# Patient Record
Sex: MALE | Race: WHITE | NOT HISPANIC OR LATINO | Employment: OTHER | URBAN - METROPOLITAN AREA
[De-identification: names, ages, dates, MRNs, and addresses within clinical notes are randomized per-mention and may not be internally consistent; named-entity substitution may affect disease eponyms.]

---

## 2017-01-04 ENCOUNTER — TELEPHONE (OUTPATIENT)
Dept: NEPHROLOGY | Facility: CLINIC | Age: 72
End: 2017-01-04

## 2017-01-04 NOTE — TELEPHONE ENCOUNTER
Call to patient to check in on BP. Patient reports that he has been out of town and just got back yesterday so he just made medication changes as recommended per Dr. Michel. Lab appointment set up 1/23 per patient. Confirmed by patient that he discontinued amlodipine and HCTZ and started nifedipine XL 60 mg BID and chlorthalidone 25 mg daily and continued metoprolol 100 mg BID and losartan 100 mg daily. Will update Dr. Michel and follow up in a week or two.  Elisa Cardona LPN  Nephrology  [facility]  [phone]

## 2017-01-17 ENCOUNTER — TELEPHONE (OUTPATIENT)
Dept: GASTROENTEROLOGY | Facility: CLINIC | Age: 72
End: 2017-01-17

## 2017-01-17 ENCOUNTER — MYC MEDICAL ADVICE (OUTPATIENT)
Dept: PULMONOLOGY | Facility: CLINIC | Age: 72
End: 2017-01-17

## 2017-01-17 NOTE — TELEPHONE ENCOUNTER
Patient contacted and reminded of upcoming appointment.  Patient confirmed they will be attending.  Patient instructed to bring updated medications list to appointment.  Instructed pt to arrive an hour and a half to two hours prior to appt time for labs.  Aj Weber, CMA

## 2017-01-18 ENCOUNTER — OFFICE VISIT (OUTPATIENT)
Dept: FAMILY MEDICINE | Facility: CLINIC | Age: 72
End: 2017-01-18

## 2017-01-18 ENCOUNTER — HOSPITAL ENCOUNTER (INPATIENT)
Facility: CLINIC | Age: 72
LOS: 7 days | Discharge: HOME | DRG: 273 | End: 2017-01-25
Attending: EMERGENCY MEDICINE | Admitting: INTERNAL MEDICINE
Payer: MEDICARE

## 2017-01-18 ENCOUNTER — APPOINTMENT (OUTPATIENT)
Dept: GENERAL RADIOLOGY | Facility: CLINIC | Age: 72
DRG: 273 | End: 2017-01-18
Attending: EMERGENCY MEDICINE
Payer: MEDICARE

## 2017-01-18 VITALS
RESPIRATION RATE: 20 BRPM | DIASTOLIC BLOOD PRESSURE: 88 MMHG | WEIGHT: 208.5 LBS | HEART RATE: 154 BPM | BODY MASS INDEX: 30.78 KG/M2 | SYSTOLIC BLOOD PRESSURE: 127 MMHG | OXYGEN SATURATION: 96 %

## 2017-01-18 DIAGNOSIS — I48.92 ATRIAL FLUTTER WITH RAPID VENTRICULAR RESPONSE (H): ICD-10-CM

## 2017-01-18 DIAGNOSIS — I48.0 PAROXYSMAL ATRIAL FIBRILLATION (H): Primary | ICD-10-CM

## 2017-01-18 DIAGNOSIS — Z23 NEED FOR PROPHYLACTIC VACCINATION AND INOCULATION AGAINST INFLUENZA: Primary | ICD-10-CM

## 2017-01-18 DIAGNOSIS — R07.9 CHEST PAIN, UNSPECIFIED TYPE: ICD-10-CM

## 2017-01-18 DIAGNOSIS — I10 HTN (HYPERTENSION): ICD-10-CM

## 2017-01-18 DIAGNOSIS — D86.9 SARCOIDOSIS: ICD-10-CM

## 2017-01-18 DIAGNOSIS — R06.02 SOB (SHORTNESS OF BREATH): ICD-10-CM

## 2017-01-18 DIAGNOSIS — F41.9 ANXIETY: ICD-10-CM

## 2017-01-18 DIAGNOSIS — D86.0 SARCOIDOSIS OF LUNG (H): ICD-10-CM

## 2017-01-18 DIAGNOSIS — I50.33 ACUTE ON CHRONIC DIASTOLIC HEART FAILURE (H): ICD-10-CM

## 2017-01-18 LAB
ALBUMIN SERPL-MCNC: 3.7 G/DL (ref 3.4–5)
ALP SERPL-CCNC: 132 U/L (ref 40–150)
ALT SERPL W P-5'-P-CCNC: 66 U/L (ref 0–70)
ANION GAP SERPL CALCULATED.3IONS-SCNC: 10 MMOL/L (ref 3–14)
AST SERPL W P-5'-P-CCNC: 32 U/L (ref 0–45)
BASOPHILS # BLD AUTO: 0.1 10E9/L (ref 0–0.2)
BASOPHILS NFR BLD AUTO: 0.6 %
BILIRUB DIRECT SERPL-MCNC: 0.3 MG/DL (ref 0–0.2)
BILIRUB SERPL-MCNC: 0.9 MG/DL (ref 0.2–1.3)
BUN SERPL-MCNC: 47 MG/DL (ref 7–30)
CA-I BLD-SCNC: 4.9 MG/DL (ref 4.4–5.2)
CALCIUM SERPL-MCNC: 8.6 MG/DL (ref 8.5–10.1)
CHLORIDE SERPL-SCNC: 102 MMOL/L (ref 94–109)
CO2 BLDCOV-SCNC: 22 MMOL/L (ref 21–28)
CO2 BLDCOV-SCNC: 24 MMOL/L (ref 21–28)
CO2 SERPL-SCNC: 23 MMOL/L (ref 20–32)
CREAT SERPL-MCNC: 1.99 MG/DL (ref 0.66–1.25)
DIFFERENTIAL METHOD BLD: ABNORMAL
EOSINOPHIL # BLD AUTO: 0.4 10E9/L (ref 0–0.7)
EOSINOPHIL NFR BLD AUTO: 3.2 %
ERYTHROCYTE [DISTWIDTH] IN BLOOD BY AUTOMATED COUNT: 13.8 % (ref 10–15)
GFR SERPL CREATININE-BSD FRML MDRD: 33 ML/MIN/1.7M2
GLUCOSE BLD-MCNC: 137 MG/DL (ref 70–99)
GLUCOSE SERPL-MCNC: 131 MG/DL (ref 70–99)
HCT VFR BLD AUTO: 45.1 % (ref 40–53)
HCT VFR BLD CALC: 47 %PCV (ref 40–53)
HGB BLD CALC-MCNC: 16 G/DL (ref 13.3–17.7)
HGB BLD-MCNC: 15.1 G/DL (ref 13.3–17.7)
IMM GRANULOCYTES # BLD: 0 10E9/L (ref 0–0.4)
IMM GRANULOCYTES NFR BLD: 0.3 %
INR PPP: 1.1 (ref 0.86–1.14)
LACTATE BLD-SCNC: 1.2 MMOL/L (ref 0.7–2.1)
LACTATE BLD-SCNC: 1.6 MMOL/L (ref 0.7–2.1)
LYMPHOCYTES # BLD AUTO: 1 10E9/L (ref 0.8–5.3)
LYMPHOCYTES NFR BLD AUTO: 8.1 %
MCH RBC QN AUTO: 32.9 PG (ref 26.5–33)
MCHC RBC AUTO-ENTMCNC: 33.5 G/DL (ref 31.5–36.5)
MCV RBC AUTO: 98 FL (ref 78–100)
MONOCYTES # BLD AUTO: 1.2 10E9/L (ref 0–1.3)
MONOCYTES NFR BLD AUTO: 9.8 %
NEUTROPHILS # BLD AUTO: 9.6 10E9/L (ref 1.6–8.3)
NEUTROPHILS NFR BLD AUTO: 78 %
NRBC # BLD AUTO: 0 10*3/UL
NRBC BLD AUTO-RTO: 0 /100
NT-PROBNP SERPL-MCNC: ABNORMAL PG/ML (ref 0–900)
PCO2 BLDV: 45 MM HG (ref 40–50)
PCO2 BLDV: 48 MM HG (ref 40–50)
PH BLDV: 7.29 PH (ref 7.32–7.43)
PH BLDV: 7.3 PH (ref 7.32–7.43)
PLATELET # BLD AUTO: 236 10E9/L (ref 150–450)
PO2 BLDV: 22 MM HG (ref 25–47)
PO2 BLDV: 30 MM HG (ref 25–47)
POTASSIUM BLD-SCNC: 4.8 MMOL/L (ref 3.4–5.3)
POTASSIUM SERPL-SCNC: 4.5 MMOL/L (ref 3.4–5.3)
PROCALCITONIN SERPL-MCNC: NORMAL NG/ML
PROT SERPL-MCNC: 8.3 G/DL (ref 6.8–8.8)
RBC # BLD AUTO: 4.59 10E12/L (ref 4.4–5.9)
SAO2 % BLDV FROM PO2: 32 %
SAO2 % BLDV FROM PO2: 50 %
SODIUM BLD-SCNC: 138 MMOL/L (ref 133–144)
SODIUM SERPL-SCNC: 135 MMOL/L (ref 133–144)
TROPONIN I BLD-MCNC: 0.23 UG/L (ref 0–0.1)
TROPONIN I SERPL-MCNC: 0.48 UG/L (ref 0–0.04)
WBC # BLD AUTO: 12.3 10E9/L (ref 4–11)

## 2017-01-18 PROCEDURE — 25000308 HC RX OP HPI UCR WEL MED 250 IP 250: Performed by: EMERGENCY MEDICINE

## 2017-01-18 PROCEDURE — 96375 TX/PRO/DX INJ NEW DRUG ADDON: CPT | Performed by: EMERGENCY MEDICINE

## 2017-01-18 PROCEDURE — 82803 BLOOD GASES ANY COMBINATION: CPT

## 2017-01-18 PROCEDURE — 84145 PROCALCITONIN (PCT): CPT | Performed by: EMERGENCY MEDICINE

## 2017-01-18 PROCEDURE — 71010 XR CHEST PORT 1 VW: CPT

## 2017-01-18 PROCEDURE — 99223 1ST HOSP IP/OBS HIGH 75: CPT | Mod: AI | Performed by: INTERNAL MEDICINE

## 2017-01-18 PROCEDURE — 87040 BLOOD CULTURE FOR BACTERIA: CPT | Performed by: EMERGENCY MEDICINE

## 2017-01-18 PROCEDURE — 83605 ASSAY OF LACTIC ACID: CPT | Performed by: EMERGENCY MEDICINE

## 2017-01-18 PROCEDURE — 93010 ELECTROCARDIOGRAM REPORT: CPT | Mod: Z6 | Performed by: EMERGENCY MEDICINE

## 2017-01-18 PROCEDURE — 82330 ASSAY OF CALCIUM: CPT

## 2017-01-18 PROCEDURE — 40000498 ZZHCL STATISTIC POTASSIUM ED POCT

## 2017-01-18 PROCEDURE — 96361 HYDRATE IV INFUSION ADD-ON: CPT | Performed by: EMERGENCY MEDICINE

## 2017-01-18 PROCEDURE — 96365 THER/PROPH/DIAG IV INF INIT: CPT | Performed by: EMERGENCY MEDICINE

## 2017-01-18 PROCEDURE — 96376 TX/PRO/DX INJ SAME DRUG ADON: CPT | Performed by: EMERGENCY MEDICINE

## 2017-01-18 PROCEDURE — 25000125 ZZHC RX 250: Performed by: EMERGENCY MEDICINE

## 2017-01-18 PROCEDURE — 25000128 H RX IP 250 OP 636: Performed by: EMERGENCY MEDICINE

## 2017-01-18 PROCEDURE — 93005 ELECTROCARDIOGRAM TRACING: CPT

## 2017-01-18 PROCEDURE — 40000501 ZZHCL STATISTIC HEMATOCRIT ED POCT

## 2017-01-18 PROCEDURE — 40000275 ZZH STATISTIC RCP TIME EA 10 MIN

## 2017-01-18 PROCEDURE — 80076 HEPATIC FUNCTION PANEL: CPT | Performed by: EMERGENCY MEDICINE

## 2017-01-18 PROCEDURE — 94640 AIRWAY INHALATION TREATMENT: CPT | Mod: 76 | Performed by: EMERGENCY MEDICINE

## 2017-01-18 PROCEDURE — 83605 ASSAY OF LACTIC ACID: CPT

## 2017-01-18 PROCEDURE — 21400006 ZZH R&B CCU INTERMEDIATE UMMC

## 2017-01-18 PROCEDURE — 85610 PROTHROMBIN TIME: CPT | Performed by: EMERGENCY MEDICINE

## 2017-01-18 PROCEDURE — 40000497 ZZHCL STATISTIC SODIUM ED POCT

## 2017-01-18 PROCEDURE — 99291 CRITICAL CARE FIRST HOUR: CPT | Mod: 25 | Performed by: EMERGENCY MEDICINE

## 2017-01-18 PROCEDURE — 40000556 ZZH STATISTIC PERIPHERAL IV START W US GUIDANCE

## 2017-01-18 PROCEDURE — 80048 BASIC METABOLIC PNL TOTAL CA: CPT | Performed by: EMERGENCY MEDICINE

## 2017-01-18 PROCEDURE — 93010 ELECTROCARDIOGRAM REPORT: CPT | Performed by: INTERNAL MEDICINE

## 2017-01-18 PROCEDURE — 96368 THER/DIAG CONCURRENT INF: CPT | Performed by: EMERGENCY MEDICINE

## 2017-01-18 PROCEDURE — 40000502 ZZHCL STATISTIC GLUCOSE ED POCT

## 2017-01-18 PROCEDURE — 93005 ELECTROCARDIOGRAM TRACING: CPT | Performed by: EMERGENCY MEDICINE

## 2017-01-18 PROCEDURE — 84484 ASSAY OF TROPONIN QUANT: CPT

## 2017-01-18 PROCEDURE — 96366 THER/PROPH/DIAG IV INF ADDON: CPT | Performed by: EMERGENCY MEDICINE

## 2017-01-18 PROCEDURE — 83880 ASSAY OF NATRIURETIC PEPTIDE: CPT | Performed by: EMERGENCY MEDICINE

## 2017-01-18 PROCEDURE — 99285 EMERGENCY DEPT VISIT HI MDM: CPT | Performed by: EMERGENCY MEDICINE

## 2017-01-18 PROCEDURE — 85025 COMPLETE CBC W/AUTO DIFF WBC: CPT | Performed by: EMERGENCY MEDICINE

## 2017-01-18 PROCEDURE — 94640 AIRWAY INHALATION TREATMENT: CPT | Performed by: EMERGENCY MEDICINE

## 2017-01-18 PROCEDURE — 84484 ASSAY OF TROPONIN QUANT: CPT | Performed by: EMERGENCY MEDICINE

## 2017-01-18 RX ORDER — MIRTAZAPINE 15 MG/1
15 TABLET, FILM COATED ORAL AT BEDTIME
Status: DISCONTINUED | OUTPATIENT
Start: 2017-01-18 | End: 2017-01-25 | Stop reason: HOSPADM

## 2017-01-18 RX ORDER — NITROGLYCERIN 0.4 MG/1
0.4 TABLET SUBLINGUAL EVERY 5 MIN PRN
Status: DISCONTINUED | OUTPATIENT
Start: 2017-01-18 | End: 2017-01-25 | Stop reason: HOSPADM

## 2017-01-18 RX ORDER — ATORVASTATIN CALCIUM 20 MG/1
20 TABLET, FILM COATED ORAL DAILY
Status: DISCONTINUED | OUTPATIENT
Start: 2017-01-19 | End: 2017-01-25 | Stop reason: HOSPADM

## 2017-01-18 RX ORDER — ALUMINA, MAGNESIA, AND SIMETHICONE 2400; 2400; 240 MG/30ML; MG/30ML; MG/30ML
15-30 SUSPENSION ORAL EVERY 4 HOURS PRN
Status: DISCONTINUED | OUTPATIENT
Start: 2017-01-18 | End: 2017-01-25 | Stop reason: HOSPADM

## 2017-01-18 RX ORDER — LIDOCAINE 40 MG/G
CREAM TOPICAL
Status: DISCONTINUED | OUTPATIENT
Start: 2017-01-18 | End: 2017-01-23

## 2017-01-18 RX ORDER — HEPARIN SODIUM 10000 [USP'U]/100ML
0-3500 INJECTION, SOLUTION INTRAVENOUS CONTINUOUS
Status: DISCONTINUED | OUTPATIENT
Start: 2017-01-18 | End: 2017-01-19

## 2017-01-18 RX ORDER — ACETAMINOPHEN 650 MG/1
650 SUPPOSITORY RECTAL EVERY 4 HOURS PRN
Status: DISCONTINUED | OUTPATIENT
Start: 2017-01-18 | End: 2017-01-25 | Stop reason: HOSPADM

## 2017-01-18 RX ORDER — IPRATROPIUM BROMIDE AND ALBUTEROL SULFATE 2.5; .5 MG/3ML; MG/3ML
3 SOLUTION RESPIRATORY (INHALATION) ONCE
Status: COMPLETED | OUTPATIENT
Start: 2017-01-18 | End: 2017-01-18

## 2017-01-18 RX ORDER — INFLIXIMAB 100 MG/10ML
100 INJECTION, POWDER, LYOPHILIZED, FOR SOLUTION INTRAVENOUS
COMMUNITY

## 2017-01-18 RX ORDER — ASPIRIN 81 MG/1
324 TABLET, CHEWABLE ORAL ONCE
Status: DISCONTINUED | OUTPATIENT
Start: 2017-01-18 | End: 2017-01-18

## 2017-01-18 RX ORDER — ACETAMINOPHEN 325 MG/1
650 TABLET ORAL EVERY 4 HOURS PRN
Status: DISCONTINUED | OUTPATIENT
Start: 2017-01-18 | End: 2017-01-25 | Stop reason: HOSPADM

## 2017-01-18 RX ORDER — LEVOTHYROXINE SODIUM 137 UG/1
137 TABLET ORAL DAILY
Status: DISCONTINUED | OUTPATIENT
Start: 2017-01-19 | End: 2017-01-25 | Stop reason: HOSPADM

## 2017-01-18 RX ORDER — METOPROLOL TARTRATE 100 MG
100 TABLET ORAL 2 TIMES DAILY
Status: DISCONTINUED | OUTPATIENT
Start: 2017-01-18 | End: 2017-01-25 | Stop reason: HOSPADM

## 2017-01-18 RX ORDER — ASPIRIN 81 MG/1
81 TABLET ORAL DAILY
Status: DISCONTINUED | OUTPATIENT
Start: 2017-01-19 | End: 2017-01-25 | Stop reason: HOSPADM

## 2017-01-18 RX ORDER — SODIUM CHLORIDE 9 MG/ML
1000 INJECTION, SOLUTION INTRAVENOUS CONTINUOUS
Status: DISCONTINUED | OUTPATIENT
Start: 2017-01-18 | End: 2017-01-18

## 2017-01-18 RX ORDER — POTASSIUM CHLORIDE 750 MG/1
20 TABLET, EXTENDED RELEASE ORAL
Status: COMPLETED | OUTPATIENT
Start: 2017-01-18 | End: 2017-01-22

## 2017-01-18 RX ORDER — LOSARTAN POTASSIUM 50 MG/1
100 TABLET ORAL DAILY
Status: DISCONTINUED | OUTPATIENT
Start: 2017-01-19 | End: 2017-01-19

## 2017-01-18 RX ORDER — ALBUTEROL SULFATE 90 UG/1
2 AEROSOL, METERED RESPIRATORY (INHALATION) EVERY 6 HOURS PRN
Status: DISCONTINUED | OUTPATIENT
Start: 2017-01-18 | End: 2017-01-25 | Stop reason: HOSPADM

## 2017-01-18 RX ORDER — POTASSIUM CHLORIDE 750 MG/1
40 TABLET, EXTENDED RELEASE ORAL
Status: DISCONTINUED | OUTPATIENT
Start: 2017-01-18 | End: 2017-01-23

## 2017-01-18 RX ORDER — ALBUTEROL SULFATE 0.83 MG/ML
2.5 SOLUTION RESPIRATORY (INHALATION)
Status: COMPLETED | OUTPATIENT
Start: 2017-01-18 | End: 2017-01-19

## 2017-01-18 RX ORDER — FUROSEMIDE 10 MG/ML
40 INJECTION INTRAMUSCULAR; INTRAVENOUS ONCE
Status: COMPLETED | OUTPATIENT
Start: 2017-01-18 | End: 2017-01-19

## 2017-01-18 RX ORDER — DILTIAZEM HYDROCHLORIDE 5 MG/ML
0.25 INJECTION INTRAVENOUS ONCE
Status: COMPLETED | OUTPATIENT
Start: 2017-01-18 | End: 2017-01-18

## 2017-01-18 RX ORDER — CHLORTHALIDONE 25 MG/1
25 TABLET ORAL DAILY
Status: DISCONTINUED | OUTPATIENT
Start: 2017-01-19 | End: 2017-01-19

## 2017-01-18 RX ORDER — POLYETHYLENE GLYCOL 3350 17 G/17G
17 POWDER, FOR SOLUTION ORAL ONCE
Status: COMPLETED | OUTPATIENT
Start: 2017-01-18 | End: 2017-01-19

## 2017-01-18 RX ADMIN — DILTIAZEM HYDROCHLORIDE 23 MG: 5 INJECTION INTRAVENOUS at 19:46

## 2017-01-18 RX ADMIN — IPRATROPIUM BROMIDE AND ALBUTEROL SULFATE 3 ML: .5; 3 SOLUTION RESPIRATORY (INHALATION) at 18:14

## 2017-01-18 RX ADMIN — ALBUTEROL SULFATE 2.5 MG: 2.5 SOLUTION RESPIRATORY (INHALATION) at 19:22

## 2017-01-18 RX ADMIN — HEPARIN SODIUM 1100 UNITS/HR: 10000 INJECTION, SOLUTION INTRAVENOUS at 20:12

## 2017-01-18 RX ADMIN — DILTIAZEM HYDROCHLORIDE 5 MG/HR: 5 INJECTION INTRAVENOUS at 22:09

## 2017-01-18 RX ADMIN — METOPROLOL TARTRATE 5 MG: 5 INJECTION INTRAVENOUS at 18:53

## 2017-01-18 RX ADMIN — METOPROLOL TARTRATE 5 MG: 5 INJECTION INTRAVENOUS at 18:15

## 2017-01-18 RX ADMIN — METOPROLOL TARTRATE 5 MG: 5 INJECTION INTRAVENOUS at 19:20

## 2017-01-18 RX ADMIN — SODIUM CHLORIDE 1000 ML: 9 INJECTION, SOLUTION INTRAVENOUS at 18:30

## 2017-01-18 RX ADMIN — SODIUM CHLORIDE 1000 ML: 900 INJECTION, SOLUTION INTRAVENOUS at 22:16

## 2017-01-18 ASSESSMENT — PAIN SCALES - GENERAL: PAINLEVEL: MILD PAIN (2)

## 2017-01-18 ASSESSMENT — PAIN DESCRIPTION - DESCRIPTORS: DESCRIPTORS: ACHING

## 2017-01-18 NOTE — MR AVS SNAPSHOT
After Visit Summary   1/18/2017    Rohan Monroe    MRN: [id number]           Patient Information     Date Of Birth          1945        Visit Information        Provider Department      1/18/2017 4:00 PM Madalyn Fajardo MD PHD [facility]        Today's Diagnoses     Need for prophylactic vaccination and inoculation against influenza    -  1       Care Instructions    Primary Care Center Medication Refill Request Information:  * Please contact your pharmacy regarding ANY request for medication refills.  ** [facility] Prescription Fax = [phone]  * Please allow 3 business days for routine medication refills.  * Please allow 5 business days for controlled substance medication refills.     Primary Care Center Test Result notification information:  *You will be notified with in 7-10 days of your appointment day regarding the results of your test.  If you are on MyChart you will be notified as soon as the provider has reviewed the results and signed off on them.          Follow-ups after your visit        Your next 10 appointments already scheduled     Jan 23, 2017 12:00 PM   Lab with [facility]   [facility] Lab ([facility])    [address] [place] [id number] [phone]            Jan 23, 2017  1:00 PM   (Arrive by 12:45 PM)   Return General Liver with Moses Orosco MD   [facility] Hepatology ([facility])    [address] [place] [id number] [phone]            Feb 13, 2017  1:30 PM   (Arrive by 1:00 PM)   RETURN DIABETES with Macey Roy MD   [facility] Endocrinology ([facility])    [address] [place] [id number] [phone]            Feb 21, 2017 12:00 PM   Infusion 180 with  SPEC INFUSION, UC 47 ATC   [facility] ([facility])     [address]  [address] [facility] [address]   [phone]            Feb 24, 2017 10:00 AM   PFT VISIT with WALTER MCDOWELL   [facility] Pulmonary Function Testing ([facility])    [address]  [address] [facility] [address]   [phone]            Feb 24, 2017 10:30 AM   (Arrive by 10:15 AM)   Return Interstitial Lung with David Morris Perlman, MD   [facility] ([facility])    [address]  [address] [facility] [address]   [phone]            Feb 27, 2017 10:20 AM   (Arrive by 10:05 AM)   RETURN FOOT/ANKLE with Chicho Mejia DPM   [facility] ([facility])    [address] [place] [address]  [address]th [facility] [address]   [phone]            Mar 06, 2017  8:30 AM   VISUAL FIELD with [facility] EYE VISUAL FIELD   [facility] Clinic ([facility])    [address]  [facility] [address]   [phone]            Mar 06, 2017  9:00 AM   RETURN GLAUCOMA with Kina Greco MD   [facility] ([facility])    [address]  [facility] [address]   [phone]              Who to contact     Please call your clinic at [phone] to:    Ask questions about your health    Make or cancel appointments    Discuss your medicines    Learn about your test results    Speak to your doctor   If you have compliments or concerns about an experience at your clinic, or if you wish to file a complaint, please contact [facility] Patient Relations at [phone] or email us at [email or web address]         Additional Information About Your Visit        MyChart Information     [employer] gives you secure access to your electronic health record. If you see a primary care provider, you can also send messages to your care team and make appointments. If  you have questions, please call your primary care clinic.  If you do not have a primary care provider, please call [phone] and they will assist you.      [employer] is an electronic gateway that provides easy, online access to your medical records. With [employer], you can request a clinic appointment, read your test results, renew a prescription or communicate with your care team.     To access your existing account, please contact your [facility] or call [phone] for assistance.        Care EveryWhere ID     This is your Care EveryWhere ID. This could be used by other organizations to access your [facility] medical records  [id number]        Your Vitals Were     Pulse Respirations Pulse Oximetry             154 20 96%          Blood Pressure from Last 3 Encounters:   01/18/17 127/88   12/27/16 163/108   12/26/16 158/91    Weight from Last 3 Encounters:   01/18/17 94.575 kg (208 lb 8 oz)   12/27/16 93.078 kg (205 lb 3.2 oz)   12/26/16 94.802 kg (209 lb)              Today, you had the following     No orders found for display         Today's Medication Changes          These changes are accurate as of: 1/18/17  4:53 PM.  If you have any questions, ask your nurse or doctor.               These medicines have changed or have updated prescriptions.        Dose/Directions    Urea 40 % Crea   Commonly known as:  CARMOL 40   This may have changed:  additional instructions   Used for:  Dermatophytosis of nail, Corns and callosities        To feet daily   Quantity:  199 g   Refills:  4                Primary Care Provider Office Phone # Fax #    Jamie Foster [phone]       [address] [address]        Thank you!     Thank you for choosing [facility]  for your care. Our goal is always to provide you with excellent care. Hearing back from our patients is one way we can continue to improve our services. Please  take a few minutes to complete the written survey that you may receive in the mail after your visit with us. Thank you!             Your Updated Medication List - Protect others around you: Learn how to safely use, store and throw away your medicines at www.disposemymeds.org.          This list is accurate as of: 1/18/17  4:53 PM.  Always use your most recent med list.                   Brand Name Dispense Instructions for use    albuterol 108 (90 BASE) MCG/ACT Inhaler    PROAIR HFA/PROVENTIL HFA/VENTOLIN HFA    3 Inhaler    Inhale 2 puffs into the lungs every 6 hours as needed for shortness of breath / dyspnea       ASPIRIN EC PO      Take 81 mg by mouth daily       atorvastatin 20 MG tablet    LIPITOR    90 tablet    Take 1 tablet (20 mg) by mouth daily       blood glucose monitoring lancets     2 Box    Use to test blood sugar 2 times daily or as directed.  102 lancets per box.  3 month supply.       blood glucose monitoring meter device kit    no brand specified    1 kit    Use to test blood sugar 2 times daily.       blood glucose monitoring test strip    ACCU-CHEK RONNIE PLUS    200 each    Use to test blood sugar 2 times daily or as directed.  3 month supply.       chlorthalidone 25 MG tablet    HYGROTON    30 tablet    Take 1 tablet (25 mg) by mouth daily       ciclopirox 0.77 % cream    LOPROX    90 g    Apply topically 2 times daily To feet and toenails.       fluticasone-vilanterol 100-25 MCG/INH oral inhaler    BREO ELLIPTA    3 Inhaler    Inhale 1 puff into the lungs daily       * INFLIXIMAB IV      Every 45 days       * inFLIXimab 100 MG injection    REMICADE         levothyroxine 137 MCG tablet    SYNTHROID/LEVOTHROID    90 tablet    Take 1 tablet (137 mcg) by mouth daily       losartan 100 MG tablet    COZAAR    90 tablet    Take 1 tablet (100 mg) by mouth daily       methotrexate 2.5 MG tablet CHEMO     48 tablet    Take 3 tablets (7.5 mg) by mouth once a week On Mondays       metoprolol 100 MG  tablet    LOPRESSOR    60 tablet    Take 1 tablet (100 mg) by mouth 2 times daily       mirtazapine 15 MG tablet    REMERON     Take 15 mg by mouth At Bedtime.       MULTIVITAMIN & MINERAL PO      Take 1 tablet by mouth daily.       NIFEdipine ER 60 MG 24 hr tablet    ADALAT CC    60 tablet    Take 1 tablet (60 mg) by mouth daily       sildenafil 50 MG cap/tab    VIAGRA    10 tablet    Take 2 tablets (100 mg) by mouth daily as needed for erectile dysfunction       tiotropium 18 MCG capsule    SPIRIVA HANDIHALER    90 capsule    Inhale contents of one capsule daily.       Urea 40 % Crea    CARMOL 40    199 g    To feet daily       * Notice:  This list has 2 medication(s) that are the same as other medications prescribed for you. Read the directions carefully, and ask your doctor or other care provider to review them with you.

## 2017-01-18 NOTE — IP AVS SNAPSHOT
MRN:[id number]                      After Visit Summary   1/18/2017    Rohan Monroe    MRN: [id number]           Thank you!     Thank you for choosing [place] for your care. Our goal is always to provide you with excellent care. Hearing back from our patients is one way we can continue to improve our services. Please take a few minutes to complete the written survey that you may receive in the mail after you visit with us. Thank you!        Patient Information     Date Of Birth          1945        About your hospital stay     You were admitted on:  January 18, 2017 You last received care in the:  [facility]    You were discharged on:  January 25, 2017        Reason for your hospital stay       Atrial flutter                  Who to Call     For medical emergencies, please call 911.  For non-urgent questions about your medical care, please call your primary care provider or clinic, [phone]  For questions related to your surgery, please call your surgery clinic        Attending Provider     Provider    Steffi Holley MD Cog[place]ell, MD George Crawford, MD Jona Torres, Jered Denson MD       Primary Care Provider Office Phone # Fax #    Jamie XIAO MD Cristian [phone]       [place] [address]        After Care Instructions     Activity       Your activity upon discharge: activity as tolerated            Diet       Follow this diet upon discharge: Orders Placed This Encounter  2 Gram Sodium Diet                  Follow-up Appointments     Adult [facility] Follow-up and recommended labs and tests       Follow up with Dr. 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     Establish care with [facility] and follow up with them within 1 week for Cr check and consideration of restarting  losartan.  Appointments on [place] and/or [facility] (with [facility] or [facility] provider or service). Call [phone] if you haven't heard regarding these appointments within 7 days of discharge.                  Your next 10 appointments already scheduled     Jan 27, 2017 11:00 AM   LAB with [facility] ([facility])    [address] [place] [id number] [phone]           Patient must bring picture ID.  Patient should be prepared to give a urine specimen  Please do not eat 10-12 hours before your appointment if you are coming in fasting for labs on lipids, cholesterol, or glucose (sugar).  Pregnant women should follow their Care Team instructions. Water with medications is okay. Do not drink coffee or other fluids.   If you have concerns about taking  your medications, please ask at office or if scheduling via [employer], send a message by clicking on Secure Messaging, Message Your Care Team.            Jan 31, 2017 10:45 AM   LAB with  LAB   [facility] ([facility])    [address] [id number] [phone]           Patient must bring picture ID.  Patient should be prepared to give a urine specimen  Please do not eat 10-12 hours before your appointment if you are coming in fasting for labs on lipids, cholesterol, or glucose (sugar).  Pregnant women should follow their Care Team instructions. Water with medications is okay. Do not drink coffee or other fluids.   If you have concerns about taking  your medications, please ask at office or if scheduling via [employer], send a message by clicking on Secure Messaging, Message Your Care Team.            Jan 31, 2017 11:45 AM   (Arrive by 11:30 AM)   Return General Liver with Moses Orosco MD   [facility] Hepatology ([facility])    [address] [place] [id number] [phone]            Feb 13,  2017  1:30 PM   (Arrive by 1:00 PM)   RETURN DIABETES with Macey Roy MD   [facility] Endocrinology ([facility])    [address] [facility]  3rd [facility] [id number] [phone]            Feb 21, 2017 12:00 PM   Infusion 180 with UC SPEC INFUSION, UC 47 ATC   [facility] Specialty [facility] Procedure ([facility])    [address] [place] [address]  2nd [facility] [id number] [phone]            Feb 24, 2017 10:00 AM   PFT VISIT with  PFL B   [facility] Pulmonary Function Testing ([facility])    [address] [place] [address]  3rd [facility] [id number] [phone]            Feb 24, 2017 10:30 AM   (Arrive by 10:15 AM)   Return Interstitial Lung with David Morris Perlman, MD   [facility] ([facility])    [address] [place] [address]  3rd [facility] [id number] [phone]            Feb 24, 2017 11:00 AM   (Arrive by 10:45 AM)   RETURN ARRHYTHMIA with Brian Vazquez MD   [facility] Heart Care ([facility])    [address] [place] [address]  3rd [facility] [id number] [phone]            Feb 27, 2017 10:20 AM   (Arrive by 10:05 AM)   RETURN FOOT/ANKLE with Chicho Mejia DPM   [facility] ([facility])    [address] [place] [address]  4th [facility] [id number] [phone]              Additional Services     INR Clinic Referral       ______________________________________________________    [facility]   Phone: [phone]   Fax: [phone]    For INR and Warfarin monitoring (Goal=2-3).  Indication for Anticoagulation: Atrial Flutter s/p cardioversion.   Expected duration of therapy: 3 months, then re-assess.   Dr. Jamie Foster to follow.     Appointment on 1/27/17 at 11 AM for INR lab draw at the [facility]  Lab.  ______________________________________________________            Medication Therapy Management Referral       Reason for referral:  on more than 10 medications and hospitalized or in the ED in the past 6 months     This service is designed to help you get the most from your medications.  A specially trained pharmacist will work closely with you and your doctors  to solve any problems related to your medications and to help you get the   best results from taking them.      The Medication Therapy Management staff will call you to schedule an appointment.                  Further instructions from your care team           Care of groin site:         Remove the Band-Aid after 24 hours. If there is minor oozing, apply another Band-aid and remove it after 12 hours.          Do NOT take a bath, use a hot tub, pool, or submerse in water for at least 3 days You may shower.          It is normal to have a small bruise or lump at the site.         Do not scrub the site.         Do not use lotion or powder near the puncture site for 3 days.         For the first 2 days: Do not stoop or squat. When you cough, sneeze or move your bowels, hold your hand over the puncture site and press gently.         Do not lift more than 10 pounds or exertional activity for 10 days.      If you start bleeding from the site in your groin:  Lie down flat and press firmly on the site.  Call your physician immediately, or, come to the emergency room.    Call 911 right away if you have bleeding that is heavy or does not stop.     Call your doctor/provider if:         You have a large or growing hard lump around the site.         The site is red, swollen, hot or tender.         Blood or fluid is draining from the site.         You have chills or a fever greater than 101 F (38 C).         Your leg or arm turns bluish, feels numb or cool.         You have hives, a rash or unusual itching.     Cardiovascular Clinic:   [address] [place] [address]. [place] [address]  Your Care Team:   Cardiology   Telephone Number     Elizabeth Ortiz Vazquez [phone]   Jaclyn Pina RN  Paige Lees RN  [phone]     For scheduling appts or procedures:    Joie Savage   [phone]     As always, Thank you for trusting us with your health care needs          Warfarin Instruction     You have started taking a medicine called warfarin. This is a blood-thinning medicine (anticoagulant). It helps prevent and treat blood clots.      Before leaving the hospital, make sure you know how much to take and how long to take it.      You will need regular blood tests to make sure your blood is clotting safely. It is very important to see your doctor for regular blood tests.    Talk to your doctor before taking any new medicine (this includes over-the-counter drugs and herbal products). Many medicines can interact with warfarin. This may cause more bleeding or too much clotting.     Eating a lot of vitamin K--found in green, leafy vegetables--can change the way warfarin works in your body. Do NOT avoid these foods. Instead, try to eat the same amount each day.     Bleeding is the most common side-effect of warfarin. You may notice bleeding gums, a bloody nose, bruises and bleeding longer when you cut yourself. See a doctor at once if:   o You cough up blood  o You find blood in your stool (poop)  o You have a deep cut, or a cut that bleeds longer than 10 minutes   o You have a bad cut, hard fall, accident or hit your head (go to urgent care or the emergency room).    For women who can get pregnant: This medicine can harm an unborn baby. Be very careful not to get pregnant while taking this medicine. If you think you might be pregnant, call your doctor right away.    For more information, read \"Guide to Warfarin Therapy,  the booklet you received in the hospital.        Pending Results     Date and Time Order Name Status Description    1/24/2017 0959 " "Cardioversion In process     1/18/2017 2244 Cardioversion In process             Statement of Approval     Ordered          01/25/17 0936  I have reviewed and agree with all the recommendations and orders detailed in this document.   EFFECTIVE NOW     Approved and electronically signed by:  Delio Mayo MD             Admission Information        Provider Department Dept Phone    1/18/2017 Jered Tenorio MD [facility] [phone]      Your Vitals Were     Blood Pressure Pulse Temperature    142/90 mmHg 119 98.7  F (37.1  C) (Oral)    Respirations Height Weight    18 1.753 m (5' 9\") 87.544 kg (193 lb)    BMI (Body Mass Index) Pulse Oximetry       28.49 kg/m2 90%       MyChart Information     SMR SITE gives you secure access to your electronic health record. If you see a primary care provider, you can also send messages to your care team and make appointments. If you have questions, please call your primary care clinic.  If you do not have a primary care provider, please call [phone] and they will assist you.        Care EveryWhere ID     This is your Care EveryWhere ID. This could be used by other organizations to access your [facility] medical records  [id number]           Review of your medicines      START taking        Dose / Directions    amiodarone HCl 400 MG Tabs   Used for:  Atrial flutter with rapid ventricular response (H)        Dose:  200 mg   Take 200 mg by mouth 2 times daily   Quantity:  60 tablet   Refills:  3       clonazePAM 0.5 MG tablet   Commonly known as:  klonoPIN   Used for:  Anxiety        Dose:  0.5 mg   Take 1 tablet (0.5 mg) by mouth nightly as needed for anxiety   Quantity:  15 tablet   Refills:  0       furosemide 20 MG tablet   Commonly known as:  LASIX   Used for:  Atrial flutter with rapid ventricular response (H), Sarcoidosis (H), Acute on chronic diastolic heart failure (H)        Dose:  40 mg   Take 2 tablets (40 mg) by mouth daily as needed For weight gain of 3 " lbs or greater   Quantity:  180 tablet   Refills:  3       * warfarin 5 MG tablet   Commonly known as:  COUMADIN   Used for:  Atrial flutter with rapid ventricular response (H)        Dose:  5 mg   Take 1 tablet (5 mg) by mouth daily   Quantity:  30 tablet   Refills:  3       * warfarin 2.5 MG tablet   Commonly known as:  COUMADIN   Used for:  Atrial flutter with rapid ventricular response (H)        Dose:  2.5 mg   Take 1 tablet (2.5 mg) by mouth once for 1 dose   Quantity:  1 tablet   Refills:  0       * Notice:  This list has 2 medication(s) that are the same as other medications prescribed for you. Read the directions carefully, and ask your doctor or other care provider to review them with you.      CONTINUE these medicines which may have CHANGED, or have new prescriptions. If we are uncertain of the size of tablets/capsules you have at home, strength may be listed as something that might have changed.        Dose / Directions    Urea 40 % Crea   Commonly known as:  CARMOL 40   This may have changed:  additional instructions   Used for:  Dermatophytosis of nail, Corns and callosities        To feet daily   Quantity:  199 g   Refills:  4         CONTINUE these medicines which have NOT CHANGED        Dose / Directions    albuterol 108 (90 BASE) MCG/ACT Inhaler   Commonly known as:  PROAIR HFA/PROVENTIL HFA/VENTOLIN HFA   Used for:  Sarcoidosis (H)        Dose:  2 puff   Inhale 2 puffs into the lungs every 6 hours as needed for shortness of breath / dyspnea   Quantity:  3 Inhaler   Refills:  6       ASPIRIN EC PO        Dose:  81 mg   Take 81 mg by mouth daily   Refills:  0       atorvastatin 20 MG tablet   Commonly known as:  LIPITOR   Used for:  Hyperlipidemia        Dose:  20 mg   Take 1 tablet (20 mg) by mouth daily   Quantity:  90 tablet   Refills:  3       blood glucose monitoring lancets   Used for:  Type 2 diabetes, HbA1C goal < 8% (H)        Use to test blood sugar 2 times daily or as directed.  102  lancets per box.  3 month supply.   Quantity:  2 Box   Refills:  3       blood glucose monitoring meter device kit   Commonly known as:  no brand specified   Used for:  Type 2 diabetes mellitus with diabetic nephropathy (H)        Use to test blood sugar 2 times daily.   Quantity:  1 kit   Refills:  0       blood glucose monitoring test strip   Commonly known as:  ACCU-CHEK RONNIE PLUS   Used for:  Type 2 diabetes, HbA1C goal < 8% (H)        Use to test blood sugar 2 times daily or as directed.  3 month supply.   Quantity:  200 each   Refills:  3       ciclopirox 0.77 % cream   Commonly known as:  LOPROX   Used for:  Dermatophytosis of nail, Tinea pedis of both feet        Apply topically 2 times daily To feet and toenails.   Quantity:  90 g   Refills:  6       fluticasone-vilanterol 100-25 MCG/INH oral inhaler   Commonly known as:  BREO ELLIPTA   Used for:  COPD (chronic obstructive pulmonary disease) (H)        Dose:  1 puff   Inhale 1 puff into the lungs daily   Quantity:  3 Inhaler   Refills:  3       * INFLIXIMAB IV        Every 45 days   Refills:  0       * inFLIXimab 100 MG injection   Commonly known as:  REMICADE        Refills:  0       levothyroxine 137 MCG tablet   Commonly known as:  SYNTHROID/LEVOTHROID   Used for:  Hypothyroidism        Dose:  137 mcg   Take 1 tablet (137 mcg) by mouth daily   Quantity:  90 tablet   Refills:  0       methotrexate 2.5 MG tablet CHEMO   Used for:  Sarcoidosis (H)        Dose:  7.5 mg   Take 3 tablets (7.5 mg) by mouth once a week On Mondays   Quantity:  48 tablet   Refills:  3       metoprolol 100 MG tablet   Commonly known as:  LOPRESSOR   Used for:  Hypertension secondary to other renal disorders        Dose:  100 mg   Take 1 tablet (100 mg) by mouth 2 times daily   Quantity:  60 tablet   Refills:  11       mirtazapine 15 MG tablet   Commonly known as:  REMERON        Dose:  15 mg   Take 15 mg by mouth At Bedtime.   Refills:  0       MULTIVITAMIN & MINERAL PO         Dose:  1 tablet   Take 1 tablet by mouth daily.   Refills:  0       sildenafil 50 MG cap/tab   Commonly known as:  VIAGRA   Used for:  CAD (coronary artery disease)        Dose:  100 mg   Take 2 tablets (100 mg) by mouth daily as needed for erectile dysfunction   Quantity:  10 tablet   Refills:  6       tiotropium 18 MCG capsule   Commonly known as:  SPIRIVA HANDIHALER   Used for:  COPD (chronic obstructive pulmonary disease) (H)        Inhale contents of one capsule daily.   Quantity:  90 capsule   Refills:  3       * Notice:  This list has 2 medication(s) that are the same as other medications prescribed for you. Read the directions carefully, and ask your doctor or other care provider to review them with you.      STOP taking     chlorthalidone 25 MG tablet   Commonly known as:  HYGROTON           losartan 100 MG tablet   Commonly known as:  COZAAR           NIFEdipine ER 60 MG 24 hr tablet   Commonly known as:  ADALAT CC                Where to get your medicines      These medications were sent to [facility] [place] - [place], [place] - [address]  [address] [address]     Phone:  [phone]    - amiodarone HCl 400 MG Tabs  - furosemide 20 MG tablet  - warfarin 2.5 MG tablet  - warfarin 5 MG tablet      Some of these will need a paper prescription and others can be bought over the counter. Ask your nurse if you have questions.     Bring a paper prescription for each of these medications    - clonazePAM 0.5 MG tablet             Protect others around you: Learn how to safely use, store and throw away your medicines at www.disposemymeds.org.             Medication List: This is a list of all your medications and when to take them. Check marks below indicate your daily home schedule. Keep this list as a reference.      Medications           Morning Afternoon Evening Bedtime As Needed    albuterol 108 (90 BASE) MCG/ACT Inhaler   Commonly known as:  PROAIR HFA/PROVENTIL  HFA/VENTOLIN HFA   Inhale 2 puffs into the lungs every 6 hours as needed for shortness of breath / dyspnea   Last time this was given:  2 puffs on 1/22/2017  6:08 AM                                amiodarone HCl 400 MG Tabs   Take 200 mg by mouth 2 times daily   Last time this was given:  400 mg on 1/25/2017  9:36 AM                                ASPIRIN EC PO   Take 81 mg by mouth daily   Last time this was given:  81 mg on 1/25/2017  9:36 AM                                atorvastatin 20 MG tablet   Commonly known as:  LIPITOR   Take 1 tablet (20 mg) by mouth daily   Last time this was given:  20 mg on 1/24/2017  9:39 PM                                blood glucose monitoring lancets   Use to test blood sugar 2 times daily or as directed.  102 lancets per box.  3 month supply.                                blood glucose monitoring meter device kit   Commonly known as:  no brand specified   Use to test blood sugar 2 times daily.                                blood glucose monitoring test strip   Commonly known as:  ACCU-CHEK RONNIE PLUS   Use to test blood sugar 2 times daily or as directed.  3 month supply.                                ciclopirox 0.77 % cream   Commonly known as:  LOPROX   Apply topically 2 times daily To feet and toenails.                                clonazePAM 0.5 MG tablet   Commonly known as:  klonoPIN   Take 1 tablet (0.5 mg) by mouth nightly as needed for anxiety   Last time this was given:  0.5 mg on 1/24/2017  9:39 PM                                fluticasone-vilanterol 100-25 MCG/INH oral inhaler   Commonly known as:  BREO ELLIPTA   Inhale 1 puff into the lungs daily   Last time this was given:  1 puff on 1/25/2017  9:34 AM                                furosemide 20 MG tablet   Commonly known as:  LASIX   Take 2 tablets (40 mg) by mouth daily as needed For weight gain of 3 lbs or greater                                * INFLIXIMAB IV   Every 45 days                                 * inFLIXimab 100 MG injection   Commonly known as:  REMICADE                                levothyroxine 137 MCG tablet   Commonly known as:  SYNTHROID/LEVOTHROID   Take 1 tablet (137 mcg) by mouth daily   Last time this was given:  137 mcg on 1/25/2017  9:36 AM                                methotrexate 2.5 MG tablet CHEMO   Take 3 tablets (7.5 mg) by mouth once a week On Mondays   Last time this was given:  7.5 mg on 1/24/2017 10:44 AM                                metoprolol 100 MG tablet   Commonly known as:  LOPRESSOR   Take 1 tablet (100 mg) by mouth 2 times daily   Last time this was given:  100 mg on 1/25/2017  9:36 AM                                mirtazapine 15 MG tablet   Commonly known as:  REMERON   Take 15 mg by mouth At Bedtime.   Last time this was given:  15 mg on 1/24/2017  9:39 PM                                MULTIVITAMIN & MINERAL PO   Take 1 tablet by mouth daily.                                sildenafil 50 MG cap/tab   Commonly known as:  VIAGRA   Take 2 tablets (100 mg) by mouth daily as needed for erectile dysfunction                                tiotropium 18 MCG capsule   Commonly known as:  SPIRIVA HANDIHALER   Inhale contents of one capsule daily.                                Urea 40 % Crea   Commonly known as:  CARMOL 40   To feet daily                                * warfarin 5 MG tablet   Commonly known as:  COUMADIN   Take 1 tablet (5 mg) by mouth daily   Last time this was given:  5 mg on 1/24/2017  5:37 PM                                * warfarin 2.5 MG tablet   Commonly known as:  COUMADIN   Take 1 tablet (2.5 mg) by mouth once for 1 dose   Last time this was given:  5 mg on 1/24/2017  5:37 PM                                * Notice:  This list has 4 medication(s) that are the same as other medications prescribed for you. Read the directions carefully, and ask your doctor or other care provider to review them with you.

## 2017-01-18 NOTE — PATIENT INSTRUCTIONS
Primary Care Center Medication Refill Request Information:  * Please contact your pharmacy regarding ANY request for medication refills.  ** [facility] Prescription Fax = [phone]  * Please allow 3 business days for routine medication refills.  * Please allow 5 business days for controlled substance medication refills.     Primary Care Center Test Result notification information:  *You will be notified with in 7-10 days of your appointment day regarding the results of your test.  If you are on MyChart you will be notified as soon as the provider has reviewed the results and signed off on them.

## 2017-01-18 NOTE — NURSING NOTE
Chief Complaint   Patient presents with     Breathing Problem     Patient is here to discuss breathing problem.      Nga Barrientos LPN at 4:09 PM on 1/18/2017.

## 2017-01-18 NOTE — TELEPHONE ENCOUNTER
"Spoke with patient, requesting being seen. Discussed symptoms, having increased SOB with chest tightness, constantly clearing throat to help breathing, not coughing much or any signs of chest or head cold. Patient stated \"sensations not usual\". Having indigestion and GI issues too. Recommended he either be seen by his PCP or ER to be further evaluated, since his symptoms do not all sound like Pulmonary issues. Agreed with plan.   "

## 2017-01-18 NOTE — ED NOTES
Pt BIBA and was seen at clinic for dyspnea and some SOB. SOB was getting worse at clinic. Hx  Sarcoidosis and 2 stents placed. HR Tachy 150's. SOB. 94% RA.

## 2017-01-18 NOTE — IP AVS SNAPSHOT
[address] [address]    Phone:  [phone]                                       After Visit Summary   1/18/2017    Rohan Monroe    MRN: [id number]           After Visit Summary Signature Page     I have received my discharge instructions, and my questions have been answered. I have discussed any challenges I see with this plan with the nurse or doctor.    ..........................................................................................................................................  Patient/Patient Representative Signature      ..........................................................................................................................................  Patient Representative Print Name and Relationship to Patient    ..................................................               ................................................  Date                                            Time    ..........................................................................................................................................  Reviewed by Signature/Title    ...................................................              ..............................................  Date                                                            Time

## 2017-01-19 ENCOUNTER — APPOINTMENT (OUTPATIENT)
Dept: ULTRASOUND IMAGING | Facility: CLINIC | Age: 72
DRG: 273 | End: 2017-01-19
Payer: MEDICARE

## 2017-01-19 ENCOUNTER — ANESTHESIA (OUTPATIENT)
Dept: SURGERY | Facility: CLINIC | Age: 72
DRG: 273 | End: 2017-01-19
Payer: MEDICARE

## 2017-01-19 ENCOUNTER — APPOINTMENT (OUTPATIENT)
Dept: CARDIOLOGY | Facility: CLINIC | Age: 72
DRG: 273 | End: 2017-01-19
Payer: MEDICARE

## 2017-01-19 ENCOUNTER — APPOINTMENT (OUTPATIENT)
Dept: CARDIOLOGY | Facility: CLINIC | Age: 72
DRG: 273 | End: 2017-01-19
Attending: STUDENT IN AN ORGANIZED HEALTH CARE EDUCATION/TRAINING PROGRAM
Payer: MEDICARE

## 2017-01-19 ENCOUNTER — ANESTHESIA EVENT (OUTPATIENT)
Dept: SURGERY | Facility: CLINIC | Age: 72
DRG: 273 | End: 2017-01-19
Payer: MEDICARE

## 2017-01-19 ENCOUNTER — APPOINTMENT (OUTPATIENT)
Dept: ULTRASOUND IMAGING | Facility: CLINIC | Age: 72
DRG: 273 | End: 2017-01-19
Attending: INTERNAL MEDICINE
Payer: MEDICARE

## 2017-01-19 LAB
ALBUMIN UR-MCNC: 300 MG/DL
ANION GAP SERPL CALCULATED.3IONS-SCNC: 8 MMOL/L (ref 3–14)
APPEARANCE UR: CLEAR
BACTERIA #/AREA URNS HPF: ABNORMAL /HPF
BILIRUB UR QL STRIP: NEGATIVE
BUN SERPL-MCNC: 45 MG/DL (ref 7–30)
CALCIUM SERPL-MCNC: 8.4 MG/DL (ref 8.5–10.1)
CHLORIDE SERPL-SCNC: 102 MMOL/L (ref 94–109)
CO2 SERPL-SCNC: 25 MMOL/L (ref 20–32)
COLOR UR AUTO: YELLOW
CREAT SERPL-MCNC: 2.16 MG/DL (ref 0.66–1.25)
CREAT SERPL-MCNC: 2.2 MG/DL (ref 0.66–1.25)
CREAT UR-MCNC: 194 MG/DL
FRACT EXCRET NA UR+SERPL-RTO: 0.2 %
GFR SERPL CREATININE-BSD FRML MDRD: 29 ML/MIN/1.7M2
GFR SERPL CREATININE-BSD FRML MDRD: 30 ML/MIN/1.7M2
GLUCOSE BLDC GLUCOMTR-MCNC: 162 MG/DL (ref 70–99)
GLUCOSE BLDC GLUCOMTR-MCNC: 169 MG/DL (ref 70–99)
GLUCOSE BLDC GLUCOMTR-MCNC: 193 MG/DL (ref 70–99)
GLUCOSE SERPL-MCNC: 175 MG/DL (ref 70–99)
GLUCOSE UR STRIP-MCNC: NEGATIVE MG/DL
HGB UR QL STRIP: ABNORMAL
HYALINE CASTS #/AREA URNS LPF: 11 /LPF (ref 0–2)
INR PPP: 1.28 (ref 0.86–1.14)
INTERPRETATION ECG - MUSE: NORMAL
INTERPRETATION ECG - MUSE: NORMAL
KETONES UR STRIP-MCNC: NEGATIVE MG/DL
LACTATE BLD-SCNC: 1.2 MMOL/L (ref 0.7–2.1)
LEUKOCYTE ESTERASE UR QL STRIP: NEGATIVE
LMWH PPP CHRO-ACNC: 0.6 IU/ML
LMWH PPP CHRO-ACNC: 0.66 IU/ML
LMWH PPP CHRO-ACNC: 0.71 IU/ML
LMWH PPP CHRO-ACNC: 1.08 IU/ML
MAGNESIUM SERPL-MCNC: 2 MG/DL (ref 1.6–2.3)
MAGNESIUM SERPL-MCNC: 2.1 MG/DL (ref 1.6–2.3)
MUCOUS THREADS #/AREA URNS LPF: PRESENT /LPF
NITRATE UR QL: NEGATIVE
PH UR STRIP: 5.5 PH (ref 5–7)
POTASSIUM SERPL-SCNC: 4.3 MMOL/L (ref 3.4–5.3)
POTASSIUM SERPL-SCNC: 4.6 MMOL/L (ref 3.4–5.3)
RBC #/AREA URNS AUTO: 20 /HPF (ref 0–2)
SODIUM SERPL-SCNC: 135 MMOL/L (ref 133–144)
SODIUM SERPL-SCNC: 139 MMOL/L (ref 133–144)
SODIUM UR-SCNC: 12 MMOL/L
SP GR UR STRIP: 1.02 (ref 1–1.03)
SQUAMOUS #/AREA URNS AUTO: 1 /HPF (ref 0–1)
TROPONIN I SERPL-MCNC: 0.39 UG/L (ref 0–0.04)
TROPONIN I SERPL-MCNC: 0.48 UG/L (ref 0–0.04)
TROPONIN I SERPL-MCNC: 0.49 UG/L (ref 0–0.04)
URN SPEC COLLECT METH UR: ABNORMAL
UROBILINOGEN UR STRIP-MCNC: 2 MG/DL (ref 0–2)
WBC #/AREA URNS AUTO: 2 /HPF (ref 0–2)

## 2017-01-19 PROCEDURE — 84295 ASSAY OF SERUM SODIUM: CPT | Performed by: INTERNAL MEDICINE

## 2017-01-19 PROCEDURE — 93325 DOPPLER ECHO COLOR FLOW MAPG: CPT | Mod: 26 | Performed by: INTERNAL MEDICINE

## 2017-01-19 PROCEDURE — 21400006 ZZH R&B CCU INTERMEDIATE UMMC

## 2017-01-19 PROCEDURE — 93005 ELECTROCARDIOGRAM TRACING: CPT

## 2017-01-19 PROCEDURE — 85610 PROTHROMBIN TIME: CPT | Performed by: STUDENT IN AN ORGANIZED HEALTH CARE EDUCATION/TRAINING PROGRAM

## 2017-01-19 PROCEDURE — 99152 MOD SED SAME PHYS/QHP 5/>YRS: CPT

## 2017-01-19 PROCEDURE — 92960 CARDIOVERSION ELECTRIC EXT: CPT | Performed by: NURSE PRACTITIONER

## 2017-01-19 PROCEDURE — 92960 CARDIOVERSION ELECTRIC EXT: CPT

## 2017-01-19 PROCEDURE — 84484 ASSAY OF TROPONIN QUANT: CPT | Performed by: STUDENT IN AN ORGANIZED HEALTH CARE EDUCATION/TRAINING PROGRAM

## 2017-01-19 PROCEDURE — A9270 NON-COVERED ITEM OR SERVICE: HCPCS | Mod: GY | Performed by: INTERNAL MEDICINE

## 2017-01-19 PROCEDURE — 80048 BASIC METABOLIC PNL TOTAL CA: CPT | Performed by: STUDENT IN AN ORGANIZED HEALTH CARE EDUCATION/TRAINING PROGRAM

## 2017-01-19 PROCEDURE — 00000146 ZZHCL STATISTIC GLUCOSE BY METER IP

## 2017-01-19 PROCEDURE — 82570 ASSAY OF URINE CREATININE: CPT | Performed by: INTERNAL MEDICINE

## 2017-01-19 PROCEDURE — 25000125 ZZHC RX 250: Performed by: INTERNAL MEDICINE

## 2017-01-19 PROCEDURE — 94640 AIRWAY INHALATION TREATMENT: CPT

## 2017-01-19 PROCEDURE — 25000132 ZZH RX MED GY IP 250 OP 250 PS 637: Mod: GY | Performed by: INTERNAL MEDICINE

## 2017-01-19 PROCEDURE — 25000308 HC RX OP HPI UCR WEL MED 250 IP 250: Performed by: EMERGENCY MEDICINE

## 2017-01-19 PROCEDURE — 93308 TTE F-UP OR LMTD: CPT | Mod: 26 | Performed by: INTERNAL MEDICINE

## 2017-01-19 PROCEDURE — 37000008 ZZH ANESTHESIA TECHNICAL FEE, 1ST 30 MIN

## 2017-01-19 PROCEDURE — 25000125 ZZHC RX 250: Performed by: STUDENT IN AN ORGANIZED HEALTH CARE EDUCATION/TRAINING PROGRAM

## 2017-01-19 PROCEDURE — 99211 OFF/OP EST MAY X REQ PHY/QHP: CPT

## 2017-01-19 PROCEDURE — 40000556 ZZH STATISTIC PERIPHERAL IV START W US GUIDANCE

## 2017-01-19 PROCEDURE — 25500064 ZZH RX 255 OP 636: Performed by: INTERNAL MEDICINE

## 2017-01-19 PROCEDURE — 93320 DOPPLER ECHO COMPLETE: CPT | Mod: 26 | Performed by: INTERNAL MEDICINE

## 2017-01-19 PROCEDURE — 94640 AIRWAY INHALATION TREATMENT: CPT | Mod: 76

## 2017-01-19 PROCEDURE — 93312 ECHO TRANSESOPHAGEAL: CPT | Mod: 26 | Performed by: INTERNAL MEDICINE

## 2017-01-19 PROCEDURE — 81001 URINALYSIS AUTO W/SCOPE: CPT | Performed by: INTERNAL MEDICINE

## 2017-01-19 PROCEDURE — 83735 ASSAY OF MAGNESIUM: CPT | Performed by: STUDENT IN AN ORGANIZED HEALTH CARE EDUCATION/TRAINING PROGRAM

## 2017-01-19 PROCEDURE — 76770 US EXAM ABDO BACK WALL COMP: CPT

## 2017-01-19 PROCEDURE — 36415 COLL VENOUS BLD VENIPUNCTURE: CPT | Performed by: INTERNAL MEDICINE

## 2017-01-19 PROCEDURE — 40000275 ZZH STATISTIC RCP TIME EA 10 MIN

## 2017-01-19 PROCEDURE — 99233 SBSQ HOSP IP/OBS HIGH 50: CPT | Mod: 25 | Performed by: INTERNAL MEDICINE

## 2017-01-19 PROCEDURE — 93321 DOPPLER ECHO F-UP/LMTD STD: CPT | Mod: 26 | Performed by: INTERNAL MEDICINE

## 2017-01-19 PROCEDURE — 25000132 ZZH RX MED GY IP 250 OP 250 PS 637: Mod: GY | Performed by: STUDENT IN AN ORGANIZED HEALTH CARE EDUCATION/TRAINING PROGRAM

## 2017-01-19 PROCEDURE — 93010 ELECTROCARDIOGRAM REPORT: CPT | Mod: 76 | Performed by: INTERNAL MEDICINE

## 2017-01-19 PROCEDURE — A9270 NON-COVERED ITEM OR SERVICE: HCPCS | Mod: GY | Performed by: STUDENT IN AN ORGANIZED HEALTH CARE EDUCATION/TRAINING PROGRAM

## 2017-01-19 PROCEDURE — 93320 DOPPLER ECHO COMPLETE: CPT

## 2017-01-19 PROCEDURE — 40000264 ECHO LIMITED WITH OPTISON

## 2017-01-19 PROCEDURE — 93308 TTE F-UP OR LMTD: CPT

## 2017-01-19 PROCEDURE — 85520 HEPARIN ASSAY: CPT | Performed by: INTERNAL MEDICINE

## 2017-01-19 PROCEDURE — 83605 ASSAY OF LACTIC ACID: CPT | Performed by: INTERNAL MEDICINE

## 2017-01-19 PROCEDURE — 82565 ASSAY OF CREATININE: CPT | Performed by: INTERNAL MEDICINE

## 2017-01-19 PROCEDURE — 84132 ASSAY OF SERUM POTASSIUM: CPT | Performed by: STUDENT IN AN ORGANIZED HEALTH CARE EDUCATION/TRAINING PROGRAM

## 2017-01-19 PROCEDURE — 36415 COLL VENOUS BLD VENIPUNCTURE: CPT | Performed by: STUDENT IN AN ORGANIZED HEALTH CARE EDUCATION/TRAINING PROGRAM

## 2017-01-19 PROCEDURE — 93970 EXTREMITY STUDY: CPT

## 2017-01-19 PROCEDURE — 84484 ASSAY OF TROPONIN QUANT: CPT | Performed by: INTERNAL MEDICINE

## 2017-01-19 PROCEDURE — 84300 ASSAY OF URINE SODIUM: CPT | Performed by: INTERNAL MEDICINE

## 2017-01-19 PROCEDURE — 5A2204Z RESTORATION OF CARDIAC RHYTHM, SINGLE: ICD-10-PCS | Performed by: NURSE PRACTITIONER

## 2017-01-19 RX ORDER — FLUMAZENIL 0.1 MG/ML
0.2 INJECTION, SOLUTION INTRAVENOUS
Status: ACTIVE | OUTPATIENT
Start: 2017-01-19 | End: 2017-01-21

## 2017-01-19 RX ORDER — FENTANYL CITRATE 50 UG/ML
25 INJECTION, SOLUTION INTRAMUSCULAR; INTRAVENOUS
Status: ACTIVE | OUTPATIENT
Start: 2017-01-19 | End: 2017-01-20

## 2017-01-19 RX ORDER — DEXTROSE MONOHYDRATE 25 G/50ML
25-50 INJECTION, SOLUTION INTRAVENOUS
Status: DISCONTINUED | OUTPATIENT
Start: 2017-01-19 | End: 2017-01-25 | Stop reason: HOSPADM

## 2017-01-19 RX ORDER — OXYCODONE HYDROCHLORIDE 5 MG/1
5 TABLET ORAL ONCE
Status: COMPLETED | OUTPATIENT
Start: 2017-01-19 | End: 2017-01-19

## 2017-01-19 RX ORDER — WARFARIN SODIUM 5 MG/1
5 TABLET ORAL
Status: DISCONTINUED | OUTPATIENT
Start: 2017-01-19 | End: 2017-01-19

## 2017-01-19 RX ORDER — NICOTINE POLACRILEX 4 MG
15-30 LOZENGE BUCCAL
Status: DISCONTINUED | OUTPATIENT
Start: 2017-01-19 | End: 2017-01-25 | Stop reason: HOSPADM

## 2017-01-19 RX ORDER — LOSARTAN POTASSIUM 50 MG/1
100 TABLET ORAL DAILY
Status: DISCONTINUED | OUTPATIENT
Start: 2017-01-19 | End: 2017-01-21

## 2017-01-19 RX ORDER — NICOTINE POLACRILEX 4 MG
15-30 LOZENGE BUCCAL
Status: DISCONTINUED | OUTPATIENT
Start: 2017-01-19 | End: 2017-01-20

## 2017-01-19 RX ORDER — FENTANYL CITRATE 50 UG/ML
25-50 INJECTION, SOLUTION INTRAMUSCULAR; INTRAVENOUS
Status: COMPLETED | OUTPATIENT
Start: 2017-01-19 | End: 2017-01-19

## 2017-01-19 RX ORDER — SODIUM CHLORIDE 9 MG/ML
INJECTION, SOLUTION INTRAVENOUS CONTINUOUS PRN
Status: DISCONTINUED | OUTPATIENT
Start: 2017-01-19 | End: 2017-01-23

## 2017-01-19 RX ORDER — HEPARIN SODIUM 10000 [USP'U]/100ML
0-3500 INJECTION, SOLUTION INTRAVENOUS CONTINUOUS
Status: DISCONTINUED | OUTPATIENT
Start: 2017-01-19 | End: 2017-01-19

## 2017-01-19 RX ORDER — HEPARIN SODIUM 10000 [USP'U]/100ML
0-3500 INJECTION, SOLUTION INTRAVENOUS CONTINUOUS
Status: DISCONTINUED | OUTPATIENT
Start: 2017-01-19 | End: 2017-01-22

## 2017-01-19 RX ORDER — DEXTROSE MONOHYDRATE 25 G/50ML
25-50 INJECTION, SOLUTION INTRAVENOUS
Status: DISCONTINUED | OUTPATIENT
Start: 2017-01-19 | End: 2017-01-20

## 2017-01-19 RX ORDER — HYDRALAZINE HYDROCHLORIDE 10 MG/1
10 TABLET, FILM COATED ORAL ONCE
Status: DISCONTINUED | OUTPATIENT
Start: 2017-01-19 | End: 2017-01-19

## 2017-01-19 RX ORDER — NALOXONE HYDROCHLORIDE 0.4 MG/ML
.1-.4 INJECTION, SOLUTION INTRAMUSCULAR; INTRAVENOUS; SUBCUTANEOUS
Status: ACTIVE | OUTPATIENT
Start: 2017-01-19 | End: 2017-01-21

## 2017-01-19 RX ORDER — WARFARIN SODIUM 2.5 MG/1
2.5 TABLET ORAL ONCE
Status: COMPLETED | OUTPATIENT
Start: 2017-01-19 | End: 2017-01-19

## 2017-01-19 RX ADMIN — NITROGLYCERIN 0.4 MG: 0.4 TABLET SUBLINGUAL at 02:32

## 2017-01-19 RX ADMIN — LEVOTHYROXINE SODIUM 137 MCG: 137 TABLET ORAL at 09:30

## 2017-01-19 RX ADMIN — METOPROLOL TARTRATE 100 MG: 100 TABLET, FILM COATED ORAL at 09:29

## 2017-01-19 RX ADMIN — POLYETHYLENE GLYCOL 3350 17 G: 17 POWDER, FOR SOLUTION ORAL at 00:26

## 2017-01-19 RX ADMIN — LIDOCAINE HYDROCHLORIDE 15 ML: 20 SOLUTION ORAL; TOPICAL at 11:30

## 2017-01-19 RX ADMIN — HEPARIN SODIUM 1650 UNITS/HR: 10000 INJECTION, SOLUTION INTRAVENOUS at 10:12

## 2017-01-19 RX ADMIN — DEXTROSE 1 MG/MIN: 5 SOLUTION INTRAVENOUS at 16:40

## 2017-01-19 RX ADMIN — ALBUTEROL SULFATE 2 PUFF: 90 AEROSOL, METERED RESPIRATORY (INHALATION) at 03:12

## 2017-01-19 RX ADMIN — FENTANYL CITRATE 50 MCG: 50 INJECTION INTRAMUSCULAR; INTRAVENOUS at 11:38

## 2017-01-19 RX ADMIN — UMECLIDINIUM 1 PUFF: 62.5 AEROSOL, POWDER ORAL at 09:30

## 2017-01-19 RX ADMIN — TOPICAL ANESTHETIC 1 SPRAY: 200 SPRAY DENTAL; PERIODONTAL at 11:31

## 2017-01-19 RX ADMIN — ALBUTEROL SULFATE 2 PUFF: 90 AEROSOL, METERED RESPIRATORY (INHALATION) at 19:29

## 2017-01-19 RX ADMIN — AMIODARONE HYDROCHLORIDE 150 MG: 1.5 INJECTION, SOLUTION INTRAVENOUS at 16:25

## 2017-01-19 RX ADMIN — NITROGLYCERIN 0.4 MG: 0.4 TABLET SUBLINGUAL at 01:24

## 2017-01-19 RX ADMIN — METOPROLOL TARTRATE 100 MG: 100 TABLET, FILM COATED ORAL at 19:05

## 2017-01-19 RX ADMIN — INSULIN ASPART 1 UNITS: 100 INJECTION, SOLUTION INTRAVENOUS; SUBCUTANEOUS at 18:11

## 2017-01-19 RX ADMIN — LOSARTAN POTASSIUM 100 MG: 50 TABLET, FILM COATED ORAL at 16:45

## 2017-01-19 RX ADMIN — NITROGLYCERIN 0.4 MG: 0.4 TABLET SUBLINGUAL at 05:52

## 2017-01-19 RX ADMIN — NIFEDIPINE 60 MG: 60 TABLET, FILM COATED, EXTENDED RELEASE ORAL at 14:06

## 2017-01-19 RX ADMIN — MIDAZOLAM 1 MG: 1 INJECTION INTRAMUSCULAR; INTRAVENOUS at 11:38

## 2017-01-19 RX ADMIN — MIRTAZAPINE 15 MG: 15 TABLET, FILM COATED ORAL at 00:27

## 2017-01-19 RX ADMIN — FLUTICASONE FUROATE AND VILANTEROL TRIFENATATE 1 PUFF: 100; 25 POWDER RESPIRATORY (INHALATION) at 09:30

## 2017-01-19 RX ADMIN — FUROSEMIDE 40 MG: 10 INJECTION, SOLUTION INTRAVENOUS at 00:27

## 2017-01-19 RX ADMIN — HUMAN ALBUMIN MICROSPHERES AND PERFLUTREN 4 ML: 10; .22 INJECTION, SOLUTION INTRAVENOUS at 16:30

## 2017-01-19 RX ADMIN — ASPIRIN 81 MG: 81 TABLET, COATED ORAL at 14:06

## 2017-01-19 RX ADMIN — ALBUTEROL SULFATE 2.5 MG: 2.5 SOLUTION RESPIRATORY (INHALATION) at 04:05

## 2017-01-19 RX ADMIN — METOPROLOL TARTRATE 100 MG: 100 TABLET, FILM COATED ORAL at 00:26

## 2017-01-19 RX ADMIN — WARFARIN SODIUM 2.5 MG: 2.5 TABLET ORAL at 19:05

## 2017-01-19 RX ADMIN — OXYCODONE HYDROCHLORIDE 5 MG: 5 TABLET ORAL at 05:59

## 2017-01-19 RX ADMIN — DEXTROSE 1 MG/MIN: 5 SOLUTION INTRAVENOUS at 19:27

## 2017-01-19 RX ADMIN — ATORVASTATIN CALCIUM 20 MG: 20 TABLET, FILM COATED ORAL at 21:13

## 2017-01-19 RX ADMIN — MIRTAZAPINE 15 MG: 15 TABLET, FILM COATED ORAL at 21:13

## 2017-01-19 RX ADMIN — ALBUTEROL SULFATE 2.5 MG: 2.5 SOLUTION RESPIRATORY (INHALATION) at 21:59

## 2017-01-19 ASSESSMENT — COPD QUESTIONNAIRES: COPD: 1

## 2017-01-19 ASSESSMENT — PAIN DESCRIPTION - DESCRIPTORS
DESCRIPTORS: ACHING
DESCRIPTORS: SHARP
DESCRIPTORS: SHARP

## 2017-01-19 ASSESSMENT — ENCOUNTER SYMPTOMS
DYSRHYTHMIAS: 1
SHORTNESS OF BREATH: 1
WEAKNESS: 1
PALPITATIONS: 0
BACK PAIN: 1
FEVER: 1

## 2017-01-19 NOTE — ANESTHESIA CARE TRANSFER NOTE
Patient: Rohan Monroe    ANESTHESIA CARDIOVERSION (N/A Update)  Additional InformationProcedure(s):  Cardioversion     Diagnosis: Atrial Flutter   Diagnosis Additional Information: No value filed.    Anesthesia Type:   No value filed.     Note:  Airway :Nasal Cannula  Patient transferred to:Medical/Surgical Unit  Comments: Anesthesia Care Transfer Note      Transfer to:  Echo RN    Patient Vital Signs:  Stable, at baseline with sedation given prior to anesthesia arrival.    Airway:  None    Patient cardioverted x1 to SR with PAC's.  No additional sedation given per anesthesia.  Airway patent, pt arousable to voice, but very sedate.  Care transferred with report to RN.    Jarett Borja CRNA      Vitals: (Last set prior to Anesthesia Care Transfer)              Electronically Signed By: VINNY Bowden CRNA  January 19, 2017  12:20 PM

## 2017-01-19 NOTE — PLAN OF CARE
Problem: Goal Outcome Summary  Goal: Goal Outcome Summary  Outcome: No Change  Pt admitted from ED after clinic visit for increased SOB. Pt A/Ox4, VSS on 3L O2 NC. Pt complains of chest pain during inhalation. Nitro given with initial success, but pain increased around 0430. Pt refused nitro, MD notified (MD assessed pt). Trop peak 0.49, last trop 0.479 at 0400. Pt switched to high intensity heparin gtt at 0500 running at 1650 units/hr, next 10A at 1100 (concerns for PE). Ultrasound of lower extremity ordered. Pt in Aflutter 150-160's. Pt was on diltiazem gtt at 10mg/hr, MD ([id number]) told to stop around 0200. Pt's SBP less than 120. Pt has been NPO since midnight for DEAN and cardioversion today. Pt complains of SOB and ACEVES. WOC consult placed for skin breakdown on coccyx, barrier applied, repositioned. Pt voiding adequately, 1x IV lasix given. Continue to monitor and notify MD of questions or concerns.

## 2017-01-19 NOTE — PROGRESS NOTES
Received consult for suspected pressure injury on coccyx. Assessed the area and noted 3 x 1.5 x 0.0cm blanchable erythema with peeling epithelium on intergluteal cleft, suspect from perspiration. No pressure injury detected. Able to shift weight from side to side. Educated pt on pressure injury, risk factors, and preventive measures. Verbalized understanding.  P. Continue to follow pressure ulcer prevention protocol. Apply [employer] barrier cream daily until redness resolves then DC. No further visit planned, will sign off.  Face to face time- 10mins

## 2017-01-19 NOTE — ANESTHESIA PREPROCEDURE EVALUATION
Anesthesia Evaluation     . Pt has had prior anesthetic.       ROS/MED HX    ENT/Pulmonary:     (+)COPD, , recent URI . .    Neurologic:       Cardiovascular:     (+) --CAD, --. : . CHF . . :. dysrhythmias a-fib, .      (-) hypertension   METS/Exercise Tolerance:     Hematologic:         Musculoskeletal:         GI/Hepatic:     (+) hepatitis liver disease (Cirrhosis, Hepatitis C), Other GI/Hepatic Hepatic coma      Renal/Genitourinary:     (+) chronic renal disease, type: CRI, Pt does not require dialysis, Pt has no history of transplant, BPH,       Endo:     (+) type II DM thyroid problem hypothyroidism, .   (-) Type I DM   Psychiatric:     (+) psychiatric history depression      Infectious Disease:         Malignancy:         Other:               Physical Exam      Airway   Mallampati: II  TM distance: >3 FB  Neck ROM: full    Dental   (+) missing    Cardiovascular   Rhythm and rate: irregular and abnormal      Pulmonary (+) decreased breath sounds                       Anesthesia Plan      History & Physical Review  History and physical reviewed and following examination; no interval change.    ASA Status:  4 .    NPO Status:  > 6 hours    Plan for MAC with Intravenous induction. Maintenance will be TIVA.  Reason for MAC:  Difficulty with conscious sedation (QS), Deep or markedly invasive procedure (G8) and Chronic cardiopulmonary disease (G9)         Postoperative Care      Consents  Anesthetic plan, risks, benefits and alternatives discussed with:  Patient.  Use of blood products discussed: No .   .                          .

## 2017-01-19 NOTE — ANESTHESIA POSTPROCEDURE EVALUATION
Patient: Rohan Monroe    ANESTHESIA CARDIOVERSION (N/A Update)  Additional InformationProcedure(s):  Cardioversion     Diagnosis:Atrial Flutter   Diagnosis Additional Information: No value filed.    Anesthesia Type:  MAC    Note:  Anesthesia Post Evaluation    Patient location during evaluation: Echo Lab  Patient participation: Able to fully participate in evaluation  Level of consciousness: awake  Pain management: adequate  Airway patency: patent  Cardiovascular status: acceptable  Respiratory status: acceptable  Hydration status: acceptable  PONV: none     Anesthetic complications: None          Last vitals:  Filed Vitals:    01/19/17 1210 01/19/17 1213 01/19/17 1215   BP: 107/80 104/76 104/69   Pulse: 79 85 90   Temp:      Resp: 20 20 20   SpO2: 98% 98% 98%       Electronically Signed By: Anuj Sanchez MD  January 19, 2017  12:23 PM

## 2017-01-19 NOTE — H&P
CARDS 1 Admission History and Physical  Rohan Monroe MRN: [id number]  1945  Date of Admission:1/18/2017  Primary care provider: Jamie Foster  ___________________________________          Assessment and Plan:   Rohan Monroe is a 71 year old male with a history of coronary disease (PCI in 2008 to mid LAD and D2) and MRI proven cardio-pulmonary sarcoid, DM2, HTN, HLD admitted with 2:1 atrial flutter.    # Atrial Flutter  Rates 150's. HD stable, dyspnea with talking and minimal activity. Going on for several weeks though worse in past few days. [id number]= 4 (HTN, CAD, DM2, age). Not anticoagulated.  No evidence of infection. CXR without pulmonary edema, though nt pro bnp elevated to 13K and has JVD to jawline.  -low intensity heparin gtt with bolus  -NPO at MN for DEAN and cardioversion in am (ordered)  -consider ablation  -s/p IV metoprolol 5mg x3 in ED  -diltiazem gtt ordered  -nt pro BNP 13K; likely 2/2 prolonged atrial flutter.   -lasix IV 40 mg x1 (also stopped the fluids that the ED was giving him!)  -continue pta metoprolol, nifedipine     #Elevated troponin  #Hx CAD s/p PCI with ROSALIE to mLAD and D2 in 2008  Denies chest pain with exertion, but endorses chest pain with a deep breath. Trop elevated to 0.482. No evidence of acute ischemia on EKG with 2:1 Flutter. Likely demand ischemia.  -on low intensity heparin gtt  -trend troponins  -continue asa, metoprolol, statin    #Cardiac and Pulmonary Sarcoidosis  #No hx of CHF  Diagnosed on MRI. Last cardiac MRI 2013 without evidence of heart failure.   -on infliximab q2mos and methotraxate every 1 week on mondays.    #Constipation  -miralax    Chronic Issues  #HTN- continue pta metoprolol, chlorthalidone, losartan, nifedipine   #CKD stage 3 2/2 DM and vascular disease- follows with renal. Baseline 1.7 and admitted at 1.9.  #COPD- continue pta inhalers, nebs  #DM2- not on insulin per med rec. SSI  "low; hypoglycemia protocol    FEN: No IVF, 2g Na diet, NPO after MN, replete electrolytes prn  Prophylaxis: heparin gtt  Consults: none  Code Status: full  Disposition: Admit to [facility]    Patient seen and discussed with cardiology fellow.    Gretel Cherry MD  Internal Medicine PGY-3  [phone]    CARDIOLOGY STAFF NOTE  I have seen and examined the patient with the Cards 1 team. I agree with the note above that reflects my assessment and plan of care with exceptions. Patient was seen on 1/19/17. Please refer to progress note dated 1/19/17 fr details.  Brian Vazquez MD [facility]  Cardiology - Electrophysiology                Chief Complaint:   Shortness of breath         History of Present Illness:   Rohan Monroe is a 71 year old male with a history of coronary disease (PCI in 2008 to mid LAD and D2) and MRI proven cardio-pulmonary sarcoid, DM2, HTN, HLD admitted with 2:1 atrial flutter.    Patient presents with a 3-4 week history of worsening shortness of breath that has been significantly worse for the past \"several days\". He came in to clinic today and subsequently the ED due to no improvement in his dyspnea. Chest pain with a deep breath, sob with talking. Denies palpitations, exertional cp, orthopnea. Reports lower extremity edema that is significant but unchanged and at his baseline. Shortness of breath so significant that he cannot complete ADL's. Taking all of his medications. Denies recent illness, URI, UTI, f/c, n/v, diaphoresis. Endorses constipation.    Patient with recent travel to [place]. Not hypoxic.     In ED, gave metoprolol 5mg IV x3, initiated heparin gtt, initiated diltiazem. Rates remain in 150's-160's despite interventions.    ROS:  Complete 10 point ROS was negative unless noted in the HPI.         Past Cardiac History:   Cardiac/pulmonary sarcoidosis  Recent Cardiac Admissions: no recent cardiac admissions    Last ECHO: 3/19/15  Interpretation Summary  Global and " regional left ventricular function is normal with an EF of 60-65%.   Global right ventricular function is normal. The IVC is small, c/w with   reduced intravascular volume. No pericardial effusion is present.  PatientHeight: 69 in  PatientWeight: 190 lbs  SystolicPressure: 135 mmHg  DiastolicPressure: 67 mmHg  HeartRate: 96 bpm  BSA 2.0 m^2        Procedure  Echocardiogram with two-dimensional, color and spectral Doppler performed.  Contrast Optison.     Left Ventricle  Left ventricular wall thickness is normal.  Left ventricular size is normal.  Global and regional left ventricular function is normal with an EF of 60-65%.  Left ventricular diastolic  function cannot be assessed.     Right Ventricle  The right ventricle is normal size.  Global right ventricular function is normal.     Atria  The left atrium appears normal.  Mild left atrial enlargement is present.     Mitral Valve  The mitral valve is normal.     Aortic Valve  Aortic valve is normal in structure and function.     Tricuspid Valve  The peak velocity of the tricuspid regurgitant jet is not obtainable.  Pulmonary artery systolic pressure cannot be assessed.     Pulmonic Valve  The pulmonic valve is normal.     Vessels  The aorta root is normal.  Small inferior vena cava size consistent with hypovolemia.     Pericardium  No pericardial effusion is present.     Compared to Previous Study  Compared to the prior study performed 2014, no significant changes are   present.     MMode 2D Measurements & Calculations  IVSd: 1.0 cm  LVIDd: 4.5 cm  LVIDs: 3.2 cm  LVPWd: 1.0 cm  FS: 28 %  LV mass(C)d: 155 grams  asc Aorta: 3.4 cm  LVOT diam: 2.2 cm  Doppler Measurements & Calculations  MV E point: 56 cm/sec  MV A point: 81 cm/sec  MV E/A: 0.70   Ao V2 max: 104 cm/sec  Ao max P.0 mmHg  ISMAEL(V,D): 3.1 cm^2  LV V1 max: 85 cm/sec     Interpreting Physician:  Andrew Quiros MD electronically signed on 2015   16:13:17    Last Angiogram: 2008      Cardiac  MRI 5/2013  Examination:   MRI CARDIAC W/CONTRAST      Date:  5/24/2013 12:00 PM      Indication:  History of sarcoidosis, followup cardiac MRI      TECHNIQUE: ECG gated MRI of the heart with and without IV gadolinium  contrast was performed on a Siemens Aera1.5 Gia MRI scanner.  Standard cardiac imaging planes were obtained using True FISP  sequences, SSFP cine sequences, early and delayed enhancement images  after injection of gadolinium contrast (MultiHance 30 mL). Image post  processing was performed on a [facility] workstation. The MRI sequences  and imaging planes in this study were tailored for cardiac imaging  and are suboptimal for evaluation of noncardiac structures.      COMPARISON: Cardiac MRI dated 10/14/2011      IMPRESSION  IMPRESSION:  1.  Normal left ventricular size and normal global LV systolic  function with a calculated ejection fraction of 55 %.  2.  Normal right ventricular size and normal global RV systolic  function with a calculated ejection fraction of   48 %.  3.  On delayed enhancement imaging, there is patchy mid myocardial  hyperenhancement in proximal inferior septum, proximal anterior  septum, and discrete areas of hyperenhancement in distal lateral wall  and distal anterior wall. This pattern of late gadolinium  hyperenhancement can be seen in patients with sarcoidosis. There is  no significant change in the delayed myocardial hyperenhancement  pattern compared to cardiac MRI dated 10/14/2011, no new foci of  delayed enhancement identified.  4. Lung fields have bilateral patchy areas of increased signal  suggestive of fibrosis.      Thank you for referring your patient for a cardiac MRI evaluation.      FINDINGS      CARDIAC MORPHOLOGY / FUNCTION:      1.  Normal left ventricular size and  normal global LV systolic  function with a calculated ejection fraction of 55 %. No regional  wall motion abnormalities are noted. Normal left ventricular mass.  Mild concentric LV remodeling.  2.   Normal right ventricular size and normal global RV systolic  function with a calculated ejection fraction of   48 %. Mild right  ventricular hypertrophy, right ventricular free wall measures 6 mm in  diastole.  3.  Normal mitral, tricuspid, and aortic valvular function. Normal  sized aortic root.  4.  Normal left atrial size, normal right atrial size.  5.  There is no evidence of myocardial edema on T2-weighted imaging.  6.  Normal rest perfusion. No evidence of LV/RV clot or mass.  7.  On delayed enhancement imaging, there is patchy mid myocardial  hyperenhancement in proximal inferior septum, proximal anterior  septum, and discrete areas of hyperenhancement in distal lateral wall  and distal anterior wall. This pattern of late gadolinium  hyperenhancement can be seen in patients with sarcoidosis. Normal  pericardial thickness. No pericardial effusion.  8. Lung fields have bilateral patchy areas of increased signal  suggestive of fibrosis.      MEASUREMENTS:      LEFT VENTRICULAR VOLUME RESULTS      Parameter              Value            [place] [id number] SSFP (Male, Age  20-65, Field 1.5)  Method:                Short-axis/3D*      Body Surface Area:   2.05  m2      ED volume:             80.36   ml         (101 - 236  ml)      ED volume index:       39.23  ml/m2      (52 - 112  ml/m2)      ED volume/HT:          45.85  ml/m       (60 - 130  ml/m)      ES volume:             35.79   ml         (28 - 93  ml)      ES volume index:       17.47  ml/m2      Stroke volume:         44.57   ml         (66 - 150  ml)      Stroke volume index: 21.76  ml/m2      Cardiac output:        3.35  l/min      Cardiac output index:1.64  l/(m2*min)      Ejection fraction:     55.46   %          (55 - 74  %)      LV mass ED:            107.55   g         (85 - 181  g)      LV mass ED index:    52.51  g/m2       (46 - 84  g/m2)      LV mass ED/HT:       61.37  g/m        (50 - 100  g/m)              LV Diameter Cynthia  results:      Parameter                              Value  LV Diameter Wizard results for slice:8  Fractional shortenin.31%  LVDd:                                  40.00  mm  LVDs:                                  29.07  mm  ASd:                                   10.88  mm  ASs:                                   15.81  mm  PWd:                                   11.80  mm  PWs:                                   14.67  mm          RIGHT VENTRICULAR VOLUME RESULTS      Parameter              Value            [facility]  SSFP (Male, Age  20-65, Field 1.5)  ED volume:             101.50  ml        (110 - 243  ml)      ED volume index:       49.55  ml/m2      (58 - 115  ml/m2)      ED volume/HT:          57.91  ml/m       (65 - 134  ml/m)      ES volume:             52.32   ml         (46 - 112  ml)      ES volume index:       25.54  ml/m2      Stroke volume:         49.18   ml         (60 - 136  ml)      Stroke volume index:24.01  ml/m2      Cardiac output:        3.70  l/min      Cardiac output index:1.81  l/(m2*min)      Ejection fraction:     48.45   %          (47 - 63  %)      MIGUEL DAMON MD  I have personally reviewed the image and initial interpretation and  agree with the findings.          Past Medical History:     Past Medical History   Diagnosis Date     Unspecified hypothyroidism      Hypothyroidism     Unspecified viral hepatitis C without hepatic coma      Sarcoidosis (H)      Generalized osteoarthrosis, unspecified site      Depressive disorder, not elsewhere classified      Depression (non-psychotic)     Other psoriasis      Viral warts, unspecified      Lichen planus      Type II or unspecified type diabetes mellitus without mention of complication, not stated as uncontrolled      Chronic infection      Hep C     Congestive heart failure, unspecified      Sarcoidosis (H)      Glaucoma suspect      ERM OS (epiretinal membrane, left eye)      PVD (posterior vitreous detachment), left eye       Cataract of both eyes      Hypertension              Past Surgical History:      Past Surgical History   Procedure Laterality Date     Hc repair incisional hernia,reducible       Hernia Repair, Incisional, Unilateral     C ligatn/strip long & short saphen       Hc removal of tonsils,<9 y/o       Tonsils <10y.o.     Cardiac stent       s/p     Arthroplasty hip  8/24/2011     Procedure:ARTHROPLASTY HIP; Right Total Hip Arthroplasty  Choice anesthesia; Surgeon:LESLI WILKINSON; Location:[facility] OR     Joint replacement       1 month ago--right hip     Cataract iol, rt/lt  9/15/2015     LE     Colonoscopy       Cardiac surgery       Biopsy               Social History:     Social History   Substance Use Topics     Smoking status: Former Smoker -- 1.00 packs/day for 30 years     Types: Cigarettes     Start date: 12/30/1960     Quit date: 07/22/1994     Smokeless tobacco: Never Used     Alcohol Use: No             Family History:     Family History   Problem Relation Age of Onset     HEART DISEASE Father      irreg heart beat     Circulatory Father      varicose veins     HEART DISEASE Mother      heart attack     Arthritis Mother      DIABETES Sister      type 2     Eye Disorder Maternal Grandmother      glaucoma     Glaucoma No family hx of      Macular Degeneration No family hx of      CANCER No family hx of      no skin cancer     Kidney Cancer Sister      DIABETES No family hx of      DIABETES Sister      OSTEOPOROSIS Mother      Thyroid Disease Mother      Prostate Cancer Father              Allergies:     Allergies   Allergen Reactions     Prednisone Other (See Comments)     He reports that he can't sleep for days and can't use small to massive doses of prednisone   Pt. Does not do well with high doses of Prednisone, His MD says that Prednisone is counter indicated. Prednisone failed to treat his sarcoidosis              Medications:     No current facility-administered medications on file prior to  encounter.  Current Outpatient Prescriptions on File Prior to Encounter:  inFLIXimab (REMICADE) 100 MG injection    NIFEdipine ER osmotic (ADALAT CC) 60 MG 24 hr tablet Take 1 tablet (60 mg) by mouth daily   chlorthalidone (HYGROTON) 25 MG tablet Take 1 tablet (25 mg) by mouth daily   metoprolol (LOPRESSOR) 100 MG tablet Take 1 tablet (100 mg) by mouth 2 times daily   levothyroxine (SYNTHROID/LEVOTHROID) 137 MCG tablet Take 1 tablet (137 mcg) by mouth daily   tiotropium (SPIRIVA HANDIHALER) 18 MCG capsule Inhale contents of one capsule daily.   fluticasone - vilanterol (BREO ELLIPTA) 100-25 MCG/INH oral inhaler Inhale 1 puff into the lungs daily   methotrexate 2.5 MG tablet Take 3 tablets (7.5 mg) by mouth once a week On Mondays   ciclopirox (LOPROX) 0.77 % cream Apply topically 2 times daily To feet and toenails.   albuterol (PROAIR HFA, PROVENTIL HFA, VENTOLIN HFA) 108 (90 BASE) MCG/ACT inhaler Inhale 2 puffs into the lungs every 6 hours as needed for shortness of breath / dyspnea   losartan (COZAAR) 100 MG tablet Take 1 tablet (100 mg) by mouth daily   atorvastatin (LIPITOR) 20 MG tablet Take 1 tablet (20 mg) by mouth daily   ASPIRIN EC PO Take 81 mg by mouth daily   blood glucose monitoring (ACCU-CHEK RONNIE PLUS) test strip Use to test blood sugar 2 times daily or as directed.  3 month supply.   blood glucose monitoring (ACCU-CHEK FASTCLIX) lancets Use to test blood sugar 2 times daily or as directed.  102 lancets per box.  3 month supply.   blood glucose monitoring (NO BRAND SPECIFIED) meter device kit Use to test blood sugar 2 times daily.   Urea (CARMOL 40) 40 % CREA To feet daily (Patient taking differently: To feet daily PRN)   INFLIXIMAB IV Every 45 days   Multiple Vitamins-Minerals (MULTIVITAMIN & MINERAL PO) Take 1 tablet by mouth daily.   mirtazapine (REMERON) 15 MG tablet Take 15 mg by mouth At Bedtime.   sildenafil (VIAGRA) 50 MG tablet Take 2 tablets (100 mg) by mouth daily as needed for erectile  "dysfunction            Physical Exam:   Blood pressure 133/95, pulse 158, temperature 98.9  F (37.2  C), temperature source Oral, resp. rate 18, height 1.753 m (5' 9\"), weight 92.987 kg (205 lb), SpO2 100 %.    General: appears uncomfortable, short of breath with conversation, pleasant  HEENT: NCAT, PERRL, EOMI, OP clear and non-erythematous  Neck: No LAD, JVD to jawline  CV: tachycardia, s1 s2 hears, no m/r/g  Resp: anteriorly with shallow breaths, +rales, no wheezes  Abd: s/nt/nd, bs normoactive, obese  Extremities: wwp, 2+ bilateral LE edema (chronic)  Skin: no lesions or rashes appreciated  Neuro/Psych: AAOx4, CN 2-12 grossly intact, moving all four extremities         Data:        CMP  Recent Labs  Lab 17  174    135   POTASSIUM 4.8 4.5   CHLORIDE  --  102   CO2  --  23   ANIONGAP  --  10   * 131*   BUN  --  47*   CR  --  1.99*   GFRESTIMATED  --  33*   RAFFY  --  8.6   PROTTOTAL  --  8.3   ALBUMIN  --  3.7   BILITOTAL  --  0.9   ALKPHOS  --  132   AST  --  32   ALT  --  66     CBC  Recent Labs  Lab 17   WBC  --  12.3*   RBC  --  4.59   HGB 16.0 15.1   HCT  --  45.1   MCV  --  98   MCH  --  32.9   MCHC  --  33.5   RDW  --  13.8   PLT  --  236     INR  Recent Labs  Lab 17   INR 1.10       Troponin Lab Results   Component Value Date    TROPI 0.482* 2017    TROPI 0.017 2016    TROPI 0.021 2015    TROPONIN 0.23* 2017    TROPONIN 0.03 2014    TROPONIN 0.01 2013     NT-pro BNP ordered    Arterial Blood GasNo lab results found in last 7 days.    EK:1 Atrial flutter with rates 157    IMAGING:   CXR:   Exam:  XR CHEST PORT 1 VW, 2017 5:58 PM     History: Extreme dyspnea history of systemic sarcoidosis and COPD.     Comparison:  Chest radiograph 2016 and CT chest 3/16/2015     Findings:  Single portable AP view of the chest was obtained. The  cardiomediastinal silhouette and pulmonary vasculature are " partially  obscured by overlying opacities.  No significant pleural effusion or  appreciable pneumothorax. Chronic diffuse bilateral interstitial  fibrotic changes with no acute airspace opacities.                                                                         Impression:  Stable chronic diffuse interstitial fibrotic changes  without acute airspace disease.       I have personally reviewed the examination and initial interpretation  and I agree with the findings.

## 2017-01-19 NOTE — PROGRESS NOTES
Pt arrived in ECHO department  for DEAN.   Procedure explained, questions answered and consent signed. Post procedure instructions discussed with patient.  Pt's throat sprayed at 1130, therefore pt will not be able to eat or drink until 2 hours after at 13:30.  Informed pt of this time and encouraged to start with warm fluids and soft foods.    Pt tolerated procedure well, and was given a total of 50 mcg IV fentanyl and 1 mg IV versed for sedation.  Pt denied throat or chest pain after DEAN complete.   DEAN probe 3 used for procedure.  No clots visualized on DEAN so proceeded with DCCV.  Pt was deeply sedated from medications used for DEAN, so no additional medications given by anesthesia.  Pt denied C.P or throat pain after procedure and was transported back to unit 6C via RN after awake and VSS.    Report given to Arden RODRIGUEZ.

## 2017-01-19 NOTE — OP NOTE
CARDIOVERSION    PROCEDURE:  direct current cardioversion  PROCEDURE DATE: 1/19/2017     Pre-procedure diagnosis:  Atrial flutter  Post-procedure diagnosis: s/p direct current cardioversion  Complications:  none    BRIEF CLINICAL HISTORY:  Mr. Monroe is a 71 year old male who has a past medical history significant for pulmonary and cardiac sarcoidosis, CAD s/p PCI mLAD and D2 2008, DM2, HTn, HLD, Hep C, and hypothyroidism. He presented on 1.18/17 with a 3-4 week history of worsening SOB which worsened over the last several days. He also endorsed chest pain with deep breathing. He was found to be in AFL with RVR Vent Rate 150 bpm. Toponin elevated to 0.482 thought to be demand ischemia. Last echo from 2015 shows normal LVEF. He was started on heparin infusion. He now presents to echo lab for DEAN/DCCV at request of Cards I Service. [facility] 4 (+age, +DM2, +HTN, +CAD).       PROCEDURE:  The patient arrived at the Echo Laboratory in a fasting, non-sedated state.  Informed consent was previously obtained from patient, who understood indications, risks, and benefits of the procedure.  Patient on heparin infusion. Transesophageal echo was performed by the echo lab team to rule out intracardiac thrombus which also showed very reduced systemic function.  With help from Anesthesia, patient was deeply sedated.  100J of synchronized biphasic shock was delivered through the cardiac monitor, and the patient was successfully converted to normal sinus rhythm. He had frequent PACs and runs of AT after DCCV.  After sedation wore off, patient awoke and remained neurologically intact.  The patient tolerated the procedure well from a hemodynamic and respiratory standpoint without any complications.  He was observed in the Echo Laboratory and then transferred back to inpatient unit.    IMPRESSION:  1.  Successful direct current cardioversion with 100J biphasic shock.    RECOMMENDATIONS/PLANS:  1.   Transfer back to inpatient unit.   2.    Continue anticoagulation  3.   Consider AAT  4.   Severely reduced systemic function noted on DEAN however was in AFL with RVR- communicated results to primary team for further evaluation.     VINNY Fox CNP  Electrophysiology Consult Service  Pager: [phone]

## 2017-01-19 NOTE — PLAN OF CARE
Problem: Goal Outcome Summary  Goal: Goal Outcome Summary  Outcome: Improving  D/I/A:  Admitted (1/18) 2:1 A-flutter. AOx4 in room. DEAN+DCCV ~midday. Converted to SR w/ frequent PACs (70's-90's). Post-DCCV, BP soft. Once up to floor, BP WNL. Weaning O2 from 5L as tolerated. Once converted, pt stated midsternal CP resolved. Heparin gtt maintained and monitored at 1650 U/hr. Redraw late d/t pt availability, 10a=1.08, heparin stopped by oncoming RN. [facility] unable to get PIV placed without US. US-PIV placed in RUE. Renal US at bedside. Still need urine sample. EF in DEAN was low (~5%), however pt was still in A-flutter. Bedisde TTE ordered to verify.     Plan:  Monitor HR/R, monitor respiratory status, manage anticoag status, get UA, and notify provider of changes in prep for DC in <3 days.

## 2017-01-19 NOTE — PROGRESS NOTES
Cardiology Daily Note   Date of Service: 1/19/2017  Patient: Rohan Monroe  MRN: [id number]  Admission Date: 1/18/2017  Hospital Day # 1      Assessment & Plan:   Rohan Monroe is a 71 year old male with a history of coronary disease (PCI in 2008 to mid LAD and D2) and MRI proven cardio-pulmonary sarcoid, DM2, HTN, HLD admitted with typical atrial flutter now s/p cardioversion    Today:  Successful cardioversion to sinus rhythm w/ frequent PAC  Started amiodarone gtt w/ bolus due to ectopy  Decreased heparin gtt to low intensity  Echocardiogram w/ significantly reduced EF of 5-10%    # Atrial Flutter  # Acute systolic heart failure  Rates 150's. HD stable, dyspnea with talking and minimal activity. Going on for several weeks though worse in past few days. [id number]= 4 (HTN, CAD, DM2, age). Not anticoagulated prior to admission.  No evidence of infection. BNP at 13k and JVD present although CXR clear and no crackles on exam. DEAN negative on 01/19 with subsequent successful cardioversion to sinus rhythm with frequent PACs and hypotension. Concern for severely reduced EF on DEAN, contradicting with TTE.  -Low intensity heparin gtt (decreased from high intensity)  -Amiodarone 150mg bolus over 30 minutes with IV load for ectopy  -Continue pta metoprolol  -Will switch from nifedipine to amlodipine tomorrow (received nifedipine today)  -Will discuss angiogram  -Afterload reduction with home losartan 100mg daily      #Hx CAD s/p PCI with ROSALIE to mLAD and D2 in 2008  Denies chest pain with exertion, but endorses chest pain with a deep breath. Trop elevation plateau ~0.48. No evidence of acute ischemia on EKG with 2:1 Flutter.   -on low intensity heparin gtt  -Will repeat 1x troponin  -continue asa, metoprolol, statin    #RADHA on CKD, non-oliguric-  Likely due to prolonged A-flutter and decompensated heart failure as above.  - Renal US, FENA, UA pending  - Repeat AM BMP  - Strict I/O, daily  weights      #Cardiac and Pulmonary Sarcoidosis  #No hx of CHF  Diagnosed on MRI. Last cardiac MRI 2013 without evidence of heart failure.    -on infliximab q2mos and methotraxate every 1 week on mondays.  -enrolled in [facility] cardiac sarcoid registry  -Follow up with Dr. Vazquez in clinic in 3 months    #Constipation  -miralax    Chronic Issues  #HTN- continue pta metoprolol, chlorthalidone, losartan, nifedipine    #CKD stage 3 2/2 DM and vascular disease- follows with renal. Baseline 1.7 and admitted at 1.9.  #COPD- continue pta inhalers, nebs  #DM2- not on insulin per med rec. SSI low; hypoglycemia protocol    FEN: No IVF, 2g Na diet, NPO after MN, replete electrolytes prn  Prophylaxis: heparin gtt  Consults: none  Code Status: full  Disposition: Pending further workup. Anticipate inpatient for next 3-4 days minimum.      This patient was discussed with Dr. Vazquez who agrees with the assessment and plan.      Frank Braga, DO  [phone]  Internal Medicine PGY 2    CARDIOLOGY STAFF NOTE  I have seen and examined the patient with the Cards 1 team. I agree with the note above that reflects my assessment and plan of care.  Patient admitted with SOB and palpitations found to be in AFL, typical looking on EKG. RAte control approach failed, set for DEAN and DCCV. We did discuss with patient the option for ablation, but he preferred DCCV and it is reasonable approach since it is the first time he is diagnosed with that. CHADS VASC 4, needs bridging with heparin given kidney function. There was some concern of very low EF on DEAN, but TTE in sinus rhythm showed a more normal EF. We will have to review the images to assess the reason for discrepancy besides the HR difference.  Brian Vazquez MD [facility]  Cardiology - Electrophysiology      __________________________________________________      Subjective & Interval Hx:    A-flutter continued overnight with palpitations and mild dyspnea. Cardioverted successfully  "with improvement in symptoms. No fever, chills, cough. Pleuritic pain resolved.    Last 24 hr care team notes reviewed.   ROS: 4 point ROS including Respiratory, CV, GI and , other than that noted in the HPI, is negative    Telemetry reviewed: A-flutter overnight    Pertinent medications:  As above    Physical Exam:  Blood pressure 106/57, pulse 83, temperature 97.4  F (36.3  C), temperature source Oral, resp. rate 18, height 1.753 m (5' 9\"), weight 92.987 kg (205 lb), SpO2 99 %.    General: Pleasant, awake, sitting at bedside, NAD  HEENT: NC, AT, anicteric  Neck: + JVD, no LAD  Chest/Resp: Mild crackles in bases b/l no wheezing  Heart/CV: RRR with frequent PAC/PVC no murmur  Abdomen/GI: Soft, NT, ND, no HSM  Extremities/MSK: No edema or petechiae  Skin: No rashes or lesions  Neuro: Speech intact, ambulating, no focal deficits  Psych: Mood appropriate, affect congruent      Lines/Tubes:   Peripheral IV 01/18/17 Left Upper forearm (Active)   Site Assessment WDL 1/19/2017 10:00 AM   Number of days:1       Peripheral IV 01/18/17 Right;Lateral Upper forearm (Active)   Number of days:1       Labs & Studies of Note:  Labs reviewed in EPIC.   Last Basic Metabolic Panel:  NA      135   1/19/2017   POTASSIUM      4.6   1/19/2017  CHLORIDE      102   1/19/2017  RAFFY      8.4   1/19/2017  CO2       25   1/19/2017  BUN       45   1/19/2017  CR     2.20   1/19/2017  GLC      175   1/19/2017      EKG reviewed.   Meds reviewed.  Imaging reviewed.    Medications list for Reference  Current Facility-Administered Medications   Medication     glucose 40 % gel 15-30 g    Or     dextrose 50 % injection 25-50 mL    Or     glucagon injection 1 mg     glucose 40 % gel 15-30 g    Or     dextrose 50 % injection 25-50 mL    Or     glucagon injection 1 mg     insulin aspart (NovoLOG) inj (RAPID ACTING)     insulin aspart (NovoLOG) inj (RAPID ACTING)     heparin  drip 25,000 units in 0.45% NaCl 250 mL (see additional administration details for " dose)     heparin bolus from infusion pump     0.9% sodium chloride infusion     midazolam (VERSED) injection 0.5 mg     fentaNYL Citrate (PF) (SUBLIMAZE) injection 25 mcg     naloxone (NARCAN) injection 0.1-0.4 mg     flumazenil (ROMAZICON) injection 0.2 mg     amiodarone (NEXTERONE) 250 mg in D5W 250 mL infusion     amiodarone (NEXTERONE) 250 mg in D5W 250 mL infusion     amiodarone (NEXTERONE) bolus 150 mg     albuterol neb solution 2.5 mg     diltiazem (CARDIZEM) 125 mg in NaCl 0.9 % 125 mL infusion     albuterol (PROAIR HFA/PROVENTIL HFA/VENTOLIN HFA) Inhaler 2 puff     aspirin EC EC tablet 81 mg     atorvastatin (LIPITOR) tablet 20 mg     chlorthalidone (HYGROTON) tablet 25 mg     fluticasone-vilanterol (BREO ELLIPTA) 100-25 MCG/INH oral inhaler 1 puff     levothyroxine (SYNTHROID/LEVOTHROID) tablet 137 mcg     losartan (COZAAR) tablet 100 mg     metoprolol (LOPRESSOR) tablet 100 mg     mirtazapine (REMERON) tablet 15 mg     umeclidinium (INCRUSE ELLIPTA) oral inhaler 1 puff     lidocaine 1 % 1 mL     lidocaine (LMX4) kit     sodium chloride (PF) 0.9% PF flush 3 mL     sodium chloride (PF) 0.9% PF flush 3 mL     medication instruction     nitroglycerin (NITROSTAT) sublingual tablet 0.4 mg     alum & mag hydroxide-simethicone (MYLANTA ES/MAALOX  ES) suspension 15-30 mL     acetaminophen (TYLENOL) tablet 650 mg     acetaminophen (TYLENOL) Suppository 650 mg     Patient is already receiving anticoagulation with heparin, enoxaparin (LOVENOX), warfarin (COUMADIN)  or other anticoagulant medication     sodium chloride (PF) 0.9% PF flush 3 mL     sodium chloride (PF) 0.9% PF flush 3 mL     May take oral meds with a sip of water, the morning of Cardioversion procedure.         HOLD Digoxin (Lanoxin) AM of procedure IF on digoxin     HOLD: Insulin - REGULAR AM of procedure IF diabetic     HOLD: Insulin - RAPID/SHORT acting AM of procedure IF diabetic     HOLD: Oral hypoglycemics AM of procedure IF diabetic     Give    of usual dose of LONG ACTING insulin AM of procedure IF diabetic     potassium chloride SA (K-DUR/KLOR-CON M) CR tablet 20 mEq     potassium chloride SA (K-DUR/KLOR-CON M) CR tablet 40 mEq     magnesium sulfate 2 g in NS intermittent infusion ([employer]ar[employer] or [facility] C[facility])     influenza Vac Split High-Dose (FLUZONE) injection 0.5 mL     NIFEdipine ER (ADALAT CC) 24 hr tablet 60 mg

## 2017-01-19 NOTE — ED PROVIDER NOTES
History     Chief Complaint   Patient presents with     Shortness of Breath     HPI  Rohan Monroe is a 71-year-old male with a history of pulmonary and cardiac sarcoidosis as well as COPD who presented to the ER due to worsening shortness of breath. The patient says he s been having worsening breathing for the past 2 weeks but it got abruptly worse about 3 days prior. The patient states that he s been unable to walk even across the room due to acute dyspnea. The patient went to his primary-care office today and was sent to the ER due to worsening breathing as well as persisting tachycardia. The patient says that he has a history of abnormal heart rate but is unable to give us any further details. Denies any prior cardioversions. He s not on any anticoagulation. He says that he cannot feel his heart beating fast and does not know when he went into the abnormal rhythm. Denies any fevers. Says he s been having some mild body aches. Notes some weakness. Says that he s not normally on oxygen. Has been taking his blood pressure medication and has not missed any doses. Is on immunosuppression for his sarcoidosis which he s been taking regularly. The patient follows with both cardiology and pulmonology here at the [facility].    I have reviewed the Medications, Allergies, Past Medical and Surgical History, and Social History in the Epic system.    This part of the document was transcribed by Sergio Mcintyre for Steffi Holley MD.    Current Facility-Administered Medications   Medication     glucose 40 % gel 15-30 g    Or     dextrose 50 % injection 25-50 mL    Or     glucagon injection 1 mg     glucose 40 % gel 15-30 g    Or     dextrose 50 % injection 25-50 mL    Or     glucagon injection 1 mg     insulin aspart (NovoLOG) inj (RAPID ACTING)     insulin aspart (NovoLOG) inj (RAPID ACTING)     albuterol neb solution 2.5 mg     heparin  drip 25,000 units in 0.45% NaCl 250 mL (see additional administration details for  dose)     heparin bolus from infusion pump     diltiazem (CARDIZEM) 125 mg in NaCl 0.9 % 125 mL infusion     albuterol (PROAIR HFA/PROVENTIL HFA/VENTOLIN HFA) Inhaler 2 puff     aspirin EC EC tablet 81 mg     atorvastatin (LIPITOR) tablet 20 mg     chlorthalidone (HYGROTON) tablet 25 mg     fluticasone-vilanterol (BREO ELLIPTA) 100-25 MCG/INH oral inhaler 1 puff     levothyroxine (SYNTHROID/LEVOTHROID) tablet 137 mcg     losartan (COZAAR) tablet 100 mg     metoprolol (LOPRESSOR) tablet 100 mg     mirtazapine (REMERON) tablet 15 mg     umeclidinium (INCRUSE ELLIPTA) oral inhaler 1 puff     lidocaine 1 % 1 mL     lidocaine (LMX4) kit     sodium chloride (PF) 0.9% PF flush 3 mL     sodium chloride (PF) 0.9% PF flush 3 mL     medication instruction     nitroglycerin (NITROSTAT) sublingual tablet 0.4 mg     alum & mag hydroxide-simethicone (MYLANTA ES/MAALOX  ES) suspension 15-30 mL     acetaminophen (TYLENOL) tablet 650 mg     acetaminophen (TYLENOL) Suppository 650 mg     Patient is already receiving anticoagulation with heparin, enoxaparin (LOVENOX), warfarin (COUMADIN)  or other anticoagulant medication     sodium chloride (PF) 0.9% PF flush 3 mL     sodium chloride (PF) 0.9% PF flush 3 mL     May take oral meds with a sip of water, the morning of Cardioversion procedure.         HOLD Digoxin (Lanoxin) AM of procedure IF on digoxin     HOLD: Insulin - REGULAR AM of procedure IF diabetic     HOLD: Insulin - RAPID/SHORT acting AM of procedure IF diabetic     HOLD: Oral hypoglycemics AM of procedure IF diabetic     Give   of usual dose of LONG ACTING insulin AM of procedure IF diabetic     potassium chloride SA (K-DUR/KLOR-CON M) CR tablet 20 mEq     potassium chloride SA (K-DUR/KLOR-CON M) CR tablet 40 mEq     magnesium sulfate 2 g in NS intermittent infusion (PharMEDium or [facility]V Cmpd)     influenza Vac Split High-Dose (FLUZONE) injection 0.5 mL     NIFEdipine ER (ADALAT CC) 24 hr tablet 60 mg     Past Medical History    Diagnosis Date     Unspecified hypothyroidism      Hypothyroidism     Unspecified viral hepatitis C without hepatic coma      Sarcoidosis (H)      Generalized osteoarthrosis, unspecified site      Depressive disorder, not elsewhere classified      Depression (non-psychotic)     Other psoriasis      Viral warts, unspecified      Lichen planus      Type II or unspecified type diabetes mellitus without mention of complication, not stated as uncontrolled      Chronic infection      Hep C     Congestive heart failure, unspecified      Sarcoidosis (H)      Glaucoma suspect      ERM OS (epiretinal membrane, left eye)      PVD (posterior vitreous detachment), left eye      Cataract of both eyes      Hypertension        Past Surgical History   Procedure Laterality Date     Hc repair incisional hernia,reducible       Hernia Repair, Incisional, Unilateral     C ligatn/strip long & short saphen       Hc removal of tonsils,<9 y/o       Tonsils <10y.o.     Cardiac stent       s/p     Arthroplasty hip  8/24/2011     Procedure:ARTHROPLASTY HIP; Right Total Hip Arthroplasty  Choice anesthesia; Surgeon:LESLI WILKINSON; Location:[facility] OR     Joint replacement       1 month ago--right hip     Cataract iol, rt/lt  9/15/2015     LE     Colonoscopy       Cardiac surgery       Biopsy         Family History   Problem Relation Age of Onset     HEART DISEASE Father      irreg heart beat     Circulatory Father      varicose veins     HEART DISEASE Mother      heart attack     Arthritis Mother      DIABETES Sister      type 2     Eye Disorder Maternal Grandmother      glaucoma     Glaucoma No family hx of      Macular Degeneration No family hx of      CANCER No family hx of      no skin cancer     Kidney Cancer Sister      DIABETES No family hx of      DIABETES Sister      OSTEOPOROSIS Mother      Thyroid Disease Mother      Prostate Cancer Father        Social History   Substance Use Topics     Smoking status: Former Smoker -- 1.00 packs/day  "for 30 years     Types: Cigarettes     Start date: 12/30/1960     Quit date: 07/22/1994     Smokeless tobacco: Never Used     Alcohol Use: No        Allergies   Allergen Reactions     Prednisone Other (See Comments)     He reports that he can't sleep for days and can't use small to massive doses of prednisone   Pt. Does not do well with high doses of Prednisone, His MD says that Prednisone is counter indicated. Prednisone failed to treat his sarcoidosis        Review of Systems   Constitutional: Positive for fever.   Respiratory: Positive for shortness of breath.    Cardiovascular: Negative for palpitations.   Musculoskeletal: Positive for back pain.   Allergic/Immunologic: Positive for immunocompromised state.   Neurological: Positive for weakness.   All other systems reviewed and are negative.      Physical Exam   BP: (!) 169/120 mmHg  Pulse: 148  Heart Rate: 156  Temp: 98.9  F (37.2  C)  Resp: 18  Height: 175.3 cm (5' 9\")  Weight: 92.987 kg (205 lb)  SpO2: 97 %  Physical Exam   Constitutional: He is oriented to person, place, and time. He appears well-developed and well-nourished.   HENT:   Head: Normocephalic and atraumatic.   Neck: Normal range of motion. Neck supple.   Cardiovascular: Normal rate, regular rhythm and normal heart sounds.    Rapid heart rate   Pulmonary/Chest: Effort normal. No respiratory distress. He has no wheezes. He has rales.   Abdominal: Soft. He exhibits no distension. There is no tenderness. There is no rebound.   Neurological: He is alert and oriented to person, place, and time.   Skin: Skin is warm.   Psychiatric: He has a normal mood and affect. His behavior is normal. Thought content normal.       ED Course     [unfilled]  Procedures             EKG Interpretation:      Interpreted by Steffi Holley  Time reviewed: 1920  Symptoms at time of EKG: None   Rhythm: atrial flutter  Rate: 150-160  Axis: Normal  Ectopy: none  Conduction: normal  ST Segments/ T Waves: No ST-T wave " changes  Q Waves: III  Comparison to prior: new a flutter    Clinical Impression: atrial flutter (new onset)                Critical Care time:  was 45 minutes for this patient excluding procedures.              Medications   albuterol neb solution 2.5 mg (2.5 mg Nebulization Given 1/18/17 1922)   heparin  drip 25,000 units in 0.45% NaCl 250 mL (see additional administration details for dose) (1,100 Units/hr Intravenous New Bag 1/18/17 2012)   heparin bolus from infusion pump (not administered)   diltiazem (CARDIZEM) 125 mg in NaCl 0.9 % 125 mL infusion (10 mg/hr Intravenous Rate/Dose Change 1/18/17 2348)   albuterol (PROAIR HFA/PROVENTIL HFA/VENTOLIN HFA) Inhaler 2 puff (not administered)   aspirin EC EC tablet 81 mg (not administered)   atorvastatin (LIPITOR) tablet 20 mg (not administered)   chlorthalidone (HYGROTON) tablet 25 mg (not administered)   fluticasone-vilanterol (BREO ELLIPTA) 100-25 MCG/INH oral inhaler 1 puff (not administered)   levothyroxine (SYNTHROID/LEVOTHROID) tablet 137 mcg (not administered)   losartan (COZAAR) tablet 100 mg (not administered)   metoprolol (LOPRESSOR) tablet 100 mg (100 mg Oral Given 1/19/17 0026)   mirtazapine (REMERON) tablet 15 mg (15 mg Oral Given 1/19/17 0027)   umeclidinium (INCRUSE ELLIPTA) oral inhaler 1 puff (not administered)   lidocaine 1 % 1 mL (not administered)   lidocaine (LMX4) kit (not administered)   sodium chloride (PF) 0.9% PF flush 3 mL (not administered)   sodium chloride (PF) 0.9% PF flush 3 mL (3 mLs Intracatheter Not Given 1/19/17 0022)   medication instruction (not administered)   nitroglycerin (NITROSTAT) sublingual tablet 0.4 mg (0.4 mg Sublingual Given 1/19/17 0124)   alum & mag hydroxide-simethicone (MYLANTA ES/MAALOX  ES) suspension 15-30 mL (not administered)   acetaminophen (TYLENOL) tablet 650 mg (not administered)   acetaminophen (TYLENOL) Suppository 650 mg (not administered)   Patient is already receiving anticoagulation with heparin,  enoxaparin (LOVENOX), warfarin (COUMADIN)  or other anticoagulant medication (not administered)   sodium chloride (PF) 0.9% PF flush 3 mL (not administered)   sodium chloride (PF) 0.9% PF flush 3 mL (3 mLs Intravenous Not Given 1/19/17 0022)   May take oral meds with a sip of water, the morning of Cardioversion procedure.     (not administered)   HOLD Digoxin (Lanoxin) AM of procedure IF on digoxin (not administered)   HOLD: Insulin - REGULAR AM of procedure IF diabetic (not administered)   HOLD: Insulin - RAPID/SHORT acting AM of procedure IF diabetic (not administered)   HOLD: Oral hypoglycemics AM of procedure IF diabetic (not administered)   Give   of usual dose of LONG ACTING insulin AM of procedure IF diabetic (not administered)   potassium chloride SA (K-DUR/KLOR-CON M) CR tablet 20 mEq (not administered)   potassium chloride SA (K-DUR/KLOR-CON M) CR tablet 40 mEq (not administered)   magnesium sulfate 2 g in NS intermittent infusion (PharMEDium or [facility] Cmpd) (not administered)   influenza Vac Split High-Dose (FLUZONE) injection 0.5 mL (not administered)   NIFEdipine ER (ADALAT CC) 24 hr tablet 60 mg (not administered)   glucose 40 % gel 15-30 g (not administered)     Or   dextrose 50 % injection 25-50 mL (not administered)     Or   glucagon injection 1 mg (not administered)   glucose 40 % gel 15-30 g (not administered)     Or   dextrose 50 % injection 25-50 mL (not administered)     Or   glucagon injection 1 mg (not administered)   insulin aspart (NovoLOG) inj (RAPID ACTING) (not administered)   insulin aspart (NovoLOG) inj (RAPID ACTING) (1 Units Subcutaneous Not Given 1/19/17 0032)   metoprolol (LOPRESSOR) injection 5 mg (5 mg Intravenous Given 1/18/17 1815)   ipratropium - albuterol 0.5 mg/2.5 mg/3 mL (DUONEB) neb solution 3 mL (3 mLs Nebulization Given 1/18/17 1814)   0.9% sodium chloride BOLUS (0 mLs Intravenous Stopped 1/18/17 2030)   metoprolol (LOPRESSOR) injection 5 mg (5 mg Intravenous Given 1/18/17  1853)   metoprolol (LOPRESSOR) injection 5 mg (5 mg Intravenous Given 1/18/17 1920)   diltiazem (CARDIZEM) injection 23.25 mg (23 mg Intravenous Given 1/18/17 1946)   heparin Loading Dose from infusion pump *Give when STARTING heparin infusion 5,600 Units (5,600 Units Intravenous Given 1/18/17 2012)   polyethylene glycol (MIRALAX/GLYCOLAX) Packet 17 g (17 g Oral Given 1/19/17 0026)   furosemide (LASIX) injection 40 mg (40 mg Intravenous Given 1/19/17 0027)         Labs Ordered and Resulted from Time of ED Arrival Up to the Time of Departure from the ED   TROPONIN I - Abnormal; Notable for the following:     Troponin I ES 0.482 (*)     All other components within normal limits   BASIC METABOLIC PANEL - Abnormal; Notable for the following:     Glucose 131 (*)     Urea Nitrogen 47 (*)     Creatinine 1.99 (*)     GFR Estimate 33 (*)     GFR Estimate If Black 40 (*)     All other components within normal limits   CBC WITH PLATELETS DIFFERENTIAL - Abnormal; Notable for the following:     WBC 12.3 (*)     Absolute Neutrophil 9.6 (*)     All other components within normal limits   HEPATIC PANEL - Abnormal; Notable for the following:     Bilirubin Direct 0.3 (*)     All other components within normal limits   ISTAT GASES ELEC ICA GLUC ELIU POCT - Abnormal; Notable for the following:     Ph Venous 7.30 (*)     PO2 Venous 22 (*)     Glucose 137 (*)     All other components within normal limits   TROPONIN POCT - Abnormal; Notable for the following:     Troponin I 0.23 (*)     All other components within normal limits   ISTAT  GASES LACTATE ELIU POCT - Abnormal; Notable for the following:     Ph Venous 7.29 (*)     All other components within normal limits   LACTIC ACID WHOLE BLOOD   INR   PROCALCITONIN   PERIPHERAL IV CATHETER   CARDIAC CONTINUOUS MONITORING   PULSE OXIMETRY NURSING   ISTAT CG4 GASES LACTATE ELIU NURSING POCT   PLATELETS MONITORED PER HEPARIN TREATMENT PROTOCOL (FOR MEANINGFUL USE   BLOOD CULTURE   BLOOD CULTURE        Assessments & Plan (with Medical Decision Making): 71-year-old male with a history of pulmonary and respiratory sarcoid as well as COPD who presents to the ER due to worsening shortness of breath.  Patient was seen in clinic today and referred here because he was complaining of worsening breathing as well as new tachycardia.  Patient has a history of paroxysmal A. fib, but does not remember when he last had it.  Patient s previous EKGs were all normal sinus rhythm.  Patient is on multiple blood pressure medications including Lopressor.  He says that he s been taking them and has not missed any doses.  Patient here was given an additional 3 doses of 5 mg of metoprolol.  With the 5 mg patient had no change in his heart rate and it remained in the 150s.  Patient was therefore is given a dose of diltiazem.  Patient got around 20 mg of diltiazem and he became rate controlled in the 80s.  Patient still remains in A-flutter, but his rate is now controlled.  Patient s chest x-ray was stable showing chronic interstitial fibrotic changes.  Patient did have some shortness of breath which improved with breathing treatments.  Patient has a normal pro-calcitonin.  The mild leukocytosis of 12.3.  INR stable.  Patient s creatinine has gradually increased to 1.99 today.  His baseline is normally around 14.  Patient does have an elevated troponin 0.48 which is likely demand ischemia from his atrial flutter.  Patient s lactic acid is normal.  Case is discussed with  who is the cardiologist on-call.  She agrees with the patient being admitted to [facility].  She recommended the patient be started on a heparin drip.  Plan will be to admit to cardiology with monitoring of a-flutter and possible cardioversion in the morning after tess. Patient was not a candidate for ER cardioversion due to unknown onset of the atrial flutter.  Patient s blood pressures have been stable and he s been hemodynamically stable.   This part of the  document was transcribed by Indiana Taylor, Medical Scribe.     I have reviewed the nursing notes.    I have reviewed the findings, diagnosis, plan and need for follow up with the patient.    New Prescriptions    No medications on file       Final diagnoses:   Atrial flutter with rapid ventricular response (H)   SOB (shortness of breath)   Sarcoidosis of lung (HCC)       1/18/2017   [facility], [place], EMERGENCY DEPARTMENT      Steffi Holley MD  01/19/17 0131

## 2017-01-19 NOTE — PROGRESS NOTES
Admission    Admitted from: [facility] ED  Accompanied by: self  Belongings: bedside  Admission Profile: complete  Teaching: orientation to unit, call don't fall, use of console, meal times, visiting hours, when to call for the RN (angina/SOB/dizzyness, etc), and enforced importance to safety  Access: PIV  Telemetry: Placed on pt  Ht./Wt.: complete  Skin: Skin breakdown on coccyx

## 2017-01-19 NOTE — PROGRESS NOTES
REASON FOR VISIT:  Increasing shortness breath.      HISTORY OF PRESENT ILLNESS:  This is a 71-year-old male with known pulmonary sarcoid who also has diabetes, coronary artery disease, hypertension, hyperlipidemia and COPD.  He comes in today because of increasing shortness of breath over the last 2 weeks.  He cannot even walk more than 10 feet at a time without stopping.  He is having difficulty  Talking in sentences.  He has been very fatigued.  He is not sleeping well.  He had visited his daughter over El[place] in [place] and since he has been home he has been doing poorly.  He is having a lot of difficulty with breathing and gets  a tightness in his chest when he breathes.  He is on inhalers and has been continuing to use them.  His symptoms have gotten a lot worse over the last 2 days.     Past Medical History   Diagnosis Date     Unspecified hypothyroidism      Hypothyroidism     Unspecified viral hepatitis C without hepatic coma      Sarcoidosis (H)      Generalized osteoarthrosis, unspecified site      Depressive disorder, not elsewhere classified      Depression (non-psychotic)     Other psoriasis      Viral warts, unspecified      Lichen planus      Type II or unspecified type diabetes mellitus without mention of complication, not stated as uncontrolled      Chronic infection      Hep C     Congestive heart failure, unspecified      Sarcoidosis (H)      Glaucoma suspect      ERM OS (epiretinal membrane, left eye)      PVD (posterior vitreous detachment), left eye      Cataract of both eyes      Hypertension      No current facility-administered medications for this visit.     Current Outpatient Prescriptions   Medication     inFLIXimab (REMICADE) 100 MG injection     NIFEdipine ER osmotic (ADALAT CC) 60 MG 24 hr tablet     chlorthalidone (HYGROTON) 25 MG tablet     metoprolol (LOPRESSOR) 100 MG tablet     levothyroxine (SYNTHROID/LEVOTHROID) 137 MCG tablet     tiotropium (SPIRIVA HANDIHALER) 18 MCG  capsule     fluticasone - vilanterol (BREO ELLIPTA) 100-25 MCG/INH oral inhaler     methotrexate 2.5 MG tablet     ciclopirox (LOPROX) 0.77 % cream     albuterol (PROAIR HFA, PROVENTIL HFA, VENTOLIN HFA) 108 (90 BASE) MCG/ACT inhaler     losartan (COZAAR) 100 MG tablet     atorvastatin (LIPITOR) 20 MG tablet     ASPIRIN EC PO     blood glucose monitoring (ACCU-CHEK RONNIE PLUS) test strip     blood glucose monitoring (ACCU-CHEK FASTCLIX) lancets     blood glucose monitoring (NO BRAND SPECIFIED) meter device kit     Urea (CARMOL 40) 40 % CREA     sildenafil (VIAGRA) 50 MG tablet     INFLIXIMAB IV     Multiple Vitamins-Minerals (MULTIVITAMIN & MINERAL PO)     mirtazapine (REMERON) 15 MG tablet     Facility-Administered Medications Ordered in Other Visits   Medication     albuterol neb solution 2.5 mg     0.9% sodium chloride BOLUS    Followed by     0.9% sodium chloride infusion        Allergies   Allergen Reactions     Prednisone Other (See Comments)     He reports that he can't sleep for days and can't use small to massive doses of prednisone   Pt. Does not do well with high doses of Prednisone, His MD says that Prednisone is counter indicated. Prednisone failed to treat his sarcoidosis      Social History     Social History     Marital Status:      Spouse Name: N/A     Number of Children: 2     Years of Education: N/A     Occupational History      [employer]     Social History Main Topics     Smoking status: Former Smoker -- 1.00 packs/day for 30 years     Types: Cigarettes     Start date: 12/30/1960     Quit date: 07/22/1994     Smokeless tobacco: Never Used     Alcohol Use: No     Drug Use: No     Sexual Activity:     Partners: Female     Other Topics Concern     Blood Transfusions No     Exercise Yes     Social History Narrative    Dairy/d 2 servings/d    Caffeine little servings/d    Exercise 3 x week    Sunscreen used - Yes    Seatbelts used - Yes    Working smoke/CO detectors in the home - Yes     Guns stored in the home - No    Self Breast Exams - NOT APPLICABLE    Self Testicular Exam - No    Eye Exam up to date - Yes    Dental Exam up to date - Yes    Pap Smear up to date - NOT APPLICABLE    Mammogram up to date - NOT APPLICABLE    PSA up to date - Yes    Dexa Scan up to date - NOT APPLICABLE    Flex Sig / Colonoscopy up to date - Yes    Immunizations up to date - Unsure    Abuse: Current or Past(Physical, Sexual or Emotional)- No    Do you feel safe in your environment - Yes     REVIEW OF SYSTEMS:   GENERAL:  No fever, chills or night sweats.  He is having great profound fatigue.   EYES:  No vision change.   ENT:  He does have throat pain.   CARDIAC:  As above, he is getting chest discomfort.   RESPIRATORY:  As above.   ABDOMEN:  Has constipation and heartburn.   GENITOURINARY:  He has a change in urination.  He has been getting up more, especially in the early morning.   MUSCULOSKELETAL:  No new joint pain, muscle pain or swelling.   SKIN:  No new lesions.   NEUROLOGIC:  No numbness, tingling, dizziness or headache.   ENDOCRINE:  Does have diabetes.   No environmental allergies.   MENTAL HEALTH:  Does have sleep problems.     /88 mmHg  Pulse 154  Resp 20  Wt 94.575 kg (208 lb 8 oz)  SpO2 96%       PHYSICAL EXAMINATION:  An elderly male who appears to be in moderate distress.  Color is still good, but he can only talk in 1-word sentences.  He is very dyspneic and looks uncomfortable.  His lungs are aerating.  No notable wheezes.  He does have diminished breath sounds at the bases.  His heart is tachycardic 157, appears to be regular.  Neck is supple with no nodes.  Abdomen is soft.  His legs have no edema.        His electrocardiogram shows normal sinus rhythm, tachycardia.      ASSESSMENT AND PLAN:  Elderly gentleman with history of pulmonary sarcoid, coronary artery disease and diabetes presents today with markedly increased shortness of breath.       1.  Dyspnea.  This could be cardiac, it  might be related to his sarcoid.  He is tachycardic, but his blood pressure is still good.  He looks moderately ill and we did send him to the emergency room by [facility] transport.   Madalyn Fajardo MD, PhD

## 2017-01-20 ENCOUNTER — ANTICOAGULATION THERAPY VISIT (OUTPATIENT)
Dept: ANTICOAGULATION | Facility: CLINIC | Age: 72
End: 2017-01-20

## 2017-01-20 DIAGNOSIS — Z79.01 LONG-TERM (CURRENT) USE OF ANTICOAGULANTS: Primary | ICD-10-CM

## 2017-01-20 DIAGNOSIS — I48.92 ATRIAL FLUTTER WITH RAPID VENTRICULAR RESPONSE (H): ICD-10-CM

## 2017-01-20 LAB
ANION GAP SERPL CALCULATED.3IONS-SCNC: 11 MMOL/L (ref 3–14)
BUN SERPL-MCNC: 57 MG/DL (ref 7–30)
CALCIUM SERPL-MCNC: 8.2 MG/DL (ref 8.5–10.1)
CHLORIDE SERPL-SCNC: 101 MMOL/L (ref 94–109)
CO2 SERPL-SCNC: 20 MMOL/L (ref 20–32)
CREAT SERPL-MCNC: 2.33 MG/DL (ref 0.66–1.25)
ERYTHROCYTE [DISTWIDTH] IN BLOOD BY AUTOMATED COUNT: 14 % (ref 10–15)
GFR SERPL CREATININE-BSD FRML MDRD: 28 ML/MIN/1.7M2
GLUCOSE BLDC GLUCOMTR-MCNC: 118 MG/DL (ref 70–99)
GLUCOSE BLDC GLUCOMTR-MCNC: 143 MG/DL (ref 70–99)
GLUCOSE BLDC GLUCOMTR-MCNC: 147 MG/DL (ref 70–99)
GLUCOSE BLDC GLUCOMTR-MCNC: 174 MG/DL (ref 70–99)
GLUCOSE SERPL-MCNC: 146 MG/DL (ref 70–99)
HBA1C MFR BLD: 7.5 % (ref 4.3–6)
HCT VFR BLD AUTO: 40.8 % (ref 40–53)
HGB BLD-MCNC: 13.4 G/DL (ref 13.3–17.7)
INR PPP: 1.3 (ref 0.86–1.14)
INTERPRETATION ECG - MUSE: NORMAL
INTERPRETATION ECG - MUSE: NORMAL
LMWH PPP CHRO-ACNC: 0.26 IU/ML
LMWH PPP CHRO-ACNC: 0.48 IU/ML
MAGNESIUM SERPL-MCNC: 2.5 MG/DL (ref 1.6–2.3)
MCH RBC QN AUTO: 32.7 PG (ref 26.5–33)
MCHC RBC AUTO-ENTMCNC: 32.8 G/DL (ref 31.5–36.5)
MCV RBC AUTO: 100 FL (ref 78–100)
PLATELET # BLD AUTO: 194 10E9/L (ref 150–450)
POTASSIUM SERPL-SCNC: 4.3 MMOL/L (ref 3.4–5.3)
RBC # BLD AUTO: 4.1 10E12/L (ref 4.4–5.9)
SODIUM SERPL-SCNC: 132 MMOL/L (ref 133–144)
WBC # BLD AUTO: 9.1 10E9/L (ref 4–11)

## 2017-01-20 PROCEDURE — 85520 HEPARIN ASSAY: CPT | Performed by: INTERNAL MEDICINE

## 2017-01-20 PROCEDURE — 36415 COLL VENOUS BLD VENIPUNCTURE: CPT | Performed by: STUDENT IN AN ORGANIZED HEALTH CARE EDUCATION/TRAINING PROGRAM

## 2017-01-20 PROCEDURE — 25000128 H RX IP 250 OP 636: Performed by: INTERNAL MEDICINE

## 2017-01-20 PROCEDURE — 85520 HEPARIN ASSAY: CPT | Performed by: STUDENT IN AN ORGANIZED HEALTH CARE EDUCATION/TRAINING PROGRAM

## 2017-01-20 PROCEDURE — 25000132 ZZH RX MED GY IP 250 OP 250 PS 637: Mod: GY | Performed by: INTERNAL MEDICINE

## 2017-01-20 PROCEDURE — 25000132 ZZH RX MED GY IP 250 OP 250 PS 637: Mod: GY | Performed by: STUDENT IN AN ORGANIZED HEALTH CARE EDUCATION/TRAINING PROGRAM

## 2017-01-20 PROCEDURE — A9270 NON-COVERED ITEM OR SERVICE: HCPCS | Mod: GY | Performed by: INTERNAL MEDICINE

## 2017-01-20 PROCEDURE — A9270 NON-COVERED ITEM OR SERVICE: HCPCS | Mod: GY | Performed by: STUDENT IN AN ORGANIZED HEALTH CARE EDUCATION/TRAINING PROGRAM

## 2017-01-20 PROCEDURE — 83735 ASSAY OF MAGNESIUM: CPT | Performed by: STUDENT IN AN ORGANIZED HEALTH CARE EDUCATION/TRAINING PROGRAM

## 2017-01-20 PROCEDURE — 90662 IIV NO PRSV INCREASED AG IM: CPT | Performed by: INTERNAL MEDICINE

## 2017-01-20 PROCEDURE — 36415 COLL VENOUS BLD VENIPUNCTURE: CPT | Performed by: INTERNAL MEDICINE

## 2017-01-20 PROCEDURE — 00000146 ZZHCL STATISTIC GLUCOSE BY METER IP

## 2017-01-20 PROCEDURE — 25000125 ZZHC RX 250: Performed by: INTERNAL MEDICINE

## 2017-01-20 PROCEDURE — 80048 BASIC METABOLIC PNL TOTAL CA: CPT | Performed by: STUDENT IN AN ORGANIZED HEALTH CARE EDUCATION/TRAINING PROGRAM

## 2017-01-20 PROCEDURE — 99232 SBSQ HOSP IP/OBS MODERATE 35: CPT | Mod: GC | Performed by: INTERNAL MEDICINE

## 2017-01-20 PROCEDURE — 21400006 ZZH R&B CCU INTERMEDIATE UMMC

## 2017-01-20 PROCEDURE — 83036 HEMOGLOBIN GLYCOSYLATED A1C: CPT | Performed by: STUDENT IN AN ORGANIZED HEALTH CARE EDUCATION/TRAINING PROGRAM

## 2017-01-20 PROCEDURE — 85610 PROTHROMBIN TIME: CPT | Performed by: STUDENT IN AN ORGANIZED HEALTH CARE EDUCATION/TRAINING PROGRAM

## 2017-01-20 PROCEDURE — 85027 COMPLETE CBC AUTOMATED: CPT | Performed by: STUDENT IN AN ORGANIZED HEALTH CARE EDUCATION/TRAINING PROGRAM

## 2017-01-20 RX ORDER — FUROSEMIDE 10 MG/ML
20 INJECTION INTRAMUSCULAR; INTRAVENOUS ONCE
Status: DISCONTINUED | OUTPATIENT
Start: 2017-01-20 | End: 2017-01-20

## 2017-01-20 RX ORDER — BISACODYL 10 MG
10 SUPPOSITORY, RECTAL RECTAL DAILY PRN
Status: DISCONTINUED | OUTPATIENT
Start: 2017-01-20 | End: 2017-01-25 | Stop reason: HOSPADM

## 2017-01-20 RX ORDER — POLYETHYLENE GLYCOL 3350 17 G/17G
17 POWDER, FOR SOLUTION ORAL DAILY PRN
Status: DISCONTINUED | OUTPATIENT
Start: 2017-01-20 | End: 2017-01-25 | Stop reason: HOSPADM

## 2017-01-20 RX ORDER — WARFARIN SODIUM 2.5 MG/1
2.5 TABLET ORAL
Status: COMPLETED | OUTPATIENT
Start: 2017-01-20 | End: 2017-01-20

## 2017-01-20 RX ORDER — POLYETHYLENE GLYCOL 3350 17 G/17G
17 POWDER, FOR SOLUTION ORAL DAILY
Status: DISCONTINUED | OUTPATIENT
Start: 2017-01-21 | End: 2017-01-20

## 2017-01-20 RX ORDER — AMOXICILLIN 250 MG
1 CAPSULE ORAL 2 TIMES DAILY
Status: DISCONTINUED | OUTPATIENT
Start: 2017-01-20 | End: 2017-01-20

## 2017-01-20 RX ORDER — AMOXICILLIN 250 MG
1 CAPSULE ORAL AT BEDTIME
Status: DISCONTINUED | OUTPATIENT
Start: 2017-01-20 | End: 2017-01-20

## 2017-01-20 RX ORDER — FUROSEMIDE 10 MG/ML
40 INJECTION INTRAMUSCULAR; INTRAVENOUS ONCE
Status: COMPLETED | OUTPATIENT
Start: 2017-01-20 | End: 2017-01-20

## 2017-01-20 RX ORDER — AMIODARONE HYDROCHLORIDE 200 MG/1
200 TABLET ORAL 2 TIMES DAILY
Status: DISCONTINUED | OUTPATIENT
Start: 2017-01-20 | End: 2017-01-21

## 2017-01-20 RX ORDER — FUROSEMIDE 10 MG/ML
20 INJECTION INTRAMUSCULAR; INTRAVENOUS
Status: DISCONTINUED | OUTPATIENT
Start: 2017-01-20 | End: 2017-01-22

## 2017-01-20 RX ADMIN — AMIODARONE HYDROCHLORIDE 200 MG: 200 TABLET ORAL at 07:59

## 2017-01-20 RX ADMIN — SENNOSIDES AND DOCUSATE SODIUM 1 TABLET: 8.6; 5 TABLET ORAL at 14:19

## 2017-01-20 RX ADMIN — METOPROLOL TARTRATE 100 MG: 100 TABLET, FILM COATED ORAL at 20:26

## 2017-01-20 RX ADMIN — INSULIN ASPART 1 UNITS: 100 INJECTION, SOLUTION INTRAVENOUS; SUBCUTANEOUS at 08:14

## 2017-01-20 RX ADMIN — WARFARIN SODIUM 2.5 MG: 2.5 TABLET ORAL at 18:02

## 2017-01-20 RX ADMIN — MIRTAZAPINE 15 MG: 15 TABLET, FILM COATED ORAL at 22:32

## 2017-01-20 RX ADMIN — POLYETHYLENE GLYCOL 3350 17 G: 17 POWDER, FOR SOLUTION ORAL at 14:19

## 2017-01-20 RX ADMIN — LEVOTHYROXINE SODIUM 137 MCG: 137 TABLET ORAL at 07:59

## 2017-01-20 RX ADMIN — ASPIRIN 81 MG: 81 TABLET, COATED ORAL at 07:59

## 2017-01-20 RX ADMIN — AMIODARONE HYDROCHLORIDE 200 MG: 200 TABLET ORAL at 20:25

## 2017-01-20 RX ADMIN — HEPARIN SODIUM 1150 UNITS/HR: 10000 INJECTION, SOLUTION INTRAVENOUS at 02:16

## 2017-01-20 RX ADMIN — INFLUENZA A VIRUS A/CALIFORNIA/7/2009 X-179A (H1N1) ANTIGEN (FORMALDEHYDE INACTIVATED), INFLUENZA A VIRUS A/HONG KONG/4801/2014 X-263B (H3N2) ANTIGEN (FORMALDEHYDE INACTIVATED), AND INFLUENZA B VIRUS B/BRISBANE/60/2008 ANTIGEN (FORMALDEHYDE INACTIVATED) 0.5 ML: 60; 60; 60 INJECTION, SUSPENSION INTRAMUSCULAR at 07:59

## 2017-01-20 RX ADMIN — FUROSEMIDE 20 MG: 10 INJECTION, SOLUTION INTRAVENOUS at 16:18

## 2017-01-20 RX ADMIN — ATORVASTATIN CALCIUM 20 MG: 20 TABLET, FILM COATED ORAL at 22:32

## 2017-01-20 RX ADMIN — BISACODYL 10 MG: 10 SUPPOSITORY RECTAL at 21:04

## 2017-01-20 RX ADMIN — FUROSEMIDE 40 MG: 10 INJECTION, SOLUTION INTRAVENOUS at 00:42

## 2017-01-20 RX ADMIN — FUROSEMIDE 20 MG: 10 INJECTION, SOLUTION INTRAVENOUS at 14:19

## 2017-01-20 RX ADMIN — ALUMINUM HYDROXIDE, MAGNESIUM HYDROXIDE, AND DIMETHICONE 30 ML: 400; 400; 40 SUSPENSION ORAL at 00:24

## 2017-01-20 RX ADMIN — ALBUTEROL SULFATE 2 PUFF: 90 AEROSOL, METERED RESPIRATORY (INHALATION) at 02:45

## 2017-01-20 RX ADMIN — SODIUM PHOSPHATE 1 ENEMA: 7; 19 ENEMA RECTAL at 22:40

## 2017-01-20 RX ADMIN — LOSARTAN POTASSIUM 100 MG: 50 TABLET, FILM COATED ORAL at 07:59

## 2017-01-20 RX ADMIN — FLUTICASONE FUROATE AND VILANTEROL TRIFENATATE 1 PUFF: 100; 25 POWDER RESPIRATORY (INHALATION) at 08:08

## 2017-01-20 RX ADMIN — SENNOSIDES AND DOCUSATE SODIUM 1 TABLET: 8.6; 5 TABLET ORAL at 20:25

## 2017-01-20 RX ADMIN — DEXTROSE 0.5 MG/MIN: 5 SOLUTION INTRAVENOUS at 00:42

## 2017-01-20 RX ADMIN — METOPROLOL TARTRATE 100 MG: 100 TABLET, FILM COATED ORAL at 07:59

## 2017-01-20 RX ADMIN — UMECLIDINIUM 1 PUFF: 62.5 AEROSOL, POWDER ORAL at 08:08

## 2017-01-20 NOTE — PLAN OF CARE
Problem: Arrhythmia/Dysrhythmia (Symptomatic) (Adult)  Goal: Signs and Symptoms of Listed Potential Problems Will be Absent or Manageable (Arrhythmia/Dysrhythmia)  Signs and symptoms of listed potential problems will be absent or manageable by discharge/transition of care (reference Arrhythmia/Dysrhythmia (Symptomatic) (Adult) CPG).   Outcome: No Change  D/I/A: Pt up with SBA of one to bathroom.  BM x1 this evening.  A+O x4 and able to make needs known.  VSSA.  SR on monitor.  Amiodarone and Heparin infusions monitored and maintained.  Pleasant and cooperative with cares and treatments.  Denies pain.  ACEVES noted.   P: Continue to monitor status.

## 2017-01-20 NOTE — PROGRESS NOTES
Care Coordinator Progress Note     Admission Date/Time:  1/18/2017  Attending MD:  Brian Vazquez MD   Data  Chart reviewed, discussed with interdisciplinary team.   Patient was admitted for:    Atrial flutter with rapid ventricular response (H)  SOB (shortness of breath)  Sarcoidosis of lung (H).    Concerns with insurance coverage for discharge needs: None.  Current Living Situation: Patient said that he lives with his teenage son. Pt said that he is independent with his own care.   Support System: Supportive and Involved  Services Involved: none currently  Transportation: Car (Pt's car was valeted to the [facility] lot. Pt's  ticket lost. )  Barriers to Discharge: subtherapeutic INR    Coordination of Care and Referrals: Provided patient/family with options for outpt INR and Warfarin monitoring.    Assessment  Per MD, pt will need outpt INR and Warfarin monitoring arranged. Pt said that his PCP is Dr. Jamie Foster.   Intervention:      Arrangements made with the [facility] (Ph: [phone] Fax: [phone]) for INR and Warfarin monitoring (Goal=2-3). Indication for Anticoagulation: Atrial Flutter s/p cardioversion. Expected duration of therapy: 3 months, then re-assess. Dr. Jamie Foster to follow. Appointment made on  at 11 AM for INR lab draw.      Also per request of pt, appts already scheduled with Dr. Orosco for 1/23/17 were rescheduled.    Plan  Anticipated Discharge Date:  1/23/17  Anticipated Discharge Plan:  Discharge to home.     ELSA RITTER RN BSN  Care Coordinator    Addendum 1/24/17  I: Per MD, above appointment changed to 1/27/17 at 11:00 am.

## 2017-01-20 NOTE — PLAN OF CARE
Problem: Arrhythmia/Dysrhythmia (Symptomatic) (Adult)  Goal: Signs and Symptoms of Listed Potential Problems Will be Absent or Manageable (Arrhythmia/Dysrhythmia)  Signs and symptoms of listed potential problems will be absent or manageable by discharge/transition of care (reference Arrhythmia/Dysrhythmia (Symptomatic) (Adult) CPG).   Outcome: Improving  VSS. Supplemental O2 weaned to 2 liters, desats to 88% on room air. Encouraged IS. Complains of abdominal discomfort/bloating and requested stool softeners. Order obtained and patient given senna and miralax. Up in room with SBA. Reports that breathing has improved since IV lasix was given overnight. Additional 20mg IV lasix given this afternoon. Warfarin education completed by pharmacy. Plan to continue to monitor patient. Heparin drip infusing at 1000units/hour. Next Xa pending. Notify physician with changes or concerns.

## 2017-01-20 NOTE — PROGRESS NOTES
Admitted to [facility] 1/18/17 for atrial flutter with rapid ventricular response, SOB, sarcoidosis.  Started on warfarin 1/19/17.  Dr. Foster will follow  Range:  2-3    Coumadin therapy x 3 months, then reassess.  Anticipated discharge from [facility]:  1/23/17,  [facility] on 1/25/17.  Christal Alfaro RN    Was not discharged until 1/25. Discharge instructions were to have INR on 1/27, but dosage was unclear. Called and left a message for Rohan asking him to call back. Note he was started on amiodarone during hospitalization. -Raysa Alejandro RN

## 2017-01-20 NOTE — PROGRESS NOTES
CARDIOLOGY SERVICE NOTE  01/20/2017    INTERVAL HISTORY:  Underwent DEAN DCCV yesterday. DEAN noted EF of 5% in atrial flutter. TTE after cardioversion demonstrated normal LV function  Had frequent PACs post- DCCV so was started on amiodarone.   Feels well today. Breathing is improving but still has orthopnea and LE edema  No palpitations or CP    PERTINENT MEDICATIONS:  Amiodarone  ASA  Atorvastatin  Losartan 100  Metoprolol 100mg BID  Heparin gtt  Warfarin     PERTINENT LABS:  CMP  Recent Labs  Lab 01/20/17  0803 01/19/17  1424 01/19/17  0633 01/19/17  0003 01/18/17  1748 01/18/17  1746   * 139 135  --  138 135   POTASSIUM 4.3  --  4.6 4.3 4.8 4.5   CHLORIDE 101  --  102  --   --  102   CO2 20  --  25  --   --  23   ANIONGAP 11  --  8  --   --  10   *  --  175*  --  137* 131*   BUN 57*  --  45*  --   --  47*   CR 2.33* 2.16* 2.20*  --   --  1.99*   GFRESTIMATED 28* 30* 29*  --   --  33*   GFRESTBLACK 33* 36* 36*  --   --  40*   RAFFY 8.2*  --  8.4*  --   --  8.6   MAG 2.5*  --  2.1 2.0  --   --    PROTTOTAL  --   --   --   --   --  8.3   ALBUMIN  --   --   --   --   --  3.7   BILITOTAL  --   --   --   --   --  0.9   ALKPHOS  --   --   --   --   --  132   AST  --   --   --   --   --  32   ALT  --   --   --   --   --  66     CBC  Recent Labs  Lab 01/20/17  0803 01/18/17  1748 01/18/17  1746   WBC 9.1  --  12.3*   RBC 4.10*  --  4.59   HGB 13.4 16.0 15.1   HCT 40.8  --  45.1     --  98   MCH 32.7  --  32.9   MCHC 32.8  --  33.5   RDW 14.0  --  13.8     --  236     INR  Recent Labs  Lab 01/20/17  0803 01/19/17  0003 01/18/17  1746   INR 1.30* 1.28* 1.10     Arterial Blood GasNo lab results found in last 7 days.    PHYSICAL EXAM:  Temp:  [97.3  F (36.3  C)-98.1  F (36.7  C)] 98.1  F (36.7  C)  Pulse:  [] 79  Heart Rate:  [68-86] 79  Resp:  [16-20] 18  BP: (109-131)/(69-97) 116/71 mmHg  SpO2:  [95 %-100 %] 96 %  Sitting comfortably in chair  nml s1/s2, JVP 8-10cm  Good air movement with mild  exp wheezing  Ext are warm with 2+ pedal edema  Alert and oriented    ASSESSMENT AND PLAN:  Mr. Monroe is a 70 yo male with history of cardio-pulmonary sarcoid, CAD s/p PCI to mLAD and D2 in 2008, T2DM, HTN, HLD who was admitted with symptomatic atrial flutter.    Successful DCCV on 1/19 and maintained with amiodarone. Hypervolemic on exam with BLE edema. Conflicting LV function assessments with low EF on DEAN and normal EF on TTE after cardioversion.     Will gently diurese today and continue bridging with heparin gtt.    Active Problem List:  1. Atrial flutter: s/p DEAN DCCV and on amiodarone  2. CAD s/p PCI with ROSALIE to mLAD and D2  3. Cardiopulmonary sarcoid  4. Possible cardiomyopathy while in flutter which has resolved  5. Hypervolemia due to #4  6. Constipation  7. CKD  8. HTN    Plan  - cont heparin gtt until INR is therapeutic; CKD prevents use of lovenox for bridging  - cont losartan, metoprolol for BP and possible cardiomyopathy  - cont amiodarone 200mg BID for one month and transition to 200mg daily  - will continue diuresis with IV furosemide 20mg BID; goal net negative 1-1.5L daily  - cont ASA, statin  - PT due to deconditioning, ACEVES  - wean O2  - senna for constipation  - follow up with Dr. Vazquez in clinic after discharge    DVT prophylaxis: heparin gtt  Disposition: inpatient for heparin bridge and diuresis    Patient and plan of care discussed with Dr. Dye.    Delio Senior MD  Cardiovascular Medicine Fellow, PGY-5  [phone]      CARDIOLOGY STAFF  Patient seen and examined by me.  History and physical examination discussed with Dr. Debra Senior whose note reflects our joint assessment and recommendation/plans.  I am covering the Cardiology 1 service for Dr. Vazquez for the day.  He had a successful cardioversion yesterday.  He is on heparin; we are awaiting a therapeutic INR.  His creatinine does not allow safe use of Lovenox or a NOAC.  We can use the time for additional diuresis.  Dominic  Marnie

## 2017-01-20 NOTE — PHARMACY-ANTICOAGULATION SERVICE
Clinical Pharmacy - Warfarin Dosing Consult     Pharmacy has been consulted to manage this patient s warfarin therapy.  Indication: Atrial Fibrillation  Therapy Goal: INR 2-3  Warfarin Prior to Admission: No  Significant drug interactions: amiodarone, ASA, heparin  Dose Comments: bridging with heparin gtt    INR   Date Value Ref Range Status   01/19/2017 1.28* 0.86 - 1.14 Final   01/18/2017 1.10 0.86 - 1.14 Final       Recommend warfarin 2.5 mg today.  Empiric dose reduction given drug-drug interaction with amiodarone (patient just started IV load).    Pharmacy will monitor Rohan Monroe daily and order warfarin doses to achieve specified goal.      Please contact pharmacy as soon as possible if the warfarin needs to be held for a procedure or if the warfarin goals change.      Ann Muñoz, PharmD

## 2017-01-20 NOTE — PLAN OF CARE
Problem: Goal Outcome Summary  Goal: Goal Outcome Summary  LUIS: Pt with CAD, MRI (cardio-pulmonary sarcoid, no CHF,  DM 2,HTN, HLD, admitted with Atrial flutter with cardioversion on 1/19/17. DEAN neg. H/y of COPD, CKD stage 3   Pr reported SOB approximately at midnight, O sat 97% on 3 L MD gigi notified,Lasix IV 40 mg given, Inhaler prn was given with effect.Pt denies any pain,nausea,palpitation.   at night. Continues on Heparin gtt at 1150 U/hr , Hep 10 A will be checked at 7 a.m. Amiodarone gtt at 30 ml/hr  Plan : Continue to monitor, notify MD as needed.

## 2017-01-21 LAB
ANION GAP SERPL CALCULATED.3IONS-SCNC: 12 MMOL/L (ref 3–14)
BUN SERPL-MCNC: 56 MG/DL (ref 7–30)
CALCIUM SERPL-MCNC: 8.4 MG/DL (ref 8.5–10.1)
CHLORIDE SERPL-SCNC: 99 MMOL/L (ref 94–109)
CO2 SERPL-SCNC: 22 MMOL/L (ref 20–32)
CREAT SERPL-MCNC: 2.09 MG/DL (ref 0.66–1.25)
ERYTHROCYTE [DISTWIDTH] IN BLOOD BY AUTOMATED COUNT: 13.7 % (ref 10–15)
GFR SERPL CREATININE-BSD FRML MDRD: 31 ML/MIN/1.7M2
GLUCOSE BLDC GLUCOMTR-MCNC: 141 MG/DL (ref 70–99)
GLUCOSE BLDC GLUCOMTR-MCNC: 151 MG/DL (ref 70–99)
GLUCOSE SERPL-MCNC: 133 MG/DL (ref 70–99)
HCT VFR BLD AUTO: 42.2 % (ref 40–53)
HGB BLD-MCNC: 14 G/DL (ref 13.3–17.7)
INR PPP: 1.23 (ref 0.86–1.14)
LMWH PPP CHRO-ACNC: 0.18 IU/ML
LMWH PPP CHRO-ACNC: 0.32 IU/ML
MAGNESIUM SERPL-MCNC: 2.4 MG/DL (ref 1.6–2.3)
MCH RBC QN AUTO: 32.3 PG (ref 26.5–33)
MCHC RBC AUTO-ENTMCNC: 33.2 G/DL (ref 31.5–36.5)
MCV RBC AUTO: 98 FL (ref 78–100)
PLATELET # BLD AUTO: 211 10E9/L (ref 150–450)
POTASSIUM SERPL-SCNC: 4 MMOL/L (ref 3.4–5.3)
RBC # BLD AUTO: 4.33 10E12/L (ref 4.4–5.9)
SODIUM SERPL-SCNC: 133 MMOL/L (ref 133–144)
WBC # BLD AUTO: 6.7 10E9/L (ref 4–11)

## 2017-01-21 PROCEDURE — A9270 NON-COVERED ITEM OR SERVICE: HCPCS | Mod: GY

## 2017-01-21 PROCEDURE — 36415 COLL VENOUS BLD VENIPUNCTURE: CPT | Performed by: INTERNAL MEDICINE

## 2017-01-21 PROCEDURE — 85520 HEPARIN ASSAY: CPT | Performed by: INTERNAL MEDICINE

## 2017-01-21 PROCEDURE — A9270 NON-COVERED ITEM OR SERVICE: HCPCS | Mod: GY | Performed by: INTERNAL MEDICINE

## 2017-01-21 PROCEDURE — 93005 ELECTROCARDIOGRAM TRACING: CPT

## 2017-01-21 PROCEDURE — 25000132 ZZH RX MED GY IP 250 OP 250 PS 637: Mod: GY | Performed by: INTERNAL MEDICINE

## 2017-01-21 PROCEDURE — 25000132 ZZH RX MED GY IP 250 OP 250 PS 637: Mod: GY | Performed by: STUDENT IN AN ORGANIZED HEALTH CARE EDUCATION/TRAINING PROGRAM

## 2017-01-21 PROCEDURE — 40000275 ZZH STATISTIC RCP TIME EA 10 MIN

## 2017-01-21 PROCEDURE — 36415 COLL VENOUS BLD VENIPUNCTURE: CPT | Performed by: STUDENT IN AN ORGANIZED HEALTH CARE EDUCATION/TRAINING PROGRAM

## 2017-01-21 PROCEDURE — 25000132 ZZH RX MED GY IP 250 OP 250 PS 637: Mod: GY

## 2017-01-21 PROCEDURE — 25000125 ZZHC RX 250: Performed by: INTERNAL MEDICINE

## 2017-01-21 PROCEDURE — 80048 BASIC METABOLIC PNL TOTAL CA: CPT | Performed by: STUDENT IN AN ORGANIZED HEALTH CARE EDUCATION/TRAINING PROGRAM

## 2017-01-21 PROCEDURE — 21400006 ZZH R&B CCU INTERMEDIATE UMMC

## 2017-01-21 PROCEDURE — 36569 INSJ PICC 5 YR+ W/O IMAGING: CPT

## 2017-01-21 PROCEDURE — 83735 ASSAY OF MAGNESIUM: CPT | Performed by: STUDENT IN AN ORGANIZED HEALTH CARE EDUCATION/TRAINING PROGRAM

## 2017-01-21 PROCEDURE — A9270 NON-COVERED ITEM OR SERVICE: HCPCS | Mod: GY | Performed by: STUDENT IN AN ORGANIZED HEALTH CARE EDUCATION/TRAINING PROGRAM

## 2017-01-21 PROCEDURE — 93010 ELECTROCARDIOGRAM REPORT: CPT | Mod: 76 | Performed by: INTERNAL MEDICINE

## 2017-01-21 PROCEDURE — 85027 COMPLETE CBC AUTOMATED: CPT | Performed by: STUDENT IN AN ORGANIZED HEALTH CARE EDUCATION/TRAINING PROGRAM

## 2017-01-21 PROCEDURE — 85610 PROTHROMBIN TIME: CPT | Performed by: STUDENT IN AN ORGANIZED HEALTH CARE EDUCATION/TRAINING PROGRAM

## 2017-01-21 PROCEDURE — 00000146 ZZHCL STATISTIC GLUCOSE BY METER IP

## 2017-01-21 PROCEDURE — 85520 HEPARIN ASSAY: CPT | Performed by: STUDENT IN AN ORGANIZED HEALTH CARE EDUCATION/TRAINING PROGRAM

## 2017-01-21 PROCEDURE — 25000128 H RX IP 250 OP 636: Performed by: INTERNAL MEDICINE

## 2017-01-21 PROCEDURE — 99232 SBSQ HOSP IP/OBS MODERATE 35: CPT | Mod: GC | Performed by: INTERNAL MEDICINE

## 2017-01-21 RX ORDER — LIDOCAINE 40 MG/G
CREAM TOPICAL
Status: DISCONTINUED | OUTPATIENT
Start: 2017-01-21 | End: 2017-01-23

## 2017-01-21 RX ORDER — AMIODARONE HYDROCHLORIDE 200 MG/1
400 TABLET ORAL 2 TIMES DAILY
Status: DISCONTINUED | OUTPATIENT
Start: 2017-01-22 | End: 2017-01-25 | Stop reason: HOSPADM

## 2017-01-21 RX ORDER — DILTIAZEM HYDROCHLORIDE 30 MG/1
30 TABLET, FILM COATED ORAL ONCE
Status: DISCONTINUED | OUTPATIENT
Start: 2017-01-21 | End: 2017-01-21

## 2017-01-21 RX ORDER — WARFARIN SODIUM 5 MG/1
5 TABLET ORAL
Status: COMPLETED | OUTPATIENT
Start: 2017-01-21 | End: 2017-01-21

## 2017-01-21 RX ADMIN — METOPROLOL TARTRATE 100 MG: 100 TABLET, FILM COATED ORAL at 09:16

## 2017-01-21 RX ADMIN — METOPROLOL TARTRATE 5 MG: 5 INJECTION INTRAVENOUS at 02:52

## 2017-01-21 RX ADMIN — FLUTICASONE FUROATE AND VILANTEROL TRIFENATATE 1 PUFF: 100; 25 POWDER RESPIRATORY (INHALATION) at 09:16

## 2017-01-21 RX ADMIN — DEXTROSE 1 MG/MIN: 5 SOLUTION INTRAVENOUS at 17:32

## 2017-01-21 RX ADMIN — DEXTROSE 1 MG/MIN: 5 SOLUTION INTRAVENOUS at 21:38

## 2017-01-21 RX ADMIN — HEPARIN SODIUM 1000 UNITS/HR: 10000 INJECTION, SOLUTION INTRAVENOUS at 01:54

## 2017-01-21 RX ADMIN — AMIODARONE HYDROCHLORIDE 150 MG: 1.5 INJECTION, SOLUTION INTRAVENOUS at 17:08

## 2017-01-21 RX ADMIN — HEPARIN SODIUM 1000 UNITS/HR: 10000 INJECTION, SOLUTION INTRAVENOUS at 21:27

## 2017-01-21 RX ADMIN — AMIODARONE HYDROCHLORIDE 200 MG: 200 TABLET ORAL at 09:16

## 2017-01-21 RX ADMIN — ASPIRIN 81 MG: 81 TABLET, COATED ORAL at 09:16

## 2017-01-21 RX ADMIN — METOPROLOL TARTRATE 5 MG: 5 INJECTION INTRAVENOUS at 02:05

## 2017-01-21 RX ADMIN — METOPROLOL TARTRATE 5 MG: 5 INJECTION INTRAVENOUS at 01:30

## 2017-01-21 RX ADMIN — INSULIN ASPART 1 UNITS: 100 INJECTION, SOLUTION INTRAVENOUS; SUBCUTANEOUS at 14:05

## 2017-01-21 RX ADMIN — WARFARIN SODIUM 5 MG: 5 TABLET ORAL at 18:02

## 2017-01-21 RX ADMIN — ATORVASTATIN CALCIUM 20 MG: 20 TABLET, FILM COATED ORAL at 21:26

## 2017-01-21 RX ADMIN — FUROSEMIDE 20 MG: 10 INJECTION, SOLUTION INTRAVENOUS at 17:15

## 2017-01-21 RX ADMIN — LIDOCAINE HYDROCHLORIDE 3 ML: 10 INJECTION, SOLUTION INFILTRATION; PERINEURAL at 16:27

## 2017-01-21 RX ADMIN — UMECLIDINIUM 1 PUFF: 62.5 AEROSOL, POWDER ORAL at 09:16

## 2017-01-21 RX ADMIN — METOPROLOL TARTRATE 100 MG: 100 TABLET, FILM COATED ORAL at 21:27

## 2017-01-21 RX ADMIN — FUROSEMIDE 20 MG: 10 INJECTION, SOLUTION INTRAVENOUS at 09:16

## 2017-01-21 RX ADMIN — LEVOTHYROXINE SODIUM 137 MCG: 137 TABLET ORAL at 09:16

## 2017-01-21 RX ADMIN — MIRTAZAPINE 15 MG: 15 TABLET, FILM COATED ORAL at 21:27

## 2017-01-21 NOTE — PROGRESS NOTES
CARDIOLOGY SERVICE NOTE  01/21/2017    INTERVAL HISTORY:  Atrial flutter recurred overnight w/ rate ~150 which did not respond to metoprolol x 3; HD stable  Mildly symptomatic w/ SOB, anxiety but no palpitations or chest pain  -1L net output w/ IV lasix yesterday    PERTINENT MEDICATIONS:  Amiodarone 200mg bid  ASA  Atorvastatin  Losartan 100  Metoprolol 100mg BID  Heparin gtt  Warfarin   Lasix 20mg IV bid    PERTINENT LABS:  CMP    Recent Labs  Lab 01/21/17  0749 01/20/17  0803 01/19/17  1424 01/19/17  0633 01/19/17  0003 01/18/17  1748 01/18/17  1746    132* 139 135  --  138 135   POTASSIUM 4.0 4.3  --  4.6 4.3 4.8 4.5   CHLORIDE 99 101  --  102  --   --  102   CO2 22 20  --  25  --   --  23   ANIONGAP 12 11  --  8  --   --  10   * 146*  --  175*  --  137* 131*   BUN 56* 57*  --  45*  --   --  47*   CR 2.09* 2.33* 2.16* 2.20*  --   --  1.99*   GFRESTIMATED 31* 28* 30* 29*  --   --  33*   GFRESTBLACK 38* 33* 36* 36*  --   --  40*   RAFFY 8.4* 8.2*  --  8.4*  --   --  8.6   MAG 2.4* 2.5*  --  2.1 2.0  --   --    PROTTOTAL  --   --   --   --   --   --  8.3   ALBUMIN  --   --   --   --   --   --  3.7   BILITOTAL  --   --   --   --   --   --  0.9   ALKPHOS  --   --   --   --   --   --  132   AST  --   --   --   --   --   --  32   ALT  --   --   --   --   --   --  66     CBC    Recent Labs  Lab 01/21/17  0749 01/20/17  0803 01/18/17  1748 01/18/17  1746   WBC 6.7 9.1  --  12.3*   RBC 4.33* 4.10*  --  4.59   HGB 14.0 13.4 16.0 15.1   HCT 42.2 40.8  --  45.1   MCV 98 100  --  98   MCH 32.3 32.7  --  32.9   MCHC 33.2 32.8  --  33.5   RDW 13.7 14.0  --  13.8    194  --  236     INR    Recent Labs  Lab 01/21/17  0749 01/20/17  0803 01/19/17  0003 01/18/17  1746   INR 1.23* 1.30* 1.28* 1.10     Arterial Blood GasNo lab results found in last 7 days.    PHYSICAL EXAM:  Temp:  [98.1  F (36.7  C)-99.6  F (37.6  C)] 98.3  F (36.8  C)  Pulse:  [] 151  Heart Rate:  [] 140  Resp:  [18] 18  BP:  (100-140)/() 128/98 mmHg  SpO2:  [89 %-96 %] 95 %  Sitting comfortably in chair  Tachycardic, regular, no appreciable murmur; +JVD  Good air movement with mild exp wheezing  Ext are warm with 2+ pedal edema to knees b/l  Alert and oriented    ASSESSMENT AND PLAN:  Mr. Monroe is a 70 yo male with history of cardio-pulmonary sarcoid, CAD s/p PCI to mLAD and D2 in 2008, T2DM, HTN, HLD who was admitted with symptomatic atrial flutter.    Successful DCCV on 1/19 w/ subsequent initiation of amiodarone. 2:1 A-flutter recurred on 01/21. Hypervolemic on exam with BLE edema improving w/ diuresis. TTE w/ EF 55% after cardioversion.    Active Problem List:  1. Atrial flutter: s/p DEAN DCCV and on amiodarone  2. CAD s/p PCI with ROSALIE to mLAD and D2  3. Cardiopulmonary sarcoid  4. Hypervolemia  5. Constipation  6. RADHA on CKD- improving  7. HTN    Plan  - cont heparin gtt until INR is therapeutic; CKD prevents use of lovenox for bridging  - cont losartan, metoprolol for BP and possible cardiomyopathy  - additional amiodarone IV load today; tomorrow will resume amiodarone 200mg BID for one month and transition to 200mg daily  - plan for ablation Monday with no urgent indication  - will continue diuresis with IV furosemide 20mg BID; goal net negative 1-1.5L daily  - renal function improving  - cont ASA, statin  - PT due to deconditioning, ACEVES  - follow up with Dr. Vazquez in clinic after discharge    DVT prophylaxis: heparin gtt  Disposition: inpatient for heparin bridge and ablation Monday w/ possible DC 01/24    Patient and plan of care discussed with Dr. George Braga,   IM- PGY-2  [phone]    CARDIOLOGY STAFF NOTE  I have seen and examined the patient with the Cards 1 team. I agree with the note above that reflects my assessment and plan of care.  Patient had recurrence of atrial flutter, and it sometimes looks like it is the same typical one, but not at times. I suggested to the patient that we continue  amiodarone loading, and pursue an EPS to see if typical flutter, and if so pursue ablation, if not, treat like afib with DCCV, AAT first and then ablation soon after if no response. Patient continues to be bridged with coumadin.  Brian Vazquez MD [facility]  Cardiology - Electrophysiology

## 2017-01-21 NOTE — PLAN OF CARE
Problem: Goal Outcome Summary  Goal: Goal Outcome Summary  Outcome: No Change  Reverted to A fib at ~1700, rate 110's; has an occasional sinus beat. Resident notified. Continues on 2 lpm O2, sats 92-96%. Heparin drip at 1000u/hr, last hep xa level therapeutic at 0.26; next level to be drawn in AM. Ate fair at supper. C/o constipation; dulcolax suppository ordered and given, awaiting results.

## 2017-01-21 NOTE — PLAN OF CARE
Problem: Goal Outcome Summary  Goal: Goal Outcome Summary  Outcome: No Change    D: admitted with symptomatic atrial flutter. Pt had Successful DCCV on 1/19 and maintained with amiodarone. Hypervolemic on exam with BLE edema. Conflicting LV function assessments with low EF on DEAN and normal EF on TTE after cardioversion. Pt reverted back A fib 1/20/16 at around 1700.  A/I: Overnight HR sustained in 130's-140's. EKG done overnight that reads A flutter. MD paged and ordered PRN Metoprolol 5 mg IV x 3. Pt received all 3 doses overnight. Pt HR remains in Afib/Aflutter 130s-140s overnight and MD aware. No further orders were placed. Pt continues on 2 lpm O2 and oxygen sats above 92%. Heparin drip at 1000 u/hr, last hep 10a level therapeutic at 0.26; next level to be drawn in AM. Pt had bowel movement at around 2300.   P: Monitor and report any changes to team. Plan is for pt to be NPO overnight anticipating possible cardioversion or ablation to address HR today. Keila Velasquez

## 2017-01-22 ENCOUNTER — APPOINTMENT (OUTPATIENT)
Dept: PHYSICAL THERAPY | Facility: CLINIC | Age: 72
DRG: 273 | End: 2017-01-22
Attending: INTERNAL MEDICINE
Payer: MEDICARE

## 2017-01-22 ENCOUNTER — APPOINTMENT (OUTPATIENT)
Dept: GENERAL RADIOLOGY | Facility: CLINIC | Age: 72
DRG: 273 | End: 2017-01-22
Payer: MEDICARE

## 2017-01-22 LAB
ANION GAP SERPL CALCULATED.3IONS-SCNC: 11 MMOL/L (ref 3–14)
BUN SERPL-MCNC: 62 MG/DL (ref 7–30)
CALCIUM SERPL-MCNC: 8.5 MG/DL (ref 8.5–10.1)
CHLORIDE SERPL-SCNC: 94 MMOL/L (ref 94–109)
CO2 SERPL-SCNC: 25 MMOL/L (ref 20–32)
CREAT SERPL-MCNC: 2.52 MG/DL (ref 0.66–1.25)
ERYTHROCYTE [DISTWIDTH] IN BLOOD BY AUTOMATED COUNT: 13.8 % (ref 10–15)
GFR SERPL CREATININE-BSD FRML MDRD: 25 ML/MIN/1.7M2
GLUCOSE BLDC GLUCOMTR-MCNC: 143 MG/DL (ref 70–99)
GLUCOSE BLDC GLUCOMTR-MCNC: 157 MG/DL (ref 70–99)
GLUCOSE BLDC GLUCOMTR-MCNC: 159 MG/DL (ref 70–99)
GLUCOSE BLDC GLUCOMTR-MCNC: 173 MG/DL (ref 70–99)
GLUCOSE BLDC GLUCOMTR-MCNC: 204 MG/DL (ref 70–99)
GLUCOSE SERPL-MCNC: 184 MG/DL (ref 70–99)
HCT VFR BLD AUTO: 42.4 % (ref 40–53)
HGB BLD-MCNC: 14.3 G/DL (ref 13.3–17.7)
INR PPP: 1.24 (ref 0.86–1.14)
LMWH PPP CHRO-ACNC: 0.15 IU/ML
LMWH PPP CHRO-ACNC: 0.37 IU/ML
MAGNESIUM SERPL-MCNC: 2 MG/DL (ref 1.6–2.3)
MAGNESIUM SERPL-MCNC: 2.2 MG/DL (ref 1.6–2.3)
MCH RBC QN AUTO: 32.6 PG (ref 26.5–33)
MCHC RBC AUTO-ENTMCNC: 33.7 G/DL (ref 31.5–36.5)
MCV RBC AUTO: 97 FL (ref 78–100)
PLATELET # BLD AUTO: 213 10E9/L (ref 150–450)
POTASSIUM SERPL-SCNC: 3.8 MMOL/L (ref 3.4–5.3)
PROCALCITONIN SERPL-MCNC: 0.22 NG/ML
RBC # BLD AUTO: 4.39 10E12/L (ref 4.4–5.9)
SODIUM SERPL-SCNC: 130 MMOL/L (ref 133–144)
WBC # BLD AUTO: 11.1 10E9/L (ref 4–11)

## 2017-01-22 PROCEDURE — 25000132 ZZH RX MED GY IP 250 OP 250 PS 637: Mod: GY | Performed by: INTERNAL MEDICINE

## 2017-01-22 PROCEDURE — 94640 AIRWAY INHALATION TREATMENT: CPT

## 2017-01-22 PROCEDURE — 40000275 ZZH STATISTIC RCP TIME EA 10 MIN

## 2017-01-22 PROCEDURE — 85520 HEPARIN ASSAY: CPT | Performed by: INTERNAL MEDICINE

## 2017-01-22 PROCEDURE — 97162 PT EVAL MOD COMPLEX 30 MIN: CPT | Mod: GP | Performed by: PHYSICAL THERAPIST

## 2017-01-22 PROCEDURE — 80048 BASIC METABOLIC PNL TOTAL CA: CPT | Performed by: INTERNAL MEDICINE

## 2017-01-22 PROCEDURE — 97116 GAIT TRAINING THERAPY: CPT | Mod: GP | Performed by: PHYSICAL THERAPIST

## 2017-01-22 PROCEDURE — 40000802 ZZH SITE CHECK

## 2017-01-22 PROCEDURE — 93010 ELECTROCARDIOGRAM REPORT: CPT | Performed by: INTERNAL MEDICINE

## 2017-01-22 PROCEDURE — 25000125 ZZHC RX 250: Performed by: STUDENT IN AN ORGANIZED HEALTH CARE EDUCATION/TRAINING PROGRAM

## 2017-01-22 PROCEDURE — 25000132 ZZH RX MED GY IP 250 OP 250 PS 637: Mod: GY | Performed by: STUDENT IN AN ORGANIZED HEALTH CARE EDUCATION/TRAINING PROGRAM

## 2017-01-22 PROCEDURE — 21400006 ZZH R&B CCU INTERMEDIATE UMMC

## 2017-01-22 PROCEDURE — A9270 NON-COVERED ITEM OR SERVICE: HCPCS | Mod: GY | Performed by: STUDENT IN AN ORGANIZED HEALTH CARE EDUCATION/TRAINING PROGRAM

## 2017-01-22 PROCEDURE — 85610 PROTHROMBIN TIME: CPT | Performed by: STUDENT IN AN ORGANIZED HEALTH CARE EDUCATION/TRAINING PROGRAM

## 2017-01-22 PROCEDURE — A9270 NON-COVERED ITEM OR SERVICE: HCPCS | Mod: GY | Performed by: INTERNAL MEDICINE

## 2017-01-22 PROCEDURE — 40000193 ZZH STATISTIC PT WARD VISIT: Performed by: PHYSICAL THERAPIST

## 2017-01-22 PROCEDURE — 00000146 ZZHCL STATISTIC GLUCOSE BY METER IP

## 2017-01-22 PROCEDURE — 97530 THERAPEUTIC ACTIVITIES: CPT | Mod: GP | Performed by: PHYSICAL THERAPIST

## 2017-01-22 PROCEDURE — A9270 NON-COVERED ITEM OR SERVICE: HCPCS | Mod: GY

## 2017-01-22 PROCEDURE — 25000132 ZZH RX MED GY IP 250 OP 250 PS 637: Mod: GY

## 2017-01-22 PROCEDURE — 83735 ASSAY OF MAGNESIUM: CPT | Performed by: STUDENT IN AN ORGANIZED HEALTH CARE EDUCATION/TRAINING PROGRAM

## 2017-01-22 PROCEDURE — 25000128 H RX IP 250 OP 636: Performed by: INTERNAL MEDICINE

## 2017-01-22 PROCEDURE — 71010 XR CHEST PORT 1 VW: CPT

## 2017-01-22 PROCEDURE — 94640 AIRWAY INHALATION TREATMENT: CPT | Mod: 76

## 2017-01-22 PROCEDURE — 84145 PROCALCITONIN (PCT): CPT | Performed by: STUDENT IN AN ORGANIZED HEALTH CARE EDUCATION/TRAINING PROGRAM

## 2017-01-22 PROCEDURE — 36415 COLL VENOUS BLD VENIPUNCTURE: CPT | Performed by: STUDENT IN AN ORGANIZED HEALTH CARE EDUCATION/TRAINING PROGRAM

## 2017-01-22 PROCEDURE — 85027 COMPLETE CBC AUTOMATED: CPT | Performed by: INTERNAL MEDICINE

## 2017-01-22 PROCEDURE — 99232 SBSQ HOSP IP/OBS MODERATE 35: CPT | Mod: GC | Performed by: INTERNAL MEDICINE

## 2017-01-22 PROCEDURE — 85520 HEPARIN ASSAY: CPT | Performed by: STUDENT IN AN ORGANIZED HEALTH CARE EDUCATION/TRAINING PROGRAM

## 2017-01-22 PROCEDURE — 40000274 ZZH STATISTIC RCP CONSULT EA 30 MIN

## 2017-01-22 PROCEDURE — 83735 ASSAY OF MAGNESIUM: CPT | Performed by: INTERNAL MEDICINE

## 2017-01-22 PROCEDURE — 36415 COLL VENOUS BLD VENIPUNCTURE: CPT | Performed by: INTERNAL MEDICINE

## 2017-01-22 PROCEDURE — 25000125 ZZHC RX 250: Performed by: INTERNAL MEDICINE

## 2017-01-22 PROCEDURE — 94799 UNLISTED PULMONARY SVC/PX: CPT

## 2017-01-22 RX ORDER — LIDOCAINE 40 MG/G
CREAM TOPICAL
Status: DISCONTINUED | OUTPATIENT
Start: 2017-01-22 | End: 2017-01-22

## 2017-01-22 RX ORDER — HEPARIN SODIUM 10000 [USP'U]/100ML
0-3500 INJECTION, SOLUTION INTRAVENOUS CONTINUOUS
Status: DISPENSED | OUTPATIENT
Start: 2017-01-22 | End: 2017-01-23

## 2017-01-22 RX ORDER — IPRATROPIUM BROMIDE AND ALBUTEROL SULFATE 2.5; .5 MG/3ML; MG/3ML
3 SOLUTION RESPIRATORY (INHALATION) EVERY 4 HOURS PRN
Status: DISCONTINUED | OUTPATIENT
Start: 2017-01-22 | End: 2017-01-25 | Stop reason: HOSPADM

## 2017-01-22 RX ORDER — FUROSEMIDE 10 MG/ML
40 INJECTION INTRAMUSCULAR; INTRAVENOUS 3 TIMES DAILY
Status: DISCONTINUED | OUTPATIENT
Start: 2017-01-22 | End: 2017-01-23

## 2017-01-22 RX ORDER — IPRATROPIUM BROMIDE AND ALBUTEROL SULFATE 2.5; .5 MG/3ML; MG/3ML
3 SOLUTION RESPIRATORY (INHALATION)
Status: DISCONTINUED | OUTPATIENT
Start: 2017-01-22 | End: 2017-01-22

## 2017-01-22 RX ORDER — SODIUM CHLORIDE 9 MG/ML
INJECTION, SOLUTION INTRAVENOUS CONTINUOUS
Status: DISCONTINUED | OUTPATIENT
Start: 2017-01-22 | End: 2017-01-23

## 2017-01-22 RX ORDER — DILTIAZEM HYDROCHLORIDE 5 MG/ML
10 INJECTION INTRAVENOUS ONCE
Status: COMPLETED | OUTPATIENT
Start: 2017-01-22 | End: 2017-01-22

## 2017-01-22 RX ORDER — CLONAZEPAM 0.5 MG/1
0.5 TABLET ORAL 2 TIMES DAILY PRN
Status: DISCONTINUED | OUTPATIENT
Start: 2017-01-22 | End: 2017-01-25 | Stop reason: HOSPADM

## 2017-01-22 RX ORDER — SENNOSIDES 8.6 MG
8.6 TABLET ORAL 2 TIMES DAILY PRN
Status: DISCONTINUED | OUTPATIENT
Start: 2017-01-22 | End: 2017-01-25 | Stop reason: HOSPADM

## 2017-01-22 RX ORDER — WARFARIN SODIUM 7.5 MG/1
7.5 TABLET ORAL
Status: COMPLETED | OUTPATIENT
Start: 2017-01-22 | End: 2017-01-22

## 2017-01-22 RX ORDER — FUROSEMIDE 10 MG/ML
40 INJECTION INTRAMUSCULAR; INTRAVENOUS
Status: DISCONTINUED | OUTPATIENT
Start: 2017-01-22 | End: 2017-01-22

## 2017-01-22 RX ORDER — FUROSEMIDE 10 MG/ML
40 INJECTION INTRAMUSCULAR; INTRAVENOUS ONCE
Status: COMPLETED | OUTPATIENT
Start: 2017-01-22 | End: 2017-01-22

## 2017-01-22 RX ADMIN — HEPARIN SODIUM 1000 UNITS/HR: 10000 INJECTION, SOLUTION INTRAVENOUS at 02:48

## 2017-01-22 RX ADMIN — AMIODARONE HYDROCHLORIDE 400 MG: 200 TABLET ORAL at 09:28

## 2017-01-22 RX ADMIN — INSULIN ASPART 1 UNITS: 100 INJECTION, SOLUTION INTRAVENOUS; SUBCUTANEOUS at 09:29

## 2017-01-22 RX ADMIN — MIRTAZAPINE 15 MG: 15 TABLET, FILM COATED ORAL at 21:36

## 2017-01-22 RX ADMIN — DEXTROSE 0.5 MG/MIN: 5 SOLUTION INTRAVENOUS at 03:10

## 2017-01-22 RX ADMIN — FUROSEMIDE 40 MG: 10 INJECTION, SOLUTION INTRAVENOUS at 09:38

## 2017-01-22 RX ADMIN — FUROSEMIDE 40 MG: 10 INJECTION, SOLUTION INTRAVENOUS at 20:18

## 2017-01-22 RX ADMIN — WARFARIN SODIUM 7.5 MG: 7.5 TABLET ORAL at 17:54

## 2017-01-22 RX ADMIN — FUROSEMIDE 40 MG: 10 INJECTION, SOLUTION INTRAVENOUS at 13:59

## 2017-01-22 RX ADMIN — CLONAZEPAM 0.5 MG: 0.5 TABLET ORAL at 21:46

## 2017-01-22 RX ADMIN — SENNOSIDES 8.6 MG: 8.6 TABLET, FILM COATED ORAL at 20:31

## 2017-01-22 RX ADMIN — IPRATROPIUM BROMIDE AND ALBUTEROL SULFATE 3 ML: .5; 3 SOLUTION RESPIRATORY (INHALATION) at 08:41

## 2017-01-22 RX ADMIN — DILTIAZEM HYDROCHLORIDE 5 MG/HR: 5 INJECTION INTRAVENOUS at 11:08

## 2017-01-22 RX ADMIN — METOPROLOL TARTRATE 100 MG: 100 TABLET, FILM COATED ORAL at 09:28

## 2017-01-22 RX ADMIN — ASPIRIN 81 MG: 81 TABLET, COATED ORAL at 09:28

## 2017-01-22 RX ADMIN — AMIODARONE HYDROCHLORIDE 400 MG: 200 TABLET ORAL at 20:18

## 2017-01-22 RX ADMIN — DILTIAZEM HYDROCHLORIDE 10 MG/HR: 5 INJECTION INTRAVENOUS at 23:38

## 2017-01-22 RX ADMIN — IPRATROPIUM BROMIDE AND ALBUTEROL SULFATE 3 ML: .5; 3 SOLUTION RESPIRATORY (INHALATION) at 12:50

## 2017-01-22 RX ADMIN — ATORVASTATIN CALCIUM 20 MG: 20 TABLET, FILM COATED ORAL at 21:36

## 2017-01-22 RX ADMIN — POTASSIUM CHLORIDE 20 MEQ: 750 TABLET, EXTENDED RELEASE ORAL at 23:37

## 2017-01-22 RX ADMIN — UMECLIDINIUM 1 PUFF: 62.5 AEROSOL, POWDER ORAL at 09:29

## 2017-01-22 RX ADMIN — METOPROLOL TARTRATE 100 MG: 100 TABLET, FILM COATED ORAL at 20:18

## 2017-01-22 RX ADMIN — FUROSEMIDE 40 MG: 10 INJECTION, SOLUTION INTRAVENOUS at 21:37

## 2017-01-22 RX ADMIN — DILTIAZEM HYDROCHLORIDE 10 MG: 5 INJECTION INTRAVENOUS at 11:07

## 2017-01-22 RX ADMIN — LEVOTHYROXINE SODIUM 137 MCG: 137 TABLET ORAL at 09:28

## 2017-01-22 RX ADMIN — FLUTICASONE FUROATE AND VILANTEROL TRIFENATATE 1 PUFF: 100; 25 POWDER RESPIRATORY (INHALATION) at 09:28

## 2017-01-22 RX ADMIN — INSULIN ASPART 1 UNITS: 100 INJECTION, SOLUTION INTRAVENOUS; SUBCUTANEOUS at 13:59

## 2017-01-22 RX ADMIN — ALBUTEROL SULFATE 2 PUFF: 90 AEROSOL, METERED RESPIRATORY (INHALATION) at 06:08

## 2017-01-22 RX ADMIN — INSULIN ASPART 1 UNITS: 100 INJECTION, SOLUTION INTRAVENOUS; SUBCUTANEOUS at 18:19

## 2017-01-22 NOTE — PLAN OF CARE
Problem: Goal Outcome Summary  Goal: Goal Outcome Summary  PT 6C: Initial PT evaluation completed, treatment initiated. RN ok'd light activity with close monitoring of HR/rhythm. Pt transfers with CGA, poor eccentric control sitting into chair. Able to march in place x 30 seconds, limited by ACEVES, SpO2 98% on 2L O2 via NC. Ambulated 60 ft with and without UE support, CGA. Recommend ambulation with assist from RN/NST/gait belt 2/2 fall risk. Noted -140 with both sinus tachycardia and a-flutter.     Recommend: anticipate home with assist from son, home PT. May benefit from use of an AD (4WW) upon discharge for ambulation 2/2 ACEVES/mild balance impairments.

## 2017-01-22 NOTE — PLAN OF CARE
"Problem: Goal Outcome Summary  Goal: Goal Outcome Summary  Outcome: No Change  D: Admitted with A. Flutter on 1/18.    I: 2LNC. Heparin gtt at 1000units/hr, therapeutic. Amiodarone gtt decreased from 1mg/min to 0.5mg/min at 2357. Pt requested senna, notified MDs.      A: A/Ox4. A flutter 100s-120s, occasional dip into 70s-80s. Hypertensive, MDs notified. Lungs diminished with wheezes. Frequent coughing. Pt complains of increased cough, which he states \"feels like I'm getting pneumonia.\" Cards 1 team notified. Good urine output. Active bowel sounds. Rt. Midline. PIV. SBA.   Temp:  [98  F (36.7  C)-98.3  F (36.8  C)] 98.2  F (36.8  C)  Pulse:  [120-151] 120  Heart Rate:  [] 150  Resp:  [18-22] 20  BP: ()/() 141/105 mmHg  SpO2:  [95 %-97 %] 95 %    P: Possible ablation on Monday. Continue to monitor and assess patient with reports of concerns to Cards 1 team.    Clarisse Campos RN 01/22/2017 8:43 AM    Hours of Care [id number]            "

## 2017-01-22 NOTE — PLAN OF CARE
Problem: Goal Outcome Summary  Goal: Goal Outcome Summary  Outcome: No Change  D: Pt admitted 1/18 with atrial flutter. Ablation 1/19. A-flutter again 1/20. Now in A-flutter with heart rates 90s-150s. O2 2L with sats upper 90s. C/o of worsening cough. Cough is not productive and increasing in frequency. MDs notified.   I: Heparin gtt 1000U/hr through PIV. 10a therapeutic. Midline placed for amiodarone drip. Amiodarone gtt 60mL/hr with orders to decrease to 30mL/hr at 2330. Encouraged IS and deep breathing.   A: Pt c/o of worsening cough. Cough is not productive and increasing in frequency. MDs notified.   P: Continue to monitor. Notify MDs as needed. Possible ablation Monday.

## 2017-01-23 ENCOUNTER — APPOINTMENT (OUTPATIENT)
Dept: CARDIOLOGY | Facility: CLINIC | Age: 72
DRG: 273 | End: 2017-01-23
Payer: MEDICARE

## 2017-01-23 ENCOUNTER — ANESTHESIA EVENT (OUTPATIENT)
Dept: SURGERY | Facility: CLINIC | Age: 72
DRG: 273 | End: 2017-01-23
Payer: MEDICARE

## 2017-01-23 ENCOUNTER — ANESTHESIA (OUTPATIENT)
Dept: SURGERY | Facility: CLINIC | Age: 72
DRG: 273 | End: 2017-01-23
Payer: MEDICARE

## 2017-01-23 LAB
ANION GAP SERPL CALCULATED.3IONS-SCNC: 11 MMOL/L (ref 3–14)
BUN SERPL-MCNC: 66 MG/DL (ref 7–30)
CALCIUM SERPL-MCNC: 8.7 MG/DL (ref 8.5–10.1)
CHLORIDE SERPL-SCNC: 95 MMOL/L (ref 94–109)
CO2 SERPL-SCNC: 24 MMOL/L (ref 20–32)
CREAT SERPL-MCNC: 2.79 MG/DL (ref 0.66–1.25)
GFR SERPL CREATININE-BSD FRML MDRD: 22 ML/MIN/1.7M2
GLUCOSE BLDC GLUCOMTR-MCNC: 130 MG/DL (ref 70–99)
GLUCOSE BLDC GLUCOMTR-MCNC: 149 MG/DL (ref 70–99)
GLUCOSE BLDC GLUCOMTR-MCNC: 150 MG/DL (ref 70–99)
GLUCOSE SERPL-MCNC: 147 MG/DL (ref 70–99)
INR PPP: 1.33 (ref 0.86–1.14)
INTERPRETATION ECG - MUSE: NORMAL
MAGNESIUM SERPL-MCNC: 2.3 MG/DL (ref 1.6–2.3)
POTASSIUM SERPL-SCNC: 4 MMOL/L (ref 3.4–5.3)
SODIUM SERPL-SCNC: 131 MMOL/L (ref 133–144)

## 2017-01-23 PROCEDURE — A9270 NON-COVERED ITEM OR SERVICE: HCPCS | Mod: GY | Performed by: INTERNAL MEDICINE

## 2017-01-23 PROCEDURE — 4A0234Z MEASUREMENT OF CARDIAC ELECTRICAL ACTIVITY, PERCUTANEOUS APPROACH: ICD-10-PCS | Performed by: INTERNAL MEDICINE

## 2017-01-23 PROCEDURE — 93005 ELECTROCARDIOGRAM TRACING: CPT

## 2017-01-23 PROCEDURE — 93010 ELECTROCARDIOGRAM REPORT: CPT | Mod: 76 | Performed by: INTERNAL MEDICINE

## 2017-01-23 PROCEDURE — 92960 CARDIOVERSION ELECTRIC EXT: CPT

## 2017-01-23 PROCEDURE — 25000128 H RX IP 250 OP 636: Performed by: INTERNAL MEDICINE

## 2017-01-23 PROCEDURE — 27210807 ZZH SHEATH CR6

## 2017-01-23 PROCEDURE — 40000275 ZZH STATISTIC RCP TIME EA 10 MIN: Performed by: OPTOMETRIST

## 2017-01-23 PROCEDURE — 25000125 ZZHC RX 250: Performed by: INTERNAL MEDICINE

## 2017-01-23 PROCEDURE — 93613 INTRACARDIAC EPHYS 3D MAPG: CPT | Mod: GC | Performed by: INTERNAL MEDICINE

## 2017-01-23 PROCEDURE — 85610 PROTHROMBIN TIME: CPT | Performed by: STUDENT IN AN ORGANIZED HEALTH CARE EDUCATION/TRAINING PROGRAM

## 2017-01-23 PROCEDURE — 27210946 ZZH KIT HC TOTES DISP CR8

## 2017-01-23 PROCEDURE — A9270 NON-COVERED ITEM OR SERVICE: HCPCS | Mod: GY | Performed by: NURSE PRACTITIONER

## 2017-01-23 PROCEDURE — 93621 COMP EP EVL L PAC&REC C SINS: CPT

## 2017-01-23 PROCEDURE — 00000146 ZZHCL STATISTIC GLUCOSE BY METER IP

## 2017-01-23 PROCEDURE — 37000008 ZZH ANESTHESIA TECHNICAL FEE, 1ST 30 MIN

## 2017-01-23 PROCEDURE — 25000125 ZZHC RX 250: Performed by: STUDENT IN AN ORGANIZED HEALTH CARE EDUCATION/TRAINING PROGRAM

## 2017-01-23 PROCEDURE — 92960 CARDIOVERSION ELECTRIC EXT: CPT | Performed by: INTERNAL MEDICINE

## 2017-01-23 PROCEDURE — 25000125 ZZHC RX 250: Performed by: NURSE PRACTITIONER

## 2017-01-23 PROCEDURE — 83735 ASSAY OF MAGNESIUM: CPT | Performed by: STUDENT IN AN ORGANIZED HEALTH CARE EDUCATION/TRAINING PROGRAM

## 2017-01-23 PROCEDURE — 93653 COMPRE EP EVAL TX SVT: CPT | Mod: GC | Performed by: INTERNAL MEDICINE

## 2017-01-23 PROCEDURE — 93005 ELECTROCARDIOGRAM TRACING: CPT | Performed by: OPTOMETRIST

## 2017-01-23 PROCEDURE — 25800025 ZZH RX 258: Performed by: NURSE ANESTHETIST, CERTIFIED REGISTERED

## 2017-01-23 PROCEDURE — 02K83ZZ MAP CONDUCTION MECHANISM, PERCUTANEOUS APPROACH: ICD-10-PCS | Performed by: INTERNAL MEDICINE

## 2017-01-23 PROCEDURE — 27210796 ZZH PAD EXTRNAL REFRENCE CARDIAC MAPPING CR14

## 2017-01-23 PROCEDURE — 93613 INTRACARDIAC EPHYS 3D MAPG: CPT

## 2017-01-23 PROCEDURE — C1732 CATH, EP, DIAG/ABL, 3D/VECT: HCPCS

## 2017-01-23 PROCEDURE — 21400006 ZZH R&B CCU INTERMEDIATE UMMC

## 2017-01-23 PROCEDURE — 99232 SBSQ HOSP IP/OBS MODERATE 35: CPT | Mod: 25 | Performed by: INTERNAL MEDICINE

## 2017-01-23 PROCEDURE — 40000802 ZZH SITE CHECK

## 2017-01-23 PROCEDURE — C1894 INTRO/SHEATH, NON-LASER: HCPCS

## 2017-01-23 PROCEDURE — 27210795 ZZH PAD DEFIB QUICK CR4

## 2017-01-23 PROCEDURE — 25000132 ZZH RX MED GY IP 250 OP 250 PS 637: Mod: GY | Performed by: STUDENT IN AN ORGANIZED HEALTH CARE EDUCATION/TRAINING PROGRAM

## 2017-01-23 PROCEDURE — 25000132 ZZH RX MED GY IP 250 OP 250 PS 637: Mod: GY | Performed by: INTERNAL MEDICINE

## 2017-01-23 PROCEDURE — 27210995 ZZH RX 272

## 2017-01-23 PROCEDURE — 02583ZZ DESTRUCTION OF CONDUCTION MECHANISM, PERCUTANEOUS APPROACH: ICD-10-PCS | Performed by: INTERNAL MEDICINE

## 2017-01-23 PROCEDURE — 25000132 ZZH RX MED GY IP 250 OP 250 PS 637: Mod: GY | Performed by: NURSE PRACTITIONER

## 2017-01-23 PROCEDURE — 93653 COMPRE EP EVAL TX SVT: CPT

## 2017-01-23 PROCEDURE — 25000125 ZZHC RX 250: Performed by: NURSE ANESTHETIST, CERTIFIED REGISTERED

## 2017-01-23 PROCEDURE — 99152 MOD SED SAME PHYS/QHP 5/>YRS: CPT

## 2017-01-23 PROCEDURE — C1730 CATH, EP, 19 OR FEW ELECT: HCPCS

## 2017-01-23 PROCEDURE — 40000556 ZZH STATISTIC PERIPHERAL IV START W US GUIDANCE

## 2017-01-23 PROCEDURE — C1893 INTRO/SHEATH, FIXED,NON-PEEL: HCPCS

## 2017-01-23 PROCEDURE — A9270 NON-COVERED ITEM OR SERVICE: HCPCS | Mod: GY | Performed by: STUDENT IN AN ORGANIZED HEALTH CARE EDUCATION/TRAINING PROGRAM

## 2017-01-23 PROCEDURE — 99153 MOD SED SAME PHYS/QHP EA: CPT

## 2017-01-23 PROCEDURE — 36415 COLL VENOUS BLD VENIPUNCTURE: CPT | Performed by: STUDENT IN AN ORGANIZED HEALTH CARE EDUCATION/TRAINING PROGRAM

## 2017-01-23 PROCEDURE — 80048 BASIC METABOLIC PNL TOTAL CA: CPT | Performed by: STUDENT IN AN ORGANIZED HEALTH CARE EDUCATION/TRAINING PROGRAM

## 2017-01-23 RX ORDER — ADENOSINE 3 MG/ML
6-12 INJECTION, SOLUTION INTRAVENOUS EVERY 5 MIN PRN
Status: DISCONTINUED | OUTPATIENT
Start: 2017-01-23 | End: 2017-01-23

## 2017-01-23 RX ORDER — PROTAMINE SULFATE 10 MG/ML
5-40 INJECTION, SOLUTION INTRAVENOUS EVERY 10 MIN PRN
Status: DISCONTINUED | OUTPATIENT
Start: 2017-01-23 | End: 2017-01-23

## 2017-01-23 RX ORDER — HEPARIN SODIUM 10000 [USP'U]/100ML
0-3500 INJECTION, SOLUTION INTRAVENOUS CONTINUOUS
Status: DISPENSED | OUTPATIENT
Start: 2017-01-23 | End: 2017-01-24

## 2017-01-23 RX ORDER — FUROSEMIDE 10 MG/ML
20-100 INJECTION INTRAMUSCULAR; INTRAVENOUS
Status: DISCONTINUED | OUTPATIENT
Start: 2017-01-23 | End: 2017-01-23

## 2017-01-23 RX ORDER — LORAZEPAM 2 MG/ML
.5-2 INJECTION INTRAMUSCULAR EVERY 10 MIN PRN
Status: DISCONTINUED | OUTPATIENT
Start: 2017-01-23 | End: 2017-01-23

## 2017-01-23 RX ORDER — FENTANYL CITRATE 50 UG/ML
25-50 INJECTION, SOLUTION INTRAMUSCULAR; INTRAVENOUS
Status: DISCONTINUED | OUTPATIENT
Start: 2017-01-23 | End: 2017-01-23

## 2017-01-23 RX ORDER — HEPARIN SODIUM 1000 [USP'U]/ML
1000-10000 INJECTION, SOLUTION INTRAVENOUS; SUBCUTANEOUS EVERY 5 MIN PRN
Status: DISCONTINUED | OUTPATIENT
Start: 2017-01-23 | End: 2017-01-23

## 2017-01-23 RX ORDER — LIDOCAINE 40 MG/G
CREAM TOPICAL
Status: DISCONTINUED | OUTPATIENT
Start: 2017-01-23 | End: 2017-01-25 | Stop reason: HOSPADM

## 2017-01-23 RX ORDER — DOBUTAMINE HYDROCHLORIDE 200 MG/100ML
5-40 INJECTION INTRAVENOUS CONTINUOUS PRN
Status: DISCONTINUED | OUTPATIENT
Start: 2017-01-23 | End: 2017-01-23

## 2017-01-23 RX ORDER — KETOROLAC TROMETHAMINE 15 MG/ML
15 INJECTION, SOLUTION INTRAMUSCULAR; INTRAVENOUS
Status: DISCONTINUED | OUTPATIENT
Start: 2017-01-23 | End: 2017-01-23

## 2017-01-23 RX ORDER — PROMETHAZINE HYDROCHLORIDE 25 MG/ML
6.25-25 INJECTION, SOLUTION INTRAMUSCULAR; INTRAVENOUS EVERY 4 HOURS PRN
Status: DISCONTINUED | OUTPATIENT
Start: 2017-01-23 | End: 2017-01-23

## 2017-01-23 RX ORDER — NALOXONE HYDROCHLORIDE 0.4 MG/ML
0.4 INJECTION, SOLUTION INTRAMUSCULAR; INTRAVENOUS; SUBCUTANEOUS EVERY 5 MIN PRN
Status: DISCONTINUED | OUTPATIENT
Start: 2017-01-23 | End: 2017-01-23

## 2017-01-23 RX ORDER — SODIUM CHLORIDE, SODIUM LACTATE, POTASSIUM CHLORIDE, CALCIUM CHLORIDE 600; 310; 30; 20 MG/100ML; MG/100ML; MG/100ML; MG/100ML
INJECTION, SOLUTION INTRAVENOUS CONTINUOUS PRN
Status: DISCONTINUED | OUTPATIENT
Start: 2017-01-23 | End: 2017-01-23

## 2017-01-23 RX ORDER — DIPHENHYDRAMINE HYDROCHLORIDE 50 MG/ML
25-50 INJECTION INTRAMUSCULAR; INTRAVENOUS
Status: DISCONTINUED | OUTPATIENT
Start: 2017-01-23 | End: 2017-01-23

## 2017-01-23 RX ORDER — NALOXONE HYDROCHLORIDE 0.4 MG/ML
.1-.4 INJECTION, SOLUTION INTRAMUSCULAR; INTRAVENOUS; SUBCUTANEOUS
Status: DISCONTINUED | OUTPATIENT
Start: 2017-01-23 | End: 2017-01-25 | Stop reason: HOSPADM

## 2017-01-23 RX ORDER — PROTAMINE SULFATE 10 MG/ML
1-5 INJECTION, SOLUTION INTRAVENOUS
Status: DISCONTINUED | OUTPATIENT
Start: 2017-01-23 | End: 2017-01-23

## 2017-01-23 RX ORDER — ONDANSETRON 2 MG/ML
4 INJECTION INTRAMUSCULAR; INTRAVENOUS EVERY 4 HOURS PRN
Status: DISCONTINUED | OUTPATIENT
Start: 2017-01-23 | End: 2017-01-23

## 2017-01-23 RX ORDER — FLUMAZENIL 0.1 MG/ML
0.2 INJECTION, SOLUTION INTRAVENOUS
Status: DISCONTINUED | OUTPATIENT
Start: 2017-01-23 | End: 2017-01-23

## 2017-01-23 RX ORDER — WARFARIN SODIUM 10 MG/1
10 TABLET ORAL
Status: COMPLETED | OUTPATIENT
Start: 2017-01-23 | End: 2017-01-23

## 2017-01-23 RX ORDER — FUROSEMIDE 40 MG
40 TABLET ORAL 3 TIMES DAILY
Status: DISCONTINUED | OUTPATIENT
Start: 2017-01-23 | End: 2017-01-23

## 2017-01-23 RX ADMIN — METOPROLOL TARTRATE 100 MG: 100 TABLET, FILM COATED ORAL at 08:44

## 2017-01-23 RX ADMIN — SENNOSIDES 8.6 MG: 8.6 TABLET, FILM COATED ORAL at 18:56

## 2017-01-23 RX ADMIN — WARFARIN SODIUM 10 MG: 10 TABLET ORAL at 18:56

## 2017-01-23 RX ADMIN — AMIODARONE HYDROCHLORIDE 400 MG: 200 TABLET ORAL at 08:44

## 2017-01-23 RX ADMIN — CLONAZEPAM 0.5 MG: 0.5 TABLET ORAL at 21:24

## 2017-01-23 RX ADMIN — MIRTAZAPINE 15 MG: 15 TABLET, FILM COATED ORAL at 21:24

## 2017-01-23 RX ADMIN — HEPARIN SODIUM 1100 UNITS/HR: 10000 INJECTION, SOLUTION INTRAVENOUS at 20:42

## 2017-01-23 RX ADMIN — INSULIN ASPART 1 UNITS: 100 INJECTION, SOLUTION INTRAVENOUS; SUBCUTANEOUS at 18:53

## 2017-01-23 RX ADMIN — ATORVASTATIN CALCIUM 20 MG: 20 TABLET, FILM COATED ORAL at 21:24

## 2017-01-23 RX ADMIN — FUROSEMIDE 40 MG: 10 INJECTION, SOLUTION INTRAVENOUS at 08:41

## 2017-01-23 RX ADMIN — ACETAMINOPHEN AND CODEINE PHOSPHATE 1 TABLET: 300; 30 TABLET ORAL at 21:24

## 2017-01-23 RX ADMIN — Medication 1 SPRAY: at 08:50

## 2017-01-23 RX ADMIN — AMIODARONE HYDROCHLORIDE 400 MG: 200 TABLET ORAL at 20:49

## 2017-01-23 RX ADMIN — UMECLIDINIUM 1 PUFF: 62.5 AEROSOL, POWDER ORAL at 08:40

## 2017-01-23 RX ADMIN — FENTANYL CITRATE 50 MCG: 50 INJECTION, SOLUTION INTRAMUSCULAR; INTRAVENOUS at 13:37

## 2017-01-23 RX ADMIN — LEVOTHYROXINE SODIUM 137 MCG: 137 TABLET ORAL at 08:44

## 2017-01-23 RX ADMIN — METHOHEXITAL SODIUM 20 MG: 500 INJECTION, POWDER, LYOPHILIZED, FOR SOLUTION INTRAMUSCULAR; INTRAVENOUS; RECTAL at 16:03

## 2017-01-23 RX ADMIN — HEPARIN SODIUM 850 UNITS/HR: 10000 INJECTION, SOLUTION INTRAVENOUS at 06:28

## 2017-01-23 RX ADMIN — METOPROLOL TARTRATE 100 MG: 100 TABLET, FILM COATED ORAL at 20:49

## 2017-01-23 RX ADMIN — FENTANYL CITRATE 25 MCG: 50 INJECTION, SOLUTION INTRAMUSCULAR; INTRAVENOUS at 14:52

## 2017-01-23 RX ADMIN — FENTANYL CITRATE 50 MCG/HR: 50 INJECTION INTRAVENOUS at 13:37

## 2017-01-23 RX ADMIN — DEXTROSE MONOHYDRATE 300 MG: 50 INJECTION, SOLUTION INTRAVENOUS at 16:00

## 2017-01-23 RX ADMIN — SODIUM CHLORIDE 30 ML/HR: 9 INJECTION, SOLUTION INTRAVENOUS at 09:31

## 2017-01-23 RX ADMIN — SODIUM CHLORIDE, POTASSIUM CHLORIDE, SODIUM LACTATE AND CALCIUM CHLORIDE: 600; 310; 30; 20 INJECTION, SOLUTION INTRAVENOUS at 16:00

## 2017-01-23 RX ADMIN — MIDAZOLAM 2 MG/HR: 5 INJECTION INTRAMUSCULAR; INTRAVENOUS at 13:37

## 2017-01-23 RX ADMIN — FLUTICASONE FUROATE AND VILANTEROL TRIFENATATE 1 PUFF: 100; 25 POWDER RESPIRATORY (INHALATION) at 08:40

## 2017-01-23 RX ADMIN — MIDAZOLAM HYDROCHLORIDE 1 MG: 1 INJECTION, SOLUTION INTRAMUSCULAR; INTRAVENOUS at 13:38

## 2017-01-23 RX ADMIN — METHOHEXITAL SODIUM 10 MG: 500 INJECTION, POWDER, LYOPHILIZED, FOR SOLUTION INTRAMUSCULAR; INTRAVENOUS; RECTAL at 16:07

## 2017-01-23 RX ADMIN — POLYETHYLENE GLYCOL 3350 17 G: 17 POWDER, FOR SOLUTION ORAL at 20:52

## 2017-01-23 RX ADMIN — ASPIRIN 81 MG: 81 TABLET, COATED ORAL at 08:44

## 2017-01-23 NOTE — PLAN OF CARE
Problem: Goal Outcome Summary  Goal: Goal Outcome Summary  Outcome: Improving  AVSS. Telemetry maintained. . A-flutter with rare ectopy. Episode of numerous PACs with junctional escape beats with HR in 40-50's. MD notified. Diltiazem drip stopped per VO by MD at 0300. Pt self converted to sinus curtis at 0242 followed by NSR with rare PACs. Continued to monitor closely. Heparin gtt maintained per order. Potassium replaced. To be rechecked with am labs. Mid shift BG 149mg/dL. NPO for possible procedure, addt'l access obtained. In chair overnight. Up with sba. Void spontaneously. Using urinal bedside. UOP wdl post lasix admin on previous shift. Weight down. Rested btwn cares. Continue POC.

## 2017-01-23 NOTE — PROGRESS NOTES
Cardiology Daily Note   Date of Service: 1/23/2017  Patient: Rohan Monroe  MRN: [id number]  Admission Date: 1/18/2017  Hospital Day # 5      Assessment & Plan:   Rohan Monroe is a 71 year old male with a history of coronary disease (PCI in 2008 to mid LAD and D2) and MRI proven cardio-pulmonary sarcoid, DM2, HTN, HLD admitted with typical atrial flutter s/p cardioversion on 1/19/2017.     Changes today:   - ablation scheduled   - increased warfarin to 10mg due to continuing subtherapeutic INR    # Atrial Flutter - s/p cardioversion 1/19/16   # Acute systolic heart failure  HD stable, remains dyspenic minimal activity. [id number]= 4 (HTN, CAD, DM2, age). Not anticoagulated prior to admission.  No evidence of infection. BNP at 13k and JVD present although CXR clear and no crackles on exam. DEAN negative on 01/19 with subsequent cardioversion to sinus rhythm with frequent PACs and hypotension. Reconverted to flutter on 1/21. TTE in sinus showed normal EF. Scheduled ablation on 1/23.  - Low intensity heparin gtt (decreased from high intensity)  - Amiodarone 400mg BID  - Continue pta metoprolol, nifedipine  - Losartan 100mg daily  - Ablation scheduled for today      #Hx CAD s/p PCI with ROSALIE to mLAD and D2 in 2008  Denies chest pain with exertion, but endorses chest pain with a deep breath. Trop elevation plateau ~0.48 --> 0.38 on 1/20 PM. No evidence of acute ischemia on EKG with 2:1 Flutter.   -on low intensity heparin gtt  -continue asa, metoprolol, statin    #RADHA on CKD, non-oliguric-  Likely due to prolonged A-flutter and decompensated heart failure as above. Renal US normal, FENA 0.2% showing pre-renal etiology. UA with elevated albumin (300), hyaline casts, and moderate blood. Creatinine 2.33 (1/20) with elevated urea nitrogen and decreased GFR (28)  - Strict I/O, daily weights    #Cardiac and Pulmonary Sarcoidosis  #No hx of CHF  Diagnosed on MRI. Last cardiac MRI 2013 without evidence  "of heart failure.    -on infliximab q2mos and methotraxate every 1 week on mondays.  -enrolled in [facility] cardiac sarcoid registry  -Follow up with Dr. Vazquez in clinic in 3 months    #Constipation  -miralax    Chronic Issues  #HTN- continue pta metoprolol, chlorthalidone, losartan,nifedipine    #CKD stage 3 2/2 DM and vascular disease- follows with renal. Baseline 1.7 and admitted at 1.9 --> 2.33 (1/20) --> 2.79 (1/23)  #COPD- continue pta inhalers, nebs  #DM2- not on insulin per med rec. SSI low; hypoglycemia protocol    FEN: No IVF, 2g Na diet, replete electrolytes prn  Prophylaxis: heparin gtt  Consults: none  Code Status: full  Disposition: Pending further workup. Anticipate inpatient for next 2-3 days minimum.      This patient was discussed with Dr. Tenorio who agrees with the assessment and plan.      Brandy Peters, MS4    Staff: see resident progress note from today as well. woa  ___________________________________________________________________      Subjective & Interval Hx:    Feeling well today. Still unable to lay flat but is trying to sleep while upright. He is having less SOB with small movements. He states he is ready for his ablation. He is having less urine output than when he first started the lasix.       Last 24 hr care team notes reviewed.   ROS: 4 point ROS including Respiratory, CV, GI and , other than that noted in the HPI, is negative    Physical Exam:  Blood pressure 128/76, pulse 120, temperature 98.1  F (36.7  C), temperature source Oral, resp. rate 18, height 1.753 m (5' 9\"), weight 88.86 kg (195 lb 14.4 oz), SpO2 96 %.    General: sitting in bed comfortably, pleasant, awake  HEENT: neck supple, EOMI  Chest/Resp: wheezes present diffusely, no crackles appreciated, better effort than yesterday  Heart/CV: heart sounds muffled, irregularly irregular, no murmurs appreciated, no S3/S4  Abdomen/GI: soft, non-tender  Extremities/MSK: spontaneous movement of all four, 2+ pitting edema to upper " shins bilaterally  Skin: warm, no pallor, no jaundice  Neuro/Psych: CN grossly intact, mood appropriate    Lines/Tubes:   Peripheral IV 17 Anterior;Right Upper forearm (Active)   Site Assessment [facility] 2017 11:00 AM   Number of days:0       Midline Catheter Single Lumen (Active)   Site Assessment [facility] 2017 11:00 AM   Line Status Infusing 2017  2:00 AM   External Cath Length (cm) 0 cm 2017  2:00 AM   Initial Extremity Circumference (cm) 0 cm 2017  2:00 AM   Extravasation? No 2017  4:00 PM   Dressing Intervention Chlorhexidine patch;Transparent;New dressing 2017  4:00 PM   Dressing Change Due 17  8:00 PM   Number of days:2       Labs & Studies of Note:  Labs reviewed in EPIC.   Last Basic Metabolic Panel:  NA      131   2017   POTASSIUM      4.0   2017  CHLORIDE       95   2017  RAFFY      8.7   2017  CO2       24   2017  BUN       66   2017  CR     2.79   2017  GLC      147   2017    INR: 1.33  M.3    EKG reviewed.   Meds reviewed.  Imaging reviewed.    Medications list for Reference  Current Facility-Administered Medications   Medication     warfarin (COUMADIN) tablet 10 mg     adenosine (ADENOCARD) injection 6-12 mg     atropine injection 0.5-1 mg     diphenhydrAMINE (BENADRYL) injection 25-50 mg     DOBUTamine 500 mg in dextrose 5% 250 mL (adult std)     fentaNYL Citrate (PF) (SUBLIMAZE) injection 25-50 mcg     fentaNYL Citrate (PF) (SUBLIMAZE) 250 mcg in D5W 100 mL infusion     flumazenil (ROMAZICON) injection 0.2 mg     furosemide (LASIX) injection  mg     heparin (porcine) injection 1,000-10,000 Units     heparin infusion 25,000 units in 0.45% NaCl 250 mL     isoproterenol (ISUPREL) 200 mcg/50 mL pre-mix ADULT     ketorolac (TORADOL) injection 15 mg     LORazepam (ATIVAN) injection 0.5-2 mg     midazolam (VERSED) injection 0.5-2 mg     midazolam (VERSED) 20 mg in D5W 100 mL infusion     naloxone (NARCAN)  injection 0.4 mg     ondansetron (ZOFRAN) injection 4 mg     promethazine (PHENERGAN) IV injection 6.25-25 mg     protamine injection 1-5 mg     protamine injection 5-40 mg     sennosides (SENOKOT) tablet 8.6 mg     furosemide (LASIX) injection 40 mg     diltiazem (CARDIZEM) 125 mg in NaCl 0.9 % 125 mL infusion     clonazePAM (klonoPIN) tablet 0.5 mg     ipratropium - albuterol 0.5 mg/2.5 mg/3 mL (DUONEB) neb solution 3 mL     0.9% sodium chloride infusion     artificial saliva (BIOTENE MT) solution 1 spray     metoprolol (LOPRESSOR) injection 5 mg     amiodarone (PACERONE/CODARONE) tablet 400 mg     lidocaine 1 % 0.5-5 mL     lidocaine (LMX4) kit     sodium chloride (PF) 0.9% PF flush 10-20 mL     sodium chloride (PF) 0.9% PF flush 10 mL     polyethylene glycol (MIRALAX/GLYCOLAX) Packet 17 g     bisacodyl (DULCOLAX) Suppository 10 mg     glucose 40 % gel 15-30 g    Or     dextrose 50 % injection 25-50 mL    Or     glucagon injection 1 mg     insulin aspart (NovoLOG) inj (RAPID ACTING)     insulin aspart (NovoLOG) inj (RAPID ACTING)     0.9% sodium chloride infusion     heparin bolus from infusion pump     Warfarin Therapy Reminder (Check START DATE - warfarin may be starting in the FUTURE)     albuterol (PROAIR HFA/PROVENTIL HFA/VENTOLIN HFA) Inhaler 2 puff     aspirin EC EC tablet 81 mg     atorvastatin (LIPITOR) tablet 20 mg     fluticasone-vilanterol (BREO ELLIPTA) 100-25 MCG/INH oral inhaler 1 puff     levothyroxine (SYNTHROID/LEVOTHROID) tablet 137 mcg     metoprolol (LOPRESSOR) tablet 100 mg     mirtazapine (REMERON) tablet 15 mg     umeclidinium (INCRUSE ELLIPTA) oral inhaler 1 puff     lidocaine 1 % 1 mL     lidocaine (LMX4) kit     sodium chloride (PF) 0.9% PF flush 3 mL     sodium chloride (PF) 0.9% PF flush 3 mL     medication instruction     nitroglycerin (NITROSTAT) sublingual tablet 0.4 mg     alum & mag hydroxide-simethicone (MYLANTA ES/MAALOX  ES) suspension 15-30 mL     acetaminophen (TYLENOL)  tablet 650 mg     acetaminophen (TYLENOL) Suppository 650 mg     Patient is already receiving anticoagulation with heparin, enoxaparin (LOVENOX), warfarin (COUMADIN)  or other anticoagulant medication     sodium chloride (PF) 0.9% PF flush 3 mL     sodium chloride (PF) 0.9% PF flush 3 mL     May take oral meds with a sip of water, the morning of Cardioversion procedure.         HOLD Digoxin (Lanoxin) AM of procedure IF on digoxin     HOLD: Insulin - REGULAR AM of procedure IF diabetic     HOLD: Insulin - RAPID/SHORT acting AM of procedure IF diabetic     HOLD: Oral hypoglycemics AM of procedure IF diabetic     Give   of usual dose of LONG ACTING insulin AM of procedure IF diabetic     potassium chloride SA (K-DUR/KLOR-CON M) CR tablet 40 mEq     magnesium sulfate 2 g in NS intermittent infusion ([facility] or [facility])

## 2017-01-23 NOTE — PROGRESS NOTES
CARDIOLOGY SERVICE NOTE  01/23/2017    INTERVAL HISTORY:  Atrial flutter ablated today but now in AFib. Received 300 mg amiodarone load.  Complains of congestion and orthopnea without fever, chills, or productive sputum  Holding afternoon diuretics with prabhjot.    PERTINENT MEDICATIONS:  Amiodarone 400mg bid  ASA  Atorvastatin 40 mg qhs  Metoprolol 100mg BID  Heparin gtt  Warfarin   Lasix 20mg IV bid    PERTINENT LABS:  CMP    Recent Labs  Lab 01/23/17  0739 01/22/17  2211 01/22/17  0810 01/21/17  0749 01/20/17  0803  01/18/17  1746   * 130*  --  133 132*  < > 135   POTASSIUM 4.0 3.8  --  4.0 4.3  < > 4.5   CHLORIDE 95 94  --  99 101  < > 102   CO2 24 25  --  22 20  < > 23   ANIONGAP 11 11  --  12 11  < > 10   * 184*  --  133* 146*  < > 131*   BUN 66* 62*  --  56* 57*  < > 47*   CR 2.79* 2.52*  --  2.09* 2.33*  < > 1.99*   GFRESTIMATED 22* 25*  --  31* 28*  < > 33*   GFRESTBLACK 27* 31*  --  38* 33*  < > 40*   RAFFY 8.7 8.5  --  8.4* 8.2*  < > 8.6   MAG 2.3 2.2 2.0 2.4* 2.5*  < >  --    PROTTOTAL  --   --   --   --   --   --  8.3   ALBUMIN  --   --   --   --   --   --  3.7   BILITOTAL  --   --   --   --   --   --  0.9   ALKPHOS  --   --   --   --   --   --  132   AST  --   --   --   --   --   --  32   ALT  --   --   --   --   --   --  66   < > = values in this interval not displayed.  CBC    Recent Labs  Lab 01/22/17  1625 01/21/17  0749 01/20/17  0803 01/18/17  1748 01/18/17  1746   WBC 11.1* 6.7 9.1  --  12.3*   RBC 4.39* 4.33* 4.10*  --  4.59   HGB 14.3 14.0 13.4 16.0 15.1   HCT 42.4 42.2 40.8  --  45.1   MCV 97 98 100  --  98   MCH 32.6 32.3 32.7  --  32.9   MCHC 33.7 33.2 32.8  --  33.5   RDW 13.8 13.7 14.0  --  13.8    211 194  --  236     INR    Recent Labs  Lab 01/23/17  0739 01/22/17  0810 01/21/17  0749 01/20/17  0803   INR 1.33* 1.24* 1.23* 1.30*     PHYSICAL EXAM:  Temp:  [98  F (36.7  C)-98.7  F (37.1  C)] 98.7  F (37.1  C)  Pulse:  [120] 120  Heart Rate:  [] 127  Resp:  [20-22]  20  BP: ()/() 131/94 mmHg  SpO2:  [94 %-97 %] 96 %    Sitting comfortably in chair  Tachycardic, regular, no appreciable murmur; +JVD  Diffuse expiratory wheezing without crackles  Ext are warm with 1+ pedal edema to mid leg b/l  Alert and oriented    ASSESSMENT AND PLAN:  Mr. Monroe is a 72 yo male with history of cardio-pulmonary sarcoid, CAD s/p PCI to mLAD and D2 in 2008, T2DM, HTN, HLD who was admitted with symptomatic atrial flutter.    Successful DCCV on 1/19 w/ subsequent initiation of amiodarone. 2:1 A-flutter recurred on 01/21. Hypervolemic on exam with BLE edema improving w/ diuresis. TTE w/ EF 55% after cardioversion.     Successful clockwise atrial flutter ablation 1/23/17. But now in atrial fibrillation.    Active Problem List:  1. Atrial flutter: s/p DEAN DCCV and on amiodarone, s/p ablation 1/23/17  2. CAD s/p PCI with ROSALIE to mLAD and D2  3. Cardiopulmonary sarcoid  4. Hypervolemia  5. Constipation  6. RADHA on CKD  7. HTN  8. COPD    Plan  - s/p atrial flutter ablation today  - cont heparin gtt until INR is therapeutic; CKD prevents use of lovenox for bridging  - discontinued losartan due to radha, continued metoprolol for BP and possible cardiomyopathy  - holding diuresis after one dose lasix 40 mg x1 today due to worsening radha likely 2/2   - cont ASA, statin  - schedule duonebs w/ wheezing on exam and COPD history; avoiding prednisone w/ fluid overload  - continue amiodarone (currently 400 mg BID) for   - follow up with Dr. Vazquez in clinic after discharge within 1 month for PET scan to assess sarcoid activity and for tapering of amiodarone    DVT prophylaxis: heparin gtt  Disposition: inpatient for heparin bridge after ablation until INR therapeutic and to optimize volume status    Patient and plan of care discussed with Dr. Tenorio.     Gretel Cherry MD  IM- PGY-3  [phone]    Attending: Patient seen and examined with Dr. Cherry (Cards I PGY3). The H&P is as noted. Any  additional findings, if any, have been added to the body of the note. All labs and imaging studies reviewed personally. The assessment and recommendations outlined above reflect our joint plan and was reached after careful review and discussion of the case.     Jered Tenorio MD

## 2017-01-23 NOTE — ANESTHESIA CARE TRANSFER NOTE
Patient: Rohan Monroe    ANESTHESIA CARDIOVERSION (N/A Update)  Additional InformationProcedure(s):  ANESTHESIA CARDIOVERSION     Diagnosis: Atrial Fibilatrial  Diagnosis Additional Information: No value filed.    Anesthesia Type:   No value filed.     Note:  Airway :Face Mask  Destination: Cath Lab.  Comments: Pt. Pink and breathing spontaneously.  Vitals stable.  Report given to oncoming nurse.  Transfer of care occurred.      Vitals: (Last set prior to Anesthesia Care Transfer)              Electronically Signed By: VINNY Prater CRNA  January 23, 2017  4:26 PM

## 2017-01-23 NOTE — PLAN OF CARE
Problem: Goal Outcome Summary  Goal: Goal Outcome Summary  PT 6C: Pt declining PT this morning. States he has been unable to sleep since Wed due to frequent caregiver disturbances and needs to rest. Will check back this afternoon as schedule allows.

## 2017-01-23 NOTE — PLAN OF CARE
Problem: Goal Outcome Summary  Goal: Goal Outcome Summary  Outcome: No Change  D: Aflutter with HR 80s-150s. BPs 110s/70s-80s. Denies pain. 2L O2 on NC.   I: Diltiazem started today, gtt 10mL/hr through midline. Heparin gtt 850U/hr through PIV, 10a recheck therapeutic at 0.15. Amiodarone drip stopped. Walked in hallway with PT and nurse. PRN clonazepam ordered for occasional anxiety. Requiring insulin coverage at meals.   A: Stable, c/o chest congestion again today. Encouraged IS and activity. RT started on Acapella. Lung sounds with expiratory wheezes.   P: Continue to monitor. Notify MDs as needed. NPO at 0000 for ablation tomorrow.

## 2017-01-23 NOTE — DISCHARGE INSTRUCTIONS
Care of groin site:         Remove the Band-Aid after 24 hours. If there is minor oozing, apply another Band-aid and remove it after 12 hours.          Do NOT take a bath, use a hot tub, pool, or submerse in water for at least 3 days You may shower.          It is normal to have a small bruise or lump at the site.         Do not scrub the site.         Do not use lotion or powder near the puncture site for 3 days.         For the first 2 days: Do not stoop or squat. When you cough, sneeze or move your bowels, hold your hand over the puncture site and press gently.         Do not lift more than 10 pounds or exertional activity for 10 days.      If you start bleeding from the site in your groin:  Lie down flat and press firmly on the site.  Call your physician immediately, or, come to the emergency room.    Call 911 right away if you have bleeding that is heavy or does not stop.     Call your doctor/provider if:         You have a large or growing hard lump around the site.         The site is red, swollen, hot or tender.         Blood or fluid is draining from the site.         You have chills or a fever greater than 101 F (38 C).         Your leg or arm turns bluish, feels numb or cool.         You have hives, a rash or unusual itching.     Cardiovascular Clinic:   [address] [place] [address]. [place] [address]  Your Care Team:  EP Cardiology   Telephone Number     Elizabeth Vazquez [phone]   Jaclyn Lees RN  [phone]     For scheduling appts or procedures:    Joie Savage   [phone]     As always, Thank you for trusting us with your health care needs

## 2017-01-23 NOTE — ANESTHESIA POSTPROCEDURE EVALUATION
Patient: Rohan Monroe    ANESTHESIA CARDIOVERSION (N/A Update)  Additional InformationProcedure(s):  ANESTHESIA CARDIOVERSION     Diagnosis:Atrial Fibilatrial  Diagnosis Additional Information: No value filed.    Anesthesia Type:  General    Note:  Anesthesia Post Evaluation    Patient location during evaluation: Cardiac Cath Lab  Patient participation: Able to fully participate in evaluation  Level of consciousness: awake and alert  Pain management: adequate  Airway patency: patent  Cardiovascular status: acceptable and hemodynamically stable  Respiratory status: acceptable and spontaneous ventilation  Hydration status: acceptable  PONV: none     Anesthetic complications: None          Last vitals:  Filed Vitals:    01/23/17 0445 01/23/17 0741 01/23/17 1102   BP: 136/90 128/92 128/76   Pulse:      Temp:  36.8  C (98.2  F) 36.7  C (98.1  F)   Resp: 18 18 18   SpO2: 95% 94% 96%       Electronically Signed By: Linnette Palumbo MD  January 23, 2017  4:35 PM

## 2017-01-23 NOTE — OP NOTE
EP PROCEDURE NOTE    Procedures:  1. Cavotricuspid isthmus ablation.  2. Comprehensive EP study with conventional mapping and pacing from RA, RV and CS with His recording.  3. 3D Mapping using Carto3.  4. Cardioversion    Attending: Dr. Churchill  EP Fellow: Dr. Weber  Procedure Date: 1/23/2017    Pre-operative Diagnosis:  Isthmus dependant Clockwise Atrial Flutter, Atrial Fibrillation  Post-operative diagnosis:   Isthmus dependant Clockwise Atrial Flutte  Complications:  None.  Fluoroscopy time/dose: 7.4 minutes, 65 mGy.    75 Fentanyl and 1 Versed given in a total of 155 minutes sedation time.  Under the supervision of Staff Physician    Clinical Profile:  Rohan Monroe is a 71 year old male with a history of coronary disease (PCI in 2008 to mid LAD and D2) and MRI proven cardio-pulmonary sarcoid, DM2, HTN, HLD, atrial fibrillation admitted with typical atrial flutter s/p cardioversion on 1/19/2017 which resulted in recurrent atrial flutter.     PROCEDURE  The risks and benefits of the procedure were explained to the patient in full.  The risks include, but are not limited to: pain, bleeding, blood transfusion reactions, arrhythmia, dissection of vessels, cardiac perforation, pericardial effusion, AV block with need of a pacemaker, stroke, and death. Informed Consent was obtained. The patient was brought to the EP lab in a fasting and hemodynamically stable condition. The patient was prepped and draped in a sterile fashion. This procedure was done under moderate sedation.  Sheaths and Catheters:  After local anesthesia with 2% lidocaine, vascular access was obtained using the modified Seldinger technique for the following access points:    Right Femoral Vein:  - 7Fr Locking Sheath: A decapolar Catheter placed into the Coronary Sinus.  - 8Fr sheath: [Later exchanged for an 8Fr RAMP sheath]   through which an non irrigated [employer] 8 mm DF curve mapping and ablation catheter was positioned into the RA  and RV.      EP study results (in milliseconds):  Pre-ablation: In Aflutter with Atrial SVW=518,  VV= 581, DE= n/a, QRS= 82, QT= 385.  Post-ablation: Atrial fibrillation, OA=SI=784, pr 193, QRS= 87, QT= 418.     Arrhythmias Observed or Induced:  A. Atrial Flutter, Isthmus dependant,  clockwise: TCL= 237.    Mapping and Ablation:  The patient was noted to be in atrial flutter at the beginning of the procedure. With the catheters in position, pacing from the lateral isthmus and proximal coronary sinus was performed to measure pre-ablation transisthmus conduction times (TICT):    The flutter was entrained from the following location at a PCL of 20-30 msec below TCL:  - CTI PPI-TCL = 3  msec.  - CSd PPI-TCL = 55 msec.     To enhance catheter stability, the 8Fr sheath was exchanged over a guidewire for an 8Fr RAMP sheath under fluoroscopy.    The ablation catheter was advanced into the cavotriscupid isthmus to a position where there was a large ventricular signal, and a small atrial signal. The position of the catheter was checked in the both the JARAMILLO and English projections.  Prior to ablating, the catheter was maneuvered to map out the HIS location. Then, we repositioned the catheter in the 6 O clock to 6:30 position in the English projection on the 3D map.  A series of coalescing ablation lesions were applied that connected the tricuspid annulus to the IVC. The atrial signal changed morphologies to a double potential. During this flutter line, the atrial flutter broke into sinus rhythm.  The post-ablation TICT was:  TICT Lateral CTI to CSp = 150 msec.  TICT CSp to Lateral CTI = 150 msec.      After evidence of bi-direction block was confirmed using differential pacing/3D mapping with CSp pacing, the procedure was terminated.  After measuring the TICT, we attempted to induce the flutter. Flutter was not induced.  However, after termination of pacing, atrial fibrillation started spontaneously. With anesthesia, we attempted  cardioversion x 3 after amiodarone 300mg iv. Each DCCV  resulted in temporary sinus rhythm but  atrial fibrillation resumed soon thereafter.  ASSESSMENT / PLAN:  Successful ablation of typical atrial flutter (Clockwise)  1.  Continue Coumadin   2.  Continue with Rhythm control  3.  Patient is in atrial fibrillatin (coarse a-fib) nto typical flutter.  The A Fib may require later DCCV attempt after RFA inflammation settles     Marcial Weber M.D.  Cardiac Electrophysiology Fellow      Dr. Churchill was present for the entire procedure.     I appreciated the opportunity to see and assess Kylie and direct the procedure described above with EP Fellow Dr Weber. The above note summarizes my findings and current recommendations.    Following the procedure I discussed the findings with Mr Monroe before he returned to the hospital station.     Please do not hesitate to contact me if you have any questions or concerns.    Jamie Churchill MD [facility]   Pager [phone]  Office [phone]

## 2017-01-23 NOTE — ANESTHESIA PREPROCEDURE EVALUATION
Anesthesia Evaluation         Anesthesia Plan      History & Physical Review  History and physical reviewed and following examination; no interval change.    ASA Status:  3 emergent.        Plan for General with Intravenous induction.          Postoperative Care      Consents  Anesthetic plan, risks, benefits and alternatives discussed with:  Patient..          ANESTHESIA PREOP EVALUATION    Procedure: ANESTHESIA CARDIOVERSION;ANESTHESIA CARDIOVERSION    HPI: Rohan Monroe is a 71 year old male with a history of coronary disease (PCI in 2008 to mid LAD and D2) and MRI proven cardio-pulmonary sarcoid, DM2, HTN, HLD admitted with typical atrial flutter s/p cardioversion on 1/19/2017.     PMHx/PSHx/ROS:  Past Medical History   Diagnosis Date     Unspecified hypothyroidism      Hypothyroidism     Unspecified viral hepatitis C without hepatic coma      Sarcoidosis (H)      Generalized osteoarthrosis, unspecified site      Depressive disorder, not elsewhere classified      Depression (non-psychotic)     Other psoriasis      Viral warts, unspecified      Lichen planus      Type II or unspecified type diabetes mellitus without mention of complication, not stated as uncontrolled      Chronic infection      Hep C     Congestive heart failure, unspecified      Sarcoidosis (H)      Glaucoma suspect      ERM OS (epiretinal membrane, left eye)      PVD (posterior vitreous detachment), left eye      Cataract of both eyes      Hypertension        Past Surgical History   Procedure Laterality Date     Hc repair incisional hernia,reducible       Hernia Repair, Incisional, Unilateral     C ligatn/strip long & short saphen       Hc removal of tonsils,<9 y/o       Tonsils <10y.o.     Cardiac stent       s/p     Arthroplasty hip  8/24/2011     Procedure:ARTHROPLASTY HIP; Right Total Hip Arthroplasty  Choice anesthesia; Surgeon:LESLI WILKINSON; Location:[facility] OR     Joint replacement       1 month ago--right hip     Cataract  iol, rt/lt  9/15/2015     LE     Colonoscopy       Cardiac surgery       Biopsy       Anesthesia cardioversion N/A 1/19/2017     Procedure: ANESTHESIA CARDIOVERSION;  Surgeon: GENERIC ANESTHESIA PROVIDER;  Location: [facility] OR         Past Anes Hx: No personal or family h/o anesthesia problems    Soc Hx:   Tobacco:   EtOH:     Allergies:   Allergies   Allergen Reactions     Prednisone Other (See Comments)     He reports that he can't sleep for days and can't use small to massive doses of prednisone   Pt. Does not do well with high doses of Prednisone, His MD says that Prednisone is counter indicated. Prednisone failed to treat his sarcoidosis        Meds:   Prescriptions prior to admission   Medication Sig Dispense Refill Last Dose     inFLIXimab (REMICADE) 100 MG injection    1/18/2017 at Unknown time     NIFEdipine ER osmotic (ADALAT CC) 60 MG 24 hr tablet Take 1 tablet (60 mg) by mouth daily 60 tablet 11 1/18/2017 at Unknown time     chlorthalidone (HYGROTON) 25 MG tablet Take 1 tablet (25 mg) by mouth daily 30 tablet 11 1/18/2017 at Unknown time     metoprolol (LOPRESSOR) 100 MG tablet Take 1 tablet (100 mg) by mouth 2 times daily 60 tablet 11 1/18/2017 at Unknown time     levothyroxine (SYNTHROID/LEVOTHROID) 137 MCG tablet Take 1 tablet (137 mcg) by mouth daily 90 tablet 0 1/18/2017 at Unknown time     tiotropium (SPIRIVA HANDIHALER) 18 MCG capsule Inhale contents of one capsule daily. 90 capsule 3 1/18/2017 at Unknown time     fluticasone - vilanterol (BREO ELLIPTA) 100-25 MCG/INH oral inhaler Inhale 1 puff into the lungs daily 3 Inhaler 3 1/18/2017 at Unknown time     methotrexate 2.5 MG tablet Take 3 tablets (7.5 mg) by mouth once a week On Mondays 48 tablet 3 1/18/2017 at Unknown time     ciclopirox (LOPROX) 0.77 % cream Apply topically 2 times daily To feet and toenails. 90 g 6 1/18/2017 at Unknown time     albuterol (PROAIR HFA, PROVENTIL HFA, VENTOLIN HFA) 108 (90 BASE) MCG/ACT inhaler Inhale 2 puffs into the  "lungs every 6 hours as needed for shortness of breath / dyspnea 3 Inhaler 6 1/18/2017 at Unknown time     losartan (COZAAR) 100 MG tablet Take 1 tablet (100 mg) by mouth daily 90 tablet 3 1/18/2017 at Unknown time     atorvastatin (LIPITOR) 20 MG tablet Take 1 tablet (20 mg) by mouth daily 90 tablet 3 1/18/2017 at Unknown time     ASPIRIN EC PO Take 81 mg by mouth daily   1/18/2017 at Unknown time     blood glucose monitoring (ACCU-CHEK RONNIE PLUS) test strip Use to test blood sugar 2 times daily or as directed.  3 month supply. 200 each 3 1/18/2017 at Unknown time     blood glucose monitoring (ACCU-CHEK FASTCLIX) lancets Use to test blood sugar 2 times daily or as directed.  102 lancets per box.  3 month supply. 2 Box 3 1/18/2017 at Unknown time     blood glucose monitoring (NO BRAND SPECIFIED) meter device kit Use to test blood sugar 2 times daily. 1 kit 0 1/18/2017 at Unknown time     Urea (CARMOL 40) 40 % CREA To feet daily (Patient taking differently: To feet daily PRN) 199 g 4 1/18/2017 at Unknown time     INFLIXIMAB IV Every 45 days   1/18/2017 at Unknown time     Multiple Vitamins-Minerals (MULTIVITAMIN & MINERAL PO) Take 1 tablet by mouth daily.   1/18/2017 at Unknown time     mirtazapine (REMERON) 15 MG tablet Take 15 mg by mouth At Bedtime.   1/18/2017 at Unknown time     sildenafil (VIAGRA) 50 MG tablet Take 2 tablets (100 mg) by mouth daily as needed for erectile dysfunction 10 tablet 6 Unknown at Unknown time       No current outpatient prescriptions on file.       Physical Exam:  VS: T 98.1, P 120, /76, R 18, SpO2 96%     Weight:   Wt Readings from Last 2 Encounters:   01/23/17 88.86 kg (195 lb 14.4 oz)   01/18/17 94.575 kg (208 lb 8 oz)     Height:   Ht Readings from Last 2 Encounters:   01/19/17 1.753 m (5' 9\")   12/27/16 1.753 m (5' 9\")       NPO Status:      Labs:      BMP:  NA      131   1/23/2017   POTASSIUM      4.0   1/23/2017  CHLORIDE       95   1/23/2017  RAFFY      8.7   1/23/2017  CO2 "       24   2017  BUN       66   2017  CR     2.79   2017  GLC      147   2017     CBC:  WBC     11.1   2017  HGB     14.3   2017  HCT     42.4   2017  PLT      213   2017     Imaging:        EK/23/2017  3:44 PM

## 2017-01-24 ENCOUNTER — APPOINTMENT (OUTPATIENT)
Dept: PHYSICAL THERAPY | Facility: CLINIC | Age: 72
DRG: 273 | End: 2017-01-24
Payer: MEDICARE

## 2017-01-24 ENCOUNTER — ANESTHESIA EVENT (OUTPATIENT)
Dept: SURGERY | Facility: CLINIC | Age: 72
DRG: 273 | End: 2017-01-24
Payer: MEDICARE

## 2017-01-24 ENCOUNTER — ANESTHESIA (OUTPATIENT)
Dept: SURGERY | Facility: CLINIC | Age: 72
DRG: 273 | End: 2017-01-24
Payer: MEDICARE

## 2017-01-24 ENCOUNTER — APPOINTMENT (OUTPATIENT)
Dept: CARDIOLOGY | Facility: CLINIC | Age: 72
DRG: 273 | End: 2017-01-24
Attending: STUDENT IN AN ORGANIZED HEALTH CARE EDUCATION/TRAINING PROGRAM
Payer: MEDICARE

## 2017-01-24 LAB
ANION GAP SERPL CALCULATED.3IONS-SCNC: 13 MMOL/L (ref 3–14)
BACTERIA SPEC CULT: NO GROWTH
BACTERIA SPEC CULT: NO GROWTH
BUN SERPL-MCNC: 67 MG/DL (ref 7–30)
CALCIUM SERPL-MCNC: 8.2 MG/DL (ref 8.5–10.1)
CHLORIDE SERPL-SCNC: 96 MMOL/L (ref 94–109)
CO2 SERPL-SCNC: 24 MMOL/L (ref 20–32)
CREAT SERPL-MCNC: 2.51 MG/DL (ref 0.66–1.25)
GFR SERPL CREATININE-BSD FRML MDRD: 25 ML/MIN/1.7M2
GLUCOSE BLDC GLUCOMTR-MCNC: 156 MG/DL (ref 70–99)
GLUCOSE SERPL-MCNC: 133 MG/DL (ref 70–99)
INR PPP: 1.83 (ref 0.86–1.14)
INR PPP: 2.24 (ref 0.86–1.14)
LMWH PPP CHRO-ACNC: 0.27 IU/ML
LMWH PPP CHRO-ACNC: 0.42 IU/ML
MAGNESIUM SERPL-MCNC: 2.3 MG/DL (ref 1.6–2.3)
MAGNESIUM SERPL-MCNC: 2.3 MG/DL (ref 1.6–2.3)
MICRO REPORT STATUS: NORMAL
MICRO REPORT STATUS: NORMAL
POTASSIUM SERPL-SCNC: 3.9 MMOL/L (ref 3.4–5.3)
POTASSIUM SERPL-SCNC: 3.9 MMOL/L (ref 3.4–5.3)
SODIUM SERPL-SCNC: 133 MMOL/L (ref 133–144)
SPECIMEN SOURCE: NORMAL
SPECIMEN SOURCE: NORMAL

## 2017-01-24 PROCEDURE — 83735 ASSAY OF MAGNESIUM: CPT | Performed by: STUDENT IN AN ORGANIZED HEALTH CARE EDUCATION/TRAINING PROGRAM

## 2017-01-24 PROCEDURE — 85520 HEPARIN ASSAY: CPT | Performed by: INTERNAL MEDICINE

## 2017-01-24 PROCEDURE — 80048 BASIC METABOLIC PNL TOTAL CA: CPT | Performed by: INTERNAL MEDICINE

## 2017-01-24 PROCEDURE — A9270 NON-COVERED ITEM OR SERVICE: HCPCS | Mod: GY | Performed by: INTERNAL MEDICINE

## 2017-01-24 PROCEDURE — 21400006 ZZH R&B CCU INTERMEDIATE UMMC

## 2017-01-24 PROCEDURE — 92960 CARDIOVERSION ELECTRIC EXT: CPT | Performed by: NURSE PRACTITIONER

## 2017-01-24 PROCEDURE — 00000146 ZZHCL STATISTIC GLUCOSE BY METER IP

## 2017-01-24 PROCEDURE — 40000193 ZZH STATISTIC PT WARD VISIT

## 2017-01-24 PROCEDURE — 97530 THERAPEUTIC ACTIVITIES: CPT | Mod: GP

## 2017-01-24 PROCEDURE — 25000125 ZZHC RX 250: Performed by: NURSE ANESTHETIST, CERTIFIED REGISTERED

## 2017-01-24 PROCEDURE — 85610 PROTHROMBIN TIME: CPT | Performed by: STUDENT IN AN ORGANIZED HEALTH CARE EDUCATION/TRAINING PROGRAM

## 2017-01-24 PROCEDURE — 36415 COLL VENOUS BLD VENIPUNCTURE: CPT | Performed by: INTERNAL MEDICINE

## 2017-01-24 PROCEDURE — 99232 SBSQ HOSP IP/OBS MODERATE 35: CPT | Mod: 25 | Performed by: INTERNAL MEDICINE

## 2017-01-24 PROCEDURE — 85610 PROTHROMBIN TIME: CPT | Performed by: INTERNAL MEDICINE

## 2017-01-24 PROCEDURE — 93010 ELECTROCARDIOGRAM REPORT: CPT | Mod: 76 | Performed by: INTERNAL MEDICINE

## 2017-01-24 PROCEDURE — 92960 CARDIOVERSION ELECTRIC EXT: CPT

## 2017-01-24 PROCEDURE — 25000131 ZZH RX MED GY IP 250 OP 636 PS 637: Mod: GY | Performed by: STUDENT IN AN ORGANIZED HEALTH CARE EDUCATION/TRAINING PROGRAM

## 2017-01-24 PROCEDURE — 84132 ASSAY OF SERUM POTASSIUM: CPT | Performed by: STUDENT IN AN ORGANIZED HEALTH CARE EDUCATION/TRAINING PROGRAM

## 2017-01-24 PROCEDURE — 36415 COLL VENOUS BLD VENIPUNCTURE: CPT | Performed by: STUDENT IN AN ORGANIZED HEALTH CARE EDUCATION/TRAINING PROGRAM

## 2017-01-24 PROCEDURE — 37000008 ZZH ANESTHESIA TECHNICAL FEE, 1ST 30 MIN

## 2017-01-24 PROCEDURE — 25000132 ZZH RX MED GY IP 250 OP 250 PS 637: Mod: GY | Performed by: INTERNAL MEDICINE

## 2017-01-24 PROCEDURE — 5A2204Z RESTORATION OF CARDIAC RHYTHM, SINGLE: ICD-10-PCS | Performed by: NURSE PRACTITIONER

## 2017-01-24 PROCEDURE — 93005 ELECTROCARDIOGRAM TRACING: CPT

## 2017-01-24 PROCEDURE — 25000132 ZZH RX MED GY IP 250 OP 250 PS 637: Mod: GY | Performed by: STUDENT IN AN ORGANIZED HEALTH CARE EDUCATION/TRAINING PROGRAM

## 2017-01-24 PROCEDURE — 83735 ASSAY OF MAGNESIUM: CPT | Performed by: INTERNAL MEDICINE

## 2017-01-24 PROCEDURE — A9270 NON-COVERED ITEM OR SERVICE: HCPCS | Mod: GY | Performed by: STUDENT IN AN ORGANIZED HEALTH CARE EDUCATION/TRAINING PROGRAM

## 2017-01-24 PROCEDURE — 25000125 ZZHC RX 250: Performed by: INTERNAL MEDICINE

## 2017-01-24 RX ORDER — CLONAZEPAM 0.5 MG/1
0.5 TABLET ORAL
Qty: 15 TABLET | Refills: 0 | Status: SHIPPED | OUTPATIENT
Start: 2017-01-24 | End: 2017-01-25

## 2017-01-24 RX ORDER — WARFARIN SODIUM 5 MG/1
5 TABLET ORAL DAILY
Qty: 30 TABLET | Refills: 3 | Status: SHIPPED
Start: 2017-01-24 | End: 2017-02-24

## 2017-01-24 RX ORDER — WARFARIN SODIUM 5 MG/1
5 TABLET ORAL
Status: COMPLETED | OUTPATIENT
Start: 2017-01-24 | End: 2017-01-24

## 2017-01-24 RX ORDER — FUROSEMIDE 10 MG/ML
40 INJECTION INTRAMUSCULAR; INTRAVENOUS ONCE
Status: COMPLETED | OUTPATIENT
Start: 2017-01-24 | End: 2017-01-24

## 2017-01-24 RX ORDER — POTASSIUM CHLORIDE 750 MG/1
40 TABLET, EXTENDED RELEASE ORAL
Status: DISCONTINUED | OUTPATIENT
Start: 2017-01-24 | End: 2017-01-24 | Stop reason: HOSPADM

## 2017-01-24 RX ORDER — FUROSEMIDE 20 MG
40 TABLET ORAL DAILY PRN
Qty: 180 TABLET | Refills: 3 | Status: SHIPPED
Start: 2017-01-24 | End: 2017-03-09

## 2017-01-24 RX ORDER — POLYETHYLENE GLYCOL 3350 17 G/17G
17 POWDER, FOR SOLUTION ORAL DAILY
Status: DISCONTINUED | OUTPATIENT
Start: 2017-01-24 | End: 2017-01-24

## 2017-01-24 RX ORDER — AMIODARONE HYDROCHLORIDE 400 MG/1
200 TABLET ORAL 2 TIMES DAILY
Qty: 60 TABLET | Refills: 3 | Status: SHIPPED
Start: 2017-01-24 | End: 2017-02-13

## 2017-01-24 RX ORDER — POTASSIUM CHLORIDE 750 MG/1
20 TABLET, EXTENDED RELEASE ORAL
Status: DISCONTINUED | OUTPATIENT
Start: 2017-01-24 | End: 2017-01-24 | Stop reason: HOSPADM

## 2017-01-24 RX ORDER — HEPARIN SODIUM 10000 [USP'U]/100ML
0-3500 INJECTION, SOLUTION INTRAVENOUS CONTINUOUS
Status: DISCONTINUED | OUTPATIENT
Start: 2017-01-24 | End: 2017-01-24

## 2017-01-24 RX ADMIN — FLUTICASONE FUROATE AND VILANTEROL TRIFENATATE 1 PUFF: 100; 25 POWDER RESPIRATORY (INHALATION) at 08:58

## 2017-01-24 RX ADMIN — WARFARIN SODIUM 5 MG: 5 TABLET ORAL at 17:37

## 2017-01-24 RX ADMIN — AMIODARONE HYDROCHLORIDE 400 MG: 200 TABLET ORAL at 08:59

## 2017-01-24 RX ADMIN — AMIODARONE HYDROCHLORIDE 400 MG: 200 TABLET ORAL at 20:40

## 2017-01-24 RX ADMIN — MIRTAZAPINE 15 MG: 15 TABLET, FILM COATED ORAL at 21:39

## 2017-01-24 RX ADMIN — ATORVASTATIN CALCIUM 20 MG: 20 TABLET, FILM COATED ORAL at 21:39

## 2017-01-24 RX ADMIN — METOPROLOL TARTRATE 100 MG: 100 TABLET, FILM COATED ORAL at 20:40

## 2017-01-24 RX ADMIN — UMECLIDINIUM 1 PUFF: 62.5 AEROSOL, POWDER ORAL at 08:58

## 2017-01-24 RX ADMIN — METHOTREXATE 7.5 MG: 2.5 TABLET ORAL at 10:44

## 2017-01-24 RX ADMIN — METOPROLOL TARTRATE 100 MG: 100 TABLET, FILM COATED ORAL at 08:59

## 2017-01-24 RX ADMIN — Medication 1 SPRAY: at 10:44

## 2017-01-24 RX ADMIN — METHOHEXITAL SODIUM 40 MG: 500 INJECTION, POWDER, LYOPHILIZED, FOR SOLUTION INTRAMUSCULAR; INTRAVENOUS; RECTAL at 14:47

## 2017-01-24 RX ADMIN — CLONAZEPAM 0.5 MG: 0.5 TABLET ORAL at 21:39

## 2017-01-24 RX ADMIN — ASPIRIN 81 MG: 81 TABLET, COATED ORAL at 08:59

## 2017-01-24 RX ADMIN — FUROSEMIDE 40 MG: 10 INJECTION, SOLUTION INTRAVENOUS at 22:13

## 2017-01-24 RX ADMIN — LEVOTHYROXINE SODIUM 137 MCG: 137 TABLET ORAL at 08:59

## 2017-01-24 NOTE — ANESTHESIA CARE TRANSFER NOTE
Patient: Rohan Monroe    ANESTHESIA CARDIOVERSION (N/A Update)  Additional InformationProcedure(s):  Cardioversion     Diagnosis: Atrial Fibrillation   Diagnosis Additional Information: No value filed.    Anesthesia Type:   General     Note:  Airway :Nasal Cannula  Patient transferred to:Phase II  Comments: Transferred to: Care of Echo RN.     Patient vital signs: stable    Airway: none    Monitors applied. Arouses to voice. Report to RN.                Vitals: (Last set prior to Anesthesia Care Transfer)              Electronically Signed By: VINNY Crooks CRNA  January 24, 2017  3:02 PM

## 2017-01-24 NOTE — PROGRESS NOTES
"  Electrophysiology Post Procedure Follow Up Note  Date of Procedure: 1/23/17  DOS: 1/24/2017     Pre-operative Diagnosis:  Isthmus dependant Clockwise Atrial Flutter, Atrial Fibrillation  Post-operative diagnosis:   Isthmus dependant Clockwise Atrial Flutter  Hospital Course:  Mr. Monroe is a 71 year old male who has a past medical history significant for pulmonary and cardiac sarcoidosis, CAD s/p PCI mLAD and D2 2008, DM2, HTn, HLD, Hep C, and hypothyroidism. He presented on 1.18/17 with a 3-4 week history of worsening SOB which worsened over the last several days. He also endorsed chest pain with deep breathing. He was found to be in AFL with RVR Vent Rate 150 bpm. Toponin elevated to 0.482 thought to be demand ischemia. Last echo from 2015 shows normal LVEF. He was started on heparin infusion. He then underwent a DEAN/DCCV on 1/19/17. He was noted to have frequent PACs and runs of AT post DCCV and was started on amiodarone. His DEAN pre DCCV showed LVEf 5% (done in AFL) but repeat surface TTE showed LVEF 55%. He then recurred back into AFL, appearing mostly typical on ECGs and was referred for ablation. He underwent a successful CTI ablation on 1/23/17. He went into AF post ablation while testing. He was given IV amiodarone bolus and DCCV attempted several times unsuccessfully. He continues on heparin infusion. CHADSVASC 4 (+age, +DM2, +HTN, +CAD). Groin sites are soft, CDI, no bruit, no bleeding, and no hematoma. Patient is tolerating oral intake, ambulating at baseline, and voiding without difficulties. Patient remains hemodynamically stable.     ROS:   10 point ROS neg other than the symptoms noted above.   Exam:  /69 mmHg  Pulse 120  Temp(Src) 97.6  F (36.4  C) (Axillary)  Resp 18  Ht 1.753 m (5' 9\")  Wt 88.678 kg (195 lb 8 oz)  BMI 28.86 kg/m2  SpO2 96%    Constitutional: alert and no distress  Head: Normocephalic. No masses, lesions, tenderness or abnormalities  Neck: Neck supple. No adenopathy. " Thyroid symmetric, normal size,, Carotids without bruits.  ENT: ENT exam normal, no neck nodes or sinus tenderness  Cardiovascular: Irregular.  No murmurs, clicks gallops or rub  Respiratory: negative, Percussion normal. Good diaphragmatic excursion. Lungs clear  Gastrointestinal: Abdomen soft, non-tender. BS normal. No masses, organomegaly  : Deferred  Musculoskeletal: extremities normal- no gross deformities noted, gait normal and normal muscle tone  Skin: no suspicious lesions or rashes  Neurologic: Gait normal. Reflexes normal and symmetric. Sensation grossly WNL.  Psychiatric: mentation appears normal and affect normal/bright  Hematologic/Lymphatic/Immunologic: Normal cervical lymph nodes   Medications:   Rohan Monroe   Home Medication Instructions LISETH:[id number]    Printed on:01/24/17 0642   Medication Information                      albuterol (PROAIR HFA, PROVENTIL HFA, VENTOLIN HFA) 108 (90 BASE) MCG/ACT inhaler  Inhale 2 puffs into the lungs every 6 hours as needed for shortness of breath / dyspnea             ASPIRIN EC PO  Take 81 mg by mouth daily             atorvastatin (LIPITOR) 20 MG tablet  Take 1 tablet (20 mg) by mouth daily             blood glucose monitoring (ACCU-CHEK RONNIE PLUS) test strip  Use to test blood sugar 2 times daily or as directed.  3 month supply.             blood glucose monitoring (ACCU-CHEK FASTCLIX) lancets  Use to test blood sugar 2 times daily or as directed.  102 lancets per box.  3 month supply.             blood glucose monitoring (NO BRAND SPECIFIED) meter device kit  Use to test blood sugar 2 times daily.             chlorthalidone (HYGROTON) 25 MG tablet  Take 1 tablet (25 mg) by mouth daily             ciclopirox (LOPROX) 0.77 % cream  Apply topically 2 times daily To feet and toenails.             fluticasone - vilanterol (BREO ELLIPTA) 100-25 MCG/INH oral inhaler  Inhale 1 puff into the lungs daily             inFLIXimab (REMICADE) 100 MG  injection               INFLIXIMAB IV  Every 45 days             levothyroxine (SYNTHROID/LEVOTHROID) 137 MCG tablet  Take 1 tablet (137 mcg) by mouth daily             losartan (COZAAR) 100 MG tablet  Take 1 tablet (100 mg) by mouth daily             methotrexate 2.5 MG tablet  Take 3 tablets (7.5 mg) by mouth once a week On Mondays             metoprolol (LOPRESSOR) 100 MG tablet  Take 1 tablet (100 mg) by mouth 2 times daily             mirtazapine (REMERON) 15 MG tablet  Take 15 mg by mouth At Bedtime.             Multiple Vitamins-Minerals (MULTIVITAMIN & MINERAL PO)  Take 1 tablet by mouth daily.             NIFEdipine ER osmotic (ADALAT CC) 60 MG 24 hr tablet  Take 1 tablet (60 mg) by mouth daily             sildenafil (VIAGRA) 50 MG tablet  Take 2 tablets (100 mg) by mouth daily as needed for erectile dysfunction             tiotropium (SPIRIVA HANDIHALER) 18 MCG capsule  Inhale contents of one capsule daily.             Urea (CARMOL 40) 40 % CREA  To feet daily               Labs:  BMP  Recent Labs  Lab 01/24/17 0313 01/23/17  0739 01/22/17  2211 01/21/17  0749    131* 130* 133   POTASSIUM 3.9 4.0 3.8 4.0   CHLORIDE 96 95 94 99   RAFFY 8.2* 8.7 8.5 8.4*   CO2 24 24 25 22   BUN 67* 66* 62* 56*   CR 2.51* 2.79* 2.52* 2.09*   * 147* 184* 133*     CBC  Recent Labs  Lab 01/22/17  1625 01/21/17  0749 01/20/17  0803 01/18/17  1748 01/18/17  1746   WBC 11.1* 6.7 9.1  --  12.3*   RBC 4.39* 4.33* 4.10*  --  4.59   HGB 14.3 14.0 13.4 16.0 15.1   HCT 42.4 42.2 40.8  --  45.1   MCV 97 98 100  --  98   MCH 32.6 32.3 32.7  --  32.9   MCHC 33.7 33.2 32.8  --  33.5   RDW 13.8 13.7 14.0  --  13.8    211 194  --  236     INR  Recent Labs  Lab 01/24/17  0313 01/23/17  0739 01/22/17  0810 01/21/17  0749   INR 1.83* 1.33* 1.24* 1.23*     Patient Instructions:    Care of groin site:         Remove the Band-Aid after 24 hours. If there is minor oozing, apply another Band-aid and remove it after 12 hours.           Do NOT take a bath, use a hot tub, pool, or submerse in water for at least 3 days You may shower.          It is normal to have a small bruise or lump at the site.         Do not scrub the site.         Do not use lotion or powder near the puncture site for 3 days.         For the first 2 days: Do not stoop or squat. When you cough, sneeze or move your bowels, hold your hand over the puncture site and press gently.         Do not lift more than 10 pounds or exertional activity for 10 days.      If you start bleeding from the site in your groin:  Lie down flat and press firmly on the site.  Call your physician immediately, or, come to the emergency room.    Call 911 right away if you have bleeding that is heavy or does not stop.     Call your doctor/provider if:         You have a large or growing hard lump around the site.         The site is red, swollen, hot or tender.         Blood or fluid is draining from the site.         You have chills or a fever greater than 101 F (38 C).         Your leg or arm turns bluish, feels numb or cool.         You have hives, a rash or unusual itching.     Plan & Follow up:    DCCV for AF later today    Continue anticoagulation    Cares per primary team     At discharge, follow up with Dr. Vazquez for ongoing cardiac sarcoid management    VINNY Fox CNP  Electrophysiology Consult Service  Pager: [phone]

## 2017-01-24 NOTE — ANESTHESIA POSTPROCEDURE EVALUATION
Patient: Rohan Monroe    ANESTHESIA CARDIOVERSION (N/A Update)  Additional InformationProcedure(s):  Cardioversion     Diagnosis:Atrial Fibrillation   Diagnosis Additional Information: No value filed.    Anesthesia Type:  General    Note:  Anesthesia Post Evaluation    Patient location during evaluation: PACU  Patient participation: Able to fully participate in evaluation  Level of consciousness: awake and alert  Pain management: adequate  Airway patency: patent  Cardiovascular status: acceptable  Respiratory status: acceptable  Hydration status: acceptable  PONV: none     Anesthetic complications: None          Last vitals:  Filed Vitals:    01/24/17 1450 01/24/17 1451 01/24/17 1454   BP: 114/84 127/79    Pulse: 76 77 72   Temp:      Resp: 14 16 16   SpO2:  98% 99%       Electronically Signed By: Deep Paz MD  January 24, 2017  3:06 PM

## 2017-01-24 NOTE — OP NOTE
CARDIOVERSION    PROCEDURE:  direct current cardioversion  PROCEDURE DATE: 1/24/2017     Pre-procedure diagnosis:  Atrial fibrillation  Post-procedure diagnosis: s/p direct current cardioversion  Complications:  none    BRIEF CLINICAL HISTORY:  Please see note from todaty     PROCEDURE:  The patient arrived at the Echo Laboratory in a fasting, non-sedated state.  Informed consent was previously obtained from patient, who understood indications, risks, and benefits of the procedure.  Patient on heparin infusion.  With help from Anesthesia, patient was deeply sedated.  200J of synchronized biphasic shock was delivered through the cardiac monitor, and the patient was successfully converted to normal sinus rhythm.  After sedation wore off, patient awoke and remained neurologically intact.  The patient tolerated the procedure well from a hemodynamic and respiratory standpoint without any complications.  He was observed in the Echo Laboratory and then transferred back to inpatient unit after sedation wore off and he was ambulatory.    IMPRESSION:  1.  Successful direct current cardioversion with 200J biphasic shock.    RECOMMENDATIONS/PLANS:  1.   Transfer back to inpatient unit   2.   Continue anticoagulation    VINNY Fox CNP  Electrophysiology Consult Service  Pager: [phone]

## 2017-01-24 NOTE — PROGRESS NOTES
Shift Summary:  Kept NPO for EPS/Ablation, Heparin Gtt at 850 units/hour, Gtt turned off later morning per pre Cath orders, sent to [facility] early afternoon, returned late afternoon awake & alert, Right groin site C/D/I, feet cool/legs discolored as prior to procedure, +1 DP, VSS, wearing O2 2L NC, monitor Atrial Fib 60's to 100's. Had received an Amiodarone bolus in CCL prior to transfer back to , on scheduled po Metoprolol & po Amiodarone, Heparin Gtt to be restarted at 2030 tonight. On bedrest with right leg straight until 2030, eating & drinking fluids, has voided once, Refer to flow sheets for full assessments, labs, FSG, frequent VS & post Ablation checks. AM INR 1.33, received Coumadin 10 mg po tonight, new drug for patient, ordered PLC Coumadin Class.

## 2017-01-24 NOTE — DISCHARGE SUMMARY
Cardiology Discharge Summary  Rohan Monroe MRN: [id number]  1945  Primary care provider: Jamie Foster  ___________________________________          Date of Admission:  1/18/2017  Date of Discharge:  1/25/2017   Admitting Physician:  Brian Vazquez MD  Discharge Physician:  Jered Tenorio MD   Discharging Service:  [facility]      Primary Provider: Jamie Foster         Reason for Admission:   Atrial flutter          Discharge Diagnosis:   Atrial flutter  Atrial fibrillation  Acute on chronic diastolic heart failure         Procedures & Significant Findings:   Atrial flutter ablation, clockwise  Atrial fibrillation cardioversion         Consultations:   None         Hospital Course by Problem:      Rohan Monroe is a 71 year old male with a history of coronary disease (PCI in 2008 to mid LAD and D2) and MRI proven cardio-pulmonary sarcoid, DM2, HTN, HLD admitted with symptomatic atrial flutter.    #Atrial fibrillation   #Atrial flutter  Successful DCCV on 1/19 w/ subsequent initiation of amiodarone. 2:1 A-flutter recurred on 01/21. Initial hypervolemia on exam with BLE edema improved w/ diuresis. TTE w/ EF 55% after cardioversion. However, returned to atrial flutter.  Successful clockwise atrial flutter ablation 1/23/17 that resulted in coarse atrial fibrillation. The following day, on 1/24/17, patient was cardioverted from coarse atrial fibrillation to NSR.   Therapeutic on warfarin on discharge. Patient feels well on discharge  -Discharged on amiodarone and warfarin.    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 Last cardiac MRI 2013 without evidence of heart failure. Hypervolemic on admission but may have been 2/2 tachyarrhythmia rather than sarcoid. Diuresed during admission, but not requiring diuresis after rates were controlled. Will need followup  -Follow up in clinic  "with Dr. Vazquez within 1 month regarding sarcoid management (PET scan possibly) and possible tapering of amiodarone.  -Continue infliximab  -Discharged with prn lasix to take if gains 3lbs in 1 day    #RADHA on CKD stage 3  Baseline creatinine 1.7. Cr elevated to 2.44 on discharge, and is resolving. Likely pre-renal after aggressive diuresis.  -holding losartan on discharge.  -recheck Cr in 1 week in [facility]  -consider re-starting losartan as able.    #HTN  Discontinued losartan on discharge due to RADHA and discontinued nifedipine. Continued metoprolol.    #CAD s/p PCI with ROSALIE to mLAD and D2 (2008)  Discharged on home medications of asa, statin, BB    #COPD  Discharged on home nebs    Physical Exam on day of Discharge:  Blood pressure 118/95, pulse 119, temperature 98.1  F (36.7  C), temperature source Oral, resp. rate 18, height 1.753 m (5' 9\"), weight 88.678 kg (195 lb 8 oz), SpO2 94 %.  General: NAD, pleasant  Neck: no JVD  Respiratory: Good air movement, no rhonchi  Heart/CV: RRR, no m/r/g  Abdomen/GI: s/nt/nd, bs+  Extremities/MSK: trace LE edema         Pending Results:   None         Discharge Medications:     Current Discharge Medication List      START taking these medications    Details   !! warfarin (COUMADIN) 2.5 MG tablet Take 1 tablet (2.5 mg) by mouth once for 1 dose  Qty: 1 tablet, Refills: 0    Associated Diagnoses: Atrial flutter with rapid ventricular response (H)      clonazePAM (KLONOPIN) 0.5 MG tablet Take 1 tablet (0.5 mg) by mouth nightly as needed for anxiety  Qty: 15 tablet, Refills: 0    Associated Diagnoses: Anxiety      amiodarone HCl 400 MG TABS Take 200 mg by mouth 2 times daily  Qty: 60 tablet, Refills: 3    Comments: Take 200mg BID for 4 weeks followed by 200mg daily.  Associated Diagnoses: Atrial flutter with rapid ventricular response (H)      !! warfarin (COUMADIN) 5 MG tablet Take 1 tablet (5 mg) by mouth daily  Qty: 30 tablet, Refills: 3    Associated Diagnoses: Atrial flutter " with rapid ventricular response (H)      furosemide (LASIX) 20 MG tablet Take 2 tablets (40 mg) by mouth daily as needed For weight gain of 3 lbs or greater  Qty: 180 tablet, Refills: 3    Associated Diagnoses: Atrial flutter with rapid ventricular response (H); Sarcoidosis (H); Acute on chronic diastolic heart failure (H)       !! - Potential duplicate medications found. Please discuss with provider.      CONTINUE these medications which have NOT CHANGED    Details   !! inFLIXimab (REMICADE) 100 MG injection       metoprolol (LOPRESSOR) 100 MG tablet Take 1 tablet (100 mg) by mouth 2 times daily  Qty: 60 tablet, Refills: 11    Associated Diagnoses: Hypertension secondary to other renal disorders      levothyroxine (SYNTHROID/LEVOTHROID) 137 MCG tablet Take 1 tablet (137 mcg) by mouth daily  Qty: 90 tablet, Refills: 0    Comments: Lab needed prior to future refills  Associated Diagnoses: Hypothyroidism      tiotropium (SPIRIVA HANDIHALER) 18 MCG capsule Inhale contents of one capsule daily.  Qty: 90 capsule, Refills: 3    Associated Diagnoses: COPD (chronic obstructive pulmonary disease) (H)      fluticasone - vilanterol (BREO ELLIPTA) 100-25 MCG/INH oral inhaler Inhale 1 puff into the lungs daily  Qty: 3 Inhaler, Refills: 3    Associated Diagnoses: COPD (chronic obstructive pulmonary disease) (H)      methotrexate 2.5 MG tablet Take 3 tablets (7.5 mg) by mouth once a week On Mondays  Qty: 48 tablet, Refills: 3    Associated Diagnoses: Sarcoidosis (H)      ciclopirox (LOPROX) 0.77 % cream Apply topically 2 times daily To feet and toenails.  Qty: 90 g, Refills: 6    Associated Diagnoses: Dermatophytosis of nail; Tinea pedis of both feet      albuterol (PROAIR HFA, PROVENTIL HFA, VENTOLIN HFA) 108 (90 BASE) MCG/ACT inhaler Inhale 2 puffs into the lungs every 6 hours as needed for shortness of breath / dyspnea  Qty: 3 Inhaler, Refills: 6    Associated Diagnoses: Sarcoidosis (H)      atorvastatin (LIPITOR) 20 MG tablet  Take 1 tablet (20 mg) by mouth daily  Qty: 90 tablet, Refills: 3    Associated Diagnoses: Hyperlipidemia      ASPIRIN EC PO Take 81 mg by mouth daily      blood glucose monitoring (ACCU-CHEK RONNIE PLUS) test strip Use to test blood sugar 2 times daily or as directed.  3 month supply.  Qty: 200 each, Refills: 3    Associated Diagnoses: Type 2 diabetes, HbA1C goal < 8% (H)      blood glucose monitoring (ACCU-CHEK FASTCLIX) lancets Use to test blood sugar 2 times daily or as directed.  102 lancets per box.  3 month supply.  Qty: 2 Box, Refills: 3    Associated Diagnoses: Type 2 diabetes, HbA1C goal < 8% (H)      blood glucose monitoring (NO BRAND SPECIFIED) meter device kit Use to test blood sugar 2 times daily.  Qty: 1 kit, Refills: 0    Associated Diagnoses: Type 2 diabetes mellitus with diabetic nephropathy (H)      Urea (CARMOL 40) 40 % CREA To feet daily  Qty: 199 g, Refills: 4    Associated Diagnoses: Dermatophytosis of nail; Corns and callosities      !! INFLIXIMAB IV Every 45 days      Multiple Vitamins-Minerals (MULTIVITAMIN & MINERAL PO) Take 1 tablet by mouth daily.      mirtazapine (REMERON) 15 MG tablet Take 15 mg by mouth At Bedtime.      sildenafil (VIAGRA) 50 MG tablet Take 2 tablets (100 mg) by mouth daily as needed for erectile dysfunction  Qty: 10 tablet, Refills: 6    Associated Diagnoses: CAD (coronary artery disease)       !! - Potential duplicate medications found. Please discuss with provider.      STOP taking these medications       NIFEdipine ER osmotic (ADALAT CC) 60 MG 24 hr tablet Comments:   Reason for Stopping:         chlorthalidone (HYGROTON) 25 MG tablet Comments:   Reason for Stopping:         losartan (COZAAR) 100 MG tablet Comments:   Reason for Stopping:                    Discharge Instructions and Follow-Up:     Discharge Procedure Orders  Medication Therapy Management Referral   Referral Type: Med Therapy Management     INR Clinic Referral     Reason for your hospital stay    Order Comments: Atrial flutter     Adult [facility] Follow-up and recommended labs and tests   Order Comments: Follow up with Dr. Vazquez, at [facility], within 1 month for follow up of cardiopulmonary sarcoid after a cardiac admission  for hospital follow- up.     Establish care with [facility] and follow up with them within 1 week for Cr check and consideration of restarting losartan.  Appointments on [facility] and/or [facility] (with [facility] or [facility] provider or service). Call [phone] if you haven't heard regarding these appointments within 7 days of discharge.     Activity   Order Comments: Your activity upon discharge: activity as tolerated   Order Specific Question Answer Comments   Is discharge order? Yes      Full Code     Diet   Order Comments: Follow this diet upon discharge: Orders Placed This Encounter  2 Gram Sodium Diet   Order Specific Question Answer Comments   Is discharge order? Yes               Discharge Disposition:   Home         Condition on Discharge:   Discharge condition: Stable   Code status on discharge: Full Code        Date of service: 1/25/2017    The patient was discussed with Dr. Tenorio.    Gretel Cherry MD   PGY3  [phone]      Staff: patient seen and examined prior to discharge. Discharge instructions (medications, and f/u appts) reviewed with the patient. The patient reports understanding these instructions and denies any questions at this time. Personal time spent on discharge: 15 minutes/woa

## 2017-01-24 NOTE — PLAN OF CARE
Problem: Goal Outcome Summary  Goal: Goal Outcome Summary  Outcome: No change  Heparin Gtt restarted at 2030 and running at 1100 units/hour. Pt alert and oriented x 4. Right groin site C/D/I, feet cool/legs discolored as baseline,pulses soft and present, VSS, wearing O2 2L NC, monitor Atrial Fib 60's to 100's. Pt received scheduled Amiodarone po and Metoprolol. Pt off bedrest at 2030. Pt slept well and VSS. RN will notify team with changes.

## 2017-01-24 NOTE — PLAN OF CARE
Problem: Goal Outcome Summary  Goal: Goal Outcome Summary  PT 6C: Attempted to see pt around noon, BP was elevated at 170/139mmHg (), therefore, activity deferred at this time. During setup for session and vitals, pt educated on trialing 4WW for improved stability during ambulation and seat present due to decreased activity tolerance, pt in agreement. Unable to return as pt has procedure this afternoon. Will reschedule.

## 2017-01-24 NOTE — PROGRESS NOTES
CARDIOLOGY SERVICE NOTE  01/24/2017    INTERVAL HISTORY:  Clockwise atrial flutter ablated 1/23 but afterwards and this morning was in AFib.   Cardioversion successful today out of AFib and now in NSR  Improved breathing after ablation and cardioversion. Denies chest pain/pressure, fever, chills.  Holding diuretics with prabhjot.  Will discharge in am.  INR therapeutic; discontinued heparin gtt    PERTINENT MEDICATIONS:  Amiodarone 400mg bid  ASA  Atorvastatin 40 mg qhs  Metoprolol 100mg BID  Warfarin     PERTINENT LABS:  CMP    Recent Labs  Lab 01/24/17  1037 01/24/17  0313 01/23/17  0739 01/22/17  2211  01/21/17  0749  01/18/17  1746   NA  --  133 131* 130*  --  133  < > 135   POTASSIUM 3.9 3.9 4.0 3.8  --  4.0  < > 4.5   CHLORIDE  --  96 95 94  --  99  < > 102   CO2  --  24 24 25  --  22  < > 23   ANIONGAP  --  13 11 11  --  12  < > 10   GLC  --  133* 147* 184*  --  133*  < > 131*   BUN  --  67* 66* 62*  --  56*  < > 47*   CR  --  2.51* 2.79* 2.52*  --  2.09*  < > 1.99*   GFRESTIMATED  --  25* 22* 25*  --  31*  < > 33*   GFRESTBLACK  --  31* 27* 31*  --  38*  < > 40*   RAFFY  --  8.2* 8.7 8.5  --  8.4*  < > 8.6   MAG 2.3 2.3 2.3 2.2  < > 2.4*  < >  --    PROTTOTAL  --   --   --   --   --   --   --  8.3   ALBUMIN  --   --   --   --   --   --   --  3.7   BILITOTAL  --   --   --   --   --   --   --  0.9   ALKPHOS  --   --   --   --   --   --   --  132   AST  --   --   --   --   --   --   --  32   ALT  --   --   --   --   --   --   --  66   < > = values in this interval not displayed.  CBC    Recent Labs  Lab 01/22/17  1625 01/21/17  0749 01/20/17  0803 01/18/17  1748 01/18/17  1746   WBC 11.1* 6.7 9.1  --  12.3*   RBC 4.39* 4.33* 4.10*  --  4.59   HGB 14.3 14.0 13.4 16.0 15.1   HCT 42.4 42.2 40.8  --  45.1   MCV 97 98 100  --  98   MCH 32.6 32.3 32.7  --  32.9   MCHC 33.7 33.2 32.8  --  33.5   RDW 13.8 13.7 14.0  --  13.8    211 194  --  236     INR    Recent Labs  Lab 01/24/17  1037 01/24/17  0313 01/23/17  0739  01/22/17  0810   INR 2.24* 1.83* 1.33* 1.24*     PHYSICAL EXAM:  Temp:  [96.6  F (35.9  C)-98.1  F (36.7  C)] 98.1  F (36.7  C)  Pulse:  [] 77  Heart Rate:  [] 102  Resp:  [14-18] 18  BP: (105-170)/() 124/80 mmHg  SpO2:  [92 %-99 %] 97 %    Sitting comfortably in chair  Tachycardic, irregular, no appreciable murmur; no JVD  Diffuse expiratory wheezing without crackles  Ext are warm with 1+ pedal edema to mid leg b/l  Alert and oriented    ASSESSMENT AND PLAN:  Mr. Monroe is a 70 yo male with history of cardio-pulmonary sarcoid, CAD s/p PCI to mLAD and D2 in 2008, T2DM, HTN, HLD who was admitted with symptomatic atrial flutter.    Successful DCCV on 1/19 w/ subsequent initiation of amiodarone. 2:1 A-flutter recurred on 01/21. Euvolemic on exam with BLE edema improving w/ diuresis. TTE w/ EF 55% after cardioversion.     Successful clockwise atrial flutter ablation 1/23/17, though converted to atrial fibrillation. Cardioverted to NSR on 1/24 successfully.    Active Problem List:  1. Atrial flutter: s/p DEAN DCCV and on amiodarone, s/p ablation 1/23/17  2. Atrial fibrillation s/p cardioversion  3. CAD s/p PCI with ROSALIE to mLAD and D2  4. Cardiopulmonary sarcoid  5. Hypervolemia  6. Constipation  7. RADHA on CKD  8. HTN  9. COPD    Plan  - s/p atrial flutter ablation 1/23 and s/p atrial fibrillation cardioversion 1/24  - INR is therapeutic; CKD prevents use of lovenox for bridging  - discontinued losartan due to radha, continued metoprolol for BP and possible cardiomyopathy  - holding diuresis today, as he is euvolemic  - cont ASA, statin  - schedule duonebs w/ wheezing on exam and COPD history; avoiding prednisone w/ fluid overload  - continue amiodarone (currently 400 mg BID) for atrial fibrillation  - follow up with Dr. Vazquez in clinic after discharge within 1 month for PET scan to assess sarcoid activity and for tapering of amiodarone    DVT prophylaxis: warfarin  Disposition: discharge in am if  euvolemic with controlled rates    Patient and plan of care discussed with Dr. Churchill.     Gretel Cherry MD  IM- PGY-3  [phone]    I very much appreciated the opportunity to see and assess Mr Rohan Monroe in the hospital with Dr Subramanian and the [facility] resident team. I discussed with him the need for DCCV for A Fib and the difference between A Fib and A Flutter. He voiced understanding and agreement to proceed with recommended plan.     I agree with the note above summarizes my findings and current recommendations.  Please do not hesitate to contact my office if you have any questions or concerns.      Jamie Churchill MD  Cardiac Arrhythmia Service  [facility]  [phone]

## 2017-01-24 NOTE — PROGRESS NOTES
Pt here for cardioversion.  Consent signed prior to procedure.  Anesthesia present for sedation, 40 mg brevitol given per them.  Pt shocked x 1 at 200 J into SR with occasional PACs.  Post EKG to be done on floor.  Pt can eat/ drink when awake.  Pt returned to 6C when awake and VSS.  Report given to 6C RN.

## 2017-01-25 ENCOUNTER — CARE COORDINATION (OUTPATIENT)
Dept: CARDIOLOGY | Facility: CLINIC | Age: 72
End: 2017-01-25

## 2017-01-25 ENCOUNTER — APPOINTMENT (OUTPATIENT)
Dept: GENERAL RADIOLOGY | Facility: CLINIC | Age: 72
DRG: 273 | End: 2017-01-25
Payer: MEDICARE

## 2017-01-25 ENCOUNTER — APPOINTMENT (OUTPATIENT)
Dept: PHYSICAL THERAPY | Facility: CLINIC | Age: 72
DRG: 273 | End: 2017-01-25
Payer: MEDICARE

## 2017-01-25 VITALS
HEIGHT: 69 IN | SYSTOLIC BLOOD PRESSURE: 142 MMHG | TEMPERATURE: 98.7 F | DIASTOLIC BLOOD PRESSURE: 90 MMHG | BODY MASS INDEX: 28.58 KG/M2 | OXYGEN SATURATION: 90 % | HEART RATE: 119 BPM | WEIGHT: 193 LBS | RESPIRATION RATE: 18 BRPM

## 2017-01-25 LAB
ANION GAP SERPL CALCULATED.3IONS-SCNC: 9 MMOL/L (ref 3–14)
BUN SERPL-MCNC: 62 MG/DL (ref 7–30)
CALCIUM SERPL-MCNC: 8.4 MG/DL (ref 8.5–10.1)
CHLORIDE SERPL-SCNC: 96 MMOL/L (ref 94–109)
CO2 SERPL-SCNC: 28 MMOL/L (ref 20–32)
CREAT SERPL-MCNC: 2.44 MG/DL (ref 0.66–1.25)
GFR SERPL CREATININE-BSD FRML MDRD: 26 ML/MIN/1.7M2
GLUCOSE BLDC GLUCOMTR-MCNC: 120 MG/DL (ref 70–99)
GLUCOSE BLDC GLUCOMTR-MCNC: 151 MG/DL (ref 70–99)
GLUCOSE BLDC GLUCOMTR-MCNC: 206 MG/DL (ref 70–99)
GLUCOSE SERPL-MCNC: 148 MG/DL (ref 70–99)
INR PPP: 3.06 (ref 0.86–1.14)
INTERPRETATION ECG - MUSE: NORMAL
MAGNESIUM SERPL-MCNC: 2.3 MG/DL (ref 1.6–2.3)
POTASSIUM SERPL-SCNC: 4.2 MMOL/L (ref 3.4–5.3)
SODIUM SERPL-SCNC: 133 MMOL/L (ref 133–144)

## 2017-01-25 PROCEDURE — 40000802 ZZH SITE CHECK

## 2017-01-25 PROCEDURE — A9270 NON-COVERED ITEM OR SERVICE: HCPCS | Mod: GY | Performed by: INTERNAL MEDICINE

## 2017-01-25 PROCEDURE — 99238 HOSP IP/OBS DSCHRG MGMT 30/<: CPT | Mod: GC | Performed by: INTERNAL MEDICINE

## 2017-01-25 PROCEDURE — 25000132 ZZH RX MED GY IP 250 OP 250 PS 637: Mod: GY | Performed by: STUDENT IN AN ORGANIZED HEALTH CARE EDUCATION/TRAINING PROGRAM

## 2017-01-25 PROCEDURE — 00000146 ZZHCL STATISTIC GLUCOSE BY METER IP

## 2017-01-25 PROCEDURE — 80048 BASIC METABOLIC PNL TOTAL CA: CPT | Performed by: STUDENT IN AN ORGANIZED HEALTH CARE EDUCATION/TRAINING PROGRAM

## 2017-01-25 PROCEDURE — A9270 NON-COVERED ITEM OR SERVICE: HCPCS | Mod: GY | Performed by: STUDENT IN AN ORGANIZED HEALTH CARE EDUCATION/TRAINING PROGRAM

## 2017-01-25 PROCEDURE — 93005 ELECTROCARDIOGRAM TRACING: CPT | Performed by: OPTOMETRIST

## 2017-01-25 PROCEDURE — 40000275 ZZH STATISTIC RCP TIME EA 10 MIN: Performed by: OPTOMETRIST

## 2017-01-25 PROCEDURE — 93005 ELECTROCARDIOGRAM TRACING: CPT

## 2017-01-25 PROCEDURE — 25000132 ZZH RX MED GY IP 250 OP 250 PS 637: Mod: GY | Performed by: INTERNAL MEDICINE

## 2017-01-25 PROCEDURE — 40000193 ZZH STATISTIC PT WARD VISIT

## 2017-01-25 PROCEDURE — 93010 ELECTROCARDIOGRAM REPORT: CPT | Mod: 76 | Performed by: INTERNAL MEDICINE

## 2017-01-25 PROCEDURE — A9270 NON-COVERED ITEM OR SERVICE: HCPCS | Mod: GY | Performed by: NURSE PRACTITIONER

## 2017-01-25 PROCEDURE — 97530 THERAPEUTIC ACTIVITIES: CPT | Mod: GP

## 2017-01-25 PROCEDURE — 71010 XR CHEST PORT 1 VW: CPT

## 2017-01-25 PROCEDURE — 25000132 ZZH RX MED GY IP 250 OP 250 PS 637: Mod: GY | Performed by: NURSE PRACTITIONER

## 2017-01-25 PROCEDURE — 85610 PROTHROMBIN TIME: CPT | Performed by: STUDENT IN AN ORGANIZED HEALTH CARE EDUCATION/TRAINING PROGRAM

## 2017-01-25 PROCEDURE — 36415 COLL VENOUS BLD VENIPUNCTURE: CPT | Performed by: STUDENT IN AN ORGANIZED HEALTH CARE EDUCATION/TRAINING PROGRAM

## 2017-01-25 PROCEDURE — 83735 ASSAY OF MAGNESIUM: CPT | Performed by: STUDENT IN AN ORGANIZED HEALTH CARE EDUCATION/TRAINING PROGRAM

## 2017-01-25 RX ORDER — WARFARIN SODIUM 2.5 MG/1
2.5 TABLET ORAL
Status: DISCONTINUED | OUTPATIENT
Start: 2017-01-25 | End: 2017-01-25 | Stop reason: HOSPADM

## 2017-01-25 RX ORDER — WARFARIN SODIUM 2.5 MG/1
2.5 TABLET ORAL ONCE
Qty: 1 TABLET | Refills: 0 | Status: SHIPPED
Start: 2017-01-25 | End: 2017-01-25

## 2017-01-25 RX ORDER — CLONAZEPAM 0.5 MG/1
0.5 TABLET ORAL
Qty: 15 TABLET | Refills: 0 | Status: SHIPPED | OUTPATIENT
Start: 2017-01-25 | End: 2017-01-31

## 2017-01-25 RX ADMIN — AMIODARONE HYDROCHLORIDE 400 MG: 200 TABLET ORAL at 09:36

## 2017-01-25 RX ADMIN — ASPIRIN 81 MG: 81 TABLET, COATED ORAL at 09:36

## 2017-01-25 RX ADMIN — LEVOTHYROXINE SODIUM 137 MCG: 137 TABLET ORAL at 09:36

## 2017-01-25 RX ADMIN — ACETAMINOPHEN AND CODEINE PHOSPHATE 2 TABLET: 300; 30 TABLET ORAL at 02:11

## 2017-01-25 RX ADMIN — UMECLIDINIUM 1 PUFF: 62.5 AEROSOL, POWDER ORAL at 09:34

## 2017-01-25 RX ADMIN — INSULIN ASPART 1 UNITS: 100 INJECTION, SOLUTION INTRAVENOUS; SUBCUTANEOUS at 09:35

## 2017-01-25 RX ADMIN — SENNOSIDES 8.6 MG: 8.6 TABLET, FILM COATED ORAL at 09:41

## 2017-01-25 RX ADMIN — POLYETHYLENE GLYCOL 3350 17 G: 17 POWDER, FOR SOLUTION ORAL at 09:41

## 2017-01-25 RX ADMIN — METOPROLOL TARTRATE 100 MG: 100 TABLET, FILM COATED ORAL at 09:36

## 2017-01-25 RX ADMIN — FLUTICASONE FUROATE AND VILANTEROL TRIFENATATE 1 PUFF: 100; 25 POWDER RESPIRATORY (INHALATION) at 09:34

## 2017-01-25 NOTE — PROGRESS NOTES
This is a patient of [facility] and will be follow by one of their Care Coordinators for Post Discharge Follow up                                                   CARDS 1 Admission History and Physical  Rohan Monroe MRN: [id number]  1945  Date of Admission:1/18/2017  Primary care provider: Jamie Foster

## 2017-01-25 NOTE — PROGRESS NOTES
Shift Summary:  Heparin Gtt infusing at 1100 units/hour, patient in Atrial Fib 90's-10's, placed NPO mid morning, sent to Echo Lab mid afternoon for Cardioversion- see EPIC note- returned to 6C in SR 1st AVB with freq PAC's, about 1630 converted to Afib/flutter in 110's, asymptomatic, BP WDL, Cards 1 Fellow notified, 12 lead EKG done, about 1830 patient converted back to SR with PAC's Cards 1 Fellow notified. INR recheck 2.24, received Coumadin 5 mg po tonight, Heparin Gtt DC'ed per order. K+ 3.9, Mag 2.3, Cr 2.51. Continues on po Metoprolol & Amiodarone. PLC Coumadin Class set for 10:30 am tomorrow in room. Refer to flow sheets for full assessments, VS, labs, FSG etc.

## 2017-01-25 NOTE — PLAN OF CARE
Problem: Goal Outcome Summary  Goal: Goal Outcome Summary  Outcome: No Change  Pt A/Ox4, VSS on 2L O2. Pt voiding adequately, 40mg IV lasix given. Pt had chest pain around 0200 (while inhaling), MD notified. CXR, EKG done. Pt given T3, pain went away. Pt in SR 70-80's with frequent PACs. Continue to monitor and notify MD of questions or concerns.

## 2017-01-25 NOTE — PLAN OF CARE
Problem: Goal Outcome Summary  Goal: Goal Outcome Summary  PT 6C: patient preparing for discharge. Education provided regarding outpatient cardiac rehab versus home health PT, use of AD upon discharge home (patient does not currently expect he will need to use one). Patient reports he doesn't have concerns regarding discharge; would benefit from follow-up with PT upon discharge in order to progress endurance and strength. Recommend home PT upon discharge to address endurance, strength for improved functional mobility.       Physical Therapy Discharge Summary    Reason for therapy discharge:    Discharged to home.    Progress towards therapy goal(s). See goals on Care Plan in [facility] electronic health record for goal details.  Goals partially met.  Barriers to achieving goals:   discharge from facility.    Therapy recommendation(s):    Continued therapy is recommended.  Rationale/Recommendations:   PT to progress endurance and strength.

## 2017-01-25 NOTE — PLAN OF CARE
"Problem: Discharge Planning  Goal: Discharge Planning (Adult, OB, Behavioral, Peds)  1/25/17 I saw the patient (pt) , a family member , and a friend at bedside for PLC Warfarin education . All three were very attentive , asking questions , able to answer\"teach back \" and verbalized understanding of content presented. Continue to reinforce information . Also see education flow sheet.   Guide to Warfarin given and reviewed today .          "

## 2017-01-26 ENCOUNTER — TELEPHONE (OUTPATIENT)
Dept: GASTROENTEROLOGY | Facility: CLINIC | Age: 72
End: 2017-01-26

## 2017-01-26 ENCOUNTER — TELEPHONE (OUTPATIENT)
Dept: PHARMACY | Facility: OTHER | Age: 72
End: 2017-01-26

## 2017-01-26 DIAGNOSIS — K74.60 CIRRHOSIS OF LIVER WITHOUT ASCITES, UNSPECIFIED HEPATIC CIRRHOSIS TYPE (H): Primary | ICD-10-CM

## 2017-01-26 NOTE — TELEPHONE ENCOUNTER
Left message for pt reminding them of upcoming appointment.  Instructed pt to bring updated medications list.  Instructed pt to arrive an hour and a half to two hours prior to appt time for labs.  Aj Weber, CMA

## 2017-01-26 NOTE — TELEPHONE ENCOUNTER
MTM referral from: Transitions of Care (recent hospital discharge or ED visit)    MTM referral outreach attempt #1 on January 26, 2017 at 11:08 AM      Outcome: Left Message    Suzie Savage MTM Coordinator Intern

## 2017-01-26 NOTE — PROGRESS NOTES
Discharge Note:  O2 sats on RA 90-91%, down to 86 % at times, patient denies SOB, Cards 1 MD notified,  some ACEVES, infrequent loose cough. Monitor SR with 1st AVB, PAC's. Patient reported good Bm after Senokot and Miralax. PLC Coumadin Education done at bedside. Refer to flow sheets for full assessments, VS, labs, FSG etc. Refer to DC orders  DISCHARGE   Discharged to: Home  Via: [employer] retrieved from [employer]  Accompanied by: son & friend  Discharge Instructions: diet, activity, medications, follow up appointments, when to call the MD, and what to watchout for (i.e. s/s of infection, increasing SOB, palpitations, chest pain,) S/S worsening HF, Coumadin management etc.  Prescriptions: To be filled by [facility] pharmacy per pt's request; medication list reviewed & sent with pt  Follow Up Appointments: arranged; information given  Belongings: All sent with pt  IV: out  Telemetry: off  Pt exhibits understanding of above discharge instructions; all questions answered.  Discharge Paperwork: faxed by SARTHAK

## 2017-01-27 ENCOUNTER — ANTICOAGULATION THERAPY VISIT (OUTPATIENT)
Dept: ANTICOAGULATION | Facility: CLINIC | Age: 72
End: 2017-01-27

## 2017-01-27 ENCOUNTER — CARE COORDINATION (OUTPATIENT)
Dept: CARDIOLOGY | Facility: CLINIC | Age: 72
End: 2017-01-27

## 2017-01-27 DIAGNOSIS — K74.60 CIRRHOSIS OF LIVER WITHOUT ASCITES, UNSPECIFIED HEPATIC CIRRHOSIS TYPE (H): ICD-10-CM

## 2017-01-27 DIAGNOSIS — I48.92 ATRIAL FLUTTER WITH RAPID VENTRICULAR RESPONSE (H): ICD-10-CM

## 2017-01-27 DIAGNOSIS — Z79.01 LONG-TERM (CURRENT) USE OF ANTICOAGULANTS: Primary | ICD-10-CM

## 2017-01-27 DIAGNOSIS — R80.9 PROTEINURIA: ICD-10-CM

## 2017-01-27 DIAGNOSIS — R80.9 PROTEINURIA: Primary | ICD-10-CM

## 2017-01-27 DIAGNOSIS — D86.9 SARCOIDOSIS: ICD-10-CM

## 2017-01-27 LAB
AFP SERPL-MCNC: 2.2 UG/L (ref 0–8)
ALBUMIN SERPL-MCNC: 2.8 G/DL (ref 3.4–5)
ALBUMIN UR-MCNC: >500 MG/DL
ALP SERPL-CCNC: 96 U/L (ref 40–150)
ALT SERPL W P-5'-P-CCNC: 50 U/L (ref 0–70)
ANION GAP SERPL CALCULATED.3IONS-SCNC: 11 MMOL/L (ref 3–14)
APPEARANCE UR: CLEAR
AST SERPL W P-5'-P-CCNC: 40 U/L (ref 0–45)
BILIRUB DIRECT SERPL-MCNC: 0.3 MG/DL (ref 0–0.2)
BILIRUB SERPL-MCNC: 0.7 MG/DL (ref 0.2–1.3)
BILIRUB UR QL STRIP: NEGATIVE
BUN SERPL-MCNC: 71 MG/DL (ref 7–30)
CALCIUM SERPL-MCNC: 8.7 MG/DL (ref 8.5–10.1)
CHLORIDE SERPL-SCNC: 97 MMOL/L (ref 94–109)
CO2 SERPL-SCNC: 24 MMOL/L (ref 20–32)
COLOR UR AUTO: YELLOW
CREAT SERPL-MCNC: 2.48 MG/DL (ref 0.66–1.25)
ERYTHROCYTE [DISTWIDTH] IN BLOOD BY AUTOMATED COUNT: 13.1 % (ref 10–15)
GFR SERPL CREATININE-BSD FRML MDRD: 26 ML/MIN/1.7M2
GLUCOSE SERPL-MCNC: 144 MG/DL (ref 70–99)
GLUCOSE UR STRIP-MCNC: NEGATIVE MG/DL
HCT VFR BLD AUTO: 41.2 % (ref 40–53)
HGB BLD-MCNC: 14.2 G/DL (ref 13.3–17.7)
HGB UR QL STRIP: ABNORMAL
INR PPP: 3.59 (ref 0.86–1.14)
KETONES UR STRIP-MCNC: NEGATIVE MG/DL
LEUKOCYTE ESTERASE UR QL STRIP: NEGATIVE
MCH RBC QN AUTO: 32.6 PG (ref 26.5–33)
MCHC RBC AUTO-ENTMCNC: 34.5 G/DL (ref 31.5–36.5)
MCV RBC AUTO: 95 FL (ref 78–100)
MUCOUS THREADS #/AREA URNS LPF: PRESENT /LPF
NITRATE UR QL: NEGATIVE
PH UR STRIP: 6 PH (ref 5–7)
PLATELET # BLD AUTO: 364 10E9/L (ref 150–450)
POTASSIUM SERPL-SCNC: 4.4 MMOL/L (ref 3.4–5.3)
PROT SERPL-MCNC: 7.7 G/DL (ref 6.8–8.8)
RBC # BLD AUTO: 4.36 10E12/L (ref 4.4–5.9)
RBC #/AREA URNS AUTO: 90 /HPF (ref 0–2)
SODIUM SERPL-SCNC: 132 MMOL/L (ref 133–144)
SP GR UR STRIP: 1.01 (ref 1–1.03)
SQUAMOUS #/AREA URNS AUTO: <1 /HPF (ref 0–1)
URN SPEC COLLECT METH UR: ABNORMAL
UROBILINOGEN UR STRIP-MCNC: 2 MG/DL (ref 0–2)
WBC # BLD AUTO: 15.4 10E9/L (ref 4–11)
WBC #/AREA URNS AUTO: 3 /HPF (ref 0–2)

## 2017-01-27 PROCEDURE — 81001 URINALYSIS AUTO W/SCOPE: CPT | Performed by: INTERNAL MEDICINE

## 2017-01-27 PROCEDURE — 82105 ALPHA-FETOPROTEIN SERUM: CPT | Performed by: INTERNAL MEDICINE

## 2017-01-27 NOTE — PROGRESS NOTES
ANTICOAGULATION FOLLOW-UP CLINIC VISIT    Patient Name:  Rohan Monroe  Date:  1/27/2017  Contact Type:  Telephone    SUBJECTIVE:     Patient Findings     Positives Medication Changes    Comments Pt continues on Amiodarone            OBJECTIVE    INR   Date Value Ref Range Status   01/27/2017 3.59* 0.86 - 1.14 Final       ASSESSMENT / PLAN  INR assessment SUPRA    Recheck INR In: 4 DAYS    INR Location Clinic      Anticoagulation Summary as of 1/27/2017     INR goal 2.0-3.0   Selected INR 3.59! (1/27/2017)   Maintenance plan No maintenance plan   Full instructions 1/27: 2.5 mg; 1/28: 2.5 mg; 1/29: 2.5 mg; 1/30: 2.5 mg; Otherwise No maintenance plan   Next INR check 1/31/2017   Priority INR   Target end date 4/20/2017    Indications   Long-term (current) use of anticoagulants [Z79.01] [Z79.01]  Atrial flutter with rapid ventricular response (H) [I48.92]         Anticoagulation Episode Summary     INR check location     Preferred lab     Send INR reminders to [facility]    Comments 3 months therapy, then reassess      Anticoagulation Care Providers     Provider Role Specialty Phone number    Jamie Foster MD [facility] Internal Medicine [phone]            See the Encounter Report to view Anticoagulation Flowsheet and Dosing Calendar (Go to Encounters tab in chart review, and find the Anticoagulation Therapy Visit)    Left message for patient with results and dosing recommendations. Asked patient to call back to report any missed doses, falls, signs and symptoms of bleeding or clotting, any changes in health, medication, or diet. Asked patient to call back with any questions or concerns.     Marcia Elias RN     Rohan called back with and was given these recommendations. He denies bleeding or bruising. -Raysa Alejandro RN

## 2017-01-31 ENCOUNTER — CARE COORDINATION (OUTPATIENT)
Dept: CARDIOLOGY | Facility: CLINIC | Age: 72
End: 2017-01-31

## 2017-01-31 ENCOUNTER — OFFICE VISIT (OUTPATIENT)
Dept: CARDIOLOGY | Facility: CLINIC | Age: 72
End: 2017-01-31
Attending: NURSE PRACTITIONER
Payer: MEDICARE

## 2017-01-31 VITALS
BODY MASS INDEX: 28.66 KG/M2 | HEIGHT: 68 IN | WEIGHT: 189.1 LBS | SYSTOLIC BLOOD PRESSURE: 137 MMHG | DIASTOLIC BLOOD PRESSURE: 90 MMHG | OXYGEN SATURATION: 96 % | HEART RATE: 71 BPM

## 2017-01-31 DIAGNOSIS — I48.3 TYPICAL ATRIAL FLUTTER (H): ICD-10-CM

## 2017-01-31 DIAGNOSIS — I48.92 ATRIAL FLUTTER (H): Primary | ICD-10-CM

## 2017-01-31 DIAGNOSIS — K59.00 CONSTIPATION, UNSPECIFIED CONSTIPATION TYPE: Primary | ICD-10-CM

## 2017-01-31 DIAGNOSIS — J44.9 CHRONIC OBSTRUCTIVE PULMONARY DISEASE, UNSPECIFIED COPD TYPE (H): ICD-10-CM

## 2017-01-31 DIAGNOSIS — D86.9 SARCOIDOSIS: ICD-10-CM

## 2017-01-31 PROCEDURE — 93010 ELECTROCARDIOGRAM REPORT: CPT | Mod: ZP | Performed by: INTERNAL MEDICINE

## 2017-01-31 PROCEDURE — 99212 OFFICE O/P EST SF 10 MIN: CPT

## 2017-01-31 PROCEDURE — 93005 ELECTROCARDIOGRAM TRACING: CPT | Mod: ZF

## 2017-01-31 PROCEDURE — 99215 OFFICE O/P EST HI 40 MIN: CPT | Mod: ZP | Performed by: NURSE PRACTITIONER

## 2017-01-31 RX ORDER — ALBUTEROL SULFATE 90 UG/1
2 AEROSOL, METERED RESPIRATORY (INHALATION) EVERY 6 HOURS PRN
Qty: 3 INHALER | Refills: 6 | Status: SHIPPED | OUTPATIENT
Start: 2017-01-31 | End: 2017-12-14

## 2017-01-31 RX ORDER — TIOTROPIUM BROMIDE 18 UG/1
CAPSULE ORAL; RESPIRATORY (INHALATION)
Qty: 90 CAPSULE | Refills: 3 | Status: SHIPPED | OUTPATIENT
Start: 2017-01-31 | End: 2017-12-14

## 2017-01-31 RX ORDER — POLYETHYLENE GLYCOL 3350 17 G/17G
1 POWDER, FOR SOLUTION ORAL DAILY
Qty: 510 G | Refills: 1 | Status: SHIPPED | OUTPATIENT
Start: 2017-01-31 | End: 2018-11-18

## 2017-01-31 ASSESSMENT — PAIN SCALES - GENERAL: PAINLEVEL: NO PAIN (0)

## 2017-01-31 NOTE — PROGRESS NOTES
"  Pt was to f/u in [facility] per  Dr. Tenorio dc summary for labs and med management. \"-recheck Cr in 1 week in [facility] -consider re-starting losartan as able\". Call to pt by scheduling, pt is not wanting to make appointment at this time.    "

## 2017-01-31 NOTE — Clinical Note
1/31/2017      RE: Rohan Monroe  [address]  [place] [address]       HPI:   Mr. Monroe is a 71 year old male who has a past medical history significant for pulmonary and cardiac sarcoidosis, CAD s/p PCI mLAD and D2 2008, DM2, HTN, HLD, Hep C, hypothyroidism, and AFL s/p DCCV 1/19/17 with early recurrence ant CTI ablation on 1/23/17 (CHADSVASC 4).   He presented on 1/18/17 to [facility] with a 3-4 week history of worsening SOB which worsened over the last several days. He also endorsed chest pain with deep breathing. He was found to be in AFL with RVR Vent Rate 150 bpm. Toponin elevated to 0.482 thought to be demand ischemia. He was subsequently admitted. He was started on heparin infusion. He then underwent a DEAN/DCCV on 1/19/17. He was noted to have frequent PACs and runs of AT post DCCV for which he was started on amiodarone. His DEAN pre DCCV showed LVEF 5% (done in AFL) but repeat surface TTE showed LVEF 55%. He then recurred back into AFL, appearing mostly typical on ECGs and was referred for ablation. He underwent a successful CTI ablation on 1/23/17. He went into AF post ablation while testing. He was given IV amiodarone bolus and DCCV attempted several times unsuccessfully. After further amiodarone loading, he underwent a successful DCCV on 1/24/17.  He was bridged to therapeutic warfarin. He developed RADHA with Scr up to 2.7 and was 2.4 at discharge. His losartan was stopped and diuretics were changed to PRN. He was discharged on 1/25/17 with plan to follow up with ROSA M and Dr. Vazquez.     He presented to clinic today for lab draws and requested to be seen by his cardiology team for which I was called over to see him. He reports he continues to have ACEVES and feels his breathing is not improving after hospital discharge. He had labs drawn 2 days after discharge on 1/27/17 which showed SCr 2.48, GFR 26. He states he has taken PRN lasix once which helped his symptoms somewhat. He  feels very functionally limited and cannot walk too far without getting short of breath.He denies any chest pain, dizziness, lightheadedness, palpitations, or syncopal symptoms. He has no leg/ankle swelling, abdominal distention, or elevated JVP. He also repots having constipation and a macerated skin in his perineal area. He also states that klonapin is causing him to have nighmears and is wondering if there is alternative sleeping aids. Presenting 12 lead ECG shows NSR Vent Rate 72 bpm,  ms, QRS 78 ms, QTc 446 ms. Current cardiac medications include: amiodarone, PRN lasix, metoprolol, lipitor, and warfarin.     PAST MEDICAL HISTORY:  Past Medical History   Diagnosis Date     Unspecified hypothyroidism      Hypothyroidism     Unspecified viral hepatitis C without hepatic coma      Sarcoidosis (H)      Generalized osteoarthrosis, unspecified site      Depressive disorder, not elsewhere classified      Depression (non-psychotic)     Other psoriasis      Viral warts, unspecified      Lichen planus      Type II or unspecified type diabetes mellitus without mention of complication, not stated as uncontrolled      Chronic infection      Hep C     Congestive heart failure, unspecified      Sarcoidosis (H)      Glaucoma suspect      ERM OS (epiretinal membrane, left eye)      PVD (posterior vitreous detachment), left eye      Cataract of both eyes      Hypertension        CURRENT MEDICATIONS:  Current Outpatient Prescriptions   Medication Sig Dispense Refill     polyethylene glycol (MIRALAX) powder Take 17 g (1 capful) by mouth daily 510 g 1     tiotropium (SPIRIVA HANDIHALER) 18 MCG capsule Inhale contents of one capsule daily. 90 capsule 3     albuterol (PROAIR HFA/PROVENTIL HFA/VENTOLIN HFA) 108 (90 BASE) MCG/ACT Inhaler Inhale 2 puffs into the lungs every 6 hours as needed for shortness of breath / dyspnea 3 Inhaler 6     fluticasone-vilanterol (BREO ELLIPTA) 100-25 MCG/INH oral inhaler Inhale 1 puff into the  lungs daily 3 Inhaler 3     amiodarone HCl 400 MG TABS Take 200 mg by mouth 2 times daily 60 tablet 3     warfarin (COUMADIN) 5 MG tablet Take 1 tablet (5 mg) by mouth daily 30 tablet 3     furosemide (LASIX) 20 MG tablet Take 2 tablets (40 mg) by mouth daily as needed For weight gain of 3 lbs or greater 180 tablet 3     inFLIXimab (REMICADE) 100 MG injection        metoprolol (LOPRESSOR) 100 MG tablet Take 1 tablet (100 mg) by mouth 2 times daily 60 tablet 11     levothyroxine (SYNTHROID/LEVOTHROID) 137 MCG tablet Take 1 tablet (137 mcg) by mouth daily 90 tablet 0     methotrexate 2.5 MG tablet Take 3 tablets (7.5 mg) by mouth once a week On Mondays 48 tablet 3     ciclopirox (LOPROX) 0.77 % cream Apply topically 2 times daily To feet and toenails. 90 g 6     atorvastatin (LIPITOR) 20 MG tablet Take 1 tablet (20 mg) by mouth daily 90 tablet 3     ASPIRIN EC PO Take 81 mg by mouth daily       blood glucose monitoring (ACCU-CHEK RONNIE PLUS) test strip Use to test blood sugar 2 times daily or as directed.  3 month supply. 200 each 3     blood glucose monitoring (ACCU-CHEK FASTCLIX) lancets Use to test blood sugar 2 times daily or as directed.  102 lancets per box.  3 month supply. 2 Box 3     blood glucose monitoring (NO BRAND SPECIFIED) meter device kit Use to test blood sugar 2 times daily. 1 kit 0     Urea (CARMOL 40) 40 % CREA To feet daily (Patient taking differently: To feet daily PRN) 199 g 4     sildenafil (VIAGRA) 50 MG tablet Take 2 tablets (100 mg) by mouth daily as needed for erectile dysfunction 10 tablet 6     INFLIXIMAB IV Every 45 days       Multiple Vitamins-Minerals (MULTIVITAMIN & MINERAL PO) Take 1 tablet by mouth daily.       mirtazapine (REMERON) 15 MG tablet Take 15 mg by mouth At Bedtime.       [DISCONTINUED] tiotropium (SPIRIVA HANDIHALER) 18 MCG capsule Inhale contents of one capsule daily. 90 capsule 3     [DISCONTINUED] albuterol (PROAIR HFA, PROVENTIL HFA, VENTOLIN HFA) 108 (90 BASE)  MCG/ACT inhaler Inhale 2 puffs into the lungs every 6 hours as needed for shortness of breath / dyspnea 3 Inhaler 6       PAST SURGICAL HISTORY:  Past Surgical History   Procedure Laterality Date     Hc repair incisional hernia,reducible       Hernia Repair, Incisional, Unilateral     C ligatn/strip long & short saphen       Hc removal of tonsils,<9 y/o       Tonsils <10y.o.     Cardiac stent       s/p     Arthroplasty hip  8/24/2011     Procedure:ARTHROPLASTY HIP; Right Total Hip Arthroplasty  Choice anesthesia; Surgeon:LESLI WILKINSON; Location:[facility] OR     Joint replacement       1 month ago--right hip     Cataract iol, rt/lt  9/15/2015     LE     Colonoscopy       Cardiac surgery       Biopsy       Anesthesia cardioversion N/A 1/19/2017     Procedure: ANESTHESIA CARDIOVERSION;  Surgeon: GENERIC ANESTHESIA PROVIDER;  Location: [facility] OR     Anesthesia cardioversion N/A 1/23/2017     Procedure: ANESTHESIA CARDIOVERSION;  Surgeon: GENERIC ANESTHESIA PROVIDER;  Location: [facility] OR     Anesthesia cardioversion N/A 1/24/2017     Procedure: ANESTHESIA CARDIOVERSION;  Surgeon: GENERIC ANESTHESIA PROVIDER;  Location: [facility] OR       ALLERGIES:     Allergies   Allergen Reactions     Prednisone Other (See Comments)     He reports that he can't sleep for days and can't use small to massive doses of prednisone   Pt. Does not do well with high doses of Prednisone, His MD says that Prednisone is counter indicated. Prednisone failed to treat his sarcoidosis        FAMILY HISTORY:  Family History   Problem Relation Age of Onset     HEART DISEASE Father      irreg heart beat     Circulatory Father      varicose veins     HEART DISEASE Mother      heart attack     Arthritis Mother      DIABETES Sister      type 2     Eye Disorder Maternal Grandmother      glaucoma     Glaucoma No family hx of      Macular Degeneration No family hx of      CANCER No family hx of      no skin cancer     Kidney Cancer Sister      DIABETES No family hx of       "DIABETES Sister      OSTEOPOROSIS Mother      Thyroid Disease Mother      Prostate Cancer Father        SOCIAL HISTORY:  Social History   Substance Use Topics     Smoking status: Former Smoker -- 1.00 packs/day for 30 years     Types: Cigarettes     Start date: 12/30/1960     Quit date: 07/22/1994     Smokeless tobacco: Never Used     Alcohol Use: No       ROS:   A comprehensive 10 point review of systems negative other than as mentioned in HPI.  Exam:  /90 mmHg  Pulse 71  Ht 1.727 m (5' 8\")  Wt 85.775 kg (189 lb 1.6 oz)  BMI 28.76 kg/m2  SpO2 96%  GENERAL APPEARANCE: alert and no distress  HEENT: no icterus, no xanthelasmas, normal pupil size and reaction, normal palate, mucosa moist, no central cyanosis  NECK: no adenopathy, no asymmetry, masses, or scars, thyroid normal to palpation and no bruits, JVP not elevated  RESPIRATORY: lungs clear to auscultation - no rales, rhonchi or wheezes, no use of accessory muscles, no retractions, respirations are unlabored, normal respiratory rate  CARDIOVASCULAR: regular rhythm, normal S1 with physiologic split S2, no S3 or S4 and no murmur, click or rub, precordium quiet with normal PMI.  ABDOMEN: soft, non tender, without hepatosplenomegaly, no masses palpable, bowel sounds normal, aorta not enlarged by palpation, no abdominal bruits  EXTREMITIES: peripheral pulses normal, no edema, no bruits  NEURO: alert and oriented to person/place/time, normal speech, gait and affect  VASC: Radial, femoral, dorsalis pedis and posterior tibialis pulses are normal in volumes and symmetric bilaterally. No bruits are heard.  SKIN: no ecchymoses, no rashes    Labs:  Reviewed.     Testing/Procedures:  PULMONARY FUNCTION TESTS:   PFT 8/30/2016   FVC 2.60   FEV1 0.96   FVC% 65   FEV1% 32     1/18/17 DEAN:  Interpretation Summary  No left atrial mass or thrombus visualized.  There is spontaneous echo contrast in the left atrium.  The LV systolic function is severely reduced with global " hypokinesis. The  LVEF is estimated at 5-10% at a heart rate of 150 bpm.  This study was compared with the study from 3/2015, the LVEF is reduced and  patient is in atrial flutter.  1/18/17 ECHOCARDIOGRAM:   Interpretation Summary  The Ejection Fraction is estimated at 50-55%.      Assessment and Plan:   Mr. Monroe is a 71 year old male who has a past medical history significant for pulmonary and cardiac sarcoidosis, CAD s/p PCI mLAD and D2 2008, DM2, HTN, HLD, Hep C, hypothyroidism, and AFL s/p DCCV 1/19/17 with early recurrence ant CTI ablation on 1/23/17 (CHADSVASC 4).   He presented on 1/18/17 to [facility] with a 3-4 week history of worsening SOB which worsened over the last several days. He also endorsed chest pain with deep breathing. He was found to be in AFL with RVR Vent Rate 150 bpm. Toponin elevated to 0.482 thought to be demand ischemia. He was subsequently admitted. He was started on heparin infusion. He then underwent a DEAN/DCCV on 1/19/17. He was noted to have frequent PACs and runs of AT post DCCV for which he was started on amiodarone. His DEAN pre DCCV showed LVEF 5% (done in AFL) but repeat surface TTE showed LVEF 55%. He then recurred back into AFL, appearing mostly typical on ECGs and was referred for ablation. He underwent a successful CTI ablation on 1/23/17. He went into AF post ablation while testing. He was given IV amiodarone bolus and DCCV attempted several times unsuccessfully. After further amiodarone loading, he underwent a successful DCCV on 1/24/17.  He was bridged to therapeutic warfarin. He developed RADHA with Scr up to 2.7 and was 2.4 at discharge. His losartan was stopped and diuretics were changed to PRN. He was discharged on 1/25/17 with plan to follow up with ROSA M and Dr. Vazquez.     He presented to clinic today for lab draws and requested to be seen by his cardiology team for which I was called over to see him. He reports he continues to have ACEVES and feels his breathing is not  "improving after hospital discharge. He had labs drawn 2 days after discharge on 1/27/17 which showed SCr 2.48, GFR 26. He states he has taken PRN lasix once which helped his symptoms somewhat. He feels very functionally limited and cannot walk too far without getting short of breath.He denies any chest pain, dizziness, lightheadedness, palpitations, or syncopal symptoms. He has no leg/ankle swelling, abdominal distention, or elevated JVP. He also repots having constipation and a macerated skin in his perineal area. He also states that klonapin is causing him to have nighmears and is wondering if there is alternative sleeping aids. Presenting 12 lead ECG shows NSR Vent Rate 72 bpm,  ms, QRS 78 ms, QTc 446 ms. Current cardiac medications include: amiodarone, PRN lasix, metoprolol, lipitor, and warfarin.     Cardiopulmonary  Sarcoidosis:   - Continue MTX and remicade   - possibly having \"sarcoid flare\" given arrhythmias, ACEVES, and slightly elevated troponins on recent hospital admission. He will be seeing his pulmonologist Dr. Perlman soon and also has appointment with Dr. Vazquez for cardiac sarcoid and EP care. We will consider PET scan to assess for active inflation. Will have him establish with Dr. Paige as well for her cardiac sarcoid expertise.   - His shortness of Breath/Dyspnea on Exertion is likely multifactorial d/t cardiopulmonary sarcoid/COPD. Possibly related to hypervolemia, but not overtly hypervolemic on exam. Last echo performed was only a very limited echo to measure LVEF. His LVEF was noted on DEAN to be very reduced (however done in AFL with RVR) then with repeat limited echo showing normalized LVEF done immediately after DCCV. Will get updated complete echo to evaluate RV and LV function, RSP, and IVC. Will hold on diuretics at this stage given elevated SCr and no strong clinical signs of hypervolemia. Will repeat labs in 1 week to closely monitor SCr. Encouraged him to avoid dehydration.   - " Will get updated labs CMP, ESR, and CRP.   - he reports he ran out of a couple of his inhalers which I will refill for him today one time only as this will be managed by pulmonary.   - will get updated PFTs     Constipation:   - Miralax 17 g PO daily   - maintain hydration    Macerated skin in perineal area:  - A&D ointment   - Keep clean and dry  - Avoid pressure on coccyx or sitting/lying in one position for long periods of time.     Insomnia:   -Reports klonopin is causing nightmares. I have instructed him to stop klonopin. He is requesting alternative sleep agent. We discussed use of melatonin which he now says he remembers it has helped him in the past. I recommended use of melatonin as it is non-habit forming. If he continues to have insomnia I have encouraged him to follow up with his PCP to determine next steps.     Atrial Flutter/Atrial Fibrillation:   - Stroke Prophylaxis:  CHADSVASC=+DM2, +HTN, +CAD, +age  2, corresponding to a 4.0% annual stroke / systemic emolism event rate. indicating need for long term oral anticoagulation.  He is appropriately on warfarin. No bleeding issues.   - Rate Control: Continue metoprolol.   - Rhythm Control: Cardioversion, Antiarrhythmics and/or ablation are options for rhythm control. S/p CTI ablation on 1/23/17 with AF post ablation s/p DCCV during ablation which was not successful and then loaded with amiodarone and had a successful DCCV on 1/23/17. He remains in sinus. He continues on amiodarone. Will need TFTs and LFTs every 6 months and PFTs annually while on amiodarone.   - Risk Factor Management: Continue Statin, maintain tight BP control, and RAHEEM evaluation.       The patient states understanding and is agreeable with plan.   VINNY Fox CNP  Pager: [phone]              VINNY Malave CNP

## 2017-01-31 NOTE — PATIENT INSTRUCTIONS
Start Miralax daily for constipation    Lab draw in 1-2 weeks    Will discuss with your lung team    Cardiovascular Clinic:   [address] [place] [address]. [place] [address]  Your Care Team:  EP Cardiology   Telephone Number     Elizabeth Vazquez [phone]  To have me paged: [phone]   Jaclyn Lees RN  [phone]     For scheduling appts or procedures:    Joie Savage   [phone]     As always, Thank you for trusting us with your health care needs!

## 2017-01-31 NOTE — NURSING NOTE
Chief Complaint   Patient presents with     Follow Up For     ADD ON- RETURN: RAR   return visit- add on for RAR

## 2017-01-31 NOTE — PROGRESS NOTES
HPI:   Mr. Monroe is a 71 year old male who has a past medical history significant for pulmonary and cardiac sarcoidosis, CAD s/p PCI mLAD and D2 2008, DM2, HTN, HLD, Hep C, hypothyroidism, and AFL s/p DCCV 1/19/17 with early recurrence ant CTI ablation on 1/23/17 (CHADSVASC 4).   He presented on 1/18/17 to [facility] with a 3-4 week history of worsening SOB which worsened over the last several days. He also endorsed chest pain with deep breathing. He was found to be in AFL with RVR Vent Rate 150 bpm. Toponin elevated to 0.482 thought to be demand ischemia. He was subsequently admitted. He was started on heparin infusion. He then underwent a DEAN/DCCV on 1/19/17. He was noted to have frequent PACs and runs of AT post DCCV for which he was started on amiodarone. His DEAN pre DCCV showed LVEF 5% (done in AFL) but repeat surface TTE showed LVEF 55%. He then recurred back into AFL, appearing mostly typical on ECGs and was referred for ablation. He underwent a successful CTI ablation on 1/23/17. He went into AF post ablation while testing. He was given IV amiodarone bolus and DCCV attempted several times unsuccessfully. After further amiodarone loading, he underwent a successful DCCV on 1/24/17.  He was bridged to therapeutic warfarin. He developed RADHA with Scr up to 2.7 and was 2.4 at discharge. His losartan was stopped and diuretics were changed to PRN. He was discharged on 1/25/17 with plan to follow up with ROSA M and Dr. Vazquez.     He presented to clinic today for lab draws and requested to be seen by his cardiology team for which I was called over to see him. He reports he continues to have ACEVES and feels his breathing is not improving after hospital discharge. He had labs drawn 2 days after discharge on 1/27/17 which showed SCr 2.48, GFR 26. He states he has taken PRN lasix once which helped his symptoms somewhat. He feels very functionally limited and cannot walk too far without getting short of breath.He denies any chest  pain, dizziness, lightheadedness, palpitations, or syncopal symptoms. He has no leg/ankle swelling, abdominal distention, or elevated JVP. He also repots having constipation and a macerated skin in his perineal area. He also states that klonapin is causing him to have nighmears and is wondering if there is alternative sleeping aids. Presenting 12 lead ECG shows NSR Vent Rate 72 bpm,  ms, QRS 78 ms, QTc 446 ms. Current cardiac medications include: amiodarone, PRN lasix, metoprolol, lipitor, and warfarin.     PAST MEDICAL HISTORY:  Past Medical History   Diagnosis Date     Unspecified hypothyroidism      Hypothyroidism     Unspecified viral hepatitis C without hepatic coma      Sarcoidosis (H)      Generalized osteoarthrosis, unspecified site      Depressive disorder, not elsewhere classified      Depression (non-psychotic)     Other psoriasis      Viral warts, unspecified      Lichen planus      Type II or unspecified type diabetes mellitus without mention of complication, not stated as uncontrolled      Chronic infection      Hep C     Congestive heart failure, unspecified      Sarcoidosis (H)      Glaucoma suspect      ERM OS (epiretinal membrane, left eye)      PVD (posterior vitreous detachment), left eye      Cataract of both eyes      Hypertension        CURRENT MEDICATIONS:  Current Outpatient Prescriptions   Medication Sig Dispense Refill     polyethylene glycol (MIRALAX) powder Take 17 g (1 capful) by mouth daily 510 g 1     tiotropium (SPIRIVA HANDIHALER) 18 MCG capsule Inhale contents of one capsule daily. 90 capsule 3     albuterol (PROAIR HFA/PROVENTIL HFA/VENTOLIN HFA) 108 (90 BASE) MCG/ACT Inhaler Inhale 2 puffs into the lungs every 6 hours as needed for shortness of breath / dyspnea 3 Inhaler 6     fluticasone-vilanterol (BREO ELLIPTA) 100-25 MCG/INH oral inhaler Inhale 1 puff into the lungs daily 3 Inhaler 3     amiodarone HCl 400 MG TABS Take 200 mg by mouth 2 times daily 60 tablet 3      warfarin (COUMADIN) 5 MG tablet Take 1 tablet (5 mg) by mouth daily 30 tablet 3     furosemide (LASIX) 20 MG tablet Take 2 tablets (40 mg) by mouth daily as needed For weight gain of 3 lbs or greater 180 tablet 3     inFLIXimab (REMICADE) 100 MG injection        metoprolol (LOPRESSOR) 100 MG tablet Take 1 tablet (100 mg) by mouth 2 times daily 60 tablet 11     levothyroxine (SYNTHROID/LEVOTHROID) 137 MCG tablet Take 1 tablet (137 mcg) by mouth daily 90 tablet 0     methotrexate 2.5 MG tablet Take 3 tablets (7.5 mg) by mouth once a week On Mondays 48 tablet 3     ciclopirox (LOPROX) 0.77 % cream Apply topically 2 times daily To feet and toenails. 90 g 6     atorvastatin (LIPITOR) 20 MG tablet Take 1 tablet (20 mg) by mouth daily 90 tablet 3     ASPIRIN EC PO Take 81 mg by mouth daily       blood glucose monitoring (ACCU-CHEK RONNIE PLUS) test strip Use to test blood sugar 2 times daily or as directed.  3 month supply. 200 each 3     blood glucose monitoring (ACCU-CHEK FASTCLIX) lancets Use to test blood sugar 2 times daily or as directed.  102 lancets per box.  3 month supply. 2 Box 3     blood glucose monitoring (NO BRAND SPECIFIED) meter device kit Use to test blood sugar 2 times daily. 1 kit 0     Urea (CARMOL 40) 40 % CREA To feet daily (Patient taking differently: To feet daily PRN) 199 g 4     sildenafil (VIAGRA) 50 MG tablet Take 2 tablets (100 mg) by mouth daily as needed for erectile dysfunction 10 tablet 6     INFLIXIMAB IV Every 45 days       Multiple Vitamins-Minerals (MULTIVITAMIN & MINERAL PO) Take 1 tablet by mouth daily.       mirtazapine (REMERON) 15 MG tablet Take 15 mg by mouth At Bedtime.       [DISCONTINUED] tiotropium (SPIRIVA HANDIHALER) 18 MCG capsule Inhale contents of one capsule daily. 90 capsule 3     [DISCONTINUED] albuterol (PROAIR HFA, PROVENTIL HFA, VENTOLIN HFA) 108 (90 BASE) MCG/ACT inhaler Inhale 2 puffs into the lungs every 6 hours as needed for shortness of breath / dyspnea 3  Inhaler 6       PAST SURGICAL HISTORY:  Past Surgical History   Procedure Laterality Date     Hc repair incisional hernia,reducible       Hernia Repair, Incisional, Unilateral     C ligatn/strip long & short saphen       Hc removal of tonsils,<11 y/o       Tonsils <10y.o.     Cardiac stent       s/p     Arthroplasty hip  8/24/2011     Procedure:ARTHROPLASTY HIP; Right Total Hip Arthroplasty  Choice anesthesia; Surgeon:LESLI WILKINSON; Location:[facility]     Joint replacement       1 month ago--right hip     Cataract iol, rt/lt  9/15/2015     LE     Colonoscopy       Cardiac surgery       Biopsy       Anesthesia cardioversion N/A 1/19/2017     Procedure: ANESTHESIA CARDIOVERSION;  Surgeon: GENERIC ANESTHESIA PROVIDER;  Location: [facility]     Anesthesia cardioversion N/A 1/23/2017     Procedure: ANESTHESIA CARDIOVERSION;  Surgeon: GENERIC ANESTHESIA PROVIDER;  Location: [facility]     Anesthesia cardioversion N/A 1/24/2017     Procedure: ANESTHESIA CARDIOVERSION;  Surgeon: GENERIC ANESTHESIA PROVIDER;  Location: [facility]       ALLERGIES:     Allergies   Allergen Reactions     Prednisone Other (See Comments)     He reports that he can't sleep for days and can't use small to massive doses of prednisone   Pt. Does not do well with high doses of Prednisone, His MD says that Prednisone is counter indicated. Prednisone failed to treat his sarcoidosis        FAMILY HISTORY:  Family History   Problem Relation Age of Onset     HEART DISEASE Father      irreg heart beat     Circulatory Father      varicose veins     HEART DISEASE Mother      heart attack     Arthritis Mother      DIABETES Sister      type 2     Eye Disorder Maternal Grandmother      glaucoma     Glaucoma No family hx of      Macular Degeneration No family hx of      CANCER No family hx of      no skin cancer     Kidney Cancer Sister      DIABETES No family hx of      DIABETES Sister      OSTEOPOROSIS Mother      Thyroid Disease Mother      Prostate Cancer Father   "      SOCIAL HISTORY:  Social History   Substance Use Topics     Smoking status: Former Smoker -- 1.00 packs/day for 30 years     Types: Cigarettes     Start date: 12/30/1960     Quit date: 07/22/1994     Smokeless tobacco: Never Used     Alcohol Use: No       ROS:   A comprehensive 10 point review of systems negative other than as mentioned in HPI.  Exam:  /90 mmHg  Pulse 71  Ht 1.727 m (5' 8\")  Wt 85.775 kg (189 lb 1.6 oz)  BMI 28.76 kg/m2  SpO2 96%  GENERAL APPEARANCE: alert and no distress  HEENT: no icterus, no xanthelasmas, normal pupil size and reaction, normal palate, mucosa moist, no central cyanosis  NECK: no adenopathy, no asymmetry, masses, or scars, thyroid normal to palpation and no bruits, JVP not elevated  RESPIRATORY: lungs clear to auscultation - no rales, rhonchi or wheezes, no use of accessory muscles, no retractions, respirations are unlabored, normal respiratory rate  CARDIOVASCULAR: regular rhythm, normal S1 with physiologic split S2, no S3 or S4 and no murmur, click or rub, precordium quiet with normal PMI.  ABDOMEN: soft, non tender, without hepatosplenomegaly, no masses palpable, bowel sounds normal, aorta not enlarged by palpation, no abdominal bruits  EXTREMITIES: peripheral pulses normal, no edema, no bruits  NEURO: alert and oriented to person/place/time, normal speech, gait and affect  VASC: Radial, femoral, dorsalis pedis and posterior tibialis pulses are normal in volumes and symmetric bilaterally. No bruits are heard.  SKIN: no ecchymoses, no rashes    Labs:  Reviewed.     Testing/Procedures:  PULMONARY FUNCTION TESTS:   PFT 8/30/2016   FVC 2.60   FEV1 0.96   FVC% 65   FEV1% 32     1/18/17 DEAN:  Interpretation Summary  No left atrial mass or thrombus visualized.  There is spontaneous echo contrast in the left atrium.  The LV systolic function is severely reduced with global hypokinesis. The  LVEF is estimated at 5-10% at a heart rate of 150 bpm.  This study was compared " with the study from 3/2015, the LVEF is reduced and  patient is in atrial flutter.  1/18/17 ECHOCARDIOGRAM:   Interpretation Summary  The Ejection Fraction is estimated at 50-55%.      Assessment and Plan:   Mr. Monroe is a 71 year old male who has a past medical history significant for pulmonary and cardiac sarcoidosis, CAD s/p PCI mLAD and D2 2008, DM2, HTN, HLD, Hep C, hypothyroidism, and AFL s/p DCCV 1/19/17 with early recurrence ant CTI ablation on 1/23/17 (CHADSVASC 4).   He presented on 1/18/17 to [facility] with a 3-4 week history of worsening SOB which worsened over the last several days. He also endorsed chest pain with deep breathing. He was found to be in AFL with RVR Vent Rate 150 bpm. Toponin elevated to 0.482 thought to be demand ischemia. He was subsequently admitted. He was started on heparin infusion. He then underwent a DEAN/DCCV on 1/19/17. He was noted to have frequent PACs and runs of AT post DCCV for which he was started on amiodarone. His DEAN pre DCCV showed LVEF 5% (done in AFL) but repeat surface TTE showed LVEF 55%. He then recurred back into AFL, appearing mostly typical on ECGs and was referred for ablation. He underwent a successful CTI ablation on 1/23/17. He went into AF post ablation while testing. He was given IV amiodarone bolus and DCCV attempted several times unsuccessfully. After further amiodarone loading, he underwent a successful DCCV on 1/24/17.  He was bridged to therapeutic warfarin. He developed RADHA with Scr up to 2.7 and was 2.4 at discharge. His losartan was stopped and diuretics were changed to PRN. He was discharged on 1/25/17 with plan to follow up with ROSA M and Dr. Vazquez.     He presented to clinic today for lab draws and requested to be seen by his cardiology team for which I was called over to see him. He reports he continues to have ACEVES and feels his breathing is not improving after hospital discharge. He had labs drawn 2 days after discharge on 1/27/17 which showed  "SCr 2.48, GFR 26. He states he has taken PRN lasix once which helped his symptoms somewhat. He feels very functionally limited and cannot walk too far without getting short of breath.He denies any chest pain, dizziness, lightheadedness, palpitations, or syncopal symptoms. He has no leg/ankle swelling, abdominal distention, or elevated JVP. He also repots having constipation and a macerated skin in his perineal area. He also states that klonapin is causing him to have nighmears and is wondering if there is alternative sleeping aids. Presenting 12 lead ECG shows NSR Vent Rate 72 bpm,  ms, QRS 78 ms, QTc 446 ms. Current cardiac medications include: amiodarone, PRN lasix, metoprolol, lipitor, and warfarin.     Cardiopulmonary  Sarcoidosis:   - Continue MTX and remicade   - possibly having \"sarcoid flare\" given arrhythmias, ACEVES, and slightly elevated troponins on recent hospital admission. He will be seeing his pulmonologist Dr. Perlman soon and also has appointment with Dr. Vazquez for cardiac sarcoid and EP care. We will consider PET scan to assess for active inflation. Will have him establish with Dr. Paige as well for her cardiac sarcoid expertise.   - His shortness of Breath/Dyspnea on Exertion is likely multifactorial d/t cardiopulmonary sarcoid/COPD. Possibly related to hypervolemia, but not overtly hypervolemic on exam. Last echo performed was only a very limited echo to measure LVEF. His LVEF was noted on DEAN to be very reduced (however done in AFL with RVR) then with repeat limited echo showing normalized LVEF done immediately after DCCV. Will get updated complete echo to evaluate RV and LV function, RSP, and IVC. Will hold on diuretics at this stage given elevated SCr and no strong clinical signs of hypervolemia. Will repeat labs in 1 week to closely monitor SCr. Encouraged him to avoid dehydration.   - Will get updated labs CMP, ESR, and CRP.   - he reports he ran out of a couple of his inhalers which " I will refill for him today one time only as this will be managed by pulmonary.   - will get updated PFTs     Constipation:   - Miralax 17 g PO daily   - maintain hydration    Macerated skin in perineal area:  - A&D ointment   - Keep clean and dry  - Avoid pressure on coccyx or sitting/lying in one position for long periods of time.     Insomnia:   -Reports klonopin is causing nightmares. I have instructed him to stop klonopin. He is requesting alternative sleep agent. We discussed use of melatonin which he now says he remembers it has helped him in the past. I recommended use of melatonin as it is non-habit forming. If he continues to have insomnia I have encouraged him to follow up with his PCP to determine next steps.     Atrial Flutter/Atrial Fibrillation:   - Stroke Prophylaxis:  CHADSVASC=+DM2, +HTN, +CAD, +age  2, corresponding to a 4.0% annual stroke / systemic emolism event rate. indicating need for long term oral anticoagulation.  He is appropriately on warfarin. No bleeding issues.   - Rate Control: Continue metoprolol.   - Rhythm Control: Cardioversion, Antiarrhythmics and/or ablation are options for rhythm control. S/p CTI ablation on 1/23/17 with AF post ablation s/p DCCV during ablation which was not successful and then loaded with amiodarone and had a successful DCCV on 1/23/17. He remains in sinus. He continues on amiodarone. Will need TFTs and LFTs every 6 months and PFTs annually while on amiodarone.   - Risk Factor Management: Continue Statin, maintain tight BP control, and RAHEEM evaluation.       The patient states understanding and is agreeable with plan.   VINNY Fox CNP  Pager: [phone]

## 2017-01-31 NOTE — Clinical Note
1/31/2017      RE: Rohan Howard Gitalejandro  [address]  [place] [address]       Dear Colleague,    Thank you for the opportunity to participate in the care of your patient, Rohan Monroe, at the [facility] Please see a copy of my visit note below.    HPI:   ***    PAST MEDICAL HISTORY:  Past Medical History   Diagnosis Date     Unspecified hypothyroidism      Hypothyroidism     Unspecified viral hepatitis C without hepatic coma      Sarcoidosis (H)      Generalized osteoarthrosis, unspecified site      Depressive disorder, not elsewhere classified      Depression (non-psychotic)     Other psoriasis      Viral warts, unspecified      Lichen planus      Type II or unspecified type diabetes mellitus without mention of complication, not stated as uncontrolled      Chronic infection      Hep C     Congestive heart failure, unspecified      Sarcoidosis (H)      Glaucoma suspect      ERM OS (epiretinal membrane, left eye)      PVD (posterior vitreous detachment), left eye      Cataract of both eyes      Hypertension        CURRENT MEDICATIONS:  Current Outpatient Prescriptions   Medication Sig Dispense Refill     polyethylene glycol (MIRALAX) powder Take 17 g (1 capful) by mouth daily 510 g 1     tiotropium (SPIRIVA HANDIHALER) 18 MCG capsule Inhale contents of one capsule daily. 90 capsule 3     albuterol (PROAIR HFA/PROVENTIL HFA/VENTOLIN HFA) 108 (90 BASE) MCG/ACT Inhaler Inhale 2 puffs into the lungs every 6 hours as needed for shortness of breath / dyspnea 3 Inhaler 6     fluticasone-vilanterol (BREO ELLIPTA) 100-25 MCG/INH oral inhaler Inhale 1 puff into the lungs daily 3 Inhaler 3     amiodarone HCl 400 MG TABS Take 200 mg by mouth 2 times daily 60 tablet 3     warfarin (COUMADIN) 5 MG tablet Take 1 tablet (5 mg) by mouth daily 30 tablet 3     furosemide (LASIX) 20 MG tablet Take 2 tablets (40 mg) by mouth daily as  needed For weight gain of 3 lbs or greater 180 tablet 3     inFLIXimab (REMICADE) 100 MG injection        metoprolol (LOPRESSOR) 100 MG tablet Take 1 tablet (100 mg) by mouth 2 times daily 60 tablet 11     levothyroxine (SYNTHROID/LEVOTHROID) 137 MCG tablet Take 1 tablet (137 mcg) by mouth daily 90 tablet 0     methotrexate 2.5 MG tablet Take 3 tablets (7.5 mg) by mouth once a week On Mondays 48 tablet 3     ciclopirox (LOPROX) 0.77 % cream Apply topically 2 times daily To feet and toenails. 90 g 6     atorvastatin (LIPITOR) 20 MG tablet Take 1 tablet (20 mg) by mouth daily 90 tablet 3     ASPIRIN EC PO Take 81 mg by mouth daily       blood glucose monitoring (ACCU-CHEK RONNIE PLUS) test strip Use to test blood sugar 2 times daily or as directed.  3 month supply. 200 each 3     blood glucose monitoring (ACCU-CHEK FASTCLIX) lancets Use to test blood sugar 2 times daily or as directed.  102 lancets per box.  3 month supply. 2 Box 3     blood glucose monitoring (NO BRAND SPECIFIED) meter device kit Use to test blood sugar 2 times daily. 1 kit 0     Urea (CARMOL 40) 40 % CREA To feet daily (Patient taking differently: To feet daily PRN) 199 g 4     sildenafil (VIAGRA) 50 MG tablet Take 2 tablets (100 mg) by mouth daily as needed for erectile dysfunction 10 tablet 6     INFLIXIMAB IV Every 45 days       Multiple Vitamins-Minerals (MULTIVITAMIN & MINERAL PO) Take 1 tablet by mouth daily.       mirtazapine (REMERON) 15 MG tablet Take 15 mg by mouth At Bedtime.       [DISCONTINUED] tiotropium (SPIRIVA HANDIHALER) 18 MCG capsule Inhale contents of one capsule daily. 90 capsule 3     [DISCONTINUED] albuterol (PROAIR HFA, PROVENTIL HFA, VENTOLIN HFA) 108 (90 BASE) MCG/ACT inhaler Inhale 2 puffs into the lungs every 6 hours as needed for shortness of breath / dyspnea 3 Inhaler 6       PAST SURGICAL HISTORY:  Past Surgical History   Procedure Laterality Date     Hc repair incisional hernia,reducible       Hernia Repair,  Incisional, Unilateral     C ligatn/strip long & short saphen       Hc removal of tonsils,<11 y/o       Tonsils <10y.o.     Cardiac stent       s/p     Arthroplasty hip  8/24/2011     Procedure:ARTHROPLASTY HIP; Right Total Hip Arthroplasty  Choice anesthesia; Surgeon:LESLI WILKINSON; Location[facility]     Joint replacement       1 month ago--right hip     Cataract iol, rt/lt  9/15/2015     LE     Colonoscopy       Cardiac surgery       Biopsy       Anesthesia cardioversion N/A 1/19/2017     Procedure: ANESTHESIA CARDIOVERSION;  Surgeon: GENERIC ANESTHESIA PROVIDER;  [facility]     Anesthesia cardioversion N/A 1/23/2017     Procedure: ANESTHESIA CARDIOVERSION;  Surgeon: GENERIC ANESTHESIA PROVIDER;  [facility]     Anesthesia cardioversion N/A 1/24/2017     Procedure: ANESTHESIA CARDIOVERSION;  Surgeon: GENERIC ANESTHESIA PROVIDER;  Location[facility]       ALLERGIES:     Allergies   Allergen Reactions     Prednisone Other (See Comments)     He reports that he can't sleep for days and can't use small to massive doses of prednisone   Pt. Does not do well with high doses of Prednisone, His MD says that Prednisone is counter indicated. Prednisone failed to treat his sarcoidosis        FAMILY HISTORY:  Family History   Problem Relation Age of Onset     HEART DISEASE Father      irreg heart beat     Circulatory Father      varicose veins     HEART DISEASE Mother      heart attack     Arthritis Mother      DIABETES Sister      type 2     Eye Disorder Maternal Grandmother      glaucoma     Glaucoma No family hx of      Macular Degeneration No family hx of      CANCER No family hx of      no skin cancer     Kidney Cancer Sister      DIABETES No family hx of      DIABETES Sister      OSTEOPOROSIS Mother      Thyroid Disease Mother      Prostate Cancer Father        SOCIAL HISTORY:  Social History   Substance Use Topics     Smoking status: Former Smoker -- 1.00 packs/day for 30 years     Types: Cigarettes     Start date:  "12/30/1960     Quit date: 07/22/1994     Smokeless tobacco: Never Used     Alcohol Use: No       ROS:   A comprehensive 10 point review of systems negative other than as mentioned in HPI.  Exam:  /90 mmHg  Pulse 71  Ht 1.727 m (5' 8\")  Wt 85.775 kg (189 lb 1.6 oz)  BMI 28.76 kg/m2  SpO2 96%  GENERAL APPEARANCE: healthy, alert and no distress  HEENT: no icterus, no xanthelasmas, normal pupil size and reaction, normal palate, mucosa moist, no central cyanosis  NECK: no adenopathy, no asymmetry, masses, or scars, thyroid normal to palpation and no bruits, JVP not elevated  RESPIRATORY: lungs clear to auscultation - no rales, rhonchi or wheezes, no use of accessory muscles, no retractions, respirations are unlabored, normal respiratory rate  CARDIOVASCULAR: regular rhythm, normal S1 with physiologic split S2, no S3 or S4 and no murmur, click or rub, precordium quiet with normal PMI.  ABDOMEN: soft, non tender, without hepatosplenomegaly, no masses palpable, bowel sounds normal, aorta not enlarged by palpation, no abdominal bruits  EXTREMITIES: peripheral pulses normal, no edema, no bruits  NEURO: alert and oriented to person/place/time, normal speech, gait and affect  VASC: Radial, femoral, dorsalis pedis and posterior tibialis pulses are normal in volumes and symmetric bilaterally. No bruits are heard.  SKIN: no ecchymoses, no rashes    Labs:  Reviewed.     Testing/Procedures:  PULMONARY FUNCTION TESTS:   PFT 8/30/2016   FVC 2.60   FEV1 0.96   FVC% 65   FEV1% 32         ECHOCARDIOGRAM:   ***      Assessment and Plan:   ***      The patient states understanding and is agreeable with plan.   VINNY Fox CNP  Pager: [phone]              Please do not hesitate to contact me if you have any questions/concerns.     Sincerely,     VINNY Malave CNP  "

## 2017-01-31 NOTE — MR AVS SNAPSHOT
After Visit Summary   1/31/2017    Rohan Monroe    MRN: [id number]           Patient Information     Date Of Birth          1945        Visit Information        Provider Department      1/31/2017 11:30 AM Elizabeth Harris APRN [facility]        Today's Diagnoses     Constipation, unspecified constipation type    -  1     Atrial flutter (H)         Chronic obstructive pulmonary disease, unspecified COPD type (H)         Sarcoidosis (H)           Care Instructions        Start Miralax daily for constipation    Lab draw in 1-2 weeks    Will discuss with your lung team    Cardiovascular Clinic:   [address] [place] [address]. [place] [address]  Your Care Team:  EP Cardiology   Telephone Number     Elizabeth Harris NP  Dr. Brian Vazquez [phone]  To have me paged: [phone]   Jaclyn Lees RN  [phone]     For scheduling appts or procedures:    Joie Savage   [phone]     As always, Thank you for trusting us with your health care needs!          Follow-ups after your visit        Your next 10 appointments already scheduled     Jan 31, 2017  3:00 PM   TELEMEDICINE with Delphine Calzada [facility] MT ([facility])    [address] [place] [id number]              Note: this is not an onsite visit; there is no need to come to the facility.            Feb 02, 2017  9:00 AM   (Arrive by 8:45 AM)   Return General Liver with Moses Orosco MD   [facility] Hepatology ([facility])    [address] [place] [address]  3rd [facility] [address] [phone]            Feb 13, 2017 12:30 PM   Lab with  LAB    [facility] Lab ([facility])    [address] [place] [address] [phone]            Feb 13, 2017  1:30 PM   (Arrive by 1:00 PM)   RETURN DIABETES with Macey Roy MD    [facility] Endocrinology ([facility])    [address]  3rd [facility] [address]   [phone]            Feb 21, 2017 12:00 PM   Infusion 180 with UC SPEC INFUSION, UC 47 ATC   [facility] Specialty [facility] Procedure ([facility])    [address]  2nd [facility] [address]   [phone]            Feb 24, 2017 10:00 AM   PFT VISIT with WALTER PFL B   [facility] Pulmonary Function Testing ([facility])    [address] [address] [facility]  3rd [facility] [address]   [phone]            Feb 24, 2017 10:30 AM   (Arrive by 10:15 AM)   Return Interstitial Lung with David Morris Perlman, MD   [facility] ([facility])    [address] [place] [address]  3rd [facility] [address]   [phone]            Feb 24, 2017 11:00 AM   (Arrive by 10:45 AM)   RETURN ARRHYTHMIA with Brian Vazquez MD   [facility] Heart Care ([facility])    [address] [place] [address]  3rd [facility] [address] [phone]            Feb 27, 2017 10:20 AM   (Arrive by 10:05 AM)   RETURN FOOT/ANKLE with Chicho Mejia DPM   [facility] Orthopaedic Clinic ([facility])    [address] [place] [address]  4th [facility] [address] [phone]              Future tests that were ordered for you today     Open Future Orders        Priority Expected Expires Ordered    Comprehensive metabolic panel (BMP + Alb, Alk Phos, ALT, AST, Total. Bili, TP) Routine  1/31/2018 1/31/2017            Who to contact     If you have questions or need follow up information about today's clinic visit or your schedule please contact [facility] directly at [phone].  Normal or non-critical lab and imaging results will be communicated to you by MyChart, letter or phone within 4 business days after the clinic has received the  "results. If you do not hear from us within 7 days, please contact the clinic through [employer] or phone. If you have a critical or abnormal lab result, we will notify you by phone as soon as possible.  Submit refill requests through [employer] or call your pharmacy and they will forward the refill request to us. Please allow 3 business days for your refill to be completed.          Additional Information About Your Visit        [employer] Information     [employer] gives you secure access to your electronic health record. If you see a primary care provider, you can also send messages to your care team and make appointments. If you have questions, please call your primary care clinic.  If you do not have a primary care provider, please call [phone] and they will assist you.        Care EveryWhere ID     This is your Care EveryWhere ID. This could be used by other organizations to access your [place] medical records  [id number]        Your Vitals Were     Pulse Height BMI (Body Mass Index) Pulse Oximetry          71 1.727 m (5' 8\") 28.76 kg/m2 96%         Blood Pressure from Last 3 Encounters:   01/31/17 137/90   01/25/17 142/90   01/18/17 127/88    Weight from Last 3 Encounters:   01/31/17 85.775 kg (189 lb 1.6 oz)   01/25/17 87.544 kg (193 lb)   01/18/17 94.575 kg (208 lb 8 oz)              We Performed the Following     EKG 12-lead, tracing only (Future)          Today's Medication Changes          These changes are accurate as of: 1/31/17 12:33 PM.  If you have any questions, ask your nurse or doctor.               Start taking these medicines.        Dose/Directions    polyethylene glycol powder   Commonly known as:  MIRALAX   Used for:  Constipation, unspecified constipation type   Started by:  Elizabeth Cabral APRN CNP        Dose:  1 capful   Take 17 g (1 capful) by mouth daily   Quantity:  510 g   Refills:  1         These medicines have changed or have updated prescriptions.        Dose/Directions    Urea " 40 % Crea   Commonly known as:  CARMOL 40   This may have changed:  additional instructions   Used for:  Dermatophytosis of nail, Corns and callosities        To feet daily   Quantity:  199 g   Refills:  4         Stop taking these medicines if you haven't already. Please contact your care team if you have questions.     clonazePAM 0.5 MG tablet   Commonly known as:  klonoPIN   Stopped by:  Elizabeth Cabral, VINNY CNP                Where to get your medicines      These medications were sent to [id number] - [place], [place] - [facility] [address]     Phone:  [phone]    - albuterol 108 (90 BASE) MCG/ACT Inhaler  - fluticasone-vilanterol 100-25 MCG/INH oral inhaler  - polyethylene glycol powder  - tiotropium 18 MCG capsule             Primary Care Provider Office Phone # Fax #    Jamie Foster [phone]       [address] [address]        Thank you!     Thank you for choosing [facility]  for your care. Our goal is always to provide you with excellent care. Hearing back from our patients is one way we can continue to improve our services. Please take a few minutes to complete the written survey that you may receive in the mail after your visit with us. Thank you!             Your Updated Medication List - Protect others around you: Learn how to safely use, store and throw away your medicines at www.disposemymeds.org.          This list is accurate as of: 1/31/17 12:33 PM.  Always use your most recent med list.                   Brand Name Dispense Instructions for use    albuterol 108 (90 BASE) MCG/ACT Inhaler    PROAIR HFA/PROVENTIL HFA/VENTOLIN HFA    3 Inhaler    Inhale 2 puffs into the lungs every 6 hours as needed for shortness of breath / dyspnea       amiodarone HCl 400 MG Tabs     60 tablet    Take 200 mg by mouth 2 times daily       ASPIRIN EC PO      Take 81 mg  by mouth daily       atorvastatin 20 MG tablet    LIPITOR    90 tablet    Take 1 tablet (20 mg) by mouth daily       blood glucose monitoring lancets     2 Box    Use to test blood sugar 2 times daily or as directed.  102 lancets per box.  3 month supply.       blood glucose monitoring meter device kit    no brand specified    1 kit    Use to test blood sugar 2 times daily.       blood glucose monitoring test strip    ACCU-CHEK RONNIE PLUS    200 each    Use to test blood sugar 2 times daily or as directed.  3 month supply.       ciclopirox 0.77 % cream    LOPROX    90 g    Apply topically 2 times daily To feet and toenails.       fluticasone-vilanterol 100-25 MCG/INH oral inhaler    BREO ELLIPTA    3 Inhaler    Inhale 1 puff into the lungs daily       furosemide 20 MG tablet    LASIX    180 tablet    Take 2 tablets (40 mg) by mouth daily as needed For weight gain of 3 lbs or greater       * INFLIXIMAB IV      Every 45 days       * inFLIXimab 100 MG injection    REMICADE         levothyroxine 137 MCG tablet    SYNTHROID/LEVOTHROID    90 tablet    Take 1 tablet (137 mcg) by mouth daily       methotrexate 2.5 MG tablet CHEMO     48 tablet    Take 3 tablets (7.5 mg) by mouth once a week On Mondays       metoprolol 100 MG tablet    LOPRESSOR    60 tablet    Take 1 tablet (100 mg) by mouth 2 times daily       mirtazapine 15 MG tablet    REMERON     Take 15 mg by mouth At Bedtime.       MULTIVITAMIN & MINERAL PO      Take 1 tablet by mouth daily.       polyethylene glycol powder    MIRALAX    510 g    Take 17 g (1 capful) by mouth daily       sildenafil 50 MG cap/tab    VIAGRA    10 tablet    Take 2 tablets (100 mg) by mouth daily as needed for erectile dysfunction       tiotropium 18 MCG capsule    SPIRIVA HANDIHALER    90 capsule    Inhale contents of one capsule daily.       Urea 40 % Crea    CARMOL 40    199 g    To feet daily       * warfarin 5 MG tablet    COUMADIN    30 tablet    Take 1 tablet (5 mg) by mouth daily        * warfarin 2.5 MG tablet    COUMADIN    1 tablet    Take 1 tablet (2.5 mg) by mouth once for 1 dose       * Notice:  This list has 4 medication(s) that are the same as other medications prescribed for you. Read the directions carefully, and ask your doctor or other care provider to review them with you.

## 2017-02-01 ENCOUNTER — TELEPHONE (OUTPATIENT)
Dept: NEPHROLOGY | Facility: CLINIC | Age: 72
End: 2017-02-01

## 2017-02-01 ENCOUNTER — ALLIED HEALTH/NURSE VISIT (OUTPATIENT)
Dept: PHARMACY | Facility: OTHER | Age: 72
End: 2017-02-01
Payer: COMMERCIAL

## 2017-02-01 DIAGNOSIS — N18.30 CKD (CHRONIC KIDNEY DISEASE) STAGE 3, GFR 30-59 ML/MIN (H): ICD-10-CM

## 2017-02-01 DIAGNOSIS — I48.92 ATRIAL FLUTTER, UNSPECIFIED TYPE (H): Primary | ICD-10-CM

## 2017-02-01 DIAGNOSIS — K59.00 CONSTIPATION: ICD-10-CM

## 2017-02-01 DIAGNOSIS — J44.9 CHRONIC OBSTRUCTIVE PULMONARY DISEASE, UNSPECIFIED COPD TYPE (H): ICD-10-CM

## 2017-02-01 DIAGNOSIS — D86.0 SARCOIDOSIS OF LUNG (H): ICD-10-CM

## 2017-02-01 DIAGNOSIS — E11.9 TYPE 2 DIABETES MELLITUS WITHOUT COMPLICATION, WITHOUT LONG-TERM CURRENT USE OF INSULIN (H): ICD-10-CM

## 2017-02-01 DIAGNOSIS — K59.04 CHRONIC IDIOPATHIC CONSTIPATION: ICD-10-CM

## 2017-02-01 LAB — INTERPRETATION ECG - MUSE: NORMAL

## 2017-02-01 PROCEDURE — 99607 MTMS BY PHARM ADDL 15 MIN: CPT | Performed by: PHARMACIST

## 2017-02-01 PROCEDURE — 99605 MTMS BY PHARM NP 15 MIN: CPT | Performed by: PHARMACIST

## 2017-02-01 NOTE — TELEPHONE ENCOUNTER
Call to patient regarding follow up appointments with Hepatology and Nephrology. Message sent to scheduling team for 2/28 at 1115 with Dr. Orosco and 100 with Dr. Michel. Informed patient of appointments scheduled. No further questions or concerns.  Elisa Cardona LPN  Nephrology  [facility]  [phone]

## 2017-02-01 NOTE — Clinical Note
[facility]  [address] [facility] [address]  [phone]      February 8, 2017      Rohan Monroe  [address]  [facility] [address]        Dear Rohan,    It was so nice to speak with you on Wednesday, February 1st.  I hope I was able to give you some useful information during our Medication Therapy Management (MTM) visit. The purpose of this visit with a clinical pharmacist was to review the medicines you received when discharged from the hospital. We want to make sure that you know which medicines to take and what they are for. We also want to make sure all your medicines are working, safe, and as easy to take as possible.    Enclosed is a summary of the suggestions we talked about and any other thoughts I had. There is also a list of your medicines included. This information has also been shared with your primary care provider.    Feel free to call if you have any questions or concerns. By working together with you and your doctor, I hope to help you feel confident managing your medicines and improving your quality of life.  I plan to call you next week to see if there are follow-up questions regarding your medications and to ensure you were able to get your inhaler from [facility].       Chapito cortez,        Caryn Guzmán, Pharm.D.  Medication Therapy Management Pharmacist  Page/VM:  [phone]

## 2017-02-01 NOTE — MR AVS SNAPSHOT
After Visit Summary   2/1/2017    Rohan Monroe    MRN: [id number]           Patient Information     Date Of Birth          1945        Visit Information        Provider Department      2/1/2017 3:00 PM Caryn Guzmán [facility] MTM        Today's Diagnoses     Atrial flutter, unspecified type (H)    -  1     Sarcoidosis of lung (HCC)         Chronic obstructive pulmonary disease, unspecified COPD type (H)         CKD (chronic kidney disease) stage 3, GFR 30-59 ml/min         Type 2 diabetes mellitus without complication, without long-term current use of insulin (H)         Constipation         Chronic idiopathic constipation           Care Instructions    Recommendations from today's MTM visit:                                                    Today we reviewed what your medicines are for, how to know if they are working, that your medicines are safe and how to make your medicine regimen as easy as possible.     1) Use Breo Ellipta and Spiriva every day to prevent bad breathing days.    2) Consider use of Dulcolax (bisacodyl) 1-2 tablets daily for 3-5 days to help with bowel movements after being discharged from the hospital.  Continue use of Miralax.    3) Amiodarone directions are as follows:  Take two 200 mg tablets twice daily for 4 weeks.  Then, take one 200 mg tablet daily.  Follow instructions provided by [facility] clinic follow-up regarding amiodarone, fluid and salt intake.    Next MTM visit: I will call in two weeks to see how you are doing with your inhalers and [facility] clinic.      To schedule another MTM appointment, please call the clinic directly or you may call the MTM scheduling line at [phone] or toll-free at [phone].     My Clinical Pharmacist's contact information:                                                      It was a pleasure visiting with you!  Please feel free to contact me with any questions or concerns you have.    Caryn  Ramirez, Pharm.D.  Medication Therapy Management Pharmacist  Page/VM:  [phone]    You may receive a survey about the MTM services you received.  I would appreciate your feedback to help me serve you better in the future. Please fill it out and return it when you can. Your comments will be anonymous.                Follow-ups after your visit        Your next 10 appointments already scheduled     Feb 13, 2017  2:00 PM   Lab with  LAB   [facility] Lab ([facility])    [address] [place] [address]  1st [facility] [id number] [phone]            Feb 13, 2017  2:30 PM   Ech Complete with ECHCR1   [facility] Echo ([facility])    [address] [place] [id number] [phone]           1.  Please bring or wear a comfortable two-piece outfit. 2.  You may eat, drink and take your normal medicines. 3.  For any questions that cannot be answered, please contact the ordering physician            Feb 21, 2017 12:00 PM   Infusion 180 with  SPEC INFUSION, UC 47 ATC   [facility] ([facility])    [address] [place] [address] [phone]            Feb 24, 2017 10:00 AM   PFT VISIT with  PFL B   [facility] Pulmonary Function Testing ([facility])    [address] [place] [id number] [phone]            Feb 24, 2017 10:30 AM   (Arrive by 10:15 AM)   Return Interstitial Lung with David Morris Perlman, MD   [facility] ([facility])    [address] [place] [id number] [phone]            Feb 24, 2017 11:00 AM   (Arrive by 10:45 AM)   RETURN ARRHYTHMIA with Brian Vazquez MD   [facility] Heart Care ([facility])    [address] [place] [address]    [phone]            Feb 27, 2017 10:20 AM   (Arrive by 10:05 AM)   RETURN FOOT/ANKLE with Chicho Mejia DPM   [facility] Orthopaedic Clinic ([facility])    [address] [address]  4th Floor  [facility] [address] [phone]            Feb 28, 2017 11:15 AM   (Arrive by 11:00 AM)   Return General Liver with Moses Orosco MD   [facility] Hepatology ([facility])    [address]  3rd Floor  [facility] [address] [phone]            Feb 28, 2017 12:00 PM   Lab with  LAB   [facility] Lab ([facility])    [address]  1st [facility] [address] [phone]              Who to contact     If you have questions or need follow up information about today's clinic visit or your schedule please contact [facility] directly at [phone].  Normal or non-critical lab and imaging results will be communicated to you by [employer], letter or phone within 4 business days after the clinic has received the results. If you do not hear from us within 7 days, please contact the clinic through [employer] or phone. If you have a critical or abnormal lab result, we will notify you by phone as soon as possible.  Submit refill requests through [employer] or call your pharmacy and they will forward the refill request to us. Please allow 3 business days for your refill to be completed.          Additional Information About Your Visit        [employer] Information     [employer] gives you secure access to your electronic health record. If you see a primary care provider, you can also send messages to your care team and make appointments. If you have questions, please call your primary care clinic.  If you do not have a primary care provider, please call [phone] and they will assist you.        Care EveryWhere ID     This is your Care EveryWhere ID. This could be used by other organizations to access your [place]  medical records  [id number]         Blood Pressure from Last 3 Encounters:   01/31/17 137/90   01/25/17 142/90   01/18/17 127/88    Weight from Last 3 Encounters:   01/31/17 189 lb 1.6 oz (85.775 kg)   01/25/17 193 lb (87.544 kg)   01/18/17 208 lb 8 oz (94.575 kg)              Today, you had the following     No orders found for display         Today's Medication Changes          These changes are accurate as of: 2/1/17 11:59 PM.  If you have any questions, ask your nurse or doctor.               These medicines have changed or have updated prescriptions.        Dose/Directions    Urea 40 % Crea   Commonly known as:  CARMOL 40   This may have changed:  additional instructions   Used for:  Dermatophytosis of nail, Corns and callosities        To feet daily   Quantity:  199 g   Refills:  4                Primary Care Provider Office Phone # Fax #    Jamie Foster [phone]       [address] [address]        Thank you!     Thank you for choosing [facility]  for your care. Our goal is always to provide you with excellent care. Hearing back from our patients is one way we can continue to improve our services. Please take a few minutes to complete the written survey that you may receive in the mail after your visit with us. Thank you!             Your Updated Medication List - Protect others around you: Learn how to safely use, store and throw away your medicines at www.disposemymeds.org.          This list is accurate as of: 2/1/17 11:59 PM.  Always use your most recent med list.                   Brand Name Dispense Instructions for use    albuterol 108 (90 BASE) MCG/ACT Inhaler    PROAIR HFA/PROVENTIL HFA/VENTOLIN HFA    3 Inhaler    Inhale 2 puffs into the lungs every 6 hours as needed for shortness of breath / dyspnea       amiodarone HCl 400 MG Tabs     60 tablet    Take 200 mg by mouth 2 times daily       ASPIRIN EC PO      Take 81 mg  by mouth daily       atorvastatin 20 MG tablet    LIPITOR    90 tablet    Take 1 tablet (20 mg) by mouth daily       blood glucose monitoring lancets     2 Box    Use to test blood sugar 2 times daily or as directed.  102 lancets per box.  3 month supply.       blood glucose monitoring meter device kit    no brand specified    1 kit    Use to test blood sugar 2 times daily.       blood glucose monitoring test strip    ACCU-CHEK RONNIE PLUS    200 each    Use to test blood sugar 2 times daily or as directed.  3 month supply.       ciclopirox 0.77 % cream    LOPROX    90 g    Apply topically 2 times daily To feet and toenails.       fluticasone-vilanterol 100-25 MCG/INH oral inhaler    BREO ELLIPTA    3 Inhaler    Inhale 1 puff into the lungs daily       furosemide 20 MG tablet    LASIX    180 tablet    Take 2 tablets (40 mg) by mouth daily as needed For weight gain of 3 lbs or greater       * INFLIXIMAB IV      Every 45 days       * inFLIXimab 100 MG injection    REMICADE         levothyroxine 137 MCG tablet    SYNTHROID/LEVOTHROID    90 tablet    Take 1 tablet (137 mcg) by mouth daily       methotrexate 2.5 MG tablet CHEMO     48 tablet    Take 3 tablets (7.5 mg) by mouth once a week On Mondays       metoprolol 100 MG tablet    LOPRESSOR    60 tablet    Take 1 tablet (100 mg) by mouth 2 times daily       mirtazapine 15 MG tablet    REMERON     Take 15 mg by mouth At Bedtime.       MULTIVITAMIN & MINERAL PO      Take 1 tablet by mouth daily.       polyethylene glycol powder    MIRALAX    510 g    Take 17 g (1 capful) by mouth daily       sildenafil 50 MG cap/tab    VIAGRA    10 tablet    Take 2 tablets (100 mg) by mouth daily as needed for erectile dysfunction       tiotropium 18 MCG capsule    SPIRIVA HANDIHALER    90 capsule    Inhale contents of one capsule daily.       Urea 40 % Crea    CARMOL 40    199 g    To feet daily       warfarin 5 MG tablet    COUMADIN    30 tablet    Take 1 tablet (5 mg) by mouth daily        * Notice:  This list has 2 medication(s) that are the same as other medications prescribed for you. Read the directions carefully, and ask your doctor or other care provider to review them with you.

## 2017-02-01 NOTE — PROGRESS NOTES
SUBJECTIVE/OBJECTIVE:                                                    Rohan Monroe is a 71 year old male called for a transitions of care visit.  He was discharged from [facility] on 2017 for Atrial fibrillation.     Chief Complaint: Saroj.    Allergies/ADRs: Reviewed in Epic  Tobacco: History of tobacco dependence - quit    Alcohol: not currently using  Caffeine: no caffeine  Activity: limited to ADLs  PMH: Reviewed in Epic    Medication Adherence: issues found and discussed below    Atrial Fibrillation: Using amiodarone 200 mg twice daily for 4 weeks, then 200 mg once daily. Very light headed using amiodarone.  Hard to know whether he is having nausea or lack of appetite because he is feeling so weak.  Has been experiencing constipation before and after admission.  Chronic anticoagulation for now.  Also using metoprolol tartrate 100 mg twice daily.  Patient is very tried but expects most of this is related to his recovery.       Constipation: Persistent constipation prior to admission - worse upon discharge.  Taking Miralax and helping a little.      Diabetes:  Pt currently taking no medications. Pt is not experiencing side effects.  SMB-3 times a week.   Ranges (patient reported): 130-150's mg/dL  Symptoms of low blood sugar? none. Frequency of hypoglycemia? never.  Recent symptoms of high blood sugar? none  Eye exam: due  Foot exam: due  Microalbumin is not < 30 mg/g. Pt is not taking an ACEi/ARB.  Aspirin: Taking 81mg daily and denies side effects   Immunizations up to date     Hyperlipidemia: Current therapy includes atorvastatin 20 mg once daily.  Pt reports no significant myalgias or other side effects.      CAD: Has had PCI with HF.  Using aspirin, statin and metoprolol tartrate 100 mg twice daily.     Sarcoidosis: Patient states it was untreatable in .  Started Remicade infusions shortly after visiting the [facility] at that time.  He is now having infusions every other month which is  less frequent than every month at the beginning of treatment.  Using furosemide as needed for weight gain.  Methotrexate is longstanding.  Taking furosemide to manage fluids.  Patient wondering how much water he should be drinking a day.      CKD/RADHA: Patient is using furosemide to manage fluids.  No orthostasis reported.      COPD: Current medications: Albuterol MDI, Tiotropium (Spiriva) once daily and Fluticasone/Vilanterol (Breo Ellipta) once daily. Pt rinses their mouth after using steroid inhaler.    Pt is not experiencing side effects.   Pt reports the following symptoms: increased need of albuterol and increased SOB at rest.  Pt does not have an COPD Action Plan on file.   Has spirometry been completed: Doesn't know  Breo Ellipta: only has 1 dose remaining.  Patient is unsure of which inhalers he should be using and how to prevent something like this from happening in the future.  He is using albuterol a lot and is confused about what he should be getting from the pharmacy.  Is not using Breo as directed currently due to confusion.      Current labs include:  BP Readings from Last 3 Encounters:   01/31/17 137/90   01/25/17 142/90   01/18/17 127/88     Today's Vitals: There were no vitals taken for this visit.  A1C      7.5   1/20/2017.  CHOL      128   1/7/2014  TRIG      148   1/7/2014  HDL       35   1/7/2014  LDL       62   1/7/2014  ALT       50   1/27/2017  Lab Results   Component Value Date    UCRR 194 01/19/2017    MICROL 3260 12/26/2016    UMALCR 3046.73* 12/26/2016       Last Basic Metabolic Panel:  NA      132   1/27/2017   POTASSIUM      4.4   1/27/2017  CHLORIDE       97   1/27/2017  BUN       71   1/27/2017  CR     2.48   1/27/2017    GFR ESTIMATE   Date Value Ref Range Status   01/27/2017 26* >60 mL/min/1.7m2 Final     Comment:     Non  GFR Calc   01/25/2017 26* >60 mL/min/1.7m2 Final     Comment:     Non  GFR Calc   01/24/2017 25* >60 mL/min/1.7m2 Final      Comment:     Non  GFR Calc     TSH   Date Value Ref Range Status   12/26/2016 1.84 0.40 - 4.00 mU/L Final     Most Recent Immunizations   Administered Date(s) Administered     Influenza (H1N1) 12/18/2009     Influenza (High Dose) 3 valent vaccine 01/20/2017     Influenza (IIV3) 09/25/2012     Mantoux 05/27/2009     Pneumococcal (PCV 7) 08/11/2011     Pneumococcal 23 valent 07/18/2016     TDAP (BOOSTRIX AGES 10-64) 08/11/2011     Tdap (Adacel,Boostrix) 08/11/2011     ASSESSMENT:                                                       Current medications were reviewed today.      Medication Adherence: needs improvement - see below    COPD: Improved. Patient would benefit from improved adherence to his Breo Ellipta to reduce risk of COPD exacerbation in the future.    Constipation: Needs improvement.  Patient could benefit from use of bisacodyl tablets 1-2 daily short 3-5 days to improve bowel function after being in the hospital.     Atrial Fibrillation/CAD: Stable.  Patient could benefit from education on amiodarone titration to ensure that therapy is successfully completed.  Pt is on moderate intensity statin which is indicated based on 2013 ACC/AHA guidelines for lipid management.      Sarcoidosis: Stable.  Patient would benefit from additional education on furosemide dosing, water restrictions, salt restrictions and daily weights to prevent future exacerbations.     Diabetes: Stable. Patient is meeting A1c goal of < 7%. Self monitoring of blood glucose is at goal of fasting  mg/dL and post prandial < 180 mg/dL.    CKD/RADHA: Stable.      PLAN:                                                      Post Discharge Medication Reconciliation Status: discharge medications reconciled, continue medications without change.    1) Use Breo Ellipta and Spiriva every day to prevent bad breathing days.    2) Consider use of Dulcolax (bisacodyl) 1-2 tablets daily for 3-5 days to help with bowel movements after  being discharged from the hospital.  Continue use of Miralax.    3) Amiodarone directions are as follows:  Take two 200 mg tablets twice daily for 4 weeks.  Then, take one 200 mg tablet daily.  Follow instructions provided by [facility] clinic follow-up regarding amiodarone, fluid and salt intake.    I spent 75 minutes with this patient today.  All changes were made via collaborative practice agreement with Jamie Foster. A copy of the visit note was provided to the patient's primary care provider.    Will follow up in 2 weeks to see how patient is doing with inhaler adherence, amiodarone and [facility] clinic.    The patient was mailed a summary of these recommendations as an after visit summary.    Caryn Guzmán, Pharm.D.  Medication Therapy Management Pharmacist  Page/VM:  [phone]

## 2017-02-08 NOTE — PATIENT INSTRUCTIONS
Recommendations from today's MTM visit:                                                    Today we reviewed what your medicines are for, how to know if they are working, that your medicines are safe and how to make your medicine regimen as easy as possible.     1) Use Breo Ellipta and Spiriva every day to prevent bad breathing days.    2) Consider use of Dulcolax (bisacodyl) 1-2 tablets daily for 3-5 days to help with bowel movements after being discharged from the hospital.  Continue use of Miralax.    3) Amiodarone directions are as follows:  Take two 200 mg tablets twice daily for 4 weeks.  Then, take one 200 mg tablet daily.  Follow instructions provided by [facility] clinic follow-up regarding amiodarone, fluid and salt intake.    Next MTM visit: I will call in two weeks to see how you are doing with your inhalers and [facility] clinic.      To schedule another MTM appointment, please call the clinic directly or you may call the MTM scheduling line at [phone] or toll-free at [phone].     My Clinical Pharmacist's contact information:                                                      It was a pleasure visiting with you!  Please feel free to contact me with any questions or concerns you have.    Caryn Guzmán, Pharm.D.  Medication Therapy Management Pharmacist  Page/VM:  [phone]    You may receive a survey about the MTM services you received.  I would appreciate your feedback to help me serve you better in the future. Please fill it out and return it when you can. Your comments will be anonymous.

## 2017-02-10 ENCOUNTER — TELEPHONE (OUTPATIENT)
Dept: OTHER | Facility: CLINIC | Age: 72
End: 2017-02-10

## 2017-02-13 ENCOUNTER — RADIANT APPOINTMENT (OUTPATIENT)
Dept: CARDIOLOGY | Facility: CLINIC | Age: 72
End: 2017-02-13

## 2017-02-13 DIAGNOSIS — D86.9 SARCOIDOSIS: ICD-10-CM

## 2017-02-13 LAB
ALBUMIN SERPL-MCNC: 2.6 G/DL (ref 3.4–5)
ALP SERPL-CCNC: 101 U/L (ref 40–150)
ALT SERPL W P-5'-P-CCNC: 30 U/L (ref 0–70)
ANION GAP SERPL CALCULATED.3IONS-SCNC: 8 MMOL/L (ref 3–14)
AST SERPL W P-5'-P-CCNC: 22 U/L (ref 0–45)
BILIRUB SERPL-MCNC: 0.7 MG/DL (ref 0.2–1.3)
BUN SERPL-MCNC: 36 MG/DL (ref 7–30)
CALCIUM SERPL-MCNC: 8.2 MG/DL (ref 8.5–10.1)
CHLORIDE SERPL-SCNC: 104 MMOL/L (ref 94–109)
CO2 SERPL-SCNC: 27 MMOL/L (ref 20–32)
CREAT SERPL-MCNC: 1.89 MG/DL (ref 0.66–1.25)
CRP SERPL-MCNC: 17.4 MG/L (ref 0–8)
ERYTHROCYTE [SEDIMENTATION RATE] IN BLOOD BY WESTERGREN METHOD: 73 MM/H (ref 0–20)
GFR SERPL CREATININE-BSD FRML MDRD: 35 ML/MIN/1.7M2
GLUCOSE SERPL-MCNC: 138 MG/DL (ref 70–99)
POTASSIUM SERPL-SCNC: 4.4 MMOL/L (ref 3.4–5.3)
PROT SERPL-MCNC: 7 G/DL (ref 6.8–8.8)
SODIUM SERPL-SCNC: 138 MMOL/L (ref 133–144)

## 2017-02-13 RX ADMIN — Medication 4 ML: at 15:15

## 2017-02-13 NOTE — TELEPHONE ENCOUNTER
Patient called reporting that he feels fatigued and nauseated since starting amiodarone. He attempted to decrease dose to 200 mg daily; however, his symptoms persisted. He states that he will no longer take this medication as he feels he cannot tolerate it. He has maintained sinus thus far. He will continue on Metoprolol for now. I updated his medication list. He has an upcoming echo and lab draw on 2/13/17. He has EP follow up scheduled on 2/24/17.   VINNY Fox CNP  Electrophysiology Consult Service  Pager: [phone]

## 2017-02-14 ENCOUNTER — MYC MEDICAL ADVICE (OUTPATIENT)
Dept: PULMONOLOGY | Facility: CLINIC | Age: 72
End: 2017-02-14

## 2017-02-15 DIAGNOSIS — D86.9 SARCOIDOSIS: Primary | ICD-10-CM

## 2017-02-15 NOTE — TELEPHONE ENCOUNTER
Spoke with patient, he is feeling like doing his Remicade infusions every 8 weeks is too far apart, wondering about going back to every 6 week. Discussed with Dr Perlman and he is fine with going back to every 6 weeks for a while.

## 2017-02-16 ENCOUNTER — INFUSION THERAPY VISIT (OUTPATIENT)
Dept: INFUSION THERAPY | Facility: CLINIC | Age: 72
End: 2017-02-16
Attending: INTERNAL MEDICINE
Payer: MEDICARE

## 2017-02-16 VITALS
WEIGHT: 202 LBS | TEMPERATURE: 97.8 F | RESPIRATION RATE: 18 BRPM | HEART RATE: 73 BPM | OXYGEN SATURATION: 93 % | DIASTOLIC BLOOD PRESSURE: 95 MMHG | BODY MASS INDEX: 30.71 KG/M2 | SYSTOLIC BLOOD PRESSURE: 163 MMHG

## 2017-02-16 DIAGNOSIS — D86.0 SARCOIDOSIS OF LUNG (H): Primary | ICD-10-CM

## 2017-02-16 DIAGNOSIS — D86.9 SARCOIDOSIS: ICD-10-CM

## 2017-02-16 PROCEDURE — 25000128 H RX IP 250 OP 636: Mod: ZF | Performed by: INTERNAL MEDICINE

## 2017-02-16 PROCEDURE — 96409 CHEMO IV PUSH SNGL DRUG: CPT

## 2017-02-16 RX ORDER — ACETAMINOPHEN 325 MG/1
650 TABLET ORAL ONCE
Status: CANCELLED
Start: 2017-02-16 | End: 2017-02-16

## 2017-02-16 RX ADMIN — INFLIXIMAB 500 MG: 100 INJECTION, POWDER, LYOPHILIZED, FOR SOLUTION INTRAVENOUS at 13:46

## 2017-02-16 ASSESSMENT — PAIN SCALES - GENERAL: PAINLEVEL: NO PAIN (0)

## 2017-02-16 NOTE — MR AVS SNAPSHOT
After Visit Summary   2/16/2017    Rohan Monroe    MRN: [id number]           Patient Information     Date Of Birth          1945        Visit Information        Provider Department      2/16/2017 1:00 PM WALTER [facility] ATC;  SPEC INFUSION [facility] Specialty and Procedure        Today's Diagnoses     Sarcoidosis of lung (HCC)    -  1    SARCOIDOSIS-systemic           Follow-ups after your visit        Your next 10 appointments already scheduled     Feb 24, 2017 10:00 AM CST   PFT VISIT with  PFL B   [facility] Pulmonary Function Testing ([facility])    [address] [place] [address]   [phone]            Feb 24, 2017 10:30 AM CST   (Arrive by 10:15 AM)   Return Interstitial Lung with David Morris Perlman, MD   [facility] ([facility])    [address] [place] [address]   [phone]            Feb 24, 2017 11:00 AM CST   (Arrive by 10:45 AM)   RETURN ARRHYTHMIA with Brian Vazquez MD   [facility] Heart Care ([facility])    [address] [place] [address]   [phone]            Feb 27, 2017 10:20 AM CST   (Arrive by 10:05 AM)   RETURN FOOT/ANKLE with Chicho Mejia DPM   [facility] Orthopaedic Clinic ([facility])    [address] [place] [address]  4th [facility] [address]   [phone]            Feb 28, 2017 11:15 AM CST   (Arrive by 11:00 AM)   Return General Liver with Moses Orosco MD   [facility] Hepatology ([facility])    [address] [place] [address]   [phone]            Feb 28, 2017 12:00 PM CST   Lab with  LAB   [facility] Lab ([facility])    [address] [place] [address]   [phone]            Feb 28, 2017  1:00 PM CST   (Arrive by  12:30 PM)   Return Visit with Ollie Michel MD   [facility] Nephrology ([facility])    [address]  3[address]  [facility] [address]   [phone]            Mar 06, 2017  8:30 AM CST   VISUAL FIELD with [facility] EYE VISUAL FIELD   Eye Clinic ([facility])    [address]  [address]  [facility] [address]   [phone]            Mar 06, 2017  9:00 AM CST   RETURN GLAUCOMA with Kina Greco MD   Eye Clinic ([facility])    [address]  [address]  [address]  [facility] [address]   [phone]            Mar 06, 2017 10:15 AM CST   RETURN RETINA with Sandee Middleton MD   Eye Clinic ([facility])    [address]  [address]  [facility] [address]   [phone]              Who to contact     If you have questions or need follow up information about today's clinic visit or your schedule please contact [facility] SPECIALTY [facility] PROCEDURE directly at [phone].  Normal or non-critical lab and imaging results will be communicated to you by Blue Apronhart, letter or phone within 4 business days after the clinic has received the results. If you do not hear from us within 7 days, please contact the clinic through Blue Apronhart or phone. If you have a critical or abnormal lab result, we will notify you by phone as soon as possible.  Submit refill requests through Enablon or call your pharmacy and they will forward the refill request to us. Please allow 3 business days for your refill to be completed.          Additional Information About Your Visit        Enablon Information     En[employer]on gives you secure access to your electronic health record. If you see a primary care provider, you can also send messages to your care team and make appointments. If you have questions, please call your primary care clinic.  If you do not have a primary  care provider, please call [phone] and they will assist you.        Care EveryWhere ID     This is your Care EveryWhere ID. This could be used by other organizations to access your [facility] medical records  [id number]        Your Vitals Were     Pulse Temperature Respirations Pulse Oximetry BMI (Body Mass Index)       73 97.8  F (36.6  C) 18 93% 30.71 kg/m2        Blood Pressure from Last 3 Encounters:   02/16/17 (!) 163/95   01/31/17 137/90   01/25/17 142/90    Weight from Last 3 Encounters:   02/16/17 91.6 kg (202 lb)   01/31/17 85.8 kg (189 lb 1.6 oz)   01/25/17 87.5 kg (193 lb)              Today, you had the following     No orders found for display         Today's Medication Changes          These changes are accurate as of: 2/16/17 11:59 PM.  If you have any questions, ask your nurse or doctor.               These medicines have changed or have updated prescriptions.        Dose/Directions    Urea 40 % Crea   Commonly known as:  CARMOL 40   This may have changed:  additional instructions   Used for:  Dermatophytosis of nail, Corns and callosities        To feet daily   Quantity:  199 g   Refills:  4                Primary Care Provider Office Phone # Fax #    Jamie Foster [phone]       [address] [address]        Thank you!     Thank you for choosing [facility] SPECIALTY AND PROCEDURE  for your care. Our goal is always to provide you with excellent care. Hearing back from our patients is one way we can continue to improve our services. Please take a few minutes to complete the written survey that you may receive in the mail after your visit with us. Thank you!             Your Updated Medication List - Protect others around you: Learn how to safely use, store and throw away your medicines at www.disposemymeds.org.          This list is accurate as of: 2/16/17 11:59 PM.  Always use your most recent med list.                    Brand Name Dispense Instructions for use    albuterol 108 (90 BASE) MCG/ACT Inhaler    PROAIR HFA/PROVENTIL HFA/VENTOLIN HFA    3 Inhaler    Inhale 2 puffs into the lungs every 6 hours as needed for shortness of breath / dyspnea       ASPIRIN EC PO      Take 81 mg by mouth daily       atorvastatin 20 MG tablet    LIPITOR    90 tablet    Take 1 tablet (20 mg) by mouth daily       blood glucose monitoring lancets     2 Box    Use to test blood sugar 2 times daily or as directed.  102 lancets per box.  3 month supply.       blood glucose monitoring meter device kit    no brand specified    1 kit    Use to test blood sugar 2 times daily.       blood glucose monitoring test strip    ACCU-CHEK RONNIE PLUS    200 each    Use to test blood sugar 2 times daily or as directed.  3 month supply.       ciclopirox 0.77 % cream    LOPROX    90 g    Apply topically 2 times daily To feet and toenails.       fluticasone-vilanterol 100-25 MCG/INH oral inhaler    BREO ELLIPTA    3 Inhaler    Inhale 1 puff into the lungs daily       furosemide 20 MG tablet    LASIX    180 tablet    Take 2 tablets (40 mg) by mouth daily as needed For weight gain of 3 lbs or greater       * INFLIXIMAB IV      Every 45 days       * inFLIXimab 100 MG injection    REMICADE         levothyroxine 137 MCG tablet    SYNTHROID/LEVOTHROID    90 tablet    Take 1 tablet (137 mcg) by mouth daily       methotrexate 2.5 MG tablet CHEMO     48 tablet    Take 3 tablets (7.5 mg) by mouth once a week On Mondays       metoprolol 100 MG tablet    LOPRESSOR    60 tablet    Take 1 tablet (100 mg) by mouth 2 times daily       mirtazapine 15 MG tablet    REMERON     Take 15 mg by mouth At Bedtime.       MULTIVITAMIN & MINERAL PO      Take 1 tablet by mouth daily.       polyethylene glycol powder    MIRALAX    510 g    Take 17 g (1 capful) by mouth daily       sildenafil 50 MG cap/tab    VIAGRA    10 tablet    Take 2 tablets (100 mg) by mouth daily as needed for  erectile dysfunction       tiotropium 18 MCG capsule    SPIRIVA HANDIHALER    90 capsule    Inhale contents of one capsule daily.       Urea 40 % Crea    CARMOL 40    199 g    To feet daily       warfarin 5 MG tablet    COUMADIN    30 tablet    Take 1 tablet (5 mg) by mouth daily       * Notice:  This list has 2 medication(s) that are the same as other medications prescribed for you. Read the directions carefully, and ask your doctor or other care provider to review them with you.

## 2017-02-17 NOTE — PROGRESS NOTES
"Infusion nursing note:    Rohan Monroe presents today to [facility] for a remicade infusion.  During today's [facility] appointment orders from Perlman were completed.  Dose # every 6 week dosing    Progress note:  ID verified by name and .  Assessment completed.  Vitals were stable throughout time in [facility].  verbal education given to patient/representative regarding infusion and possible side effects.  Patient/representative verbalized understanding.    PRIOR TO INFUSION OF BIOLOGICAL MEDICATIONS OR ANY OF THESE AS LISTED: Remicaide (infliximab) \".rheumbiologicalchecklist\"    Prior to Infusion of biological medications or any of these as listed:    1. Elevated temperature, fever, chills, productive cough or abnormal vital signs, night sweats, coughing up blood or sputum, no appetite or abnormal vital signs : NO    2. Open wounds or new incisions: NO    3. Recent hospitalization: YES cardiac ablation a couple of weeks ago  4. Any current or recent bouts of illness or infection? On any antibiotics? : NO    5. Any upcoming surgeries or dental procedures?:NO    6. Any new, sudden or worsening abdominal pain :NO    7. Vaccination within 4 weeks? Patient or someone in the household is scheduled to receive vaccination? No live virus vaccines prior to or during treatment :NO    8. Any nervous system diseases [i.e. multiple sclerosis, Guillain-[place], seizures, neurological  changes]: NO    9. New-onset medical symptoms: YES    10.  Evaluate for any sign of active TB [Unexplained weight loss, Loss of appetite, Night sweats, Fever, Fatigue, Chills, Coughing for 3 weeks or longer, Hemoptysis (coughing up blood), Chest pain]: NO      Note: Pt states he had a cardiac ablation a couple of weeks ago and since then he has been increasingly SOB.  His O2 sats 93%, frequent but dry cough, appears very deconditioned.  Dr. Perlman updated on the above and the patients recent WBC, CRP and ESR.  TORB: okay to proceed with remicade " infusion today.    Pt has appt with Dr. Perlman next week.  Administrations This Visit     inFLIXimab (REMICADE) 500 mg in NaCl 0.9 % 250 mL non-oncology use     Admin Date Action Dose Rate Route Administered By          02/16/2017 New Bag 500 mg 162.5 mL/hr Intravenous Rosa Rouse, RN                         BP (!) 163/95  Pulse 73  Temp 97.8  F (36.6  C)  Resp 18  Wt 91.6 kg (202 lb)  SpO2 93%  BMI 30.71 kg/m2    Infusion given over approximately  2 hours     Discharge Plan:  Reviewed with patient.  Given biologic medication or medication hand-out. Inform patient if any fever, chills or signs of infection, new symptoms, abdominal pain, heart palpitations, shortness of breath, reaction, weakness, neurological changes, seek medical attention immediately and should not receive infusions. No live virus vaccines prior to or during treatment or up to 6 months post infusion. If the patient has an upcoming procedure or surgery, this should be discussed with the rheumatologist and surgeon or provider.    Discharge instructions were reviewed with patient Yes  Patient/representative verbalized understanding of discharge instructions and all questions answered Yes.    Discharged from [facility] in stable condition.    Rosa Rouse

## 2017-02-20 DIAGNOSIS — D63.1 ANEMIA IN CHRONIC KIDNEY DISEASE (CODE): ICD-10-CM

## 2017-02-20 DIAGNOSIS — N18.30 CKD (CHRONIC KIDNEY DISEASE) STAGE 3, GFR 30-59 ML/MIN (H): Primary | ICD-10-CM

## 2017-02-20 NOTE — NURSING NOTE
Labs per clinic 2A protocol.  Follow up/CKD 3  Last OV: 12/27/16  Elisa Cardona LPN  Nephrology  [facility]  [phone]

## 2017-02-21 ENCOUNTER — TELEPHONE (OUTPATIENT)
Dept: GASTROENTEROLOGY | Facility: CLINIC | Age: 72
End: 2017-02-21

## 2017-02-21 NOTE — TELEPHONE ENCOUNTER
Patient contacted and reminded of upcoming appointment.  Patient confirmed they will be attending.  Patient instructed to bring updated medications list to appointment.  Patient instructed to arrive early for check-in  Aj Weber CMA

## 2017-02-23 ENCOUNTER — PRE VISIT (OUTPATIENT)
Dept: CARDIOLOGY | Facility: CLINIC | Age: 72
End: 2017-02-23

## 2017-02-23 DIAGNOSIS — I48.92 ATRIAL FLUTTER, UNSPECIFIED TYPE (H): Primary | ICD-10-CM

## 2017-02-24 ENCOUNTER — OFFICE VISIT (OUTPATIENT)
Dept: CARDIOLOGY | Facility: CLINIC | Age: 72
End: 2017-02-24
Attending: INTERNAL MEDICINE
Payer: MEDICARE

## 2017-02-24 ENCOUNTER — OFFICE VISIT (OUTPATIENT)
Dept: PULMONOLOGY | Facility: CLINIC | Age: 72
End: 2017-02-24
Attending: INTERNAL MEDICINE
Payer: MEDICARE

## 2017-02-24 VITALS
HEART RATE: 80 BPM | DIASTOLIC BLOOD PRESSURE: 106 MMHG | BODY MASS INDEX: 29.62 KG/M2 | SYSTOLIC BLOOD PRESSURE: 178 MMHG | TEMPERATURE: 97.8 F | HEIGHT: 69 IN | WEIGHT: 200 LBS | OXYGEN SATURATION: 92 %

## 2017-02-24 VITALS
RESPIRATION RATE: 20 BRPM | BODY MASS INDEX: 29.62 KG/M2 | HEIGHT: 69 IN | HEART RATE: 85 BPM | OXYGEN SATURATION: 92 % | WEIGHT: 200 LBS | TEMPERATURE: 97.8 F

## 2017-02-24 DIAGNOSIS — I48.92 ATRIAL FLUTTER WITH RAPID VENTRICULAR RESPONSE (H): ICD-10-CM

## 2017-02-24 DIAGNOSIS — I48.92 ATRIAL FLUTTER, UNSPECIFIED TYPE (H): ICD-10-CM

## 2017-02-24 DIAGNOSIS — I10 ESSENTIAL HYPERTENSION: Primary | ICD-10-CM

## 2017-02-24 DIAGNOSIS — J84.9 ILD (INTERSTITIAL LUNG DISEASE) (H): Primary | ICD-10-CM

## 2017-02-24 DIAGNOSIS — D86.9 SARCOIDOSIS: ICD-10-CM

## 2017-02-24 LAB
DLCOUNC-%PRED-PRE: 39 %
DLCOUNC-PRE: 9.71 ML/MIN/MMHG
DLCOUNC-PRED: 24.79 ML/MIN/MMHG
ERV-%PRED-PRE: 128 %
ERV-PRE: 0.99 L
ERV-PRED: 0.77 L
EXPTIME-PRE: 11.07 SEC
FEF2575-%PRED-POST: 16 %
FEF2575-%PRED-PRE: 17 %
FEF2575-POST: 0.38 L/SEC
FEF2575-PRE: 0.4 L/SEC
FEF2575-PRED: 2.22 L/SEC
FEFMAX-%PRED-PRE: 51 %
FEFMAX-PRE: 3.97 L/SEC
FEFMAX-PRED: 7.7 L/SEC
FEV1-%PRED-PRE: 39 %
FEV1-PRE: 1.16 L
FEV1FEV6-PRE: 49 %
FEV1FEV6-PRED: 77 %
FEV1FVC-PRE: 42 %
FEV1FVC-PRED: 76 %
FEV1SVC-PRE: 43 %
FEV1SVC-PRED: 67 %
FIFMAX-PRE: 4.26 L/SEC
FVC-%PRED-PRE: 69 %
FVC-PRE: 2.74 L
FVC-PRED: 3.92 L
IC-%PRED-PRE: 47 %
IC-PRE: 1.73 L
IC-PRED: 3.66 L
VA-%PRED-PRE: 64 %
VA-PRE: 4.23 L
VC-%PRED-PRE: 61 %
VC-PRE: 2.72 L
VC-PRED: 4.43 L

## 2017-02-24 PROCEDURE — 93010 ELECTROCARDIOGRAM REPORT: CPT | Mod: ZP | Performed by: INTERNAL MEDICINE

## 2017-02-24 PROCEDURE — 99212 OFFICE O/P EST SF 10 MIN: CPT | Mod: 27

## 2017-02-24 PROCEDURE — 99214 OFFICE O/P EST MOD 30 MIN: CPT | Mod: ZP | Performed by: INTERNAL MEDICINE

## 2017-02-24 PROCEDURE — 99212 OFFICE O/P EST SF 10 MIN: CPT | Mod: ZF

## 2017-02-24 PROCEDURE — 93005 ELECTROCARDIOGRAM TRACING: CPT | Mod: ZF

## 2017-02-24 RX ORDER — AMLODIPINE BESYLATE 10 MG/1
10 TABLET ORAL DAILY
Qty: 90 TABLET | Refills: 3 | Status: ON HOLD | OUTPATIENT
Start: 2017-02-24 | End: 2017-04-28

## 2017-02-24 RX ORDER — WARFARIN SODIUM 5 MG/1
5 TABLET ORAL DAILY
Qty: 30 TABLET | Refills: 6 | Status: SHIPPED | OUTPATIENT
Start: 2017-02-24 | End: 2017-10-12

## 2017-02-24 ASSESSMENT — ENCOUNTER SYMPTOMS
POSTURAL DYSPNEA: 0
COUGH: 1
LEG SWELLING: 1
DYSPNEA ON EXERTION: 1
EXERCISE INTOLERANCE: 1
HYPERTENSION: 1
CLAUDICATION: 0
FATIGUE: 1
CONSTIPATION: 1
INSOMNIA: 0
ALTERED TEMPERATURE REGULATION: 1
ORTHOPNEA: 0
DYSURIA: 0
PANIC: 0
DECREASED APPETITE: 0
NAUSEA: 0
NIGHT SWEATS: 0
DEPRESSION: 0
RESPIRATORY PAIN: 0
NERVOUS/ANXIOUS: 1
JAUNDICE: 0
CHILLS: 0
POLYPHAGIA: 0
INCREASED ENERGY: 1
ABDOMINAL PAIN: 0
DIFFICULTY URINATING: 0
HALLUCINATIONS: 0
HEARTBURN: 0
SNORES LOUDLY: 0
HEMATURIA: 0
WEIGHT LOSS: 1
SLEEP DISTURBANCES DUE TO BREATHING: 1
LEG PAIN: 0
DIARRHEA: 0
POLYDIPSIA: 0
BLOOD IN STOOL: 0
VOMITING: 0
BOWEL INCONTINENCE: 0
RECTAL PAIN: 0
SYNCOPE: 0
FLANK PAIN: 0
LIGHT-HEADEDNESS: 0
RECTAL BLEEDING: 0
COUGH DISTURBING SLEEP: 1
DECREASED CONCENTRATION: 0
TACHYCARDIA: 1
WEIGHT GAIN: 0
WHEEZING: 1
SPUTUM PRODUCTION: 1
FEVER: 0
SHORTNESS OF BREATH: 1
BLOATING: 1
HYPOTENSION: 0
HEMOPTYSIS: 0
PALPITATIONS: 1

## 2017-02-24 ASSESSMENT — PAIN SCALES - GENERAL
PAINLEVEL: NO PAIN (0)
PAINLEVEL: NO PAIN (0)

## 2017-02-24 NOTE — PROGRESS NOTES
HPI:   Mr. Monroe is a 71 year old male who has a past medical history significant for pulmonary and cardiac sarcoidosis, CAD s/p PCI mLAD and D2 2008, DM2, HTN, HLD, Hep C, hypothyroidism, and AFL s/p DCCV 1/19/17 with early recurrence and s/p CTI ablation on 1/23/17 (CHADSVASC 4).   He presented on 1/18/17 to [facility] with a 3-4 week history of worsening SOB which worsened over the last several days. He also endorsed chest pain with deep breathing. He was found to be in AFL with RVR Vent Rate 150 bpm. Toponin elevated to 0.482 thought to be demand ischemia. He was subsequently admitted. He was started on heparin infusion. He then underwent a DEAN/DCCV on 1/19/17. He was noted to have frequent PACs and runs of AT post DCCV for which he was started on amiodarone. His DEAN pre DCCV showed LVEF 5% (done in AFL) but repeat surface TTE showed LVEF 55%. He then recurred back into AFL, appearing mostly typical on ECGs and was referred for ablation. He underwent a successful CTI ablation on 1/23/17. He went into AF post ablation while testing. He was given IV amiodarone bolus and DCCV attempted several times unsuccessfully. After further amiodarone loading, he underwent a successful DCCV on 1/24/17.  He was bridged to therapeutic warfarin. He developed RADHA with Scr up to 2.7 and was 2.4 at discharge. His losartan was stopped and diuretics were changed to PRN. He was discharged on 1/25/17 with plan to follow up with [facility] and EP.     He then presented to clinic on 1/31/17  Reporting continued ACEVES and feels his breathing is not improving after hospital discharge. He had labs drawn 2 days after discharge on 1/27/17 which showed SCr 2.48, GFR 26. He states he has taken PRN lasix once which helped his symptoms somewhat. He felt very functionally limited and cannot walk too far without getting short of breath. An echo was ordered showed LVEF 50-55%, IVC normal, RV mildly reduced function, and no significant valvular issues. Labs show  improved Scr 1.89 GFR 33 and Sed rate 73.     Today, he reports his SOB and ACEVES is improving but not back to his prior baseline. He had PFTs today which showed mixed restriction and obstruction, moderate to severe reduction in DLCO. Spirometry overall stable, DLCO down from previous but has been that low before. He follows with Dr. Shine for pulmonary sarcoid who plans to repeat PET scan. Patient had been on amiodarone which he did not tolerate due to nausea and fatigue and he self stopped this mediation on 2/10/17. He is noted to be hypertensive today 170-180's/100's. Presenting 12 lead ECG shows NSR with 1 AVB Vent Rate 87 bpm,  ms, QRS 76 ms, QTc 481ms. Current cardiac medications include: PRN lasix, metoprolol, lipitor, and warfarin.     PAST MEDICAL HISTORY:  Past Medical History   Diagnosis Date     Cataract of both eyes      Chronic infection      Hep C     Congestive heart failure, unspecified      Depressive disorder, not elsewhere classified      Depression (non-psychotic)     ERM OS (epiretinal membrane, left eye)      Generalized osteoarthrosis, unspecified site      Glaucoma suspect      Hypertension      Lichen planus      Other psoriasis      PVD (posterior vitreous detachment), left eye      Sarcoidosis (H)      Sarcoidosis (H)      Type II or unspecified type diabetes mellitus without mention of complication, not stated as uncontrolled      Unspecified hypothyroidism      Hypothyroidism     Unspecified viral hepatitis C without hepatic coma      Viral warts, unspecified        CURRENT MEDICATIONS:  Current Outpatient Prescriptions   Medication Sig Dispense Refill     polyethylene glycol (MIRALAX) powder Take 17 g (1 capful) by mouth daily 510 g 1     tiotropium (SPIRIVA HANDIHALER) 18 MCG capsule Inhale contents of one capsule daily. 90 capsule 3     albuterol (PROAIR HFA/PROVENTIL HFA/VENTOLIN HFA) 108 (90 BASE) MCG/ACT Inhaler Inhale 2 puffs into the lungs every 6 hours as needed for  shortness of breath / dyspnea 3 Inhaler 6     fluticasone-vilanterol (BREO ELLIPTA) 100-25 MCG/INH oral inhaler Inhale 1 puff into the lungs daily 3 Inhaler 3     warfarin (COUMADIN) 5 MG tablet Take 1 tablet (5 mg) by mouth daily 30 tablet 3     furosemide (LASIX) 20 MG tablet Take 2 tablets (40 mg) by mouth daily as needed For weight gain of 3 lbs or greater 180 tablet 3     inFLIXimab (REMICADE) 100 MG injection        metoprolol (LOPRESSOR) 100 MG tablet Take 1 tablet (100 mg) by mouth 2 times daily 60 tablet 11     levothyroxine (SYNTHROID/LEVOTHROID) 137 MCG tablet Take 1 tablet (137 mcg) by mouth daily 90 tablet 0     methotrexate 2.5 MG tablet Take 3 tablets (7.5 mg) by mouth once a week On Mondays 48 tablet 3     ciclopirox (LOPROX) 0.77 % cream Apply topically 2 times daily To feet and toenails. 90 g 6     atorvastatin (LIPITOR) 20 MG tablet Take 1 tablet (20 mg) by mouth daily 90 tablet 3     ASPIRIN EC PO Take 81 mg by mouth daily       blood glucose monitoring (ACCU-CHEK RONNIE PLUS) test strip Use to test blood sugar 2 times daily or as directed.  3 month supply. 200 each 3     blood glucose monitoring (ACCU-CHEK FASTCLIX) lancets Use to test blood sugar 2 times daily or as directed.  102 lancets per box.  3 month supply. 2 Box 3     blood glucose monitoring (NO BRAND SPECIFIED) meter device kit Use to test blood sugar 2 times daily. 1 kit 0     Urea (CARMOL 40) 40 % CREA To feet daily (Patient taking differently: To feet daily PRN) 199 g 4     sildenafil (VIAGRA) 50 MG tablet Take 2 tablets (100 mg) by mouth daily as needed for erectile dysfunction 10 tablet 6     INFLIXIMAB IV Every 45 days       Multiple Vitamins-Minerals (MULTIVITAMIN & MINERAL PO) Take 1 tablet by mouth daily.       mirtazapine (REMERON) 15 MG tablet Take 15 mg by mouth At Bedtime.         PAST SURGICAL HISTORY:  Past Surgical History   Procedure Laterality Date     Hc repair incisional hernia,reducible       Hernia Repair,  Incisional, Unilateral     C ligatn/strip long & short saphen       Hc removal of tonsils,<11 y/o       Tonsils <10y.o.     Cardiac stent       s/p     Arthroplasty hip  8/24/2011     Procedure:ARTHROPLASTY HIP; Right Total Hip Arthroplasty  Choice anesthesia; Surgeon:LESLI WILKINSON; Location:[facility] OR     Joint replacement       1 month ago--right hip     Cataract iol, rt/lt  9/15/2015     LE     Colonoscopy       Cardiac surgery       Biopsy       Anesthesia cardioversion N/A 1/19/2017     Procedure: ANESTHESIA CARDIOVERSION;  Surgeon: GENERIC ANESTHESIA PROVIDER;  Location: [facility] OR     Anesthesia cardioversion N/A 1/23/2017     Procedure: ANESTHESIA CARDIOVERSION;  Surgeon: GENERIC ANESTHESIA PROVIDER;  Location: [facility] OR     Anesthesia cardioversion N/A 1/24/2017     Procedure: ANESTHESIA CARDIOVERSION;  Surgeon: GENERIC ANESTHESIA PROVIDER;  Location: [facility] OR       ALLERGIES:     Allergies   Allergen Reactions     Prednisone Other (See Comments)     He reports that he can't sleep for days and can't use small to massive doses of prednisone   Pt. Does not do well with high doses of Prednisone, His MD says that Prednisone is counter indicated. Prednisone failed to treat his sarcoidosis        FAMILY HISTORY:  Family History   Problem Relation Age of Onset     HEART DISEASE Father      irreg heart beat     Circulatory Father      varicose veins     HEART DISEASE Mother      heart attack     Arthritis Mother      DIABETES Sister      type 2     Eye Disorder Maternal Grandmother      glaucoma     Glaucoma No family hx of      Macular Degeneration No family hx of      CANCER No family hx of      no skin cancer     Kidney Cancer Sister      DIABETES No family hx of      DIABETES Sister      OSTEOPOROSIS Mother      Thyroid Disease Mother      Prostate Cancer Father        SOCIAL HISTORY:  Social History   Substance Use Topics     Smoking status: Former Smoker     Packs/day: 1.00     Years: 30.00     Types: Cigarettes     Start  "date: 12/30/1960     Quit date: 7/22/1994     Smokeless tobacco: Never Used     Alcohol use No       ROS:   A comprehensive 10 point review of systems negative other than as mentioned in HPI.  Exam:  BP (!) 178/106 (BP Location: Left arm, Patient Position: Supine, Cuff Size: Adult Regular)  Pulse 80  Temp 97.8  F (36.6  C) (Oral)  Ht 1.74 m (5' 8.5\")  Wt 90.7 kg (200 lb)  SpO2 92%  BMI 29.97 kg/m2  GENERAL APPEARANCE: alert and no distress  HEENT: no icterus, no xanthelasmas, normal pupil size and reaction, normal palate, mucosa moist, no central cyanosis  NECK: no adenopathy, no asymmetry, masses, or scars, thyroid normal to palpation and no bruits, JVP not elevated  RESPIRATORY: lungs clear to auscultation - no rales, rhonchi or wheezes, no use of accessory muscles, no retractions, respirations are unlabored, normal respiratory rate  CARDIOVASCULAR: regular rhythm, normal S1 with physiologic split S2, no S3 or S4 and no murmur, click or rub, precordium quiet with normal PMI.  ABDOMEN: soft, non tender, without hepatosplenomegaly, no masses palpable, bowel sounds normal, aorta not enlarged by palpation, no abdominal bruits  EXTREMITIES: peripheral pulses normal, no edema, no bruits  NEURO: alert and oriented to person/place/time, normal speech, gait and affect  VASC: Radial, femoral, dorsalis pedis and posterior tibialis pulses are normal in volumes and symmetric bilaterally. No bruits are heard.  SKIN: no ecchymoses, no rashes    Labs:  Reviewed.     Testing/Procedures:  PULMONARY FUNCTION TESTS:   PFT Latest Ref Rng & Units 2/24/2017   FVC L 2.74   FEV1 L 1.16   FVC% % 69   FEV1% % 39     1/18/17 DEAN:  Interpretation Summary  No left atrial mass or thrombus visualized.  There is spontaneous echo contrast in the left atrium.  The LV systolic function is severely reduced with global hypokinesis. The  LVEF is estimated at 5-10% at a heart rate of 150 bpm.  This study was compared with the study from 3/2015, the LVEF " "is reduced and  patient is in atrial flutter.  1/18/17 ECHOCARDIOGRAM:   Interpretation Summary  The Ejection Fraction is estimated at 50-55%.      Assessment and Plan:   Mr. Monroe is a 71 year old male who has a past medical history significant for pulmonary and cardiac sarcoidosis, CAD s/p PCI mLAD and D2 2008, DM2, HTN, HLD, Hep C, hypothyroidism, and AFL s/p DCCV 1/19/17 with early recurrence and s/p CTI ablation on 1/23/17 (CHADSVASC 4). He was admitted on 1/18/17 to 1/25/17 with a 3-4 week history of worsening SOB which worsened over the last several days. He was found to be in AFL with RVR Vent Rate 150 bpm and he underwent a DEAN/DCCV with early reccurace back into AFL, appearing mostly typical on ECGs and was referred for ablation. He underwent a successful CTI ablation on 1/23/17. He developed RADHA with Scr up to 2.7 and was 2.4 at discharge. His losartan was stopped and diuretics were changed to PRN. Recent echo shows LVEF 50-55%, IVC normal, RV mildly reduced, and no significant valvular issues. Today, he reports his SOB and ACEVES is improving but not back to his prior baseline. He had PFTs today which showed mixed restriction and obstruction, moderate to severe reduction in DLCO. Spirometry overall stable, DLCO down from previous but has been that low before. He follows with Dr. Shine for pulmonary sarcoid who plans to repeat PET scan. Patient had been on amiodarone which he did not tolerate due to nausea and fatigue and he self stopped this mediation on 2/10/17. He is noted to be hypertensive today 170-180's/100's. Presenting 12 lead ECG shows NSR with 1 AVB Vent Rate 87 bpm,  ms, QRS 76 ms, QTc 481ms. Current cardiac medications include: PRN lasix, metoprolol, lipitor, and warfarin.     Cardiopulmonary  Sarcoidosis:   - Continue MTX and remicade   - possibly having \"sarcoid flare\" given arrhythmias, ACEVES, and slightly elevated troponins on recent hospital admission. He was seen by his " pulmonologist Dr. Perlman today who plans to get updated PET sca. Patient will also be establishing with Dr. Paige in the near future for her cardiac sarcoid expertise.   - His shortness of Breath/Dyspnea on Exertion is likely multifactorial d/t cardiopulmonary sarcoid/COPD. It is improving.     Atrial Flutter/Atrial Fibrillation:   - Stroke Prophylaxis:  CHADSVASC=+DM2, +HTN, +CAD, +age  2, corresponding to a 4.0% annual stroke / systemic emolism event rate. indicating need for long term oral anticoagulation.  He is appropriately on warfarin. No bleeding issues.   - Rate Control: Continue metoprolol.   - Rhythm Control: Cardioversion, Antiarrhythmics and/or ablation are options for rhythm control. S/p CTI ablation on 1/23/17 with AF post ablation s/p DCCV during ablation which was not successful and then loaded with amiodarone and had a successful DCCV on 1/23/17. He remains in sinus. He did not tolerate amiodarone and stopped medication on 2/10/17. Renal function and CAD limit AAT options. We do not feel aggressive rhythm control strategy is needed at this stage.   - Risk Factor Management: Continue Statin, RAHEEM evaluation as indicated, and maintain tight bP control. He is hypertensive today and we will start him on Norvasc 10 mg daily. We have asked him to record his BPs at home and send up an updated in 3-4 weeks. He will come in for a nurses visit in a week to monitor BP and calibrate his home BP machine.      Follow up in 3 months   The patient states understanding and is agreeable with plan.   This patient was seen and examined with Dr. Vazquez. The above note reflects our joint assessment and plan.   VINNY Fox CNP  Pager: [phone]    EP STAFF NOTE  Patient seen and examined and management plan personally reviewed with the patient. I agree with the note above by Elizabeth Harris, EP Nurse. Changes in the physical examination, assessment and plan have been incorporated into the note by myself, as to make  it a single cohesive document.  Brian Vazquez MD [facility]  Cardiology - Electrophysiology

## 2017-02-24 NOTE — NURSING NOTE
Med Reconcile: Reviewed and verified all current medications with the patient. The updated medication list was printed and given to the patient.    New Medication: Norvasc 10 mg daily.  Patient was educated regarding newly prescribed medication, including discussion of  the indication, administration, side effects, and when to report to MD or RN. Patient demonstrated understanding of this information and agreed to call with further questions or concerns.    Return Appointment: Follow up with Dr Vazquez in 4 months.  Patient given instructions regarding scheduling next clinic visit. Patient demonstrated understanding of this information and agreed to call with further questions or concerns.    Patient stated he understood all health information given and agreed to call with further questions or concerns.    Chaitanya Farrell RN  RN Care Coordinator  [facility]  [phone]

## 2017-02-24 NOTE — PATIENT INSTRUCTIONS
1.  Start taking Norvasc 5 mg daily  2.  Please monitor your blood pressures at home.  When you are here for one of your next appointments in the building, please let us know so we can set up a nurse visit to compare the readings.  3.  Follow up with Dr Vazquez in 4 months    Cardiovascular Clinic:   [address] [place] [address]. [place] [address]  Your Care Team:  EP Cardiology   Telephone Number     Elizabeth Vazquez [phone]  To have me paged: [phone]   Jaclyn Lees RN  [phone]     For scheduling appts or procedures:    Joie Savage    [phone]      As always, Thank you for trusting us with your health care needs!

## 2017-02-24 NOTE — MR AVS SNAPSHOT
After Visit Summary   2/24/2017    Rohna Monroe    MRN: [id number]           Patient Information     Date Of Birth          1945        Visit Information        Provider Department      2/24/2017 11:00 AM Brian Vazquez MD [facility]        Today's Diagnoses     HTN (hypertension)    -  1    Atrial flutter, unspecified type (H)          Care Instructions    1.  Start taking Norvasc 5 mg daily  2.  Please monitor your blood pressures at home.  When you are here for one of your next appointments in the building, please let us know so we can set up a nurse visit to compare the readings.  3.  Follow up with Dr Vazquez in 4 months    Cardiovascular Clinic:   [address] [place] [address]. [place] [address]  Your Care Team:   Cardiology   Telephone Number     Elizabeth Harris NP  Dr. Brian Vazquez [phone]  To have me paged: [phone]   Jaclyn Lees RN  [phone]     For scheduling appts or procedures:    Joie Savage    [phone]      As always, Thank you for trusting us with your health care needs!          Follow-ups after your visit        Follow-up notes from your care team     Return in about 4 months (around 6/24/2017) for Dr Vazquez dx Sarcoidosis, aflutter.      Your next 10 appointments already scheduled     Feb 28, 2017 11:15 AM CST   (Arrive by 11:00 AM)   Return General Liver with Moses Orosco MD   [facility] ([facility])    [address] [place] [address] [phone]            Feb 28, 2017 12:00 PM CST   Lab with  LAB    Health Lab ([facility])    [address] [place] [address] [phone]            Feb 28, 2017  1:00 PM CST   (Arrive by 12:30 PM)   Return Visit with Ollie Michel MD   [facility] ([facility])    [address] [place] [address]    [phone]            Mar 06, 2017  8:30 AM CST   VISUAL FIELD with [facility] EYE VISUAL FIELD   Eye Clinic ([facility])    [address]  [address] Clin 9a  [facility] [address]   [phone]            Mar 06, 2017  9:00 AM CST   RETURN GLAUCOMA with Kina Greco MD   Eye Clinic ([facility])    [address]  [address]  [address] Clin 9a  [facility] [address]   [phone]            Mar 06, 2017 10:15 AM CST   RETURN RETINA with Sandee Middleton MD   Eye Clinic ([facility])    [address]  [address]  [address] Clin 9a  [facility] [address] [phone]            Mar 09, 2017  1:00 PM CST   (Arrive by 12:45 PM)   NEW HEART FAILURE with Diane Paige MD   [facility] ([facility])    [address]  3rd Floor  [facility] [id number] [phone]            Mar 30, 2017 12:00 PM CDT   Infusion 180 with UC SPEC INFUSION, UC 49 ATC   [facility] ([facility])    [address]  2nd Floor  [facility] [id number] [phone]            Apr 11, 2017  1:00 PM CDT   Lab with UC LAB   [facility] ([facility])    [address]  1st Floor  [facility] [address] [phone]              Future tests that were ordered for you today     Open Future Orders        Priority Expected Expires Ordered    General PFT Lab (Please always keep checked) Routine  2/24/2018 2/24/2017    Pulmonary Function Test Routine  2/24/2018 2/24/2017    6 minute walk test Routine  2/24/2018 2/24/2017            Who to contact     If you have questions or need follow up information about today's clinic visit or your schedule please contact [facility] directly at [phone].  Normal or non-critical lab and imaging results will be communicated to you by Jalen, letter  "or phone within 4 business days after the clinic has received the results. If you do not hear from us within 7 days, please contact the clinic through [employer] or phone. If you have a critical or abnormal lab result, we will notify you by phone as soon as possible.  Submit refill requests through [employer] or call your pharmacy and they will forward the refill request to us. Please allow 3 business days for your refill to be completed.          Additional Information About Your Visit        MilePoint Information     [employer] gives you secure access to your electronic health record. If you see a primary care provider, you can also send messages to your care team and make appointments. If you have questions, please call your primary care clinic.  If you do not have a primary care provider, please call [phone] and they will assist you.        Care EveryWhere ID     This is your Care EveryWhere ID. This could be used by other organizations to access your [place] medical records  [id number]        Your Vitals Were     Pulse Temperature Height Pulse Oximetry BMI (Body Mass Index)       80 97.8  F (36.6  C) (Oral) 1.74 m (5' 8.5\") 92% 29.97 kg/m2        Blood Pressure from Last 3 Encounters:   02/24/17 (!) 178/106   02/16/17 (!) 163/95   01/31/17 137/90    Weight from Last 3 Encounters:   02/24/17 90.7 kg (200 lb)   02/24/17 90.7 kg (200 lb)   02/16/17 91.6 kg (202 lb)              We Performed the Following     EKG 12-lead, tracing only (Future)          Today's Medication Changes          These changes are accurate as of: 2/24/17 12:08 PM.  If you have any questions, ask your nurse or doctor.               Start taking these medicines.        Dose/Directions    amLODIPine 10 MG tablet   Commonly known as:  NORVASC   Used for:  HTN (hypertension)   Started by:  Brian Vazquez MD        Dose:  10 mg   Take 1 tablet (10 mg) by mouth daily   Quantity:  90 tablet   Refills:  3         These medicines have changed or have " updated prescriptions.        Dose/Directions    Urea 40 % Crea   Commonly known as:  CARMOL 40   This may have changed:  additional instructions   Used for:  Dermatophytosis of nail, Corns and callosities        To feet daily   Quantity:  199 g   Refills:  4            Where to get your medicines      These medications were sent to [id number] - [place], [place] - [address]  [address] [address]     Phone:  [phone]     amLODIPine 10 MG tablet                Primary Care Provider Office Phone # Fax #    Jamie Foster [phone]       [facility] [address] [address]        Thank you!     Thank you for choosing [facility]  for your care. Our goal is always to provide you with excellent care. Hearing back from our patients is one way we can continue to improve our services. Please take a few minutes to complete the written survey that you may receive in the mail after your visit with us. Thank you!             Your Updated Medication List - Protect others around you: Learn how to safely use, store and throw away your medicines at www.disposemymeds.org.          This list is accurate as of: 2/24/17 12:08 PM.  Always use your most recent med list.                   Brand Name Dispense Instructions for use    albuterol 108 (90 BASE) MCG/ACT Inhaler    PROAIR HFA/PROVENTIL HFA/VENTOLIN HFA    3 Inhaler    Inhale 2 puffs into the lungs every 6 hours as needed for shortness of breath / dyspnea       amLODIPine 10 MG tablet    NORVASC    90 tablet    Take 1 tablet (10 mg) by mouth daily       ASPIRIN EC PO      Take 81 mg by mouth daily       atorvastatin 20 MG tablet    LIPITOR    90 tablet    Take 1 tablet (20 mg) by mouth daily       blood glucose monitoring lancets     2 Box    Use to test blood sugar 2 times daily or as directed.  102 lancets per box.  3 month supply.       blood glucose monitoring  meter device kit    no brand specified    1 kit    Use to test blood sugar 2 times daily.       blood glucose monitoring test strip    ACCU-CHEK RONNIE PLUS    200 each    Use to test blood sugar 2 times daily or as directed.  3 month supply.       ciclopirox 0.77 % cream    LOPROX    90 g    Apply topically 2 times daily To feet and toenails.       fluticasone-vilanterol 100-25 MCG/INH oral inhaler    BREO ELLIPTA    3 Inhaler    Inhale 1 puff into the lungs daily       furosemide 20 MG tablet    LASIX    180 tablet    Take 2 tablets (40 mg) by mouth daily as needed For weight gain of 3 lbs or greater       * INFLIXIMAB IV      Every 45 days       * inFLIXimab 100 MG injection    REMICADE         levothyroxine 137 MCG tablet    SYNTHROID/LEVOTHROID    90 tablet    Take 1 tablet (137 mcg) by mouth daily       methotrexate 2.5 MG tablet CHEMO     48 tablet    Take 3 tablets (7.5 mg) by mouth once a week On Mondays       metoprolol 100 MG tablet    LOPRESSOR    60 tablet    Take 1 tablet (100 mg) by mouth 2 times daily       mirtazapine 15 MG tablet    REMERON     Take 15 mg by mouth At Bedtime.       MULTIVITAMIN & MINERAL PO      Take 1 tablet by mouth daily.       polyethylene glycol powder    MIRALAX    510 g    Take 17 g (1 capful) by mouth daily       sildenafil 50 MG cap/tab    VIAGRA    10 tablet    Take 2 tablets (100 mg) by mouth daily as needed for erectile dysfunction       tiotropium 18 MCG capsule    SPIRIVA HANDIHALER    90 capsule    Inhale contents of one capsule daily.       Urea 40 % Crea    CARMOL 40    199 g    To feet daily       warfarin 5 MG tablet    COUMADIN    30 tablet    Take 1 tablet (5 mg) by mouth daily       * Notice:  This list has 2 medication(s) that are the same as other medications prescribed for you. Read the directions carefully, and ask your doctor or other care provider to review them with you.

## 2017-02-24 NOTE — MR AVS SNAPSHOT
After Visit Summary   2/24/2017    Rohan Monroe    MRN: [id number]           Patient Information     Date Of Birth          1945        Visit Information        Provider Department      2/24/2017 10:30 AM Perlman, David Morris, MD [facility]        Today's Diagnoses     ILD (interstitial lung disease) (H)    -  1       Follow-ups after your visit        Follow-up notes from your care team     Return in about 3 months (around 5/24/2017).      Your next 10 appointments already scheduled     Feb 28, 2017 11:15 AM CST   (Arrive by 11:00 AM)   Return General Liver with Moses Orosco MD   [facility] ([facility])    [address]  3rd Floor  [facility] [address]   [phone]            Feb 28, 2017 12:00 PM CST   Lab with  LAB   [facility] Lab ([facility])    [address]  1st Floor  [facility] [address]   [phone]            Feb 28, 2017  1:00 PM CST   (Arrive by 12:30 PM)   Return Visit with Ollie Michel MD   [facility] Nephrology ([facility])    [address]  3rd [facility] [address]   [phone]            Mar 06, 2017  8:30 AM CST   VISUAL FIELD with [facility] EYE VISUAL FIELD   Eye Clinic ([facility])    [address]  [address]  9th [address] Clin 9a  [facility] [address]   [phone]            Mar 06, 2017  9:00 AM CST   RETURN GLAUCOMA with Kina Greco MD   Eye Clinic ([facility])    [address]  [address]  [address] [place] [address]   [phone]            Mar 06, 2017 10:15 AM CST   RETURN RETINA with Sandee Middleton MD   Eye Clinic ([facility])    [address]  [address]  [address] Clin 9a  [facility] [address]   [phone]            Mar 09, 2017  1:00 PM CST   (Arrive by 12:45 PM)   NEW HEART FAILURE  with Diane Paieg MD   [facility] ([facility])    [address]  3rd Floor  [facility] [address] [phone]            Mar 30, 2017 12:00 PM CDT   Infusion 180 with UC SPEC INFUSION, UC 49 ATC   [facility] ([facility])    [address]  2nd Floor  [facility] [address] [phone]            Apr 11, 2017  1:00 PM CDT   Lab with UC LAB   [facility] Lab ([facility])    [address]  1st Floor  [facility] [address] [phone]              Future tests that were ordered for you today     Open Future Orders        Priority Expected Expires Ordered    General PFT Lab (Please always keep checked) Routine  2/24/2018 2/24/2017    Pulmonary Function Test Routine  2/24/2018 2/24/2017    6 minute walk test Routine  2/24/2018 2/24/2017            Who to contact     If you have questions or need follow up information about today's clinic visit or your schedule please contact [facility] directly at [phone].  Normal or non-critical lab and imaging results will be communicated to you by MyChart, letter or phone within 4 business days after the clinic has received the results. If you do not hear from us within 7 days, please contact the clinic through [employer] or phone. If you have a critical or abnormal lab result, we will notify you by phone as soon as possible.  Submit refill requests through [employer] or call your pharmacy and they will forward the refill request to us. Please allow 3 business days for your refill to be completed.          Additional Information About Your Visit        [employer] Information     [employer] gives you secure access to your electronic health record. If you see a primary care provider, you can also send messages to your care team and make appointments. If you have questions, please call  "your primary care clinic.  If you do not have a primary care provider, please call [phone] and they will assist you.        Care EveryWhere ID     This is your Care EveryWhere ID. This could be used by other organizations to access your [place] medical records  [id number]        Your Vitals Were     Pulse Temperature Respirations Height Pulse Oximetry BMI (Body Mass Index)    85 97.8  F (36.6  C) 20 1.74 m (5' 8.5\") 92% 29.97 kg/m2       Blood Pressure from Last 3 Encounters:   02/24/17 (!) 178/106   02/16/17 (!) 163/95   01/31/17 137/90    Weight from Last 3 Encounters:   02/24/17 90.7 kg (200 lb)   02/24/17 90.7 kg (200 lb)   02/16/17 91.6 kg (202 lb)                 Today's Medication Changes          These changes are accurate as of: 2/24/17  2:47 PM.  If you have any questions, ask your nurse or doctor.               Start taking these medicines.        Dose/Directions    amLODIPine 10 MG tablet   Commonly known as:  NORVASC   Used for:  HTN (hypertension)   Started by:  Brian Vazquez MD        Dose:  10 mg   Take 1 tablet (10 mg) by mouth daily   Quantity:  90 tablet   Refills:  3         These medicines have changed or have updated prescriptions.        Dose/Directions    Urea 40 % Crea   Commonly known as:  CARMOL 40   This may have changed:  additional instructions   Used for:  Dermatophytosis of nail, Corns and callosities        To feet daily   Quantity:  199 g   Refills:  4            Where to get your medicines      These medications were sent to [id number] - [place], [place] - [address] [address]     Phone:  [phone]     amLODIPine 10 MG tablet    warfarin 5 MG tablet                Primary Care Provider Office Phone # Fax #    Jamie Foster [phone]       [address] [address]        Thank you!     Thank you for choosing [facility]  for your care. Our goal is always to provide you with excellent care. Hearing back from our patients is one way we can continue to improve our services. Please take a few minutes to complete the written survey that you may receive in the mail after your visit with us. Thank you!             Your Updated Medication List - Protect others around you: Learn how to safely use, store and throw away your medicines at www.disposemymeds.org.          This list is accurate as of: 2/24/17  2:47 PM.  Always use your most recent med list.                   Brand Name Dispense Instructions for use    albuterol 108 (90 BASE) MCG/ACT Inhaler    PROAIR HFA/PROVENTIL HFA/VENTOLIN HFA    3 Inhaler    Inhale 2 puffs into the lungs every 6 hours as needed for shortness of breath / dyspnea       amLODIPine 10 MG tablet    NORVASC    90 tablet    Take 1 tablet (10 mg) by mouth daily       ASPIRIN EC PO      Take 81 mg by mouth daily       atorvastatin 20 MG tablet    LIPITOR    90 tablet    Take 1 tablet (20 mg) by mouth daily       blood glucose monitoring lancets     2 Box    Use to test blood sugar 2 times daily or as directed.  102 lancets per box.  3 month supply.       blood glucose monitoring meter device kit    no brand specified    1 kit    Use to test blood sugar 2 times daily.       blood glucose monitoring test strip    ACCU-CHEK RONNIE PLUS    200 each    Use to test blood sugar 2 times daily or as directed.  3 month supply.       ciclopirox 0.77 % cream    LOPROX    90 g    Apply topically 2 times daily To feet and toenails.       fluticasone-vilanterol 100-25 MCG/INH oral inhaler    BREO ELLIPTA    3 Inhaler    Inhale 1 puff into the lungs daily       furosemide 20 MG tablet    LASIX    180 tablet    Take 2 tablets (40 mg) by mouth daily as needed For weight gain of 3 lbs or greater       * INFLIXIMAB IV      Every 45 days       * inFLIXimab 100 MG injection    REMICADE         levothyroxine 137 MCG tablet     SYNTHROID/LEVOTHROID    90 tablet    Take 1 tablet (137 mcg) by mouth daily       methotrexate 2.5 MG tablet CHEMO     48 tablet    Take 3 tablets (7.5 mg) by mouth once a week On Mondays       metoprolol 100 MG tablet    LOPRESSOR    60 tablet    Take 1 tablet (100 mg) by mouth 2 times daily       mirtazapine 15 MG tablet    REMERON     Take 15 mg by mouth At Bedtime.       MULTIVITAMIN & MINERAL PO      Take 1 tablet by mouth daily.       polyethylene glycol powder    MIRALAX    510 g    Take 17 g (1 capful) by mouth daily       sildenafil 50 MG cap/tab    VIAGRA    10 tablet    Take 2 tablets (100 mg) by mouth daily as needed for erectile dysfunction       tiotropium 18 MCG capsule    SPIRIVA HANDIHALER    90 capsule    Inhale contents of one capsule daily.       Urea 40 % Crea    CARMOL 40    199 g    To feet daily       warfarin 5 MG tablet    COUMADIN    30 tablet    Take 1 tablet (5 mg) by mouth daily       * Notice:  This list has 2 medication(s) that are the same as other medications prescribed for you. Read the directions carefully, and ask your doctor or other care provider to review them with you.

## 2017-02-24 NOTE — LETTER
2/24/2017      RE: Rohan Monroe  [address]  [place] [id number]       Dear Colleague,    Thank you for the opportunity to participate in the care of your patient, Rohan Monroe, at the [facility]. Please see a copy of my visit note below.    HPI:   Mr. Monroe is a 71 year old male who has a past medical history significant for pulmonary and cardiac sarcoidosis, CAD s/p PCI mLAD and D2 2008, DM2, HTN, HLD, Hep C, hypothyroidism, and AFL s/p DCCV 1/19/17 with early recurrence and s/p CTI ablation on 1/23/17 (CHADSVASC 4).   He presented on 1/18/17 to [facility] with a 3-4 week history of worsening SOB which worsened over the last several days. He also endorsed chest pain with deep breathing. He was found to be in AFL with RVR Vent Rate 150 bpm. Toponin elevated to 0.482 thought to be demand ischemia. He was subsequently admitted. He was started on heparin infusion. He then underwent a DEAN/DCCV on 1/19/17. He was noted to have frequent PACs and runs of AT post DCCV for which he was started on amiodarone. His DEAN pre DCCV showed LVEF 5% (done in AFL) but repeat surface TTE showed LVEF 55%. He then recurred back into AFL, appearing mostly typical on ECGs and was referred for ablation. He underwent a successful CTI ablation on 1/23/17. He went into AF post ablation while testing. He was given IV amiodarone bolus and DCCV attempted several times unsuccessfully. After further amiodarone loading, he underwent a successful DCCV on 1/24/17.  He was bridged to therapeutic warfarin. He developed RADHA with Scr up to 2.7 and was 2.4 at discharge. His losartan was stopped and diuretics were changed to PRN. He was discharged on 1/25/17 with plan to follow up with CORE and EP.     He then presented to clinic on 1/31/17  Reporting continued ACEVES and feels his breathing is not improving after hospital discharge. He had labs drawn 2 days  after discharge on 1/27/17 which showed SCr 2.48, GFR 26. He states he has taken PRN lasix once which helped his symptoms somewhat. He felt very functionally limited and cannot walk too far without getting short of breath. An echo was ordered showed LVEF 50-55%, IVC normal, RV mildly reduced function, and no significant valvular issues. Labs show improved Scr 1.89 GFR 33 and Sed rate 73.     Today, he reports his SOB and ACEVES is improving but not back to his prior baseline. He had PFTs today which showed mixed restriction and obstruction, moderate to severe reduction in DLCO. Spirometry overall stable, DLCO down from previous but has been that low before. He follows with Dr. Shine for pulmonary sarcoid who plans to repeat PET scan. Patient had been on amiodarone which he did not tolerate due to nausea and fatigue and he self stopped this mediation on 2/10/17. He is noted to be hypertensive today 170-180's/100's. Presenting 12 lead ECG shows NSR with 1 AVB Vent Rate 87 bpm,  ms, QRS 76 ms, QTc 481ms. Current cardiac medications include: PRN lasix, metoprolol, lipitor, and warfarin.     PAST MEDICAL HISTORY:  Past Medical History   Diagnosis Date     Cataract of both eyes      Chronic infection      Hep C     Congestive heart failure, unspecified      Depressive disorder, not elsewhere classified      Depression (non-psychotic)     ERM OS (epiretinal membrane, left eye)      Generalized osteoarthrosis, unspecified site      Glaucoma suspect      Hypertension      Lichen planus      Other psoriasis      PVD (posterior vitreous detachment), left eye      Sarcoidosis (H)      Sarcoidosis (H)      Type II or unspecified type diabetes mellitus without mention of complication, not stated as uncontrolled      Unspecified hypothyroidism      Hypothyroidism     Unspecified viral hepatitis C without hepatic coma      Viral warts, unspecified        CURRENT MEDICATIONS:  Current Outpatient Prescriptions   Medication Sig  Dispense Refill     polyethylene glycol (MIRALAX) powder Take 17 g (1 capful) by mouth daily 510 g 1     tiotropium (SPIRIVA HANDIHALER) 18 MCG capsule Inhale contents of one capsule daily. 90 capsule 3     albuterol (PROAIR HFA/PROVENTIL HFA/VENTOLIN HFA) 108 (90 BASE) MCG/ACT Inhaler Inhale 2 puffs into the lungs every 6 hours as needed for shortness of breath / dyspnea 3 Inhaler 6     fluticasone-vilanterol (BREO ELLIPTA) 100-25 MCG/INH oral inhaler Inhale 1 puff into the lungs daily 3 Inhaler 3     warfarin (COUMADIN) 5 MG tablet Take 1 tablet (5 mg) by mouth daily 30 tablet 3     furosemide (LASIX) 20 MG tablet Take 2 tablets (40 mg) by mouth daily as needed For weight gain of 3 lbs or greater 180 tablet 3     inFLIXimab (REMICADE) 100 MG injection        metoprolol (LOPRESSOR) 100 MG tablet Take 1 tablet (100 mg) by mouth 2 times daily 60 tablet 11     levothyroxine (SYNTHROID/LEVOTHROID) 137 MCG tablet Take 1 tablet (137 mcg) by mouth daily 90 tablet 0     methotrexate 2.5 MG tablet Take 3 tablets (7.5 mg) by mouth once a week On Mondays 48 tablet 3     ciclopirox (LOPROX) 0.77 % cream Apply topically 2 times daily To feet and toenails. 90 g 6     atorvastatin (LIPITOR) 20 MG tablet Take 1 tablet (20 mg) by mouth daily 90 tablet 3     ASPIRIN EC PO Take 81 mg by mouth daily       blood glucose monitoring (ACCU-CHEK RONNIE PLUS) test strip Use to test blood sugar 2 times daily or as directed.  3 month supply. 200 each 3     blood glucose monitoring (ACCU-CHEK FASTCLIX) lancets Use to test blood sugar 2 times daily or as directed.  102 lancets per box.  3 month supply. 2 Box 3     blood glucose monitoring (NO BRAND SPECIFIED) meter device kit Use to test blood sugar 2 times daily. 1 kit 0     Urea (CARMOL 40) 40 % CREA To feet daily (Patient taking differently: To feet daily PRN) 199 g 4     sildenafil (VIAGRA) 50 MG tablet Take 2 tablets (100 mg) by mouth daily as needed for erectile dysfunction 10 tablet 6      INFLIXIMAB IV Every 45 days       Multiple Vitamins-Minerals (MULTIVITAMIN & MINERAL PO) Take 1 tablet by mouth daily.       mirtazapine (REMERON) 15 MG tablet Take 15 mg by mouth At Bedtime.         PAST SURGICAL HISTORY:  Past Surgical History   Procedure Laterality Date     Hc repair incisional hernia,reducible       Hernia Repair, Incisional, Unilateral     C ligatn/strip long & short saphen       Hc removal of tonsils,<11 y/o       Tonsils <10y.o.     Cardiac stent       s/p     Arthroplasty hip  8/24/2011     Procedure:ARTHROPLASTY HIP; Right Total Hip Arthroplasty  Choice anesthesia; Surgeon:LESLI WILKINSON; Location:[facility] OR     Joint replacement       1 month ago--right hip     Cataract iol, rt/lt  9/15/2015     LE     Colonoscopy       Cardiac surgery       Biopsy       Anesthesia cardioversion N/A 1/19/2017     Procedure: ANESTHESIA CARDIOVERSION;  Surgeon: GENERIC ANESTHESIA PROVIDER;  Location: [facility] OR     Anesthesia cardioversion N/A 1/23/2017     Procedure: ANESTHESIA CARDIOVERSION;  Surgeon: GENERIC ANESTHESIA PROVIDER;  Location: [facility] OR     Anesthesia cardioversion N/A 1/24/2017     Procedure: ANESTHESIA CARDIOVERSION;  Surgeon: GENERIC ANESTHESIA PROVIDER;  Location: [facility] OR       ALLERGIES:     Allergies   Allergen Reactions     Prednisone Other (See Comments)     He reports that he can't sleep for days and can't use small to massive doses of prednisone   Pt. Does not do well with high doses of Prednisone, His MD says that Prednisone is counter indicated. Prednisone failed to treat his sarcoidosis        FAMILY HISTORY:  Family History   Problem Relation Age of Onset     HEART DISEASE Father      irreg heart beat     Circulatory Father      varicose veins     HEART DISEASE Mother      heart attack     Arthritis Mother      DIABETES Sister      type 2     Eye Disorder Maternal Grandmother      glaucoma     Glaucoma No family hx of      Macular Degeneration No family hx of      CANCER No family hx of       "no skin cancer     Kidney Cancer Sister      DIABETES No family hx of      DIABETES Sister      OSTEOPOROSIS Mother      Thyroid Disease Mother      Prostate Cancer Father        SOCIAL HISTORY:  Social History   Substance Use Topics     Smoking status: Former Smoker     Packs/day: 1.00     Years: 30.00     Types: Cigarettes     Start date: 12/30/1960     Quit date: 7/22/1994     Smokeless tobacco: Never Used     Alcohol use No       ROS:   A comprehensive 10 point review of systems negative other than as mentioned in HPI.  Exam:  BP (!) 178/106 (BP Location: Left arm, Patient Position: Supine, Cuff Size: Adult Regular)  Pulse 80  Temp 97.8  F (36.6  C) (Oral)  Ht 1.74 m (5' 8.5\")  Wt 90.7 kg (200 lb)  SpO2 92%  BMI 29.97 kg/m2  GENERAL APPEARANCE: alert and no distress  HEENT: no icterus, no xanthelasmas, normal pupil size and reaction, normal palate, mucosa moist, no central cyanosis  NECK: no adenopathy, no asymmetry, masses, or scars, thyroid normal to palpation and no bruits, JVP not elevated  RESPIRATORY: lungs clear to auscultation - no rales, rhonchi or wheezes, no use of accessory muscles, no retractions, respirations are unlabored, normal respiratory rate  CARDIOVASCULAR: regular rhythm, normal S1 with physiologic split S2, no S3 or S4 and no murmur, click or rub, precordium quiet with normal PMI.  ABDOMEN: soft, non tender, without hepatosplenomegaly, no masses palpable, bowel sounds normal, aorta not enlarged by palpation, no abdominal bruits  EXTREMITIES: peripheral pulses normal, no edema, no bruits  NEURO: alert and oriented to person/place/time, normal speech, gait and affect  VASC: Radial, femoral, dorsalis pedis and posterior tibialis pulses are normal in volumes and symmetric bilaterally. No bruits are heard.  SKIN: no ecchymoses, no rashes    Labs:  Reviewed.     Testing/Procedures:  PULMONARY FUNCTION TESTS:   PFT Latest Ref Rng & Units 2/24/2017   FVC L 2.74   FEV1 L 1.16   FVC% % 69   FEV1% % " 39     1/18/17 DEAN:  Interpretation Summary  No left atrial mass or thrombus visualized.  There is spontaneous echo contrast in the left atrium.  The LV systolic function is severely reduced with global hypokinesis. The  LVEF is estimated at 5-10% at a heart rate of 150 bpm.  This study was compared with the study from 3/2015, the LVEF is reduced and  patient is in atrial flutter.  1/18/17 ECHOCARDIOGRAM:   Interpretation Summary  The Ejection Fraction is estimated at 50-55%.      Assessment and Plan:   Mr. Monroe is a 71 year old male who has a past medical history significant for pulmonary and cardiac sarcoidosis, CAD s/p PCI mLAD and D2 2008, DM2, HTN, HLD, Hep C, hypothyroidism, and AFL s/p DCCV 1/19/17 with early recurrence and s/p CTI ablation on 1/23/17 (CHADSVASC 4). He was admitted on 1/18/17 to 1/25/17 with a 3-4 week history of worsening SOB which worsened over the last several days. He was found to be in AFL with RVR Vent Rate 150 bpm and he underwent a DEAN/DCCV with early reccurace back into AFL, appearing mostly typical on ECGs and was referred for ablation. He underwent a successful CTI ablation on 1/23/17. He developed RADHA with Scr up to 2.7 and was 2.4 at discharge. His losartan was stopped and diuretics were changed to PRN. Recent echo shows LVEF 50-55%, IVC normal, RV mildly reduced, and no significant valvular issues. Today, he reports his SOB and ACEVES is improving but not back to his prior baseline. He had PFTs today which showed mixed restriction and obstruction, moderate to severe reduction in DLCO. Spirometry overall stable, DLCO down from previous but has been that low before. He follows with Dr. Shine for pulmonary sarcoid who plans to repeat PET scan. Patient had been on amiodarone which he did not tolerate due to nausea and fatigue and he self stopped this mediation on 2/10/17. He is noted to be hypertensive today 170-180's/100's. Presenting 12 lead ECG shows NSR with 1 AVB Vent Rate 87  "bpm,  ms, QRS 76 ms, QTc 481ms. Current cardiac medications include: PRN lasix, metoprolol, lipitor, and warfarin.     Cardiopulmonary  Sarcoidosis:   - Continue MTX and remicade   - possibly having \"sarcoid flare\" given arrhythmias, ACEVES, and slightly elevated troponins on recent hospital admission. He was seen by his pulmonologist Dr. Perlman today who plans to get updated PET sca. Patient will also be establishing with Dr. Paige in the near future for her cardiac sarcoid expertise.   - His shortness of Breath/Dyspnea on Exertion is likely multifactorial d/t cardiopulmonary sarcoid/COPD. It is improving.     Atrial Flutter/Atrial Fibrillation:   - Stroke Prophylaxis:  CHADSVASC=+DM2, +HTN, +CAD, +age  2, corresponding to a 4.0% annual stroke / systemic emolism event rate. indicating need for long term oral anticoagulation.  He is appropriately on warfarin. No bleeding issues.   - Rate Control: Continue metoprolol.   - Rhythm Control: Cardioversion, Antiarrhythmics and/or ablation are options for rhythm control. S/p CTI ablation on 1/23/17 with AF post ablation s/p DCCV during ablation which was not successful and then loaded with amiodarone and had a successful DCCV on 1/23/17. He remains in sinus. He did not tolerate amiodarone and stopped medication on 2/10/17. Renal function and CAD limit AAT options. We do not feel aggressive rhythm control strategy is needed at this stage.   - Risk Factor Management: Continue Statin, RAHEEM evaluation as indicated, and maintain tight bP control. He is hypertensive today and we will start him on Norvasc 10 mg daily. We have asked him to record his BPs at home and send up an updated in 3-4 weeks. He will come in for a nurses visit in a week to monitor BP and calibrate his home BP machine.      Follow up in 3 months   The patient states understanding and is agreeable with plan.   This patient was seen and examined with Dr. Vazquez. The above note reflects our joint " assessment and plan.   VINNY Fox CNP  Pager: [phone]    EP STAFF NOTE  Patient seen and examined and management plan personally reviewed with the patient. I agree with the note above by Elizabeth Harris, EP Nurse. Changes in the physical examination, assessment and plan have been incorporated into the note by myself, as to make it a single cohesive document.        Brian Vazquez MD [facility]  Cardiology - Electrophysiology

## 2017-02-24 NOTE — LETTER
2/24/2017       RE: Rohan Monroe  [address]  [place] [address]     Dear Colleague,    Thank you for referring your patient, Rohan Monroe, to the [facility] at [facility]. Please see a copy of my visit note below.    Reason for Visit  Rohan Monroe is a 71 year old year old male who is being seen for RECHECK (Follow up Sacroids)    ILD HPI    Denis Monroe is a 71-year-old male with a history of pulmonary and cardiac sarcoidosis, along with some obstructive lung disease and liver disease due to hep C, who is seen today for followup.  The patient has been stable, maintained on 7.5 mg weekly of methotrexate and Remicade infusions for several years.  We had spaced his Remicade infusions to every 8 weeks as he was doing well and he seemed to tolerate this okay.  However, he recently was hospitalized with an episode of atrial fibrillation, ultimately underwent cardioversion and ablation procedure.  Since then he had a lot worsening shortness of breath that did not resolve with the fixing of the atrial fibrillation.  We decided to bring up the frequency of his Remicade infusions to every 6 weeks and he does feel that since his last infusion, his breathing has improved and he is continuing to improve and not quite back to his normal baseline, but feels that he is pretty close.  He otherwise is tolerating the medications well.  In terms of his cardiac sarcoid he has followup today with Dr. Brian Vazquez, and then in a couple weeks with Dr. Paige regarding his cardiac sarcoid.  Otherwise, he does note occasional phlegm production but is generally clear without significant yellow or green color.  Denies fevers, chills, night sweats.           Current Outpatient Prescriptions   Medication     polyethylene glycol (MIRALAX) powder     tiotropium (SPIRIVA HANDIHALER) 18 MCG capsule     albuterol (PROAIR  HFA/PROVENTIL HFA/VENTOLIN HFA) 108 (90 BASE) MCG/ACT Inhaler     fluticasone-vilanterol (BREO ELLIPTA) 100-25 MCG/INH oral inhaler     furosemide (LASIX) 20 MG tablet     [DISCONTINUED] warfarin (COUMADIN) 5 MG tablet     inFLIXimab (REMICADE) 100 MG injection     metoprolol (LOPRESSOR) 100 MG tablet     levothyroxine (SYNTHROID/LEVOTHROID) 137 MCG tablet     methotrexate 2.5 MG tablet     ciclopirox (LOPROX) 0.77 % cream     atorvastatin (LIPITOR) 20 MG tablet     ASPIRIN EC PO     blood glucose monitoring (ACCU-CHEK RONNIE PLUS) test strip     blood glucose monitoring (ACCU-CHEK FASTCLIX) lancets     blood glucose monitoring (NO BRAND SPECIFIED) meter device kit     Urea (CARMOL 40) 40 % CREA     sildenafil (VIAGRA) 50 MG tablet     INFLIXIMAB IV     Multiple Vitamins-Minerals (MULTIVITAMIN & MINERAL PO)     mirtazapine (REMERON) 15 MG tablet     amLODIPine (NORVASC) 10 MG tablet     warfarin (COUMADIN) 5 MG tablet     No current facility-administered medications for this visit.      Allergies   Allergen Reactions     Prednisone Other (See Comments)     He reports that he can't sleep for days and can't use small to massive doses of prednisone   Pt. Does not do well with high doses of Prednisone, His MD says that Prednisone is counter indicated. Prednisone failed to treat his sarcoidosis      Past Medical History   Diagnosis Date     Cataract of both eyes      Chronic infection      Hep C     Congestive heart failure, unspecified      Depressive disorder, not elsewhere classified      Depression (non-psychotic)     ERM OS (epiretinal membrane, left eye)      Generalized osteoarthrosis, unspecified site      Glaucoma suspect      Hypertension      Lichen planus      Other psoriasis      PVD (posterior vitreous detachment), left eye      Sarcoidosis (H)      Sarcoidosis (H)      Type II or unspecified type diabetes mellitus without mention of complication, not stated as uncontrolled      Unspecified hypothyroidism       Hypothyroidism     Unspecified viral hepatitis C without hepatic coma      Viral warts, unspecified        Past Surgical History   Procedure Laterality Date     Hc repair incisional hernia,reducible       Hernia Repair, Incisional, Unilateral     C ligatn/strip long & short saphen       Hc removal of tonsils,<9 y/o       Tonsils <10y.o.     Cardiac stent       s/p     Arthroplasty hip  8/24/2011     Procedure:ARTHROPLASTY HIP; Right Total Hip Arthroplasty  Choice anesthesia; Surgeon:LESLI WILKINSON; Location:[facility] OR     Joint replacement       1 month ago--right hip     Cataract iol, rt/lt  9/15/2015     LE     Colonoscopy       Cardiac surgery       Biopsy       Anesthesia cardioversion N/A 1/19/2017     Procedure: ANESTHESIA CARDIOVERSION;  Surgeon: GENERIC ANESTHESIA PROVIDER;  Location: [facility] OR     Anesthesia cardioversion N/A 1/23/2017     Procedure: ANESTHESIA CARDIOVERSION;  Surgeon: GENERIC ANESTHESIA PROVIDER;  Location: [facility] OR     Anesthesia cardioversion N/A 1/24/2017     Procedure: ANESTHESIA CARDIOVERSION;  Surgeon: GENERIC ANESTHESIA PROVIDER;  Location: [facility] OR       Social History     Social History     Marital status:      Spouse name: N/A     Number of children: 2     Years of education: N/A     Occupational History      [employer]     Social History Main Topics     Smoking status: Former Smoker     Packs/day: 1.00     Years: 30.00     Types: Cigarettes     Start date: 12/30/1960     Quit date: 7/22/1994     Smokeless tobacco: Never Used     Alcohol use No     Drug use: No     Sexual activity: Yes     Partners: Female     Other Topics Concern     Blood Transfusions No     Exercise Yes     Social History Narrative    Dairy/d 2 servings/d    Caffeine little servings/d    Exercise 3 x week    Sunscreen used - Yes    Seatbelts used - Yes    Working smoke/CO detectors in the home - Yes    Guns stored in the home - No    Self Breast Exams - NOT APPLICABLE    Self Testicular Exam - No     Eye Exam up to date - Yes    Dental Exam up to date - Yes    Pap Smear up to date - NOT APPLICABLE    Mammogram up to date - NOT APPLICABLE    PSA up to date - Yes    Dexa Scan up to date - NOT APPLICABLE    Flex Sig / Colonoscopy up to date - Yes    Immunizations up to date - Unsure    Abuse: Current or Past(Physical, Sexual or Emotional)- No    Do you feel safe in your environment - Yes       Family History   Problem Relation Age of Onset     HEART DISEASE Father      irreg heart beat     Circulatory Father      varicose veins     HEART DISEASE Mother      heart attack     Arthritis Mother      DIABETES Sister      type 2     Eye Disorder Maternal Grandmother      glaucoma     Glaucoma No family hx of      Macular Degeneration No family hx of      CANCER No family hx of      no skin cancer     Kidney Cancer Sister      DIABETES No family hx of      DIABETES Sister      OSTEOPOROSIS Mother      Thyroid Disease Mother      Prostate Cancer Father        ROS Pulmonary      Answers for HPI/ROS submitted by the patient on 2/24/2017   General Symptoms: Yes  Skin Symptoms: No  HENT Symptoms: No  EYE SYMPTOMS: No  HEART SYMPTOMS: Yes  LUNG SYMPTOMS: Yes  INTESTINAL SYMPTOMS: Yes  URINARY SYMPTOMS: Yes  REPRODUCTIVE SYMPTOMS: No  SKELETAL SYMPTOMS: No  BLOOD SYMPTOMS: No  NERVOUS SYSTEM SYMPTOMS: No  MENTAL HEALTH SYMPTOMS: Yes  Fever: No  Loss of appetite: No  Weight loss: Yes  Weight gain: No  Fatigue: Yes  Night sweats: No  Chills: No  Increased stress: Yes  Excessive hunger: No  Excessive thirst: No  Feeling hot or cold when others believe the temperature is normal: Yes  Loss of height: No  Post-operative complications: No  Surgical site pain: No  Hallucinations: No  Change in or Loss of Energy: Yes  Hyperactivity: No  Confusion: No  Cough: Yes  Sputum or phlegm: Yes  Coughing up blood: No  Difficulty breating or shortness of breath: Yes  Snoring: No  Wheezing: Yes  Difficulty breathing on exertion: Yes  Respiratory  "pain: No  Nighttime Cough: Yes  Difficulty breathing when lying flat: No  Chest pain or pressure: No  Fast or irregular heartbeat: Yes  Pain in legs with walking: No  Swelling in feet or ankles: Yes  Trouble breathing while lying down: No  Fingers or Toes appear blue: No  High blood pressure: Yes  Low blood pressure: No  Fainting: No  Murmurs: No  Chest pain on exertion: No  Chest pain at rest: No  Cramping pain in leg during exercise: No  Pacemaker: No  Varicose veins: No  Edema or swelling: Yes  Fast heart beat: Yes  Wake up at night with shortness of breath: Yes  Heart flutters: Yes  Light-headedness: No  Exercise intolerance: Yes  Heart burn or indigestion: No  Nausea: No  Vomiting: No  Abdominal pain: No  Bloating: Yes  Constipation: Yes  Diarrhea: No  Blood in stool: No  Black stools: No  Rectal or Anal pain: No  Fecal incontinence: No  Rectal bleeding: No  Yellowing of skin or eyes: No  Vomit with blood: No  Change in stools: No  Hemorrhoids: No  Trouble holding urine or incontinence: No  Pain or burning: No  Trouble starting or stopping: No  Increased frequency of urination: Yes  Blood in urine: No  Decreased frequency of urination: No  Frequent nighttime urination: Yes  Flank pain: No  Difficulty emptying bladder: No  Nervous or Anxious: Yes  Depression: No  Trouble sleeping: No  Trouble thinking or concentrating: No  Mood changes: No  Panic attacks: No      A complete ROS was otherwise negative except as noted in the HPI.  Vitals:    02/24/17 1008   Pulse: 85   Resp: 20   Temp: 97.8  F (36.6  C)   SpO2: 92%   Weight: 90.7 kg (200 lb)   Height: 1.74 m (5' 8.5\")     Exam:   GENERAL APPEARANCE: Well developed, well nourished, alert, and in no apparent distress.  NECK: supple, no masses, no thyromegaly.  LYMPHATICS: No significant axillary, cervical, or supraclavicular nodes.  RESP: normal percussion, good air flow throughout, - some coarse upper airway wheezes, crackles mainly at R base.  CV: Normal S1, S2, " regular rhythm, normal rate, no rub, no murmur,  no gallop, no LE edema.   ABDOMEN:  Bowel sounds normal, soft, nontender, no HSM or masses.   MS: extremities normal- no clubbing, no cyanosis.  NEURO: Mentation intact, speech normal, normal strength and tone, normal gait and stance  PSYCH: mentation appears normal. and affect normal/bright  Results: I have reviewed all imaging, PFTs and other relavent tests, please see below for details, PFT and imaging results were reviewed with the patient.  PFTs: Mixed restriction and obstruction, moderate to severe reduction in DLCO.  Spirometry overall stable, DLCO down from previous but has been that low before.  Assessment and plan:     This is a 71-year-old male with a complicated pulmonary and cardiac sarcoidosis on Remicade and methotrexate who has been recovering from what seems to be a flare of his sarcoidosis, although what role the atrial fibrillation plays in I think is hard to know.  He will see Dr. Brian Vazquez today, although I do not think any further immediate management of his rhythm is needed and he will follow up with Dr. Paige in a couple weeks.  I do think one question would be, given a recent episode of atrial fibrillation whether we feel his immunosuppression regimen is stable for his cardiac sarcoidosis and whether we need to get repeat imaging, specifically a cardiac PET scan to further investigate this.  I do not think it is urgent and therefore, we will wait until he sees Dr. Paige and I will follow up and discuss his case with her.  I do not think he needs any other imaging of his chest right now, although may consider a CT scan of the chest in the future if his breathing does not return to baseline; however, if we are going to get a cardiac PET scan we can get a PET/CT anyway, so I will hold off on any imaging until that decision is made.  I will see him back in 3 months with pulmonary function tests.         CBC   Recent Labs   Lab Test   01/27/17   1132  01/22/17   1625   RBC  4.36*  4.39*   HGB  14.2  14.3   HCT  41.2  42.4   PLT  364  213       Basic Metabolic Panel  Recent Labs   Lab Test  02/13/17   1359  01/27/17   1132   08/24/11   1306   NA  138  132*   < >   --    POTASSIUM  4.4  4.4   < >  5.1   CHLORIDE  104  97   < >   --    CO2  27  24   < >   --    BUN  36*  71*   < >   --    CT   --    --    --   Red Blood Cells Leukocyte Reduced  Red Blood Cells Leukocyte Reduced   GLC  138*  144*   < >   --    RAFFY  8.2*  8.7   < >   --     < > = values in this interval not displayed.       INR  Recent Labs   Lab Test  01/27/17   1132  01/25/17   0724   INR  3.59*  3.06*       PFT  PFT Latest Ref Rng & Units 2/24/2017   FVC L 2.74   FEV1 L 1.16   FVC% % 69   FEV1% % 39         Again, thank you for allowing me to participate in the care of your patient.      Sincerely,    David Morris Perlman, MD

## 2017-02-24 NOTE — NURSING NOTE
Chief Complaint   Patient presents with     RECHECK     Follow up Sacrford Gordon LPN  Neuroscience- Movement Disorders and Epilepsy

## 2017-02-24 NOTE — PROGRESS NOTES
Reason for Visit  Rohan Monroe is a 71 year old year old male who is being seen for RECHECK (Follow up Sacroids)    ILD HPI    Denis Monroe is a 71-year-old male with a history of pulmonary and cardiac sarcoidosis, along with some obstructive lung disease and liver disease due to hep C, who is seen today for followup.  The patient has been stable, maintained on 7.5 mg weekly of methotrexate and Remicade infusions for several years.  We had spaced his Remicade infusions to every 8 weeks as he was doing well and he seemed to tolerate this okay.  However, he recently was hospitalized with an episode of atrial fibrillation, ultimately underwent cardioversion and ablation procedure.  Since then he had a lot worsening shortness of breath that did not resolve with the fixing of the atrial fibrillation.  We decided to bring up the frequency of his Remicade infusions to every 6 weeks and he does feel that since his last infusion, his breathing has improved and he is continuing to improve and not quite back to his normal baseline, but feels that he is pretty close.  He otherwise is tolerating the medications well.  In terms of his cardiac sarcoid he has followup today with Dr. Brian Vazquez, and then in a couple weeks with Dr. Paige regarding his cardiac sarcoid.  Otherwise, he does note occasional phlegm production but is generally clear without significant yellow or green color.  Denies fevers, chills, night sweats.           Current Outpatient Prescriptions   Medication     polyethylene glycol (MIRALAX) powder     tiotropium (SPIRIVA HANDIHALER) 18 MCG capsule     albuterol (PROAIR HFA/PROVENTIL HFA/VENTOLIN HFA) 108 (90 BASE) MCG/ACT Inhaler     fluticasone-vilanterol (BREO ELLIPTA) 100-25 MCG/INH oral inhaler     furosemide (LASIX) 20 MG tablet     [DISCONTINUED] warfarin (COUMADIN) 5 MG tablet     inFLIXimab (REMICADE) 100 MG injection     metoprolol (LOPRESSOR) 100 MG tablet     levothyroxine  (SYNTHROID/LEVOTHROID) 137 MCG tablet     methotrexate 2.5 MG tablet     ciclopirox (LOPROX) 0.77 % cream     atorvastatin (LIPITOR) 20 MG tablet     ASPIRIN EC PO     blood glucose monitoring (ACCU-CHEK RONNIE PLUS) test strip     blood glucose monitoring (ACCU-CHEK FASTCLIX) lancets     blood glucose monitoring (NO BRAND SPECIFIED) meter device kit     Urea (CARMOL 40) 40 % CREA     sildenafil (VIAGRA) 50 MG tablet     INFLIXIMAB IV     Multiple Vitamins-Minerals (MULTIVITAMIN & MINERAL PO)     mirtazapine (REMERON) 15 MG tablet     amLODIPine (NORVASC) 10 MG tablet     warfarin (COUMADIN) 5 MG tablet     No current facility-administered medications for this visit.      Allergies   Allergen Reactions     Prednisone Other (See Comments)     He reports that he can't sleep for days and can't use small to massive doses of prednisone   Pt. Does not do well with high doses of Prednisone, His MD says that Prednisone is counter indicated. Prednisone failed to treat his sarcoidosis      Past Medical History   Diagnosis Date     Cataract of both eyes      Chronic infection      Hep C     Congestive heart failure, unspecified      Depressive disorder, not elsewhere classified      Depression (non-psychotic)     ERM OS (epiretinal membrane, left eye)      Generalized osteoarthrosis, unspecified site      Glaucoma suspect      Hypertension      Lichen planus      Other psoriasis      PVD (posterior vitreous detachment), left eye      Sarcoidosis (H)      Sarcoidosis (H)      Type II or unspecified type diabetes mellitus without mention of complication, not stated as uncontrolled      Unspecified hypothyroidism      Hypothyroidism     Unspecified viral hepatitis C without hepatic coma      Viral warts, unspecified        Past Surgical History   Procedure Laterality Date     Hc repair incisional hernia,reducible       Hernia Repair, Incisional, Unilateral     C ligatn/strip long & short saphen       Hc removal of tonsils,<9 y/o        Tonsils <10y.o.     Cardiac stent       s/p     Arthroplasty hip  8/24/2011     Procedure:ARTHROPLASTY HIP; Right Total Hip Arthroplasty  Choice anesthesia; Surgeon:LESLI WILKINSON; Location:[facility]     Joint replacement       1 month ago--right hip     Cataract iol, rt/lt  9/15/2015     LE     Colonoscopy       Cardiac surgery       Biopsy       Anesthesia cardioversion N/A 1/19/2017     Procedure: ANESTHESIA CARDIOVERSION;  Surgeon: GENERIC ANESTHESIA PROVIDER;  Location: [facility]     Anesthesia cardioversion N/A 1/23/2017     Procedure: ANESTHESIA CARDIOVERSION;  Surgeon: GENERIC ANESTHESIA PROVIDER;  Location: [facility]     Anesthesia cardioversion N/A 1/24/2017     Procedure: ANESTHESIA CARDIOVERSION;  Surgeon: GENERIC ANESTHESIA PROVIDER;  Location: [facility] OR       Social History     Social History     Marital status:      Spouse name: N/A     Number of children: 2     Years of education: N/A     Occupational History      [employer]     Social History Main Topics     Smoking status: Former Smoker     Packs/day: 1.00     Years: 30.00     Types: Cigarettes     Start date: 12/30/1960     Quit date: 7/22/1994     Smokeless tobacco: Never Used     Alcohol use No     Drug use: No     Sexual activity: Yes     Partners: Female     Other Topics Concern     Blood Transfusions No     Exercise Yes     Social History Narrative    Dairy/d 2 servings/d    Caffeine little servings/d    Exercise 3 x week    Sunscreen used - Yes    Seatbelts used - Yes    Working smoke/CO detectors in the home - Yes    Guns stored in the home - No    Self Breast Exams - NOT APPLICABLE    Self Testicular Exam - No    Eye Exam up to date - Yes    Dental Exam up to date - Yes    Pap Smear up to date - NOT APPLICABLE    Mammogram up to date - NOT APPLICABLE    PSA up to date - Yes    Dexa Scan up to date - NOT APPLICABLE    Flex Sig / Colonoscopy up to date - Yes    Immunizations up to date - Unsure    Abuse: Current or Past(Physical,  Sexual or Emotional)- No    Do you feel safe in your environment - Yes       Family History   Problem Relation Age of Onset     HEART DISEASE Father      irreg heart beat     Circulatory Father      varicose veins     HEART DISEASE Mother      heart attack     Arthritis Mother      DIABETES Sister      type 2     Eye Disorder Maternal Grandmother      glaucoma     Glaucoma No family hx of      Macular Degeneration No family hx of      CANCER No family hx of      no skin cancer     Kidney Cancer Sister      DIABETES No family hx of      DIABETES Sister      OSTEOPOROSIS Mother      Thyroid Disease Mother      Prostate Cancer Father        ROS Pulmonary      Answers for HPI/ROS submitted by the patient on 2/24/2017   General Symptoms: Yes  Skin Symptoms: No  HENT Symptoms: No  EYE SYMPTOMS: No  HEART SYMPTOMS: Yes  LUNG SYMPTOMS: Yes  INTESTINAL SYMPTOMS: Yes  URINARY SYMPTOMS: Yes  REPRODUCTIVE SYMPTOMS: No  SKELETAL SYMPTOMS: No  BLOOD SYMPTOMS: No  NERVOUS SYSTEM SYMPTOMS: No  MENTAL HEALTH SYMPTOMS: Yes  Fever: No  Loss of appetite: No  Weight loss: Yes  Weight gain: No  Fatigue: Yes  Night sweats: No  Chills: No  Increased stress: Yes  Excessive hunger: No  Excessive thirst: No  Feeling hot or cold when others believe the temperature is normal: Yes  Loss of height: No  Post-operative complications: No  Surgical site pain: No  Hallucinations: No  Change in or Loss of Energy: Yes  Hyperactivity: No  Confusion: No  Cough: Yes  Sputum or phlegm: Yes  Coughing up blood: No  Difficulty breating or shortness of breath: Yes  Snoring: No  Wheezing: Yes  Difficulty breathing on exertion: Yes  Respiratory pain: No  Nighttime Cough: Yes  Difficulty breathing when lying flat: No  Chest pain or pressure: No  Fast or irregular heartbeat: Yes  Pain in legs with walking: No  Swelling in feet or ankles: Yes  Trouble breathing while lying down: No  Fingers or Toes appear blue: No  High blood pressure: Yes  Low blood pressure:  "No  Fainting: No  Murmurs: No  Chest pain on exertion: No  Chest pain at rest: No  Cramping pain in leg during exercise: No  Pacemaker: No  Varicose veins: No  Edema or swelling: Yes  Fast heart beat: Yes  Wake up at night with shortness of breath: Yes  Heart flutters: Yes  Light-headedness: No  Exercise intolerance: Yes  Heart burn or indigestion: No  Nausea: No  Vomiting: No  Abdominal pain: No  Bloating: Yes  Constipation: Yes  Diarrhea: No  Blood in stool: No  Black stools: No  Rectal or Anal pain: No  Fecal incontinence: No  Rectal bleeding: No  Yellowing of skin or eyes: No  Vomit with blood: No  Change in stools: No  Hemorrhoids: No  Trouble holding urine or incontinence: No  Pain or burning: No  Trouble starting or stopping: No  Increased frequency of urination: Yes  Blood in urine: No  Decreased frequency of urination: No  Frequent nighttime urination: Yes  Flank pain: No  Difficulty emptying bladder: No  Nervous or Anxious: Yes  Depression: No  Trouble sleeping: No  Trouble thinking or concentrating: No  Mood changes: No  Panic attacks: No      A complete ROS was otherwise negative except as noted in the HPI.  Vitals:    02/24/17 1008   Pulse: 85   Resp: 20   Temp: 97.8  F (36.6  C)   SpO2: 92%   Weight: 90.7 kg (200 lb)   Height: 1.74 m (5' 8.5\")     Exam:   GENERAL APPEARANCE: Well developed, well nourished, alert, and in no apparent distress.  NECK: supple, no masses, no thyromegaly.  LYMPHATICS: No significant axillary, cervical, or supraclavicular nodes.  RESP: normal percussion, good air flow throughout, - some coarse upper airway wheezes, crackles mainly at R base.  CV: Normal S1, S2, regular rhythm, normal rate, no rub, no murmur,  no gallop, no LE edema.   ABDOMEN:  Bowel sounds normal, soft, nontender, no HSM or masses.   MS: extremities normal- no clubbing, no cyanosis.  NEURO: Mentation intact, speech normal, normal strength and tone, normal gait and stance  PSYCH: mentation appears normal. and " affect normal/bright  Results: I have reviewed all imaging, PFTs and other relavent tests, please see below for details, PFT and imaging results were reviewed with the patient.  PFTs: Mixed restriction and obstruction, moderate to severe reduction in DLCO.  Spirometry overall stable, DLCO down from previous but has been that low before.  Assessment and plan:     This is a 71-year-old male with a complicated pulmonary and cardiac sarcoidosis on Remicade and methotrexate who has been recovering from what seems to be a flare of his sarcoidosis, although what role the atrial fibrillation plays in I think is hard to know.  He will see Dr. Brian Vazquez today, although I do not think any further immediate management of his rhythm is needed and he will follow up with Dr. Paige in a couple weeks.  I do think one question would be, given a recent episode of atrial fibrillation whether we feel his immunosuppression regimen is stable for his cardiac sarcoidosis and whether we need to get repeat imaging, specifically a cardiac PET scan to further investigate this.  I do not think it is urgent and therefore, we will wait until he sees Dr. Paige and I will follow up and discuss his case with her.  I do not think he needs any other imaging of his chest right now, although may consider a CT scan of the chest in the future if his breathing does not return to baseline; however, if we are going to get a cardiac PET scan we can get a PET/CT anyway, so I will hold off on any imaging until that decision is made.  I will see him back in 3 months with pulmonary function tests.         CBC   Recent Labs   Lab Test  01/27/17   1132  01/22/17   1625   RBC  4.36*  4.39*   HGB  14.2  14.3   HCT  41.2  42.4   PLT  364  213       Basic Metabolic Panel  Recent Labs   Lab Test  02/13/17   1359  01/27/17   1132   08/24/11   1306   NA  138  132*   < >   --    POTASSIUM  4.4  4.4   < >  5.1   CHLORIDE  104  97   < >   --    CO2  27  24   < >   --     BUN  36*  71*   < >   --    CT   --    --    --   Red Blood Cells Leukocyte Reduced  Red Blood Cells Leukocyte Reduced   GLC  138*  144*   < >   --    RAFFY  8.2*  8.7   < >   --     < > = values in this interval not displayed.       INR  Recent Labs   Lab Test  01/27/17   1132  01/25/17   0724   INR  3.59*  3.06*       PFT  PFT Latest Ref Rng & Units 2/24/2017   FVC L 2.74   FEV1 L 1.16   FVC% % 69   FEV1% % 39           CC:

## 2017-02-27 LAB — INTERPRETATION ECG - MUSE: NORMAL

## 2017-02-28 ENCOUNTER — PRE VISIT (OUTPATIENT)
Dept: CARDIOLOGY | Facility: CLINIC | Age: 72
End: 2017-02-28

## 2017-02-28 ENCOUNTER — OFFICE VISIT (OUTPATIENT)
Dept: GASTROENTEROLOGY | Facility: CLINIC | Age: 72
End: 2017-02-28
Attending: INTERNAL MEDICINE
Payer: MEDICARE

## 2017-02-28 ENCOUNTER — OFFICE VISIT (OUTPATIENT)
Dept: NEPHROLOGY | Facility: CLINIC | Age: 72
End: 2017-02-28
Attending: INTERNAL MEDICINE
Payer: MEDICARE

## 2017-02-28 ENCOUNTER — ALLIED HEALTH/NURSE VISIT (OUTPATIENT)
Dept: CARDIOLOGY | Facility: CLINIC | Age: 72
End: 2017-02-28
Attending: INTERNAL MEDICINE
Payer: MEDICARE

## 2017-02-28 VITALS
RESPIRATION RATE: 24 BRPM | OXYGEN SATURATION: 94 % | DIASTOLIC BLOOD PRESSURE: 86 MMHG | HEIGHT: 69 IN | WEIGHT: 206 LBS | TEMPERATURE: 97.6 F | HEART RATE: 87 BPM | SYSTOLIC BLOOD PRESSURE: 148 MMHG | BODY MASS INDEX: 30.51 KG/M2

## 2017-02-28 VITALS — OXYGEN SATURATION: 92 % | DIASTOLIC BLOOD PRESSURE: 93 MMHG | HEART RATE: 84 BPM | SYSTOLIC BLOOD PRESSURE: 169 MMHG

## 2017-02-28 DIAGNOSIS — K74.60 CIRRHOSIS OF LIVER WITHOUT ASCITES, UNSPECIFIED HEPATIC CIRRHOSIS TYPE (H): Primary | ICD-10-CM

## 2017-02-28 DIAGNOSIS — D63.1 ANEMIA IN CHRONIC KIDNEY DISEASE (CODE): ICD-10-CM

## 2017-02-28 DIAGNOSIS — N18.30 CKD (CHRONIC KIDNEY DISEASE) STAGE 3, GFR 30-59 ML/MIN (H): Primary | ICD-10-CM

## 2017-02-28 DIAGNOSIS — I10 HTN (HYPERTENSION): Primary | ICD-10-CM

## 2017-02-28 DIAGNOSIS — N18.30 CKD (CHRONIC KIDNEY DISEASE) STAGE 3, GFR 30-59 ML/MIN (H): ICD-10-CM

## 2017-02-28 LAB
ALBUMIN SERPL-MCNC: 3 G/DL (ref 3.4–5)
ANION GAP SERPL CALCULATED.3IONS-SCNC: 8 MMOL/L (ref 3–14)
BUN SERPL-MCNC: 33 MG/DL (ref 7–30)
CALCIUM SERPL-MCNC: 8.6 MG/DL (ref 8.5–10.1)
CHLORIDE SERPL-SCNC: 104 MMOL/L (ref 94–109)
CO2 SERPL-SCNC: 26 MMOL/L (ref 20–32)
CREAT SERPL-MCNC: 1.92 MG/DL (ref 0.66–1.25)
CREAT UR-MCNC: 83 MG/DL
DEPRECATED CALCIDIOL+CALCIFEROL SERPL-MC: 11 UG/L (ref 20–75)
ERYTHROCYTE [DISTWIDTH] IN BLOOD BY AUTOMATED COUNT: 15 % (ref 10–15)
FERRITIN SERPL-MCNC: 181 NG/ML (ref 26–388)
GFR SERPL CREATININE-BSD FRML MDRD: 34 ML/MIN/1.7M2
GLUCOSE SERPL-MCNC: 122 MG/DL (ref 70–99)
HCT VFR BLD AUTO: 42 % (ref 40–53)
HGB BLD-MCNC: 13.4 G/DL (ref 13.3–17.7)
IRON SATN MFR SERPL: 19 % (ref 15–46)
IRON SERPL-MCNC: 61 UG/DL (ref 35–180)
MCH RBC QN AUTO: 31.1 PG (ref 26.5–33)
MCHC RBC AUTO-ENTMCNC: 31.9 G/DL (ref 31.5–36.5)
MCV RBC AUTO: 97 FL (ref 78–100)
MICROALBUMIN UR-MCNC: 3760 MG/L
MICROALBUMIN/CREAT UR: 4546.55 MG/G CR (ref 0–17)
PHOSPHATE SERPL-MCNC: 2.6 MG/DL (ref 2.5–4.5)
PLATELET # BLD AUTO: 276 10E9/L (ref 150–450)
POTASSIUM SERPL-SCNC: 4.9 MMOL/L (ref 3.4–5.3)
PROT UR-MCNC: 5.02 G/L
PROT/CREAT 24H UR: 6.07 G/G CR (ref 0–0.2)
PTH-INTACT SERPL-MCNC: 183 PG/ML (ref 12–72)
RBC # BLD AUTO: 4.31 10E12/L (ref 4.4–5.9)
SODIUM SERPL-SCNC: 138 MMOL/L (ref 133–144)
TIBC SERPL-MCNC: 316 UG/DL (ref 240–430)
WBC # BLD AUTO: 10.1 10E9/L (ref 4–11)

## 2017-02-28 PROCEDURE — 82306 VITAMIN D 25 HYDROXY: CPT | Performed by: INTERNAL MEDICINE

## 2017-02-28 PROCEDURE — 83970 ASSAY OF PARATHORMONE: CPT | Performed by: INTERNAL MEDICINE

## 2017-02-28 PROCEDURE — 80069 RENAL FUNCTION PANEL: CPT | Performed by: INTERNAL MEDICINE

## 2017-02-28 PROCEDURE — 83550 IRON BINDING TEST: CPT | Performed by: INTERNAL MEDICINE

## 2017-02-28 PROCEDURE — 40000269 ZZH STATISTIC NO CHARGE FACILITY FEE: Mod: ZF

## 2017-02-28 PROCEDURE — 99211 OFF/OP EST MAY X REQ PHY/QHP: CPT | Mod: ZF

## 2017-02-28 PROCEDURE — 99213 OFFICE O/P EST LOW 20 MIN: CPT | Mod: ZF

## 2017-02-28 PROCEDURE — 82728 ASSAY OF FERRITIN: CPT | Performed by: INTERNAL MEDICINE

## 2017-02-28 PROCEDURE — 36415 COLL VENOUS BLD VENIPUNCTURE: CPT | Performed by: INTERNAL MEDICINE

## 2017-02-28 PROCEDURE — 85027 COMPLETE CBC AUTOMATED: CPT | Performed by: INTERNAL MEDICINE

## 2017-02-28 PROCEDURE — 83540 ASSAY OF IRON: CPT | Performed by: INTERNAL MEDICINE

## 2017-02-28 PROCEDURE — 84156 ASSAY OF PROTEIN URINE: CPT | Performed by: INTERNAL MEDICINE

## 2017-02-28 PROCEDURE — 82043 UR ALBUMIN QUANTITATIVE: CPT | Performed by: INTERNAL MEDICINE

## 2017-02-28 PROCEDURE — 99212 OFFICE O/P EST SF 10 MIN: CPT | Mod: ZF

## 2017-02-28 ASSESSMENT — PAIN SCALES - GENERAL: PAINLEVEL: NO PAIN (0)

## 2017-02-28 NOTE — PROGRESS NOTES
I had the pleasure of seeing Rohan Monroe for followup in the Liver Clinic at the [facility] on 02/28/2017.  Mr. Monroe returns for followup of what was thought to be cirrhosis caused by chronic hepatitis C.  He may have had resolution of his cirrhosis with an effective treatment of hepatitis C. The reason why I say so is that his platelet count has completely normalized and he did have a fibrosis scan at [facility] that suggested that he was somewhat less than cirrhotic in terms of his stage fibrosis.      He recently was hospitalized here with severe flare of his pulmonary sarcoidosis.  He also developed atrial dysrhythmias and had an ablation procedure.  It has taken him quite a while to improve.  He just saw Dr. Perlman last week, and the plan is to increase his Remicade to every 6 weeks.  He says really this week is the first time he has started to feel better in terms of his pulmonary symptoms.      He denies any abdominal pain, itching or skin rash.  He does have fatigue.  He denies any increased abdominal girth.  He has mild lower extremity edema.  He denies any fevers or chills.  He does have a chronic cough along with his shortness of breath.  He denies any nausea, vomiting, diarrhea or constipation.  His appetite is improving, but he has had a fair amount of weight loss with this illness.       Current Outpatient Prescriptions   Medication     amLODIPine (NORVASC) 10 MG tablet     warfarin (COUMADIN) 5 MG tablet     polyethylene glycol (MIRALAX) powder     tiotropium (SPIRIVA HANDIHALER) 18 MCG capsule     albuterol (PROAIR HFA/PROVENTIL HFA/VENTOLIN HFA) 108 (90 BASE) MCG/ACT Inhaler     fluticasone-vilanterol (BREO ELLIPTA) 100-25 MCG/INH oral inhaler     furosemide (LASIX) 20 MG tablet     inFLIXimab (REMICADE) 100 MG injection     metoprolol (LOPRESSOR) 100 MG tablet     levothyroxine (SYNTHROID/LEVOTHROID) 137 MCG tablet     methotrexate 2.5 MG tablet      ciclopirox (LOPROX) 0.77 % cream     atorvastatin (LIPITOR) 20 MG tablet     ASPIRIN EC PO     blood glucose monitoring (ACCU-CHEK RONNIE PLUS) test strip     blood glucose monitoring (ACCU-CHEK FASTCLIX) lancets     blood glucose monitoring (NO BRAND SPECIFIED) meter device kit     Urea (CARMOL 40) 40 % CREA     sildenafil (VIAGRA) 50 MG tablet     Multiple Vitamins-Minerals (MULTIVITAMIN & MINERAL PO)     mirtazapine (REMERON) 15 MG tablet     No current facility-administered medications for this visit.      B/P: 148/86, T: 97.6, P: 87, R: 24    HEENT exam shows no scleral icterus and no temporal muscle wasting.  His chest exam shows decreased breath sounds bilaterally and a few bibasilar rales.  His abdominal exam shows no increase in girth.  No masses or tenderness to palpation are present.  Liver is 10 cm in span without left lobe enlargement.  No spleen tip is palpable, and extremity exam shows just 1+ edema.  Skin exam shows no stigmata of chronic liver disease.  Neurologic exam shows no asterixis.       Recent Results (from the past 168 hour(s))   General PFT Lab (Please always keep checked)    Collection Time: 02/24/17  9:24 AM   Result Value Ref Range    FVC-Pred 3.92 L    FVC-Pre 2.74 L    FVC-%Pred-Pre 69 %    FEV1-Pre 1.16 L    FEV1-%Pred-Pre 39 %    FEV1FVC-Pred 76 %    FEV1FVC-Pre 42 %    FEFMax-Pred 7.70 L/sec    FEFMax-Pre 3.97 L/sec    FEFMax-%Pred-Pre 51 %    FEF2575-Pred 2.22 L/sec    FEF2575-Pre 0.40 L/sec    PJT1104-%Pred-Pre 17 %    FEF2575-Post 0.38 L/sec    [id number]-%Pred-Post 16 %    ExpTime-Pre 11.07 sec    FIFMax-Pre 4.26 L/sec    VC-Pred 4.43 L    VC-Pre 2.72 L    VC-%Pred-Pre 61 %    IC-Pred 3.66 L    IC-Pre 1.73 L    IC-%Pred-Pre 47 %    ERV-Pred 0.77 L    ERV-Pre 0.99 L    ERV-%Pred-Pre 128 %    FEV1FEV6-Pred 77 %    FEV1FEV6-Pre 49 %    DLCOunc-Pred 24.79 ml/min/mmHg    DLCOunc-Pre 9.71 ml/min/mmHg    DLCOunc-%Pred-Pre 39 %    VA-Pre 4.23 L    VA-%Pred-Pre 64 %    FEV1SVC-Pred 67 %     FEV1SVC-Pre 43 %   EKG 12-lead, tracing only (Future)    Collection Time: 02/24/17 11:00 AM   Result Value Ref Range    Interpretation ECG Click View Image link to view waveform and result       His most recent laboratory tests show his sodium is 138, potassium 4.4, BUN is 36, creatinine 1.9.  His INR is 3.6.  His white count is 15.4, hemoglobin 14.2, platelets 364,000.      My impression is that Mr. Monroe has advanced fibrosis caused by chronic hepatitis C.  As I mentioned, his fibrosis scan suggests that he at a stage below cirrhosis and his platelet count is normal at this point in time.  I will see him back in the clinic in 6 months to repeat the ultrasound at that point in time.  He is a sustained virologic responder to hepatitis C, and his liver function is excellent.      He is seeing Dr. Perlman for his sarcoid lung disease, and he is treating that aggressively.  He will see Dr. Michel for his chronic kidney disease today.  His proteinuria is up, and I am sure he will be addressing that.      Thank you very much for allowing me to participate in the care of this patient.  If you have any questions regarding my recommendations, please do not hesitate to contact me.       Moses Orosco MD      Professor of Medicine  [employer]      Executive Medical Director, Solid Organ Transplant Program  [facility]

## 2017-02-28 NOTE — LETTER
2/28/2017      RE: Rohan Monroe  [address]  [place] [id number]       I had the pleasure of seeing Rohan Monroe for followup in the Liver Clinic at the [facility] on 02/28/2017.  Mr. Monroe returns for followup of what was thought to be cirrhosis caused by chronic hepatitis C.  He may have had resolution of his cirrhosis with an effective treatment of hepatitis C. The reason why I say so is that his platelet count has completely normalized and he did have a fibrosis scan at [facility] that suggested that he was somewhat less than cirrhotic in terms of his stage fibrosis.      He recently was hospitalized here with severe flare of his pulmonary sarcoidosis.  He also developed atrial dysrhythmias and had an ablation procedure.  It has taken him quite a while to improve.  He just saw Dr. Perlman last week, and the plan is to increase his Remicade to every 6 weeks.  He says really this week is the first time he has started to feel better in terms of his pulmonary symptoms.      He denies any abdominal pain, itching or skin rash.  He does have fatigue.  He denies any increased abdominal girth.  He has mild lower extremity edema.  He denies any fevers or chills.  He does have a chronic cough along with his shortness of breath.  He denies any nausea, vomiting, diarrhea or constipation.  His appetite is improving, but he has had a fair amount of weight loss with this illness.       Current Outpatient Prescriptions   Medication     amLODIPine (NORVASC) 10 MG tablet     warfarin (COUMADIN) 5 MG tablet     polyethylene glycol (MIRALAX) powder     tiotropium (SPIRIVA HANDIHALER) 18 MCG capsule     albuterol (PROAIR HFA/PROVENTIL HFA/VENTOLIN HFA) 108 (90 BASE) MCG/ACT Inhaler     fluticasone-vilanterol (BREO ELLIPTA) 100-25 MCG/INH oral inhaler     furosemide (LASIX) 20 MG tablet     inFLIXimab (REMICADE) 100 MG injection     metoprolol  (LOPRESSOR) 100 MG tablet     levothyroxine (SYNTHROID/LEVOTHROID) 137 MCG tablet     methotrexate 2.5 MG tablet     ciclopirox (LOPROX) 0.77 % cream     atorvastatin (LIPITOR) 20 MG tablet     ASPIRIN EC PO     blood glucose monitoring (ACCU-CHEK RONNIE PLUS) test strip     blood glucose monitoring (ACCU-CHEK FASTCLIX) lancets     blood glucose monitoring (NO BRAND SPECIFIED) meter device kit     Urea (CARMOL 40) 40 % CREA     sildenafil (VIAGRA) 50 MG tablet     Multiple Vitamins-Minerals (MULTIVITAMIN & MINERAL PO)     mirtazapine (REMERON) 15 MG tablet     No current facility-administered medications for this visit.      B/P: 148/86, T: 97.6, P: 87, R: 24    HEENT exam shows no scleral icterus and no temporal muscle wasting.  His chest exam shows decreased breath sounds bilaterally and a few bibasilar rales.  His abdominal exam shows no increase in girth.  No masses or tenderness to palpation are present.  Liver is 10 cm in span without left lobe enlargement.  No spleen tip is palpable, and extremity exam shows just 1+ edema.  Skin exam shows no stigmata of chronic liver disease.  Neurologic exam shows no asterixis.       Recent Results (from the past 168 hour(s))   General PFT Lab (Please always keep checked)    Collection Time: 02/24/17  9:24 AM   Result Value Ref Range    FVC-Pred 3.92 L    FVC-Pre 2.74 L    FVC-%Pred-Pre 69 %    FEV1-Pre 1.16 L    FEV1-%Pred-Pre 39 %    FEV1FVC-Pred 76 %    FEV1FVC-Pre 42 %    FEFMax-Pred 7.70 L/sec    FEFMax-Pre 3.97 L/sec    FEFMax-%Pred-Pre 51 %    FEF2575-Pred 2.22 L/sec    FEF2575-Pre 0.40 L/sec    [id number]-%Pred-Pre 17 %    FEF2575-Post 0.38 L/sec    [id number]-%Pred-Post 16 %    ExpTime-Pre 11.07 sec    FIFMax-Pre 4.26 L/sec    VC-Pred 4.43 L    VC-Pre 2.72 L    VC-%Pred-Pre 61 %    IC-Pred 3.66 L    IC-Pre 1.73 L    IC-%Pred-Pre 47 %    ERV-Pred 0.77 L    ERV-Pre 0.99 L    ERV-%Pred-Pre 128 %    FEV1FEV6-Pred 77 %    FEV1FEV6-Pre 49 %    DLCOunc-Pred 24.79 ml/min/mmHg     DLCOunc-Pre 9.71 ml/min/mmHg    DLCOunc-%Pred-Pre 39 %    VA-Pre 4.23 L    VA-%Pred-Pre 64 %    FEV1SVC-Pred 67 %    FEV1SVC-Pre 43 %   EKG 12-lead, tracing only (Future)    Collection Time: 02/24/17 11:00 AM   Result Value Ref Range    Interpretation ECG Click View Image link to view waveform and result       His most recent laboratory tests show his sodium is 138, potassium 4.4, BUN is 36, creatinine 1.9.  His INR is 3.6.  His white count is 15.4, hemoglobin 14.2, platelets 364,000.      My impression is that Mr. Monroe has advanced fibrosis caused by chronic hepatitis C.  As I mentioned, his fibrosis scan suggests that he at a stage below cirrhosis and his platelet count is normal at this point in time.  I will see him back in the clinic in 6 months to repeat the ultrasound at that point in time.  He is a sustained virologic responder to hepatitis C, and his liver function is excellent.      He is seeing Dr. Perlman for his sarcoid lung disease, and he is treating that aggressively.  He will see Dr. Michel for his chronic kidney disease today.  His proteinuria is up, and I am sure he will be addressing that.      Thank you very much for allowing me to participate in the care of this patient.  If you have any questions regarding my recommendations, please do not hesitate to contact me.       Moses Orosco MD      Professor of Medicine  [employer]      Executive Medical Director, Solid Organ Transplant Program  [facility]

## 2017-02-28 NOTE — NURSING NOTE
Patient comes to clinic today for BP check and BP monitor calibration.     Home BP Monitor:  149/94    Clinic BP monitor:  169/93      Spoke with Dr. Vazquez about elevated BP today. It is slightly lower than last check during clinic appointment on 2/24/17 which was 178/106 where norvasc 10mg daily was added. Per Dr. Vazquez, continue norvasc 10mg daily along with already prescribed metorpolol 100 BID for another 2 weeks. If BPs still elevated at that time, will change metoprolol to coreg.

## 2017-02-28 NOTE — NURSING NOTE
"Chief Complaint   Patient presents with     RECHECK     Chronic Hep C with SVR       Initial /86 (BP Location: Right arm, Patient Position: Chair, Cuff Size: Adult Large)  Pulse 87  Temp 97.6  F (36.4  C) (Oral)  Resp 24  Ht 1.74 m (5' 8.5\")  Wt 93.4 kg (206 lb)  SpO2 94%  BMI 30.86 kg/m2 Estimated body mass index is 30.86 kg/(m^2) as calculated from the following:    Height as of this encounter: 1.74 m (5' 8.5\").    Weight as of this encounter: 93.4 kg (206 lb).  Medication Reconciliation: complete    "

## 2017-02-28 NOTE — LETTER
2/28/2017      RE: Rohan Monroe  [address]  [facility] [address]       Nephrology Follow-up Clinic Note  February 28, 2017       Reason for visit: CKD follow-up    HPI:  Mr Monroe is a pleasant 71 year old man here for follow-up of CKD and proteinuria. His past medical hx is significant for sarcoid (affecting lungs and heart), hep C s/p treatment with cirrhosis (and SVR), CAD s/p stents, COPD, He denies a personal history of UTIs or kidney stones. Given unclear etiology of CKD and presence of RBCs, WBCs, and proteinuria we proceeded with kidney biopsy on 11/13/2015. Results were consistent with diabetic changes and vascular disease as etiology for CKD.  Patient now working with endocrine and diabetes educator regarding his DM.  At out last visit, we made a significant number of changes regarding his blood pressure medications.  Unfortunately, he recently was hospitalized this past Aj for atrial flutter and hypervolemia.  He did undergo cardioversion and subsequent ablation.  He is now on coumadin.  His Cr gretchen with a aggressive diuresis.  His main complaint has been dyspnea that did not significantly improve after diuresis and ablation.  He has not been hypoxic.  He does feel better after his remicade infusion which will now occur more frequently (every 6 weeks) though clearly not back to baseline.  He has occasional phlegm production. He denies fever, palpitations and chest pain.  Cardiology is following his blood pressure closely.      Review of Systems:   A 10 point review of systems was negative except as noted above.    Active Medical Issues:  Patient Active Problem List   Diagnosis     Hypothyroidism     SARCOIDOSIS-systemic     Generalized osteoarthrosis, unspecified site     Viral warts     Other psoriasis     Hypertrophy of prostate without urinary obstruction     Diabetes mellitus, type 2 (H)     CAD (coronary artery disease)     Type 2 diabetes, HbA1C goal < 8% (H)      CARDIOVASCULAR SCREENING; LDL GOAL LESS THAN 100     Sepsis (H)     Atrial flutter with rapid ventricular response (H)     CKD (chronic kidney disease) stage 3, GFR 30-59 ml/min     Hip joint pain     Hyperlipidemia LDL goal <70     Acute exacerbation of chronic obstructive pulmonary disease (COPD) (H)     Cellulitis     Immunosuppression (H)     Hyponatremia     RADHA (acute kidney injury) (HCC)     COPD (chronic obstructive pulmonary disease) (H)     Cirrhosis of liver (H)     Pneumonia     Proteinuria     Microscopic hematuria     Sarcoidosis of lung (HCC)     Hyperlipidemia     Atrial flutter (H)     Long-term (current) use of anticoagulants [Z79.01]       PMHx, PSHx, FamHx, SocHx: reviewed in EPIC.    Current Medications:  Medications reviewed by me.  Current Outpatient Prescriptions   Medication Sig Dispense Refill     amLODIPine (NORVASC) 10 MG tablet Take 1 tablet (10 mg) by mouth daily 90 tablet 3     warfarin (COUMADIN) 5 MG tablet Take 1 tablet (5 mg) by mouth daily 30 tablet 6     polyethylene glycol (MIRALAX) powder Take 17 g (1 capful) by mouth daily (Patient taking differently: Take 1 capful by mouth daily as needed for constipation ) 510 g 1     tiotropium (SPIRIVA HANDIHALER) 18 MCG capsule Inhale contents of one capsule daily. 90 capsule 3     albuterol (PROAIR HFA/PROVENTIL HFA/VENTOLIN HFA) 108 (90 BASE) MCG/ACT Inhaler Inhale 2 puffs into the lungs every 6 hours as needed for shortness of breath / dyspnea 3 Inhaler 6     fluticasone-vilanterol (BREO ELLIPTA) 100-25 MCG/INH oral inhaler Inhale 1 puff into the lungs daily 3 Inhaler 3     furosemide (LASIX) 20 MG tablet Take 2 tablets (40 mg) by mouth daily as needed For weight gain of 3 lbs or greater 180 tablet 3     inFLIXimab (REMICADE) 100 MG injection every 6 weeks       metoprolol (LOPRESSOR) 100 MG tablet Take 1 tablet (100 mg) by mouth 2 times daily 60 tablet 11     levothyroxine (SYNTHROID/LEVOTHROID) 137 MCG tablet Take 1 tablet (137  "mcg) by mouth daily 90 tablet 0     methotrexate 2.5 MG tablet Take 3 tablets (7.5 mg) by mouth once a week On Mondays 48 tablet 3     ciclopirox (LOPROX) 0.77 % cream Apply topically 2 times daily To feet and toenails. 90 g 6     atorvastatin (LIPITOR) 20 MG tablet Take 1 tablet (20 mg) by mouth daily 90 tablet 3     ASPIRIN EC PO Take 81 mg by mouth daily       blood glucose monitoring (ACCU-CHEK RONNIE PLUS) test strip Use to test blood sugar 2 times daily or as directed.  3 month supply. 200 each 3     blood glucose monitoring (ACCU-CHEK FASTCLIX) lancets Use to test blood sugar 2 times daily or as directed.  102 lancets per box.  3 month supply. 2 Box 3     blood glucose monitoring (NO BRAND SPECIFIED) meter device kit Use to test blood sugar 2 times daily. 1 kit 0     Urea (CARMOL 40) 40 % CREA To feet daily (Patient taking differently: To feet daily PRN) 199 g 4     sildenafil (VIAGRA) 50 MG tablet Take 2 tablets (100 mg) by mouth daily as needed for erectile dysfunction 10 tablet 6     Multiple Vitamins-Minerals (MULTIVITAMIN & MINERAL PO) Take 1 tablet by mouth daily.       mirtazapine (REMERON) 15 MG tablet Take 15 mg by mouth At Bedtime.         Physical Exam:   Vital Signs 2/28/2017   Systolic 148   Diastolic 86   Pulse 87   Temperature 97.6   Respirations 24   Weight (LB) 206 lb   Height 5' 8.504\"   BMI 30.93   Pain 0   O2 94     Gen: notably SOB  Heart: regular rhythm, normal rate, no rub  Lungs: shallow breaths but clear  Abd: soft, non-tender, non-distended  : No CVA tenderness  Ext: 1+ LE edema  MSK: No joint effusions  Skin: No concerning rash  Neuro: No gross focal deficit  Psych: Mood and affect appropriate     Labs:   Today's labs reviewed by me.  Electrolytes/Renal -   Recent Labs   Lab Test  02/28/17   1150  02/13/17   1359  01/27/17   1132  01/25/17   0724  01/24/17   1037  01/24/17   0313   12/26/16   1015  08/30/16   1322   NA  138  138  132*  133   --   133   < >  135  136   POTASSIUM  " 4.9  4.4  4.4  4.2  3.9  3.9   < >  4.2  4.9   CHLORIDE  104  104  97  96   --   96   < >  105  104   CO2  26  27  24  28   --   24   < >  22  26   BUN  33*  36*  71*  62*   --   67*   < >  41*  46*   CR  1.92*  1.89*  2.48*  2.44*   --   2.51*   < >  1.70*  1.77*   GLC  122*  138*  144*  148*   --   133*   < >  214*  159*   RAFFY  8.6  8.2*  8.7  8.4*   --   8.2*   < >  8.4*  9.2   MAG   --    --    --   2.3  2.3  2.3   < >   --    --    PHOS  2.6   --    --    --    --    --    --   2.8  3.2    < > = values in this interval not displayed.     CBC -   Recent Labs   Lab Test  02/28/17   1150  01/27/17   1132  01/22/17   1625   WBC  10.1  15.4*  11.1*   HGB  13.4  14.2  14.3   PLT  276  364  213     LFTs -   Recent Labs   Lab Test  02/28/17   1150  02/13/17   1359  01/27/17   1132  01/18/17   1746   ALKPHOS   --   101  96  132   BILITOTAL   --   0.7  0.7  0.9   ALT   --   30  50  66   AST   --   22  40  32   PROTTOTAL   --   7.0  7.7  8.3   ALBUMIN  3.0*  2.6*  2.8*  3.7     UA -   Recent Labs   Lab Test  10/27/15   1339   COLOR  Yellow   APPEARANCE  Clear   URINEGLC  Negative   URINEBILI  Negative   URINEKETONE  Negative   SG  1.017   UBLD  Large*   URINEPH  6.0   PROTEIN  300*   NITRITE  Negative   LEUKEST  Negative   RBCU  40*   WBCU  2     Recent Labs   Lab Test  02/28/17   1206  12/26/16   1015  08/30/16   1320  07/18/16   1324  03/15/16   1107  02/29/16   1424  10/27/15   1339  10/07/15   1032  01/27/11   1507  11/16/10   0950   UTPG  6.07*  3.60*  1.40*  2.44*  1.50*  2.29*  2.00*  1.81*  0.02  0.10     PTH -   Recent Labs   Lab Test  03/15/16   1116  01/27/11   1435   PTHI  98*  24     IRON STUDIES -   Recent Labs   Lab Test  02/28/17   1150  03/15/16   1116   IRON  61  91   FEB  316  346   IRONSAT  19  26   TIFFANIE  181  176         Assessment & Plan:   CKD stage 3 with proteinuria and hematuria:  Etiology 2/2 DM and renovascular disease.  Patient underwent kidney biopsy on 11/13/2015 to better evaluate his CKD.  Tissue sample was limited but with tissue/cortex present patient had changes consistent with diabetic nephropathy and also mild to moderate renal arteriosclerosis.  Serologic work-up for vasculitis and monoclonal gammopathy was negative. Hep C related kidney disease unlikely given he has had treatment with sustained virologic response.  Kidney function consistently declined since 3/16/2015 and has ranged from 1.2-1.8 mg/dL in that time. Previous to 3/2015 he has had some fluctuation in Cr, likely representing RADHA events and appears to have had another RADHA event during his recent hospitalization for atrial flutter.  His recent baseline Cr was ~1.7 mg/dL though now his Cr running closer to 1.9 mg/dL which may be his new baseline.  He does have significant albuminuria that was not well controlled with max dose losartan.  However, he is now off losartan which may lead to worsening proteinuria.    --discussed that moving forward it will be important to control his DM and HTN with regards to his CKD   --would like to restart losartan (and eventually get back to max dose) given DM, HTN, proteinuria and CKD.  Albuminuria not at goal in past despite max dose losartan. Regardless, he would benefit from RAAS blockade for renoprotection.  However, would not start double RAAS blockade for management of proteinuria given that risks outweigh the benefits.    --cardiology currently managing antihypertensives.  Will encourage them that next agent should be an ACEi or ARB followed by close monitoring of his kidney function.  --no electrolyte, acid/base or anemia issues at this time  --PTH mildly elevated at 98, given normal corrected Ca and Phos.  No need for active vitamin D (e.g. calcitriol) at this time.  Will continue to monitor recheck in 6-12 month.  --follow-up in 4 months    Hypertension: Cardiology has made a number of changes to his antihypertensive regimen and is following him closely.  Currently, he is on lasix,  metoprolol and amlodipine.    --will defer further management to cardiology  --as mentioned, would like to start ACEi or ARB in the near future for renoprotection/proteinuria (please see problem 1)      Total time spent was >40 minutes, and more than 50% of face to face time was spent in counseling and/or coordination of care regarding principles of multidisciplinary care, medication management, and chronic kidney disease education.  MD Ollie Bass MD

## 2017-02-28 NOTE — MR AVS SNAPSHOT
After Visit Summary   2/28/2017    Rohan Monroe    MRN: [id number]           Patient Information     Date Of Birth          1945        Visit Information        Provider Department      2/28/2017 2:00 PM Nurse, [facility]        Care Instructions    You were seen today for home BP cuff calibration and BP check.     Your home BP cuff is 20 points lower on the systolic than the clinic's BP cuff. Please add 20 points to your top number when you take it at home to reflect your true blood pressure.    Your BP today:  Your cuff: 149/94  Clinic cuff: 169/93      Dr. Vazquez has advised to continue current medication for the next 2 weeks. If BP is still elevated, we will change medications at that time.       Jaclyn Pina, RN  Electrophysiology Nurse Coordinator  [phone]        Follow-ups after your visit        Follow-up notes from your care team     Return if symptoms worsen or fail to improve.      Your next 10 appointments already scheduled     Mar 06, 2017  8:30 AM CST   VISUAL FIELD with [facility] EYE VISUAL FIELD   Eye Clinic ([facility])    [address]  [address] [place] [address]   [phone]            Mar 06, 2017  9:00 AM CST   RETURN GLAUCOMA with Kina Greco MD   Eye Clinic ([facility])    [address]  [address] [place] [address]   [phone]            Mar 06, 2017 10:15 AM CST   RETURN RETINA with Sandee Middleton MD   Eye Clinic ([facility])    [address]  [address] [place] [address]   [phone]            Mar 09, 2017  1:00 PM CST   (Arrive by 12:45 PM)   NEW HEART FAILURE with Diane Paige MD   [facility] ([facility])    [address]  [address] [facility] [id number] [phone]            Mar 30, 2017 12:00 PM CDT   Infusion 180  with  SPEC INFUSION, UC 49 ATC   [facility] Specialty [facility] Procedure ([facility])    [address] [place] [address]  [address] [facility] [address]   [phone]            Apr 11, 2017  1:00 PM CDT   Lab with  LAB   [facility] ([facility])    [address] [place] [address]   [phone]            Apr 11, 2017  2:00 PM CDT   (Arrive by 1:30 PM)   Return Visit with Ollie Michel MD   [facility] Nephrology ([facility])    [address] [place] [address]   [phone]            Jun 21, 2017  1:30 PM CDT   (Arrive by 1:15 PM)   RETURN ARRHYTHMIA with Brian Vazquez MD   [facility] ([facility])    [address] [place] [address]   [phone]            Aug 29, 2017 10:30 AM CDT   US ABDOMEN COMPLETE with UCUS2   [facility] ([facility])    [address] [place] [address]   [phone]           Please bring a list of your medicines (including vitamins, minerals and over-the-counter drugs). Also, tell your doctor about any allergies you may have. Wear comfortable clothes and leave your valuables at home.  Adults: No eating or drinking for 8 hours before the exam. You may take medicine with a small sip of water.  Children: - Children 6+ years: No food or drink for 6 hours before exam. - Children 1-5 years: No food or drink for 4 hours before exam. - Infants, breast-fed: may have breast milk up to 2 hours before exam. - Infants, formula: may have bottle until 4 hours before exam.  Please call the Imaging Department at your exam site with any questions.              Future tests that were ordered for you today     Open Standing Orders        Priority Remaining Interval Expires Ordered    US abdomen complete [ZQS374] Routine 2/2  Every 6 Months 3/30/2018 2/28/2017          Open Future Orders        Priority Expected Expires Ordered    INR [PXO0104] Routine 8/27/2017 3/30/2018 2/28/2017    AFP tumor marker [HZY214] Routine 8/27/2017 3/30/2018 2/28/2017     Hepatic Panel [LAB20] Routine 8/27/2017 3/30/2018 2/28/2017    Basic metabolic panel [LAB15] Routine 8/27/2017 3/30/2018 2/28/2017    CBC with platelets [IEB213] Routine 8/27/2017 3/30/2018 2/28/2017            Who to contact     If you have questions or need follow up information about today's clinic visit or your schedule please contact [facility] directly at [phone].  Normal or non-critical lab and imaging results will be communicated to you by [employer], letter or phone within 4 business days after the clinic has received the results. If you do not hear from us within 7 days, please contact the clinic through [employer] or phone. If you have a critical or abnormal lab result, we will notify you by phone as soon as possible.  Submit refill requests through [employer] or call your pharmacy and they will forward the refill request to us. Please allow 3 business days for your refill to be completed.          Additional Information About Your Visit        [employer] Information     [employer] gives you secure access to your electronic health record. If you see a primary care provider, you can also send messages to your care team and make appointments. If you have questions, please call your primary care clinic.  If you do not have a primary care provider, please call [phone] and they will assist you.        Care EveryWhere ID     This is your Care EveryWhere ID. This could be used by other organizations to access your [facility] medical records  [id number]        Your Vitals Were     Pulse Pulse Oximetry                84 92%           Blood Pressure from Last 3 Encounters:   02/28/17 (!) (P) 149/94   02/28/17 148/86   02/24/17 (!) 178/106    Weight from Last 3 Encounters:   02/28/17  93.4 kg (206 lb)   02/24/17 90.7 kg (200 lb)   02/24/17 90.7 kg (200 lb)              Today, you had the following     No orders found for display         Today's Medication Changes          These changes are accurate as of: 2/28/17  2:17 PM.  If you have any questions, ask your nurse or doctor.               These medicines have changed or have updated prescriptions.        Dose/Directions    polyethylene glycol powder   Commonly known as:  MIRALAX   This may have changed:    - when to take this  - reasons to take this   Used for:  Constipation, unspecified constipation type        Dose:  1 capful   Take 17 g (1 capful) by mouth daily   Quantity:  510 g   Refills:  1       Urea 40 % Crea   Commonly known as:  CARMOL 40   This may have changed:  additional instructions   Used for:  Dermatophytosis of nail, Corns and callosities        To feet daily   Quantity:  199 g   Refills:  4                Primary Care Provider Office Phone # Fax #    Jamie Foster [phone]       [address] [address]        Thank you!     Thank you for choosing [facility]  for your care. Our goal is always to provide you with excellent care. Hearing back from our patients is one way we can continue to improve our services. Please take a few minutes to complete the written survey that you may receive in the mail after your visit with us. Thank you!             Your Updated Medication List - Protect others around you: Learn how to safely use, store and throw away your medicines at www.disposemymeds.org.          This list is accurate as of: 2/28/17  2:17 PM.  Always use your most recent med list.                   Brand Name Dispense Instructions for use    albuterol 108 (90 BASE) MCG/ACT Inhaler    PROAIR HFA/PROVENTIL HFA/VENTOLIN HFA    3 Inhaler    Inhale 2 puffs into the lungs every 6 hours as needed for shortness of breath / dyspnea       amLODIPine 10 MG tablet     NORVASC    90 tablet    Take 1 tablet (10 mg) by mouth daily       ASPIRIN EC PO      Take 81 mg by mouth daily       atorvastatin 20 MG tablet    LIPITOR    90 tablet    Take 1 tablet (20 mg) by mouth daily       blood glucose monitoring lancets     2 Box    Use to test blood sugar 2 times daily or as directed.  102 lancets per box.  3 month supply.       blood glucose monitoring meter device kit    no brand specified    1 kit    Use to test blood sugar 2 times daily.       blood glucose monitoring test strip    ACCU-CHEK RONNIE PLUS    200 each    Use to test blood sugar 2 times daily or as directed.  3 month supply.       ciclopirox 0.77 % cream    LOPROX    90 g    Apply topically 2 times daily To feet and toenails.       fluticasone-vilanterol 100-25 MCG/INH oral inhaler    BREO ELLIPTA    3 Inhaler    Inhale 1 puff into the lungs daily       furosemide 20 MG tablet    LASIX    180 tablet    Take 2 tablets (40 mg) by mouth daily as needed For weight gain of 3 lbs or greater       inFLIXimab 100 MG injection    REMICADE     every 6 weeks       levothyroxine 137 MCG tablet    SYNTHROID/LEVOTHROID    90 tablet    Take 1 tablet (137 mcg) by mouth daily       methotrexate 2.5 MG tablet CHEMO     48 tablet    Take 3 tablets (7.5 mg) by mouth once a week On Mondays       metoprolol 100 MG tablet    LOPRESSOR    60 tablet    Take 1 tablet (100 mg) by mouth 2 times daily       mirtazapine 15 MG tablet    REMERON     Take 15 mg by mouth At Bedtime.       MULTIVITAMIN & MINERAL PO      Take 1 tablet by mouth daily.       polyethylene glycol powder    MIRALAX    510 g    Take 17 g (1 capful) by mouth daily       sildenafil 50 MG cap/tab    VIAGRA    10 tablet    Take 2 tablets (100 mg) by mouth daily as needed for erectile dysfunction       tiotropium 18 MCG capsule    SPIRIVA HANDIHALER    90 capsule    Inhale contents of one capsule daily.       Urea 40 % Crea    CARMOL 40    199 g    To feet daily       warfarin 5 MG  tablet    COUMADIN    30 tablet    Take 1 tablet (5 mg) by mouth daily

## 2017-02-28 NOTE — MR AVS SNAPSHOT
After Visit Summary   2/28/2017    Rohan Monroe    MRN: [id number]           Patient Information     Date Of Birth          1945        Visit Information        Provider Department      2/28/2017 1:00 PM Ollie Michel MD [facility] Nephrology        Today's Diagnoses     CKD (chronic kidney disease) stage 3, GFR 30-59 ml/min    -  1       Follow-ups after your visit        Follow-up notes from your care team     Return in about 6 months (around 8/28/2017).      Your next 10 appointments already scheduled     Mar 06, 2017  8:30 AM CST   VISUAL FIELD with [facility] EYE VISUAL FIELD   Eye Clinic ([facility])    [address]  [address]  9[address] Clin 9a  [facility] [address]   [phone]            Mar 06, 2017  9:00 AM CST   RETURN GLAUCOMA with Kina Greco MD   Eye Clinic ([facility])    [address]  [address]  [address] Clin 9a  [facility] [address]   [phone]            Mar 06, 2017 10:15 AM CST   RETURN RETINA with Sandee Middleton MD   Eye Clinic ([facility])    [address]  [address]  [address] Clin 9a  [facility] [address]   [phone]            Mar 09, 2017  1:00 PM CST   (Arrive by 12:45 PM)   NEW HEART FAILURE with Diane Paige MD   [facility] ([facility])    [address]  3rd Floor  [facility] [address] [phone]            Mar 30, 2017 12:00 PM CDT   Infusion 180 with UC SPEC INFUSION, UC 49 ATC   [facility] ([facility])    [address]  2nd Floor  [facility] [address] [phone]            Apr 11, 2017  1:00 PM CDT   Lab with UC LAB   [facility] Lab ([facility])    [address]  1st [facility] [id number] [phone]            Apr 11, 2017  2:00 PM CDT   (Arrive by 1:30 PM)    Return Visit with Ollie Michel MD   [facility] Nephrology ([facility])    [address]  3rd [facility] [address] [phone]            Jun 21, 2017  1:30 PM CDT   (Arrive by 1:15 PM)   RETURN ARRHYTHMIA with Brian Vazquez MD   [facility] ([facility])    [address]  3rd [facility] [address] [phone]            Aug 29, 2017 10:30 AM CDT   US ABDOMEN COMPLETE with UCUS2   [facility] US ([facility])    [address]  1st [facility] [address] [phone]           Please bring a list of your medicines (including vitamins, minerals and over-the-counter drugs). Also, tell your doctor about any allergies you may have. Wear comfortable clothes and leave your valuables at home.  Adults: No eating or drinking for 8 hours before the exam. You may take medicine with a small sip of water.  Children: - Children 6+ years: No food or drink for 6 hours before exam. - Children 1-5 years: No food or drink for 4 hours before exam. - Infants, breast-fed: may have breast milk up to 2 hours before exam. - Infants, formula: may have bottle until 4 hours before exam.  Please call the Imaging Department at your exam site with any questions.              Future tests that were ordered for you today     Open Standing Orders        Priority Remaining Interval Expires Ordered    US abdomen complete [BLI368] Routine 2/2 Every 6 Months 3/30/2018 2/28/2017          Open Future Orders        Priority Expected Expires Ordered    INR [YXG9708] Routine 8/27/2017 3/30/2018 2/28/2017    AFP tumor marker [[id number]] Routine 8/27/2017 3/30/2018 2/28/2017     Hepatic Panel [LAB20] Routine 8/27/2017 3/30/2018 2/28/2017    Basic metabolic panel [LAB15] Routine 8/27/2017 3/30/2018 2/28/2017    CBC with platelets [LXG314] Routine 8/27/2017 3/30/2018 2/28/2017            Who to contact      If you have questions or need follow up information about today's clinic visit or your schedule please contact [facility] NEPHROLOGY directly at [phone].  Normal or non-critical lab and imaging results will be communicated to you by Measurefulhart, letter or phone within 4 business days after the clinic has received the results. If you do not hear from us within 7 days, please contact the clinic through miacosat or phone. If you have a critical or abnormal lab result, we will notify you by phone as soon as possible.  Submit refill requests through SugarCRM or call your pharmacy and they will forward the refill request to us. Please allow 3 business days for your refill to be completed.          Additional Information About Your Visit        SugarCRM Information     SugarCRM gives you secure access to your electronic health record. If you see a primary care provider, you can also send messages to your care team and make appointments. If you have questions, please call your primary care clinic.  If you do not have a primary care provider, please call [phone] and they will assist you.        Care EveryWhere ID     This is your Care EveryWhere ID. This could be used by other organizations to access your [facility] medical records  [id number]         Blood Pressure from Last 3 Encounters:   02/28/17 (!) (P) 149/94   02/28/17 148/86   02/24/17 (!) 178/106    Weight from Last 3 Encounters:   02/28/17 93.4 kg (206 lb)   02/24/17 90.7 kg (200 lb)   02/24/17 90.7 kg (200 lb)              Today, you had the following     No orders found for display         Today's Medication Changes          These changes are accurate as of: 2/28/17 11:59 PM.  If you have any questions, ask your nurse or doctor.               These medicines have changed or have updated prescriptions.        Dose/Directions    polyethylene glycol powder   Commonly known as:  MIRALAX   This may have changed:    - when to take this  - reasons to take this    Used for:  Constipation, unspecified constipation type        Dose:  1 capful   Take 17 g (1 capful) by mouth daily   Quantity:  510 g   Refills:  1       Urea 40 % Crea   Commonly known as:  CARMOL 40   This may have changed:  additional instructions   Used for:  Dermatophytosis of nail, Corns and callosities        To feet daily   Quantity:  199 g   Refills:  4                Primary Care Provider Office Phone # Fax #    Jamie Foster [phone]       [address] [address]        Thank you!     Thank you for choosing [facility] NEPHROLOGY  for your care. Our goal is always to provide you with excellent care. Hearing back from our patients is one way we can continue to improve our services. Please take a few minutes to complete the written survey that you may receive in the mail after your visit with us. Thank you!             Your Updated Medication List - Protect others around you: Learn how to safely use, store and throw away your medicines at www.disposemymeds.org.          This list is accurate as of: 2/28/17 11:59 PM.  Always use your most recent med list.                   Brand Name Dispense Instructions for use    albuterol 108 (90 BASE) MCG/ACT Inhaler    PROAIR HFA/PROVENTIL HFA/VENTOLIN HFA    3 Inhaler    Inhale 2 puffs into the lungs every 6 hours as needed for shortness of breath / dyspnea       amLODIPine 10 MG tablet    NORVASC    90 tablet    Take 1 tablet (10 mg) by mouth daily       ASPIRIN EC PO      Take 81 mg by mouth daily       atorvastatin 20 MG tablet    LIPITOR    90 tablet    Take 1 tablet (20 mg) by mouth daily       blood glucose monitoring lancets     2 Box    Use to test blood sugar 2 times daily or as directed.  102 lancets per box.  3 month supply.       blood glucose monitoring meter device kit    no brand specified    1 kit    Use to test blood sugar 2 times daily.       blood glucose monitoring test strip     ACCU-CHEK RONNIE PLUS    200 each    Use to test blood sugar 2 times daily or as directed.  3 month supply.       ciclopirox 0.77 % cream    LOPROX    90 g    Apply topically 2 times daily To feet and toenails.       fluticasone-vilanterol 100-25 MCG/INH oral inhaler    BREO ELLIPTA    3 Inhaler    Inhale 1 puff into the lungs daily       furosemide 20 MG tablet    LASIX    180 tablet    Take 2 tablets (40 mg) by mouth daily as needed For weight gain of 3 lbs or greater       inFLIXimab 100 MG injection    REMICADE     every 6 weeks       levothyroxine 137 MCG tablet    SYNTHROID/LEVOTHROID    90 tablet    Take 1 tablet (137 mcg) by mouth daily       methotrexate 2.5 MG tablet CHEMO     48 tablet    Take 3 tablets (7.5 mg) by mouth once a week On Mondays       metoprolol 100 MG tablet    LOPRESSOR    60 tablet    Take 1 tablet (100 mg) by mouth 2 times daily       mirtazapine 15 MG tablet    REMERON     Take 15 mg by mouth At Bedtime.       MULTIVITAMIN & MINERAL PO      Take 1 tablet by mouth daily.       polyethylene glycol powder    MIRALAX    510 g    Take 17 g (1 capful) by mouth daily       sildenafil 50 MG cap/tab    VIAGRA    10 tablet    Take 2 tablets (100 mg) by mouth daily as needed for erectile dysfunction       tiotropium 18 MCG capsule    SPIRIVA HANDIHALER    90 capsule    Inhale contents of one capsule daily.       Urea 40 % Crea    CARMOL 40    199 g    To feet daily       warfarin 5 MG tablet    COUMADIN    30 tablet    Take 1 tablet (5 mg) by mouth daily

## 2017-02-28 NOTE — MR AVS SNAPSHOT
After Visit Summary   2/28/2017    Rohan Monroe    MRN: [id number]           Patient Information     Date Of Birth          1945        Visit Information        Provider Department      2/28/2017 11:15 AM Moses Orosco MD [facility] Hepatology        Today's Diagnoses     Cirrhosis of liver without ascites, unspecified hepatic cirrhosis type (H)    -  1       Follow-ups after your visit        Follow-up notes from your care team     Return in about 6 months (around 8/28/2017).      Your next 10 appointments already scheduled     Feb 28, 2017 12:00 PM CST   Lab with  LAB    Health Lab ([facility])    [address]  1st [facility] [address]   [phone]            Feb 28, 2017  1:00 PM CST   (Arrive by 12:30 PM)   Return Visit with Ollie Michel MD   [facility] Nephrology ([facility])    [address]  3rd [facility] [address]   [phone]            Feb 28, 2017  2:00 PM CST   Nurse Visit with  Cvc Nurse   [facility] Heart Care ([facility])    [address]  3rd [facility] [address]   [phone]            Mar 06, 2017  8:30 AM CST   VISUAL FIELD with [facility] EYE VISUAL FIELD   Eye Clinic ([facility])    [address]  [address]   [facility] [address] [place] [address]   [phone]            Mar 06, 2017  9:00 AM CST   RETURN GLAUCOMA with Kina Greco MD   Eye Clinic ([facility])    [address]  [address]   [facility] [address] [place] [address]   [phone]            Mar 06, 2017 10:15 AM CST   RETURN RETINA with Sandee Middleton MD   Eye Clinic ([facility])    [address]  [address]  [facility] Clin [address] [place] [address]   [phone]            Mar 09, 2017  1:00 PM CST   (Arrive by 12:45 PM)   NEW HEART FAILURE with Diane Paige,  MD   [facility] Heart Care ([facility])    [address]  3rd Floor  [facility] [address] [phone]            Mar 30, 2017 12:00 PM CDT   Infusion 180 with UC SPEC INFUSION, UC 49 ATC   [facility] Specialty [facility] ([facility])    [address]  2nd Floor  [facility] [address] [phone]            Apr 11, 2017  1:00 PM CDT   Lab with UC LAB   [facility] Lab ([facility])    [address] [place] [address]  1st Floor  [facility] [address] [phone]              Future tests that were ordered for you today     Open Standing Orders        Priority Remaining Interval Expires Ordered    US abdomen complete [MNN893] Routine 2/2 Every 6 Months 3/30/2018 2/28/2017          Open Future Orders        Priority Expected Expires Ordered    INR [[id number]] Routine 8/27/2017 3/30/2018 2/28/2017    AFP tumor marker [MEF716] Routine 8/27/2017 3/30/2018 2/28/2017     Hepatic Panel [LAB20] Routine 8/27/2017 3/30/2018 2/28/2017    Basic metabolic panel [LAB15] Routine 8/27/2017 3/30/2018 2/28/2017    CBC with platelets [IGG017] Routine 8/27/2017 3/30/2018 2/28/2017            Who to contact     If you have questions or need follow up information about today's clinic visit or your schedule please contact [facility] HEPATOLOGY directly at [phone].  Normal or non-critical lab and imaging results will be communicated to you by MyChart, letter or phone within 4 business days after the clinic has received the results. If you do not hear from us within 7 days, please contact the clinic through MyChart or phone. If you have a critical or abnormal lab result, we will notify you by phone as soon as possible.  Submit refill requests through ePig Games or call your pharmacy and they will forward the refill request to us. Please allow 3 business days for your refill to be completed.          Additional Information  "About Your Visit        [employer] Information     ScalingData gives you secure access to your electronic health record. If you see a primary care provider, you can also send messages to your care team and make appointments. If you have questions, please call your primary care clinic.  If you do not have a primary care provider, please call [phone] and they will assist you.        Care EveryWhere ID     This is your Care EveryWhere ID. This could be used by other organizations to access your [facility] medical records  [id number]        Your Vitals Were     Pulse Temperature Respirations Height Pulse Oximetry BMI (Body Mass Index)    87 97.6  F (36.4  C) (Oral) 24 1.74 m (5' 8.5\") 94% 30.86 kg/m2       Blood Pressure from Last 3 Encounters:   02/28/17 148/86   02/24/17 (!) 178/106   02/16/17 (!) 163/95    Weight from Last 3 Encounters:   02/28/17 93.4 kg (206 lb)   02/24/17 90.7 kg (200 lb)   02/24/17 90.7 kg (200 lb)              We Performed the Following     Schedule follow up appointments          Today's Medication Changes          These changes are accurate as of: 2/28/17 11:27 AM.  If you have any questions, ask your nurse or doctor.               These medicines have changed or have updated prescriptions.        Dose/Directions    polyethylene glycol powder   Commonly known as:  MIRALAX   This may have changed:    - when to take this  - reasons to take this   Used for:  Constipation, unspecified constipation type        Dose:  1 capful   Take 17 g (1 capful) by mouth daily   Quantity:  510 g   Refills:  1       Urea 40 % Crea   Commonly known as:  CARMOL 40   This may have changed:  additional instructions   Used for:  Dermatophytosis of nail, Corns and callosities        To feet daily   Quantity:  199 g   Refills:  4                Primary Care Provider Office Phone # Fax #    Jamie Foster [phone]       [address] [address]        Thank you!  "    Thank you for choosing [facility] HEPATOLOGY  for your care. Our goal is always to provide you with excellent care. Hearing back from our patients is one way we can continue to improve our services. Please take a few minutes to complete the written survey that you may receive in the mail after your visit with us. Thank you!             Your Updated Medication List - Protect others around you: Learn how to safely use, store and throw away your medicines at www.disposemymeds.org.          This list is accurate as of: 2/28/17 11:27 AM.  Always use your most recent med list.                   Brand Name Dispense Instructions for use    albuterol 108 (90 BASE) MCG/ACT Inhaler    PROAIR HFA/PROVENTIL HFA/VENTOLIN HFA    3 Inhaler    Inhale 2 puffs into the lungs every 6 hours as needed for shortness of breath / dyspnea       amLODIPine 10 MG tablet    NORVASC    90 tablet    Take 1 tablet (10 mg) by mouth daily       ASPIRIN EC PO      Take 81 mg by mouth daily       atorvastatin 20 MG tablet    LIPITOR    90 tablet    Take 1 tablet (20 mg) by mouth daily       blood glucose monitoring lancets     2 Box    Use to test blood sugar 2 times daily or as directed.  102 lancets per box.  3 month supply.       blood glucose monitoring meter device kit    no brand specified    1 kit    Use to test blood sugar 2 times daily.       blood glucose monitoring test strip    ACCU-CHEK RONNIE PLUS    200 each    Use to test blood sugar 2 times daily or as directed.  3 month supply.       ciclopirox 0.77 % cream    LOPROX    90 g    Apply topically 2 times daily To feet and toenails.       fluticasone-vilanterol 100-25 MCG/INH oral inhaler    BREO ELLIPTA    3 Inhaler    Inhale 1 puff into the lungs daily       furosemide 20 MG tablet    LASIX    180 tablet    Take 2 tablets (40 mg) by mouth daily as needed For weight gain of 3 lbs or greater       inFLIXimab 100 MG injection    REMICADE     every 6 weeks       levothyroxine 137 MCG  tablet    SYNTHROID/LEVOTHROID    90 tablet    Take 1 tablet (137 mcg) by mouth daily       methotrexate 2.5 MG tablet CHEMO     48 tablet    Take 3 tablets (7.5 mg) by mouth once a week On Mondays       metoprolol 100 MG tablet    LOPRESSOR    60 tablet    Take 1 tablet (100 mg) by mouth 2 times daily       mirtazapine 15 MG tablet    REMERON     Take 15 mg by mouth At Bedtime.       MULTIVITAMIN & MINERAL PO      Take 1 tablet by mouth daily.       polyethylene glycol powder    MIRALAX    510 g    Take 17 g (1 capful) by mouth daily       sildenafil 50 MG cap/tab    VIAGRA    10 tablet    Take 2 tablets (100 mg) by mouth daily as needed for erectile dysfunction       tiotropium 18 MCG capsule    SPIRIVA HANDIHALER    90 capsule    Inhale contents of one capsule daily.       Urea 40 % Crea    CARMOL 40    199 g    To feet daily       warfarin 5 MG tablet    COUMADIN    30 tablet    Take 1 tablet (5 mg) by mouth daily

## 2017-02-28 NOTE — PROGRESS NOTES
Nephrology Follow-up Clinic Note  February 28, 2017       Reason for visit: CKD follow-up    HPI:  Mr Monroe is a pleasant 71 year old man here for follow-up of CKD and proteinuria. His past medical hx is significant for sarcoid (affecting lungs and heart), hep C s/p treatment with cirrhosis (and SVR), CAD s/p stents, COPD, He denies a personal history of UTIs or kidney stones. Given unclear etiology of CKD and presence of RBCs, WBCs, and proteinuria we proceeded with kidney biopsy on 11/13/2015. Results were consistent with diabetic changes and vascular disease as etiology for CKD.  Patient now working with endocrine and diabetes educator regarding his DM.  At out last visit, we made a significant number of changes regarding his blood pressure medications.  Unfortunately, he recently was hospitalized this past Ja for atrial flutter and hypervolemia.  He did undergo cardioversion and subsequent ablation.  He is now on coumadin.  His Cr gretchen with a aggressive diuresis.  His main complaint has been dyspnea that did not significantly improve after diuresis and ablation.  He has not been hypoxic.  He does feel better after his remicade infusion which will now occur more frequently (every 6 weeks) though clearly not back to baseline.  He has occasional phlegm production. He denies fever, palpitations and chest pain.  Cardiology is following his blood pressure closely.      Review of Systems:   A 10 point review of systems was negative except as noted above.    Active Medical Issues:  Patient Active Problem List   Diagnosis     Hypothyroidism     SARCOIDOSIS-systemic     Generalized osteoarthrosis, unspecified site     Viral warts     Other psoriasis     Hypertrophy of prostate without urinary obstruction     Diabetes mellitus, type 2 (H)     CAD (coronary artery disease)     Type 2 diabetes, HbA1C goal < 8% (H)     CARDIOVASCULAR SCREENING; LDL GOAL LESS THAN 100     Sepsis (H)     Atrial flutter with rapid ventricular  response (H)     CKD (chronic kidney disease) stage 3, GFR 30-59 ml/min     Hip joint pain     Hyperlipidemia LDL goal <70     Acute exacerbation of chronic obstructive pulmonary disease (COPD) (H)     Cellulitis     Immunosuppression (H)     Hyponatremia     RADHA (acute kidney injury) (HCC)     COPD (chronic obstructive pulmonary disease) (H)     Cirrhosis of liver (H)     Pneumonia     Proteinuria     Microscopic hematuria     Sarcoidosis of lung (HCC)     Hyperlipidemia     Atrial flutter (H)     Long-term (current) use of anticoagulants [Z79.01]       PMHx, PSHx, FamHx, SocHx: reviewed in EPIC.    Current Medications:  Medications reviewed by me.  Current Outpatient Prescriptions   Medication Sig Dispense Refill     amLODIPine (NORVASC) 10 MG tablet Take 1 tablet (10 mg) by mouth daily 90 tablet 3     warfarin (COUMADIN) 5 MG tablet Take 1 tablet (5 mg) by mouth daily 30 tablet 6     polyethylene glycol (MIRALAX) powder Take 17 g (1 capful) by mouth daily (Patient taking differently: Take 1 capful by mouth daily as needed for constipation ) 510 g 1     tiotropium (SPIRIVA HANDIHALER) 18 MCG capsule Inhale contents of one capsule daily. 90 capsule 3     albuterol (PROAIR HFA/PROVENTIL HFA/VENTOLIN HFA) 108 (90 BASE) MCG/ACT Inhaler Inhale 2 puffs into the lungs every 6 hours as needed for shortness of breath / dyspnea 3 Inhaler 6     fluticasone-vilanterol (BREO ELLIPTA) 100-25 MCG/INH oral inhaler Inhale 1 puff into the lungs daily 3 Inhaler 3     furosemide (LASIX) 20 MG tablet Take 2 tablets (40 mg) by mouth daily as needed For weight gain of 3 lbs or greater 180 tablet 3     inFLIXimab (REMICADE) 100 MG injection every 6 weeks       metoprolol (LOPRESSOR) 100 MG tablet Take 1 tablet (100 mg) by mouth 2 times daily 60 tablet 11     levothyroxine (SYNTHROID/LEVOTHROID) 137 MCG tablet Take 1 tablet (137 mcg) by mouth daily 90 tablet 0     methotrexate 2.5 MG tablet Take 3 tablets (7.5 mg) by mouth once a week On  "Mondays 48 tablet 3     ciclopirox (LOPROX) 0.77 % cream Apply topically 2 times daily To feet and toenails. 90 g 6     atorvastatin (LIPITOR) 20 MG tablet Take 1 tablet (20 mg) by mouth daily 90 tablet 3     ASPIRIN EC PO Take 81 mg by mouth daily       blood glucose monitoring (ACCU-CHEK RONNIE PLUS) test strip Use to test blood sugar 2 times daily or as directed.  3 month supply. 200 each 3     blood glucose monitoring (ACCU-CHEK FASTCLIX) lancets Use to test blood sugar 2 times daily or as directed.  102 lancets per box.  3 month supply. 2 Box 3     blood glucose monitoring (NO BRAND SPECIFIED) meter device kit Use to test blood sugar 2 times daily. 1 kit 0     Urea (CARMOL 40) 40 % CREA To feet daily (Patient taking differently: To feet daily PRN) 199 g 4     sildenafil (VIAGRA) 50 MG tablet Take 2 tablets (100 mg) by mouth daily as needed for erectile dysfunction 10 tablet 6     Multiple Vitamins-Minerals (MULTIVITAMIN & MINERAL PO) Take 1 tablet by mouth daily.       mirtazapine (REMERON) 15 MG tablet Take 15 mg by mouth At Bedtime.         Physical Exam:   Vital Signs 2/28/2017   Systolic 148   Diastolic 86   Pulse 87   Temperature 97.6   Respirations 24   Weight (LB) 206 lb   Height 5' 8.504\"   BMI 30.93   Pain 0   O2 94     Gen: notably SOB  Heart: regular rhythm, normal rate, no rub  Lungs: shallow breaths but clear  Abd: soft, non-tender, non-distended  : No CVA tenderness  Ext: 1+ LE edema  MSK: No joint effusions  Skin: No concerning rash  Neuro: No gross focal deficit  Psych: Mood and affect appropriate     Labs:   Today's labs reviewed by me.  Electrolytes/Renal -   Recent Labs   Lab Test  02/28/17   1150  02/13/17   1359  01/27/17   1132  01/25/17   0724  01/24/17   1037  01/24/17   0313   12/26/16   1015  08/30/16   1322   NA  138  138  132*  133   --   133   < >  135  136   POTASSIUM  4.9  4.4  4.4  4.2  3.9  3.9   < >  4.2  4.9   CHLORIDE  104  104  97  96   --   96   < >  105  104   CO2  26  27 "  24  28   --   24   < >  22  26   BUN  33*  36*  71*  62*   --   67*   < >  41*  46*   CR  1.92*  1.89*  2.48*  2.44*   --   2.51*   < >  1.70*  1.77*   GLC  122*  138*  144*  148*   --   133*   < >  214*  159*   RAFFY  8.6  8.2*  8.7  8.4*   --   8.2*   < >  8.4*  9.2   MAG   --    --    --   2.3  2.3  2.3   < >   --    --    PHOS  2.6   --    --    --    --    --    --   2.8  3.2    < > = values in this interval not displayed.     CBC -   Recent Labs   Lab Test  02/28/17   1150  01/27/17   1132  01/22/17   1625   WBC  10.1  15.4*  11.1*   HGB  13.4  14.2  14.3   PLT  276  364  213     LFTs -   Recent Labs   Lab Test  02/28/17   1150  02/13/17   1359  01/27/17   1132  01/18/17   1746   ALKPHOS   --   101  96  132   BILITOTAL   --   0.7  0.7  0.9   ALT   --   30  50  66   AST   --   22  40  32   PROTTOTAL   --   7.0  7.7  8.3   ALBUMIN  3.0*  2.6*  2.8*  3.7     UA -   Recent Labs   Lab Test  10/27/15   1339   COLOR  Yellow   APPEARANCE  Clear   URINEGLC  Negative   URINEBILI  Negative   URINEKETONE  Negative   SG  1.017   UBLD  Large*   URINEPH  6.0   PROTEIN  300*   NITRITE  Negative   LEUKEST  Negative   RBCU  40*   WBCU  2     Recent Labs   Lab Test  02/28/17   1206  12/26/16   1015  08/30/16   1320  07/18/16   1324  03/15/16   1107  02/29/16   1424  10/27/15   1339  10/07/15   1032  01/27/11   1507  11/16/10   0950   UTPG  6.07*  3.60*  1.40*  2.44*  1.50*  2.29*  2.00*  1.81*  0.02  0.10     PTH -   Recent Labs   Lab Test  03/15/16   1116  01/27/11   1435   PTHI  98*  24     IRON STUDIES -   Recent Labs   Lab Test  02/28/17   1150  03/15/16   1116   IRON  61  91   FEB  316  346   IRONSAT  19  26   TIFFANIE  181  176         Assessment & Plan:   CKD stage 3 with proteinuria and hematuria:  Etiology 2/2 DM and renovascular disease.  Patient underwent kidney biopsy on 11/13/2015 to better evaluate his CKD. Tissue sample was limited but with tissue/cortex present patient had changes consistent with diabetic nephropathy  and also mild to moderate renal arteriosclerosis.  Serologic work-up for vasculitis and monoclonal gammopathy was negative. Hep C related kidney disease unlikely given he has had treatment with sustained virologic response.  Kidney function consistently declined since 3/16/2015 and has ranged from 1.2-1.8 mg/dL in that time. Previous to 3/2015 he has had some fluctuation in Cr, likely representing RADHA events and appears to have had another RADHA event during his recent hospitalization for atrial flutter.  His recent baseline Cr was ~1.7 mg/dL though now his Cr running closer to 1.9 mg/dL which may be his new baseline.  He does have significant albuminuria that was not well controlled with max dose losartan.  However, he is now off losartan which may lead to worsening proteinuria.    --discussed that moving forward it will be important to control his DM and HTN with regards to his CKD   --would like to restart losartan (and eventually get back to max dose) given DM, HTN, proteinuria and CKD.  Albuminuria not at goal in past despite max dose losartan. Regardless, he would benefit from RAAS blockade for renoprotection.  However, would not start double RAAS blockade for management of proteinuria given that risks outweigh the benefits.    --cardiology currently managing antihypertensives.  Will encourage them that next agent should be an ACEi or ARB followed by close monitoring of his kidney function.  --no electrolyte, acid/base or anemia issues at this time  --PTH mildly elevated at 98, given normal corrected Ca and Phos.  No need for active vitamin D (e.g. calcitriol) at this time.  Will continue to monitor recheck in 6-12 month.  --follow-up in 4 months    Hypertension: Cardiology has made a number of changes to his antihypertensive regimen and is following him closely.  Currently, he is on lasix, metoprolol and amlodipine.    --will defer further management to cardiology  --as mentioned, would like to start ACEi or  ARB in the near future for renoprotection/proteinuria (please see problem 1)      Total time spent was >40 minutes, and more than 50% of face to face time was spent in counseling and/or coordination of care regarding principles of multidisciplinary care, medication management, and chronic kidney disease education.  Ollie Michel MD

## 2017-02-28 NOTE — PATIENT INSTRUCTIONS
You were seen today for home BP cuff calibration and BP check.     Your home BP cuff is 20 points lower on the systolic than the clinic's BP cuff. Please add 20 points to your top number when you take it at home to reflect your true blood pressure.    Your BP today:  Your cuff: 149/94  Clinic cuff: 169/93      Dr. Vazquez has advised to continue current medication for the next 2 weeks. If BP is still elevated, we will change medications at that time.       Jaclyn Pina RN  Electrophysiology Nurse Coordinator  [phone]

## 2017-03-03 ENCOUNTER — ANTICOAGULATION THERAPY VISIT (OUTPATIENT)
Dept: ANTICOAGULATION | Facility: CLINIC | Age: 72
End: 2017-03-03

## 2017-03-03 DIAGNOSIS — I48.92 ATRIAL FLUTTER WITH RAPID VENTRICULAR RESPONSE (H): ICD-10-CM

## 2017-03-03 DIAGNOSIS — Z79.01 LONG-TERM (CURRENT) USE OF ANTICOAGULANTS: ICD-10-CM

## 2017-03-06 ENCOUNTER — OFFICE VISIT (OUTPATIENT)
Dept: OPHTHALMOLOGY | Facility: CLINIC | Age: 72
End: 2017-03-06
Attending: OPHTHALMOLOGY
Payer: MEDICARE

## 2017-03-06 ENCOUNTER — PRE VISIT (OUTPATIENT)
Dept: CARDIOLOGY | Facility: CLINIC | Age: 72
End: 2017-03-06

## 2017-03-06 DIAGNOSIS — H35.373 EPIRETINAL MEMBRANE (ERM) OF BOTH EYES: Primary | ICD-10-CM

## 2017-03-06 DIAGNOSIS — H43.812 VITREOUS DEGENERATION AND DETACHMENT OF LEFT EYE: ICD-10-CM

## 2017-03-06 DIAGNOSIS — D86.0 SARCOIDOSIS OF LUNG (H): ICD-10-CM

## 2017-03-06 DIAGNOSIS — H40.003 GLAUCOMA SUSPECT OF BOTH EYES: ICD-10-CM

## 2017-03-06 DIAGNOSIS — D86.9 SARCOIDOSIS: ICD-10-CM

## 2017-03-06 DIAGNOSIS — H43.811 VITREORETINAL DEGENERATION OF RIGHT EYE: ICD-10-CM

## 2017-03-06 DIAGNOSIS — E11.3299 MILD NONPROLIFERATIVE DIABETIC RETINOPATHY WITHOUT MACULAR EDEMA ASSOCIATED WITH TYPE 2 DIABETES MELLITUS (H): ICD-10-CM

## 2017-03-06 DIAGNOSIS — I25.10 CORONARY ARTERY DISEASE WITHOUT ANGINA PECTORIS, UNSPECIFIED VESSEL OR LESION TYPE, UNSPECIFIED WHETHER NATIVE OR TRANSPLANTED HEART: Primary | ICD-10-CM

## 2017-03-06 PROCEDURE — 99213 OFFICE O/P EST LOW 20 MIN: CPT

## 2017-03-06 PROCEDURE — 92133 CPTRZD OPH DX IMG PST SGM ON: CPT | Mod: ZF | Performed by: OPHTHALMOLOGY

## 2017-03-06 PROCEDURE — 92015 DETERMINE REFRACTIVE STATE: CPT | Mod: GY,ZF

## 2017-03-06 PROCEDURE — 92083 EXTENDED VISUAL FIELD XM: CPT | Mod: ZF | Performed by: OPHTHALMOLOGY

## 2017-03-06 ASSESSMENT — REFRACTION_WEARINGRX
OS_AXIS: 023
OD_CYLINDER: +1.25
OS_ADD: +2.75
OD_ADD: +2.75
OD_CYLINDER: +1.25
OS_CYLINDER: +0.75
OS_SPHERE: -5.00
OD_ADD: +2.75
OD_SPHERE: -4.00
OS_CYLINDER: +0.75
OD_SPHERE: -4.00
OS_AXIS: 023
OD_AXIS: 010
OS_ADD: +2.75
OS_SPHERE: -5.00
OD_AXIS: 010

## 2017-03-06 ASSESSMENT — VISUAL ACUITY
OD_SC+: -1
OS_SC: 20/50
OS_SC: 20/50
OD_SC: 20/15
METHOD: SNELLEN - LINEAR
OD_SC: 20/15
OD_SC+: -1
METHOD: SNELLEN - LINEAR

## 2017-03-06 ASSESSMENT — REFRACTION_MANIFEST
OS_ADD: +2.75
OD_CYLINDER: SPHERE
OD_SPHERE: -0.25
OS_SPHERE: -0.50
OD_ADD: +2.75
OS_CYLINDER: +0.50
OS_ADD: +2.75
OD_SPHERE: -0.25
OD_CYLINDER: SPHERE
OS_SPHERE: -0.50
OD_ADD: +2.75
OS_CYLINDER: +0.50
OS_AXIS: 150
OS_AXIS: 150

## 2017-03-06 ASSESSMENT — EXTERNAL EXAM - LEFT EYE
OS_EXAM: NORMAL
OS_EXAM: NORMAL

## 2017-03-06 ASSESSMENT — SLIT LAMP EXAM - LIDS
COMMENTS: NORMAL

## 2017-03-06 ASSESSMENT — CONF VISUAL FIELD
OS_NORMAL: 1
OD_NORMAL: 1
OD_NORMAL: 1
OS_NORMAL: 1
METHOD: COUNTING FINGERS

## 2017-03-06 ASSESSMENT — TONOMETRY
OS_IOP_MMHG: 13
IOP_METHOD: APPLANATION
OD_IOP_MMHG: 12
IOP_METHOD: APPLANATION
OD_IOP_MMHG: 12
OS_IOP_MMHG: 13

## 2017-03-06 ASSESSMENT — CUP TO DISC RATIO
OS_RATIO: 0.85
OS_RATIO: 0.85
OD_RATIO: 0.75
OD_RATIO: 0.75

## 2017-03-06 ASSESSMENT — EXTERNAL EXAM - RIGHT EYE
OD_EXAM: NORMAL
OD_EXAM: NORMAL

## 2017-03-06 NOTE — PROGRESS NOTES
CC -   ERM, DM    HPI - No va complaints, about the same as last visit.  No distortion, VA OS worse than OD, but not bothered much. DM II, good BG control, doesn't know A1c  Rohan Monroe is a  71 year old year-old patient referred by Dr Greco for ERM evaluation       PAST OCULAR SURGERY  S/p CE/IOL LE 9/15/15   S/p CE/IOL RE 9/29/15  Glaucoma suspect    RETINAL IMAGING:  OCT  3-6-17  right eye- mild ERM no CME  left eye -  2+ ERM, 2+ distortion, no CME      ASSESSMENT & PLAN    1. ERM OS > OD   - VA good, not bothered   - observe    2. PVD OU   - S/Sx RD    3.  Mild NPDR OU with DM II no DME   - BP/BG control      4.  Sarcoidosis   - no ocular involvement at present      5.  OAG OS > OD   - sees Angus      return to clinic: 1 years, OCT OU    ATTESTATION     Attending Attestation:    Complete documentation of historical and exam elements from today's encounter can be found in the full encounter summary report (not redupilcated in this progress note).  I personally obtained the chief complaint(s) and hisotry of present illness., I have confirmed and edited as necessary the Past medical history/Past Surgical History, Social history, Family medical history,  Review of systems, and exam/neuro findings as obtained by the technician or others. I have examined this patient myself. , I personally viewed the relevant tests, image(s), reports, and studies listed above and the documentation reflects my findings and interpretation. and I formulated and edited as necessary the assessment and plan and discussed the findings and management plan with the patient and family.    Sandee Middleton MD, PhD  , Vitreoretinal Surgery  Department of Ophthalmology  [facility]

## 2017-03-06 NOTE — NURSING NOTE
Chief Complaints and History of Present Illnesses   Patient presents with     Glaucoma Suspect Follow Up     1.5 year follow up both eyes.     HPI    Affected eye(s):  Both   Symptoms:     No floaters   No flashes   No redness   No Dryness         Do you have eye pain now?:  No      Comments:  Pt states vision is about the same as last visit.  DMII BS: 158 yesterday morning.  Lab Results       Component                Value               Date                       A1C                      7.5                 01/20/2017                 A1C                      6.8                 01/07/2014                 A1C                      6.6                 09/01/2013                 A1C                      6.4                 08/11/2011                 A1C                      7.3                 06/05/2008              Choco Murillo [facility] March 6, 2017 8:22 AM

## 2017-03-06 NOTE — MR AVS SNAPSHOT
After Visit Summary   3/6/2017    Rohan Monroe    MRN: [id number]           Patient Information     Date Of Birth          1945        Visit Information        Provider Department      3/6/2017 10:15 AM Sandee Middleton MD [facility]        Today's Diagnoses     Epiretinal membrane (ERM) of both eyes    -  1    Vitreoretinal degeneration of right eye        Vitreous degeneration and detachment of left eye        Mild nonproliferative diabetic retinopathy without macular edema associated with type 2 diabetes mellitus (H)           Follow-ups after your visit        Follow-up notes from your care team     Return in about 1 year (around 3/6/2018).      Your next 10 appointments already scheduled     Mar 09, 2017 12:15 PM CST   Lab with UC LAB   [facility] ([facility])    [address] [place] [address]  [address] [facility] [address] [phone]            Mar 09, 2017  1:00 PM CST   (Arrive by 12:45 PM)   NEW HEART FAILURE with Diane Paige MD   [facility] ([facility])    [address] [place] [address]  [address] [facility] [address]   [phone]            Mar 30, 2017 12:00 PM CDT   Infusion 180 with UC SPEC INFUSION, UC 49 ATC   [facility] ([facility])    [address] [place] [address]  [address] [facility] [address] [phone]            Apr 11, 2017  1:00 PM CDT   Lab with UC LAB   [facility] ([facility])    [address] [place] [address]  [address] [facility] [address]   [phone]            Apr 11, 2017  2:00 PM CDT   (Arrive by 1:30 PM)   Return Visit with Ollie Michel MD   [facility] Nephrology ([facility])    [address] [place] [address]  [address] [facility] [address]   [phone]            Jun 21, 2017  1:30 PM CDT   (Arrive by 1:15 PM)   RETURN ARRHYTHMIA with Brian  MD George   [facility] ([facility])    [address] [place] [id number] [phone]            Aug 29, 2017 10:00 AM CDT   Lab with  LAB    [facility] ([facility])    [address] [place] [id number] [phone]            Aug 29, 2017 10:30 AM CDT   US ABDOMEN COMPLETE with UCUS2   [facility] US ([facility])    [address] [place] [id number] [phone]           Please bring a list of your medicines (including vitamins, minerals and over-the-counter drugs). Also, tell your doctor about any allergies you may have. Wear comfortable clothes and leave your valuables at home.  Adults: No eating or drinking for 8 hours before the exam. You may take medicine with a small sip of water.  Children: - Children 6+ years: No food or drink for 6 hours before exam. - Children 1-5 years: No food or drink for 4 hours before exam. - Infants, breast-fed: may have breast milk up to 2 hours before exam. - Infants, formula: may have bottle until 4 hours before exam.  Please call the Imaging Department at your exam site with any questions.            Aug 29, 2017 11:30 AM CDT   (Arrive by 11:15 AM)   Return General Liver with Moses Orosco MD   [facility] Hepatology ([facility])    [address] [place] [id number] [phone]              Future tests that were ordered for you today     Open Future Orders        Priority Expected Expires Ordered    OCT Retina Spectralis OU (both eyes) Routine  9/7/2018 3/6/2017            Who to contact     Please call your clinic at [phone] to:    Ask questions about your health    Make or cancel appointments    Discuss your medicines    Learn about your test results    Speak to your doctor   If you have compliments or concerns about an experience at your  clinic, or if you wish to file a complaint, please contact [facility] Patient Relations at [phone] or email us at [email or web address]         Additional Information About Your Visit        Drinks4-youhart Information     [employer]fepoint[employer] gives you secure access to your electronic health record. If you see a primary care provider, you can also send messages to your care team and make appointments. If you have questions, please call your primary care clinic.  If you do not have a primary care provider, please call [phone] and they will assist you.      Me-Mover is an electronic gateway that provides easy, online access to your medical records. With Me-Mover, you can request a clinic appointment, read your test results, renew a prescription or communicate with your care team.     To access your existing account, please contact your [facility] Clinic or call [phone] for assistance.        Care EveryWhere ID     This is your Care EveryWhere ID. This could be used by other organizations to access your [place] medical records  [id number]         Blood Pressure from Last 3 Encounters:   02/28/17 (!) (P) 149/94   02/28/17 148/86   02/24/17 (!) 178/106    Weight from Last 3 Encounters:   02/28/17 93.4 kg (206 lb)   02/24/17 90.7 kg (200 lb)   02/24/17 90.7 kg (200 lb)              We Performed the Following     OCT Retina Spectralis OU (both eyes)          Today's Medication Changes          These changes are accurate as of: 3/6/17 11:59 AM.  If you have any questions, ask your nurse or doctor.               These medicines have changed or have updated prescriptions.        Dose/Directions    polyethylene glycol powder   Commonly known as:  MIRALAX   This may have changed:    - when to take this  - reasons to take this   Used for:  Constipation, unspecified constipation type        Dose:  1 capful   Take 17 g (1 capful) by mouth daily   Quantity:  510 g   Refills:   1       Urea 40 % Crea   Commonly known as:  CARMOL 40   This may have changed:  additional instructions   Used for:  Dermatophytosis of nail, Corns and callosities        To feet daily   Quantity:  199 g   Refills:  4                Primary Care Provider Office Phone # Fax #    Jamie Foster [phone]       [address] [address]        Thank you!     Thank you for choosing [facility]  for your care. Our goal is always to provide you with excellent care. Hearing back from our patients is one way we can continue to improve our services. Please take a few minutes to complete the written survey that you may receive in the mail after your visit with us. Thank you!             Your Updated Medication List - Protect others around you: Learn how to safely use, store and throw away your medicines at www.disposemymeds.org.          This list is accurate as of: 3/6/17 11:59 AM.  Always use your most recent med list.                   Brand Name Dispense Instructions for use    albuterol 108 (90 BASE) MCG/ACT Inhaler    PROAIR HFA/PROVENTIL HFA/VENTOLIN HFA    3 Inhaler    Inhale 2 puffs into the lungs every 6 hours as needed for shortness of breath / dyspnea       amLODIPine 10 MG tablet    NORVASC    90 tablet    Take 1 tablet (10 mg) by mouth daily       ASPIRIN EC PO      Take 81 mg by mouth daily       atorvastatin 20 MG tablet    LIPITOR    90 tablet    Take 1 tablet (20 mg) by mouth daily       blood glucose monitoring lancets     2 Box    Use to test blood sugar 2 times daily or as directed.  102 lancets per box.  3 month supply.       blood glucose monitoring meter device kit    no brand specified    1 kit    Use to test blood sugar 2 times daily.       blood glucose monitoring test strip    ACCU-CHEK RONNIE PLUS    200 each    Use to test blood sugar 2 times daily or as directed.  3 month supply.       ciclopirox 0.77 % cream    LOPROX    90 g    Apply  topically 2 times daily To feet and toenails.       fluticasone-vilanterol 100-25 MCG/INH oral inhaler    BREO ELLIPTA    3 Inhaler    Inhale 1 puff into the lungs daily       furosemide 20 MG tablet    LASIX    180 tablet    Take 2 tablets (40 mg) by mouth daily as needed For weight gain of 3 lbs or greater       inFLIXimab 100 MG injection    REMICADE     every 6 weeks       levothyroxine 137 MCG tablet    SYNTHROID/LEVOTHROID    90 tablet    Take 1 tablet (137 mcg) by mouth daily       methotrexate 2.5 MG tablet CHEMO     48 tablet    Take 3 tablets (7.5 mg) by mouth once a week On Mondays       metoprolol 100 MG tablet    LOPRESSOR    60 tablet    Take 1 tablet (100 mg) by mouth 2 times daily       mirtazapine 15 MG tablet    REMERON     Take 15 mg by mouth At Bedtime.       MULTIVITAMIN & MINERAL PO      Take 1 tablet by mouth daily.       polyethylene glycol powder    MIRALAX    510 g    Take 17 g (1 capful) by mouth daily       sildenafil 50 MG cap/tab    VIAGRA    10 tablet    Take 2 tablets (100 mg) by mouth daily as needed for erectile dysfunction       tiotropium 18 MCG capsule    SPIRIVA HANDIHALER    90 capsule    Inhale contents of one capsule daily.       Urea 40 % Crea    CARMOL 40    199 g    To feet daily       warfarin 5 MG tablet    COUMADIN    30 tablet    Take 1 tablet (5 mg) by mouth daily

## 2017-03-06 NOTE — PROGRESS NOTES
HPI: [id number]  History of sarcoid   Glaucoma suspect   Suspicious nerves   Steroid responder but now on Remicaide and methotrexate for sarcoid   Good intraocular pressure and visual field stable with decreased central sensitivity both eyes consistent with abnormal maculas    S/p CE/IOL LE 9/15/15   S/p CE/IOL RE 9/29/15    Octopus visual field and OCT retinal nerve fiber layer stable    A/P:   Cataract extraction with intraocular lens right eye 9-29-15 and left eye 9/15/15.   LE corrects to 20/30 (likely 2/2 epiretinal membrane and underlying retinal irregularities)    RTC in 6 months for GTOP visual field    To follow up with retina for Epiretinal membrane today    Attending Physician Attestation:  Complete documentation of historical and exam elements from today's encounter can be found in the full encounter summary report (not reduplicated in this progress note). I personally obtained the chief complaint(s) and history of present illness. I confirmed and edited asnecessary the review of systems, past medical/surgical history, family history, social history, and examination findings as documented by others; and I examined the patient myself. I personally reviewed the relevant tests, images, and reports as documented above. I formulated and edited as necessary the assessment and plan and discussed the findings and management plan with the patient and family.  - Kina Greco MD 10:08 AM 3/6/2017

## 2017-03-06 NOTE — MR AVS SNAPSHOT
After Visit Summary   3/6/2017    Rohan Monroe    MRN: [id number]           Patient Information     Date Of Birth          1945        Visit Information        Provider Department      3/6/2017 9:00 AM [facility]        Today's Diagnoses     Glaucoma suspect of both eyes           Follow-ups after your visit        Follow-up notes from your care team     Return in about 6 months (around 9/6/2017) for visual field GTOP.      Your next 10 appointments already scheduled     Mar 09, 2017 12:15 PM CST   Lab with UC LAB   [facility] ([facility] [place])    [address] [place] [id number]   [phone]            Mar 09, 2017  1:00 PM CST   (Arrive by 12:45 PM)   NEW HEART FAILURE with Diane Paige MD   [facility] ([facility])    [address] [place] [phone]   [phone]            Mar 30, 2017 12:00 PM CDT   Infusion 180 with UC SPEC INFUSION, UC 49 ATC   [facility] ([facility])    [address] [place] [phone]   [phone]            Apr 11, 2017  1:00 PM CDT   Lab with UC LAB   [facility] ([facility] [place])    [address] [place] [id number]   [phone]            Apr 11, 2017  2:00 PM CDT   (Arrive by 1:30 PM)   Return Visit with Ollie Michel MD   [facility] ([facility] [place])    [address] [place] [address]   [phone]            Jun 21, 2017  1:30 PM CDT   (Arrive by 1:15 PM)   RETURN ARRHYTHMIA with Brian Vazquez MD   [facility] ([facility] [place])    [address] [place] [phone]   [phone]            Aug 29, 2017 10:00 AM CDT   Lab with UC LAB   [facility]  ([facility])    [address] [place] [phone] [phone]            Aug 29, 2017 10:30 AM CDT   US ABDOMEN COMPLETE with UCUS2   [facility] ([facility])    [address] [place] [id number] [phone]           Please bring a list of your medicines (including vitamins, minerals and over-the-counter drugs). Also, tell your doctor about any allergies you may have. Wear comfortable clothes and leave your valuables at home.  Adults: No eating or drinking for 8 hours before the exam. You may take medicine with a small sip of water.  Children: - Children 6+ years: No food or drink for 6 hours before exam. - Children 1-5 years: No food or drink for 4 hours before exam. - Infants, breast-fed: may have breast milk up to 2 hours before exam. - Infants, formula: may have bottle until 4 hours before exam.  Please call the Imaging Department at your exam site with any questions.            Aug 29, 2017 11:30 AM CDT   (Arrive by 11:15 AM)   Return General Liver with Moses Orosco MD   [facility] ([facility])    [address] [place] [id number] [phone]              Who to contact     Please call your clinic at [phone] to:    Ask questions about your health    Make or cancel appointments    Discuss your medicines    Learn about your test results    Speak to your doctor   If you have compliments or concerns about an experience at your clinic, or if you wish to file a complaint, please contact [facility] Patient Relations at [phone] or email us at [email or web address]         Additional Information About Your Visit        MyChart Information     [employer] gives you secure access to your electronic health record. If you see a primary care provider, you can also send messages to your care team and  make appointments. If you have questions, please call your primary care clinic.  If you do not have a primary care provider, please call [phone] and they will assist you.      [employer] is an electronic gateway that provides easy, online access to your medical records. With [employer], you can request a clinic appointment, read your test results, renew a prescription or communicate with your care team.     To access your existing account, please contact your [facility] or call [phone] for assistance.        Care EveryWhere ID     This is your Care EveryWhere ID. This could be used by other organizations to access your [facility] medical records  [id number]         Blood Pressure from Last 3 Encounters:   02/28/17 (!) (P) 149/94   02/28/17 148/86   02/24/17 (!) 178/106    Weight from Last 3 Encounters:   02/28/17 93.4 kg (206 lb)   02/24/17 90.7 kg (200 lb)   02/24/17 90.7 kg (200 lb)              We Performed the Following     Glaucoma Top OU     OCT Optic Nerve RNFL Spectralis OU (both eyes)          Today's Medication Changes          These changes are accurate as of: 3/6/17 10:17 AM.  If you have any questions, ask your nurse or doctor.               These medicines have changed or have updated prescriptions.        Dose/Directions    polyethylene glycol powder   Commonly known as:  MIRALAX   This may have changed:    - when to take this  - reasons to take this   Used for:  Constipation, unspecified constipation type        Dose:  1 capful   Take 17 g (1 capful) by mouth daily   Quantity:  510 g   Refills:  1       Urea 40 % Crea   Commonly known as:  CARMOL 40   This may have changed:  additional instructions   Used for:  Dermatophytosis of nail, Corns and callosities        To feet daily   Quantity:  199 g   Refills:  4                Primary Care Provider Office Phone # Fax #    Jamie Foster [phone]       [address]  [facility] [address]        Thank you!     Thank you for choosing [facility]  for your care. Our goal is always to provide you with excellent care. Hearing back from our patients is one way we can continue to improve our services. Please take a few minutes to complete the written survey that you may receive in the mail after your visit with us. Thank you!             Your Updated Medication List - Protect others around you: Learn how to safely use, store and throw away your medicines at www.disposemymeds.org.          This list is accurate as of: 3/6/17 10:17 AM.  Always use your most recent med list.                   Brand Name Dispense Instructions for use    albuterol 108 (90 BASE) MCG/ACT Inhaler    PROAIR HFA/PROVENTIL HFA/VENTOLIN HFA    3 Inhaler    Inhale 2 puffs into the lungs every 6 hours as needed for shortness of breath / dyspnea       amLODIPine 10 MG tablet    NORVASC    90 tablet    Take 1 tablet (10 mg) by mouth daily       ASPIRIN EC PO      Take 81 mg by mouth daily       atorvastatin 20 MG tablet    LIPITOR    90 tablet    Take 1 tablet (20 mg) by mouth daily       blood glucose monitoring lancets     2 Box    Use to test blood sugar 2 times daily or as directed.  102 lancets per box.  3 month supply.       blood glucose monitoring meter device kit    no brand specified    1 kit    Use to test blood sugar 2 times daily.       blood glucose monitoring test strip    ACCU-CHEK RONNIE PLUS    200 each    Use to test blood sugar 2 times daily or as directed.  3 month supply.       ciclopirox 0.77 % cream    LOPROX    90 g    Apply topically 2 times daily To feet and toenails.       fluticasone-vilanterol 100-25 MCG/INH oral inhaler    BREO ELLIPTA    3 Inhaler    Inhale 1 puff into the lungs daily       furosemide 20 MG tablet    LASIX    180 tablet    Take 2 tablets (40 mg) by mouth daily as needed For weight gain of 3 lbs or greater       inFLIXimab 100 MG injection    REMICADE     every 6 weeks        levothyroxine 137 MCG tablet    SYNTHROID/LEVOTHROID    90 tablet    Take 1 tablet (137 mcg) by mouth daily       methotrexate 2.5 MG tablet CHEMO     48 tablet    Take 3 tablets (7.5 mg) by mouth once a week On Mondays       metoprolol 100 MG tablet    LOPRESSOR    60 tablet    Take 1 tablet (100 mg) by mouth 2 times daily       mirtazapine 15 MG tablet    REMERON     Take 15 mg by mouth At Bedtime.       MULTIVITAMIN & MINERAL PO      Take 1 tablet by mouth daily.       polyethylene glycol powder    MIRALAX    510 g    Take 17 g (1 capful) by mouth daily       sildenafil 50 MG cap/tab    VIAGRA    10 tablet    Take 2 tablets (100 mg) by mouth daily as needed for erectile dysfunction       tiotropium 18 MCG capsule    SPIRIVA HANDIHALER    90 capsule    Inhale contents of one capsule daily.       Urea 40 % Crea    CARMOL 40    199 g    To feet daily       warfarin 5 MG tablet    COUMADIN    30 tablet    Take 1 tablet (5 mg) by mouth daily

## 2017-03-06 NOTE — NURSING NOTE
Chief Complaints and History of Present Illnesses   Patient presents with     Epiretinal Membrane Follow Up     2 year follow up both eyes.     HPI    Affected eye(s):  Both   Symptoms:     No floaters   No flashes   No redness   No Dryness         Do you have eye pain now?:  No      Comments:  Pt states vision is about the same as last visit.  DMII BS: 158 yesterday morning.  Lab Results       Component                Value               Date                       A1C                      7.5                 01/20/2017                 A1C                      6.8                 01/07/2014                 A1C                      6.6                 09/01/2013                 A1C                      6.4                 08/11/2011                 A1C                      7.3                 06/05/2008              Choco Murillo [facility] March 6, 2017 8:22 AM

## 2017-03-09 ENCOUNTER — OFFICE VISIT (OUTPATIENT)
Dept: CARDIOLOGY | Facility: CLINIC | Age: 72
End: 2017-03-09
Attending: INTERNAL MEDICINE
Payer: MEDICARE

## 2017-03-09 VITALS
BODY MASS INDEX: 30.38 KG/M2 | DIASTOLIC BLOOD PRESSURE: 81 MMHG | HEART RATE: 55 BPM | OXYGEN SATURATION: 92 % | SYSTOLIC BLOOD PRESSURE: 148 MMHG | WEIGHT: 205.1 LBS | HEIGHT: 69 IN

## 2017-03-09 DIAGNOSIS — D86.0 SARCOIDOSIS OF LUNG (H): ICD-10-CM

## 2017-03-09 DIAGNOSIS — I48.92 ATRIAL FLUTTER, UNSPECIFIED TYPE (H): ICD-10-CM

## 2017-03-09 DIAGNOSIS — I50.33 ACUTE ON CHRONIC DIASTOLIC HEART FAILURE (H): ICD-10-CM

## 2017-03-09 DIAGNOSIS — D86.9 SARCOIDOSIS: ICD-10-CM

## 2017-03-09 DIAGNOSIS — Z79.01 LONG-TERM (CURRENT) USE OF ANTICOAGULANTS: ICD-10-CM

## 2017-03-09 DIAGNOSIS — I25.10 CORONARY ARTERY DISEASE WITHOUT ANGINA PECTORIS, UNSPECIFIED VESSEL OR LESION TYPE, UNSPECIFIED WHETHER NATIVE OR TRANSPLANTED HEART: Primary | ICD-10-CM

## 2017-03-09 DIAGNOSIS — I48.92 ATRIAL FLUTTER WITH RAPID VENTRICULAR RESPONSE (H): ICD-10-CM

## 2017-03-09 DIAGNOSIS — I25.10 CORONARY ARTERY DISEASE WITHOUT ANGINA PECTORIS, UNSPECIFIED VESSEL OR LESION TYPE, UNSPECIFIED WHETHER NATIVE OR TRANSPLANTED HEART: ICD-10-CM

## 2017-03-09 DIAGNOSIS — Z13.6 ENCOUNTER FOR ABDOMINAL AORTIC ANEURYSM (AAA) SCREENING: Primary | ICD-10-CM

## 2017-03-09 LAB
ANION GAP SERPL CALCULATED.3IONS-SCNC: 10 MMOL/L (ref 3–14)
BUN SERPL-MCNC: 34 MG/DL (ref 7–30)
CALCIUM SERPL-MCNC: 8.2 MG/DL (ref 8.5–10.1)
CHLORIDE SERPL-SCNC: 104 MMOL/L (ref 94–109)
CO2 SERPL-SCNC: 23 MMOL/L (ref 20–32)
CREAT SERPL-MCNC: 1.6 MG/DL (ref 0.66–1.25)
CRP SERPL-MCNC: 9.2 MG/L (ref 0–8)
GFR SERPL CREATININE-BSD FRML MDRD: 43 ML/MIN/1.7M2
GLUCOSE SERPL-MCNC: 142 MG/DL (ref 70–99)
POTASSIUM SERPL-SCNC: 4.8 MMOL/L (ref 3.4–5.3)
SODIUM SERPL-SCNC: 136 MMOL/L (ref 133–144)
TROPONIN I SERPL-MCNC: 0.02 UG/L (ref 0–0.04)

## 2017-03-09 PROCEDURE — 99214 OFFICE O/P EST MOD 30 MIN: CPT | Mod: ZP | Performed by: INTERNAL MEDICINE

## 2017-03-09 PROCEDURE — 84484 ASSAY OF TROPONIN QUANT: CPT | Performed by: INTERNAL MEDICINE

## 2017-03-09 PROCEDURE — 36415 COLL VENOUS BLD VENIPUNCTURE: CPT | Performed by: INTERNAL MEDICINE

## 2017-03-09 PROCEDURE — 80048 BASIC METABOLIC PNL TOTAL CA: CPT | Performed by: INTERNAL MEDICINE

## 2017-03-09 PROCEDURE — 86140 C-REACTIVE PROTEIN: CPT | Performed by: INTERNAL MEDICINE

## 2017-03-09 RX ORDER — FUROSEMIDE 20 MG
80 TABLET ORAL 2 TIMES DAILY
Qty: 180 TABLET | Refills: 3 | Status: SHIPPED | OUTPATIENT
Start: 2017-03-09 | End: 2017-03-14

## 2017-03-09 ASSESSMENT — PAIN SCALES - GENERAL: PAINLEVEL: NO PAIN (0)

## 2017-03-09 NOTE — PROGRESS NOTES
Interpretation Summary  Borderline reduced left ventricular systolic function, (EF 50-55%). Left  ventricular diastolic function is indeterminate.  The right ventricle is normal size; global RV function is mildly reduced.  No significant valvular pathology.  The inferior vena cava is normal; estimated mean RA pressure is <3 mmHg.  In comparison to prior limited study dated 1/19/17, the LV function is  unchanged, however much improved from chronologically earlier study of the  same date.  _____________________________________________________________________________  __        Left Ventricle  Left ventricular size is normal. Mild concentric wall thickening consistent  with left ventricular hypertrophy is present. Left ventricular diastolic  function is indeterminate. Borderline (EF 50-55%) reduced left ventricular  function is present.     Right Ventricle  The right ventricle is normal size. Global right ventricular function is  mildly reduced.     Atria  The left atrium appears normal. Mild to moderate right atrial enlargement is  present.     Mitral Valve  The mitral valve is normal.        Aortic Valve  Mild aortic valve sclerosis is present.     Tricuspid Valve  Trace tricuspid insufficiency is present. Pulmonary artery systolic pressure  cannot be assessed.     Pulmonic Valve  The pulmonic valve cannot be assessed.     Vessels  The inferior vena cava is normal. The aorta root is normal. Estimated mean  right atrial pressure is <3 mmHg.     Pericardium  No pericardial effusion is present.     _____________________________________________________________________________  __  MMode/2D Measurements & Calculations  IVSd: 1.2 cm  LVIDd: 4.4 cm  LVIDs: 3.0 cm  LVPWd: 1.00 cm  FS: 31.8 %  EDV(Teich): 88.9 ml  ESV(Teich): 35.5 ml     LV mass(C)d: 166.9 grams  Ao root diam: 3.3 cm  asc Aorta Diam: 3.0 cm  LA/Ao: 1.2  LVOT diam: 2.1 cm  LVOT area: 3.4 cm2  LA Volume (BP): 59.5 ml  LA Volume Index (BP): 29.3  ml/m2        Doppler Measurements & Calculations  MV E max jhonatan: 65.1 cm/sec  MV A max jhonatan: 81.9 cm/sec  MV E/A: 0.79  MV mean P.4 mmHg  MV V2 VTI: 19.5 cm  MVA(VTI): 2.7 cm2     MV dec time: 0.21 sec  Ao V2 max: 107.9 cm/sec  Ao max P.7 mmHg  LV V1 VTI: 15.8 cm  SV(LVOT): 52.8 ml  PA acc time: 0.10 sec  TR max jhonatan: 285.0 cm/sec  TR max P.5 mmHg  Lateral E/e': 7.6  Medial E/e': 9.7

## 2017-03-09 NOTE — MR AVS SNAPSHOT
After Visit Summary   3/9/2017    Rohan Monroe    MRN: [id number]           Patient Information     Date Of Birth          1945        Visit Information        Provider Department      3/9/2017 1:00 PM Diane Paige MD [facility]        Today's Diagnoses     Coronary artery disease without angina pectoris, unspecified vessel or lesion type, unspecified whether native or transplanted heart    -  1    SARCOIDOSIS-systemic        Atrial flutter with rapid ventricular response (H)        Acute on chronic diastolic heart failure (H)          Care Instructions    Increase Lasix 80 mg twice daily  check your weight daily in AM  Schedule lab appointment for Mon  Someone will call you on Mon. To see how you feel.    Reyna Hernandez, RN or Louisa Sparrow RN  Cardiology Care Coordinator  Please be aware that I work part-time but I will be checking messages several times per week.   For urgent needs, please call the number below.    [phone], press 1 for [facility], press 3 to speak to a nurse    ..        Follow-ups after your visit        Follow-up notes from your care team     Return for to be determined.      Your next 10 appointments already scheduled     Mar 13, 2017 10:15 AM CDT   LAB with  LAB   [facility] ([facility])    [address] [place] [id number] [phone]           Patient must bring picture ID.  Patient should be prepared to give a urine specimen  Please do not eat 10-12 hours before your appointment if you are coming in fasting for labs on lipids, cholesterol, or glucose (sugar).  Pregnant women should follow their Care Team instructions. Water with medications is okay. Do not drink coffee or other fluids.   If you have concerns about taking  your medications, please ask at office or if scheduling via [employer], send a message by clicking on Secure Messaging, Message Your Care Team.            Mar 30,  2017 12:00 PM CDT   Infusion 180 with  SPEC INFUSION, UC 49 ATC   [facility] ([facility])    [address]  [address] [facility] [address]   [phone]            Apr 11, 2017  1:00 PM CDT   Lab with UC LAB   [facility] Lab ([facility])    [address] [place] [address]   [phone]            Apr 11, 2017  2:00 PM CDT   (Arrive by 1:30 PM)   Return Visit with Ollie Michel MD   [facility] Nephrology ([facility])    [address] [place] [address]   [phone]            Jun 21, 2017  1:30 PM CDT   (Arrive by 1:15 PM)   RETURN ARRHYTHMIA with Brian Vazquez MD   [facility] Heart Care ([facility])    [address] [place] [address]   [phone]            Aug 29, 2017 10:00 AM CDT   Lab with UC LAB   [facility] Lab ([facility])    [address] [place] [address]   [phone]            Aug 29, 2017 10:30 AM CDT   US ABDOMEN COMPLETE with UCUS2   [facility] US ([facility])    [address] [place] [address]   [phone]           Please bring a list of your medicines (including vitamins, minerals and over-the-counter drugs). Also, tell your doctor about any allergies you may have. Wear comfortable clothes and leave your valuables at home.  Adults: No eating or drinking for 8 hours before the exam. You may take medicine with a small sip of water.  Children: - Children 6+ years: No food or drink for 6 hours before exam. - Children 1-5 years: No food or drink for 4 hours before exam. - Infants, breast-fed: may have breast milk up to 2 hours before exam. - Infants, formula: may have bottle until 4 hours before exam.  Please call the Imaging Department  at your exam site with any questions.            Aug 29, 2017 11:30 AM CDT   (Arrive by 11:15 AM)   Return General Liver with Moses Orosco MD   [facility] Hepatology ([facility])    [address]  3rd Floor  [facility] [address] [phone]            Sep 08, 2017  9:30 AM CDT   RETURN GLAUCOMA with Kina Greco MD   [facility] ([facility])    [address]  [address]  9th [address] Clin [address]  [facility] [id number] [phone]              Future tests that were ordered for you today     Open Future Orders        Priority Expected Expires Ordered    Basic metabolic panel Routine 3/13/2017 3/9/2018 3/9/2017            Who to contact     If you have questions or need follow up information about today's clinic visit or your schedule please contact [facility] directly at [phone].  Normal or non-critical lab and imaging results will be communicated to you by MyChart, letter or phone within 4 business days after the clinic has received the results. If you do not hear from us within 7 days, please contact the clinic through [employer] or phone. If you have a critical or abnormal lab result, we will notify you by phone as soon as possible.  Submit refill requests through [employer] or call your pharmacy and they will forward the refill request to us. Please allow 3 business days for your refill to be completed.          Additional Information About Your Visit        [employer] Information     [employer] gives you secure access to your electronic health record. If you see a primary care provider, you can also send messages to your care team and make appointments. If you have questions, please call your primary care clinic.  If you do not have a primary care provider, please call [phone] and they will assist you.        Care EveryWhere ID     This is your Care EveryWhere ID. This could be used by other organizations to access your [facility]  "records  [id number]        Your Vitals Were     Pulse Height Pulse Oximetry BMI (Body Mass Index)          55 1.753 m (5' 9\") 92% 30.29 kg/m2         Blood Pressure from Last 3 Encounters:   03/09/17 148/81   02/28/17 (!) (P) 149/94   02/28/17 148/86    Weight from Last 3 Encounters:   03/09/17 93 kg (205 lb 1.6 oz)   02/28/17 93.4 kg (206 lb)   02/24/17 90.7 kg (200 lb)                 Today's Medication Changes          These changes are accurate as of: 3/9/17  1:43 PM.  If you have any questions, ask your nurse or doctor.               These medicines have changed or have updated prescriptions.        Dose/Directions    furosemide 20 MG tablet   Commonly known as:  LASIX   This may have changed:    - how much to take  - when to take this  - reasons to take this   Used for:  Atrial flutter with rapid ventricular response (H), Sarcoidosis (H), Acute on chronic diastolic heart failure (H)   Changed by:  Diane Paige MD        Dose:  80 mg   Take 4 tablets (80 mg) by mouth 2 times daily For weight gain of 3 lbs or greater   Quantity:  180 tablet   Refills:  3       polyethylene glycol powder   Commonly known as:  MIRALAX   This may have changed:    - when to take this  - reasons to take this   Used for:  Constipation, unspecified constipation type        Dose:  1 capful   Take 17 g (1 capful) by mouth daily   Quantity:  510 g   Refills:  1       Urea 40 % Crea   Commonly known as:  CARMOL 40   This may have changed:  additional instructions   Used for:  Dermatophytosis of nail, Corns and callosities        To feet daily   Quantity:  199 g   Refills:  4            Where to get your medicines      These medications were sent to [id number] - [place], [place]  [facility] [address]     Phone:  [phone]     furosemide 20 MG tablet                Primary Care Provider Office Phone # Fax #    Jamie Foster [phone] "       [place] [address] [address]        Thank you!     Thank you for choosing [facility]  for your care. Our goal is always to provide you with excellent care. Hearing back from our patients is one way we can continue to improve our services. Please take a few minutes to complete the written survey that you may receive in the mail after your visit with us. Thank you!             Your Updated Medication List - Protect others around you: Learn how to safely use, store and throw away your medicines at www.disposemymeds.org.          This list is accurate as of: 3/9/17  1:43 PM.  Always use your most recent med list.                   Brand Name Dispense Instructions for use    albuterol 108 (90 BASE) MCG/ACT Inhaler    PROAIR HFA/PROVENTIL HFA/VENTOLIN HFA    3 Inhaler    Inhale 2 puffs into the lungs every 6 hours as needed for shortness of breath / dyspnea       amLODIPine 10 MG tablet    NORVASC    90 tablet    Take 1 tablet (10 mg) by mouth daily       ASPIRIN EC PO      Take 81 mg by mouth daily       atorvastatin 20 MG tablet    LIPITOR    90 tablet    Take 1 tablet (20 mg) by mouth daily       blood glucose monitoring lancets     2 Box    Use to test blood sugar 2 times daily or as directed.  102 lancets per box.  3 month supply.       blood glucose monitoring meter device kit    no brand specified    1 kit    Use to test blood sugar 2 times daily.       blood glucose monitoring test strip    ACCU-CHEK RONNIE PLUS    200 each    Use to test blood sugar 2 times daily or as directed.  3 month supply.       ciclopirox 0.77 % cream    LOPROX    90 g    Apply topically 2 times daily To feet and toenails.       fluticasone-vilanterol 100-25 MCG/INH oral inhaler    BREO ELLIPTA    3 Inhaler    Inhale 1 puff into the lungs daily       furosemide 20 MG tablet    LASIX    180 tablet    Take 4 tablets (80 mg) by mouth 2 times daily For weight gain of 3 lbs or greater        inFLIXimab 100 MG injection    REMICADE     every 6 weeks       levothyroxine 137 MCG tablet    SYNTHROID/LEVOTHROID    90 tablet    Take 1 tablet (137 mcg) by mouth daily       methotrexate 2.5 MG tablet CHEMO     48 tablet    Take 3 tablets (7.5 mg) by mouth once a week On Mondays       metoprolol 100 MG tablet    LOPRESSOR    60 tablet    Take 1 tablet (100 mg) by mouth 2 times daily       mirtazapine 15 MG tablet    REMERON     Take 15 mg by mouth At Bedtime.       MULTIVITAMIN & MINERAL PO      Take 1 tablet by mouth daily.       polyethylene glycol powder    MIRALAX    510 g    Take 17 g (1 capful) by mouth daily       sildenafil 50 MG cap/tab    VIAGRA    10 tablet    Take 2 tablets (100 mg) by mouth daily as needed for erectile dysfunction       tiotropium 18 MCG capsule    SPIRIVA HANDIHALER    90 capsule    Inhale contents of one capsule daily.       Urea 40 % Crea    CARMOL 40    199 g    To feet daily       warfarin 5 MG tablet    COUMADIN    30 tablet    Take 1 tablet (5 mg) by mouth daily

## 2017-03-09 NOTE — NURSING NOTE
Chief Complaint   Patient presents with     New Patient     ref/ / George for pt. with pulm and cardiac sarcoid

## 2017-03-09 NOTE — PROGRESS NOTES
March 9, 2017    Dear Colleagues:      I had the pleasure of seeing Rohan Monroe in the [facility] on 03/09/2017.  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 6 weeks since 2008 as well as longstanding methotrexate, who is here today for further evaluation of newly worsening heart failure.      Most recently he had atrial flutter and underwent a pulmonary vein isolation.  He was in the hospital for control of the atrial arrhythmias and had intermittent amiodarone loading.  Due to toxicity from the amiodarone, this was stopped.  These were documented to be pulmonary, but he had difficulty thinking clearly and just overall felt poorly on amiodarone and therefore stopped it.  He has not had any further palpitations, although he did not really have any prior to that when he was in atrial fibrillation.  At present he says that he can barely walk 10 steps.  He is short of breath even with showering.  Several months ago he was actually feeling quite well and could walk several blocks.  He feels considerably worse in the last month and a half.  When he was first out of the hospital, he has coughing and that is somewhat better.  He denies PND and orthopnea any further.  He denies palpitations chest pain or syncope, but his energy level is extremely low.  He cannot walk 1 flight of stairs at present, and he has wheezing at rest.  He denies any fevers or chills, and the cough is not productive.       PAST MEDICAL HISTORY:  Past Medical History   Diagnosis Date     Cataract of both eyes      Chronic infection      Hep C     Congestive heart failure, unspecified      Depressive disorder, not elsewhere classified      Depression (non-psychotic)     ERM OS (epiretinal membrane, left eye)      Generalized osteoarthrosis, unspecified site      Glaucoma  suspect      Hypertension      Lichen planus      Other psoriasis      PVD (posterior vitreous detachment), left eye      Sarcoidosis (H)      Sarcoidosis (H)      Type II or unspecified type diabetes mellitus without mention of complication, not stated as uncontrolled      Unspecified hypothyroidism      Hypothyroidism     Unspecified viral hepatitis C without hepatic coma      Viral warts, unspecified        SOCIAL HISTORY:  The patient was an  in the [employer] and stopped working 3 years ago.  He lives with his son, who is in college and is studying to be a .  He is .  He did drink heavily in his past.  He stopped in 1992.  Smoking he stopped in 1994.  He denies any other illicit drug use.      FAMILY HISTORY:  There is no family history of autoimmune diseases or sudden cardiac death.         CURRENT MEDICATIONS:  Current Outpatient Prescriptions   Medication Sig Dispense Refill     amLODIPine (NORVASC) 10 MG tablet Take 1 tablet (10 mg) by mouth daily 90 tablet 3     warfarin (COUMADIN) 5 MG tablet Take 1 tablet (5 mg) by mouth daily 30 tablet 6     polyethylene glycol (MIRALAX) powder Take 17 g (1 capful) by mouth daily (Patient taking differently: Take 1 capful by mouth daily as needed for constipation ) 510 g 1     tiotropium (SPIRIVA HANDIHALER) 18 MCG capsule Inhale contents of one capsule daily. 90 capsule 3     albuterol (PROAIR HFA/PROVENTIL HFA/VENTOLIN HFA) 108 (90 BASE) MCG/ACT Inhaler Inhale 2 puffs into the lungs every 6 hours as needed for shortness of breath / dyspnea 3 Inhaler 6     fluticasone-vilanterol (BREO ELLIPTA) 100-25 MCG/INH oral inhaler Inhale 1 puff into the lungs daily 3 Inhaler 3     furosemide (LASIX) 20 MG tablet Take 2 tablets (40 mg) by mouth daily as needed For weight gain of 3 lbs or greater 180 tablet 3     inFLIXimab (REMICADE) 100 MG injection every 6 weeks       metoprolol (LOPRESSOR) 100 MG tablet Take 1  tablet (100 mg) by mouth 2 times daily 60 tablet 11     levothyroxine (SYNTHROID/LEVOTHROID) 137 MCG tablet Take 1 tablet (137 mcg) by mouth daily 90 tablet 0     methotrexate 2.5 MG tablet Take 3 tablets (7.5 mg) by mouth once a week On Mondays 48 tablet 3     ciclopirox (LOPROX) 0.77 % cream Apply topically 2 times daily To feet and toenails. 90 g 6     atorvastatin (LIPITOR) 20 MG tablet Take 1 tablet (20 mg) by mouth daily 90 tablet 3     ASPIRIN EC PO Take 81 mg by mouth daily       blood glucose monitoring (ACCU-CHEK RONNIE PLUS) test strip Use to test blood sugar 2 times daily or as directed.  3 month supply. 200 each 3     blood glucose monitoring (ACCU-CHEK FASTCLIX) lancets Use to test blood sugar 2 times daily or as directed.  102 lancets per box.  3 month supply. 2 Box 3     blood glucose monitoring (NO BRAND SPECIFIED) meter device kit Use to test blood sugar 2 times daily. 1 kit 0     Urea (CARMOL 40) 40 % CREA To feet daily (Patient taking differently: To feet daily PRN) 199 g 4     sildenafil (VIAGRA) 50 MG tablet Take 2 tablets (100 mg) by mouth daily as needed for erectile dysfunction 10 tablet 6     Multiple Vitamins-Minerals (MULTIVITAMIN & MINERAL PO) Take 1 tablet by mouth daily.       mirtazapine (REMERON) 15 MG tablet Take 15 mg by mouth At Bedtime.         ROS:   Constitutional: No fever, chills, or sweats. Weight has been relatively stable   ENT: No visual disturbance, ear ache, epistaxis, sore throat.   Allergies/Immunologic: Negative.   Respiratory: + cough, no hemoptysis.   Cardiovascular: As per HPI.   GI: No nausea, vomiting, hematemesis, melena, or hematochezia.   : No urinary frequency, dysuria, or hematuria.   Integument: Negative.   Psychiatric: frustrated with his health   Neuro: Negative.   Endocrinology: Negative.   Musculoskeletal: sore on his backside which is new in the last few months    EXAM:  /89 (BP Location: Right arm, Patient Position: Chair, Cuff Size: Adult  "Regular)  Pulse 55  Ht 1.753 m (5' 9\")  Wt 93 kg (205 lb 1.6 oz)  SpO2 92%  BMI 30.29 kg/m2  General: appears comfortable, alert and articulate  Head: normocephalic, atraumatic  Eyes: anicteric sclera, EOMI  Neck: no adenopathy  Orophyarynx: moist mucosa, no lesions, dentition intact  Heart: PMI unable to be appreciated, regular, S1/S2, trace systolic murmur at the apex, no gallop, rub, estimated JVP 15 cm    Lungs: clear, no rales or wheezing  Abdomen:  + distension  non-tender, bowel sounds present, no hepatosplenomegaly  Extremities: no clubbing, cyanosis, 1 + LE edema   Neurological: normal speech and affect, no gross motor deficits    Labs:  CBC RESULTS:  Lab Results   Component Value Date    WBC 10.1 02/28/2017    RBC 4.31 (L) 02/28/2017    HGB 13.4 02/28/2017    HCT 42.0 02/28/2017    MCV 97 02/28/2017    MCH 31.1 02/28/2017    MCHC 31.9 02/28/2017    RDW 15.0 02/28/2017     02/28/2017       CMP RESULTS:  Lab Results   Component Value Date     02/28/2017    POTASSIUM 4.9 02/28/2017    CHLORIDE 104 02/28/2017    CO2 26 02/28/2017    ANIONGAP 8 02/28/2017     (H) 02/28/2017    BUN 33 (H) 02/28/2017    CR 1.92 (H) 02/28/2017    GFRESTIMATED 34 (L) 02/28/2017    GFRESTBLACK 42 (L) 02/28/2017    RAFFY 8.6 02/28/2017    BILITOTAL 0.7 02/13/2017    ALBUMIN 3.0 (L) 02/28/2017    ALKPHOS 101 02/13/2017    ALT 30 02/13/2017    AST 22 02/13/2017        INR RESULTS:  Lab Results   Component Value Date    INR 3.59 (H) 01/27/2017       Lab Results   Component Value Date    MAG 2.3 01/25/2017     Lab Results   Component Value Date    NTBNPI 16016 (H) 01/18/2017     Lab Results   Component Value Date    NTBNP 674 (H) 03/16/2015       Interpretation Summary  Borderline reduced left ventricular systolic function, (EF 50-55%). Left  ventricular diastolic function is indeterminate.  The right ventricle is normal size; global RV function is mildly reduced.  No significant valvular pathology.  The inferior vena " cava is normal; estimated mean RA pressure is <3 mmHg.  In comparison to prior limited study dated 17, the LV function is  unchanged, however much improved from chronologically earlier study of the  same date.    Left Ventricle  Left ventricular size is normal. Mild concentric wall thickening consistent  with left ventricular hypertrophy is present. Left ventricular diastolic  function is indeterminate. Borderline (EF 50-55%) reduced left ventricular  function is present.     Right Ventricle  The right ventricle is normal size. Global right ventricular function is  mildly reduced.     Atria  The left atrium appears normal. Mild to moderate right atrial enlargement is  present.     Mitral Valve  The mitral valve is normal.        Aortic Valve  Mild aortic valve sclerosis is present.     Tricuspid Valve  Trace tricuspid insufficiency is present. Pulmonary artery systolic pressure  cannot be assessed.     Pulmonic Valve  The pulmonic valve cannot be assessed.     Vessels  The inferior vena cava is normal. The aorta root is normal. Estimated mean  right atrial pressure is <3 mmHg.     Pericardium  No pericardial effusion is present.     _____________________________________________________________________________  __  MMode/2D Measurements & Calculations  IVSd: 1.2 cm  LVIDd: 4.4 cm  LVIDs: 3.0 cm  LVPWd: 1.00 cm  FS: 31.8 %  EDV(Teich): 88.9 ml  ESV(Teich): 35.5 ml     LV mass(C)d: 166.9 grams  Ao root diam: 3.3 cm  asc Aorta Diam: 3.0 cm  LA/Ao: 1.2  LVOT diam: 2.1 cm  LVOT area: 3.4 cm2  LA Volume (BP): 59.5 ml  LA Volume Index (BP): 29.3 ml/m2        Doppler Measurements & Calculations  MV E max jhonatan: 65.1 cm/sec  MV A max jhonatan: 81.9 cm/sec  MV E/A: 0.79  MV mean P.4 mmHg  MV V2 VTI: 19.5 cm  MVA(VTI): 2.7 cm2     MV dec time: 0.21 sec  Ao V2 max: 107.9 cm/sec  Ao max P.7 mmHg  LV V1 VTI: 15.8 cm  SV(LVOT): 52.8 ml  PA acc time: 0.10 sec  TR max jhonatan: 285.0 cm/sec  TR max P.5 mmHg  Lateral E/e':  7.6  Medial E/e': 9.7        EKG demonstrated first-degree AV block, normal axis, slightly prolonged QT with premature atrial contractions.      Last pulmonary function tests were in 02/24/2017, shows FEV1 of 2.74, which is 69% of predicted, an FEV1 of 39% of predicted at 1.16.  DLCO was not performed.     It appears as though the last coronary angiogram was in 04/08/2008.  Left main was normal.  LAD had mild disease proximally.  There was a large septal branch which continues to give us several other septal branches, mid-LAD there was an 80% lesion, and D2 is a medium branch with 80% ostial disease, so the impression was severely diseased mid-LAD and D2 at the bifurcation, status post successful PCI with Cypher ROSALIE to the mid-LAD and D2, moderate RCA disease.  Filling pressures at that time were RA of 12, RV 60/12, PA 60/30 with a mean of 44, wedge pressure of 23.     Assessment and Plan:   In summary, this is a very pleasant 71-year-old man with a history of pulmonary sarcoidosis and a presumptive diagnosis of cardiac sarcoid based on the fact that he has high filling pressures and atrial arrhythmias but without a confirmed tissue diagnosis of cardiac sarcoidosis, who is maintained on infliximab as well as methotrexate, with decent control of his pulmonary symptoms over the last several years, who presents for significantly worsening shortness of breath, diuretic requirement and recent natriuretic peptides in the 10-15,000 range.      He has significant functional limitation today which is likely multifactorial, but substantial volume overload is detected on exam today.  I am increasing his Lasix to 80 mg b.i.d., and I was actually on the fringe of even admitting him today for further diuresis and evaluation of what may be happening in is lungs.      With regard to his heart failure, technically he has heart failure with preserved ejection fraction based on the fact that his last EF was normal.  However, he has  abnormal diastolic filling pattern and elevated filling pressures even years ago which is likely due to a combination of hypertension as well as sarcoidosis.  He also has underlying coronary artery disease from 2008, and this has not been reevaluated and we will need to consider whether or not to repeat a coronary angiogram based on the fact that he has worsening heart failure and this could certainly be contributing to worsening diastolic function.      With regard to his worsening pulmonary symptoms, on my differential includes volume overload, which he certainly has on exam, but also worsening pulmonary sarcoid,  amiodarone lung toxicity, atypical infections given the immunosuppression, methotrexate lung toxicity,  and therefore if he is not better from a pulmonary perspective after further diuresis, I would like to perform a right heart catheterization as well as a chest CT to determine whether or not he needs further pulmonary evaluation.  I will also communicate with his outpatient lung physician, Dr. Perlman, about his worsening status.      With regard to his atrial fibrillation, he is presently in sinus on metoprolol 100 b.i.d. as well as anticoagulated, currently in sinus rhythm.      2.  Coronary artery disease.  He is on aspirin, statin and beta blocker, presently asymptomatic but again we may reevaluate at the coronaries soon .     3.  Chronic kidney disease with proteinuria.  He has developed worsening renal function on valsartan in the hospital, although last renal note said to try again.  At present he is decompensated and I am increasing his diuretics, so I do not think now is the time to try, but we will certainly have an eye towards rechallenging on an ARB soon given the proteinuria and CKD.        Please feel free to contact me if you have any further questions.      Sincerely,            Diane Paige MD   Cardiology Staff     CC  Patient Care Team:  Jamie Foster MD as PCP - General  (Internal Medicine)  Jamie Foster MD as Referring Physician (Internal Medicine)  Kina Greco MD as MD (Ophthalmology)  Perlman, David Morris, MD as MD (Internal Medicine)  Haley Renner DO as Referring Physician (Student in organized health care education/training program)  Chicho Mejia, EDDYM (Podiatry)  Macey Roy MD as MD (Internal Medicine)  Thompson Gonzalez MD as MD (Cardiology)  Karlene Farrell, RN as Nurse Coordinator (Cardiology)  Madalyn Fajardo MD PhD as MD (Family Practice)  Jaclyn Pina, MICHAEL as Nurse Coordinator (Clinical Cardiac Electrophysiology)  Brian Vazquez MD as MD (Clinical Cardiac Electrophysiology)  Elizabeth Cabral, VINNY CNP as Nurse Practitioner (Nurse Practitioner)  ELIZABETH CABRAL

## 2017-03-09 NOTE — PATIENT INSTRUCTIONS
Increase Lasix 80 mg twice daily  check your weight daily in AM  Schedule lab appointment for Mon  Someone will call you on Mon. To see how you feel.    Reyna Hernandez RN or Louisa Sparrow RN  Cardiology Care Coordinator  Please be aware that I work part-time but I will be checking messages several times per week.   For urgent needs, please call the number below.    [phone], press 1 for apprupt, press 3 to speak to a nurse    ..

## 2017-03-09 NOTE — LETTER
3/9/2017      RE: Rohan Monroe  [address]  [facility] [address]       Dear Colleague,    Thank you for the opportunity to participate in the care of your patient, Rohan Monroe, at the [facility]. Please see a copy of my visit note below.      March 9, 2017    Dear Colleagues:      I had the pleasure of seeing Rohan Monroe in the [facility] on 03/09/2017.  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 6 weeks since 2008 as well as longstanding methotrexate, who is here today for further evaluation of newly worsening heart failure.      Most recently he had atrial flutter and underwent a pulmonary vein isolation.  He was in the hospital for control of the atrial arrhythmias and had intermittent amiodarone loading.  Due to toxicity from the amiodarone, this was stopped.  These were documented to be pulmonary, but he had difficulty thinking clearly and just overall felt poorly on amiodarone and therefore stopped it.  He has not had any further palpitations, although he did not really have any prior to that when he was in atrial fibrillation.  At present he says that he can barely walk 10 steps.  He is short of breath even with showering.  Several months ago he was actually feeling quite well and could walk several blocks.  He feels considerably worse in the last month and a half.  When he was first out of the hospital, he has coughing and that is somewhat better.  He denies PND and orthopnea any further.  He denies palpitations chest pain or syncope, but his energy level is extremely low.  He cannot walk 1 flight of stairs at present, and he has wheezing at rest.  He denies any fevers or chills, and the cough is not productive.       PAST  MEDICAL HISTORY:  Past Medical History   Diagnosis Date     Cataract of both eyes      Chronic infection      Hep C     Congestive heart failure, unspecified      Depressive disorder, not elsewhere classified      Depression (non-psychotic)     ERM OS (epiretinal membrane, left eye)      Generalized osteoarthrosis, unspecified site      Glaucoma suspect      Hypertension      Lichen planus      Other psoriasis      PVD (posterior vitreous detachment), left eye      Sarcoidosis (H)      Sarcoidosis (H)      Type II or unspecified type diabetes mellitus without mention of complication, not stated as uncontrolled      Unspecified hypothyroidism      Hypothyroidism     Unspecified viral hepatitis C without hepatic coma      Viral warts, unspecified        SOCIAL HISTORY:  The patient was an  in the [employer] and stopped working 3 years ago.  He lives with his son, who is in college and is studying to be a .  He is .  He did drink heavily in his past.  He stopped in 1992.  Smoking he stopped in 1994.  He denies any other illicit drug use.      FAMILY HISTORY:  There is no family history of autoimmune diseases or sudden cardiac death.         CURRENT MEDICATIONS:  Current Outpatient Prescriptions   Medication Sig Dispense Refill     amLODIPine (NORVASC) 10 MG tablet Take 1 tablet (10 mg) by mouth daily 90 tablet 3     warfarin (COUMADIN) 5 MG tablet Take 1 tablet (5 mg) by mouth daily 30 tablet 6     polyethylene glycol (MIRALAX) powder Take 17 g (1 capful) by mouth daily (Patient taking differently: Take 1 capful by mouth daily as needed for constipation ) 510 g 1     tiotropium (SPIRIVA HANDIHALER) 18 MCG capsule Inhale contents of one capsule daily. 90 capsule 3     albuterol (PROAIR HFA/PROVENTIL HFA/VENTOLIN HFA) 108 (90 BASE) MCG/ACT Inhaler Inhale 2 puffs into the lungs every 6 hours as needed for shortness of breath / dyspnea 3 Inhaler 6      fluticasone-vilanterol (BREO ELLIPTA) 100-25 MCG/INH oral inhaler Inhale 1 puff into the lungs daily 3 Inhaler 3     furosemide (LASIX) 20 MG tablet Take 2 tablets (40 mg) by mouth daily as needed For weight gain of 3 lbs or greater 180 tablet 3     inFLIXimab (REMICADE) 100 MG injection every 6 weeks       metoprolol (LOPRESSOR) 100 MG tablet Take 1 tablet (100 mg) by mouth 2 times daily 60 tablet 11     levothyroxine (SYNTHROID/LEVOTHROID) 137 MCG tablet Take 1 tablet (137 mcg) by mouth daily 90 tablet 0     methotrexate 2.5 MG tablet Take 3 tablets (7.5 mg) by mouth once a week On Mondays 48 tablet 3     ciclopirox (LOPROX) 0.77 % cream Apply topically 2 times daily To feet and toenails. 90 g 6     atorvastatin (LIPITOR) 20 MG tablet Take 1 tablet (20 mg) by mouth daily 90 tablet 3     ASPIRIN EC PO Take 81 mg by mouth daily       blood glucose monitoring (ACCU-CHEK RONNIE PLUS) test strip Use to test blood sugar 2 times daily or as directed.  3 month supply. 200 each 3     blood glucose monitoring (ACCU-CHEK FASTCLIX) lancets Use to test blood sugar 2 times daily or as directed.  102 lancets per box.  3 month supply. 2 Box 3     blood glucose monitoring (NO BRAND SPECIFIED) meter device kit Use to test blood sugar 2 times daily. 1 kit 0     Urea (CARMOL 40) 40 % CREA To feet daily (Patient taking differently: To feet daily PRN) 199 g 4     sildenafil (VIAGRA) 50 MG tablet Take 2 tablets (100 mg) by mouth daily as needed for erectile dysfunction 10 tablet 6     Multiple Vitamins-Minerals (MULTIVITAMIN & MINERAL PO) Take 1 tablet by mouth daily.       mirtazapine (REMERON) 15 MG tablet Take 15 mg by mouth At Bedtime.         ROS:   Constitutional: No fever, chills, or sweats. Weight has been relatively stable   ENT: No visual disturbance, ear ache, epistaxis, sore throat.   Allergies/Immunologic: Negative.   Respiratory: + cough, no hemoptysis.   Cardiovascular: As per HPI.   GI: No nausea, vomiting, hematemesis,  "melena, or hematochezia.   : No urinary frequency, dysuria, or hematuria.   Integument: Negative.   Psychiatric: frustrated with his health   Neuro: Negative.   Endocrinology: Negative.   Musculoskeletal: sore on his backside which is new in the last few months    EXAM:  /89 (BP Location: Right arm, Patient Position: Chair, Cuff Size: Adult Regular)  Pulse 55  Ht 1.753 m (5' 9\")  Wt 93 kg (205 lb 1.6 oz)  SpO2 92%  BMI 30.29 kg/m2  General: appears comfortable, alert and articulate  Head: normocephalic, atraumatic  Eyes: anicteric sclera, EOMI  Neck: no adenopathy  Orophyarynx: moist mucosa, no lesions, dentition intact  Heart: PMI unable to be appreciated, regular, S1/S2, trace systolic murmur at the apex, no gallop, rub, estimated JVP 15 cm    Lungs: clear, no rales or wheezing  Abdomen:  + distension  non-tender, bowel sounds present, no hepatosplenomegaly  Extremities: no clubbing, cyanosis, 1 + LE edema   Neurological: normal speech and affect, no gross motor deficits    Labs:  CBC RESULTS:  Lab Results   Component Value Date    WBC 10.1 02/28/2017    RBC 4.31 (L) 02/28/2017    HGB 13.4 02/28/2017    HCT 42.0 02/28/2017    MCV 97 02/28/2017    MCH 31.1 02/28/2017    MCHC 31.9 02/28/2017    RDW 15.0 02/28/2017     02/28/2017       CMP RESULTS:  Lab Results   Component Value Date     02/28/2017    POTASSIUM 4.9 02/28/2017    CHLORIDE 104 02/28/2017    CO2 26 02/28/2017    ANIONGAP 8 02/28/2017     (H) 02/28/2017    BUN 33 (H) 02/28/2017    CR 1.92 (H) 02/28/2017    GFRESTIMATED 34 (L) 02/28/2017    GFRESTBLACK 42 (L) 02/28/2017    RAFFY 8.6 02/28/2017    BILITOTAL 0.7 02/13/2017    ALBUMIN 3.0 (L) 02/28/2017    ALKPHOS 101 02/13/2017    ALT 30 02/13/2017    AST 22 02/13/2017        INR RESULTS:  Lab Results   Component Value Date    INR 3.59 (H) 01/27/2017       Lab Results   Component Value Date    MAG 2.3 01/25/2017     Lab Results   Component Value Date    NTBNPI 34571 (H) 01/18/2017 "     Lab Results   Component Value Date    NTBNP 674 (H) 03/16/2015       Interpretation Summary  Borderline reduced left ventricular systolic function, (EF 50-55%). Left  ventricular diastolic function is indeterminate.  The right ventricle is normal size; global RV function is mildly reduced.  No significant valvular pathology.  The inferior vena cava is normal; estimated mean RA pressure is <3 mmHg.  In comparison to prior limited study dated 1/19/17, the LV function is  unchanged, however much improved from chronologically earlier study of the  same date.    Left Ventricle  Left ventricular size is normal. Mild concentric wall thickening consistent  with left ventricular hypertrophy is present. Left ventricular diastolic  function is indeterminate. Borderline (EF 50-55%) reduced left ventricular  function is present.     Right Ventricle  The right ventricle is normal size. Global right ventricular function is  mildly reduced.     Atria  The left atrium appears normal. Mild to moderate right atrial enlargement is  present.     Mitral Valve  The mitral valve is normal.        Aortic Valve  Mild aortic valve sclerosis is present.     Tricuspid Valve  Trace tricuspid insufficiency is present. Pulmonary artery systolic pressure  cannot be assessed.     Pulmonic Valve  The pulmonic valve cannot be assessed.     Vessels  The inferior vena cava is normal. The aorta root is normal. Estimated mean  right atrial pressure is <3 mmHg.     Pericardium  No pericardial effusion is present.     _____________________________________________________________________________  __  MMode/2D Measurements & Calculations  IVSd: 1.2 cm  LVIDd: 4.4 cm  LVIDs: 3.0 cm  LVPWd: 1.00 cm  FS: 31.8 %  EDV(Teich): 88.9 ml  ESV(Teich): 35.5 ml     LV mass(C)d: 166.9 grams  Ao root diam: 3.3 cm  asc Aorta Diam: 3.0 cm  LA/Ao: 1.2  LVOT diam: 2.1 cm  LVOT area: 3.4 cm2  LA Volume (BP): 59.5 ml  LA Volume Index (BP): 29.3 ml/m2        Doppler  Measurements & Calculations  MV E max jhonatan: 65.1 cm/sec  MV A max jhonatan: 81.9 cm/sec  MV E/A: 0.79  MV mean P.4 mmHg  MV V2 VTI: 19.5 cm  MVA(VTI): 2.7 cm2     MV dec time: 0.21 sec  Ao V2 max: 107.9 cm/sec  Ao max P.7 mmHg  LV V1 VTI: 15.8 cm  SV(LVOT): 52.8 ml  PA acc time: 0.10 sec  TR max jhonatan: 285.0 cm/sec  TR max P.5 mmHg  Lateral E/e': 7.6  Medial E/e': 9.7        EKG demonstrated first-degree AV block, normal axis, slightly prolonged QT with premature atrial contractions.      Last pulmonary function tests were in 2017, shows FEV1 of 2.74, which is 69% of predicted, an FEV1 of 39% of predicted at 1.16.  DLCO was not performed.     It appears as though the last coronary angiogram was in 2008.  Left main was normal.  LAD had mild disease proximally.  There was a large septal branch which continues to give us several other septal branches, mid-LAD there was an 80% lesion, and D2 is a medium branch with 80% ostial disease, so the impression was severely diseased mid-LAD and D2 at the bifurcation, status post successful PCI with Cypher ROSALIE to the mid-LAD and D2, moderate RCA disease.  Filling pressures at that time were RA of 12, RV 60/12, PA 60/30 with a mean of 44, wedge pressure of 23.     Assessment and Plan:   In summary, this is a very pleasant 71-year-old man with a history of pulmonary sarcoidosis and a presumptive diagnosis of cardiac sarcoid based on the fact that he has high filling pressures and atrial arrhythmias but without a confirmed tissue diagnosis of cardiac sarcoidosis, who is maintained on infliximab as well as methotrexate, with decent control of his pulmonary symptoms over the last several years, who presents for significantly worsening shortness of breath, diuretic requirement and recent natriuretic peptides in the 10-15,000 range.      He has significant functional limitation today which is likely multifactorial, but substantial volume overload is detected on exam  today.  I am increasing his Lasix to 80 mg b.i.d., and I was actually on the fringe of even admitting him today for further diuresis and evaluation of what may be happening in is lungs.      With regard to his heart failure, technically he has heart failure with preserved ejection fraction based on the fact that his last EF was normal.  However, he has abnormal diastolic filling pattern and elevated filling pressures even years ago which is likely due to a combination of hypertension as well as sarcoidosis.  He also has underlying coronary artery disease from 2008, and this has not been reevaluated and we will need to consider whether or not to repeat a coronary angiogram based on the fact that he has worsening heart failure and this could certainly be contributing to worsening diastolic function.      With regard to his worsening pulmonary symptoms, on my differential includes volume overload, which he certainly has on exam, but also worsening pulmonary sarcoid,  amiodarone lung toxicity, atypical infections given the immunosuppression, methotrexate lung toxicity,  and therefore if he is not better from a pulmonary perspective after further diuresis, I would like to perform a right heart catheterization as well as a chest CT to determine whether or not he needs further pulmonary evaluation.  I will also communicate with his outpatient lung physician, Dr. Perlman, about his worsening status.      With regard to his atrial fibrillation, he is presently in sinus on metoprolol 100 b.i.d. as well as anticoagulated, currently in sinus rhythm.      2.  Coronary artery disease.  He is on aspirin, statin and beta blocker, presently asymptomatic but again we may reevaluate at the coronaries soon .     3.  Chronic kidney disease with proteinuria.  He has developed worsening renal function on valsartan in the hospital, although last renal note said to try again.  At present he is decompensated and I am increasing his  diuretics, so I do not think now is the time to try, but we will certainly have an eye towards rechallenging on an ARB soon given the proteinuria and CKD.        Please feel free to contact me if you have any further questions.      Sincerely,            Diane Paige MD   Cardiology Staff     CC  Patient Care Team:  Jamie Foster MD as PCP - General (Internal Medicine)  Kina Greco MD as MD (Ophthalmology)  Perlman, David Morris, MD as MD (Internal Medicine)  Haley Renner DO as Referring Physician (Student in organized health care education/training program)  Chicho Mejia, AGATHA (Podiatry)  Macey Roy MD as MD (Internal Medicine)  Thompson Gonzalez MD as MD (Cardiology)  Karlene Farrell, RN as Nurse Coordinator (Cardiology)  Madalyn Fajardo MD PhD as MD (Family Practice)  Jaclyn Pina, MICHAEL as Nurse Coordinator (Clinical Cardiac Electrophysiology)  Brian Vazquez MD as MD (Clinical Cardiac Electrophysiology)  Elizabeth Cabral APRN CNP as Nurse Practitioner (Nurse Practitioner)

## 2017-03-10 ENCOUNTER — CARE COORDINATION (OUTPATIENT)
Dept: CARDIOLOGY | Facility: CLINIC | Age: 72
End: 2017-03-10

## 2017-03-10 ENCOUNTER — MYC MEDICAL ADVICE (OUTPATIENT)
Dept: CARDIOLOGY | Facility: CLINIC | Age: 72
End: 2017-03-10

## 2017-03-10 NOTE — PROGRESS NOTES
Called patient for BP recordings.     We calibrated his home BP monitor on Feb 28, showing that his blood pressure monitor was showing 20 points less on systolic than our clinic BP. However, he was seen in our clinic yesterday and his BP recordings between home and clinic monitors were about the same. Lasix was increased yesterday at appointment with Dr. Paige.      3/9/17:  Home BP monitor: 144/79  Clinic BP monitor: 148/81    3/1/17:  Home BP monitor: 149/94, 140/83      Routed to Elizabeth Harris NP for recommendations.   Patient also Myckenny hammer'd in a list of blood pressures.    3/14/17  Dr. Paige just increased his diuretics. She has requested we hold on restarting losartan. I told her we will leave it up to her to restart when she sees fit.   Thanks,   LVW     Sent above to patient via [employer].

## 2017-03-13 ENCOUNTER — CARE COORDINATION (OUTPATIENT)
Dept: CARDIOLOGY | Facility: CLINIC | Age: 72
End: 2017-03-13

## 2017-03-13 DIAGNOSIS — D86.9 SARCOIDOSIS: ICD-10-CM

## 2017-03-13 DIAGNOSIS — I25.10 CORONARY ARTERY DISEASE WITHOUT ANGINA PECTORIS, UNSPECIFIED VESSEL OR LESION TYPE, UNSPECIFIED WHETHER NATIVE OR TRANSPLANTED HEART: ICD-10-CM

## 2017-03-13 DIAGNOSIS — Z79.01 LONG-TERM (CURRENT) USE OF ANTICOAGULANTS: ICD-10-CM

## 2017-03-13 DIAGNOSIS — I48.92 ATRIAL FLUTTER WITH RAPID VENTRICULAR RESPONSE (H): ICD-10-CM

## 2017-03-13 DIAGNOSIS — D86.9 SARCOIDOSIS: Primary | ICD-10-CM

## 2017-03-13 DIAGNOSIS — I50.33 ACUTE ON CHRONIC DIASTOLIC HEART FAILURE (H): ICD-10-CM

## 2017-03-13 DIAGNOSIS — I48.92 ATRIAL FLUTTER, UNSPECIFIED TYPE (H): ICD-10-CM

## 2017-03-13 LAB — INR PPP: 2.16 (ref 0.86–1.14)

## 2017-03-13 PROCEDURE — 36415 COLL VENOUS BLD VENIPUNCTURE: CPT | Performed by: INTERNAL MEDICINE

## 2017-03-13 PROCEDURE — 85610 PROTHROMBIN TIME: CPT | Performed by: INTERNAL MEDICINE

## 2017-03-13 PROCEDURE — 80048 BASIC METABOLIC PNL TOTAL CA: CPT | Performed by: INTERNAL MEDICINE

## 2017-03-13 NOTE — PROGRESS NOTES
Spoke with patient today to see if his symptoms had improved with increase Lasix last week.  Pt. States he is MUCH better, He is currently taking Lasix 80 mg BID.  Last week he needed assistance to go anywhere and was using a wheelchair.  Today he was able to go to the lab by himself.  Awaiting BMP results from today and will then speak with Dr. Paige about further plans.    3/14/17    Cr. 2.21 yesterday onn Lasix 80 mg BID, was 1.6 last week.   Spoke with pt. He is still feeling well.  Will decrease lasix to 40 mg BID per Dr. Paige   Repeat labs next week. I will call him on Fri. To see if he is all right on this dose. He may increase to 60 mg in AM if weight up later this week,

## 2017-03-13 NOTE — PROGRESS NOTES
"Pt calling into triage line just wanting to inform Dr. Paige and her heart failure team that \"I feel great\" after recent increase of lasix to 80mg po BID    No need to call patient back as this \"was the best major change\" that has been done lately for the patient  "

## 2017-03-14 ENCOUNTER — ANTICOAGULATION THERAPY VISIT (OUTPATIENT)
Dept: ANTICOAGULATION | Facility: CLINIC | Age: 72
End: 2017-03-14

## 2017-03-14 DIAGNOSIS — I48.92 ATRIAL FLUTTER WITH RAPID VENTRICULAR RESPONSE (H): ICD-10-CM

## 2017-03-14 DIAGNOSIS — Z79.01 LONG-TERM (CURRENT) USE OF ANTICOAGULANTS: ICD-10-CM

## 2017-03-14 LAB
ANION GAP SERPL CALCULATED.3IONS-SCNC: 5 MMOL/L (ref 3–14)
BUN SERPL-MCNC: 45 MG/DL (ref 7–30)
CALCIUM SERPL-MCNC: 8.5 MG/DL (ref 8.5–10.1)
CHLORIDE SERPL-SCNC: 98 MMOL/L (ref 94–109)
CO2 SERPL-SCNC: 32 MMOL/L (ref 20–32)
CREAT SERPL-MCNC: 2.27 MG/DL (ref 0.66–1.25)
GFR SERPL CREATININE-BSD FRML MDRD: 28 ML/MIN/1.7M2
GLUCOSE SERPL-MCNC: 163 MG/DL (ref 70–99)
POTASSIUM SERPL-SCNC: 4.1 MMOL/L (ref 3.4–5.3)
SODIUM SERPL-SCNC: 135 MMOL/L (ref 133–144)

## 2017-03-14 RX ORDER — FUROSEMIDE 20 MG
40 TABLET ORAL 2 TIMES DAILY
Qty: 180 TABLET | Refills: 3 | Status: ON HOLD | COMMUNITY
Start: 2017-03-14 | End: 2017-04-28

## 2017-03-14 NOTE — MR AVS SNAPSHOT
Rohan Howard Gitalejandro   3/14/2017   Anticoagulation Therapy Visit    Description:  71 year old male   Provider:  Delphine Kaur, RN   Department:  [facility]           INR as of 3/14/2017     Today's INR 2.16 (3/13/2017)      Anticoagulation Summary as of 3/14/2017     INR goal 2.0-3.0   Today's INR 2.16 (3/13/2017)   Full instructions 5 mg every day   Next INR check 3/30/2017    Indications   Long-term (current) use of anticoagulants [Z79.01] [Z79.01]  Atrial flutter with rapid ventricular response (H) [I48.92]         March 2017 Details    Sun Mon Tue Wed Thu Fri Sat        1               2               3               4                 5               6               7               8               9               10               11                 12               13               14      5 mg   See details      15      5 mg         16      5 mg         17      5 mg         18      5 mg           19      5 mg         20      5 mg         21      5 mg         22      5 mg         23      5 mg         24      5 mg         25      5 mg           26      5 mg         27      5 mg         28      5 mg         29      5 mg         30            31                 Date Details   03/14 This INR check       Date of next INR:  3/30/2017         How to take your warfarin dose     To take:  5 mg Take 1 of the 5 mg tablets.

## 2017-03-14 NOTE — PROGRESS NOTES
ANTICOAGULATION FOLLOW-UP CLINIC VISIT    Patient Name:  Rohan Monroe  Date:  3/14/2017  Contact Type:  Telephone    SUBJECTIVE:     Patient Findings     Positives No Problem Findings    Comments Denis reports that he has been taking warfarin 5mg daily for the past couple of weeks.  Denis reports that he has an appointment on 3/30 and this is when he will have his INR checked.           OBJECTIVE    INR   Date Value Ref Range Status   03/13/2017 2.16 (H) 0.86 - 1.14 Final       ASSESSMENT / PLAN  INR assessment THER    Recheck INR In: 2 WEEKS    INR Location Clinic      Anticoagulation Summary as of 3/14/2017     INR goal 2.0-3.0   Today's INR 2.16 (3/13/2017)   Maintenance plan 5 mg (5 mg x 1) every day   Full instructions 5 mg every day   Weekly total 35 mg   Plan last modified Delphine Kaur, RN (3/14/2017)   Next INR check 3/30/2017   Priority INR   Target end date 4/20/2017    Indications   Long-term (current) use of anticoagulants [Z79.01] [Z79.01]  Atrial flutter with rapid ventricular response (H) [I48.92]         Anticoagulation Episode Summary     INR check location     Preferred lab     Send INR reminders to [facility]    Comments 3 months therapy, then reassess. Call [phone]. Do not leave message.       Anticoagulation Care Providers     Provider Role Specialty Phone number    Jamie Foster MD [facility] Internal Medicine [phone]            See the Encounter Report to view Anticoagulation Flowsheet and Dosing Calendar (Go to Encounters tab in chart review, and find the Anticoagulation Therapy Visit)    Spoke with Denis.    Delphine Kaur, MICHAEL

## 2017-03-30 ENCOUNTER — ANTICOAGULATION THERAPY VISIT (OUTPATIENT)
Dept: ANTICOAGULATION | Facility: CLINIC | Age: 72
End: 2017-03-30

## 2017-03-30 ENCOUNTER — INFUSION THERAPY VISIT (OUTPATIENT)
Dept: INFUSION THERAPY | Facility: CLINIC | Age: 72
End: 2017-03-30
Attending: INTERNAL MEDICINE
Payer: MEDICARE

## 2017-03-30 VITALS — DIASTOLIC BLOOD PRESSURE: 83 MMHG | WEIGHT: 200.3 LBS | BODY MASS INDEX: 29.58 KG/M2 | SYSTOLIC BLOOD PRESSURE: 151 MMHG

## 2017-03-30 DIAGNOSIS — I48.92 ATRIAL FLUTTER WITH RAPID VENTRICULAR RESPONSE (H): ICD-10-CM

## 2017-03-30 DIAGNOSIS — K74.60 CIRRHOSIS OF LIVER WITHOUT ASCITES, UNSPECIFIED HEPATIC CIRRHOSIS TYPE (H): ICD-10-CM

## 2017-03-30 DIAGNOSIS — D86.9 SARCOIDOSIS: ICD-10-CM

## 2017-03-30 DIAGNOSIS — D86.0 SARCOIDOSIS OF LUNG (H): Primary | ICD-10-CM

## 2017-03-30 DIAGNOSIS — Z79.01 LONG-TERM (CURRENT) USE OF ANTICOAGULANTS: ICD-10-CM

## 2017-03-30 LAB — INR PPP: 3.68 (ref 0.86–1.14)

## 2017-03-30 PROCEDURE — 96413 CHEMO IV INFUSION 1 HR: CPT

## 2017-03-30 PROCEDURE — 40000269 ZZH STATISTIC NO CHARGE FACILITY FEE: Mod: ZF

## 2017-03-30 PROCEDURE — 85610 PROTHROMBIN TIME: CPT | Performed by: INTERNAL MEDICINE

## 2017-03-30 PROCEDURE — 25000128 H RX IP 250 OP 636: Mod: ZF | Performed by: INTERNAL MEDICINE

## 2017-03-30 PROCEDURE — 96415 CHEMO IV INFUSION ADDL HR: CPT

## 2017-03-30 RX ORDER — ACETAMINOPHEN 325 MG/1
650 TABLET ORAL ONCE
Status: CANCELLED
Start: 2017-03-30 | End: 2017-03-30

## 2017-03-30 RX ADMIN — INFLIXIMAB 500 MG: 100 INJECTION, POWDER, LYOPHILIZED, FOR SOLUTION INTRAVENOUS at 12:18

## 2017-03-30 NOTE — MR AVS SNAPSHOT
After Visit Summary   3/30/2017    Rohan Monroe    MRN: [id number]           Patient Information     Date Of Birth          1945        Visit Information        Provider Department      3/30/2017 12:00 PM UC 49 ATC; UC SPEC INFUSION [facility]        Today's Diagnoses     Sarcoidosis of lung (HCC)    -  1    SARCOIDOSIS-systemic        Cirrhosis of liver without ascites, unspecified hepatic cirrhosis type (H)          Care Instructions      Infliximab Solution for injection  What is this medicine?  INFLIXIMAB (in FLIX i mab) is used to treat Crohn's disease and ulcerative colitis. It is also used to treat ankylosing spondylitis, psoriasis, and some forms of arthritis.  This medicine may be used for other purposes; ask your health care provider or pharmacist if you have questions.  What should I tell my health care provider before I take this medicine?  They need to know if you have any of these conditions:    diabetes    exposure to tuberculosis    heart failure    hepatitis or liver disease    immune system problems    infection    lung or breathing disease, like COPD    multiple sclerosis    current or past resident of [place]    seizure disorder    an unusual or allergic reaction to infliximab, mouse proteins, other medicines, foods, dyes, or preservatives    pregnant or trying to get pregnant    breast-feeding  How should I use this medicine?  This medicine is for injection into a vein. It is usually given by a health care professional in a hospital or clinic setting.  A special MedGuide will be given to you by the pharmacist with each prescription and refill. Be sure to read this information carefully each time.  Talk to your pediatrician regarding the use of this medicine in children. Special care may be needed.  Overdosage: If you think you have taken too much of this medicine contact a poison control  center or emergency room at once.  NOTE: This medicine is only for you. Do not share this medicine with others.  What if I miss a dose?  It is important not to miss your dose. Call your doctor or health care professional if you are unable to keep an appointment.  What may interact with this medicine?  Do not take this medicine with any of the following medications:    anakinra    rilonacept  This medicine may also interact with the following medications:    vaccines  This list may not describe all possible interactions. Give your health care provider a list of all the medicines, herbs, non-prescription drugs, or dietary supplements you use. Also tell them if you smoke, drink alcohol, or use illegal drugs. Some items may interact with your medicine.  What should I watch for while using this medicine?  Visit your doctor or health care professional for regular checks on your progress.  If you get a cold or other infection while receiving this medicine, call your doctor or health care professional. Do not treat yourself. This medicine may decrease your body's ability to fight infections. Before beginning therapy, your doctor may do a test to see if you have been exposed to tuberculosis.  This medicine may make the symptoms of heart failure worse in some patients. If you notice symptoms such as increased shortness of breath or swelling of the ankles or legs, contact your health care provider right away.  If you are going to have surgery or dental work, tell your health care professional or dentist that you have received this medicine.  If you take this medicine for plaque psoriasis, stay out of the sun. If you cannot avoid being in the sun, wear protective clothing and use sunscreen. Do not use sun lamps or tanning beds/booths.  What side effects may I notice from receiving this medicine?  Side effects that you should report to your doctor or health care professional as soon as possible:    allergic reactions like skin  rash, itching or hives, swelling of the face, lips, or tongue    chest pain    fever or chills, usually related to the infusion    muscle or joint pain    red, scaly patches or raised bumps on the skin    signs of infection - fever or chills, cough, sore throat, pain or difficulty passing urine    swollen lymph nodes in the neck, underarm, or groin areas    unexplained weight loss    unusual bleeding or bruising    unusually weak or tired    yellowing of the eyes or skin  Side effects that usually do not require medical attention (report to your doctor or health care professional if they continue or are bothersome):    headache    heartburn or stomach pain    nausea, vomiting  This list may not describe all possible side effects. Call your doctor for medical advice about side effects. You may report side effects to FDA at 3-291-FDA-5628.  Where should I keep my medicine?  This drug is given in a hospital or clinic and will not be stored at home.  NOTE:This sheet is a summary. It may not cover all possible information. If you have questions about this medicine, talk to your doctor, pharmacist, or health care provider. Copyright  2016 Gold Standard              Follow-ups after your visit        Your next 10 appointments already scheduled     Apr 11, 2017  1:00 PM CDT   Lab with [facility]   [facility] ([facility])    [address] [place] [address]  [address] [facility] [address] [phone]            Apr 11, 2017  2:00 PM CDT   (Arrive by 1:30 PM)   Return Visit with Ollie Michel MD   [facility] ([facility])    [address]  [address] [facility] [address] [phone]            May 11, 2017  2:00 PM CDT   Infusion 180 with WALTER SPEC INFUSION, UC 50 ATC   [facility] ([facility])    [address]  [address] [facility] [address] [phone]             Jun 21, 2017  1:30 PM CDT   (Arrive by 1:15 PM)   RETURN ARRHYTHMIA with Brian Vazquez MD   [facility] ([facility])    [address] [place] [id number] [phone]            Aug 29, 2017 10:00 AM CDT   Lab with  LAB   [facility] ([facility])    [address] [place] [id number] [phone]            Aug 29, 2017 10:30 AM CDT   US ABDOMEN COMPLETE with UCUS2   [facility] ([facility])    [address] [id number] [phone]           Please bring a list of your medicines (including vitamins, minerals and over-the-counter drugs). Also, tell your doctor about any allergies you may have. Wear comfortable clothes and leave your valuables at home.  Adults: No eating or drinking for 8 hours before the exam. You may take medicine with a small sip of water.  Children: - Children 6+ years: No food or drink for 6 hours before exam. - Children 1-5 years: No food or drink for 4 hours before exam. - Infants, breast-fed: may have breast milk up to 2 hours before exam. - Infants, formula: may have bottle until 4 hours before exam.  Please call the Imaging Department at your exam site with any questions.            Aug 29, 2017 11:30 AM CDT   (Arrive by 11:15 AM)   Return General Liver with Moses Orosco MD   [facility] ([facility])    [address] [place] [address]   [phone]            Sep 08, 2017  9:30 AM CDT   RETURN GLAUCOMA with Kina Greco MD   [facility] ([facility])    [address]  [address]   [address]  [facility] [address]   [phone]            Mar 06, 2018 12:15 PM CST   RETURN RETINA with Sandee Middleton MD   [facility] ([facility])    [address]  [address]  [address]  [address]  [facility] [address] [phone]              Who to contact     If you have questions or need follow up information about today's clinic visit or your schedule please contact [facility] directly at [phone].  Normal or non-critical lab and imaging results will be communicated to you by MyChart, letter or phone within 4 business days after the clinic has received the results. If you do not hear from us within 7 days, please contact the clinic through [employer] or phone. If you have a critical or abnormal lab result, we will notify you by phone as soon as possible.  Submit refill requests through [employer] or call your pharmacy and they will forward the refill request to us. Please allow 3 business days for your refill to be completed.          Additional Information About Your Visit        [employer] Information     [employer] gives you secure access to your electronic health record. If you see a primary care provider, you can also send messages to your care team and make appointments. If you have questions, please call your primary care clinic.  If you do not have a primary care provider, please call [phone] and they will assist you.        Care EveryWhere ID     This is your Care EveryWhere ID. This could be used by other organizations to access your [facility] medical records  [id number]        Your Vitals Were     BMI (Body Mass Index)                   29.58 kg/m2            Blood Pressure from Last 3 Encounters:   03/30/17 151/83   03/09/17 148/81   02/28/17 (!) (P) 149/94    Weight from Last 3 Encounters:   03/30/17 90.9 kg (200 lb 4.8 oz)   03/09/17 93 kg (205 lb 1.6 oz)   02/28/17 93.4 kg (206 lb)              We Performed the Following     INR [[id number]]     Treatment Conditions          Today's Medication Changes          These changes are accurate as of: 3/30/17  3:32 PM.  If you have any questions, ask your nurse or doctor.               These  medicines have changed or have updated prescriptions.        Dose/Directions    polyethylene glycol powder   Commonly known as:  MIRALAX   This may have changed:    - when to take this  - reasons to take this   Used for:  Constipation, unspecified constipation type        Dose:  1 capful   Take 17 g (1 capful) by mouth daily   Quantity:  510 g   Refills:  1       Urea 40 % Crea   Commonly known as:  CARMOL 40   This may have changed:  additional instructions   Used for:  Dermatophytosis of nail, Corns and callosities        To feet daily   Quantity:  199 g   Refills:  4                Primary Care Provider Office Phone # Fax #    Jamie Foster [phone]       [facility] [address] [place] [id number]        Thank you!     Thank you for choosing [facility]  for your care. Our goal is always to provide you with excellent care. Hearing back from our patients is one way we can continue to improve our services. Please take a few minutes to complete the written survey that you may receive in the mail after your visit with us. Thank you!             Your Updated Medication List - Protect others around you: Learn how to safely use, store and throw away your medicines at www.disposemymeds.org.          This list is accurate as of: 3/30/17  3:32 PM.  Always use your most recent med list.                   Brand Name Dispense Instructions for use    albuterol 108 (90 BASE) MCG/ACT Inhaler    PROAIR HFA/PROVENTIL HFA/VENTOLIN HFA    3 Inhaler    Inhale 2 puffs into the lungs every 6 hours as needed for shortness of breath / dyspnea       amLODIPine 10 MG tablet    NORVASC    90 tablet    Take 1 tablet (10 mg) by mouth daily       ASPIRIN EC PO      Take 81 mg by mouth daily       atorvastatin 20 MG tablet    LIPITOR    90 tablet    Take 1 tablet (20 mg) by mouth daily       blood glucose monitoring lancets     2 Box    Use to test blood  sugar 2 times daily or as directed.  102 lancets per box.  3 month supply.       blood glucose monitoring meter device kit    no brand specified    1 kit    Use to test blood sugar 2 times daily.       blood glucose monitoring test strip    ACCU-CHEK RONNIE PLUS    200 each    Use to test blood sugar 2 times daily or as directed.  3 month supply.       ciclopirox 0.77 % cream    LOPROX    90 g    Apply topically 2 times daily To feet and toenails.       fluticasone-vilanterol 100-25 MCG/INH oral inhaler    BREO ELLIPTA    3 Inhaler    Inhale 1 puff into the lungs daily       inFLIXimab 100 MG injection    REMICADE     every 6 weeks       LASIX 20 MG tablet   Generic drug:  furosemide     180 tablet    Take 2 tablets (40 mg) by mouth 2 times daily For weight gain of 3 lbs or greater       levothyroxine 137 MCG tablet    SYNTHROID/LEVOTHROID    90 tablet    Take 1 tablet (137 mcg) by mouth daily       methotrexate 2.5 MG tablet CHEMO     48 tablet    Take 3 tablets (7.5 mg) by mouth once a week On Mondays       metoprolol 100 MG tablet    LOPRESSOR    60 tablet    Take 1 tablet (100 mg) by mouth 2 times daily       mirtazapine 15 MG tablet    REMERON     Take 15 mg by mouth At Bedtime.       MULTIVITAMIN & MINERAL PO      Take 1 tablet by mouth daily.       polyethylene glycol powder    MIRALAX    510 g    Take 17 g (1 capful) by mouth daily       sildenafil 50 MG cap/tab    VIAGRA    10 tablet    Take 2 tablets (100 mg) by mouth daily as needed for erectile dysfunction       tiotropium 18 MCG capsule    SPIRIVA HANDIHALER    90 capsule    Inhale contents of one capsule daily.       Urea 40 % Crea    CARMOL 40    199 g    To feet daily       warfarin 5 MG tablet    COUMADIN    30 tablet    Take 1 tablet (5 mg) by mouth daily

## 2017-03-30 NOTE — PROGRESS NOTES
ANTICOAGULATION FOLLOW-UP CLINIC VISIT    Patient Name:  Rohan Monroe  Date:  3/30/2017  Contact Type:  Telephone    SUBJECTIVE:     Patient Findings     Positives No Problem Findings           OBJECTIVE    INR   Date Value Ref Range Status   03/30/2017 3.68 (H) 0.86 - 1.14 Final       ASSESSMENT / PLAN  INR assessment SUPRA    Recheck INR In: 1 WEEK    INR Location Clinic      Anticoagulation Summary as of 3/30/2017     INR goal 2.0-3.0   Today's INR 3.68!   Maintenance plan 5 mg (5 mg x 1) every day   Full instructions 3/30: 2.5 mg; Otherwise 5 mg every day   Weekly total 35 mg   Plan last modified Delphine Kaur RN (3/14/2017)   Next INR check 4/6/2017   Priority INR   Target end date 4/20/2017    Indications   Long-term (current) use of anticoagulants [Z79.01] [Z79.01]  Atrial flutter with rapid ventricular response (H) [I48.92]         Anticoagulation Episode Summary     INR check location     Preferred lab     Send INR reminders to [facility]    Comments 3 months therapy, then reassess. Call [phone]. Do not leave message.       Anticoagulation Care Providers     Provider Role Specialty Phone number    Jamie Foster MD [facility] [phone]            See the Encounter Report to view Anticoagulation Flowsheet and Dosing Calendar (Go to Encounters tab in chart review, and find the Anticoagulation Therapy Visit)    Spoke with Rohan Elias, RN

## 2017-03-30 NOTE — PATIENT INSTRUCTIONS
Infliximab Solution for injection  What is this medicine?  INFLIXIMAB (in FLIX i mab) is used to treat Crohn's disease and ulcerative colitis. It is also used to treat ankylosing spondylitis, psoriasis, and some forms of arthritis.  This medicine may be used for other purposes; ask your health care provider or pharmacist if you have questions.  What should I tell my health care provider before I take this medicine?  They need to know if you have any of these conditions:    diabetes    exposure to tuberculosis    heart failure    hepatitis or liver disease    immune system problems    infection    lung or breathing disease, like COPD    multiple sclerosis    current or past resident of [place]    seizure disorder    an unusual or allergic reaction to infliximab, mouse proteins, other medicines, foods, dyes, or preservatives    pregnant or trying to get pregnant    breast-feeding  How should I use this medicine?  This medicine is for injection into a vein. It is usually given by a health care professional in a hospital or clinic setting.  A special MedGuide will be given to you by the pharmacist with each prescription and refill. Be sure to read this information carefully each time.  Talk to your pediatrician regarding the use of this medicine in children. Special care may be needed.  Overdosage: If you think you have taken too much of this medicine contact a poison control center or emergency room at once.  NOTE: This medicine is only for you. Do not share this medicine with others.  What if I miss a dose?  It is important not to miss your dose. Call your doctor or health care professional if you are unable to keep an appointment.  What may interact with this medicine?  Do not take this medicine with any of the following medications:    anakinra    rilonacept  This medicine may also interact with the following medications:    vaccines  This list may not describe all possible interactions.  Give your health care provider a list of all the medicines, herbs, non-prescription drugs, or dietary supplements you use. Also tell them if you smoke, drink alcohol, or use illegal drugs. Some items may interact with your medicine.  What should I watch for while using this medicine?  Visit your doctor or health care professional for regular checks on your progress.  If you get a cold or other infection while receiving this medicine, call your doctor or health care professional. Do not treat yourself. This medicine may decrease your body's ability to fight infections. Before beginning therapy, your doctor may do a test to see if you have been exposed to tuberculosis.  This medicine may make the symptoms of heart failure worse in some patients. If you notice symptoms such as increased shortness of breath or swelling of the ankles or legs, contact your health care provider right away.  If you are going to have surgery or dental work, tell your health care professional or dentist that you have received this medicine.  If you take this medicine for plaque psoriasis, stay out of the sun. If you cannot avoid being in the sun, wear protective clothing and use sunscreen. Do not use sun lamps or tanning beds/booths.  What side effects may I notice from receiving this medicine?  Side effects that you should report to your doctor or health care professional as soon as possible:    allergic reactions like skin rash, itching or hives, swelling of the face, lips, or tongue    chest pain    fever or chills, usually related to the infusion    muscle or joint pain    red, scaly patches or raised bumps on the skin    signs of infection - fever or chills, cough, sore throat, pain or difficulty passing urine    swollen lymph nodes in the neck, underarm, or groin areas    unexplained weight loss    unusual bleeding or bruising    unusually weak or tired    yellowing of the eyes or skin  Side effects that usually do not require medical  attention (report to your doctor or health care professional if they continue or are bothersome):    headache    heartburn or stomach pain    nausea, vomiting  This list may not describe all possible side effects. Call your doctor for medical advice about side effects. You may report side effects to FDA at 5-506-FDA-9590.  Where should I keep my medicine?  This drug is given in a hospital or clinic and will not be stored at home.  NOTE:This sheet is a summary. It may not cover all possible information. If you have questions about this medicine, talk to your doctor, pharmacist, or health care provider. Copyright  2016 Gold Standard

## 2017-03-30 NOTE — MR AVS SNAPSHOT
Rohan Howard Gitalejandro   3/30/2017   Anticoagulation Therapy Visit    Description:  71 year old male   Provider:  Marcia Elias, RN   Department:  [facility]           INR as of 3/30/2017     Today's INR 3.68!      Anticoagulation Summary as of 3/30/2017     INR goal 2.0-3.0   Today's INR 3.68!   Full instructions 3/30: 2.5 mg; Otherwise 5 mg every day   Next INR check 4/6/2017    Indications   Long-term (current) use of anticoagulants [Z79.01] [Z79.01]  Atrial flutter with rapid ventricular response (H) [I48.92]         March 2017 Details    Sun Mon Tue Wed Thu Fri Sat        1               2               3               4                 5               6               7               8               9               10               11                 12               13               14               15               16               17               18                 19               20               21               22               23               24               25                 26               27               28               29               30      2.5 mg   See details      31      5 mg           Date Details   03/30 This INR check               How to take your warfarin dose     To take:  2.5 mg Take 0.5 of a 5 mg tablet.    To take:  5 mg Take 1 of the 5 mg tablets.           April 2017 Details    Sun Mon Tue Wed Thu Fri Sat           1      5 mg           2      5 mg         3      5 mg         4      5 mg         5      5 mg         6            7               8                 9               10               11               12               13               14               15                 16               17               18               19               20               21               22                 23               24               25               26               27               28               29                 30                      Date Details   No  additional details    Date of next INR:  4/6/2017         How to take your warfarin dose     To take:  5 mg Take 1 of the 5 mg tablets.

## 2017-03-31 ENCOUNTER — CARE COORDINATION (OUTPATIENT)
Dept: CARDIOLOGY | Facility: CLINIC | Age: 72
End: 2017-03-31

## 2017-03-31 ENCOUNTER — CARE COORDINATION (OUTPATIENT)
Dept: PULMONOLOGY | Facility: CLINIC | Age: 72
End: 2017-03-31

## 2017-03-31 NOTE — PROGRESS NOTES
Spoke with patient regarding previous phone call to triage nurse.  He reports his breathing seems a bit better today.  In reviewing the infusion notes for Remicaide    from yesterday, he received approx. 350 cc of fluid.  This may have been enough to cause him SOB,  He is currently taking Lasix 40 mg BID, Dr. Paige mentioned in her clinic notes that he could take 60 mg in AM if he had wt. Increase.  He reports his wt. has been stable, but I did suggest he could take an extra 20 mg of Lasix on his infusion days.  He is scheduled for infusions every 6 weeks.  He will come in next week for repeat labs.

## 2017-03-31 NOTE — PROGRESS NOTES
Pt called and stated since his infusion with Remicaide he has had increase in SOB and not feeling well.   Is not sure if it will get better in a few days or if he should be concerned.     Call back number: [phone]

## 2017-03-31 NOTE — PROGRESS NOTES
Patient called concerned that his symptoms overall are not improving.  He would like to discuss changing Remicade infusions to every four weeks.  Will notify Dr. Perlman when he gets back from vacation next week.

## 2017-04-03 ENCOUNTER — CARE COORDINATION (OUTPATIENT)
Dept: CARDIOLOGY | Facility: CLINIC | Age: 72
End: 2017-04-03

## 2017-04-03 ENCOUNTER — TELEPHONE (OUTPATIENT)
Dept: CARDIOLOGY | Facility: CLINIC | Age: 72
End: 2017-04-03

## 2017-04-03 DIAGNOSIS — N18.30 CKD (CHRONIC KIDNEY DISEASE) STAGE 3, GFR 30-59 ML/MIN (H): Primary | ICD-10-CM

## 2017-04-03 NOTE — TELEPHONE ENCOUNTER
Patient called stating he is experiencing increased SOB.  He said thathe called Friday and his breathing has been worse since then.  He stated that he feels congestion in his lungs and is not feeling well.  He would really like to talk to Dr. Paige or one of her nurses regarding his concerns.      # [phone]

## 2017-04-03 NOTE — NURSING NOTE
Labs per clinic 2A protocol.  Follow up/CKD 3  Last OV: 2/28/17  Per Dr. Michel, iPTH and vitamin D in 6 months  Elisa Cardona LPN  Nephrology  [facility]  [phone]

## 2017-04-03 NOTE — PROGRESS NOTES
"Type of call: Symptoms Call  Symptoms Call    Symptom: shortness of breath    Active now: intermittent, mostly with activity    Duration: until pt sits and rests    Notes: Pt called back stating that he had called earlier and was waiting for Dr. Paige's RN to call him regarding is SOB. Pt complained of shortness of breath with activity, and this is worsening.     Pt stated that he had received Remicaide last Friday and this helped a great deal. Pt states that he feels like he has mucous in his lung that he is unable to expectorate with cough.     Pt encouraged to use his nebulizer to help open his airways, as pt stated that his has helped in the past. Rohan said that he would try this again and call back, if before 5:00pm if this isn't resolved.     Pt then encouraged to go to the emergency room if symptoms worsen.     Pt verbalized understanding of above plan of care.     Continue to monitor closely.     Patient call back number: [phone] (home)    Update: Rohan called back and stated that since using his nebulizer, he feels a lot better. \"This has helped a great deal.\"     Instructed pt to monitor his sputum for any signs of bright red blood or colors of infection.     Pt verbalized understanding, questions were answered.        "

## 2017-04-04 ENCOUNTER — CARE COORDINATION (OUTPATIENT)
Dept: CARDIOLOGY | Facility: CLINIC | Age: 72
End: 2017-04-04

## 2017-04-04 NOTE — PROGRESS NOTES
Patient calls in with same concerns as yesterday (see note). The neb helped to expectorate mucus one time. He does not feel as though he has a URI and thinks it's a sarcoid flare. Wondering if either Dr. Paige or Dr. Perlman have recommendations.      Will route to both MDs and RNCCs for follow up.

## 2017-04-04 NOTE — PROGRESS NOTES
"Spoke with patient about recent call to triage line.  He continues to have periodic episodes of increase SOB.  His wt. hhs been stable and he thinks it is actually down a bit.  He is currently taking Lasix 40 mg in AM, 60 mg in afternoon. He has not had labs as it is difficult for him to get to clinic.  He has appt. next week and will repeat them at that time.  Due to elevated Cr. I have asked him to take the extra lasix on a \"as needed basis\" rather than everyday.  I also encouraged him to contact Dr. Perlman again,as this sounds perhaps more like his lung problem than cardiac.  He is agreeable to plan.  "

## 2017-04-07 ENCOUNTER — CARE COORDINATION (OUTPATIENT)
Dept: PULMONOLOGY | Facility: CLINIC | Age: 72
End: 2017-04-07

## 2017-04-07 NOTE — PROGRESS NOTES
"Patient states that he has been more short of breath this week than his baseline.  He contacted Dr. Paige's nurse who instructed him to try using his nebulizer.  Patient states that nebulizer helped for awhile and it also helped him cough up a \"big grey glob\" that gave him some relief. Patient denies fevers or chest pain.  Plan will be to notify Dr. Perlman ( who is currently out of town) and schedule patient in Dr. Perlman's next available appointment slot.  Patient instructed that if symptoms get worse in the interim that he should be evaluated in the ED.    "

## 2017-04-10 ENCOUNTER — CARE COORDINATION (OUTPATIENT)
Dept: PULMONOLOGY | Facility: CLINIC | Age: 72
End: 2017-04-10

## 2017-04-10 DIAGNOSIS — J22 LOWER RESPIRATORY INFECTION: Primary | ICD-10-CM

## 2017-04-10 RX ORDER — AZITHROMYCIN 250 MG/1
TABLET, FILM COATED ORAL
Qty: 6 TABLET | Refills: 0 | Status: SHIPPED | OUTPATIENT
Start: 2017-04-10 | End: 2017-04-18

## 2017-04-10 NOTE — PROGRESS NOTES
Telephone Encounter: Incoming    Reason for Call: Cough increased with grey production. SOB with minimal activity. Wondering about increasing inflixamab infusions to every 4 weeks.      Nursing Action/Patient Instruction: Discussed with Dr Perlman, he would like him to take an antibiotic, Zpak and is okay with increasing Inflixamab to every 4 weeks. Informed patient.     Patient Response/Questions/Concerns: Agrees with plan.

## 2017-04-11 DIAGNOSIS — E03.9 HYPOTHYROIDISM: ICD-10-CM

## 2017-04-13 RX ORDER — LEVOTHYROXINE SODIUM 137 UG/1
137 TABLET ORAL DAILY
Qty: 90 TABLET | Refills: 1 | Status: SHIPPED | OUTPATIENT
Start: 2017-04-13 | End: 2017-10-12

## 2017-04-13 NOTE — TELEPHONE ENCOUNTER
levothyroxine  138 MCG      Last Written Prescription Date:  12/20/16  Last Fill Quantity: 90,   # refills: 0  Last Office Visit : 1/18/17  Future Office visit:  NONE  TSH   Date Value Ref Range Status   12/26/2016 1.84 0.40 - 4.00 mU/L Final

## 2017-04-17 DIAGNOSIS — N18.30 CKD (CHRONIC KIDNEY DISEASE) STAGE 3, GFR 30-59 ML/MIN (H): Primary | ICD-10-CM

## 2017-04-17 NOTE — NURSING NOTE
Labs per clinic 2A protocol.  Follow up/CKD 3  Last OV: 2/28/17  Elisa Cardona LPN  Nephrology  [facility]  [phone]

## 2017-04-18 ENCOUNTER — OFFICE VISIT (OUTPATIENT)
Dept: NEPHROLOGY | Facility: CLINIC | Age: 72
End: 2017-04-18
Attending: INTERNAL MEDICINE
Payer: MEDICARE

## 2017-04-18 VITALS
WEIGHT: 195 LBS | HEIGHT: 70 IN | HEART RATE: 78 BPM | BODY MASS INDEX: 27.92 KG/M2 | SYSTOLIC BLOOD PRESSURE: 113 MMHG | DIASTOLIC BLOOD PRESSURE: 75 MMHG | OXYGEN SATURATION: 94 %

## 2017-04-18 DIAGNOSIS — J84.9 ILD (INTERSTITIAL LUNG DISEASE) (H): Primary | ICD-10-CM

## 2017-04-18 DIAGNOSIS — N18.4 CKD (CHRONIC KIDNEY DISEASE) STAGE 4, GFR 15-29 ML/MIN (H): Primary | ICD-10-CM

## 2017-04-18 DIAGNOSIS — K74.60 CIRRHOSIS OF LIVER WITHOUT ASCITES, UNSPECIFIED HEPATIC CIRRHOSIS TYPE (H): ICD-10-CM

## 2017-04-18 DIAGNOSIS — D86.0 SARCOIDOSIS OF LUNG (H): Primary | ICD-10-CM

## 2017-04-18 DIAGNOSIS — N18.30 CKD (CHRONIC KIDNEY DISEASE) STAGE 3, GFR 30-59 ML/MIN (H): ICD-10-CM

## 2017-04-18 DIAGNOSIS — D86.9 SARCOIDOSIS: ICD-10-CM

## 2017-04-18 LAB
ALBUMIN SERPL-MCNC: 3.5 G/DL (ref 3.4–5)
ANION GAP SERPL CALCULATED.3IONS-SCNC: 10 MMOL/L (ref 3–14)
BUN SERPL-MCNC: 49 MG/DL (ref 7–30)
CALCIUM SERPL-MCNC: 9.2 MG/DL (ref 8.5–10.1)
CHLORIDE SERPL-SCNC: 104 MMOL/L (ref 94–109)
CO2 SERPL-SCNC: 24 MMOL/L (ref 20–32)
CREAT SERPL-MCNC: 2.32 MG/DL (ref 0.66–1.25)
CREAT UR-MCNC: 36 MG/DL
ERYTHROCYTE [DISTWIDTH] IN BLOOD BY AUTOMATED COUNT: 16 % (ref 10–15)
GFR SERPL CREATININE-BSD FRML MDRD: 28 ML/MIN/1.7M2
GLUCOSE SERPL-MCNC: 123 MG/DL (ref 70–99)
HCT VFR BLD AUTO: 40.6 % (ref 40–53)
HGB BLD-MCNC: 13.2 G/DL (ref 13.3–17.7)
MCH RBC QN AUTO: 30.7 PG (ref 26.5–33)
MCHC RBC AUTO-ENTMCNC: 32.5 G/DL (ref 31.5–36.5)
MCV RBC AUTO: 94 FL (ref 78–100)
MICROALBUMIN UR-MCNC: 882 MG/L
MICROALBUMIN/CREAT UR: 2456.82 MG/G CR (ref 0–17)
PHOSPHATE SERPL-MCNC: 3.2 MG/DL (ref 2.5–4.5)
PLATELET # BLD AUTO: 360 10E9/L (ref 150–450)
POTASSIUM SERPL-SCNC: 4.2 MMOL/L (ref 3.4–5.3)
PROT UR-MCNC: 1.11 G/L
PROT/CREAT 24H UR: 3.08 G/G CR (ref 0–0.2)
RBC # BLD AUTO: 4.3 10E12/L (ref 4.4–5.9)
SODIUM SERPL-SCNC: 138 MMOL/L (ref 133–144)
WBC # BLD AUTO: 9.2 10E9/L (ref 4–11)

## 2017-04-18 PROCEDURE — 99212 OFFICE O/P EST SF 10 MIN: CPT | Mod: ZF

## 2017-04-18 PROCEDURE — 85027 COMPLETE CBC AUTOMATED: CPT | Performed by: INTERNAL MEDICINE

## 2017-04-18 PROCEDURE — 84156 ASSAY OF PROTEIN URINE: CPT | Performed by: INTERNAL MEDICINE

## 2017-04-18 PROCEDURE — 80069 RENAL FUNCTION PANEL: CPT | Performed by: INTERNAL MEDICINE

## 2017-04-18 PROCEDURE — 36415 COLL VENOUS BLD VENIPUNCTURE: CPT | Performed by: INTERNAL MEDICINE

## 2017-04-18 PROCEDURE — 82043 UR ALBUMIN QUANTITATIVE: CPT | Performed by: INTERNAL MEDICINE

## 2017-04-18 RX ORDER — ACETAMINOPHEN 325 MG/1
650 TABLET ORAL ONCE
Status: CANCELLED
Start: 2017-04-18 | End: 2017-04-18

## 2017-04-18 RX ORDER — ACETAMINOPHEN 325 MG/1
650 TABLET ORAL ONCE
Status: DISCONTINUED | OUTPATIENT
Start: 2017-04-18 | End: 2017-04-18 | Stop reason: CLARIF

## 2017-04-18 ASSESSMENT — PAIN SCALES - GENERAL: PAINLEVEL: NO PAIN (0)

## 2017-04-18 NOTE — LETTER
4/18/2017      RE: Rohan Monroe  [address]  [place] [address]       Follow up visit for CKD    Primary Care Physician: Jamie Foster    History of Present Illness:  Mr Monroe is a pleasant 71 year old man here for follow-up of CKD and proteinuria. His past medical hx is significant for sarcoid (affecting lungs and heart), hep C s/p treatment with cirrhosis (and SVR), CAD s/p stents, COPD, He denies a personal history of UTIs or kidney stones. Given unclear etiology of CKD and presence of RBCs, WBCs, and proteinuria we proceeded with kidney biopsy on 11/13/2015. Results were consistent with diabetic changes and vascular disease as etiology for CKD. Patient now working with endocrine and diabetes educator regarding his DM. At out last visit, we made a significant number of changes regarding his blood pressure medications. Unfortunately, he recently was hospitalized this past Aj for atrial flutter and hypervolemia. He did undergo cardioversion and subsequent ablation. He is now on coumadin. His Cr gretchen with a aggressive diuresis. His main complaint has been dyspnea that did not significantly improve after diuresis and ablation. He feels better after remicade infusions which are now scheduled every 4 weeks.  The dyspnea is thought to be related to mucous plugging. He was also treated for pneumonia last week.  Last month he received a higher amount of diuretics and he had a bump in the creatinine from 1.6 to 2.27.  He is back on lower diuretic dose, furosemide 40 mg bid.  He denies fever, palpitations and chest pain.     Active Medical Problems:  Patient Active Problem List   Diagnosis     Hypothyroidism     SARCOIDOSIS-systemic     Generalized osteoarthrosis, unspecified site     Viral warts     Other psoriasis     Hypertrophy of prostate without urinary obstruction     Diabetes mellitus, type 2 (H)     CAD (coronary artery disease)     Type 2 diabetes, HbA1C goal < 8% (H)      CARDIOVASCULAR SCREENING; LDL GOAL LESS THAN 100     Sepsis (H)     Atrial flutter with rapid ventricular response (H)     CKD (chronic kidney disease) stage 3, GFR 30-59 ml/min     Hip joint pain     Hyperlipidemia LDL goal <70     Acute exacerbation of chronic obstructive pulmonary disease (COPD) (H)     Cellulitis     Immunosuppression (H)     Hyponatremia     RADHA (acute kidney injury) (HCC)     COPD (chronic obstructive pulmonary disease) (H)     Cirrhosis of liver (H)     Pneumonia     Proteinuria     Microscopic hematuria     Sarcoidosis of lung (HCC)     Hyperlipidemia     Atrial flutter (H)     Long-term (current) use of anticoagulants [Z79.01]       Past Surgical History:  Past Surgical History:   Procedure Laterality Date     ANESTHESIA CARDIOVERSION N/A 1/19/2017    Procedure: ANESTHESIA CARDIOVERSION;  Surgeon: GENERIC ANESTHESIA PROVIDER;  Location: [facility] OR     ANESTHESIA CARDIOVERSION N/A 1/23/2017    Procedure: ANESTHESIA CARDIOVERSION;  Surgeon: GENERIC ANESTHESIA PROVIDER;  Location: [facility] OR     ANESTHESIA CARDIOVERSION N/A 1/24/2017    Procedure: ANESTHESIA CARDIOVERSION;  Surgeon: GENERIC ANESTHESIA PROVIDER;  Location: [facility] OR     ARTHROPLASTY HIP  8/24/2011    Procedure:ARTHROPLASTY HIP; Right Total Hip Arthroplasty  Choice anesthesia; Surgeon:LESLI WILKINSON; Location:[facility] OR     BIOPSY       cardiac stent      s/p     CARDIAC SURGERY       CATARACT IOL, RT/LT  9/15/2015    LE     COLONOSCOPY       HC REMOVAL OF TONSILS,<11 Y/O      Tonsils <10y.o.     HC REPAIR INCISIONAL HERNIA,REDUCIBLE      Hernia Repair, Incisional, Unilateral     JOINT REPLACEMENT      1 month ago--right hip     LIGATN/STRIP LONG & SHORT SAPHEN         Family History:  Family History   Problem Relation Age of Onset     HEART DISEASE Father      irreg heart beat     Circulatory Father      varicose veins     Prostate Cancer Father      HEART DISEASE Mother      heart attack     Arthritis Mother      OSTEOPOROSIS Mother       Thyroid Disease Mother      Eye Disorder Maternal Grandmother      glaucoma     DIABETES Sister      type 2     Kidney Cancer Sister      DIABETES Sister      Glaucoma No family hx of      Macular Degeneration No family hx of      CANCER No family hx of      no skin cancer       Social History:  Social History     Social History     Marital status:      Spouse name: N/A     Number of children: 2     Years of education: N/A     Occupational History      [employer]     Social History Main Topics     Smoking status: Former Smoker     Packs/day: 1.00     Years: 30.00     Types: Cigarettes     Start date: 12/30/1960     Quit date: 7/22/1994     Smokeless tobacco: Never Used     Alcohol use No     Drug use: No     Sexual activity: Yes     Partners: Female     Other Topics Concern     Blood Transfusions No     Exercise Yes     Social History Narrative    Dairy/d 2 servings/d    Caffeine little servings/d    Exercise 3 x week    Sunscreen used - Yes    Seatbelts used - Yes    Working smoke/CO detectors in the home - Yes    Guns stored in the home - No    Self Breast Exams - NOT APPLICABLE    Self Testicular Exam - No    Eye Exam up to date - Yes    Dental Exam up to date - Yes    Pap Smear up to date - NOT APPLICABLE    Mammogram up to date - NOT APPLICABLE    PSA up to date - Yes    Dexa Scan up to date - NOT APPLICABLE    Flex Sig / Colonoscopy up to date - Yes    Immunizations up to date - Unsure    Abuse: Current or Past(Physical, Sexual or Emotional)- No    Do you feel safe in your environment - Yes       Allergies:  Allergies   Allergen Reactions     Prednisone Other (See Comments)     He reports that he can't sleep for days and can't use small to massive doses of prednisone   Pt. Does not do well with high doses of Prednisone, His MD says that Prednisone is counter indicated. Prednisone failed to treat his sarcoidosis        Medications:  Outpatient Encounter Prescriptions as of 4/18/2017   Medication  Sig Dispense Refill     levothyroxine (SYNTHROID/LEVOTHROID) 137 MCG tablet Take 1 tablet (137 mcg) by mouth daily 90 tablet 1     furosemide (LASIX) 20 MG tablet Take 2 tablets (40 mg) by mouth 2 times daily For weight gain of 3 lbs or greater 180 tablet 3     amLODIPine (NORVASC) 10 MG tablet Take 1 tablet (10 mg) by mouth daily 90 tablet 3     warfarin (COUMADIN) 5 MG tablet Take 1 tablet (5 mg) by mouth daily 30 tablet 6     polyethylene glycol (MIRALAX) powder Take 17 g (1 capful) by mouth daily (Patient taking differently: Take 1 capful by mouth daily as needed for constipation ) 510 g 1     tiotropium (SPIRIVA HANDIHALER) 18 MCG capsule Inhale contents of one capsule daily. 90 capsule 3     albuterol (PROAIR HFA/PROVENTIL HFA/VENTOLIN HFA) 108 (90 BASE) MCG/ACT Inhaler Inhale 2 puffs into the lungs every 6 hours as needed for shortness of breath / dyspnea 3 Inhaler 6     fluticasone-vilanterol (BREO ELLIPTA) 100-25 MCG/INH oral inhaler Inhale 1 puff into the lungs daily 3 Inhaler 3     inFLIXimab (REMICADE) 100 MG injection every 6 weeks       metoprolol (LOPRESSOR) 100 MG tablet Take 1 tablet (100 mg) by mouth 2 times daily 60 tablet 11     methotrexate 2.5 MG tablet Take 3 tablets (7.5 mg) by mouth once a week On Mondays 48 tablet 3     ciclopirox (LOPROX) 0.77 % cream Apply topically 2 times daily To feet and toenails. 90 g 6     atorvastatin (LIPITOR) 20 MG tablet Take 1 tablet (20 mg) by mouth daily 90 tablet 3     ASPIRIN EC PO Take 81 mg by mouth daily       blood glucose monitoring (ACCU-CHEK RONNIE PLUS) test strip Use to test blood sugar 2 times daily or as directed.  3 month supply. 200 each 3     blood glucose monitoring (ACCU-CHEK FASTCLIX) lancets Use to test blood sugar 2 times daily or as directed.  102 lancets per box.  3 month supply. 2 Box 3     blood glucose monitoring (NO BRAND SPECIFIED) meter device kit Use to test blood sugar 2 times daily. 1 kit 0     Urea (CARMOL 40) 40 % CREA To feet  "daily (Patient taking differently: To feet daily PRN) 199 g 4     sildenafil (VIAGRA) 50 MG tablet Take 2 tablets (100 mg) by mouth daily as needed for erectile dysfunction 10 tablet 6     Multiple Vitamins-Minerals (MULTIVITAMIN & MINERAL PO) Take 1 tablet by mouth daily.       mirtazapine (REMERON) 15 MG tablet Take 15 mg by mouth At Bedtime.       [DISCONTINUED] azithromycin (ZITHROMAX) 250 MG tablet Take 2 tablets (500 mg) the first day, then take 1 tablet (250 mg) by mouth daily for a total of 5 days. 6 tablet 0     Facility-Administered Encounter Medications as of 4/18/2017   Medication Dose Route Frequency Provider Last Rate Last Dose     inFLIXimab (REMICADE) 455 mg in NaCl 0.9 % 250 mL non-oncology use  5 mg/kg Intravenous Once Perlman, David Morris, MD         acetaminophen (TYLENOL) tablet 650 mg  650 mg Oral Once Perlman, David Morris, MD         0.9% sodium chloride BOLUS  250 mL Intravenous Once Perlman, David Morris, MD            Review of Systems:   A comprehensive review of systems was obtained and negative, except as noted in the HPI or PMH.    Physical Exam:  /75  Pulse 78  Ht 1.765 m (5' 9.5\")  Wt 88.5 kg (195 lb)  SpO2 94%  BMI 28.38 kg/m2    GENERAL APPEARANCE: alert and no distress  HENT: mouth without ulcers or lesions  LYMPHATICS: no cervical or supraclavicular nodes  RESP: lungs clear to auscultation - no rales, rhonchi or wheezes  CV: regular rhythm, normal rate, no rub, no murmur  EDEMA: trace LE edema bilaterally  ABDOMEN: soft, nondistended, nontender, bowel sounds normal  MS: extremities normal - no gross deformities noted, no evidence of inflammation in joints, no muscle tenderness  SKIN: no rash    Laboratory:  Recent Labs   Lab Test  04/18/17   1231  03/30/17   1200   02/28/17   1150  02/13/17   1359   01/25/17   0724   01/20/17   0803   03/15/16   1116   01/27/11   1435   NA  138   --    < >  138  138   < >  133   < >  132*   < >  135   < >  135   POTASSIUM  4.2   --    " < >  4.9  4.4   < >  4.2   < >  4.3   < >  4.5   < >  4.8   CHLORIDE  104   --    < >  104  104   < >  96   < >  101   < >  103   < >  102   CO2  24   --    < >  26  27   < >  28   < >  20   < >  25   < >  27   BUN  49*   --    < >  33*  36*   < >  62*   < >  57*   < >  40*   < >  31*   CR  2.32*   --    < >  1.92*  1.89*   < >  2.44*   < >  2.33*   < >  1.60*   < >  1.30*   RAFFY  9.2   --    < >  8.6  8.2*   < >  8.4*   < >  8.2*   < >  8.9   < >  10.0   MAG   --    --    --    --    --    --   2.3   < >  2.5*   < >   --    < >   --    PHOS  3.2   --    --   2.6   --    --    --    --    --    < >  2.8   < >  3.3   PROTTOTAL   --    --    --    --   7.0   < >   --    --    --    < >   --    < >   --    ALBUMIN  3.5   --    --   3.0*  2.6*   < >   --    --    --    < >  3.8   < >  4.3   AST   --    --    --    --   22   < >   --    --    --    < >  19   < >   --    ALT   --    --    --    --   30   < >   --    --    --    < >  45   < >   --    GLC  123*   --    < >  122*  138*   < >  148*   < >  146*   < >  145*   < >  117*   A1C   --    --    --    --    --    --    --    --   7.5*   --    --    < >   --    WBC  9.2   --    --   10.1   --    < >   --    < >  9.1   < >  9.1   < >  8.6   HGB  13.2*   --    --   13.4   --    < >   --    < >  13.4   < >  13.8  13.8   < >  15.6   MCV  94   --    --   97   --    < >   --    < >  100   < >  96   < >  92   PLT  360   --    --   276   --    < >   --    < >  194   < >  234   < >  201   INR   --   3.68*   < >   --    --    < >  3.06*   < >  1.30*   < >   --    < >   --    PTHI   --    --    --   183*   --    --    --    --    --    --   98*   --   24   IRON   --    --    --   61   --    --    --    --    --    --   91   --    --    FEB   --    --    --   316   --    --    --    --    --    --   346   --    --    IRONSAT   --    --    --   19   --    --    --    --    --    --   26   --    --    TIFFANIE   --    --    --   181   --    --    --    --    --    --   176   --    --      < > = values in this interval not displayed.       Recent Labs   Lab Test  04/18/17   1212  02/28/17   1206  01/27/17   1130   12/26/16   1015  08/30/16   1320  07/18/16   1324   03/15/16   1107  02/29/16   1424  10/27/15   1339  10/07/15   1032   01/27/11   1507  11/16/10   0950   COLOR   --    --   Yellow   < >   --    --    --    < >   --    --   Yellow   --    < >  Yellow  Yellow   APPEARANCE   --    --   Clear   < >   --    --    --    < >   --    --   Clear   --    < >  Clear  Clear   URINEGLC   --    --   Negative   < >   --    --    --    < >   --    --   Negative   --    < >  Negative  Negative   URINEBILI   --    --   Negative   < >   --    --    --    < >   --    --   Negative   --    < >  Negative  Negative   URINEKETONE   --    --   Negative   < >   --    --    --    < >   --    --   Negative   --    < >  Negative  Negative   SG   --    --   1.014   < >   --    --    --    < >   --    --   1.017   --    < >  1.024  1.017   UBLD   --    --   Large*   < >   --    --    --    < >   --    --   Large*   --    < >  Negative  Negative   URINEPH   --    --   6.0   < >   --    --    --    < >   --    --   6.0   --    < >  5.5  5.5   PROTEIN   --    --   >500*   < >   --    --    --    < >   --    --   300*   --    < >  30*  Negative   NITRITE   --    --   Negative   < >   --    --    --    < >   --    --   Negative   --    < >  Negative  Negative   LEUKEST   --    --   Negative   < >   --    --    --    < >   --    --   Negative   --    < >  Small*  Negative   RBCU   --    --   90*   < >   --    --    --    < >   --    --   40*   --    < >  1  1   WBCU   --    --   3*   < >   --    --    --    < >   --    --   2   --    < >  3*  <1   UTPG  3.08*  6.07*   --    --   3.60*  1.40*  2.44*   --   1.50*  2.29*  2.00*  1.81*   --   0.02  0.10    < > = values in this interval not displayed.       Imaging:  All imaging studies reviewed by me.     Assessment and Plan:  Mr. Monroe is a 71 year old male with sarcoidosis,  atrial fibrillation, hypertension, recent pulmonary embolism, h/o hepatitis c, ckd stage 3 secondary to diabetes and renovascular disease that presents for follow up of CKD.    1. Renal Function  CKD stage 3, Creatinine variable with baseline as low as 1.6, eGFR 43.  He has a current RADHA likely related to diuretics.  He had significant increase in the bicarbonate which likely represents a pre-renal response to the higher diuretic dose.  He has significant risk factors for progression with nephrotic range proteinuria.  His proteinuria is slightly better than before though which is encouraging since he has been off of ARB.  -- Agree with gentle diuretics for volume management per cardiology recommendations  -- Ideally if the kidney function remains stable, would like to restart ARB at a later point.    2. Volume / Blood Pressure  Hypertension well controlled with diuretics, amlodipine, metoprolol.  Volume status is mildly increased  -- No changes to bp medications today.  Recommend ongoing diuresis with goal of avoiding intravascular depletion.  Recommend eventual ARB for renoprotective effects.    3. Electrolytes  No electrolyte issues at this time.    4. Acid Base  No evidence of metabolic abnormality at his time    5. Anemia  Patient has mild anemia.  He has adequate iron stores.    -- Continue to monitor hemoglobin if renal function continues to decline    6. CKD-BMD  Secondary hyperparathyroidism likely due to severe vitamin d deficiency    Follow up in 3 months    This patient has been evaluated by me, Ollie Michel MD, on 4/18/17. I discussed with the fellow, Dr. Ervin, and agree with the findings and plan in this note.    I have reviewed 4/18/17 medications, vital signs and labs.    Total time spent was >40 minutes, and more than 50% of face to face time was spent in counseling and/or coordination of care regarding principles of multidisciplinary care, medication management, and CKD education.   Ollie  Wilfredo Michel MD

## 2017-04-18 NOTE — NURSING NOTE
"Chief Complaint   Patient presents with     RECHECK     CKD stage 3       Initial /75  Pulse 78  Ht 1.765 m (5' 9.5\")  Wt 88.5 kg (195 lb)  SpO2 94%  BMI 28.38 kg/m2 Estimated body mass index is 28.38 kg/(m^2) as calculated from the following:    Height as of this encounter: 1.765 m (5' 9.5\").    Weight as of this encounter: 88.5 kg (195 lb).  Medication Reconciliation: complete    "

## 2017-04-18 NOTE — PROGRESS NOTES
Follow up visit for CKD    Primary Care Physician: Jamie Foster    History of Present Illness:  Mr Monroe is a pleasant 71 year old man here for follow-up of CKD and proteinuria. His past medical hx is significant for sarcoid (affecting lungs and heart), hep C s/p treatment with cirrhosis (and SVR), CAD s/p stents, COPD, He denies a personal history of UTIs or kidney stones. Given unclear etiology of CKD and presence of RBCs, WBCs, and proteinuria we proceeded with kidney biopsy on 11/13/2015. Results were consistent with diabetic changes and vascular disease as etiology for CKD. Patient now working with endocrine and diabetes educator regarding his DM. At out last visit, we made a significant number of changes regarding his blood pressure medications. Unfortunately, he recently was hospitalized this past Aj for atrial flutter and hypervolemia. He did undergo cardioversion and subsequent ablation. He is now on coumadin. His Cr gretchen with a aggressive diuresis. His main complaint has been dyspnea that did not significantly improve after diuresis and ablation. He feels better after remicade infusions which are now scheduled every 4 weeks.  The dyspnea is thought to be related to mucous plugging. He was also treated for pneumonia last week.  Last month he received a higher amount of diuretics and he had a bump in the creatinine from 1.6 to 2.27.  He is back on lower diuretic dose, furosemide 40 mg bid.  He denies fever, palpitations and chest pain.     Active Medical Problems:  Patient Active Problem List   Diagnosis     Hypothyroidism     SARCOIDOSIS-systemic     Generalized osteoarthrosis, unspecified site     Viral warts     Other psoriasis     Hypertrophy of prostate without urinary obstruction     Diabetes mellitus, type 2 (H)     CAD (coronary artery disease)     Type 2 diabetes, HbA1C goal < 8% (H)     CARDIOVASCULAR SCREENING; LDL GOAL LESS THAN 100     Sepsis (H)     Atrial flutter with rapid ventricular  response (H)     CKD (chronic kidney disease) stage 3, GFR 30-59 ml/min     Hip joint pain     Hyperlipidemia LDL goal <70     Acute exacerbation of chronic obstructive pulmonary disease (COPD) (H)     Cellulitis     Immunosuppression (H)     Hyponatremia     RADHA (acute kidney injury) (HCC)     COPD (chronic obstructive pulmonary disease) (H)     Cirrhosis of liver (H)     Pneumonia     Proteinuria     Microscopic hematuria     Sarcoidosis of lung (HCC)     Hyperlipidemia     Atrial flutter (H)     Long-term (current) use of anticoagulants [Z79.01]       Past Surgical History:  Past Surgical History:   Procedure Laterality Date     ANESTHESIA CARDIOVERSION N/A 1/19/2017    Procedure: ANESTHESIA CARDIOVERSION;  Surgeon: GENERIC ANESTHESIA PROVIDER;  Location: [facility] OR     ANESTHESIA CARDIOVERSION N/A 1/23/2017    Procedure: ANESTHESIA CARDIOVERSION;  Surgeon: GENERIC ANESTHESIA PROVIDER;  Location: [facility] OR     ANESTHESIA CARDIOVERSION N/A 1/24/2017    Procedure: ANESTHESIA CARDIOVERSION;  Surgeon: GENERIC ANESTHESIA PROVIDER;  Location: [facility] OR     ARTHROPLASTY HIP  8/24/2011    Procedure:ARTHROPLASTY HIP; Right Total Hip Arthroplasty  Choice anesthesia; Surgeon:LESLI WILKINSON; Location:[facility] OR     BIOPSY       cardiac stent      s/p     CARDIAC SURGERY       CATARACT IOL, RT/LT  9/15/2015    LE     COLONOSCOPY       HC REMOVAL OF TONSILS,<11 Y/O      Tonsils <10y.o.     HC REPAIR INCISIONAL HERNIA,REDUCIBLE      Hernia Repair, Incisional, Unilateral     JOINT REPLACEMENT      1 month ago--right hip     LIGATN/STRIP LONG & SHORT SAPHEN         Family History:  Family History   Problem Relation Age of Onset     HEART DISEASE Father      irreg heart beat     Circulatory Father      varicose veins     Prostate Cancer Father      HEART DISEASE Mother      heart attack     Arthritis Mother      OSTEOPOROSIS Mother      Thyroid Disease Mother      Eye Disorder Maternal Grandmother      glaucoma     DIABETES Sister      type 2      Kidney Cancer Sister      DIABETES Sister      Glaucoma No family hx of      Macular Degeneration No family hx of      CANCER No family hx of      no skin cancer       Social History:  Social History     Social History     Marital status:      Spouse name: N/A     Number of children: 2     Years of education: N/A     Occupational History      [employer]     Social History Main Topics     Smoking status: Former Smoker     Packs/day: 1.00     Years: 30.00     Types: Cigarettes     Start date: 12/30/1960     Quit date: 7/22/1994     Smokeless tobacco: Never Used     Alcohol use No     Drug use: No     Sexual activity: Yes     Partners: Female     Other Topics Concern     Blood Transfusions No     Exercise Yes     Social History Narrative    Dairy/d 2 servings/d    Caffeine little servings/d    Exercise 3 x week    Sunscreen used - Yes    Seatbelts used - Yes    Working smoke/CO detectors in the home - Yes    Guns stored in the home - No    Self Breast Exams - NOT APPLICABLE    Self Testicular Exam - No    Eye Exam up to date - Yes    Dental Exam up to date - Yes    Pap Smear up to date - NOT APPLICABLE    Mammogram up to date - NOT APPLICABLE    PSA up to date - Yes    Dexa Scan up to date - NOT APPLICABLE    Flex Sig / Colonoscopy up to date - Yes    Immunizations up to date - Unsure    Abuse: Current or Past(Physical, Sexual or Emotional)- No    Do you feel safe in your environment - Yes       Allergies:  Allergies   Allergen Reactions     Prednisone Other (See Comments)     He reports that he can't sleep for days and can't use small to massive doses of prednisone   Pt. Does not do well with high doses of Prednisone, His MD says that Prednisone is counter indicated. Prednisone failed to treat his sarcoidosis        Medications:  Outpatient Encounter Prescriptions as of 4/18/2017   Medication Sig Dispense Refill     levothyroxine (SYNTHROID/LEVOTHROID) 137 MCG tablet Take 1 tablet (137 mcg) by  mouth daily 90 tablet 1     furosemide (LASIX) 20 MG tablet Take 2 tablets (40 mg) by mouth 2 times daily For weight gain of 3 lbs or greater 180 tablet 3     amLODIPine (NORVASC) 10 MG tablet Take 1 tablet (10 mg) by mouth daily 90 tablet 3     warfarin (COUMADIN) 5 MG tablet Take 1 tablet (5 mg) by mouth daily 30 tablet 6     polyethylene glycol (MIRALAX) powder Take 17 g (1 capful) by mouth daily (Patient taking differently: Take 1 capful by mouth daily as needed for constipation ) 510 g 1     tiotropium (SPIRIVA HANDIHALER) 18 MCG capsule Inhale contents of one capsule daily. 90 capsule 3     albuterol (PROAIR HFA/PROVENTIL HFA/VENTOLIN HFA) 108 (90 BASE) MCG/ACT Inhaler Inhale 2 puffs into the lungs every 6 hours as needed for shortness of breath / dyspnea 3 Inhaler 6     fluticasone-vilanterol (BREO ELLIPTA) 100-25 MCG/INH oral inhaler Inhale 1 puff into the lungs daily 3 Inhaler 3     inFLIXimab (REMICADE) 100 MG injection every 6 weeks       metoprolol (LOPRESSOR) 100 MG tablet Take 1 tablet (100 mg) by mouth 2 times daily 60 tablet 11     methotrexate 2.5 MG tablet Take 3 tablets (7.5 mg) by mouth once a week On Mondays 48 tablet 3     ciclopirox (LOPROX) 0.77 % cream Apply topically 2 times daily To feet and toenails. 90 g 6     atorvastatin (LIPITOR) 20 MG tablet Take 1 tablet (20 mg) by mouth daily 90 tablet 3     ASPIRIN EC PO Take 81 mg by mouth daily       blood glucose monitoring (ACCU-CHEK RONNIE PLUS) test strip Use to test blood sugar 2 times daily or as directed.  3 month supply. 200 each 3     blood glucose monitoring (ACCU-CHEK FASTCLIX) lancets Use to test blood sugar 2 times daily or as directed.  102 lancets per box.  3 month supply. 2 Box 3     blood glucose monitoring (NO BRAND SPECIFIED) meter device kit Use to test blood sugar 2 times daily. 1 kit 0     Urea (CARMOL 40) 40 % CREA To feet daily (Patient taking differently: To feet daily PRN) 199 g 4     sildenafil (VIAGRA) 50 MG tablet  "Take 2 tablets (100 mg) by mouth daily as needed for erectile dysfunction 10 tablet 6     Multiple Vitamins-Minerals (MULTIVITAMIN & MINERAL PO) Take 1 tablet by mouth daily.       mirtazapine (REMERON) 15 MG tablet Take 15 mg by mouth At Bedtime.       [DISCONTINUED] azithromycin (ZITHROMAX) 250 MG tablet Take 2 tablets (500 mg) the first day, then take 1 tablet (250 mg) by mouth daily for a total of 5 days. 6 tablet 0     Facility-Administered Encounter Medications as of 4/18/2017   Medication Dose Route Frequency Provider Last Rate Last Dose     inFLIXimab (REMICADE) 455 mg in NaCl 0.9 % 250 mL non-oncology use  5 mg/kg Intravenous Once Perlman, David Morris, MD         acetaminophen (TYLENOL) tablet 650 mg  650 mg Oral Once Perlman, David Morris, MD         0.9% sodium chloride BOLUS  250 mL Intravenous Once Perlman, David Morris, MD            Review of Systems:   A comprehensive review of systems was obtained and negative, except as noted in the HPI or PMH.    Physical Exam:  /75  Pulse 78  Ht 1.765 m (5' 9.5\")  Wt 88.5 kg (195 lb)  SpO2 94%  BMI 28.38 kg/m2    GENERAL APPEARANCE: alert and no distress  HENT: mouth without ulcers or lesions  LYMPHATICS: no cervical or supraclavicular nodes  RESP: lungs clear to auscultation - no rales, rhonchi or wheezes  CV: regular rhythm, normal rate, no rub, no murmur  EDEMA: trace LE edema bilaterally  ABDOMEN: soft, nondistended, nontender, bowel sounds normal  MS: extremities normal - no gross deformities noted, no evidence of inflammation in joints, no muscle tenderness  SKIN: no rash    Laboratory:  Recent Labs   Lab Test  04/18/17   1231  03/30/17   1200   02/28/17   1150  02/13/17   1359   01/25/17   0724   01/20/17   0803   03/15/16   1116   01/27/11   1435   NA  138   --    < >  138  138   < >  133   < >  132*   < >  135   < >  135   POTASSIUM  4.2   --    < >  4.9  4.4   < >  4.2   < >  4.3   < >  4.5   < >  4.8   CHLORIDE  104   --    < >  104  104   " < >  96   < >  101   < >  103   < >  102   CO2  24   --    < >  26  27   < >  28   < >  20   < >  25   < >  27   BUN  49*   --    < >  33*  36*   < >  62*   < >  57*   < >  40*   < >  31*   CR  2.32*   --    < >  1.92*  1.89*   < >  2.44*   < >  2.33*   < >  1.60*   < >  1.30*   RAFFY  9.2   --    < >  8.6  8.2*   < >  8.4*   < >  8.2*   < >  8.9   < >  10.0   MAG   --    --    --    --    --    --   2.3   < >  2.5*   < >   --    < >   --    PHOS  3.2   --    --   2.6   --    --    --    --    --    < >  2.8   < >  3.3   PROTTOTAL   --    --    --    --   7.0   < >   --    --    --    < >   --    < >   --    ALBUMIN  3.5   --    --   3.0*  2.6*   < >   --    --    --    < >  3.8   < >  4.3   AST   --    --    --    --   22   < >   --    --    --    < >  19   < >   --    ALT   --    --    --    --   30   < >   --    --    --    < >  45   < >   --    GLC  123*   --    < >  122*  138*   < >  148*   < >  146*   < >  145*   < >  117*   A1C   --    --    --    --    --    --    --    --   7.5*   --    --    < >   --    WBC  9.2   --    --   10.1   --    < >   --    < >  9.1   < >  9.1   < >  8.6   HGB  13.2*   --    --   13.4   --    < >   --    < >  13.4   < >  13.8  13.8   < >  15.6   MCV  94   --    --   97   --    < >   --    < >  100   < >  96   < >  92   PLT  360   --    --   276   --    < >   --    < >  194   < >  234   < >  201   INR   --   3.68*   < >   --    --    < >  3.06*   < >  1.30*   < >   --    < >   --    PTHI   --    --    --   183*   --    --    --    --    --    --   98*   --   24   IRON   --    --    --   61   --    --    --    --    --    --   91   --    --    FEB   --    --    --   316   --    --    --    --    --    --   346   --    --    IRONSAT   --    --    --   19   --    --    --    --    --    --   26   --    --    TIFFANIE   --    --    --   181   --    --    --    --    --    --   176   --    --     < > = values in this interval not displayed.       Recent Labs   Lab Test  04/18/17   1218   02/28/17   1206  01/27/17   1130   12/26/16   1015  08/30/16   1320  07/18/16   1324   03/15/16   1107  02/29/16   1424  10/27/15   1339  10/07/15   1032   01/27/11   1507  11/16/10   0950   COLOR   --    --   Yellow   < >   --    --    --    < >   --    --   Yellow   --    < >  Yellow  Yellow   APPEARANCE   --    --   Clear   < >   --    --    --    < >   --    --   Clear   --    < >  Clear  Clear   URINEGLC   --    --   Negative   < >   --    --    --    < >   --    --   Negative   --    < >  Negative  Negative   URINEBILI   --    --   Negative   < >   --    --    --    < >   --    --   Negative   --    < >  Negative  Negative   URINEKETONE   --    --   Negative   < >   --    --    --    < >   --    --   Negative   --    < >  Negative  Negative   SG   --    --   1.014   < >   --    --    --    < >   --    --   1.017   --    < >  1.024  1.017   UBLD   --    --   Large*   < >   --    --    --    < >   --    --   Large*   --    < >  Negative  Negative   URINEPH   --    --   6.0   < >   --    --    --    < >   --    --   6.0   --    < >  5.5  5.5   PROTEIN   --    --   >500*   < >   --    --    --    < >   --    --   300*   --    < >  30*  Negative   NITRITE   --    --   Negative   < >   --    --    --    < >   --    --   Negative   --    < >  Negative  Negative   LEUKEST   --    --   Negative   < >   --    --    --    < >   --    --   Negative   --    < >  Small*  Negative   RBCU   --    --   90*   < >   --    --    --    < >   --    --   40*   --    < >  1  1   WBCU   --    --   3*   < >   --    --    --    < >   --    --   2   --    < >  3*  <1   UTPG  3.08*  6.07*   --    --   3.60*  1.40*  2.44*   --   1.50*  2.29*  2.00*  1.81*   --   0.02  0.10    < > = values in this interval not displayed.       Imaging:  All imaging studies reviewed by me.     Assessment and Plan:  Mr. Monroe is a 71 year old male with sarcoidosis, atrial fibrillation, hypertension, recent pulmonary embolism, h/o hepatitis c, ckd stage 3  secondary to diabetes and renovascular disease that presents for follow up of CKD.    1. Renal Function  CKD stage 3, Creatinine variable with baseline as low as 1.6, eGFR 43.  He has a current RADHA likely related to diuretics.  He had significant increase in the bicarbonate which likely represents a pre-renal response to the higher diuretic dose.  He has significant risk factors for progression with nephrotic range proteinuria.  His proteinuria is slightly better than before though which is encouraging since he has been off of ARB.  -- Agree with gentle diuretics for volume management per cardiology recommendations  -- Ideally if the kidney function remains stable, would like to restart ARB at a later point.    2. Volume / Blood Pressure  Hypertension well controlled with diuretics, amlodipine, metoprolol.  Volume status is mildly increased  -- No changes to bp medications today.  Recommend ongoing diuresis with goal of avoiding intravascular depletion.  Recommend eventual ARB for renoprotective effects.    3. Electrolytes  No electrolyte issues at this time.    4. Acid Base  No evidence of metabolic abnormality at his time    5. Anemia  Patient has mild anemia.  He has adequate iron stores.    -- Continue to monitor hemoglobin if renal function continues to decline    6. CKD-BMD  Secondary hyperparathyroidism likely due to severe vitamin d deficiency    Follow up in 3 months    This patient has been evaluated by me, Ollie Michel MD, on 4/18/17. I discussed with the fellow, Dr. Ervin, and agree with the findings and plan in this note.    I have reviewed 4/18/17 medications, vital signs and labs.    Total time spent was >40 minutes, and more than 50% of face to face time was spent in counseling and/or coordination of care regarding principles of multidisciplinary care, medication management, and CKD education.   Ollie Michel MD

## 2017-04-18 NOTE — MR AVS SNAPSHOT
After Visit Summary   4/18/2017    Rohan [place]    MRN: [id number]           Patient Information     Date Of Birth          1945        Visit Information        Provider Department      4/18/2017 1:00 PM Kenny Ervin MD [facility] Nephrology        Today's Diagnoses     CKD (chronic kidney disease) stage 4, GFR 15-29 ml/min (H)    -  1       Follow-ups after your visit        Follow-up notes from your care team     Return in about 3 months (around 7/18/2017) for Routine Visit.      Your next 10 appointments already scheduled     Apr 19, 2017  2:00 PM CDT   Infusion 180 with UC SPEC INFUSION, UC 45 ATC   [facility] ([facility])    [address] [place] [address]  [address] [facility] [address] [phone]            Apr 22, 2017 10:00 AM CDT   (Arrive by 9:45 AM)   Return Visit with Geovany Morel MD   [facility] ([facility])    [address] [place] [address] [phone]            Jun 21, 2017  1:30 PM CDT   (Arrive by 1:15 PM)   RETURN ARRHYTHMIA with Brian Vazquez MD   [facility] ([facility])    [address] [place] [address] [phone]            Aug 29, 2017 10:00 AM CDT   Lab with UC LAB   [facility] Lab ([facility])    [address] [place] [address] [phone]            Aug 29, 2017 10:30 AM CDT   US ABDOMEN COMPLETE with UCUS2   [facility] US ([facility])    [address] [place] [address] [phone]           Please bring a list of your medicines (including vitamins, minerals and over-the-counter drugs). Also, tell your doctor about any allergies you may have. Wear comfortable clothes and leave your valuables at home.  Adults: No eating or drinking  for 8 hours before the exam. You may take medicine with a small sip of water.  Children: - Children 6+ years: No food or drink for 6 hours before exam. - Children 1-5 years: No food or drink for 4 hours before exam. - Infants, breast-fed: may have breast milk up to 2 hours before exam. - Infants, formula: may have bottle until 4 hours before exam.  Please call the Imaging Department at your exam site with any questions.            Aug 29, 2017 11:30 AM CDT   (Arrive by 11:15 AM)   Return General Liver with Moses Orosco MD   [facility] Hepatology ([facility])    [address]  [address] [facility] [id number] [phone]            Sep 08, 2017  9:30 AM CDT   RETURN GLAUCOMA with Kina Greco MD   [facility] ([facility])    [address]  [address]  [address]  [facility] [address]   [phone]            Sep 12, 2017  2:30 PM CDT   Lab with  LAB   [facility] Lab ([facility])    [address]  [address] [facility] [id number] [phone]            Sep 12, 2017  3:35 PM CDT   (Arrive by 3:05 PM)   Return Visit with Ollie Michel MD   [facility] Nephrology ([facility])    [address]  [address] [facility] [id number] [phone]              Future tests that were ordered for you today     Open Standing Orders        Priority Remaining Interval Expires Ordered    Renal panel Routine 6/6 Every 4 Weeks 4/18/2018 4/18/2017    Protein  random urine Routine 6/6 Every 4 Weeks 4/18/2018 4/18/2017            Who to contact     If you have questions or need follow up information about today's clinic visit or your schedule please contact [facility] NEPHROLOGY directly at [phone].  Normal or non-critical lab and imaging results will be communicated to you by MyChart, letter or phone within 4 business days after the clinic has received the results. If you do not  "hear from us within 7 days, please contact the clinic through [email or web address] or phone. If you have a critical or abnormal lab result, we will notify you by phone as soon as possible.  Submit refill requests through [email or web address] or call your pharmacy and they will forward the refill request to us. Please allow 3 business days for your refill to be completed.          Additional Information About Your Visit        [email or web address] Information     [email or web address] gives you secure access to your electronic health record. If you see a primary care provider, you can also send messages to your care team and make appointments. If you have questions, please call your primary care clinic.  If you do not have a primary care provider, please call [phone] and they will assist you.        Care EveryWhere ID     This is your Care EveryWhere ID. This could be used by other organizations to access your [facility] medical records  [id number]        Your Vitals Were     Pulse Height Pulse Oximetry BMI (Body Mass Index)          78 1.765 m (5' 9.5\") 94% 28.38 kg/m2         Blood Pressure from Last 3 Encounters:   04/18/17 113/75   03/30/17 151/83   03/09/17 148/81    Weight from Last 3 Encounters:   04/18/17 88.5 kg (195 lb)   03/30/17 90.9 kg (200 lb 4.8 oz)   03/09/17 93 kg (205 lb 1.6 oz)                 Today's Medication Changes          These changes are accurate as of: 4/18/17  1:55 PM.  If you have any questions, ask your nurse or doctor.               These medicines have changed or have updated prescriptions.        Dose/Directions    polyethylene glycol powder   Commonly known as:  MIRALAX   This may have changed:    - when to take this  - reasons to take this   Used for:  Constipation, unspecified constipation type        Dose:  1 capful   Take 17 g (1 capful) by mouth daily   Quantity:  510 g   Refills:  1       Urea 40 % Crea   Commonly known as:  CARMOL 40   This may have changed:  additional instructions   Used for:  Dermatophytosis of nail, " Corns and callosities        To feet daily   Quantity:  199 g   Refills:  4                Primary Care Provider Office Phone # Fax #    Jamie Foster [phone]       [address] [address]        Thank you!     Thank you for choosing [facility] NEPHROLOGY  for your care. Our goal is always to provide you with excellent care. Hearing back from our patients is one way we can continue to improve our services. Please take a few minutes to complete the written survey that you may receive in the mail after your visit with us. Thank you!             Your Updated Medication List - Protect others around you: Learn how to safely use, store and throw away your medicines at www.disposemymeds.org.          This list is accurate as of: 4/18/17  1:55 PM.  Always use your most recent med list.                   Brand Name Dispense Instructions for use    albuterol 108 (90 BASE) MCG/ACT Inhaler    PROAIR HFA/PROVENTIL HFA/VENTOLIN HFA    3 Inhaler    Inhale 2 puffs into the lungs every 6 hours as needed for shortness of breath / dyspnea       amLODIPine 10 MG tablet    NORVASC    90 tablet    Take 1 tablet (10 mg) by mouth daily       ASPIRIN EC PO      Take 81 mg by mouth daily       atorvastatin 20 MG tablet    LIPITOR    90 tablet    Take 1 tablet (20 mg) by mouth daily       blood glucose monitoring lancets     2 Box    Use to test blood sugar 2 times daily or as directed.  102 lancets per box.  3 month supply.       blood glucose monitoring meter device kit    no brand specified    1 kit    Use to test blood sugar 2 times daily.       blood glucose monitoring test strip    ACCU-CHEK RONNIE PLUS    200 each    Use to test blood sugar 2 times daily or as directed.  3 month supply.       ciclopirox 0.77 % cream    LOPROX    90 g    Apply topically 2 times daily To feet and toenails.       fluticasone-vilanterol 100-25 MCG/INH oral inhaler    BREO ELLIPTA    3 Inhaler     Inhale 1 puff into the lungs daily       inFLIXimab 100 MG injection    REMICADE     every 6 weeks       LASIX 20 MG tablet   Generic drug:  furosemide     180 tablet    Take 2 tablets (40 mg) by mouth 2 times daily For weight gain of 3 lbs or greater       levothyroxine 137 MCG tablet    SYNTHROID/LEVOTHROID    90 tablet    Take 1 tablet (137 mcg) by mouth daily       methotrexate 2.5 MG tablet CHEMO     48 tablet    Take 3 tablets (7.5 mg) by mouth once a week On Mondays       metoprolol 100 MG tablet    LOPRESSOR    60 tablet    Take 1 tablet (100 mg) by mouth 2 times daily       mirtazapine 15 MG tablet    REMERON     Take 15 mg by mouth At Bedtime.       MULTIVITAMIN & MINERAL PO      Take 1 tablet by mouth daily.       polyethylene glycol powder    MIRALAX    510 g    Take 17 g (1 capful) by mouth daily       sildenafil 50 MG cap/tab    VIAGRA    10 tablet    Take 2 tablets (100 mg) by mouth daily as needed for erectile dysfunction       tiotropium 18 MCG capsule    SPIRIVA HANDIHALER    90 capsule    Inhale contents of one capsule daily.       Urea 40 % Crea    CARMOL 40    199 g    To feet daily       warfarin 5 MG tablet    COUMADIN    30 tablet    Take 1 tablet (5 mg) by mouth daily

## 2017-04-19 ENCOUNTER — CARE COORDINATION (OUTPATIENT)
Dept: PULMONOLOGY | Facility: CLINIC | Age: 72
End: 2017-04-19

## 2017-04-19 DIAGNOSIS — D86.9 SARCOIDOSIS: Primary | ICD-10-CM

## 2017-04-19 NOTE — PROGRESS NOTES
"Telephone Encounter: Incoming    Reason for Call: Patient states that he had \"a bad night\" with episodes of shortness of breath and other symptoms of discomfort during the night. He is vague about his symptoms but stated that at one point he almost call ed 911.  He is wondering if he needs oxygen at night.    Nursing Action/Patient Instruction: Patient informed that if symptoms get worse that he needs to be seen in ED.  Patient also instructed that he needs to inform his cardiologist about symptoms. Will notify Dr. Perlman and check overnight oximetry.     Patient Response/Questions/Concerns: Patient agrees with plan.  States that he has an appointment with cardiologist this coming Saturday.  "

## 2017-04-22 ENCOUNTER — HOSPITAL ENCOUNTER (INPATIENT)
Facility: CLINIC | Age: 72
LOS: 6 days | Discharge: HOME | DRG: 286 | End: 2017-04-28
Attending: EMERGENCY MEDICINE | Admitting: HOSPITALIST
Payer: MEDICARE

## 2017-04-22 ENCOUNTER — APPOINTMENT (OUTPATIENT)
Dept: ULTRASOUND IMAGING | Facility: CLINIC | Age: 72
DRG: 286 | End: 2017-04-22
Attending: EMERGENCY MEDICINE
Payer: MEDICARE

## 2017-04-22 ENCOUNTER — APPOINTMENT (OUTPATIENT)
Dept: CT IMAGING | Facility: CLINIC | Age: 72
DRG: 286 | End: 2017-04-22
Attending: EMERGENCY MEDICINE
Payer: MEDICARE

## 2017-04-22 ENCOUNTER — APPOINTMENT (OUTPATIENT)
Dept: GENERAL RADIOLOGY | Facility: CLINIC | Age: 72
DRG: 286 | End: 2017-04-22
Attending: EMERGENCY MEDICINE
Payer: MEDICARE

## 2017-04-22 ENCOUNTER — OFFICE VISIT (OUTPATIENT)
Dept: CARDIOLOGY | Facility: CLINIC | Age: 72
End: 2017-04-22
Attending: INTERNAL MEDICINE
Payer: MEDICARE

## 2017-04-22 VITALS
OXYGEN SATURATION: 89 % | HEART RATE: 82 BPM | WEIGHT: 195.11 LBS | SYSTOLIC BLOOD PRESSURE: 118 MMHG | DIASTOLIC BLOOD PRESSURE: 74 MMHG | HEIGHT: 70 IN | BODY MASS INDEX: 27.93 KG/M2

## 2017-04-22 DIAGNOSIS — I27.20 PULMONARY HYPERTENSION (H): Primary | ICD-10-CM

## 2017-04-22 DIAGNOSIS — E11.9 TYPE 2 DIABETES, HBA1C GOAL < 8% (H): ICD-10-CM

## 2017-04-22 DIAGNOSIS — J18.9 PNEUMONIA: ICD-10-CM

## 2017-04-22 DIAGNOSIS — R06.00 DYSPNEA, UNSPECIFIED TYPE: ICD-10-CM

## 2017-04-22 DIAGNOSIS — D86.0 SARCOIDOSIS OF LUNG (H): ICD-10-CM

## 2017-04-22 DIAGNOSIS — R09.02 HYPOXIA: ICD-10-CM

## 2017-04-22 DIAGNOSIS — R07.89 CHEST PRESSURE: Primary | ICD-10-CM

## 2017-04-22 LAB
ALBUMIN SERPL-MCNC: 3.4 G/DL (ref 3.4–5)
ALP SERPL-CCNC: 109 U/L (ref 40–150)
ALT SERPL W P-5'-P-CCNC: 24 U/L (ref 0–70)
ANION GAP SERPL CALCULATED.3IONS-SCNC: 9 MMOL/L (ref 3–14)
AST SERPL W P-5'-P-CCNC: 28 U/L (ref 0–45)
BASOPHILS # BLD AUTO: 0.1 10E9/L (ref 0–0.2)
BASOPHILS NFR BLD AUTO: 0.6 %
BILIRUB SERPL-MCNC: 1 MG/DL (ref 0.2–1.3)
BUN SERPL-MCNC: 42 MG/DL (ref 7–30)
CALCIUM SERPL-MCNC: 9.2 MG/DL (ref 8.5–10.1)
CHLORIDE SERPL-SCNC: 102 MMOL/L (ref 94–109)
CO2 BLDCOV-SCNC: 26 MMOL/L (ref 21–28)
CO2 SERPL-SCNC: 28 MMOL/L (ref 20–32)
CREAT SERPL-MCNC: 1.98 MG/DL (ref 0.66–1.25)
CRP SERPL-MCNC: 27 MG/L (ref 0–8)
D DIMER PPP FEU-MCNC: 1.4 UG/ML FEU (ref 0–0.5)
DIFFERENTIAL METHOD BLD: ABNORMAL
EOSINOPHIL # BLD AUTO: 0.4 10E9/L (ref 0–0.7)
EOSINOPHIL NFR BLD AUTO: 3.8 %
ERYTHROCYTE [DISTWIDTH] IN BLOOD BY AUTOMATED COUNT: 16 % (ref 10–15)
FLUAV+FLUBV AG SPEC QL: NEGATIVE
FLUAV+FLUBV AG SPEC QL: NORMAL
GFR SERPL CREATININE-BSD FRML MDRD: 33 ML/MIN/1.7M2
GLUCOSE BLDC GLUCOMTR-MCNC: 105 MG/DL (ref 70–99)
GLUCOSE SERPL-MCNC: 111 MG/DL (ref 70–99)
GRAM STN SPEC: NORMAL
HCT VFR BLD AUTO: 39 % (ref 40–53)
HGB BLD-MCNC: 12.5 G/DL (ref 13.3–17.7)
IMM GRANULOCYTES # BLD: 0 10E9/L (ref 0–0.4)
IMM GRANULOCYTES NFR BLD: 0.2 %
INR PPP: 1.61 (ref 0.86–1.14)
INTERPRETATION ECG - MUSE: NORMAL
INTERPRETATION ECG - MUSE: NORMAL
L PNEUMO1 AG UR QL IA: NORMAL
LACTATE BLD-SCNC: 0.6 MMOL/L (ref 0.7–2.1)
LYMPHOCYTES # BLD AUTO: 1.1 10E9/L (ref 0.8–5.3)
LYMPHOCYTES NFR BLD AUTO: 10.5 %
Lab: NORMAL
MCH RBC QN AUTO: 30.2 PG (ref 26.5–33)
MCHC RBC AUTO-ENTMCNC: 32.1 G/DL (ref 31.5–36.5)
MCV RBC AUTO: 94 FL (ref 78–100)
MICRO REPORT STATUS: NORMAL
MONOCYTES # BLD AUTO: 0.3 10E9/L (ref 0–1.3)
MONOCYTES NFR BLD AUTO: 3 %
NEUTROPHILS # BLD AUTO: 8.3 10E9/L (ref 1.6–8.3)
NEUTROPHILS NFR BLD AUTO: 81.9 %
NRBC # BLD AUTO: 0 10*3/UL
NRBC BLD AUTO-RTO: 0 /100
NT-PROBNP SERPL-MCNC: 2832 PG/ML (ref 0–900)
PCO2 BLDV: 46 MM HG (ref 40–50)
PH BLDV: 7.36 PH (ref 7.32–7.43)
PLATELET # BLD AUTO: 348 10E9/L (ref 150–450)
PO2 BLDV: 22 MM HG (ref 25–47)
POTASSIUM SERPL-SCNC: 3.6 MMOL/L (ref 3.4–5.3)
PROCALCITONIN SERPL-MCNC: 0.06 NG/ML
PROT SERPL-MCNC: 7.9 G/DL (ref 6.8–8.8)
RBC # BLD AUTO: 4.14 10E12/L (ref 4.4–5.9)
S PNEUM AG SPEC QL: NORMAL
SAO2 % BLDV FROM PO2: 35 %
SODIUM SERPL-SCNC: 139 MMOL/L (ref 133–144)
SPECIMEN SOURCE: NORMAL
TROPONIN I SERPL-MCNC: NORMAL UG/L (ref 0–0.04)
WBC # BLD AUTO: 10.2 10E9/L (ref 4–11)

## 2017-04-22 PROCEDURE — 85610 PROTHROMBIN TIME: CPT | Performed by: EMERGENCY MEDICINE

## 2017-04-22 PROCEDURE — 87804 INFLUENZA ASSAY W/OPTIC: CPT | Performed by: EMERGENCY MEDICINE

## 2017-04-22 PROCEDURE — 87899 AGENT NOS ASSAY W/OPTIC: CPT | Performed by: PHYSICIAN ASSISTANT

## 2017-04-22 PROCEDURE — 96365 THER/PROPH/DIAG IV INF INIT: CPT | Performed by: EMERGENCY MEDICINE

## 2017-04-22 PROCEDURE — 71020 XR CHEST 2 VW: CPT

## 2017-04-22 PROCEDURE — A9270 NON-COVERED ITEM OR SERVICE: HCPCS | Mod: GY

## 2017-04-22 PROCEDURE — 80053 COMPREHEN METABOLIC PANEL: CPT | Performed by: EMERGENCY MEDICINE

## 2017-04-22 PROCEDURE — 84145 PROCALCITONIN (PCT): CPT | Performed by: EMERGENCY MEDICINE

## 2017-04-22 PROCEDURE — 99223 1ST HOSP IP/OBS HIGH 75: CPT | Mod: AI | Performed by: HOSPITALIST

## 2017-04-22 PROCEDURE — 25000132 ZZH RX MED GY IP 250 OP 250 PS 637: Mod: GY | Performed by: PHYSICIAN ASSISTANT

## 2017-04-22 PROCEDURE — 25000128 H RX IP 250 OP 636: Performed by: EMERGENCY MEDICINE

## 2017-04-22 PROCEDURE — 93010 ELECTROCARDIOGRAM REPORT: CPT | Mod: ZP | Performed by: INTERNAL MEDICINE

## 2017-04-22 PROCEDURE — 86140 C-REACTIVE PROTEIN: CPT | Performed by: EMERGENCY MEDICINE

## 2017-04-22 PROCEDURE — 71260 CT THORAX DX C+: CPT

## 2017-04-22 PROCEDURE — 87205 SMEAR GRAM STAIN: CPT | Performed by: PHYSICIAN ASSISTANT

## 2017-04-22 PROCEDURE — 85025 COMPLETE CBC W/AUTO DIFF WBC: CPT | Performed by: EMERGENCY MEDICINE

## 2017-04-22 PROCEDURE — 82803 BLOOD GASES ANY COMBINATION: CPT

## 2017-04-22 PROCEDURE — 12000001 ZZH R&B MED SURG/OB UMMC

## 2017-04-22 PROCEDURE — 25500064 ZZH RX 255 OP 636: Performed by: STUDENT IN AN ORGANIZED HEALTH CARE EDUCATION/TRAINING PROGRAM

## 2017-04-22 PROCEDURE — 87077 CULTURE AEROBIC IDENTIFY: CPT | Performed by: PHYSICIAN ASSISTANT

## 2017-04-22 PROCEDURE — 99285 EMERGENCY DEPT VISIT HI MDM: CPT | Mod: 25 | Performed by: EMERGENCY MEDICINE

## 2017-04-22 PROCEDURE — 93971 EXTREMITY STUDY: CPT | Mod: LT

## 2017-04-22 PROCEDURE — 99214 OFFICE O/P EST MOD 30 MIN: CPT | Mod: ZP | Performed by: INTERNAL MEDICINE

## 2017-04-22 PROCEDURE — 25000132 ZZH RX MED GY IP 250 OP 250 PS 637: Mod: GY

## 2017-04-22 PROCEDURE — 83605 ASSAY OF LACTIC ACID: CPT

## 2017-04-22 PROCEDURE — 85379 FIBRIN DEGRADATION QUANT: CPT | Performed by: EMERGENCY MEDICINE

## 2017-04-22 PROCEDURE — 00000146 ZZHCL STATISTIC GLUCOSE BY METER IP

## 2017-04-22 PROCEDURE — 93010 ELECTROCARDIOGRAM REPORT: CPT | Mod: Z6 | Performed by: EMERGENCY MEDICINE

## 2017-04-22 PROCEDURE — 87070 CULTURE OTHR SPECIMN AEROBIC: CPT | Performed by: PHYSICIAN ASSISTANT

## 2017-04-22 PROCEDURE — A9270 NON-COVERED ITEM OR SERVICE: HCPCS | Mod: GY | Performed by: EMERGENCY MEDICINE

## 2017-04-22 PROCEDURE — 25000132 ZZH RX MED GY IP 250 OP 250 PS 637: Mod: GY | Performed by: EMERGENCY MEDICINE

## 2017-04-22 PROCEDURE — 99207 ZZC CDG-CHARGE REQUIRED MANUAL ENTRY: CPT | Performed by: HOSPITALIST

## 2017-04-22 PROCEDURE — 83880 ASSAY OF NATRIURETIC PEPTIDE: CPT | Performed by: EMERGENCY MEDICINE

## 2017-04-22 PROCEDURE — 87040 BLOOD CULTURE FOR BACTERIA: CPT | Performed by: EMERGENCY MEDICINE

## 2017-04-22 PROCEDURE — A9270 NON-COVERED ITEM OR SERVICE: HCPCS | Mod: GY | Performed by: PHYSICIAN ASSISTANT

## 2017-04-22 PROCEDURE — 84484 ASSAY OF TROPONIN QUANT: CPT | Performed by: EMERGENCY MEDICINE

## 2017-04-22 PROCEDURE — 87633 RESP VIRUS 12-25 TARGETS: CPT | Performed by: EMERGENCY MEDICINE

## 2017-04-22 PROCEDURE — 87186 SC STD MICRODIL/AGAR DIL: CPT | Performed by: PHYSICIAN ASSISTANT

## 2017-04-22 PROCEDURE — 93005 ELECTROCARDIOGRAM TRACING: CPT | Performed by: EMERGENCY MEDICINE

## 2017-04-22 RX ORDER — POLYETHYLENE GLYCOL 3350 17 G/17G
17 POWDER, FOR SOLUTION ORAL DAILY PRN
Status: DISCONTINUED | OUTPATIENT
Start: 2017-04-22 | End: 2017-04-28 | Stop reason: HOSPADM

## 2017-04-22 RX ORDER — WARFARIN SODIUM 5 MG/1
5 TABLET ORAL ONCE
Status: COMPLETED | OUTPATIENT
Start: 2017-04-22 | End: 2017-04-22

## 2017-04-22 RX ORDER — ACETAMINOPHEN 325 MG/1
650 TABLET ORAL EVERY 4 HOURS PRN
Status: DISCONTINUED | OUTPATIENT
Start: 2017-04-22 | End: 2017-04-28 | Stop reason: HOSPADM

## 2017-04-22 RX ORDER — FUROSEMIDE 40 MG
40 TABLET ORAL 2 TIMES DAILY
Status: DISCONTINUED | OUTPATIENT
Start: 2017-04-22 | End: 2017-04-23

## 2017-04-22 RX ORDER — CEFTRIAXONE 1 G/1
1 INJECTION, POWDER, FOR SOLUTION INTRAMUSCULAR; INTRAVENOUS EVERY 24 HOURS
Status: DISCONTINUED | OUTPATIENT
Start: 2017-04-23 | End: 2017-04-26

## 2017-04-22 RX ORDER — LIDOCAINE HYDROCHLORIDE 10 MG/ML
INJECTION, SOLUTION EPIDURAL; INFILTRATION; INTRACAUDAL; PERINEURAL
Status: DISCONTINUED
Start: 2017-04-22 | End: 2017-04-22 | Stop reason: HOSPADM

## 2017-04-22 RX ORDER — ASPIRIN 81 MG/1
324 TABLET, CHEWABLE ORAL ONCE
Status: COMPLETED | OUTPATIENT
Start: 2017-04-22 | End: 2017-04-22

## 2017-04-22 RX ORDER — MIRTAZAPINE 15 MG/1
15 TABLET, FILM COATED ORAL AT BEDTIME
Status: DISCONTINUED | OUTPATIENT
Start: 2017-04-22 | End: 2017-04-28 | Stop reason: HOSPADM

## 2017-04-22 RX ORDER — ONDANSETRON 2 MG/ML
4 INJECTION INTRAMUSCULAR; INTRAVENOUS EVERY 6 HOURS PRN
Status: DISCONTINUED | OUTPATIENT
Start: 2017-04-22 | End: 2017-04-28 | Stop reason: HOSPADM

## 2017-04-22 RX ORDER — ONDANSETRON 4 MG/1
4 TABLET, ORALLY DISINTEGRATING ORAL EVERY 6 HOURS PRN
Status: DISCONTINUED | OUTPATIENT
Start: 2017-04-22 | End: 2017-04-28 | Stop reason: HOSPADM

## 2017-04-22 RX ORDER — ASPIRIN 81 MG/1
81 TABLET ORAL DAILY
Status: DISCONTINUED | OUTPATIENT
Start: 2017-04-23 | End: 2017-04-28 | Stop reason: HOSPADM

## 2017-04-22 RX ORDER — PIPERACILLIN SODIUM, TAZOBACTAM SODIUM 3; .375 G/15ML; G/15ML
3.38 INJECTION, POWDER, LYOPHILIZED, FOR SOLUTION INTRAVENOUS ONCE
Status: DISCONTINUED | OUTPATIENT
Start: 2017-04-22 | End: 2017-04-22

## 2017-04-22 RX ORDER — METOPROLOL TARTRATE 50 MG
100 TABLET ORAL 2 TIMES DAILY
Status: DISCONTINUED | OUTPATIENT
Start: 2017-04-22 | End: 2017-04-28 | Stop reason: HOSPADM

## 2017-04-22 RX ORDER — ATORVASTATIN CALCIUM 10 MG/1
20 TABLET, FILM COATED ORAL DAILY
Status: DISCONTINUED | OUTPATIENT
Start: 2017-04-23 | End: 2017-04-28 | Stop reason: HOSPADM

## 2017-04-22 RX ORDER — NALOXONE HYDROCHLORIDE 0.4 MG/ML
.1-.4 INJECTION, SOLUTION INTRAMUSCULAR; INTRAVENOUS; SUBCUTANEOUS
Status: DISCONTINUED | OUTPATIENT
Start: 2017-04-22 | End: 2017-04-28 | Stop reason: HOSPADM

## 2017-04-22 RX ORDER — BISACODYL 5 MG
5-15 TABLET, DELAYED RELEASE (ENTERIC COATED) ORAL DAILY PRN
Status: DISCONTINUED | OUTPATIENT
Start: 2017-04-22 | End: 2017-04-28 | Stop reason: HOSPADM

## 2017-04-22 RX ORDER — IOPAMIDOL 755 MG/ML
66 INJECTION, SOLUTION INTRAVASCULAR ONCE
Status: COMPLETED | OUTPATIENT
Start: 2017-04-22 | End: 2017-04-22

## 2017-04-22 RX ORDER — DOXYCYCLINE 100 MG/1
100 CAPSULE ORAL EVERY 12 HOURS SCHEDULED
Status: DISCONTINUED | OUTPATIENT
Start: 2017-04-22 | End: 2017-04-26

## 2017-04-22 RX ORDER — AMOXICILLIN 250 MG
1-2 CAPSULE ORAL 2 TIMES DAILY
Status: DISCONTINUED | OUTPATIENT
Start: 2017-04-22 | End: 2017-04-28 | Stop reason: HOSPADM

## 2017-04-22 RX ORDER — ALBUTEROL SULFATE 90 UG/1
2 AEROSOL, METERED RESPIRATORY (INHALATION) EVERY 6 HOURS PRN
Status: DISCONTINUED | OUTPATIENT
Start: 2017-04-22 | End: 2017-04-28 | Stop reason: HOSPADM

## 2017-04-22 RX ORDER — LEVOTHYROXINE SODIUM 137 UG/1
137 TABLET ORAL DAILY
Status: DISCONTINUED | OUTPATIENT
Start: 2017-04-23 | End: 2017-04-28 | Stop reason: HOSPADM

## 2017-04-22 RX ADMIN — MIRTAZAPINE 15 MG: 15 TABLET, FILM COATED ORAL at 21:58

## 2017-04-22 RX ADMIN — METOPROLOL TARTRATE 100 MG: 50 TABLET, FILM COATED ORAL at 21:58

## 2017-04-22 RX ADMIN — WARFARIN SODIUM 5 MG: 5 TABLET ORAL at 21:58

## 2017-04-22 RX ADMIN — DOXYCYCLINE HYCLATE 100 MG: 100 CAPSULE, GELATIN COATED ORAL at 21:57

## 2017-04-22 RX ADMIN — ASPIRIN 324 MG: 81 TABLET, CHEWABLE ORAL at 16:35

## 2017-04-22 RX ADMIN — AZITHROMYCIN MONOHYDRATE 500 MG: 500 INJECTION, POWDER, LYOPHILIZED, FOR SOLUTION INTRAVENOUS at 18:52

## 2017-04-22 RX ADMIN — FUROSEMIDE 40 MG: 40 TABLET ORAL at 21:58

## 2017-04-22 RX ADMIN — IOPAMIDOL 66 ML: 755 INJECTION, SOLUTION INTRAVENOUS at 16:01

## 2017-04-22 RX ADMIN — PIPERACILLIN AND TAZOBACTAM 3.38 G: 3; .375 INJECTION, POWDER, LYOPHILIZED, FOR SOLUTION INTRAVENOUS; PARENTERAL at 20:24

## 2017-04-22 RX ADMIN — SENNOSIDES AND DOCUSATE SODIUM 2 TABLET: 8.6; 5 TABLET ORAL at 21:59

## 2017-04-22 ASSESSMENT — ACTIVITIES OF DAILY LIVING (ADL)
SWALLOWING: 0-->SWALLOWS FOODS/LIQUIDS WITHOUT DIFFICULTY
AMBULATION: 1-->ASSISTIVE EQUIPMENT
RETIRED_COMMUNICATION: 0-->UNDERSTANDS/COMMUNICATES WITHOUT DIFFICULTY
TOILETING: 0-->INDEPENDENT
WHICH_OF_THE_ABOVE_FUNCTIONAL_RISKS_HAD_A_RECENT_ONSET_OR_CHANGE?: AMBULATION
RETIRED_EATING: 0-->INDEPENDENT
TRANSFERRING: 0-->INDEPENDENT
DRESS: 0-->INDEPENDENT
FALL_HISTORY_WITHIN_LAST_SIX_MONTHS: NO
BATHING: 0-->INDEPENDENT
COGNITION: 0 - NO COGNITION ISSUES REPORTED

## 2017-04-22 ASSESSMENT — PAIN SCALES - GENERAL: PAINLEVEL: NO PAIN (1)

## 2017-04-22 NOTE — ED PROVIDER NOTES
History     Chief Complaint   Patient presents with     Chest Pain     HPI  Rohan [place] is a 71 year old male with a h/o A.flutter on coumadin, HLD, COPD, DM2, HLD, Sarcoidosis (affecting lungs and heart), hypothyroidism, hep C s/p treatment with cirrhosis (and SVR), CAD s/p stents who was sent from his Cardiology Clinic due to hypoxia and worsening SOB. He has been maintained on 7.5 mg weekly of methotrexate and Remicade infusions for several years. Last Echo 2/13/17, EF 50-55%. Right Heart Cath ordered by Dr. Paige, 3/9/2017, not yet completed.       He endorses that he has lower extremity edema, but his left leg is larger than the right (which is seemingly newer to him). He denies any recent illness, fever, chills, nausea, vomiting, abdominal pain, diarrhea, or changes in skin. He states that he is not currently on home oxygen, but was years ago before starting remicade for his sarcoidosis. He reports that the chest discomfort that he currently has is similar to when he needed stents placed in the past, however the asymmetric lower extremity uneven swelling is new. He says the chest discomfort has been constant, lasting about a 1.5 weeks, does not change with position and does not radiate. Mild nonproductive cough, no hemoptysis. Some generalized weakness, fatigue, but no syncope or near syncope. No LH or dizziness, no palpitations.    I have reviewed the Medications, Allergies, Past Medical and Surgical History, and Social History in the Epic system.    ______________________________________________________________________    Review of Systems   Constitutional: Positive for activity change (decreased) and fatigue. Negative for chills and fever.   HENT: Negative for congestion and sore throat.   Eyes: Negative for photophobia and visual disturbance.   Respiratory: Positive for shortness of breath. Negative for cough, chest tightness and wheezing.   Cardiovascular: Positive for chest pain  (discomfort right-sided, progressive, not acute) and leg swelling. Negative for palpitations.   Gastrointestinal: Positive for vomiting. Negative for abdominal pain, blood in stool, constipation, diarrhea and nausea.   Genitourinary: Negative for difficulty urinating, dysuria and hematuria.   Musculoskeletal: Negative for arthralgias and myalgias.   Skin: Negative for rash.   Neurological: Negative for dizziness, weakness, light-headedness and headaches.     Physical Exam      Physical Exam   Constitutional: He is oriented to person, place, and time. He appears well-developed and well-nourished. No distress (mild respiratory distress).   HENT:   Head: Normocephalic and atraumatic.   Nose: Nose normal.   Mouth/Throat: Oropharynx is clear and moist. No oropharyngeal exudate.   Eyes: Conjunctivae and EOM are normal. Pupils are equal, round, and reactive to light. Right eye exhibits no discharge. Left eye exhibits no discharge. No scleral icterus.   Neck: Normal range of motion. Neck supple. No JVD present. No thyromegaly present.   Cardiovascular: Normal rate and regular rhythm.   No murmur heard.  Pulmonary/Chest: Effort normal and breath sounds normal. No stridor. No respiratory distress (resolved on oxygen). He has no wheezes. He exhibits tenderness (right sided mid chest discomfort on exam).   Abdominal: Soft. Bowel sounds are normal. He exhibits no distension. There is no tenderness.   Musculoskeletal: He exhibits edema (LLE > RLE).   Lymphadenopathy:   He has no cervical adenopathy.   Neurological: He is alert and oriented to person, place, and time. He has normal reflexes. No cranial nerve deficit. He exhibits normal muscle tone.   Skin: Skin is warm. He is not diaphoretic.     ED Course     ED Course   Comment By Time   Repeat EKG, unchanged Jackie Bah MD 04/22 5910   Spoke with Radiology prior to ordering the CT PE discussing the patient's renal function.  They believe it will be safe to perform CT  PE with contrast. Radha Holcomb MD 04/22 1456     Procedures             EKG Interpretation:      Interpreted by Radha Holcomb MD  Time reviewed: 12:37  Symptoms at time of EKG: chest pain   Rhythm: sinus with premature atrial complexes  Rate: 71 bpm  Axis: Normal  Ectopy: none  Conduction: 1st degree AV block  ST Segments/ T Waves: Non-specific ST-T wave changes  Q Waves: none  Comparison to prior: compared to 2/24/17: decreased amplitude in II, III, aVF compared to previous  Clinical Impression: no acute changes, otherwise normal morphology           Labs Ordered and Resulted from Time of ED Arrival Up to the Time of Departure from the ED - No data to display    Assessments & Plan (with Medical Decision Making)   Resident note:  Rohan [place] is a 71 year old male with a h/o A.flutter on coumadin, HLD, COPD, DM2, HLD, Sarcoidosis (affecting lungs and heart), hypothyroidism, hep C s/p treatment with cirrhosis (and SVR), CAD s/p stents who was sent from his Cardiology Clinic due to hypoxia and worsening SOB, possibly due to his sarcoidosis vs cardiac etiology vs thrombosis. He is currently on anticoagulation, INR is subtherapeutic with worsening left LE swelling, d-dimer is elevated. He was given oxygen with improvement in respiratory status and aspirin. CXR concerning for pulmonary edema.     ______________________________________________________________________    ATTENDING NOTE:   I have seen and evaluated the patient along with our rotating resident, Dr. Bah. I agree with her documentation except as may differ below. I have personally seen and performed a physical examination of the patient, have added/changed any pertinent ROS/PE findings to the above chart, and have come to the following assessment & plan:    IMPRESSION: 71-year-old male, PMH notable for pulmonary sarcoid on Remicade, diastolic heart failure, CAD with previous stents, as was shortness of breath as described further above in  HPI/ROS.  Quickly, patient appears nontoxic, NAD, but slightly tachypneic. No significant wheezes    PLAN: Labs, CXR, symptom management. Think will need admission given new oxygen requirement. When admitted should get echo, Cards and Pulm consults.    RESULTS:  See ED Course section above for particular pertinent findings and comments  - Labs: Ddimer +, BNP elevated but down from previous, troponin negative CRP elevated  - Imaging: Images and written preliminary reports reviewed by myself and revealed:  --- No DVT on LE US  --- CT and CXR, No PE but does have left sided infection vs. Worsening sarcoid + chronic sarcoid, pleural effusion    INTERVENTIONS:   - Supplemental O2  - Aspirin  - Azithromycin (IV)    RE-EVALUATION:  See ED Course section above for particular pertinent findings and comments  - The patient's symptoms were stable/grossly unchanged during ED stay, though he did fee somewhat improved with the supplemental O2  - Patient observed for multiple hours with multiple repeat exams and remains stable.    DISCUSSIONS:  - I discussed the care of the patient with IM, Pulm  - w/ Patient: I have reviewed the findings, diagnosis, plan and need for follow up with the patient.    DISPOSITION/PLANNING:  - FINAL IMPRESSION: Dyspnea, hypoxia/hypoxic respiratory failure, infectious/Pneumonia on top of baseline pulmonary and cardiac sarcoidosis  - DISPOSITION: Admit to IM for further evaluation/management  --- Pending: Resp. Viral panel, cultures  --- Recommendations: Cards, Pulm consults, echo      ______________________________________________________________________________    - I have reviewed the available nursing notes.                      New Prescriptions    No medications on file       Final diagnoses:   None       4/22/2017   [facility] [place], EMERGENCY DEPARTMENT     Radha Holcomb MD  04/24/17 1426

## 2017-04-22 NOTE — LETTER
4/22/2017    RE: Rohan [place]  [address]  [place] [address]       Dear Colleague,    Thank you for the opportunity to participate in the care of your patient, Rohan [place], at the [facility]. Please see a copy of my visit note below.    HISTORY OF PRESENT ILLNESS:  Mr. [place] is a 71-year-old gentleman, a patient of Dr. Diane Paige, presenting to the clinic for evaluation of shortness of breath.  Patient states that he had been having worsening shortness of breath in the last few weeks and states that his overall functional capacity is extremely limited to a level that he has difficulty moving within the house for his day-to-day activities.  He denies having any chest pain, palpitations, syncope or presyncope.    ROS:   Constitutional: No fever, chills, or sweats. No weight gain/loss  ENT: No visual disturbance, ear ache, epistaxis, sore throat  Allergies/Immunologic: Negative  Respiratory: No cough, hemoptysis  Cardiovascular: As per HPI.    GI: No nausea, vomiting, hematemesis, melena, or hematochezia  : No urinary frequency, dysuria, or hematuria    Integument: No rash  Psychiatric: Negative  Neuro: No visual disturbance, weakness or paraesthesias  Endocrinology: Negative  Musculoskeletal: No myalgia         PAST MEDICAL HISTORY:   1.  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   2.  Heart failure with preserved ejection fraction.   3.  Hypertension.   4.  Type 2 diabetes.   5.  Coronary artery disease with status post PCI of the LAD and diagonal branch.      MEDICATIONS:   1.  Levothyroxine 137 mcg a day.   2.  Lasix 20 mg a day.   3.  Amlodipine 10 mg a day.   4.  Warfarin 5 mg daily.   5.  Spiriva 18 mcg inhaler.   6.  Remicade 100 mg daily.   7.  Metoprolol 100 mg twice daily.   8.  Methotrexate 7.5 mg by mouth once a week.   9.  Lipitor 20 mg daily.   10.  Sildenafil 100 mg  daily.   11.  Aspirin 81 mg daily.      PHYSICAL EXAMINATION:   VITAL SIGNS:  Blood pressure 118/74, heart rate 82 beats per minute, BMI 28.74.   GENERAL:  The patient is in mild distress.   HEAD:  Atraumatic and normocephalic.   ENT:  Pupils are round and reactive to light.  Extraocular movements are full.  No pallor, no icterus.  Oropharynx appears normal.   NECK:  No JVD, no thyromegaly, no carotid bruits.   CHEST:  Bilaterally symmetrical chest.  Bilateral basilar crackles present.     CARDIAC:  Regular rate and rhythm, no S3, no S4, no murmurs.   ABDOMEN:  Soft, bowel sounds present, nontender.   EXTREMITIES:  Pedal edema present.      ASSESSMENT AND PLAN:   1.  Shortness of breath is multifactorial.  The patient has sarcoidosis and has heart failure with a preserved ejection fraction.  The patient's shortness of breath has worsened in the last few weeks, and the patient states that he is having difficulty doing his day-to-day activities at home.  The patient came to the clinic with the intention of getting admitted to the hospital for further workup.  I have discussed this patient's current condition with our emergency department physicians for evaluation and for possible admission today in the hospital.   2.  Coronary artery disease.  No symptoms of ischemic etiology at this time.  Would continue with the current medical regimen for his ischemic heart disease at this time.         DOE SINGER MD     D: 2017 12:09   T: 2017 11:13   MT: [email or web address]    Name:     LOU RAO   MRN:      [id number]        Account:      [id number]   :      1945           Service Date: 2017    Document: [id number]

## 2017-04-22 NOTE — MR AVS SNAPSHOT
After Visit Summary   4/22/2017    Rohan Monroe    MRN: [id number]           Patient Information     Date Of Birth          1945        Visit Information        Provider Department      4/22/2017 10:00 AM Villa Randhawa MD [facility]        Today's Diagnoses     Chest pressure    -  1       Follow-ups after your visit        Your next 10 appointments already scheduled     May 08, 2017  3:00 PM CDT   Infusion 180 with UC SPEC INFUSION, UC 51 ATC   [facility] Specialty and Procedure ([facility])    [address] [place] [id number] [phone]            May 11, 2017  3:00 PM CDT   PFT VISIT with  PFL B   [facility] Pul[facility] Function Testing ([facility])    [address] [place] [phone]   [phone]            May 11, 2017  3:30 PM CDT   (Arrive by 3:15 PM)   Return Interstitial Lung with David Morris Perlman, MD   [facility] ([facility])    [address] [place] [id number] [phone]            Jun 21, 2017  1:30 PM CDT   (Arrive by 1:15 PM)   RETURN ARRHYTHMIA with Brian Vazquez MD   [facility] ([facility])    [address] [place] [phone]   [phone]            Aug 29, 2017 10:00 AM CDT   Lab with UC LAB   [facility] ([facility])    [address] [place] [id number] [phone]            Aug 29, 2017 10:30 AM CDT   US ABDOMEN COMPLETE with UCUS2   [facility] US ([facility])    [address] [place] [id number] [phone]           Please bring a list of your medicines (including vitamins, minerals and over-the-counter drugs). Also, tell your doctor about any allergies  you may have. Wear comfortable clothes and leave your valuables at home.  Adults: No eating or drinking for 8 hours before the exam. You may take medicine with a small sip of water.  Children: - Children 6+ years: No food or drink for 6 hours before exam. - Children 1-5 years: No food or drink for 4 hours before exam. - Infants, breast-fed: may have breast milk up to 2 hours before exam. - Infants, formula: may have bottle until 4 hours before exam.  Please call the Imaging Department at your exam site with any questions.            Aug 29, 2017 11:30 AM CDT   (Arrive by 11:15 AM)   Return General Liver with Moses Orosco MD   [facility] Hepatology ([facility])    [address]  3rd Floor  [facility] [id number] [phone]            Sep 08, 2017  9:30 AM CDT   RETURN GLAUCOMA with Kina Greco MD   [facility] ([facility])    [address]  [address]  [address] [address]  [facility] [id number] [phone]            Sep 12, 2017  2:30 PM CDT   Lab with  LAB   [facility] Lab ([facility])    [address]  [address] [facility] [phone] [phone]              Who to contact     If you have questions or need follow up information about today's clinic visit or your schedule please contact [facility] directly at [phone].  Normal or non-critical lab and imaging results will be communicated to you by MyChart, letter or phone within 4 business days after the clinic has received the results. If you do not hear from us within 7 days, please contact the clinic through MyChart or phone. If you have a critical or abnormal lab result, we will notify you by phone as soon as possible.  Submit refill requests through myJambi or call your pharmacy and they will forward the refill request to us. Please allow 3 business days for your refill to be completed.          Additional Information About Your Visit       "  MyChart Information     [employer] gives you secure access to your electronic health record. If you see a primary care provider, you can also send messages to your care team and make appointments. If you have questions, please call your primary care clinic.  If you do not have a primary care provider, please call [phone] and they will assist you.        Care EveryWhere ID     This is your Care EveryWhere ID. This could be used by other organizations to access your [facility] medical records  [id number]        Your Vitals Were     Pulse Height Pulse Oximetry BMI (Body Mass Index)          82 1.765 m (5' 9.5\") 89% 28.4 kg/m2         Blood Pressure from Last 3 Encounters:   04/28/17 (!) 160/94   04/22/17 118/74   04/18/17 113/75    Weight from Last 3 Encounters:   04/28/17 83.9 kg (185 lb)   04/22/17 88.5 kg (195 lb 1.7 oz)   04/18/17 88.5 kg (195 lb)              We Performed the Following     EKG 12-lead, tracing only (Same Day)          Today's Medication Changes          These changes are accurate as of: 4/22/17 12:33 PM.  If you have any questions, ask your nurse or doctor.               These medicines have changed or have updated prescriptions.        Dose/Directions    polyethylene glycol powder   Commonly known as:  MIRALAX   This may have changed:    - when to take this  - reasons to take this   Used for:  Constipation, unspecified constipation type        Dose:  1 capful   Take 17 g (1 capful) by mouth daily   Quantity:  510 g   Refills:  1       Urea 40 % Crea   Commonly known as:  CARMOL 40   This may have changed:  additional instructions   Used for:  Dermatophytosis of nail, Corns and callosities        To feet daily   Quantity:  199 g   Refills:  4                Primary Care Provider Office Phone # Fax #    Jamie Foster [phone]       [address] [address]        Thank you!     Thank you for choosing [facility]  for your " care. Our goal is always to provide you with excellent care. Hearing back from our patients is one way we can continue to improve our services. Please take a few minutes to complete the written survey that you may receive in the mail after your visit with us. Thank you!             Your Updated Medication List - Protect others around you: Learn how to safely use, store and throw away your medicines at www.disposemymeds.org.          This list is accurate as of: 4/22/17 12:33 PM.  Always use your most recent med list.                   Brand Name Dispense Instructions for use    albuterol 108 (90 BASE) MCG/ACT Inhaler    PROAIR HFA/PROVENTIL HFA/VENTOLIN HFA    3 Inhaler    Inhale 2 puffs into the lungs every 6 hours as needed for shortness of breath / dyspnea       amLODIPine 10 MG tablet    NORVASC    90 tablet    Take 1 tablet (10 mg) by mouth daily       ASPIRIN EC PO      Take 81 mg by mouth daily       atorvastatin 20 MG tablet    LIPITOR    90 tablet    Take 1 tablet (20 mg) by mouth daily       blood glucose monitoring lancets     2 Box    Use to test blood sugar 2 times daily or as directed.  102 lancets per box.  3 month supply.       blood glucose monitoring meter device kit    no brand specified    1 kit    Use to test blood sugar 2 times daily.       blood glucose monitoring test strip    ACCU-CHEK RONNIE PLUS    200 each    Use to test blood sugar 2 times daily or as directed.  3 month supply.       ciclopirox 0.77 % cream    LOPROX    90 g    Apply topically 2 times daily To feet and toenails.       fluticasone-vilanterol 100-25 MCG/INH oral inhaler    BREO ELLIPTA    3 Inhaler    Inhale 1 puff into the lungs daily       inFLIXimab 100 MG injection    REMICADE     Inject 100 mg into the vein every 28 days       * LASIX 20 MG tablet   Generic drug:  furosemide     180 tablet    Take 40 mg by mouth 2 times daily       * furosemide 20 MG tablet    LASIX    180 tablet    Take 3 tablets (60 mg) by mouth 2  times daily       levothyroxine 137 MCG tablet    SYNTHROID/LEVOTHROID    90 tablet    Take 1 tablet (137 mcg) by mouth daily       methotrexate 2.5 MG tablet CHEMO     48 tablet    Take 3 tablets (7.5 mg) by mouth once a week On Mondays       metoprolol 100 MG tablet    LOPRESSOR    60 tablet    Take 1 tablet (100 mg) by mouth 2 times daily       mirtazapine 15 MG tablet    REMERON     Take 15 mg by mouth At Bedtime.       MULTIVITAMIN & MINERAL PO      Take 1 tablet by mouth daily.       polyethylene glycol powder    MIRALAX    510 g    Take 17 g (1 capful) by mouth daily       sildenafil 20 MG tablet    REVATIO/VIAGRA    90 tablet    Take 1 tablet (20 mg) by mouth 3 times daily       sildenafil 50 MG cap/tab    VIAGRA    10 tablet    Take 2 tablets (100 mg) by mouth daily as needed for erectile dysfunction       tiotropium 18 MCG capsule    SPIRIVA HANDIHALER    90 capsule    Inhale contents of one capsule daily.       Urea 40 % Crea    CARMOL 40    199 g    To feet daily       warfarin 5 MG tablet    COUMADIN    30 tablet    Take 1 tablet (5 mg) by mouth daily       * Notice:  This list has 2 medication(s) that are the same as other medications prescribed for you. Read the directions carefully, and ask your doctor or other care provider to review them with you.

## 2017-04-22 NOTE — PHARMACY-ADMISSION MEDICATION HISTORY
"Admission medication history interview status for the 4/22/2017 admission is complete. See Epic admission navigator for allergy information, pharmacy, prior to admission medications and immunization status.     Medication history interview sources:  Patient    Changes made to PTA medication list (reason)  Added: N/A (per pt)  Deleted: N/A (per pt)  Changed:   -furosemide: removed \"for weight gain of 3 lbs or greater\" (per pt, taking this medication daily regardless of weight change)  -infliximab injection: every 6 weeks --> Inject 100 mg into the vein every 28 days.  (per pt)  -Miralax powder: Take 17g by mouth daily --> Take 17g by mouth daily as needed. (per pt)  -sildenafil tablet: Take 100mg by mouth daily as needed for erectile dysfunction --> Take 50mg by mouth daily as needed for erectile dysfunction. (per pt)  -urea cream: to feet daily --> Apply to feet daily as needed. (per pt)     Additional medication history information (including reliability of information, actions taken by pharmacist):  -Pt was able to recall last doses taken and report any changes to dosing regimens.  -Infliximab: Pt reported his next dose is this upcoming Wednesday.  -Per [place], pt has a documented influenza immunization this season.      Prior to Admission medications    Medication Sig Last Dose Taking? Auth Provider   levothyroxine (SYNTHROID/LEVOTHROID) 137 MCG tablet Take 1 tablet (137 mcg) by mouth daily 4/22/2017 at AM Yes Jamie Foster MD   furosemide (LASIX) 20 MG tablet Take 40 mg by mouth 2 times daily  4/22/2017 at AM Yes Diane Paige MD   amLODIPine (NORVASC) 10 MG tablet Take 1 tablet (10 mg) by mouth daily 4/21/2017 at PM Yes Brian Vazquez MD   warfarin (COUMADIN) 5 MG tablet Take 1 tablet (5 mg) by mouth daily 4/21/2017 at Unknown time Yes Brian Vazquez MD   polyethylene glycol (MIRALAX) powder Take 17 g (1 capful) by mouth daily  Patient taking differently: Take 1 capful by mouth daily as needed for " constipation  Past Week at Unknown time Yes Elizabeth Cabral APRN CNP   tiotropium (SPIRIVA HANDIHALER) 18 MCG capsule Inhale contents of one capsule daily. 4/22/2017 at AM Yes Elizabeth Cabral APRN CNP   albuterol (PROAIR HFA/PROVENTIL HFA/VENTOLIN HFA) 108 (90 BASE) MCG/ACT Inhaler Inhale 2 puffs into the lungs every 6 hours as needed for shortness of breath / dyspnea 4/22/2017 at AM Yes Elizabeth Cabral APRN CNP   fluticasone-vilanterol (BREO ELLIPTA) 100-25 MCG/INH oral inhaler Inhale 1 puff into the lungs daily 4/22/2017 at AM Yes Elizabeth Cabral APRN CNP   inFLIXimab (REMICADE) 100 MG injection Inject 100 mg into the vein every 28 days  4 weeks ago at Unknown time Yes Reported, Patient   metoprolol (LOPRESSOR) 100 MG tablet Take 1 tablet (100 mg) by mouth 2 times daily 4/22/2017 at AM Yes Ollie Michel MD   methotrexate 2.5 MG tablet Take 3 tablets (7.5 mg) by mouth once a week On Mondays  Patient taking differently: Take 2.5mg by mouth weekly on Mondays. Past Month at Unknown time Yes Perlman, David Morris, MD   atorvastatin (LIPITOR) 20 MG tablet Take 1 tablet (20 mg) by mouth daily 4/21/2017 at PM Yes Thompson Gonzalez MD   ASPIRIN EC PO Take 81 mg by mouth daily 4/22/2017 at AM Yes Unknown, Entered By History   Multiple Vitamins-Minerals (MULTIVITAMIN & MINERAL PO) Take 1 tablet by mouth daily. 4/21/2017 at PM Yes Unknown, Entered By History   mirtazapine (REMERON) 15 MG tablet Take 15 mg by mouth At Bedtime. 4/21/2017 at PM Yes Unknown, Entered By History   ciclopirox (LOPROX) 0.77 % cream Apply topically 2 times daily To feet and toenails. Unknown at Unknown time  Chicho Mejia DPM   blood glucose monitoring (ACCU-CHEK RONNIE PLUS) test strip Use to test blood sugar 2 times daily or as directed.  3 month supply.   Macey Roy MD   blood glucose monitoring (ACCU-CHEK FASTCLIX) lancets Use to test blood sugar 2 times daily or as directed.  102  lancets per box.  3 month supply.   Macey Roy MD   blood glucose monitoring (NO BRAND SPECIFIED) meter device kit Use to test blood sugar 2 times daily.   Macey Roy MD   Urea (CARMOL 40) 40 % CREA To feet daily  Patient taking differently: Apply to feet daily as needed. Unknown at Unknown time  Chicho Mejia DPM   sildenafil (VIAGRA) 50 MG tablet Take 2 tablets (100 mg) by mouth daily as needed for erectile dysfunction  Patient taking differently: Take 50 mg by mouth daily as needed for erectile dysfunction  Unknown at Unknown time  Weight, Real Lopes MD         Medication history completed by: Veronica Fountain

## 2017-04-22 NOTE — IP AVS SNAPSHOT
[address] [address]    Phone:  [phone]                                       After Visit Summary   4/22/2017    Rohan Monroe    MRN: [id number]           After Visit Summary Signature Page     I have received my discharge instructions, and my questions have been answered. I have discussed any challenges I see with this plan with the nurse or doctor.    ..........................................................................................................................................  Patient/Patient Representative Signature      ..........................................................................................................................................  Patient Representative Print Name and Relationship to Patient    ..................................................               ................................................  Date                                            Time    ..........................................................................................................................................  Reviewed by Signature/Title    ...................................................              ..............................................  Date                                                            Time

## 2017-04-22 NOTE — PHARMACY-VANCOMYCIN DOSING SERVICE
Pharmacy Vancomycin Initial Note  Date of Service 2017  Patient's  1945  71 year old, male    Indication: [facility]    Current estimated CrCl = Estimated Creatinine Clearance: 36.9 mL/min (based on Cr of 1.98).  Patient with CKD stage 4.  Baseline SCr ~1.9-2.7 per chart review.    Creatinine for last 3 days  2017: 12:48 PM Creatinine 1.98 mg/dL    Recent Vancomycin Level(s) for last 3 days  No results found for requested labs within last 72 hours.      Vancomycin IV Administrations (past 72 hours)      No vancomycin orders with administrations in past 72 hours.                Nephrotoxins and other renal medications (Future)    Start     Dose/Rate Route Frequency Ordered Stop    17 1800  vancomycin (VANCOCIN) 1,750 mg in NaCl 0.9 % 500 mL intermittent infusion      1,750 mg Intravenous EVERY 24 HOURS 17 1758      17 1800  vancomycin (VANCOCIN) 1,750 mg in NaCl 0.9 % 500 mL intermittent infusion      1,750 mg Intravenous ONCE 17 1759      17 1748  piperacillin-tazobactam (ZOSYN) 3.375 g vial to attach to  mL bag     Comments:  DO NOT USE THIS FIELD FOR ADMIN INSTRUCTIONS; INFORMATION DOES NOT SHOW ON MAR. USE THE FIELD ABOVE MARKED ADMIN INSTRUCTIONS    3.375 g  100 mL/hr over 1 Hours Intravenous ONCE 17 1747            Contrast Orders - past 72 hours (72h ago through future)    Start     Dose/Rate Route Frequency Ordered Stop    17 1602  iopamidol (ISOVUE-370) solution 66 mL      66 mL Intravenous ONCE 17 1601 17 1601                Plan:  1.  Start vancomycin 1750mg (~20mg/kg rounded to nearest 250mg) every 24 hours.  2.  Goal Trough Level: 15-20 mg/L   3.  Pharmacy will check trough levels as appropriate in 1-3 Days.    4. Serum creatinine levels will be ordered daily for the first week of therapy and at least twice weekly for subsequent weeks.    5. [place] method utilized to dose vancomycin therapy: Method  2    Jaimie Valverde, PharmD, BCPS

## 2017-04-22 NOTE — ED NOTES
Bed: IN01  Expected date:   Expected time:   Means of arrival:   Comments:  JALEN (MRN [id number]) - 70 yo M. history of lung sarcoid, diastolic heart failure, CAD with stents to the LAD and diagonal in the past, currently on Remicade for the lungs are quite, presented to cardiology clinic with shortness of breath.  Being sent here for further evaluation to ensure no immediate cardiac/lung condition.  No testing initiated in the Cardiology clinic

## 2017-04-22 NOTE — ED NOTES
Alert orientated ambulatory patient presents to ER via EMS with c/o:    1.) SoB    Hx: SOB with exertion.  Observed during changing patient from street clothes in hospital gown, patient became SOB.  SPo2% on room air 88%.  With 2L o2 supplementation spo2% 95.      Airway WDLs  Breathing SOB  Circulation WDLs

## 2017-04-22 NOTE — IP AVS SNAPSHOT
MRN:[id number]                      After Visit Summary   4/22/2017    Rohan Monroe    MRN: [id number]           Thank you!     Thank you for choosing [facility] for your care. Our goal is always to provide you with excellent care. Hearing back from our patients is one way we can continue to improve our services. Please take a few minutes to complete the written survey that you may receive in the mail after you visit with us. Thank you!        Patient Information     Date Of Birth          1945        Designated Caregiver       Most Recent Value    Caregiver    Will someone help with your care after discharge? yes    Name of designated caregiver Elvis Monroe    Phone number of caregiver [phone]    Caregiver address same as patient      About your hospital stay     You were admitted on:  April 22, 2017 You last received care in the:  [address] [facility]    You were discharged on:  April 28, 2017        Reason for your hospital stay       You were admitted to the hospital for shortness of breath and hypoxemia in the setting of sarcoidosis.  An extensive evaluation was performed including cardiac catheterization that revealed pulmonary hypertension.  You were started on sildenafil for this as well as home oxygen.    You also had a thoracentesis performed and 300 ml of fluid removed.                  Who to Call     For medical emergencies, please call 911.  For non-urgent questions about your medical care, please call your primary care provider or clinic, [phone]          Attending Provider     Provider Specialty    Radha Holcomb MD Emergency Medicine    Shaq Sanabria MD Internal Medicine    Sheila Sands MD Internal Medicine    Gus, Sacha Lopes MD Pediatrics       Primary Care Provider Office Phone # Fax #    Jamie Foster [phone]       [address] [address]        After  Care Instructions     Activity       Your activity upon discharge: activity as tolerated            Diet       Follow this diet upon discharge: Regular low salt            Oxygen Adult       [facility] Order 2-4 liter(s) by nasal cannula continuously with use of portable tank. Expected treatment length is indefinite (99 months).. Test on conserving device as applicable.    Patients who qualify for home O2 coverage under the CMS guidelines require ABG tests or O2 sat readings obtained closest to, but no earlier than 2 days prior to the discharge, as evidence of the need for home oxygen therapy. Testing must be performed while patient is in the chronic stable state. See notes for O2 sats.    I certify that this patient, Rohan Monroe has been under my care and that I, or a nurse practitioner or physician's assistant working with me, had a face-to-face encounter that meets the face-to-face encounter requirements with this patient on 4/27/2017. The patient, Rohan Monroe was evaluated or treated in whole, or in part, for the following medical condition, which necessitates the use of the ordered oxygen. Treatment Diagnosis: sarcoidosis, pulmonary hypertension    Attending Provider: Sacha Weber*  Physician signature: See electronic signature associated with these discharge orders  Date of Order: April 27, 2017                  Follow-up Appointments     Adult [facility] Follow-up and recommended labs and tests       Follow up with Dr. Perlman next week.    Get remicade infusion next week.    Follow up with Cardiology cardiac catheterization for stenting in 2 weeks.    Follow up with Dr. Paige in the next few weeks.      Appointments on [facility] and/or [facility] (with [facility] or [facility] provider or service). Call [phone] if you haven't heard regarding these appointments within 7 days of discharge.                  Your next 10 appointments already scheduled     May 08, 2017  3:00  PM CDT   Infusion 180 with  SPEC INFUSION, UC 51 ATC   [facility] Specialty [facility] Procedure ([facility])    [address]  2nd [facility] [address]   [phone]            May 11, 2017  3:00 PM CDT   PFT VISIT with  PFL B   [facility] Pulmonary Function Testing ([facility])    [address] [address]   [phone]            May 11, 2017  3:30 PM CDT   (Arrive by 3:15 PM)   Return Interstitial Lung with David Morris Perlman, MD   [facility] ([facility])    [address] [address]   [phone]            Jun 21, 2017  1:30 PM CDT   (Arrive by 1:15 PM)   RETURN ARRHYTHMIA with Brian Vazquez MD   [facility] ([facility])    [address] [place] [address]   [phone]            Aug 29, 2017 10:00 AM CDT   Lab with  LAB   [facility] ([facility])    [address] [address]   [phone]            Aug 29, 2017 10:30 AM CDT   US ABDOMEN COMPLETE with UCUS2   [facility] US ([facility])    [address] [place] [address]   [phone]           Please bring a list of your medicines (including vitamins, minerals and over-the-counter drugs). Also, tell your doctor about any allergies you may have. Wear comfortable clothes and leave your valuables at home.  Adults: No eating or drinking for 8 hours before the exam. You may take medicine with a small sip of water.  Children: - Children 6+ years: No food or drink for 6 hours before exam. - Children 1-5 years: No food or drink for 4 hours before exam. - Infants, breast-fed: may have breast milk up to 2 hours before exam. - Infants, formula: may have bottle until 4  hours before exam.  Please call the Imaging Department at your exam site with any questions.            Aug 29, 2017 11:30 AM CDT   (Arrive by 11:15 AM)   Return General Liver with Moses Orosco MD   [facility]atology ([facility])    [address] [facility] [address] [facility]  3rd Floor  [facility] [address] [phone]            Sep 08, 2017  9:30 AM CDT   RETURN GLAUCOMA with Kina Greco MD   [facility] ([facility])    [address]  [address]  [address] [address]  [facility] [address]   [phone]            Sep 12, 2017  2:30 PM CDT   Lab with  LAB   [facility] Lab ([facility])    [address] [facility] [facility]  [address]st [facility] [address] [phone]              Additional Services     Medication Therapy Management Referral       Reason for referral:  on more than 5 medications and managing chronic disease and on more than 10 medications and hospitalized or in the ED in the past 6 months     This service is designed to help you get the most from your medications.  A specially trained pharmacist will work closely with you and your doctors  to solve any problems related to your medications and to help you get the   best results from taking them.      The Medication Therapy Management staff will call you to schedule an appointment.            PULMONARY REHAB REFERRAL                 Further instructions from your care team       Oxygen Provider:  Arranged through [employer], contact number [phone]. Home visit will occur same day as discharge where set up of equipment will be completed. If you have any questions or concerns please call the oxygen company directly.      Pending Results     Date and Time Order Name Status Description    4/25/2017 1132 Fluid Culture Aerobic Bacterial Preliminary     4/22/2017 1252 POC US ECHO LIMITED In process             Statement of Approval     Ordered          04/28/17 4180  I have  "reviewed and agree with all the recommendations and orders detailed in this document.  EFFECTIVE NOW     Approved and electronically signed by:  Sacha Weber MD             Admission Information     Date & Time Provider Department Dept. Phone    4/22/2017 Scaha Weber MD [address] [facility] [phone]      Your Vitals Were     Blood Pressure Pulse Temperature Respirations Height Weight    160/94 (BP Location: Left arm) 78 97.8  F (36.6  C) (Oral) 20 1.753 m (5' 9\") 83.9 kg (185 lb)    Pulse Oximetry BMI (Body Mass Index)                99% 27.32 kg/m2          MyChart Information     [employer] [employer] gives you secure access to your electronic health record. If you see a primary care provider, you can also send messages to your care team and make appointments. If you have questions, please call your primary care clinic.  If you do not have a primary care provider, please call [phone] and they will assist you.        Care EveryWhere ID     This is your Care EveryWhere ID. This could be used by other organizations to access your [facility] [place] medical records  [id number]           Review of your medicines      START taking        Dose / Directions    sildenafil 20 MG tablet   Commonly known as:  REVATIO/VIAGRA   Used for:  Pulmonary hypertension (H)        Dose:  20 mg   Take 1 tablet (20 mg) by mouth 3 times daily   Quantity:  90 tablet   Refills:  3         CONTINUE these medicines which may have CHANGED, or have new prescriptions. If we are uncertain of the size of tablets/capsules you have at home, strength may be listed as something that might have changed.        Dose / Directions    furosemide 20 MG tablet   Commonly known as:  LASIX   This may have changed:  how much to take   Used for:  Pulmonary hypertension (H)        Dose:  60 mg   Take 3 tablets (60 mg) by mouth 2 times daily   Quantity:  180 tablet   Refills:  0       methotrexate 2.5 MG tablet CHEMO   This may have changed:  "   - how much to take  - how to take this  - when to take this  - additional instructions   Used for:  Sarcoidosis (H)        Dose:  7.5 mg   Take 3 tablets (7.5 mg) by mouth once a week On Mondays   Quantity:  48 tablet   Refills:  3       polyethylene glycol powder   Commonly known as:  MIRALAX   This may have changed:    - when to take this  - reasons to take this   Used for:  Constipation, unspecified constipation type        Dose:  1 capful   Take 17 g (1 capful) by mouth daily   Quantity:  510 g   Refills:  1       Urea 40 % Crea   Commonly known as:  CARMOL 40   This may have changed:  additional instructions   Used for:  Dermatophytosis of nail, Corns and callosities        To feet daily   Quantity:  199 g   Refills:  4         CONTINUE these medicines which have NOT CHANGED        Dose / Directions    albuterol 108 (90 BASE) MCG/ACT Inhaler   Commonly known as:  PROAIR HFA/PROVENTIL HFA/VENTOLIN HFA   Used for:  Sarcoidosis (H)        Dose:  2 puff   Inhale 2 puffs into the lungs every 6 hours as needed for shortness of breath / dyspnea   Quantity:  3 Inhaler   Refills:  6       ASPIRIN EC PO        Dose:  81 mg   Take 81 mg by mouth daily   Refills:  0       atorvastatin 20 MG tablet   Commonly known as:  LIPITOR   Used for:  Hyperlipidemia        Dose:  20 mg   Take 1 tablet (20 mg) by mouth daily   Quantity:  90 tablet   Refills:  3       blood glucose monitoring lancets   Used for:  Type 2 diabetes, HbA1C goal < 8% (H)        Use to test blood sugar 2 times daily or as directed.  102 lancets per box.  3 month supply.   Quantity:  2 Box   Refills:  3       blood glucose monitoring meter device kit   Commonly known as:  no brand specified   Used for:  Type 2 diabetes mellitus with diabetic nephropathy (H)        Use to test blood sugar 2 times daily.   Quantity:  1 kit   Refills:  0       blood glucose monitoring test strip   Commonly known as:  ACCU-CHEK RONNIE PLUS   Used for:  Type 2 diabetes, HbA1C goal  < 8% (H)        Use to test blood sugar 2 times daily or as directed.  3 month supply.   Quantity:  200 each   Refills:  3       ciclopirox 0.77 % cream   Commonly known as:  LOPROX   Used for:  Dermatophytosis of nail, Tinea pedis of both feet        Apply topically 2 times daily To feet and toenails.   Quantity:  90 g   Refills:  6       fluticasone-vilanterol 100-25 MCG/INH oral inhaler   Commonly known as:  BREO ELLIPTA   Used for:  Chronic obstructive pulmonary disease, unspecified COPD type (H)        Dose:  1 puff   Inhale 1 puff into the lungs daily   Quantity:  3 Inhaler   Refills:  3       inFLIXimab 100 MG injection   Commonly known as:  REMICADE        Dose:  100 mg   Inject 100 mg into the vein every 28 days   Refills:  0       levothyroxine 137 MCG tablet   Commonly known as:  SYNTHROID/LEVOTHROID   Used for:  Hypothyroidism        Dose:  137 mcg   Take 1 tablet (137 mcg) by mouth daily   Quantity:  90 tablet   Refills:  1       metoprolol 100 MG tablet   Commonly known as:  LOPRESSOR   Used for:  Hypertension secondary to other renal disorders        Dose:  100 mg   Take 1 tablet (100 mg) by mouth 2 times daily   Quantity:  60 tablet   Refills:  11       mirtazapine 15 MG tablet   Commonly known as:  REMERON        Dose:  15 mg   Take 15 mg by mouth At Bedtime.   Refills:  0       MULTIVITAMIN & MINERAL PO        Dose:  1 tablet   Take 1 tablet by mouth daily.   Refills:  0       tiotropium 18 MCG capsule   Commonly known as:  SPIRIVA HANDIHALER   Used for:  Chronic obstructive pulmonary disease, unspecified COPD type (H)        Inhale contents of one capsule daily.   Quantity:  90 capsule   Refills:  3       warfarin 5 MG tablet   Commonly known as:  COUMADIN   Used for:  Atrial flutter with rapid ventricular response (H)        Dose:  5 mg   Take 1 tablet (5 mg) by mouth daily   Quantity:  30 tablet   Refills:  6         STOP taking     amLODIPine 10 MG tablet   Commonly known as:  NORVASC            sildenafil 50 MG cap/tab   Commonly known as:  VIAGRA                Where to get your medicines      These medications were sent to [facility] - [place], [place] - [address]  [address], [facility] [address]     Phone:  [phone]     furosemide 20 MG tablet    sildenafil 20 MG tablet                Protect others around you: Learn how to safely use, store and throw away your medicines at www.disposemymeds.org.             Medication List: This is a list of all your medications and when to take them. Check marks below indicate your daily home schedule. Keep this list as a reference.      Medications           Morning Afternoon Evening Bedtime As Needed    albuterol 108 (90 BASE) MCG/ACT Inhaler   Commonly known as:  PROAIR HFA/PROVENTIL HFA/VENTOLIN HFA   Inhale 2 puffs into the lungs every 6 hours as needed for shortness of breath / dyspnea   Last time this was given:  2 puffs on 4/24/2017  4:24 PM                                   ASPIRIN EC PO   Take 81 mg by mouth daily   Last time this was given:  81 mg on 4/28/2017  9:06 AM                                   atorvastatin 20 MG tablet   Commonly known as:  LIPITOR   Take 1 tablet (20 mg) by mouth daily   Last time this was given:  20 mg on 4/28/2017  9:05 AM                                   blood glucose monitoring lancets   Use to test blood sugar 2 times daily or as directed.  102 lancets per box.  3 month supply.                                      blood glucose monitoring meter device kit   Commonly known as:  no brand specified   Use to test blood sugar 2 times daily.                                blood glucose monitoring test strip   Commonly known as:  ACCU-CHEK RONNIE PLUS   Use to test blood sugar 2 times daily or as directed.  3 month supply.                                ciclopirox 0.77 % cream   Commonly known as:  LOPROX   Apply topically 2 times daily To feet and toenails.                                       fluticasone-vilanterol 100-25 MCG/INH oral inhaler   Commonly known as:  BREO ELLIPTA   Inhale 1 puff into the lungs daily   Last time this was given:  1 puff on 4/28/2017  9:06 AM                                   furosemide 20 MG tablet   Commonly known as:  LASIX   Take 3 tablets (60 mg) by mouth 2 times daily   Last time this was given:  60 mg on 4/28/2017  9:05 AM                                      inFLIXimab 100 MG injection   Commonly known as:  REMICADE   Inject 100 mg into the vein every 28 days                                levothyroxine 137 MCG tablet   Commonly known as:  SYNTHROID/LEVOTHROID   Take 1 tablet (137 mcg) by mouth daily   Last time this was given:  137 mcg on 4/28/2017  9:06 AM                                   methotrexate 2.5 MG tablet CHEMO   Take 3 tablets (7.5 mg) by mouth once a week On Mondays                                metoprolol 100 MG tablet   Commonly known as:  LOPRESSOR   Take 1 tablet (100 mg) by mouth 2 times daily   Last time this was given:  100 mg on 4/28/2017  9:06 AM                                      mirtazapine 15 MG tablet   Commonly known as:  REMERON   Take 15 mg by mouth At Bedtime.   Last time this was given:  15 mg on 4/27/2017  8:57 PM                                   MULTIVITAMIN & MINERAL PO   Take 1 tablet by mouth daily.                                   polyethylene glycol powder   Commonly known as:  MIRALAX   Take 17 g (1 capful) by mouth daily                                   sildenafil 20 MG tablet   Commonly known as:  REVATIO/VIAGRA   Take 1 tablet (20 mg) by mouth 3 times daily   Last time this was given:  20 mg on 4/28/2017 10:52 AM                                         tiotropium 18 MCG capsule   Commonly known as:  SPIRIVA HANDIHALER   Inhale contents of one capsule daily.                                   Urea 40 % Crea   Commonly known as:  CARMOL 40   To feet daily                                warfarin 5 MG tablet   Commonly  known as:  COUMADIN   Take 1 tablet (5 mg) by mouth daily   Last time this was given:  7.5 mg on 4/27/2017  6:13 PM

## 2017-04-23 LAB
ALBUMIN SERPL-MCNC: 3.3 G/DL (ref 3.4–5)
ALP SERPL-CCNC: 108 U/L (ref 40–150)
ALT SERPL W P-5'-P-CCNC: 31 U/L (ref 0–70)
ANION GAP SERPL CALCULATED.3IONS-SCNC: 8 MMOL/L (ref 3–14)
AST SERPL W P-5'-P-CCNC: 27 U/L (ref 0–45)
BILIRUB SERPL-MCNC: 1.2 MG/DL (ref 0.2–1.3)
BUN SERPL-MCNC: 37 MG/DL (ref 7–30)
CALCIUM SERPL-MCNC: 8.6 MG/DL (ref 8.5–10.1)
CHLORIDE SERPL-SCNC: 105 MMOL/L (ref 94–109)
CO2 SERPL-SCNC: 25 MMOL/L (ref 20–32)
CREAT SERPL-MCNC: 1.84 MG/DL (ref 0.66–1.25)
CRP SERPL-MCNC: 24 MG/L (ref 0–8)
ERYTHROCYTE [DISTWIDTH] IN BLOOD BY AUTOMATED COUNT: 16.3 % (ref 10–15)
ERYTHROCYTE [SEDIMENTATION RATE] IN BLOOD BY WESTERGREN METHOD: 44 MM/H (ref 0–20)
FLUAV H1 2009 PAND RNA SPEC QL NAA+PROBE: NEGATIVE
FLUAV H1 RNA SPEC QL NAA+PROBE: NEGATIVE
FLUAV H3 RNA SPEC QL NAA+PROBE: NEGATIVE
FLUAV RNA SPEC QL NAA+PROBE: NEGATIVE
FLUBV RNA SPEC QL NAA+PROBE: NEGATIVE
GFR SERPL CREATININE-BSD FRML MDRD: 36 ML/MIN/1.7M2
GLUCOSE BLDC GLUCOMTR-MCNC: 118 MG/DL (ref 70–99)
GLUCOSE SERPL-MCNC: 136 MG/DL (ref 70–99)
HADV DNA SPEC QL NAA+PROBE: NEGATIVE
HADV DNA SPEC QL NAA+PROBE: NEGATIVE
HCT VFR BLD AUTO: 39.8 % (ref 40–53)
HGB BLD-MCNC: 12.8 G/DL (ref 13.3–17.7)
HMPV RNA SPEC QL NAA+PROBE: NEGATIVE
HPIV1 RNA SPEC QL NAA+PROBE: NEGATIVE
HPIV2 RNA SPEC QL NAA+PROBE: NEGATIVE
HPIV3 RNA SPEC QL NAA+PROBE: NEGATIVE
INR PPP: 2.06 (ref 0.86–1.14)
MCH RBC QN AUTO: 30.7 PG (ref 26.5–33)
MCHC RBC AUTO-ENTMCNC: 32.2 G/DL (ref 31.5–36.5)
MCV RBC AUTO: 95 FL (ref 78–100)
MICROBIOLOGIST REVIEW: NORMAL
MRSA DNA SPEC QL NAA+PROBE: NORMAL
PLATELET # BLD AUTO: 339 10E9/L (ref 150–450)
POTASSIUM SERPL-SCNC: 4 MMOL/L (ref 3.4–5.3)
PROT SERPL-MCNC: 7.6 G/DL (ref 6.8–8.8)
RBC # BLD AUTO: 4.17 10E12/L (ref 4.4–5.9)
RHINOVIRUS RNA SPEC QL NAA+PROBE: NEGATIVE
RSV RNA SPEC QL NAA+PROBE: NEGATIVE
RSV RNA SPEC QL NAA+PROBE: NEGATIVE
SODIUM SERPL-SCNC: 138 MMOL/L (ref 133–144)
SPECIMEN SOURCE: NORMAL
SPECIMEN SOURCE: NORMAL
WBC # BLD AUTO: 11.9 10E9/L (ref 4–11)

## 2017-04-23 PROCEDURE — 25000132 ZZH RX MED GY IP 250 OP 250 PS 637: Mod: GY | Performed by: INTERNAL MEDICINE

## 2017-04-23 PROCEDURE — 40000141 ZZH STATISTIC PERIPHERAL IV START W/O US GUIDANCE

## 2017-04-23 PROCEDURE — 99233 SBSQ HOSP IP/OBS HIGH 50: CPT | Performed by: INTERNAL MEDICINE

## 2017-04-23 PROCEDURE — 86140 C-REACTIVE PROTEIN: CPT | Performed by: PHYSICIAN ASSISTANT

## 2017-04-23 PROCEDURE — A9270 NON-COVERED ITEM OR SERVICE: HCPCS | Mod: GY | Performed by: PHYSICIAN ASSISTANT

## 2017-04-23 PROCEDURE — 25000128 H RX IP 250 OP 636: Performed by: INTERNAL MEDICINE

## 2017-04-23 PROCEDURE — 99221 1ST HOSP IP/OBS SF/LOW 40: CPT | Mod: GC | Performed by: INTERNAL MEDICINE

## 2017-04-23 PROCEDURE — 80053 COMPREHEN METABOLIC PANEL: CPT | Performed by: PHYSICIAN ASSISTANT

## 2017-04-23 PROCEDURE — 25000128 H RX IP 250 OP 636: Performed by: PHYSICIAN ASSISTANT

## 2017-04-23 PROCEDURE — 85027 COMPLETE CBC AUTOMATED: CPT | Performed by: PHYSICIAN ASSISTANT

## 2017-04-23 PROCEDURE — 25000132 ZZH RX MED GY IP 250 OP 250 PS 637: Mod: GY | Performed by: PHYSICIAN ASSISTANT

## 2017-04-23 PROCEDURE — 12000001 ZZH R&B MED SURG/OB UMMC

## 2017-04-23 PROCEDURE — A9270 NON-COVERED ITEM OR SERVICE: HCPCS | Mod: GY | Performed by: INTERNAL MEDICINE

## 2017-04-23 PROCEDURE — 85652 RBC SED RATE AUTOMATED: CPT | Performed by: PHYSICIAN ASSISTANT

## 2017-04-23 PROCEDURE — 36415 COLL VENOUS BLD VENIPUNCTURE: CPT | Performed by: PHYSICIAN ASSISTANT

## 2017-04-23 PROCEDURE — 85610 PROTHROMBIN TIME: CPT | Performed by: PHYSICIAN ASSISTANT

## 2017-04-23 PROCEDURE — 00000146 ZZHCL STATISTIC GLUCOSE BY METER IP

## 2017-04-23 PROCEDURE — 87640 STAPH A DNA AMP PROBE: CPT | Performed by: PHYSICIAN ASSISTANT

## 2017-04-23 PROCEDURE — 87641 MR-STAPH DNA AMP PROBE: CPT | Performed by: PHYSICIAN ASSISTANT

## 2017-04-23 RX ORDER — WARFARIN SODIUM 5 MG/1
5 TABLET ORAL
Status: COMPLETED | OUTPATIENT
Start: 2017-04-23 | End: 2017-04-23

## 2017-04-23 RX ORDER — FUROSEMIDE 10 MG/ML
40 INJECTION INTRAMUSCULAR; INTRAVENOUS
Status: DISCONTINUED | OUTPATIENT
Start: 2017-04-23 | End: 2017-04-26

## 2017-04-23 RX ORDER — LORAZEPAM 0.5 MG/1
0.5 TABLET ORAL EVERY 4 HOURS PRN
Status: DISCONTINUED | OUTPATIENT
Start: 2017-04-23 | End: 2017-04-28 | Stop reason: HOSPADM

## 2017-04-23 RX ADMIN — SENNOSIDES AND DOCUSATE SODIUM 2 TABLET: 8.6; 5 TABLET ORAL at 08:26

## 2017-04-23 RX ADMIN — ALBUTEROL SULFATE 2 PUFF: 90 AEROSOL, METERED RESPIRATORY (INHALATION) at 02:30

## 2017-04-23 RX ADMIN — METOPROLOL TARTRATE 100 MG: 50 TABLET, FILM COATED ORAL at 08:26

## 2017-04-23 RX ADMIN — FUROSEMIDE 40 MG: 10 INJECTION, SOLUTION INTRAVENOUS at 16:51

## 2017-04-23 RX ADMIN — LORAZEPAM 0.5 MG: 0.5 TABLET ORAL at 06:58

## 2017-04-23 RX ADMIN — ALBUTEROL SULFATE 2 PUFF: 90 AEROSOL, METERED RESPIRATORY (INHALATION) at 18:08

## 2017-04-23 RX ADMIN — SENNOSIDES AND DOCUSATE SODIUM 2 TABLET: 8.6; 5 TABLET ORAL at 19:01

## 2017-04-23 RX ADMIN — UMECLIDINIUM 1 PUFF: 62.5 AEROSOL, POWDER ORAL at 08:25

## 2017-04-23 RX ADMIN — ASPIRIN 81 MG: 81 TABLET, COATED ORAL at 08:25

## 2017-04-23 RX ADMIN — CEFTRIAXONE 1 G: 1 INJECTION, POWDER, FOR SOLUTION INTRAMUSCULAR; INTRAVENOUS at 01:32

## 2017-04-23 RX ADMIN — CEFTRIAXONE 1 G: 1 INJECTION, POWDER, FOR SOLUTION INTRAMUSCULAR; INTRAVENOUS at 23:39

## 2017-04-23 RX ADMIN — FLUTICASONE FUROATE AND VILANTEROL TRIFENATATE 1 PUFF: 100; 25 POWDER RESPIRATORY (INHALATION) at 08:25

## 2017-04-23 RX ADMIN — LEVOTHYROXINE SODIUM 137 MCG: 137 TABLET ORAL at 08:26

## 2017-04-23 RX ADMIN — LORAZEPAM 0.5 MG: 0.5 TABLET ORAL at 19:00

## 2017-04-23 RX ADMIN — WARFARIN SODIUM 5 MG: 5 TABLET ORAL at 18:07

## 2017-04-23 RX ADMIN — ATORVASTATIN CALCIUM 20 MG: 10 TABLET, FILM COATED ORAL at 08:26

## 2017-04-23 RX ADMIN — FUROSEMIDE 40 MG: 40 TABLET ORAL at 08:26

## 2017-04-23 RX ADMIN — DOXYCYCLINE HYCLATE 100 MG: 100 CAPSULE, GELATIN COATED ORAL at 08:26

## 2017-04-23 RX ADMIN — DOXYCYCLINE HYCLATE 100 MG: 100 CAPSULE, GELATIN COATED ORAL at 19:00

## 2017-04-23 RX ADMIN — METOPROLOL TARTRATE 100 MG: 50 TABLET, FILM COATED ORAL at 19:01

## 2017-04-23 NOTE — PLAN OF CARE
Problem: Goal Outcome Summary  Goal: Goal Outcome Summary  Outcome: No Change  Pt alert and oriented X 4. VSS. C/O shortness of breath , lung sounds diminished, prn albuterol inhaler given,  Nasal cannula changed to oxymask. Oxymask on 5L, O2 sats 96 %. Pt reports relief.   Ceftriaxone infused per order. MRSA swab sent down to lab. New PIV on left arm as right one accidentally pulled out by pt in sleep. Voiding small amount frequently. Denies pain. P: Monitor respiratory status closely. Notify MD with change in status. Continue with POC.     Around 0610, pt asked for bedpan, placed bed pan underneath, within one minute pt called complaining of SOB, sat pt upright, O2 sats still in high 90's on 5L via oxymask. Pt reports feeling slightly better after few minutes. Called MD per pt's request. MD came to assess pt. Ativan 0.5 mg PO given per order.

## 2017-04-23 NOTE — PLAN OF CARE
Problem: Goal Outcome Summary  Goal: Goal Outcome Summary  Outcome: No Change  Patient denies pain and shortness of breath. Satting 93-95% on 2L via NC. Patient using bedpan and urinal. Encouraged to get up to bathroom but patient afraid he'll get too short of breath and not be able to recover in a timely manner. Good appetite with meals. Anxious and wants to see Cardiologists. VSS. Continue with plan of care and notify MD of any changes.

## 2017-04-23 NOTE — PROGRESS NOTES
Admission:  Patient arrived around 1900 from the emergency department. Placed in droplet isolation per order. His sats are mid to high 90's on 3 liters of oxygen per nasal cannula. Received several IV antibiotics. Lungs are diminished throughout and fine crackles noted in left lower lobe. Rust colored sputum noted once and sent to the lab. He say he has sarcoidosis. He denies pain. Medications given as ordered and admit profile completed. Patient would like to some information about advanced directives. Ate a soup, applesauce and drank milk. Will monitor respiratory status closely.

## 2017-04-23 NOTE — CONSULTS
[facility]    Pulmonary, Allergy, Critical Care and Sleep Medicine    Initial Consultation  04/23/2017    Rohan Monroe MRN# [id number]   Age: 71 year old YOB: 1945     Date of Admission: 4/22/2017  Reason for Consultation: Hypoxemic respiratory failure/sarcoidosis flare  Requesting Team: Gold 1    Primary care provider: Jamie Foster     Assessment and Recommendations:    Rohan Monroe is a 71 year old male with a  history of sarcoidosis (pulmonary and presumed cardiac involvement treated with MTX and Remicade infusions), aflutter s/p ablation in Jan 2017 (on warfarin), COPD, Hep C (treated with SVR), and HFpEF who is admitted from the cardiology clinic for dyspnea on exertion.     Acute on chronic hypoxemic respiratory failure:  Complicated history of sarcoidosis (both pulmonary and cardiac involvement) currently being managed with MTX (7.5 mg qweek) and Remicaide injections (now [id number]). His symptoms are primarily transient episodes of dyspnea with exertion. He feels very strongly that these are similar to pain he had when he had his coronary stents placed. His CT does show new left pleural effusion and new LLL infiltrates compared to previous CT in 2015. Differential is broad at this point but would include infection, sarcoidosis flare, amiodarone or MTX toxicity. I'm more suspicious that this represents episodic coronary events. Cardiology has been consulted to assist in evaluation of dyspnea.   -Continue with antibiotics but would not treat for prolonged period of time (maybe total of 5 days)   -Follow up infectious w/u  -Would continue to hold MTX  -I think RHC/Echo would be appropriate, cards involved    Seen and discussed with Dr. Perlman Andrew Caraganis, MD  Pulmonary and Critical Care Fellow   Pager [phone]    Chief Complaint and History of Present Illness:    CC:  Dyspnea on exertion     HPI:   71 year old male with history of  sarcoidosis (pulmonary and presumed cardiac involvement treated with MTX and Remicade infusions), aflutter s/p ablation in Jan 2017 (on warfarin), COPD, Hep C (treated with SVR), and HFpEF who is admitted from the cardiology clinic for dyspnea on exertion.     Patient with a very complicated history but in short has been maintained on MTX and Remicade infusions with good success. He is followed by Dr. Perlman and last saw him Feb 24th. At that time, his Remicade infusions had been spaced out to q8 weeks but had worsening of symptoms that coincided with an admission in January for a flutter that required ablation. His Remicade was shortened to q6 weeks then that he thought helped initially but soon after had more progressive dyspnea. Unfortunately, his symptoms continued and his Remicade was shortened to q4 weeks and recently finished a course of Azithromycin that helped very little.     He describes transient episodes of breathlessness when doing even the littlest exertion (combing his hair). During these episodes, his oxygen desaturation does not go down but he nearly passes out and can not take a full breath. He does associate some chest pressure with it as well. He normally has to rest for a few minutes before he is able to catch his breath. He has very little cough but does note when he coughs up something (typically grey) he feels better. He denies fever, sick contacts, or recent travel.     During his admission in January, he was started on Amiodarone but was unable to tolerate after one week because of increased confusion. He saw Dr. Paige for his cardiac sarcoidosis in March at which time his Lasix dose was increased to 80 mg BID, which he thought helped for a short period of time. Unfortunately, the dose has been reduced to 40 BID because of renal failure. Plans were discussed at that time for potential RHC to evaluate his dyspnea in the setting of CAD with stent placement in 2009.     In the ER, he was  hypoxic to 87% on RA. Initially given Vanc/Zosyn/Azithro. CT chest showed new left sided pleural effusion and LLL consolidation/GGO that is new compared to CT from March 2015. PCT was negative, BNP elevated at 2832 (was 13K in Aj). Admitted to the floors and continued on CTX and doxy. Team spoke with pulmonary, and steroids were not started.     Review of Systems:  Complete 12 point ROS negative unless mentioned in HPI  Histories, Prior to Admission Medications, Allergies:    Past Medical History:  Past Medical History:   Diagnosis Date     Cataract of both eyes      Chronic infection     Hep C     Congestive heart failure, unspecified      Depressive disorder, not elsewhere classified     Depression (non-psychotic)     ERM OS (epiretinal membrane, left eye)      Generalized osteoarthrosis, unspecified site      Glaucoma suspect      Hypertension      Lichen planus      Other psoriasis      PVD (posterior vitreous detachment), left eye      Sarcoidosis (H)      Sarcoidosis (H)      Type II or unspecified type diabetes mellitus without mention of complication, not stated as uncontrolled      Unspecified hypothyroidism     Hypothyroidism     Unspecified viral hepatitis C without hepatic coma      Viral warts, unspecified        Past Surgical History:  Past Surgical History:   Procedure Laterality Date     ANESTHESIA CARDIOVERSION N/A 1/19/2017    Procedure: ANESTHESIA CARDIOVERSION;  Surgeon: GENERIC ANESTHESIA PROVIDER;  Location: [facility] OR     ANESTHESIA CARDIOVERSION N/A 1/23/2017    Procedure: ANESTHESIA CARDIOVERSION;  Surgeon: GENERIC ANESTHESIA PROVIDER;  Location: [facility] OR     ANESTHESIA CARDIOVERSION N/A 1/24/2017    Procedure: ANESTHESIA CARDIOVERSION;  Surgeon: GENERIC ANESTHESIA PROVIDER;  Location: [facility] OR     ARTHROPLASTY HIP  8/24/2011    Procedure:ARTHROPLASTY HIP; Right Total Hip Arthroplasty  Choice anesthesia; Surgeon:LESLI WILKINSON; Location:[facility] OR     BIOPSY       cardiac stent      s/p     CARDIAC  SURGERY       CATARACT IOL, RT/LT  9/15/2015    LE     COLONOSCOPY       HC REMOVAL OF TONSILS,<11 Y/O      Tonsils <10y.o.     HC REPAIR INCISIONAL HERNIA,REDUCIBLE      Hernia Repair, Incisional, Unilateral     JOINT REPLACEMENT      1 month ago--right hip     LIGATN/STRIP LONG & SHORT SAPHEN         Past Social History:  Social History     Social History     Marital status:      Spouse name: N/A     Number of children: 2     Years of education: N/A     Occupational History      [employer]     Social History Main Topics     Smoking status: Former Smoker     Packs/day: 1.00     Years: 30.00     Types: Cigarettes     Start date: 12/30/1960     Quit date: 7/22/1994     Smokeless tobacco: Never Used     Alcohol use No     Drug use: No     Sexual activity: Yes     Partners: Female     Other Topics Concern     Blood Transfusions No     Exercise Yes     Social History Narrative    Dairy/d 2 servings/d    Caffeine little servings/d    Exercise 3 x week    Sunscreen used - Yes    Seatbelts used - Yes    Working smoke/CO detectors in the home - Yes    Guns stored in the home - No    Self Breast Exams - NOT APPLICABLE    Self Testicular Exam - No    Eye Exam up to date - Yes    Dental Exam up to date - Yes    Pap Smear up to date - NOT APPLICABLE    Mammogram up to date - NOT APPLICABLE    PSA up to date - Yes    Dexa Scan up to date - NOT APPLICABLE    Flex Sig / Colonoscopy up to date - Yes    Immunizations up to date - Unsure    Abuse: Current or Past(Physical, Sexual or Emotional)- No    Do you feel safe in your environment - Yes       Family History:  Family History   Problem Relation Age of Onset     HEART DISEASE Father      irreg heart beat     Circulatory Father      varicose veins     Prostate Cancer Father      HEART DISEASE Mother      heart attack     Arthritis Mother      OSTEOPOROSIS Mother      Thyroid Disease Mother      Eye Disorder Maternal Grandmother      glaucoma     DIABETES Sister       type 2     Kidney Cancer Sister      DIABETES Sister      Glaucoma No family hx of      Macular Degeneration No family hx of      CANCER No family hx of      no skin cancer       Medications:    warfarin  5 mg Oral ONCE at 18:00     senna-docusate  1-2 tablet Oral BID     aspirin EC EC tablet 81 mg  81 mg Oral Daily     atorvastatin  20 mg Oral Daily     fluticasone-vilanterol  1 puff Inhalation Daily     furosemide  40 mg Oral BID     levothyroxine  137 mcg Oral Daily     metoprolol  100 mg Oral BID     mirtazapine  15 mg Oral At Bedtime     umeclidinium  1 puff Inhalation Daily     cefTRIAXone  1 g Intravenous Q24H     doxycycline  100 mg Oral Q12H JOSE MANUEL     LORazepam, naloxone, - MEDICATION INSTRUCTIONS -, acetaminophen, bisacodyl, ondansetron **OR** ondansetron, albuterol, polyethylene glycol, Warfarin Therapy Reminder    Allergies:     Allergies   Allergen Reactions     Prednisone Other (See Comments)     He reports that he can't sleep for days and can't use small to massive doses of prednisone   Pt. Does not do well with high doses of Prednisone, His MD says that Prednisone is counter indicated. Prednisone failed to treat his sarcoidosis        Physical Exam:    Temp:  [95.8  F (35.4  C)-97.6  F (36.4  C)] 95.8  F (35.4  C)  Heart Rate:  [71-84] 78  Resp:  [11-24] 24  BP: (115-175)/() 143/83  SpO2:  [87 %-100 %] 95 %    Intake/Output Summary (Last 24 hours) at 04/23/17 1134  Last data filed at 04/23/17 1055   Gross per 24 hour   Intake              480 ml   Output              950 ml   Net             -470 ml       General: laying in bed in NAD, 93% on RA  HEENT: anicteric, moist mucosa  Neck: no palpable lymphadenopathy, no JVD noted  Chest: diminished breath sounds on the left, no wheezing  Cardiac: RRR no murmurs  Abdomen: Soft, flat, non tender, active BS  Extremities: No LE Edema  Neuro: A&Ox3, no focal deficits   Skin: no rash noted    Laboratory, imaging, and microbiologic data:    All laboratory  and imaging data reviewed.    PCT: 0.06  Lactate: 0.9  BNP: 2823 (prev 13K in Aj)  Strep pneumo/legionella: neg  CRP: 27->24  Sputum cx: pending  RVP: pending  Influenza: neg      Attending Note:    The patient was seen examined by me with the pulmonary fellow, I have personally reviewed the imaging studies, lab and culture results.  The note reflects our joint findings, assessment and plan.  Pt known to me, overall reviewing PFTs and Chest CT pulm status appears stable other than new pleural effusion.  Pt's symptoms new and given history needs eval for cardiac process.      David Perlman, M.D.  Pulmonary, Allergy and Critical Care.

## 2017-04-23 NOTE — PROGRESS NOTES
[facility], [place]    Internal Medicine Progress Note - Gold Service      Assessment & Plan   Rohan Monroe is a 71 year old male with hx of Atrial flutter s/p ablation on ac, CAD s/p atents, CHF, Sarcoidosis involving heart and lungs on MTX and remicade q4 wks, HLD, COPD, hypothyroidism, DM2, hep C cirrhosis s/p treatment w SVR who was sent from cardiology clinic with worsening ACEVES found to have hypoxemia.    Acute on chronic hypoxic respiratory failure: He has hx of CHF, COPD, lung/heart sarcoidosis who has been experiencing dyspnea for some time. States sx are particularly worse for the past 2 weeks with ACEVES. Per pt, completed z pack w some relief though symptoms have recurred.  - CT-PE 4/22: negative for PE, new left lower lobe consolidative and ground glass opacities, layering moderate left pleural effusion, increased mediastinal lymphadenopathy, chronic emphysema.  - viral panel negative  - in ED, given vancomycin, azithromycin and zosyn, currently on ceftriaxone and doxycycline  - CRP 24  - sputum culture sent  - O2 via NC 2L and sat 94% this am, but gets dyspneic w conversation without desaturation  - prn nebs  - pulmonary consult requested  - Per sarcoid cardiology clinic note from 3/9, plan was to proceed w RHC to further eval etiology of dyspnea: will consult cardiology  - He is on immunosuppression, and thus differential dx is broad at this time    Sarcoidosis: heart and lung involvement. Most recent PFT's (2/24/17) FVC 2.74 (69% pred), FEV1 1.16 (39% pred) Currently maintained on Remicade every 4 weeks (due for dose on 4/26/17), and methotrexate  - continuing PTA regimen. Will resume MTX tomorrow    Diastolic HF: last Echo 2/2017 with EF 50-55%, LV function indeterminate, global RV function mildly reduced. PTA lasix. Appears euvolemic on exam. NT-BNP 2832, previously 13K (1/2017)  - on PTA lasix 40mg bid. Prior nephrology note indicated lasix 80bid and pt states he  felt better with higher dose for a shortwhile.    CAD: hx of PCI w ROSALIE to mid LAD and D2  - continuing PTA asa statin and BB  - as above, cardiology consult for possible RHC    Afib/a flutter: hx of recent ablation and amioradone loading, but felt worse on amiodarone and it was discontinued.  - on admission, EKG sinus  - continue PTA coumadin and metoprolol  - INR subtherapeutic on admission. Pharmacy to help dose coumadin    HTN: BP stable on admission    COPD: not on home O2  - contineu PTA regimen    CKD3: Cre 1.8 on admission which is close to his baseline. Follows w Dr Ervin here, just recently seen.    Dm2: a1c 7.5% in Jan 2017. Diet controlled  - continue carb controlled diet.    Diet: Moderate Consistent CHO Diet  Fluids: n/a  DVT Prophylaxis: Warfarin  Code Status: Full Code    Disposition Plan   Expected discharge: 2 - 3 days; recommended to prior living arrangement once eval completed.     Entered: Sheila Sands 04/23/2017, 10:41 AM   Information in the above section will display in the discharge planner report.      The patient's care was discussed with the Patient.    Sheila Sands  Internal Medicine Staff Hospitalist Service  [facility]  Pager: [phone]  Please see sticky note for cross cover information    Interval History   Feels sob with minimal exertion. Otherwise, no CP, no abdominal pain. Other complete ROS reviewed and negative.      Data reviewed today: I reviewed all medications, new labs and imaging results over the last 24 hours. I personally reviewed the chest CT image(s) showing consolidation.    Physical Exam   Vital Signs: Temp: 95.8  F (35.4  C) Temp src: Axillary BP: 143/83   Heart Rate: 78 Resp: 24 SpO2: 95 % O2 Device: Oxymask Oxygen Delivery: 5 LPM  Weight: 191 lbs 4.8 oz  General Appearance: Not in acute distress, but tachypneic and unable to speak long sentences.  Respiratory: distant breath sound on L lower lung field.  Cardiovascular: RRR no murmur  GI:  soft  Skin: no peripheral edema         Data   Data     Recent Labs  Lab 04/23/17  0635 04/22/17  1248 04/18/17  1231   WBC 11.9* 10.2 9.2   HGB 12.8* 12.5* 13.2*   MCV 95 94 94    348 360   INR 2.06* 1.61*  --     139 138   POTASSIUM 4.0 3.6 4.2   CHLORIDE 105 102 104   CO2 25 28 24   BUN 37* 42* 49*   CR 1.84* 1.98* 2.32*   ANIONGAP 8 9 10   RAFFY 8.6 9.2 9.2   * 111* 123*   ALBUMIN 3.3* 3.4 3.5   PROTTOTAL 7.6 7.9  --    BILITOTAL 1.2 1.0  --    ALKPHOS 108 109  --    ALT 31 24  --    AST 27 28  --    TROPI  --  <0.015The 99th percentile for upper reference range is 0.045 ug/L.  Troponin values in the range of 0.045 - 0.120 ug/L may be associated with risks of adverse clinical events.  --

## 2017-04-23 NOTE — H&P
[facility]   History & Physical    Rohan Monroe MRN# [id number]   Age: 71 year old YOB: 1945     Date of Admission: 4/22/2017  Service: Gold Night    Primary Care Provider: Jamie Foster         Assessment and Plan:   Rohan Monroe is a 71 year old male with PMH of Atrial Flutter (on warfarin), CAD s/p stents, CHF, HLD, COPD (no home O2 use), hypothyroidism, DMII (diet controlled),  Hep C cirrhosis (s/p treatment- SVR), depression, and Sarcoidosis of the heart and lungs who presents from Cardiology Clinic d/t ~ 2 week hypoxia, ACEVES and worsening shortness of breath.        A/c hypoxic respiratory failure: In patient with hx of CHF, COPD, and Lung/heart sarcoidosis with ~ 2 week hx of productive cough, shortness of breath and progressive aceves. Patient completed Z-pack 4 days ago and reports sx have worsened. No fevers, chills, night sweats, weight gain or loss, or PND noted. Presented to ED from Cardiology clinic. ED evaluation revealed D-Dimer 1.4, LA 0.6, NT-BNP 2832 (13K-1/2017). Negative CT-PE. Negative Influenza. Afebrile, no leukocytosis. CT with new left lower lobe consolidative and ground glass opacities, layering moderate left pleural effusion, increased mediastinal lymphadenopathy, chronic emphysema. Sats initially 87% on RA with increase to 94% on 2L O2. Initiated on Vancomycin, Azithromycin, and Zosyn. Etiology of sx suspicious for Sarcoid flare vs amiodarone toxicity vs MTX toxicity vs atypical infection (given immunosuppression). Discussed with Pulmonology who agree with holding off on steroids (paitnet unable to tolerate) for now and consult in am.    - Respiratory Viral panel- Pending    - Ceftriaxone and Doxycycline for now for possible CAP  - Add procal, CRP, ESR, Legionella and strep pneumoniae antigen  - sputum culture  - O2 PRN to maintain sats > 90%  - Pulmonology consult- appreciate recs  - Continue Home Inhalers  - PRN  albuterol Nebs  - Continuous pulse Ox  - Will trend troponin tonight  - Continue to closely monitor      Sarcoidosis: Heart (presumed- as he has geneva filling pressures and Lung involvement. Most recent PFT's (2/24/17) FVC 2.74 (69% pred), FEV1 1.16 (39% pred) Currently maintained on Remicade every 4 weeks (due for dose on 4/26/17), and methotrexate. No Folic acid use on MTX- would recommend.  - Continue PTA regimen. Did note order MTX tonight- if patient admitted on 4/24/17 pleased order. Also recommend Folic acid supplementation     Diastolic HF: ECHO (2/2017) with EF 50-55%, LV function indeterminate, global RV function mildly reduced. PTA lasix. Appears euvolemic on exam. 1+ piitting edema on left ankle, though could be related to recent Norvasc use. NT-BNP 2832, previously 13K (1/2017)  - Continue PTA lasix    CAD: 4/2008. S/p PCI with ROSALIE to the mid-LAD and D2. PTA ASA, statin and beta blocker. Per recent Cardiology clinic note, plan to reevaluate coronaries soon.   - Continue PTA meds  - F/u with Cardiology as scheduled    A. Fib/flutter: [id number]. Recent ablation and amiodarone loading, though developed toxicity and amiodarone d/c. EKG with sinus rhythm, PVCs', prolonged Qtc. PTA warfarin, metoprolol. INR subtherapeutic at 1.61.    - Continue PTA warfarin. Pharmacy consult to dose  - Continue metoprolol         COPD: Reports controlled. No home O2 use. PTA Breo ellipta, Singulair, albuterol  - Continue     HTN: BP stable on admission. PTA lasix, Norvasc, and metoprolol.      CKD III: with significant proteinuria. Biopsy (11/13/15) consistent with diabetic and vascular disease. Follows with [facility], Dr. Ervin (4/2017) with plan for 3 month f/u  - Avoid nephrotoxic medication, hypotension, dehydration    Hypothyroidism: TSH wnl 1.84 (12/2016). PTA synthroid.  - Continue    DMII: Hgb A1c 7.5 (1/20/17). Diet controlled per patient, though A1c not indicative of this.    - BG checks BID  - MOD CHO  diet      Discussed with Dr. Sanabria     FEN: No IVF, MOD CHO diet  Prophylaxis: Warfarin  Code Status: Full Code    Courtney Whitley PA-C  Internal Medicine Hospitalist Service  [facility]  Pager: [phone]         Chief Complaint:   Shortness of breath, sharma         History of Present Illness:   Rohan Monroe is a 71 year old male with PMH as outlined above who presented to Cariology clinic with complaints of ~ 2 week hx of sob, sharma, and productive cough. Patient reports that he was admitted in 1/2017 for A.flutter, elevated troponin and A/c diastolic HF- s/p ablation, amiodarone loading and diuresis. Since that time patient reports that he has not been at his recent  pulmonary status. ~ 2 weeks ago patient reports significant sputum production for which he received a Z-pack and slight improvement in sputum production, though no change in sob or sharma. He is only able to ambulate ~ 10 feet and reports sx with even hair brushing. Rest make sx worse and exertion makes worse. No PND, orthopnea, CP, palpitations, syncope. Sputum is now clear and scant, cough is reported as dry. No fevers, chills, or cold sx noted.     Currently, patient resting in hospital bed w/o acute complaint. He looks for jackson to Pulmonology consult.                Review of Systems:   A 10 point review of systems was performed and is negative unless otherwise noted in HPI.          Past Medical History:     Past Medical History:   Diagnosis Date     Cataract of both eyes      Chronic infection     Hep C     Congestive heart failure, unspecified      Depressive disorder, not elsewhere classified     Depression (non-psychotic)     ERM OS (epiretinal membrane, left eye)      Generalized osteoarthrosis, unspecified site      Glaucoma suspect      Hypertension      Lichen planus      Other psoriasis      PVD (posterior vitreous detachment), left eye      Sarcoidosis (H)      Sarcoidosis (H)      Type II or unspecified  type diabetes mellitus without mention of complication, not stated as uncontrolled      Unspecified hypothyroidism     Hypothyroidism     Unspecified viral hepatitis C without hepatic coma      Viral warts, unspecified              Past Surgical History:      Past Surgical History:   Procedure Laterality Date     ANESTHESIA CARDIOVERSION N/A 1/19/2017    Procedure: ANESTHESIA CARDIOVERSION;  Surgeon: GENERIC ANESTHESIA PROVIDER;  Location: [facility] OR     ANESTHESIA CARDIOVERSION N/A 1/23/2017    Procedure: ANESTHESIA CARDIOVERSION;  Surgeon: GENERIC ANESTHESIA PROVIDER;  Location: [facility] OR     ANESTHESIA CARDIOVERSION N/A 1/24/2017    Procedure: ANESTHESIA CARDIOVERSION;  Surgeon: GENERIC ANESTHESIA PROVIDER;  Location: [facility] OR     ARTHROPLASTY HIP  8/24/2011    Procedure:ARTHROPLASTY HIP; Right Total Hip Arthroplasty  Choice anesthesia; Surgeon:LESLI WILKINSON; Location:[facility] OR     BIOPSY       cardiac stent      s/p     CARDIAC SURGERY       CATARACT IOL, RT/LT  9/15/2015    LE     COLONOSCOPY       HC REMOVAL OF TONSILS,<11 Y/O      Tonsils <10y.o.     HC REPAIR INCISIONAL HERNIA,REDUCIBLE      Hernia Repair, Incisional, Unilateral     JOINT REPLACEMENT      1 month ago--right hip     LIGATN/STRIP LONG & SHORT SAPHEN               Family History:     Family History   Problem Relation Age of Onset     HEART DISEASE Father      irreg heart beat     Circulatory Father      varicose veins     Prostate Cancer Father      HEART DISEASE Mother      heart attack     Arthritis Mother      OSTEOPOROSIS Mother      Thyroid Disease Mother      Eye Disorder Maternal Grandmother      glaucoma     DIABETES Sister      type 2     Kidney Cancer Sister      DIABETES Sister      Glaucoma No family hx of      Macular Degeneration No family hx of      CANCER No family hx of      no skin cancer             Social History:     Social History   Substance Use Topics     Smoking status: Former Smoker     Packs/day: 1.00     Years: 30.00     Types:  Cigarettes     Start date: 12/30/1960     Quit date: 7/22/1994     Smokeless tobacco: Never Used     Alcohol use No             Medications:     No current facility-administered medications on file prior to encounter.   Current Outpatient Prescriptions on File Prior to Encounter:  levothyroxine (SYNTHROID/LEVOTHROID) 137 MCG tablet Take 1 tablet (137 mcg) by mouth daily   furosemide (LASIX) 20 MG tablet Take 40 mg by mouth 2 times daily    amLODIPine (NORVASC) 10 MG tablet Take 1 tablet (10 mg) by mouth daily   warfarin (COUMADIN) 5 MG tablet Take 1 tablet (5 mg) by mouth daily   polyethylene glycol (MIRALAX) powder Take 17 g (1 capful) by mouth daily (Patient taking differently: Take 1 capful by mouth daily as needed for constipation )   tiotropium (SPIRIVA HANDIHALER) 18 MCG capsule Inhale contents of one capsule daily.   albuterol (PROAIR HFA/PROVENTIL HFA/VENTOLIN HFA) 108 (90 BASE) MCG/ACT Inhaler Inhale 2 puffs into the lungs every 6 hours as needed for shortness of breath / dyspnea   fluticasone-vilanterol (BREO ELLIPTA) 100-25 MCG/INH oral inhaler Inhale 1 puff into the lungs daily   inFLIXimab (REMICADE) 100 MG injection Inject 100 mg into the vein every 28 days    metoprolol (LOPRESSOR) 100 MG tablet Take 1 tablet (100 mg) by mouth 2 times daily   methotrexate 2.5 MG tablet Take 3 tablets (7.5 mg) by mouth once a week On Mondays (Patient taking differently: Take 2.5mg by mouth weekly on Mondays.)   atorvastatin (LIPITOR) 20 MG tablet Take 1 tablet (20 mg) by mouth daily   ASPIRIN EC PO Take 81 mg by mouth daily   Multiple Vitamins-Minerals (MULTIVITAMIN & MINERAL PO) Take 1 tablet by mouth daily.   mirtazapine (REMERON) 15 MG tablet Take 15 mg by mouth At Bedtime.   ciclopirox (LOPROX) 0.77 % cream Apply topically 2 times daily To feet and toenails.   blood glucose monitoring (ACCU-CHEK RONNIE PLUS) test strip Use to test blood sugar 2 times daily or as directed.  3 month supply.   blood glucose monitoring  "(ACCU-CHEK FASTCLIX) lancets Use to test blood sugar 2 times daily or as directed.  102 lancets per box.  3 month supply.   blood glucose monitoring (NO BRAND SPECIFIED) meter device kit Use to test blood sugar 2 times daily.   Urea (CARMOL 40) 40 % CREA To feet daily (Patient taking differently: Apply to feet daily as needed.)   sildenafil (VIAGRA) 50 MG tablet Take 2 tablets (100 mg) by mouth daily as needed for erectile dysfunction (Patient taking differently: Take 50 mg by mouth daily as needed for erectile dysfunction )            Allergies:     Allergies   Allergen Reactions     Prednisone Other (See Comments)     He reports that he can't sleep for days and can't use small to massive doses of prednisone   Pt. Does not do well with high doses of Prednisone, His MD says that Prednisone is counter indicated. Prednisone failed to treat his sarcoidosis              Physical Exam:   /71 (BP Location: Left arm)  Temp 97.3  F (36.3  C) (Oral)  Resp 16  Ht 1.753 m (5' 9\")  Wt 86.8 kg (191 lb 4.8 oz)  SpO2 93%  BMI 28.25 kg/m2   GENERAL: Pleasant male lying in bed. Able to talk in full sentences. No accessory muscle use.  Alert and oriented x 3. NAD.   HEENT: Anicteric sclera. PERRL. Mucous membranes moist and without lesions.   CV: RRR. S1, S2. No murmurs appreciated.   RESPIRATORY: Effort normal on 2L O2. Lungs: Right with scant wutgh scant exp wheeze. Left lower with faint crackle.  No rhonchi.   GI: Abdomen soft and non distended with normoactive bowel sounds present in all quadrants. No tenderness, rebound, guarding.   MUSCULOSKELETAL: No joint swelling or tenderness. Moves all extremities.   NEUROLOGICAL: No focal deficits. Strength 5/5 bilaterally in upper and lower extremities.   EXTREMITIES: 1+ pitting edema to ankle on RLE. LLE with appearance of chronic venous insufficiency.  Intact bilateral pedal pulses.   SKIN: No jaundice. No rashes to exposed skin areas. .          Labs:   CBC:  Recent Labs "   Lab Test  04/22/17   1248   WBC  10.2   RBC  4.14*   HGB  12.5*   HCT  39.0*   MCV  94   MCH  30.2   MCHC  32.1   RDW  16.0*   PLT  348       CMP:  Recent Labs   Lab Test  04/22/17   1248   NA  139   POTASSIUM  3.6   CHLORIDE  102   RAFFY  9.2   CO2  28   BUN  42*   CR  1.98*   GLC  111*   AST  28   ALT  24   BILITOTAL  1.0   ALBUMIN  3.4   PROTTOTAL  7.9   ALKPHOS  109       INR:   Recent Labs   Lab Test  04/22/17   1248   INR  1.61*            Imaging:   EXAMINATION: DOPPLER VENOUS ULTRASOUND OF THE LEFT LOWER EXTREMITY,  4/22/2017 4:50 PM     IMPRESSION:  No evidence left lower extremity deep venous thrombosis.         EXAMINATION: CT CHEST PULMONARY EMBOLISM W CONTRAST, 4/22/2017 4:20 PM       IMPRESSION:   1. No pulmonary embolism.  2. New left lower lobe consolidative and groundglass opacities. New  layering moderate left pleural effusion. Differential infection versus  progression of sarcoid lung disease. Increased mediastinal  lymphadenopathy.   3. Chronic changes of sarcoidosis with peribronchovascular fibrotic  changes and perilymphatic nodularity.  4. Chronic emphysema.  5. Cirrhosis.    XR CHEST 2 VW 4/22/2017 1:27 PM      HISTORY: shortness of breath  IMPRESSION:  1. Increased diffuse bilateral interstitial opacities, findings are  concerning for pulmonary edema. Additional consideration includes  infection.  2. There is a new left small-to-moderate possibly loculated pleural  effusion

## 2017-04-23 NOTE — PHARMACY-ANTICOAGULATION SERVICE
Clinical Pharmacy - Warfarin Dosing Consult     Pharmacy has been consulted to manage this patient s warfarin therapy.  Indication: Atrial Flutter  Warfarin Prior to Admission: Yes  Warfarin PTA Regimen: 5 mg daily  Significant drug interactions: azithromycin, doxycycline, ceftriaxone, aspirin    INR   Date Value Ref Range Status   04/22/2017 1.61 (H) 0.86 - 1.14 Final   03/30/2017 3.68 (H) 0.86 - 1.14 Final       Recommend warfarin 5 mg today.  Pharmacy will monitor Rohan Monroe daily and order warfarin doses to achieve specified goal.      Please contact pharmacy as soon as possible if the warfarin needs to be held for a procedure or if the warfarin goals change.      Ann English, Pharm.D.

## 2017-04-23 NOTE — CONSULTS
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     He reports that over the past several weeks has become increasingly dyspneic with exertion, to the extent to where just walking for a few steps. He has also had a productive cough over the past week that is now primarily clear and small in quantity. No fevers or night sweats but some chills. No chest pain or pressure. No orthopnea/PND.      He is followed by Dr. Paige out-pt and was last seen in her clinic on 3/9/2017. At that time he was found to be decompensated with worsened functional capacity due to dyspnea, and was also found to be hypervolemic. It was discussed that he would benefit from a RHC as well as possibly an ischemic evaluation. He was started on an increased diuretic dose of furosemide 80mg BID.            Past Medical History:     Past Medical History:   Diagnosis Date     Cataract of both eyes      Chronic infection     Hep C     Congestive heart failure, unspecified      Depressive disorder, not elsewhere classified     Depression (non-psychotic)     ERM OS (epiretinal membrane, left eye)      Generalized osteoarthrosis, unspecified site      Glaucoma suspect      Hypertension      Lichen planus      Other psoriasis      PVD (posterior vitreous detachment), left eye      Sarcoidosis (H)      Sarcoidosis (H)      Type II or unspecified type diabetes mellitus without mention of complication, not stated as uncontrolled      Unspecified hypothyroidism     Hypothyroidism      Unspecified viral hepatitis C without hepatic coma      Viral warts, unspecified              Past Surgical History:      Past Surgical History:   Procedure Laterality Date     ANESTHESIA CARDIOVERSION N/A 1/19/2017    Procedure: ANESTHESIA CARDIOVERSION;  Surgeon: GENERIC ANESTHESIA PROVIDER;  Location: [facility] OR     ANESTHESIA CARDIOVERSION N/A 1/23/2017    Procedure: ANESTHESIA CARDIOVERSION;  Surgeon: GENERIC ANESTHESIA PROVIDER;  Location: [facility] OR     ANESTHESIA CARDIOVERSION N/A 1/24/2017    Procedure: ANESTHESIA CARDIOVERSION;  Surgeon: GENERIC ANESTHESIA PROVIDER;  Location: [facility] OR     ARTHROPLASTY HIP  8/24/2011    Procedure:ARTHROPLASTY HIP; Right Total Hip Arthroplasty  Choice anesthesia; Surgeon:LESLI WILKINSON; Location:[facility] OR     BIOPSY       cardiac stent      s/p     CARDIAC SURGERY       CATARACT IOL, RT/LT  9/15/2015    LE     COLONOSCOPY       HC REMOVAL OF TONSILS,<11 Y/O      Tonsils <10y.o.     HC REPAIR INCISIONAL HERNIA,REDUCIBLE      Hernia Repair, Incisional, Unilateral     JOINT REPLACEMENT      1 month ago--right hip     LIGATN/STRIP LONG & SHORT SAPHEN               Social History:     Social History   Substance Use Topics     Smoking status: Former Smoker     Packs/day: 1.00     Years: 30.00     Types: Cigarettes     Start date: 12/30/1960     Quit date: 7/22/1994     Smokeless tobacco: Never Used     Alcohol use No             Family History:     Family History   Problem Relation Age of Onset     HEART DISEASE Father      irreg heart beat     Circulatory Father      varicose veins     Prostate Cancer Father      HEART DISEASE Mother      heart attack     Arthritis Mother      OSTEOPOROSIS Mother      Thyroid Disease Mother      Eye Disorder Maternal Grandmother      glaucoma     DIABETES Sister      type 2     Kidney Cancer Sister      DIABETES Sister      Glaucoma No family hx of      Macular Degeneration No family hx of      CANCER No family hx of      no skin cancer             Allergies:      Allergies   Allergen Reactions     Prednisone Other (See Comments)     He reports that he can't sleep for days and can't use small to massive doses of prednisone   Pt. Does not do well with high doses of Prednisone, His MD says that Prednisone is counter indicated. Prednisone failed to treat his sarcoidosis              Medications:   Prior to admission medications:  Prescriptions Prior to Admission   Medication Sig Dispense Refill Last Dose     levothyroxine (SYNTHROID/LEVOTHROID) 137 MCG tablet Take 1 tablet (137 mcg) by mouth daily 90 tablet 1 4/22/2017 at AM     furosemide (LASIX) 20 MG tablet Take 40 mg by mouth 2 times daily  180 tablet 3 4/22/2017 at AM     amLODIPine (NORVASC) 10 MG tablet Take 1 tablet (10 mg) by mouth daily 90 tablet 3 4/21/2017 at PM     warfarin (COUMADIN) 5 MG tablet Take 1 tablet (5 mg) by mouth daily 30 tablet 6 4/21/2017 at Unknown time     polyethylene glycol (MIRALAX) powder Take 17 g (1 capful) by mouth daily (Patient taking differently: Take 1 capful by mouth daily as needed for constipation ) 510 g 1 Past Week at Unknown time     tiotropium (SPIRIVA HANDIHALER) 18 MCG capsule Inhale contents of one capsule daily. 90 capsule 3 4/22/2017 at AM     albuterol (PROAIR HFA/PROVENTIL HFA/VENTOLIN HFA) 108 (90 BASE) MCG/ACT Inhaler Inhale 2 puffs into the lungs every 6 hours as needed for shortness of breath / dyspnea 3 Inhaler 6 4/22/2017 at AM     fluticasone-vilanterol (BREO ELLIPTA) 100-25 MCG/INH oral inhaler Inhale 1 puff into the lungs daily 3 Inhaler 3 4/22/2017 at AM     inFLIXimab (REMICADE) 100 MG injection Inject 100 mg into the vein every 28 days    4 weeks ago at Unknown time     metoprolol (LOPRESSOR) 100 MG tablet Take 1 tablet (100 mg) by mouth 2 times daily 60 tablet 11 4/22/2017 at AM     methotrexate 2.5 MG tablet Take 3 tablets (7.5 mg) by mouth once a week On Mondays (Patient taking differently: Take 2.5mg by mouth weekly on Mondays.) 48 tablet 3 Past Month at  Unknown time     atorvastatin (LIPITOR) 20 MG tablet Take 1 tablet (20 mg) by mouth daily 90 tablet 3 4/21/2017 at PM     ASPIRIN EC PO Take 81 mg by mouth daily   4/22/2017 at AM     Multiple Vitamins-Minerals (MULTIVITAMIN & MINERAL PO) Take 1 tablet by mouth daily.   4/21/2017 at PM     mirtazapine (REMERON) 15 MG tablet Take 15 mg by mouth At Bedtime.   4/21/2017 at PM     ciclopirox (LOPROX) 0.77 % cream Apply topically 2 times daily To feet and toenails. 90 g 6 Unknown at Unknown time     blood glucose monitoring (ACCU-CHEK RONNIE PLUS) test strip Use to test blood sugar 2 times daily or as directed.  3 month supply. 200 each 3 Taking     blood glucose monitoring (ACCU-CHEK FASTCLIX) lancets Use to test blood sugar 2 times daily or as directed.  102 lancets per box.  3 month supply. 2 Box 3 Taking     blood glucose monitoring (NO BRAND SPECIFIED) meter device kit Use to test blood sugar 2 times daily. 1 kit 0 Taking     Urea (CARMOL 40) 40 % CREA To feet daily (Patient taking differently: Apply to feet daily as needed.) 199 g 4 Unknown at Unknown time     sildenafil (VIAGRA) 50 MG tablet Take 2 tablets (100 mg) by mouth daily as needed for erectile dysfunction (Patient taking differently: Take 50 mg by mouth daily as needed for erectile dysfunction ) 10 tablet 6 Unknown at Unknown time        Current medications:  Current Facility-Administered Medications   Medication     LORazepam (ATIVAN) tablet 0.5 mg     warfarin (COUMADIN) tablet 5 mg     naloxone (NARCAN) injection 0.1-0.4 mg     Patient is already receiving anticoagulation with heparin, enoxaparin (LOVENOX), warfarin (COUMADIN)  or other anticoagulant medication     acetaminophen (TYLENOL) tablet 650 mg     senna-docusate (SENOKOT-S;PERICOLACE) 8.6-50 MG per tablet 1-2 tablet     bisacodyl (DULCOLAX) EC tablet 5-15 mg     ondansetron (ZOFRAN-ODT) ODT tab 4 mg    Or     ondansetron (ZOFRAN) injection 4 mg     albuterol (PROAIR HFA/PROVENTIL HFA/VENTOLIN  "HFA) Inhaler 2 puff     aspirin EC EC tablet 81 mg     atorvastatin (LIPITOR) tablet 20 mg     fluticasone-vilanterol (BREO ELLIPTA) 100-25 MCG/INH oral inhaler 1 puff     furosemide (LASIX) tablet 40 mg     levothyroxine (SYNTHROID/LEVOTHROID) tablet 137 mcg     metoprolol (LOPRESSOR) tablet 100 mg     mirtazapine (REMERON) tablet 15 mg     polyethylene glycol (MIRALAX/GLYCOLAX) powder 17 g     umeclidinium (INCRUSE ELLIPTA) 62.5 MCG/INH oral inhaler 1 puff     Warfarin Therapy Reminder (Check START DATE - warfarin may be starting in the FUTURE)     cefTRIAXone (ROCEPHIN) 1 g vial to attach to  mL bag for ADULTS or NS 50 mL bag for PEDS     doxycycline (VIBRAMYCIN) capsule 100 mg            Review of Systems:   12-point review of systems was negative except as mentioned in the HPI.          Exam:   /80 (BP Location: Left arm)  Temp 96.8  F (36  C) (Oral)  Resp 18  Ht 1.753 m (5' 9\")  Wt 86.8 kg (191 lb 4.8 oz)  SpO2 93%  BMI 28.25 kg/m2    GEN: A & O, resting comfortably in bed, NAD, on O2/NC  HEENT: PERRL  NECK: Supple, JVP elevated to angle of jaw with (+)HJR  RESP: rales bilat bases  CV: RRR nl s1, s2, soft 1/6 SANDRA at the LUSB  ABD: Soft, nontender to palpation, +BS, no guarding  EXT: No peripheral edema, warm/well perfused   NEURO: CN II-XII intact, normal 5/5 strength in all extremitites  SKIN: Normal skin turgor, no rash on limited exam         Data:   Labs:  CMP  Recent Labs  Lab 04/23/17  0635 04/22/17  1248 04/18/17  1231    139 138   POTASSIUM 4.0 3.6 4.2   CHLORIDE 105 102 104   CO2 25 28 24   ANIONGAP 8 9 10   * 111* 123*   BUN 37* 42* 49*   CR 1.84* 1.98* 2.32*   GFRESTIMATED 36* 33* 28*   GFRESTBLACK 44* 40* 34*   RAFFY 8.6 9.2 9.2   PHOS  --   --  3.2   PROTTOTAL 7.6 7.9  --    ALBUMIN 3.3* 3.4 3.5   BILITOTAL 1.2 1.0  --    ALKPHOS 108 109  --    AST 27 28  --    ALT 31 24  --      CBC  Recent Labs  Lab 04/23/17  0635 04/22/17  1248 04/18/17  1231   WBC 11.9* 10.2 9.2 "   RBC 4.17* 4.14* 4.30*   HGB 12.8* 12.5* 13.2*   HCT 39.8* 39.0* 40.6   MCV 95 94 94   MCH 30.7 30.2 30.7   MCHC 32.2 32.1 32.5   RDW 16.3* 16.0* 16.0*    348 360     INR  Recent Labs  Lab 17  0635 17  1248   INR 2.06* 1.61*     Lab Results   Component Value Date    TROPI  2017     <0.015  The 99th percentile for upper reference range is 0.045 ug/L.  Troponin values in   the range of 0.045 - 0.120 ug/L may be associated with risks of adverse   clinical events.      TROPI 0.017 2017    TROPI 0.387 () 2017    TROPONIN 0.23 () 2017    TROPONIN 0.03 2014    TROPONIN 0.01 2013     Recent Labs   Lab Test  14   1100  12   0936   CHOL  128  105   HDL  35*  28*   LDL  62  50   TRIG  148  131   CHOLHDLRATIO  3.6  3.7            Imaging:   EKst degree AVB with PACs, non specific TW change in V1    CT PE 2017  IMPRESSION:   1. No pulmonary embolism.  2. New left lower lobe consolidative and groundglass opacities. New  layering moderate left pleural effusion. Differential infection versus  progression of sarcoid lung disease. Increased mediastinal  lymphadenopathy.   3. Chronic changes of sarcoidosis with peribronchovascular fibrotic  changes and perilymphatic nodularity.  4. Chronic emphysema.  5. Cirrhosis.    Cardiac MRI 2013  IMPRESSION:  1. Normal left ventricular size and normal global LV systolic  function with a calculated ejection fraction of 55 %.  2. Normal right ventricular size and normal global RV systolic  function with a calculated ejection fraction of 48 %.  3. On delayed enhancement imaging, there is patchy mid myocardial  hyperenhancement in proximal inferior septum, proximal anterior  septum, and discrete areas of hyperenhancement in distal lateral wall  and distal anterior wall. This pattern of late gadolinium  hyperenhancement can be seen in patients with sarcoidosis. There is  no significant change in the delayed  myocardial hyperenhancement  pattern compared to cardiac MRI dated 10/14/2011, no new foci of  delayed enhancement identified.  4. Lung fields have bilateral patchy areas of increased signal  suggestive of fibrosis.          Assessment and Recomendations:     Mr. 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 He appears hypervolemic on exam with elevated JVP and (+)HJR with rales on pulmonary auscultation. His weight is low at 86.8lb, prev 93kg 3/9/2017. NTproBNP elevated 2800, prev 35046 however prior values 950, 1300.     Recommend:  - diurese with furosemide 40mg IV BID (on 40mg PO BID at home), titrate as needed for goal net neg 1-2L/24h  - TTE tomorrow  - RHC and coronary angiogram once more euvolemic, likely Tuesday   - agree with current cardiac regimen of ASA 81 mg daily, atorvastatin 20mg daily, metoprolol tartrate 100mg BID, warfarin   - management of pulmonary sarcoid per Pulmonology, currently on methotrexate and infliximab    Thank you for allowing us to participate in the care of this patient. Please do not hesitate to call with any questions or concerns.     Jose David Pinzon MD  PGY4 Cardiology  Pager: [phone]  April 23, 2017    I very much appreciated the opportunity to see and assess Mr Rohan Monroe in the hospital with CV Fellow Dr Pinzon. Mr Monroe presents a combination of conditions making it difficult to attribute his cardiac decompensation to a single source. However, diuretic management to stabalize followed later this week with RHC and poss angiography would seem appropriate      I agree  with the note above which summarizes my findings and current recommendations.  Please do not hesitate to contact my office if you have any questions or concerns.      Jamie Churchill MD  Cardiac Arrhythmia Service  [facility]  [phone]

## 2017-04-24 ENCOUNTER — APPOINTMENT (OUTPATIENT)
Dept: CARDIOLOGY | Facility: CLINIC | Age: 72
DRG: 286 | End: 2017-04-24
Payer: MEDICARE

## 2017-04-24 LAB
ANION GAP SERPL CALCULATED.3IONS-SCNC: 6 MMOL/L (ref 3–14)
BUN SERPL-MCNC: 29 MG/DL (ref 7–30)
CALCIUM SERPL-MCNC: 8.8 MG/DL (ref 8.5–10.1)
CHLORIDE SERPL-SCNC: 102 MMOL/L (ref 94–109)
CO2 SERPL-SCNC: 30 MMOL/L (ref 20–32)
CREAT SERPL-MCNC: 1.82 MG/DL (ref 0.66–1.25)
GFR SERPL CREATININE-BSD FRML MDRD: 37 ML/MIN/1.7M2
GLUCOSE BLDC GLUCOMTR-MCNC: 114 MG/DL (ref 70–99)
GLUCOSE BLDC GLUCOMTR-MCNC: 131 MG/DL (ref 70–99)
GLUCOSE BLDC GLUCOMTR-MCNC: 148 MG/DL (ref 70–99)
GLUCOSE SERPL-MCNC: 166 MG/DL (ref 70–99)
INR PPP: 2.38 (ref 0.86–1.14)
MAGNESIUM SERPL-MCNC: 2 MG/DL (ref 1.6–2.3)
POTASSIUM SERPL-SCNC: 4 MMOL/L (ref 3.4–5.3)
SODIUM SERPL-SCNC: 138 MMOL/L (ref 133–144)

## 2017-04-24 PROCEDURE — 93321 DOPPLER ECHO F-UP/LMTD STD: CPT | Mod: 26 | Performed by: INTERNAL MEDICINE

## 2017-04-24 PROCEDURE — 36415 COLL VENOUS BLD VENIPUNCTURE: CPT | Performed by: INTERNAL MEDICINE

## 2017-04-24 PROCEDURE — 80048 BASIC METABOLIC PNL TOTAL CA: CPT | Performed by: INTERNAL MEDICINE

## 2017-04-24 PROCEDURE — 93308 TTE F-UP OR LMTD: CPT | Mod: 26 | Performed by: INTERNAL MEDICINE

## 2017-04-24 PROCEDURE — 85610 PROTHROMBIN TIME: CPT | Performed by: PHYSICIAN ASSISTANT

## 2017-04-24 PROCEDURE — 25000132 ZZH RX MED GY IP 250 OP 250 PS 637: Mod: GY | Performed by: PHYSICIAN ASSISTANT

## 2017-04-24 PROCEDURE — 83735 ASSAY OF MAGNESIUM: CPT | Performed by: INTERNAL MEDICINE

## 2017-04-24 PROCEDURE — 25000128 H RX IP 250 OP 636: Performed by: INTERNAL MEDICINE

## 2017-04-24 PROCEDURE — 25500064 ZZH RX 255 OP 636: Performed by: INTERNAL MEDICINE

## 2017-04-24 PROCEDURE — 99233 SBSQ HOSP IP/OBS HIGH 50: CPT | Performed by: INTERNAL MEDICINE

## 2017-04-24 PROCEDURE — 36415 COLL VENOUS BLD VENIPUNCTURE: CPT | Performed by: PHYSICIAN ASSISTANT

## 2017-04-24 PROCEDURE — 25000125 ZZHC RX 250: Performed by: INTERNAL MEDICINE

## 2017-04-24 PROCEDURE — 40000264 ECHO LIMITED WITH OPTISON

## 2017-04-24 PROCEDURE — A9270 NON-COVERED ITEM OR SERVICE: HCPCS | Mod: GY | Performed by: PHYSICIAN ASSISTANT

## 2017-04-24 PROCEDURE — 25000132 ZZH RX MED GY IP 250 OP 250 PS 637: Mod: GY | Performed by: INTERNAL MEDICINE

## 2017-04-24 PROCEDURE — 12000001 ZZH R&B MED SURG/OB UMMC

## 2017-04-24 PROCEDURE — A9270 NON-COVERED ITEM OR SERVICE: HCPCS | Mod: GY | Performed by: INTERNAL MEDICINE

## 2017-04-24 PROCEDURE — 99232 SBSQ HOSP IP/OBS MODERATE 35: CPT | Mod: 25 | Performed by: PHYSICIAN ASSISTANT

## 2017-04-24 PROCEDURE — 93325 DOPPLER ECHO COLOR FLOW MAPG: CPT | Mod: 26 | Performed by: INTERNAL MEDICINE

## 2017-04-24 PROCEDURE — 00000146 ZZHCL STATISTIC GLUCOSE BY METER IP

## 2017-04-24 RX ADMIN — DOXYCYCLINE HYCLATE 100 MG: 100 CAPSULE, GELATIN COATED ORAL at 21:17

## 2017-04-24 RX ADMIN — SENNOSIDES AND DOCUSATE SODIUM 2 TABLET: 8.6; 5 TABLET ORAL at 08:24

## 2017-04-24 RX ADMIN — FUROSEMIDE 40 MG: 10 INJECTION, SOLUTION INTRAVENOUS at 17:40

## 2017-04-24 RX ADMIN — FUROSEMIDE 40 MG: 10 INJECTION, SOLUTION INTRAVENOUS at 08:24

## 2017-04-24 RX ADMIN — METOPROLOL TARTRATE 100 MG: 50 TABLET, FILM COATED ORAL at 21:17

## 2017-04-24 RX ADMIN — HUMAN ALBUMIN MICROSPHERES AND PERFLUTREN 6 ML: 10; .22 INJECTION, SOLUTION INTRAVENOUS at 10:15

## 2017-04-24 RX ADMIN — DOXYCYCLINE HYCLATE 100 MG: 100 CAPSULE, GELATIN COATED ORAL at 08:24

## 2017-04-24 RX ADMIN — MIRTAZAPINE 15 MG: 15 TABLET, FILM COATED ORAL at 21:17

## 2017-04-24 RX ADMIN — SENNOSIDES AND DOCUSATE SODIUM 2 TABLET: 8.6; 5 TABLET ORAL at 21:17

## 2017-04-24 RX ADMIN — ALBUTEROL SULFATE 2 PUFF: 90 AEROSOL, METERED RESPIRATORY (INHALATION) at 16:24

## 2017-04-24 RX ADMIN — LORAZEPAM 0.5 MG: 0.5 TABLET ORAL at 17:40

## 2017-04-24 RX ADMIN — METOPROLOL TARTRATE 100 MG: 50 TABLET, FILM COATED ORAL at 08:24

## 2017-04-24 RX ADMIN — LEVOTHYROXINE SODIUM 137 MCG: 137 TABLET ORAL at 08:24

## 2017-04-24 RX ADMIN — ASPIRIN 81 MG: 81 TABLET, COATED ORAL at 08:24

## 2017-04-24 RX ADMIN — UMECLIDINIUM 1 PUFF: 62.5 AEROSOL, POWDER ORAL at 08:23

## 2017-04-24 RX ADMIN — PHYTONADIONE 2 MG: 10 INJECTION, EMULSION INTRAMUSCULAR; INTRAVENOUS; SUBCUTANEOUS at 15:08

## 2017-04-24 RX ADMIN — ATORVASTATIN CALCIUM 20 MG: 10 TABLET, FILM COATED ORAL at 08:24

## 2017-04-24 RX ADMIN — FLUTICASONE FUROATE AND VILANTEROL TRIFENATATE 1 PUFF: 100; 25 POWDER RESPIRATORY (INHALATION) at 08:23

## 2017-04-24 NOTE — PLAN OF CARE
Problem: Goal Outcome Summary  Goal: Goal Outcome Summary  Outcome: No Change  Patient is alert and oriented times 4. At well for supper. Unable to get out of bed to to shortness of breath. Gets winded talking at times. Says he doesn't feel any worse though. Has occasional cough. Lungs diminished in upper airways, fine crackles noted in right base. Provided with incentive spirometer, and he demonstrated correct use up to 750 ml. Noted to desat to 89-90% on 1.5 liters of oxygen so increased oxygen 2 times to 3 liters after supper. Oxygen sats 99% when he is sleeping. On IV an oral antibiotics. Was seen by cardiology and pulmonology today. Lasix changed to IV. Had good output after evening lasix. Requested oral ativan once at 1900. Continue to monitor respiratory status closely.

## 2017-04-24 NOTE — PROGRESS NOTES
[facility]    Pulmonary, Allergy, Critical Care and Sleep Medicine    Follow-up Note  April 24, 2017    Assessment and Plan:  Rohan Monroe is a 71 year old male with a  history of sarcoidosis (pulmonary and presumed cardiac involvement treated with MTX and Remicade infusions), aflutter s/p ablation in Jan 2017 (on warfarin), COPD, Hep C (treated with SVR), and HFpEF who is admitted from the cardiology clinic for dyspnea on exertion.      Acute on chronic hypoxemic respiratory failure:  Complicated history of sarcoidosis (both pulmonary and cardiac involvement) currently being managed with MTX (7.5 mg qweek) and Remicaide injections (now [id number]). His current symptoms are primarily transient episodes of dyspnea with exertion. He feels very strongly that these are similar to pain he had when he had his coronary stents placed.  His CT does show new left pleural effusion and new LLL infiltrates compared to previous CT in 2015. Differential is broad at this point but would include infection, sarcoidosis flare, amiodarone or MTX toxicity.  We are more concerned though that this is actually cardiac related, possibly transient coronary ischemia given only with exertion and agree with plan for angiogram and RHC.  Has not had his effusion tapped yet and would be helpful to see if transudate/exudate.  -Continue with antibiotics total of 5 days  -Follow up infectious w/u  -Would continue to hold MTX  -Agree with plan for RHC/LHC per cards  -Would be worth having IR perform diagnostic/therapeutic thoracentesis    Seen and discussed with Dr Kang Cardona  Pulmonary/Critical Care Fellow    Interval History:  No big events overnight.  Has been getting diuretics with adequate urine output.  Plan for LHC/RHC tomorrow per cards.  Still no fevers, needing 3LNC.    Medications:       warfarin-No DOSE today  1 each Does not apply no dose today (warfarin)     phytonadione (AQUA-MEPHYTON) in 50 mL  0.9% NaCl intermittent infusion  2 mg Intravenous Once     furosemide  40 mg Intravenous BID     senna-docusate  1-2 tablet Oral BID     aspirin EC EC tablet 81 mg  81 mg Oral Daily     atorvastatin  20 mg Oral Daily     fluticasone-vilanterol  1 puff Inhalation Daily     levothyroxine  137 mcg Oral Daily     metoprolol  100 mg Oral BID     mirtazapine  15 mg Oral At Bedtime     umeclidinium  1 puff Inhalation Daily     cefTRIAXone  1 g Intravenous Q24H     doxycycline  100 mg Oral Q12H JOSE MANUEL       Physical Exam:    Temp:  [96.1  F (35.6  C)-97.3  F (36.3  C)] 96.1  F (35.6  C)  Pulse:  [77-79] 79  Heart Rate:  [78-80] 80  Resp:  [18-24] 20  BP: (131-151)/(77-85) 151/84  SpO2:  [88 %-99 %] 92 %    Intake/Output Summary (Last 24 hours) at 04/24/17 1258  Last data filed at 04/24/17 0800   Gross per 24 hour   Intake              720 ml   Output             3000 ml   Net            -2280 ml       General: laying in bed in NAD  HEENT: anicteric, moist mucosa  Neck: no palpable lymphadenopathy, no JVD noted  Chest: Diminished B/L, has crackles, no wheezing.  Cardiac: RRR no murmurs  Abdomen: Soft, flat, non tender, active BS  Extremities: No LE Edema  Neuro: A&Ox3, no focal defecits  Skin: no rash noted           Data:   All laboratory and imaging data reviewed.    CMP  Recent Labs  Lab 04/23/17  0635 04/22/17  1248 04/18/17  1231    139 138   POTASSIUM 4.0 3.6 4.2   CHLORIDE 105 102 104   CO2 25 28 24   ANIONGAP 8 9 10   * 111* 123*   BUN 37* 42* 49*   CR 1.84* 1.98* 2.32*   GFRESTIMATED 36* 33* 28*   GFRESTBLACK 44* 40* 34*   RAFFY 8.6 9.2 9.2   PHOS  --   --  3.2   PROTTOTAL 7.6 7.9  --    ALBUMIN 3.3* 3.4 3.5   BILITOTAL 1.2 1.0  --    ALKPHOS 108 109  --    AST 27 28  --    ALT 31 24  --      CBC  Recent Labs  Lab 04/23/17  0635 04/22/17  1248 04/18/17  1231   WBC 11.9* 10.2 9.2   RBC 4.17* 4.14* 4.30*   HGB 12.8* 12.5* 13.2*   HCT 39.8* 39.0* 40.6   MCV 95 94 94   MCH 30.7 30.2 30.7   MCHC 32.2 32.1 32.5    RDW 16.3* 16.0* 16.0*    348 360     INR  Recent Labs  Lab 04/24/17  0715 04/23/17  0635 04/22/17  1248   INR 2.38* 2.06* 1.61*       Urine Studies  Recent Labs   Lab Test  01/27/17   1130  01/19/17   1930  03/26/16   1947  10/27/15   1339  07/24/14   1630   URINEPH  6.0  5.5  5.0  6.0  5.5   NITRITE  Negative  Negative  Negative  Negative  Negative   LEUKEST  Negative  Negative  Negative  Negative  Trace*   WBCU  3*  2  1  2  2   Sputum Culture last 3 months:  Specimen Description   Date Value Ref Range Status   04/23/2017 Nares  Final   04/22/2017 Urine  Final   04/22/2017 Urine  Final   04/22/2017 Sputum  Final   04/22/2017 Sputum  Final    Culture Micro   Date Value Ref Range Status   04/22/2017   Final    Moderate growth Normal trent to date  Culture in progress     04/22/2017 No growth after 2 days  Final   04/22/2017 No growth after 2 days  Final

## 2017-04-24 NOTE — PROGRESS NOTES
HISTORY OF PRESENT ILLNESS:  Mr. Monroe is a 71-year-old gentleman, a patient of Dr. Diane Paige, presenting to the clinic for evaluation of shortness of breath.  Patient states that he had been having worsening shortness of breath in the last few weeks and states that his overall functional capacity is extremely limited to a level that he has difficulty moving within the house for his day-to-day activities.  He denies having any chest pain, palpitations, syncope or presyncope.    ROS:   Constitutional: No fever, chills, or sweats. No weight gain/loss  ENT: No visual disturbance, ear ache, epistaxis, sore throat  Allergies/Immunologic: Negative  Respiratory: No cough, hemoptysis  Cardiovascular: As per HPI.    GI: No nausea, vomiting, hematemesis, melena, or hematochezia  : No urinary frequency, dysuria, or hematuria    Integument: No rash  Psychiatric: Negative  Neuro: No visual disturbance, weakness or paraesthesias  Endocrinology: Negative  Musculoskeletal: No myalgia         PAST MEDICAL HISTORY:   1.  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   2.  Heart failure with preserved ejection fraction.   3.  Hypertension.   4.  Type 2 diabetes.   5.  Coronary artery disease with status post PCI of the LAD and diagonal branch.      MEDICATIONS:   1.  Levothyroxine 137 mcg a day.   2.  Lasix 20 mg a day.   3.  Amlodipine 10 mg a day.   4.  Warfarin 5 mg daily.   5.  Spiriva 18 mcg inhaler.   6.  Remicade 100 mg daily.   7.  Metoprolol 100 mg twice daily.   8.  Methotrexate 7.5 mg by mouth once a week.   9.  Lipitor 20 mg daily.   10.  Sildenafil 100 mg daily.   11.  Aspirin 81 mg daily.      PHYSICAL EXAMINATION:   VITAL SIGNS:  Blood pressure 118/74, heart rate 82 beats per minute, BMI 28.74.   GENERAL:  The patient is in mild distress.   HEAD:  Atraumatic and normocephalic.   ENT:  Pupils are round and reactive to light.  Extraocular movements are full.  No pallor, no icterus.  Oropharynx  appears normal.   NECK:  No JVD, no thyromegaly, no carotid bruits.   CHEST:  Bilaterally symmetrical chest.  Bilateral basilar crackles present.     CARDIAC:  Regular rate and rhythm, no S3, no S4, no murmurs.   ABDOMEN:  Soft, bowel sounds present, nontender.   EXTREMITIES:  Pedal edema present.      ASSESSMENT AND PLAN:   1.  Shortness of breath is multifactorial.  The patient has sarcoidosis and has heart failure with a preserved ejection fraction.  The patient's shortness of breath has worsened in the last few weeks, and the patient states that he is having difficulty doing his day-to-day activities at home.  The patient came to the clinic with the intention of getting admitted to the hospital for further workup.  I have discussed this patient's current condition with our emergency department physicians for evaluation and for possible admission today in the hospital.   2.  Coronary artery disease.  No symptoms of ischemic etiology at this time.  Would continue with the current medical regimen for his ischemic heart disease at this time.         DOE SINGER MD             D: 2017 12:09   T: 2017 11:13   MT: [email or web address]      Name:     LOU RAO   MRN:      [id number]        Account:      [id number]   :      1945           Service Date: 2017      Document: [id number]

## 2017-04-24 NOTE — PROGRESS NOTES
Care Coordinator Progress Note     Admission Date/Time:  4/22/2017  Attending MD:  Sheila Sands,*     Data  Chart reviewed, discussed with interdisciplinary team.   Patient was admitted for: Hypoxia, sarcoidosis, O2.    Concerns with insurance coverage for discharge needs: None.  Current Living Situation: Patient said that he lives with his teenage son. Pt said that he was independent with his own care prior to admission, but significant decline currently in breathing.  Support System: Supportive and Involved  Services Involved: none currently  Transportation: family or friend will supply  Barriers to Discharge: pending medical clearance, significant dyspnea with any exertion, heart cath tomorrow, O2 need currently.        Assessment   RNCC met with pt who stated that things are not currently working at home due to his SOB. States that his 21 yo son lives with him and is the reason why this pt is able to stay in his home currently.  Pt feels that his sob is so great that he will need more help at home.  Discussed possible out comes and pt would like to have HHC at home depending on outcome of angiogram tomorrow.  Pt also may require O2 at home.  Pending a walk test after medical work up.      Current resources:   [facility] (Ph: [phone] Fax: [phone]) for INR and Warfarin monitoring (Goal=2-3). Indication for Anticoagulation: Atrial Flutter s/p cardioversion. Expected duration of therapy: 3 months, then re-assess. Dr. Jamie Foster to follow.   PCP: Jamie Foster        Plan  Anticipated Discharge Date:  4/26/17-4/27/17   Anticipated Discharge Plan: angiogram tomorrow.  Dc disposition pending medical work up.  RNCC to continue to follow this pt for possible hHC and DME needs.  May also need a new neb machine as his is old and no one supplies the needed tubing and maintainance on the unit.     Vi Spicer, RNCC, BSN    [facility]  [address] [place] [address]    [email or web address]  [email or web address]    Office: [phone] Pager: [phone]

## 2017-04-24 NOTE — PROGRESS NOTES
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    Acute on chronic hypoxic respiratory failure: He has hx of CHF, COPD, lung/heart sarcoidosis who has been experiencing dyspnea for some time. States sx are particularly worse for the past 2 weeks with ACEVES. Per pt, completed z pack w some relief though symptoms have recurred.  - CT-PE 4/22: negative for PE, new left lower lobe consolidative and ground glass opacities, layering moderate left pleural effusion, increased mediastinal lymphadenopathy, chronic emphysema.  - viral panel negative  - in ED, given vancomycin, azithromycin and zosyn, currently on ceftriaxone and doxycycline. Plan to treat for 5 days.  - CRP 24  - sputum culture sent: negative to date  - O2 via NC 2L and sat 94% this am, but gets dyspneic w conversation without desaturation  - prn nebs  - pulmonary consult appreciated: plan for thoracentesis by radiology. Reversing INR w vit K  - Per sarcoid cardiology clinic note from 3/9, plan was to proceed w RHC to further eval etiology of dyspnea. Cardiology consulted: plan for iv diuresis,echo today and RHC likely tomorrow. NPO after MN  - neg ~2L neg since yesterday and feeling little better  - He is on immunosuppression, and thus differential dx is broad at this time    Sarcoidosis: heart and lung involvement. Most recent PFT's (2/24/17) FVC 2.74 (69% pred), FEV1 1.16 (39% pred) Currently maintained on Remicade every 4 weeks (due for dose on 4/26/17), and methotrexate  - Holding MTX per discussion with pulmonary on 4/23    Diastolic HF: last Echo 2/2017 with  EF 50-55%, LV function indeterminate, global RV function mildly reduced. PTA lasix. Appears euvolemic on exam. NT-BNP 2832, previously 13K (1/2017)  - on PTA lasix 40mg bid. Prior nephrology note indicated lasix 80bid and pt states he felt better with higher dose for a shortwhile. As above, currently being diuresed with lasix 40mg iv bid goal 1-2L neg daily.    CAD: hx of PCI w ROSALIE to mid LAD and D2  - continuing PTA asa statin and BB  - as above, cardiology consulted    Afib/a flutter: hx of recent ablation and amioradone loading, but felt worse on amiodarone and it was discontinued.  - on admission, EKG sinus  - continue PTA coumadin and metoprolol  - INR subtherapeutic on admission. Pharmacy to help dose coumadin, now therapeutic (on antibiotic): reversing with vit k today for thoracentesis. RHC    HTN: BP stable on admission    COPD: not on home O2  - contineu PTA regimen    CKD3: Cre 1.8 on admission which is close to his baseline. Follows w Dr Ervin here, just recently seen.    Dm2: a1c 7.5% in Jan 2017. Diet controlled  - continue carb controlled diet.    Diet: Moderate Consistent CHO Diet  NPO Time Specified  Fluids: n/a  DVT Prophylaxis: Warfarin  Code Status: Full Code    Disposition Plan   Expected discharge: 2 - 3 days; recommended to prior living arrangement once eval completed.     Entered: Sheila Sands 04/24/2017, 11:54 AM   Information in the above section will display in the discharge planner report.      The patient's care was discussed with the Patient.    Sheila Sands  Internal Medicine Staff Hospitalist Service  [facility]  Pager: [phone]  Please see sticky note for cross cover information    Interval History   Feels sob with minimal exertion. Otherwise, no CP, no abdominal pain. Other complete ROS reviewed and negative.      Data reviewed today: I reviewed all medications, new labs and imaging results over the last 24 hours. I personally reviewed the chest CT image(s)  showing consolidation.    Physical Exam   Vital Signs: Temp: 96.1  F (35.6  C) Temp src: Oral BP: 151/84 (just finished rolling/bedpan) Pulse: 79 Heart Rate: 80 Resp: 20 SpO2: 92 % O2 Device: Nasal cannula Oxygen Delivery: 3 LPM  Weight: 191 lbs 4.8 oz  General Appearance: Not in acute distress, but get dyspenic with minimal exertion.  Respiratory: distant breath sound on L lower lung field.  Cardiovascular: RRR no murmur  GI: soft  Skin: no peripheral edema         Data   Data     Recent Labs  Lab 04/24/17  0715 04/23/17  0635 04/22/17  1248 04/18/17  1231   WBC  --  11.9* 10.2 9.2   HGB  --  12.8* 12.5* 13.2*   MCV  --  95 94 94   PLT  --  339 348 360   INR 2.38* 2.06* 1.61*  --    NA  --  138 139 138   POTASSIUM  --  4.0 3.6 4.2   CHLORIDE  --  105 102 104   CO2  --  25 28 24   BUN  --  37* 42* 49*   CR  --  1.84* 1.98* 2.32*   ANIONGAP  --  8 9 10   RAFFY  --  8.6 9.2 9.2   GLC  --  136* 111* 123*   ALBUMIN  --  3.3* 3.4 3.5   PROTTOTAL  --  7.6 7.9  --    BILITOTAL  --  1.2 1.0  --    ALKPHOS  --  108 109  --    ALT  --  31 24  --    AST  --  27 28  --    TROPI  --   --  <0.015The 99th percentile for upper reference range is 0.045 ug/L.  Troponin values in the range of 0.045 - 0.120 ug/L may be associated with risks of adverse clinical events.  --

## 2017-04-24 NOTE — PLAN OF CARE
Problem: Respiratory Insufficiency (Adult)  Goal: Effective Ventilation  Patient will demonstrate the desired outcomes by discharge/transition of care.   Outcome: No Change  Pt alert and oriented X 4. Denies pain. O2 sats mostly around high 90's, occasionally drops down to 88 -89 % on 3L via nasal cannula. Pt reports one time it dropped down to 82 %. Denies SOB at rest, increased shortness of breath with using urinal , gets winded when talking, cough at times. Pt had lasix yesterday with good urine output. BM yesterday morning.   P: Monitor respiratory status closely, notify MD with change in status.

## 2017-04-24 NOTE — PROGRESS NOTES
Cardiology Progress Note  April 24, 2017    This patient was discussed with Dr. Rivera and the note below reflects our joint plan.   Assessment and Recommendation:   Mr. 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      He reports that over the past several weeks has become increasingly dyspneic with even minimal exertion such as walking 10 feet. He has also had a productive cough over the past week that is now primarily clear and small in quantity. No fevers or night sweats but some chills. No chest pain or pressure. No orthopnea/PND. PE w/u negative, no DVT on US.      Interval History: No interval changes. Rohan explains that he feels well while lying stationary in his hospital bed but notes with minimal exertion he develops profound dyspnea. He states minimal improvement with Lasix IV in hospital. ECHO is unchanged from previous study, LVEF 50-55% with mild diffuse hypokinesis. Weight is 86.8 kg (weight in 2/2017 was 90.7 kg, 2/2017 93kg). He has been having good UOP with diuresis. BNP 2,800 Plan is for continued diuresis, RHC and coronary angiogram tomorrow if INR, PLT stable and if patient can lie flat on table.       Recommend:  - Diurese with furosemide 40mg IV BID (on 40mg PO BID at home), titrate as needed for goal net neg 1-2L/24h  - TTE completed, unchanged from previous studies  - RHC and coronary angio tomorrow if euvolemic (weight is down, having good UOP), ordered  -NPO at midnight  - agree with current cardiac regimen of ASA 81 mg daily, atorvastatin 20mg daily, metoprolol tartrate 100mg BID, warfarin. Will get  on day of angiography so can hold 81 mg dose tomorrow AM  - management of pulmonary sarcoid per Pulmonology, currently on methotrexate and infliximab    Thank you for consulting the  "cardiovascular services at the [facility]. Please do not hesitate to call us with any questions.     Uziel Moreau PA-C  [facility] Cardiology Consult Team  Pager [phone] or [phone]    Interval History: No palpitations, CP or other cardiac symptoms.     Review of systems: 4 point ROS including Respiratory, CV, GI and , other than that noted in the HPI,  is negative     Objective:   Vitals: /84 (BP Location: Left arm)  Pulse 79  Temp 96.1  F (35.6  C) (Oral)  Resp 20  Ht 1.753 m (5' 9\")  Wt 86.8 kg (191 lb 4.8 oz)  SpO2 92%  BMI 28.25 kg/m2     Intake/Output Summary (Last 24 hours) at 04/24/17 1108  Last data filed at 04/24/17 0800   Gross per 24 hour   Intake              720 ml   Output             3000 ml   Net            -2280 ml     Gen: AxO, NAD, O2 via nasal cannula satting 92-94%   Lungs: coarse, rales in bases  CV: RRR, soft systolic murmur 1/6, JVP appears elevated to just below jaw  Abd: Soft   Ext: 1+ pitting edema to left lower extremity otherwise normal, distal pulses intact  Labs:  Lab Results   Component Value Date    TROPI  04/22/2017     <0.015  The 99th percentile for upper reference range is 0.045 ug/L.  Troponin values in   the range of 0.045 - 0.120 ug/L may be associated with risks of adverse   clinical events.      TROPI 0.017 03/09/2017    TROPI 0.387 () 01/19/2017    TROPI 0.479 () 01/19/2017    TROPI 0.490 () 01/19/2017    TROPONIN 0.23 () 01/18/2017    TROPONIN 0.03 06/25/2014    TROPONIN 0.01 08/31/2013    TROPONIN 0.01 09/24/2011     Diagnostic studies:     Echo:  Limited study.  Borderline (EF 50-55%) reduced left ventricular function is present. Traced at  51%.  No change from prior.    Left Ventricle  Left ventricular wall thickness is normal. Left ventricular size is normal.  Borderline (EF 50-55%) reduced left ventricular function is present. Mild  diffuse hypokinesis is present.     Right Ventricle  The right ventricle is " normal size.     Pericardium  No pericardial effusion is present.    Echo 2/13/17  Borderline reduced left ventricular systolic function, (EF 50-55%). Left  ventricular diastolic function is indeterminate.  The right ventricle is normal size; global RV function is mildly reduced.  No significant valvular pathology.  The inferior vena cava is normal; estimated mean RA pressure is <3 mmHg.  In comparison to prior limited study dated 1/19/17, the LV function is  unchanged, however much improved from chronologically earlier study of the  same date.    EKG:    Meds per EPIC EMR:    furosemide  40 mg Intravenous BID     senna-docusate  1-2 tablet Oral BID     aspirin EC EC tablet 81 mg  81 mg Oral Daily     atorvastatin  20 mg Oral Daily     fluticasone-vilanterol  1 puff Inhalation Daily     levothyroxine  137 mcg Oral Daily     metoprolol  100 mg Oral BID     mirtazapine  15 mg Oral At Bedtime     umeclidinium  1 puff Inhalation Daily     cefTRIAXone  1 g Intravenous Q24H     doxycycline  100 mg Oral Q12H JOSE MANUEL     I interviewed and examined the patient with the house staff.  I agree with the assessment and plan as documented.      Julio Rivera MD, PhD  Professor of Medicine  Division of Cardiology

## 2017-04-24 NOTE — PLAN OF CARE
Problem: Respiratory Insufficiency (Adult)  Goal: Identify Related Risk Factors and Signs and Symptoms  Related risk factors and signs and symptoms are identified upon initiation of Human Response Clinical Practice Guideline (CPG)   DI: vss, afebrile. Lungs diminished throughout with crackles noted in (R) base. 92% on 3L/nc. ACEVES. Declined to get out of bed due to dyspnea . Appetite good, consumed 100%  Of trays. Denies pain. NPO after midnight for thoracentesis and possible heart cath. Pt is aware. INR=2.38, no coumadin tonight and will receive Vit K when available from pharmacy. Alert and oriented x 4. P: cont to monitor resp status, keep NPO after midnight.

## 2017-04-25 ENCOUNTER — APPOINTMENT (OUTPATIENT)
Dept: CARDIOLOGY | Facility: CLINIC | Age: 72
DRG: 286 | End: 2017-04-25
Attending: PHYSICIAN ASSISTANT
Payer: MEDICARE

## 2017-04-25 ENCOUNTER — APPOINTMENT (OUTPATIENT)
Dept: GENERAL RADIOLOGY | Facility: CLINIC | Age: 72
DRG: 286 | End: 2017-04-25
Attending: STUDENT IN AN ORGANIZED HEALTH CARE EDUCATION/TRAINING PROGRAM
Payer: MEDICARE

## 2017-04-25 LAB
ANION GAP SERPL CALCULATED.3IONS-SCNC: 8 MMOL/L (ref 3–14)
BUN SERPL-MCNC: 29 MG/DL (ref 7–30)
CALCIUM SERPL-MCNC: 9 MG/DL (ref 8.5–10.1)
CHLORIDE SERPL-SCNC: 103 MMOL/L (ref 94–109)
CO2 SERPL-SCNC: 27 MMOL/L (ref 20–32)
CREAT SERPL-MCNC: 1.6 MG/DL (ref 0.66–1.25)
ERYTHROCYTE [DISTWIDTH] IN BLOOD BY AUTOMATED COUNT: 16 % (ref 10–15)
GFR SERPL CREATININE-BSD FRML MDRD: 43 ML/MIN/1.7M2
GLUCOSE BLDC GLUCOMTR-MCNC: 114 MG/DL (ref 70–99)
GLUCOSE BLDC GLUCOMTR-MCNC: 121 MG/DL (ref 70–99)
GLUCOSE BLDC GLUCOMTR-MCNC: 177 MG/DL (ref 70–99)
GLUCOSE SERPL-MCNC: 129 MG/DL (ref 70–99)
HCT VFR BLD AUTO: 40.4 % (ref 40–53)
HGB BLD-MCNC: 12.9 G/DL (ref 13.3–17.7)
INR PPP: 1.46 (ref 0.86–1.14)
INTERPRETATION ECG - MUSE: NORMAL
MCH RBC QN AUTO: 30.4 PG (ref 26.5–33)
MCHC RBC AUTO-ENTMCNC: 31.9 G/DL (ref 31.5–36.5)
MCV RBC AUTO: 95 FL (ref 78–100)
PLATELET # BLD AUTO: 312 10E9/L (ref 150–450)
POTASSIUM SERPL-SCNC: 3.8 MMOL/L (ref 3.4–5.3)
RBC # BLD AUTO: 4.24 10E12/L (ref 4.4–5.9)
SODIUM SERPL-SCNC: 138 MMOL/L (ref 133–144)
WBC # BLD AUTO: 11.2 10E9/L (ref 4–11)

## 2017-04-25 PROCEDURE — 25000125 ZZHC RX 250: Performed by: INTERNAL MEDICINE

## 2017-04-25 PROCEDURE — 36415 COLL VENOUS BLD VENIPUNCTURE: CPT | Performed by: PHYSICIAN ASSISTANT

## 2017-04-25 PROCEDURE — 88305 TISSUE EXAM BY PATHOLOGIST: CPT | Performed by: INTERNAL MEDICINE

## 2017-04-25 PROCEDURE — 99233 SBSQ HOSP IP/OBS HIGH 50: CPT | Performed by: STUDENT IN AN ORGANIZED HEALTH CARE EDUCATION/TRAINING PROGRAM

## 2017-04-25 PROCEDURE — 00000155 ZZHCL STATISTIC H-CELL BLOCK W/STAIN: Performed by: INTERNAL MEDICINE

## 2017-04-25 PROCEDURE — 27210742 ZZH CATH CR1

## 2017-04-25 PROCEDURE — 4A023N6 MEASUREMENT OF CARDIAC SAMPLING AND PRESSURE, RIGHT HEART, PERCUTANEOUS APPROACH: ICD-10-PCS | Performed by: INTERNAL MEDICINE

## 2017-04-25 PROCEDURE — 93456 R HRT CORONARY ARTERY ANGIO: CPT

## 2017-04-25 PROCEDURE — 71020 XR CHEST 2 VW: CPT

## 2017-04-25 PROCEDURE — 99152 MOD SED SAME PHYS/QHP 5/>YRS: CPT

## 2017-04-25 PROCEDURE — 21400006 ZZH R&B CCU INTERMEDIATE UMMC

## 2017-04-25 PROCEDURE — 00000146 ZZHCL STATISTIC GLUCOSE BY METER IP

## 2017-04-25 PROCEDURE — A9270 NON-COVERED ITEM OR SERVICE: HCPCS | Mod: GY | Performed by: PHYSICIAN ASSISTANT

## 2017-04-25 PROCEDURE — C1894 INTRO/SHEATH, NON-LASER: HCPCS

## 2017-04-25 PROCEDURE — 00000102 ZZHCL STATISTIC CYTO WRIGHT STAIN TC: Performed by: INTERNAL MEDICINE

## 2017-04-25 PROCEDURE — 27210946 ZZH KIT HC TOTES DISP CR8

## 2017-04-25 PROCEDURE — 88112 CYTOPATH CELL ENHANCE TECH: CPT | Performed by: INTERNAL MEDICINE

## 2017-04-25 PROCEDURE — 80048 BASIC METABOLIC PNL TOTAL CA: CPT | Performed by: PHYSICIAN ASSISTANT

## 2017-04-25 PROCEDURE — 99153 MOD SED SAME PHYS/QHP EA: CPT

## 2017-04-25 PROCEDURE — 85027 COMPLETE CBC AUTOMATED: CPT | Performed by: PHYSICIAN ASSISTANT

## 2017-04-25 PROCEDURE — 27210787 ZZH MANIFOLD CR2

## 2017-04-25 PROCEDURE — 25000128 H RX IP 250 OP 636: Performed by: INTERNAL MEDICINE

## 2017-04-25 PROCEDURE — 27211089 ZZH KIT ACIST INJECTOR CR3

## 2017-04-25 PROCEDURE — 25000132 ZZH RX MED GY IP 250 OP 250 PS 637: Mod: GY | Performed by: PHYSICIAN ASSISTANT

## 2017-04-25 PROCEDURE — 25000128 H RX IP 250 OP 636: Performed by: PHYSICIAN ASSISTANT

## 2017-04-25 PROCEDURE — 93456 R HRT CORONARY ARTERY ANGIO: CPT | Mod: 26 | Performed by: INTERNAL MEDICINE

## 2017-04-25 PROCEDURE — 85610 PROTHROMBIN TIME: CPT | Performed by: PHYSICIAN ASSISTANT

## 2017-04-25 RX ORDER — ARGATROBAN 1 MG/ML
150 INJECTION, SOLUTION INTRAVENOUS
Status: DISCONTINUED | OUTPATIENT
Start: 2017-04-25 | End: 2017-04-25 | Stop reason: HOSPADM

## 2017-04-25 RX ORDER — NITROGLYCERIN 20 MG/100ML
.07-2 INJECTION INTRAVENOUS CONTINUOUS PRN
Status: DISCONTINUED | OUTPATIENT
Start: 2017-04-25 | End: 2017-04-25 | Stop reason: HOSPADM

## 2017-04-25 RX ORDER — ONDANSETRON 2 MG/ML
4 INJECTION INTRAMUSCULAR; INTRAVENOUS EVERY 4 HOURS PRN
Status: DISCONTINUED | OUTPATIENT
Start: 2017-04-25 | End: 2017-04-25 | Stop reason: HOSPADM

## 2017-04-25 RX ORDER — NICARDIPINE HYDROCHLORIDE 2.5 MG/ML
100 INJECTION INTRAVENOUS
Status: DISCONTINUED | OUTPATIENT
Start: 2017-04-25 | End: 2017-04-25 | Stop reason: HOSPADM

## 2017-04-25 RX ORDER — LIDOCAINE 40 MG/G
CREAM TOPICAL
Status: DISCONTINUED | OUTPATIENT
Start: 2017-04-25 | End: 2017-04-26

## 2017-04-25 RX ORDER — PROMETHAZINE HYDROCHLORIDE 25 MG/ML
6.25-25 INJECTION, SOLUTION INTRAMUSCULAR; INTRAVENOUS EVERY 4 HOURS PRN
Status: DISCONTINUED | OUTPATIENT
Start: 2017-04-25 | End: 2017-04-25 | Stop reason: HOSPADM

## 2017-04-25 RX ORDER — CLOPIDOGREL BISULFATE 75 MG/1
75 TABLET ORAL
Status: DISCONTINUED | OUTPATIENT
Start: 2017-04-25 | End: 2017-04-25 | Stop reason: HOSPADM

## 2017-04-25 RX ORDER — NITROGLYCERIN 0.4 MG/1
0.4 TABLET SUBLINGUAL EVERY 5 MIN PRN
Status: DISCONTINUED | OUTPATIENT
Start: 2017-04-25 | End: 2017-04-25 | Stop reason: HOSPADM

## 2017-04-25 RX ORDER — METOPROLOL TARTRATE 1 MG/ML
5 INJECTION, SOLUTION INTRAVENOUS EVERY 5 MIN PRN
Status: DISCONTINUED | OUTPATIENT
Start: 2017-04-25 | End: 2017-04-25 | Stop reason: HOSPADM

## 2017-04-25 RX ORDER — FLUMAZENIL 0.1 MG/ML
0.2 INJECTION, SOLUTION INTRAVENOUS
Status: DISCONTINUED | OUTPATIENT
Start: 2017-04-25 | End: 2017-04-25 | Stop reason: HOSPADM

## 2017-04-25 RX ORDER — DIPHENHYDRAMINE HYDROCHLORIDE 50 MG/ML
25-50 INJECTION INTRAMUSCULAR; INTRAVENOUS
Status: DISCONTINUED | OUTPATIENT
Start: 2017-04-25 | End: 2017-04-25 | Stop reason: HOSPADM

## 2017-04-25 RX ORDER — NALOXONE HYDROCHLORIDE 0.4 MG/ML
.1-.4 INJECTION, SOLUTION INTRAMUSCULAR; INTRAVENOUS; SUBCUTANEOUS
Status: DISCONTINUED | OUTPATIENT
Start: 2017-04-25 | End: 2017-04-26

## 2017-04-25 RX ORDER — HEPARIN SODIUM 1000 [USP'U]/ML
1000-10000 INJECTION, SOLUTION INTRAVENOUS; SUBCUTANEOUS EVERY 5 MIN PRN
Status: DISCONTINUED | OUTPATIENT
Start: 2017-04-25 | End: 2017-04-25 | Stop reason: HOSPADM

## 2017-04-25 RX ORDER — PRASUGREL 10 MG/1
10-60 TABLET, FILM COATED ORAL
Status: DISCONTINUED | OUTPATIENT
Start: 2017-04-25 | End: 2017-04-25 | Stop reason: HOSPADM

## 2017-04-25 RX ORDER — NALOXONE HYDROCHLORIDE 0.4 MG/ML
0.4 INJECTION, SOLUTION INTRAMUSCULAR; INTRAVENOUS; SUBCUTANEOUS EVERY 5 MIN PRN
Status: DISCONTINUED | OUTPATIENT
Start: 2017-04-25 | End: 2017-04-25 | Stop reason: HOSPADM

## 2017-04-25 RX ORDER — WARFARIN SODIUM 7.5 MG/1
7.5 TABLET ORAL
Status: DISCONTINUED | OUTPATIENT
Start: 2017-04-25 | End: 2017-04-25

## 2017-04-25 RX ORDER — EPTIFIBATIDE 2 MG/ML
1 INJECTION, SOLUTION INTRAVENOUS CONTINUOUS PRN
Status: DISCONTINUED | OUTPATIENT
Start: 2017-04-25 | End: 2017-04-25 | Stop reason: HOSPADM

## 2017-04-25 RX ORDER — ATROPINE SULFATE 0.1 MG/ML
.5-1 INJECTION INTRAVENOUS
Status: DISCONTINUED | OUTPATIENT
Start: 2017-04-25 | End: 2017-04-25 | Stop reason: HOSPADM

## 2017-04-25 RX ORDER — NALOXONE HYDROCHLORIDE 0.4 MG/ML
.2-.4 INJECTION, SOLUTION INTRAMUSCULAR; INTRAVENOUS; SUBCUTANEOUS
Status: ACTIVE | OUTPATIENT
Start: 2017-04-25 | End: 2017-04-26

## 2017-04-25 RX ORDER — HYDRALAZINE HYDROCHLORIDE 20 MG/ML
10-20 INJECTION INTRAMUSCULAR; INTRAVENOUS
Status: DISCONTINUED | OUTPATIENT
Start: 2017-04-25 | End: 2017-04-25 | Stop reason: HOSPADM

## 2017-04-25 RX ORDER — LORAZEPAM 2 MG/ML
.5-2 INJECTION INTRAMUSCULAR EVERY 4 HOURS PRN
Status: DISCONTINUED | OUTPATIENT
Start: 2017-04-25 | End: 2017-04-25 | Stop reason: HOSPADM

## 2017-04-25 RX ORDER — ATROPINE SULFATE 0.1 MG/ML
0.5 INJECTION INTRAVENOUS EVERY 5 MIN PRN
Status: ACTIVE | OUTPATIENT
Start: 2017-04-25 | End: 2017-04-26

## 2017-04-25 RX ORDER — ADENOSINE 3 MG/ML
12-12000 INJECTION, SOLUTION INTRAVENOUS
Status: DISCONTINUED | OUTPATIENT
Start: 2017-04-25 | End: 2017-04-25 | Stop reason: HOSPADM

## 2017-04-25 RX ORDER — ASPIRIN 81 MG/1
81-324 TABLET, CHEWABLE ORAL
Status: DISCONTINUED | OUTPATIENT
Start: 2017-04-25 | End: 2017-04-25 | Stop reason: HOSPADM

## 2017-04-25 RX ORDER — NITROGLYCERIN 5 MG/ML
100-200 VIAL (ML) INTRAVENOUS
Status: DISCONTINUED | OUTPATIENT
Start: 2017-04-25 | End: 2017-04-25 | Stop reason: HOSPADM

## 2017-04-25 RX ORDER — METHYLPREDNISOLONE SODIUM SUCCINATE 125 MG/2ML
125 INJECTION, POWDER, LYOPHILIZED, FOR SOLUTION INTRAMUSCULAR; INTRAVENOUS
Status: DISCONTINUED | OUTPATIENT
Start: 2017-04-25 | End: 2017-04-25 | Stop reason: HOSPADM

## 2017-04-25 RX ORDER — ENALAPRILAT 1.25 MG/ML
1.25-2.5 INJECTION INTRAVENOUS
Status: DISCONTINUED | OUTPATIENT
Start: 2017-04-25 | End: 2017-04-25 | Stop reason: HOSPADM

## 2017-04-25 RX ORDER — EPTIFIBATIDE 2 MG/ML
180 INJECTION, SOLUTION INTRAVENOUS EVERY 10 MIN PRN
Status: DISCONTINUED | OUTPATIENT
Start: 2017-04-25 | End: 2017-04-25 | Stop reason: HOSPADM

## 2017-04-25 RX ORDER — SODIUM NITROPRUSSIDE 25 MG/ML
100-200 INJECTION INTRAVENOUS
Status: DISCONTINUED | OUTPATIENT
Start: 2017-04-25 | End: 2017-04-25 | Stop reason: HOSPADM

## 2017-04-25 RX ORDER — POTASSIUM CHLORIDE 29.8 MG/ML
20 INJECTION INTRAVENOUS
Status: DISCONTINUED | OUTPATIENT
Start: 2017-04-25 | End: 2017-04-25 | Stop reason: HOSPADM

## 2017-04-25 RX ORDER — VERAPAMIL HYDROCHLORIDE 2.5 MG/ML
1-5 INJECTION, SOLUTION INTRAVENOUS
Status: DISCONTINUED | OUTPATIENT
Start: 2017-04-25 | End: 2017-04-25 | Stop reason: HOSPADM

## 2017-04-25 RX ORDER — IOPAMIDOL 755 MG/ML
35 INJECTION, SOLUTION INTRAVASCULAR ONCE
Status: COMPLETED | OUTPATIENT
Start: 2017-04-25 | End: 2017-04-25

## 2017-04-25 RX ORDER — FLUMAZENIL 0.1 MG/ML
0.2 INJECTION, SOLUTION INTRAVENOUS
Status: DISCONTINUED | OUTPATIENT
Start: 2017-04-25 | End: 2017-04-26

## 2017-04-25 RX ORDER — ARGATROBAN 1 MG/ML
350 INJECTION, SOLUTION INTRAVENOUS
Status: DISCONTINUED | OUTPATIENT
Start: 2017-04-25 | End: 2017-04-25 | Stop reason: HOSPADM

## 2017-04-25 RX ORDER — FENTANYL CITRATE 50 UG/ML
25-50 INJECTION, SOLUTION INTRAMUSCULAR; INTRAVENOUS
Status: DISCONTINUED | OUTPATIENT
Start: 2017-04-25 | End: 2017-04-26

## 2017-04-25 RX ORDER — NITROGLYCERIN 5 MG/ML
100-500 VIAL (ML) INTRAVENOUS
Status: DISCONTINUED | OUTPATIENT
Start: 2017-04-25 | End: 2017-04-25 | Stop reason: HOSPADM

## 2017-04-25 RX ORDER — PROTAMINE SULFATE 10 MG/ML
1-5 INJECTION, SOLUTION INTRAVENOUS
Status: DISCONTINUED | OUTPATIENT
Start: 2017-04-25 | End: 2017-04-25 | Stop reason: HOSPADM

## 2017-04-25 RX ORDER — LIDOCAINE HYDROCHLORIDE 10 MG/ML
30 INJECTION, SOLUTION EPIDURAL; INFILTRATION; INTRACAUDAL; PERINEURAL
Status: DISCONTINUED | OUTPATIENT
Start: 2017-04-25 | End: 2017-04-25 | Stop reason: HOSPADM

## 2017-04-25 RX ORDER — PHENYLEPHRINE HCL IN 0.9% NACL 1 MG/10 ML
20-100 SYRINGE (ML) INTRAVENOUS
Status: DISCONTINUED | OUTPATIENT
Start: 2017-04-25 | End: 2017-04-25 | Stop reason: HOSPADM

## 2017-04-25 RX ORDER — DOBUTAMINE HYDROCHLORIDE 200 MG/100ML
2-20 INJECTION INTRAVENOUS CONTINUOUS PRN
Status: DISCONTINUED | OUTPATIENT
Start: 2017-04-25 | End: 2017-04-25 | Stop reason: HOSPADM

## 2017-04-25 RX ORDER — CLOPIDOGREL BISULFATE 75 MG/1
300-600 TABLET ORAL
Status: DISCONTINUED | OUTPATIENT
Start: 2017-04-25 | End: 2017-04-25 | Stop reason: HOSPADM

## 2017-04-25 RX ORDER — POTASSIUM CHLORIDE 7.45 MG/ML
10 INJECTION INTRAVENOUS
Status: DISCONTINUED | OUTPATIENT
Start: 2017-04-25 | End: 2017-04-25 | Stop reason: HOSPADM

## 2017-04-25 RX ORDER — DEXTROSE MONOHYDRATE 25 G/50ML
12.5-5 INJECTION, SOLUTION INTRAVENOUS EVERY 30 MIN PRN
Status: DISCONTINUED | OUTPATIENT
Start: 2017-04-25 | End: 2017-04-25 | Stop reason: HOSPADM

## 2017-04-25 RX ORDER — PROTAMINE SULFATE 10 MG/ML
25-100 INJECTION, SOLUTION INTRAVENOUS EVERY 5 MIN PRN
Status: DISCONTINUED | OUTPATIENT
Start: 2017-04-25 | End: 2017-04-25 | Stop reason: HOSPADM

## 2017-04-25 RX ORDER — FUROSEMIDE 10 MG/ML
20-100 INJECTION INTRAMUSCULAR; INTRAVENOUS
Status: DISCONTINUED | OUTPATIENT
Start: 2017-04-25 | End: 2017-04-25 | Stop reason: HOSPADM

## 2017-04-25 RX ORDER — DOPAMINE HYDROCHLORIDE 160 MG/100ML
2-20 INJECTION, SOLUTION INTRAVENOUS CONTINUOUS PRN
Status: DISCONTINUED | OUTPATIENT
Start: 2017-04-25 | End: 2017-04-25 | Stop reason: HOSPADM

## 2017-04-25 RX ORDER — EPTIFIBATIDE 2 MG/ML
2 INJECTION, SOLUTION INTRAVENOUS CONTINUOUS PRN
Status: DISCONTINUED | OUTPATIENT
Start: 2017-04-25 | End: 2017-04-25 | Stop reason: HOSPADM

## 2017-04-25 RX ORDER — FENTANYL CITRATE 50 UG/ML
25-50 INJECTION, SOLUTION INTRAMUSCULAR; INTRAVENOUS
Status: DISCONTINUED | OUTPATIENT
Start: 2017-04-25 | End: 2017-04-25 | Stop reason: HOSPADM

## 2017-04-25 RX ORDER — NIFEDIPINE 10 MG/1
10 CAPSULE ORAL
Status: DISCONTINUED | OUTPATIENT
Start: 2017-04-25 | End: 2017-04-25 | Stop reason: HOSPADM

## 2017-04-25 RX ORDER — ASPIRIN 325 MG
325 TABLET ORAL
Status: DISCONTINUED | OUTPATIENT
Start: 2017-04-25 | End: 2017-04-25 | Stop reason: HOSPADM

## 2017-04-25 RX ADMIN — BACLOFEN 325 MG: 10 TABLET ORAL at 12:22

## 2017-04-25 RX ADMIN — MIDAZOLAM HYDROCHLORIDE 1 MG: 1 INJECTION, SOLUTION INTRAMUSCULAR; INTRAVENOUS at 13:16

## 2017-04-25 RX ADMIN — SENNOSIDES AND DOCUSATE SODIUM 1 TABLET: 8.6; 5 TABLET ORAL at 19:55

## 2017-04-25 RX ADMIN — ASPIRIN 81 MG: 81 TABLET, COATED ORAL at 17:13

## 2017-04-25 RX ADMIN — IOPAMIDOL 35 ML: 755 INJECTION, SOLUTION INTRAVASCULAR at 14:00

## 2017-04-25 RX ADMIN — FUROSEMIDE 40 MG: 10 INJECTION, SOLUTION INTRAVENOUS at 08:38

## 2017-04-25 RX ADMIN — DOXYCYCLINE HYCLATE 100 MG: 100 CAPSULE, GELATIN COATED ORAL at 19:55

## 2017-04-25 RX ADMIN — FENTANYL CITRATE 50 MCG: 50 INJECTION, SOLUTION INTRAMUSCULAR; INTRAVENOUS at 13:16

## 2017-04-25 RX ADMIN — LEVOTHYROXINE SODIUM 137 MCG: 137 TABLET ORAL at 18:49

## 2017-04-25 RX ADMIN — FLUTICASONE FUROATE AND VILANTEROL TRIFENATATE 1 PUFF: 100; 25 POWDER RESPIRATORY (INHALATION) at 08:38

## 2017-04-25 RX ADMIN — MIRTAZAPINE 15 MG: 15 TABLET, FILM COATED ORAL at 22:13

## 2017-04-25 RX ADMIN — CEFTRIAXONE 1 G: 1 INJECTION, POWDER, FOR SOLUTION INTRAMUSCULAR; INTRAVENOUS at 00:56

## 2017-04-25 RX ADMIN — FUROSEMIDE 40 MG: 10 INJECTION, SOLUTION INTRAVENOUS at 17:13

## 2017-04-25 RX ADMIN — METOPROLOL TARTRATE 100 MG: 50 TABLET, FILM COATED ORAL at 08:37

## 2017-04-25 RX ADMIN — METOPROLOL TARTRATE 100 MG: 50 TABLET, FILM COATED ORAL at 19:55

## 2017-04-25 RX ADMIN — ATORVASTATIN CALCIUM 20 MG: 10 TABLET, FILM COATED ORAL at 17:13

## 2017-04-25 RX ADMIN — UMECLIDINIUM 1 PUFF: 62.5 AEROSOL, POWDER ORAL at 08:38

## 2017-04-25 NOTE — PLAN OF CARE
Problem: Goal Outcome Summary  Goal: Goal Outcome Summary  Outcome: No Change  Patient is alert and oriented times 4, very pleasant. Had a few visitors and many phone calls. Unable to tolerate activity du to shortness of breath. Gets short of breath talking at times. Took albuterol inhaler once with some relief. Said it helped him cough up some sputum. On oxygen at 3 liters at all times. Sats 92 and greater. Has continuous pulse ox on. Lungs are diminished throughout. Great urine output after IV lasix. To have a right heart cath tomorrow. First of 2 surgical scrubs done. No coumadin given tonight. Has saline locked PIV. Offers no complaints of pain.

## 2017-04-25 NOTE — PLAN OF CARE
Problem: Respiratory Insufficiency (Adult)  Goal: Identify Related Risk Factors and Signs and Symptoms  Related risk factors and signs and symptoms are identified upon initiation of Human Response Clinical Practice Guideline (CPG)   Outcome: No Change  DI: vss, afebrile. Denies pain. Short of breath with activity. CXR done this am, down to procedure at 1210. Pop wipes done. Pt to transfer to  post procedure. Belongings packed. Cell phone placed into pocket of jeans in belongings bag. P; left via litter at 1210

## 2017-04-25 NOTE — PROGRESS NOTES
Patient A&O X 4. VSS, SR, on 4L O2 NC with good O2 saturation. Denies any pain, shortness of breath. Right groin site soft, slightly tender to touch, no change since sheath pull, good pulses and sensation. Off bedrest at 1600. Coumadin tonight held for thoracentesis tomorrow, add-on case per IR. Will continue to monitor and notify team of any questions or concerns.

## 2017-04-25 NOTE — PLAN OF CARE
Problem: Goal Outcome Summary  Goal: Goal Outcome Summary  Alert and oriented X 4. AVSS. Denies pain. Denies SOB at rest. Reports SOB with minor activity such as turning and sitting up in bed. Able to speak without difficulty. Fine crackles at bases.He reports occasional productive cough with tan sputum expectorated. Encouraged to sleep with head elevated. Using Incentive spirometer during the day. Continues to have good urine output. He's been NPO for heart cath procedure.

## 2017-04-25 NOTE — PHARMACY-ANTICOAGULATION SERVICE
Clinical Pharmacy - Warfarin Dosing Consult     Pharmacy has been consulted to manage this patient s warfarin therapy.  Indication: Atrial Fibrillation  Therapy Goal: INR 2-3  Warfarin Prior to Admission: Yes  Warfarin PTA Regimen: 5 mg daily  Significant drug interactions: aspirin, doxycycline, ceftriaxone, azithromycin)  Dose Comments: Hold dose and Vit K today for procedure on 4/25    INR   Date Value Ref Range Status   04/25/2017 1.46 (H) 0.86 - 1.14 Final   04/24/2017 2.38 (H) 0.86 - 1.14 Final       Recommend warfarin 7.5 mg today.  Pharmacy will monitor Rohan Monroe daily and order warfarin doses to achieve specified goal.      Please contact pharmacy as soon as possible if the warfarin needs to be held for a procedure or if the warfarin goals change.      Addendum:  4/25 PM:   Warfarin dose dc'd for thoracentesis on 4/26.  4/26: Warfarin restarted and will recommend 7.5 mg today.    Joie Lam, PharmD, pager [phone]

## 2017-04-25 NOTE — PROGRESS NOTES
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     Acute on chronic hypoxic respiratory failure:  Patient has history of both CHF and severe pulmonary sarcoidosis now with worsening exertional dyspnea.  Evaluation for PE negative but CT showed left pleural effusion, mediastinal lymphadenopathy, and left lower lobe consolidation.      Differential diagnosis includes pulmonary hypertension vs worsening sarcoid.  Etiology of left pleural effusion is not clear but need to determine transudate or exudate.   Chest radiograph shows slightly larger effusion today.    - in ED, given vancomycin, azithromycin and zosyn, currently on ceftriaxone and doxycycline. Plan to treat for 5 days.  Sputum culture NGTD  - continue supplementary oxygen as needed  -thoracentesis today  - left and right cardiac catheterization today  - prn nebs  -continue diuresis.     Sarcoidosis: heart and lung involvement. Most recent PFT's (2/24/17) FVC 2.74 (69% pred), FEV1 1.16 (39% pred) Currently maintained on Remicade every 4 weeks (due for dose on 4/26/17), and methotrexate  - Holding MTX per discussion with pulmonary on 4/23    Diastolic HF: last Echo 2/2017 with EF 50-55%, LV function indeterminate, global RV function mildly reduced.     -Continue diuresis, aiming for ~1L negative daily, monitor BMP      CAD: hx of PCI w ROSALIE to mid LAD and D2  - continuing PTA asa statin and BB  - as above, cardiology consulted    Afib/a flutter: hx of recent ablation and amioradone loading, but felt worse on amiodarone and it was discontinued.  - on admission,  EKG sinus  - continue PTA coumadin and metoprolol  - INR subtherapeutic on admission.  Reversed for procedures, plan to restart today.    HTN: Stable, monitor.     COPD: not on home O2  - contineu PTA regimen    CKD3: Cre 1.8 on admission which is close to his baseline. Follows w Dr Ervin here, just recently seen.    Dm2: a1c 7.5% in Jan 2017. Diet controlled  - continue carb controlled diet.    Diet: NPO Time Specified  Room Service  Fluids: n/a  DVT Prophylaxis: Warfarin restart tonight  Code Status: Full Code    Disposition Plan   Expected discharge: 2 - 3 days; recommended to prior living arrangement once eval completed.     Entered: Sacha Weber 04/25/2017, 12:24 PM   Information in the above section will display in the discharge planner report.      The patient's care was discussed with the Patient.    Sacha Weber MD   Internal Medicine Staff Hospitalist Service  [facility]  Pager: [phone]  Please see sticky note for cross cover information    Interval History     Continues to feel about the same with marked dyspnea on exertion.  No pain or new other symptoms.      4 point review of systems otherwise negative.     Data reviewed today: I reviewed all medications, new labs and imaging results over the last 24 hours. I personally reviewed all imaging and labs in the last 24h.     Physical Exam   Vital Signs: Temp: 97  F (36.1  C) Temp src: Oral BP: (!) 158/98 Pulse: 80 Heart Rate: 85 Resp: 20 SpO2: 96 % O2 Device: Nasal cannula Oxygen Delivery: 3 LPM  Weight: 188 lbs 1.6 oz  General Appearance: Comfortably lying in bed.   Respiratory: decreased breath sounds at left lung base, crackles throughout.   Cardiovascular: normal rate, regular rhythm. No murmurs, rubs, or gallops.  JVP 14 cm  GI: soft, non-tender, non-distended.  No hepatosplenomegaly or mass.   Skin: no peripheral edema         Data   Data     Recent Labs  Lab 04/25/17  0707 04/24/17  1305 04/24/17  0715 04/23/17  0635  04/22/17  1248   WBC 11.2*  --   --  11.9* 10.2   HGB 12.9*  --   --  12.8* 12.5*   MCV 95  --   --  95 94     --   --  339 348   INR 1.46*  --  2.38* 2.06* 1.61*    138  --  138 139   POTASSIUM 3.8 4.0  --  4.0 3.6   CHLORIDE 103 102  --  105 102   CO2 27 30  --  25 28   BUN 29 29  --  37* 42*   CR 1.60* 1.82*  --  1.84* 1.98*   ANIONGAP 8 6  --  8 9   RAFFY 9.0 8.8  --  8.6 9.2   * 166*  --  136* 111*   ALBUMIN  --   --   --  3.3* 3.4   PROTTOTAL  --   --   --  7.6 7.9   BILITOTAL  --   --   --  1.2 1.0   ALKPHOS  --   --   --  108 109   ALT  --   --   --  31 24   AST  --   --   --  27 28   TROPI  --   --   --   --  <0.015The 99th percentile for upper reference range is 0.045 ug/L.  Troponin values in the range of 0.045 - 0.120 ug/L may be associated with risks of adverse clinical events.

## 2017-04-26 ENCOUNTER — APPOINTMENT (OUTPATIENT)
Dept: GENERAL RADIOLOGY | Facility: CLINIC | Age: 72
DRG: 286 | End: 2017-04-26
Attending: PHYSICIAN ASSISTANT
Payer: MEDICARE

## 2017-04-26 ENCOUNTER — APPOINTMENT (OUTPATIENT)
Dept: INTERVENTIONAL RADIOLOGY/VASCULAR | Facility: CLINIC | Age: 72
DRG: 286 | End: 2017-04-26
Attending: RADIOLOGY PRACTITIONER ASSISTANT
Payer: MEDICARE

## 2017-04-26 LAB
ANION GAP SERPL CALCULATED.3IONS-SCNC: 9 MMOL/L (ref 3–14)
APPEARANCE FLD: NORMAL
BACTERIA SPEC CULT: ABNORMAL
BASOPHILS NFR FLD MANUAL: 1 %
BUN SERPL-MCNC: 35 MG/DL (ref 7–30)
CALCIUM SERPL-MCNC: 9 MG/DL (ref 8.5–10.1)
CHLORIDE SERPL-SCNC: 102 MMOL/L (ref 94–109)
CO2 SERPL-SCNC: 26 MMOL/L (ref 20–32)
COLOR FLD: NORMAL
CREAT SERPL-MCNC: 1.68 MG/DL (ref 0.66–1.25)
EOSINOPHIL NFR FLD MANUAL: 1 %
ERYTHROCYTE [DISTWIDTH] IN BLOOD BY AUTOMATED COUNT: 15.7 % (ref 10–15)
GFR SERPL CREATININE-BSD FRML MDRD: 40 ML/MIN/1.7M2
GLUCOSE SERPL-MCNC: 134 MG/DL (ref 70–99)
GRAM STN SPEC: NORMAL
HCT VFR BLD AUTO: 41.3 % (ref 40–53)
HGB BLD-MCNC: 13.2 G/DL (ref 13.3–17.7)
INR PPP: 1.24 (ref 0.86–1.14)
LACTATE BLD-SCNC: 0.7 MMOL/L (ref 0.7–2.1)
LDH FLD L TO P-CCNC: 300 U/L
LDH SERPL L TO P-CCNC: 219 U/L (ref 85–227)
LYMPHOCYTES NFR FLD MANUAL: 78 %
MCH RBC QN AUTO: 30.5 PG (ref 26.5–33)
MCHC RBC AUTO-ENTMCNC: 32 G/DL (ref 31.5–36.5)
MCV RBC AUTO: 95 FL (ref 78–100)
MICRO REPORT STATUS: ABNORMAL
MICRO REPORT STATUS: NORMAL
MICROORGANISM SPEC CULT: ABNORMAL
MONOS+MACROS NFR FLD MANUAL: 16 %
NEUTS BAND NFR FLD MANUAL: 4 %
NT-PROBNP SERPL-MCNC: 2129 PG/ML (ref 0–900)
PH FLD: 8.5 PH
PLATELET # BLD AUTO: 328 10E9/L (ref 150–450)
POTASSIUM SERPL-SCNC: 3.9 MMOL/L (ref 3.4–5.3)
PROT FLD-MCNC: 4.1 G/DL
RBC # BLD AUTO: 4.33 10E12/L (ref 4.4–5.9)
SODIUM SERPL-SCNC: 137 MMOL/L (ref 133–144)
SPECIMEN SOURCE FLD: NORMAL
SPECIMEN SOURCE: ABNORMAL
SPECIMEN SOURCE: NORMAL
WBC # BLD AUTO: 13.3 10E9/L (ref 4–11)
WBC # FLD AUTO: 1457 /UL

## 2017-04-26 PROCEDURE — 85027 COMPLETE CBC AUTOMATED: CPT | Performed by: STUDENT IN AN ORGANIZED HEALTH CARE EDUCATION/TRAINING PROGRAM

## 2017-04-26 PROCEDURE — 85610 PROTHROMBIN TIME: CPT | Performed by: STUDENT IN AN ORGANIZED HEALTH CARE EDUCATION/TRAINING PROGRAM

## 2017-04-26 PROCEDURE — 25000132 ZZH RX MED GY IP 250 OP 250 PS 637: Mod: GY

## 2017-04-26 PROCEDURE — 84157 ASSAY OF PROTEIN OTHER: CPT | Performed by: INTERNAL MEDICINE

## 2017-04-26 PROCEDURE — 83615 LACTATE (LD) (LDH) ENZYME: CPT | Performed by: INTERNAL MEDICINE

## 2017-04-26 PROCEDURE — 83615 LACTATE (LD) (LDH) ENZYME: CPT | Performed by: STUDENT IN AN ORGANIZED HEALTH CARE EDUCATION/TRAINING PROGRAM

## 2017-04-26 PROCEDURE — 71010 XR CHEST 1 VW: CPT

## 2017-04-26 PROCEDURE — 32555 ASPIRATE PLEURA W/ IMAGING: CPT

## 2017-04-26 PROCEDURE — 25000132 ZZH RX MED GY IP 250 OP 250 PS 637: Mod: GY | Performed by: PHYSICIAN ASSISTANT

## 2017-04-26 PROCEDURE — 25000128 H RX IP 250 OP 636: Performed by: PHYSICIAN ASSISTANT

## 2017-04-26 PROCEDURE — 82962 GLUCOSE BLOOD TEST: CPT

## 2017-04-26 PROCEDURE — 36415 COLL VENOUS BLD VENIPUNCTURE: CPT | Performed by: STUDENT IN AN ORGANIZED HEALTH CARE EDUCATION/TRAINING PROGRAM

## 2017-04-26 PROCEDURE — 25000132 ZZH RX MED GY IP 250 OP 250 PS 637: Mod: GY | Performed by: STUDENT IN AN ORGANIZED HEALTH CARE EDUCATION/TRAINING PROGRAM

## 2017-04-26 PROCEDURE — 80048 BASIC METABOLIC PNL TOTAL CA: CPT | Performed by: STUDENT IN AN ORGANIZED HEALTH CARE EDUCATION/TRAINING PROGRAM

## 2017-04-26 PROCEDURE — 27210995 ZZH RX 272: Performed by: RADIOLOGY

## 2017-04-26 PROCEDURE — 83605 ASSAY OF LACTIC ACID: CPT | Performed by: STUDENT IN AN ORGANIZED HEALTH CARE EDUCATION/TRAINING PROGRAM

## 2017-04-26 PROCEDURE — 99233 SBSQ HOSP IP/OBS HIGH 50: CPT | Performed by: STUDENT IN AN ORGANIZED HEALTH CARE EDUCATION/TRAINING PROGRAM

## 2017-04-26 PROCEDURE — 84311 SPECTROPHOTOMETRY: CPT | Performed by: INTERNAL MEDICINE

## 2017-04-26 PROCEDURE — A9270 NON-COVERED ITEM OR SERVICE: HCPCS | Mod: GY | Performed by: STUDENT IN AN ORGANIZED HEALTH CARE EDUCATION/TRAINING PROGRAM

## 2017-04-26 PROCEDURE — 40000940 XR CHEST 1 VW

## 2017-04-26 PROCEDURE — 87070 CULTURE OTHR SPECIMN AEROBIC: CPT | Performed by: INTERNAL MEDICINE

## 2017-04-26 PROCEDURE — A9270 NON-COVERED ITEM OR SERVICE: HCPCS | Mod: GY | Performed by: PHYSICIAN ASSISTANT

## 2017-04-26 PROCEDURE — 89051 BODY FLUID CELL COUNT: CPT | Performed by: INTERNAL MEDICINE

## 2017-04-26 PROCEDURE — 25000128 H RX IP 250 OP 636: Performed by: INTERNAL MEDICINE

## 2017-04-26 PROCEDURE — A9270 NON-COVERED ITEM OR SERVICE: HCPCS | Mod: GY

## 2017-04-26 PROCEDURE — 87205 SMEAR GRAM STAIN: CPT | Performed by: INTERNAL MEDICINE

## 2017-04-26 PROCEDURE — 83880 ASSAY OF NATRIURETIC PEPTIDE: CPT | Performed by: STUDENT IN AN ORGANIZED HEALTH CARE EDUCATION/TRAINING PROGRAM

## 2017-04-26 PROCEDURE — 0W9B3ZZ DRAINAGE OF LEFT PLEURAL CAVITY, PERCUTANEOUS APPROACH: ICD-10-PCS | Performed by: PHYSICIAN ASSISTANT

## 2017-04-26 PROCEDURE — 21400006 ZZH R&B CCU INTERMEDIATE UMMC

## 2017-04-26 PROCEDURE — 27211039 IR THORACENTESIS

## 2017-04-26 PROCEDURE — 83986 ASSAY PH BODY FLUID NOS: CPT | Performed by: INTERNAL MEDICINE

## 2017-04-26 RX ORDER — WARFARIN SODIUM 7.5 MG/1
7.5 TABLET ORAL
Status: COMPLETED | OUTPATIENT
Start: 2017-04-26 | End: 2017-04-26

## 2017-04-26 RX ADMIN — METOPROLOL TARTRATE 100 MG: 50 TABLET, FILM COATED ORAL at 20:23

## 2017-04-26 RX ADMIN — WARFARIN SODIUM 7.5 MG: 7.5 TABLET ORAL at 17:10

## 2017-04-26 RX ADMIN — SENNOSIDES AND DOCUSATE SODIUM 1 TABLET: 8.6; 5 TABLET ORAL at 07:46

## 2017-04-26 RX ADMIN — DOXYCYCLINE HYCLATE 100 MG: 100 CAPSULE, GELATIN COATED ORAL at 07:46

## 2017-04-26 RX ADMIN — CEFTRIAXONE 1 G: 1 INJECTION, POWDER, FOR SOLUTION INTRAMUSCULAR; INTRAVENOUS at 01:05

## 2017-04-26 RX ADMIN — ATORVASTATIN CALCIUM 20 MG: 10 TABLET, FILM COATED ORAL at 07:46

## 2017-04-26 RX ADMIN — UMECLIDINIUM 1 PUFF: 62.5 AEROSOL, POWDER ORAL at 07:47

## 2017-04-26 RX ADMIN — LEVOTHYROXINE SODIUM 137 MCG: 137 TABLET ORAL at 07:46

## 2017-04-26 RX ADMIN — FUROSEMIDE 60 MG: 40 TABLET ORAL at 17:10

## 2017-04-26 RX ADMIN — METOPROLOL TARTRATE 100 MG: 50 TABLET, FILM COATED ORAL at 07:46

## 2017-04-26 RX ADMIN — LIDOCAINE HYDROCHLORIDE 8 ML: 10 INJECTION, SOLUTION EPIDURAL; INFILTRATION; INTRACAUDAL; PERINEURAL at 08:35

## 2017-04-26 RX ADMIN — MIRTAZAPINE 15 MG: 15 TABLET, FILM COATED ORAL at 21:45

## 2017-04-26 RX ADMIN — FLUTICASONE FUROATE AND VILANTEROL TRIFENATATE 1 PUFF: 100; 25 POWDER RESPIRATORY (INHALATION) at 07:46

## 2017-04-26 RX ADMIN — SENNOSIDES AND DOCUSATE SODIUM 1 TABLET: 8.6; 5 TABLET ORAL at 20:23

## 2017-04-26 RX ADMIN — ASPIRIN 81 MG: 81 TABLET, COATED ORAL at 09:46

## 2017-04-26 RX ADMIN — FUROSEMIDE 40 MG: 10 INJECTION, SOLUTION INTRAVENOUS at 07:46

## 2017-04-26 NOTE — PROGRESS NOTES
Interventional Radiology Intra-procedural Nursing Note    Patient Name: Rohan Monroe  Medical Record Number: [id number]  Today's Date: April 26, 2017    Start Time: 0/830  End of procedure time:0840  Procedure: LEFT DIAGNOSTIC THORACENTESIS  Report given to Joanne Nava RN:   Time pt departs:  0910  : NO    Other Notes: Arrives from PCU  W/ consent  Present et completed from yesterday  W/ daughter signing consent. TO called after procedure & laterality of procedure verified et clarified orders. 300 cc   Removed of which 120cc sent for  Labs as ordered. To have CXR follow-up in 1 hr (0945) which provider will order.Awaiting transport @ this time.    Elizabeth M. Agerbeck

## 2017-04-26 NOTE — PLAN OF CARE
Problem: Goal Outcome Summary  Goal: Goal Outcome Summary     DIAP:  Pt admitted 4/22/17 from clinic w/ ACEVES/hypoxia.  Pt has history of a flutter s/p ablation, CAD s/p stents, CHF, sarcoidosis involving heart and lungs, HLD, COPD, hypothyroidism, DM2, hep C.  Pt alert and oriented.  VSS on room air/2-3L NC at times.  Up w/ SBA.  Thoracentesis done this AM w/ 300 cc removed- awaiting labs.  R mary WNL.  Pt triggered sepsis protocol, lactic acid 0.7.  Pt c/o blurred vision, SOB and lightheadedness this afternoon, MD aware- pt better since w/ O2.  Continue w/ plan of care and notify team w/ changes/concerns.

## 2017-04-26 NOTE — PROCEDURES
Completed ultrasound guided LEFT-sided thoracentesis. A total of 300 mL clear reddish colored fluid drained from the chest. A sample of fluid was collected for requested labs. No immediate complication.  Dx: Pleural effusion. Ortiz

## 2017-04-26 NOTE — PROGRESS NOTES
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      Acute on chronic hypoxic respiratory failure:  Patient has history of both CHF and severe pulmonary sarcoidosis now with worsening exertional dyspnea.  Evaluation for PE negative but CT showed left pleural effusion, mediastinal lymphadenopathy, and left lower lobe consolidation.  Cardiac catheterization yesterday revealed pulmonary hypertension with normal PAOP.       Suspect that pulmonary hypertension is the most likely diagnosis at present.       -discuss treatment with cardiology for pulmonary hypertension   - stop antibiotics today  - continue supplementary oxygen as needed  -switch furosemide to 60 mg PO BID      Left pleural effusion:  Exudate by criteria - awaiting cytology and cultures, but consider possibly related to sarcoidosis?  -appreciate pulmonary assistance  -await cytology and cultures      Sarcoidosis: heart and lung involvement. Most recent PFT's (2/24/17) FVC 2.74 (69% pred), FEV1 1.16 (39% pred) Currently maintained on Remicade every 4 weeks (due for dose on 4/26/17), and methotrexate  - Holding MTX per discussion with pulmonary on 4/23  -will need Remicade in the coming days    Diastolic HF: last Echo 2/2017 with EF 50-55%, LV function indeterminate, global RV function mildly reduced.     -Continue diuresis as above    CAD: hx of PCI w ROSALIE to mid LAD and D2; ISR seen on cardiac catheterization yesterday.  -plan for outpatient PCI  - continuing PTA aspirin, statin and BB  -appreciate cardiology  assistance    Afib/a flutter: hx of recent ablation and amioradone loading, but felt worse on amiodarone and it was discontinued.  - on admission, EKG sinus  - continue PTA coumadin and metoprolol  - INR subtherapeutic on admission.  Restart warfarin today.    HTN: Stable, monitor.     COPD: not on home O2  - contineu PTA regimen    CKD3: Cre 1.8 on admission which is close to his baseline, now 1.6. Follows w Dr Ervin here, just recently seen.    Dm2: a1c 7.5% in Jan 2017. Diet controlled  - continue carb controlled diet.    Diet: Room Service  Regular Diet Adult  Fluids: n/a  DVT Prophylaxis: Warfarin  Code Status: Full Code    Disposition Plan   Expected discharge: 2 - 3 days; recommended to prior living arrangement once eval completed.     Entered: Sacha Weber 04/26/2017, 8:40 AM   Information in the above section will display in the discharge planner report.      The patient's care was discussed with the Patient.    Sacha Weber MD   Internal Medicine Staff Hospitalist Service  [facility]  Pager: [phone]  Please see sticky note for cross cover information    Interval History     Cardiac catheterization performed yesterday that showed ISR of stent in diagonal but no findings to explain current symptoms.  RHC showed markedly elevated pulmonary pressures and normal PAOP.    4 point review of systems otherwise negative.     Data reviewed today: I reviewed all medications, new labs and imaging results over the last 24 hours. I personally reviewed all imaging and labs in the last 24h.     Physical Exam   Vital Signs: Temp: 97.5  F (36.4  C) Temp src: Oral BP: 130/80   Heart Rate: 82 Resp: 20 SpO2: 98 % O2 Device: Nasal cannula Oxygen Delivery: 4 LPM  Weight: 183 lbs 8 oz  General Appearance: Comfortably sitting up in a chair  Respiratory: mildly elevated work of breathing.  Crackles throughout.   Cardiovascular: normal rate, regular rhythm. No murmurs, rubs, or gallops.  JVP 10  GI:  soft, non-tender, non-distended.  No hepatosplenomegaly or mass.   Skin: no peripheral edema         Data   Data     Recent Labs  Lab 04/26/17  0711 04/25/17  0707 04/24/17  1305 04/24/17  0715 04/23/17  0635 04/22/17  1248   WBC 13.3* 11.2*  --   --  11.9* 10.2   HGB 13.2* 12.9*  --   --  12.8* 12.5*   MCV 95 95  --   --  95 94    312  --   --  339 348   INR 1.24* 1.46*  --  2.38* 2.06* 1.61*    138 138  --  138 139   POTASSIUM 3.9 3.8 4.0  --  4.0 3.6   CHLORIDE 102 103 102  --  105 102   CO2 26 27 30  --  25 28   BUN 35* 29 29  --  37* 42*   CR 1.68* 1.60* 1.82*  --  1.84* 1.98*   ANIONGAP 9 8 6  --  8 9   RAFFY 9.0 9.0 8.8  --  8.6 9.2   * 129* 166*  --  136* 111*   ALBUMIN  --   --   --   --  3.3* 3.4   PROTTOTAL  --   --   --   --  7.6 7.9   BILITOTAL  --   --   --   --  1.2 1.0   ALKPHOS  --   --   --   --  108 109   ALT  --   --   --   --  31 24   AST  --   --   --   --  27 28   TROPI  --   --   --   --   --  <0.015The 99th percentile for upper reference range is 0.045 ug/L.  Troponin values in the range of 0.045 - 0.120 ug/L may be associated with risks of adverse clinical events.

## 2017-04-26 NOTE — PLAN OF CARE
Problem: Goal Outcome Summary  Goal: Goal Outcome Summary  Outcome: No Change     D: Pt admitted 4/22 from clinic with hypoxia and worsening ACEVES. Hx sarcoidosis and CHF.   I/A: VSSA, maintaining sats >92% on 4L via NC (drops to 86-88% on RA). No c/o pain overnight. R groin site tender to touch, soft, with good pulses. BG stable overnight. Using urinal with adequate UO. Sleeping between cares.   P: Plan for a thoracentesis this afternoon for L pleural effusion. Continue to monitor and notify Gold 1 with pertinent changes.

## 2017-04-27 LAB
ADENOSINE DEAMINASE PLR-CCNC: 2.7 U/L
ANION GAP SERPL CALCULATED.3IONS-SCNC: 9 MMOL/L (ref 3–14)
BUN SERPL-MCNC: 42 MG/DL (ref 7–30)
CALCIUM SERPL-MCNC: 9.1 MG/DL (ref 8.5–10.1)
CHLORIDE SERPL-SCNC: 101 MMOL/L (ref 94–109)
CO2 SERPL-SCNC: 27 MMOL/L (ref 20–32)
CREAT SERPL-MCNC: 1.85 MG/DL (ref 0.66–1.25)
ERYTHROCYTE [DISTWIDTH] IN BLOOD BY AUTOMATED COUNT: 15.6 % (ref 10–15)
GFR SERPL CREATININE-BSD FRML MDRD: 36 ML/MIN/1.7M2
GLUCOSE SERPL-MCNC: 152 MG/DL (ref 70–99)
HCT VFR BLD AUTO: 41.5 % (ref 40–53)
HGB BLD-MCNC: 13.3 G/DL (ref 13.3–17.7)
INR PPP: 1.18 (ref 0.86–1.14)
LACTATE BLD-SCNC: 1.4 MMOL/L (ref 0.7–2.1)
MCH RBC QN AUTO: 30.3 PG (ref 26.5–33)
MCHC RBC AUTO-ENTMCNC: 32 G/DL (ref 31.5–36.5)
MCV RBC AUTO: 95 FL (ref 78–100)
PLATELET # BLD AUTO: 350 10E9/L (ref 150–450)
POTASSIUM SERPL-SCNC: 4 MMOL/L (ref 3.4–5.3)
RBC # BLD AUTO: 4.39 10E12/L (ref 4.4–5.9)
SODIUM SERPL-SCNC: 137 MMOL/L (ref 133–144)
TSH SERPL DL<=0.05 MIU/L-ACNC: 0.7 MU/L (ref 0.4–4)
WBC # BLD AUTO: 13.5 10E9/L (ref 4–11)

## 2017-04-27 PROCEDURE — 84443 ASSAY THYROID STIM HORMONE: CPT | Performed by: INTERNAL MEDICINE

## 2017-04-27 PROCEDURE — 85027 COMPLETE CBC AUTOMATED: CPT | Performed by: INTERNAL MEDICINE

## 2017-04-27 PROCEDURE — 83605 ASSAY OF LACTIC ACID: CPT | Performed by: INTERNAL MEDICINE

## 2017-04-27 PROCEDURE — 25000132 ZZH RX MED GY IP 250 OP 250 PS 637: Mod: GY | Performed by: PHYSICIAN ASSISTANT

## 2017-04-27 PROCEDURE — 99232 SBSQ HOSP IP/OBS MODERATE 35: CPT | Mod: GC | Performed by: INTERNAL MEDICINE

## 2017-04-27 PROCEDURE — 21400006 ZZH R&B CCU INTERMEDIATE UMMC

## 2017-04-27 PROCEDURE — 80048 BASIC METABOLIC PNL TOTAL CA: CPT | Performed by: INTERNAL MEDICINE

## 2017-04-27 PROCEDURE — 85610 PROTHROMBIN TIME: CPT | Performed by: INTERNAL MEDICINE

## 2017-04-27 PROCEDURE — 25000132 ZZH RX MED GY IP 250 OP 250 PS 637: Mod: GY | Performed by: STUDENT IN AN ORGANIZED HEALTH CARE EDUCATION/TRAINING PROGRAM

## 2017-04-27 PROCEDURE — 36415 COLL VENOUS BLD VENIPUNCTURE: CPT | Performed by: INTERNAL MEDICINE

## 2017-04-27 PROCEDURE — A9270 NON-COVERED ITEM OR SERVICE: HCPCS | Mod: GY | Performed by: STUDENT IN AN ORGANIZED HEALTH CARE EDUCATION/TRAINING PROGRAM

## 2017-04-27 PROCEDURE — 99233 SBSQ HOSP IP/OBS HIGH 50: CPT | Performed by: STUDENT IN AN ORGANIZED HEALTH CARE EDUCATION/TRAINING PROGRAM

## 2017-04-27 PROCEDURE — A9270 NON-COVERED ITEM OR SERVICE: HCPCS | Mod: GY | Performed by: PHYSICIAN ASSISTANT

## 2017-04-27 RX ORDER — WARFARIN SODIUM 7.5 MG/1
7.5 TABLET ORAL
Status: COMPLETED | OUTPATIENT
Start: 2017-04-27 | End: 2017-04-27

## 2017-04-27 RX ADMIN — FUROSEMIDE 60 MG: 40 TABLET ORAL at 07:36

## 2017-04-27 RX ADMIN — WARFARIN SODIUM 7.5 MG: 7.5 TABLET ORAL at 18:13

## 2017-04-27 RX ADMIN — MIRTAZAPINE 15 MG: 15 TABLET, FILM COATED ORAL at 20:57

## 2017-04-27 RX ADMIN — SENNOSIDES AND DOCUSATE SODIUM 1 TABLET: 8.6; 5 TABLET ORAL at 07:36

## 2017-04-27 RX ADMIN — UMECLIDINIUM 1 PUFF: 62.5 AEROSOL, POWDER ORAL at 07:36

## 2017-04-27 RX ADMIN — LEVOTHYROXINE SODIUM 137 MCG: 137 TABLET ORAL at 07:36

## 2017-04-27 RX ADMIN — FLUTICASONE FUROATE AND VILANTEROL TRIFENATATE 1 PUFF: 100; 25 POWDER RESPIRATORY (INHALATION) at 07:36

## 2017-04-27 RX ADMIN — FUROSEMIDE 60 MG: 40 TABLET ORAL at 18:13

## 2017-04-27 RX ADMIN — ATORVASTATIN CALCIUM 20 MG: 10 TABLET, FILM COATED ORAL at 07:36

## 2017-04-27 RX ADMIN — METOPROLOL TARTRATE 100 MG: 50 TABLET, FILM COATED ORAL at 07:36

## 2017-04-27 RX ADMIN — SENNOSIDES AND DOCUSATE SODIUM 1 TABLET: 8.6; 5 TABLET ORAL at 20:00

## 2017-04-27 RX ADMIN — ASPIRIN 81 MG: 81 TABLET, COATED ORAL at 07:36

## 2017-04-27 RX ADMIN — METOPROLOL TARTRATE 100 MG: 50 TABLET, FILM COATED ORAL at 20:00

## 2017-04-27 NOTE — PLAN OF CARE
Problem: Goal Outcome Summary  Goal: Goal Outcome Summary  Outcome: No Change  D: Pt admitted w/ hypoxia, ACEVES.  S/p L thoracentesis 4/26.  RHC 4/25 showing phtn.  I: 1.5 LPM NC.  A: Pt denies pain.  VSS, see flowsheet.  SR.  SBA.  Sleeping b/w cares.  Voiding in urinal.  Wheezy, diminished lung sounds.  INR 1.24.  WBC 13.3.  BNP 2129.  P: Continue to monitor and notify MDs as necessary of pertinent pt condition changes.  Pulm consult.  Advance directive consult.

## 2017-04-27 NOTE — PROGRESS NOTES
[facility]    Pulmonary, Allergy, Critical Care and Sleep Medicine    Follow-up Note  April 27, 2017    Assessment and Plan:  Rohan Monroe is a 71 year old male with a  history of sarcoidosis (pulmonary and presumed cardiac involvement treated with MTX and Remicade infusions), aflutter s/p ablation in Jan 2017 (on warfarin), COPD, Hep C (treated with SVR), and HFpEF who is admitted from the cardiology clinic for dyspnea on exertion.      Acute on chronic hypoxemic respiratory failure:  Complicated history of sarcoidosis (both pulmonary and cardiac involvement) currently being managed with MTX (7.5 mg qweek) and Remicaide injections (now [id number]). His current symptoms are primarily transient episodes of dyspnea with exertion. He feels very strongly that these are similar to pain he had when he had his coronary stents placed.  His CT did show new left pleural effusion and new LLL infiltrates compared to previous CT in 2015.   -RHC showed does have pHTN, primary team talking with cards and deciding if will start on sildenafil  -LHC shows instent restenosis and will need intervention as outpt but he has RADHA and they were worried about contrast nephropathy if intervened at that time so will do outpt procedure when renal function improved  -His effusion is exudative and lymphocytic predominant, no hx TB or fungal PNA.  Sarcoid can do this but is a diagnosis of exclusion.  Cytology not yet returned, but adenopathy was not significantly increased though patients with sarcoid do have higher risk of lymphoma so we have him F/U as an outpt in 4 weeks with Dr Perlman with repeat CT looking at effusion and his adenopathy assuming cytology is negative.  -OK to restart his MTX  -OK to move his remicade infusion back to next week     Seen and discussed with Dr Kang Cardona  Pulmonary/Critical Care Fellow    Interval History:  Feels better after thoracentesis.  Had questions about starting  meds for pHTN and regarding when he gets his repeat [facility].          Medications:       furosemide  60 mg Oral BID     senna-docusate  1-2 tablet Oral BID     aspirin EC EC tablet 81 mg  81 mg Oral Daily     atorvastatin  20 mg Oral Daily     fluticasone-vilanterol  1 puff Inhalation Daily     levothyroxine  137 mcg Oral Daily     metoprolol  100 mg Oral BID     mirtazapine  15 mg Oral At Bedtime     umeclidinium  1 puff Inhalation Daily       Physical Exam:    Vitals:    04/27/17 0500 04/27/17 0734 04/27/17 1134 04/27/17 1141   BP:  144/89 (!) 141/102 137/87   BP Location:  Left arm Left arm Left arm   Pulse:       Resp:  20 18    Temp:  97.6  F (36.4  C) 98  F (36.7  C)    TempSrc:  Oral Oral    SpO2:  93% 93%    Weight: 83.1 kg (183 lb 1.6 oz)      Height:             Intake/Output Summary (Last 24 hours) at 04/27/17 1526  Last data filed at 04/27/17 1329   Gross per 24 hour   Intake              760 ml   Output             1500 ml   Net             -740 ml         General: laying in bed in NAD  HEENT: anicteric, moist mucosa  Neck: no palpable lymphadenopathy, no JVD noted  Chest: Diminished B/L, has crackles at posterior bases, no wheezing.  Cardiac: RRR no murmurs  Abdomen: Soft, flat, non tender, active BS  Extremities: No LE Edema  Neuro: A&Ox3, no focal defecits  Skin: no rash noted           Data:   All laboratory and imaging data reviewed.    CMP    Recent Labs  Lab 04/27/17  0725 04/26/17  0711 04/25/17  0707 04/24/17  1305 04/23/17  0635 04/22/17  1248    137 138 138 138 139   POTASSIUM 4.0 3.9 3.8 4.0 4.0 3.6   CHLORIDE 101 102 103 102 105 102   CO2 27 26 27 30 25 28   ANIONGAP 9 9 8 6 8 9   * 134* 129* 166* 136* 111*   BUN 42* 35* 29 29 37* 42*   CR 1.85* 1.68* 1.60* 1.82* 1.84* 1.98*   GFRESTIMATED 36* 40* 43* 37* 36* 33*   GFRESTBLACK 44* 49* 51* 44* 44* 40*   RAFFY 9.1 9.0 9.0 8.8 8.6 9.2   MAG  --   --   --  2.0  --   --    PROTTOTAL  --   --   --   --  7.6 7.9   ALBUMIN  --   --   --   --   3.3* 3.4   BILITOTAL  --   --   --   --  1.2 1.0   ALKPHOS  --   --   --   --  108 109   AST  --   --   --   --  27 28   ALT  --   --   --   --  31 24     CBC    Recent Labs  Lab 04/27/17  0725 04/26/17  0711 04/25/17  0707 04/23/17  0635   WBC 13.5* 13.3* 11.2* 11.9*   RBC 4.39* 4.33* 4.24* 4.17*   HGB 13.3 13.2* 12.9* 12.8*   HCT 41.5 41.3 40.4 39.8*   MCV 95 95 95 95   MCH 30.3 30.5 30.4 30.7   MCHC 32.0 32.0 31.9 32.2   RDW 15.6* 15.7* 16.0* 16.3*    328 312 339     INR    Recent Labs  Lab 04/27/17  0725 04/26/17  0711 04/25/17  0707 04/24/17  0715   INR 1.18* 1.24* 1.46* 2.38*       Urine Studies    Recent Labs   Lab Test  01/27/17   1130  01/19/17   1930  03/26/16   1947  10/27/15   1339  07/24/14   1630   URINEPH  6.0  5.5  5.0  6.0  5.5   NITRITE  Negative  Negative  Negative  Negative  Negative   LEUKEST  Negative  Negative  Negative  Negative  Trace*   WBCU  3*  2  1  2  2   Sputum Culture last 3 months:  Specimen Description   Date Value Ref Range Status   04/26/2017 Pleural fluid Left  Final   04/26/2017 Pleural fluid Left  Final   04/23/2017 Nares  Final    Culture Micro   Date Value Ref Range Status   04/26/2017 Culture negative monitoring continues  Final   04/22/2017 (A)  Final    Heavy growth Normal trent  Light growth Citrobacter koseri     04/22/2017 No growth after 5 days  Final

## 2017-04-27 NOTE — CONSULTS
CARDIOLOGY INPATIENT CONSULTATION  April 27, 2017    Reason for Consult:    A cardiology consult was requested by Dr. Weber from the hospitalist service to provide clinical guidance regarding new diagnosis of pulmonary hypertension.    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   Patient states that he has been having ACEVES for many months. His SOB has become much more severe the past month, to the extend that he walks a few meters and he has to stop and catch his breath. Minimal activity like combing his hair or walking, easily make him SOB.  He was seen by Dr Paige in cardiology clinic 3/9/2017 and at that time, the differential for ACEVES was pulmonary sarcoidosis, amiodarone toxicity (he received minimal anounts after the cardioversion but had to stop due to intolerance) or infectious process (given that he is on infliximab) or MTX lung toxicity. Lasix was increased to 80 mg PO BID. In telephone follow-up, he mentioned that his symptoms had improved. He was also prescribed Z pack another time (given increased sputum production and SOB) but it didn't help. He was seen on 4/22 by Dr. Randhawa and it was decided that he should be admitted. At the hospital, he had CTPE protocol without PE on 4/22. On 4/25, he underwent thoracentesis with 300cc removal. He mentions that he noticed great relive in his symptoms. He jhad RHC (numbers suggestive of mod to severe pulm HTN (mean PA of 42), normal left-sided filling pressures (wedge of 11mmHg). PVR of 11 which is consistent with  pulmonary process. TPG is 11, close to the value 12, implying primary pulmonary process. His RV has normal function without dilation  Of note, denies syncope or presyncope    At the time of interview, the patient denies chest pain, dyspnea at rest or with exertion, orthopnea, PND, palpitations, lightheadedness, or syncope.     REVIEW OF SYSTEMS:      Constitutional: No fever, chills, or sweats. No weight gain/loss   ENT: No visual disturbance, ear ache, epistaxis, sore throat  Allergies/Immunologic: Negative  Respiratory: No cough, hemoptysis  Cardiovascular: As per HPI  GI: No nausea, vomiting, hematemesis, melena, or hematochezia  : No urinary frequency, dysuria, or hematuria  Integument: Negative  Psychiatric: Negative  Neuro: Negative  Endocrinology: Negative   Musculoskeletal: Negative  Vascular: No walking impairment, claudication, ischemic rest pain or nonhealing wounds    PMH:    Past Medical History:   Diagnosis Date     Cataract of both eyes      Chronic infection     Hep C     Congestive heart failure, unspecified      Depressive disorder, not elsewhere classified     Depression (non-psychotic)     ERM OS (epiretinal membrane, left eye)      Generalized osteoarthrosis, unspecified site      Glaucoma suspect      Hypertension      Lichen planus      Other psoriasis      PVD (posterior vitreous detachment), left eye      Sarcoidosis (H)      Sarcoidosis (H)      Type II or unspecified type diabetes mellitus without mention of complication, not stated as uncontrolled      Unspecified hypothyroidism     Hypothyroidism     Unspecified viral hepatitis C without hepatic coma      Viral warts, unspecified          ACTIVE PROBLEMS:    Patient Active Problem List    Diagnosis Date Noted     Hypoxia 04/22/2017     Priority: Medium     Long-term (current) use of anticoagulants [Z79.01] 01/20/2017     Priority: Medium     Atrial flutter (H) 01/18/2017     Priority: Medium     Hyperlipidemia 05/13/2016     Priority:  Medium     Sarcoidosis of lung (HCC) 03/21/2016     Priority: Medium     Proteinuria 10/30/2015     Priority: Medium     Microscopic hematuria 10/30/2015     Priority: Medium     COPD (chronic obstructive pulmonary disease) (H) 01/22/2014     Priority: Medium     Hyponatremia 09/01/2013     Priority: Medium     Diagnosis updated by automated process. Provider to review and confirm.       RADHA (acute kidney injury) (HCC) 09/01/2013     Priority: Medium     Immunosuppression (H) 08/31/2013     Priority: Medium     Pneumonia 06/25/2014     Cirrhosis of liver (H) 04/16/2014     Cellulitis 08/31/2013     Hyperlipidemia LDL goal <70 09/26/2011     Acute exacerbation of chronic obstructive pulmonary disease (COPD) (H) 09/26/2011     Hip joint pain 09/20/2011     Atrial flutter with rapid ventricular response (H) 03/28/2011     CKD (chronic kidney disease) stage 3, GFR 30-59 ml/min 03/28/2011     Type 2 diabetes, HbA1C goal < 8% (H) 10/31/2010     A1C 6.4, 2011        CARDIOVASCULAR SCREENING; LDL GOAL LESS THAN 100 10/31/2010     CAD (coronary artery disease) 05/30/2008     Diabetes mellitus, type 2 (H)      Problem list name updated by automated process. Provider to review       Hypertrophy of prostate without urinary obstruction 10/13/2006     Problem list name updated by automated process. Provider to review       Hypothyroidism 07/22/2005     Hypothyroidism  Problem list name updated by automated process. Provider to review       SARCOIDOSIS-systemic 07/22/2005     Generalized osteoarthrosis, unspecified site 07/22/2005     Viral warts 07/22/2005     Problem list name updated by automated process. Provider to review       Other psoriasis 07/22/2005         SOCIAL HISTORY:    Social History   Substance Use Topics     Smoking status: Former Smoker     Packs/day: 1.00     Years: 30.00     Types: Cigarettes     Start date: 12/30/1960     Quit date: 7/22/1994     Smokeless tobacco: Never Used     Alcohol use No       FAMILY  HISTORY:    Family History   Problem Relation Age of Onset     HEART DISEASE Father      irreg heart beat     Circulatory Father      varicose veins     Prostate Cancer Father      HEART DISEASE Mother      heart attack     Arthritis Mother      OSTEOPOROSIS Mother      Thyroid Disease Mother      Eye Disorder Maternal Grandmother      glaucoma     DIABETES Sister      type 2     Kidney Cancer Sister      DIABETES Sister      Glaucoma No family hx of      Macular Degeneration No family hx of      CANCER No family hx of      no skin cancer       MEDICATIONS:      warfarin  7.5 mg Oral ONCE at 18:00     furosemide  60 mg Oral BID     senna-docusate  1-2 tablet Oral BID     aspirin EC EC tablet 81 mg  81 mg Oral Daily     atorvastatin  20 mg Oral Daily     fluticasone-vilanterol  1 puff Inhalation Daily     levothyroxine  137 mcg Oral Daily     metoprolol  100 mg Oral BID     mirtazapine  15 mg Oral At Bedtime     umeclidinium  1 puff Inhalation Daily         Warfarin Therapy Reminder       - MEDICATION INSTRUCTIONS -         PHYSICAL EXAM:    Temp:  [97.6  F (36.4  C)-98.4  F (36.9  C)] 98  F (36.7  C)  Pulse:  [79] 79  Heart Rate:  [74-94] 74  Resp:  [18-20] 18  BP: (137-155)/() 137/87  SpO2:  [93 %-96 %] 93 %    Intake/Output Summary (Last 24 hours) at 04/27/17 1354  Last data filed at 04/27/17 1329   Gross per 24 hour   Intake              760 ml   Output             1500 ml   Net             -740 ml     GENERAL:pleasant, no apparent distress.    HEENT: NC/AT.  PERRLA.  EOMI.  Sclerae white, not injected.     NECK: .  No jugular venous distension.   HEART: RRR. Normal S1, S2. No murmur, rub, click, or gallop. The PMI is in the 5th ICS in the midclavicular line. There is no heave.    LUNGS: CTA.  No ronchi, wheezes, rales.  No dullness to percussion.   NEURO: AA&Ox3, fluent/appropriate, motor grossly nonfocal  ABDOMEN: Soft, nontender, nondistended.    EXTREMITIES: No clubbing, cyanosis, or edema.  No wounds.  No varicose veins signs of chronic venous insufficiency.       DIAGNOSTIC TESTING:     Labs:   All laboratory data reviewed    Lab Results   Component Value Date    TROPI  04/22/2017     <0.015  The 99th percentile for upper reference range is 0.045 ug/L.  Troponin values in   the range of 0.045 - 0.120 ug/L may be associated with risks of adverse   clinical events.      TROPI 0.017 03/09/2017    TROPI 0.387 () 01/19/2017    TROPI 0.479 () 01/19/2017    TROPI 0.490 () 01/19/2017    TROPONIN 0.23 () 01/18/2017    TROPONIN 0.03 06/25/2014    TROPONIN 0.01 08/31/2013    TROPONIN 0.01 09/24/2011       Imaging:   CT PE 4/22/2017  IMPRESSION:   1. No pulmonary embolism.  2. New left lower lobe consolidative and groundglass opacities. New  layering moderate left pleural effusion. Differential infection versus  progression of sarcoid lung disease. Increased mediastinal  lymphadenopathy.   3. Chronic changes of sarcoidosis with peribronchovascular fibrotic  changes and perilymphatic nodularity.  4. Chronic emphysema.  5. Cirrhosis.     Cardiac MRI 5/24/2013  IMPRESSION:  1. Normal left ventricular size and normal global LV systolic  function with a calculated ejection fraction of 55 %.  2. Normal right ventricular size and normal global RV systolic  function with a calculated ejection fraction of 48 %.  3. On delayed enhancement imaging, there is patchy mid myocardial  hyperenhancement in proximal inferior septum, proximal anterior  septum, and discrete areas of hyperenhancement in distal lateral wall  and distal anterior wall. This pattern of late gadolinium  hyperenhancement can be seen in patients with sarcoidosis. There is  no significant change in the delayed myocardial hyperenhancement  pattern compared to cardiac MRI dated 10/14/2011, no new foci of  delayed enhancement identified.  4. Lung fields have bilateral patchy areas of increased signal  suggestive of fibrosis.    PFT with severe decrease in DLCO in  2017    EKst degree AVB with PACs, non specific TW change in V1    Transthoracic Echocardiogram:     Recent Results (from the past 4320 hour(s))   ECHO LIMITED WITH OPTISON    Narrative    [id number]  [facility]  [id number]  [id number]^LANDY^GABRIELA^CARYL           [facility]  Echocardiography Laboratory  [address] [place] [address]     Name: LOU RAO  MRN: [id number]  : 1945  Study Date: 2017 10:07 AM  Age: 71 yrs  Gender: Male  Patient Location: [facility]  Reason For Study: Cardiomyopathy  History: Cardiomyopathy  Ordering Physician: GABRIELA BILL  Performed By: Adri Long RDCS     BSA: 2.0 m2  Height: 69 in  Weight: 191 lb  BP: 143/82 mmHg  _____________________________________________________________________________  __        Procedure  Limited Portable Echo Adult. Contrast Optison. Optison (NDC #0659-6667-54)  given intravenously. Patient was given 6 ml mixture of 3 ml Optison and 6 ml  saline. 3 ml wasted.  _____________________________________________________________________________  __        Interpretation Summary  Limited study.  Borderline (EF 50-55%) reduced left ventricular function is present. Traced at  51%.  No change from prior.  _____________________________________________________________________________  __        Left Ventricle  Left ventricular wall thickness is normal. Left ventricular size is normal.  Borderline (EF 50-55%) reduced left ventricular function is present. Mild  diffuse hypokinesis is present.     Right Ventricle  The right ventricle is normal size.     Pericardium  No pericardial effusion is present.     _____________________________________________________________________________  __                             Report approved by: Sherrill Okeefe 2017 01:00 PM                       _____________________________________________________________________________  __      ECHO COMPLETE WITH OPTISON     [facility]    [id number]  [facility]  [id number]  [id number]^DENI CORONADO^OSCAR^CHRISTINE           [facility]  Diagnostic and Treamtent-3rd Floor  [address] [place] [address]     Name: LOU RAO  MRN: [id number]  : 1945  Study Date: 2017 02:28 PM  Age: 71 yrs  Gender: Male  Patient Location: [facility]  Reason For Study: Sarcoidosis, unspecified  Ordering Physician: OSCAR GONSALVES  Referring Physician: OSCAR GONSALVES  Performed By: Shelley Yee RDCS     BSA: 2.0 m2  Height: 69 in  Weight: 191 lb  BP: 179/115 mmHg  _____________________________________________________________________________  __        Procedure  Echocardiogram with two-dimensional, color and spectral Doppler performed.  Contrast Optison. Optison (NDC #9290-4546-38) given intravenously. Patient was  given 4 ml mixture of 3 ml Optison and 6 ml saline. 5 ml wasted. IV start  location L Upper arm .  _____________________________________________________________________________  __        Interpretation Summary  Borderline reduced left ventricular systolic function, (EF 50-55%). Left  ventricular diastolic function is indeterminate.  The right ventricle is normal size; global RV function is mildly reduced.  No significant valvular pathology.  The inferior vena cava is normal; estimated mean RA pressure is <3 mmHg.  In comparison to prior limited study dated 17, the LV function is  unchanged, however much improved from chronologically earlier study of the  same date.  _____________________________________________________________________________  __        Left Ventricle  Left ventricular size is normal. Mild concentric wall thickening consistent  with left ventricular hypertrophy is present. Left ventricular diastolic  function is indeterminate. Borderline (EF 50-55%) reduced left ventricular  function is present.     Right Ventricle  The right ventricle is normal size. Global  right ventricular function is  mildly reduced.     Atria  The left atrium appears normal. Mild to moderate right atrial enlargement is  present.     Mitral Valve  The mitral valve is normal.        Aortic Valve  Mild aortic valve sclerosis is present.     Tricuspid Valve  Trace tricuspid insufficiency is present. Pulmonary artery systolic pressure  cannot be assessed.     Pulmonic Valve  The pulmonic valve cannot be assessed.     Vessels  The inferior vena cava is normal. The aorta root is normal. Estimated mean  right atrial pressure is <3 mmHg.     Pericardium  No pericardial effusion is present.     _____________________________________________________________________________  __  MMode/2D Measurements & Calculations  IVSd: 1.2 cm  LVIDd: 4.4 cm  LVIDs: 3.0 cm  LVPWd: 1.00 cm  FS: 31.8 %  EDV(Teich): 88.9 ml  ESV(Teich): 35.5 ml     LV mass(C)d: 166.9 grams  Ao root diam: 3.3 cm  asc Aorta Diam: 3.0 cm  LA/Ao: 1.2  LVOT diam: 2.1 cm  LVOT area: 3.4 cm2  LA Volume (BP): 59.5 ml  LA Volume Index (BP): 29.3 ml/m2        Doppler Measurements & Calculations  MV E max jhonatan: 65.1 cm/sec  MV A max jhonatan: 81.9 cm/sec  MV E/A: 0.79  MV mean P.4 mmHg  MV V2 VTI: 19.5 cm  MVA(VTI): 2.7 cm2     MV dec time: 0.21 sec  Ao V2 max: 107.9 cm/sec  Ao max P.7 mmHg  LV V1 VTI: 15.8 cm  SV(LVOT): 52.8 ml  PA acc time: 0.10 sec  TR max jhonatan: 285.0 cm/sec  TR max P.5 mmHg  Lateral E/e': 7.6  Medial E/e': 9.7           _____________________________________________________________________________  __           Report approved by: Sherrill Christianson 2017 05:17 PM      ECHO LIMITED WITH OPTISON    Narrative    Interpretation Summary                       [facility]  Echocardiography Laboratory  [address] [place] [address]     Name: JALEN LOUCHRISTIANO PERDOMO  MRN: [id number]  : 1945  Study Date: 2017 04:05 PM  Age: 71 yrs  Gender: Male  Patient Location: [facility]  Reason  For Study: Heart Failure  Ordering Physician: KASEY SMITH  Performed By: Nestor Ambrocio RDCS     BSA: 2.1 m2  Height: 69 in  Weight: 205 lb  ______________________________________________________________________________        Procedure  Limited Portable Echo Adult. Contrast Optison. Optison (NDC #9392-7942-19)  given intravenously. Patient was given 4 ml mixture of 3 ml Optison and 6 ml  saline. 5 ml wasted.  ______________________________________________________________________________     Interpretation Summary  The Ejection Fraction is estimated at 50-55%.  ______________________________________________________________________________           Left Ventricle  The Ejection Fraction is estimated at 50-55%.  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 RHC c/w pulmonary HTN, normal left-sided filling pressures.  He most likely has pulm HTN Group V due to underlying sarcoidosis. His RV function is preserved and he denies syncope, pre-syncope or hypotension.       RECOMMENDATION:  --Will initiate Revatio 20mg PO TID. Will monitor and would expect response in about 3 months. We discussed with the patient about our  recommendations and he is in agreement  --Continue current management for CAD, AFib/Flutter, HTN, HFpEF    I have discussed the above with Dr. Hardwick.      Maryellen Felder MD  Cardiology Fellow  [phone]      I personally saw and examined this patient with PAH complicating sarcoidosis treated with TNF antagonist.  Heart catherization disclosed normal RA and PCP with elevated mean PA pressure and adequate though low cardiac output. In addition the patient is felt to have cirrhosis of the liver, another potential cause of PAH.  While the patient has extensive lung disease, we would suggest a trial of PAH directed therapy.  We discussed the response rate (20%) as well as risks and benefits and patient wishes to proceed.  He will need to be monitored carefully for potential magnification of VQ mismatch and resultant hypoxia.  At the present time there is little objective evidence for diastolic dysfunction, however, this would/could be expected and/or contributing as MRI did show mid-wall enhancement. Thank you for allowing us to see him.  Eric Hardwick M.D.

## 2017-04-27 NOTE — PLAN OF CARE
Problem: Goal Outcome Summary  Goal: Goal Outcome Summary     DIAP: Pt admitted 4/22/17 from clinic w/ ACEVES/hypoxia.  Pt has history of a flutter s/p ablation, CAD s/p stents, CHF, sarcoidosis involving heart and lungs, HLD, COPD, hypothyroidism, DM2, hep C.  Pt alert and oriented. VSS on room air/2-3L NC at times w/ exertion.  Up w/ SBA.  Pt triggered sepsis protocol, lactic acid 1.4.  No complaints of pain.  Walk test done today for home O2.  New pulm HTN dx per MD.  PO lasix w/ good UOP.  Likely discharge tomorrow w/ new medication.  Continue w/ plan of care and notify team w/ changes/concerns.

## 2017-04-27 NOTE — PROGRESS NOTES
[facility], [place]    Internal Medicine Progress Note - Gold Service      Assessment & Plan   Rohan Monroe is a 71 year old male with hx of Atrial flutter s/p ablation on ac, CAD s/p stents, CHF, Sarcoidosis involving heart and lungs on MTX and remicade q4 wks, HLD, COPD, hypothyroidism, DM2, hep C cirrhosis s/p treatment w SVR who was sent from cardiology clinic with worsening hypoxemia and dyspnea on exertion found to have pulmonary hypertension.      Acute on chronic hypoxic respiratory failure:  Patient has history of both CHF and severe pulmonary sarcoidosis now with worsening exertional dyspnea.  Evaluation for PE negative but CT showed left pleural effusion, mediastinal lymphadenopathy, and left lower lobe consolidation.  Cardiac catheterization revealed pulmonary hypertension with normal PAOP.       Suspect that pulmonary hypertension is the most likely diagnosis at present.       -await cardiology recs  - no further need for antibiotics   - continue supplementary oxygen as needed  -furosemide 60 mg PO BID  -likely will need oxygen at home  -likely start sildenafil soon      Left pleural effusion:  Exudate by criteria - awaiting cytology and cultures, but consider possibly related to sarcoidosis?  -appreciate pulmonary assistance  -await cytology and cultures  -plan for CT as outpatient with close follow up       Sarcoidosis: heart and lung involvement. Most recent PFT's (2/24/17) FVC 2.74 (69% pred), FEV1 1.16 (39% pred) Currently maintained on Remicade every 4 weeks (due for dose on 4/26/17), and methotrexate    -restart methotrexate Monday  -will need Remicade next week after discharge     Diastolic HF: last Echo 2/2017 with EF 50-55%, LV function indeterminate, global RV function mildly reduced.     -Continue diuresis as above    CAD: hx of PCI w ROSALIE to mid LAD and D2; ISR seen on cardiac catheterization yesterday.  -plan for outpatient PCI  - continuing PTA  aspirin, statin and BB  -appreciate cardiology assistance    Afib/a flutter: hx of recent ablation and amioradone loading, but felt worse on amiodarone and it was discontinued.  - on admission, EKG sinus  - continue PTA coumadin and metoprolol  - INR subtherapeutic on admission.  Continue warfarin    HTN: Stable, monitor.     COPD: not on home O2  - contineu PTA regimen    CKD3:  At baseline, monitor.     Dm2: a1c 7.5% in Jan 2017. Diet controlled  - continue carb controlled diet.    Diet: Room Service  Regular Diet Adult  Fluids: n/a  DVT Prophylaxis: Warfarin  Code Status: Full Code    Disposition Plan   Expected discharge: 2 - 3 days; recommended to prior living arrangement once eval completed.     Entered: Sacha Weber 04/27/2017, 8:38 AM   Information in the above section will display in the discharge planner report.      The patient's care was discussed with the Patient.    Sacha Weber MD   Internal Medicine Staff Hospitalist Service  [facility]  Pager: [phone]  Please see sticky note for cross cover information    Interval History     Feels a little better again today, is able to walk a few minutes now with less shortness of breath.  Still requiring oxygen.     4 point review of systems otherwise negative.     Data reviewed today: I reviewed all medications, new labs and imaging results over the last 24 hours. I personally reviewed all imaging and labs in the last 24h.     Physical Exam   Vital Signs: Temp: 97.6  F (36.4  C) Temp src: Oral BP: 144/89 Pulse: 79 Heart Rate: 86 Resp: 20 SpO2: 93 % O2 Device: Nasal cannula Oxygen Delivery: 1.5 LPM  Weight: 183 lbs 1.6 oz  General Appearance: Comfortably sitting up in a chair  Respiratory: mildly elevated work of breathing.  Crackles throughout.   Cardiovascular: normal rate, regular rhythm. No murmurs, rubs, or gallops.   GI: soft, non-tender, non-distended.  No hepatosplenomegaly or mass.   Skin: no peripheral edema         Data    Data     Recent Labs  Lab 04/27/17  0725 04/26/17  0711 04/25/17  0707  04/23/17  0635 04/22/17  1248   WBC 13.5* 13.3* 11.2*  --  11.9* 10.2   HGB 13.3 13.2* 12.9*  --  12.8* 12.5*   MCV 95 95 95  --  95 94    328 312  --  339 348   INR 1.18* 1.24* 1.46*  < > 2.06* 1.61*    137 138  < > 138 139   POTASSIUM 4.0 3.9 3.8  < > 4.0 3.6   CHLORIDE 101 102 103  < > 105 102   CO2 27 26 27  < > 25 28   BUN 42* 35* 29  < > 37* 42*   CR 1.85* 1.68* 1.60*  < > 1.84* 1.98*   ANIONGAP 9 9 8  < > 8 9   RAFFY 9.1 9.0 9.0  < > 8.6 9.2   * 134* 129*  < > 136* 111*   ALBUMIN  --   --   --   --  3.3* 3.4   PROTTOTAL  --   --   --   --  7.6 7.9   BILITOTAL  --   --   --   --  1.2 1.0   ALKPHOS  --   --   --   --  108 109   ALT  --   --   --   --  31 24   AST  --   --   --   --  27 28   TROPI  --   --   --   --   --  <0.015The 99th percentile for upper reference range is 0.045 ug/L.  Troponin values in the range of 0.045 - 0.120 ug/L may be associated with risks of adverse clinical events.   < > = values in this interval not displayed.

## 2017-04-27 NOTE — PROGRESS NOTES
Attempted to see today but was not in room.  Off for IR procedure.  Reviewed RHC, seems to have pHTN.  Will stop by tomorrow once analysis of fluid done.

## 2017-04-27 NOTE — PROGRESS NOTES
Patient has been assessed for Home Oxygen needs. Oxygen readings:    *Pulse oximetry (SpO2) = *93-95% on room air at rest while awake.    *SpO2 improved to 97% on 2 liters/minute at rest.    *SpO2 = 84% on room air during activity/with exercise.    *SpO2 improved to 86% on 2 liters/minute during activity/with exercise, at 4 LPM sats 90-93%

## 2017-04-27 NOTE — PLAN OF CARE
"Problem: Respiratory Insufficiency (Adult)  Goal: Identify Related Risk Factors and Signs and Symptoms  Related risk factors and signs and symptoms are identified upon initiation of Human Response Clinical Practice Guideline (CPG)   Outcome: Improving  D/I/A: Pt up in chair t/o evening.  States \"breathing feels better.\"  A+O x4 and able to make needs known.  VSSA.  Denies pain; mild ACEVES noted.  Pleasant and cooperative with cares and treatments.  Voiding in fair amounts without difficulty.  Thoracentesis site cover with tegaderm, no pain or redness noted.  P: Continue to monitor status.        "

## 2017-04-27 NOTE — PROGRESS NOTES
Care Coordinator Progress Note     Admission Date/Time:  4/22/2017  Attending MD:  Sacha Weber*     Data  Chart reviewed, discussed with interdisciplinary team.   Patient was admitted for:    Hypoxia  Dyspnea, unspecified type  Pneumonia  Sarcoidosis of lung (H)  Type 2 diabetes, HbA1C goal < 8% (H).    Concerns with insurance coverage for discharge needs: None.  Current Living Situation: Patient said that he lives with his teenage son. Pt said that he was independent with his own care prior to admission, but significant decline currently in breathing.  Support System: Supportive and Involved  Services Involved: none currently  Transportation: family or friend will supply  Barriers to Discharge: pending medical clearance, significant dyspnea with any exertion, O2 need currently.     Assessment  RNCC met with pt who stated that things are not currently working at home due to his SOB. States that his 23 yo son lives with him and is the reason why this pt is able to stay in his home currently.  Pt was able to begin moving around more and feels that he will not need any additional support other than his son at home.  He agrees to Home O2 recommendation, see walk test.  Orders placed and referral sent to  home O2 per pt request.  HO2 aware that pt may dc tomorrow am.  Pt would like to drive his car home, as it is currently in the [employer] lot.  Instructed pt to ask the physician team, otherwise he stated his son or friend will pick him up.      Pt requested RN to reschedule next remicade infusion, as he missed his last infusion, appointment schedule for next available appointment, unable to get in next week.  See AVS for appointment date and time.      [facility]  [facility]  [address] [place] [id number]  [phone]   Mon-Fri: 7 a.m. - 7 p.m.  Sat.-Sun. 7 a.m. - 2 p.m.  [facility]  [address] [place]   [address]  Phone # [phone]      Plan  Anticipated Discharge Date:  4/28/17   Anticipated Discharge Plan:  Dc to home with new home O2 and follow up appointments scheduled.     Vi Spicer RNCC, BSN    [facility]  [address] [place] [id number]    [email or web address]    Office: [phone] Pager: [phone]

## 2017-04-28 ENCOUNTER — DOCUMENTATION ONLY (OUTPATIENT)
Dept: PHARMACY | Facility: CLINIC | Age: 72
End: 2017-04-28

## 2017-04-28 ENCOUNTER — DOCUMENTATION ONLY (OUTPATIENT)
Dept: CARDIOLOGY | Facility: CLINIC | Age: 72
End: 2017-04-28

## 2017-04-28 ENCOUNTER — APPOINTMENT (OUTPATIENT)
Dept: PHYSICAL THERAPY | Facility: CLINIC | Age: 72
DRG: 286 | End: 2017-04-28
Attending: STUDENT IN AN ORGANIZED HEALTH CARE EDUCATION/TRAINING PROGRAM
Payer: MEDICARE

## 2017-04-28 VITALS
WEIGHT: 185 LBS | HEART RATE: 78 BPM | TEMPERATURE: 98 F | BODY MASS INDEX: 27.4 KG/M2 | SYSTOLIC BLOOD PRESSURE: 160 MMHG | OXYGEN SATURATION: 94 % | RESPIRATION RATE: 20 BRPM | HEIGHT: 69 IN | DIASTOLIC BLOOD PRESSURE: 94 MMHG

## 2017-04-28 LAB
ANION GAP SERPL CALCULATED.3IONS-SCNC: 8 MMOL/L (ref 3–14)
BACTERIA SPEC CULT: NO GROWTH
BACTERIA SPEC CULT: NO GROWTH
BUN SERPL-MCNC: 46 MG/DL (ref 7–30)
CALCIUM SERPL-MCNC: 9.2 MG/DL (ref 8.5–10.1)
CHLORIDE SERPL-SCNC: 99 MMOL/L (ref 94–109)
CO2 SERPL-SCNC: 28 MMOL/L (ref 20–32)
COPATH REPORT: NORMAL
CREAT SERPL-MCNC: 1.99 MG/DL (ref 0.66–1.25)
ERYTHROCYTE [DISTWIDTH] IN BLOOD BY AUTOMATED COUNT: 15.7 % (ref 10–15)
GFR SERPL CREATININE-BSD FRML MDRD: 33 ML/MIN/1.7M2
GLUCOSE SERPL-MCNC: 144 MG/DL (ref 70–99)
HCT VFR BLD AUTO: 40.8 % (ref 40–53)
HGB BLD-MCNC: 13.2 G/DL (ref 13.3–17.7)
INR PPP: 1.27 (ref 0.86–1.14)
MCH RBC QN AUTO: 30.5 PG (ref 26.5–33)
MCHC RBC AUTO-ENTMCNC: 32.4 G/DL (ref 31.5–36.5)
MCV RBC AUTO: 94 FL (ref 78–100)
MICRO REPORT STATUS: NORMAL
MICRO REPORT STATUS: NORMAL
PLATELET # BLD AUTO: 356 10E9/L (ref 150–450)
POTASSIUM SERPL-SCNC: 4.1 MMOL/L (ref 3.4–5.3)
RBC # BLD AUTO: 4.33 10E12/L (ref 4.4–5.9)
SODIUM SERPL-SCNC: 136 MMOL/L (ref 133–144)
SPECIMEN SOURCE: NORMAL
SPECIMEN SOURCE: NORMAL
WBC # BLD AUTO: 11.3 10E9/L (ref 4–11)

## 2017-04-28 PROCEDURE — 40000193 ZZH STATISTIC PT WARD VISIT

## 2017-04-28 PROCEDURE — 97530 THERAPEUTIC ACTIVITIES: CPT | Mod: GP

## 2017-04-28 PROCEDURE — 97161 PT EVAL LOW COMPLEX 20 MIN: CPT | Mod: GP

## 2017-04-28 PROCEDURE — 85610 PROTHROMBIN TIME: CPT | Performed by: INTERNAL MEDICINE

## 2017-04-28 PROCEDURE — 25000132 ZZH RX MED GY IP 250 OP 250 PS 637: Mod: GY | Performed by: INTERNAL MEDICINE

## 2017-04-28 PROCEDURE — 25000132 ZZH RX MED GY IP 250 OP 250 PS 637: Mod: GY | Performed by: STUDENT IN AN ORGANIZED HEALTH CARE EDUCATION/TRAINING PROGRAM

## 2017-04-28 PROCEDURE — 25000132 ZZH RX MED GY IP 250 OP 250 PS 637: Mod: GY | Performed by: PHYSICIAN ASSISTANT

## 2017-04-28 PROCEDURE — 85027 COMPLETE CBC AUTOMATED: CPT | Performed by: INTERNAL MEDICINE

## 2017-04-28 PROCEDURE — 36415 COLL VENOUS BLD VENIPUNCTURE: CPT | Performed by: INTERNAL MEDICINE

## 2017-04-28 PROCEDURE — 00000146 ZZHCL STATISTIC GLUCOSE BY METER IP

## 2017-04-28 PROCEDURE — 99239 HOSP IP/OBS DSCHRG MGMT >30: CPT | Performed by: STUDENT IN AN ORGANIZED HEALTH CARE EDUCATION/TRAINING PROGRAM

## 2017-04-28 PROCEDURE — A9270 NON-COVERED ITEM OR SERVICE: HCPCS | Mod: GY | Performed by: INTERNAL MEDICINE

## 2017-04-28 PROCEDURE — 80048 BASIC METABOLIC PNL TOTAL CA: CPT | Performed by: INTERNAL MEDICINE

## 2017-04-28 PROCEDURE — A9270 NON-COVERED ITEM OR SERVICE: HCPCS | Mod: GY | Performed by: PHYSICIAN ASSISTANT

## 2017-04-28 PROCEDURE — A9270 NON-COVERED ITEM OR SERVICE: HCPCS | Mod: GY | Performed by: STUDENT IN AN ORGANIZED HEALTH CARE EDUCATION/TRAINING PROGRAM

## 2017-04-28 RX ORDER — WARFARIN SODIUM 7.5 MG/1
7.5 TABLET ORAL
Status: DISCONTINUED | OUTPATIENT
Start: 2017-04-28 | End: 2017-04-28 | Stop reason: HOSPADM

## 2017-04-28 RX ORDER — FUROSEMIDE 20 MG
60 TABLET ORAL 2 TIMES DAILY
Qty: 180 TABLET | Refills: 0 | Status: SHIPPED | OUTPATIENT
Start: 2017-04-28 | End: 2017-10-13

## 2017-04-28 RX ORDER — SILDENAFIL CITRATE 20 MG/1
20 TABLET ORAL 3 TIMES DAILY
Status: DISCONTINUED | OUTPATIENT
Start: 2017-04-28 | End: 2017-04-28 | Stop reason: HOSPADM

## 2017-04-28 RX ORDER — SILDENAFIL CITRATE 20 MG/1
20 TABLET ORAL 3 TIMES DAILY
Qty: 90 TABLET | Refills: 3 | Status: SHIPPED | OUTPATIENT
Start: 2017-04-28 | End: 2017-09-26

## 2017-04-28 RX ADMIN — FLUTICASONE FUROATE AND VILANTEROL TRIFENATATE 1 PUFF: 100; 25 POWDER RESPIRATORY (INHALATION) at 09:06

## 2017-04-28 RX ADMIN — METOPROLOL TARTRATE 100 MG: 50 TABLET, FILM COATED ORAL at 09:06

## 2017-04-28 RX ADMIN — ASPIRIN 81 MG: 81 TABLET, COATED ORAL at 09:06

## 2017-04-28 RX ADMIN — LEVOTHYROXINE SODIUM 137 MCG: 137 TABLET ORAL at 09:06

## 2017-04-28 RX ADMIN — FUROSEMIDE 60 MG: 40 TABLET ORAL at 09:05

## 2017-04-28 RX ADMIN — UMECLIDINIUM 1 PUFF: 62.5 AEROSOL, POWDER ORAL at 09:06

## 2017-04-28 RX ADMIN — ATORVASTATIN CALCIUM 20 MG: 10 TABLET, FILM COATED ORAL at 09:05

## 2017-04-28 RX ADMIN — LORAZEPAM 0.5 MG: 0.5 TABLET ORAL at 03:04

## 2017-04-28 RX ADMIN — SILDENAFIL 20 MG: 20 TABLET, FILM COATED ORAL at 15:50

## 2017-04-28 RX ADMIN — SENNOSIDES AND DOCUSATE SODIUM 1 TABLET: 8.6; 5 TABLET ORAL at 09:06

## 2017-04-28 RX ADMIN — SILDENAFIL 20 MG: 20 TABLET, FILM COATED ORAL at 10:52

## 2017-04-28 NOTE — CONSULTS
IMPRESSION:   1.  Sarcoidosis.   2.  MRI compatible with mild involvement of the myocardium.   3.  Pulmonary artery hypertension.   4.  Sarcoid lung disease.   5.  History of atrial flutter.   6.  Hyperlipidemia.   7.  Remote history of acute kidney injury.   8.  Type 2 diabetes mellitus.   9.  Benign prostatic hypertrophy.   10.  Hypothyroidism.   11.  Cirrhosis of the liver (nonalcoholic).   12.  Remote history of hyponatremia.        Thank you very much for allowing me to see your patient, Rohan Monroe.  I first saw Mr. Monroe in 2008 for evaluation of alternative therapies for sarcoid as well as for evidence of sarcoid myocardial involvement.  At that time I referred him to the [facility], as a magnetic resonance imaging was in its infant days here and PET scanning was restricted.  At the [facility], the diagnosis of sarcoid was confirmed and the patient was then offered an early chance to participate in Remicade usage for sarcoid.      He subsequently transferred his care back to the [facility], where he has been followed by Chelsie.  As an attempt was made to increase the intervals between dosing of Remicade, the patient developed increasing shortness of breath.  He would be considered a New York Heart Association functional class III.      Cardiac catheterization has been recently performed.  This disclosed evidence of pulmonary arterial hypertension, as reflected by evidence of a normal pulmonary capillary wedge pressure, elevated pulmonary artery pressure and an elevated pulmonary vascular resistance.      He has no history of presyncope nor syncope.      A remote transesophageal echocardiogram has shown spontaneous echo contrast in the left atrium, decreased systolic function with diffuse hypokinesis.  At that time, the patient was in atrial flutter.  A more recent echocardiogram showed a marked resumption of LV function in the range of 50% to 55%, indeterminate left  ventricular diastolic function and normal right-sided structures.  Presumably in the interim, he had undergone ablation for his atrial flutter, which resulted in the improvement in his LV function.      We discussed the potential treatments with pulmonary hypertension specific medications.  He is willing to try that.  He understands that there is only approximately a 20% response rate.  We discussed the adverse effects associated with phosphodiesterase-5 inhibitors.      His Remicade interval has been reduced in an attempt to regain control of his exertional symptoms.  He has not tolerated prednisone in the past.  We have in the past used low-dose rapamycin to treat sarcoid, and in animal models there have been rather striking improvements in the granulomas, as evidenced by microscopy.      Thank you very much for allowing us to see him.         CHERYL NGUYEN MD             D: 2017 16:55   T: 2017 19:37   MT: MELE      Name:     LOU RAO   MRN:      [id number]        Account:       [id number]   :      1945           Consult Date:  2017      Document: [id number]       cc: Andrew PJ Olson MD David Perlman MD Rebecca Cogswell MD

## 2017-04-28 NOTE — PROGRESS NOTES
04/28/17 1100   Quick Adds   Type of Visit Initial PT Evaluation   Living Environment   Lives With child(jose), adult   Living Arrangements apartment   Home Accessibility no concerns   Number of Stairs to Enter Home 0   Number of Stairs Within Home 0   Transportation Available car;family or friend will provide   Living Environment Comment Pt lives with 22 year old son, son is very supportive and involved in pt cares.  Pt is retired, son is a student and available to assist as needed.   Self-Care   Dominant Hand right   Usual Activity Tolerance good   Current Activity Tolerance fair   Regular Exercise yes   Equipment Currently Used at Home cane, straight   Activity/Exercise/Self-Care Comment Pt previously participated in both OP Pulmonary and Cardiac Rehab, states this was many years ago. Pt says he walks in [employer] near his home with son for exercise.    Functional Level Prior   Ambulation 1-->assistive equipment   Transferring 0-->independent   Toileting 0-->independent   Bathing 0-->independent   Dressing 0-->independent   Eating 0-->independent   Communication 0-->understands/communicates without difficulty   Swallowing 0-->swallows foods/liquids without difficulty   Cognition 0 - no cognition issues reported   Fall history within last six months no   Which of the above functional risks had a recent onset or change? ambulation   Prior Functional Level Comment Pt reports independence with functional mobility and ADLs at baseline, states he was using SEC for last couple weeks due to increased ACEVES and weakness.   General Information   Onset of Illness/Injury or Date of Surgery - Date 04/22/17   Referring Physician Sacha Weber MD   Patient/Family Goals Statement to return to prior level of function, increase activity tolerance   Pertinent History of Current Problem (include personal factors and/or comorbidities that impact the POC) Pt admitted on 4/22 from clinic for ACEVES and hypoxia. The [facility]  includes a flutter s/p ablation, CAD s/p stents, CHF, sarcoidosis, Type 2 DM, and a new dx of pulmonary HTN.   Precautions/Limitations oxygen therapy device and L/min  (2 L/min at rest)   Heart Disease Risk Factors Diabetes;Lack of physical activity;High blood pressure;Medical history   General Observations Pt received sitting up in chair, agreeable to PT, VSS on 2L via NC, RN ok'd treatment session.   General Info Comments Activity: Up ad higinio   Cognitive Status Examination   Orientation orientation to person, place and time   Level of Consciousness alert   Follows Commands and Answers Questions 100% of the time   Personal Safety and Judgment intact   Memory intact   Pain Assessment   Patient Currently in Pain No   Integumentary/Edema   Integumentary/Edema Comments No LE edema   Range of Motion (ROM)   ROM Comment BLE WFL   Strength   Strength Comments Not formally assessed, demonstrates at least 3+/5 strength in BLE with mobility   Transfer Skills   Transfer Comments sit<>stand IND   Gait   Gait Comments Ambulates with mod IND, steady throughout   Balance   Balance Comments Sitting: good, standing: good   Sensory Examination   Sensory Perception Comments Denies sensory changes in extremities   General Therapy Interventions   Planned Therapy Interventions risk factor education;home program guidelines;progressive activity/exercise   Clinical Impression   Criteria for Skilled Therapeutic Intervention yes, treatment indicated   PT Diagnosis deconditioning in setting of new Pulmonary Hypertension diagnosis   Influenced by the following impairments decreased activity tolerance, ACEVES, need for supplemental O2   Functional limitations due to impairments functional endurance for daily mobility and ADLs   Clinical Presentation Stable/Uncomplicated   Clinical Presentation Rationale Pt with new diagnosis of pulmonary hypertension in setting of significant PMH and multiple comorbidities impacting abiltiy to return to PLOF safely  "and independently   Clinical Decision Making (Complexity) Low complexity   Therapy Frequency` (1x eval and treat)   Predicted Duration of Therapy Intervention (days/wks) (1x eval and treat)   Anticipated Discharge Disposition Home with Outpatient Therapy   Risk & Benefits of therapy have been explained Yes   Patient, Family & other staff in agreement with plan of care Yes   [facility] TM \"6 Clicks\"   2016, Trustees of [facility], under license to [employer].  All rights reserved.   6 Clicks Short Forms Basic Mobility Inpatient Short Form   [facility]  \"6 Clicks\" V.2 Basic Mobility Inpatient Short Form   1. Turning from your back to your side while in a flat bed without using bedrails? 4 - None   2. Moving from lying on your back to sitting on the side of a flat bed without using bedrails? 4 - None   3. Moving to and from a bed to a chair (including a wheelchair)? 4 - None   4. Standing up from a chair using your arms (e.g., wheelchair, or bedside chair)? 4 - None   5. To walk in hospital room? 4 - None   6. Climbing 3-5 steps with a railing? 4 - None   Basic Mobility Raw Score (Score out of 24.Lower scores equate to lower levels of function) 24   Total Evaluation Time   Total Evaluation Time (Minutes) 10     "

## 2017-04-28 NOTE — PROGRESS NOTES
CLINICAL NUTRITION SERVICES    Reviewed nutrition risk factors due to LOS.  Pt is tolerating diet and eating adequately per nursing documentation (good appetite and consuming % of meals documented).  Per discussion with RN, reports good/adequate oral intake. Reviewed wt hx.  Wt Hx: 91.6 kg (4/26/16), 88.5 kg (8/30/16), 85.8 kg (1/31/17), 90.9 kg (3/30/17), 86.8 kg (4/22/17), 83.9 kg (4/28/17) - Wt changes due to changes in fluid status and diuresis this admission.      Follow Up / Monitoring:   Per protocol. Will continue to follow pt via rounds.    Cheryl Hill, MS, RD, LD, [facility]   6C Pgr:  [phone]

## 2017-04-28 NOTE — DISCHARGE SUMMARY
[facility] - Internal Medicine Discharge Summary   Date of Service: 4/28/2017    Rohan Monroe MRN# [id number]   YOB: 1945 Age: 71 year old     Date of Admission:  4/22/2017  Date of Discharge:  4/28/2017  Admitting Physician:  Shaq Sanabria MD  Discharge Physician:  Sacha Weber MD  Discharging Service:  Internal Medicine, [facility]     Primary Provider: Jamie Foster         Reason for Admission:   Rohan Monroe is a 71 year old male with PMH of Atrial Flutter (on warfarin), CAD s/p stents, CHF, HLD, COPD (no home O2 use), hypothyroidism, DMII (diet controlled),  Hep C cirrhosis (s/p treatment- SVR), depression, and Sarcoidosis of the heart and lungs who presents from Cardiology Clinic d/t ~ 2 week hypoxia, ACEVES and worsening shortness of breath.           Discharge Diagnosis:   Acute on chronic hypoxemic respiratory failure  WHO Group 1 vs Group 5 Pulmonary hypertension  Sarcoidosis  Heart failure with preserved ejection fraction  Exudative left pleural effusion  Coronary artery disease  COPD  Hypertension  Type 2 Diabetes Mellitus            Procedures & Significant Findings:     Right heart catheterization:  RA 12/10 (8)  RV 65/2 (12)  PCWP 14/17 (11)  PA 65/28 (42)  /81 (97)  PVR 8.11 dhaliwal units    Stefan CI 1.9    CT 4/22:    1. No pulmonary embolism.  2. New left lower lobe consolidative and groundglass opacities. New  layering moderate left pleural effusion. Differential infection versus  progression of sarcoid lung disease. Increased mediastinal  lymphadenopathy.   3. Chronic changes of sarcoidosis with peribronchovascular fibrotic  changes and perilymphatic nodularity.  4. Chronic emphysema.  5. Cirrhosis.       Left thoracentesis on 4/26 with 300 ml removed:  Red, Cloudy  WBC 1457 (78% N)  Cytology negative   (greater than serum)  Protein 4.1  Culture NGTD    Creatinine at discharge 1.99    Echocardiogram limited:    Borderline (EF 50-55%)  reduced left ventricular function is present. Traced at  51%.           Consultations:   Pulmonology  Cardiology         Hospital Course by Problem:    Acute on chronic hypoxemic respiratory failure, due to WHO Group 1 vs Group 5 Pulmonary hypertension: The patient was admitted to the hospital and placed on supplemental oxygen.  A CT was performed that did not reveal a pulmonary embolism but did show possible consolidation. There was evidence of hypervolemia and the patient was administered IV diuresis with some improvement in his symptoms.  A broad differential diagnosis was entertained and consultations were obtained from both Cardiology and Pulmonology.  A right cardiac catheterization was performed that showed a normal wedge pressure but markedly elevated pulmonary arterial pressures.  A left heart cardiac catheterization was also performed that revealed in-stent restenosis but no obstruction; it was decided that elective PCI should be performed in a few weeks.  Given the consolidation seen imaging the patient was treated with 5 days of ceftriaxone and doxycycline.   The patient was started on increased diuretic doses and sildenafil and will need to be followed closely as an outpatient.   He was discharged on home oxygen.       Sarcoidosis:  The patient has severe sarcoidosis managed with remicade and methotrexate.  Both were held as an inpatient and then restarted at discharge.  He should continue to follow up with Dr. Perlman.    Heart failure with preserved ejection fraction: Interestingly, Mr. Monroe had a normal PAOP after diuresis.  It is likely that some degree of diuresis will continue to be necessary, as will follow up with Dr. Paige.    Exudative left pleural effusion: Mr. Monroe was noted to have a left sided pleural effusion and this was drained (300 ml) with significant improvement in his symptoms.  This was found to be an exudate that was apparently sterile.  There was no lymphadenopathy to  "suggest malignancy and cytology was negative.   He should have a CT in 4 weeks to evaluate for recurrence.    Coronary artery disease: Mr. Monroe has a history of CAD with PCI to the LAD and diagonal branch.  Cardiac catheterization revealed evidence of instent restenosis that was not obstructive.  He should have a repeat cardiac catheterization in 2-4 weeks.    COPD: Home medications were continued.    Hypertension: Metoprolol was continued but amlodipine was stopped given CHF.    Type 2 Diabetes Mellitus: Last A1c was 7.5% with diet control.      Physical Exam on day of Discharge:  Blood pressure (!) 160/94, pulse 78, temperature 97.8  F (36.6  C), temperature source Oral, resp. rate 20, height 1.753 m (5' 9\"), weight 83.9 kg (185 lb), SpO2 99 %.  General: Well appearing  HEENT: Anicteric, extraocular muscles intact.   Neck: supple, no lymphadenopathy   Respiratory: bilateral fine crackles.   Heart/CV: normal rate, regular rhythm. No murmurs, rubs, or gallops.   Abdomen/GI: soft, non-tender, non-distended.  No hepatosplenomegaly or mass.  Normoactive bowel sounds.   Extremities/MSK: warm and well perfused.  No edema.               Pending Results:   Pleural fluid culture          Discharge Medications:     Current Discharge Medication List      START taking these medications    Details   sildenafil (REVATIO/VIAGRA) 20 MG tablet Take 1 tablet (20 mg) by mouth 3 times daily  Qty: 90 tablet, Refills: 3    Associated Diagnoses: Pulmonary hypertension (H)         CONTINUE these medications which have CHANGED    Details   furosemide (LASIX) 20 MG tablet Take 3 tablets (60 mg) by mouth 2 times daily  Qty: 180 tablet, Refills: 0    Associated Diagnoses: Pulmonary hypertension (H)         CONTINUE these medications which have NOT CHANGED    Details   levothyroxine (SYNTHROID/LEVOTHROID) 137 MCG tablet Take 1 tablet (137 mcg) by mouth daily  Qty: 90 tablet, Refills: 1    Associated Diagnoses: Hypothyroidism      warfarin " (COUMADIN) 5 MG tablet Take 1 tablet (5 mg) by mouth daily  Qty: 30 tablet, Refills: 6    Associated Diagnoses: Atrial flutter with rapid ventricular response (H)      polyethylene glycol (MIRALAX) powder Take 17 g (1 capful) by mouth daily  Qty: 510 g, Refills: 1    Associated Diagnoses: Constipation, unspecified constipation type      tiotropium (SPIRIVA HANDIHALER) 18 MCG capsule Inhale contents of one capsule daily.  Qty: 90 capsule, Refills: 3    Associated Diagnoses: Chronic obstructive pulmonary disease, unspecified COPD type (H)      albuterol (PROAIR HFA/PROVENTIL HFA/VENTOLIN HFA) 108 (90 BASE) MCG/ACT Inhaler Inhale 2 puffs into the lungs every 6 hours as needed for shortness of breath / dyspnea  Qty: 3 Inhaler, Refills: 6    Associated Diagnoses: Sarcoidosis (H)      fluticasone-vilanterol (BREO ELLIPTA) 100-25 MCG/INH oral inhaler Inhale 1 puff into the lungs daily  Qty: 3 Inhaler, Refills: 3    Associated Diagnoses: Chronic obstructive pulmonary disease, unspecified COPD type (H)      inFLIXimab (REMICADE) 100 MG injection Inject 100 mg into the vein every 28 days       metoprolol (LOPRESSOR) 100 MG tablet Take 1 tablet (100 mg) by mouth 2 times daily  Qty: 60 tablet, Refills: 11    Associated Diagnoses: Hypertension secondary to other renal disorders      methotrexate 2.5 MG tablet Take 3 tablets (7.5 mg) by mouth once a week On Mondays  Qty: 48 tablet, Refills: 3    Associated Diagnoses: Sarcoidosis (H)      atorvastatin (LIPITOR) 20 MG tablet Take 1 tablet (20 mg) by mouth daily  Qty: 90 tablet, Refills: 3    Associated Diagnoses: Hyperlipidemia      ASPIRIN EC PO Take 81 mg by mouth daily      Multiple Vitamins-Minerals (MULTIVITAMIN & MINERAL PO) Take 1 tablet by mouth daily.      mirtazapine (REMERON) 15 MG tablet Take 15 mg by mouth At Bedtime.      ciclopirox (LOPROX) 0.77 % cream Apply topically 2 times daily To feet and toenails.  Qty: 90 g, Refills: 6    Associated Diagnoses: Dermatophytosis  of nail; Tinea pedis of both feet      blood glucose monitoring (ACCU-CHEK RONNIE PLUS) test strip Use to test blood sugar 2 times daily or as directed.  3 month supply.  Qty: 200 each, Refills: 3    Associated Diagnoses: Type 2 diabetes, HbA1C goal < 8% (H)      blood glucose monitoring (ACCU-CHEK FASTCLIX) lancets Use to test blood sugar 2 times daily or as directed.  102 lancets per box.  3 month supply.  Qty: 2 Box, Refills: 3    Associated Diagnoses: Type 2 diabetes, HbA1C goal < 8% (H)      blood glucose monitoring (NO BRAND SPECIFIED) meter device kit Use to test blood sugar 2 times daily.  Qty: 1 kit, Refills: 0    Associated Diagnoses: Type 2 diabetes mellitus with diabetic nephropathy (H)      Urea (CARMOL 40) 40 % CREA To feet daily  Qty: 199 g, Refills: 4    Associated Diagnoses: Dermatophytosis of nail; Corns and callosities         STOP taking these medications       amLODIPine (NORVASC) 10 MG tablet Comments:   Reason for Stopping:         sildenafil (VIAGRA) 50 MG tablet Comments:   Reason for Stopping:                    Discharge Instructions and Follow-Up:     Discharge Procedure Orders  Medication Therapy Management Referral   Referral Type: Med Therapy Management     Reason for your hospital stay   Order Comments: You were admitted to the hospital for shortness of breath and hypoxemia in the setting of sarcoidosis.  An extensive evaluation was performed including cardiac catheterization that revealed pulmonary hypertension.  You were started on sildenafil for this as well as home oxygen.    You also had a thoracentesis performed and 300 ml of fluid removed.     Adult [facility] Follow-up and recommended labs and tests   Order Comments: Follow up with Dr. Perlman next week.    Get remicade infusion next week.    Follow up with Dr. Paige in the next few weeks.      Appointments on [place] and/or [facility] (with [facility] or [facility] provider or service). Call [phone] if you haven't heard  regarding these appointments within 7 days of discharge.     Activity   Order Comments: Your activity upon discharge: activity as tolerated   Order Specific Question Answer Comments   Is discharge order? Yes      Full Code     Oxygen Adult   Order Comments: [facility] Order 2-4 liter(s) by nasal cannula continuously with use of portable tank. Expected treatment length is indefinite (99 months).. Test on conserving device as applicable.    Patients who qualify for home O2 coverage under the CMS guidelines require ABG tests or O2 sat readings obtained closest to, but no earlier than 2 days prior to the discharge, as evidence of the need for home oxygen therapy. Testing must be performed while patient is in the chronic stable state. See notes for O2 sats.    I certify that this patient, Rohan Monroe has been under my care and that I, or a nurse practitioner or physician's assistant working with me, had a face-to-face encounter that meets the face-to-face encounter requirements with this patient on 4/27/2017. The patient, Rohan Monroe was evaluated or treated in whole, or in part, for the following medical condition, which necessitates the use of the ordered oxygen. Treatment Diagnosis: sarcoidosis, pulmonary hypertension    Attending Provider: Sacha Weber*  Physician signature: See electronic signature associated with these discharge orders  Date of Order: April 27, 2017     Diet   Order Comments: Follow this diet upon discharge: Regular low salt   Order Specific Question Answer Comments   Is discharge order? Yes                  Discharge Disposition:   Home         Condition on Discharge:   Discharge condition: Stable   Code status on discharge: Full Code        Date of service: 4/28/2017        It was our pleasure to care for Mr. Monroe. Please do not hesitate to contact me should there be questions regarding the hospital course or discharge plan.      Sacha Weber,  MD  Hospitalist  Medicine & Pediatrics  [facility]   of Medicine & Pediatrics  [facility]  Pager: [phone]  [email or web address]  TT 60

## 2017-04-28 NOTE — PLAN OF CARE
DISCHARGE     Discharged to:  Home  Via:  Automobile  Accompanied by:  Self, pt refused to have someone get him  Discharge Instructions:  Diet, activity, medications, follow up appointments, when to call the MD, and what to watchout for (i.e. s/s of infection, increasing SOB, palpitations, chest pain,)  Prescriptions:  To be filled by discharge pharmacy per pt's request; medication list reviewed & sent with pt  Follow Up Appointments:  Arranged; information given  Belongings:  All sent with pt  IV:  Out  Telemetry:  Off  Pt exhibits understanding of above discharge instructions; all questions answered.  Discharge Paperwork:  Signed,  aware of dc

## 2017-04-28 NOTE — PLAN OF CARE
Problem: Goal Outcome Summary  Goal: Goal Outcome Summary  Pt admitted on 4/22 from clinic for ACEVES and hypoxia. PMH includes a flutter s/p ablation, CAD s/p stents, CHF, sarcoidosis, Type 2 DM, and a new dx of pulmonary HTN.  Pt VSS throughout shift, AO x 4 and denies pain. O2 sats above 92% with 2 L NC. R PIV SL'ed. Pt SBA while transferring. PRN Ativan x 1 given. Plan is for discharge later today with medications that will require follow up at his clinic for new diagnosis. Continue to monitor and notify physicians of any pertinent changes.

## 2017-04-28 NOTE — PROGRESS NOTES
Prior Authorization Approval    Sildenafil   Qty: 90  Day Supply: 30  Diagnosis: Pulmonary hypertension (H) (I27.2)    Expected copay: 10.00  Effective Dates: 01/29/2017 - 04/28/2020    Insurance: [employer]  Phone: [phone]  ID: [id number]  Submitted via: theresa Jesus  Pharmacy Liaison  Cell: [phone] Page: [phone]

## 2017-04-28 NOTE — DISCHARGE INSTRUCTIONS
Oxygen Provider:  Arranged through [facility], contact number [phone]. Home visit will occur same day as discharge where set up of equipment will be completed. If you have any questions or concerns please call the oxygen company directly.

## 2017-04-28 NOTE — PROGRESS NOTES
Care Coordinator- Discharge Planning     Admission Date/Time:  4/22/2017  Attending MD:  Sacha Weber*     Data  Chart reviewed, discussed with interdisciplinary team.   Patient was admitted for:   1. Hypoxia    2. Dyspnea, unspecified type    3. Pneumonia    4. Sarcoidosis of lung (H)    5. Type 2 diabetes, HbA1C goal < 8% (H)         Assessment  Full assessment completed in previous note    Coordination of Care and Referrals: Provided patient/family with options for DME.    RNCC contacted [facility] regarding dc being held for tomorrow.  They will deliver portable O2 today, however if pt does not dc 4/29/17 he WILL need a new walk test documented and orders updated if their is a change in need.  Please call [facility] tomorrow to let them know if pt is dcing 4/29/17 or holding another day.  Pt on weekend list for RNCC follow up.   Bedside RN aware also.   [facility]  [address]   [address]  Phone # [phone]       Plan  Anticipated Discharge Date: 4/29/17   Anticipated Discharge Plan:  Dc held today, restarting Pulm HTN med.  Dc planned for 4/29/17     CTS Handoff completed:  ZONIA Spicer, NEFTALI, BSN    [facility]    Medicine Group  [address] [place] [address]    [email or web address]    Office: [phone] Pager: [phone]     4/28/2017 3:28 PM care team dc'ing pt.  He tolerated new med this am.  O2 delivered and will complete set up in his home.  RNCC contacted home o2 to notify of dc.  Pt to have family provide transportation or discuss driving with dc MD.

## 2017-04-28 NOTE — PROGRESS NOTES
Received oxygen intake at 4:12pm on 4/27/17. Reviewed chart, patient qualifies under Medicare guidelines and all documentation is in the chart, except a signed order. Spoke to Vi, the care coordinator, and confirmed we received the order and just need it signed before discharge tomorrow. She also said she has already offered choice to the patient and he is okay with [facility] setting him up. Confirmed that we will put together order and deliver tomorrow.   9:30am- Called patient and confirmed that he is okay with [facility], and said we would bring a tank to the hospital to get him home and complete home set up upon discharge.   9:40pm- Vi returned our call and said patient will not be going home today, but most likely tomorrow. Explained that we will still deliver the E-tank today, so the patient is ready to go whenever discharge occurs. Also mentioned that if patient does not go home tomorrow he will need to be retested to qualify for oxygen under Medicare guidelines.

## 2017-04-28 NOTE — PLAN OF CARE
Problem: Respiratory Insufficiency (Adult)  Goal: Identify Related Risk Factors and Signs and Symptoms  Related risk factors and signs and symptoms are identified upon initiation of Human [employer] Clinical Practice Guideline (CPG)   Outcome: Improving  D/I: Monitor shows SR 80s. Continues on O2 at 2-4L/NC. Received 1st dose sildenafil with no change in BP. Sats at rest on 2L 94-97%, decreases to 84% on 3L per therapy, so increased to 4L. Recovers quickly with rest. Home O2 tank delivered and patient is aware of who to call for delivery of concentrator. Denies pain. Up for shower independently on 3L. Denied difficulties. See flowsheets for assessments and additional data.  A: Stable PH patient on O2. Tolerated 1st dose sildenafil well.   P: DC to home tonight with home O2 and new start sildenafil. Patient states has car here from admit (through clinic) and states he is comfortable driving himself. Recommended to patient that he get a ride. Patient attempting to make arrangements. Continue current cares and notify providers with questions or concerns.     04/28/17 1530   Respiratory Insufficiency   Related Risk Factors (Respiratory Insufficiency) physiological factors   Signs and Symptoms (Respiratory Insufficiency) decreased oxygen saturation

## 2017-04-28 NOTE — PLAN OF CARE
Problem: Goal Outcome Summary  Goal: Goal Outcome Summary  PT 6C: PT Eval and 1x treatment completed. Pt presets with significant deconditioning in the setting of new Pulmonary HTN diagnosis. Ambulated 150' x2 mod IND, increased ACEVES requiring seated rest. SpO2 >97% on 2L at rest, decreasing to mid 80s during ambulation. Increased to 4L for second bout of ambulation with pt maintaining sats >90%. Educated pt on progressing activity tolerance, including increasing ambulation frequency and duration and participating in OP Pulmonary Rehab upon discharge. Pt is very agreeable to plan.     REC: Home with OP Pulmonary Rehab. (Orders have been entered)      Physical Therapy Discharge Summary    Reason for therapy discharge:    Discharged to home.    Progress towards therapy goal(s). See goals on Care Plan in [facility] electronic health record for goal details.  Goals met    Therapy recommendation(s):    Continued therapy is recommended.  Rationale/Recommendations:  Recommend OP Pulmonary Rehab for increased functional endurance for improved safety and independence with functional mobility and ADLs..

## 2017-05-01 ENCOUNTER — CARE COORDINATION (OUTPATIENT)
Dept: CARE COORDINATION | Facility: CLINIC | Age: 72
End: 2017-05-01

## 2017-05-01 ENCOUNTER — ANTICOAGULATION THERAPY VISIT (OUTPATIENT)
Dept: ANTICOAGULATION | Facility: CLINIC | Age: 72
End: 2017-05-01

## 2017-05-01 ENCOUNTER — CARE COORDINATION (OUTPATIENT)
Dept: CARDIOLOGY | Facility: CLINIC | Age: 72
End: 2017-05-01

## 2017-05-01 DIAGNOSIS — Z79.01 LONG-TERM (CURRENT) USE OF ANTICOAGULANTS: ICD-10-CM

## 2017-05-01 DIAGNOSIS — I48.92 ATRIAL FLUTTER WITH RAPID VENTRICULAR RESPONSE (H): ICD-10-CM

## 2017-05-01 DIAGNOSIS — I48.92 ATRIAL FLUTTER (H): Primary | ICD-10-CM

## 2017-05-01 LAB
BACTERIA SPEC CULT: NO GROWTH
MICRO REPORT STATUS: NORMAL
SPECIMEN SOURCE: NORMAL

## 2017-05-01 NOTE — PROGRESS NOTES
"[facility]  \"Hello, my name is Kat Moreno , and I am calling from the [facility].  I want to check in and see how you are doing, after leaving the hospital.  You may also receive a call from your Care Coordinator (care team), but I want to make sure you don t have any urgent needs.  I have a couple questions to review with you:     Post-Discharge Outreach                                                    Rohan Monroe is a 71 year old male     Follow-up Appointments           Adult [facility] Follow-up and recommended labs and tests       Follow up with Dr. Perlman next week.     Get remicade infusion next week.     Follow up with Cardiology cardiac catheterization for stenting in 2 weeks.     Follow up with Dr. Paige in the next few weeks.      Appointments on [place] and/or [facility] (with [facility] or [facility] provider or service). Call [phone] if you haven't heard regarding these appointments within 7 days of discharge.                       Your next 10 appointments already scheduled            May 08, 2017 3:00 PM CDT   Infusion 180 with  SPEC INFUSION,  51 ATC   [facility] Specialty and Procedure ([facility])     [address] [place] [address] [id number] [phone]                  May 11, 2017 3:00 PM CDT   PFT VISIT with  PFL B   [facility] Pulmonary Function Testing ([facility])     [address] [place] [address] [id number] [phone]                  May 11, 2017 3:30 PM CDT   (Arrive by 3:15 PM)   Return Interstitial Lung with David Morris Perlman, MD   [facility] ([facility])     [address] [place] [address] [id number] [phone]                  Jun 21, 2017 1:30 PM CDT   (Arrive by 1:15 PM)   RETURN ARRHYTHMIA with Brian Vazquez MD   [facility] ([facility])     [address]  3rd [address]  [facility] [address] [phone]              Care Team:    Patient Care Team       Relationship Specialty Notifications Start End    Jamie Foster MD PCP - General Internal Medicine  10/18/11     Phone: [phone] Fax: [phone]         [facility] [address] [facility] [address]    Jamie Foster MD Referring Physician Internal Medicine  1/16/14     Comment:  urology    Phone: [phone] Fax: [phone]         [facility] [address] [facility] [address]    Kina Greco MD MD Ophthalmology  5/1/14     Phone: [phone] Fax: [phone]          [facility] [address] [facility] [address] [facility] [address]    Perlman, David Morris, MD MD Internal Medicine  5/11/15     Phone: [phone] Pager: [phone] Fax: [phone]         [facility] [address] [facility] [address] [facility] [address]    Haley Renner DO Referring Physician Student in organized health care education/training program  9/24/15     Comment:  Referred to Endocrinology    Phone: [phone] Fax: [phone]         [facility] [address]    Chicho Mejia DPM   Podiatry  9/28/15     Phone: [phone] Fax: [phone]          [facility] [address] [facility] [address]    Macey Roy MD MD Internal Medicine  11/24/15     Phone: [phone] Fax: [phone]         [facility] [address] [facility] [address]    Thompson Gonzalez MD MD Cardiology Admissions 5/13/16     Phone: [phone] Fax: [phone]          [facility] [address] [facility] [address] [facility] [address]    Karlene Farrell, RN Nurse Coordinator Cardiology Admissions 5/13/16     Madalyn Fajardo MD PhD MD Family Practice  1/18/17     Phone: [phone] Fax: [phone]          [facility] [address] [facility] [address] [facility] [address]     Jaclyn Pina, RN Nurse Coordinator Clinical Cardiac Electrophysiology  2/23/17     Phone: [phone] Fax: [phone]         [address] [address]    Brian Vazquez MD MD Clinical Cardiac Electrophysiology  2/23/17     Phone: [phone] Fax: [phone]         [place] [address] [facility] [address]    Elizabeth Cabral APRN CNP Nurse Practitioner Nurse Practitioner  2/23/17     Phone: [phone] Fax: [phone]         [place] [id number] [facility] [address]            Transition of Care Review                                                      Did you have a surgery or procedure during your hospital visit? No   If yes, do you have any of the following:     Signs of infection:  No:      Pain:  No         Wound/incision concerns? NO    Do you have all of your medications/refills?  Yes    Are you having any side effects or questions about your medication(s)? No    Do you have any new or worsening symptoms?  NO    Do you have any future appointments scheduled?   YES             Plan                                                      Thanks for your time.  Your Care Coordinator may follow-up within the next couple days.  In the meantime if you have questions, concerns or problems call your care team.        Kat Moreno

## 2017-05-01 NOTE — PROGRESS NOTES
Called patient to verify that he would be in for an INR today, and he got upset, and said that there is no way that he can get out today for an INR.  He said the earliest that he will do it is next Monday-5/8.

## 2017-05-01 NOTE — PROGRESS NOTES
I spoke with Dr. Foster, and he is going to review this patient's chart, and determine if coumadin therapy is to continue.  He was going to contact cardiology to discuss.

## 2017-05-01 NOTE — MR AVS SNAPSHOT
Rohan Howard Mabel   5/1/2017   Anticoagulation Therapy Visit    Description:  71 year old male   Provider:  Diane Mazariegos RN   Department:  [facility]           INR as of 5/1/2017     Today's INR       Anticoagulation Summary as of 5/1/2017     INR goal 2.0-3.0   Today's INR    Full instructions 5 mg every day   Next INR check 5/1/2017    Indications   Long-term (current) use of anticoagulants [Z79.01] [Z79.01]  Atrial flutter with rapid ventricular response (H) [I48.92]         May 2017 Details    Sun Mon Tue Wed Thu Fri Sat      1      See details      2               3               4               5               6                 7               8               9               10               11               12               13                 14               15               16               17               18               19               20                 21               22               23               24               25               26               27                 28               29               30               31                   Date Details   05/01 This INR check       Date of next INR:  5/1/2017         How to take your warfarin dose     To take:  5 mg Take 1 of the 5 mg tablets.

## 2017-05-01 NOTE — MR AVS SNAPSHOT
Rohan Howard Mabel   5/1/2017   Anticoagulation Therapy Visit    Description:  71 year old male   Provider:  Diane Mazariegos, RN   Department:  [facility]           INR as of 5/1/2017     Today's INR       Anticoagulation Summary as of 5/1/2017     INR goal 2.0-3.0   Today's INR    Full instructions 5 mg every day   Next INR check 5/8/2017    Indications   Long-term (current) use of anticoagulants [Z79.01] [Z79.01]  Atrial flutter with rapid ventricular response (H) [I48.92]         May 2017 Details    Sun Mon Tue Wed Thu Fri Sat      1      5 mg   See details      2      5 mg         3      5 mg         4      5 mg         5      5 mg         6      5 mg           7      5 mg         8            9               10               11               12               13                 14               15               16               17               18               19               20                 21               22               23               24               25               26               27                 28               29               30               31                   Date Details   05/01 This INR check       Date of next INR:  5/8/2017         How to take your warfarin dose     To take:  5 mg Take 1 of the 5 mg tablets.

## 2017-05-01 NOTE — PROGRESS NOTES
Dates of hospitalization: 4/22 to 4/28  Reason for hospitalization: SOB and hypoxemia in the setting of sarcoidosis.  Pulmonary HTN.  Procedures performed: cardiac catheterization.  Thoracentesis with 300mL of fluid removed.  Vitamin K or FFP administered? no  Inpatient warfarin doses added to calendar? yes  Medication changes at discharge: doses of sildenafil , methotrexate, and lasix changed.  Warfarin dosing after DC: 4/27 take 7.5mg and then 5mg daily .  Patient discharged on Lovenox? no  Next INR date: 5/1  Where is the patient discharging to? (home, TCU, staying locally, etc.): home  Will patient have home care? no

## 2017-05-01 NOTE — PROGRESS NOTES
Spoke with patient who was recently admitted for SOB. He was started on Sildenafil and seems to be feeling better.  Per patient , he was told he needed Cor. Angio for stenting with in the next 2 weeks.  He is concerned and some what upset that this has not been set up yet. He says when he needed stents in the past, he had similar symptoms of SOB.  Dr. Paige would like to see him on May 26th but pt. feels this is too late.  I will speak with Dr. Paige regarding this matter and get back to him.

## 2017-05-03 ENCOUNTER — ALLIED HEALTH/NURSE VISIT (OUTPATIENT)
Dept: PHARMACY | Facility: CLINIC | Age: 72
End: 2017-05-03
Payer: COMMERCIAL

## 2017-05-03 ENCOUNTER — CARE COORDINATION (OUTPATIENT)
Dept: CARDIOLOGY | Facility: CLINIC | Age: 72
End: 2017-05-03

## 2017-05-03 DIAGNOSIS — E78.5 HYPERLIPIDEMIA LDL GOAL <70: ICD-10-CM

## 2017-05-03 DIAGNOSIS — G47.00 INSOMNIA, UNSPECIFIED TYPE: ICD-10-CM

## 2017-05-03 DIAGNOSIS — I48.92 ATRIAL FLUTTER WITH RAPID VENTRICULAR RESPONSE (H): ICD-10-CM

## 2017-05-03 DIAGNOSIS — D86.9 SARCOIDOSIS: Primary | ICD-10-CM

## 2017-05-03 DIAGNOSIS — I25.10 CORONARY ARTERY DISEASE WITHOUT ANGINA PECTORIS, UNSPECIFIED VESSEL OR LESION TYPE, UNSPECIFIED WHETHER NATIVE OR TRANSPLANTED HEART: ICD-10-CM

## 2017-05-03 DIAGNOSIS — E11.9 TYPE 2 DIABETES MELLITUS WITHOUT COMPLICATION, WITHOUT LONG-TERM CURRENT USE OF INSULIN (H): ICD-10-CM

## 2017-05-03 PROCEDURE — 99607 MTMS BY PHARM ADDL 15 MIN: CPT | Performed by: PHARMACIST

## 2017-05-03 PROCEDURE — 99605 MTMS BY PHARM NP 15 MIN: CPT | Performed by: PHARMACIST

## 2017-05-03 NOTE — PROGRESS NOTES
SUBJECTIVE/OBJECTIVE:                                                    Rohan Monroe is a 71 year old male called for a transitions of care visit.  He was discharged from [facility] on 04/24 for acute respiratory failure.     Chief Complaint: No major questions today aside from some concern about getting in for cardiac stenting as quickly as possible.    Allergies/ADRs: Reviewed in Epic  Tobacco: No tobacco use   Alcohol: not currently using  Caffeine: 1-1.5 cups/day of coffee  Activity: Limited by sarcoidosis. Working on cooking and light house work.  PMH: Reviewed in Epic    Medication Adherence: no issues reported    Sarcoidosis/Secondary PHTN: Pt is taking sildenafil 20mg TID as directed and feels it is giving him some relief without notable side effects. Pt is feeling okay today. He is pleased with the fact that he is now able to cook meals and do light housework around his apartment. Pt is currently using up to 4 liters of O2 around the clock and his tubing is sufficient for free movement around his apartment.  He is taking his Spiriva and Breo inhalers as directed and has no concerns or issues regarding their use. He washes his mouth out after using Breo. He does occasionally need albuterol inhaler.  Pt is taking furosemide as directed with his second dose early in the afternoon to avoid night waking. Pt is urinating very frequently. He weights once a day and reports stable. He has not been checking home BPs.  Pt will be restarting MTX 2.5mg weekly and monthly Remicade treatments now that he is out of the hospital.    AFib/CVD: Pt is taking metoprolol warfarin without complaint of bruising or bleeding. Pt is followed by the INR clinic. He will be going in on the 8th.  Pt reports that he was to have cardiac stenting while in the hospital but cardiology recommended he hold off due to the stress that contrast dye was already posing on his kidneys. The recommendation was for pt to return for  stenting within 2 weeks but he is not scheduled with cardiology until the end of this month. He has already called cardiology about this.    Diabetes:  Pt currently taking no medications.  SMBG: one time daily.   Ranges (patient reported): 120 range when he checks after eating in the morning.  Frequency of hypoglycemia? never.  Recent symptoms of high blood sugar? none  Eye exam: up to date  Foot exam: up to date  Microalbumin is not < 30 mg/g. Pt is not taking an ACEi/ARB due to recent RADHA.  Aspirin: Taking 81mg daily and denies side effects    Hyperlipidemia: Current therapy includes atorvastatin 20mg once daily.  Pt reports no significant myalgias or other side effects.     Insomnia: Pt is taking mirtazapine without complaint of issues. He feels it works well for sleep.    Current labs include:  BP Readings from Last 3 Encounters:   04/28/17 (!) 160/94   04/22/17 118/74   04/18/17 113/75     Today's Vitals: There were no vitals taken for this visit.  Lab Results   Component Value Date    A1C 7.5 01/20/2017   .  Lab Results   Component Value Date    CHOL 128 01/07/2014     Lab Results   Component Value Date    TRIG 148 01/07/2014     Lab Results   Component Value Date    HDL 35 01/07/2014     Lab Results   Component Value Date    LDL 62 01/07/2014     Lab Results   Component Value Date    ALT 31 04/23/2017     Lab Results   Component Value Date    UCRR 36 04/18/2017    MICROL 882 04/18/2017    UMALCR 2456.82 (H) 04/18/2017       Last Basic Metabolic Panel:  Lab Results   Component Value Date     04/28/2017      Lab Results   Component Value Date    POTASSIUM 4.1 04/28/2017     Lab Results   Component Value Date    CHLORIDE 99 04/28/2017     Lab Results   Component Value Date    BUN 46 04/28/2017     Lab Results   Component Value Date    CR 1.99 04/28/2017     GFR Estimate   Date Value Ref Range Status   04/28/2017 33 (L) >60 mL/min/1.7m2 Final     Comment:     Non  GFR Calc   04/27/2017 36  (L) >60 mL/min/1.7m2 Final     Comment:     Non  GFR Calc   04/26/2017 40 (L) >60 mL/min/1.7m2 Final     Comment:     Non  GFR Calc     GFR Estimate If Black   Date Value Ref Range Status   04/28/2017 40 (L) >60 mL/min/1.7m2 Final     Comment:      GFR Calc   04/27/2017 44 (L) >60 mL/min/1.7m2 Final     Comment:      GFR Calc   04/26/2017 49 (L) >60 mL/min/1.7m2 Final     Comment:      GFR Calc     TSH   Date Value Ref Range Status   04/27/2017 0.70 0.40 - 4.00 mU/L Final   ]    Most Recent Immunizations   Administered Date(s) Administered     Influenza (H1N1) 12/18/2009     Influenza (High Dose) 3 valent vaccine 01/20/2017     Influenza (IIV3) 09/25/2012     Mantoux 05/27/2009     Pneumococcal (PCV 7) 08/11/2011     Pneumococcal 23 valent 07/18/2016     TDAP Vaccine (Boostrix) 08/11/2011     Tdap (Adacel,Boostrix) 08/11/2011     ASSESSMENT:                                                       Current medications were reviewed today.      Medication Adherence: no issues identified    Sarcoidosis/Secondary PHTN: Improving. Due to comorbid diabetes, pt would benefit from restart of ARB once SCr has stabilized.    AFib/CVD: Unimproved. I did note to the patient that cardiology is working on scheduling him sooner per chart notes. I encouraged him to reach out to them again if he does not hear by the end of the week.    Diabetes: Needs improvement. Patient is meeting A1c goal of < 8%. Self monitoring of blood glucose is at goal of fasting  mg/dL. Pt would benefit from checking fasting and 2 hours postprandial BG readings.    Hyperlipidemia: Needs improvement. Pt due for FLP.    Insomnia: Stable.    PLAN:                                                      Post Discharge Medication Reconciliation Status: discharge medications reconciled, continue medications without change.    1. Pt to check BP and BG regularly and discuss them with me  next week (see pt instructions).    2. Pt to contact cardiology if he has not heard back from the nurse or scheduling by the end of the week.    3. Pt to have FLP drawn sometime in the next few months (no rush). Order placed.    Next visit:  Discuss ARB restart    I spent 60 minutes with this patient today.  All changes were made via collaborative practice agreement with Jamie Foster A copy of the visit note was provided to the patient's primary care provider.    Will follow up in 1 week via phone.    The patient was mailed a summary of these recommendations as an after visit summary.    Michelle Haywood PharmD  Medication Therapy Management Provider  Phone:[phone]  Pager: [phone]

## 2017-05-03 NOTE — PATIENT INSTRUCTIONS
Recommendations from today's MTM visit:                                                    MTM (medication therapy management) is a service provided by a clinical pharmacist designed to help you get the most of out of your medicines.   Today we reviewed what your medicines are for, how to know if they are working, that your medicines are safe and how to make your medicine regimen as easy as possible.     1. Check blood pressure every other day so we can discuss when I call next week.    2. Check blood sugar once daily. Try to get some readings in the morning before you eat and some 2 hours after a meal. Record which ones are fasting (before meals) and which are postprandial (after meals).    3. Hopefully cardiology will call back in the next day or so, but let me know if you need help getting a hold of the nurse to discuss stenting.    4. Have your cholesterol checked with Dr. Foster. I will place an order for you to have it drawn whenever, but this is not at all urgent. This is a fasting lab.    Next MTM visit: I will call again Wednesday 5/10 at 10:30am    To schedule another MTM appointment, please call the clinic directly or you may call the MTM scheduling line at [phone] or toll-free at [phone].     My Clinical Pharmacist's contact information:                                                      It was a pleasure seeing you today!  Please feel free to contact me with any questions or concerns you have.      Michelle Haywood, Ganesh  Medication Therapy Management Provider  Phone:[phone]  Pager: [phone]    You may receive a survey about the MTM services you received.  I would appreciate your feedback to help me serve you better in the future. Please fill it out and return it when you can. Your comments will be anonymous.

## 2017-05-03 NOTE — PROGRESS NOTES
Spoke with patient this evening about scheduling Cor. Angio. next week. Dr. Paige and Sydnee reviewed recent cath labs studies today.  Patient reports his SOB is improving since starting Sildenafil for high PA pressures.  I will contact him tomorrow to schedule procedure.

## 2017-05-03 NOTE — MR AVS SNAPSHOT
After Visit Summary   5/3/2017    Rohan Monroe    MRN: [id number]           Patient Information     Date Of Birth          1945        Visit Information        Provider Department      5[facility]3/2017 11:00 [facility]        Today's Diagnoses     SARCOIDOSIS-systemic    -  1    Type 2 diabetes mellitus without complication, without long-term current use of insulin (H)        Coronary artery disease without angina pectoris, unspecified vessel or lesion type, unspecified whether native or transplanted heart        Atrial flutter with rapid ventricular response (H)        Hyperlipidemia LDL goal <70        Insomnia, unspecified type          Care Instructions    Recommendations from today's MTM visit:                                                    MTM (medication therapy management) is a service provided by a clinical pharmacist designed to help you get the most of out of your medicines.   Today we reviewed what your medicines are for, how to know if they are working, that your medicines are safe and how to make your medicine regimen as easy as possible.     1. Check blood pressure every other day so we can discuss when I call next week.    2. Check blood sugar once daily. Try to get some readings in the morning before you eat and some 2 hours after a meal. Record which ones are fasting (before meals) and which are postprandial (after meals).    3. Hopefully cardiology will call back in the next day or so, but let me know if you need help getting a hold of the nurse to discuss stenting.    4. Have your cholesterol checked with Dr. Foster. I will place an order for you to have it drawn whenever, but this is not at all urgent. This is a fasting lab.    Next MTM visit: I will call again Wednesday 5/10 at 10:30am    To schedule another MTM appointment, please call the clinic directly or you may call the MTM scheduling line at  [phone] or toll-free at [phone].     My Clinical Pharmacist's contact information:                                                      It was a pleasure seeing you today!  Please feel free to contact me with any questions or concerns you have.      Michelle Haywood PharmD  Medication Therapy Management Provider  Phone:[phone]  Pager: [phone]    You may receive a survey about the MTM services you received.  I would appreciate your feedback to help me serve you better in the future. Please fill it out and return it when you can. Your comments will be anonymous.          Follow-ups after your visit        Your next 10 appointments already scheduled     May 08, 2017  3:00 PM CDT   Infusion 180 with  SPEC INFUSION, UC 51 ATC   [facility] ([facility])    [address] [place] [address]  2nd [facility] [address] [phone]            May 10, 2017 10:30 AM CDT   TELEMEDICINE with Michelle Haywood [facility] Medication Therapy Management ([facility])    [address] [place] [address] [phone]           Note: this is not an onsite visit; there is no need to come to the facility.            May 11, 2017  3:00 PM CDT   PFT VISIT with  PFL B   [facility] ([facility])    [address] [place] [id number] [phone]            May 11, 2017  3:30 PM CDT   (Arrive by 3:15 PM)   Return Interstitial Lung with David Morris Perlman, MD   [facility] ([facility])    [address] [place] [id number] [phone]            May 26, 2017  9:30 AM CDT   Lab with  LAB   [facility] ([facility])    [address] [place] [id number] [phone]            May  26, 2017 10:00 AM CDT   (Arrive by 9:45 AM)   RETURN HEART FAILURE with Diane Paige MD   [facility] ([facility])    [address] [address] [phone]            Jun 21, 2017  1:30 PM CDT   (Arrive by 1:15 PM)   RETURN ARRHYTHMIA with Brian Vazquez MD   [facility] ([facility])    [address] [place] [id number] [phone]            Aug 29, 2017 10:00 AM CDT   Lab with  LAB   [facility] ([facility])    [address] [place] [id number] [phone]            Aug 29, 2017 10:30 AM CDT   US ABDOMEN COMPLETE with UCUS2   [facility] ([facility])    [address] [place] [id number] [phone]           Please bring a list of your medicines (including vitamins, minerals and over-the-counter drugs). Also, tell your doctor about any allergies you may have. Wear comfortable clothes and leave your valuables at home.  Adults: No eating or drinking for 8 hours before the exam. You may take medicine with a small sip of water.  Children: - Children 6+ years: No food or drink for 6 hours before exam. - Children 1-5 years: No food or drink for 4 hours before exam. - Infants, breast-fed: may have breast milk up to 2 hours before exam. - Infants, formula: may have bottle until 4 hours before exam.  Please call the Imaging Department at your exam site with any questions.              Future tests that were ordered for you today     Open Future Orders        Priority Expected Expires Ordered    Lipid panel reflex to direct LDL Routine  5/3/2018 5/3/2017            Who to contact     If you have questions or need follow up information about today's clinic visit or your schedule please contact [facility] MEDICATION THERAPY MANAGEMENT directly at [phone].  Normal  or non-critical lab and imaging results will be communicated to you by Fotomotohart, letter or phone within 4 business days after the clinic has received the results. If you do not hear from us within 7 days, please contact the clinic through VocalIQ or phone. If you have a critical or abnormal lab result, we will notify you by phone as soon as possible.  Submit refill requests through VocalIQ or call your pharmacy and they will forward the refill request to us. Please allow 3 business days for your refill to be completed.          Additional Information About Your Visit        VocalIQ Information     V[employer]IQ gives you secure access to your electronic health record. If you see a primary care provider, you can also send messages to your care team and make appointments. If you have questions, please call your primary care clinic.  If you do not have a primary care provider, please call [phone] and they will assist you.        Care EveryWhere ID     This is your Care EveryWhere ID. This could be used by other organizations to access your [facility] medical records  [id number]         Blood Pressure from Last 3 Encounters:   04/28/17 (!) 160/94   04/22/17 118/74   04/18/17 113/75    Weight from Last 3 Encounters:   04/28/17 185 lb (83.9 kg)   04/22/17 195 lb 1.7 oz (88.5 kg)   04/18/17 195 lb (88.5 kg)                 Today's Medication Changes          These changes are accurate as of: 5/3/17  3:15 PM.  If you have any questions, ask your nurse or doctor.               These medicines have changed or have updated prescriptions.        Dose/Directions    methotrexate 2.5 MG tablet CHEMO   This may have changed:    - how much to take  - how to take this  - when to take this  - additional instructions   Used for:  Sarcoidosis (H)        Dose:  7.5 mg   Take 3 tablets (7.5 mg) by mouth once a week On Mondays   Quantity:  48 tablet   Refills:  3       Urea 40 % Crea   Commonly known as:  CARMOL 40   This may have changed:   additional instructions   Used for:  Dermatophytosis of nail, Corns and callosities        To feet daily   Quantity:  199 g   Refills:  4                Primary Care Provider Office Phone # Fax #    Jamie Foster [phone]       [facility] [address] [address]        Thank you!     Thank you for choosing [facility] MEDICATION THERAPY MANAGEMENT  for your care. Our goal is always to provide you with excellent care. Hearing back from our patients is one way we can continue to improve our services. Please take a few minutes to complete the written survey that you may receive in the mail after your visit with us. Thank you!             Your Updated Medication List - Protect others around you: Learn how to safely use, store and throw away your medicines at www.disposemymeds.org.          This list is accurate as of: 5/3/17  3:15 PM.  Always use your most recent med list.                   Brand Name Dispense Instructions for use    albuterol 108 (90 BASE) MCG/ACT Inhaler    PROAIR HFA/PROVENTIL HFA/VENTOLIN HFA    3 Inhaler    Inhale 2 puffs into the lungs every 6 hours as needed for shortness of breath / dyspnea       ASPIRIN EC PO      Take 81 mg by mouth daily       atorvastatin 20 MG tablet    LIPITOR    90 tablet    Take 1 tablet (20 mg) by mouth daily       blood glucose monitoring lancets     2 Box    Use to test blood sugar 2 times daily or as directed.  102 lancets per box.  3 month supply.       blood glucose monitoring meter device kit    no brand specified    1 kit    Use to test blood sugar 2 times daily.       blood glucose monitoring test strip    ACCU-CHEK RONNIE PLUS    200 each    Use to test blood sugar 2 times daily or as directed.  3 month supply.       fluticasone-vilanterol 100-25 MCG/INH oral inhaler    BREO ELLIPTA    3 Inhaler    Inhale 1 puff into the lungs daily       furosemide 20 MG tablet    LASIX    180 tablet    Take 3 tablets (60 mg) by  mouth 2 times daily       inFLIXimab 100 MG injection    REMICADE     Inject 100 mg into the vein every 28 days       levothyroxine 137 MCG tablet    SYNTHROID/LEVOTHROID    90 tablet    Take 1 tablet (137 mcg) by mouth daily       methotrexate 2.5 MG tablet CHEMO     48 tablet    Take 3 tablets (7.5 mg) by mouth once a week On Mondays       metoprolol 100 MG tablet    LOPRESSOR    60 tablet    Take 1 tablet (100 mg) by mouth 2 times daily       mirtazapine 15 MG tablet    REMERON     Take 15 mg by mouth At Bedtime.       MULTIVITAMIN & MINERAL PO      Take 1 tablet by mouth daily.       polyethylene glycol powder    MIRALAX    510 g    Take 17 g (1 capful) by mouth daily       sildenafil 20 MG tablet    REVATIO/VIAGRA    90 tablet    Take 1 tablet (20 mg) by mouth 3 times daily       tiotropium 18 MCG capsule    SPIRIVA HANDIHALER    90 capsule    Inhale contents of one capsule daily.       Urea 40 % Crea    CARMOL 40    199 g    To feet daily       warfarin 5 MG tablet    COUMADIN    30 tablet    Take 1 tablet (5 mg) by mouth daily

## 2017-05-04 ENCOUNTER — CARE COORDINATION (OUTPATIENT)
Dept: CARDIOLOGY | Facility: CLINIC | Age: 72
End: 2017-05-04

## 2017-05-04 DIAGNOSIS — D86.0 SARCOIDOSIS OF LUNG (H): ICD-10-CM

## 2017-05-04 DIAGNOSIS — I25.10 CORONARY ARTERY DISEASE INVOLVING NATIVE CORONARY ARTERY OF NATIVE HEART WITHOUT ANGINA PECTORIS: Primary | ICD-10-CM

## 2017-05-04 DIAGNOSIS — E78.5 HYPERLIPIDEMIA LDL GOAL <70: ICD-10-CM

## 2017-05-04 RX ORDER — SODIUM CHLORIDE 9 MG/ML
INJECTION, SOLUTION INTRAVENOUS CONTINUOUS
Status: CANCELLED | OUTPATIENT
Start: 2017-05-04

## 2017-05-04 RX ORDER — ASPIRIN 81 MG/1
81 TABLET ORAL DAILY
Status: CANCELLED | OUTPATIENT
Start: 2017-05-04

## 2017-05-04 NOTE — PROGRESS NOTES
Coronary angiogram scheduled for Fri. May 12 with Dr. Randhawa with 12noon arrival  Will bridge patient with Lovenox due to high Chads-vas score,  Pt. Is agreeable to plan.

## 2017-05-05 DIAGNOSIS — D86.0 SARCOIDOSIS OF LUNG (H): ICD-10-CM

## 2017-05-05 DIAGNOSIS — D86.9 SARCOIDOSIS: ICD-10-CM

## 2017-05-08 ENCOUNTER — CARE COORDINATION (OUTPATIENT)
Dept: CARDIOLOGY | Facility: CLINIC | Age: 72
End: 2017-05-08

## 2017-05-08 ENCOUNTER — ANTICOAGULATION THERAPY VISIT (OUTPATIENT)
Dept: ANTICOAGULATION | Facility: CLINIC | Age: 72
End: 2017-05-08

## 2017-05-08 ENCOUNTER — INFUSION THERAPY VISIT (OUTPATIENT)
Dept: INFUSION THERAPY | Facility: CLINIC | Age: 72
End: 2017-05-08
Attending: INTERNAL MEDICINE
Payer: MEDICARE

## 2017-05-08 VITALS
SYSTOLIC BLOOD PRESSURE: 123 MMHG | BODY MASS INDEX: 28.5 KG/M2 | WEIGHT: 193 LBS | RESPIRATION RATE: 16 BRPM | DIASTOLIC BLOOD PRESSURE: 64 MMHG | HEART RATE: 84 BPM | TEMPERATURE: 96.4 F

## 2017-05-08 DIAGNOSIS — I25.10 CORONARY ARTERY DISEASE INVOLVING NATIVE CORONARY ARTERY OF NATIVE HEART WITHOUT ANGINA PECTORIS: ICD-10-CM

## 2017-05-08 DIAGNOSIS — N18.4 CKD (CHRONIC KIDNEY DISEASE) STAGE 4, GFR 15-29 ML/MIN (H): ICD-10-CM

## 2017-05-08 DIAGNOSIS — D86.0 SARCOIDOSIS OF LUNG (H): Primary | ICD-10-CM

## 2017-05-08 DIAGNOSIS — Z79.01 LONG-TERM (CURRENT) USE OF ANTICOAGULANTS: ICD-10-CM

## 2017-05-08 DIAGNOSIS — E78.5 HYPERLIPIDEMIA LDL GOAL <70: ICD-10-CM

## 2017-05-08 DIAGNOSIS — D86.9 SARCOIDOSIS: ICD-10-CM

## 2017-05-08 DIAGNOSIS — I48.91 ATRIAL FIBRILLATION (H): Primary | ICD-10-CM

## 2017-05-08 DIAGNOSIS — I48.92 ATRIAL FLUTTER WITH RAPID VENTRICULAR RESPONSE (H): ICD-10-CM

## 2017-05-08 LAB
ALBUMIN SERPL-MCNC: 3.1 G/DL (ref 3.4–5)
ALP SERPL-CCNC: 108 U/L (ref 40–150)
ALT SERPL W P-5'-P-CCNC: 69 U/L (ref 0–70)
ANION GAP SERPL CALCULATED.3IONS-SCNC: 9 MMOL/L (ref 3–14)
AST SERPL W P-5'-P-CCNC: 52 U/L (ref 0–45)
BILIRUB DIRECT SERPL-MCNC: 0.2 MG/DL (ref 0–0.2)
BILIRUB SERPL-MCNC: 0.6 MG/DL (ref 0.2–1.3)
BUN SERPL-MCNC: 44 MG/DL (ref 7–30)
CALCIUM SERPL-MCNC: 8.7 MG/DL (ref 8.5–10.1)
CHLORIDE SERPL-SCNC: 99 MMOL/L (ref 94–109)
CO2 SERPL-SCNC: 27 MMOL/L (ref 20–32)
CREAT SERPL-MCNC: 1.94 MG/DL (ref 0.66–1.25)
CREAT UR-MCNC: 124 MG/DL
ERYTHROCYTE [DISTWIDTH] IN BLOOD BY AUTOMATED COUNT: 14.8 % (ref 10–15)
GFR SERPL CREATININE-BSD FRML MDRD: 34 ML/MIN/1.7M2
GLUCOSE SERPL-MCNC: 138 MG/DL (ref 70–99)
HCT VFR BLD AUTO: 32.8 % (ref 40–53)
HGB BLD-MCNC: 10.8 G/DL (ref 13.3–17.7)
INR PPP: 2.44 (ref 0.86–1.14)
MCH RBC QN AUTO: 30.2 PG (ref 26.5–33)
MCHC RBC AUTO-ENTMCNC: 32.9 G/DL (ref 31.5–36.5)
MCV RBC AUTO: 92 FL (ref 78–100)
PHOSPHATE SERPL-MCNC: 3.6 MG/DL (ref 2.5–4.5)
PLATELET # BLD AUTO: 369 10E9/L (ref 150–450)
POTASSIUM SERPL-SCNC: 4.2 MMOL/L (ref 3.4–5.3)
PROT SERPL-MCNC: 7.6 G/DL (ref 6.8–8.8)
PROT UR-MCNC: 2.32 G/L
PROT/CREAT 24H UR: 1.87 G/G CR (ref 0–0.2)
RBC # BLD AUTO: 3.58 10E12/L (ref 4.4–5.9)
SODIUM SERPL-SCNC: 136 MMOL/L (ref 133–144)
WBC # BLD AUTO: 11.1 10E9/L (ref 4–11)

## 2017-05-08 PROCEDURE — 84155 ASSAY OF PROTEIN SERUM: CPT | Performed by: INTERNAL MEDICINE

## 2017-05-08 PROCEDURE — 84075 ASSAY ALKALINE PHOSPHATASE: CPT | Performed by: INTERNAL MEDICINE

## 2017-05-08 PROCEDURE — 82248 BILIRUBIN DIRECT: CPT | Performed by: INTERNAL MEDICINE

## 2017-05-08 PROCEDURE — 84156 ASSAY OF PROTEIN URINE: CPT | Performed by: INTERNAL MEDICINE

## 2017-05-08 PROCEDURE — 85027 COMPLETE CBC AUTOMATED: CPT | Performed by: INTERNAL MEDICINE

## 2017-05-08 PROCEDURE — 96415 CHEMO IV INFUSION ADDL HR: CPT

## 2017-05-08 PROCEDURE — 25000128 H RX IP 250 OP 636: Mod: ZF | Performed by: INTERNAL MEDICINE

## 2017-05-08 PROCEDURE — 96413 CHEMO IV INFUSION 1 HR: CPT

## 2017-05-08 PROCEDURE — 84450 TRANSFERASE (AST) (SGOT): CPT | Performed by: INTERNAL MEDICINE

## 2017-05-08 PROCEDURE — 85610 PROTHROMBIN TIME: CPT | Performed by: INTERNAL MEDICINE

## 2017-05-08 PROCEDURE — 84460 ALANINE AMINO (ALT) (SGPT): CPT | Performed by: INTERNAL MEDICINE

## 2017-05-08 PROCEDURE — 82247 BILIRUBIN TOTAL: CPT | Performed by: INTERNAL MEDICINE

## 2017-05-08 PROCEDURE — 80069 RENAL FUNCTION PANEL: CPT | Performed by: INTERNAL MEDICINE

## 2017-05-08 RX ORDER — ACETAMINOPHEN 325 MG/1
650 TABLET ORAL ONCE
Status: CANCELLED
Start: 2017-05-08 | End: 2017-05-08

## 2017-05-08 RX ADMIN — INFLIXIMAB 500 MG: 100 INJECTION, POWDER, LYOPHILIZED, FOR SOLUTION INTRAVENOUS at 15:32

## 2017-05-08 NOTE — PROGRESS NOTES
"Nursing Note  Rohan Monroe [facility]:   Chief Complaint   Patient presents with     Infusion     Remicade     During [facility]'s [facility] appointment, orders from Dr. Perlman were completed.  Frequency: monthly    Progress note:  Patient identification verified by name and date of birth.  Assessment completed.  Vitals recorded in Doc Flowsheets.  Patient was provided with education regarding infusion and possible side effects.  Patient verbalized understanding.      needed: No  Premedications: historically pt does not take pre-medications.  Infusion Rates: infusion given over approximately 2 hours.  Approximate Infusion length:2 hours.   Labs: were drawn per orders.   Vascular access: peripheral IV placed today.  Treatment Conditions:PRIOR TO INFUSION OF BIOLOGICAL MEDICATIONS OR ANY OF THESE AS LISTED: Remicaide (infliximab) \".rheumbiologicalchecklist\"    Prior to Infusion of biological medications or any of these as listed:    1. Elevated temperature, fever, chills, productive cough or abnormal vital signs, night sweats, coughing up blood or sputum, no appetite or abnormal vital signs : NO    2. Open wounds or new incisions: NO    3. Recent hospitalization: NO    4.  Recent surgeries:  NO    5. Any upcoming surgeries or dental procedures?:NO    6. Any current or recent bouts of illness or infection? On any antibiotics? : NO    7. Any new, sudden or worsening abdominal pain :NO    8. Vaccination within 4 weeks? Patient or someone in the household is scheduled to receive vaccination? No live virus vaccines prior to or during treatment :NO    9. Any nervous system diseases [i.e. multiple sclerosis, Guillain-[place], seizures, neurological  changes]: NO    10. Pregnant or breast feeding; or plans on pregnancy in the future: NO    11. Signs of worsening depression or suicidal ideations while taking benlysta:NO    12. " New-onset medical symptoms: NO    13.  New cancer diagnosis or on chemotherapy or radiation NO    14.  Evaluate for any sign of active TB [Unexplained weight loss, Loss of appetite, Night sweats, Fever, Fatigue, Chills, Coughing for 3 weeks or longer, Hemoptysis (coughing up blood), Chest pain]: NO    **Note: If answered yes to any of the above, hold the infusion and contact ordering rheumatologist or on-call rheumatologist.    RN reviewed the following with patient: Medication hand-out provided to patient, Inform patient if any fever, chills or signs of infection, new symptoms, abdominal pain, heart palpitations, shortness of breath, reaction, weakness, neurological changes, seek medical attention immediately and should not receive infusions. No live virus vaccines prior to or during treatment or up to 6 months post infusion. If the patient has an upcoming procedure or surgery, this should be discussed with the rheumatologist and surgeon or provider.  Patient tolerated infusion: well.          Discharge Plan:   Follow up plan of care with: sarcoidosis f/u and PFT  Discharge instructions were reviewed with patient.  Patient/representative verbalized understanding of discharge instructions and all questions answered.  [facility] in stable condition.    Yuliet Farrell RN    Administrations This Visit     inFLIXimab (REMICADE) 500 mg in NaCl 0.9 % 325 mL non-oncology use     Admin Date Action Dose Rate Route Administered By          05/08/2017 New Bag 500 mg 162.5 mL/hr Intravenous Yuliet Farrell RN                         /62  Temp 96.4  F (35.8  C) (Oral)  Resp 16  Wt 87.5 kg (193 lb)  BMI 28.5 kg/m2

## 2017-05-08 NOTE — MR AVS SNAPSHOT
Rohan Howard Mabel   5/8/2017   Anticoagulation Therapy Visit    Description:  71 year old male   Provider:  Diane Mazariegos RN   Department:  [facility]           INR as of 5/8/2017     Today's INR 2.44      Anticoagulation Summary as of 5/8/2017     INR goal 2.0-3.0   Today's INR 2.44   Full instructions 5 mg every day   Next INR check 5/12/2017    Indications   Long-term (current) use of anticoagulants [Z79.01] [Z79.01]  Atrial flutter with rapid ventricular response (H) [I48.92]         May 2017 Details    Sun Mon Tue Wed Thu Fri Sat      1               2               3               4               5               6                 7               8      5 mg   See details      9      5 mg         10      5 mg         11      5 mg         12            13                 14               15               16               17               18               19               20                 21               22               23               24               25               26               27                 28               29               30               31                   Date Details   05/08 This INR check       Date of next INR:  5/12/2017         How to take your warfarin dose     To take:  5 mg Take 1 of the 5 mg tablets.

## 2017-05-08 NOTE — PATIENT INSTRUCTIONS
Dear Rohan Monroe    Thank you for choosing [facility] ([facility]) for your infusion.  The following information is a summary of our appointment as well as important reminders.      POST-INFUSION OF BIOLOGICAL MEDICATION:    If you experience any fever, chills or signs of infection, new symptoms, abdominal pain, heart palpitations, shortness of breath, reaction, weakness, neurological changes, seek medical attention immediately and should not receive infusions. No live virus vaccines prior to or during treatment or up to 6 months post infusion. If the patient has an upcoming procedure or surgery, this should be discussed with the rheumatologist and surgeon or provider.    Additional information: you had your infusion of Remicade 500 mg via IV todayl      We look forward in seeing you on your next appointment here at [facility].  Please don t hesitate to call us at [phone] to reschedule any of your appointments or to speak with one of the [facility] registered nurses.  It was a pleasure taking care of you today.    Sincerely,  Yuliet Farrell RN  [facility]  [facility]  [address] [place] [address]  [id number]  Phone:  [phone]

## 2017-05-08 NOTE — MR AVS SNAPSHOT
After Visit Summary   5/8/2017    Rohan Monroe    MRN: [id number]           Patient Information     Date Of Birth          1945        Visit Information        Provider Department      5/8/2017 3:00 PM  51 ATC;  [facility] Specialty and Procedure        Today's Diagnoses     Sarcoidosis of lung (HCC)    -  1    SARCOIDOSIS-systemic        Coronary artery disease involving native coronary artery of native heart without angina pectoris        Hyperlipidemia LDL goal <70        CKD (chronic kidney disease) stage 4, GFR 15-29 ml/min (H)        Sarcoidosis (H)          Care Instructions    Dear Rohan Monroe    Thank you for choosing [facility] ([facility]) for your infusion.  The following information is a summary of our appointment as well as important reminders.      POST-INFUSION OF BIOLOGICAL MEDICATION:    If you experience any fever, chills or signs of infection, new symptoms, abdominal pain, heart palpitations, shortness of breath, reaction, weakness, neurological changes, seek medical attention immediately and should not receive infusions. No live virus vaccines prior to or during treatment or up to 6 months post infusion. If the patient has an upcoming procedure or surgery, this should be discussed with the rheumatologist and surgeon or provider.    Additional information: you had your infusion of Remicade 500 mg via IV todayl      We look forward in seeing you on your next appointment here at [facility].  Please don t hesitate to call us at [phone] to reschedule any of your appointments or to speak with one of the [facility] registered nurses.  It was a pleasure taking care of you today.    Sincerely,  Yuliet Farrell RN  [facility]  Specialty Infusion & Procedure Center  [address] [place] [address]  [address]  Phone:  [phone]           Follow-ups after your visit        Your next 10 appointments already scheduled     May 10, 2017 10:30 AM CDT   TELEMEDICINE with Michelle Haywood RPH   [facility] Medication Therapy Management ([facility])    [address] [place] [address]  4th [facility] [id number] [phone]           Note: this is not an onsite visit; there is no need to come to the facility.            May 11, 2017  3:00 PM CDT   PFT VISIT with WALTER PFL B   [facility] Pulmonary Function Testing ([facility])    [address] [place] [address]  3rd [facility] [id number] [phone]            May 11, 2017  3:30 PM CDT   (Arrive by 3:15 PM)   Return Interstitial Lung with David Morris Perlman, MD   [facility] ([facility])    [address] [place] [address]  3rd [facility] [id number] [phone]            May 12, 2017 12:00 PM CDT   Procedure 1.5 hr with U2A ROOM 6   [address] [facility] ([facility])    [address] [facility] [address]               May 12, 2017  1:30 PM CDT   Cath 90 Minute with [id number]   [facility]  Heart Cath Lab ([facility])    [address] [facility] [address] [phone]            May 26, 2017  9:30 AM CDT   Lab with UC LAB   [facility] Lab ([facility])    [address] [place] [address]  1st [facility] [id number] [phone]            May 26, 2017 10:00 AM CDT   (Arrive by 9:45 AM)   RETURN HEART FAILURE with Diane Paige MD   [facility] Heart Care ([facility])    [address] [place] [address]  3rd [facility] [address] [phone]            Jun 05, 2017 12:00 PM CDT   Infusion 180 with UC SPEC INFUSION, UC 49 ATC   [facility] Specialty and Procedure ([facility])    [address] [place] [address]  Se  2nd Floor  [facility] [id number] [phone]            Jun 21, 2017  1:30 PM CDT   (Arrive by 1:15 PM)   RETURN ARRHYTHMIA with Brian Vazquez MD   [facility] ([facility])    [address] [facility] [address]  3rd Floor  [facility] [id number] [phone]              Who to contact     If you have questions or need follow up information about today's clinic visit or your schedule please contact [facility]REATMENT [place] SPECIALTY AND PROCEDURE directly at [phone].  Normal or non-critical lab and imaging results will be communicated to you by [employer], letter or phone within 4 business days after the clinic has received the results. If you do not hear from us within 7 days, please contact the clinic through [employer] or phone. If you have a critical or abnormal lab result, we will notify you by phone as soon as possible.  Submit refill requests through [employer] or call your pharmacy and they will forward the refill request to us. Please allow 3 business days for your refill to be completed.          Additional Information About Your Visit        [employer] Information     [employer] gives you secure access to your electronic health record. If you see a primary care provider, you can also send messages to your care team and make appointments. If you have questions, please call your primary care clinic.  If you do not have a primary care provider, please call [phone] and they will assist you.        Care EveryWhere ID     This is your Care EveryWhere ID. This could be used by other organizations to access your [facility] medical records  [id number]        Your Vitals Were     Pulse Temperature Respirations BMI (Body Mass Index)          84 96.4  F (35.8  C) (Oral) 16 28.5 kg/m2         Blood Pressure from Last 3 Encounters:   05/08/17 123/64   04/28/17 (!) 160/94   04/22/17 118/74    Weight from Last 3 Encounters:   05/08/17 87.5 kg (193 lb)   04/28/17 83.9 kg (185 lb)    04/22/17 88.5 kg (195 lb 1.7 oz)              We Performed the Following     Alkaline phosphatase     ALT     AST     Bilirubin  total     Bilirubin direct     CBC with platelets     INR     Protein  random urine     Protein total     Renal panel     Treatment Conditions          Today's Medication Changes          These changes are accurate as of: 5/8/17  5:26 PM.  If you have any questions, ask your nurse or doctor.               Start taking these medicines.        Dose/Directions    enoxaparin 80 MG/0.8ML injection   Commonly known as:  LOVENOX   Used for:  Atrial fibrillation (H)   Started by:  Diane Paige MD        Dose:  1 mg/kg   Inject 0.88 mLs (88 mg) Subcutaneous every 12 hours   Quantity:  8 Syringe   Refills:  0         These medicines have changed or have updated prescriptions.        Dose/Directions    methotrexate 2.5 MG tablet CHEMO   This may have changed:    - how much to take  - how to take this  - when to take this  - additional instructions   Used for:  Sarcoidosis (H)        Dose:  7.5 mg   Take 3 tablets (7.5 mg) by mouth once a week On Mondays   Quantity:  48 tablet   Refills:  3       Urea 40 % Crea   Commonly known as:  CARMOL 40   This may have changed:  additional instructions   Used for:  Dermatophytosis of nail, Corns and callosities        To feet daily   Quantity:  199 g   Refills:  4            Where to get your medicines      These medications were sent to [address] [place] [address]    Hours:  TRANSPLANT PHONE NUMBER [phone] Phone:  [phone]     enoxaparin 80 MG/0.8ML injection                Primary Care Provider Office Phone # Fax #    Jamie Foster [phone]       [address] [address]        Thank you!     Thank you for choosing [facility] SPECIALTY AND PROCEDURE   for your care. Our goal is always to provide you with excellent care. Hearing back from our patients is one way we can continue to improve our services. Please take a few minutes to complete the written survey that you may receive in the mail after your visit with us. Thank you!             Your Updated Medication List - Protect others around you: Learn how to safely use, store and throw away your medicines at www.disposemymeds.org.          This list is accurate as of: 5/8/17  5:26 PM.  Always use your most recent med list.                   Brand Name Dispense Instructions for use    albuterol 108 (90 BASE) MCG/ACT Inhaler    PROAIR HFA/PROVENTIL HFA/VENTOLIN HFA    3 Inhaler    Inhale 2 puffs into the lungs every 6 hours as needed for shortness of breath / dyspnea       ASPIRIN EC PO      Take 81 mg by mouth daily       atorvastatin 20 MG tablet    LIPITOR    90 tablet    Take 1 tablet (20 mg) by mouth daily       blood glucose monitoring lancets     2 Box    Use to test blood sugar 2 times daily or as directed.  102 lancets per box.  3 month supply.       blood glucose monitoring meter device kit    no brand specified    1 kit    Use to test blood sugar 2 times daily.       blood glucose monitoring test strip    ACCU-CHEK RONNIE PLUS    200 each    Use to test blood sugar 2 times daily or as directed.  3 month supply.       enoxaparin 80 MG/0.8ML injection    LOVENOX    8 Syringe    Inject 0.88 mLs (88 mg) Subcutaneous every 12 hours       fluticasone-vilanterol 100-25 MCG/INH oral inhaler    BREO ELLIPTA    3 Inhaler    Inhale 1 puff into the lungs daily       furosemide 20 MG tablet    LASIX    180 tablet    Take 3 tablets (60 mg) by mouth 2 times daily       inFLIXimab 100 MG injection    REMICADE     Inject 100 mg into the vein every 28 days       levothyroxine 137 MCG tablet    SYNTHROID/LEVOTHROID    90 tablet    Take 1 tablet (137 mcg) by mouth daily       methotrexate 2.5 MG tablet CHEMO     48 tablet     Take 3 tablets (7.5 mg) by mouth once a week On Mondays       metoprolol 100 MG tablet    LOPRESSOR    60 tablet    Take 1 tablet (100 mg) by mouth 2 times daily       mirtazapine 15 MG tablet    REMERON     Take 15 mg by mouth At Bedtime.       MULTIVITAMIN & MINERAL PO      Take 1 tablet by mouth daily.       polyethylene glycol powder    MIRALAX    510 g    Take 17 g (1 capful) by mouth daily       sildenafil 20 MG tablet    REVATIO/VIAGRA    90 tablet    Take 1 tablet (20 mg) by mouth 3 times daily       tiotropium 18 MCG capsule    SPIRIVA HANDIHALER    90 capsule    Inhale contents of one capsule daily.       Urea 40 % Crea    CARMOL 40    199 g    To feet daily       warfarin 5 MG tablet    COUMADIN    30 tablet    Take 1 tablet (5 mg) by mouth daily

## 2017-05-08 NOTE — PROGRESS NOTES
ANTICOAGULATION FOLLOW-UP CLINIC VISIT    Patient Name:  Rohan Monroe  Date:  5/8/2017  Contact Type:  Telephone    SUBJECTIVE:     Patient Findings     Comments 5/12-surgery.  Patient will call our clinic when he knows when he is to start holding his coumadin.  1645- received a call back from Lolita Hernandez who called the patient with the plan for bridging.  Please see the Smart set note for details.           OBJECTIVE    INR   Date Value Ref Range Status   05/08/2017 2.44 (H) 0.86 - 1.14 Final       ASSESSMENT / PLAN  INR assessment THER    Recheck INR In: 1 WEEK    INR Location Clinic      Anticoagulation Summary as of 5/8/2017     INR goal 2.0-3.0   Today's INR 2.44   Maintenance plan 5 mg (5 mg x 1) every day   Full instructions 5/8: Hold; 5/9: Hold; 5/10: Hold; 5/11: Hold; Otherwise 5 mg every day   Weekly total 35 mg   Plan last modified Delphine Kaur RN (3/14/2017)   Next INR check 5/15/2017   Priority INR   Target end date 4/20/2017    Indications   Long-term (current) use of anticoagulants [Z79.01] [Z79.01]  Atrial flutter with rapid ventricular response (H) [I48.92]         Anticoagulation Episode Summary     INR check location     Preferred lab     Send INR reminders to [facility]    Comments 3 months therapy, then reassess. Call [phone]. Do not leave message.       Anticoagulation Care Providers     Provider Role Specialty Phone number    Jamie Foster MD [facility] Internal Medicine [phone]            See the Encounter Report to view Anticoagulation Flowsheet and Dosing Calendar (Go to Encounters tab in chart review, and find the Anticoagulation Therapy Visit)    Spoke with patient and Lolita Hernandez RN.  The following is the bridging plan for 5/12 coronary angiogram with Dr. Randhawa:  Patient will start holding coumadin today-5/8.  He will start Lovenox every 12 hours on Wed AM and continue it on Thursday, with the last dose on Thursday evening. Patient will ask  when to restart coumadin and lovenox after the procedure.   Lovenox is to be post procedure too.   Lolita has gone over the entire plan with the patient, and ordered the lovenox.       Diane Mazariegos RN

## 2017-05-08 NOTE — PROGRESS NOTES
Patient's INR was 2.4 today. He is scheduled for RHC on Fri. 5/12 at [facility].  He has history of At. Fib. So will need to be bridged with Lovenox per Dr. Paige.  Lovenox 90 mg ordered x 8 doses with pharmacy at [facility].  Pt. will stop Coumadin today. Start Lovenox on Wed, and Thurs. BID.  Procedure is Fri. IF ok with cardiology he may restart Coumadin on Fri. evening.  Continue  Lovenox on Sat. and Sun.   INR check on Mon. I have reviewed this plan with Marcia at the [facility] clinic.

## 2017-05-08 NOTE — PROGRESS NOTES
ANTICOAGULATION FOLLOW-UP CLINIC VISIT    Patient Name:  Rohna Monroe  Date:  5/8/2017  Contact Type:  Telephone    SUBJECTIVE:     Patient Findings     Comments 5/12-surgery.  Patient will call our clinic when he knows when he is to start holding his coumadin.           OBJECTIVE    INR   Date Value Ref Range Status   05/08/2017 2.44 (H) 0.86 - 1.14 Final       ASSESSMENT / PLAN  INR assessment THER    Recheck INR In: 4 DAYS    INR Location Clinic      Anticoagulation Summary as of 5/8/2017     INR goal 2.0-3.0   Today's INR 2.44   Maintenance plan 5 mg (5 mg x 1) every day   Full instructions 5 mg every day   Weekly total 35 mg   Plan last modified Delphine Kaur RN (3/14/2017)   Next INR check 5/12/2017   Priority INR   Target end date 4/20/2017    Indications   Long-term (current) use of anticoagulants [Z79.01] [Z79.01]  Atrial flutter with rapid ventricular response (H) [I48.92]         Anticoagulation Episode Summary     INR check location     Preferred lab     Send INR reminders to [facility]    Comments 3 months therapy, then reassess. Call [phone]. Do not leave message.       Anticoagulation Care Providers     Provider Role Specialty Phone number    Jamie Foster MD [facility] Internal Medicine [phone]            See the Encounter Report to view Anticoagulation Flowsheet and Dosing Calendar (Go to Encounters tab in chart review, and find the Anticoagulation Therapy Visit)  Spoke with patient.    Diane Mazariegos RN

## 2017-05-10 ENCOUNTER — ALLIED HEALTH/NURSE VISIT (OUTPATIENT)
Dept: PHARMACY | Facility: CLINIC | Age: 72
End: 2017-05-10
Payer: COMMERCIAL

## 2017-05-10 DIAGNOSIS — I48.92 ATRIAL FLUTTER WITH RAPID VENTRICULAR RESPONSE (H): ICD-10-CM

## 2017-05-10 DIAGNOSIS — E11.9 TYPE 2 DIABETES MELLITUS WITHOUT COMPLICATION, WITHOUT LONG-TERM CURRENT USE OF INSULIN (H): ICD-10-CM

## 2017-05-10 DIAGNOSIS — I25.10 CORONARY ARTERY DISEASE WITHOUT ANGINA PECTORIS, UNSPECIFIED VESSEL OR LESION TYPE, UNSPECIFIED WHETHER NATIVE OR TRANSPLANTED HEART: Primary | ICD-10-CM

## 2017-05-10 PROCEDURE — 99606 MTMS BY PHARM EST 15 MIN: CPT | Performed by: PHARMACIST

## 2017-05-10 NOTE — MR AVS SNAPSHOT
After Visit Summary   5/10/2017    Rohan Monroe    MRN: [id number]           Patient Information     Date Of Birth          1945        Visit Information        Provider Department      5[facility]10/2017 10:30 [facility]        Today's Diagnoses     Coronary artery disease without angina pectoris, unspecified vessel or lesion type, unspecified whether native or transplanted heart    -  1    Atrial flutter with rapid ventricular response (H)        Type 2 diabetes mellitus without complication, without long-term current use of insulin (H)          Care Instructions    Recommendations from today's MTM visit:                                                        1. Start Lovenox 90mg (0.9ml) twice daily today and tomorrow, stop for Friday, and restart for Saturday and Sunday. Speak with cardiology after your procedure to see when to restart your warfarin.    2. Work with cardiology to determine when you should restart losartan for heart and kidney health.    3. At your leisure, have your fasting cholesterol checked. The order is placed.    Next MTM visit: Call me anytime with questions.    To schedule another MTM appointment, please call the clinic directly or you may call the MTM scheduling line at [phone] or toll-free at [phone].     My Clinical Pharmacist's contact information:                                                      It was a pleasure seeing you today!  Please feel free to contact me with any questions or concerns you have.      Michelle Haywood PharmD  Medication Therapy Management Provider  Phone:[phone]  Pager: [phone]    You may receive a survey about the MTM services you received.  I would appreciate your feedback to help me serve you better in the future. Please fill it out and return it when you can. Your comments will be anonymous.                  Follow-ups after your visit        Your next 10  appointments already scheduled     May 11, 2017  3:00 PM CDT   PFT VISIT with UC PFL B   [facility] Pulmonary Function Testing ([facility])    [address] [place] [address]  [address] [facility] [address] [phone]            May 11, 2017  3:30 PM CDT   (Arrive by 3:15 PM)   Return Interstitial Lung with David Morris Perlman, MD   [facility] ([facility])    [address] [place] [address]  [address] [facility] [address] [phone]            May 12, 2017 12:00 PM CDT   Procedure 1.5 hr with U2A ROOM 6   [address] [facility] ([facility])    [address] [facility] [address]               May 12, 2017  1:30 PM CDT   Cath 90 Minute with [id number]   [facility] [place]  Heart Cath Lab ([facility])    [address] [facility] [address] [phone]            May 26, 2017  9:30 AM CDT   Lab with UC LAB   [facility] Lab ([facility])    [address] [place] [address]  [address] [facility] [address] [phone]            May 26, 2017 10:00 AM CDT   (Arrive by 9:45 AM)   RETURN HEART FAILURE with Diane Paige MD   [facility] Heart Care ([facility])    [address] [place] [address]  [address] [facility] [address] [phone]            Jun 05, 2017 12:00 PM CDT   Infusion 180 with UC SPEC INFUSION, UC 49 ATC   [facility] Specialty and Procedure ([facility])    [address] [place] [address]  [address] [facility] [address] [phone]            Jun 21, 2017  1:30 PM CDT   (Arrive by 1:15 PM)   RETURN ARRHYTHMIA with Brian Vazquez MD   [facility] Heart Care ([facility])    [address] [place] [address]  [address] [facility] [address] [phone]            Aug 29, 2017 10:30 AM CDT   US ABDOMEN COMPLETE with UCUS2   [facility]  Imaging Center US ([facility])    [address]  [address] [facility] [address] [phone]           Please bring a list of your medicines (including vitamins, minerals and over-the-counter drugs). Also, tell your doctor about any allergies you may have. Wear comfortable clothes and leave your valuables at home.  Adults: No eating or drinking for 8 hours before the exam. You may take medicine with a small sip of water.  Children: - Children 6+ years: No food or drink for 6 hours before exam. - Children 1-5 years: No food or drink for 4 hours before exam. - Infants, breast-fed: may have breast milk up to 2 hours before exam. - Infants, formula: may have bottle until 4 hours before exam.  Please call the Imaging Department at your exam site with any questions.              Who to contact     If you have questions or need follow up information about today's clinic visit or your schedule please contact [facility] MEDICATION THERAPY MANAGEMENT directly at [phone].  Normal or non-critical lab and imaging results will be communicated to you by SnapAppointmentshart, letter or phone within 4 business days after the clinic has received the results. If you do not hear from us within 7 days, please contact the clinic through [employer] or phone. If you have a critical or abnormal lab result, we will notify you by phone as soon as possible.  Submit refill requests through [employer] or call your pharmacy and they will forward the refill request to us. Please allow 3 business days for your refill to be completed.          Additional Information About Your Visit        [employer] Information     [employer] gives you secure access to your electronic health record. If you see a primary care provider, you can also send messages to your care team and make appointments. If you have questions, please call your primary care clinic.  If you do not have a primary care provider, please call [phone] and they will  assist you.        Care EveryWhere ID     This is your Care EveryWhere ID. This could be used by other organizations to access your [place] [facility] medical records  [id number]         Blood Pressure from Last 3 Encounters:   05/08/17 123/64   04/28/17 (!) 160/94   04/22/17 118/74    Weight from Last 3 Encounters:   05/08/17 193 lb (87.5 kg)   04/28/17 185 lb (83.9 kg)   04/22/17 195 lb 1.7 oz (88.5 kg)              Today, you had the following     No orders found for display         Today's Medication Changes          These changes are accurate as of: 5/10/17 12:39 PM.  If you have any questions, ask your nurse or doctor.               These medicines have changed or have updated prescriptions.        Dose/Directions    methotrexate 2.5 MG tablet CHEMO   This may have changed:    - how much to take  - how to take this  - when to take this  - additional instructions   Used for:  Sarcoidosis (H)        Dose:  7.5 mg   Take 3 tablets (7.5 mg) by mouth once a week On Mondays   Quantity:  48 tablet   Refills:  3       Urea 40 % Crea   Commonly known as:  CARMOL 40   This may have changed:  additional instructions   Used for:  Dermatophytosis of nail, Corns and callosities        To feet daily   Quantity:  199 g   Refills:  4                Primary Care Provider Office Phone # Fax #    Jamie Foster [phone]       [address] [address]        Thank you!     Thank you for choosing [facility] MEDICATION THERAPY MANAGEMENT  for your care. Our goal is always to provide you with excellent care. Hearing back from our patients is one way we can continue to improve our services. Please take a few minutes to complete the written survey that you may receive in the mail after your visit with us. Thank you!             Your Updated Medication List - Protect others around you: Learn how to safely use, store and throw away your medicines at www.disposemymeds.org.          This  list is accurate as of: 5/10/17 12:39 PM.  Always use your most recent med list.                   Brand Name Dispense Instructions for use    albuterol 108 (90 BASE) MCG/ACT Inhaler    PROAIR HFA/PROVENTIL HFA/VENTOLIN HFA    3 Inhaler    Inhale 2 puffs into the lungs every 6 hours as needed for shortness of breath / dyspnea       ASPIRIN EC PO      Take 81 mg by mouth daily       atorvastatin 20 MG tablet    LIPITOR    90 tablet    Take 1 tablet (20 mg) by mouth daily       blood glucose monitoring lancets     2 Box    Use to test blood sugar 2 times daily or as directed.  102 lancets per box.  3 month supply.       blood glucose monitoring meter device kit    no brand specified    1 kit    Use to test blood sugar 2 times daily.       blood glucose monitoring test strip    ACCU-CHEK RONNIE PLUS    200 each    Use to test blood sugar 2 times daily or as directed.  3 month supply.       enoxaparin 80 MG/0.8ML injection    LOVENOX    8 Syringe    Inject 0.88 mLs (88 mg) Subcutaneous every 12 hours       fluticasone-vilanterol 100-25 MCG/INH oral inhaler    BREO ELLIPTA    3 Inhaler    Inhale 1 puff into the lungs daily       furosemide 20 MG tablet    LASIX    180 tablet    Take 3 tablets (60 mg) by mouth 2 times daily       inFLIXimab 100 MG injection    REMICADE     Inject 100 mg into the vein every 28 days       levothyroxine 137 MCG tablet    SYNTHROID/LEVOTHROID    90 tablet    Take 1 tablet (137 mcg) by mouth daily       methotrexate 2.5 MG tablet CHEMO     48 tablet    Take 3 tablets (7.5 mg) by mouth once a week On Mondays       metoprolol 100 MG tablet    LOPRESSOR    60 tablet    Take 1 tablet (100 mg) by mouth 2 times daily       mirtazapine 15 MG tablet    REMERON     Take 15 mg by mouth At Bedtime.       MULTIVITAMIN & MINERAL PO      Take 1 tablet by mouth daily.       polyethylene glycol powder    MIRALAX    510 g    Take 17 g (1 capful) by mouth daily       sildenafil 20 MG tablet    REVATIO/VIAGRA     90 tablet    Take 1 tablet (20 mg) by mouth 3 times daily       tiotropium 18 MCG capsule    SPIRIVA HANDIHALER    90 capsule    Inhale contents of one capsule daily.       Urea 40 % Crea    CARMOL 40    199 g    To feet daily       warfarin 5 MG tablet    COUMADIN    30 tablet    Take 1 tablet (5 mg) by mouth daily

## 2017-05-10 NOTE — PROGRESS NOTES
SUBJECTIVE/OBJECTIVE:                                                    Rohan Monroe is a 71 year old male called for a follow-up visit for Medication Therapy Management.  He was referred to me from our transitions of care program.     Chief Complaint: Follow up from our visit on 5/3.  Pt called to see if he is doing better.    Tobacco: No tobacco use   Alcohol: not currently using    Medication Adherence: no issues reported    AFib/CVD/HTN: Pt is currently taking metoprolol as directed. He is scheduled for his angiogram for Friday. Pt stopped warfarin a couple days ago and is starting enoxaparin 90mg BID this morning. He is not having bruising/bleeding issues at this time. Pt's home BP readings have generally been in the 130s over 70s.  Diabetes: Pt is not taking any medications. He reports that his home BG readings are 115-120 fasting, 140-150 postprandial.    Current labs include:  BP Readings from Last 3 Encounters:   05/08/17 123/64   04/28/17 (!) 160/94   04/22/17 118/74     Today's Vitals: There were no vitals taken for this visit.  Lab Results   Component Value Date    A1C 7.5 01/20/2017   .  Lab Results   Component Value Date    CHOL 128 01/07/2014     Lab Results   Component Value Date    TRIG 148 01/07/2014     Lab Results   Component Value Date    HDL 35 01/07/2014     Lab Results   Component Value Date    LDL 62 01/07/2014       Liver Function Studies -   Recent Labs   Lab Test  05/08/17   1518   PROTTOTAL  7.6   ALBUMIN  3.1*   BILITOTAL  0.6   ALKPHOS  108   AST  52*   ALT  69       Lab Results   Component Value Date    UCRR 124 05/08/2017    MICROL 882 04/18/2017    UMALCR 2456.82 (H) 04/18/2017       Last Basic Metabolic Panel:  Lab Results   Component Value Date     05/08/2017      Lab Results   Component Value Date    POTASSIUM 4.2 05/08/2017     Lab Results   Component Value Date    CHLORIDE 99 05/08/2017     Lab Results   Component Value Date    BUN 44 05/08/2017     Lab Results    Component Value Date    CR 1.94 05/08/2017     GFR Estimate   Date Value Ref Range Status   05/08/2017 34 (L) >60 mL/min/1.7m2 Final     Comment:     Non  GFR Calc   04/28/2017 33 (L) >60 mL/min/1.7m2 Final     Comment:     Non  GFR Calc   04/27/2017 36 (L) >60 mL/min/1.7m2 Final     Comment:     Non  GFR Calc     GFR Estimate If Black   Date Value Ref Range Status   05/08/2017 41 (L) >60 mL/min/1.7m2 Final     Comment:      GFR Calc   04/28/2017 40 (L) >60 mL/min/1.7m2 Final     Comment:      GFR Calc   04/27/2017 44 (L) >60 mL/min/1.7m2 Final     Comment:      GFR Calc     TSH   Date Value Ref Range Status   04/27/2017 0.70 0.40 - 4.00 mU/L Final   ]    Most Recent Immunizations   Administered Date(s) Administered     Influenza (H1N1) 12/18/2009     Influenza (High Dose) 3 valent vaccine 01/20/2017     Influenza (IIV3) 09/25/2012     Mantoux 05/27/2009     Pneumococcal (PCV 7) 08/11/2011     Pneumococcal 23 valent 07/18/2016     TDAP Vaccine (Boostrix) 08/11/2011     Tdap (Adacel,Boostrix) 08/11/2011     ASSESSMENT:                                                    Current medications were reviewed today as discussed above.      Medication Adherence: no issues identified    AFib/CVD/HTN: Improving. Instructions for Lovenox from phone visit 5/8 reviewed with the patient and he is following appropriately. We discussed that ideally pt should restart ARB therapy long-term, but no recommendation to start today.  Diabetes: Stable. Self monitoring of blood glucose is at goal of fasting  mg/dL and post prandial < 180 mg/dL.     PLAN:                                                      1. Pt to start Lovenox today as directed by cardiology in preparation for angiogram Friday.    2. In the long-term, pt to consider restarting losartan with cardiology and to get FLP checked as ordered last visit.    I spent 15 minutes  with this patient today.  All changes were made via collaborative practice agreement with Jamie Foster. A copy of the visit note was provided to the patient's primary care provider.     Will follow up upon pt request, as he is not covered for MTM visits going forward. I encouraged him to call anytime with questions.    The patient was mailed a summary of these recommendations as an after visit summary.    Michelle Haywood PharmD  Medication Therapy Management Provider  Phone:[phone]  Pager: [phone]

## 2017-05-10 NOTE — PATIENT INSTRUCTIONS
Recommendations from today's MTM visit:                                                        1. Start Lovenox 90mg (0.9ml) twice daily today and tomorrow, stop for Friday, and restart for Saturday and Sunday. Speak with cardiology after your procedure to see when to restart your warfarin.    2. Work with cardiology to determine when you should restart losartan for heart and kidney health.    3. At your leisure, have your fasting cholesterol checked. The order is placed.    Next MTM visit: Call me anytime with questions.    To schedule another MTM appointment, please call the clinic directly or you may call the MTM scheduling line at [phone] or toll-free at [phone].     My Clinical Pharmacist's contact information:                                                      It was a pleasure seeing you today!  Please feel free to contact me with any questions or concerns you have.      Michelle Haywood PharmD  Medication Therapy Management Provider  Phone:[phone]  Pager: [phone]    You may receive a survey about the MTM services you received.  I would appreciate your feedback to help me serve you better in the future. Please fill it out and return it when you can. Your comments will be anonymous.

## 2017-05-11 ENCOUNTER — OFFICE VISIT (OUTPATIENT)
Dept: PULMONOLOGY | Facility: CLINIC | Age: 72
DRG: 246 | End: 2017-05-11
Attending: INTERNAL MEDICINE
Payer: MEDICARE

## 2017-05-11 VITALS
DIASTOLIC BLOOD PRESSURE: 72 MMHG | HEART RATE: 78 BPM | SYSTOLIC BLOOD PRESSURE: 116 MMHG | HEIGHT: 69 IN | WEIGHT: 193 LBS | BODY MASS INDEX: 28.58 KG/M2 | OXYGEN SATURATION: 91 %

## 2017-05-11 DIAGNOSIS — D86.9 SARCOIDOSIS: Primary | ICD-10-CM

## 2017-05-11 DIAGNOSIS — J84.9 ILD (INTERSTITIAL LUNG DISEASE) (H): ICD-10-CM

## 2017-05-11 ASSESSMENT — PAIN SCALES - GENERAL: PAINLEVEL: NO PAIN (0)

## 2017-05-11 NOTE — LETTER
5/11/2017       RE: Rohan Monroe  [address]  [place] [facility] [facility] [address]     Dear Colleague,    Thank you for referring your patient, Rohan Monroe, to the [facility] at [facility]. Please see a copy of my visit note below.    Reason for Visit  Rohan Monroe is a 71 year old year old male who is being seen for Sarcoidosis  ILD HPI    Denis Monroe is a 71-year-old male with a history of pulmonary, cardiac sarcoidosis as well as known coronary artery disease who is seen today for followup.  He was recently hospitalized with worsening dyspnea on exertion.  He underwent a right and left heart catheterization, which demonstrated some restenosis of his previously stented lesions as well as fairly significant pulmonary hypertension with an RV systolic pressure of 65.  He was diuresed and treated with antibiotics as he did have a productive cough.  His CT scan had a slightly increased left upper lobe infiltrate, but otherwise looked stable.  He returns to clinic today stating he is doing okay.  He still has fairly significant dyspnea on exertion.  They are planning a repeat angiogram tomorrow to address the restenosis that they found on the previous angiogram.  He continues on his Remicade and methotrexate.  He otherwise does note he has significant lower extremity edema, although he has been put on 60 a day of Lasix and he does feel that this is getting better.  Otherwise, he certainly is not back to his previous baseline, but feels like he is doing okay and from a lung standpoint he feels that his breathing is stable.  Of note, he also had a pleural effusion that was tapped during the admission.           Current Outpatient Prescriptions   Medication     enoxaparin (LOVENOX) 80 MG/0.8ML injection     sildenafil (REVATIO/VIAGRA) 20 MG tablet     furosemide (LASIX) 20 MG tablet     levothyroxine  (SYNTHROID/LEVOTHROID) 137 MCG tablet     warfarin (COUMADIN) 5 MG tablet     polyethylene glycol (MIRALAX) powder     tiotropium (SPIRIVA HANDIHALER) 18 MCG capsule     albuterol (PROAIR HFA/PROVENTIL HFA/VENTOLIN HFA) 108 (90 BASE) MCG/ACT Inhaler     fluticasone-vilanterol (BREO ELLIPTA) 100-25 MCG/INH oral inhaler     inFLIXimab (REMICADE) 100 MG injection     metoprolol (LOPRESSOR) 100 MG tablet     methotrexate 2.5 MG tablet     atorvastatin (LIPITOR) 20 MG tablet     ASPIRIN EC PO     blood glucose monitoring (ACCU-CHEK RONNIE PLUS) test strip     blood glucose monitoring (ACCU-CHEK FASTCLIX) lancets     blood glucose monitoring (NO BRAND SPECIFIED) meter device kit     Urea (CARMOL 40) 40 % CREA     Multiple Vitamins-Minerals (MULTIVITAMIN & MINERAL PO)     mirtazapine (REMERON) 15 MG tablet     No current facility-administered medications for this visit.      Allergies   Allergen Reactions     Prednisone Other (See Comments)     He reports that he can't sleep for days and can't use small to massive doses of prednisone   Pt. Does not do well with high doses of Prednisone, His MD says that Prednisone is counter indicated. Prednisone failed to treat his sarcoidosis      Past Medical History:   Diagnosis Date     Cataract of both eyes      Chronic infection     Hep C     Congestive heart failure, unspecified      Depressive disorder, not elsewhere classified     Depression (non-psychotic)     ERM OS (epiretinal membrane, left eye)      Generalized osteoarthrosis, unspecified site      Glaucoma suspect      Hypertension      Lichen planus      Other psoriasis      PVD (posterior vitreous detachment), left eye      Sarcoidosis (H)      Sarcoidosis (H)      Type II or unspecified type diabetes mellitus without mention of complication, not stated as uncontrolled      Unspecified hypothyroidism     Hypothyroidism     Unspecified viral hepatitis C without hepatic coma      Viral warts, unspecified        Past Surgical  History:   Procedure Laterality Date     ANESTHESIA CARDIOVERSION N/A 1/19/2017    Procedure: ANESTHESIA CARDIOVERSION;  Surgeon: GENERIC ANESTHESIA PROVIDER;  Location: [facility] OR     ANESTHESIA CARDIOVERSION N/A 1/23/2017    Procedure: ANESTHESIA CARDIOVERSION;  Surgeon: GENERIC ANESTHESIA PROVIDER;  Location: [facility] OR     ANESTHESIA CARDIOVERSION N/A 1/24/2017    Procedure: ANESTHESIA CARDIOVERSION;  Surgeon: GENERIC ANESTHESIA PROVIDER;  Location: [facility] OR     ARTHROPLASTY HIP  8/24/2011    Procedure:ARTHROPLASTY HIP; Right Total Hip Arthroplasty  Choice anesthesia; Surgeon:ELSLI WILKINSON; Location:[facility] OR     BIOPSY       cardiac stent      s/p     CARDIAC SURGERY       CATARACT IOL, RT/LT  9/15/2015    LE     COLONOSCOPY       HC REMOVAL OF TONSILS,<9 Y/O      Tonsils <10y.o.     HC REPAIR INCISIONAL HERNIA,REDUCIBLE      Hernia Repair, Incisional, Unilateral     JOINT REPLACEMENT      1 month ago--right hip     LIGATN/STRIP LONG & SHORT SAPHEN         Social History     Social History     Marital status:      Spouse name: N/A     Number of children: 2     Years of education: N/A     Occupational History      [employer]     Social History Main Topics     Smoking status: Former Smoker     Packs/day: 1.00     Years: 30.00     Types: Cigarettes     Start date: 12/30/1960     Quit date: 7/22/1994     Smokeless tobacco: Never Used     Alcohol use No     Drug use: No     Sexual activity: Yes     Partners: Female     Other Topics Concern     Blood Transfusions No     Exercise Yes     Social History Narrative    Dairy/d 2 servings/d    Caffeine little servings/d    Exercise 3 x week    Sunscreen used - Yes    Seatbelts used - Yes    Working smoke/CO detectors in the home - Yes    Guns stored in the home - No    Self Breast Exams - NOT APPLICABLE    Self Testicular Exam - No    Eye Exam up to date - Yes    Dental Exam up to date - Yes    Pap Smear up to date - NOT APPLICABLE    Mammogram up to date - NOT  "APPLICABLE    PSA up to date - Yes    Dexa Scan up to date - NOT APPLICABLE    Flex Sig / Colonoscopy up to date - Yes    Immunizations up to date - Unsure    Abuse: Current or Past(Physical, Sexual or Emotional)- No    Do you feel safe in your environment - Yes       Family History   Problem Relation Age of Onset     HEART DISEASE Father      irreg heart beat     Circulatory Father      varicose veins     Prostate Cancer Father      HEART DISEASE Mother      heart attack     Arthritis Mother      OSTEOPOROSIS Mother      Thyroid Disease Mother      Eye Disorder Maternal Grandmother      glaucoma     DIABETES Sister      type 2     Kidney Cancer Sister      DIABETES Sister      Glaucoma No family hx of      Macular Degeneration No family hx of      CANCER No family hx of      no skin cancer       ROS Pulmonary    A complete ROS was otherwise negative except as noted in the HPI.  Vitals:    05/11/17 1522 05/11/17 1527   BP: 116/72    BP Location: Right arm    Patient Position: Chair    Cuff Size: Adult Regular    Pulse: 78    SpO2: 97% 91%   Weight: 87.5 kg (193 lb)    Height: 1.753 m (5' 9\")      Exam:   GENERAL APPEARANCE: Well developed, well nourished, alert, and in no apparent distress.  NECK: supple, no masses, no thyromegaly.  LYMPHATICS: No significant axillary, cervical, or supraclavicular nodes.  RESP: good air flow throughout, - bibasialr crackles  CV: Normal S1, S2, regular rhythm, normal rate, no rub, no murmur,  no gallop, 1-2+ LE edema.   ABDOMEN:  Bowel sounds normal, soft, nontender, no HSM or masses.   MS: extremities normal- no clubbing, no cyanosis.  SKIN: no rash on limited exam  NEURO: Mentation intact, speech normal, normal strength and tone, normal gait and stance  PSYCH: mentation appears normal. and affect normal/bright  Results: I have reviewed all imaging, PFTs and other relavent tests, please see below for details, PFT and imaging results were reviewed with the patient.  PFTs: Severe " obstruction, slightly worse than previous  Assessment and plan:     A 71-year-old male with a complicated history of cardiac and pulmonary sarcoidosis.   1.  Pulmonary sarcoidosis.  It does appear to be relatively stable at this time as far as I can tell, although his disease seemed multifactorial, so it is always difficult to know what may be contributing to his symptoms.  At this point I would continue with his current immunosuppression regimen and continue with the Remicade infusions.   2.  Coronary artery disease.  We will undergo repeat left heart catheterization with plan for intervention tomorrow.  We will see how he does after this.  He also will follow up with Dr. Paige in the Cardiology Clinic.   3.  Pulmonary hypertension.  Significant pulmonary hypertension on his right heart catheterization.  I think it may be worth addressing this at some point, particularly if his dyspnea does not improve after the stenting of his coronary lesions.   4.  Pleural effusion.  Likely sarcoid related, the etiology is unclear.  It was apparently inflammatory at 1400 white cells and 78% lymphocytes.  Gram stain was negative.  There were no malignant cells founds.  We will likely plan for a followup chest x-ray at his next visit but otherwise this does not need any further evaluation.         CBC   Recent Labs   Lab Test  05/08/17   1518 04/28/17   0748   RBC  3.58*  4.33*   HGB  10.8*  13.2*   HCT  32.8*  40.8   PLT  369  356       Basic Metabolic Panel  Recent Labs   Lab Test  05/08/17   1518  04/28/17   0748   08/24/11   1306   NA  136  136   < >   --    POTASSIUM  4.2  4.1   < >  5.1   CHLORIDE  99  99   < >   --    CO2  27  28   < >   --    BUN  44*  46*   < >   --    CT   --    --    --   Red Blood Cells Leukocyte Reduced  Red Blood Cells Leukocyte Reduced   GLC  138*  144*   < >   --    RAFFY  8.7  9.2   < >   --     < > = values in this interval not displayed.       INR  Recent Labs   Lab Test  05/08/17 1518   04/28/17   0748   INR  2.44*  1.27*       PFT  PFT Latest Ref Rng & Units 5/11/2017   FVC L 2.45   FEV1 L 1.01   FVC% % 62   FEV1% % 34       Again, thank you for allowing me to participate in the care of your patient.      Sincerely,    David Morris Perlman, MD

## 2017-05-11 NOTE — MR AVS SNAPSHOT
After Visit Summary   5/11/2017    Rohan Monroe    MRN: [id number]           Patient Information     Date Of Birth          1945        Visit Information        Provider Department      5/11/2017 3:30 PM Perlman, David Morris, MD [facility]        Today's Diagnoses     Sarcoidosis (H)    -  1       Follow-ups after your visit        Your next 10 appointments already scheduled     May 12, 2017 12:00 PM CDT   Procedure 1.5 hr with U2A ROOM 6   [address] [facility] [place] ([facility])    [address] [address]               May 12, 2017  1:30 PM CDT   Cath 90 Minute with [id number]   [facility] [place],  Heart Cath Lab ([facility])    [address] [address] [phone]            May 26, 2017  9:30 AM CDT   Lab with UC LAB   [facility] Lab ([facility])    [address] [place] [address]  [address] [facility] [address] [phone]            May 26, 2017 10:00 AM CDT   (Arrive by 9:45 AM)   RETURN HEART FAILURE with Diane Paige MD   [facility] Care ([facility])    [address]  3rd [address]  [facility] [address] [phone]            Jun 05, 2017 12:00 PM CDT   Infusion 180 with UC SPEC INFUSION, UC 49 ATC   [facility] Specialty and Procedure ([facility])    [address]  2nd [address]  [facility] [address] [phone]            Jun 21, 2017  1:30 PM CDT   (Arrive by 1:15 PM)   RETURN ARRHYTHMIA with Brian Vazquez MD   [facility] Heart Care ([facility])    [address]  3rd Floor  [facility] [address] [phone]            Aug 29, 2017 10:00 AM CDT   Lab with UC LAB   [facility] Lab ([facility])    [address] [place] [address]  [address] [facility]  [id number] [phone]            Aug 29, 2017 10:30 AM CDT   US ABDOMEN COMPLETE with UCUS2   [facility] Imaging [facility] US ([facility])    [address]  [address] [facility] [id number] [phone]           Please bring a list of your medicines (including vitamins, minerals and over-the-counter drugs). Also, tell your doctor about any allergies you may have. Wear comfortable clothes and leave your valuables at home.  Adults: No eating or drinking for 8 hours before the exam. You may take medicine with a small sip of water.  Children: - Children 6+ years: No food or drink for 6 hours before exam. - Children 1-5 years: No food or drink for 4 hours before exam. - Infants, breast-fed: may have breast milk up to 2 hours before exam. - Infants, formula: may have bottle until 4 hours before exam.  Please call the Imaging Department at your exam site with any questions.            Aug 29, 2017 11:30 AM CDT   (Arrive by 11:15 AM)   Return General Liver with Moses Orosco MD   [facility] Hepatology ([facility])    [address] [place] [address]  [address] [facility] [id number] [phone]              Future tests that were ordered for you today     Open Future Orders        Priority Expected Expires Ordered    General PFT Lab (Please always keep checked) Routine  5/11/2018 5/11/2017    Pulmonary Function Test Routine  5/11/2018 5/11/2017            Who to contact     If you have questions or need follow up information about today's clinic visit or your schedule please contact [facility] directly at [phone].  Normal or non-critical lab and imaging results will be communicated to you by MyChart, letter or phone within 4 business days after the clinic has received the results. If you do not hear from us within 7 days, please contact the clinic through MyChart or phone. If you have a critical or abnormal lab result, we will  "notify you by phone as soon as possible.  Submit refill requests through [employer] or call your pharmacy and they will forward the refill request to us. Please allow 3 business days for your refill to be completed.          Additional Information About Your Visit        [employer] Information     [employer] gives you secure access to your electronic health record. If you see a primary care provider, you can also send messages to your care team and make appointments. If you have questions, please call your primary care clinic.  If you do not have a primary care provider, please call [phone] and they will assist you.        Care EveryWhere ID     This is your Care EveryWhere ID. This could be used by other organizations to access your [place] medical records  [id number]        Your Vitals Were     Pulse Height Pulse Oximetry BMI (Body Mass Index)          78 1.753 m (5' 9\") 91% 28.5 kg/m2         Blood Pressure from Last 3 Encounters:   05/11/17 116/72   05/08/17 123/64   04/28/17 (!) 160/94    Weight from Last 3 Encounters:   05/11/17 87.5 kg (193 lb)   05/08/17 87.5 kg (193 lb)   04/28/17 83.9 kg (185 lb)                 Today's Medication Changes          These changes are accurate as of: 5/11/17  3:51 PM.  If you have any questions, ask your nurse or doctor.               These medicines have changed or have updated prescriptions.        Dose/Directions    methotrexate 2.5 MG tablet CHEMO   This may have changed:    - how much to take  - how to take this  - when to take this  - additional instructions   Used for:  Sarcoidosis (H)        Dose:  7.5 mg   Take 3 tablets (7.5 mg) by mouth once a week On Mondays   Quantity:  48 tablet   Refills:  3       Urea 40 % Crea   Commonly known as:  CARMOL 40   This may have changed:  additional instructions   Used for:  Dermatophytosis of nail, Corns and callosities        To feet daily   Quantity:  199 g   Refills:  4                Primary Care Provider Office Phone # Fax # "    Jamie Foster [phone]       [place] [id number] [address] [address]        Thank you!     Thank you for choosing [facility]  for your care. Our goal is always to provide you with excellent care. Hearing back from our patients is one way we can continue to improve our services. Please take a few minutes to complete the written survey that you may receive in the mail after your visit with us. Thank you!             Your Updated Medication List - Protect others around you: Learn how to safely use, store and throw away your medicines at www.disposemymeds.org.          This list is accurate as of: 5/11/17  3:51 PM.  Always use your most recent med list.                   Brand Name Dispense Instructions for use    albuterol 108 (90 BASE) MCG/ACT Inhaler    PROAIR HFA/PROVENTIL HFA/VENTOLIN HFA    3 Inhaler    Inhale 2 puffs into the lungs every 6 hours as needed for shortness of breath / dyspnea       ASPIRIN EC PO      Take 81 mg by mouth daily       atorvastatin 20 MG tablet    LIPITOR    90 tablet    Take 1 tablet (20 mg) by mouth daily       blood glucose monitoring lancets     2 Box    Use to test blood sugar 2 times daily or as directed.  102 lancets per box.  3 month supply.       blood glucose monitoring meter device kit    no brand specified    1 kit    Use to test blood sugar 2 times daily.       blood glucose monitoring test strip    ACCU-CHEK RONNIE PLUS    200 each    Use to test blood sugar 2 times daily or as directed.  3 month supply.       enoxaparin 80 MG/0.8ML injection    LOVENOX    8 Syringe    Inject 0.88 mLs (88 mg) Subcutaneous every 12 hours       fluticasone-vilanterol 100-25 MCG/INH oral inhaler    BREO ELLIPTA    3 Inhaler    Inhale 1 puff into the lungs daily       furosemide 20 MG tablet    LASIX    180 tablet    Take 3 tablets (60 mg) by mouth 2 times daily       inFLIXimab 100 MG injection    REMICADE      Inject 100 mg into the vein every 28 days       levothyroxine 137 MCG tablet    SYNTHROID/LEVOTHROID    90 tablet    Take 1 tablet (137 mcg) by mouth daily       methotrexate 2.5 MG tablet CHEMO     48 tablet    Take 3 tablets (7.5 mg) by mouth once a week On Mondays       metoprolol 100 MG tablet    LOPRESSOR    60 tablet    Take 1 tablet (100 mg) by mouth 2 times daily       mirtazapine 15 MG tablet    REMERON     Take 15 mg by mouth At Bedtime.       MULTIVITAMIN & MINERAL PO      Take 1 tablet by mouth daily.       polyethylene glycol powder    MIRALAX    510 g    Take 17 g (1 capful) by mouth daily       sildenafil 20 MG tablet    REVATIO/VIAGRA    90 tablet    Take 1 tablet (20 mg) by mouth 3 times daily       tiotropium 18 MCG capsule    SPIRIVA HANDIHALER    90 capsule    Inhale contents of one capsule daily.       Urea 40 % Crea    CARMOL 40    199 g    To feet daily       warfarin 5 MG tablet    COUMADIN    30 tablet    Take 1 tablet (5 mg) by mouth daily

## 2017-05-11 NOTE — PROGRESS NOTES
Reason for Visit  Rohan Monroe is a 71 year old year old male who is being seen for Sarcoidosis  ILD HPI    Denis Monroe is a 71-year-old male with a history of pulmonary, cardiac sarcoidosis as well as known coronary artery disease who is seen today for followup.  He was recently hospitalized with worsening dyspnea on exertion.  He underwent a right and left heart catheterization, which demonstrated some restenosis of his previously stented lesions as well as fairly significant pulmonary hypertension with an RV systolic pressure of 65.  He was diuresed and treated with antibiotics as he did have a productive cough.  His CT scan had a slightly increased left upper lobe infiltrate, but otherwise looked stable.  He returns to clinic today stating he is doing okay.  He still has fairly significant dyspnea on exertion.  They are planning a repeat angiogram tomorrow to address the restenosis that they found on the previous angiogram.  He continues on his Remicade and methotrexate.  He otherwise does note he has significant lower extremity edema, although he has been put on 60 a day of Lasix and he does feel that this is getting better.  Otherwise, he certainly is not back to his previous baseline, but feels like he is doing okay and from a lung standpoint he feels that his breathing is stable.  Of note, he also had a pleural effusion that was tapped during the admission.           Current Outpatient Prescriptions   Medication     enoxaparin (LOVENOX) 80 MG/0.8ML injection     sildenafil (REVATIO/VIAGRA) 20 MG tablet     furosemide (LASIX) 20 MG tablet     levothyroxine (SYNTHROID/LEVOTHROID) 137 MCG tablet     warfarin (COUMADIN) 5 MG tablet     polyethylene glycol (MIRALAX) powder     tiotropium (SPIRIVA HANDIHALER) 18 MCG capsule     albuterol (PROAIR HFA/PROVENTIL HFA/VENTOLIN HFA) 108 (90 BASE) MCG/ACT Inhaler     fluticasone-vilanterol (BREO ELLIPTA) 100-25 MCG/INH oral inhaler     inFLIXimab (REMICADE) 100  MG injection     metoprolol (LOPRESSOR) 100 MG tablet     methotrexate 2.5 MG tablet     atorvastatin (LIPITOR) 20 MG tablet     ASPIRIN EC PO     blood glucose monitoring (ACCU-CHEK RONNIE PLUS) test strip     blood glucose monitoring (ACCU-CHEK FASTCLIX) lancets     blood glucose monitoring (NO BRAND SPECIFIED) meter device kit     Urea (CARMOL 40) 40 % CREA     Multiple Vitamins-Minerals (MULTIVITAMIN & MINERAL PO)     mirtazapine (REMERON) 15 MG tablet     No current facility-administered medications for this visit.      Allergies   Allergen Reactions     Prednisone Other (See Comments)     He reports that he can't sleep for days and can't use small to massive doses of prednisone   Pt. Does not do well with high doses of Prednisone, His MD says that Prednisone is counter indicated. Prednisone failed to treat his sarcoidosis      Past Medical History:   Diagnosis Date     Cataract of both eyes      Chronic infection     Hep C     Congestive heart failure, unspecified      Depressive disorder, not elsewhere classified     Depression (non-psychotic)     ERM OS (epiretinal membrane, left eye)      Generalized osteoarthrosis, unspecified site      Glaucoma suspect      Hypertension      Lichen planus      Other psoriasis      PVD (posterior vitreous detachment), left eye      Sarcoidosis (H)      Sarcoidosis (H)      Type II or unspecified type diabetes mellitus without mention of complication, not stated as uncontrolled      Unspecified hypothyroidism     Hypothyroidism     Unspecified viral hepatitis C without hepatic coma      Viral warts, unspecified        Past Surgical History:   Procedure Laterality Date     ANESTHESIA CARDIOVERSION N/A 1/19/2017    Procedure: ANESTHESIA CARDIOVERSION;  Surgeon: GENERIC ANESTHESIA PROVIDER;  Location: [facility] OR     ANESTHESIA CARDIOVERSION N/A 1/23/2017    Procedure: ANESTHESIA CARDIOVERSION;  Surgeon: GENERIC ANESTHESIA PROVIDER;  Location: [facility] OR     ANESTHESIA CARDIOVERSION N/A  1/24/2017    Procedure: ANESTHESIA CARDIOVERSION;  Surgeon: GENERIC ANESTHESIA PROVIDER;  Location: [facility] OR     ARTHROPLASTY HIP  8/24/2011    Procedure:ARTHROPLASTY HIP; Right Total Hip Arthroplasty  Choice anesthesia; Surgeon:LESLI WILKINSON; Location:[facility] OR     BIOPSY       cardiac stent      s/p     CARDIAC SURGERY       CATARACT IOL, RT/LT  9/15/2015    LE     COLONOSCOPY       HC REMOVAL OF TONSILS,<9 Y/O      Tonsils <10y.o.     HC REPAIR INCISIONAL HERNIA,REDUCIBLE      Hernia Repair, Incisional, Unilateral     JOINT REPLACEMENT      1 month ago--right hip     LIGATN/STRIP LONG & SHORT SAPHEN         Social History     Social History     Marital status:      Spouse name: N/A     Number of children: 2     Years of education: N/A     Occupational History      [employer]     Social History Main Topics     Smoking status: Former Smoker     Packs/day: 1.00     Years: 30.00     Types: Cigarettes     Start date: 12/30/1960     Quit date: 7/22/1994     Smokeless tobacco: Never Used     Alcohol use No     Drug use: No     Sexual activity: Yes     Partners: Female     Other Topics Concern     Blood Transfusions No     Exercise Yes     Social History Narrative    Dairy/d 2 servings/d    Caffeine little servings/d    Exercise 3 x week    Sunscreen used - Yes    Seatbelts used - Yes    Working smoke/CO detectors in the home - Yes    Guns stored in the home - No    Self Breast Exams - NOT APPLICABLE    Self Testicular Exam - No    Eye Exam up to date - Yes    Dental Exam up to date - Yes    Pap Smear up to date - NOT APPLICABLE    Mammogram up to date - NOT APPLICABLE    PSA up to date - Yes    Dexa Scan up to date - NOT APPLICABLE    Flex Sig / Colonoscopy up to date - Yes    Immunizations up to date - Unsure    Abuse: Current or Past(Physical, Sexual or Emotional)- No    Do you feel safe in your environment - Yes       Family History   Problem Relation Age of Onset     HEART DISEASE Father      irreg  "heart beat     Circulatory Father      varicose veins     Prostate Cancer Father      HEART DISEASE Mother      heart attack     Arthritis Mother      OSTEOPOROSIS Mother      Thyroid Disease Mother      Eye Disorder Maternal Grandmother      glaucoma     DIABETES Sister      type 2     Kidney Cancer Sister      DIABETES Sister      Glaucoma No family hx of      Macular Degeneration No family hx of      CANCER No family hx of      no skin cancer       ROS Pulmonary    A complete ROS was otherwise negative except as noted in the HPI.  Vitals:    05/11/17 1522 05/11/17 1527   BP: 116/72    BP Location: Right arm    Patient Position: Chair    Cuff Size: Adult Regular    Pulse: 78    SpO2: 97% 91%   Weight: 87.5 kg (193 lb)    Height: 1.753 m (5' 9\")      Exam:   GENERAL APPEARANCE: Well developed, well nourished, alert, and in no apparent distress.  NECK: supple, no masses, no thyromegaly.  LYMPHATICS: No significant axillary, cervical, or supraclavicular nodes.  RESP: good air flow throughout, - bibasialr crackles  CV: Normal S1, S2, regular rhythm, normal rate, no rub, no murmur,  no gallop, 1-2+ LE edema.   ABDOMEN:  Bowel sounds normal, soft, nontender, no HSM or masses.   MS: extremities normal- no clubbing, no cyanosis.  SKIN: no rash on limited exam  NEURO: Mentation intact, speech normal, normal strength and tone, normal gait and stance  PSYCH: mentation appears normal. and affect normal/bright  Results: I have reviewed all imaging, PFTs and other relavent tests, please see below for details, PFT and imaging results were reviewed with the patient.  PFTs: Severe obstruction, slightly worse than previous  Assessment and plan:     A 71-year-old male with a complicated history of cardiac and pulmonary sarcoidosis.   1.  Pulmonary sarcoidosis.  It does appear to be relatively stable at this time as far as I can tell, although his disease seemed multifactorial, so it is always difficult to know what may be " contributing to his symptoms.  At this point I would continue with his current immunosuppression regimen and continue with the Remicade infusions.   2.  Coronary artery disease.  We will undergo repeat left heart catheterization with plan for intervention tomorrow.  We will see how he does after this.  He also will follow up with Dr. Paige in the Cardiology Clinic.   3.  Pulmonary hypertension.  Significant pulmonary hypertension on his right heart catheterization.  I think it may be worth addressing this at some point, particularly if his dyspnea does not improve after the stenting of his coronary lesions.   4.  Pleural effusion.  Likely sarcoid related, the etiology is unclear.  It was apparently inflammatory at 1400 white cells and 78% lymphocytes.  Gram stain was negative.  There were no malignant cells founds.  We will likely plan for a followup chest x-ray at his next visit but otherwise this does not need any further evaluation.         CBC   Recent Labs   Lab Test  05/08/17 1518 04/28/17   0748   RBC  3.58*  4.33*   HGB  10.8*  13.2*   HCT  32.8*  40.8   PLT  369  356       Basic Metabolic Panel  Recent Labs   Lab Test  05/08/17 1518 04/28/17   0748   08/24/11   1306   NA  136  136   < >   --    POTASSIUM  4.2  4.1   < >  5.1   CHLORIDE  99  99   < >   --    CO2  27  28   < >   --    BUN  44*  46*   < >   --    CT   --    --    --   Red Blood Cells Leukocyte Reduced  Red Blood Cells Leukocyte Reduced   GLC  138*  144*   < >   --    RAFFY  8.7  9.2   < >   --     < > = values in this interval not displayed.       INR  Recent Labs   Lab Test  05/08/17 1518 04/28/17   0748   INR  2.44*  1.27*       PFT  PFT Latest Ref Rng & Units 5/11/2017   FVC L 2.45   FEV1 L 1.01   FVC% % 62   FEV1% % 34           CC:

## 2017-05-11 NOTE — NURSING NOTE
Chief Complaint   Patient presents with     Interstitial Lung Disease (ILD)     Return Visit- Sarcoids f/u    Cheryl Dalton at 2:55 PM on 5/11/2017

## 2017-05-12 ENCOUNTER — APPOINTMENT (OUTPATIENT)
Dept: ULTRASOUND IMAGING | Facility: CLINIC | Age: 72
DRG: 246 | End: 2017-05-12
Payer: MEDICARE

## 2017-05-12 ENCOUNTER — APPOINTMENT (OUTPATIENT)
Dept: CARDIOLOGY | Facility: CLINIC | Age: 72
DRG: 246 | End: 2017-05-12
Attending: INTERNAL MEDICINE
Payer: MEDICARE

## 2017-05-12 ENCOUNTER — APPOINTMENT (OUTPATIENT)
Dept: MEDSURG UNIT | Facility: CLINIC | Age: 72
DRG: 246 | End: 2017-05-12
Attending: INTERNAL MEDICINE
Payer: MEDICARE

## 2017-05-12 ENCOUNTER — HOSPITAL ENCOUNTER (INPATIENT)
Facility: CLINIC | Age: 72
LOS: 6 days | Discharge: HOME HEALTH | DRG: 246 | End: 2017-05-18
Attending: INTERNAL MEDICINE | Admitting: INTERNAL MEDICINE
Payer: MEDICARE

## 2017-05-12 DIAGNOSIS — E78.5 HYPERLIPIDEMIA: ICD-10-CM

## 2017-05-12 DIAGNOSIS — I25.10 CAD S/P PERCUTANEOUS CORONARY ANGIOPLASTY: Primary | ICD-10-CM

## 2017-05-12 DIAGNOSIS — I25.10 CORONARY ARTERY DISEASE INVOLVING NATIVE CORONARY ARTERY OF NATIVE HEART WITHOUT ANGINA PECTORIS: ICD-10-CM

## 2017-05-12 DIAGNOSIS — E78.5 HYPERLIPIDEMIA LDL GOAL <70: ICD-10-CM

## 2017-05-12 DIAGNOSIS — D86.0 SARCOIDOSIS OF LUNG (H): ICD-10-CM

## 2017-05-12 DIAGNOSIS — Z98.61 CAD S/P PERCUTANEOUS CORONARY ANGIOPLASTY: Primary | ICD-10-CM

## 2017-05-12 PROBLEM — R07.9 CHEST PAIN: Status: ACTIVE | Noted: 2017-05-12

## 2017-05-12 LAB
ANION GAP SERPL CALCULATED.3IONS-SCNC: 7 MMOL/L (ref 3–14)
ANION GAP SERPL CALCULATED.3IONS-SCNC: 9 MMOL/L (ref 3–14)
BUN SERPL-MCNC: 40 MG/DL (ref 7–30)
BUN SERPL-MCNC: 43 MG/DL (ref 7–30)
CALCIUM SERPL-MCNC: 7.9 MG/DL (ref 8.5–10.1)
CALCIUM SERPL-MCNC: 8.8 MG/DL (ref 8.5–10.1)
CHLORIDE SERPL-SCNC: 100 MMOL/L (ref 94–109)
CHLORIDE SERPL-SCNC: 102 MMOL/L (ref 94–109)
CO2 SERPL-SCNC: 26 MMOL/L (ref 20–32)
CO2 SERPL-SCNC: 29 MMOL/L (ref 20–32)
CREAT SERPL-MCNC: 1.84 MG/DL (ref 0.66–1.25)
CREAT SERPL-MCNC: 1.86 MG/DL (ref 0.66–1.25)
ERYTHROCYTE [DISTWIDTH] IN BLOOD BY AUTOMATED COUNT: 15.3 % (ref 10–15)
ERYTHROCYTE [DISTWIDTH] IN BLOOD BY AUTOMATED COUNT: 15.4 % (ref 10–15)
ERYTHROCYTE [DISTWIDTH] IN BLOOD BY AUTOMATED COUNT: 15.4 % (ref 10–15)
GFR SERPL CREATININE-BSD FRML MDRD: 36 ML/MIN/1.7M2
GFR SERPL CREATININE-BSD FRML MDRD: 36 ML/MIN/1.7M2
GLUCOSE BLDC GLUCOMTR-MCNC: 109 MG/DL (ref 70–99)
GLUCOSE BLDC GLUCOMTR-MCNC: 161 MG/DL (ref 70–99)
GLUCOSE SERPL-MCNC: 107 MG/DL (ref 70–99)
GLUCOSE SERPL-MCNC: 166 MG/DL (ref 70–99)
HCT VFR BLD AUTO: 26.3 % (ref 40–53)
HCT VFR BLD AUTO: 27.1 % (ref 40–53)
HCT VFR BLD AUTO: 34.8 % (ref 40–53)
HGB BLD-MCNC: 10.9 G/DL (ref 13.3–17.7)
HGB BLD-MCNC: 8.4 G/DL (ref 13.3–17.7)
HGB BLD-MCNC: 8.7 G/DL (ref 13.3–17.7)
INR PPP: 1.28 (ref 0.86–1.14)
INR PPP: 1.3 (ref 0.86–1.14)
KCT BLD-ACNC: 181 SEC (ref 105–167)
KCT BLD-ACNC: 211 SEC (ref 105–167)
KCT BLD-ACNC: 272 SEC (ref 105–167)
KCT BLD-ACNC: 291 SEC (ref 105–167)
LACTATE BLD-SCNC: 1.4 MMOL/L (ref 0.7–2.1)
MAGNESIUM SERPL-MCNC: 2.1 MG/DL (ref 1.6–2.3)
MCH RBC QN AUTO: 29.6 PG (ref 26.5–33)
MCH RBC QN AUTO: 29.9 PG (ref 26.5–33)
MCH RBC QN AUTO: 30.1 PG (ref 26.5–33)
MCHC RBC AUTO-ENTMCNC: 31.3 G/DL (ref 31.5–36.5)
MCHC RBC AUTO-ENTMCNC: 31.9 G/DL (ref 31.5–36.5)
MCHC RBC AUTO-ENTMCNC: 32.1 G/DL (ref 31.5–36.5)
MCV RBC AUTO: 94 FL (ref 78–100)
MCV RBC AUTO: 94 FL (ref 78–100)
MCV RBC AUTO: 95 FL (ref 78–100)
PLATELET # BLD AUTO: 246 10E9/L (ref 150–450)
PLATELET # BLD AUTO: 287 10E9/L (ref 150–450)
PLATELET # BLD AUTO: 301 10E9/L (ref 150–450)
POTASSIUM SERPL-SCNC: 3.6 MMOL/L (ref 3.4–5.3)
POTASSIUM SERPL-SCNC: 3.8 MMOL/L (ref 3.4–5.3)
RBC # BLD AUTO: 2.81 10E12/L (ref 4.4–5.9)
RBC # BLD AUTO: 2.89 10E12/L (ref 4.4–5.9)
RBC # BLD AUTO: 3.68 10E12/L (ref 4.4–5.9)
SODIUM SERPL-SCNC: 136 MMOL/L (ref 133–144)
SODIUM SERPL-SCNC: 138 MMOL/L (ref 133–144)
WBC # BLD AUTO: 11.4 10E9/L (ref 4–11)
WBC # BLD AUTO: 7.5 10E9/L (ref 4–11)
WBC # BLD AUTO: 9.7 10E9/L (ref 4–11)

## 2017-05-12 PROCEDURE — 93005 ELECTROCARDIOGRAM TRACING: CPT

## 2017-05-12 PROCEDURE — 25000132 ZZH RX MED GY IP 250 OP 250 PS 637: Mod: GY

## 2017-05-12 PROCEDURE — C1769 GUIDE WIRE: HCPCS

## 2017-05-12 PROCEDURE — 25000128 H RX IP 250 OP 636: Performed by: INTERNAL MEDICINE

## 2017-05-12 PROCEDURE — 27210998 ZZH ACCESS HEART CATH CR6

## 2017-05-12 PROCEDURE — 027135Z DILATION OF CORONARY ARTERY, TWO ARTERIES WITH TWO DRUG-ELUTING INTRALUMINAL DEVICES, PERCUTANEOUS APPROACH: ICD-10-PCS | Performed by: INTERNAL MEDICINE

## 2017-05-12 PROCEDURE — 99153 MOD SED SAME PHYS/QHP EA: CPT

## 2017-05-12 PROCEDURE — C9601 PERC DRUG-EL COR STENT BRAN: HCPCS | Mod: LD

## 2017-05-12 PROCEDURE — C9600 PERC DRUG-EL COR STENT SING: HCPCS

## 2017-05-12 PROCEDURE — 27210787 ZZH MANIFOLD CR2

## 2017-05-12 PROCEDURE — 85610 PROTHROMBIN TIME: CPT

## 2017-05-12 PROCEDURE — 25000125 ZZHC RX 250

## 2017-05-12 PROCEDURE — 25000128 H RX IP 250 OP 636

## 2017-05-12 PROCEDURE — 83605 ASSAY OF LACTIC ACID: CPT | Performed by: INTERNAL MEDICINE

## 2017-05-12 PROCEDURE — C1887 CATHETER, GUIDING: HCPCS

## 2017-05-12 PROCEDURE — 80048 BASIC METABOLIC PNL TOTAL CA: CPT

## 2017-05-12 PROCEDURE — C1725 CATH, TRANSLUMIN NON-LASER: HCPCS

## 2017-05-12 PROCEDURE — 27210946 ZZH KIT HC TOTES DISP CR8

## 2017-05-12 PROCEDURE — 92928 PRQ TCAT PLMT NTRAC ST 1 LES: CPT | Mod: LD | Performed by: INTERNAL MEDICINE

## 2017-05-12 PROCEDURE — 27210760 ZZH DEVICE INFLATION CR7

## 2017-05-12 PROCEDURE — 85347 COAGULATION TIME ACTIVATED: CPT

## 2017-05-12 PROCEDURE — 12000001 ZZH R&B MED SURG/OB UMMC

## 2017-05-12 PROCEDURE — 40000172 ZZH STATISTIC PROCEDURE PREP ONLY

## 2017-05-12 PROCEDURE — 27211089 ZZH KIT ACIST INJECTOR CR3

## 2017-05-12 PROCEDURE — C1894 INTRO/SHEATH, NON-LASER: HCPCS

## 2017-05-12 PROCEDURE — 83735 ASSAY OF MAGNESIUM: CPT

## 2017-05-12 PROCEDURE — C1874 STENT, COATED/COV W/DEL SYS: HCPCS

## 2017-05-12 PROCEDURE — 00000146 ZZHCL STATISTIC GLUCOSE BY METER IP

## 2017-05-12 PROCEDURE — 99152 MOD SED SAME PHYS/QHP 5/>YRS: CPT

## 2017-05-12 PROCEDURE — 85027 COMPLETE CBC AUTOMATED: CPT

## 2017-05-12 PROCEDURE — 80048 BASIC METABOLIC PNL TOTAL CA: CPT | Performed by: INTERNAL MEDICINE

## 2017-05-12 PROCEDURE — 85610 PROTHROMBIN TIME: CPT | Performed by: INTERNAL MEDICINE

## 2017-05-12 PROCEDURE — 85027 COMPLETE CBC AUTOMATED: CPT | Performed by: INTERNAL MEDICINE

## 2017-05-12 PROCEDURE — A9270 NON-COVERED ITEM OR SERVICE: HCPCS | Mod: GY

## 2017-05-12 PROCEDURE — 93010 ELECTROCARDIOGRAM REPORT: CPT | Mod: 59 | Performed by: INTERNAL MEDICINE

## 2017-05-12 PROCEDURE — 93926 LOWER EXTREMITY STUDY: CPT | Mod: RT

## 2017-05-12 RX ORDER — HEPARIN SODIUM 1000 [USP'U]/ML
1000-10000 INJECTION, SOLUTION INTRAVENOUS; SUBCUTANEOUS EVERY 5 MIN PRN
Status: DISCONTINUED | OUTPATIENT
Start: 2017-05-12 | End: 2017-05-12 | Stop reason: HOSPADM

## 2017-05-12 RX ORDER — VERAPAMIL HYDROCHLORIDE 2.5 MG/ML
1-5 INJECTION, SOLUTION INTRAVENOUS
Status: DISCONTINUED | OUTPATIENT
Start: 2017-05-12 | End: 2017-05-12 | Stop reason: HOSPADM

## 2017-05-12 RX ORDER — ASPIRIN 325 MG
325 TABLET ORAL
Status: DISCONTINUED | OUTPATIENT
Start: 2017-05-12 | End: 2017-05-12 | Stop reason: HOSPADM

## 2017-05-12 RX ORDER — LIDOCAINE 40 MG/G
CREAM TOPICAL
Status: DISCONTINUED | OUTPATIENT
Start: 2017-05-12 | End: 2017-05-18 | Stop reason: HOSPADM

## 2017-05-12 RX ORDER — NITROGLYCERIN 5 MG/ML
100-500 VIAL (ML) INTRAVENOUS
Status: DISCONTINUED | OUTPATIENT
Start: 2017-05-12 | End: 2017-05-12 | Stop reason: HOSPADM

## 2017-05-12 RX ORDER — NALOXONE HYDROCHLORIDE 0.4 MG/ML
.2-.4 INJECTION, SOLUTION INTRAMUSCULAR; INTRAVENOUS; SUBCUTANEOUS
Status: DISCONTINUED | OUTPATIENT
Start: 2017-05-12 | End: 2017-05-13 | Stop reason: CLARIF

## 2017-05-12 RX ORDER — ARGATROBAN 1 MG/ML
350 INJECTION, SOLUTION INTRAVENOUS
Status: DISCONTINUED | OUTPATIENT
Start: 2017-05-12 | End: 2017-05-12 | Stop reason: HOSPADM

## 2017-05-12 RX ORDER — NIFEDIPINE 10 MG/1
10 CAPSULE ORAL
Status: DISCONTINUED | OUTPATIENT
Start: 2017-05-12 | End: 2017-05-12 | Stop reason: HOSPADM

## 2017-05-12 RX ORDER — HYDROCODONE BITARTRATE AND ACETAMINOPHEN 5; 325 MG/1; MG/1
1-2 TABLET ORAL EVERY 4 HOURS PRN
Status: DISCONTINUED | OUTPATIENT
Start: 2017-05-12 | End: 2017-05-18 | Stop reason: HOSPADM

## 2017-05-12 RX ORDER — FLUMAZENIL 0.1 MG/ML
0.2 INJECTION, SOLUTION INTRAVENOUS
Status: DISCONTINUED | OUTPATIENT
Start: 2017-05-12 | End: 2017-05-12 | Stop reason: HOSPADM

## 2017-05-12 RX ORDER — NITROGLYCERIN 0.4 MG/1
0.4 TABLET SUBLINGUAL EVERY 5 MIN PRN
Status: DISCONTINUED | OUTPATIENT
Start: 2017-05-12 | End: 2017-05-18 | Stop reason: HOSPADM

## 2017-05-12 RX ORDER — NITROGLYCERIN 20 MG/100ML
.07-2 INJECTION INTRAVENOUS CONTINUOUS PRN
Status: DISCONTINUED | OUTPATIENT
Start: 2017-05-12 | End: 2017-05-12 | Stop reason: HOSPADM

## 2017-05-12 RX ORDER — DOPAMINE HYDROCHLORIDE 160 MG/100ML
2-20 INJECTION, SOLUTION INTRAVENOUS CONTINUOUS PRN
Status: DISCONTINUED | OUTPATIENT
Start: 2017-05-12 | End: 2017-05-12 | Stop reason: HOSPADM

## 2017-05-12 RX ORDER — NITROGLYCERIN 5 MG/ML
100-200 VIAL (ML) INTRAVENOUS
Status: DISCONTINUED | OUTPATIENT
Start: 2017-05-12 | End: 2017-05-12 | Stop reason: HOSPADM

## 2017-05-12 RX ORDER — METOPROLOL TARTRATE 50 MG
100 TABLET ORAL 2 TIMES DAILY
Status: DISCONTINUED | OUTPATIENT
Start: 2017-05-12 | End: 2017-05-13

## 2017-05-12 RX ORDER — LORAZEPAM 2 MG/ML
.5-2 INJECTION INTRAMUSCULAR EVERY 4 HOURS PRN
Status: DISCONTINUED | OUTPATIENT
Start: 2017-05-12 | End: 2017-05-12 | Stop reason: HOSPADM

## 2017-05-12 RX ORDER — ENALAPRILAT 1.25 MG/ML
1.25-2.5 INJECTION INTRAVENOUS
Status: DISCONTINUED | OUTPATIENT
Start: 2017-05-12 | End: 2017-05-12 | Stop reason: HOSPADM

## 2017-05-12 RX ORDER — HYDRALAZINE HYDROCHLORIDE 20 MG/ML
10 INJECTION INTRAMUSCULAR; INTRAVENOUS
Status: DISCONTINUED | OUTPATIENT
Start: 2017-05-12 | End: 2017-05-18 | Stop reason: HOSPADM

## 2017-05-12 RX ORDER — FENTANYL CITRATE 50 UG/ML
25-50 INJECTION, SOLUTION INTRAMUSCULAR; INTRAVENOUS
Status: DISCONTINUED | OUTPATIENT
Start: 2017-05-12 | End: 2017-05-12 | Stop reason: HOSPADM

## 2017-05-12 RX ORDER — PHENYLEPHRINE HCL IN 0.9% NACL 1 MG/10 ML
20-100 SYRINGE (ML) INTRAVENOUS
Status: DISCONTINUED | OUTPATIENT
Start: 2017-05-12 | End: 2017-05-12 | Stop reason: HOSPADM

## 2017-05-12 RX ORDER — ATROPINE SULFATE 0.1 MG/ML
.5-1 INJECTION INTRAVENOUS
Status: DISCONTINUED | OUTPATIENT
Start: 2017-05-12 | End: 2017-05-12 | Stop reason: HOSPADM

## 2017-05-12 RX ORDER — CLOPIDOGREL BISULFATE 75 MG/1
75 TABLET ORAL DAILY
Status: DISCONTINUED | OUTPATIENT
Start: 2017-05-13 | End: 2017-05-13

## 2017-05-12 RX ORDER — ONDANSETRON 2 MG/ML
4 INJECTION INTRAMUSCULAR; INTRAVENOUS EVERY 6 HOURS PRN
Status: DISCONTINUED | OUTPATIENT
Start: 2017-05-12 | End: 2017-05-18 | Stop reason: HOSPADM

## 2017-05-12 RX ORDER — ARGATROBAN 1 MG/ML
150 INJECTION, SOLUTION INTRAVENOUS
Status: DISCONTINUED | OUTPATIENT
Start: 2017-05-12 | End: 2017-05-12 | Stop reason: HOSPADM

## 2017-05-12 RX ORDER — FLUMAZENIL 0.1 MG/ML
0.2 INJECTION, SOLUTION INTRAVENOUS
Status: DISCONTINUED | OUTPATIENT
Start: 2017-05-12 | End: 2017-05-13 | Stop reason: CLARIF

## 2017-05-12 RX ORDER — ALPRAZOLAM 0.5 MG
0.5 TABLET ORAL ONCE
Status: DISCONTINUED | OUTPATIENT
Start: 2017-05-13 | End: 2017-05-13

## 2017-05-12 RX ORDER — NALOXONE HYDROCHLORIDE 0.4 MG/ML
.1-.4 INJECTION, SOLUTION INTRAMUSCULAR; INTRAVENOUS; SUBCUTANEOUS
Status: DISCONTINUED | OUTPATIENT
Start: 2017-05-12 | End: 2017-05-18 | Stop reason: HOSPADM

## 2017-05-12 RX ORDER — POTASSIUM CHLORIDE 29.8 MG/ML
20 INJECTION INTRAVENOUS
Status: DISCONTINUED | OUTPATIENT
Start: 2017-05-12 | End: 2017-05-12 | Stop reason: HOSPADM

## 2017-05-12 RX ORDER — EPTIFIBATIDE 2 MG/ML
2 INJECTION, SOLUTION INTRAVENOUS CONTINUOUS PRN
Status: DISCONTINUED | OUTPATIENT
Start: 2017-05-12 | End: 2017-05-12 | Stop reason: HOSPADM

## 2017-05-12 RX ORDER — DIPHENHYDRAMINE HYDROCHLORIDE 50 MG/ML
25-50 INJECTION INTRAMUSCULAR; INTRAVENOUS
Status: DISCONTINUED | OUTPATIENT
Start: 2017-05-12 | End: 2017-05-12 | Stop reason: HOSPADM

## 2017-05-12 RX ORDER — DEXTROSE MONOHYDRATE 25 G/50ML
12.5-5 INJECTION, SOLUTION INTRAVENOUS EVERY 30 MIN PRN
Status: DISCONTINUED | OUTPATIENT
Start: 2017-05-12 | End: 2017-05-12 | Stop reason: HOSPADM

## 2017-05-12 RX ORDER — NITROGLYCERIN 0.4 MG/1
0.4 TABLET SUBLINGUAL EVERY 5 MIN PRN
Status: DISCONTINUED | OUTPATIENT
Start: 2017-05-12 | End: 2017-05-12 | Stop reason: HOSPADM

## 2017-05-12 RX ORDER — ATROPINE SULFATE 0.1 MG/ML
0.5 INJECTION INTRAVENOUS EVERY 5 MIN PRN
Status: DISCONTINUED | OUTPATIENT
Start: 2017-05-12 | End: 2017-05-13 | Stop reason: CLARIF

## 2017-05-12 RX ORDER — METOPROLOL TARTRATE 1 MG/ML
5 INJECTION, SOLUTION INTRAVENOUS EVERY 5 MIN PRN
Status: DISCONTINUED | OUTPATIENT
Start: 2017-05-12 | End: 2017-05-12 | Stop reason: HOSPADM

## 2017-05-12 RX ORDER — DOBUTAMINE HYDROCHLORIDE 200 MG/100ML
2-20 INJECTION INTRAVENOUS CONTINUOUS PRN
Status: DISCONTINUED | OUTPATIENT
Start: 2017-05-12 | End: 2017-05-12 | Stop reason: HOSPADM

## 2017-05-12 RX ORDER — FUROSEMIDE 10 MG/ML
20-100 INJECTION INTRAMUSCULAR; INTRAVENOUS
Status: DISCONTINUED | OUTPATIENT
Start: 2017-05-12 | End: 2017-05-12 | Stop reason: HOSPADM

## 2017-05-12 RX ORDER — HYDRALAZINE HYDROCHLORIDE 20 MG/ML
10-20 INJECTION INTRAMUSCULAR; INTRAVENOUS
Status: DISCONTINUED | OUTPATIENT
Start: 2017-05-12 | End: 2017-05-12 | Stop reason: HOSPADM

## 2017-05-12 RX ORDER — PROTAMINE SULFATE 10 MG/ML
25-100 INJECTION, SOLUTION INTRAVENOUS EVERY 5 MIN PRN
Status: DISCONTINUED | OUTPATIENT
Start: 2017-05-12 | End: 2017-05-12 | Stop reason: HOSPADM

## 2017-05-12 RX ORDER — ASPIRIN 81 MG/1
81 TABLET ORAL DAILY
Status: DISCONTINUED | OUTPATIENT
Start: 2017-05-12 | End: 2017-05-12 | Stop reason: HOSPADM

## 2017-05-12 RX ORDER — IOPAMIDOL 755 MG/ML
65 INJECTION, SOLUTION INTRAVASCULAR ONCE
Status: COMPLETED | OUTPATIENT
Start: 2017-05-12 | End: 2017-05-12

## 2017-05-12 RX ORDER — NICARDIPINE HYDROCHLORIDE 2.5 MG/ML
100 INJECTION INTRAVENOUS
Status: DISCONTINUED | OUTPATIENT
Start: 2017-05-12 | End: 2017-05-12 | Stop reason: HOSPADM

## 2017-05-12 RX ORDER — MIRTAZAPINE 15 MG/1
15 TABLET, FILM COATED ORAL AT BEDTIME
Status: DISCONTINUED | OUTPATIENT
Start: 2017-05-12 | End: 2017-05-18 | Stop reason: HOSPADM

## 2017-05-12 RX ORDER — ACETAMINOPHEN 325 MG/1
325-650 TABLET ORAL EVERY 4 HOURS PRN
Status: DISCONTINUED | OUTPATIENT
Start: 2017-05-12 | End: 2017-05-18 | Stop reason: HOSPADM

## 2017-05-12 RX ORDER — POTASSIUM CHLORIDE 7.45 MG/ML
10 INJECTION INTRAVENOUS
Status: DISCONTINUED | OUTPATIENT
Start: 2017-05-12 | End: 2017-05-12 | Stop reason: HOSPADM

## 2017-05-12 RX ORDER — ONDANSETRON 2 MG/ML
4 INJECTION INTRAMUSCULAR; INTRAVENOUS EVERY 4 HOURS PRN
Status: DISCONTINUED | OUTPATIENT
Start: 2017-05-12 | End: 2017-05-12 | Stop reason: HOSPADM

## 2017-05-12 RX ORDER — WARFARIN SODIUM 7.5 MG/1
7.5 TABLET ORAL
Status: DISCONTINUED | OUTPATIENT
Start: 2017-05-12 | End: 2017-05-13

## 2017-05-12 RX ORDER — CLOPIDOGREL BISULFATE 75 MG/1
300-600 TABLET ORAL
Status: COMPLETED | OUTPATIENT
Start: 2017-05-12 | End: 2017-05-12

## 2017-05-12 RX ORDER — SODIUM CHLORIDE 9 MG/ML
INJECTION, SOLUTION INTRAVENOUS CONTINUOUS
Status: DISCONTINUED | OUTPATIENT
Start: 2017-05-12 | End: 2017-05-12 | Stop reason: HOSPADM

## 2017-05-12 RX ORDER — ONDANSETRON 4 MG/1
4 TABLET, ORALLY DISINTEGRATING ORAL EVERY 6 HOURS PRN
Status: DISCONTINUED | OUTPATIENT
Start: 2017-05-12 | End: 2017-05-18 | Stop reason: HOSPADM

## 2017-05-12 RX ORDER — ASPIRIN 81 MG/1
81 TABLET ORAL DAILY
Status: DISCONTINUED | OUTPATIENT
Start: 2017-05-13 | End: 2017-05-13

## 2017-05-12 RX ORDER — METHYLPREDNISOLONE SODIUM SUCCINATE 125 MG/2ML
125 INJECTION, POWDER, LYOPHILIZED, FOR SOLUTION INTRAMUSCULAR; INTRAVENOUS
Status: DISCONTINUED | OUTPATIENT
Start: 2017-05-12 | End: 2017-05-12 | Stop reason: HOSPADM

## 2017-05-12 RX ORDER — SILDENAFIL CITRATE 20 MG/1
20 TABLET ORAL 3 TIMES DAILY
Status: DISCONTINUED | OUTPATIENT
Start: 2017-05-12 | End: 2017-05-18 | Stop reason: HOSPADM

## 2017-05-12 RX ORDER — PROMETHAZINE HYDROCHLORIDE 25 MG/ML
6.25-25 INJECTION, SOLUTION INTRAMUSCULAR; INTRAVENOUS EVERY 4 HOURS PRN
Status: DISCONTINUED | OUTPATIENT
Start: 2017-05-12 | End: 2017-05-12 | Stop reason: HOSPADM

## 2017-05-12 RX ORDER — PRASUGREL 10 MG/1
10-60 TABLET, FILM COATED ORAL
Status: DISCONTINUED | OUTPATIENT
Start: 2017-05-12 | End: 2017-05-12 | Stop reason: HOSPADM

## 2017-05-12 RX ORDER — ASPIRIN 81 MG/1
81-324 TABLET, CHEWABLE ORAL
Status: DISCONTINUED | OUTPATIENT
Start: 2017-05-12 | End: 2017-05-12 | Stop reason: HOSPADM

## 2017-05-12 RX ORDER — FENTANYL CITRATE 50 UG/ML
25-50 INJECTION, SOLUTION INTRAMUSCULAR; INTRAVENOUS
Status: DISCONTINUED | OUTPATIENT
Start: 2017-05-12 | End: 2017-05-13 | Stop reason: CLARIF

## 2017-05-12 RX ORDER — LIDOCAINE HYDROCHLORIDE 10 MG/ML
30 INJECTION, SOLUTION EPIDURAL; INFILTRATION; INTRACAUDAL; PERINEURAL
Status: DISCONTINUED | OUTPATIENT
Start: 2017-05-12 | End: 2017-05-12 | Stop reason: HOSPADM

## 2017-05-12 RX ORDER — SODIUM NITROPRUSSIDE 25 MG/ML
100-200 INJECTION INTRAVENOUS
Status: DISCONTINUED | OUTPATIENT
Start: 2017-05-12 | End: 2017-05-12 | Stop reason: HOSPADM

## 2017-05-12 RX ORDER — ATORVASTATIN CALCIUM 40 MG/1
40 TABLET, FILM COATED ORAL DAILY
Qty: 30 TABLET | Refills: 3 | Status: SHIPPED | OUTPATIENT
Start: 2017-05-12 | End: 2017-09-18

## 2017-05-12 RX ORDER — EPTIFIBATIDE 2 MG/ML
180 INJECTION, SOLUTION INTRAVENOUS EVERY 10 MIN PRN
Status: DISCONTINUED | OUTPATIENT
Start: 2017-05-12 | End: 2017-05-12 | Stop reason: HOSPADM

## 2017-05-12 RX ORDER — SODIUM CHLORIDE 9 MG/ML
INJECTION, SOLUTION INTRAVENOUS CONTINUOUS
Status: ACTIVE | OUTPATIENT
Start: 2017-05-12 | End: 2017-05-12

## 2017-05-12 RX ORDER — PROTAMINE SULFATE 10 MG/ML
1-5 INJECTION, SOLUTION INTRAVENOUS
Status: DISCONTINUED | OUTPATIENT
Start: 2017-05-12 | End: 2017-05-12 | Stop reason: HOSPADM

## 2017-05-12 RX ORDER — LEVOTHYROXINE SODIUM 137 UG/1
137 TABLET ORAL
Status: DISCONTINUED | OUTPATIENT
Start: 2017-05-13 | End: 2017-05-18 | Stop reason: HOSPADM

## 2017-05-12 RX ORDER — CLOPIDOGREL BISULFATE 75 MG/1
75 TABLET ORAL DAILY
Qty: 90 TABLET | Refills: 3 | Status: SHIPPED | OUTPATIENT
Start: 2017-05-13 | End: 2018-05-04

## 2017-05-12 RX ORDER — EPTIFIBATIDE 2 MG/ML
1 INJECTION, SOLUTION INTRAVENOUS CONTINUOUS PRN
Status: DISCONTINUED | OUTPATIENT
Start: 2017-05-12 | End: 2017-05-12 | Stop reason: HOSPADM

## 2017-05-12 RX ORDER — ATORVASTATIN CALCIUM 40 MG/1
40 TABLET, FILM COATED ORAL AT BEDTIME
Status: DISCONTINUED | OUTPATIENT
Start: 2017-05-12 | End: 2017-05-18 | Stop reason: HOSPADM

## 2017-05-12 RX ORDER — NALOXONE HYDROCHLORIDE 0.4 MG/ML
0.4 INJECTION, SOLUTION INTRAMUSCULAR; INTRAVENOUS; SUBCUTANEOUS EVERY 5 MIN PRN
Status: DISCONTINUED | OUTPATIENT
Start: 2017-05-12 | End: 2017-05-12 | Stop reason: HOSPADM

## 2017-05-12 RX ORDER — CLOPIDOGREL BISULFATE 75 MG/1
75 TABLET ORAL
Status: DISCONTINUED | OUTPATIENT
Start: 2017-05-12 | End: 2017-05-12 | Stop reason: HOSPADM

## 2017-05-12 RX ORDER — ADENOSINE 3 MG/ML
12-12000 INJECTION, SOLUTION INTRAVENOUS
Status: DISCONTINUED | OUTPATIENT
Start: 2017-05-12 | End: 2017-05-12 | Stop reason: HOSPADM

## 2017-05-12 RX ADMIN — FENTANYL CITRATE 25 MCG: 50 INJECTION, SOLUTION INTRAMUSCULAR; INTRAVENOUS at 14:54

## 2017-05-12 RX ADMIN — IOPAMIDOL 65 ML: 755 INJECTION, SOLUTION INTRAVASCULAR at 16:15

## 2017-05-12 RX ADMIN — MIDAZOLAM HYDROCHLORIDE 0.5 MG: 1 INJECTION, SOLUTION INTRAMUSCULAR; INTRAVENOUS at 14:57

## 2017-05-12 RX ADMIN — NITROGLYCERIN 200 MCG: 5 INJECTION, SOLUTION INTRAVENOUS at 15:16

## 2017-05-12 RX ADMIN — MIDAZOLAM HYDROCHLORIDE 0.5 MG: 1 INJECTION, SOLUTION INTRAMUSCULAR; INTRAVENOUS at 14:41

## 2017-05-12 RX ADMIN — SODIUM CHLORIDE: 9 INJECTION, SOLUTION INTRAVENOUS at 16:32

## 2017-05-12 RX ADMIN — MIDAZOLAM HYDROCHLORIDE 0.5 MG: 1 INJECTION, SOLUTION INTRAMUSCULAR; INTRAVENOUS at 14:49

## 2017-05-12 RX ADMIN — MIDAZOLAM HYDROCHLORIDE 0.5 MG: 1 INJECTION, SOLUTION INTRAMUSCULAR; INTRAVENOUS at 14:46

## 2017-05-12 RX ADMIN — FENTANYL CITRATE 25 MCG: 50 INJECTION, SOLUTION INTRAMUSCULAR; INTRAVENOUS at 15:21

## 2017-05-12 RX ADMIN — CLOPIDOGREL BISULFATE 600 MG: 75 TABLET ORAL at 15:42

## 2017-05-12 RX ADMIN — FENTANYL CITRATE 25 MCG: 50 INJECTION, SOLUTION INTRAMUSCULAR; INTRAVENOUS at 14:44

## 2017-05-12 RX ADMIN — SILDENAFIL 20 MG: 20 TABLET, FILM COATED ORAL at 21:57

## 2017-05-12 RX ADMIN — FENTANYL CITRATE 25 MCG: 50 INJECTION, SOLUTION INTRAMUSCULAR; INTRAVENOUS at 14:41

## 2017-05-12 RX ADMIN — MIDAZOLAM HYDROCHLORIDE 0.5 MG: 1 INJECTION, SOLUTION INTRAMUSCULAR; INTRAVENOUS at 15:25

## 2017-05-12 RX ADMIN — FENTANYL CITRATE 50 MCG: 50 INJECTION, SOLUTION INTRAMUSCULAR; INTRAVENOUS at 18:53

## 2017-05-12 RX ADMIN — Medication 9000 UNITS: at 15:03

## 2017-05-12 RX ADMIN — SODIUM CHLORIDE: 9 INJECTION, SOLUTION INTRAVENOUS at 13:40

## 2017-05-12 NOTE — DISCHARGE INSTRUCTIONS
Going Home after Coronary Angioplasty or Stent Placement       Name: Rohan Monroe  Medical Record Number:  [id number]  Today's Date: May 18, 2017        For 24 hours:         Have an adult stay with you for 24 hours.         Relax and take it easy.         Drink plenty of fluids.         You may eat your normal diet, unless your doctor tells you otherwise.         Do NOT make any important or legal decisions.         Do NOT drive or operate machines at home or at work.         Do NOT drink alcohol.      Do NOT smoke.     Medicines:         If you have begun Plavix (clopidogrel), do not stop taking it until you talk to your heart doctor (cardiologist).         If you are on metformin (Glucophage), do not restart it until you have blood tests (within 2 to 3 days after discharge). When your doctor tells you it is safe, you may restart the metformin.         If you have stopped any other medicines, check with your nurse or provider about when to restart them.    Care of groin site:         Remove the Band-Aid after 24 hours. If there is minor oozing, apply another Band-aid and remove it after 12 hours.          Do NOT take a bath, or use a hot tub or pool for at least 3 days. You may shower.          It is normal to have a small bruise or lump at the site.         Do not scrub the site.         Do not use lotion or powder near the puncture site for 3 days.         For the first 2 days: Do not stoop or squat. When you cough, sneeze or move your bowels, hold your hand over the puncture site and press gently.         Do not lift more than 10 pounds for at least 3 to 5 days.         For 2 days, do NOT have sex or do any heavy exercise.     If you start bleeding from the site in your groin:  Lie down flat and press firmly on the site.  Call your physician immediately, or, come to the emergency room.    Call 911 right away if you have bleeding that is heavy or does not stop.     Call your doctor if:         You have  a large or growing hard lump around the site.         The site is red, swollen, hot or tender.         Blood or fluid is draining from the site.         You have chills or a fever greater than 101 F (38 C).         Your leg or arm turns bluish, feels numb or cool.         You have hives, a rash or unusual itching.     Cardiac Rehabilitation: You should receive a phone call from the Cardiac Rehabilitation Department within the next week.  If you do not receive the call, please contact [facility] at [phone].    ADDITIONAL INSTRUCTIONS:   1) Take aspirin 81 mg, Plavix, and coumadin until your INR level reaches 2 (have your INR checked on Friday May 19th). When your INR level reaches 2, STOP taking aspirin, but CONTINUE taking Plavix and coumadin.   2) Follow-up with your primary care provider in one week for incision site check and medication review.   3) Follow-up with Cardiology (Dr. Paige) on Friday, May 26th at 10:00 am (with lab draw at 9:30 am) at the [facility] ([address] [place] [address]). If you have any questions, please contact the Cardiology Clinic at [phone].    [facility] [place]:   [phone] (7 days a week)      Cardiology Fellow on call (24 hours per day) at [facility], [place]:   [phone] (ask for Cardiology Fellow on call)      Number where we can reach you:  ____________    [From Dr. Tobin note: Rohan Monroe is a 71 year old male who was admitted on 5/12/2017 after coronary angiogram with PCI that was complicated by large retroperitoneal bleed now with severe acute kidney injury and ileus.     1. Acute blood loss anemia due to retroperitoneal bleed with iliac artery injury requiring placement of covered stent     2. Coronary artery disease with percutaneous coronary intervention to mid-LAD vessel (left anterior descending artery) and D2 coronary artery]

## 2017-05-12 NOTE — PHARMACY-ANTICOAGULATION SERVICE
Clinical Pharmacy - Warfarin Dosing Consult     Pharmacy has been consulted to manage this patient s warfarin therapy.  Indication: Atrial Fibrillation  Therapy Goal: INR 2-3  Warfarin Prior to Admission: Yes  Warfarin PTA Regimen: 5 mg daily  Significant drug interactions: levothyroxin, clopidogrel, mirtazapine, Norco  Recent documented change in oral intake/nutrition: Unknown  Dose Comments: Pt holding warfarin since 5/8 in preparation for cardiac procedure    INR   Date Value Ref Range Status   05/12/2017 1.28 (H) 0.86 - 1.14 Final   05/08/2017 2.44 (H) 0.86 - 1.14 Final       Recommend warfarin 7.5 mg today.  Pharmacy will monitor Rohan Monroe daily and order warfarin doses to achieve specified goal.      Please contact pharmacy as soon as possible if the warfarin needs to be held for a procedure or if the warfarin goals change.      Lexa Morataya, PharmD

## 2017-05-12 NOTE — IP AVS SNAPSHOT
MRN:[id number]                      After Visit Summary   5/12/2017    Rohan Monroe    MRN: [id number]           Thank you!     Thank you for choosing [facility] for your care. Our goal is always to provide you with excellent care. Hearing back from our patients is one way we can continue to improve our services. Please take a few minutes to complete the written survey that you may receive in the mail after you visit with us. Thank you!        Patient Information     Date Of Birth          1945        Designated Caregiver       Most Recent Value    Caregiver    Will someone help with your care after discharge? yes    Name of designated caregiver arely [none]    Phone number of caregiver [phone]    Caregiver address same as patient      About your hospital stay     You were admitted on:  May 12, 2017 You last received care in the:  [address] [facility]    You were discharged on:  May 18, 2017        Reason for your hospital stay       Retroperitoneal bleed                  Who to Call     For medical emergencies, please call 911.  For non-urgent questions about your medical care, please call your primary care provider or clinic, [phone]  For questions related to your surgery, please call your surgery clinic        Attending Provider     Provider Specialty    Diane Paige MD Cardiology    Dominic Dye MD Clinical Cardiac Electrophysiology    Geovany Morel MD Cardiology       Primary Care Provider Office Phone # Fax #    Jamie XIAO MD Cristian [phone]       [address] [address]        After Care Instructions     Activity       Your activity upon discharge: activity as tolerated            Diet       Follow this diet upon discharge: Orders Placed This Encounter      Regular Diet Adult                  Follow-up Appointments     Adult [facility] Follow-up and recommended labs and tests       Follow up  with primary care provider, Jamie Foster, within 7 days for hospital follow- up.  The following labs/tests are recommended: BMP to assess renal function.      Cardiology in 1 month    Appointments on [place] and/or [facility] (with [facility] or [facility] provider or service). Call [phone] if you haven't heard regarding these appointments within 7 days of discharge.                  Your next 10 appointments already scheduled     May 26, 2017  9:30 AM CDT   Lab with UC LAB   [facility] ([facility])    [address] [place] [address]  1st [facility] [address] [phone]            May 26, 2017 10:00 AM CDT   (Arrive by 9:45 AM)   RETURN HEART FAILURE with Diane Paige MD   [facility] ([facility])    [address] [place] [place] [id number] [phone]            Jun 05, 2017 12:00 PM CDT   Infusion 180 with UC SPEC INFUSION, UC 49 ATC   [facility] Specialty [facility] Procedure ([facility])    [address] [place] [address]  2nd [facility] [address] [phone]            Jun 21, 2017  1:30 PM CDT   (Arrive by 1:15 PM)   RETURN ARRHYTHMIA with Brian Vazquez MD   [facility] ([facility])    [address] [place] [place] [id number] [phone]            Aug 17, 2017  1:00 PM CDT   PFT VISIT with UC PFL B   [facility] Pulmonary Function Testing ([facility])    [address] [place] [place] [address] [phone]            Aug 17, 2017  1:30 PM CDT   (Arrive by 1:15 PM)   Return Interstitial Lung with David Morris Perlman, MD   [facility] ([facility])    [address] [place] [id number] [phone]            Aug 29, 2017 10:00 AM CDT   Lab with UC LAB   [facility] ([facility])    [address] [place] [id number] [phone]            Aug 29, 2017 10:30 AM CDT   US ABDOMEN COMPLETE with UCUS2   [facility] ([facility])    [address] [place] [id number] [phone]           Please bring a list of your medicines (including vitamins, minerals and over-the-counter drugs). Also, tell your doctor about any allergies you may have. Wear comfortable clothes and leave your valuables at home.  Adults: No eating or drinking for 8 hours before the exam. You may take medicine with a small sip of water.  Children: - Children 6+ years: No food or drink for 6 hours before exam. - Children 1-5 years: No food or drink for 4 hours before exam. - Infants, breast-fed: may have breast milk up to 2 hours before exam. - Infants, formula: may have bottle until 4 hours before exam.  Please call the Imaging Department at your exam site with any questions.            Aug 29, 2017 11:30 AM CDT   (Arrive by 11:15 AM)   Return General Liver with Moses Orosco MD   [facility] ([facility])    [address] [place] [id number] [phone]              Additional Services     Home care nursing referral       [facility]  Phone: [phone]   Fax: [phone]    For RN eval post hospitalization.   Assess: vital signs, respiratory and cardiac status, activity tolerance, hydration, nutritional status.  Med set up and management.   INR lab draws with results to the [facility], next INR due 5/19/2017.   BMP lab draw once on 5/20/2017 with results to Dr. Joel Tobin, pager: [phone]  hysical Therapy eval and treat for deconditioning, strengthening, and endurance.     Your provider has ordered home care nursing services. If you have not been contacted within 2 days of your discharge please call the inpatient department phone  number at [phone] .            Medication Therapy Management Referral       Reason for referral:  on more than 10 medications and hospitalized or in the ED in the past 6 months     This service is designed to help you get the most from your medications.  A specially trained pharmacist will work closely with you and your doctors  to solve any problems related to your medications and to help you get the   best results from taking them.      The Medication Therapy Management staff will call you to schedule an appointment.                  Further instructions from your care team       Going Home after Coronary Angioplasty or Stent Placement       Name: Rohan Monroe  Medical Record Number:  [id number]  Today's Date: May 18, 2017        For 24 hours:         Have an adult stay with you for 24 hours.         Relax and take it easy.         Drink plenty of fluids.         You may eat your normal diet, unless your doctor tells you otherwise.         Do NOT make any important or legal decisions.         Do NOT drive or operate machines at home or at work.         Do NOT drink alcohol.      Do NOT smoke.     Medicines:         If you have begun Plavix (clopidogrel), do not stop taking it until you talk to your heart doctor (cardiologist).         If you are on metformin (Glucophage), do not restart it until you have blood tests (within 2 to 3 days after discharge). When your doctor tells you it is safe, you may restart the metformin.         If you have stopped any other medicines, check with your nurse or provider about when to restart them.    Care of groin site:         Remove the Band-Aid after 24 hours. If there is minor oozing, apply another Band-aid and remove it after 12 hours.          Do NOT take a bath, or use a hot tub or pool for at least 3 days. You may shower.          It is normal to have a small bruise or lump at the site.         Do not scrub the site.         Do not use lotion or powder near  the puncture site for 3 days.         For the first 2 days: Do not stoop or squat. When you cough, sneeze or move your bowels, hold your hand over the puncture site and press gently.         Do not lift more than 10 pounds for at least 3 to 5 days.         For 2 days, do NOT have sex or do any heavy exercise.     If you start bleeding from the site in your groin:  Lie down flat and press firmly on the site.  Call your physician immediately, or, come to the emergency room.    Call 911 right away if you have bleeding that is heavy or does not stop.     Call your doctor if:         You have a large or growing hard lump around the site.         The site is red, swollen, hot or tender.         Blood or fluid is draining from the site.         You have chills or a fever greater than 101 F (38 C).         Your leg or arm turns bluish, feels numb or cool.         You have hives, a rash or unusual itching.     Cardiac Rehabilitation: You should receive a phone call from the Cardiac Rehabilitation Department within the next week.  If you do not receive the call, please contact [facility] at [phone].    ADDITIONAL INSTRUCTIONS:   1) Take aspirin 81 mg, Plavix, and coumadin until your INR level reaches 2 (have your INR checked on Friday May 19th). When your INR level reaches 2, STOP taking aspirin, but CONTINUE taking Plavix and coumadin.   2) Follow-up with your primary care provider in one week for incision site check and medication review.   3) Follow-up with Cardiology (Dr. Paige) on Friday, May 26th at 10:00 am (with lab draw at 9:30 am) at the [facility] ([address] [place] [address]). If you have any questions, please contact the Cardiology Clinic at [phone].    [facility] [place]:   [phone] (7 days a week)      Cardiology Fellow on call (24 hours per day) at [facility] [place]:   [phone] (ask for Cardiology Fellow on  "call)      Number where we can reach you:  ____________    [From Dr. Tobin note: Rohan Monroe is a 71 year old male who was admitted on 5/12/2017 after coronary angiogram with PCI that was complicated by large retroperitoneal bleed now with severe acute kidney injury and ileus.     1. Acute blood loss anemia due to retroperitoneal bleed with iliac artery injury requiring placement of covered stent     2. Coronary artery disease with percutaneous coronary intervention to mid-LAD vessel (left anterior descending artery) and D2 coronary artery]    Pending Results     Date and Time Order Name Status Description    5/13/2017 0311 IR Lower Extremity Angiogram Right Preliminary     5/12/2017 2300 EKG 12-lead, tracing only  Preliminary             Statement of Approval     Ordered          05/18/17 4456  I have reviewed and agree with all the recommendations and orders detailed in this document.  EFFECTIVE NOW     Approved and electronically signed by:  Bri Alas APRN CNP             Admission Information     Date & Time Provider Department Dept. Phone    5/12/2017 Geovany Morel MD [address] [facility] [phone]      Your Vitals Were     Blood Pressure Pulse Temperature Respirations Height Weight    161/75 (BP Location: Left arm) 102 98.3  F (36.8  C) (Oral) 18 1.753 m (5' 9\") 90.2 kg (198 lb 12.8 oz)    Pulse Oximetry BMI (Body Mass Index)                96% 29.36 kg/m2          MyChart Information     [employer] gives you secure access to your electronic health record. If you see a primary care provider, you can also send messages to your care team and make appointments. If you have questions, please call your primary care clinic.  If you do not have a primary care provider, please call [phone] and they will assist you.        Care EveryWhere ID     This is your Care EveryWhere ID. This could be used by other organizations to access your [place] medical records  [id number]           Review " of your medicines      START taking        Dose / Directions    clopidogrel 75 MG tablet   Commonly known as:  PLAVIX   Used for:  Coronary artery disease involving native coronary artery of native heart without angina pectoris        Dose:  75 mg   Take 1 tablet (75 mg) by mouth daily   Quantity:  90 tablet   Refills:  3         CONTINUE these medicines which may have CHANGED, or have new prescriptions. If we are uncertain of the size of tablets/capsules you have at home, strength may be listed as something that might have changed.        Dose / Directions    atorvastatin 40 MG tablet   Commonly known as:  LIPITOR   This may have changed:    - medication strength  - how much to take   Used for:  Coronary artery disease involving native coronary artery of native heart without angina pectoris        Dose:  40 mg   Take 1 tablet (40 mg) by mouth daily   Quantity:  30 tablet   Refills:  3       methotrexate 2.5 MG tablet CHEMO   This may have changed:    - how much to take  - how to take this  - when to take this  - additional instructions   Used for:  Sarcoidosis (H)        Dose:  7.5 mg   Take 3 tablets (7.5 mg) by mouth once a week On Mondays   Quantity:  48 tablet   Refills:  3       Urea 40 % Crea   Commonly known as:  CARMOL 40   This may have changed:  additional instructions   Used for:  Dermatophytosis of nail, Corns and callosities        To feet daily   Quantity:  199 g   Refills:  4         CONTINUE these medicines which have NOT CHANGED        Dose / Directions    albuterol 108 (90 BASE) MCG/ACT Inhaler   Commonly known as:  PROAIR HFA/PROVENTIL HFA/VENTOLIN HFA   Used for:  Sarcoidosis (H)        Dose:  2 puff   Inhale 2 puffs into the lungs every 6 hours as needed for shortness of breath / dyspnea   Quantity:  3 Inhaler   Refills:  6       ASPIRIN EC PO        Dose:  81 mg   Take 81 mg by mouth daily   Refills:  0       blood glucose monitoring lancets   Used for:  Type 2 diabetes, HbA1C goal < 8% (H)         Use to test blood sugar 2 times daily or as directed.  102 lancets per box.  3 month supply.   Quantity:  2 Box   Refills:  3       blood glucose monitoring meter device kit   Commonly known as:  no brand specified   Used for:  Type 2 diabetes mellitus with diabetic nephropathy (H)        Use to test blood sugar 2 times daily.   Quantity:  1 kit   Refills:  0       blood glucose monitoring test strip   Commonly known as:  ACCU-CHEK RONNIE PLUS   Used for:  Type 2 diabetes, HbA1C goal < 8% (H)        Use to test blood sugar 2 times daily or as directed.  3 month supply.   Quantity:  200 each   Refills:  3       fluticasone-vilanterol 100-25 MCG/INH oral inhaler   Commonly known as:  BREO ELLIPTA   Used for:  Chronic obstructive pulmonary disease, unspecified COPD type (H)        Dose:  1 puff   Inhale 1 puff into the lungs daily   Quantity:  3 Inhaler   Refills:  3       furosemide 20 MG tablet   Commonly known as:  LASIX   Used for:  Pulmonary hypertension (H)        Dose:  60 mg   Take 3 tablets (60 mg) by mouth 2 times daily   Quantity:  180 tablet   Refills:  0       inFLIXimab 100 MG injection   Commonly known as:  REMICADE        Dose:  100 mg   Inject 100 mg into the vein every 28 days   Refills:  0       levothyroxine 137 MCG tablet   Commonly known as:  SYNTHROID/LEVOTHROID   Used for:  Hypothyroidism        Dose:  137 mcg   Take 1 tablet (137 mcg) by mouth daily   Quantity:  90 tablet   Refills:  1       metoprolol 100 MG tablet   Commonly known as:  LOPRESSOR   Used for:  Hypertension secondary to other renal disorders        Dose:  100 mg   Take 1 tablet (100 mg) by mouth 2 times daily   Quantity:  60 tablet   Refills:  11       mirtazapine 15 MG tablet   Commonly known as:  REMERON        Dose:  15 mg   Take 15 mg by mouth At Bedtime.   Refills:  0       MULTIVITAMIN & MINERAL PO        Dose:  1 tablet   Take 1 tablet by mouth daily.   Refills:  0       polyethylene glycol powder   Commonly known as:   MIRALAX   Used for:  Constipation, unspecified constipation type        Dose:  1 capful   Take 17 g (1 capful) by mouth daily   Quantity:  510 g   Refills:  1       sildenafil 20 MG tablet   Commonly known as:  REVATIO/VIAGRA   Used for:  Pulmonary hypertension (H)        Dose:  20 mg   Take 1 tablet (20 mg) by mouth 3 times daily   Quantity:  90 tablet   Refills:  3       tiotropium 18 MCG capsule   Commonly known as:  SPIRIVA HANDIHALER   Used for:  Chronic obstructive pulmonary disease, unspecified COPD type (H)        Inhale contents of one capsule daily.   Quantity:  90 capsule   Refills:  3       warfarin 5 MG tablet   Commonly known as:  COUMADIN   Used for:  Atrial flutter with rapid ventricular response (H)        Dose:  5 mg   Take 1 tablet (5 mg) by mouth daily   Quantity:  30 tablet   Refills:  6         STOP taking     enoxaparin 80 MG/0.8ML injection   Commonly known as:  LOVENOX                Where to get your medicines      These medications were sent to [id number] - [place], [place] - [address] [address]     Phone:  [phone]     atorvastatin 40 MG tablet    clopidogrel 75 MG tablet                Protect others around you: Learn how to safely use, store and throw away your medicines at www.disposemymeds.org.             Medication List: This is a list of all your medications and when to take them. Check marks below indicate your daily home schedule. Keep this list as a reference.      Medications           Morning Afternoon Evening Bedtime As Needed    albuterol 108 (90 BASE) MCG/ACT Inhaler   Commonly known as:  PROAIR HFA/PROVENTIL HFA/VENTOLIN HFA   Inhale 2 puffs into the lungs every 6 hours as needed for shortness of breath / dyspnea                                   ASPIRIN EC PO   Take 81 mg by mouth daily                                   atorvastatin 40 MG tablet   Commonly known as:  LIPITOR   Take 1 tablet (40 mg)  by mouth daily   Last time this was given:  40 mg on 5/17/2017  7:43 PM                                   blood glucose monitoring lancets   Use to test blood sugar 2 times daily or as directed.  102 lancets per box.  3 month supply.                                blood glucose monitoring meter device kit   Commonly known as:  no brand specified   Use to test blood sugar 2 times daily.                                blood glucose monitoring test strip   Commonly known as:  ACCU-CHEK RONNIE PLUS   Use to test blood sugar 2 times daily or as directed.  3 month supply.                                clopidogrel 75 MG tablet   Commonly known as:  PLAVIX   Take 1 tablet (75 mg) by mouth daily   Last time this was given:  75 mg on 5/18/2017  9:13 AM                                   fluticasone-vilanterol 100-25 MCG/INH oral inhaler   Commonly known as:  BREO ELLIPTA   Inhale 1 puff into the lungs daily   Last time this was given:  1 puff on 5/18/2017  9:10 AM                                   furosemide 20 MG tablet   Commonly known as:  LASIX   Take 3 tablets (60 mg) by mouth 2 times daily                                inFLIXimab 100 MG injection   Commonly known as:  REMICADE   Inject 100 mg into the vein every 28 days                                levothyroxine 137 MCG tablet   Commonly known as:  SYNTHROID/LEVOTHROID   Take 1 tablet (137 mcg) by mouth daily   Last time this was given:  137 mcg on 5/18/2017  9:12 AM                                   methotrexate 2.5 MG tablet CHEMO   Take 3 tablets (7.5 mg) by mouth once a week On Mondays   Last time this was given:  2.5 mg on 5/16/2017  5:25 PM                                metoprolol 100 MG tablet   Commonly known as:  LOPRESSOR   Take 1 tablet (100 mg) by mouth 2 times daily   Last time this was given:  100 mg on 5/18/2017  9:12 AM                                      mirtazapine 15 MG tablet   Commonly known as:  REMERON   Take 15 mg by mouth At Bedtime.   Last  time this was given:  15 mg on 5/17/2017  7:48 PM                                   MULTIVITAMIN & MINERAL PO   Take 1 tablet by mouth daily.                                polyethylene glycol powder   Commonly known as:  MIRALAX   Take 17 g (1 capful) by mouth daily                                   sildenafil 20 MG tablet   Commonly known as:  REVATIO/VIAGRA   Take 1 tablet (20 mg) by mouth 3 times daily   Last time this was given:  20 mg on 5/18/2017  9:19 AM                                         tiotropium 18 MCG capsule   Commonly known as:  SPIRIVA HANDIHALER   Inhale contents of one capsule daily.   Last time this was given:  18 mcg on 5/18/2017  9:11 AM                                   Urea 40 % Crea   Commonly known as:  CARMOL 40   To feet daily                                warfarin 5 MG tablet   Commonly known as:  COUMADIN   Take 1 tablet (5 mg) by mouth daily   Last time this was given:  2 mg on 5/17/2017  5:49 PM

## 2017-05-12 NOTE — LETTER
Transition Communication Hand-off for Care Transitions to Next Level of Care Provider    Name: Rohan Monroe  MRN #: [id number]  Primary Care Provider: Jamie Foster     Primary Clinic: [address] [address]     Reason for Hospitalization:  CAD S/P percutaneous coronary angioplasty [I25.10, Z98.61]  Retroperitoneal bleed [R58]  Admit Date/Time: 5/12/2017 11:46 AM  Discharge Date: 5/18/2017  Payor Source: Payor: MEDICARE / Plan: MEDICARE / Product Type: Medicare /     Reason for Communication Hand-off Referral: Multiple providers/specialties    Discharge Plan: Discharge to home with home care     Concern for non-adherence with plan of care:   No  Discharge Needs Assessment:  Needs       Most Recent Value    Home Care [facility] [phone], Fax: [phone]        Follow-up specialty is recommended: Yes    Follow-up plan:  Future Appointments  Date Time Provider Department Center   5/26/2017 9:30 AM  [facility]   5/26/2017 10:00 AM Diane Paige MD [facility]   6/5/2017 12:00 PM UC SPEC INFUSION UCINPR [facility]   6/21/2017 1:30 PM Brian Vazquez MD [facility]   8/17/2017 1:00 PM UC PFL B UCPFT [facility]   8/17/2017 1:30 PM Perlman, David Morris, MD CLS [facility]   8/29/2017 10:00 AM UC LAB UCLAB [facility]   8/29/2017 10:30 AM UCUS2 UCCUS [facility]   8/29/2017 11:30 AM Moses Orosco MD [facility]   9/8/2017 9:30 AM Kina Greco MD UUEYE [facility]   9/12/2017 2:30 PM UC LAB UCLAB [facility]   9/12/2017 3:35 PM Ollie Michel MD [facility]   3/6/2018 12:15 PM Sandee Middleton MD [facility]     Referrals     Future Labs/Procedures    Home care nursing referral     Comments:    [facility]  Phone: [phone]   Fax: [phone]    For RN nancy post hospitalization.   Assess: vital signs, respiratory and cardiac status, activity tolerance, hydration, nutritional status.  Med set up and management.    INR lab draws with results to the [facility], next INR due 5/19/2017.   BMP lab draw once on 5/20/2017 with results to Dr. Joel Tobin, pager: [phone]  Physical Therapy eval and treat for deconditioning, strengthening, and endurance.     Your provider has ordered home care nursing services. If you have not been contacted within 2 days of your discharge please call the inpatient department phone number at [phone] .    Medication Therapy Management Referral     Comments:    Reason for referral:  on more than 10 medications and hospitalized or in the ED in the past 6 months     This service is designed to help you get the most from your medications.  A specially trained pharmacist will work closely with you and your doctors  to solve any problems related to your medications and to help you get the   best results from taking them.      The Medication Therapy Management staff will call you to schedule an appointment.            Key Recommendations:  Post hospitalization follow up.  Alyson Cristobal RN BSN  6C Unit Care Coordinator  Phone number: [phone]  Pager: [phone]

## 2017-05-12 NOTE — IP AVS SNAPSHOT
[address] [address]    Phone:  [phone]                                       After Visit Summary   5/12/2017    Rohan Monroe    MRN: [id number]           After Visit Summary Signature Page     I have received my discharge instructions, and my questions have been answered. I have discussed any challenges I see with this plan with the nurse or doctor.    ..........................................................................................................................................  Patient/Patient Representative Signature      ..........................................................................................................................................  Patient Representative Print Name and Relationship to Patient    ..................................................               ................................................  Date                                            Time    ..........................................................................................................................................  Reviewed by Signature/Title    ...................................................              ..............................................  Date                                                            Time

## 2017-05-12 NOTE — PROCEDURES
PRELIMINARY CARDIAC CATH REPORT:     PROCEDURES PERFORMED:   1. Selective left coronary angiography.  2. Percutaneous coronary intervention of the mLAD and D2 with drug eluting stents.  3. Sedation directed by the interventional cardiologist for total time of 55 minutes.    PHYSICIANS:  1. Attending Interventional Cardiology Staff: Villa Randhawa MD  2. Interventional Cardiology Fellow: John Mayfield MD   3. Cardiology Fellow: Artur Castillo MD     INDICATION:  Rohan Monroe is a 71 year old male with stable angina (CCS class 3) thought secondary to mLAD and D2 stenoses who presents on an elective outpatient basis for planned PCI of the lesions.    DESCRIPTION:  1. Consent obtained with discussion of risks.  All questions were answered.  2. Sterile prep and procedure.  3. Location: right common femoral artery.  4. Access: Local anesthetic with lidocaine.  A standard (18 g) needle with ultrasound guidance was used to establish vascular access using a modified Seldinger technique.  5. Sheath: 6Fr long sheath.  6. Catheters: 6Fr XB-3.5 guide.  7. Fluoroscopy time of 7.6 min.  8. Estimated blood loss of <5 mL.  9. See below for procedure details.    MEDICATIONS:  1. Contrast 65 mL IV.  2. Conscious sedation with fentanyl 100 mcg and midazolam 2.5 mg for total of 55 minutes as directed by the attending cardiologist.  3.Heparin administered to achieve a goal ACT > 250 sec.   4. Nitroglycerin intracoronary.  5. Plavix 600 mg po.    HEMODYNAMICS:  1. HR 81 bpm  2. Ao /68/88 mmHg    CORONARY ANGIOGRAM:   1. LM is without angiographic evidence of disease.   2. LAD supplies the entire apex (type 3) and gives rise to septal perforators, D1 and D2.  The mLAD has a 75% hazy stenosis just distal to the existing stent, D1 has a 60% ostial stenosis and D2 has serial 80% in-stent stenoses and the remainder of the vessel has mild luminal irregularities.    3. LCX gives rise to OM1 and OM2 vessels.  The pLCx has a  30% stenosis, OM1 has a 70% stenosis in a small vessel and the remainder of the vessel has mild luminal irregularities.    4.  RCA was not studied as was recently evaluated.      PERCUTANEOUS CORONARY INTERVENTION:  1. LAD Lesions:  A 6Fr XB-3.5 guide catheter was positioned at the ostium of the LM.  Heparin was administered to achieve a goal ACT > 250 sec.  A Christiano Blue wire was advanced across the D2 in-stent lesion and positioned in the distal D2.  A 1.5x15mm balloon was used to pre-dilate the lesion.  A [id number] Synergy drug eluting stent was successfully deployed across the D2 lesion with inflation to 11 ashley.  The wire was then repositioned in the dLAD.  The mLAD lesion was treated with a 2.36c77mc Synergy drug eluting stent with inflation to 14 ashley.  Final angiography showed no evidence of perforation or dissection with residual stenosis of 0% and JOHN 3 flow.  No complications.    COMPLICATIONS:  1. None    SUMMARY:   1. Successful deployment of a 2.13y53ln Synergy drug eluting stent to the D2.  2. Successful deployment of a [id number] Synergy drug eluting stent to the mLAD.    PLAN:   1. Aspirin 81 mg po daily lifelong.  2. Plavix 75 mg po daily for at least 1 year uninterrupted.  3. Bedrest per protocol.  4. Discharge per protocol.  5. Continued medical management and lifestyle modification for cardiovascular risk factor optimization.     The attending interventional cardiologist was present for the entire procedure.    Findings discussed with the primary outpatient cardiology attending Dr. Paige.    See CVIS report for final draft.    John Mayfield M.D.  Structural Heart Disease &   Advanced Interventional Cardiology Fellow  Pager [phone]

## 2017-05-13 ENCOUNTER — APPOINTMENT (OUTPATIENT)
Dept: GENERAL RADIOLOGY | Facility: CLINIC | Age: 72
DRG: 246 | End: 2017-05-13
Attending: INTERNAL MEDICINE
Payer: MEDICARE

## 2017-05-13 ENCOUNTER — APPOINTMENT (OUTPATIENT)
Dept: CT IMAGING | Facility: CLINIC | Age: 72
DRG: 246 | End: 2017-05-13
Payer: MEDICARE

## 2017-05-13 ENCOUNTER — APPOINTMENT (OUTPATIENT)
Dept: INTERVENTIONAL RADIOLOGY/VASCULAR | Facility: CLINIC | Age: 72
DRG: 246 | End: 2017-05-13
Attending: INTERNAL MEDICINE
Payer: MEDICARE

## 2017-05-13 LAB
ABO + RH BLD: NORMAL
ABO + RH BLD: NORMAL
ALBUMIN SERPL-MCNC: 2.7 G/DL (ref 3.4–5)
ALP SERPL-CCNC: 61 U/L (ref 40–150)
ALT SERPL W P-5'-P-CCNC: 34 U/L (ref 0–70)
ANION GAP SERPL CALCULATED.3IONS-SCNC: 10 MMOL/L (ref 3–14)
ANION GAP SERPL CALCULATED.3IONS-SCNC: 13 MMOL/L (ref 3–14)
APTT PPP: 34 SEC (ref 22–37)
AST SERPL W P-5'-P-CCNC: 23 U/L (ref 0–45)
BASE DEFICIT BLDV-SCNC: 0.7 MMOL/L
BILIRUB SERPL-MCNC: 1.2 MG/DL (ref 0.2–1.3)
BLD GP AB SCN SERPL QL: NORMAL
BLD PROD TYP BPU: NORMAL
BLD UNIT ID BPU: 0
BLOOD BANK CMNT PATIENT-IMP: NORMAL
BLOOD PRODUCT CODE: NORMAL
BPU ID: NORMAL
BUN SERPL-MCNC: 38 MG/DL (ref 7–30)
BUN SERPL-MCNC: 46 MG/DL (ref 7–30)
CA-I BLD-MCNC: 3.5 MG/DL (ref 4.4–5.2)
CALCIUM SERPL-MCNC: 7.5 MG/DL (ref 8.5–10.1)
CALCIUM SERPL-MCNC: 7.6 MG/DL (ref 8.5–10.1)
CHLORIDE SERPL-SCNC: 103 MMOL/L (ref 94–109)
CHLORIDE SERPL-SCNC: 104 MMOL/L (ref 94–109)
CHOLEST SERPL-MCNC: 98 MG/DL
CO2 SERPL-SCNC: 23 MMOL/L (ref 20–32)
CO2 SERPL-SCNC: 24 MMOL/L (ref 20–32)
CREAT SERPL-MCNC: 2.03 MG/DL (ref 0.66–1.25)
CREAT SERPL-MCNC: 2.85 MG/DL (ref 0.66–1.25)
ERYTHROCYTE [DISTWIDTH] IN BLOOD BY AUTOMATED COUNT: 16 % (ref 10–15)
ERYTHROCYTE [DISTWIDTH] IN BLOOD BY AUTOMATED COUNT: 16.3 % (ref 10–15)
ERYTHROCYTE [DISTWIDTH] IN BLOOD BY AUTOMATED COUNT: 16.5 % (ref 10–15)
ERYTHROCYTE [DISTWIDTH] IN BLOOD BY AUTOMATED COUNT: 16.6 % (ref 10–15)
ERYTHROCYTE [DISTWIDTH] IN BLOOD BY AUTOMATED COUNT: 16.8 % (ref 10–15)
FIBRINOGEN PPP-MCNC: 359 MG/DL (ref 200–420)
GFR SERPL CREATININE-BSD FRML MDRD: 22 ML/MIN/1.7M2
GFR SERPL CREATININE-BSD FRML MDRD: 32 ML/MIN/1.7M2
GLUCOSE BLDC GLUCOMTR-MCNC: 230 MG/DL (ref 70–99)
GLUCOSE SERPL-MCNC: 148 MG/DL (ref 70–99)
GLUCOSE SERPL-MCNC: 166 MG/DL (ref 70–99)
HCO3 BLDV-SCNC: 25 MMOL/L (ref 21–28)
HCT VFR BLD AUTO: 22.5 % (ref 40–53)
HCT VFR BLD AUTO: 23.2 % (ref 40–53)
HCT VFR BLD AUTO: 24.4 % (ref 40–53)
HCT VFR BLD AUTO: 27.2 % (ref 40–53)
HCT VFR BLD AUTO: 30.2 % (ref 40–53)
HDLC SERPL-MCNC: 28 MG/DL
HGB BLD-MCNC: 10.4 G/DL (ref 13.3–17.7)
HGB BLD-MCNC: 6.6 G/DL (ref 13.3–17.7)
HGB BLD-MCNC: 7.6 G/DL (ref 13.3–17.7)
HGB BLD-MCNC: 7.6 G/DL (ref 13.3–17.7)
HGB BLD-MCNC: 8.2 G/DL (ref 13.3–17.7)
HGB BLD-MCNC: 9.4 G/DL (ref 13.3–17.7)
INR PPP: 1.21 (ref 0.86–1.14)
INR PPP: 1.26 (ref 0.86–1.14)
INR PPP: 1.45 (ref 0.86–1.14)
INTERPRETATION ECG - MUSE: NORMAL
LACTATE BLD-SCNC: 2.1 MMOL/L (ref 0.7–2.1)
LACTATE BLD-SCNC: 2.4 MMOL/L (ref 0.7–2.1)
LACTATE BLD-SCNC: 2.7 MMOL/L (ref 0.7–2.1)
LACTATE BLD-SCNC: 4 MMOL/L (ref 0.7–2.1)
LACTATE BLD-SCNC: 4 MMOL/L (ref 0.7–2.1)
LDLC SERPL CALC-MCNC: 50 MG/DL
MAGNESIUM SERPL-MCNC: 2 MG/DL (ref 1.6–2.3)
MCH RBC QN AUTO: 29.3 PG (ref 26.5–33)
MCH RBC QN AUTO: 29.8 PG (ref 26.5–33)
MCH RBC QN AUTO: 29.9 PG (ref 26.5–33)
MCH RBC QN AUTO: 30 PG (ref 26.5–33)
MCH RBC QN AUTO: 30.2 PG (ref 26.5–33)
MCHC RBC AUTO-ENTMCNC: 32.8 G/DL (ref 31.5–36.5)
MCHC RBC AUTO-ENTMCNC: 33.6 G/DL (ref 31.5–36.5)
MCHC RBC AUTO-ENTMCNC: 33.8 G/DL (ref 31.5–36.5)
MCHC RBC AUTO-ENTMCNC: 34.4 G/DL (ref 31.5–36.5)
MCHC RBC AUTO-ENTMCNC: 34.6 G/DL (ref 31.5–36.5)
MCV RBC AUTO: 87 FL (ref 78–100)
MCV RBC AUTO: 89 FL (ref 78–100)
MCV RBC AUTO: 92 FL (ref 78–100)
NONHDLC SERPL-MCNC: 70 MG/DL
NUM BPU REQUESTED: 2
NUM BPU REQUESTED: 4
NUM BPU REQUESTED: 6
O2/TOTAL GAS SETTING VFR VENT: ABNORMAL %
OXYHGB MFR BLDV: 26 %
PCO2 BLDV: 48 MM HG (ref 40–50)
PH BLDV: 7.33 PH (ref 7.32–7.43)
PHOSPHATE SERPL-MCNC: 3.4 MG/DL (ref 2.5–4.5)
PLATELET # BLD AUTO: 204 10E9/L (ref 150–450)
PLATELET # BLD AUTO: 205 10E9/L (ref 150–450)
PLATELET # BLD AUTO: 217 10E9/L (ref 150–450)
PLATELET # BLD AUTO: 225 10E9/L (ref 150–450)
PLATELET # BLD AUTO: 250 10E9/L (ref 150–450)
PO2 BLDV: 19 MM HG (ref 25–47)
POTASSIUM SERPL-SCNC: 4.2 MMOL/L (ref 3.4–5.3)
POTASSIUM SERPL-SCNC: 4.4 MMOL/L (ref 3.4–5.3)
POTASSIUM SERPL-SCNC: 4.6 MMOL/L (ref 3.4–5.3)
PROT SERPL-MCNC: 6.1 G/DL (ref 6.8–8.8)
RADIOLOGIST FLAGS: ABNORMAL
RADIOLOGIST FLAGS: ABNORMAL
RBC # BLD AUTO: 2.52 10E12/L (ref 4.4–5.9)
RBC # BLD AUTO: 2.53 10E12/L (ref 4.4–5.9)
RBC # BLD AUTO: 2.8 10E12/L (ref 4.4–5.9)
RBC # BLD AUTO: 3.14 10E12/L (ref 4.4–5.9)
RBC # BLD AUTO: 3.49 10E12/L (ref 4.4–5.9)
SODIUM SERPL-SCNC: 139 MMOL/L (ref 133–144)
SODIUM SERPL-SCNC: 139 MMOL/L (ref 133–144)
SPECIMEN EXP DATE BLD: NORMAL
TRANSFUSION STATUS PATIENT QL: NORMAL
TRIGL SERPL-MCNC: 98 MG/DL
WBC # BLD AUTO: 11.2 10E9/L (ref 4–11)
WBC # BLD AUTO: 16.8 10E9/L (ref 4–11)
WBC # BLD AUTO: 18.5 10E9/L (ref 4–11)
WBC # BLD AUTO: 19.1 10E9/L (ref 4–11)
WBC # BLD AUTO: 20.3 10E9/L (ref 4–11)

## 2017-05-13 PROCEDURE — 27210908 ZZH NEEDLE CR4

## 2017-05-13 PROCEDURE — 83605 ASSAY OF LACTIC ACID: CPT | Performed by: INTERNAL MEDICINE

## 2017-05-13 PROCEDURE — 36415 COLL VENOUS BLD VENIPUNCTURE: CPT | Performed by: INTERNAL MEDICINE

## 2017-05-13 PROCEDURE — 85610 PROTHROMBIN TIME: CPT | Performed by: INTERNAL MEDICINE

## 2017-05-13 PROCEDURE — 37221 ZZHC REVASC ILIAC ART, ANGIO/STENT, INIT VESSEL: CPT

## 2017-05-13 PROCEDURE — A9270 NON-COVERED ITEM OR SERVICE: HCPCS | Mod: GY

## 2017-05-13 PROCEDURE — 85384 FIBRINOGEN ACTIVITY: CPT | Performed by: INTERNAL MEDICINE

## 2017-05-13 PROCEDURE — B41D1ZZ FLUOROSCOPY OF AORTA AND BILATERAL LOWER EXTREMITY ARTERIES USING LOW OSMOLAR CONTRAST: ICD-10-PCS | Performed by: RADIOLOGY

## 2017-05-13 PROCEDURE — C1887 CATHETER, GUIDING: HCPCS

## 2017-05-13 PROCEDURE — 27210914 ZZH SHEATH CR8

## 2017-05-13 PROCEDURE — C1769 GUIDE WIRE: HCPCS

## 2017-05-13 PROCEDURE — 25500064 ZZH RX 255 OP 636: Performed by: INTERNAL MEDICINE

## 2017-05-13 PROCEDURE — P9016 RBC LEUKOCYTES REDUCED: HCPCS

## 2017-05-13 PROCEDURE — 80048 BASIC METABOLIC PNL TOTAL CA: CPT

## 2017-05-13 PROCEDURE — 99152 MOD SED SAME PHYS/QHP 5/>YRS: CPT

## 2017-05-13 PROCEDURE — 80053 COMPREHEN METABOLIC PANEL: CPT | Performed by: INTERNAL MEDICINE

## 2017-05-13 PROCEDURE — 85027 COMPLETE CBC AUTOMATED: CPT | Performed by: INTERNAL MEDICINE

## 2017-05-13 PROCEDURE — C1874 STENT, COATED/COV W/DEL SYS: HCPCS

## 2017-05-13 PROCEDURE — S0028 INJECTION, FAMOTIDINE, 20 MG: HCPCS | Performed by: INTERNAL MEDICINE

## 2017-05-13 PROCEDURE — 86901 BLOOD TYPING SEROLOGIC RH(D): CPT | Performed by: INTERNAL MEDICINE

## 2017-05-13 PROCEDURE — 86901 BLOOD TYPING SEROLOGIC RH(D): CPT

## 2017-05-13 PROCEDURE — 36415 COLL VENOUS BLD VENIPUNCTURE: CPT

## 2017-05-13 PROCEDURE — P9037 PLATE PHERES LEUKOREDU IRRAD: HCPCS

## 2017-05-13 PROCEDURE — 86850 RBC ANTIBODY SCREEN: CPT | Performed by: INTERNAL MEDICINE

## 2017-05-13 PROCEDURE — 85018 HEMOGLOBIN: CPT

## 2017-05-13 PROCEDURE — 25000128 H RX IP 250 OP 636: Performed by: INTERNAL MEDICINE

## 2017-05-13 PROCEDURE — 74176 CT ABD & PELVIS W/O CONTRAST: CPT

## 2017-05-13 PROCEDURE — 83735 ASSAY OF MAGNESIUM: CPT | Performed by: INTERNAL MEDICINE

## 2017-05-13 PROCEDURE — 99222 1ST HOSP IP/OBS MODERATE 55: CPT | Mod: GC | Performed by: INTERNAL MEDICINE

## 2017-05-13 PROCEDURE — P9059 PLASMA, FRZ BETWEEN 8-24HOUR: HCPCS

## 2017-05-13 PROCEDURE — 99153 MOD SED SAME PHYS/QHP EA: CPT

## 2017-05-13 PROCEDURE — 27210804 ZZH SHEATH CR3

## 2017-05-13 PROCEDURE — 83605 ASSAY OF LACTIC ACID: CPT

## 2017-05-13 PROCEDURE — 40000940 XR ABDOMEN PORT F1 VW

## 2017-05-13 PROCEDURE — 00000146 ZZHCL STATISTIC GLUCOSE BY METER IP

## 2017-05-13 PROCEDURE — 85610 PROTHROMBIN TIME: CPT

## 2017-05-13 PROCEDURE — 75710 ARTERY X-RAYS ARM/LEG: CPT | Mod: RT

## 2017-05-13 PROCEDURE — 25000125 ZZHC RX 250: Performed by: RADIOLOGY

## 2017-05-13 PROCEDURE — 40000556 ZZH STATISTIC PERIPHERAL IV START W US GUIDANCE

## 2017-05-13 PROCEDURE — 27210732 ZZH ACCESSORY CR1

## 2017-05-13 PROCEDURE — 93005 ELECTROCARDIOGRAM TRACING: CPT

## 2017-05-13 PROCEDURE — 25000132 ZZH RX MED GY IP 250 OP 250 PS 637: Mod: GY

## 2017-05-13 PROCEDURE — 80048 BASIC METABOLIC PNL TOTAL CA: CPT | Performed by: INTERNAL MEDICINE

## 2017-05-13 PROCEDURE — 25500064 ZZH RX 255 OP 636: Performed by: RADIOLOGY

## 2017-05-13 PROCEDURE — 74174 CTA ABD&PLVS W/CONTRAST: CPT

## 2017-05-13 PROCEDURE — 20000004 ZZH R&B ICU UMMC

## 2017-05-13 PROCEDURE — 25800025 ZZH RX 258: Performed by: INTERNAL MEDICINE

## 2017-05-13 PROCEDURE — 25000128 H RX IP 250 OP 636

## 2017-05-13 PROCEDURE — 27210780 ZZH KIT CR3

## 2017-05-13 PROCEDURE — 86900 BLOOD TYPING SEROLOGIC ABO: CPT

## 2017-05-13 PROCEDURE — 25000125 ZZHC RX 250

## 2017-05-13 PROCEDURE — 27210845 ZZH DEVICE INFLATION CR5

## 2017-05-13 PROCEDURE — 86900 BLOOD TYPING SEROLOGIC ABO: CPT | Performed by: INTERNAL MEDICINE

## 2017-05-13 PROCEDURE — 25000125 ZZHC RX 250: Performed by: INTERNAL MEDICINE

## 2017-05-13 PROCEDURE — 047H3DZ DILATION OF RIGHT EXTERNAL ILIAC ARTERY WITH INTRALUMINAL DEVICE, PERCUTANEOUS APPROACH: ICD-10-PCS | Performed by: RADIOLOGY

## 2017-05-13 PROCEDURE — 86923 COMPATIBILITY TEST ELECTRIC: CPT

## 2017-05-13 PROCEDURE — 80061 LIPID PANEL: CPT

## 2017-05-13 PROCEDURE — 74000 XR ABDOMEN PORT F1 VW: CPT

## 2017-05-13 PROCEDURE — 82805 BLOOD GASES W/O2 SATURATION: CPT

## 2017-05-13 PROCEDURE — 93010 ELECTROCARDIOGRAM REPORT: CPT | Mod: 76 | Performed by: INTERNAL MEDICINE

## 2017-05-13 PROCEDURE — 83735 ASSAY OF MAGNESIUM: CPT

## 2017-05-13 PROCEDURE — 84100 ASSAY OF PHOSPHORUS: CPT

## 2017-05-13 PROCEDURE — 25000128 H RX IP 250 OP 636: Performed by: RADIOLOGY

## 2017-05-13 PROCEDURE — 86850 RBC ANTIBODY SCREEN: CPT

## 2017-05-13 PROCEDURE — 27210905 ZZH KIT CR7

## 2017-05-13 PROCEDURE — 85027 COMPLETE CBC AUTOMATED: CPT

## 2017-05-13 PROCEDURE — 84132 ASSAY OF SERUM POTASSIUM: CPT | Performed by: INTERNAL MEDICINE

## 2017-05-13 PROCEDURE — 40000344 ZZHCL STATISTIC THAWING COMPONENT

## 2017-05-13 PROCEDURE — 85730 THROMBOPLASTIN TIME PARTIAL: CPT | Performed by: INTERNAL MEDICINE

## 2017-05-13 PROCEDURE — 82330 ASSAY OF CALCIUM: CPT | Performed by: INTERNAL MEDICINE

## 2017-05-13 RX ORDER — FENTANYL CITRATE 50 UG/ML
25-50 INJECTION, SOLUTION INTRAMUSCULAR; INTRAVENOUS EVERY 5 MIN PRN
Status: DISCONTINUED | OUTPATIENT
Start: 2017-05-13 | End: 2017-05-18 | Stop reason: HOSPADM

## 2017-05-13 RX ORDER — ONDANSETRON 2 MG/ML
4 INJECTION INTRAMUSCULAR; INTRAVENOUS EVERY 6 HOURS PRN
Status: DISCONTINUED | OUTPATIENT
Start: 2017-05-13 | End: 2017-05-17

## 2017-05-13 RX ORDER — IOPAMIDOL 755 MG/ML
100 INJECTION, SOLUTION INTRAVASCULAR ONCE
Status: COMPLETED | OUTPATIENT
Start: 2017-05-13 | End: 2017-05-13

## 2017-05-13 RX ORDER — ASPIRIN 81 MG/1
81 TABLET, CHEWABLE ORAL DAILY
Status: DISCONTINUED | OUTPATIENT
Start: 2017-05-13 | End: 2017-05-18 | Stop reason: HOSPADM

## 2017-05-13 RX ORDER — LORAZEPAM 2 MG/ML
0.5 INJECTION INTRAMUSCULAR EVERY 6 HOURS PRN
Status: DISCONTINUED | OUTPATIENT
Start: 2017-05-13 | End: 2017-05-15

## 2017-05-13 RX ORDER — CLOPIDOGREL BISULFATE 75 MG/1
75 TABLET ORAL DAILY
Status: DISCONTINUED | OUTPATIENT
Start: 2017-05-13 | End: 2017-05-18 | Stop reason: HOSPADM

## 2017-05-13 RX ORDER — SODIUM CHLORIDE 9 MG/ML
INJECTION, SOLUTION INTRAVENOUS ONCE
Status: DISCONTINUED | OUTPATIENT
Start: 2017-05-13 | End: 2017-05-13

## 2017-05-13 RX ORDER — IODIXANOL 320 MG/ML
150 INJECTION, SOLUTION INTRAVASCULAR ONCE
Status: COMPLETED | OUTPATIENT
Start: 2017-05-13 | End: 2017-05-13

## 2017-05-13 RX ORDER — NALOXONE HYDROCHLORIDE 0.4 MG/ML
.1-.4 INJECTION, SOLUTION INTRAMUSCULAR; INTRAVENOUS; SUBCUTANEOUS
Status: DISCONTINUED | OUTPATIENT
Start: 2017-05-13 | End: 2017-05-13 | Stop reason: CLARIF

## 2017-05-13 RX ORDER — FLUMAZENIL 0.1 MG/ML
0.2 INJECTION, SOLUTION INTRAVENOUS
Status: DISCONTINUED | OUTPATIENT
Start: 2017-05-13 | End: 2017-05-18 | Stop reason: HOSPADM

## 2017-05-13 RX ORDER — LORAZEPAM 2 MG/ML
0.5 INJECTION INTRAMUSCULAR ONCE
Status: COMPLETED | OUTPATIENT
Start: 2017-05-13 | End: 2017-05-13

## 2017-05-13 RX ADMIN — SODIUM CHLORIDE: 9 INJECTION, SOLUTION INTRAVENOUS at 01:29

## 2017-05-13 RX ADMIN — SODIUM CHLORIDE 1000 ML: 9 INJECTION, SOLUTION INTRAVENOUS at 18:12

## 2017-05-13 RX ADMIN — LORAZEPAM 0.5 MG: 2 INJECTION INTRAMUSCULAR; INTRAVENOUS at 10:44

## 2017-05-13 RX ADMIN — FENTANYL CITRATE 100 MCG: 50 INJECTION INTRAMUSCULAR; INTRAVENOUS at 04:54

## 2017-05-13 RX ADMIN — IODIXANOL 50 ML: 320 INJECTION, SOLUTION INTRAVASCULAR at 05:18

## 2017-05-13 RX ADMIN — ONDANSETRON 4 MG: 2 INJECTION INTRAMUSCULAR; INTRAVENOUS at 07:26

## 2017-05-13 RX ADMIN — ONDANSETRON 4 MG: 2 INJECTION INTRAMUSCULAR; INTRAVENOUS at 10:45

## 2017-05-13 RX ADMIN — LORAZEPAM 0.5 MG: 2 INJECTION INTRAMUSCULAR; INTRAVENOUS at 21:49

## 2017-05-13 RX ADMIN — IOPAMIDOL 135 ML: 755 INJECTION, SOLUTION INTRAVENOUS at 02:23

## 2017-05-13 RX ADMIN — SODIUM CHLORIDE, POTASSIUM CHLORIDE, SODIUM LACTATE AND CALCIUM CHLORIDE 500 ML: 600; 310; 30; 20 INJECTION, SOLUTION INTRAVENOUS at 15:06

## 2017-05-13 RX ADMIN — HYDRALAZINE HYDROCHLORIDE 10 MG: 20 INJECTION INTRAMUSCULAR; INTRAVENOUS at 09:34

## 2017-05-13 RX ADMIN — MIDAZOLAM 2 MG: 1 INJECTION INTRAMUSCULAR; INTRAVENOUS at 04:54

## 2017-05-13 RX ADMIN — ATORVASTATIN CALCIUM 40 MG: 40 TABLET, FILM COATED ORAL at 21:48

## 2017-05-13 RX ADMIN — SILDENAFIL 20 MG: 20 TABLET, FILM COATED ORAL at 21:49

## 2017-05-13 RX ADMIN — FAMOTIDINE 20 MG: 20 INJECTION, SOLUTION INTRAVENOUS at 09:45

## 2017-05-13 RX ADMIN — MIRTAZAPINE 15 MG: 15 TABLET, FILM COATED ORAL at 21:48

## 2017-05-13 ASSESSMENT — PAIN DESCRIPTION - DESCRIPTORS
DESCRIPTORS: DISCOMFORT;PRESSURE
DESCRIPTORS: DISCOMFORT

## 2017-05-13 NOTE — BRIEF OP NOTE
Interventional Radiology Brief Post Procedure Note    Procedure: Right iliac arteriogram, stent placement    Proceduralist: Maria Victoria Palmer MD    Assistant: Dr. Saleh    Time Out: Prior to the start of the procedure and with procedural staff participation, I verbally confirmed the patient s identity using two indicators, relevant allergies, that the procedure was appropriate and matched the consent or emergent situation, and that the correct equipment/implants were available. Immediately prior to starting the procedure I conducted the Time Out with the procedural staff and re-confirmed the patient s name, procedure, and site/side. (The Joint Commission universal protocol was followed.)  Yes    Sedation: IR Nurse Monitored Care   Post Procedure Summary:  Prior to the start of the procedure and with procedural staff participation, I verbally confirmed the patient s identity using two indicators, relevant allergies, that the procedure was appropriate and matched the consent or emergent situation, and that the correct equipment/implants were available. Immediately prior to starting the procedure I conducted the Time Out with the procedural staff and re-confirmed the patient s name, procedure, and site/side. (The Joint Commission universal protocol was followed.)  Yes       Sedatives: Fentanyl and Midazolam (Versed)    Vital signs, airway and pulse oximetry were monitored and remained stable throughout the procedure and sedation was maintained until the procedure was complete.  The patient was monitored by staff until sedation discharge criteria were met.    Patient tolerance: Patient tolerated the procedure well with no immediate complications.    Time of sedation in minutes: 60 Minutes minutes from beginning to end of physician one to one monitoring.        Findings:   Right iliac arteriogram with distal EIA pseudoaneurysm with active extravasation  Atrium ICAST stent 6 mm x 38 mm deployed    Estimated Blood Loss:  Less than 10 ml    Fluoroscopy Time: 6.3 minutes    SPECIMENS: None    Complications: 1. None     Condition: Stable    Plan:   1. Flat bedrest with left leg straight x 3 hours  2. Primary management per SICU  3. Continue Plavix as a;lready prescribed    Comments: See dictated procedure note for full details.    Maria Victoria Palmer MD

## 2017-05-13 NOTE — H&P
Cardiology Consult Note    Reason for Admission: Acute Blood Loss Anemia     HPI: Rohan Monroe is a 71 year old with Afib (on warfarin), DM, COPD, HCV and sarcoidosis (pulmonary ) who presented for elective PCI of mLaD and D2 for stable CCS class 3 angina. Patient underwent PCI without any intraprocedure complications or problems. Patient was sent to the floor post procedure. 30 min after arterial sheath removal patient started to develop hypotension and tachycardia. Patient felt flush at the time, groin at the time did not have any e/o hematoma but did have a small ecchymosis. ECG with no ischemic changes. Bedside echo revealed small IVC, labs later showed a hgb 8.9 from 10.9 and a normal lactate. He was given IVF with some improvement in BP and tachycardia. An US of the groin revealed pseudoaneurysm with 8.5mm neck. Patient continue to become hypotensive with new onset abdmoinal pain. CT A&P was obtained which revealed a large RP hematoma with active bleeding from external illiac artery. Patient was given 2u of pRBCs, plts and ffp. He was sent to IR were a cover stent was place. Patient has since remaind HD stable. Patient was transfer to the unit after procedure.     This am he complaints of abdominal distention and pain. Also with increase nausea and emesis since arrival. Denies any ongoing chest pain or dyspnea.     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; heart failure (EF 50-55%, mild LVH, diastolic dysfunction), atrial arrythmias  NYHA class 2-3 symptoms  ** Amiodorone toxicity   - CAD   -pHTN    Gen: -fever, -chills  HEENT: -decreased vision, -throat pain  Pulm: +dyspnea, -cough, -hemoptysis  CV: -chest pain, -exertional dyspnea, -orthopnea, -PND, -edema, -palpitations, -claudication  GI: -nausea, -vomiting, -diarrhea, -hematochezia  Heme: -NS, -weight loss  Neuro: -syncope, -dizziness  Endocrine: -polyuria, -polydypsia, -polyphagia  MSK: -myalgias  Skin: -rash or  ecchymosis    PAST MEDICAL HISTORY:  Past Medical History:   Diagnosis Date     Cataract of both eyes      Chronic infection     Hep C     Congestive heart failure, unspecified      Depressive disorder, not elsewhere classified     Depression (non-psychotic)     ERM OS (epiretinal membrane, left eye)      Generalized osteoarthrosis, unspecified site      Glaucoma suspect      Hypertension      Lichen planus      Other psoriasis      PVD (posterior vitreous detachment), left eye      Sarcoidosis (H)      Sarcoidosis (H)      Type II or unspecified type diabetes mellitus without mention of complication, not stated as uncontrolled      Unspecified hypothyroidism     Hypothyroidism     Unspecified viral hepatitis C without hepatic coma      Viral warts, unspecified        PAST SURGICAL HISTORY:  Past Surgical History:   Procedure Laterality Date     ANESTHESIA CARDIOVERSION N/A 1/19/2017    Procedure: ANESTHESIA CARDIOVERSION;  Surgeon: GENERIC ANESTHESIA PROVIDER;  Location: [facility] OR     ANESTHESIA CARDIOVERSION N/A 1/23/2017    Procedure: ANESTHESIA CARDIOVERSION;  Surgeon: GENERIC ANESTHESIA PROVIDER;  Location: [facility] OR     ANESTHESIA CARDIOVERSION N/A 1/24/2017    Procedure: ANESTHESIA CARDIOVERSION;  Surgeon: GENERIC ANESTHESIA PROVIDER;  Location: [facility] OR     ARTHROPLASTY HIP  8/24/2011    Procedure:ARTHROPLASTY HIP; Right Total Hip Arthroplasty  Choice anesthesia; Surgeon:LESLI WILKINSON; Location:[facility] OR     BIOPSY       cardiac stent      s/p     CARDIAC SURGERY       CATARACT IOL, RT/LT  9/15/2015    LE     COLONOSCOPY       HC REMOVAL OF TONSILS,<11 Y/O      Tonsils <10y.o.     HC REPAIR INCISIONAL HERNIA,REDUCIBLE      Hernia Repair, Incisional, Unilateral     JOINT REPLACEMENT      1 month ago--right hip     LIGATN/STRIP LONG & SHORT SAPHEN         SH and FH reviewed in EPIC  The patient was an  in the [place] and stopped working 3 years ago. He lives with his son, who is in  "college and is studying to be a . He is . He did drink heavily in his past. He stopped in 1992. Smoking he stopped in 1994. He denies any other illicit drug use.     Cardiac meds: Amlodipine 10mg daily, warfarin 5mg daily, metoprolol 100mg BID, lasix 80mg BID, atorvastatin 20mg daily, asa 81mg daily,     ALL  Allergies   Allergen Reactions     Prednisone Other (See Comments)     He reports that he can't sleep for days and can't use small to massive doses of prednisone   Pt. Does not do well with high doses of Prednisone, His MD says that Prednisone is counter indicated. Prednisone failed to treat his sarcoidosis        VITAL SIGNS:  /83  Pulse 102  Temp 98.8  F (37.1  C) (Oral)  Resp 18  Ht 1.753 m (5' 9\")  Wt 93 kg (205 lb 0.4 oz)  SpO2 97%  BMI 30.28 kg/m2       Intake/Output Summary (Last 24 hours) at 05/13/17 1743  Last data filed at 05/13/17 1600   Gross per 24 hour   Intake             4870 ml   Output             1595 ml   Net             3275 ml       PHYSICAL EXAM  General: moderate distress, laying flat in bed   Eyes:  PERRL, EOMI  Respiratory: CTAB, no wheezes, rales  Cardiac: tachycardia, regular, normal S1/S2. - S3 or S4. - murmurs. Wwp, mild lower ex edema  GI: distended, very tender, not hard   MSK: no deformities  Skin: no rash  Neurologic: alert, oriented    LABS: reviewed in EPIC  Wbc 20   hgb 10>8.9>7>6.9>7.5  plts 225    Cr 2.03  Lactic acid 4>2.7    Ecg: Sinus tach, 1 degree AVB, no ischemic changes     [facility] 04/2017    ------------------  Assessment:  71 year old with DM, COPD, HCV, Afib s/p PIV (on warfarin), CAD pulm sarcoid and presumed cardiac sarcoid with HFpEF who initially presented for elective PCI, s/p ROSALIE to mlAD and D2 course post procedure course complicated by large RP bleed requiering multiple transfusions and covered stent placement to external illiac by IR. Now stable from a bleeding perpective but with ongoing tachycardia and " abdominal distention concerning for ilious or SBO.    Neuro  # Anxiety  - prn ativan     # Pain  -limiting opioid use given abdominal distention    CV   # CAD, s/p ROSALIE to mLAD, D2  - ASA 81mg daily  - Ticagrelor 90mg BID  - Atorvastatin  - Holding BB in the setting of bleed     # Tachycardia, sinus. Suspect dehydration given ongoing volume loss. BB withdrawal also a possibility  -- IVF  -- reassess after every bolus     #HFpEF, holding lasix given recent volume loss   -- monitor closely  -- accurate io  -- tele     Pulm,   #Sarcoid   - holding mtx for now   - restart meds once more stable     Heme   # Acute blood loss, s/p cover stent placmeent to external illiac   -- trend hgb  -- hgb hgb >8     GI  # Ilious vs SBO  NG to suction  NPO     # ID   # Leukocytosis, likely 2/2 hematoma  -- continue to tred    Renal   #RADHA, 2/2 volume and blood loss  Trend cr  ivf  I/o      PPx: famotidine, Pneumoboots for now, given recent bleed    Patient seen and discuss with Dr. Randhawa.     Colin Mendoza MD   Cardiology Fellow       I have seen and examined the patient and agree with the finding and plan.       Villa Randhawa MD  Cardiology-CSI  [phone]

## 2017-05-13 NOTE — PROGRESS NOTES
Provider called regarding scheduled blood pressure meds for patient. Provider okay to hold lasix and metoprolol.

## 2017-05-13 NOTE — PROGRESS NOTES
Contacted Dr Mendoza via telephone and made aware UOP 5mL last 2 hours, remains tachycardic. Orders to be placed, will monitor

## 2017-05-13 NOTE — PROGRESS NOTES
Manual pressure held for 20 minutes to right groin site.  Sheath, size 6 Fr,  pulled by MARTY Hay.  Site CDI, no hematoma.  Stasis achieved at 1911. Off bedrest @ 2958.

## 2017-05-13 NOTE — PROGRESS NOTES
Assumed care at 2100. Pt was alert and oriented x 4. Denies SOB  and CP. Was on bed rest. Right groin site wnl, area soft, no hematoma noted. Dr. Zimmerman was here to see Pt; He complained of pain at the right groin site. Writer called lab to check CBC earlier. Pt requested to void. Unable to void in urinal. Pt appears anxious. Per provider, to straight cath Pt. 100 cc rosie urine obtained. Pt post void scan 29cc.  Pt started to complained difficulty breathing, became tachy and BP low. Please see flow sheet. MD here. Maintained saturations % on 3 L.. MD order CT of the Abd and pelvis, without contrast. Pt taken to CT, and will be transferred to .

## 2017-05-13 NOTE — PROGRESS NOTES
Interventional Radiology Intra-procedural Nursing Note    Patient Name: Rohan Monroe  Medical Record Number: [id number]  Today's Date: May 13, 2017    Start Time: 0408  End of procedure time: 0510  Procedure: angiogram for RP bleed  Report given to: Jono RODRIGUEZ  Time pt departs:  0520  : none    Other Notes: pt to IR.  Consent obtained and in chart.  Prepped and positioned per protocol.  Time out performed.  Dr. KELSEY Palmer is supervising staff assisted by DR. ZULEIKA Delgado.  Puncture made to L groin at 0412.   Bleed identified and stent placed.  Groin closed with a MYNX closure device.  He will need 3 hrs of bedrest.. Versed 12 Mg and Fentanyl 100 Mcg given for sedation.  Tolerated procedure well    GAL IVERSON

## 2017-05-13 NOTE — PROGRESS NOTES
"Sheath removal completed per protocol by MARTY Hay and MICHAEL Oliver. Pt tolerated well. Manual pressure was released at 1910. SBPs went down to 90s and HR up to 120s. Pt reports \"feeling weird and hot.\" Pt set up with frequent vitals and SBP dropped to 60s. Dr Zimmerman was notified and came to the bedside. Rapid response called. Right groin site remains CDI, no bleeding noted. Site non tender and soft. Pt has bruising on abd from lovenox injections which was present prior to hospital arrival per pt. Pt also has a healing bruise on upper RLE. Pt reports having the urge to urinate, but unable to empty his bladder. Bladder can shows 70cc. EKG completed. Labs drawn. U/s currently at bedside.   "

## 2017-05-13 NOTE — SIGNIFICANT EVENT
"SIGNIFICANT OVERNIGHT EVENTS      - 6Fr RFA sheath removed at ~650 PM, pressure held for 20 minutes.  - At about 730 PM pt first started becoming hypotensive and tachycardiac. Assessed at bedside. He denied any back pain, abdominal pain or right groin pain, only endorsed feeling \"weird and hot\".   - Access site was soft, non tender and didn't reveal any hematoma, he did have a small superficial bruise at the access site (~1 cm).   - Bedside echo showed no pericardial effusion, normal LV function and IVC measuring 1.1 cms and collapsing completely with inspiration. EKG showed NSR, normal axis, no conduction abnormalities, and no significant ST-T changes when compared with the EKG from 4-26 PM.  - His Hb was 8.7 (from 10.9) and lactate was 1.4  - Received 500 cc normal saline and his BP normalized (MAPs in the mid to high 70s)  - U/S revealed a new 14 mm access site pseudoaneurysm with an 8.5 mm neck. Repeat Hb at 1030 PM resulted at 8.4 (from 8.7 two hours ago). CT non contrast ordered in the setting of hypotension and Hb drop. Vascular surgery consulted.     - At around midnight, he started becoming hypotensive and tachycardiac again. Complained of RLQ pain.   - CT abd/pel non contrast reviewed with radiology: moderate to large Rt RP hematoma with mixed density.   - Given his hypotension and RP bleed, he was moved to the cardiac ICU and Interventional Radiology were consulted. CT angio abd/pel W contrast ordered stat to confirm the location of the bleed.   - Labs revealed Hb down to 6.6 and lactate elevated at 4.0  - Massive transfusion protocol was activated and he received 2 units of pRBCs, 2 units of FFP and 1 unit of platelets.  - CT angio abd/pel revealed enlarging RP hematoma with active extravasation from the Rt external iliac artery.     - Pt emergently taken to IR suite where Rt iliac arteriogram showed distal EIA pseudoaneurysm with active extravasation. Atrium ICAST stent 6 x 38 mm deployed.  - Pt " "currently hemodynamically stable: BP (!) 137/91 (BP Location: Left arm)  Pulse 102  Temp 98  F (36.7  C) (Oral)  Resp 14  Ht 1.753 m (5' 9\")  Wt 93 kg (205 lb 0.4 oz)  SpO2 100%  BMI 30.28 kg/m2  - Called pt's daughter Radha ([phone]) however it went to .    - Continue close hemodynamic monitoring  - Monitor Hb q4H for now.   - Continue ASA 81 mg, Plavix 75 mg, Lipitor 40 mg. Will hold Lopressor and lasix for now (Lopressor can be cautiously re-initiated once his BP has been stable for a few hours. Lasix to be determined by his volume status).      Go Zimmerman MD  Cardiovascular Disease Fellow  Pager: [phone]             "

## 2017-05-13 NOTE — CONSULTS
SURGICAL HISTORY AND PHYSICAL  5/13/2017    CHIEF COMPLAINT: Pseudoaneurysm with possible rupture into the retroperitoneum  ADMITTING PHYSICIAN: Dr. Stauffer    HISTORY PRESENTING ILLNESS: This is a 71 year old male with PMH significant for pulmonary/cardiac sarcoidosis, CHF (preserved Ef),  Hepatitis C, COPD (not O2 dependent), A flutter (on coumadin) who presented recently to the hospital with dyspnea on exertion found to have re-stenosis of coronary stents planned for scheduled left coronary angiography on 5/12 (was bridged pre procedure with lovenox)    The left angiography was performed through the right common femoral artery under ultrasound guidance with a 6 Spanish sheath. No comment in the note about closure device or ultrasound verification before removing,  held pressure for 20 mins. After this time he became hypotensive with a 2 point hemoglobin drop. US showed a pseudo-aneurysm 1.4 cm with a 0.8 cm neck.  No pericardial effusion with normal function, compressible IVC on bedside echo per Cardiology team.     Given the persistent hypotension without rapidly expanding groin hematoma we recommended a CT scan to look for intra-abdominal bleed. CT scan returned showing retroperitoneal hematoma possibly active bleeding (reviewed with radiology resident), documented preliminary read reported enlarging right retroperitoneal hematoma with active extravasation from right external iliac artery.     After the CT he was persistently hypotensive (MAP 60-70s) tachycardic 100-110s, increasing O2 requirements to 6 L and RLQ pain, transferred to cardiac ICU. Started on massive transfusion protocol after hemoglobin returned at 6.6 from 8. Got 2 u pRBC and 1 u platelets. Blood pressures improved and HR lowered with transfusions.    Went to IR suite for possible embolization    Review of systems: 10 point ROS neg other than the symptoms noted above in the HPI.     PAST MEDICAL HISTORY:  Past Medical History:   Diagnosis Date      Cataract of both eyes      Chronic infection     Hep C     Congestive heart failure, unspecified      Depressive disorder, not elsewhere classified     Depression (non-psychotic)     ERM OS (epiretinal membrane, left eye)      Generalized osteoarthrosis, unspecified site      Glaucoma suspect      Hypertension      Lichen planus      Other psoriasis      PVD (posterior vitreous detachment), left eye      Sarcoidosis (H)      Sarcoidosis (H)      Type II or unspecified type diabetes mellitus without mention of complication, not stated as uncontrolled      Unspecified hypothyroidism     Hypothyroidism     Unspecified viral hepatitis C without hepatic coma      Viral warts, unspecified      PAST SURGICAL HISTORY:  No previous abdominal surgeries  Past Surgical History:   Procedure Laterality Date     ANESTHESIA CARDIOVERSION N/A 1/19/2017    Procedure: ANESTHESIA CARDIOVERSION;  Surgeon: GENERIC ANESTHESIA PROVIDER;  Location: [facility] OR     ANESTHESIA CARDIOVERSION N/A 1/23/2017    Procedure: ANESTHESIA CARDIOVERSION;  Surgeon: GENERIC ANESTHESIA PROVIDER;  Location: [facility] OR     ANESTHESIA CARDIOVERSION N/A 1/24/2017    Procedure: ANESTHESIA CARDIOVERSION;  Surgeon: GENERIC ANESTHESIA PROVIDER;  Location: [facility] OR     ARTHROPLASTY HIP  8/24/2011    Procedure:ARTHROPLASTY HIP; Right Total Hip Arthroplasty  Choice anesthesia; Surgeon:LESLI WILKINSON; Location:[facility] OR     BIOPSY       cardiac stent      s/p     CARDIAC SURGERY       CATARACT IOL, RT/LT  9/15/2015    LE     COLONOSCOPY       HC REMOVAL OF TONSILS,<9 Y/O      Tonsils <10y.o.     HC REPAIR INCISIONAL HERNIA,REDUCIBLE      Hernia Repair, Incisional, Unilateral     JOINT REPLACEMENT      1 month ago--right hip     LIGATN/STRIP LONG & SHORT SAPHEN       FAMILY HISTORY:  Non-contributory    SOCIAL HISTORY:  30 pack year history   No ETOH    ALLERGIES:  Allergies   Allergen Reactions     Prednisone Other (See Comments)     He reports that he can't sleep for days and  can't use small to massive doses of prednisone   Pt. Does not do well with high doses of Prednisone, His MD says that Prednisone is counter indicated. Prednisone failed to treat his sarcoidosis      MEDICATIONS:  Prescriptions Prior to Admission   Medication Sig Dispense Refill Last Dose     enoxaparin (LOVENOX) 80 MG/0.8ML injection Inject 0.88 mLs (88 mg) Subcutaneous every 12 hours 8 Syringe 0 5/11/2017 at 1900     sildenafil (REVATIO/VIAGRA) 20 MG tablet Take 1 tablet (20 mg) by mouth 3 times daily 90 tablet 3 5/12/2017 at 0800     furosemide (LASIX) 20 MG tablet Take 3 tablets (60 mg) by mouth 2 times daily 180 tablet 0 5/12/2017 at 0800     levothyroxine (SYNTHROID/LEVOTHROID) 137 MCG tablet Take 1 tablet (137 mcg) by mouth daily 90 tablet 1 5/12/2017 at 0800     warfarin (COUMADIN) 5 MG tablet Take 1 tablet (5 mg) by mouth daily 30 tablet 6 Past Week at Unknown time     polyethylene glycol (MIRALAX) powder Take 17 g (1 capful) by mouth daily 510 g 1 Past Month at Unknown time     tiotropium (SPIRIVA HANDIHALER) 18 MCG capsule Inhale contents of one capsule daily. 90 capsule 3 5/11/2017 at 0800     albuterol (PROAIR HFA/PROVENTIL HFA/VENTOLIN HFA) 108 (90 BASE) MCG/ACT Inhaler Inhale 2 puffs into the lungs every 6 hours as needed for shortness of breath / dyspnea 3 Inhaler 6 Past Week at Unknown time     fluticasone-vilanterol (BREO ELLIPTA) 100-25 MCG/INH oral inhaler Inhale 1 puff into the lungs daily 3 Inhaler 3 5/11/2017 at 0800     inFLIXimab (REMICADE) 100 MG injection Inject 100 mg into the vein every 28 days    Past Week at Unknown time     metoprolol (LOPRESSOR) 100 MG tablet Take 1 tablet (100 mg) by mouth 2 times daily 60 tablet 11 5/12/2017 at 0800     methotrexate 2.5 MG tablet Take 3 tablets (7.5 mg) by mouth once a week On Mondays (Patient taking differently: Take 2.5mg by mouth weekly on Mondays.) 48 tablet 3 Past Week at Unknown time     ASPIRIN EC PO Take 81 mg by mouth daily   5/12/2017 at  0800     blood glucose monitoring (ACCU-CHEK RONNIE PLUS) test strip Use to test blood sugar 2 times daily or as directed.  3 month supply. 200 each 3 Past Week at Unknown time     blood glucose monitoring (ACCU-CHEK FASTCLIX) lancets Use to test blood sugar 2 times daily or as directed.  102 lancets per box.  3 month supply. 2 Box 3 Past Week at Unknown time     blood glucose monitoring (NO BRAND SPECIFIED) meter device kit Use to test blood sugar 2 times daily. 1 kit 0 Past Week at Unknown time     Urea (CARMOL 40) 40 % CREA To feet daily (Patient taking differently: Apply to feet daily as needed.) 199 g 4 Past Month at Unknown time     Multiple Vitamins-Minerals (MULTIVITAMIN & MINERAL PO) Take 1 tablet by mouth daily.   5/11/2017 at 1400     mirtazapine (REMERON) 15 MG tablet Take 15 mg by mouth At Bedtime.   5/11/2017 at 2100       PHYSICAL EXAMINATION:  Temp:  [97.4  F (36.3  C)-98.2  F (36.8  C)] 97.6  F (36.4  C)  Heart Rate:  [] 104  Resp:  [16-20] 20  BP: ()/() 72/41  SpO2:  [96 %-100 %] 100 %    General: Anxious  Neuro: AAOx3    CV: tachycardic, regular rhythmn  Resp: Non labored breathing on 6 L   Abdomen: soft, distended, tender in RLQ, not peritonitic. Skin with ecchymosis from lovenox injections  R groin without pulsatile mass, no ecchymosis, soft  MSK: wwp    LABS:  LABS: Reviewed.   Arterial Blood Gases   No lab results found in last 7 days.  Complete Blood Count     Recent Labs  Lab 05/12/17 2228 05/12/17 2020 05/12/17  1240 05/08/17  1518   WBC 11.4* 9.7 7.5 11.1*   HGB 8.4* 8.7* 10.9* 10.8*    287 301 369     Basic Metabolic Panel    Recent Labs  Lab 05/12/17 2028 05/12/17  1240 05/08/17  1518    136 136   POTASSIUM 3.6 3.8 4.2   CHLORIDE 102 100 99   CO2 26 29 27   BUN 40* 43* 44*   CR 1.84* 1.86* 1.94*   * 107* 138*     Liver Function Tests    Recent Labs  Lab 05/12/17 2028 05/12/17  1240 05/08/17  1518   AST  --   --  52*   ALT  --   --  69   ALKPHOS   --   --  108   BILITOTAL  --   --  0.6   ALBUMIN  --   --  3.1*   INR 1.30* 1.28* 2.44*     Pancreatic Enzymes  No lab results found in last 7 days.  Coagulation Profile    Recent Labs  Lab 05/12/17 2028 05/12/17  1240 05/08/17  1518   INR 1.30* 1.28* 2.44*     Lactate  Invalid input(s): LACTATE    IMAGING:  Recent Results (from the past 24 hour(s))   US Lower Extremity Arterial rt   Result Value    Radiologist flags Pseudoaneurysm (Urgent)    Narrative    Exam: Duplex ultrasound of right lower extremity arteries dated  5/12/2017 9:31 PM     Clinical information: Hypotension after cor angio (access RFA),  concern for bleeidng     Comparison: None    Technique: Includes Gray Scale images, color Doppler, spectral Doppler  waveforms and velocities with appropriate angles of 60 degrees or  less.    Ordering provider: Dr. Zimmerman    Findings:     Right lower extremity:     Common femoral artery: 195 cm/s above, 129 at the access site, and 84  cm/sec. Below Waveforms: Biphasic and triphasic  Deep femoral artery: 44 cm/sec. Waveforms: Biphasic  Proximal SFA: 123 cm/sec. Waveforms: Triphasic  Mid SFA: 86 cm/sec. Waveforms: Triphasic  Distal SFA: 78 cm/sec. Waveforms: Triphasic  Popliteal artery: 52 cm/sec. Waveforms: Biphasic  PTA ankle: 47 cm/sec. Waveforms: Biphasic  BENNETT ankle: 30 cm/sec. Waveforms: Biphasic    At the access site, there is a 14 mm pseudoaneurysm with an 8.5 mm  neck.      Impression    Impression:     1. At the access site, there is a 14 mm pseudoaneurysm with an 8.5 mm  neck.   2. Right leg: Normal velocities throughout the right leg.  Triphasic  and biphasic waveforms throughout the right leg.    [Urgent Result: Pseudoaneurysm]    Finding was identified on 5/12/2017 9:36 PM.     Dr. Zimmerman was contacted by Dr. Jeb Clement at 5/12/2017 9:42 PM and  verbalized understanding of the urgent finding.      Guidelines:  [facility] duplex criteria for lower limb arterial  occlusive  disease  -Percent stenosis- Normal (1-19%): Peak systolic velocity (cm/s):  <150, End-diastolic velocity (cm/s): <40, Velocity ration (Vr): <1.5,  Distal arterial waveform: Triphasic  -Percent stenosis- 20-49%: Peak systolic velocity (cm/s): 150-200,  End-diastolic velocity (cm/s): <40, Velocity ration (Vr): 1.5-2.0,  Distal arterial waveform: Triphasic  -Percent stenosis- 50-75%: Peak systolic velocity (cm/s): 200-300,  End-diastolic velocity (cm/s): <90, Velocity ration (Vr): 2.0-3.9,  Distal arterial waveform: Poststenotic turbulence distal to stenosis,  monophasic distal waveform  -Percent stenosis- >75%: Peak systolic velocity (cm/s): >300,  End-diastolic velocity (cm/s): <90, Velocity ration (Vr): >4.0, Distal  arterial waveform: Dampened distal waveform and low PSV/EDV* in the  stenosis  -Percent stenosis- Occlusion: Absent flow by color Doppler/pulsed  Doppler spectral analysis; length of occlusion estimated from distance  between exit and reentry collateral arteries  *PSV = peak systolic velocity, EDV = end-diastolic velocity  http://link.bassett.com/chapter/10.1007/[email or web address]_23/fulltext  html       ASSESSMENT: Rohan Monroe is a 71 year old male with PMH significant for pulmonary/cardiac sarcoidosis, CHF (preserved Ef),  Hepatitis C, COPD (not O2 dependent), A flutter (on coumadin) who presented recently to the hospital with dyspnea on exertion found to have re-stenosis of coronary stents planned for scheduled left coronary angiography on 5/12 (was bridged pre procedure with lovenox), post procedure developed R groin pseudoaneurysm then right retroperitoneal hematoma thought to be from active bleeding from right external iliac. Stabilized with blood products, in cardiac ICU    PLAN:  -IR to embolize  -vascular surgery aware if further interventions are required    Patient findings and plan discussed with surgery staff Dr. Stauffer and vascular fellow Dr. Fleming.    Barb Valle MD  PGY-2  General Surgery  [phone]

## 2017-05-13 NOTE — PROGRESS NOTES
Contacted Dr Mendoza via telephone. Made aware 900 cc out from NG, HR remains 120-130's, and UOP 10ml/hr for last 3 hours. MD stated he does not want any meds down tube for now, keep strict NPO- unable to give afternoon meds d/t this.

## 2017-05-13 NOTE — PROGRESS NOTES
70yo male w/ R retroperitoneal bleeding s/p R EIA access for PCI. R EIA stented by IR overnight. He is nauseated this am, suspect this is related to retroperitoneal hematoma. No additional vascular interventions at this time. Will defer management to cardiology and IR service at this time, and follow-up with IR service s/p R EIA stenting. Vascular surgery will sign-off at this time.

## 2017-05-13 NOTE — PROGRESS NOTES
Patient arrived from  via bed accompanied by 6C nurse Ann and resource float Brenna.  Settled into room 1, oriented to room. Massive transfusion protocol initiated due to HGB 6.6

## 2017-05-13 NOTE — PROGRESS NOTES
Nutrition progress note:     RD consulted for TLC diet education. Stopped by pt room, not appropriate at this time, will continue to follow.      RD to continue to follow up on oral intake and advancement of diet, follow per protocol.    Jack Saavedra MS, RD, LD, CNSC

## 2017-05-13 NOTE — PROVIDER NOTIFICATION
05/12/17 2000   Call Information   Date of Call 05/12/17   Time of Call 1959   Name of person requesting the team Emily Behrendt   Title of person requesting team RN   RRT Arrival time 2000   Reason for call   Type of RRT Adult   Primary reason for call Cardiovascular   Cardiovascular SBP less than 90   Was patient transferred from the ED, ICU, or PACU within last 24 hours prior to RRT call? Yes  (s/p angioplasty/2A)   SBAR   Situation Sudden hypotension SBP 60's/40's with -112.  Other vss.   Background s/p L coronary angiogram with PCI mLAD and D2 stents placed today.  Hx stable angina.   Notable History/Conditions Cardiac;Recent surgery   Assessment Pt. A+O x 4.  Afebrile with current vs BP low 80's/upper 50's.  Remains in ST at 103.  Respirations easy with BBS =/clear.  Rt. groin bruised with no evidence of hematoma.   Pt. remains in flat position. Pedal pulses 2+ on right.  Skin warm, dry to touch with CRT 2 seconds.  Pt. denies pain, SOB, or any discomfort at this time.     Interventions Blood glucose;ECG;Fluid bolus;IV fluids;Labs;O2 per N/C or mask;Portable monitor;Other (describe)  (Echo per MD at [facility].  500cc fluid bolus begun.)   Adjustments to Recommend (12 lead EKG, glucose, labs, guidry catheter, portable echo.)   Patient Outcome   Patient Outcome Stabilized on unit   RRT Team   Attending/Primary/Covering Physician Go Leiva   Date Attending Physician notified 05/12/17   Time Attending Physician notified 1959   Lead RN Allison Swan RN Emily Behrendt   RT Zak Metz   Other staff Peri Villatoro RN

## 2017-05-13 NOTE — PROGRESS NOTES
Interventional Radiology Pre-Procedure Sedation Assessment   Time of Assessment: 3:53 AM    Expected Level: Moderate Sedation    Indication: Sedation is required for the following type of Procedure: Arterial    Sedation and procedural consent: Risks, benefits and alternatives were discussed with Patient    PO Intake: Appropriately NPO for procedure    ASA Class: Class 3 - SEVERE SYSTEMIC DISEASE, DEFINITE FUNCTIONAL LIMITATIONS.    Mallampati: Grade 2:  Soft palate, base of uvula, tonsillar pillars, and portion of posterior pharyngeal wall visible    Lungs: Lungs Clear with good breath sounds bilaterally    Heart: Normal heart sounds and rate    History and physical reviewed and no updates needed. I have reviewed the lab findings, diagnostic data, medications, and the plan for sedation. I have determined this patient to be an appropriate candidate for the planned sedation and procedure and have reassessed the patient IMMEDIATELY PRIOR to sedation and procedure.    Ashley Saleh MD, MD

## 2017-05-13 NOTE — PLAN OF CARE
Problem: Goal Outcome Summary  Goal: Goal Outcome Summary  RN:  1.Pt will remain hemodynamically stable.   Pt transferred from 6D after CT abs/pelvis.  Hypotension, RUQ abdominal pain, SOB, anxiety s/p PCI yesterday evening.  Pt A&O on arrival.  C/o SOB, generalized discomfort.  Diaphoretic, clammy.  C/o feeling chilled.  Denies CP.  R groin puncture site flat, CDI.  Denies pain along R flank.  PP+2.  1L NS infusing.  BP 60-70's/40's.  MAP 50's.  -110's.   Pt placed on OxiPlus mask at 6L with O2 sat %.  MD called and at bedside to assess.  Stat EKG, labs including hgb and lactic acid ordered.  [facility] surgery also at bedside to assess.  Pt transferred to  per orders.  Report given to 4E RN.  Belongings sent w/ pt.  Continue care on .

## 2017-05-13 NOTE — PROGRESS NOTES
Notified Dr Mendoza pt still having nausea/vomiting and unable to give AM meds. Pt also requesting anti-anxiety medication. Stated he will place orders, aware meds unable to be given

## 2017-05-14 ENCOUNTER — APPOINTMENT (OUTPATIENT)
Dept: GENERAL RADIOLOGY | Facility: CLINIC | Age: 72
DRG: 246 | End: 2017-05-14
Attending: INTERNAL MEDICINE
Payer: MEDICARE

## 2017-05-14 LAB
ALBUMIN SERPL-MCNC: 2.5 G/DL (ref 3.4–5)
ALP SERPL-CCNC: 54 U/L (ref 40–150)
ALT SERPL W P-5'-P-CCNC: 28 U/L (ref 0–70)
ANION GAP SERPL CALCULATED.3IONS-SCNC: 11 MMOL/L (ref 3–14)
AST SERPL W P-5'-P-CCNC: 24 U/L (ref 0–45)
BILIRUB SERPL-MCNC: 0.8 MG/DL (ref 0.2–1.3)
BUN SERPL-MCNC: 54 MG/DL (ref 7–30)
CALCIUM SERPL-MCNC: 8 MG/DL (ref 8.5–10.1)
CHLORIDE SERPL-SCNC: 106 MMOL/L (ref 94–109)
CO2 SERPL-SCNC: 24 MMOL/L (ref 20–32)
CREAT SERPL-MCNC: 3.88 MG/DL (ref 0.66–1.25)
ERYTHROCYTE [DISTWIDTH] IN BLOOD BY AUTOMATED COUNT: 16.8 % (ref 10–15)
ERYTHROCYTE [DISTWIDTH] IN BLOOD BY AUTOMATED COUNT: 16.9 % (ref 10–15)
GFR SERPL CREATININE-BSD FRML MDRD: 15 ML/MIN/1.7M2
GLUCOSE SERPL-MCNC: 140 MG/DL (ref 70–99)
HCT VFR BLD AUTO: 22.8 % (ref 40–53)
HCT VFR BLD AUTO: 22.8 % (ref 40–53)
HGB BLD-MCNC: 7.7 G/DL (ref 13.3–17.7)
HGB BLD-MCNC: 7.8 G/DL (ref 13.3–17.7)
INR PPP: 1.25 (ref 0.86–1.14)
LACTATE BLD-SCNC: 2.2 MMOL/L (ref 0.7–2.1)
MCH RBC QN AUTO: 29.8 PG (ref 26.5–33)
MCH RBC QN AUTO: 30 PG (ref 26.5–33)
MCHC RBC AUTO-ENTMCNC: 33.8 G/DL (ref 31.5–36.5)
MCHC RBC AUTO-ENTMCNC: 34.2 G/DL (ref 31.5–36.5)
MCV RBC AUTO: 88 FL (ref 78–100)
MCV RBC AUTO: 88 FL (ref 78–100)
PLATELET # BLD AUTO: 160 10E9/L (ref 150–450)
PLATELET # BLD AUTO: 182 10E9/L (ref 150–450)
POTASSIUM SERPL-SCNC: 4.4 MMOL/L (ref 3.4–5.3)
PROT SERPL-MCNC: 5.9 G/DL (ref 6.8–8.8)
RBC # BLD AUTO: 2.58 10E12/L (ref 4.4–5.9)
RBC # BLD AUTO: 2.6 10E12/L (ref 4.4–5.9)
SODIUM SERPL-SCNC: 140 MMOL/L (ref 133–144)
WBC # BLD AUTO: 14.6 10E9/L (ref 4–11)
WBC # BLD AUTO: 14.8 10E9/L (ref 4–11)

## 2017-05-14 PROCEDURE — 74000 XR ABDOMEN PORT F1 VW: CPT

## 2017-05-14 PROCEDURE — 85610 PROTHROMBIN TIME: CPT | Performed by: RADIOLOGY

## 2017-05-14 PROCEDURE — 85027 COMPLETE CBC AUTOMATED: CPT | Performed by: RADIOLOGY

## 2017-05-14 PROCEDURE — 25000125 ZZHC RX 250: Performed by: INTERNAL MEDICINE

## 2017-05-14 PROCEDURE — 99232 SBSQ HOSP IP/OBS MODERATE 35: CPT | Mod: GC | Performed by: INTERNAL MEDICINE

## 2017-05-14 PROCEDURE — 21400006 ZZH R&B CCU INTERMEDIATE UMMC

## 2017-05-14 PROCEDURE — 25000128 H RX IP 250 OP 636: Performed by: INTERNAL MEDICINE

## 2017-05-14 PROCEDURE — 25000132 ZZH RX MED GY IP 250 OP 250 PS 637: Mod: GY | Performed by: STUDENT IN AN ORGANIZED HEALTH CARE EDUCATION/TRAINING PROGRAM

## 2017-05-14 PROCEDURE — 80053 COMPREHEN METABOLIC PANEL: CPT | Performed by: RADIOLOGY

## 2017-05-14 PROCEDURE — A9270 NON-COVERED ITEM OR SERVICE: HCPCS | Mod: GY | Performed by: STUDENT IN AN ORGANIZED HEALTH CARE EDUCATION/TRAINING PROGRAM

## 2017-05-14 PROCEDURE — 85027 COMPLETE CBC AUTOMATED: CPT | Performed by: INTERNAL MEDICINE

## 2017-05-14 PROCEDURE — 71010 XR CHEST PORT 1 VW: CPT

## 2017-05-14 PROCEDURE — 36415 COLL VENOUS BLD VENIPUNCTURE: CPT | Performed by: INTERNAL MEDICINE

## 2017-05-14 PROCEDURE — S0028 INJECTION, FAMOTIDINE, 20 MG: HCPCS | Performed by: INTERNAL MEDICINE

## 2017-05-14 PROCEDURE — 25000132 ZZH RX MED GY IP 250 OP 250 PS 637: Mod: GY

## 2017-05-14 PROCEDURE — 25000132 ZZH RX MED GY IP 250 OP 250 PS 637: Mod: GY | Performed by: INTERNAL MEDICINE

## 2017-05-14 PROCEDURE — 83605 ASSAY OF LACTIC ACID: CPT | Performed by: INTERNAL MEDICINE

## 2017-05-14 PROCEDURE — A9270 NON-COVERED ITEM OR SERVICE: HCPCS | Mod: GY

## 2017-05-14 PROCEDURE — 36415 COLL VENOUS BLD VENIPUNCTURE: CPT | Performed by: RADIOLOGY

## 2017-05-14 RX ORDER — SODIUM CHLORIDE 9 MG/ML
INJECTION, SOLUTION INTRAVENOUS CONTINUOUS
Status: DISCONTINUED | OUTPATIENT
Start: 2017-05-14 | End: 2017-05-15

## 2017-05-14 RX ORDER — LABETALOL HYDROCHLORIDE 5 MG/ML
10 INJECTION, SOLUTION INTRAVENOUS ONCE
Status: COMPLETED | OUTPATIENT
Start: 2017-05-14 | End: 2017-05-14

## 2017-05-14 RX ORDER — METOPROLOL TARTRATE 50 MG
100 TABLET ORAL 2 TIMES DAILY
Status: DISCONTINUED | OUTPATIENT
Start: 2017-05-14 | End: 2017-05-18 | Stop reason: HOSPADM

## 2017-05-14 RX ORDER — FAMOTIDINE 20 MG/1
20 TABLET, FILM COATED ORAL DAILY
Status: DISCONTINUED | OUTPATIENT
Start: 2017-05-14 | End: 2017-05-18 | Stop reason: HOSPADM

## 2017-05-14 RX ADMIN — CLOPIDOGREL 75 MG: 75 TABLET, FILM COATED ORAL at 07:41

## 2017-05-14 RX ADMIN — MIRTAZAPINE 15 MG: 15 TABLET, FILM COATED ORAL at 20:56

## 2017-05-14 RX ADMIN — LORAZEPAM 0.5 MG: 2 INJECTION INTRAMUSCULAR; INTRAVENOUS at 20:55

## 2017-05-14 RX ADMIN — LABETALOL HYDROCHLORIDE 10 MG: 5 INJECTION, SOLUTION INTRAVENOUS at 00:44

## 2017-05-14 RX ADMIN — FAMOTIDINE 20 MG: 10 INJECTION INTRAVENOUS at 10:24

## 2017-05-14 RX ADMIN — ASPIRIN 81 MG CHEWABLE TABLET 81 MG: 81 TABLET CHEWABLE at 07:41

## 2017-05-14 RX ADMIN — METOPROLOL TARTRATE 100 MG: 50 TABLET, FILM COATED ORAL at 10:24

## 2017-05-14 RX ADMIN — ACETAMINOPHEN 650 MG: 325 TABLET, FILM COATED ORAL at 20:55

## 2017-05-14 RX ADMIN — METOPROLOL TARTRATE 100 MG: 50 TABLET, FILM COATED ORAL at 20:55

## 2017-05-14 RX ADMIN — ATORVASTATIN CALCIUM 40 MG: 40 TABLET, FILM COATED ORAL at 20:56

## 2017-05-14 RX ADMIN — LEVOTHYROXINE SODIUM 137 MCG: 137 TABLET ORAL at 07:36

## 2017-05-14 RX ADMIN — SILDENAFIL 20 MG: 20 TABLET, FILM COATED ORAL at 20:55

## 2017-05-14 RX ADMIN — SILDENAFIL 20 MG: 20 TABLET, FILM COATED ORAL at 13:31

## 2017-05-14 RX ADMIN — SODIUM CHLORIDE: 9 INJECTION, SOLUTION INTRAVENOUS at 13:31

## 2017-05-14 RX ADMIN — SILDENAFIL 20 MG: 20 TABLET, FILM COATED ORAL at 07:41

## 2017-05-14 NOTE — PROGRESS NOTES
Cardiology Progress Note    Overnight/subj:   - NG place yesterday due increase abdominal distention, 900cc intially out  - improvement in n/v post ng placement  - NG replace overnight   - received several bolus of IVF in addition to 2prbcs for ongiong tachycardia and dehydration    VS reviewed: Notable for temp: AF, HR 130s>100s, MAP , oxygenating on 98% on 2L  Tele: no events       Wt: on admit 87.5, yday 93, today 96.3  I/O: 5.0 in / 1665 out yday. Since MN 0 in / 300 out  NG 1245 out yesterday    Drips:   none  PO Cardiac Meds: ASA 81mg daily, plavix 75mg daily, atorvastatin 75mg daily, sildenafil 20mg TID  Other meds: levothyroxine, lorazapam prn    Radiology Imaging  CXR- Increase interstial markings, small r effusion, increase hilar prominence, low lung volumes. Minimal change compare to prior on 4/26   AXR- NG tube in stomach    Cardiology Studies:  No new studies     Labs: Reviewed in epic    Phys exam:  GEN: NAD  Pulm: CTAB   Cardiac: JVP <5, tachycardia no murmurs  Vascular: +2 EDUARDO and +2 pulses pulses  GI: soft, non distended    ASSESSMENT/PLAN:  71 year old with DM, COPD, HCV, Afib s/p PIV (on warfarin), CAD pulm sarcoid and presumed cardiac sarcoid with HFpEF who initially presented for elective PCI, s/p ROSALIE to mlAD and D2 course post procedure course complicated by large RP bleed requiering multiple transfusions and covered stent placement to [facility] by IR. Now stable from a bleeding perpective but with ongoing tachycardia and abdominal distention concerning for ilious or SBO.     Neuro  # Anxiety  - prn ativan      # Pain  -limiting opioid use given abdominal distention     CV   # CAD, s/p ROSALIE to mLAD, D2  - ASA 81mg daily  - Ticagrelor 90mg BID  - Atorvastatin  - Holding BB in the setting of bleed      # Tachycardia, sinus. Suspect dehydration given ongoing volume loss. BB withdrawal also a possibility  -- IVF 75cc/hr  -- restart BB     #HFpEF, holding lasix given recent volume loss    -- monitor closely  -- accurate io  -- tele      Pulm,   #Sarcoid   - holding mtx for now   - restart meds once more stable      Heme   # Acute blood loss, s/p cover stent placmeent to external illiac   -- trend hgb  -- hgb hgb >8      GI  # Ilious vs SBO  NG to suction  Advance diet      # ID   # Leukocytosis, likely 2/2 hematoma  -- continue to tred     Renal   #RADHA, 2/2 volume and blood loss  Trend cr  ivf  I/o     Transfer to the floor.       PPx: famotidine, Pneumoboots for now, given recent bleed     Patient seen and discuss with Dr. Adriel Mendoza MD   Cardiology Fellow   [id number]      ATTENDING NOTE:  Patient has been seen and evaluated by me on 05/14/2017. I have reviewed the documentation above.  I have reviewed today's vital signs, medications, labs, and imaging results.  I have reviewed and edited, as necessary, the history, review of systems, physical examination, and assessment and plan.  I have discussed my assessment and plan with the cardiology fellow.  Rohan Monroe is a 71 year old male with risk factor profile (-) HTN, Type II DM, (+) hypercholesterolemia, sarcoidosis, HFpEF, CAD, s/p PCI mid LAD (5/13/17), complicated by retroperitoneal bleed at the site of the arteriotomy puncture requiring multiple units of PRBCs and urgent stenting of the RT external iliac artery with a covered stent, recuperating in the CCU.  The patient developed an ileus that was treated with NGT to suction.      Hemoglobin   Date Value Ref Range Status   05/14/2017 7.7 (L) 13.3 - 17.7 g/dL Final     Plan to reinitiate beta blocker today and gently hydrate patient.  Patient may transfer to telemetry today.      Geovany Morel MD     Cardiovascular Division

## 2017-05-14 NOTE — PROGRESS NOTES
Pt alert and oriented, denies pain throughout shift. NGT clamped this morning per MD order and now tolerating clear liquids- pt states appetite is poor but denies nausea/abdominal discomfort. Sp02 >92% on 2L NC, dyspnea on exertion reported. Fung remains in place d/t low UOP and strict I&O. Pt currently up in chair for last 3 hours, tolerating well, refused walk x2 today. Transfer orders received by MD- awaiting bed. Pt's daughter and son at bedside, VSS and call light in reach

## 2017-05-14 NOTE — PROGRESS NOTES
MD Notified: Dr Saucedo  Reason for Notification: Discussed patients Heart rate 120-130's, blood pressures 150's/90's, On beta blocker at home, held due to concerns for continued bleeding.  Orders Received: Yes, see MAR  Continue to monitor patient and update team of changes.

## 2017-05-14 NOTE — PROGRESS NOTES
NGT not functioning properly, was clamped due to PO meds given however after placed back on suction no functioning right, lots of air heard leaking again. NGT replaced without issues. abd xray ordered.

## 2017-05-14 NOTE — PLAN OF CARE
Problem: Goal Outcome Summary  Goal: Goal Outcome Summary  RN:  1.Pt will remain hemodynamically stable.    Outcome: Improving  Patient is alert and oriented to person, place, time, and situation Pupils equal, round, reactive to light, accommodating. moves all extremities. Follows commands. NGT in right nare replaced, mostly clear output from ice chips. Lungs Clear to ausculation, oxygen saturations >92%  On 2 liters of oxygen per nasal canula. Abdomen less distended than yesterday, denies nausea or vomiting. Bruising on abdomen. Guidry remains in place, is positional but patent. Pressure monitoring dc'd last evening, writer did not replace guidry however due to possibility of having this dc'd today altogether and the patient did not want the guidry replaced if it is removed. circulation, motion, sensation intact in all extremities. Patient upset about not being able to eat, insists he will be ordering breakfast. Writer educated patient about the bowel obstruction and how the NGT works. Writer will attempt again this morning to walk the patient, he refused last evening. Patient resting comfortably with call light in reach.  Continue to monitor patient and update team of changes.

## 2017-05-15 ENCOUNTER — APPOINTMENT (OUTPATIENT)
Dept: GENERAL RADIOLOGY | Facility: CLINIC | Age: 72
DRG: 246 | End: 2017-05-15
Attending: NURSE PRACTITIONER
Payer: MEDICARE

## 2017-05-15 ENCOUNTER — APPOINTMENT (OUTPATIENT)
Dept: CARDIOLOGY | Facility: CLINIC | Age: 72
DRG: 246 | End: 2017-05-15
Attending: NURSE PRACTITIONER
Payer: MEDICARE

## 2017-05-15 LAB
ALBUMIN SERPL-MCNC: 2.6 G/DL (ref 3.4–5)
ALP SERPL-CCNC: 62 U/L (ref 40–150)
ALT SERPL W P-5'-P-CCNC: 27 U/L (ref 0–70)
ANION GAP SERPL CALCULATED.3IONS-SCNC: 10 MMOL/L (ref 3–14)
AST SERPL W P-5'-P-CCNC: 27 U/L (ref 0–45)
BILIRUB SERPL-MCNC: 1 MG/DL (ref 0.2–1.3)
BUN SERPL-MCNC: 57 MG/DL (ref 7–30)
CALCIUM SERPL-MCNC: 7.8 MG/DL (ref 8.5–10.1)
CHLORIDE SERPL-SCNC: 104 MMOL/L (ref 94–109)
CO2 SERPL-SCNC: 23 MMOL/L (ref 20–32)
CREAT SERPL-MCNC: 4.84 MG/DL (ref 0.66–1.25)
ERYTHROCYTE [DISTWIDTH] IN BLOOD BY AUTOMATED COUNT: 17 % (ref 10–15)
GFR SERPL CREATININE-BSD FRML MDRD: 12 ML/MIN/1.7M2
GLUCOSE BLDC GLUCOMTR-MCNC: 115 MG/DL (ref 70–99)
GLUCOSE BLDC GLUCOMTR-MCNC: 90 MG/DL (ref 70–99)
GLUCOSE BLDC GLUCOMTR-MCNC: 96 MG/DL (ref 70–99)
GLUCOSE SERPL-MCNC: 103 MG/DL (ref 70–99)
HCT VFR BLD AUTO: 21 % (ref 40–53)
HGB BLD-MCNC: 6.9 G/DL (ref 13.3–17.7)
HGB BLD-MCNC: 7.7 G/DL (ref 13.3–17.7)
INR PPP: 1.23 (ref 0.86–1.14)
INTERPRETATION ECG - MUSE: NORMAL
LACTATE BLD-SCNC: 0.7 MMOL/L (ref 0.7–2.1)
LACTATE BLD-SCNC: 0.7 MMOL/L (ref 0.7–2.1)
MCH RBC QN AUTO: 30.1 PG (ref 26.5–33)
MCHC RBC AUTO-ENTMCNC: 32.9 G/DL (ref 31.5–36.5)
MCV RBC AUTO: 92 FL (ref 78–100)
NT-PROBNP SERPL-MCNC: 5090 PG/ML (ref 0–900)
PLATELET # BLD AUTO: 143 10E9/L (ref 150–450)
POTASSIUM SERPL-SCNC: 4.4 MMOL/L (ref 3.4–5.3)
PROT SERPL-MCNC: 6.2 G/DL (ref 6.8–8.8)
RADIOLOGIST FLAGS: ABNORMAL
RBC # BLD AUTO: 2.29 10E12/L (ref 4.4–5.9)
SODIUM SERPL-SCNC: 138 MMOL/L (ref 133–144)
WBC # BLD AUTO: 11.7 10E9/L (ref 4–11)

## 2017-05-15 PROCEDURE — 71010 XR CHEST PORT 1 VW: CPT

## 2017-05-15 PROCEDURE — 25000132 ZZH RX MED GY IP 250 OP 250 PS 637: Mod: GY | Performed by: INTERNAL MEDICINE

## 2017-05-15 PROCEDURE — 21400006 ZZH R&B CCU INTERMEDIATE UMMC

## 2017-05-15 PROCEDURE — 36415 COLL VENOUS BLD VENIPUNCTURE: CPT | Performed by: NURSE PRACTITIONER

## 2017-05-15 PROCEDURE — 25000128 H RX IP 250 OP 636: Performed by: NURSE PRACTITIONER

## 2017-05-15 PROCEDURE — A9270 NON-COVERED ITEM OR SERVICE: HCPCS | Mod: GY

## 2017-05-15 PROCEDURE — 85018 HEMOGLOBIN: CPT | Performed by: NURSE PRACTITIONER

## 2017-05-15 PROCEDURE — 25000128 H RX IP 250 OP 636: Performed by: INTERNAL MEDICINE

## 2017-05-15 PROCEDURE — 00000146 ZZHCL STATISTIC GLUCOSE BY METER IP

## 2017-05-15 PROCEDURE — 80053 COMPREHEN METABOLIC PANEL: CPT | Performed by: INTERNAL MEDICINE

## 2017-05-15 PROCEDURE — A9270 NON-COVERED ITEM OR SERVICE: HCPCS | Mod: GY | Performed by: NURSE PRACTITIONER

## 2017-05-15 PROCEDURE — 93321 DOPPLER ECHO F-UP/LMTD STD: CPT | Mod: 26 | Performed by: INTERNAL MEDICINE

## 2017-05-15 PROCEDURE — 25000132 ZZH RX MED GY IP 250 OP 250 PS 637: Mod: GY | Performed by: STUDENT IN AN ORGANIZED HEALTH CARE EDUCATION/TRAINING PROGRAM

## 2017-05-15 PROCEDURE — 93325 DOPPLER ECHO COLOR FLOW MAPG: CPT | Mod: 26 | Performed by: INTERNAL MEDICINE

## 2017-05-15 PROCEDURE — 74000 XR ABDOMEN PORT F1 VW: CPT

## 2017-05-15 PROCEDURE — 99232 SBSQ HOSP IP/OBS MODERATE 35: CPT | Performed by: NURSE PRACTITIONER

## 2017-05-15 PROCEDURE — 36415 COLL VENOUS BLD VENIPUNCTURE: CPT | Performed by: INTERNAL MEDICINE

## 2017-05-15 PROCEDURE — A9270 NON-COVERED ITEM OR SERVICE: HCPCS | Mod: GY | Performed by: STUDENT IN AN ORGANIZED HEALTH CARE EDUCATION/TRAINING PROGRAM

## 2017-05-15 PROCEDURE — 85027 COMPLETE CBC AUTOMATED: CPT | Performed by: NURSE PRACTITIONER

## 2017-05-15 PROCEDURE — 25000132 ZZH RX MED GY IP 250 OP 250 PS 637: Mod: GY | Performed by: NURSE PRACTITIONER

## 2017-05-15 PROCEDURE — 93308 TTE F-UP OR LMTD: CPT

## 2017-05-15 PROCEDURE — 83880 ASSAY OF NATRIURETIC PEPTIDE: CPT | Performed by: INTERNAL MEDICINE

## 2017-05-15 PROCEDURE — 25000132 ZZH RX MED GY IP 250 OP 250 PS 637: Mod: GY

## 2017-05-15 PROCEDURE — 83605 ASSAY OF LACTIC ACID: CPT | Performed by: INTERNAL MEDICINE

## 2017-05-15 PROCEDURE — 85610 PROTHROMBIN TIME: CPT | Performed by: INTERNAL MEDICINE

## 2017-05-15 PROCEDURE — A9270 NON-COVERED ITEM OR SERVICE: HCPCS | Mod: GY | Performed by: INTERNAL MEDICINE

## 2017-05-15 PROCEDURE — 93308 TTE F-UP OR LMTD: CPT | Mod: 26 | Performed by: INTERNAL MEDICINE

## 2017-05-15 RX ORDER — FUROSEMIDE 10 MG/ML
80 INJECTION INTRAMUSCULAR; INTRAVENOUS ONCE
Status: COMPLETED | OUTPATIENT
Start: 2017-05-15 | End: 2017-05-15

## 2017-05-15 RX ORDER — FUROSEMIDE 10 MG/ML
60 INJECTION INTRAMUSCULAR; INTRAVENOUS ONCE
Status: COMPLETED | OUTPATIENT
Start: 2017-05-15 | End: 2017-05-15

## 2017-05-15 RX ORDER — LORAZEPAM 0.5 MG/1
0.5 TABLET ORAL EVERY 6 HOURS
Status: DISCONTINUED | OUTPATIENT
Start: 2017-05-15 | End: 2017-05-18 | Stop reason: HOSPADM

## 2017-05-15 RX ORDER — MAGNESIUM CARB/ALUMINUM HYDROX 105-160MG
30 TABLET,CHEWABLE ORAL DAILY PRN
Status: DISCONTINUED | OUTPATIENT
Start: 2017-05-15 | End: 2017-05-18 | Stop reason: HOSPADM

## 2017-05-15 RX ORDER — TIOTROPIUM BROMIDE 18 UG/1
18 CAPSULE ORAL; RESPIRATORY (INHALATION) DAILY
Status: DISCONTINUED | OUTPATIENT
Start: 2017-05-15 | End: 2017-05-18 | Stop reason: HOSPADM

## 2017-05-15 RX ADMIN — MIRTAZAPINE 15 MG: 15 TABLET, FILM COATED ORAL at 21:08

## 2017-05-15 RX ADMIN — SILDENAFIL 20 MG: 20 TABLET, FILM COATED ORAL at 08:57

## 2017-05-15 RX ADMIN — CLOPIDOGREL 75 MG: 75 TABLET, FILM COATED ORAL at 08:58

## 2017-05-15 RX ADMIN — METOPROLOL TARTRATE 100 MG: 50 TABLET, FILM COATED ORAL at 08:57

## 2017-05-15 RX ADMIN — LORAZEPAM 0.5 MG: 0.5 TABLET ORAL at 14:44

## 2017-05-15 RX ADMIN — FUROSEMIDE 60 MG: 10 INJECTION, SOLUTION INTRAVENOUS at 10:12

## 2017-05-15 RX ADMIN — ASPIRIN 81 MG CHEWABLE TABLET 81 MG: 81 TABLET CHEWABLE at 08:57

## 2017-05-15 RX ADMIN — FUROSEMIDE 80 MG: 10 INJECTION, SOLUTION INTRAVENOUS at 18:52

## 2017-05-15 RX ADMIN — ATORVASTATIN CALCIUM 40 MG: 40 TABLET, FILM COATED ORAL at 21:08

## 2017-05-15 RX ADMIN — FAMOTIDINE 20 MG: 20 TABLET ORAL at 08:58

## 2017-05-15 RX ADMIN — LORAZEPAM 0.5 MG: 0.5 TABLET ORAL at 21:08

## 2017-05-15 RX ADMIN — LEVOTHYROXINE SODIUM 137 MCG: 137 TABLET ORAL at 08:57

## 2017-05-15 RX ADMIN — METOPROLOL TARTRATE 100 MG: 50 TABLET, FILM COATED ORAL at 21:08

## 2017-05-15 RX ADMIN — SILDENAFIL 20 MG: 20 TABLET, FILM COATED ORAL at 21:08

## 2017-05-15 RX ADMIN — SILDENAFIL 20 MG: 20 TABLET, FILM COATED ORAL at 14:44

## 2017-05-15 RX ADMIN — LORAZEPAM 0.5 MG: 2 INJECTION INTRAMUSCULAR; INTRAVENOUS at 09:03

## 2017-05-15 NOTE — CONSULTS
Nephrology Initial Consult  May 15, 2017      Rohan Monroe MRN:[id number] YOB: 1945  Date of Admission:5/12/2017  Primary care provider: Jamie Foster  Requesting physician: Geovany Morel MD    ASSESSMENT AND RECOMMENDATIONS:   Rohan Monroe is a 71 year old male with history of CKD stage 3 2/2 to DM/vascular dx, atrial fibrillation, HFpEF, CAD s/p stents, HCV, hypothyroidism, and sarcoidosis (pulm/cardiac involvement) that was admitted on 5/12 for elective PCI.  Now with RADHA on CKD.    1.) Non-oliguric RADHA on CKD: Likely a combination of contrast nephropathy and ischemic ATN from episodic hypotension and blood loss. Baseline Cr is variable, but appears to be in the 1.6-1.8 range. Cr continues to increase since admission and is up to 4.84 today. BUN 57. Patient continues to make adequate urine. No electrolyte, acid/base, volume issues, or indications for dialysis at the present time.  - Agree with giving diuretics; would recommend re-dosing Lasix this evening   - Continue Fung catheter  - Avoid hypotension and renal hypoperfusion  - Avoid nephrotoxins  - Daily BMP    2.) Volume/BP: Had episodes of hypotension from previous acute blood loss anemia. Antihypertensives being held for now given intermittent hypotension.     3.) Chronic anemia: Likely multifactorial from chronic health problems and underling CKD. Exacerbated this hospitalization from RP bleed following PCI. Hgb down to 6.9 today. Transfusions per primary team.    Recommendations were communicated to primary team via this note.     Thank you for this interesting consult.  Nephrology will continue to follow.     Seen and discussed with Dr. Fung.    Fabian Cruz DO   PGY-2 Internal Medicine  [phone]    REASON FOR CONSULT: RADHA on CKD    HISTORY OF PRESENT ILLNESS:  Rohan Mnoroe is a 71 year old male with history of CKD stage 3 2/2 to DM/vascular dx, atrial fibrillation, HFpEF, CAD s/p stents, HCV,  hypothyroidism, and sarcoidosis (pulm/cardiac involvement) that was admitted on 5/12 for elective PCI. Following the procedure, patient developed hypotension with BP 59/41 with Hgb drop from 10.9 to 6.6. 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 Cr increased from 1.84 on day of procedure to 4.84 today. Nephrology was consulted for assistance in management of RADHA on CKD.    At time of encounter, patient reports ongoing bloating and abdominal distension. He has some shortness of breath when he changes position. Denies body aches, fevers/chills, nausea/vomiting, dysuria/hematuria. No urinary urgency/hesitancy. Currently has Fung catheter in place. No other complaints or concerns.     Patient last seen by Nephrology as an outpatient on 4/18/17 by Dr. Ervin. At that time, was thought to have mild pre-renal RADHA from diuretics.     PAST MEDICAL HISTORY:  Reviewed with patient on 05/15/2017   Past Medical History:   Diagnosis Date     Cataract of both eyes      Chronic infection     Hep C     Congestive heart failure, unspecified      Depressive disorder, not elsewhere classified     Depression (non-psychotic)     ERM OS (epiretinal membrane, left eye)      Generalized osteoarthrosis, unspecified site      Glaucoma suspect      Hypertension      Lichen planus      Other psoriasis      PVD (posterior vitreous detachment), left eye      Sarcoidosis (H)      Sarcoidosis (H)      Type II or unspecified type diabetes mellitus without mention of complication, not stated as uncontrolled      Unspecified hypothyroidism     Hypothyroidism     Unspecified viral hepatitis C without hepatic coma      Viral warts, unspecified        Past Surgical History:   Procedure Laterality Date     ANESTHESIA CARDIOVERSION N/A 1/19/2017    Procedure: ANESTHESIA CARDIOVERSION;  Surgeon: GENERIC ANESTHESIA PROVIDER;  Location: [facility] OR     ANESTHESIA CARDIOVERSION N/A 1/23/2017    Procedure: ANESTHESIA  CARDIOVERSION;  Surgeon: GENERIC ANESTHESIA PROVIDER;  Location: [facility] OR     ANESTHESIA CARDIOVERSION N/A 1/24/2017    Procedure: ANESTHESIA CARDIOVERSION;  Surgeon: GENERIC ANESTHESIA PROVIDER;  Location: [facility] OR     ARTHROPLASTY HIP  8/24/2011    Procedure:ARTHROPLASTY HIP; Right Total Hip Arthroplasty  Choice anesthesia; Surgeon:LESLI WILKINSON; Location:[facility] OR     BIOPSY       cardiac stent      s/p     CARDIAC SURGERY       CATARACT IOL, RT/LT  9/15/2015    LE     COLONOSCOPY       HC REMOVAL OF TONSILS,<11 Y/O      Tonsils <10y.o.     HC REPAIR INCISIONAL HERNIA,REDUCIBLE      Hernia Repair, Incisional, Unilateral     JOINT REPLACEMENT      1 month ago--right hip     LIGATN/STRIP LONG & SHORT SAPHEN        MEDICATIONS:  PTA Meds  Prior to Admission medications    Medication Sig Last Dose Taking? Auth Provider   atorvastatin (LIPITOR) 40 MG tablet Take 1 tablet (40 mg) by mouth daily  Yes Villa Randhawa MD   clopidogrel (PLAVIX) 75 MG tablet Take 1 tablet (75 mg) by mouth daily  Yes Villa Randhawa MD   enoxaparin (LOVENOX) 80 MG/0.8ML injection Inject 0.88 mLs (88 mg) Subcutaneous every 12 hours 5/11/2017 at 1900 Yes Diane Paige MD   sildenafil (REVATIO/VIAGRA) 20 MG tablet Take 1 tablet (20 mg) by mouth 3 times daily 5/12/2017 at 0800 Yes Sacha Weber MD   furosemide (LASIX) 20 MG tablet Take 3 tablets (60 mg) by mouth 2 times daily 5/12/2017 at 0800 Yes Sacha Weber MD   levothyroxine (SYNTHROID/LEVOTHROID) 137 MCG tablet Take 1 tablet (137 mcg) by mouth daily 5/12/2017 at 0800 Yes Jamie Foster MD   warfarin (COUMADIN) 5 MG tablet Take 1 tablet (5 mg) by mouth daily Past Week at Unknown time Yes Brian Vazquez MD   polyethylene glycol (MIRALAX) powder Take 17 g (1 capful) by mouth daily Past Month at Unknown time Yes Elizabeth Cabral APRN CNP   tiotropium (SPIRIVA HANDIHALER) 18 MCG capsule Inhale contents of one capsule daily. 5/11/2017 at 0800 Yes  Elizabeth Cabral APRN CNP   albuterol (PROAIR HFA/PROVENTIL HFA/VENTOLIN HFA) 108 (90 BASE) MCG/ACT Inhaler Inhale 2 puffs into the lungs every 6 hours as needed for shortness of breath / dyspnea Past Week at Unknown time Yes Elizabeth Cabral APRN CNP   fluticasone-vilanterol (BREO ELLIPTA) 100-25 MCG/INH oral inhaler Inhale 1 puff into the lungs daily 5/11/2017 at 0800 Yes Eliazbeth Cabral APRN CNP   inFLIXimab (REMICADE) 100 MG injection Inject 100 mg into the vein every 28 days  Past Week at Unknown time Yes Reported, Patient   metoprolol (LOPRESSOR) 100 MG tablet Take 1 tablet (100 mg) by mouth 2 times daily 5/12/2017 at 0800 Yes Ollie Michel MD   methotrexate 2.5 MG tablet Take 3 tablets (7.5 mg) by mouth once a week On Mondays  Patient taking differently: Take 2.5mg by mouth weekly on Mondays. Past Week at Unknown time Yes Perlman, David Morris, MD   ASPIRIN EC PO Take 81 mg by mouth daily 5/12/2017 at 0800 Yes Unknown, Entered By History   blood glucose monitoring (ACCU-CHEK RONNIE PLUS) test strip Use to test blood sugar 2 times daily or as directed.  3 month supply. Past Week at Unknown time Yes Macey Roy MD   blood glucose monitoring (ACCU-CHEK FASTCLIX) lancets Use to test blood sugar 2 times daily or as directed.  102 lancets per box.  3 month supply. Past Week at Unknown time Yes Macey Roy MD   blood glucose monitoring (NO BRAND SPECIFIED) meter device kit Use to test blood sugar 2 times daily. Past Week at Unknown time Yes Macey Roy MD   Urea (CARMOL 40) 40 % CREA To feet daily  Patient taking differently: Apply to feet daily as needed. Past Month at Unknown time Yes Chicho Mejia DPM   Multiple Vitamins-Minerals (MULTIVITAMIN & MINERAL PO) Take 1 tablet by mouth daily. 5/11/2017 at 1400 Yes Unknown, Entered By History   mirtazapine (REMERON) 15 MG tablet Take 15 mg by mouth At Bedtime. 5/11/2017 at 2100 Yes Unknown,  Entered By History      Current Meds    metoprolol  100 mg Oral BID     famotidine  20 mg Oral Daily     aspirin  81 mg Oral Daily     clopidogrel  75 mg Oral Daily     lidocaine 2 %  10 mL Urethral Once     atorvastatin  40 mg Oral At Bedtime     levothyroxine  137 mcg Oral QAM AC     mirtazapine  15 mg Oral At Bedtime     sildenafil  20 mg Oral TID     sodium chloride (PF)  3 mL Intracatheter Q8H     Infusion Meds    ASPIRIN NOT PRESCRIBED       - MEDICATION INSTRUCTIONS -       Reason ACE/ARB order not selected       - MEDICATION INSTRUCTIONS -       Warfarin Therapy Reminder       ALLERGIES:    Allergies   Allergen Reactions     Prednisone Other (See Comments)     He reports that he can't sleep for days and can't use small to massive doses of prednisone   Pt. Does not do well with high doses of Prednisone, His MD says that Prednisone is counter indicated. Prednisone failed to treat his sarcoidosis      REVIEW OF SYSTEMS:  A 10 point review of systems was negative except as noted above.    SOCIAL HISTORY:   Social History     Social History     Marital status:      Spouse name: N/A     Number of children: 2     Years of education: N/A     Occupational History      [employer]     Social History Main Topics     Smoking status: Former Smoker     Packs/day: 1.00     Years: 30.00     Types: Cigarettes     Start date: 12/30/1960     Quit date: 7/22/1994     Smokeless tobacco: Never Used     Alcohol use No     Drug use: No     Sexual activity: Yes     Partners: Female     Other Topics Concern     Blood Transfusions No     Exercise Yes     Social History Narrative    Dairy/d 2 servings/d    Caffeine little servings/d    Exercise 3 x week    Sunscreen used - Yes    Seatbelts used - Yes    Working smoke/CO detectors in the home - Yes    Guns stored in the home - No    Self Breast Exams - NOT APPLICABLE    Self Testicular Exam - No    Eye Exam up to date - Yes    Dental Exam up to date - Yes    Pap Smear up  "to date - NOT APPLICABLE    Mammogram up to date - NOT APPLICABLE    PSA up to date - Yes    Dexa Scan up to date - NOT APPLICABLE    Flex Sig / Colonoscopy up to date - Yes    Immunizations up to date - Unsure    Abuse: Current or Past(Physical, Sexual or Emotional)- No    Do you feel safe in your environment - Yes     FAMILY MEDICAL HISTORY:   Family History   Problem Relation Age of Onset     HEART DISEASE Father      irreg heart beat     Circulatory Father      varicose veins     Prostate Cancer Father      HEART DISEASE Mother      heart attack     Arthritis Mother      OSTEOPOROSIS Mother      Thyroid Disease Mother      Eye Disorder Maternal Grandmother      glaucoma     DIABETES Sister      type 2     Kidney Cancer Sister      DIABETES Sister      Glaucoma No family hx of      Macular Degeneration No family hx of      CANCER No family hx of      no skin cancer     PHYSICAL EXAM:   Temp  Av.7  F (36.5  C)  Min: 95.8  F (35.4  C)  Max: 99  F (37.2  C)      Pulse  Av.3  Min: 77  Max: 108 Resp  Av.8  Min: 11  Max: 24  SpO2  Av.6 %  Min: 87 %  Max: 100 %  FiO2 (%)  Av %  Min: 97 %  Max: 97 %       /82 (BP Location: Left arm)  Pulse 102  Temp 98.6  F (37  C) (Axillary)  Resp 20  Ht 1.753 m (5' 9\")  Wt 96.3 kg (212 lb 4.9 oz)  SpO2 99%  BMI 31.35 kg/m2   Date 05/15/17 0700 - 17 0659   Shift 6933-1910 9783-3511 0193-1491 24 Hour Total   I  N  T  A  K  E   Shift Total  (mL/kg)       O  U  T  P  U  T   Urine 450   450    Shift Total  (mL/kg) 450  (4.67)   450  (4.67)   Weight (kg) 96.3 96.3 96.3 96.3       Admit Weight: 87.5 kg (193 lb)   GENERAL APPEARANCE: lying in bed in NAD, pleasant and cooperative   EYES: no scleral icterus, pupils equal  HENT: NC/AT, moist mucous membranes   Pulmonary: Non-labored, decreased breath sounds at the bases bilaterally   CV: regular rhythm, normal rate, no rub   - 1+ LE edema present  Bilaterally   GI: mildly distended, non-tender, bowel " sounds quiet   : Fung present draining light yellow urine   SKIN: no rash, warm, dry, no cyanosis  NEURO: mentation intact and speech normal    LABS:   CMP  Recent Labs  Lab 05/15/17  0719 05/14/17  0601 05/13/17 1911 05/13/17  0551  05/12/17 2028 05/08/17  1518    140 139 139  --  138  < > 136   POTASSIUM 4.4 4.4 4.4 4.6  < > 3.6  < > 4.2   CHLORIDE 104 106 103 104  --  102  < > 99   CO2 23 24 23 24  --  26  < > 27   ANIONGAP 10 11 13 10  --  9  < > 9   * 140* 148* 166*  --  166*  < > 138*   BUN 57* 54* 46* 38*  --  40*  < > 44*   CR 4.84* 3.88* 2.85* 2.03*  --  1.84*  < > 1.94*   GFRESTIMATED 12* 15* 22* 32*  --  36*  < > 34*   GFRESTBLACK 14* 19* 26* 39*  --  44*  < > 41*   RAFFY 7.8* 8.0* 7.6* 7.5*  --  7.9*  < > 8.7   MAG  --   --   --  2.0  --  2.1  --   --    PHOS  --   --   --  3.4  --   --   --  3.6   PROTTOTAL 6.2* 5.9* 6.1*  --   --   --   --  7.6   ALBUMIN 2.6* 2.5* 2.7*  --   --   --   --  3.1*   BILITOTAL 1.0 0.8 1.2  --   --   --   --  0.6   ALKPHOS 62 54 61  --   --   --   --  108   AST 27 24 23  --   --   --   --  52*   ALT 27 28 34  --   --   --   --  69   < > = values in this interval not displayed.  CBC  Recent Labs  Lab 05/14/17  1008 05/14/17  0601 05/13/17 2239 05/13/17 1911   HGB 7.7* 7.8* 8.2* 9.4*   WBC 14.6* 14.8* 16.8* 19.1*   RBC 2.58* 2.60* 2.80* 3.14*   HCT 22.8* 22.8* 24.4* 27.2*   MCV 88 88 87 87   MCH 29.8 30.0 29.3 29.9   MCHC 33.8 34.2 33.6 34.6   RDW 16.8* 16.9* 16.5* 16.6*    182 205 204     INR  Recent Labs  Lab 05/15/17  0719 05/14/17  0601 05/13/17  1911 05/13/17  0551 05/13/17  0158   INR 1.23* 1.25* 1.21* 1.26* 1.45*   PTT  --   --   --   --  34     ABG  Recent Labs  Lab 05/13/17  0625   O2PER 6L      URINE STUDIES  Recent Labs   Lab Test  01/27/17   1130  01/19/17   1930  03/26/16   1947  10/27/15   1339   COLOR  Yellow  Yellow  Yellow  Yellow   APPEARANCE  Clear  Clear  Clear  Clear   URINEGLC  Negative  Negative  Negative  Negative   URINEBILI   Negative  Negative  Negative  Negative   URINEKETONE  Negative  Negative  Negative  Negative   SG  1.014  1.016  1.013  1.017   UBLD  Large*  Moderate*  Trace*  Large*   URINEPH  6.0  5.5  5.0  6.0   PROTEIN  >500*  300*  30*  300*   NITRITE  Negative  Negative  Negative  Negative   LEUKEST  Negative  Negative  Negative  Negative   RBCU  90*  20*  1  40*   WBCU  3*  2  1  2     Recent Labs   Lab Test  05/08/17   1650  04/18/17   1212  02/28/17   1206  12/26/16   1015  08/30/16   1320  07/18/16   1324  03/15/16   1107  02/29/16   1424  10/27/15   1339  10/07/15   1032  01/27/11   1507  11/16/10   0950   UTPG  1.87*  3.08*  6.07*  3.60*  1.40*  2.44*  1.50*  2.29*  2.00*  1.81*  0.02  0.10     PTH  Recent Labs   Lab Test  02/28/17   1150  03/15/16   1116  01/27/11   1435   PTHI  183*  98*  24     IRON STUDIES  Recent Labs   Lab Test  02/28/17   1150  03/15/16   1116   IRON  61  91   FEB  316  346   IRONSAT  19  26   TIFFANIE  181  176     IMAGING:  All imaging studies reviewed by me.     DO STACIA Beach, Thompson Fung, saw this patient on 05/15/2017 with Dr. Cruz and agree with the findings and plan of care as documented in the note.

## 2017-05-15 NOTE — PLAN OF CARE
Problem: Goal Outcome Summary  Goal: Goal Outcome Summary  RN:  1.Pt will remain hemodynamically stable.    Outcome: Improving  Patient is alert and oriented to person, place, time, and situation. VSS, no CO pain. Follows commands. NGT in right nare clamped. On clear liquids now. Lungs Clear on 2 liters of oxygen per nasal canula.Bruising on abdomen. Fung remains in place and patent. Patient slept well overnight. No other issue noted. Will continue to monitor and  Address as needed.

## 2017-05-15 NOTE — PROGRESS NOTES
Interventional Cardiology Progress Note  Interval: Patient accidentally pulled out NG during sleep. Replaced this am.   Surgery consulted.   Nephrology consulted.   Repeat abdomen Xray  Repeat CXR  Stopped MIV.   IV lasix 60 mg x 1  Ativan switched from IV to PO.    Drips:   none  PO Cardiac Meds: ASA 81mg daily, Plavix 75mg daily, atorvastatin 75 mg daily, sildenafil 20 mg TID  Other meds: levothyroxine.      Radiology Imaging  CXR- 1. Unchanged bilateral mixed airspace opacities, indistinct pulmonary  vasculature and perihilar opacities suggestive of pulmonary edema  superimposed on chronic changes of sarcoidosis.   2. Small bilateral pleural effusions, not significantly changed from  the prior radiograph.  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 Currently hemodynamically stable with stable hemoglobin. However, abdominal distention concerning for ileus or SBO.     Neuro  # Anxiety  - prn ativan, switched to PO.     # Pain  -limiting opioid use given abdominal distention     CV   # CAD, s/p ROSALIE to mLAD, D2  - ASA 81mg daily  - Ticagrelor 90mg BID  - Atorvastatin  - Holding BB in the setting of bleed       # Tachycardia, resolved.   -- IVF stopped  -- restart BB     #HFpEF  - Resume PTA lasix with IV 60 mg lasix bolus x 1 today.   -- monitor closely  --  Accurate io  --  Continue tele      Pulm,   #Sarcoid   - holding mtx for now    - restart meds once more stable      Heme   # Acute blood loss, s/p cover stent placmeent to external illiac   -- trend hgb  -- Prefer to keep hgb >8, however 6.9 assumed to be dilutional. Recheck pending.      GI  # Ileus s vs SBO- patient had a bowel movement yesterday.  -- Surgery Consult  -- NG to suction  Advance diet as tolerated, pending surgery approval.      # ID   # Leukocytosis, likely 2/2 hematoma  -- continue to trend      Renal   #RADHA, likely secondary  Hypotension in the setting of acute blood loss.   -- Nephrology consult.   --Trend cr   I/o     PPx: famotidine, Pneumoboots for now, given recent bleed     VINNY Thomas, CNP  [facility]  Interventional Cardiology-CSI Service  Pager [phone]

## 2017-05-15 NOTE — CONSULTS
General Surgery Consult Note     Rohan Monroe MRN# [id number]   YOB: 1945 Age: 71 year old      Date of Admission:  5/12/2017    CC: Abdominal distention    Assessment/Plan: 71 year old male with complex medical history and post procedure day 2 from PCI complicated by retroperitoneal hematoma requiring external iliac stenting.  Patient subsequently developed abdominal distention that improved with NG decompression. Likely ileus in setting of retroperitoneal hematoma and nonobstructive imaging.    -Continue NG decompression to LIS  -Rec NPO. Would avoid diet while NG in place. Ok to remove NG and start diet with return of bowel function (+flatus).  -Keep K>4.0, Phos >3.0, Mg >2.0 to promote bowel function  -Mineral oil through NG or enemas if continues to have no bowel movement.    General surgery will sign off. Please call with questions.      HPI: 71 year old male h/o a fib on warfarin, DM, COPD, HCV, sarcoidosis who underwent elective PCI of mLaD and D2 for stable angina.  Postoperatively, patient developed large right sided retroperitoneal hematoma wtih pseudoaneurysm at catheter site that required 2 units PRBCs an, PLTs,  FFP, and covered stent placement of the external iliac artery by IR. Patient subsequently developed abdominal distention and AXR showed large gastric bubble. NG tube was placed with return of 900cc. Patient had immediate improvement in nausea and abdominal distention.  NG became dislodged overnight and was replaced this morning. Patient was started on clear liquid diet which he consumed while NG tube was in place.  Denies having flatus or bowel movement today.    Past Medical History:  Past Medical History:   Diagnosis Date     Cataract of both eyes      Chronic infection     Hep C     Congestive heart failure, unspecified      Depressive disorder, not elsewhere classified     Depression (non-psychotic)     ERM OS (epiretinal membrane, left eye)      Generalized  osteoarthrosis, unspecified site      Glaucoma suspect      Hypertension      Lichen planus      Other psoriasis      PVD (posterior vitreous detachment), left eye      Sarcoidosis (H)      Sarcoidosis (H)      Type II or unspecified type diabetes mellitus without mention of complication, not stated as uncontrolled      Unspecified hypothyroidism     Hypothyroidism     Unspecified viral hepatitis C without hepatic coma      Viral warts, unspecified        Past Surgical History:  Past Surgical History:   Procedure Laterality Date     ANESTHESIA CARDIOVERSION N/A 1/19/2017    Procedure: ANESTHESIA CARDIOVERSION;  Surgeon: GENERIC ANESTHESIA PROVIDER;  Location: [facility] OR     ANESTHESIA CARDIOVERSION N/A 1/23/2017    Procedure: ANESTHESIA CARDIOVERSION;  Surgeon: GENERIC ANESTHESIA PROVIDER;  Location: [facility] OR     ANESTHESIA CARDIOVERSION N/A 1/24/2017    Procedure: ANESTHESIA CARDIOVERSION;  Surgeon: GENERIC ANESTHESIA PROVIDER;  Location: [facility] OR     ARTHROPLASTY HIP  8/24/2011    Procedure:ARTHROPLASTY HIP; Right Total Hip Arthroplasty  Choice anesthesia; Surgeon:LESLI WILKINSON; Location:[facility] OR     BIOPSY       cardiac stent      s/p     CARDIAC SURGERY       CATARACT IOL, RT/LT  9/15/2015    LE     COLONOSCOPY       HC REMOVAL OF TONSILS,<9 Y/O      Tonsils <10y.o.     HC REPAIR INCISIONAL HERNIA,REDUCIBLE      Hernia Repair, Incisional, Unilateral     JOINT REPLACEMENT      1 month ago--right hip     LIGATN/STRIP LONG & SHORT SAPHEN         Allergies:     Allergies   Allergen Reactions     Prednisone Other (See Comments)     He reports that he can't sleep for days and can't use small to massive doses of prednisone   Pt. Does not do well with high doses of Prednisone, His MD says that Prednisone is counter indicated. Prednisone failed to treat his sarcoidosis        Medications:    No current facility-administered medications on file prior to encounter.   Current Outpatient Prescriptions on File Prior to  Encounter:  sildenafil (REVATIO/VIAGRA) 20 MG tablet Take 1 tablet (20 mg) by mouth 3 times daily   furosemide (LASIX) 20 MG tablet Take 3 tablets (60 mg) by mouth 2 times daily   levothyroxine (SYNTHROID/LEVOTHROID) 137 MCG tablet Take 1 tablet (137 mcg) by mouth daily   warfarin (COUMADIN) 5 MG tablet Take 1 tablet (5 mg) by mouth daily   polyethylene glycol (MIRALAX) powder Take 17 g (1 capful) by mouth daily   tiotropium (SPIRIVA HANDIHALER) 18 MCG capsule Inhale contents of one capsule daily.   albuterol (PROAIR HFA/PROVENTIL HFA/VENTOLIN HFA) 108 (90 BASE) MCG/ACT Inhaler Inhale 2 puffs into the lungs every 6 hours as needed for shortness of breath / dyspnea   fluticasone-vilanterol (BREO ELLIPTA) 100-25 MCG/INH oral inhaler Inhale 1 puff into the lungs daily   inFLIXimab (REMICADE) 100 MG injection Inject 100 mg into the vein every 28 days    metoprolol (LOPRESSOR) 100 MG tablet Take 1 tablet (100 mg) by mouth 2 times daily   methotrexate 2.5 MG tablet Take 3 tablets (7.5 mg) by mouth once a week On Mondays (Patient taking differently: Take 2.5mg by mouth weekly on Mondays.)   ASPIRIN EC PO Take 81 mg by mouth daily   blood glucose monitoring (ACCU-CHEK RONNIE PLUS) test strip Use to test blood sugar 2 times daily or as directed.  3 month supply.   blood glucose monitoring (ACCU-CHEK FASTCLIX) lancets Use to test blood sugar 2 times daily or as directed.  102 lancets per box.  3 month supply.   blood glucose monitoring (NO BRAND SPECIFIED) meter device kit Use to test blood sugar 2 times daily.   Urea (CARMOL 40) 40 % CREA To feet daily (Patient taking differently: Apply to feet daily as needed.)   Multiple Vitamins-Minerals (MULTIVITAMIN & MINERAL PO) Take 1 tablet by mouth daily.   mirtazapine (REMERON) 15 MG tablet Take 15 mg by mouth At Bedtime.       Social History:  Social History     Social History     Marital status:      Spouse name: N/A     Number of children: 2     Years of education: N/A  "    Occupational History      [employer]     Social History Main Topics     Smoking status: Former Smoker     Packs/day: 1.00     Years: 30.00     Types: Cigarettes     Start date: 12/30/1960     Quit date: 7/22/1994     Smokeless tobacco: Never Used     Alcohol use No     Drug use: No     Sexual activity: Yes     Partners: Female     Other Topics Concern     Blood Transfusions No     Exercise Yes     Social History Narrative    Dairy/d 2 servings/d    Caffeine little servings/d    Exercise 3 x week    Sunscreen used - Yes    Seatbelts used - Yes    Working smoke/CO detectors in the home - Yes    Guns stored in the home - No    Self Breast Exams - NOT APPLICABLE    Self Testicular Exam - No    Eye Exam up to date - Yes    Dental Exam up to date - Yes    Pap Smear up to date - NOT APPLICABLE    Mammogram up to date - NOT APPLICABLE    PSA up to date - Yes    Dexa Scan up to date - NOT APPLICABLE    Flex Sig / Colonoscopy up to date - Yes    Immunizations up to date - Unsure    Abuse: Current or Past(Physical, Sexual or Emotional)- No    Do you feel safe in your environment - Yes       Family History:  Family History   Problem Relation Age of Onset     HEART DISEASE Father      irreg heart beat     Circulatory Father      varicose veins     Prostate Cancer Father      HEART DISEASE Mother      heart attack     Arthritis Mother      OSTEOPOROSIS Mother      Thyroid Disease Mother      Eye Disorder Maternal Grandmother      glaucoma     DIABETES Sister      type 2     Kidney Cancer Sister      DIABETES Sister      Glaucoma No family hx of      Macular Degeneration No family hx of      CANCER No family hx of      no skin cancer       ROS:  The remainder of the complete ROS was negative unless noted in the HPI.    Exam:  /80 (BP Location: Left arm)  Pulse 102  Temp 98.1  F (36.7  C) (Oral)  Resp 20  Ht 1.753 m (5' 9\")  Wt 96.3 kg (212 lb 4.9 oz)  SpO2 98%  BMI 31.35 kg/m2    General: Alert, " interactive, NAD, NG to suction  Resp: CTAB, no crackles or wheezes  Cardiac: RRR, NS1,S2, No m/r/g  Abdomen: Multiple ecchymoses present. Soft, minimal tenderness to RLQ, mildly distended with tympanny to percussion. No HSM or masses, no rebound or guarding.   Extremities: No LE edema or obvious joint abnormalities  Skin: Warm and dry, no jaundice or rash    Labs:  Labs reviewed  Hgb 6.9   WBC 11.7  Creatinine 4.84    Imaging:  AXR 5/15/17:  1. No abnormally dilated loops of bowel on this single supine x-ray.  2. Delayed nephrograms consistent with poor renal function.      Sabino Whitaker MD PGY-1..................5/15/2017   1:43 PM  General Surgery Resident  P: [id number]    Discussed with Dr. العراقي on 5/15/17

## 2017-05-15 NOTE — PROGRESS NOTES
Care Coordinator Progress Note     Admission Date/Time:  5/12/2017  Attending MD:  Geovany Morel MD     Data  Chart reviewed, discussed with interdisciplinary team.   Patient was admitted for:    Coronary artery disease involving native coronary artery of native heart without angina pectoris  Sarcoidosis of lung (H)  Hyperlipidemia LDL goal <70  Hyperlipidemia  CAD S/P percutaneous coronary angioplasty.    Concerns with insurance coverage for discharge needs: None.  Current Living Situation: Patient said that he lives with his adult son, Elvis.    Support System: Supportive and Involved son.   Services Involved: [facility],  Home [facility] for home O2.   Transportation: Pt's daughter, Jaimie will provide pt with a ride home.   Barriers to Discharge: chronically ill  Coordination of Care and Referrals: Provided patient/family with options for home care.    Assessment  Pt currently has NG in place but it is clamped. Pt and pt's family are requesting that home care be set up with [facility]. Pt said that he is homebound.   Intervention:    Referral made to [facility] (Ph: [phone] Fax: [phone]) for RN eval post hospitalization, assess vital signs, respiratory and cardiac status, activity tolerance, hydration, nutrition, med set up and management. INR lab draws with results to the [facility]. Physical Therapy eval and treat for deconditioning, strengthening, and endurance.   Plan  Anticipated Discharge Date:  TBD  Anticipated Discharge Plan:  Discharge to home with home care.     ELSA RITTER RN BSN  Care Coordinator  [phone]    Addendum 5/18  Per MD, plan is for discharge to home today. Per MD, pt will need a BMP on Saturday 5/20/2017 with results to [facility] ([phone]), added this to home care order and this writer called over and spoke to Maggy in intake at [facility] who stated they can accommodate this.  This  writer spoke with pt and his daughter to answer their questions regarding home care.   Alyson Cristobal RN BSN  6C Unit Care Coordinator  Phone number: [phone]  Pager: [phone]

## 2017-05-15 NOTE — PROGRESS NOTES
Transfer:  Transferred to: 6C   Via: wheel chair  Reason for transfer: No longer needs ICU level of care  Belongings: dentures, eye glasses and phone with  in clothing bag sent with patient  Report called to: Jordan RODRIGUEZ on 6C.  Writer did not complete an assessment on the patient however patient is.   alert and oriented to person, place, time, and situation  afebrile, vital signs stable  In no distress at time of transfer, just wanting to get a good nights sleep.  NGT remains in place other tolerating liquid diet today.  Left 4E at 20:00 hrs.

## 2017-05-16 LAB
ALBUMIN SERPL-MCNC: 2.7 G/DL (ref 3.4–5)
ALP SERPL-CCNC: 66 U/L (ref 40–150)
ALT SERPL W P-5'-P-CCNC: 25 U/L (ref 0–70)
ANION GAP SERPL CALCULATED.3IONS-SCNC: 11 MMOL/L (ref 3–14)
ANION GAP SERPL CALCULATED.3IONS-SCNC: 9 MMOL/L (ref 3–14)
AST SERPL W P-5'-P-CCNC: 27 U/L (ref 0–45)
BILIRUB SERPL-MCNC: 1.3 MG/DL (ref 0.2–1.3)
BUN SERPL-MCNC: 59 MG/DL (ref 7–30)
BUN SERPL-MCNC: 60 MG/DL (ref 7–30)
CALCIUM SERPL-MCNC: 7.9 MG/DL (ref 8.5–10.1)
CALCIUM SERPL-MCNC: 8.1 MG/DL (ref 8.5–10.1)
CHLORIDE SERPL-SCNC: 101 MMOL/L (ref 94–109)
CHLORIDE SERPL-SCNC: 104 MMOL/L (ref 94–109)
CO2 SERPL-SCNC: 24 MMOL/L (ref 20–32)
CO2 SERPL-SCNC: 26 MMOL/L (ref 20–32)
CREAT SERPL-MCNC: 4.86 MG/DL (ref 0.66–1.25)
CREAT SERPL-MCNC: 5.07 MG/DL (ref 0.66–1.25)
ERYTHROCYTE [DISTWIDTH] IN BLOOD BY AUTOMATED COUNT: 17 % (ref 10–15)
GFR SERPL CREATININE-BSD FRML MDRD: 11 ML/MIN/1.7M2
GFR SERPL CREATININE-BSD FRML MDRD: 12 ML/MIN/1.7M2
GLUCOSE BLDC GLUCOMTR-MCNC: 109 MG/DL (ref 70–99)
GLUCOSE BLDC GLUCOMTR-MCNC: 90 MG/DL (ref 70–99)
GLUCOSE BLDC GLUCOMTR-MCNC: 97 MG/DL (ref 70–99)
GLUCOSE SERPL-MCNC: 164 MG/DL (ref 70–99)
GLUCOSE SERPL-MCNC: 90 MG/DL (ref 70–99)
HCT VFR BLD AUTO: 23.3 % (ref 40–53)
HGB BLD-MCNC: 7.5 G/DL (ref 13.3–17.7)
INR PPP: 1.22 (ref 0.86–1.14)
LACTATE BLD-SCNC: 1.2 MMOL/L (ref 0.7–2.1)
MCH RBC QN AUTO: 30.1 PG (ref 26.5–33)
MCHC RBC AUTO-ENTMCNC: 32.2 G/DL (ref 31.5–36.5)
MCV RBC AUTO: 94 FL (ref 78–100)
PLATELET # BLD AUTO: 205 10E9/L (ref 150–450)
POTASSIUM SERPL-SCNC: 3.8 MMOL/L (ref 3.4–5.3)
POTASSIUM SERPL-SCNC: 4.1 MMOL/L (ref 3.4–5.3)
PROT SERPL-MCNC: 6.2 G/DL (ref 6.8–8.8)
RBC # BLD AUTO: 2.49 10E12/L (ref 4.4–5.9)
SODIUM SERPL-SCNC: 137 MMOL/L (ref 133–144)
SODIUM SERPL-SCNC: 138 MMOL/L (ref 133–144)
WBC # BLD AUTO: 10.1 10E9/L (ref 4–11)

## 2017-05-16 PROCEDURE — A9270 NON-COVERED ITEM OR SERVICE: HCPCS | Mod: GY | Performed by: INTERNAL MEDICINE

## 2017-05-16 PROCEDURE — A9270 NON-COVERED ITEM OR SERVICE: HCPCS | Mod: GY

## 2017-05-16 PROCEDURE — 25000132 ZZH RX MED GY IP 250 OP 250 PS 637: Mod: GY

## 2017-05-16 PROCEDURE — 25000132 ZZH RX MED GY IP 250 OP 250 PS 637: Mod: GY | Performed by: INTERNAL MEDICINE

## 2017-05-16 PROCEDURE — 25000128 H RX IP 250 OP 636: Performed by: INTERNAL MEDICINE

## 2017-05-16 PROCEDURE — A9270 NON-COVERED ITEM OR SERVICE: HCPCS | Mod: GY | Performed by: NURSE PRACTITIONER

## 2017-05-16 PROCEDURE — 21400006 ZZH R&B CCU INTERMEDIATE UMMC

## 2017-05-16 PROCEDURE — 99233 SBSQ HOSP IP/OBS HIGH 50: CPT | Mod: GC | Performed by: INTERNAL MEDICINE

## 2017-05-16 PROCEDURE — 80048 BASIC METABOLIC PNL TOTAL CA: CPT | Performed by: INTERNAL MEDICINE

## 2017-05-16 PROCEDURE — 83605 ASSAY OF LACTIC ACID: CPT | Performed by: INTERNAL MEDICINE

## 2017-05-16 PROCEDURE — 80053 COMPREHEN METABOLIC PANEL: CPT | Performed by: INTERNAL MEDICINE

## 2017-05-16 PROCEDURE — 00000146 ZZHCL STATISTIC GLUCOSE BY METER IP

## 2017-05-16 PROCEDURE — A9270 NON-COVERED ITEM OR SERVICE: HCPCS | Mod: GY | Performed by: STUDENT IN AN ORGANIZED HEALTH CARE EDUCATION/TRAINING PROGRAM

## 2017-05-16 PROCEDURE — 36415 COLL VENOUS BLD VENIPUNCTURE: CPT | Performed by: INTERNAL MEDICINE

## 2017-05-16 PROCEDURE — 25000132 ZZH RX MED GY IP 250 OP 250 PS 637: Mod: GY | Performed by: STUDENT IN AN ORGANIZED HEALTH CARE EDUCATION/TRAINING PROGRAM

## 2017-05-16 PROCEDURE — 25000132 ZZH RX MED GY IP 250 OP 250 PS 637: Mod: GY | Performed by: NURSE PRACTITIONER

## 2017-05-16 PROCEDURE — 85027 COMPLETE CBC AUTOMATED: CPT | Performed by: INTERNAL MEDICINE

## 2017-05-16 PROCEDURE — 85610 PROTHROMBIN TIME: CPT | Performed by: INTERNAL MEDICINE

## 2017-05-16 PROCEDURE — 25000131 ZZH RX MED GY IP 250 OP 636 PS 637: Mod: GY | Performed by: INTERNAL MEDICINE

## 2017-05-16 RX ORDER — WARFARIN SODIUM 1 MG/1
2 TABLET ORAL
Status: COMPLETED | OUTPATIENT
Start: 2017-05-16 | End: 2017-05-16

## 2017-05-16 RX ORDER — FUROSEMIDE 10 MG/ML
80 INJECTION INTRAMUSCULAR; INTRAVENOUS ONCE
Status: COMPLETED | OUTPATIENT
Start: 2017-05-16 | End: 2017-05-16

## 2017-05-16 RX ADMIN — TIOTROPIUM BROMIDE 18 MCG: 18 CAPSULE ORAL; RESPIRATORY (INHALATION) at 08:20

## 2017-05-16 RX ADMIN — METOPROLOL TARTRATE 100 MG: 50 TABLET, FILM COATED ORAL at 20:05

## 2017-05-16 RX ADMIN — METOPROLOL TARTRATE 100 MG: 50 TABLET, FILM COATED ORAL at 08:21

## 2017-05-16 RX ADMIN — FUROSEMIDE 80 MG: 10 INJECTION, SOLUTION INTRAVENOUS at 09:39

## 2017-05-16 RX ADMIN — SILDENAFIL 20 MG: 20 TABLET, FILM COATED ORAL at 08:20

## 2017-05-16 RX ADMIN — MINERAL OIL 30 ML: 1000 SOLUTION ORAL at 01:40

## 2017-05-16 RX ADMIN — SILDENAFIL 20 MG: 20 TABLET, FILM COATED ORAL at 14:01

## 2017-05-16 RX ADMIN — ATORVASTATIN CALCIUM 40 MG: 40 TABLET, FILM COATED ORAL at 22:22

## 2017-05-16 RX ADMIN — LORAZEPAM 0.5 MG: 0.5 TABLET ORAL at 02:56

## 2017-05-16 RX ADMIN — MIRTAZAPINE 15 MG: 15 TABLET, FILM COATED ORAL at 22:22

## 2017-05-16 RX ADMIN — LEVOTHYROXINE SODIUM 137 MCG: 137 TABLET ORAL at 08:21

## 2017-05-16 RX ADMIN — CLOPIDOGREL 75 MG: 75 TABLET, FILM COATED ORAL at 08:21

## 2017-05-16 RX ADMIN — FAMOTIDINE 20 MG: 20 TABLET ORAL at 08:21

## 2017-05-16 RX ADMIN — LORAZEPAM 0.5 MG: 0.5 TABLET ORAL at 20:05

## 2017-05-16 RX ADMIN — LORAZEPAM 0.5 MG: 0.5 TABLET ORAL at 08:21

## 2017-05-16 RX ADMIN — SILDENAFIL 20 MG: 20 TABLET, FILM COATED ORAL at 20:05

## 2017-05-16 RX ADMIN — WARFARIN SODIUM 2 MG: 1 TABLET ORAL at 17:25

## 2017-05-16 RX ADMIN — LORAZEPAM 0.5 MG: 0.5 TABLET ORAL at 14:01

## 2017-05-16 RX ADMIN — ASPIRIN 81 MG CHEWABLE TABLET 81 MG: 81 TABLET CHEWABLE at 08:20

## 2017-05-16 RX ADMIN — METHOTREXATE 2.5 MG: 2.5 TABLET ORAL at 17:25

## 2017-05-16 RX ADMIN — FLUTICASONE FUROATE AND VILANTEROL TRIFENATATE 1 PUFF: 100; 25 POWDER RESPIRATORY (INHALATION) at 08:19

## 2017-05-16 NOTE — PROGRESS NOTES
"Cardiology Progress Note    Assessment & Plan   Rohan Monroe is a 71 year old male who was admitted on 5/12/2017 after coronary angiogram with PCI that was complicated by large RP bleed now with severe RADHA and ileus.    1.  Acute blood loss anemia due to RP bleed with iliac artery injury requiring placement of covered stent  -Daily Hgb  -Pain control with limitations of narcotics due to ileus    2.  CAD with PCI to mLAD and D2  -DAPT, atorvastatin  -Hold beta-blocker for now, restart tomorrow    3.  KDIGO Grade 3 RADHA on Stage 3 CKD: Cr appears to be peaking today at 5.07  -Continue with diuresis today  -Renal following, no need for dialysis    4.  HFpEF  -IV diuresis again today    5.  Ileus  -Clamp NG tube today  -Clear liquid  -Trial of bowel meds    6.  History of sarcoidosis  -Resume methotrexate today    7.  Atrial fibrillation  -Resume warfarin tonight    Other medical conditions that are being monitored include: hypoalbuminemia and hypoproteinemia    PPx  -Famotidine and pneumoboots due to bleed    Interval History   Wants to go home    Physical Exam   Temp: 97.7  F (36.5  C) Temp src: Oral BP: 155/87   Heart Rate: 94 Resp: 18 SpO2: 99 % O2 Device: Nasal cannula Oxygen Delivery: 5 LPM  Vitals:    05/13/17 0530 05/14/17 0015 05/16/17 0700   Weight: 93 kg (205 lb 0.4 oz) 96.3 kg (212 lb 4.9 oz) 93.4 kg (206 lb)     Vital Signs with Ranges  Temp:  [97.6  F (36.4  C)-98.5  F (36.9  C)] 97.7  F (36.5  C)  Heart Rate:  [73-97] 94  Resp:  [18-22] 18  BP: (131-155)/(76-94) 155/87  SpO2:  [97 %-100 %] 99 %  I/O last 3 completed shifts:  In: 660 [P.O.:660]  Out: 3570 [Urine:2550; Emesis/NG output:1020]    Heart Rate: 94, Blood pressure 155/87, pulse 102, temperature 97.7  F (36.5  C), temperature source Oral, resp. rate 18, height 1.753 m (5' 9\"), weight 93.4 kg (206 lb), SpO2 99 %.  206 lbs 0 oz  GEN:  Alert, oriented x 3, appears comfortable, NAD.  CV:  Regular rate and rhythm, no murmur or JVD.    LUNGS:  " Clear to auscultation bilaterally   ABD:  Active bowel sounds, soft, non-tender, slightly distended  EXT:  Pitting edema bilaterally    Medications     ASPIRIN NOT PRESCRIBED       - MEDICATION INSTRUCTIONS -       Reason ACE/ARB order not selected       - MEDICATION INSTRUCTIONS -       Warfarin Therapy Reminder         LORazepam  0.5 mg Oral Q6H     fluticasone-vilanterol  1 puff Inhalation Daily     tiotropium  18 mcg Inhalation Daily     metoprolol  100 mg Oral BID     famotidine  20 mg Oral Daily     aspirin  81 mg Oral Daily     clopidogrel  75 mg Oral Daily     lidocaine 2 %  10 mL Urethral Once     atorvastatin  40 mg Oral At Bedtime     levothyroxine  137 mcg Oral QAM AC     mirtazapine  15 mg Oral At Bedtime     sildenafil  20 mg Oral TID     sodium chloride (PF)  3 mL Intracatheter Q8H       Data     Recent Labs  Lab 05/16/17  0703 05/15/17  1333 05/15/17  0954 05/15/17  0719 05/14/17  1008 05/14/17  0601   WBC 10.1  --  11.7*  --  14.6* 14.8*   HGB 7.5* 7.7* 6.9*  --  7.7* 7.8*   MCV 94  --  92  --  88 88     --  143*  --  160 182   INR 1.22*  --   --  1.23*  --  1.25*     --   --  138  --  140   POTASSIUM 4.1  --   --  4.4  --  4.4   CHLORIDE 104  --   --  104  --  106   CO2 24  --   --  23  --  24   BUN 60*  --   --  57*  --  54*   CR 5.07*  --   --  4.84*  --  3.88*   ANIONGAP 11  --   --  10  --  11   RAFFY 8.1*  --   --  7.8*  --  8.0*   GLC 90  --   --  103*  --  140*   ALBUMIN 2.7*  --   --  2.6*  --  2.5*   PROTTOTAL 6.2*  --   --  6.2*  --  5.9*   BILITOTAL 1.3  --   --  1.0  --  0.8   ALKPHOS 66  --   --  62  --  54   ALT 25  --   --  27  --  28   AST 27  --   --  27  --  24       Recent Results (from the past 24 hour(s))   XR Abdomen Port 1 View    Narrative    Study: XR ABDOMEN PORT F1 VW 5/15/2017 12:27 PM    History: r/o SBO    Comparison: Abdominal x-ray of 5/14/2017.    Findings:   Single supine abdominal x-ray was obtained. Paucity of small bowel gas  with no abnormally  dilated loops of bowel. Air-filled loops of right  and transverse colon. Hyperdense appearance of the kidneys. Contrast  administered 2 days ago. Partially visualized changes of total right  hip arthroplasty. Degenerative changes of the lumbar spine.      Impression    Impression:   1. No abnormally dilated loops of bowel on this single supine x-ray.  2. Delayed nephrograms consistent with poor renal function.    I have personally reviewed the examination and initial interpretation  and I agree with the findings.    SHAKILA MEEK MD   XR Chest Port 1 View    Narrative    Exam: XR CHEST PORT 1 VW, 5/15/2017 10:13 AM    Indication: assess volume status    Comparison: Radiograph the chest 5/14/2017.    Findings:   Bilateral mixed airspace opacities. Small bilateral pleural effusions.  Bibasilar, retrocardiac, and perihilar opacities. Pulmonary  vasculature is indistinct. Cardiac silhouette is not enlarged. Stable  tortuosity of the trachea. The upper abdomen is unremarkable.      Impression    Impression:   1. Unchanged bilateral mixed airspace opacities, indistinct pulmonary  vasculature and perihilar opacities suggestive of pulmonary edema  superimposed on chronic changes of sarcoidosis.   2. Small bilateral pleural effusions, not significantly changed from  the prior radiograph.    I have personally reviewed the examination and initial interpretation  and I agree with the findings.    ISAIAH WILSON MD     I have seen and examined the patient with the [facility] team. I agree with the assessment and plan of the note above.I have reviewed pertinent labs.     Paulo Goodwin MD  Interventional Cardiology  Pager: [phone]

## 2017-05-16 NOTE — PLAN OF CARE
Problem: Goal Outcome Summary  Goal: Goal Outcome Summary  RN:  1.Pt will remain hemodynamically stable.    Outcome: Therapy, progress toward functional goals is gradual  Patient on CLD but not wishing to take anything besides H2O with AM pills, Abd & Chest X-Rays done, new order to replace NGT for decompression, patient OKed to continue CLD lunch with suction off x 30 min, after LIS restarted Clear liquids drained back, General Surgery Consult done, new order for NPO except ice chips, NGT to LIS has drained 1020 cc, red jello colored. Denies nausea, denies abdominal pain, abdomen still distended and slightly firm, passing gas tonight. IVF's DC'ed mid morning, received Lasix 60 mg in AM and 80 mg tonight, Fung patent, 650 cc UO this shift. Cr 4.84, seen by Renal Consult. Lactic Acid 0.7 x 2 today, Hgb 6.9, felt to be dilutional, recheck 7.7. No change in right flank bruising. BNP 5090, K+ 4.4. Echo cardiogram done. Monitor NSR 70's-90's. C/O anxiety, received Ativan 0.5 mg IV once, then changed to scheduled oral dose Q 6 hours. Refer to flow sheets for full assessments, VS, FSG, labs etc.

## 2017-05-16 NOTE — PROGRESS NOTES
"  Nephrology Progress Note  05/16/2017         Assessment & Recommendations:     Rohan Monroe is a 71 year old male with history of CKD stage 3 2/2 to DM/vascular dx, atrial fibrillation, HFpEF, CAD s/p stents, HCV, hypothyroidism, and sarcoidosis (pulm/cardiac involvement) that was admitted on 5/12 for elective PCI. Now with RADHA on CKD.     1.) Non-oliguric RADHA on CKD: Likely a combination of contrast nephropathy and ischemic ATN from episodic hypotension and blood loss. Baseline Cr is variable, but appears to be in the 1.6-1.8 range. Serum Cr peaked to 5.1 and most recent serum Cr 4.8. Serum Cr appears to have plateaued.    - Avoid hypotension and renal hypoperfusion  - Avoid nephrotoxins  - Daily BMP     2.) Volume/BP: BP in acceptable range. Non oliguric with diuretics. Diuretics managed by cardiology team.      3.) Chronic anemia: Likely multifactorial from chronic health problems and underling CKD. Exacerbated this hospitalization from RP bleed following PCI. Hgb 7.5  today. Transfusions per primary team.    4) no electrolyte or acid base issues.     Discussed with Dr Fung.      Recommendations were communicated to primary team via this note.      MADY Enriquez MD   [phone].     Interval History :      patient reported feeling better. Denied SOB. No nausea or vomiting.   Review of Systems:   As in interval history.   Physical Exam:   I/O last 3 completed shifts:  In: 960 [P.O.:960]  Out: 4550 [Urine:3450; Emesis/NG output:1100]   /81 (BP Location: Left arm)  Pulse 102  Temp 97.6  F (36.4  C) (Oral)  Resp 17  Ht 1.753 m (5' 9\")  Wt 93.4 kg (206 lb)  SpO2 100%  BMI 30.42 kg/m2     GENERAL APPEARANCE: no acute distress  EYES:  No  scleral icterus, pupils equal  HENT: mouth without ulcers or lesions  PULM: lungs clear to auscultate bilaterally.   CV: regular rhythm, normal rate, no rub     -edema trace  GI: soft, bowel sounds are present.   INTEGUMENT: no cyanosis  NEURO:  NAD    Labs: "   All labs reviewed by me  Electrolytes/Renal -   Recent Labs   Lab Test  05/16/17   1657  05/16/17   0703  05/15/17   0719   05/13/17   0551   05/12/17   2028   05/08/17   1518   04/24/17   1305   04/18/17   1231   NA  137  138  138   < >  139   --   138   < >  136   < >  138   < >  138   POTASSIUM  3.8  4.1  4.4   < >  4.6   < >  3.6   < >  4.2   < >  4.0   < >  4.2   CHLORIDE  101  104  104   < >  104   --   102   < >  99   < >  102   < >  104   CO2  26  24  23   < >  24   --   26   < >  27   < >  30   < >  24   BUN  59*  60*  57*   < >  38*   --   40*   < >  44*   < >  29   < >  49*   CR  4.86*  5.07*  4.84*   < >  2.03*   --   1.84*   < >  1.94*   < >  1.82*   < >  2.32*   GLC  164*  90  103*   < >  166*   --   166*   < >  138*   < >  166*   < >  123*   RFAFY  7.9*  8.1*  7.8*   < >  7.5*   --   7.9*   < >  8.7   < >  8.8   < >  9.2   MAG   --    --    --    --   2.0   --   2.1   --    --    --   2.0   --    --    PHOS   --    --    --    --   3.4   --    --    --   3.6   --    --    --   3.2    < > = values in this interval not displayed.       CBC -   Recent Labs   Lab Test  05/16/17   0703  05/15/17   1333  05/15/17   0954  05/14/17   1008   WBC  10.1   --   11.7*  14.6*   HGB  7.5*  7.7*  6.9*  7.7*   PLT  205   --   143*  160       LFTs -   Recent Labs   Lab Test  05/16/17   0703  05/15/17   0719  05/14/17   0601   ALKPHOS  66  62  54   BILITOTAL  1.3  1.0  0.8   ALT  25  27  28   AST  27  27  24   PROTTOTAL  6.2*  6.2*  5.9*   ALBUMIN  2.7*  2.6*  2.5*       Iron Panel -   Recent Labs   Lab Test  02/28/17   1150  03/15/16   1116   IRON  61  91   IRONSAT  19  26   TIFFANIE  181  176         Imaging:  All imaging studies reviewed by me.     Current Medications:    methotrexate  2.5 mg Oral Weekly     LORazepam  0.5 mg Oral Q6H     fluticasone-vilanterol  1 puff Inhalation Daily     tiotropium  18 mcg Inhalation Daily     metoprolol  100 mg Oral BID     famotidine  20 mg Oral Daily     aspirin  81 mg Oral Daily      clopidogrel  75 mg Oral Daily     lidocaine 2 %  10 mL Urethral Once     atorvastatin  40 mg Oral At Bedtime     levothyroxine  137 mcg Oral QAM AC     mirtazapine  15 mg Oral At Bedtime     sildenafil  20 mg Oral TID     sodium chloride (PF)  3 mL Intracatheter Q8H       ASPIRIN NOT PRESCRIBED       - MEDICATION INSTRUCTIONS -       Reason ACE/ARB order not selected       - MEDICATION INSTRUCTIONS -       Warfarin Therapy Reminder       CHADU MD STACIA Dwyer, Thompson Fung, saw this patient on 05/16/2017 with Dr. Enriquez and agree with the findings and plan of care as documented in the note.

## 2017-05-16 NOTE — PLAN OF CARE
Problem: Goal Outcome Summary  Goal: Goal Outcome Summary  RN:  1.Pt will remain hemodynamically stable.    Outcome: Therapy, progress toward functional goals is gradual    Pt continues on NGT for decompression, NGT to LIS has drained approximately 250 ml overnight, that appears yellow/green colored. Pt BG levels stable overnight. Pt denies nausea and abdominal pain overnight. Pt requested and received PRN order for mineral oil to address constipation and lack of flatus. Pt abdomen continues to appear distended and slightly firm. Fung patent and put out approx. 1550 ml of urine overnight. HR NSR 70's-90's. Pt continues to report anxiety and received scheduled Ativan PO Q 6 hours. Pt VSS on 5 L NC. RN will continue to monitor and update team with any changes. Keial Velasquez

## 2017-05-16 NOTE — PLAN OF CARE
Problem: Goal Outcome Summary  Goal: Goal Outcome Summary  RN:  1.Pt will remain hemodynamically stable.    Outcome: Therapy, progress toward functional goals is gradual  NPO except ice and oral medications at start of shift, abdomen round,softer, passing gas, incontinent small brown watery stool, NGT to LIS, reported hunger and desire for coffee, NGT clamped late morning and patient started CLD, tolerating well. Fung patent clear yellow urine, received Lasix 80 mg IV once, UO >3000 since 12mn. Cr 5.07. Renal Consult following. Hgb 7.5, no change in right flank bruising. Denies any pain. Wearing O2 4L NC, walked in valadez with assist of one, O2 sats down to mid 80's, SOB, increased 2 to 6L for recovery, back to 4L now. Monitor shows SR to 's with occasional PAC's. INR 1.22, plan Coumadin 2 mg tonight.Refer to flow sheets for full assessments, VS, labs, FSG etc.

## 2017-05-17 LAB
ALBUMIN SERPL-MCNC: 2.6 G/DL (ref 3.4–5)
ALP SERPL-CCNC: 65 U/L (ref 40–150)
ALT SERPL W P-5'-P-CCNC: 23 U/L (ref 0–70)
ANION GAP SERPL CALCULATED.3IONS-SCNC: 8 MMOL/L (ref 3–14)
AST SERPL W P-5'-P-CCNC: 26 U/L (ref 0–45)
BILIRUB SERPL-MCNC: 1.1 MG/DL (ref 0.2–1.3)
BUN SERPL-MCNC: 51 MG/DL (ref 7–30)
CALCIUM SERPL-MCNC: 7.9 MG/DL (ref 8.5–10.1)
CHLORIDE SERPL-SCNC: 102 MMOL/L (ref 94–109)
CO2 SERPL-SCNC: 29 MMOL/L (ref 20–32)
CREAT SERPL-MCNC: 4.26 MG/DL (ref 0.66–1.25)
ERYTHROCYTE [DISTWIDTH] IN BLOOD BY AUTOMATED COUNT: 17 % (ref 10–15)
GFR SERPL CREATININE-BSD FRML MDRD: 14 ML/MIN/1.7M2
GLUCOSE BLDC GLUCOMTR-MCNC: 101 MG/DL (ref 70–99)
GLUCOSE SERPL-MCNC: 105 MG/DL (ref 70–99)
HCT VFR BLD AUTO: 23.8 % (ref 40–53)
HGB BLD-MCNC: 7.6 G/DL (ref 13.3–17.7)
INR PPP: 1.21 (ref 0.86–1.14)
LACTATE BLD-SCNC: 2 MMOL/L (ref 0.7–2.1)
MCH RBC QN AUTO: 29.8 PG (ref 26.5–33)
MCHC RBC AUTO-ENTMCNC: 31.9 G/DL (ref 31.5–36.5)
MCV RBC AUTO: 93 FL (ref 78–100)
PLATELET # BLD AUTO: 230 10E9/L (ref 150–450)
POTASSIUM SERPL-SCNC: 3.9 MMOL/L (ref 3.4–5.3)
PROT SERPL-MCNC: 6.3 G/DL (ref 6.8–8.8)
RBC # BLD AUTO: 2.55 10E12/L (ref 4.4–5.9)
SODIUM SERPL-SCNC: 138 MMOL/L (ref 133–144)
WBC # BLD AUTO: 11.6 10E9/L (ref 4–11)

## 2017-05-17 PROCEDURE — A9270 NON-COVERED ITEM OR SERVICE: HCPCS | Mod: GY | Performed by: INTERNAL MEDICINE

## 2017-05-17 PROCEDURE — 25000132 ZZH RX MED GY IP 250 OP 250 PS 637: Mod: GY | Performed by: NURSE PRACTITIONER

## 2017-05-17 PROCEDURE — 25000132 ZZH RX MED GY IP 250 OP 250 PS 637: Mod: GY | Performed by: INTERNAL MEDICINE

## 2017-05-17 PROCEDURE — 00000146 ZZHCL STATISTIC GLUCOSE BY METER IP

## 2017-05-17 PROCEDURE — 25000132 ZZH RX MED GY IP 250 OP 250 PS 637: Mod: GY | Performed by: STUDENT IN AN ORGANIZED HEALTH CARE EDUCATION/TRAINING PROGRAM

## 2017-05-17 PROCEDURE — 99232 SBSQ HOSP IP/OBS MODERATE 35: CPT | Mod: GC | Performed by: INTERNAL MEDICINE

## 2017-05-17 PROCEDURE — 21400006 ZZH R&B CCU INTERMEDIATE UMMC

## 2017-05-17 PROCEDURE — A9270 NON-COVERED ITEM OR SERVICE: HCPCS | Mod: GY | Performed by: STUDENT IN AN ORGANIZED HEALTH CARE EDUCATION/TRAINING PROGRAM

## 2017-05-17 PROCEDURE — 80053 COMPREHEN METABOLIC PANEL: CPT | Performed by: INTERNAL MEDICINE

## 2017-05-17 PROCEDURE — A9270 NON-COVERED ITEM OR SERVICE: HCPCS | Mod: GY

## 2017-05-17 PROCEDURE — 85027 COMPLETE CBC AUTOMATED: CPT | Performed by: INTERNAL MEDICINE

## 2017-05-17 PROCEDURE — 36415 COLL VENOUS BLD VENIPUNCTURE: CPT | Performed by: INTERNAL MEDICINE

## 2017-05-17 PROCEDURE — A9270 NON-COVERED ITEM OR SERVICE: HCPCS | Mod: GY | Performed by: NURSE PRACTITIONER

## 2017-05-17 PROCEDURE — 25000132 ZZH RX MED GY IP 250 OP 250 PS 637: Mod: GY

## 2017-05-17 PROCEDURE — 83605 ASSAY OF LACTIC ACID: CPT | Performed by: INTERNAL MEDICINE

## 2017-05-17 PROCEDURE — 85610 PROTHROMBIN TIME: CPT | Performed by: INTERNAL MEDICINE

## 2017-05-17 RX ORDER — SENNOSIDES 8.6 MG
8.6 TABLET ORAL 2 TIMES DAILY PRN
Status: DISCONTINUED | OUTPATIENT
Start: 2017-05-17 | End: 2017-05-18 | Stop reason: HOSPADM

## 2017-05-17 RX ORDER — WARFARIN SODIUM 1 MG/1
2 TABLET ORAL
Status: COMPLETED | OUTPATIENT
Start: 2017-05-17 | End: 2017-05-17

## 2017-05-17 RX ADMIN — MIRTAZAPINE 15 MG: 15 TABLET, FILM COATED ORAL at 19:48

## 2017-05-17 RX ADMIN — FAMOTIDINE 20 MG: 20 TABLET ORAL at 08:10

## 2017-05-17 RX ADMIN — LEVOTHYROXINE SODIUM 137 MCG: 137 TABLET ORAL at 08:10

## 2017-05-17 RX ADMIN — ATORVASTATIN CALCIUM 40 MG: 40 TABLET, FILM COATED ORAL at 19:43

## 2017-05-17 RX ADMIN — LORAZEPAM 0.5 MG: 0.5 TABLET ORAL at 19:43

## 2017-05-17 RX ADMIN — LORAZEPAM 0.5 MG: 0.5 TABLET ORAL at 01:59

## 2017-05-17 RX ADMIN — LORAZEPAM 0.5 MG: 0.5 TABLET ORAL at 08:11

## 2017-05-17 RX ADMIN — SILDENAFIL 20 MG: 20 TABLET, FILM COATED ORAL at 10:08

## 2017-05-17 RX ADMIN — SILDENAFIL 20 MG: 20 TABLET, FILM COATED ORAL at 14:26

## 2017-05-17 RX ADMIN — FLUTICASONE FUROATE AND VILANTEROL TRIFENATATE 1 PUFF: 100; 25 POWDER RESPIRATORY (INHALATION) at 08:09

## 2017-05-17 RX ADMIN — TIOTROPIUM BROMIDE 18 MCG: 18 CAPSULE ORAL; RESPIRATORY (INHALATION) at 08:12

## 2017-05-17 RX ADMIN — WARFARIN SODIUM 2 MG: 1 TABLET ORAL at 17:49

## 2017-05-17 RX ADMIN — METOPROLOL TARTRATE 100 MG: 50 TABLET, FILM COATED ORAL at 08:11

## 2017-05-17 RX ADMIN — METOPROLOL TARTRATE 100 MG: 50 TABLET, FILM COATED ORAL at 19:43

## 2017-05-17 RX ADMIN — CLOPIDOGREL 75 MG: 75 TABLET, FILM COATED ORAL at 08:11

## 2017-05-17 RX ADMIN — SILDENAFIL 20 MG: 20 TABLET, FILM COATED ORAL at 19:43

## 2017-05-17 RX ADMIN — SENNOSIDES 8.6 MG: 8.6 TABLET, FILM COATED ORAL at 17:49

## 2017-05-17 RX ADMIN — ASPIRIN 81 MG CHEWABLE TABLET 81 MG: 81 TABLET CHEWABLE at 08:10

## 2017-05-17 ASSESSMENT — ACTIVITIES OF DAILY LIVING (ADL)
TRANSFERRING: 1-->ASSISTIVE EQUIPMENT
BATHING: 0-->INDEPENDENT
TOILETING: 0-->INDEPENDENT
COGNITION: 0 - NO COGNITION ISSUES REPORTED
AMBULATION: 0-->INDEPENDENT
RETIRED_EATING: 0-->INDEPENDENT
SWALLOWING: 0-->SWALLOWS FOODS/LIQUIDS WITHOUT DIFFICULTY
DRESS: 0-->INDEPENDENT
FALL_HISTORY_WITHIN_LAST_SIX_MONTHS: NO
RETIRED_COMMUNICATION: 0-->UNDERSTANDS/COMMUNICATES WITHOUT DIFFICULTY

## 2017-05-17 NOTE — PLAN OF CARE
Problem: Bowel Obstruction (Adult)  Goal: Signs and Symptoms of Listed Potential Problems Will be Absent or Manageable (Bowel Obstruction)  Signs and symptoms of listed potential problems will be absent or manageable by discharge/transition of care (reference Bowel Obstruction (Adult) CPG).   Outcome: No Change  D/when I walked in the room, he began barking at me and told me he was ready to go home  I/I tried to discuss this issue with him. He says he has home oxygen,   D/he told me he wants to talk to MD about going home today, because he is ready to go  I/talked to CSI on unit, and she says she needs to talk to the primary CSI person after his meeting at 1700. So, I told the patient that I talked to MD, and she needs to check with the regular CSI MD after 1700 and they will talk to you about this and decide about d/c.  D/He was agreeable to this plan and thanked me for doing this.  P/await decision  D/MD talked to me and after discussion with primary CSI MD they will advance his diet to regular tonight and want to wait until am to d/c him.   I/I asked her if she will tell discuss this with him, she is going there now.   P/probable d/c in am  DOROTA/MD called and told me he was okay with staying until the am

## 2017-05-17 NOTE — PLAN OF CARE
Problem: Bowel Obstruction (Adult)  Goal: Signs and Symptoms of Listed Potential Problems Will be Absent or Manageable (Bowel Obstruction)  Signs and symptoms of listed potential problems will be absent or manageable by discharge/transition of care (reference Bowel Obstruction (Adult) CPG).   Outcome: Improving  Patient inadvertantly removed his NG tube overnights. His diet has been advanced to full liquids and has tolerated it well. . His cataheter was also removed and he has voided x3 since that time. Will continue with POC

## 2017-05-17 NOTE — PROGRESS NOTES
Nephrology Progress Note  05/17/2017         Assessment & Recommendations:   Rohan Monroe is a 71 year old male with history of CKD stage 3 2/2 to DM/vascular dx, atrial fibrillation, HFpEF, CAD s/p stents, HCV, hypothyroidism, and sarcoidosis (pulm/cardiac involvement) that was admitted on 5/12 for elective PCI. Now with RADHA on CKD.      1.) Non-oliguric RADHA on CKD: Likely a combination of contrast nephropathy and ischemic ATN from episodic hypotension and blood loss. Baseline Cr is variable, but appears to be in the 1.6-1.8 range. Serum Cr peaked to 5.1 and most recent serum Cr 4.26. Serum Cr appears to have plateaued.    - Avoid hypotension and renal hypoperfusion  - Avoid nephrotoxins  - Daily BMP  - Okay to discharge from renal perspective; patient will need to follow up with lab check next week to ensure labs continue to improve      2.) Volume/BP: BP in acceptable range. Diuretics managed by cardiology team.       3.) Chronic anemia: Likely multifactorial from chronic health problems and underling CKD. Exacerbated this hospitalization from RP bleed following PCI. Hgb 7.6  today. Transfusions per primary team.     4.) Electrolytes and Acid/Base: No acute issues.      Seen and discussed with Dr. Anderson. Nephrology will sign off. Please do not hesitate to contact us with questions.       Recommendations were communicated to primary team via this note.     Fabian Cruz DO   PGY-2 Internal Medicine  [phone]    Interval History :   No acute events overnight. Pt eager to discharge. Denies CP/SOB, leg swelling, nausea/vomiting. Requesting stool softener for constipation. Also wants Fung catheter removed. Continues to have excellent UOP.    Review of Systems:   4 pt ROS (constitutional, GI, respiratory, CV) negative other than in the HPI above.    Physical Exam:   I/O last 3 completed shifts:  In: 1440 [P.O.:1440]  Out: 3875 [Urine:3775; Emesis/NG output:100]   /79 (BP Location: Left arm)   "Pulse 102  Temp 98.3  F (36.8  C) (Oral)  Resp 18  Ht 1.753 m (5' 9\")  Wt 90.2 kg (198 lb 12.8 oz)  SpO2 99%  BMI 29.36 kg/m2     GENERAL APPEARANCE: Sitting in bedside chair in NAD, non-toxic appearing, on supplemental oxygen  EYES:  no scleral icterus  HENT: moist mucous membranes   PULM: mild bibasilar crackles at the bases   CV: regular rhythm, normal rate, no rub     -trace LE edema present   INTEGUMENT: warm and dry  Labs:   All labs reviewed by me  Electrolytes/Renal -   Recent Labs   Lab Test  05/16/17   1657  05/16/17   0703  05/15/17   0719   05/13/17   0551   05/12/17   2028   05/08/17   1518   04/24/17   1305   04/18/17   1231   NA  137  138  138   < >  139   --   138   < >  136   < >  138   < >  138   POTASSIUM  3.8  4.1  4.4   < >  4.6   < >  3.6   < >  4.2   < >  4.0   < >  4.2   CHLORIDE  101  104  104   < >  104   --   102   < >  99   < >  102   < >  104   CO2  26  24  23   < >  24   --   26   < >  27   < >  30   < >  24   BUN  59*  60*  57*   < >  38*   --   40*   < >  44*   < >  29   < >  49*   CR  4.86*  5.07*  4.84*   < >  2.03*   --   1.84*   < >  1.94*   < >  1.82*   < >  2.32*   GLC  164*  90  103*   < >  166*   --   166*   < >  138*   < >  166*   < >  123*   RAFFY  7.9*  8.1*  7.8*   < >  7.5*   --   7.9*   < >  8.7   < >  8.8   < >  9.2   MAG   --    --    --    --   2.0   --   2.1   --    --    --   2.0   --    --    PHOS   --    --    --    --   3.4   --    --    --   3.6   --    --    --   3.2    < > = values in this interval not displayed.       CBC -   Recent Labs   Lab Test  05/17/17   0720  05/16/17   0703  05/15/17   1333  05/15/17   0954   WBC  11.6*  10.1   --   11.7*   HGB  7.6*  7.5*  7.7*  6.9*   PLT  230  205   --   143*       LFTs -   Recent Labs   Lab Test  05/16/17   0703  05/15/17   0719  05/14/17   0601   ALKPHOS  66  62  54   BILITOTAL  1.3  1.0  0.8   ALT  25  27  28   AST  27  27  24   PROTTOTAL  6.2*  6.2*  5.9*   ALBUMIN  2.7*  2.6*  2.5*       Iron Panel - "   Recent Labs   Lab Test  02/28/17   1150  03/15/16   1116   IRON  61  91   IRONSAT  19  26   TIFFANIE  181  176         Imaging:  All imaging studies reviewed by me.     Current Medications:    methotrexate  2.5 mg Oral Weekly     LORazepam  0.5 mg Oral Q6H     fluticasone-vilanterol  1 puff Inhalation Daily     tiotropium  18 mcg Inhalation Daily     metoprolol  100 mg Oral BID     famotidine  20 mg Oral Daily     aspirin  81 mg Oral Daily     clopidogrel  75 mg Oral Daily     lidocaine 2 %  10 mL Urethral Once     atorvastatin  40 mg Oral At Bedtime     levothyroxine  137 mcg Oral QAM AC     mirtazapine  15 mg Oral At Bedtime     sildenafil  20 mg Oral TID     sodium chloride (PF)  3 mL Intracatheter Q8H       ASPIRIN NOT PRESCRIBED       - MEDICATION INSTRUCTIONS -       Reason ACE/ARB order not selected       - MEDICATION INSTRUCTIONS -       Warfarin Therapy Reminder       Fabian Cruz DO

## 2017-05-17 NOTE — PROGRESS NOTES
"Cardiology Progress Note    Assessment & Plan   Rohan Monroe is a 71 year old male who was admitted on 5/12/2017 after coronary angiogram with PCI that was complicated by large RP bleed now with severe RADHA and ileus.    1.  Acute blood loss anemia due to RP bleed with iliac artery injury requiring placement of covered stent  -Daily Hgb    2.  CAD with PCI to mLAD and D2  -DAPT, atorvastatin  -Metoprolol    3.  KDIGO Grade 3 RADHA on Stage 3 CKD: Cr peaked at 5.07 and now downtrending  -Renal following, no need for dialysis    4.  HFpEF  -IV diuresis again today    5.  Ileus  -ADAT    6.  History of sarcoidosis  -Resume methotrexate on 5/16 (weekly dosing)    7.  Atrial fibrillation  -Warfarin    Other medical conditions that are being monitored include: hypoalbuminemia and hypoproteinemia    PPx  -Famotidine and pneumoboots due to bleed    Interval History   Wants to go home    Physical Exam   Temp: 98.2  F (36.8  C) Temp src: Oral BP: 152/86   Heart Rate: 83 Resp: 16 SpO2: 97 % O2 Device: Nasal cannula Oxygen Delivery: 3 LPM  Vitals:    05/14/17 0015 05/16/17 0700 05/17/17 0300   Weight: 96.3 kg (212 lb 4.9 oz) 93.4 kg (206 lb) 90.2 kg (198 lb 12.8 oz)     Vital Signs with Ranges  Temp:  [97.6  F (36.4  C)-98.9  F (37.2  C)] 98.2  F (36.8  C)  Heart Rate:  [83-99] 83  Resp:  [16-18] 16  BP: (135-157)/(74-89) 152/86  SpO2:  [86 %-100 %] 97 %  I/O last 3 completed shifts:  In: 1440 [P.O.:1440]  Out: 3875 [Urine:3775; Emesis/NG output:100]    Heart Rate: 83, Blood pressure 152/86, pulse 102, temperature 98.2  F (36.8  C), temperature source Oral, resp. rate 16, height 1.753 m (5' 9\"), weight 90.2 kg (198 lb 12.8 oz), SpO2 97 %.  198 lbs 12.8 oz  GEN:  Alert, oriented x 3, appears comfortable, NAD.  CV:  Regular rate and rhythm, no murmur or JVD.    LUNGS:  Clear to auscultation bilaterally   ABD:  Active bowel sounds, soft, non-tender, no longer distended  EXT:  Trace edema    Medications     ASPIRIN NOT " PRESCRIBED       - MEDICATION INSTRUCTIONS -       Reason ACE/ARB order not selected       - MEDICATION INSTRUCTIONS -       Warfarin Therapy Reminder         methotrexate  2.5 mg Oral Weekly     LORazepam  0.5 mg Oral Q6H     fluticasone-vilanterol  1 puff Inhalation Daily     tiotropium  18 mcg Inhalation Daily     metoprolol  100 mg Oral BID     famotidine  20 mg Oral Daily     aspirin  81 mg Oral Daily     clopidogrel  75 mg Oral Daily     lidocaine 2 %  10 mL Urethral Once     atorvastatin  40 mg Oral At Bedtime     levothyroxine  137 mcg Oral QAM AC     mirtazapine  15 mg Oral At Bedtime     sildenafil  20 mg Oral TID     sodium chloride (PF)  3 mL Intracatheter Q8H       Data     Recent Labs  Lab 05/16/17  1657 05/16/17  0703 05/15/17  1333 05/15/17  0954 05/15/17  0719 05/14/17  1008 05/14/17  0601   WBC  --  10.1  --  11.7*  --  14.6* 14.8*   HGB  --  7.5* 7.7* 6.9*  --  7.7* 7.8*   MCV  --  94  --  92  --  88 88   PLT  --  205  --  143*  --  160 182   INR  --  1.22*  --   --  1.23*  --  1.25*    138  --   --  138  --  140   POTASSIUM 3.8 4.1  --   --  4.4  --  4.4   CHLORIDE 101 104  --   --  104  --  106   CO2 26 24  --   --  23  --  24   BUN 59* 60*  --   --  57*  --  54*   CR 4.86* 5.07*  --   --  4.84*  --  3.88*   ANIONGAP 9 11  --   --  10  --  11   RAFFY 7.9* 8.1*  --   --  7.8*  --  8.0*   * 90  --   --  103*  --  140*   ALBUMIN  --  2.7*  --   --  2.6*  --  2.5*   PROTTOTAL  --  6.2*  --   --  6.2*  --  5.9*   BILITOTAL  --  1.3  --   --  1.0  --  0.8   ALKPHOS  --  66  --   --  62  --  54   ALT  --  25  --   --  27  --  28   AST  --  27  --   --  27  --  24       No results found for this or any previous visit (from the past 24 hour(s)).     I have seen and examined the patient and agree with the finding and plan.       Villa Randhawa MD  Cardiology-[facility]  [phone]

## 2017-05-17 NOTE — PLAN OF CARE
Problem: Goal Outcome Summary  Goal: Goal Outcome Summary  RN:  1.Pt will remain hemodynamically stable.    Outcome: Therapy, progress toward functional goals is gradual    Pt VSS on 3-4 L oxygen NC overnight. At start of shift pt had NGT in place for decompression, with NGT clamped. Pt NG tube was in place and intact at 0200 when assessed. At 0315 pt placed call light on and requested to go to bathroom. NGT was out at that time. Pt was sitting up at bedside and a small scrape was noted on posterior side of right calf. This was cleaned and a primapore was placed for protection. Pt was very anxious and attempting to stand to walk to bathroom. RN provided reassurance and assistance to bathroom. Pt had some incontinence of stool and also had a BM at that time. Team notified of spontaneous NGT removal and instruction is to leave tube out for now. Pt BG levels stable overnight. Pt denies nausea and abdominal pain overnight. Pt abdomen continues to appear distended. Fung patent and put out good amounts of urine overnight. HR NSR 70's-90's. Pt continues to report anxiety and received scheduled Ativan PO Q 6 hours. RN will continue to monitor and update team with any changes. Keila Velasquez

## 2017-05-18 ENCOUNTER — CARE COORDINATION (OUTPATIENT)
Dept: CARE COORDINATION | Facility: CLINIC | Age: 72
End: 2017-05-18

## 2017-05-18 VITALS
OXYGEN SATURATION: 96 % | RESPIRATION RATE: 18 BRPM | BODY MASS INDEX: 29.44 KG/M2 | DIASTOLIC BLOOD PRESSURE: 75 MMHG | SYSTOLIC BLOOD PRESSURE: 161 MMHG | HEIGHT: 69 IN | TEMPERATURE: 98.3 F | HEART RATE: 102 BPM | WEIGHT: 198.8 LBS

## 2017-05-18 DIAGNOSIS — Z98.61 STATUS POST PERCUTANEOUS TRANSLUMINAL CORONARY ANGIOPLASTY: Primary | ICD-10-CM

## 2017-05-18 LAB
ALBUMIN SERPL-MCNC: 2.8 G/DL (ref 3.4–5)
ALP SERPL-CCNC: 75 U/L (ref 40–150)
ALT SERPL W P-5'-P-CCNC: 24 U/L (ref 0–70)
ANION GAP SERPL CALCULATED.3IONS-SCNC: 9 MMOL/L (ref 3–14)
AST SERPL W P-5'-P-CCNC: 27 U/L (ref 0–45)
BILIRUB SERPL-MCNC: 2.4 MG/DL (ref 0.2–1.3)
BUN SERPL-MCNC: 46 MG/DL (ref 7–30)
CALCIUM SERPL-MCNC: 8.3 MG/DL (ref 8.5–10.1)
CHLORIDE SERPL-SCNC: 102 MMOL/L (ref 94–109)
CO2 SERPL-SCNC: 27 MMOL/L (ref 20–32)
CREAT SERPL-MCNC: 3.29 MG/DL (ref 0.66–1.25)
ERYTHROCYTE [DISTWIDTH] IN BLOOD BY AUTOMATED COUNT: 16.7 % (ref 10–15)
GFR SERPL CREATININE-BSD FRML MDRD: 19 ML/MIN/1.7M2
GLUCOSE BLDC GLUCOMTR-MCNC: 142 MG/DL (ref 70–99)
GLUCOSE SERPL-MCNC: 131 MG/DL (ref 70–99)
HCT VFR BLD AUTO: 26.1 % (ref 40–53)
HGB BLD-MCNC: 8.2 G/DL (ref 13.3–17.7)
INR PPP: 1.19 (ref 0.86–1.14)
MCH RBC QN AUTO: 29.5 PG (ref 26.5–33)
MCHC RBC AUTO-ENTMCNC: 31.4 G/DL (ref 31.5–36.5)
MCV RBC AUTO: 94 FL (ref 78–100)
PLATELET # BLD AUTO: 308 10E9/L (ref 150–450)
POTASSIUM SERPL-SCNC: 3.8 MMOL/L (ref 3.4–5.3)
PROT SERPL-MCNC: 6.9 G/DL (ref 6.8–8.8)
RBC # BLD AUTO: 2.78 10E12/L (ref 4.4–5.9)
SODIUM SERPL-SCNC: 137 MMOL/L (ref 133–144)
WBC # BLD AUTO: 12.9 10E9/L (ref 4–11)

## 2017-05-18 PROCEDURE — 25000132 ZZH RX MED GY IP 250 OP 250 PS 637: Mod: GY | Performed by: NURSE PRACTITIONER

## 2017-05-18 PROCEDURE — A9270 NON-COVERED ITEM OR SERVICE: HCPCS | Mod: GY | Performed by: NURSE PRACTITIONER

## 2017-05-18 PROCEDURE — 99239 HOSP IP/OBS DSCHRG MGMT >30: CPT | Mod: GC | Performed by: INTERNAL MEDICINE

## 2017-05-18 PROCEDURE — 25000132 ZZH RX MED GY IP 250 OP 250 PS 637: Mod: GY

## 2017-05-18 PROCEDURE — A9270 NON-COVERED ITEM OR SERVICE: HCPCS | Mod: GY | Performed by: INTERNAL MEDICINE

## 2017-05-18 PROCEDURE — 25000132 ZZH RX MED GY IP 250 OP 250 PS 637: Mod: GY | Performed by: INTERNAL MEDICINE

## 2017-05-18 PROCEDURE — 85610 PROTHROMBIN TIME: CPT | Performed by: INTERNAL MEDICINE

## 2017-05-18 PROCEDURE — 85027 COMPLETE CBC AUTOMATED: CPT | Performed by: INTERNAL MEDICINE

## 2017-05-18 PROCEDURE — 80053 COMPREHEN METABOLIC PANEL: CPT | Performed by: INTERNAL MEDICINE

## 2017-05-18 PROCEDURE — A9270 NON-COVERED ITEM OR SERVICE: HCPCS | Mod: GY

## 2017-05-18 PROCEDURE — 00000146 ZZHCL STATISTIC GLUCOSE BY METER IP

## 2017-05-18 PROCEDURE — 25000132 ZZH RX MED GY IP 250 OP 250 PS 637: Mod: GY | Performed by: STUDENT IN AN ORGANIZED HEALTH CARE EDUCATION/TRAINING PROGRAM

## 2017-05-18 PROCEDURE — A9270 NON-COVERED ITEM OR SERVICE: HCPCS | Mod: GY | Performed by: STUDENT IN AN ORGANIZED HEALTH CARE EDUCATION/TRAINING PROGRAM

## 2017-05-18 RX ORDER — WARFARIN SODIUM 5 MG/1
5 TABLET ORAL
Status: DISCONTINUED | OUTPATIENT
Start: 2017-05-18 | End: 2017-05-18 | Stop reason: HOSPADM

## 2017-05-18 RX ADMIN — CLOPIDOGREL 75 MG: 75 TABLET, FILM COATED ORAL at 09:13

## 2017-05-18 RX ADMIN — FLUTICASONE FUROATE AND VILANTEROL TRIFENATATE 1 PUFF: 100; 25 POWDER RESPIRATORY (INHALATION) at 09:10

## 2017-05-18 RX ADMIN — LORAZEPAM 0.5 MG: 0.5 TABLET ORAL at 01:34

## 2017-05-18 RX ADMIN — LEVOTHYROXINE SODIUM 137 MCG: 137 TABLET ORAL at 09:12

## 2017-05-18 RX ADMIN — FAMOTIDINE 20 MG: 20 TABLET ORAL at 09:12

## 2017-05-18 RX ADMIN — TIOTROPIUM BROMIDE 18 MCG: 18 CAPSULE ORAL; RESPIRATORY (INHALATION) at 09:11

## 2017-05-18 RX ADMIN — ASPIRIN 81 MG CHEWABLE TABLET 81 MG: 81 TABLET CHEWABLE at 09:12

## 2017-05-18 RX ADMIN — SILDENAFIL 20 MG: 20 TABLET, FILM COATED ORAL at 09:19

## 2017-05-18 RX ADMIN — METOPROLOL TARTRATE 100 MG: 50 TABLET, FILM COATED ORAL at 09:12

## 2017-05-18 NOTE — PROGRESS NOTES
"[facility]  \"Hello, my name is Cecelia Riggs , and I am calling from the [facility].  I want to check in and see how you are doing, after leaving the hospital.  You may also receive a call from your Care Coordinator (care team), but I want to make sure you don t have any urgent needs.  I have a couple questions to review with you:     Post-Discharge Outreach                                                    Rohan Monroe is a 71 year old male     Follow-up Appointments           Adult [facility] Follow-up and recommended labs and tests       Follow up with primary care provider, Jamie Foster, within 7 days for hospital follow- up. The following labs/tests are recommended: BMP to assess renal function.      Cardiology in 1 month     Appointments on [facility] and/or [facility] (with [facility] or [facility] provider or service). Call [phone] if you haven't heard regarding these appointments within 7 days of discharge.                       Your next 10 appointments already scheduled            May 26, 2017 9:30 AM CDT   Lab with [facility]   [facility] ([facility])     [address] [place] [address]  [address] [facility] [id number] [phone]                  May 26, 2017 10:00 AM CDT   (Arrive by 9:45 AM)   RETURN HEART FAILURE with Diane Paige MD   [facility] ([facility])     [address] [place] [address]  [address] [facility] [id number] [phone]                  Jun 05, 2017 12:00 PM CDT   Infusion 180 with UC SPEC INFUSION, UC 49 ATC   [facility] ([facility])     [address] [place] [address]  [address] [facility] [id number] [phone]                  Jun 21, 2017 1:30 PM CDT   (Arrive by 1:15 PM)   RETURN ARRHYTHMIA with Brian Vazquez MD   [facility] ([facility])     [address] [place] [address]" [address] [address]   [phone]                  Aug 17, 2017 1:00 PM CDT   PFT VISIT with  PFL B   [facility] Function Testing ([facility])     [address] [place] [id number] [phone]                  Aug 17, 2017 1:30 PM CDT   (Arrive by 1:15 PM)   Return Interstitial Lung with David Morris Perlman, MD   [facility] ([facility])     [address] [place] [id number] [phone]                  Aug 29, 2017 10:00 AM CDT   Lab with  LAB   [facility] ([facility])     [address] [place] [id number] [phone]             Care Team:    Patient Care Team       Relationship Specialty Notifications Start End    Jamie Foster MD PCP - General Internal Medicine  10/18/11     Phone: [phone] Fax: [phone]         [address] [address]    Jamie Foster MD Referring Physician Internal Medicine  1/16/14     Comment:  urology    Phone: [phone] Fax: [phone]         [address] [address]    Kina Greco MD MD Ophthalmology  5/1/14     Phone: [phone] Fax: [phone]          [facility] [address] [address]    Perlman, David Morris, MD MD Internal Medicine  5/11/15     Phone: [phone] Pager: [phone] Fax: [phone]         [facility] [address] [facility] [address] [facility] [address]    Haley Renner DO Referring Physician Student in organized health care education/training program  9/24/15     Comment:  Referred to Endocrinology    Phone: [phone] Fax: [phone]         [facility] [address] [place] [address]    Chicho Mejia DPM   Podiatry  9/28/15     Phone: [phone] Fax: [phone]         [place] [address]  [address] [facility] [address]    Macey Roy MD MD Internal Medicine  11/24/15     Phone: [phone] Fax: [phone]         [address] [facility] [address]    Thompson Gonzalez MD MD Cardiology Admissions 5/13/16     Phone: [phone] Fax: [phone]          [facility] [address] [facility] [address] [facility] [address]    Karlene Farrell, RN Nurse Coordinator Cardiology Admissions 5/13/16     Madalyn Fajardo MD PhD MD Family Practice  1/18/17     Phone: [phone] Fax: [phone]          [facility] [address] [facility] [address] [facility] [address]    Jaclyn Pina RN Nurse Coordinator Clinical Cardiac Electrophysiology  2/23/17     Phone: [phone] Fax: [phone]         [place] [id number]    Brian Vazquez MD MD Clinical Cardiac Electrophysiology  2/23/17     Phone: [phone] Fax: [phone]         [address] [address]    Elizabeth Cabral APRN CNP Nurse Practitioner Nurse Practitioner  2/23/17     Phone: [phone] Fax: [phone]         [facility] [address] [facility] [address]            Transition of Care Review                                                      Did you have a surgery or procedure during your hospital visit? Yes   If yes, do you have any of the following:     Signs of infection:  NO    Pain:  No     Pain Scale (0-10) 0/10     Location: NA    Wound/incision concerns? NO    Do you have all of your medications/refills?  Yes    Are you having any side effects or questions about your medication(s)? No    Do you have any new or worsening symptoms?  No    Do you have any future appointments scheduled?   Yes             Plan                                                      Thanks for your time.  Your Care Coordinator may follow-up within the next couple days.  In the meantime if you have questions, concerns or problems call your care team.         Cecelia Riggs

## 2017-05-18 NOTE — PLAN OF CARE
Problem: Goal Outcome Summary  Goal: Goal Outcome Summary  RN:  1.Pt will remain hemodynamically stable.    Outcome: No Change  Neuro: A&Ox4.   Cardiac: VSS.             NSR   Respiratory: 3L per nasal cannula   GI/: Voiding spontaneously. No BM this shift.   Diet/appetite: Tolerating regular diet. Denies nausea   Activity: Up with SBA   Pain: . Denies   Skin: Intact, no new deficits noted.  Lines: PIV saline locked.         Pt has been resting comfortably throughout night, will continue to monitor and follow plan of care.

## 2017-05-18 NOTE — DISCHARGE SUMMARY
[facility], [place]    Discharge Summary  CSI    Date of Admission:  5/12/2017  Date of Discharge:  5/18/2017    Discharge Diagnoses   1.  Acute blood loss anemia due to iliac artery injury  2.  KDIGO Grade 3 RADHA on Stage 3 CKD  3.  Sarcoidosis  4.  Chronic hypoxemic respiratory failure requiring supplemental oxygen due to sarcoidiosis and COPD  5.  HFpEF  6.  Atrial flutter  7.  History of hepatitis C  8.  Adynamic ileus  9.  Coronary artery disease    History of Present Illness   Rohan Monroe is an 71 year old male who presented for scheduled coronary angiogram with stents to LAD and D2.  He developed a RP bleed and required transfusion for acute blood loss anemia due to iliac artery injury.    Hospital Course   Rohan Monroe was admitted on 5/12/2017.  The following problems were addressed during his hospitalization:    1.  Acute blood loss anemia due to iliac artery injury: Mr. Monroe developed a large RP bleed after his coronary angiogram which required transfusion and placement of a stent to iliac by IR.  His Hgb stabilized and he did well after the stent was placed from a bleeding standpoint but developed RADHA and ileus which required prolonged hospital stay.    2.  KDIGO Grade 3 RADHA on Stage 3 CKD: Mr. Monroe has a multifactorial RADHA due to contrast load and hypotension in setting of iliac artery injury.  His creatinine peaked at 5.05 but downtrended to 3.3 on day of discharge.  He did not require dialysis.    3.  Adynamic ileus: We had to place an NG and hold eating from Mr. Monroe.  He did well with just conservative therapy and was tolerating a normal diet by day of discharge.  The surgery team saw him and did not feel there was a need for any surgical intervention.    4.  Chronic hypoxemic respiratory failure: Mr. Monroe did get some diuresis and then will go home on his chronic home oxygen.  He will continue to see his outpatient doctors in cardiology and  pulmonology to further evaluate.  He will continue with lasix as an outpatient.    5.  CAD s/p stenting to mLAD and D2: Mr. Monroe got stents on 5/12/2017.  He was sent out on DAPT.    6.  History of atrial flutter: Mr. Monroe will need to continue with warfarin for stroke ppx.  We will allow him to drift up to an INR of 2.0 at which time he will stop his aspirin.    Significant Results and Procedures     1.  Coronary Angiogram 5/12/2017  1. LM is without angiographic evidence of disease.   2. LAD supplies the entire apex (type 3) and gives rise to septal perforators, D1 and D2. The mLAD has a 75% hazy stenosis just distal to the existing stent, D1 has a 60% ostial stenosis and D2 has serial 80% in-stent stenoses and the remainder of the vessel has mild luminal irregularities.   3. LCX gives rise to OM1 and OM2 vessels. The pLCx has a 30% stenosis, OM1 has a 70% stenosis in a small vessel and the remainder of the vessel has mild luminal irregularities.   4. RCA was not studied as was recently evaluated.      PERCUTANEOUS CORONARY INTERVENTION:  1. LAD Lesions:  A 6Fr XB-3.5 guide catheter was positioned at the ostium of the LM. Heparin was administered to achieve a goal ACT > 250 sec. A Christiano Blue wire was advanced across the D2 in-stent lesion and positioned in the distal D2. A 1.5x15mm balloon was used to pre-dilate the lesion. A [id number] Synergy drug eluting stent was successfully deployed across the D2 lesion with inflation to 11 ashley. The wire was then repositioned in the dLAD. The mLAD lesion was treated with a 2.99g47ou Synergy drug eluting stent with inflation to 14 ashley. Final angiography showed no evidence of perforation or dissection with residual stenosis of 0% and JOHN 3 flow. No complications.     COMPLICATIONS:  1. None     SUMMARY:   1. Successful deployment of a [id number] Synergy drug eluting stent to the D2.  2. Successful deployment of a [id number] Synergy drug eluting stent to the mLAD.    2.  CT  Abd 5/13/2017  1. Enlarging right retroperitoneal hematoma with active extravasation from the right external iliac artery.  2. Small left heterogeneous pleural effusion with indeterminate density. Findings are of uncertain etiology but hemorrhage or infection within the pleural space can have this appearance.  3. Changes in the lung consistent with sarcoidosis.  4. Nonspecific lymphadenopathy associated with the celiac axis.    3.  Placement of covered stent to right external iliac artery 5/13/2017  1. Diagnostic angiography demonstrating a pseudoaneurysm with the neck  arising from distal right external iliac artery with associated active  extravasation.  2. Successful stent exclusion of the pseudoaneurysm with a 6 mm x 39 mm Atrium I cast balloon expandable stent graft.    Pending Results   Unresulted Labs Ordered in the Past 30 Days of this Admission     No orders found from 3/13/2017 to 5/13/2017.          Code Status   Full Code       Primary Care Physician   Jamie Foster    Physical Exam   Temp: 98.3  F (36.8  C) Temp src: Oral BP: 161/75   Heart Rate: 96 Resp: 18 SpO2: 96 % O2 Device: Nasal cannula Oxygen Delivery: 3 LPM  Vitals:    05/14/17 0015 05/16/17 0700 05/17/17 0300   Weight: 96.3 kg (212 lb 4.9 oz) 93.4 kg (206 lb) 90.2 kg (198 lb 12.8 oz)     Vital Signs with Ranges  Temp:  [98.2  F (36.8  C)-98.7  F (37.1  C)] 98.3  F (36.8  C)  Heart Rate:  [] 96  Resp:  [16-18] 18  BP: (129-161)/(73-88) 161/75  SpO2:  [94 %-99 %] 96 %  I/O last 3 completed shifts:  In: 1050 [P.O.:1050]  Out: 1795 [Urine:1795]    Constitutional: Awake, alert, cooperative, no apparent distress, and appears stated age.  Eyes: Lids and lashes normal, pupils equal, extra ocular muscles intact, sclera clear, conjunctiva normal.  Respiratory: No increased work of breathing, slightly prolonged expiratory phase  Cardiovascular: Normal apical impulse, irregularly irregular rhythm, normal S1 and S2, no S3 or S4, and no murmur  noted.  GI: No scars, normal bowel sounds, soft, non-distended, non-tender, no masses palpated, no hepatosplenomegaly.  Musculoskeletal: There is no redness, warmth, or swelling of the joints.  Neurologic: Awake, alert, oriented to name, place and time.  Cranial nerves II-XII are grossly intact.     Discharge Disposition   Discharged to home  Condition at discharge: Stable    Consultations This Hospital Stay   PHARMACY TO DOSE WARFARIN  NUTRITION SERVICES ADULT IP CONSULT  PHARMACY IP CONSULT  PHARMACY IP CONSULT  VASCULAR ACCESS CARE ADULT IP CONSULT  NEPHROLOGY GENERAL ADULT IP CONSULT  SURGERY GENERAL ADULT IP CONSULT  SMOKING CESSATION PROGRAM IP CONSULT    Time Spent on this Encounter   I, Joel Tobin, personally saw the patient today and spent greater than 30 minutes discharging this patient.    Discharge Orders     Home care nursing referral     Medication Therapy Management Referral     MD face to face encounter   Documentation of Face to Face and Certification for Home Health Services    I certify that patient: Rohan Monroe is under my care and that I, or a nurse practitioner or physician's assistant working with me, had a face-to-face encounter that meets the physician face-to-face encounter requirements with this patient on: 5/15/2017.    This encounter with the patient was in whole, or in part, for the following medical condition, which is the primary reason for home health care: CAD, s/p angioplasty and stents, Sarcoidosis, DM.    I certify that, based on my findings, the following services are medically necessary home health services: Nursing and Physical Therapy.    My clinical findings support the need for the above services because: Nurse is needed: To assess after changes in medications or other medical regimen, to provide assessment and oversight required in the home to assure adherence to the medical plan, to teach and train about the disease and treatments for CAD, DM, Atrial  Fibrillation.  Physical Therapy Services are needed to assess and treat the following functional impairments: deconditioning, strengthening, and endurance.     Further, I certify that my clinical findings support that this patient is homebound (i.e. absences from home require considerable and taxing effort and are for medical reasons or [employer] services or infrequently or of short duration when for other reasons) because: Requires assistance of another person or specialized equipment to access medical services because patient: Is unable to exit home safely on own.   Based on the above findings. I certify that this patient is confined to the home and needs intermittent skilled nursing care, physical therapy and/or speech therapy.  The patient is under my care, and I have initiated the establishment of the plan of care.  This patient will be followed by a physician who will periodically review the plan of care.  Physician/Provider to provide follow up care: Jamie Foster    Attending hospital physician (the Medicare certified [place] provider): Dr. Geovany Morel  Physician Signature: See electronic signature associated with these discharge orders.  Date: 5/15/2017       Discharge Medications   Current Discharge Medication List      START taking these medications    Details   clopidogrel (PLAVIX) 75 MG tablet Take 1 tablet (75 mg) by mouth daily  Qty: 90 tablet, Refills: 3    Associated Diagnoses: CAD S/P percutaneous coronary angioplasty; Coronary artery disease involving native coronary artery of native heart without angina pectoris         CONTINUE these medications which have CHANGED    Details   atorvastatin (LIPITOR) 40 MG tablet Take 1 tablet (40 mg) by mouth daily  Qty: 30 tablet, Refills: 3    Associated Diagnoses: Coronary artery disease involving native coronary artery of native heart without angina pectoris; CAD S/P percutaneous coronary angioplasty         CONTINUE these medications which have NOT  CHANGED    Details   sildenafil (REVATIO/VIAGRA) 20 MG tablet Take 1 tablet (20 mg) by mouth 3 times daily  Qty: 90 tablet, Refills: 3    Associated Diagnoses: Pulmonary hypertension (H)      furosemide (LASIX) 20 MG tablet Take 3 tablets (60 mg) by mouth 2 times daily  Qty: 180 tablet, Refills: 0    Associated Diagnoses: Pulmonary hypertension (H)      levothyroxine (SYNTHROID/LEVOTHROID) 137 MCG tablet Take 1 tablet (137 mcg) by mouth daily  Qty: 90 tablet, Refills: 1    Associated Diagnoses: Hypothyroidism      warfarin (COUMADIN) 5 MG tablet Take 1 tablet (5 mg) by mouth daily  Qty: 30 tablet, Refills: 6    Associated Diagnoses: Atrial flutter with rapid ventricular response (H)      polyethylene glycol (MIRALAX) powder Take 17 g (1 capful) by mouth daily  Qty: 510 g, Refills: 1    Associated Diagnoses: Constipation, unspecified constipation type      tiotropium (SPIRIVA HANDIHALER) 18 MCG capsule Inhale contents of one capsule daily.  Qty: 90 capsule, Refills: 3    Associated Diagnoses: Chronic obstructive pulmonary disease, unspecified COPD type (H)      albuterol (PROAIR HFA/PROVENTIL HFA/VENTOLIN HFA) 108 (90 BASE) MCG/ACT Inhaler Inhale 2 puffs into the lungs every 6 hours as needed for shortness of breath / dyspnea  Qty: 3 Inhaler, Refills: 6    Associated Diagnoses: Sarcoidosis (H)      fluticasone-vilanterol (BREO ELLIPTA) 100-25 MCG/INH oral inhaler Inhale 1 puff into the lungs daily  Qty: 3 Inhaler, Refills: 3    Associated Diagnoses: Chronic obstructive pulmonary disease, unspecified COPD type (H)      inFLIXimab (REMICADE) 100 MG injection Inject 100 mg into the vein every 28 days       metoprolol (LOPRESSOR) 100 MG tablet Take 1 tablet (100 mg) by mouth 2 times daily  Qty: 60 tablet, Refills: 11    Associated Diagnoses: Hypertension secondary to other renal disorders      methotrexate 2.5 MG tablet Take 3 tablets (7.5 mg) by mouth once a week On Mondays  Qty: 48 tablet, Refills: 3    Associated  Diagnoses: Sarcoidosis (H)      ASPIRIN EC PO Take 81 mg by mouth daily      blood glucose monitoring (ACCU-CHEK RONNIE PLUS) test strip Use to test blood sugar 2 times daily or as directed.  3 month supply.  Qty: 200 each, Refills: 3    Associated Diagnoses: Type 2 diabetes, HbA1C goal < 8% (H)      blood glucose monitoring (ACCU-CHEK FASTCLIX) lancets Use to test blood sugar 2 times daily or as directed.  102 lancets per box.  3 month supply.  Qty: 2 Box, Refills: 3    Associated Diagnoses: Type 2 diabetes, HbA1C goal < 8% (H)      blood glucose monitoring (NO BRAND SPECIFIED) meter device kit Use to test blood sugar 2 times daily.  Qty: 1 kit, Refills: 0    Associated Diagnoses: Type 2 diabetes mellitus with diabetic nephropathy (H)      Urea (CARMOL 40) 40 % CREA To feet daily  Qty: 199 g, Refills: 4    Associated Diagnoses: Dermatophytosis of nail; Corns and callosities      Multiple Vitamins-Minerals (MULTIVITAMIN & MINERAL PO) Take 1 tablet by mouth daily.      mirtazapine (REMERON) 15 MG tablet Take 15 mg by mouth At Bedtime.         STOP taking these medications       enoxaparin (LOVENOX) 80 MG/0.8ML injection Comments:   Reason for Stopping:             Allergies   Allergies   Allergen Reactions     Prednisone Other (See Comments)     He reports that he can't sleep for days and can't use small to massive doses of prednisone   Pt. Does not do well with high doses of Prednisone, His MD says that Prednisone is counter indicated. Prednisone failed to treat his sarcoidosis      Data       Patient see and examined. Agree with the plan. Spent more than 30 minutes discussing the discharge planning.     Villa Randhawa MD  Interventional Cardiology  [phone]

## 2017-05-18 NOTE — PLAN OF CARE
Discharge   D/I: VSS, pt a/ox4. Denies pain. Removed PIV. Pt denies chest pain, SOA, dizziness, nausea. Does have some shortness of breath with activity but this has improved since yesterday. Pt's daughter present for discharge. Reviewed all DC meds with pt and pt's daughter, both verbalized understanding.  Went over all DC paperwork and teaching with pt and pt's daughter, both verbalized understanding.   A: Pt ready for discharge   P: Pt discharged to home via wheelchair, daughter private ride, and Transport Services, with home care RN and PT to follow up tomorrow (INR and BMP will be drawn then). Pt will follow up with PCP within 7 days, with his outpatient nephrologist within 2 weeks (per pt), and with Dr. Paige on May 26th.

## 2017-05-19 ENCOUNTER — ANTICOAGULATION THERAPY VISIT (OUTPATIENT)
Dept: ANTICOAGULATION | Facility: CLINIC | Age: 72
End: 2017-05-19

## 2017-05-19 ENCOUNTER — TELEPHONE (OUTPATIENT)
Dept: INTERVENTIONAL RADIOLOGY/VASCULAR | Facility: CLINIC | Age: 72
End: 2017-05-19

## 2017-05-19 ENCOUNTER — DOCUMENTATION ONLY (OUTPATIENT)
Dept: CARE COORDINATION | Facility: CLINIC | Age: 72
End: 2017-05-19

## 2017-05-19 ENCOUNTER — CARE COORDINATION (OUTPATIENT)
Dept: CARDIOLOGY | Facility: CLINIC | Age: 72
End: 2017-05-19

## 2017-05-19 ENCOUNTER — MEDICAL CORRESPONDENCE (OUTPATIENT)
Dept: HEALTH INFORMATION MANAGEMENT | Facility: CLINIC | Age: 72
End: 2017-05-19

## 2017-05-19 DIAGNOSIS — Z79.01 LONG-TERM (CURRENT) USE OF ANTICOAGULANTS: ICD-10-CM

## 2017-05-19 DIAGNOSIS — I48.92 ATRIAL FLUTTER WITH RAPID VENTRICULAR RESPONSE (H): ICD-10-CM

## 2017-05-19 DIAGNOSIS — I72.3 PSEUDOANEURYSM OF ILIAC ARTERY (H): Primary | ICD-10-CM

## 2017-05-19 LAB — INR PPP: 1.2

## 2017-05-19 NOTE — PROGRESS NOTES
ANTICOAGULATION FOLLOW-UP CLINIC VISIT    Patient Name:  Rohan Monroe  Date:  5/19/2017  Contact Type:  Telephone    SUBJECTIVE:     Patient Findings     Comments Pt just recently D/C'd from hospital and put on 5mg daily. Pt was taking this dose prior to hospitalization. Will have continue 5mg daily and recheck INR in 5 days            OBJECTIVE    INR   Date Value Ref Range Status   05/19/2017 1.20  Final     Comment:     Outside Lab        ASSESSMENT / PLAN  INR assessment SUB    Recheck INR In: 5 DAYS    INR Location Homecare INR      Anticoagulation Summary as of 5/19/2017     INR goal 2.0-3.0   Today's INR 1.20!   Maintenance plan 5 mg (5 mg x 1) every day   Full instructions 5 mg every day   Weekly total 35 mg   Plan last modified Delphine Kaur RN (3/14/2017)   Next INR check 5/24/2017   Priority INR   Target end date 4/20/2017    Indications   Long-term (current) use of anticoagulants [Z79.01] [Z79.01]  Atrial flutter with rapid ventricular response (H) [I48.92]         Anticoagulation Episode Summary     INR check location     Preferred lab     Send INR reminders to [facility]    Comments 3 months therapy, then reassess. Call [phone]. Do not leave message.       Anticoagulation Care Providers     Provider Role Specialty Phone number    Jamie Foster MD [facility] [phone]            See the Encounter Report to view Anticoagulation Flowsheet and Dosing Calendar (Go to Encounters tab in chart review, and find the Anticoagulation Therapy Visit)    Spoke with ERMELINDA Wallace. Gave them new warfarin recommendation.  No changes in health, medication, or diet. No missed doses, no falls. No signs or symptoms of bleed or clotting.     Marcia Elias, MICHAEL

## 2017-05-19 NOTE — PROGRESS NOTES
Dates of hospitalization: 5/12 to 5/18  Reason for hospitalization: Retroperitonea bleed, acute kidney injury, ileus.  Procedures performed: EKG, coronary angiogram, placement of covered stent to iliac artery.  Vitamin K or FFP administered? no  Inpatient warfarin doses added to calendar? yes  Medication changes at discharge: D/C Lovenox  Warfarin dosing after DC: 5mg daily  Patient discharged on Lovenox? no  Next INR date: 5/19  Where is the patient discharging to? (home, TCU, staying locally, etc.): home  Will patient have home care? Yes- [facility].

## 2017-05-19 NOTE — MR AVS SNAPSHOT
Rohan Howard Mabel   5/19/2017   Anticoagulation Therapy Visit    Description:  71 year old male   Provider:  Diane Mazariegos, RN   Department:  [facility]           INR as of 5/19/2017     Today's INR No new INR was available at the time of this encounter.      Anticoagulation Summary as of 5/19/2017     INR goal 2.0-3.0   Today's INR No new INR was available at the time of this encounter.   Full instructions 5 mg every day   Next INR check 5/19/2017    Indications   Long-term (current) use of anticoagulants [Z79.01] [Z79.01]  Atrial flutter with rapid ventricular response (H) [I48.92]         May 2017 Details    Sun Mon Tue Wed Thu Fri Sat      1               2               3               4               5               6                 7               8               9               10               11               12               13                 14               15               16               17               18               19      See details      20                 21               22               23               24               25               26               27                 28               29               30               31                   Date Details   05/19 This INR check       Date of next INR:  5/19/2017         How to take your warfarin dose     To take:  5 mg Take 1 of the 5 mg tablets.

## 2017-05-19 NOTE — PROGRESS NOTES
Called patient today after discharge yesterday form [facility].  He had Cor. Angio. on 5/12, complicated by RP bleed.  Despite this he is now feeling quite well,his SOB is greatly improved with stent placements in LAD.  He has follow up with Dr. Paige on 5/26

## 2017-05-19 NOTE — MR AVS SNAPSHOT
Rohan Howard Mabel   5/19/2017   Anticoagulation Therapy Visit    Description:  71 year old male   Provider:  Marcia Elias, RN   Department:  [facility]           INR as of 5/19/2017     Today's INR 1.20!      Anticoagulation Summary as of 5/19/2017     INR goal 2.0-3.0   Today's INR 1.20!   Full instructions 5 mg every day   Next INR check 5/24/2017    Indications   Long-term (current) use of anticoagulants [Z79.01] [Z79.01]  Atrial flutter with rapid ventricular response (H) [I48.92]         May 2017 Details    Sun Mon Tue Wed Thu Fri Sat      1               2               3               4               5               6                 7               8               9               10               11               12               13                 14               15               16               17               18               19      5 mg   See details      20      5 mg           21      5 mg         22      5 mg         23      5 mg         24            25               26               27                 28               29               30               31                   Date Details   05/19 This INR check       Date of next INR:  5/24/2017         How to take your warfarin dose     To take:  5 mg Take 1 of the 5 mg tablets.

## 2017-05-20 LAB
ANION GAP SERPL CALCULATED.3IONS-SCNC: 9 MMOL/L (ref 3–14)
BUN SERPL-MCNC: 49 MG/DL (ref 7–30)
CALCIUM SERPL-MCNC: 7.9 MG/DL (ref 8.5–10.1)
CHLORIDE SERPL-SCNC: 99 MMOL/L (ref 94–109)
CO2 SERPL-SCNC: 28 MMOL/L (ref 20–32)
CREAT SERPL-MCNC: 2.48 MG/DL (ref 0.66–1.25)
GFR SERPL CREATININE-BSD FRML MDRD: 26 ML/MIN/1.7M2
GLUCOSE SERPL-MCNC: 178 MG/DL (ref 70–99)
POTASSIUM SERPL-SCNC: 4.1 MMOL/L (ref 3.4–5.3)
SODIUM SERPL-SCNC: 136 MMOL/L (ref 133–144)

## 2017-05-20 PROCEDURE — 80048 BASIC METABOLIC PNL TOTAL CA: CPT | Performed by: INTERNAL MEDICINE

## 2017-05-20 NOTE — PROGRESS NOTES
[facility] now requests orders and shares plan of care/discharge summaries for some patients through [employer].  Please REPLY TO THIS MESSAGE in order to give authorization for orders when needed.  This is considered a verbal order, you will still receive a faxed copy of orders for signature.  Thank you for your assistance in improving collaboration for our patients.    ORDER    SN [id number], [id number], 3 PRN for Labs, INR, assess vitals, Card/resp, /GI, wound care, CHF, DM, medication assessment/education.    Wound care order: Clean pressure ulcer stage 2 to coccyx with NS or soap/water, pat dry, apply Kerrafoam dressing, change 2-3 times a week or when soiled. Ok for family to provide wound care on non nursing visit days when educated.    OT 1day1 for weakness and ADL/IADLs, cognitive ability  PT 1day1 for weakness, transfer, ambulation   1day1 for community resource and transportation  Home Health Aide [id number] weeks for bathing and ADLs    MICHAEL Spear [phone]  [email or web address]

## 2017-05-20 NOTE — PROGRESS NOTES
Ok for this. SAVANNA Del Valles for home care. Documented for home care requests.  Willow Bob RN 9:28 AM on 5/22/2017.

## 2017-05-22 ENCOUNTER — TELEPHONE (OUTPATIENT)
Dept: INTERNAL MEDICINE | Facility: CLINIC | Age: 72
End: 2017-05-22

## 2017-05-22 NOTE — TELEPHONE ENCOUNTER
----- Message from Marquise Lemus sent at 5/19/2017  5:00 PM CDT -----  Regarding: Verbal Order  Contact: [phone]  Rainer from [facility]    Requesting verbal order for wound care.     Wound care order:    Clean with normal saline or water and soap, pat dry, apply gauze dressing daily and as needed.     Verbal order ok'd an documented.  Willow Bob RN 11:54 AM on 5/22/2017.

## 2017-05-22 NOTE — TELEPHONE ENCOUNTER
----- Message from Marquise Lemus sent at 5/19/2017  4:35 PM CDT -----  Regarding: Verbal Orders  Contact: [phone]  Rainer from [facility]    Requesting verbal orders for Home Care.     Skilled nursing 2 times a week for 3 weeks, 1 time a week for 3 weeks, 3 PRN visits. Visits are to assess and draw labs, check INR.     Occupational therapy one time a week for 1 week for weakness and ADL.    Physical therapy one time a week for 1 week for weaknesses, transfer and ambulation     one time a week for one week for community resources and transportation.  Verbal ok given and documented.  Willow Bob RN 11:48 AM on 5/22/2017.

## 2017-05-23 ENCOUNTER — ANTICOAGULATION THERAPY VISIT (OUTPATIENT)
Dept: ANTICOAGULATION | Facility: CLINIC | Age: 72
End: 2017-05-23

## 2017-05-23 DIAGNOSIS — Z79.01 LONG-TERM (CURRENT) USE OF ANTICOAGULANTS: ICD-10-CM

## 2017-05-23 DIAGNOSIS — I48.92 ATRIAL FLUTTER WITH RAPID VENTRICULAR RESPONSE (H): ICD-10-CM

## 2017-05-23 LAB — INR PPP: 1.3

## 2017-05-23 NOTE — MR AVS SNAPSHOT
Rohan Howard Gitalejandro   5/23/2017   Anticoagulation Therapy Visit    Description:  71 year old male   Provider:  Diane Mazariegos RN   Department:  [facility]           INR as of 5/23/2017     Today's INR 1.3!      Anticoagulation Summary as of 5/23/2017     INR goal 2.0-3.0   Today's INR 1.3!   Full instructions 5/23: 10 mg; 5/24: 7.5 mg; Otherwise 5 mg every day   Next INR check 5/25/2017    Indications   Long-term (current) use of anticoagulants [Z79.01] [Z79.01]  Atrial flutter with rapid ventricular response (H) [I48.92]         May 2017 Details    Sun Mon Tue Wed Thu Fri Sat      1               2               3               4               5               6                 7               8               9               10               11               12               13                 14               15               16               17               18               19               20                 21               22               23      10 mg   See details      24      7.5 mg         25            26               27                 28               29               30               31                   Date Details   05/23 This INR check       Date of next INR:  5/25/2017         How to take your warfarin dose     To take:  5 mg Take 1 of the 5 mg tablets.    To take:  7.5 mg Take 1.5 of the 5 mg tablets.    To take:  10 mg Take 2 of the 5 mg tablets.

## 2017-05-23 NOTE — PROGRESS NOTES
ANTICOAGULATION FOLLOW-UP CLINIC VISIT    Patient Name:  Rohan Monroe  Date:  5/23/2017  Contact Type:  Telephone    SUBJECTIVE:     Patient Findings     Positives No Problem Findings           OBJECTIVE    INR   Date Value Ref Range Status   05/23/2017 1.3  Final       ASSESSMENT / PLAN  INR assessment SUB    Recheck INR In: 2 DAYS    INR Location [facility]      Anticoagulation Summary as of 5/23/2017     INR goal 2.0-3.0   Today's INR 1.3!   Maintenance plan 5 mg (5 mg x 1) every day   Full instructions 5/23: 10 mg; 5/24: 7.5 mg; Otherwise 5 mg every day   Weekly total 35 mg   Plan last modified Delphine Kaur RN (3/14/2017)   Next INR check 5/25/2017   Priority INR   Target end date 4/20/2017    Indications   Long-term (current) use of anticoagulants [Z79.01] [Z79.01]  Atrial flutter with rapid ventricular response (H) [I48.92]         Anticoagulation Episode Summary     INR check location     Preferred lab     Send INR reminders to [facility]    Comments 3 months therapy, then reassess. Call [phone]. Do not leave message.       Anticoagulation Care Providers     Provider Role Specialty Phone number    Jamie Foster MD [facility] Internal Medicine [phone]            See the Encounter Report to view Anticoagulation Flowsheet and Dosing Calendar (Go to Encounters tab in chart review, and find the Anticoagulation Therapy Visit)  Spoke with ERMELINDA Dunham nurse.    Diane Mazariegos RN

## 2017-05-24 ENCOUNTER — PRE VISIT (OUTPATIENT)
Dept: CARDIOLOGY | Facility: CLINIC | Age: 72
End: 2017-05-24

## 2017-05-24 ENCOUNTER — TELEPHONE (OUTPATIENT)
Dept: NEPHROLOGY | Facility: CLINIC | Age: 72
End: 2017-05-24

## 2017-05-24 ENCOUNTER — DOCUMENTATION ONLY (OUTPATIENT)
Dept: CARE COORDINATION | Facility: CLINIC | Age: 72
End: 2017-05-24

## 2017-05-24 DIAGNOSIS — I25.10 CORONARY ARTERY DISEASE INVOLVING NATIVE CORONARY ARTERY OF NATIVE HEART WITHOUT ANGINA PECTORIS: ICD-10-CM

## 2017-05-24 DIAGNOSIS — I25.10 CAD S/P PERCUTANEOUS CORONARY ANGIOPLASTY: Primary | ICD-10-CM

## 2017-05-24 DIAGNOSIS — Z98.61 CAD S/P PERCUTANEOUS CORONARY ANGIOPLASTY: Primary | ICD-10-CM

## 2017-05-24 NOTE — TELEPHONE ENCOUNTER
"Call to patient regarding sooner appointment with Dr. Michel per discharge summary. Patient reports to be feeling \"very well\" and mentioned that he has a Home care nurse coming out to see him weekly. Will send message to Dr. Michel and follow up on labs in the meantime and/or sooner appointment.  Elisa Cardona LPN  Nephrology  [facility]  [phone]    "

## 2017-05-24 NOTE — PROGRESS NOTES
[facility] now requests orders and shares plan of care/discharge summaries for some patients through [email or web address].  Please REPLY TO THIS MESSAGE in order to give authorization for orders when needed.  This is considered a verbal order, you will still receive a faxed copy of orders for signature.  Thank you for your assistance in improving collaboration for our patients.    ORDER    OT to continue 2w2, for UE therapeutic HEP/breathing exs and ADLs/AE needs. The plan will also include falls prevention plan, monitor and treat pain, monitor skin integrity, monitor for s/s of depression, diabetic foot care, monitor for symptoms of heart failure and to achieve the following goals.    1. Pt will be indep with UE therapeutic HEP, including PLB technique, to increase activity tolerance to be able to take a shower without assist and to resume driving, 3 weeks.   2. Pt will utilize AE and/or adapted methonds to increase indep and safety with bathing and dressing, by 3 weeks.     Luann Esposito, OTR/L  [facility]  [phone]  [email or web address]

## 2017-05-25 ENCOUNTER — ANTICOAGULATION THERAPY VISIT (OUTPATIENT)
Dept: ANTICOAGULATION | Facility: CLINIC | Age: 72
End: 2017-05-25

## 2017-05-25 DIAGNOSIS — Z79.01 LONG-TERM (CURRENT) USE OF ANTICOAGULANTS: ICD-10-CM

## 2017-05-25 DIAGNOSIS — I48.92 ATRIAL FLUTTER WITH RAPID VENTRICULAR RESPONSE (H): ICD-10-CM

## 2017-05-25 LAB — INR PPP: 1.8

## 2017-05-25 NOTE — MR AVS SNAPSHOT
Rohan Howard Gitalejandro   5/25/2017   Anticoagulation Therapy Visit    Description:  71 year old male   Provider:  Delphine Kaur, RN   Department:  [facility]           INR as of 5/25/2017     Today's INR 1.8!      Anticoagulation Summary as of 5/25/2017     INR goal 2.0-3.0   Today's INR 1.8!   Full instructions 5/25: 7.5 mg; 5/26: 5 mg; 5/27: 5 mg; 5/28: 5 mg; 5/29: 5 mg   Next INR check 5/30/2017    Indications   Long-term (current) use of anticoagulants [Z79.01] [Z79.01]  Atrial flutter with rapid ventricular response (H) [I48.92]         May 2017 Details    Sun Mon Tue Wed Thu Fri Sat      1               2               3               4               5               6                 7               8               9               10               11               12               13                 14               15               16               17               18               19               20                 21               22               23               24               25      7.5 mg   See details      26      5 mg         27      5 mg           28      5 mg         29      5 mg         30            31                   Date Details   05/25 This INR check       Date of next INR:  5/30/2017         How to take your warfarin dose     To take:  5 mg Take 1 of the 5 mg tablets.    To take:  7.5 mg Take 1.5 of the 5 mg tablets.

## 2017-05-25 NOTE — PROGRESS NOTES
ANTICOAGULATION FOLLOW-UP CLINIC VISIT    Patient Name:  Rohan Monroe  Date:  5/25/2017  Contact Type:  Telephone    SUBJECTIVE:        OBJECTIVE    INR   Date Value Ref Range Status   05/25/2017 1.8  Final       ASSESSMENT / PLAN  INR assessment SUB    Recheck INR In: 5 DAYS    INR Location Clinic      Anticoagulation Summary as of 5/25/2017     INR goal 2.0-3.0   Today's INR 1.8!   Maintenance plan No maintenance plan   Full instructions 5/25: 7.5 mg; 5/26: 5 mg; 5/27: 5 mg; 5/28: 5 mg; 5/29: 5 mg   Plan last modified Delphine Kaur RN (5/25/2017)   Next INR check 5/30/2017   Priority INR   Target end date 4/20/2017    Indications   Long-term (current) use of anticoagulants [Z79.01] [Z79.01]  Atrial flutter with rapid ventricular response (H) [I48.92]         Anticoagulation Episode Summary     INR check location     Preferred lab     Send INR reminders to [facility]    Comments 3 months therapy, then reassess. Call [phone]. Do not leave message.       Anticoagulation Care Providers     Provider Role Specialty Phone number    Jamie Foster MD [facility] Internal Medicine [phone]            See the Encounter Report to view Anticoagulation Flowsheet and Dosing Calendar (Go to Encounters tab in chart review, and find the Anticoagulation Therapy Visit)    Spoke with Ángel Kaur RN

## 2017-05-26 ENCOUNTER — OFFICE VISIT (OUTPATIENT)
Dept: PHARMACY | Facility: CLINIC | Age: 72
End: 2017-05-26
Payer: COMMERCIAL

## 2017-05-26 ENCOUNTER — TELEPHONE (OUTPATIENT)
Dept: INTERNAL MEDICINE | Facility: CLINIC | Age: 72
End: 2017-05-26

## 2017-05-26 ENCOUNTER — OFFICE VISIT (OUTPATIENT)
Dept: CARDIOLOGY | Facility: CLINIC | Age: 72
End: 2017-05-26
Attending: INTERNAL MEDICINE
Payer: MEDICARE

## 2017-05-26 VITALS
SYSTOLIC BLOOD PRESSURE: 121 MMHG | OXYGEN SATURATION: 99 % | WEIGHT: 193 LBS | HEART RATE: 73 BPM | HEIGHT: 69 IN | DIASTOLIC BLOOD PRESSURE: 63 MMHG | BODY MASS INDEX: 28.58 KG/M2

## 2017-05-26 DIAGNOSIS — E11.9 TYPE 2 DIABETES MELLITUS WITHOUT COMPLICATION, WITHOUT LONG-TERM CURRENT USE OF INSULIN (H): Primary | ICD-10-CM

## 2017-05-26 DIAGNOSIS — I25.10 CORONARY ARTERY DISEASE WITHOUT ANGINA PECTORIS, UNSPECIFIED VESSEL OR LESION TYPE, UNSPECIFIED WHETHER NATIVE OR TRANSPLANTED HEART: ICD-10-CM

## 2017-05-26 DIAGNOSIS — Z98.61 CAD S/P PERCUTANEOUS CORONARY ANGIOPLASTY: Primary | ICD-10-CM

## 2017-05-26 DIAGNOSIS — I25.10 CORONARY ARTERY DISEASE INVOLVING NATIVE CORONARY ARTERY OF NATIVE HEART WITHOUT ANGINA PECTORIS: ICD-10-CM

## 2017-05-26 DIAGNOSIS — Z98.61 CAD S/P PERCUTANEOUS CORONARY ANGIOPLASTY: ICD-10-CM

## 2017-05-26 DIAGNOSIS — I25.10 CAD S/P PERCUTANEOUS CORONARY ANGIOPLASTY: Primary | ICD-10-CM

## 2017-05-26 DIAGNOSIS — I48.92 ATRIAL FLUTTER WITH RAPID VENTRICULAR RESPONSE (H): ICD-10-CM

## 2017-05-26 DIAGNOSIS — E78.5 HYPERLIPIDEMIA LDL GOAL <70: ICD-10-CM

## 2017-05-26 DIAGNOSIS — Z98.61 STATUS POST PERCUTANEOUS TRANSLUMINAL CORONARY ANGIOPLASTY: ICD-10-CM

## 2017-05-26 DIAGNOSIS — I25.10 CAD S/P PERCUTANEOUS CORONARY ANGIOPLASTY: ICD-10-CM

## 2017-05-26 DIAGNOSIS — K21.9 GASTROESOPHAGEAL REFLUX DISEASE WITHOUT ESOPHAGITIS: ICD-10-CM

## 2017-05-26 DIAGNOSIS — I48.92 ATRIAL FLUTTER (H): ICD-10-CM

## 2017-05-26 DIAGNOSIS — D86.0 SARCOIDOSIS OF LUNG (H): ICD-10-CM

## 2017-05-26 DIAGNOSIS — D86.9 SARCOIDOSIS: ICD-10-CM

## 2017-05-26 LAB
ALBUMIN SERPL-MCNC: 3.1 G/DL (ref 3.4–5)
ALP SERPL-CCNC: 79 U/L (ref 40–150)
ALT SERPL W P-5'-P-CCNC: 30 U/L (ref 0–70)
ANION GAP SERPL CALCULATED.3IONS-SCNC: 8 MMOL/L (ref 3–14)
AST SERPL W P-5'-P-CCNC: 40 U/L (ref 0–45)
BILIRUB SERPL-MCNC: 1.7 MG/DL (ref 0.2–1.3)
BUN SERPL-MCNC: 55 MG/DL (ref 7–30)
CALCIUM SERPL-MCNC: 9 MG/DL (ref 8.5–10.1)
CHLORIDE SERPL-SCNC: 100 MMOL/L (ref 94–109)
CHOLEST SERPL-MCNC: 106 MG/DL
CO2 SERPL-SCNC: 26 MMOL/L (ref 20–32)
CREAT SERPL-MCNC: 1.88 MG/DL (ref 0.66–1.25)
ERYTHROCYTE [DISTWIDTH] IN BLOOD BY AUTOMATED COUNT: 17.1 % (ref 10–15)
GFR SERPL CREATININE-BSD FRML MDRD: 35 ML/MIN/1.7M2
GLUCOSE SERPL-MCNC: 153 MG/DL (ref 70–99)
HCT VFR BLD AUTO: 28.9 % (ref 40–53)
HDLC SERPL-MCNC: 36 MG/DL
HGB BLD-MCNC: 8.9 G/DL (ref 13.3–17.7)
INR PPP: 1.99 (ref 0.86–1.14)
LDLC SERPL CALC-MCNC: 50 MG/DL
MCH RBC QN AUTO: 30 PG (ref 26.5–33)
MCHC RBC AUTO-ENTMCNC: 30.8 G/DL (ref 31.5–36.5)
MCV RBC AUTO: 97 FL (ref 78–100)
NONHDLC SERPL-MCNC: 70 MG/DL
PLATELET # BLD AUTO: 494 10E9/L (ref 150–450)
POTASSIUM SERPL-SCNC: 4.2 MMOL/L (ref 3.4–5.3)
PROT SERPL-MCNC: 7.6 G/DL (ref 6.8–8.8)
RBC # BLD AUTO: 2.97 10E12/L (ref 4.4–5.9)
SODIUM SERPL-SCNC: 135 MMOL/L (ref 133–144)
TRIGL SERPL-MCNC: 102 MG/DL
WBC # BLD AUTO: 12.1 10E9/L (ref 4–11)

## 2017-05-26 PROCEDURE — 99212 OFFICE O/P EST SF 10 MIN: CPT | Mod: ZF

## 2017-05-26 PROCEDURE — 80061 LIPID PANEL: CPT | Performed by: INTERNAL MEDICINE

## 2017-05-26 PROCEDURE — 80053 COMPREHEN METABOLIC PANEL: CPT | Performed by: INTERNAL MEDICINE

## 2017-05-26 PROCEDURE — 99606 MTMS BY PHARM EST 15 MIN: CPT | Performed by: PHARMACIST

## 2017-05-26 PROCEDURE — 85027 COMPLETE CBC AUTOMATED: CPT | Performed by: INTERNAL MEDICINE

## 2017-05-26 PROCEDURE — 99607 MTMS BY PHARM ADDL 15 MIN: CPT | Performed by: PHARMACIST

## 2017-05-26 PROCEDURE — 99215 OFFICE O/P EST HI 40 MIN: CPT | Performed by: INTERNAL MEDICINE

## 2017-05-26 PROCEDURE — 85610 PROTHROMBIN TIME: CPT | Performed by: INTERNAL MEDICINE

## 2017-05-26 PROCEDURE — 36415 COLL VENOUS BLD VENIPUNCTURE: CPT | Performed by: INTERNAL MEDICINE

## 2017-05-26 RX ORDER — ATORVASTATIN CALCIUM 20 MG/1
TABLET, FILM COATED ORAL
COMMUNITY
Start: 2017-01-31 | End: 2017-05-26

## 2017-05-26 ASSESSMENT — PAIN SCALES - GENERAL: PAINLEVEL: NO PAIN (0)

## 2017-05-26 NOTE — PROGRESS NOTES
May 26, 2017    Dear Colleagues:      I had the pleasure of seeing Rohan Monroe in the [facility] today.   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 6 weeks since 2008 as well as longstanding methotrexate, who I initially saw for worsening heart failure in March 2017.     His recent cardiac history is as follows.  He has a history of atrial flutter and underwent a pulmonary vein isolation.  He was in the hospital for control of the atrial arrhythmias and had intermittent amiodarone loading.  Due to toxicity from the amiodarone, this was stopped.     When if first met him, he had such severe shortness of breath he could barely walk 10 steps.  He was short of breath even showering.  At that time I was worried about worsening pulmonary sarcoid in addition to worsening heart failure with preserved ejection fraction.  I sent him for coronary angiogram, and he had stenting which will be reviewed below.  His PCI was complicated by a retroperitoneal bleed as well as RADHA.  He was not anuric during this time.  His hemoglobin stabilized and his kidney function has improved.  He has been discharged.  In fact, he says his breathing is actually much more comfortable than it was previously.  He is also on chronic oxygen therapy as well now.  He can walk about 15 steps before having to stop from both weakness and shortness breath.  He denies PND or orthopnea.  He denies lower extremity edema.  He denies any further palpitations or chest heaviness.  He has not had any GI bleeding and is on aspirin, Plavix as well as warfarin.       PAST MEDICAL HISTORY:  Past Medical History:   Diagnosis Date     Cataract of both eyes      Chronic infection     Hep C     Congestive heart failure, unspecified      Depressive disorder, not elsewhere classified     Depression  (non-psychotic)     ERM OS (epiretinal membrane, left eye)      Generalized osteoarthrosis, unspecified site      Glaucoma suspect      Hypertension      Lichen planus      Other psoriasis      PVD (posterior vitreous detachment), left eye      Sarcoidosis (H)      Sarcoidosis (H)      Type II or unspecified type diabetes mellitus without mention of complication, not stated as uncontrolled      Unspecified hypothyroidism     Hypothyroidism     Unspecified viral hepatitis C without hepatic coma      Viral warts, unspecified        SOCIAL HISTORY:  The patient was an  in the [employer] and stopped working 3 years ago.  He lives with his son, who is in college and is studying to be a .  He is .  He did drink heavily in his past.  He stopped in 1992.  Smoking he stopped in 1994.  He denies any other illicit drug use.      FAMILY HISTORY:  There is no family history of autoimmune diseases or sudden cardiac death.     CURRENT MEDICATIONS:  Current Outpatient Prescriptions   Medication Sig Dispense Refill     atorvastatin (LIPITOR) 40 MG tablet Take 1 tablet (40 mg) by mouth daily 30 tablet 3     clopidogrel (PLAVIX) 75 MG tablet Take 1 tablet (75 mg) by mouth daily 90 tablet 3     sildenafil (REVATIO/VIAGRA) 20 MG tablet Take 1 tablet (20 mg) by mouth 3 times daily 90 tablet 3     furosemide (LASIX) 20 MG tablet Take 3 tablets (60 mg) by mouth 2 times daily 180 tablet 0     levothyroxine (SYNTHROID/LEVOTHROID) 137 MCG tablet Take 1 tablet (137 mcg) by mouth daily 90 tablet 1     warfarin (COUMADIN) 5 MG tablet Take 1 tablet (5 mg) by mouth daily 30 tablet 6     polyethylene glycol (MIRALAX) powder Take 17 g (1 capful) by mouth daily 510 g 1     tiotropium (SPIRIVA HANDIHALER) 18 MCG capsule Inhale contents of one capsule daily. 90 capsule 3     albuterol (PROAIR HFA/PROVENTIL HFA/VENTOLIN HFA) 108 (90 BASE) MCG/ACT Inhaler Inhale 2 puffs into the lungs  every 6 hours as needed for shortness of breath / dyspnea 3 Inhaler 6     fluticasone-vilanterol (BREO ELLIPTA) 100-25 MCG/INH oral inhaler Inhale 1 puff into the lungs daily 3 Inhaler 3     inFLIXimab (REMICADE) 100 MG injection Inject 100 mg into the vein every 28 days        metoprolol (LOPRESSOR) 100 MG tablet Take 1 tablet (100 mg) by mouth 2 times daily 60 tablet 11     methotrexate 2.5 MG tablet Take 3 tablets (7.5 mg) by mouth once a week On Mondays (Patient taking differently: Take 2.5mg by mouth weekly on Mondays.) 48 tablet 3     ASPIRIN EC PO Take 81 mg by mouth daily       blood glucose monitoring (ACCU-CHEK RONNIE PLUS) test strip Use to test blood sugar 2 times daily or as directed.  3 month supply. 200 each 3     blood glucose monitoring (ACCU-CHEK FASTCLIX) lancets Use to test blood sugar 2 times daily or as directed.  102 lancets per box.  3 month supply. 2 Box 3     blood glucose monitoring (NO BRAND SPECIFIED) meter device kit Use to test blood sugar 2 times daily. 1 kit 0     Urea (CARMOL 40) 40 % CREA To feet daily (Patient taking differently: Apply to feet daily as needed.) 199 g 4     Multiple Vitamins-Minerals (MULTIVITAMIN & MINERAL PO) Take 1 tablet by mouth daily.       mirtazapine (REMERON) 15 MG tablet Take 15 mg by mouth At Bedtime.       [DISCONTINUED] atorvastatin (LIPITOR) 20 MG tablet          ROS:   Constitutional: No fever, chills, or sweats. Weight has been relatively stable   ENT: No visual disturbance, ear ache, epistaxis, sore throat.   Allergies/Immunologic: Negative.   Respiratory: + intermittent cough, no hemoptysis.   Cardiovascular: As per HPI.   GI: No nausea, vomiting, hematemesis, melena, or hematochezia.   : No urinary frequency, dysuria, or hematuria.   Integument: Negative.   Psychiatric: frustrated with his health   Neuro: Negative.   Endocrinology: Negative.   Musculoskeletal: sore on his backside which is new in the last few months    EXAM:  /63 (BP  "Location: Right arm, Patient Position: Chair, Cuff Size: Adult Regular)  Pulse 73  Ht 1.753 m (5' 9\")  Wt 87.5 kg (193 lb)  SpO2 99%  BMI 28.5 kg/m2  General: appears comfortable, alert and articulate  Head: normocephalic, atraumatic  Eyes: anicteric sclera, EOMI  Neck: no adenopathy  Orophyarynx: moist mucosa, no lesions, dentition intact  Heart: PMI unable to be appreciated, regular, S1/S2, trace systolic murmur at the apex, no gallop, rub, estimated JVP <10     Lungs: slight expiratory wheeze, no crackles   Abdomen:  Non distended, non-tender, bowel sounds present, no hepatosplenomegaly  Extremities: no clubbing, cyanosis, no LE edema   Neurological: normal speech and affect, no gross motor deficits    Labs:  CBC RESULTS:  Lab Results   Component Value Date    WBC 12.1 (H) 05/26/2017    RBC 2.97 (L) 05/26/2017    HGB 8.9 (L) 05/26/2017    HCT 28.9 (L) 05/26/2017    MCV 97 05/26/2017    MCH 30.0 05/26/2017    MCHC 30.8 (L) 05/26/2017    RDW 17.1 (H) 05/26/2017     (H) 05/26/2017       CMP RESULTS:  Lab Results   Component Value Date     05/26/2017    POTASSIUM 4.2 05/26/2017    CHLORIDE 100 05/26/2017    CO2 26 05/26/2017    ANIONGAP 8 05/26/2017     (H) 05/26/2017    BUN 55 (H) 05/26/2017    CR 1.88 (H) 05/26/2017    GFRESTIMATED 35 (L) 05/26/2017    GFRESTBLACK 43 (L) 05/26/2017    RAFFY 9.0 05/26/2017    BILITOTAL 1.7 (H) 05/26/2017    ALBUMIN 3.1 (L) 05/26/2017    ALKPHOS 79 05/26/2017    ALT 30 05/26/2017    AST 40 05/26/2017        INR RESULTS:  Lab Results   Component Value Date    INR 1.99 (H) 05/26/2017       Lab Results   Component Value Date    MAG 2.0 05/13/2017     Lab Results   Component Value Date    NTBNPI 5090 (H) 05/15/2017     Lab Results   Component Value Date    NTBNP 674 (H) 03/16/2015       Interpretation Summary  Borderline reduced left ventricular systolic function, (EF 50-55%). Left  ventricular diastolic function is indeterminate.  The right ventricle is normal " size; global RV function is mildly reduced.  No significant valvular pathology.  The inferior vena cava is normal; estimated mean RA pressure is <3 mmHg.  In comparison to prior limited study dated 17, the LV function is  unchanged, however much improved from chronologically earlier study of the  same date.    Left Ventricle  Left ventricular size is normal. Mild concentric wall thickening consistent  with left ventricular hypertrophy is present. Left ventricular diastolic  function is indeterminate. Borderline (EF 50-55%) reduced left ventricular  function is present.     Right Ventricle  The right ventricle is normal size. Global right ventricular function is  mildly reduced.     Atria  The left atrium appears normal. Mild to moderate right atrial enlargement is  present.     Mitral Valve  The mitral valve is normal.        Aortic Valve  Mild aortic valve sclerosis is present.     Tricuspid Valve  Trace tricuspid insufficiency is present. Pulmonary artery systolic pressure  cannot be assessed.     Pulmonic Valve  The pulmonic valve cannot be assessed.     Vessels  The inferior vena cava is normal. The aorta root is normal. Estimated mean  right atrial pressure is <3 mmHg.     Pericardium  No pericardial effusion is present.     MMode/2D Measurements & Calculations  IVSd: 1.2 cm  LVIDd: 4.4 cm  LVIDs: 3.0 cm  LVPWd: 1.00 cm  FS: 31.8 %  EDV(Teich): 88.9 ml  ESV(Teich): 35.5 ml     LV mass(C)d: 166.9 grams  Ao root diam: 3.3 cm  asc Aorta Diam: 3.0 cm  LA/Ao: 1.2  LVOT diam: 2.1 cm  LVOT area: 3.4 cm2  LA Volume (BP): 59.5 ml  LA Volume Index (BP): 29.3 ml/m2        Doppler Measurements & Calculations  MV E max jhonatan: 65.1 cm/sec  MV A max jhonatan: 81.9 cm/sec  MV E/A: 0.79  MV mean P.4 mmHg  MV V2 VTI: 19.5 cm  MVA(VTI): 2.7 cm2     MV dec time: 0.21 sec  Ao V2 max: 107.9 cm/sec  Ao max P.7 mmHg  LV V1 VTI: 15.8 cm  SV(LVOT): 52.8 ml  PA acc time: 0.10 sec  TR max jhonatan: 285.0 cm/sec  TR max P.5  mmHg  Lateral E/e': 7.6  Medial E/e': 9.7     EKG demonstrated first-degree AV block, normal axis, slightly prolonged QT with premature atrial contractions.      Last pulmonary function tests were in 02/24/2017, shows FEV1 of 2.74, which is 69% of predicted, an FEV1 of 39% of predicted at 1.16.  DLCO was not performed.      1.  Coronary Angiogram 5/12/2017  1. LM is without angiographic evidence of disease.   2. LAD supplies the entire apex (type 3) and gives rise to septal perforators, D1 and D2. The mLAD has a 75% hazy stenosis just distal to the existing stent, D1 has a 60% ostial stenosis and D2 has serial 80% in-stent stenoses and the remainder of the vessel has mild luminal irregularities.   3. LCX gives rise to OM1 and OM2 vessels. The pLCx has a 30% stenosis, OM1 has a 70% stenosis in a small vessel and the remainder of the vessel has mild luminal irregularities.   4. RCA was not studied as was recently evaluated.       PERCUTANEOUS CORONARY INTERVENTION:  1. LAD Lesions:  A 6Fr XB-3.5 guide catheter was positioned at the ostium of the LM. Heparin was administered to achieve a goal ACT > 250 sec. A Christiano Blue wire was advanced across the D2 in-stent lesion and positioned in the distal D2. A 1.5x15mm balloon was used to pre-dilate the lesion. A [id number] Synergy drug eluting stent was successfully deployed across the D2 lesion with inflation to 11 ashley. The wire was then repositioned in the dLAD. The mLAD lesion was treated with a [id number] Synergy drug eluting stent with inflation to 14 ashley. Final angiography showed no evidence of perforation or dissection with residual stenosis of 0% and JOHN 3 flow. No complications.      COMPLICATIONS:  1. None      SUMMARY:   1. Successful deployment of a [id number] Synergy drug eluting stent to the D2.  2. Successful deployment of a [id number] Synergy drug eluting stent to the mLAD.     2.  CT Abd 5/13/2017  1. Enlarging right retroperitoneal hematoma with active  extravasation from the right external iliac artery.  2. Small left heterogeneous pleural effusion with indeterminate density. Findings are of uncertain etiology but hemorrhage or infection within the pleural space can have this appearance.  3. Changes in the lung consistent with sarcoidosis.  4. Nonspecific lymphadenopathy associated with the celiac axis.     3.  Placement of covered stent to right external iliac artery 5/13/2017  1. Diagnostic angiography demonstrating a pseudoaneurysm with the neck  arising from distal right external iliac artery with associated active  extravasation.  2. Successful stent exclusion of the pseudoaneurysm with a 6 mm x 39 mm Atrium I cast balloon expandable stent graft.        Assessment and Plan:   In summary, this is a very pleasant 71-year-old man with a history of pulmonary sarcoidosis and a presumptive diagnosis of cardiac sarcoid based on the fact that he has high filling pressures and atrial arrhythmias but without a confirmed tissue diagnosis of cardiac sarcoidosis, who is maintained on infliximab as well as methotrexate, with decent control of his pulmonary symptoms over the last several years, who I first evaluated several months ago for worsening SOB and HFpEF symptoms. We diuresed him and have now performed revascularization.  This was unfortunately complicated by an RP bleed requiring covered stent to the iliac artery.  He also had RADHA during this admission which is now recovering.      He is overall much improved today with regard to his volume overload with a combination of diuretics and revascularization, I cannot exclude that he may have active cardiac sarcoidosis, although he is very heavily treated for this.  At this point, I would like to hold off on further workup for potential active cardiac sarcoidosis, now that he is symptomatically much improved.  He is euvolemic on exam today, he is [place] Heart Association indeterminate based on concomitant lung disease.    His blood pressure is well controlled today, and I will not consider repeat PET scan in him unless we have a decline in ejection fraction, worsened arrhythmias or worsening heart failure still.     Given history of coronary artery disease, he is on dual antiplatelet therapy as well as Coumadin.  I am adding omeprazole for stomach protection today, and now that his INR is therapeutic, I am dropping his aspirin therapy.  He will remain on Plavix, warfarin at least for the next year as well as statin.      I will plan to see him back in 3-4 months to ensure that he remains euvolemic.     Other:   With regard to his atrial fibrillation, he is presently in sinus on metoprolol 100 b.i.d. as well as anticoagulated, currently in sinus rhythm.       Chronic kidney disease with proteinuria.  He has developed worsening renal function on valsartan in the hospital, although last renal note said to try again.  Given he is still recovering from RADHA I would like to wait on this at present.      Please feel free to contact me if you have any further questions.      Sincerely,         Diane Paige MD   Cardiology Staff           CC  Patient Care Team:  Jamie Foster MD as PCP - General (Internal Medicine)  Jamie Foster MD as Referring Physician (Internal Medicine)  Kina Greco MD as MD (Ophthalmology)  Perlman, David Morris, MD as MD (Internal Medicine)  Haley Renner DO as Referring Physician (Student in organized health care education/training program)  Chicho Mejia DPM (Podiatry)  Macey Roy MD as MD (Internal Medicine)  Thompson Gonzalez MD as MD (Cardiology)  Karlene Farrell RN as Nurse Coordinator (Cardiology)  Madalyn Fajardo MD PhD as MD (Family Practice)  Jaclyn Pina, MICHAEL as Nurse Coordinator (Clinical Cardiac Electrophysiology)  Brian Vazquez MD as MD (Clinical Cardiac Electrophysiology)  Elizabeth Cabral, VINNY CNP as Nurse Practitioner (Nurse  Practitioner)  Maria Victoria Palmer MD as MD (Radiology)  OSCAR GONSALVES

## 2017-05-26 NOTE — LETTER
5/26/2017      RE: Rohan Monroe  [address]  [place] [address]       Dear Colleague,    Thank you for the opportunity to participate in the care of your patient, Rohan Monroe, at the [facility]. Please see a copy of my visit note below.    May 26, 2017    Dear Colleagues:      I had the pleasure of seeing Rohan Monroe in the [facility] today.   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 6 weeks since 2008 as well as longstanding methotrexate, who I initially saw for worsening heart failure in March 2017.     His recent cardiac history is as follows.  He has a history of atrial flutter and underwent a pulmonary vein isolation.  He was in the hospital for control of the atrial arrhythmias and had intermittent amiodarone loading.  Due to toxicity from the amiodarone, this was stopped.     When if first met him, he had such severe shortness of breath he could barely walk 10 steps.  He was short of breath even showering.  At that time I was worried about worsening pulmonary sarcoid in addition to worsening heart failure with preserved ejection fraction.  I sent him for coronary angiogram, and he had stenting which will be reviewed below.  His PCI was complicated by a retroperitoneal bleed as well as RADHA.  He was not anuric during this time.  His hemoglobin stabilized and his kidney function has improved.  He has been discharged.  In fact, he says his breathing is actually much more comfortable than it was previously.  He is also on chronic oxygen therapy as well now.  He can walk about 15 steps before having to stop from both weakness and shortness breath.  He denies PND or orthopnea.  He denies lower extremity edema.  He denies any further  palpitations or chest heaviness.  He has not had any GI bleeding and is on aspirin, Plavix as well as warfarin.       PAST MEDICAL HISTORY:  Past Medical History:   Diagnosis Date     Cataract of both eyes      Chronic infection     Hep C     Congestive heart failure, unspecified      Depressive disorder, not elsewhere classified     Depression (non-psychotic)     ERM OS (epiretinal membrane, left eye)      Generalized osteoarthrosis, unspecified site      Glaucoma suspect      Hypertension      Lichen planus      Other psoriasis      PVD (posterior vitreous detachment), left eye      Sarcoidosis (H)      Sarcoidosis (H)      Type II or unspecified type diabetes mellitus without mention of complication, not stated as uncontrolled      Unspecified hypothyroidism     Hypothyroidism     Unspecified viral hepatitis C without hepatic coma      Viral warts, unspecified        SOCIAL HISTORY:  The patient was an  in the [employer] and stopped working 3 years ago.  He lives with his son, who is in college and is studying to be a .  He is .  He did drink heavily in his past.  He stopped in 1992.  Smoking he stopped in 1994.  He denies any other illicit drug use.      FAMILY HISTORY:  There is no family history of autoimmune diseases or sudden cardiac death.     CURRENT MEDICATIONS:  Current Outpatient Prescriptions   Medication Sig Dispense Refill     atorvastatin (LIPITOR) 40 MG tablet Take 1 tablet (40 mg) by mouth daily 30 tablet 3     clopidogrel (PLAVIX) 75 MG tablet Take 1 tablet (75 mg) by mouth daily 90 tablet 3     sildenafil (REVATIO/VIAGRA) 20 MG tablet Take 1 tablet (20 mg) by mouth 3 times daily 90 tablet 3     furosemide (LASIX) 20 MG tablet Take 3 tablets (60 mg) by mouth 2 times daily 180 tablet 0     levothyroxine (SYNTHROID/LEVOTHROID) 137 MCG tablet Take 1 tablet (137 mcg) by mouth daily 90 tablet 1     warfarin (COUMADIN) 5 MG tablet Take  1 tablet (5 mg) by mouth daily 30 tablet 6     polyethylene glycol (MIRALAX) powder Take 17 g (1 capful) by mouth daily 510 g 1     tiotropium (SPIRIVA HANDIHALER) 18 MCG capsule Inhale contents of one capsule daily. 90 capsule 3     albuterol (PROAIR HFA/PROVENTIL HFA/VENTOLIN HFA) 108 (90 BASE) MCG/ACT Inhaler Inhale 2 puffs into the lungs every 6 hours as needed for shortness of breath / dyspnea 3 Inhaler 6     fluticasone-vilanterol (BREO ELLIPTA) 100-25 MCG/INH oral inhaler Inhale 1 puff into the lungs daily 3 Inhaler 3     inFLIXimab (REMICADE) 100 MG injection Inject 100 mg into the vein every 28 days        metoprolol (LOPRESSOR) 100 MG tablet Take 1 tablet (100 mg) by mouth 2 times daily 60 tablet 11     methotrexate 2.5 MG tablet Take 3 tablets (7.5 mg) by mouth once a week On Mondays (Patient taking differently: Take 2.5mg by mouth weekly on Mondays.) 48 tablet 3     ASPIRIN EC PO Take 81 mg by mouth daily       blood glucose monitoring (ACCU-CHEK RONNIE PLUS) test strip Use to test blood sugar 2 times daily or as directed.  3 month supply. 200 each 3     blood glucose monitoring (ACCU-CHEK FASTCLIX) lancets Use to test blood sugar 2 times daily or as directed.  102 lancets per box.  3 month supply. 2 Box 3     blood glucose monitoring (NO BRAND SPECIFIED) meter device kit Use to test blood sugar 2 times daily. 1 kit 0     Urea (CARMOL 40) 40 % CREA To feet daily (Patient taking differently: Apply to feet daily as needed.) 199 g 4     Multiple Vitamins-Minerals (MULTIVITAMIN & MINERAL PO) Take 1 tablet by mouth daily.       mirtazapine (REMERON) 15 MG tablet Take 15 mg by mouth At Bedtime.       [DISCONTINUED] atorvastatin (LIPITOR) 20 MG tablet          ROS:   Constitutional: No fever, chills, or sweats. Weight has been relatively stable   ENT: No visual disturbance, ear ache, epistaxis, sore throat.   Allergies/Immunologic: Negative.   Respiratory: + intermittent cough, no hemoptysis.   Cardiovascular:  "As per HPI.   GI: No nausea, vomiting, hematemesis, melena, or hematochezia.   : No urinary frequency, dysuria, or hematuria.   Integument: Negative.   Psychiatric: frustrated with his health   Neuro: Negative.   Endocrinology: Negative.   Musculoskeletal: sore on his backside which is new in the last few months    EXAM:  /63 (BP Location: Right arm, Patient Position: Chair, Cuff Size: Adult Regular)  Pulse 73  Ht 1.753 m (5' 9\")  Wt 87.5 kg (193 lb)  SpO2 99%  BMI 28.5 kg/m2  General: appears comfortable, alert and articulate  Head: normocephalic, atraumatic  Eyes: anicteric sclera, EOMI  Neck: no adenopathy  Orophyarynx: moist mucosa, no lesions, dentition intact  Heart: PMI unable to be appreciated, regular, S1/S2, trace systolic murmur at the apex, no gallop, rub, estimated JVP <10     Lungs: slight expiratory wheeze, no crackles   Abdomen:  Non distended, non-tender, bowel sounds present, no hepatosplenomegaly  Extremities: no clubbing, cyanosis, no LE edema   Neurological: normal speech and affect, no gross motor deficits    Labs:  CBC RESULTS:  Lab Results   Component Value Date    WBC 12.1 (H) 05/26/2017    RBC 2.97 (L) 05/26/2017    HGB 8.9 (L) 05/26/2017    HCT 28.9 (L) 05/26/2017    MCV 97 05/26/2017    MCH 30.0 05/26/2017    MCHC 30.8 (L) 05/26/2017    RDW 17.1 (H) 05/26/2017     (H) 05/26/2017       CMP RESULTS:  Lab Results   Component Value Date     05/26/2017    POTASSIUM 4.2 05/26/2017    CHLORIDE 100 05/26/2017    CO2 26 05/26/2017    ANIONGAP 8 05/26/2017     (H) 05/26/2017    BUN 55 (H) 05/26/2017    CR 1.88 (H) 05/26/2017    GFRESTIMATED 35 (L) 05/26/2017    GFRESTBLACK 43 (L) 05/26/2017    RAFFY 9.0 05/26/2017    BILITOTAL 1.7 (H) 05/26/2017    ALBUMIN 3.1 (L) 05/26/2017    ALKPHOS 79 05/26/2017    ALT 30 05/26/2017    AST 40 05/26/2017        INR RESULTS:  Lab Results   Component Value Date    INR 1.99 (H) 05/26/2017       Lab Results   Component Value Date    " MAG 2.0 05/13/2017     Lab Results   Component Value Date    NTBNPI 5090 (H) 05/15/2017     Lab Results   Component Value Date    NTBNP 674 (H) 03/16/2015       Interpretation Summary  Borderline reduced left ventricular systolic function, (EF 50-55%). Left  ventricular diastolic function is indeterminate.  The right ventricle is normal size; global RV function is mildly reduced.  No significant valvular pathology.  The inferior vena cava is normal; estimated mean RA pressure is <3 mmHg.  In comparison to prior limited study dated 1/19/17, the LV function is  unchanged, however much improved from chronologically earlier study of the  same date.    Left Ventricle  Left ventricular size is normal. Mild concentric wall thickening consistent  with left ventricular hypertrophy is present. Left ventricular diastolic  function is indeterminate. Borderline (EF 50-55%) reduced left ventricular  function is present.     Right Ventricle  The right ventricle is normal size. Global right ventricular function is  mildly reduced.     Atria  The left atrium appears normal. Mild to moderate right atrial enlargement is  present.     Mitral Valve  The mitral valve is normal.        Aortic Valve  Mild aortic valve sclerosis is present.     Tricuspid Valve  Trace tricuspid insufficiency is present. Pulmonary artery systolic pressure  cannot be assessed.     Pulmonic Valve  The pulmonic valve cannot be assessed.     Vessels  The inferior vena cava is normal. The aorta root is normal. Estimated mean  right atrial pressure is <3 mmHg.     Pericardium  No pericardial effusion is present.     MMode/2D Measurements & Calculations  IVSd: 1.2 cm  LVIDd: 4.4 cm  LVIDs: 3.0 cm  LVPWd: 1.00 cm  FS: 31.8 %  EDV(Teich): 88.9 ml  ESV(Teich): 35.5 ml     LV mass(C)d: 166.9 grams  Ao root diam: 3.3 cm  asc Aorta Diam: 3.0 cm  LA/Ao: 1.2  LVOT diam: 2.1 cm  LVOT area: 3.4 cm2  LA Volume (BP): 59.5 ml  LA Volume Index (BP): 29.3 ml/m2        Doppler  Measurements & Calculations  MV E max jhonatan: 65.1 cm/sec  MV A max jhonatan: 81.9 cm/sec  MV E/A: 0.79  MV mean P.4 mmHg  MV V2 VTI: 19.5 cm  MVA(VTI): 2.7 cm2     MV dec time: 0.21 sec  Ao V2 max: 107.9 cm/sec  Ao max P.7 mmHg  LV V1 VTI: 15.8 cm  SV(LVOT): 52.8 ml  PA acc time: 0.10 sec  TR max jhonatan: 285.0 cm/sec  TR max P.5 mmHg  Lateral E/e': 7.6  Medial E/e': 9.7     EKG demonstrated first-degree AV block, normal axis, slightly prolonged QT with premature atrial contractions.      Last pulmonary function tests were in 2017, shows FEV1 of 2.74, which is 69% of predicted, an FEV1 of 39% of predicted at 1.16.  DLCO was not performed.      1.  Coronary Angiogram 2017  1. LM is without angiographic evidence of disease.   2. LAD supplies the entire apex (type 3) and gives rise to septal perforators, D1 and D2. The mLAD has a 75% hazy stenosis just distal to the existing stent, D1 has a 60% ostial stenosis and D2 has serial 80% in-stent stenoses and the remainder of the vessel has mild luminal irregularities.   3. LCX gives rise to OM1 and OM2 vessels. The pLCx has a 30% stenosis, OM1 has a 70% stenosis in a small vessel and the remainder of the vessel has mild luminal irregularities.   4. RCA was not studied as was recently evaluated.       PERCUTANEOUS CORONARY INTERVENTION:  1. LAD Lesions:  A 6Fr XB-3.5 guide catheter was positioned at the ostium of the LM. Heparin was administered to achieve a goal ACT > 250 sec. A Christiano Blue wire was advanced across the D2 in-stent lesion and positioned in the distal D2. A 1.5x15mm balloon was used to pre-dilate the lesion. A [id number] Synergy drug eluting stent was successfully deployed across the D2 lesion with inflation to 11 ashley. The wire was then repositioned in the dLAD. The mLAD lesion was treated with a [id number] Synergy drug eluting stent with inflation to 14 ashley. Final angiography showed no evidence of perforation or dissection with residual stenosis of  0% and JOHN 3 flow. No complications.      COMPLICATIONS:  1. None      SUMMARY:   1. Successful deployment of a [id number] Synergy drug eluting stent to the D2.  2. Successful deployment of a [id number] Synergy drug eluting stent to the mLAD.     2.  CT Abd 5/13/2017  1. Enlarging right retroperitoneal hematoma with active extravasation from the right external iliac artery.  2. Small left heterogeneous pleural effusion with indeterminate density. Findings are of uncertain etiology but hemorrhage or infection within the pleural space can have this appearance.  3. Changes in the lung consistent with sarcoidosis.  4. Nonspecific lymphadenopathy associated with the celiac axis.     3.  Placement of covered stent to right external iliac artery 5/13/2017  1. Diagnostic angiography demonstrating a pseudoaneurysm with the neck  arising from distal right external iliac artery with associated active  extravasation.  2. Successful stent exclusion of the pseudoaneurysm with a 6 mm x 39 mm Atrium I cast balloon expandable stent graft.        Assessment and Plan:   In summary, this is a very pleasant 71-year-old man with a history of pulmonary sarcoidosis and a presumptive diagnosis of cardiac sarcoid based on the fact that he has high filling pressures and atrial arrhythmias but without a confirmed tissue diagnosis of cardiac sarcoidosis, who is maintained on infliximab as well as methotrexate, with decent control of his pulmonary symptoms over the last several years, who I first evaluated several months ago for worsening SOB and HFpEF symptoms. We diuresed him and have now performed revascularization.  This was unfortunately complicated by an RP bleed requiring covered stent to the iliac artery.  He also had RADHA during this admission which is now recovering.      He is overall much improved today with regard to his volume overload with a combination of diuretics and revascularization, I cannot exclude that he may have active  cardiac sarcoidosis, although he is very heavily treated for this.  At this point, I would like to hold off on further workup for potential active cardiac sarcoidosis, now that he is symptomatically much improved.  He is euvolemic on exam today, he is [place] Heart Association indeterminate based on concomitant lung disease.   His blood pressure is well controlled today, and I will not consider repeat PET scan in him unless we have a decline in ejection fraction, worsened arrhythmias or worsening heart failure still.     Given history of coronary artery disease, he is on dual antiplatelet therapy as well as Coumadin.  I am adding omeprazole for stomach protection today, and now that his INR is therapeutic, I am dropping his aspirin therapy.  He will remain on Plavix, warfarin at least for the next year as well as statin.      I will plan to see him back in 3-4 months to ensure that he remains euvolemic.     Other:   With regard to his atrial fibrillation, he is presently in sinus on metoprolol 100 b.i.d. as well as anticoagulated, currently in sinus rhythm.       Chronic kidney disease with proteinuria.  He has developed worsening renal function on valsartan in the hospital, although last renal note said to try again.  Given he is still recovering from RADHA I would like to wait on this at present.      Please feel free to contact me if you have any further questions.      Sincerely,      Diane Paige MD   Cardiology Staff       CC  Patient Care Team:  Jamie Foster MD as PCP - General (Internal Medicine)  Jamie Foster MD as Referring Physician (Internal Medicine)  Kina Greco MD as MD (Ophthalmology)  Perlman, David Morris, MD as MD (Internal Medicine)  Haley Renner DO as Referring Physician (Student in organized health care education/training program)  Chicho Mejia DPM (Podiatry)  Macey Roy MD as MD (Internal Medicine)  Thompson Gonzalez MD as MD  (Cardiology)  Karlene Farrell, RN as Nurse Coordinator (Cardiology)  Madalyn Fajardo MD PhD as MD (Family Practice)  Jaclyn Pina, MICHAEL as Nurse Coordinator (Clinical Cardiac Electrophysiology)  Brian Vazquez MD as MD (Clinical Cardiac Electrophysiology)  Elizabeth Cabral, APRN CNP as Nurse Practitioner (Nurse Practitioner)  Maria Victoria Palmer MD as MD (Radiology)  ELIZABETH CABRAL

## 2017-05-26 NOTE — PROGRESS NOTES
5/26/17:  INR 1.99.  He had INR check yesterday, 5/25/17 and dosing was given.  No call made today.  Christal Alfaro RN

## 2017-05-26 NOTE — TELEPHONE ENCOUNTER
----- Message from Marquise Lemus sent at 5/25/2017  2:14 PM CDT -----  Regarding: Verbal Order  Contact: [phone]  Urmila from [facility]    Requesting verbal order for home physical therapy once a week for 4 weeks, for gait, DME and mobility.    Secured voicemail.     5/26/17 Urmila given verbal for orders above. Ángel RODRIGUEZ

## 2017-05-26 NOTE — PATIENT INSTRUCTIONS
Start Omeprazole 20 mg daily  Stop Aspirin  Follow up with Dr. Paige in 3 months      Reyna Hernandez, RN  Cardiology Care Coordinator  Please be aware that I work part-time but I will be checking messages several times per week.   For urgent needs, please call the number below.    [phone], press 1 for [employer], press 3 to speak to a nurse    .

## 2017-05-26 NOTE — PROGRESS NOTES
SUBJECTIVE/OBJECTIVE:                Rohan Monroe is a 71 year old male coming in for a transitions of care visit.  He was discharged from [facility] on 05/18 for Coronary Angiogram with stenting.     Chief Complaint: No major concerns today.    Allergies/ADRs: Reviewed in Epic  Tobacco: No tobacco use   Alcohol: not currently using    PMH: Reviewed in Epic    Medication Adherence: no issues reported    Diabetes:  Pt currently taking no medications.  SMBG: two times daily.   Ranges (patient reported): 100-120 in the morning, 150 max after meals.  Patient is not experiencing hypoglycemia  Recent symptoms of high blood sugar? none  Aspirin: Not taking due to Plavix and warfarin therapies    AFib/CVD/HTN: Pt is currently taking warfarin as directed and started Plavix in the hospital without complaint of concerning bruising or bleeding. Pt also continues on metoprolol and furosemide. Pt feels that his current furosemide dose is a good balance; keeping his weight stable and minimizing frustrating diuresis side effects. Weight has been stable since leaving the hospital.    Sarcoidosis: Pt continues on Remicade, MTX, sildenafil, Spiriva, Breo, and albuterol as needed. Pt has no side effects from Remicade or MTX that he knows of. He takes a multivitamin but does not have a separate folic acid supplement. Pt feels that his breathing has improved.    GERD: Pt started omeprazole today by cardiology. He was told this would likely be a short term medication. He does not complain of heartburn symptoms.    Current labs include:  BP Readings from Last 3 Encounters:   05/26/17 121/63   05/18/17 161/75   05/11/17 116/72     Today's Vitals: There were no vitals taken for this visit.    Most Recent Immunizations   Administered Date(s) Administered     Influenza (H1N1) 12/18/2009     Influenza (High Dose) 3 valent vaccine 01/20/2017     Influenza (IIV3) 09/25/2012     Mantoux 05/27/2009     Pneumococcal (PCV 7) 08/11/2011      Pneumococcal 23 valent 07/18/2016     TDAP Vaccine (Boostrix) 08/11/2011     Tdap (Adacel,Boostrix) 08/11/2011     ASSESSMENT:                 Current medications were reviewed today.      Medication Adherence: no issues identified    Diabetes: Stable. Self monitoring of blood glucose is at goal of fasting  mg/dL and post prandial < 180 mg/dL.    AFib/CVD/HTN: Stable. INR nearly WNL. Current therapy appears appropriate.    Sarcoidosis: Stable. Pt instructed to speak with rheumatology before he stops his multivitamin if ever he wishes to do so as the folate content is likely preventing MTX side effects.    GERD: Improving. Discussed with pt that short term PPI use is appropriate but long-term use is ideally avoided due to malabsorption and other potential issues.    PLAN:                Post Discharge Medication Reconciliation Status: discharge medications reconciled, continue medications without change.    1. Pt to start PPI as prescribed by cardiology, discontinuing when possible (likely when Plavix is stopped in 1 year).    I spent 30 minutes with this patient today.  All changes were made via collaborative practice agreement with Jamie Foster. A copy of the visit note was provided to the patient's primary care provider.    Will follow up in 1 week via phone.    The patient was given a summary of these recommendations as an after visit summary.    Michelle Haywood PharmD  Medication Therapy Management Provider  Phone:[phone]  Pager: [phone]

## 2017-05-26 NOTE — PATIENT INSTRUCTIONS
Recommendations from today's MTM visit:                                                        1. Omeprazole is a fine medication for stomach acid suppression short-term, but please continue to ask your care team when this can be stopped as it is associated with some long-term side effects that are ideally avoided.    2. Consult with pulmonology before stopping your multivitamin if you decide you want to stop it at some point. You are likely benefiting from the folic acid content in it while you are on methotrexate.    Next MTM visit: I will call June 2nd (next Friday) at 2pm to make sure things are still going well.    To schedule another MTM appointment, please call the clinic directly or you may call the MTM scheduling line at [phone] or toll-free at [phone].     My Clinical Pharmacist's contact information:                                                      It was a pleasure seeing you today!  Please feel free to contact me with any questions or concerns you have.      Michelle Haywood PharmD  Medication Therapy Management Provider  Phone:[phone]  Pager: [phone]    You may receive a survey about the MTM services you received.  I would appreciate your feedback to help me serve you better in the future. Please fill it out and return it when you can. Your comments will be anonymous.      \

## 2017-05-26 NOTE — NURSING NOTE
Chief Complaint   Patient presents with     Follow Up For     follow up for recent adm. with stent placement/labs

## 2017-05-26 NOTE — MR AVS SNAPSHOT
After Visit Summary   5/26/2017    Rohan Monroe    MRN: [id number]           Patient Information     Date Of Birth          1945        Visit Information        Provider Department      5/26/2017 12:00 PM Michelle Haywood [facility] Medication Therapy Management        Care Instructions    Recommendations from today's MTM visit:                                                        1. Omeprazole is a fine medication for stomach acid suppression short-term, but please continue to ask your care team when this can be stopped as it is associated with some long-term side effects that are ideally avoided.    2. Consult with pulmonology before stopping your multivitamin if you decide you want to stop it at some point. You are likely benefiting from the folic acid content in it while you are on methotrexate.    Next MTM visit: I will call June 2nd (next Friday) at 2pm to make sure things are still going well.    To schedule another MTM appointment, please call the clinic directly or you may call the MTM scheduling line at [phone] or toll-free at [phone].     My Clinical Pharmacist's contact information:                                                      It was a pleasure seeing you today!  Please feel free to contact me with any questions or concerns you have.      Michelle Haywood, Ganesh  Medication Therapy Management Provider  Phone:[phone]  Pager: [phone]    You may receive a survey about the MTM services you received.  I would appreciate your feedback to help me serve you better in the future. Please fill it out and return it when you can. Your comments will be anonymous.      \              Follow-ups after your visit        Your next 10 appointments already scheduled     May 31, 2017 10:00 AM CDT   (Arrive by 9:45 AM)   Return Visit with Maria Victoria Palmer MD   [facility] ([facility])    [address] [place] [address]    2nd [facility] [address]   [phone]            Jun 02, 2017  2:00 PM CDT   TELEMEDICINE with Michelle Haywood RPH   [facility] Medication Therapy Management ([facility])    [address] [place] [address]  4th [facility] [address]   [phone]           Note: this is not an onsite visit; there is no need to come to the facility.            Jun 05, 2017 12:00 PM CDT   Infusion 180 with UC SPEC INFUSION, UC 49 ATC   [facility] Advanced Treatment Center Specialty and Procedure ([facility])    [address] [place] [address]  2nd [facility] [address]   [phone]            Jun 12, 2017  2:35 PM CDT   (Arrive by 2:20 PM)   Return Visit with Jamie Foster MD   [facility] Primary Care Clinic ([facility])    [address] [place] [address]  4th [facility] [address]   [phone]            Jun 21, 2017  1:30 PM CDT   (Arrive by 1:15 PM)   RETURN ARRHYTHMIA with Brian Vazquez MD   [facility] Heart Care ([facility])    [address] [place] [address]  3rd [facility] [address]   [phone]            Aug 17, 2017  1:00 PM CDT   PFT VISIT with WALTER PFL B   [facility] Pulmonary Function Testing ([facility])    [address] [place] [address]  3rd [facility] [address]   [phone]            Aug 17, 2017  1:30 PM CDT   (Arrive by 1:15 PM)   Return Interstitial Lung with David Morris Perlman, MD   [facility] ([facility])    [address] [place] [address]  3rd [facility] [address]   [phone]            Aug 29, 2017 10:00 AM CDT   Lab with UC LAB   [facility] Lab ([facility])    [address] [place] [address]  1st Floor  [facility] [address]   [phone]            Aug 29, 2017 10:30 AM CDT   US ABDOMEN COMPLETE with UCUS2   [facility] Imaging Center US ([facility])    [facility]  [address] [address] [phone]           Please bring a list of your medicines (including vitamins, minerals and over-the-counter drugs). Also, tell your doctor about any allergies you may have. Wear comfortable clothes and leave your valuables at home.  Adults: No eating or drinking for 8 hours before the exam. You may take medicine with a small sip of water.  Children: - Children 6+ years: No food or drink for 6 hours before exam. - Children 1-5 years: No food or drink for 4 hours before exam. - Infants, breast-fed: may have breast milk up to 2 hours before exam. - Infants, formula: may have bottle until 4 hours before exam.  Please call the Imaging Department at your exam site with any questions.              Who to contact     If you have questions or need follow up information about today's clinic visit or your schedule please contact  [facility] directly at [phone].  Normal or non-critical lab and imaging results will be communicated to you by [employer], letter or phone within 4 business days after the clinic has received the results. If you do not hear from us within 7 days, please contact the clinic through [email or web address] or phone. If you have a critical or abnormal lab result, we will notify you by phone as soon as possible.  Submit refill requests through [email or web address] or call your pharmacy and they will forward the refill request to us. Please allow 3 business days for your refill to be completed.          Additional Information About Your Visit        [email or web address] Information     [email or web address] gives you secure access to your electronic health record. If you see a primary care provider, you can also send messages to your care team and make appointments. If you have questions, please call your primary care clinic.  If you do not have a primary care provider, please call [phone] and they will assist you.        Care EveryWhere ID     This is your Care  EveryWhere ID. This could be used by other organizations to access your [facility] medical records  [id number]         Blood Pressure from Last 3 Encounters:   05/26/17 121/63   05/18/17 161/75   05/11/17 116/72    Weight from Last 3 Encounters:   05/26/17 193 lb (87.5 kg)   05/17/17 198 lb 12.8 oz (90.2 kg)   05/11/17 193 lb (87.5 kg)              Today, you had the following     No orders found for display         Today's Medication Changes          These changes are accurate as of: 5/26/17 12:30 PM.  If you have any questions, ask your nurse or doctor.               Start taking these medicines.        Dose/Directions    omeprazole 20 MG CR capsule   Commonly known as:  priLOSEC   Used for:  CAD S/P percutaneous coronary angioplasty, Coronary artery disease involving native coronary artery of native heart without angina pectoris, Sarcoidosis of lung (H)   Started by:  Diane Paige MD        Dose:  20 mg   Take 1 capsule (20 mg) by mouth daily   Quantity:  90 capsule   Refills:  3         These medicines have changed or have updated prescriptions.        Dose/Directions    methotrexate 2.5 MG tablet CHEMO   This may have changed:    - how much to take  - how to take this  - when to take this  - additional instructions   Used for:  Sarcoidosis (H)        Dose:  7.5 mg   Take 3 tablets (7.5 mg) by mouth once a week On Mondays   Quantity:  48 tablet   Refills:  3       Urea 40 % Crea   Commonly known as:  CARMOL 40   This may have changed:  additional instructions   Used for:  Dermatophytosis of nail, Corns and callosities        To feet daily   Quantity:  199 g   Refills:  4            Where to get your medicines      These medications were sent to [id number] - [place], [place] - [address], [facility] [address]     Phone:  [phone]     omeprazole 20 MG CR capsule                Primary Care Provider Office Phone # Fax #    Jamie Foster MD  [phone]       [facility] [address] [address]        Thank you!     Thank you for choosing [facility] MEDICATION THERAPY MANAGEMENT  for your care. Our goal is always to provide you with excellent care. Hearing back from our patients is one way we can continue to improve our services. Please take a few minutes to complete the written survey that you may receive in the mail after your visit with us. Thank you!             Your Updated Medication List - Protect others around you: Learn how to safely use, store and throw away your medicines at www.disposemymeds.org.          This list is accurate as of: 5/26/17 12:30 PM.  Always use your most recent med list.                   Brand Name Dispense Instructions for use    albuterol 108 (90 BASE) MCG/ACT Inhaler    PROAIR HFA/PROVENTIL HFA/VENTOLIN HFA    3 Inhaler    Inhale 2 puffs into the lungs every 6 hours as needed for shortness of breath / dyspnea       atorvastatin 40 MG tablet    LIPITOR    30 tablet    Take 1 tablet (40 mg) by mouth daily       blood glucose monitoring lancets     2 Box    Use to test blood sugar 2 times daily or as directed.  102 lancets per box.  3 month supply.       blood glucose monitoring meter device kit    no brand specified    1 kit    Use to test blood sugar 2 times daily.       blood glucose monitoring test strip    ACCU-CHEK RONNIE PLUS    200 each    Use to test blood sugar 2 times daily or as directed.  3 month supply.       clopidogrel 75 MG tablet    PLAVIX    90 tablet    Take 1 tablet (75 mg) by mouth daily       fluticasone-vilanterol 100-25 MCG/INH oral inhaler    BREO ELLIPTA    3 Inhaler    Inhale 1 puff into the lungs daily       furosemide 20 MG tablet    LASIX    180 tablet    Take 3 tablets (60 mg) by mouth 2 times daily       inFLIXimab 100 MG injection    REMICADE     Inject 100 mg into the vein every 28 days       levothyroxine 137 MCG tablet    SYNTHROID/LEVOTHROID    90  tablet    Take 1 tablet (137 mcg) by mouth daily       methotrexate 2.5 MG tablet CHEMO     48 tablet    Take 3 tablets (7.5 mg) by mouth once a week On Mondays       metoprolol 100 MG tablet    LOPRESSOR    60 tablet    Take 1 tablet (100 mg) by mouth 2 times daily       mirtazapine 15 MG tablet    REMERON     Take 15 mg by mouth At Bedtime.       MULTIVITAMIN & MINERAL PO      Take 1 tablet by mouth daily.       omeprazole 20 MG CR capsule    priLOSEC    90 capsule    Take 1 capsule (20 mg) by mouth daily       polyethylene glycol powder    MIRALAX    510 g    Take 17 g (1 capful) by mouth daily       sildenafil 20 MG tablet    REVATIO/VIAGRA    90 tablet    Take 1 tablet (20 mg) by mouth 3 times daily       tiotropium 18 MCG capsule    SPIRIVA HANDIHALER    90 capsule    Inhale contents of one capsule daily.       Urea 40 % Crea    CARMOL 40    199 g    To feet daily       warfarin 5 MG tablet    COUMADIN    30 tablet    Take 1 tablet (5 mg) by mouth daily

## 2017-05-26 NOTE — MR AVS SNAPSHOT
After Visit Summary   5/26/2017    Rohan Monroe    MRN: [id number]           Patient Information     Date Of Birth          1945        Visit Information        Provider Department      5/26/2017 10:00 AM Diane Paige MD [facility]        Today's Diagnoses     CAD S/P percutaneous coronary angioplasty    -  1    Coronary artery disease involving native coronary artery of native heart without angina pectoris        Sarcoidosis of lung (HCC)          Care Instructions    Start Omeprazole 20 mg daily  Stop Aspirin  Follow up with Dr. Paige in 3 months      Reyna Hernandez, RN  Cardiology Care Coordinator  Please be aware that I work part-time but I will be checking messages several times per week.   For urgent needs, please call the number below.    [phone], press 1 for [employer], press 3 to speak to a nurse    .          Follow-ups after your visit        Follow-up notes from your care team     Return in about 3 months (around 8/26/2017) for Physical Exam, Lab Work.      Your next 10 appointments already scheduled     Jun 02, 2017  2:00 PM CDT   TELEMEDICINE with Michelle Haywood RPH   [facility] Medication Therapy Management ([facility])    [address] [place] [address]  [address] [facility] [address] [phone]           Note: this is not an onsite visit; there is no need to come to the facility.            Jun 05, 2017 12:00 PM CDT   Infusion 180 with UC SPEC INFUSION, UC 49 ATC   [facility] Specialty and Procedure ([facility])    [address] [place] [address]  2nd Floor  [facility] [address] [phone]            Jun 12, 2017  2:35 PM CDT   (Arrive by 2:20 PM)   Return Visit with Jamie Foster MD   [facility] Primary Care Clinic ([facility])    [address] [place] [address]  [address] [facility] [address] [phone]            Jun 21, 2017 10:00 AM CDT    (Arrive by 9:45 AM)   Return Visit with Maria Victoria Palmer MD   [facility] ([facility])    [address] [facility]  2nd [facility] [address]   [phone]            Jun 21, 2017  1:30 PM CDT   (Arrive by 1:15 PM)   RETURN ARRHYTHMIA with Brian Vazquez MD   [facility] Heart Care ([facility])    [address] [place] [address]  3rd [facility] [address]   [phone]            Aug 17, 2017  1:00 PM CDT   PFT VISIT with  PFL B   [facility] Pulmonary Function Testing ([facility])    [address] [place] [address]   [phone]            Aug 17, 2017  1:30 PM CDT   (Arrive by 1:15 PM)   Return Interstitial Lung with David Morris Perlman, MD   [facility] ([facility])    [address] [place] [address]   [phone]            Aug 29, 2017 10:00 AM CDT   Lab with  LAB   [facility] Lab ([facility])    [address] [place] [address]   [phone]            Aug 29, 2017 10:30 AM CDT   US ABDOMEN COMPLETE with US2   [facility] Imaging Center US ([facility])    [address] [place] [address] [phone]           Please bring a list of your medicines (including vitamins, minerals and over-the-counter drugs). Also, tell your doctor about any allergies you may have. Wear comfortable clothes and leave your valuables at home.  Adults: No eating or drinking for 8 hours before the exam. You may take medicine with a small sip of water.  Children: - Children 6+ years: No food or drink for 6 hours before exam. - Children 1-5 years: No food or drink for 4 hours before exam. - Infants, breast-fed: may have breast milk up to 2 hours before exam. - Infants, formula: may have bottle until 4 hours before  "exam.  Please call the Imaging Department at your exam site with any questions.              Who to contact     If you have questions or need follow up information about today's clinic visit or your schedule please contact [facility] directly at [phone].  Normal or non-critical lab and imaging results will be communicated to you by [employer], letter or phone within 4 business days after the clinic has received the results. If you do not hear from us within 7 days, please contact the clinic through [employer] or phone. If you have a critical or abnormal lab result, we will notify you by phone as soon as possible.  Submit refill requests through [employer] or call your pharmacy and they will forward the refill request to us. Please allow 3 business days for your refill to be completed.          Additional Information About Your Visit        [employer] Information     [employer] gives you secure access to your electronic health record. If you see a primary care provider, you can also send messages to your care team and make appointments. If you have questions, please call your primary care clinic.  If you do not have a primary care provider, please call [phone] and they will assist you.        Care EveryWhere ID     This is your Care EveryWhere ID. This could be used by other organizations to access your [facility] medical records  [id number]        Your Vitals Were     Pulse Height Pulse Oximetry BMI (Body Mass Index)          73 1.753 m (5' 9\") 99% 28.5 kg/m2         Blood Pressure from Last 3 Encounters:   05/26/17 121/63   05/18/17 161/75   05/11/17 116/72    Weight from Last 3 Encounters:   05/26/17 87.5 kg (193 lb)   05/17/17 90.2 kg (198 lb 12.8 oz)   05/11/17 87.5 kg (193 lb)              Today, you had the following     No orders found for display         Today's Medication Changes          These changes are accurate as of: 5/26/17 11:59 PM.  If you have any questions, ask your nurse or doctor.          "      Start taking these medicines.        Dose/Directions    omeprazole 20 MG CR capsule   Commonly known as:  priLOSEC   Used for:  CAD S/P percutaneous coronary angioplasty, Coronary artery disease involving native coronary artery of native heart without angina pectoris, Sarcoidosis of lung (H)   Started by:  Diane Paige MD        Dose:  20 mg   Take 1 capsule (20 mg) by mouth daily   Quantity:  90 capsule   Refills:  3         These medicines have changed or have updated prescriptions.        Dose/Directions    methotrexate 2.5 MG tablet CHEMO   This may have changed:    - how much to take  - how to take this  - when to take this  - additional instructions   Used for:  Sarcoidosis (H)        Dose:  7.5 mg   Take 3 tablets (7.5 mg) by mouth once a week On Mondays   Quantity:  48 tablet   Refills:  3       Urea 40 % Crea   Commonly known as:  CARMOL 40   This may have changed:  additional instructions   Used for:  Dermatophytosis of nail, Corns and callosities        To feet daily   Quantity:  199 g   Refills:  4            Where to get your medicines      These medications were sent to [facility] #[id number] - [place], [place] - [facility] [address]     Phone:  [phone]     omeprazole 20 MG CR capsule                Primary Care Provider Office Phone # Fax #    Jamie Foster [phone]       [address] [address]        Thank you!     Thank you for choosing [facility]  for your care. Our goal is always to provide you with excellent care. Hearing back from our patients is one way we can continue to improve our services. Please take a few minutes to complete the written survey that you may receive in the mail after your visit with us. Thank you!             Your Updated Medication List - Protect others around you: Learn how to safely use, store and throw away your medicines  at www.disposemymeds.org.          This list is accurate as of: 5/26/17 11:59 PM.  Always use your most recent med list.                   Brand Name Dispense Instructions for use    albuterol 108 (90 BASE) MCG/ACT Inhaler    PROAIR HFA/PROVENTIL HFA/VENTOLIN HFA    3 Inhaler    Inhale 2 puffs into the lungs every 6 hours as needed for shortness of breath / dyspnea       atorvastatin 40 MG tablet    LIPITOR    30 tablet    Take 1 tablet (40 mg) by mouth daily       blood glucose monitoring lancets     2 Box    Use to test blood sugar 2 times daily or as directed.  102 lancets per box.  3 month supply.       blood glucose monitoring meter device kit    no brand specified    1 kit    Use to test blood sugar 2 times daily.       blood glucose monitoring test strip    ACCU-CHEK RONNIE PLUS    200 each    Use to test blood sugar 2 times daily or as directed.  3 month supply.       clopidogrel 75 MG tablet    PLAVIX    90 tablet    Take 1 tablet (75 mg) by mouth daily       fluticasone-vilanterol 100-25 MCG/INH oral inhaler    BREO ELLIPTA    3 Inhaler    Inhale 1 puff into the lungs daily       furosemide 20 MG tablet    LASIX    180 tablet    Take 3 tablets (60 mg) by mouth 2 times daily       inFLIXimab 100 MG injection    REMICADE     Inject 100 mg into the vein every 28 days       levothyroxine 137 MCG tablet    SYNTHROID/LEVOTHROID    90 tablet    Take 1 tablet (137 mcg) by mouth daily       methotrexate 2.5 MG tablet CHEMO     48 tablet    Take 3 tablets (7.5 mg) by mouth once a week On Mondays       metoprolol 100 MG tablet    LOPRESSOR    60 tablet    Take 1 tablet (100 mg) by mouth 2 times daily       mirtazapine 15 MG tablet    REMERON     Take 15 mg by mouth At Bedtime.       MULTIVITAMIN & MINERAL PO      Take 1 tablet by mouth daily.       omeprazole 20 MG CR capsule    priLOSEC    90 capsule    Take 1 capsule (20 mg) by mouth daily       polyethylene glycol powder    MIRALAX    510 g    Take 17 g (1 capful)  by mouth daily       sildenafil 20 MG tablet    REVATIO/VIAGRA    90 tablet    Take 1 tablet (20 mg) by mouth 3 times daily       tiotropium 18 MCG capsule    SPIRIVA HANDIHALER    90 capsule    Inhale contents of one capsule daily.       Urea 40 % Crea    CARMOL 40    199 g    To feet daily       warfarin 5 MG tablet    COUMADIN    30 tablet    Take 1 tablet (5 mg) by mouth daily

## 2017-05-30 ENCOUNTER — ANTICOAGULATION THERAPY VISIT (OUTPATIENT)
Dept: ANTICOAGULATION | Facility: CLINIC | Age: 72
End: 2017-05-30

## 2017-05-30 DIAGNOSIS — I48.92 ATRIAL FLUTTER WITH RAPID VENTRICULAR RESPONSE (H): ICD-10-CM

## 2017-05-30 DIAGNOSIS — Z79.01 LONG-TERM (CURRENT) USE OF ANTICOAGULANTS: ICD-10-CM

## 2017-05-30 LAB — INR PPP: 2.4

## 2017-05-30 NOTE — PROGRESS NOTES
ANTICOAGULATION FOLLOW-UP CLINIC VISIT    Patient Name:  Rohan Monroe  Date:  5/30/2017  Contact Type:  Telephone    SUBJECTIVE:     Patient Findings     Positives No Problem Findings    Comments Dose prior to hospitalization was 5mg daily.  Will put patient back on that dosing and evaluate INR again in 1 week.           OBJECTIVE    INR   Date Value Ref Range Status   05/30/2017 2.4  Final       ASSESSMENT / PLAN  INR assessment THER    Recheck INR In: 6 DAYS    INR Location Clinic      Anticoagulation Summary as of 5/30/2017     INR goal 2.0-3.0   Today's INR 2.4   Maintenance plan No maintenance plan   Full instructions 5/30: 5 mg; 5/31: 5 mg; 6/1: 5 mg; 6/2: 5 mg; 6/3: 5 mg; 6/4: 5 mg   Plan last modified Delphine Kaur RN (5/25/2017)   Next INR check 6/5/2017   Priority INR   Target end date 4/20/2017    Indications   Long-term (current) use of anticoagulants [Z79.01] [Z79.01]  Atrial flutter with rapid ventricular response (H) [I48.92]         Anticoagulation Episode Summary     INR check location     Preferred lab     Send INR reminders to [facility]    Comments 3 months therapy, then reassess. Call [phone]. Do not leave message.       Anticoagulation Care Providers     Provider Role Specialty Phone number    Jamie Foster MD [facility] Internal Medicine [phone]            See the Encounter Report to view Anticoagulation Flowsheet and Dosing Calendar (Go to Encounters tab in chart review, and find the Anticoagulation Therapy Visit)    Spoke with Gena BALL.    Delphine Kaur, RN

## 2017-05-30 NOTE — MR AVS SNAPSHOT
Rohan Howard Gitalejandro   5/30/2017   Anticoagulation Therapy Visit    Description:  71 year old male   Provider:  Delphine Kaur, RN   Department:  [facility]           INR as of 5/30/2017     Today's INR 2.4      Anticoagulation Summary as of 5/30/2017     INR goal 2.0-3.0   Today's INR 2.4   Full instructions 5/30: 5 mg; 5/31: 5 mg; 6/1: 5 mg; 6/2: 5 mg; 6/3: 5 mg; 6/4: 5 mg   Next INR check 6/5/2017    Indications   Long-term (current) use of anticoagulants [Z79.01] [Z79.01]  Atrial flutter with rapid ventricular response (H) [I48.92]         May 2017 Details    Sun Mon Tue Wed Thu Fri Sat      1               2               3               4               5               6                 7               8               9               10               11               12               13                 14               15               16               17               18               19               20                 21               22               23               24               25               26               27                 28               29               30      5 mg   See details      31      5 mg             Date Details   05/30 This INR check               How to take your warfarin dose     To take:  5 mg Take 1 of the 5 mg tablets.           June 2017 Details    Sun Mon Tue Wed Thu Fri Sat         1      5 mg         2      5 mg         3      5 mg           4      5 mg         5            6               7               8               9               10                 11               12               13               14               15               16               17                 18               19               20               21               22               23               24                 25               26               27               28               29               30                 Date Details   No additional details    Date of next INR:   6/5/2017         How to take your warfarin dose     To take:  5 mg Take 1 of the 5 mg tablets.

## 2017-05-31 ENCOUNTER — TELEPHONE (OUTPATIENT)
Dept: INTERNAL MEDICINE | Facility: CLINIC | Age: 72
End: 2017-05-31

## 2017-05-31 DIAGNOSIS — D86.0 SARCOIDOSIS, LUNG (H): Primary | ICD-10-CM

## 2017-05-31 DIAGNOSIS — R26.9 ABNORMAL GAIT: ICD-10-CM

## 2017-05-31 DIAGNOSIS — J44.9 COPD (CHRONIC OBSTRUCTIVE PULMONARY DISEASE) (H): ICD-10-CM

## 2017-05-31 DIAGNOSIS — I50.9 CONGESTIVE HEART FAILURE (H): ICD-10-CM

## 2017-05-31 NOTE — TELEPHONE ENCOUNTER
----- Message from Dee Hernandez sent at 5/31/2017 12:13 PM CDT -----  Regarding: home care orders  Urmila with [facility] is calling to request a walker for the patient.  Please call her [phone] confidential VM.  Fax number to fax order to is [phone] Attn; Urmila Mera PT  DME for Walker written.    Left message for [facility] to call back.  Willow Bob RN 2:03 PM on 5/31/2017.    Urmila from [facility]     Would like to modify an order she requested earlier today for this pt.     She requested for a 4 wheel walker, would like to change it to 4 wheel walker with a seat and oxygen tank durant.     Need this faxed over to her   [phone]

## 2017-06-02 ENCOUNTER — ALLIED HEALTH/NURSE VISIT (OUTPATIENT)
Dept: PHARMACY | Facility: CLINIC | Age: 72
End: 2017-06-02
Payer: COMMERCIAL

## 2017-06-02 DIAGNOSIS — I25.10 CORONARY ARTERY DISEASE WITHOUT ANGINA PECTORIS, UNSPECIFIED VESSEL OR LESION TYPE, UNSPECIFIED WHETHER NATIVE OR TRANSPLANTED HEART: ICD-10-CM

## 2017-06-02 DIAGNOSIS — I48.92 ATRIAL FLUTTER WITH RAPID VENTRICULAR RESPONSE (H): Primary | ICD-10-CM

## 2017-06-02 PROCEDURE — 99607 MTMS BY PHARM ADDL 15 MIN: CPT | Performed by: PHARMACIST

## 2017-06-02 PROCEDURE — 99606 MTMS BY PHARM EST 15 MIN: CPT | Performed by: PHARMACIST

## 2017-06-02 NOTE — PROGRESS NOTES
SUBJECTIVE/OBJECTIVE:                Rohan Monroe is a 71 year old male called for a follow-up visit for Medication Therapy Management.  He was referred to me from our transitions of care program.     Chief Complaint: Follow up from our visit on 5/26.  Pt has concerns about side effects from his furosemide.    Tobacco: No tobacco use   Alcohol: not currently using    Medication Adherence: no issues reported    AFib/CVD/HTN: Pt continues on warfarin, Plavix, metoprolol (all as directed), and furosemide 60mg BID. Pt has had concerns/difficulty with his furosemide lately. He reports that night time has been terrible in terms of the amount of times he needs to get up to urinate (sometimes more than once per hour). It has been so bad that he has been using a urinal from the hospital. He has been taking his second dose around 4pm instead of later in the evening but feels it isn't helping. The volume is large every time and he feels like he is able to empty completely. He is wondering if there is a way he could get another hand held urinal for the time being.  Pt has had no concerning bruising or bleeding.    Current labs include:  BP Readings from Last 3 Encounters:   05/26/17 121/63   05/18/17 161/75   05/11/17 116/72     Today's Vitals: There were no vitals taken for this visit.  Lab Results   Component Value Date    A1C 7.5 01/20/2017   .  Lab Results   Component Value Date    CHOL 106 05/26/2017     Lab Results   Component Value Date    TRIG 102 05/26/2017     Lab Results   Component Value Date    HDL 36 05/26/2017     Lab Results   Component Value Date    LDL 50 05/26/2017       Liver Function Studies -   Recent Labs   Lab Test  05/26/17   0933   PROTTOTAL  7.6   ALBUMIN  3.1*   BILITOTAL  1.7*   ALKPHOS  79   AST  40   ALT  30       Lab Results   Component Value Date    UCRR 124 05/08/2017    MICROL 882 04/18/2017    UMALCR 2456.82 (H) 04/18/2017       Last Basic Metabolic Panel:  Lab Results   Component  Value Date     05/26/2017      Lab Results   Component Value Date    POTASSIUM 4.2 05/26/2017     Lab Results   Component Value Date    CHLORIDE 100 05/26/2017     Lab Results   Component Value Date    BUN 55 05/26/2017     Lab Results   Component Value Date    CR 1.88 05/26/2017     GFR Estimate   Date Value Ref Range Status   05/26/2017 35 (L) >60 mL/min/1.7m2 Final     Comment:     Non  GFR Calc   05/20/2017 26 (L) >60 mL/min/1.7m2 Final     Comment:     Non  GFR Calc   05/18/2017 19 (L) >60 mL/min/1.7m2 Final     Comment:     Non  GFR Calc     GFR Estimate If Black   Date Value Ref Range Status   05/26/2017 43 (L) >60 mL/min/1.7m2 Final     Comment:      GFR Calc   05/20/2017 31 (L) >60 mL/min/1.7m2 Final     Comment:      GFR Calc   05/18/2017 22 (L) >60 mL/min/1.7m2 Final     Comment:      GFR Calc     TSH   Date Value Ref Range Status   04/27/2017 0.70 0.40 - 4.00 mU/L Final   ]    Most Recent Immunizations   Administered Date(s) Administered     Influenza (H1N1) 12/18/2009     Influenza (High Dose) 3 valent vaccine 01/20/2017     Influenza (IIV3) 09/25/2012     Mantoux 05/27/2009     Pneumococcal (PCV 7) 08/11/2011     Pneumococcal 23 valent 07/18/2016     TDAP Vaccine (Boostrix) 08/11/2011     Tdap (Adacel,Boostrix) 08/11/2011       ASSESSMENT:              Current medications were reviewed today as discussed above.      Medication Adherence: no issues identified    AFib/CVD/HTN: Needs improvement. Discussed with patient that pt may benefit from front loading his dose (80mg AM, 40mg in the afternoon) or switching to torsemide for more even diuresis, but that this would need to be discussed with cardiology.     PLAN:                  1. Pt mailed replacement hand held urinal.    2. I will discuss options with cardiology regarding diuretic dosing.    I spent 30 minutes with this patient today.  All changes  were made via collaborative practice agreement with Jamie Foster. A copy of the visit note was provided to the patient's primary care provider.     Will follow up upon pt request, as Pt is not covered for ongoing MTM visits at this time.    The patient was mailed a summary of these recommendations as an after visit summary.    Michelle Haywood PharmD  Medication Therapy Management Provider  Phone:[phone]  Pager: [phone]

## 2017-06-02 NOTE — MR AVS SNAPSHOT
After Visit Summary   6/2/2017    Rohan Monroe    MRN: [id number]           Patient Information     Date Of Birth          1945        Visit Information        Provider Department      6/2/2017 2:00 [facility]        Care Instructions    Recommendations from today's MTM visit:                                                      1. Move second furosemide dose up to 1 or 2pm to see if this helps with overnight urination issues.    2. You will find a hand urinal enclosed, compliments of urology!    3. I will send Dr. Paige a message about your furosemide. Options for you might include:  -Front loading your dose to take 80mg in the morning and 40mg in the afternoon.  -Switching to once daily torsemide to see if this provides more even diuresis with less night-time episodes.    4. Work with cardiology to eventually restart your losartan, as this would likely benefit your kidneys long-term.    5. When you complete your year on Plavix, you could likely stop omeprazole, so ask your care team about this to avoid long-term side effects from omeprazole.    Next MTM visit: Call anytime    To schedule another MTM appointment, please call the clinic directly or you may call the MTM scheduling line at [phone] or toll-free at [phone].     My Clinical Pharmacist's contact information:                                                      It was a pleasure seeing you today!  Please feel free to contact me with any questions or concerns you have.      Michelle Haywood, Ganesh  Medication Therapy Management Provider  Phone:[phone]  Pager: [phone]    You may receive a survey about the MTM services you received.  I would appreciate your feedback to help me serve you better in the future. Please fill it out and return it when you can. Your comments will be anonymous.                  Follow-ups after your visit        Your next 10  appointments already scheduled     Jun 05, 2017 12:00 PM CDT   Infusion 180 with UC SPEC INFUSION, UC 49 ATC   [facility] [place] [facility] ([facility])    [address] [place] [address]  [address] [employer] [address]   [phone]            Jun 12, 2017  2:35 PM CDT   (Arrive by 2:20 PM)   Return Visit with Jamie Foster MD   [facility] ([facility])    [address] [place] [address]  [address] [employer] [address]   [phone]            Jun 21, 2017 10:00 AM CDT   (Arrive by 9:45 AM)   Return Visit with Maria Victoria Palmer MD   [facility] ([facility])    [address] [place] [address]   [phone]            Jun 21, 2017  1:30 PM CDT   (Arrive by 1:15 PM)   RETURN ARRHYTHMIA with Brian Vazquez MD   [facility] ([facility])    [address] [place] [address]  [address] [employer] [address]   [phone]            Aug 17, 2017  1:00 PM CDT   PFT VISIT with UC PFL B   [facility] Pulmonary Function Testing ([facility])    [address] [place] [address]  [address] [employer] [address]   [phone]            Aug 17, 2017  1:30 PM CDT   (Arrive by 1:15 PM)   Return Interstitial Lung with David Morris Perlman, MD   [facility] ([facility])    [address] [place] [address]  [address] [employer] [address]   [phone]            Aug 29, 2017 10:00 AM CDT   Lab with UC LAB   [facility] Lab ([facility])    [address] [place] [address]  [address] [employer] [address]   [phone]            Aug 29, 2017 10:30 AM CDT   US ABDOMEN COMPLETE with UCUS2   [facility] Imaging Center US ([facility])    [address] [place] [id number] [phone]           Please bring a list of  your medicines (including vitamins, minerals and over-the-counter drugs). Also, tell your doctor about any allergies you may have. Wear comfortable clothes and leave your valuables at home.  Adults: No eating or drinking for 8 hours before the exam. You may take medicine with a small sip of water.  Children: - Children 6+ years: No food or drink for 6 hours before exam. - Children 1-5 years: No food or drink for 4 hours before exam. - Infants, breast-fed: may have breast milk up to 2 hours before exam. - Infants, formula: may have bottle until 4 hours before exam.  Please call the Imaging Department at your exam site with any questions.            Aug 29, 2017 11:30 AM CDT   (Arrive by 11:15 AM)   Return General Liver with Moses Orosco MD   [facility] ([facility] [place])    [address] [place] [address] [phone]              Who to contact     If you have questions or need follow up information about today's clinic visit or your schedule please contact [facility] MEDICATION THERAPY MANAGEMENT directly at [phone].  Normal or non-critical lab and imaging results will be communicated to you by [employer], letter or phone within 4 business days after the clinic has received the results. If you do not hear from us within 7 days, please contact the clinic through [email or web address] or phone. If you have a critical or abnormal lab result, we will notify you by phone as soon as possible.  Submit refill requests through [employer] or call your pharmacy and they will forward the refill request to us. Please allow 3 business days for your refill to be completed.          Additional Information About Your Visit        [employer] Information     [employer] gives you secure access to your electronic health record. If you see a primary care provider, you can also send messages to your care team and make appointments. If you have questions, please call your primary care clinic.  If you do  not have a primary care provider, please call [phone] and they will assist you.        Care EveryWhere ID     This is your Care EveryWhere ID. This could be used by other organizations to access your [facility] medical records  [id number]         Blood Pressure from Last 3 Encounters:   05/26/17 121/63   05/18/17 161/75   05/11/17 116/72    Weight from Last 3 Encounters:   05/26/17 193 lb (87.5 kg)   05/17/17 198 lb 12.8 oz (90.2 kg)   05/11/17 193 lb (87.5 kg)              Today, you had the following     No orders found for display         Today's Medication Changes          These changes are accurate as of: 6/2/17  2:34 PM.  If you have any questions, ask your nurse or doctor.               These medicines have changed or have updated prescriptions.        Dose/Directions    methotrexate 2.5 MG tablet CHEMO   This may have changed:    - how much to take  - how to take this  - when to take this  - additional instructions   Used for:  Sarcoidosis (H)        Dose:  7.5 mg   Take 3 tablets (7.5 mg) by mouth once a week On Mondays   Quantity:  48 tablet   Refills:  3       Urea 40 % Crea   Commonly known as:  CARMOL 40   This may have changed:  additional instructions   Used for:  Dermatophytosis of nail, Corns and callosities        To feet daily   Quantity:  199 g   Refills:  4                Primary Care Provider Office Phone # Fax #    Jamie Foster [phone]       [address] [address]        Thank you!     Thank you for choosing [facility] MEDICATION THERAPY MANAGEMENT  for your care. Our goal is always to provide you with excellent care. Hearing back from our patients is one way we can continue to improve our services. Please take a few minutes to complete the written survey that you may receive in the mail after your visit with us. Thank you!             Your Updated Medication List - Protect others around you: Learn how to safely use, store  and throw away your medicines at www.disposemymeds.org.          This list is accurate as of: 6/2/17  2:34 PM.  Always use your most recent med list.                   Brand Name Dispense Instructions for use    albuterol 108 (90 BASE) MCG/ACT Inhaler    PROAIR HFA/PROVENTIL HFA/VENTOLIN HFA    3 Inhaler    Inhale 2 puffs into the lungs every 6 hours as needed for shortness of breath / dyspnea       atorvastatin 40 MG tablet    LIPITOR    30 tablet    Take 1 tablet (40 mg) by mouth daily       blood glucose monitoring lancets     2 Box    Use to test blood sugar 2 times daily or as directed.  102 lancets per box.  3 month supply.       blood glucose monitoring meter device kit    no brand specified    1 kit    Use to test blood sugar 2 times daily.       blood glucose monitoring test strip    ACCU-CHEK RONNIE PLUS    200 each    Use to test blood sugar 2 times daily or as directed.  3 month supply.       clopidogrel 75 MG tablet    PLAVIX    90 tablet    Take 1 tablet (75 mg) by mouth daily       fluticasone-vilanterol 100-25 MCG/INH oral inhaler    BREO ELLIPTA    3 Inhaler    Inhale 1 puff into the lungs daily       furosemide 20 MG tablet    LASIX    180 tablet    Take 3 tablets (60 mg) by mouth 2 times daily       inFLIXimab 100 MG injection    REMICADE     Inject 100 mg into the vein every 28 days       levothyroxine 137 MCG tablet    SYNTHROID/LEVOTHROID    90 tablet    Take 1 tablet (137 mcg) by mouth daily       methotrexate 2.5 MG tablet CHEMO     48 tablet    Take 3 tablets (7.5 mg) by mouth once a week On Mondays       metoprolol 100 MG tablet    LOPRESSOR    60 tablet    Take 1 tablet (100 mg) by mouth 2 times daily       mirtazapine 15 MG tablet    REMERON     Take 15 mg by mouth At Bedtime.       MULTIVITAMIN & MINERAL PO      Take 1 tablet by mouth daily.       omeprazole 20 MG CR capsule    priLOSEC    90 capsule    Take 1 capsule (20 mg) by mouth daily       order for DME     1 Device    She  requested for a 4 wheel walker, would like to change it to 4 wheel walker with a seat and oxygen tank durant.   Need this faxed over to her  [phone]       polyethylene glycol powder    MIRALAX    510 g    Take 17 g (1 capful) by mouth daily       sildenafil 20 MG tablet    REVATIO/VIAGRA    90 tablet    Take 1 tablet (20 mg) by mouth 3 times daily       tiotropium 18 MCG capsule    SPIRIVA HANDIHALER    90 capsule    Inhale contents of one capsule daily.       Urea 40 % Crea    CARMOL 40    199 g    To feet daily       warfarin 5 MG tablet    COUMADIN    30 tablet    Take 1 tablet (5 mg) by mouth daily

## 2017-06-02 NOTE — PATIENT INSTRUCTIONS
Recommendations from today's MTM visit:                                                      1. Move second furosemide dose up to 1 or 2pm to see if this helps with overnight urination issues.    2. You will find a hand urinal enclosed, compliments of urology!    3. I will send Dr. Paige a message about your furosemide. Options for you might include:  -Front loading your dose to take 80mg in the morning and 40mg in the afternoon.  -Switching to once daily torsemide to see if this provides more even diuresis with less night-time episodes.    4. Work with cardiology to eventually restart your losartan, as this would likely benefit your kidneys long-term.    5. When you complete your year on Plavix, you could likely stop omeprazole, so ask your care team about this to avoid long-term side effects from omeprazole.    Next MTM visit: Call anytime    To schedule another MTM appointment, please call the clinic directly or you may call the MTM scheduling line at [phone] or toll-free at [phone].     My Clinical Pharmacist's contact information:                                                      It was a pleasure seeing you today!  Please feel free to contact me with any questions or concerns you have.      Michelle Haywood, Ganesh  Medication Therapy Management Provider  Phone:[phone]  Pager: [phone]    You may receive a survey about the MTM services you received.  I would appreciate your feedback to help me serve you better in the future. Please fill it out and return it when you can. Your comments will be anonymous.

## 2017-06-05 ENCOUNTER — INFUSION THERAPY VISIT (OUTPATIENT)
Dept: INFUSION THERAPY | Facility: CLINIC | Age: 72
End: 2017-06-05
Attending: INTERNAL MEDICINE
Payer: MEDICARE

## 2017-06-05 ENCOUNTER — CARE COORDINATION (OUTPATIENT)
Dept: PULMONOLOGY | Facility: CLINIC | Age: 72
End: 2017-06-05

## 2017-06-05 ENCOUNTER — ANTICOAGULATION THERAPY VISIT (OUTPATIENT)
Dept: ANTICOAGULATION | Facility: CLINIC | Age: 72
End: 2017-06-05

## 2017-06-05 VITALS
WEIGHT: 187 LBS | SYSTOLIC BLOOD PRESSURE: 137 MMHG | DIASTOLIC BLOOD PRESSURE: 81 MMHG | BODY MASS INDEX: 27.62 KG/M2 | TEMPERATURE: 96 F | OXYGEN SATURATION: 99 % | RESPIRATION RATE: 18 BRPM | HEART RATE: 80 BPM

## 2017-06-05 DIAGNOSIS — R09.02 HYPOXIA: ICD-10-CM

## 2017-06-05 DIAGNOSIS — D86.9 SARCOIDOSIS: ICD-10-CM

## 2017-06-05 DIAGNOSIS — I48.92 ATRIAL FLUTTER WITH RAPID VENTRICULAR RESPONSE (H): ICD-10-CM

## 2017-06-05 DIAGNOSIS — J84.9 ILD (INTERSTITIAL LUNG DISEASE) (H): Primary | ICD-10-CM

## 2017-06-05 DIAGNOSIS — Z79.01 LONG-TERM (CURRENT) USE OF ANTICOAGULANTS: ICD-10-CM

## 2017-06-05 DIAGNOSIS — D86.0 SARCOIDOSIS OF LUNG (H): Primary | ICD-10-CM

## 2017-06-05 LAB — INR PPP: 2.5

## 2017-06-05 PROCEDURE — 25000128 H RX IP 250 OP 636: Mod: ZF | Performed by: INTERNAL MEDICINE

## 2017-06-05 PROCEDURE — 96415 CHEMO IV INFUSION ADDL HR: CPT

## 2017-06-05 PROCEDURE — 96413 CHEMO IV INFUSION 1 HR: CPT

## 2017-06-05 RX ORDER — ACETAMINOPHEN 325 MG/1
650 TABLET ORAL ONCE
Status: CANCELLED
Start: 2017-06-05 | End: 2017-06-05

## 2017-06-05 RX ADMIN — INFLIXIMAB 400 MG: 100 INJECTION, POWDER, LYOPHILIZED, FOR SOLUTION INTRAVENOUS at 12:46

## 2017-06-05 NOTE — MR AVS SNAPSHOT
Rohan Howard Mabel   6/5/2017   Anticoagulation Therapy Visit    Description:  71 year old male   Provider:  Marcia Elias, RN   Department:  [facility]           INR as of 6/5/2017     Today's INR       Anticoagulation Summary as of 6/5/2017     INR goal 2.0-3.0   Today's INR    Next INR check     Indications   Long-term (current) use of anticoagulants [Z79.01] [Z79.01]  Atrial flutter with rapid ventricular response (H) [I48.92]         May 2017 Details    Sun Mon Tue Wed u Fri Sat      1               2               3               4               5               6                 7               8               9               10               11               12               13                 14               15               16               17               18               19               20                 21               22               23               24               25               26               27                 28               29               30      5 mg   See details      31      5 mg             Date Details   05/30 This INR check               How to take your warfarin dose     To take:  5 mg Take 1 of the 5 mg tablets.           June 2017 Details    Sun Mon Tue Wed Thu Fri Sat         1      5 mg         2      5 mg         3      5 mg           4      5 mg         5            6               7               8               9               10                 11               12               13               14               15               16               17                 18               19               20               21               22               23               24                 25               26               27               28               29               30                 Date Details   No additional details    Date of next INR:  6/5/2017         How to take your warfarin dose     To take:  5 mg Take 1 of the 5 mg tablets.

## 2017-06-05 NOTE — MR AVS SNAPSHOT
After Visit Summary   6/5/2017    Rohan Monroe    MRN: [id number]           Patient Information     Date Of Birth          1945        Visit Information        Provider Department      6/5/2017 12:00 PM UC 49 ATC; UC SPEC INFUSION [facility] Special[facility]        Today's Diagnoses     Sarcoidosis of lung (HCC)    -  1    SARCOIDOSIS-systemic          Care Instructions    Dear Rohan Monroe    Thank you for choosing [facility] ([facility]) for your infusion.  The following information is a summary of our appointment as well as important reminders.      POST-INFUSION OF BIOLOGICAL MEDICATION:    If you experience any fever, chills or signs of infection, new symptoms, abdominal pain, heart palpitations, shortness of breath, reaction, weakness, neurological changes, seek medical attention immediately and should not receive infusions. No live virus vaccines prior to or during treatment or up to 6 months post infusion. If the patient has an upcoming procedure or surgery, this should be discussed with the rheumatologist and surgeon or provider.    Additional information: you had your infusion of Remicade 500 mg via IV todayl      We look forward in seeing you on your next appointment here at [facility].  Please don t hesitate to call us at [phone] to reschedule any of your appointments or to speak with one of the [facility] registered nurses.  It was a pleasure taking care of you today.    Sincerely,  Diane Metcalf, MICHAEL  [facility]  Special[facility]fusion [facility] Center  [address] [place] [address]  [address]  Phone:  [phone]            Follow-ups after your visit        Your next 10 appointments already scheduled     Jun 12, 2017  2:35 PM CDT   (Arrive by 2:20 PM)   Return Visit with Jamie Foster MD   [facility] ([facility])    [address] [place] [address]  [address] [facility] [address]   [phone]            Jun 21, 2017 10:00 AM CDT   (Arrive by 9:45 AM)   Return Visit with Maria Victoria Palmer MD   [facility] ([facility])    [address] [place] [address]  [address] [facility] [address]   [phone]            Jun 21, 2017  1:30 PM CDT   (Arrive by 1:15 PM)   RETURN ARRHYTHMIA with Brian Vazquez MD   [facility] ([facility])    [address] [place] [address]  [address] [facility] [address]   [phone]            Jul 10, 2017 11:00 AM CDT   Infusion 180 with UC SPEC INFUSION, UC 50 ATC   [facility] Procedure ([facility])    [address] [place] [address]   [phone]            Aug 17, 2017  1:00 PM CDT   PFT VISIT with UC PFL B   [facility] Function [facility] ([facility])    [address] [place] [address]   [phone]            Aug 17, 2017  1:30 PM CDT   (Arrive by 1:15 PM)   Return Interstitial Lung with David Morris Perlman, MD   [facility] ([facility])    [address] [place] [address]   [phone]            Aug 29, 2017 10:00 AM CDT   Lab with UC LAB   [facility] ([facility])    [address] [place] [address]   [phone]            Aug 29, 2017 10:30 AM CDT   US ABDOMEN COMPLETE with UCUS2   [facility] US ([facility])    [address] [place] [id number] [phone]           Please bring a list of your medicines (including vitamins, minerals and over-the-counter drugs). Also, tell your doctor about any allergies you may have. Wear comfortable clothes and leave your  valuables at home.  Adults: No eating or drinking for 8 hours before the exam. You may take medicine with a small sip of water.  Children: - Children 6+ years: No food or drink for 6 hours before exam. - Children 1-5 years: No food or drink for 4 hours before exam. - Infants, breast-fed: may have breast milk up to 2 hours before exam. - Infants, formula: may have bottle until 4 hours before exam.  Please call the Imaging Department at your exam site with any questions.            Aug 29, 2017 11:30 AM CDT   (Arrive by 11:15 AM)   Return General Liver with Moses Orosco MD   [facility] ([facility])    [address] [facility]  [address] [facility] [address] [phone]              Who to contact     If you have questions or need follow up information about today's clinic visit or your schedule please contact [facility] SPECIALTY AND PROCEDURE directly at [phone].  Normal or non-critical lab and imaging results will be communicated to you by [employer], letter or phone within 4 business days after the clinic has received the results. If you do not hear from us within 7 days, please contact the clinic through [employer] or phone. If you have a critical or abnormal lab result, we will notify you by phone as soon as possible.  Submit refill requests through [employer] or call your pharmacy and they will forward the refill request to us. Please allow 3 business days for your refill to be completed.          Additional Information About Your Visit        [employer] Information     [employer] gives you secure access to your electronic health record. If you see a primary care provider, you can also send messages to your care team and make appointments. If you have questions, please call your primary care clinic.  If you do not have a primary care provider, please call [phone] and they will assist you.        Care EveryWhere ID     This is your Care EveryWhere ID. This  could be used by other organizations to access your [facility] medical records  [id number]        Your Vitals Were     Pulse Temperature Respirations Pulse Oximetry BMI (Body Mass Index)       80 96  F (35.6  C) (Oral) 18 99% 27.62 kg/m2        Blood Pressure from Last 3 Encounters:   06/05/17 137/81   05/26/17 121/63   05/18/17 161/75    Weight from Last 3 Encounters:   06/05/17 84.8 kg (187 lb)   05/26/17 87.5 kg (193 lb)   05/17/17 90.2 kg (198 lb 12.8 oz)              We Performed the Following     ABO/Rh type and screen     Blood component     Blood component     Blood component     Blood component     Blood component     Blood component     Blood component     Blood component     Blood component     Blood component     Blood component     Blood component     Blood component     Blood component     Plasma prepare order unit     Platelets prepare order unit          Today's Medication Changes          These changes are accurate as of: 6/5/17  4:35 PM.  If you have any questions, ask your nurse or doctor.               These medicines have changed or have updated prescriptions.        Dose/Directions    methotrexate 2.5 MG tablet CHEMO   This may have changed:    - how much to take  - how to take this  - when to take this  - additional instructions   Used for:  Sarcoidosis (H)        Dose:  7.5 mg   Take 3 tablets (7.5 mg) by mouth once a week On Mondays   Quantity:  48 tablet   Refills:  3       * order for DME   This may have changed:  Another medication with the same name was added. Make sure you understand how and when to take each.   Used for:  Sarcoidosis, lung (H), COPD (chronic obstructive pulmonary disease) (H), Abnormal gait, Congestive heart failure (H)   Changed by:  Jamie Foster MD        She requested for a 4 wheel walker, would like to change it to 4 wheel walker with a seat and oxygen tank durant.   Need this faxed over to her  [phone]   Quantity:  1 Device   Refills:  1       * order  for DME   This may have changed:  You were already taking a medication with the same name, and this prescription was added. Make sure you understand how and when to take each.   Used for:  ILD (interstitial lung disease) (H), Hypoxia   Changed by:  Perlman, David Morris, MD        Oxygen: Patient requires supplemental Oxygen 3 LPM via nasal canula with activity. Please provide patient with a portable oxygen concentrator for improved mobility.  Okay to spot check or titrated for conserving to keep stats above 90%. Oxygen will be for a lifetime.   Quantity:  1 Device   Refills:  0       Urea 40 % Crea   Commonly known as:  CARMOL 40   This may have changed:  additional instructions   Used for:  Dermatophytosis of nail, Corns and callosities        To feet daily   Quantity:  199 g   Refills:  4       * Notice:  This list has 2 medication(s) that are the same as other medications prescribed for you. Read the directions carefully, and ask your doctor or other care provider to review them with you.         Where to get your medicines      Some of these will need a paper prescription and others can be bought over the counter.  Ask your nurse if you have questions.     Bring a paper prescription for each of these medications     order for DME                Primary Care Provider Office Phone # Fax #    Jamie Foster [phone]       [address] [address]        Thank you!     Thank you for choosing [facility]  for your care. Our goal is always to provide you with excellent care. Hearing back from our patients is one way we can continue to improve our services. Please take a few minutes to complete the written survey that you may receive in the mail after your visit with us. Thank you!             Your Updated Medication List - Protect others around you: Learn how to safely use, store and throw away your medicines  at www.disposemymeds.org.          This list is accurate as of: 6/5/17  4:35 PM.  Always use your most recent med list.                   Brand Name Dispense Instructions for use    albuterol 108 (90 BASE) MCG/ACT Inhaler    PROAIR HFA/PROVENTIL HFA/VENTOLIN HFA    3 Inhaler    Inhale 2 puffs into the lungs every 6 hours as needed for shortness of breath / dyspnea       atorvastatin 40 MG tablet    LIPITOR    30 tablet    Take 1 tablet (40 mg) by mouth daily       blood glucose monitoring lancets     2 Box    Use to test blood sugar 2 times daily or as directed.  102 lancets per box.  3 month supply.       blood glucose monitoring meter device kit    no brand specified    1 kit    Use to test blood sugar 2 times daily.       blood glucose monitoring test strip    ACCU-CHEK RONNIE PLUS    200 each    Use to test blood sugar 2 times daily or as directed.  3 month supply.       clopidogrel 75 MG tablet    PLAVIX    90 tablet    Take 1 tablet (75 mg) by mouth daily       fluticasone-vilanterol 100-25 MCG/INH oral inhaler    BREO ELLIPTA    3 Inhaler    Inhale 1 puff into the lungs daily       furosemide 20 MG tablet    LASIX    180 tablet    Take 3 tablets (60 mg) by mouth 2 times daily       inFLIXimab 100 MG injection    REMICADE     Inject 100 mg into the vein every 28 days       levothyroxine 137 MCG tablet    SYNTHROID/LEVOTHROID    90 tablet    Take 1 tablet (137 mcg) by mouth daily       methotrexate 2.5 MG tablet CHEMO     48 tablet    Take 3 tablets (7.5 mg) by mouth once a week On Mondays       metoprolol 100 MG tablet    LOPRESSOR    60 tablet    Take 1 tablet (100 mg) by mouth 2 times daily       mirtazapine 15 MG tablet    REMERON     Take 15 mg by mouth At Bedtime.       MULTIVITAMIN & MINERAL PO      Take 1 tablet by mouth daily.       omeprazole 20 MG CR capsule    priLOSEC    90 capsule    Take 1 capsule (20 mg) by mouth daily       * order for DME     1 Device    She requested for a 4 wheel walker,  would like to change it to 4 wheel walker with a seat and oxygen tank durant.   Need this faxed over to her  [phone]       * order for DME     1 Device    Oxygen: Patient requires supplemental Oxygen 3 LPM via nasal canula with activity. Please provide patient with a portable oxygen concentrator for improved mobility.  Okay to spot check or titrated for conserving to keep stats above 90%. Oxygen will be for a lifetime.       polyethylene glycol powder    MIRALAX    510 g    Take 17 g (1 capful) by mouth daily       sildenafil 20 MG tablet    REVATIO/VIAGRA    90 tablet    Take 1 tablet (20 mg) by mouth 3 times daily       tiotropium 18 MCG capsule    SPIRIVA HANDIHALER    90 capsule    Inhale contents of one capsule daily.       Urea 40 % Crea    CARMOL 40    199 g    To feet daily       warfarin 5 MG tablet    COUMADIN    30 tablet    Take 1 tablet (5 mg) by mouth daily       * Notice:  This list has 2 medication(s) that are the same as other medications prescribed for you. Read the directions carefully, and ask your doctor or other care provider to review them with you.

## 2017-06-05 NOTE — PROGRESS NOTES
LIZZY Johnson from  [facility] called reporting she is stuck in traffic and is afraid she won't be out in time to call us before we close. Writer instructed her to have pt take a 5mg dose tonight and call us on 6/6 with results

## 2017-06-05 NOTE — PROGRESS NOTES
Telephone Encounter: Incoming    Reason for Call: Currently using tanks for oxygen but would like to get a POC, uses 3 LPM. Order states he needs 2-4 LPM with activity and his oxygen company [facility] [employer] is asking for a new order.     Nursing Action/Patient Instruction: New ordered faxed.     Patient Response/Questions/Concerns: Will let us know of any issues.

## 2017-06-05 NOTE — PROGRESS NOTES
"Nursing Note  Rohan Monroe [facility]:   Chief Complaint   Patient presents with     Infusion     remicade for sarcoidosis     During today's [facility] appointment, orders from Dr. Perlman were completed.  Frequency: monthly    Progress note:  Patient identification verified by name and date of birth.  Assessment completed.  Vitals recorded in Doc Flowsheets.  Patient was provided with education regarding infusion and possible side effects.  Patient verbalized understanding.      needed: No  Premedications: historically pt does not take pre-medications.  Infusion Rates: infusion given over approximately 2 hours.  Approximate Infusion length:2 hours.   Labs: were drawn per orders.   Vascular access: peripheral IV placed today.  Treatment Conditions:PRIOR TO INFUSION OF BIOLOGICAL MEDICATIONS OR ANY OF THESE AS LISTED: Remicaide (infliximab) \".rheumbiologicalchecklist\"    Prior to Infusion of biological medications or any of these as listed:    1. Elevated temperature, fever, chills, productive cough or abnormal vital signs, night sweats, coughing up blood or sputum, no appetite or abnormal vital signs : NO    2. Open wounds or new incisions: NO    3. Recent hospitalization: NO    4.  Recent surgeries:  NO    5. Any upcoming surgeries or dental procedures?:NO    6. Any current or recent bouts of illness or infection? On any antibiotics? : NO    7. Any new, sudden or worsening abdominal pain :NO    8. Vaccination within 4 weeks? Patient or someone in the household is scheduled to receive vaccination? No live virus vaccines prior to or during treatment :NO    9. Any nervous system diseases [i.e. multiple sclerosis, Guillain-Rowena, seizures, neurological  changes]: NO    10. Pregnant or breast feeding; or plans on pregnancy in the future: NO    11. Signs of worsening depression or suicidal ideations while taking " benlysta:NO    12. New-onset medical symptoms: NO    13.  New cancer diagnosis or on chemotherapy or radiation NO    14.  Evaluate for any sign of active TB [Unexplained weight loss, Loss of appetite, Night sweats, Fever, Fatigue, Chills, Coughing for 3 weeks or longer, Hemoptysis (coughing up blood), Chest pain]: NO    Administrations This Visit     inFLIXimab (REMICADE) 400 mg in NaCl 0.9 % 315 mL non-oncology use     Admin Date Action Dose Rate Route Administered By          06/05/2017 New Bag 400 mg 157.5 mL/hr Intravenous Kay Jose RN                         **Note: If answered yes to any of the above, hold the infusion and contact ordering rheumatologist or on-call rheumatologist.    RN reviewed the following with patient: Medication hand-out provided to patient, Inform patient if any fever, chills or signs of infection, new symptoms, abdominal pain, heart palpitations, shortness of breath, reaction, weakness, neurological changes, seek medical attention immediately and should not receive infusions. No live virus vaccines prior to or during treatment or up to 6 months post infusion. If the patient has an upcoming procedure or surgery, this should be discussed with the rheumatologist and surgeon or provider.  Patient tolerated infusion: well.          Discharge Plan:   Follow up plan of care with: pulmonologist.   Discharge instructions were reviewed with patient.  Patient/representative verbalized understanding of discharge instructions and all questions answered.  [facility] in [facility] condition.    Diane Metcalf, RN        /77 (BP Location: Right arm)  Pulse 72  Temp 96  F (35.6  C) (Oral)  Resp 18  Wt 84.8 kg (187 lb)  SpO2 99%  BMI 27.62 kg/m2

## 2017-06-05 NOTE — PATIENT INSTRUCTIONS
Dear Rohan Monroe    Thank you for choosing [facility] ([facility]) for your infusion.  The following information is a summary of our appointment as well as important reminders.      POST-INFUSION OF BIOLOGICAL MEDICATION:    If you experience any fever, chills or signs of infection, new symptoms, abdominal pain, heart palpitations, shortness of breath, reaction, weakness, neurological changes, seek medical attention immediately and should not receive infusions. No live virus vaccines prior to or during treatment or up to 6 months post infusion. If the patient has an upcoming procedure or surgery, this should be discussed with the rheumatologist and surgeon or provider.    Additional information: you had your infusion of Remicade 500 mg via IV todayl      We look forward in seeing you on your next appointment here at [facility].  Please don t hesitate to call us at [phone] to reschedule any of your appointments or to speak with one of the [facility] registered nurses.  It was a pleasure taking care of you today.    Sincerely,  Diane Metcalf RN  [facility]  Special[facility] Center  [address] [place] [address]  [address]  Phone:  [phone]

## 2017-06-06 ENCOUNTER — ANTICOAGULATION THERAPY VISIT (OUTPATIENT)
Dept: ANTICOAGULATION | Facility: CLINIC | Age: 72
End: 2017-06-06

## 2017-06-06 DIAGNOSIS — Z79.01 LONG-TERM (CURRENT) USE OF ANTICOAGULANTS: ICD-10-CM

## 2017-06-06 DIAGNOSIS — I48.92 ATRIAL FLUTTER WITH RAPID VENTRICULAR RESPONSE (H): ICD-10-CM

## 2017-06-06 NOTE — MR AVS SNAPSHOT
Rohan Howard Mabel   6/6/2017   Anticoagulation Therapy Visit    Description:  71 year old male   Provider:  Diane Mazariegos, RN   Department:  [facility]           INR as of 6/6/2017     Today's INR 2.5 (6/5/2017)      Anticoagulation Summary as of 6/6/2017     INR goal 2.0-3.0   Today's INR 2.5 (6/5/2017)   Full instructions 5 mg every day   Next INR check 6/12/2017    Indications   Long-term (current) use of anticoagulants [Z79.01] [Z79.01]  Atrial flutter with rapid ventricular response (H) [I48.92]         June 2017 Details    Sun Mon Tue Wed Thu Fri Sat         1               2               3                 4               5               6      5 mg   See details      7      5 mg         8      5 mg         9      5 mg         10      5 mg           11      5 mg         12            13               14               15               16               17                 18               19               20               21               22               23               24                 25               26               27               28               29               30                 Date Details   06/06 This INR check       Date of next INR:  6/12/2017         How to take your warfarin dose     To take:  5 mg Take 1 of the 5 mg tablets.

## 2017-06-06 NOTE — PROGRESS NOTES
ANTICOAGULATION FOLLOW-UP CLINIC VISIT    Patient Name:  Rohan Monroe  Date:  6/6/2017  Contact Type:  Telephone    SUBJECTIVE:     Patient Findings     Positives No Problem Findings           OBJECTIVE    INR   Date Value Ref Range Status   06/05/2017 2.5  Final       ASSESSMENT / PLAN  INR assessment THER    Recheck INR In: 1 WEEK    [facility]R Location [facility]      Anticoagulation Summary as of 6/6/2017     INR goal 2.0-3.0   Today's INR 2.5 (6/5/2017)   Maintenance plan 5 mg (5 mg x 1) every day   Full instructions 5 mg every day   Weekly total 35 mg   Plan last modified Diane Mazariegos RN (6/6/2017)   Next INR check 6/12/2017   Priority INR   Target end date 4/20/2017    Indications   Long-term (current) use of anticoagulants [Z79.01] [Z79.01]  Atrial flutter with rapid ventricular response (H) [I48.92]         Anticoagulation Episode Summary     INR check location     Preferred lab     Send INR reminders to [facility]    Comments 3 months therapy, then reassess. Call [phone]. Do not leave message.       Anticoagulation Care Providers     Provider Role Specialty Phone number    Jamie Foster MD [facility] Internal Medicine [phone]            See the Encounter Report to view Anticoagulation Flowsheet and Dosing Calendar (Go to Encounters tab in chart review, and find the Anticoagulation Therapy Visit)    Spoke with patient and LM for [facility] nurse.    Diane Mazariegos RN

## 2017-06-07 ENCOUNTER — TELEPHONE (OUTPATIENT)
Dept: NEPHROLOGY | Facility: CLINIC | Age: 72
End: 2017-06-07

## 2017-06-07 NOTE — TELEPHONE ENCOUNTER
Per Dr. Michel, labs are looking ok and will be monitored closely through multiple clinics, ok to leave appointment in September at this time. Patient reports to be feeling good and will call clinic with questions or concerns.  Elisa Cardona LPN  Nephrology  [facility]  [phone]

## 2017-06-08 LAB
DLCOCOR-%PRED-PRE: 36 %
DLCOCOR-PRE: 8.97 ML/MIN/MMHG
DLCOUNC-%PRED-PRE: 31 %
DLCOUNC-PRE: 7.84 ML/MIN/MMHG
DLCOUNC-PRED: 24.74 ML/MIN/MMHG
ERV-%PRED-PRE: 103 %
ERV-PRE: 0.85 L
ERV-PRED: 0.82 L
EXPTIME-PRE: 11.57 SEC
FEF2575-%PRED-PRE: 16 %
FEF2575-PRE: 0.37 L/SEC
FEF2575-PRED: 2.21 L/SEC
FEFMAX-%PRED-PRE: 36 %
FEFMAX-PRE: 2.81 L/SEC
FEFMAX-PRED: 7.67 L/SEC
FEV1-%PRED-PRE: 34 %
FEV1-PRE: 1.01 L
FEV1FEV6-PRE: 47 %
FEV1FEV6-PRED: 77 %
FEV1FVC-PRE: 41 %
FEV1FVC-PRED: 76 %
FEV1SVC-PRE: 43 %
FEV1SVC-PRED: 67 %
FIFMAX-PRE: 2.41 L/SEC
FVC-%PRED-PRE: 62 %
FVC-PRE: 2.45 L
FVC-PRED: 3.91 L
IC-%PRED-PRE: 42 %
IC-PRE: 1.52 L
IC-PRED: 3.6 L
VA-%PRED-PRE: 55 %
VA-PRE: 3.62 L
VC-%PRED-PRE: 53 %
VC-PRE: 2.37 L
VC-PRED: 4.42 L

## 2017-06-12 ENCOUNTER — ANTICOAGULATION THERAPY VISIT (OUTPATIENT)
Dept: ANTICOAGULATION | Facility: CLINIC | Age: 72
End: 2017-06-12

## 2017-06-12 DIAGNOSIS — I48.92 ATRIAL FLUTTER WITH RAPID VENTRICULAR RESPONSE (H): ICD-10-CM

## 2017-06-12 DIAGNOSIS — Z79.01 LONG-TERM (CURRENT) USE OF ANTICOAGULANTS: ICD-10-CM

## 2017-06-12 LAB — INR PPP: 2.4

## 2017-06-12 NOTE — MR AVS SNAPSHOT
Rohan Howard Mabel   6/12/2017   Anticoagulation Therapy Visit    Description:  71 year old male   Provider:  Christal Alejandro, RN   [facility]           INR as of 6/12/2017     Today's INR 2.4      Anticoagulation Summary as of 6/12/2017     INR goal 2.0-3.0   Today's INR 2.4   Full instructions 5 mg every day   Next INR check 6/19/2017    Indications   Long-term (current) use of anticoagulants [Z79.01] [Z79.01]  Atrial flutter with rapid ventricular response (H) [I48.92]         June 2017 Details    Sun Mon Tue Wed Thu Fri Sat         1               2               3                 4               5               6               7               8               9               10                 11               12      5 mg   See details      13      5 mg         14      5 mg         15      5 mg         16      5 mg         17      5 mg           18      5 mg         19            20               21               22               23               24                 25               26               27               28               29               30                 Date Details   06/12 This INR check       Date of next INR:  6/19/2017         How to take your warfarin dose     To take:  5 mg Take 1 of the 5 mg tablets.

## 2017-06-12 NOTE — PROGRESS NOTES
ANTICOAGULATION FOLLOW-UP CLINIC VISIT    Patient Name:  Rohan Monroe  Date:  6/12/2017  Contact Type:  Telephone    SUBJECTIVE:     Patient Findings     Positives No Problem Findings           OBJECTIVE    INR   Date Value Ref Range Status   06/12/2017 2.4  Final       ASSESSMENT / PLAN  INR assessment THER    Recheck INR In: 1 WEEK    INR Location [facility]      Anticoagulation Summary as of 6/12/2017     INR goal 2.0-3.0   Today's INR 2.4   Maintenance plan 5 mg (5 mg x 1) every day   Full instructions 5 mg every day   Weekly total 35 mg   No change documented Christal Alejandro, MICHAEL   Plan last modified Diane Mazariegos RN (6/6/2017)   Next INR check 6/19/2017   Priority INR   Target end date 4/20/2017    Indications   Long-term (current) use of anticoagulants [Z79.01] [Z79.01]  Atrial flutter with rapid ventricular response (H) [I48.92]         Anticoagulation Episode Summary     INR check location     Preferred lab     Send INR reminders to [facility]    Comments 3 months therapy, then reassess. Call [phone]. Do not leave message.       Anticoagulation Care Providers     Provider Role Specialty Phone number    Jamie Foster MD [facility] Internal Medicine [phone]            See the Encounter Report to view Anticoagulation Flowsheet and Dosing Calendar (Go to Encounters tab in chart review, and find the Anticoagulation Therapy Visit)    Spoke with home care nurse    Christal Alejandro, RN

## 2017-06-13 ENCOUNTER — DOCUMENTATION ONLY (OUTPATIENT)
Dept: SLEEP MEDICINE | Facility: CLINIC | Age: 72
End: 2017-06-13

## 2017-06-13 NOTE — PROGRESS NOTES
Pt was pulse dose tested on O2 in his home on 6/13/17. He has recently been ordered to be on home O2 @ 3LPM. The pt requested to be tested on pulse dose O2 in order for his O2 tanks to last longer.   I arrived to his home and the pt was on 3LPM via NC. I explained to him what we would do for the pulse dose test. He went over some of his lung hx with me and his recent hospital visit.   Mr Monroe walked down the valadez in his apt building on 3LPM NC to check his O2 sats with that liter flow.   Sats on 3LPM continuous after walking 50ft 96%.  Sats on 3L pulse dose after walking 50ft 92%.  Sats on 2L pulse dose after walking 50ft 89%.  Pt was instructed on his breathing and knows how to purse lip breathe. I explained that he would need to breathe through his nose to ensure that he is activating the pulse dose oxygen. We went over the equipment and he would like to get switched out to the POC at this time. We will change his equipment to a POC on 6/14/17. This equipment will do both continuous flow and pulse dose. The pt is familiar with the POC and prefers this over O2 tanks.

## 2017-06-15 ENCOUNTER — TELEPHONE (OUTPATIENT)
Dept: INTERNAL MEDICINE | Facility: CLINIC | Age: 72
End: 2017-06-15

## 2017-06-15 NOTE — TELEPHONE ENCOUNTER
----- Message from Marquise Lemus sent at 6/15/2017  9:13 AM CDT -----  Regarding: Verbal Order  Contact: [phone]  Urmila from [facility]    Requesting verbal order to continue home physical therapy once a week for 3 weeks for home exercise and gait.     6/15/17 Urmila given verbal authorization for orders below.  Parvin Clemente RN

## 2017-06-20 ENCOUNTER — PRE VISIT (OUTPATIENT)
Dept: CARDIOLOGY | Facility: CLINIC | Age: 72
End: 2017-06-20

## 2017-06-20 DIAGNOSIS — I48.92 ATRIAL FLUTTER, UNSPECIFIED TYPE (H): Primary | ICD-10-CM

## 2017-06-21 ENCOUNTER — OFFICE VISIT (OUTPATIENT)
Dept: CARDIOLOGY | Facility: CLINIC | Age: 72
End: 2017-06-21
Attending: INTERNAL MEDICINE
Payer: MEDICARE

## 2017-06-21 ENCOUNTER — ANTICOAGULATION THERAPY VISIT (OUTPATIENT)
Dept: ANTICOAGULATION | Facility: CLINIC | Age: 72
End: 2017-06-21

## 2017-06-21 VITALS
HEART RATE: 87 BPM | DIASTOLIC BLOOD PRESSURE: 73 MMHG | WEIGHT: 185.5 LBS | HEIGHT: 67 IN | BODY MASS INDEX: 29.11 KG/M2 | OXYGEN SATURATION: 93 % | SYSTOLIC BLOOD PRESSURE: 126 MMHG

## 2017-06-21 DIAGNOSIS — I48.92 ATRIAL FLUTTER, UNSPECIFIED TYPE (H): ICD-10-CM

## 2017-06-21 DIAGNOSIS — Z79.01 LONG-TERM (CURRENT) USE OF ANTICOAGULANTS: ICD-10-CM

## 2017-06-21 DIAGNOSIS — R22.9 LOCALIZED SUPERFICIAL SWELLING, MASS, OR LUMP: Primary | ICD-10-CM

## 2017-06-21 DIAGNOSIS — I48.92 ATRIAL FLUTTER WITH RAPID VENTRICULAR RESPONSE (H): ICD-10-CM

## 2017-06-21 DIAGNOSIS — I97.410 INTRAOPERATIVE HEMORRHAGE AND HEMATOMA OF A CIRCULATORY SYSTEM ORGAN OR STRUCTURE COMPLICATING A CARDIAC CATHETERIZATION: ICD-10-CM

## 2017-06-21 LAB — INR PPP: 2

## 2017-06-21 PROCEDURE — 99214 OFFICE O/P EST MOD 30 MIN: CPT | Mod: ZP | Performed by: INTERNAL MEDICINE

## 2017-06-21 PROCEDURE — 93005 ELECTROCARDIOGRAM TRACING: CPT | Mod: ZF

## 2017-06-21 PROCEDURE — 93010 ELECTROCARDIOGRAM REPORT: CPT | Mod: ZP | Performed by: INTERNAL MEDICINE

## 2017-06-21 ASSESSMENT — PAIN SCALES - GENERAL: PAINLEVEL: NO PAIN (0)

## 2017-06-21 NOTE — MR AVS SNAPSHOT
After Visit Summary   6/21/2017    Rohan Monroe    MRN: [id number]           Patient Information     Date Of Birth          1945        Visit Information        Provider Department      6/21/2017 1:30 PM Brian Vazquez MD [facility]        Today's Diagnoses     Localized superficial swelling, mass, or lump    -  1    Atrial flutter, unspecified type (H)        Intraoperative hemorrhage and hematoma of a circulatory system organ or structure complicating a cardiac catheterization           Care Instructions    Cardiology Provider you saw in clinic today: Dr. Vazquez    Labs/Tests needed:     1. L groin ultrasound today     Follow-up Visit:  As needed          Please contact us via [facility] [employer] for questions or concerns.    Jaclyn Pina RN   Electrophysiology Nurse Coordinator.  [phone]    If your question concerns the schedule/appointment times, contact:  [facility]   [phone]    Device Clinic (Pacemakers, ICD, Loop Recorders)   [phone]      You will receive all normal lab and testing results via [employer] or mail if not reviewed in clinic today.   If you need a medication refill please contact your pharmacy.    As always, thank you for trusting us with your health care needs!            Follow-ups after your visit        Follow-up notes from your care team     Return if symptoms worsen or fail to improve.      Your next 10 appointments already scheduled     Jun 21, 2017  3:00 PM CDT   US LOWER EXTREMITY VENOUS DUPLEX LEFT with UCUSV2    [facility])    [address] [place] [id number] [phone]           Please bring a list of your medicines (including vitamins, minerals and over-the-counter drugs). Also, tell your doctor about any allergies you may have. Wear comfortable clothes and leave your valuables at home.  You do not need to do anything special to prepare  for your exam.  Please call the Imaging Department at your exam site with any questions.            Jun 21, 2017  3:30 PM CDT   US LOWER EXTREMITY ARTERIAL DUPLEX LEFT with UCUSV2   [facility] ([facility])    [address] [place] [id number] [phone]           Please bring a list of your medicines (including vitamins, minerals and over-the-counter drugs). Also, tell your doctor about any allergies you may have. Wear comfortable clothes and leave your valuables at home.  You do not need to do anything special to prepare for your exam.  Please call the Imaging Department at your exam site with any questions.            Jun 28, 2017 11:40 AM CDT   (Arrive by 11:25 AM)   Return Visit with Maria Victoria Palmer MD   [facility] ([facility])    [address] [place] [id number] [phone]            Jul 10, 2017 11:00 AM CDT   Infusion 180 with  SPEC INFUSION, UC 50 ATC   [facility] ([facility])    [address] [place] [id number]   [phone]            Aug 17, 2017  1:00 PM CDT   PFT VISIT with  PFL B   [facility] Pulmonary Function Testing ([facility])    [address] [place] [id number] [phone]            Aug 17, 2017  1:30 PM CDT   (Arrive by 1:15 PM)   Return Interstitial Lung with David Morris Perlman, MD   [facility] ([facility])    [address] [place] [phone]   [phone]            Aug 29, 2017 10:00 AM CDT   Lab with  LAB   [facility] Lab ([facility])    [address] [place] [phone]   [phone]            Aug 29, 2017 10:30 AM CDT   US ABDOMEN COMPLETE with  UCUS2   [facility] ([facility])    [address] [place] [id number] [phone]           Please bring a list of your medicines (including vitamins, minerals and over-the-counter drugs). Also, tell your doctor about any allergies you may have. Wear comfortable clothes and leave your valuables at home.  Adults: No eating or drinking for 8 hours before the exam. You may take medicine with a small sip of water.  Children: - Children 6+ years: No food or drink for 6 hours before exam. - Children 1-5 years: No food or drink for 4 hours before exam. - Infants, breast-fed: may have breast milk up to 2 hours before exam. - Infants, formula: may have bottle until 4 hours before exam.  Please call the Imaging Department at your exam site with any questions.            Aug 29, 2017 11:30 AM CDT   (Arrive by 11:15 AM)   Return General Liver with Moses Orosco MD   [facility] Hepatology ([facility])    [address] [place] [phone]              Future tests that were ordered for you today     Open Future Orders        Priority Expected Expires Ordered    US Venous lower extremity lt Routine  6/21/2018 6/21/2017    US Lower Extremity Arterial Duplex Left Routine  6/21/2018 6/21/2017            Who to contact     If you have questions or need follow up information about today's clinic visit or your schedule please contact [facility] directly at [phone].  Normal or non-critical lab and imaging results will be communicated to you by MyChart, letter or phone within 4 business days after the clinic has received the results. If you do not hear from us within 7 days, please contact the clinic through MyChart or phone. If you have a critical or abnormal lab result, we will notify you by phone as soon as possible.  Submit refill requests through [facility] or call your pharmacy and they will  "forward the refill request to us. Please allow 3 business days for your refill to be completed.          Additional Information About Your Visit        [email or web address] Information     [facility] gives you secure access to your electronic health record. If you see a primary care provider, you can also send messages to your care team and make appointments. If you have questions, please call your primary care clinic.  If you do not have a primary care provider, please call [phone] and they will assist you.        Care EveryWhere ID     This is your Care EveryWhere ID. This could be used by other organizations to access your [facility] medical records  [id number]        Your Vitals Were     Pulse Height Pulse Oximetry BMI (Body Mass Index)          87 1.702 m (5' 7\") 93% 29.05 kg/m2         Blood Pressure from Last 3 Encounters:   06/21/17 126/73   06/05/17 137/81   05/26/17 121/63    Weight from Last 3 Encounters:   06/21/17 84.1 kg (185 lb 8 oz)   06/05/17 84.8 kg (187 lb)   05/26/17 87.5 kg (193 lb)              We Performed the Following     EKG 12-lead, tracing only (Future)          Today's Medication Changes          These changes are accurate as of: 6/21/17  2:34 PM.  If you have any questions, ask your nurse or doctor.               These medicines have changed or have updated prescriptions.        Dose/Directions    methotrexate 2.5 MG tablet CHEMO   This may have changed:    - how much to take  - how to take this  - when to take this  - additional instructions   Used for:  Sarcoidosis (H)        Dose:  7.5 mg   Take 3 tablets (7.5 mg) by mouth once a week On Mondays   Quantity:  48 tablet   Refills:  3       Urea 40 % Crea   Commonly known as:  CARMOL 40   This may have changed:  additional instructions   Used for:  Dermatophytosis of nail, Corns and callosities        To feet daily   Quantity:  199 g   Refills:  4                Primary Care Provider Office Phone # Fax #    Jamie Foster [phone] " [phone]       [facility] [address] [address]        Equal Access to Services     ISIAH MONTOYA : Hadii aad ku hadkendalamelia Joshua, wapaulda luqadaha, qaybta kaalmada wilmerjeff, nelly janiein hayaayarely guillenforrest tiangaylenic jacinto. So [facility] [place] [facility] [phone].    ATENCIÓN: Si habla español, tiene a mcfadden disposición servicios gratuitos de asistencia lingüística. Llame al [phone].    We comply with applicable federal civil rights laws and [place] laws. We do not discriminate on the basis of race, color, national origin, age, disability sex, sexual orientation or gender identity.            Thank you!     Thank you for choosing [facility]  for your care. Our goal is always to provide you with excellent care. Hearing back from our patients is one way we can continue to improve our services. Please take a few minutes to complete the written survey that you may receive in the mail after your visit with us. Thank you!             Your Updated Medication List - Protect others around you: Learn how to safely use, store and throw away your medicines at www.disposemymeds.org.          This list is accurate as of: 6/21/17  2:34 PM.  Always use your most recent med list.                   Brand Name Dispense Instructions for use Diagnosis    albuterol 108 (90 BASE) MCG/ACT Inhaler    PROAIR HFA/PROVENTIL HFA/VENTOLIN HFA    3 Inhaler    Inhale 2 puffs into the lungs every 6 hours as needed for shortness of breath / dyspnea    Sarcoidosis (H)       atorvastatin 40 MG tablet    LIPITOR    30 tablet    Take 1 tablet (40 mg) by mouth daily    Coronary artery disease involving native coronary artery of native heart without angina pectoris, CAD S/P percutaneous coronary angioplasty       blood glucose monitoring lancets     2 Box    Use to test blood sugar 2 times daily or as directed.  102 lancets per box.  3 month supply.    Type 2 diabetes, HbA1C goal < 8% (H)       blood glucose monitoring meter device  kit    no brand specified    1 kit    Use to test blood sugar 2 times daily.    Type 2 diabetes mellitus with diabetic nephropathy (H)       blood glucose monitoring test strip    ACCU-CHEK RONNIE PLUS    200 each    Use to test blood sugar 2 times daily or as directed.  3 month supply.    Type 2 diabetes, HbA1C goal < 8% (H)       clopidogrel 75 MG tablet    PLAVIX    90 tablet    Take 1 tablet (75 mg) by mouth daily    CAD S/P percutaneous coronary angioplasty, Coronary artery disease involving native coronary artery of native heart without angina pectoris       fluticasone-vilanterol 100-25 MCG/INH oral inhaler    BREO ELLIPTA    3 Inhaler    Inhale 1 puff into the lungs daily    Chronic obstructive pulmonary disease, unspecified COPD type (H)       furosemide 20 MG tablet    LASIX    180 tablet    Take 3 tablets (60 mg) by mouth 2 times daily    Pulmonary hypertension (H)       inFLIXimab 100 MG injection    REMICADE     Inject 100 mg into the vein every 28 days        levothyroxine 137 MCG tablet    SYNTHROID/LEVOTHROID    90 tablet    Take 1 tablet (137 mcg) by mouth daily    Hypothyroidism       methotrexate 2.5 MG tablet CHEMO     48 tablet    Take 3 tablets (7.5 mg) by mouth once a week On Mondays    Sarcoidosis (H)       metoprolol 100 MG tablet    LOPRESSOR    60 tablet    Take 1 tablet (100 mg) by mouth 2 times daily    Hypertension secondary to other renal disorders       mirtazapine 15 MG tablet    REMERON     Take 15 mg by mouth At Bedtime.        MULTIVITAMIN & MINERAL PO      Take 1 tablet by mouth daily.        omeprazole 20 MG CR capsule    priLOSEC    90 capsule    Take 1 capsule (20 mg) by mouth daily    CAD S/P percutaneous coronary angioplasty, Coronary artery disease involving native coronary artery of native heart without angina pectoris, Sarcoidosis of lung (H)       * order for DME     1 Device    She requested for a 4 wheel walker, would like to change it to 4 wheel walker with a seat and  oxygen tank durant.   Need this faxed over to her  [phone]    Sarcoidosis, lung (H), COPD (chronic obstructive pulmonary disease) (H), Abnormal gait, Congestive heart failure (H)       * order for DME     1 Device    Oxygen: Patient requires supplemental Oxygen 3 LPM via nasal canula with activity. Please provide patient with a portable oxygen concentrator for improved mobility.  Okay to spot check or titrated for conserving to keep stats above 90%. Oxygen will be for a lifetime.    ILD (interstitial lung disease) (H), Hypoxia       polyethylene glycol powder    MIRALAX    510 g    Take 17 g (1 capful) by mouth daily    Constipation, unspecified constipation type       sildenafil 20 MG tablet    REVATIO/VIAGRA    90 tablet    Take 1 tablet (20 mg) by mouth 3 times daily    Pulmonary hypertension (H)       tiotropium 18 MCG capsule    SPIRIVA HANDIHALER    90 capsule    Inhale contents of one capsule daily.    Chronic obstructive pulmonary disease, unspecified COPD type (H)       Urea 40 % Crea    CARMOL 40    199 g    To feet daily    Dermatophytosis of nail, Corns and callosities       warfarin 5 MG tablet    COUMADIN    30 tablet    Take 1 tablet (5 mg) by mouth daily    Atrial flutter with rapid ventricular response (H)       * Notice:  This list has 2 medication(s) that are the same as other medications prescribed for you. Read the directions carefully, and ask your doctor or other care provider to review them with you.

## 2017-06-21 NOTE — NURSING NOTE
Chief Complaint   Patient presents with     Follow Up For     4 mo f/u AFL, cardiopulmonary sarcoid. Recent PCI to LAD. EKG     Vitals were taken and medications were reconciled. EKG was performed.    ALANNAH Peña  1:09 PM

## 2017-06-21 NOTE — MR AVS SNAPSHOT
Rohan Howard Mabel   6/21/2017   Anticoagulation Therapy Visit    Description:  71 year old male   Provider:  Diane Mazariegos RN   Department:  [facility]           INR as of 6/21/2017     Today's INR 2.0      Anticoagulation Summary as of 6/21/2017     INR goal 2.0-3.0   Today's INR 2.0   Full instructions 5 mg every day   Next INR check 7/5/2017    Indications   Long-term (current) use of anticoagulants [Z79.01] [Z79.01]  Atrial flutter with rapid ventricular response (H) [I48.92]         June 2017 Details    Sun Mon Tue Wed Thu Fri Sat         1               2               3                 4               5               6               7               8               9               10                 11               12               13               14               15               16               17                 18               19               20               21      5 mg   See details      22      5 mg         23      5 mg         24      5 mg           25      5 mg         26      5 mg         27      5 mg         28      5 mg         29      5 mg         30      5 mg           Date Details   06/21 This INR check               How to take your warfarin dose     To take:  5 mg Take 1 of the 5 mg tablets.           July 2017 Details    Sun Mon Tue Wed Thu Fri Sat           1      5 mg           2      5 mg         3      5 mg         4      5 mg         5            6               7               8                 9               10               11               12               13               14               15                 16               17               18               19               20               21               22                 23               24               25               26               27               28               29                 30               31                     Date Details   No additional details    Date of next INR:  7/5/2017          How to take your warfarin dose     To take:  5 mg Take 1 of the 5 mg tablets.

## 2017-06-21 NOTE — PROGRESS NOTES
Electrophysiology Clinic Note  HPI:   Mr. Monroe is a 71 year old male who has a past medical history significant for pulmonary and cardiac sarcoidosis, CAD s/p PCI mLAD and D2 2008 with ISR s/p PCI LAD and D2 on 5/12/17 complicated by RP bleed s/p iliac artery stenting in IR, DM2, HTN, HLD, Hep C, hypothyroidism, and AFL s/p DCCV 1/19/17 with early recurrence then s/p CTI ablation on 1/23/17 (CHADSVASC 4 on Warfarin).     He presented on 1/18/17 to [facility] with a 3-4 week history of worsening SOB which worsened over the last several days. He also endorsed chest pain with deep breathing. He was found to be in AFL with RVR Vent Rate 150 bpm. Toponin elevated to 0.482 thought to be demand ischemia. He was subsequently admitted. He was started on heparin infusion. He then underwent a DEAN/DCCV on 1/19/17. He was noted to have frequent PACs and runs of AT post DCCV for which he was started on amiodarone. His DEAN pre DCCV showed LVEF 5% (done in AFL) but repeat surface TTE showed LVEF 55%. He then recurred back into AFL, appearing mostly typical on ECGs and was referred for ablation. He underwent a successful CTI ablation on 1/23/17. He went into AF post ablation while testing. He was given IV amiodarone bolus and DCCV attempted several times unsuccessfully. After further amiodarone loading, he underwent a successful DCCV on 1/24/17.  He was bridged to therapeutic warfarin. He developed RADHA with Scr up to 2.7 and was 2.4 at discharge. His losartan was stopped and diuretics were changed to PRN. He was discharged on 1/25/17. He did not tolerate amiodarone due to nausea and fatigue and self stopped the mediation on 2/10/17. PFTs from 2/24/17 showed mixed restriction and obstruction, moderate to severe reduction in DLCO. Spirometry overall stable, DLCO down from previous but has been that low before. He follows with Dr. Shine for pulmonary sarcoid who plans to repeat PET scan. He was started on Noravasc 10 mg daily in 2/2017  due to continued hypertension.     He presents today for follow up. He was hospitalized in 4/22/17-4/28/17 with hypoxia, ACEVES and worsening shortness of breath. This was felt to be multifactorial. RHC showed normal wedge but markedly elevated pulmonary arterial pressures. LHC showed ISR but no obstruction and decision was made to pursue elective PCI in a few weeks after further tune up. He was started on increased diuretic doses and sildenafil. Imaging showed consolidation and he was also treated with abx. He was discharged on home oxygen. He then underwent PCI to LAD and D2 on 5/12/17 complicated by large RP Bleed s/p iliac artery stenting and RADHA. He reports feeling well. He continues on home oxygen and reports his ACEVES has improved. He denies any chest pain/pressure, dizziness, lightheadedness, palpitations, or syncopal symptoms. He reported a lump in left groin area. Assessment shows golf ball sized, hard, firm mass in left groin. Slight pulsatility felt, no bruit appreciated on ascultation.  Presenting 12 lead ECG shows SR with PACs Vent Rate 86 bpm,  ms, QRS 82 ms, QTc 457 ms. Current cardiac medications include: Lasix, Metoprolol, Lipitor, Plavix, Warfarin, Sildenafil, MTX, and Remicaide.   PAST MEDICAL HISTORY:  Past Medical History:   Diagnosis Date     Atrial flutter (H)      Cataract of both eyes      Chronic infection     Hep C     Congestive heart failure, unspecified      Coronary artery disease      Depressive disorder, not elsewhere classified     Depression (non-psychotic)     ERM OS (epiretinal membrane, left eye)      Generalized osteoarthrosis, unspecified site      Glaucoma suspect      Hypertension      Lichen planus      Other psoriasis      PVD (posterior vitreous detachment), left eye      Sarcoidosis (H)      Sarcoidosis (H)      Type II or unspecified type diabetes mellitus without mention of complication, not stated as uncontrolled      Unspecified hypothyroidism     Hypothyroidism      Unspecified viral hepatitis C without hepatic coma      Viral warts, unspecified        CURRENT MEDICATIONS:  Current Outpatient Prescriptions   Medication Sig Dispense Refill     order for DME Oxygen: Patient requires supplemental Oxygen 3 LPM via nasal canula with activity. Please provide patient with a portable oxygen concentrator for improved mobility.  Okay to spot check or titrated for conserving to keep stats above 90%. Oxygen will be for a lifetime. 1 Device 0     order for DME She requested for a 4 wheel walker, would like to change it to 4 wheel walker with a seat and oxygen tank durant.     Need this faxed over to her   [phone] 1 Device 1     omeprazole (PRILOSEC) 20 MG CR capsule Take 1 capsule (20 mg) by mouth daily 90 capsule 3     atorvastatin (LIPITOR) 40 MG tablet Take 1 tablet (40 mg) by mouth daily 30 tablet 3     clopidogrel (PLAVIX) 75 MG tablet Take 1 tablet (75 mg) by mouth daily 90 tablet 3     sildenafil (REVATIO/VIAGRA) 20 MG tablet Take 1 tablet (20 mg) by mouth 3 times daily 90 tablet 3     furosemide (LASIX) 20 MG tablet Take 3 tablets (60 mg) by mouth 2 times daily 180 tablet 0     levothyroxine (SYNTHROID/LEVOTHROID) 137 MCG tablet Take 1 tablet (137 mcg) by mouth daily 90 tablet 1     warfarin (COUMADIN) 5 MG tablet Take 1 tablet (5 mg) by mouth daily 30 tablet 6     polyethylene glycol (MIRALAX) powder Take 17 g (1 capful) by mouth daily 510 g 1     tiotropium (SPIRIVA HANDIHALER) 18 MCG capsule Inhale contents of one capsule daily. 90 capsule 3     albuterol (PROAIR HFA/PROVENTIL HFA/VENTOLIN HFA) 108 (90 BASE) MCG/ACT Inhaler Inhale 2 puffs into the lungs every 6 hours as needed for shortness of breath / dyspnea 3 Inhaler 6     fluticasone-vilanterol (BREO ELLIPTA) 100-25 MCG/INH oral inhaler Inhale 1 puff into the lungs daily 3 Inhaler 3     inFLIXimab (REMICADE) 100 MG injection Inject 100 mg into the vein every 28 days        metoprolol (LOPRESSOR) 100 MG tablet Take 1  tablet (100 mg) by mouth 2 times daily 60 tablet 11     methotrexate 2.5 MG tablet Take 3 tablets (7.5 mg) by mouth once a week On Mondays (Patient taking differently: Take 2.5mg by mouth weekly on Mondays.) 48 tablet 3     blood glucose monitoring (ACCU-CHEK RONNIE PLUS) test strip Use to test blood sugar 2 times daily or as directed.  3 month supply. 200 each 3     blood glucose monitoring (ACCU-CHEK FASTCLIX) lancets Use to test blood sugar 2 times daily or as directed.  102 lancets per box.  3 month supply. 2 Box 3     blood glucose monitoring (NO BRAND SPECIFIED) meter device kit Use to test blood sugar 2 times daily. 1 kit 0     Urea (CARMOL 40) 40 % CREA To feet daily (Patient taking differently: Apply to feet daily as needed.) 199 g 4     Multiple Vitamins-Minerals (MULTIVITAMIN & MINERAL PO) Take 1 tablet by mouth daily.       mirtazapine (REMERON) 15 MG tablet Take 15 mg by mouth At Bedtime.         PAST SURGICAL HISTORY:  Past Surgical History:   Procedure Laterality Date     ANESTHESIA CARDIOVERSION N/A 1/19/2017    Procedure: ANESTHESIA CARDIOVERSION;  Surgeon: GENERIC ANESTHESIA PROVIDER;  Location: [facility] OR     ANESTHESIA CARDIOVERSION N/A 1/23/2017    Procedure: ANESTHESIA CARDIOVERSION;  Surgeon: GENERIC ANESTHESIA PROVIDER;  Location: [facility] OR     ANESTHESIA CARDIOVERSION N/A 1/24/2017    Procedure: ANESTHESIA CARDIOVERSION;  Surgeon: GENERIC ANESTHESIA PROVIDER;  Location: [facility] OR     ARTHROPLASTY HIP  8/24/2011    Procedure:ARTHROPLASTY HIP; Right Total Hip Arthroplasty  Choice anesthesia; Surgeon:LESLI WILKINSON; Location:[facility] OR     BIOPSY       cardiac stent      s/p     CARDIAC SURGERY       CATARACT IOL, RT/LT  9/15/2015    LE     COLONOSCOPY       CORONARY ANGIOGRAPHY ADULT ORDER       H ABLATION ATRIAL FLUTTER       HC REMOVAL OF TONSILS,<11 Y/O      Tonsils <10y.o.     HC REPAIR INCISIONAL HERNIA,REDUCIBLE      Hernia Repair, Incisional, Unilateral     HEART CATH, ANGIOPLASTY       JOINT  "REPLACEMENT      1 month ago--right hip     LIGATN/STRIP LONG & SHORT SAPHEN         ALLERGIES:     Allergies   Allergen Reactions     Prednisone Other (See Comments)     He reports that he can't sleep for days and can't use small to massive doses of prednisone   Pt. Does not do well with high doses of Prednisone, His MD says that Prednisone is counter indicated. Prednisone failed to treat his sarcoidosis        FAMILY HISTORY:  Family History   Problem Relation Age of Onset     HEART DISEASE Father      irreg heart beat     Circulatory Father      varicose veins     Prostate Cancer Father      HEART DISEASE Mother      heart attack     Arthritis Mother      OSTEOPOROSIS Mother      Thyroid Disease Mother      Eye Disorder Maternal Grandmother      glaucoma     DIABETES Sister      type 2     Kidney Cancer Sister      DIABETES Sister      Glaucoma No family hx of      Macular Degeneration No family hx of      CANCER No family hx of      no skin cancer       SOCIAL HISTORY:  Social History   Substance Use Topics     Smoking status: Former Smoker     Packs/day: 1.00     Years: 30.00     Types: Cigarettes     Start date: 12/30/1960     Quit date: 7/22/1994     Smokeless tobacco: Never Used     Alcohol use No       ROS:   A comprehensive 10 point review of systems negative other than as mentioned in HPI.  Exam:  /73 (BP Location: Left arm, Patient Position: Chair, Cuff Size: Adult Regular)  Pulse 87  Ht 1.702 m (5' 7\")  Wt 84.1 kg (185 lb 8 oz)  SpO2 93%  BMI 29.05 kg/m2  GENERAL APPEARANCE: alert and no distress, on home oxygen.  HEENT: LIVIER ARELLANO.   NECK: supple.   RESPIRATORY: wearing oxygen, respirations are unlabored, normal respiratory rate  CARDIOVASCULAR: regular rhythm, S1/S2, and no murmur, click or rub, precordium quiet with normal PMI.  ABDOMEN: soft, non tender, bowel sounds normal  EXTREMITIES: peripheral pulses normal, trace edema, hard lump in left groin area  NEURO: alert and oriented to " person/place/time, normal speech, gait and affect  SKIN: some ecchymoses, no rashes    Labs:  Reviewed.     Testing/Procedures:  PULMONARY FUNCTION TESTS:   PFT Latest Ref Rng & Units 5/11/2017   FVC L 2.45   FEV1 L 1.01   FVC% % 62   FEV1% % 34     1/18/17 DEAN:  Interpretation Summary  No left atrial mass or thrombus visualized.  There is spontaneous echo contrast in the left atrium.  The LV systolic function is severely reduced with global hypokinesis. The  LVEF is estimated at 5-10% at a heart rate of 150 bpm.  This study was compared with the study from 3/2015, the LVEF is reduced and  patient is in atrial flutter.  1/18/17 ECHOCARDIOGRAM:   Interpretation Summary  The Ejection Fraction is estimated at 50-55%.    4/24/17 ECHO:  Interpretation Summary  Limited study.  Borderline (EF 50-55%) reduced left ventricular function is present. Traced at  51%.  No change from prior.    Assessment and Plan:   Mr. Monroe is a 71 year old male who has a past medical history significant for pulmonary and cardiac sarcoidosis, CAD s/p PCI mLAD and D2 2008 with ISR s/p PCI LAD and D2 on 5/12/17 complicated by RP bleed s/p iliac artery stenting in IR, DM2, HTN, HLD, Hep C, hypothyroidism, and AFL s/p DCCV 1/19/17 with early recurrence then s/p CTI ablation on 1/23/17 (CHADSVASC 4 on Warfarin).   He presents today for follow up. He was hospitalized in 4/22/17-4/28/17 with hypoxia, ACEVES and worsening shortness of breath. This was felt to be multifactorial. RHC showed normal wedge but markedly elevated pulmonary arterial pressures. LHC showed ISR but no obstruction and decision was made to pursue elective PCI in a few weeks after further tune up. He was started on increased diuretic doses and sildenafil. Imaging showed consolidation and he was also treated with abx. He was discharged on home oxygen. He then underwent PCI to LAD and D2 on 5/12/17 complicated by large RP Bleed s/p iliac artery stenting and RADHA. He reports feeling well.  He continues on home oxygen and reports his ACEVES has improved. He denies any chest pain/pressure, dizziness, lightheadedness, palpitations, or syncopal symptoms. He reported a lump in left groin area. Assessment shows golf ball sized, hard, firm mass in left groin. Slight pulsatility felt, no bruit appreciated on ascultation.  Presenting 12 lead ECG shows SR with PACs Vent Rate 86 bpm,  ms, QRS 82 ms, QTc 457 ms. Current cardiac medications include: Lasix, Metoprolol, Lipitor, Plavix, Warfarin, Sildenafil, MTX, and Remicaide.    Cardiopulmonary  Sarcoidosis:   - Continue MTX and remicade   -His ACEVES/SOB is likely multifactorial d/t cardiopulmonary sarcoid/COPD. It is improving.     Atrial Flutter/Atrial Fibrillation:   - Stroke Prophylaxis:  CHADSVASC=+DM2, +HTN, +CAD, +age  2, corresponding to a 4.0% annual stroke / systemic emolism event rate. indicating need for long term oral anticoagulation.  He is appropriately on warfarin.    - Rate Control: Continue metoprolol.   - Rhythm Control:  S/p CTI ablation on 1/23/17 with AF post ablation s/p DCCV during ablation which was not successful and then loaded with amiodarone and had a successful DCCV on 1/23/17. He remains in sinus. He stopped amiodarone d/t side effects on 2/10/17. Renal function and CAD limit other AAT options. No recurrences of AF; therefore, will not employ rhythm control strategy at this time.   - Risk Factor Management: Continue Statin, maintain tight BP control, and RAHEEM evaluation.     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 This side was accessed during IR procedure for stenting of right iliac artery.   - US to evaluate and refer to IR for thrombin injection as needed.   He will continue to follow with Dr. Paige for ongoing management of sarcoid and HFpEF. He can follow up with EP on an as needed basis.   The patient states understanding and is agreeable with plan.   This patient was seen and examined with Dr. Vazquez. The above note  reflects our joint assessment and plan.   VINNY Fox CNP  Pager: [phone]    EP STAFF NOTE  I have seen and examined the patient as part of a shared visit with DONOVAN Fox NP.  I agree with the note above. I reviewed today's vital signs and medications. I have reviewed and discussed with the advanced practice provider their physical examination, assessment, and plan   Briefly, AFL and PAF s/p CTI ablation with no recurrences, amio taken transiently and stopped Feb 2017, has left groin swelling after IR intervention through the LFA to correct an RP bleed form a CORS,   My key history/exam findings are: firm swelling of left groin, non pulsatile to touch.   The key management decisions made by me: left groin US..    Brian Vazquez MD [facility], [place]  Cardiology - Electrophysiology

## 2017-06-21 NOTE — PROGRESS NOTES
ANTICOAGULATION FOLLOW-UP CLINIC VISIT    Patient Name:  Rohan Monroe  Date:  6/21/2017  Contact Type:  Telephone    SUBJECTIVE:     Patient Findings     Positives No Problem Findings           OBJECTIVE    INR   Date Value Ref Range Status   06/21/2017 2.0  Final       ASSESSMENT / PLAN  INR assessment THER    Recheck INR In: 2 WEEKS    INR Location [facility]      Anticoagulation Summary as of 6/21/2017     INR goal 2.0-3.0   Today's INR 2.0   Maintenance plan 5 mg (5 mg x 1) every day   Full instructions 5 mg every day   Weekly total 35 mg   Plan last modified Diane Mazariegos RN (6/6/2017)   Next INR check 7/5/2017   Priority INR   Target end date 4/20/2017    Indications   Long-term (current) use of anticoagulants [Z79.01] [Z79.01]  Atrial flutter with rapid ventricular response (H) [I48.92]         Anticoagulation Episode Summary     INR check location     Preferred lab     Send INR reminders to [facility]    Comments 3 months therapy, then reassess. Call [phone]. Do not leave message.       Anticoagulation Care Providers     Provider Role Specialty Phone number    Jamie Foster MD [facility] Internal Medicine [phone]            See the Encounter Report to view Anticoagulation Flowsheet and Dosing Calendar (Go to Encounters tab in chart review, and find the Anticoagulation Therapy Visit)  [facility] nurse, ASHANTI on our answering machine. I LM on her answering machine, and will continue to try to contact patient.    Diane Mazariegos, MICHAEL

## 2017-06-21 NOTE — LETTER
6/21/2017      RE: Rohan Monroe  [address]  [place] [address]       Dear Colleague,    Thank you for the opportunity to participate in the care of your patient, Rohan Monroe, at the [facility] at [facility]. Please see a copy of my visit note below.    Electrophysiology Clinic Note  HPI:   Mr. Monroe is a 71 year old male who has a past medical history significant for pulmonary and cardiac sarcoidosis, CAD s/p PCI mLAD and D2 2008 with ISR s/p PCI LAD and D2 on 5/12/17 complicated by RP bleed s/p iliac artery stenting in IR, DM2, HTN, HLD, Hep C, hypothyroidism, and AFL s/p DCCV 1/19/17 with early recurrence then s/p CTI ablation on 1/23/17 (CHADSVASC 4 on Warfarin).     He presented on 1/18/17 to [facility] with a 3-4 week history of worsening SOB which worsened over the last several days. He also endorsed chest pain with deep breathing. He was found to be in AFL with RVR Vent Rate 150 bpm. Toponin elevated to 0.482 thought to be demand ischemia. He was subsequently admitted. He was started on heparin infusion. He then underwent a DEAN/DCCV on 1/19/17. He was noted to have frequent PACs and runs of AT post DCCV for which he was started on amiodarone. His DEAN pre DCCV showed LVEF 5% (done in AFL) but repeat surface TTE showed LVEF 55%. He then recurred back into AFL, appearing mostly typical on ECGs and was referred for ablation. He underwent a successful CTI ablation on 1/23/17. He went into AF post ablation while testing. He was given IV amiodarone bolus and DCCV attempted several times unsuccessfully. After further amiodarone loading, he underwent a successful DCCV on 1/24/17.  He was bridged to therapeutic warfarin. He developed RADHA with Scr up to 2.7 and was 2.4 at discharge. His losartan was stopped and diuretics were changed to PRN. He was discharged on 1/25/17. He did not tolerate amiodarone due to nausea and fatigue and  self stopped the mediation on 2/10/17. PFTs from 2/24/17 showed mixed restriction and obstruction, moderate to severe reduction in DLCO. Spirometry overall stable, DLCO down from previous but has been that low before. He follows with Dr. Shine for pulmonary sarcoid who plans to repeat PET scan. He was started on Noravasc 10 mg daily in 2/2017 due to continued hypertension.     He presents today for follow up. He was hospitalized in 4/22/17-4/28/17 with hypoxia, ACEVES and worsening shortness of breath. This was felt to be multifactorial. RHC showed normal wedge but markedly elevated pulmonary arterial pressures. LHC showed ISR but no obstruction and decision was made to pursue elective PCI in a few weeks after further tune up. He was started on increased diuretic doses and sildenafil. Imaging showed consolidation and he was also treated with abx. He was discharged on home oxygen. He then underwent PCI to LAD and D2 on 5/12/17 complicated by large RP Bleed s/p iliac artery stenting and RADHA. He reports feeling well. He continues on home oxygen and reports his ACEVES has improved. He denies any chest pain/pressure, dizziness, lightheadedness, palpitations, or syncopal symptoms. He reported a lump in left groin area. Assessment shows golf ball sized, hard, firm mass in left groin. Slight pulsatility felt, no bruit appreciated on ascultation.  Presenting 12 lead ECG shows SR with PACs Vent Rate 86 bpm,  ms, QRS 82 ms, QTc 457 ms. Current cardiac medications include: Lasix, Metoprolol, Lipitor, Plavix, Warfarin, Sildenafil, MTX, and Remicaide.   PAST MEDICAL HISTORY:  Past Medical History:   Diagnosis Date     Atrial flutter (H)      Cataract of both eyes      Chronic infection     Hep C     Congestive heart failure, unspecified      Coronary artery disease      Depressive disorder, not elsewhere classified     Depression (non-psychotic)     ERM OS (epiretinal membrane, left eye)      Generalized osteoarthrosis,  unspecified site      Glaucoma suspect      Hypertension      Lichen planus      Other psoriasis      PVD (posterior vitreous detachment), left eye      Sarcoidosis (H)      Sarcoidosis (H)      Type II or unspecified type diabetes mellitus without mention of complication, not stated as uncontrolled      Unspecified hypothyroidism     Hypothyroidism     Unspecified viral hepatitis C without hepatic coma      Viral warts, unspecified        CURRENT MEDICATIONS:  Current Outpatient Prescriptions   Medication Sig Dispense Refill     order for DME Oxygen: Patient requires supplemental Oxygen 3 LPM via nasal canula with activity. Please provide patient with a portable oxygen concentrator for improved mobility.  Okay to spot check or titrated for conserving to keep stats above 90%. Oxygen will be for a lifetime. 1 Device 0     order for DME She requested for a 4 wheel walker, would like to change it to 4 wheel walker with a seat and oxygen tank durant.     Need this faxed over to her   [phone] 1 Device 1     omeprazole (PRILOSEC) 20 MG CR capsule Take 1 capsule (20 mg) by mouth daily 90 capsule 3     atorvastatin (LIPITOR) 40 MG tablet Take 1 tablet (40 mg) by mouth daily 30 tablet 3     clopidogrel (PLAVIX) 75 MG tablet Take 1 tablet (75 mg) by mouth daily 90 tablet 3     sildenafil (REVATIO/VIAGRA) 20 MG tablet Take 1 tablet (20 mg) by mouth 3 times daily 90 tablet 3     furosemide (LASIX) 20 MG tablet Take 3 tablets (60 mg) by mouth 2 times daily 180 tablet 0     levothyroxine (SYNTHROID/LEVOTHROID) 137 MCG tablet Take 1 tablet (137 mcg) by mouth daily 90 tablet 1     warfarin (COUMADIN) 5 MG tablet Take 1 tablet (5 mg) by mouth daily 30 tablet 6     polyethylene glycol (MIRALAX) powder Take 17 g (1 capful) by mouth daily 510 g 1     tiotropium (SPIRIVA HANDIHALER) 18 MCG capsule Inhale contents of one capsule daily. 90 capsule 3     albuterol (PROAIR HFA/PROVENTIL HFA/VENTOLIN HFA) 108 (90 BASE) MCG/ACT Inhaler  Inhale 2 puffs into the lungs every 6 hours as needed for shortness of breath / dyspnea 3 Inhaler 6     fluticasone-vilanterol (BREO ELLIPTA) 100-25 MCG/INH oral inhaler Inhale 1 puff into the lungs daily 3 Inhaler 3     inFLIXimab (REMICADE) 100 MG injection Inject 100 mg into the vein every 28 days        metoprolol (LOPRESSOR) 100 MG tablet Take 1 tablet (100 mg) by mouth 2 times daily 60 tablet 11     methotrexate 2.5 MG tablet Take 3 tablets (7.5 mg) by mouth once a week On Mondays (Patient taking differently: Take 2.5mg by mouth weekly on Mondays.) 48 tablet 3     blood glucose monitoring (ACCU-CHEK RONNIE PLUS) test strip Use to test blood sugar 2 times daily or as directed.  3 month supply. 200 each 3     blood glucose monitoring (ACCU-CHEK FASTCLIX) lancets Use to test blood sugar 2 times daily or as directed.  102 lancets per box.  3 month supply. 2 Box 3     blood glucose monitoring (NO BRAND SPECIFIED) meter device kit Use to test blood sugar 2 times daily. 1 kit 0     Urea (CARMOL 40) 40 % CREA To feet daily (Patient taking differently: Apply to feet daily as needed.) 199 g 4     Multiple Vitamins-Minerals (MULTIVITAMIN & MINERAL PO) Take 1 tablet by mouth daily.       mirtazapine (REMERON) 15 MG tablet Take 15 mg by mouth At Bedtime.         PAST SURGICAL HISTORY:  Past Surgical History:   Procedure Laterality Date     ANESTHESIA CARDIOVERSION N/A 1/19/2017    Procedure: ANESTHESIA CARDIOVERSION;  Surgeon: GENERIC ANESTHESIA PROVIDER;  Location: [facility] OR     ANESTHESIA CARDIOVERSION N/A 1/23/2017    Procedure: ANESTHESIA CARDIOVERSION;  Surgeon: GENERIC ANESTHESIA PROVIDER;  Location: [facility] OR     ANESTHESIA CARDIOVERSION N/A 1/24/2017    Procedure: ANESTHESIA CARDIOVERSION;  Surgeon: GENERIC ANESTHESIA PROVIDER;  Location: [facility] OR     ARTHROPLASTY HIP  8/24/2011    Procedure:ARTHROPLASTY HIP; Right Total Hip Arthroplasty  Choice anesthesia; Surgeon:LESLI WILKINSON; Location:[facility] OR     BIOPSY       cardiac  "stent      s/p     CARDIAC SURGERY       CATARACT IOL, RT/LT  9/15/2015    LE     COLONOSCOPY       CORONARY ANGIOGRAPHY ADULT ORDER       H ABLATION ATRIAL FLUTTER       HC REMOVAL OF TONSILS,<9 Y/O      Tonsils <10y.o.     HC REPAIR INCISIONAL HERNIA,REDUCIBLE      Hernia Repair, Incisional, Unilateral     HEART CATH, ANGIOPLASTY       JOINT REPLACEMENT      1 month ago--right hip     LIGATN/STRIP LONG & SHORT SAPHEN         ALLERGIES:     Allergies   Allergen Reactions     Prednisone Other (See Comments)     He reports that he can't sleep for days and can't use small to massive doses of prednisone   Pt. Does not do well with high doses of Prednisone, His MD says that Prednisone is counter indicated. Prednisone failed to treat his sarcoidosis        FAMILY HISTORY:  Family History   Problem Relation Age of Onset     HEART DISEASE Father      irreg heart beat     Circulatory Father      varicose veins     Prostate Cancer Father      HEART DISEASE Mother      heart attack     Arthritis Mother      OSTEOPOROSIS Mother      Thyroid Disease Mother      Eye Disorder Maternal Grandmother      glaucoma     DIABETES Sister      type 2     Kidney Cancer Sister      DIABETES Sister      Glaucoma No family hx of      Macular Degeneration No family hx of      CANCER No family hx of      no skin cancer       SOCIAL HISTORY:  Social History   Substance Use Topics     Smoking status: Former Smoker     Packs/day: 1.00     Years: 30.00     Types: Cigarettes     Start date: 12/30/1960     Quit date: 7/22/1994     Smokeless tobacco: Never Used     Alcohol use No       ROS:   A comprehensive 10 point review of systems negative other than as mentioned in HPI.  Exam:  /73 (BP Location: Left arm, Patient Position: Chair, Cuff Size: Adult Regular)  Pulse 87  Ht 1.702 m (5' 7\")  Wt 84.1 kg (185 lb 8 oz)  SpO2 93%  BMI 29.05 kg/m2  GENERAL APPEARANCE: alert and no distress, on home oxygen.  HEENT: LIVIER ARELLANO.   NECK: supple. "   RESPIRATORY: wearing oxygen, respirations are unlabored, normal respiratory rate  CARDIOVASCULAR: regular rhythm, S1/S2, and no murmur, click or rub, precordium quiet with normal PMI.  ABDOMEN: soft, non tender, bowel sounds normal  EXTREMITIES: peripheral pulses normal, trace edema, hard lump in left groin area  NEURO: alert and oriented to person/place/time, normal speech, gait and affect  SKIN: some ecchymoses, no rashes    Labs:  Reviewed.     Testing/Procedures:  PULMONARY FUNCTION TESTS:   PFT Latest Ref Rng & Units 5/11/2017   FVC L 2.45   FEV1 L 1.01   FVC% % 62   FEV1% % 34     1/18/17 DEAN:  Interpretation Summary  No left atrial mass or thrombus visualized.  There is spontaneous echo contrast in the left atrium.  The LV systolic function is severely reduced with global hypokinesis. The  LVEF is estimated at 5-10% at a heart rate of 150 bpm.  This study was compared with the study from 3/2015, the LVEF is reduced and  patient is in atrial flutter.  1/18/17 ECHOCARDIOGRAM:   Interpretation Summary  The Ejection Fraction is estimated at 50-55%.    4/24/17 ECHO:  Interpretation Summary  Limited study.  Borderline (EF 50-55%) reduced left ventricular function is present. Traced at  51%.  No change from prior.    Assessment and Plan:   Mr. Monroe is a 71 year old male who has a past medical history significant for pulmonary and cardiac sarcoidosis, CAD s/p PCI mLAD and D2 2008 with ISR s/p PCI LAD and D2 on 5/12/17 complicated by RP bleed s/p iliac artery stenting in IR, DM2, HTN, HLD, Hep C, hypothyroidism, and AFL s/p DCCV 1/19/17 with early recurrence then s/p CTI ablation on 1/23/17 (CHADSVASC 4 on Warfarin).   He presents today for follow up. He was hospitalized in 4/22/17-4/28/17 with hypoxia, ACEVES and worsening shortness of breath. This was felt to be multifactorial. RHC showed normal wedge but markedly elevated pulmonary arterial pressures. LHC showed ISR but no obstruction and decision was made to  pursue elective PCI in a few weeks after further tune up. He was started on increased diuretic doses and sildenafil. Imaging showed consolidation and he was also treated with abx. He was discharged on home oxygen. He then underwent PCI to LAD and D2 on 5/12/17 complicated by large RP Bleed s/p iliac artery stenting and RADHA. He reports feeling well. He continues on home oxygen and reports his ACEVES has improved. He denies any chest pain/pressure, dizziness, lightheadedness, palpitations, or syncopal symptoms. He reported a lump in left groin area. Assessment shows golf ball sized, hard, firm mass in left groin. Slight pulsatility felt, no bruit appreciated on ascultation.  Presenting 12 lead ECG shows SR with PACs Vent Rate 86 bpm,  ms, QRS 82 ms, QTc 457 ms. Current cardiac medications include: Lasix, Metoprolol, Lipitor, Plavix, Warfarin, Sildenafil, MTX, and Remicaide.    Cardiopulmonary  Sarcoidosis:   - Continue MTX and remicade   -His ACEVES/SOB is likely multifactorial d/t cardiopulmonary sarcoid/COPD. It is improving.     Atrial Flutter/Atrial Fibrillation:   - Stroke Prophylaxis:  CHADSVASC=+DM2, +HTN, +CAD, +age  2, corresponding to a 4.0% annual stroke / systemic emolism event rate. indicating need for long term oral anticoagulation.  He is appropriately on warfarin.    - Rate Control: Continue metoprolol.   - Rhythm Control:  S/p CTI ablation on 1/23/17 with AF post ablation s/p DCCV during ablation which was not successful and then loaded with amiodarone and had a successful DCCV on 1/23/17. He remains in sinus. He stopped amiodarone d/t side effects on 2/10/17. Renal function and CAD limit other AAT options. No recurrences of AF; therefore, will not employ rhythm control strategy at this time.   - Risk Factor Management: Continue Statin, maintain tight BP control, and RAHEEM evaluation.     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 This side was accessed during IR procedure for stenting of right  iliac artery.   - US to evaluate and refer to IR for thrombin injection as needed.   He will continue to follow with Dr. Paige for ongoing management of sarcoid and HFpEF. He can follow up with EP on an as needed basis.   The patient states understanding and is agreeable with plan.   This patient was seen and examined with Dr. Vazquez. The above note reflects our joint assessment and plan.   VINNY Fox CNP  Pager: [phone]    EP STAFF NOTE  I have seen and examined the patient as part of a shared visit with DONOVAN Fox NP.  I agree with the note above. I reviewed today's vital signs and medications. I have reviewed and discussed with the advanced practice provider their physical examination, assessment, and plan   Briefly, AFL and PAF s/p CTI ablation with no recurrences, amio taken transiently and stopped Feb 2017, has left groin swelling after IR intervention through the LFA to correct an RP bleed form a CORS,   My key history/exam findings are: firm swelling of left groin, non pulsatile to touch.   The key management decisions made by me: left groin US..    Brian Vazquez MD [facility]  Cardiology - Electrophysiology

## 2017-06-21 NOTE — PATIENT INSTRUCTIONS
Cardiology Provider you saw in clinic today: Dr. Vazquez    Labs/Tests needed:     1. L groin ultrasound today     Follow-up Visit:  As needed          Please contact us via [employer] for questions or concerns.    Jaclyn Pina RN   Electrophysiology Nurse Coordinator.  [phone]    If your question concerns the schedule/appointment times, contact:  [facility]   [phone]    Device Clinic (Pacemakers, ICD, Loop Recorders)   [phone]      You will receive all normal lab and testing results via [employer] or mail if not reviewed in clinic today.   If you need a medication refill please contact your pharmacy.    As always, thank you for trusting us with your health care needs!

## 2017-06-23 ENCOUNTER — CARE COORDINATION (OUTPATIENT)
Dept: CARDIOLOGY | Facility: CLINIC | Age: 72
End: 2017-06-23

## 2017-06-23 LAB — INTERPRETATION ECG - MUSE: NORMAL

## 2017-06-26 ENCOUNTER — TELEPHONE (OUTPATIENT)
Dept: INTERVENTIONAL RADIOLOGY/VASCULAR | Facility: CLINIC | Age: 72
End: 2017-06-26

## 2017-06-27 ENCOUNTER — HOSPITAL ENCOUNTER (OUTPATIENT)
Facility: CLINIC | Age: 72
Discharge: HOME | End: 2017-06-27
Attending: RADIOLOGY | Admitting: RADIOLOGY
Payer: MEDICARE

## 2017-06-27 ENCOUNTER — APPOINTMENT (OUTPATIENT)
Dept: INTERVENTIONAL RADIOLOGY/VASCULAR | Facility: CLINIC | Age: 72
End: 2017-06-27
Attending: INTERNAL MEDICINE
Payer: MEDICARE

## 2017-06-27 VITALS
DIASTOLIC BLOOD PRESSURE: 77 MMHG | RESPIRATION RATE: 16 BRPM | SYSTOLIC BLOOD PRESSURE: 142 MMHG | OXYGEN SATURATION: 98 % | HEART RATE: 72 BPM

## 2017-06-27 PROCEDURE — 25000125 ZZHC RX 250: Performed by: RADIOLOGY

## 2017-06-27 PROCEDURE — 76942 ECHO GUIDE FOR BIOPSY: CPT

## 2017-06-27 PROCEDURE — 27210903 ZZH KIT CR5

## 2017-06-27 PROCEDURE — 36002 PSEUDOANEURYSM INJECTION TRT: CPT

## 2017-06-27 PROCEDURE — 27210732 ZZH ACCESSORY CR1

## 2017-06-27 RX ORDER — LIDOCAINE HYDROCHLORIDE 10 MG/ML
1-30 INJECTION, SOLUTION EPIDURAL; INFILTRATION; INTRACAUDAL; PERINEURAL
Status: DISCONTINUED | OUTPATIENT
Start: 2017-06-27 | End: 2017-06-29 | Stop reason: HOSPADM

## 2017-06-27 RX ADMIN — THROMBIN, TOPICAL (BOVINE) 1500 UNITS: KIT at 12:39

## 2017-06-27 NOTE — PROGRESS NOTES
Interventional Radiology Intra-procedural Nursing Note    Patient Name: Rohan Monroe  Medical Record Number: [id number]  Today's Date: June 27, 2017         Start Time: 1200  End of procedure time: 1240  Procedure: left common femoral artery psuedo-aneurysm thrombin injection.  Fire Safety Score: 1    Consent Review/Timeout Performed by: Dr. Aaron Olea/ Dr. Alex Pallas  Procedure Performed By: Dr. Aaron Olea/ Dr. Alex Pallas     Time pt departs:  1545    Other Notes:  Alert male ambulatory from [facility] to IR Procedure Room 6 for planned intervention.  ID band confirmed and patient acknowledges understanding of planned procedure. Patient repositioned to procedure table positioned supine.  Patient prepped and draped per policy see VS flowsheet, MAR for further information.       Using image guidance, treatment performed to left femoral site by Provider.  Per provider, 1000 units injected into larger portion of left femoral bilobed psuedo-aneurysm and 500 units injected into smaller portion of left femoral psuedo-aneurysm.    Patient condition post procedure is stable.   Per MD order, patient to remain in IR holding area with bedrest x 3 hours for post-procedure monitoring.  Staff to monitor and document groin site characteristics and pulses q 30 minutes.    LE pulses, CMS, color and sensation remain unchanged post-procedure.  Left groin golf-ball sized pseudo-aneurysm remains palpable.  Physician review status at bedside at 1540. Per MD order, patient discharged independently to [facility] for transport home.  Patient understands discharge orders.    Sandra Denise RN.

## 2017-06-27 NOTE — PROGRESS NOTES
Interventional Radiology Brief Post Procedure Note    Procedure: Injection of thrombin into left CFA pseudoaneursym    Proceduralist: Aaron Olea MD    Assistant: Alex P. Pallas, DO    Time Out: Prior to the start of the procedure and with procedural staff participation, I verbally confirmed the patient s identity using two indicators, relevant allergies, that the procedure was appropriate and matched the consent or emergent situation, and that the correct equipment/implants were available. Immediately prior to starting the procedure I conducted the Time Out with the procedural staff and re-confirmed the patient s name, procedure, and site/side. (The Joint Commission universal protocol was followed.)  Yes    Sedation: None. Local Anesthestic used    Findings: Successful thrombosis of left CFA PSA    Estimated Blood Loss: Minimal    SPECIMENS: None    Complications: 1. None     Condition: Stable    Plan: See dictation.    Comments: See dictated procedure note for full details.    Alex P. Pallas, DO

## 2017-06-29 ENCOUNTER — ANTICOAGULATION THERAPY VISIT (OUTPATIENT)
Dept: ANTICOAGULATION | Facility: CLINIC | Age: 72
End: 2017-06-29

## 2017-06-29 DIAGNOSIS — I48.92 ATRIAL FLUTTER WITH RAPID VENTRICULAR RESPONSE (H): ICD-10-CM

## 2017-06-29 DIAGNOSIS — Z79.01 LONG-TERM (CURRENT) USE OF ANTICOAGULANTS: ICD-10-CM

## 2017-06-29 LAB — INR PPP: 3.3

## 2017-06-29 NOTE — MR AVS SNAPSHOT
Rohan Howard Gitalejandro   6/29/2017   Anticoagulation Therapy Visit    Description:  71 year old male   Provider:  Delphine Kaur, RN   Department:  [facility]           INR as of 6/29/2017     Today's INR 3.3!      Anticoagulation Summary as of 6/29/2017     INR goal 2.0-3.0   Today's INR 3.3!   Full instructions 6/29: 2.5 mg; Otherwise 5 mg every day   Next INR check 7/6/2017    Indications   Long-term (current) use of anticoagulants [Z79.01] [Z79.01]  Atrial flutter with rapid ventricular response (H) [I48.92]         June 2017 Details    Sun Mon Tue Wed Thu Fri Sat         1               2               3                 4               5               6               7               8               9               10                 11               12               13               14               15               16               17                 18               19               20               21               22               23               24                 25               26               27               28               29      2.5 mg   See details      30      5 mg           Date Details   06/29 This INR check               How to take your warfarin dose     To take:  2.5 mg Take 0.5 of a 5 mg tablet.    To take:  5 mg Take 1 of the 5 mg tablets.           July 2017 Details    Sun Mon Tue Wed Thu Fri Sat           1      5 mg           2      5 mg         3      5 mg         4      5 mg         5      5 mg         6            7               8                 9               10               11               12               13               14               15                 16               17               18               19               20               21               22                 23               24               25               26               27               28               29                 30               31                     Date Details   No  additional details    Date of next INR:  7/6/2017         How to take your warfarin dose     To take:  5 mg Take 1 of the 5 mg tablets.

## 2017-06-29 NOTE — PROGRESS NOTES
ANTICOAGULATION FOLLOW-UP CLINIC VISIT    Patient Name:  Rohan Monroe  Date:  6/29/2017  Contact Type:  Telephone    SUBJECTIVE:     Patient Findings     Positives Unexplained INR or factor level change           OBJECTIVE    INR   Date Value Ref Range Status   06/29/2017 3.3  Final       ASSESSMENT / PLAN  INR assessment SUPRA    Recheck INR In: 1 WEEK    INR Location Clinic      Anticoagulation Summary as of 6/29/2017     INR goal 2.0-3.0   Today's INR 3.3!   Maintenance plan 5 mg (5 mg x 1) every day   Full instructions 6/29: 2.5 mg; Otherwise 5 mg every day   Weekly total 35 mg   Plan last modified Diane Mazariegos RN (6/6/2017)   Next INR check 7/6/2017   Priority INR   Target end date 4/20/2017    Indications   Long-term (current) use of anticoagulants [Z79.01] [Z79.01]  Atrial flutter with rapid ventricular response (H) [I48.92]         Anticoagulation Episode Summary     INR check location     Preferred lab     Send INR reminders to [facility]    Comments 3 months therapy, then reassess. Call [phone]. Do not leave message.       Anticoagulation Care Providers     Provider Role Specialty Phone number    Jamie Foster MD [facility] Internal Medicine [phone]            See the Encounter Report to view Anticoagulation Flowsheet and Dosing Calendar (Go to Encounters tab in chart review, and find the Anticoagulation Therapy Visit)    Spoke with Elizabeth BALL.    Delphine Kaur, MICHAEL

## 2017-07-07 ENCOUNTER — ANTICOAGULATION THERAPY VISIT (OUTPATIENT)
Dept: ANTICOAGULATION | Facility: CLINIC | Age: 72
End: 2017-07-07

## 2017-07-07 DIAGNOSIS — I48.92 ATRIAL FLUTTER WITH RAPID VENTRICULAR RESPONSE (H): ICD-10-CM

## 2017-07-07 DIAGNOSIS — Z79.01 LONG-TERM (CURRENT) USE OF ANTICOAGULANTS: ICD-10-CM

## 2017-07-07 LAB — INR PPP: 1.4

## 2017-07-07 NOTE — PROGRESS NOTES
ANTICOAGULATION FOLLOW-UP CLINIC VISIT    Patient Name:  Rohan Monroe  Date:  7/7/2017  Contact Type:  Telephone    SUBJECTIVE:     Patient Findings     Positives Unexplained INR or factor level change    Comments Denies missed doses or changes to diet            OBJECTIVE    INR   Date Value Ref Range Status   07/07/2017 1.40  Final     Comment:     Home Care        ASSESSMENT / PLAN  INR assessment SUB    Recheck INR In: 1 WEEK    INR Location Homecare INR      Anticoagulation Summary as of 7/7/2017     INR goal 2.0-3.0   Today's INR 1.40!   Maintenance plan 5 mg (5 mg x 1) every day   Full instructions 7/7: 7.5 mg; Otherwise 5 mg every day   Weekly total 35 mg   Plan last modified Diane Mazariegos RN (6/6/2017)   Next INR check 7/14/2017   Priority INR   Target end date 4/20/2017    Indications   Long-term (current) use of anticoagulants [Z79.01] [Z79.01]  Atrial flutter with rapid ventricular response (H) [I48.92]         Anticoagulation Episode Summary     INR check location     Preferred lab     Send INR reminders to [facility] CL[facility]    Comments 3 months therapy, then reassess. Call [phone]. Do not leave message.       Anticoagulation Care Providers     Provider Role Specialty Phone number    Jamie Foster MD [facility] Internal Medicine [phone]            See the Encounter Report to view Anticoagulation Flowsheet and Dosing Calendar (Go to Encounters tab in chart review, and find the Anticoagulation Therapy Visit)    Spoke with [facility]. Gave them new warfarin recommendation.  No changes in health, medication, or diet. No missed doses, no falls. No signs or symptoms of bleed or clotting.    Pt will get D/C from [facility] on 7/17 and pt will have to go into the clinic to get labs drawn     Marcia Elias RN

## 2017-07-07 NOTE — MR AVS SNAPSHOT
Rohan Howard Mabel   7/7/2017   Anticoagulation Therapy Visit    Description:  71 year old male   Provider:  Marcia Elias, RN   Department:  [facility]           INR as of 7/7/2017     Today's INR 1.40!      Anticoagulation Summary as of 7/7/2017     INR goal 2.0-3.0   Today's INR 1.40!   Full instructions 7/7: 7.5 mg; Otherwise 5 mg every day   Next INR check 7/14/2017    Indications   Long-term (current) use of anticoagulants [Z79.01] [Z79.01]  Atrial flutter with rapid ventricular response (H) [I48.92]         July 2017 Details    Sun Mon Tue Wed Thu Fri Sat           1                 2               3               4               5               6               7      7.5 mg   See details      8      5 mg           9      5 mg         10      5 mg         11      5 mg         12      5 mg         13      5 mg         14            15                 16               17               18               19               20               21               22                 23               24               25               26               27               28               29                 30               31                     Date Details   07/07 This INR check       Date of next INR:  7/14/2017         How to take your warfarin dose     To take:  5 mg Take 1 of the 5 mg tablets.    To take:  7.5 mg Take 1.5 of the 5 mg tablets.

## 2017-07-10 ENCOUNTER — INFUSION THERAPY VISIT (OUTPATIENT)
Dept: INFUSION THERAPY | Facility: CLINIC | Age: 72
End: 2017-07-10
Attending: INTERNAL MEDICINE
Payer: MEDICARE

## 2017-07-10 VITALS
HEART RATE: 76 BPM | DIASTOLIC BLOOD PRESSURE: 74 MMHG | SYSTOLIC BLOOD PRESSURE: 136 MMHG | BODY MASS INDEX: 29.43 KG/M2 | OXYGEN SATURATION: 96 % | RESPIRATION RATE: 18 BRPM | WEIGHT: 187.9 LBS

## 2017-07-10 DIAGNOSIS — D86.9 SARCOIDOSIS: ICD-10-CM

## 2017-07-10 DIAGNOSIS — D86.0 SARCOIDOSIS OF LUNG (H): Primary | ICD-10-CM

## 2017-07-10 PROCEDURE — 96413 CHEMO IV INFUSION 1 HR: CPT

## 2017-07-10 PROCEDURE — 96415 CHEMO IV INFUSION ADDL HR: CPT

## 2017-07-10 PROCEDURE — 25000128 H RX IP 250 OP 636: Mod: ZF | Performed by: INTERNAL MEDICINE

## 2017-07-10 RX ORDER — ACETAMINOPHEN 325 MG/1
650 TABLET ORAL ONCE
Status: CANCELLED
Start: 2017-07-10 | End: 2017-07-10

## 2017-07-10 RX ADMIN — INFLIXIMAB 400 MG: 100 INJECTION, POWDER, LYOPHILIZED, FOR SOLUTION INTRAVENOUS at 11:47

## 2017-07-10 NOTE — MR AVS SNAPSHOT
After Visit Summary   7/10/2017    Rohan Monroe    MRN: [id number]           Patient Information     Date Of Birth          1945        Visit Information        Provider Department      7/10/2017 11:00 AM UC 50 ATC; UC SPEC INFUSION [facility] Specialty and Procedure        Today's Diagnoses     Sarcoidosis of lung (HCC)    -  1    SARCOIDOSIS-systemic          Care Instructions    Dear Rohan Monroe    Thank you for choosing [facility] ([facility]) for your infusion.  The following information is a summary of our appointment as well as important reminders.      POST-INFUSION OF BIOLOGICAL MEDICATION:    If you experience any fever, chills or signs of infection, new symptoms, abdominal pain, heart palpitations, shortness of breath, reaction, weakness, neurological changes, seek medical attention immediately and should not receive infusions. No live virus vaccines prior to or during treatment or up to 6 months post infusion. If the patient has an upcoming procedure or surgery, this should be discussed with the rheumatologist and surgeon or provider.    Additional information: you had your infusion of Remicade 400 mg via IV today.      We look forward in seeing you on your next appointment here at [facility].  Please don t hesitate to call us at [phone] to reschedule any of your appointments or to speak with one of the [facility] registered nurses.  It was a pleasure taking care of you today.    Sincerely,  Yuliet Farrell RN  [facility]  Specialty Infusion & Procedure Center  [address] [place] [address]  [address]  Phone:  [phone]            Follow-ups after your visit        Your next 10 appointments already scheduled     Aug 07, 2017  2:00 PM CDT   Infusion 180 with UC SPEC INFUSION, UC 44 ATC   [facility] Specialty and Procedure ([facility]  [facility])    [address] [place] [id number]   [phone]            Aug 17, 2017  1:00 PM CDT   PFT VISIT with  PFL B   [facility] Pulmonary Function Testing ([facility])    [address] [place] [address]   [phone]            Aug 17, 2017  1:30 PM CDT   (Arrive by 1:15 PM)   Return Interstitial Lung with David Morris Perlman, MD   [facility] ([facility])    [address] [place] [phone]   [phone]            Aug 29, 2017 10:00 AM CDT   Lab with  LAB   [facility] Lab ([facility])    [address] [place] [address]   [phone]            Aug 29, 2017 10:30 AM CDT   US ABDOMEN COMPLETE with US2   [facility] US ([facility])    [address] [place] [id number] [phone]           Please bring a list of your medicines (including vitamins, minerals and over-the-counter drugs). Also, tell your doctor about any allergies you may have. Wear comfortable clothes and leave your valuables at home.  Adults: No eating or drinking for 8 hours before the exam. You may take medicine with a small sip of water.  Children: - Children 6+ years: No food or drink for 6 hours before exam. - Children 1-5 years: No food or drink for 4 hours before exam. - Infants, breast-fed: may have breast milk up to 2 hours before exam. - Infants, formula: may have bottle until 4 hours before exam.  Please call the Imaging Department at your exam site with any questions.            Aug 29, 2017 11:30 AM CDT   (Arrive by 11:15 AM)   Return General Liver with Moses Orosco MD   [facility] Hepatology ([facility])    [address] [place] [id number]   [phone]            Aug 31, 2017  1:00 PM  CDT   Lab with UC LAB   [facility] Lab ([facility])    [address]  1st Floor  [facility] [id number] [phone]            Aug 31, 2017  1:30 PM CDT   (Arrive by 1:15 PM)   RETURN HEART FAILURE with Diane Paige MD   [facility] Heart Care ([facility])    [address]  3rd Floor  [facility] [id number] [phone]            Sep 05, 2017 12:00 PM CDT   Infusion 180 with UC SPEC INFUSION   [facility] Specialty and Procedure ([facility])    [address]  2nd Floor  [facility] [id number] [phone]              Who to contact     If you have questions or need follow up information about today's clinic visit or your schedule please contact [facility] SPECIALTY AND PROCEDURE directly at [phone].  Normal or non-critical lab and imaging results will be communicated to you by [facility]hart, letter or phone within 4 business days after the clinic has received the results. If you do not hear from us within 7 days, please contact the clinic through [employer] or phone. If you have a critical or abnormal lab result, we will notify you by phone as soon as possible.  Submit refill requests through [employer] Website or call your pharmacy and they will forward the refill request to us. Please allow 3 business days for your refill to be completed.          Additional Information About Your Visit        Glacier Bayhart Information     [employer] Website gives you secure access to your electronic health record. If you see a primary care provider, you can also send messages to your care team and make appointments. If you have questions, please call your primary care clinic.  If you do not have a primary care provider, please call [phone] and they will assist you.        Care EveryWhere ID     This is your Care EveryWhere ID. This could be used by other organizations to access your [place]  medical records  [id number]        Your Vitals Were     BMI (Body Mass Index)                   29.43 kg/m2            Blood Pressure from Last 3 Encounters:   06/27/17 142/77   06/21/17 126/73   06/05/17 137/81    Weight from Last 3 Encounters:   07/10/17 85.2 kg (187 lb 14.4 oz)   06/21/17 84.1 kg (185 lb 8 oz)   06/05/17 84.8 kg (187 lb)              Today, you had the following     No orders found for display         Today's Medication Changes          These changes are accurate as of: 7/10/17 12:49 PM.  If you have any questions, ask your nurse or doctor.               These medicines have changed or have updated prescriptions.        Dose/Directions    methotrexate 2.5 MG tablet CHEMO   This may have changed:    - how much to take  - how to take this  - when to take this  - additional instructions   Used for:  Sarcoidosis (H)        Dose:  7.5 mg   Take 3 tablets (7.5 mg) by mouth once a week On Mondays   Quantity:  48 tablet   Refills:  3       Urea 40 % Crea   Commonly known as:  CARMOL 40   This may have changed:  additional instructions   Used for:  Dermatophytosis of nail, Corns and callosities        To feet daily   Quantity:  199 g   Refills:  4                Primary Care Provider Office Phone # Fax #    Jamie Foster [phone]       [address] [address]        Equal Access to Services     ISIAH MONTOYA AH: Hadii rhys zepedao Soleslie, waaxda luqadaha, qaybta kaalmada vesna, waxsilvio mariann munson . So [facility] [phone].    ATENCIÓN: Si habla español, tiene a mcfadden disposición servicios gratuitos de asistencia lingüística. Llame al [phone].    We comply with applicable federal civil rights laws and [place] laws. We do not discriminate on the basis of race, color, national origin, age, disability sex, sexual orientation or gender identity.            Thank you!     Thank you for choosing [facility]  [facility]MENT C[facility] SPECIALTY AND PROCEDURE  for your care. Our goal is always to provide you with excellent care. Hearing back from our patients is one way we can continue to improve our services. Please take a few minutes to complete the written survey that you may receive in the mail after your visit with us. Thank you!             Your Updated Medication List - Protect others around you: Learn how to safely use, store and throw away your medicines at www.disposemymeds.org.          This list is accurate as of: 7/10/17 12:49 PM.  Always use your most recent med list.                   Brand Name Dispense Instructions for use Diagnosis    albuterol 108 (90 BASE) MCG/ACT Inhaler    PROAIR HFA/PROVENTIL HFA/VENTOLIN HFA    3 Inhaler    Inhale 2 puffs into the lungs every 6 hours as needed for shortness of breath / dyspnea    Sarcoidosis (H)       atorvastatin 40 MG tablet    LIPITOR    30 tablet    Take 1 tablet (40 mg) by mouth daily    Coronary artery disease involving native coronary artery of native heart without angina pectoris, CAD S/P percutaneous coronary angioplasty       blood glucose monitoring lancets     2 Box    Use to test blood sugar 2 times daily or as directed.  102 lancets per box.  3 month supply.    Type 2 diabetes, HbA1C goal < 8% (H)       blood glucose monitoring meter device kit    no brand specified    1 kit    Use to test blood sugar 2 times daily.    Type 2 diabetes mellitus with diabetic nephropathy (H)       blood glucose monitoring test strip    ACCU-CHEK RONNIE PLUS    200 each    Use to test blood sugar 2 times daily or as directed.  3 month supply.    Type 2 diabetes, HbA1C goal < 8% (H)       clopidogrel 75 MG tablet    PLAVIX    90 tablet    Take 1 tablet (75 mg) by mouth daily    CAD S/P percutaneous coronary angioplasty, Coronary artery disease involving native coronary artery of native heart without angina pectoris       fluticasone-vilanterol 100-25 MCG/INH oral inhaler    BREO  ELLIPTA    3 Inhaler    Inhale 1 puff into the lungs daily    Chronic obstructive pulmonary disease, unspecified COPD type (H)       furosemide 20 MG tablet    LASIX    180 tablet    Take 3 tablets (60 mg) by mouth 2 times daily    Pulmonary hypertension (H)       inFLIXimab 100 MG injection    REMICADE     Inject 100 mg into the vein every 28 days        levothyroxine 137 MCG tablet    SYNTHROID/LEVOTHROID    90 tablet    Take 1 tablet (137 mcg) by mouth daily    Hypothyroidism       methotrexate 2.5 MG tablet CHEMO     48 tablet    Take 3 tablets (7.5 mg) by mouth once a week On Mondays    Sarcoidosis (H)       metoprolol 100 MG tablet    LOPRESSOR    60 tablet    Take 1 tablet (100 mg) by mouth 2 times daily    Hypertension secondary to other renal disorders       mirtazapine 15 MG tablet    REMERON     Take 15 mg by mouth At Bedtime.        MULTIVITAMIN & MINERAL PO      Take 1 tablet by mouth daily.        omeprazole 20 MG CR capsule    priLOSEC    90 capsule    Take 1 capsule (20 mg) by mouth daily    CAD S/P percutaneous coronary angioplasty, Coronary artery disease involving native coronary artery of native heart without angina pectoris, Sarcoidosis of lung (H)       * order for DME     1 Device    She requested for a 4 wheel walker, would like to change it to 4 wheel walker with a seat and oxygen tank durant.   Need this faxed over to her  [phone]    Sarcoidosis, lung (H), COPD (chronic obstructive pulmonary disease) (H), Abnormal gait, Congestive heart failure (H)       * order for DME     1 Device    Oxygen: Patient requires supplemental Oxygen 3 LPM via nasal canula with activity. Please provide patient with a portable oxygen concentrator for improved mobility.  Okay to spot check or titrated for conserving to keep stats above 90%. Oxygen will be for a lifetime.    ILD (interstitial lung disease) (H), Hypoxia       polyethylene glycol powder    MIRALAX    510 g    Take 17 g (1 capful) by mouth daily     Constipation, unspecified constipation type       sildenafil 20 MG tablet    REVATIO/VIAGRA    90 tablet    Take 1 tablet (20 mg) by mouth 3 times daily    Pulmonary hypertension (H)       tiotropium 18 MCG capsule    SPIRIVA HANDIHALER    90 capsule    Inhale contents of one capsule daily.    Chronic obstructive pulmonary disease, unspecified COPD type (H)       Urea 40 % Crea    CARMOL 40    199 g    To feet daily    Dermatophytosis of nail, Corns and callosities       warfarin 5 MG tablet    COUMADIN    30 tablet    Take 1 tablet (5 mg) by mouth daily    Atrial flutter with rapid ventricular response (H)       * Notice:  This list has 2 medication(s) that are the same as other medications prescribed for you. Read the directions carefully, and ask your doctor or other care provider to review them with you.

## 2017-07-10 NOTE — PROGRESS NOTES
"Nursing Note  Rohan Monroe [facility] for:   Chief Complaint   Patient presents with     Infusion     Remicade     During today's [facility] appointment, orders from Dr. Perlman were completed.  Frequency: monthly    Progress note:  Patient identification verified by name and date of birth.  Assessment completed.  Vitals recorded in Doc Flowsheets.  Patient was provided with education regarding infusion and possible side effects.  Patient verbalized understanding.      needed: No  Premedications: historically pt does not take pre-medications.  Infusion Rates: infusion given over approximately 2 hours.  Approximate Infusion length:2 hours.   Labs: were drawn per orders.   Vascular access: peripheral IV placed today.  Treatment Conditions:PRIOR TO INFUSION OF BIOLOGICAL MEDICATIONS OR ANY OF THESE AS LISTED: Remicaide (infliximab) \".rheumbiologicalchecklist\"    Prior to Infusion of biological medications or any of these as listed:    1. Elevated temperature, fever, chills, productive cough or abnormal vital signs, night sweats, coughing up blood or sputum, no appetite or abnormal vital signs : NO    2. Open wounds or new incisions: NO    3. Recent hospitalization: NO    4.  Recent surgeries:  NO    5. Any upcoming surgeries or dental procedures?:NO    6. Any current or recent bouts of illness or infection? On any antibiotics? : NO    7. Any new, sudden or worsening abdominal pain :NO    8. Vaccination within 4 weeks? Patient or someone in the household is scheduled to receive vaccination? No live virus vaccines prior to or during treatment :NO    9. Any nervous system diseases [i.e. multiple sclerosis, Guillain-Snelling, seizures, neurological  changes]: NO    10. Pregnant or breast feeding; or plans on pregnancy in the future: NO    11. Signs of worsening depression or suicidal ideations while taking benlysta:NO    12. " New-onset medical symptoms: NO    13.  New cancer diagnosis or on chemotherapy or radiation NO    14.  Evaluate for any sign of active TB [Unexplained weight loss, Loss of appetite, Night sweats, Fever, Fatigue, Chills, Coughing for 3 weeks or longer, Hemoptysis (coughing up blood), Chest pain]: NO    **Note: If answered yes to any of the above, hold the infusion and contact ordering rheumatologist or on-call rheumatologist.    RN reviewed the following with patient: Medication hand-out provided to patient, Inform patient if any fever, chills or signs of infection, new symptoms, abdominal pain, heart palpitations, shortness of breath, reaction, weakness, neurological changes, seek medical attention immediately and should not receive infusions. No live virus vaccines prior to or during treatment or up to 6 months post infusion. If the patient has an upcoming procedure or surgery, this should be discussed with the rheumatologist and surgeon or provider.  Patient tolerated infusion: well.          Discharge Plan:   Follow up plan of care with: ongoing infusions with Specialty Infusion, sarcoidosis f/u and PFT  Discharge instructions were reviewed with patient.  Patient/representative verbalized understanding of discharge instructions and all questions answered.  Patient discharged from [facility] in stable condition.    Yuliet Farrell RN    Administrations This Visit     inFLIXimab (REMICADE) 400 mg in NaCl 0.9 % 315 mL non-oncology use     Admin Date Action Dose Rate Route Administered By          07/10/2017 New Bag 400 mg 157.5 mL/hr Intravenous Yuliet Farrell RN                         /74  Pulse 76  Resp 18  Wt 85.2 kg (187 lb 14.4 oz)  SpO2 96%  BMI 29.43 kg/m2

## 2017-07-10 NOTE — PATIENT INSTRUCTIONS
Dear Rohan Monroe    Thank you for choosing [facility] ([facility]) for your infusion.  The following information is a summary of our appointment as well as important reminders.      POST-INFUSION OF BIOLOGICAL MEDICATION:    If you experience any fever, chills or signs of infection, new symptoms, abdominal pain, heart palpitations, shortness of breath, reaction, weakness, neurological changes, seek medical attention immediately and should not receive infusions. No live virus vaccines prior to or during treatment or up to 6 months post infusion. If the patient has an upcoming procedure or surgery, this should be discussed with the rheumatologist and surgeon or provider.    Additional information: you had your infusion of Remicade 400 mg via IV today.      We look forward in seeing you on your next appointment here at [facility].  Please don t hesitate to call us at [phone] to reschedule any of your appointments or to speak with one of the [facility] registered nurses.  It was a pleasure taking care of you today.    Sincerely,  Yuliet Farrell RN  [facility]  Specialty Infusion & Procedure Center  [address] [place] [address]  [address]  Phone:  [phone]

## 2017-07-12 ENCOUNTER — TRANSFERRED RECORDS (OUTPATIENT)
Dept: HEALTH INFORMATION MANAGEMENT | Facility: CLINIC | Age: 72
End: 2017-07-12

## 2017-07-12 DIAGNOSIS — D86.9 SARCOIDOSIS: Primary | ICD-10-CM

## 2017-07-13 ENCOUNTER — CARE COORDINATION (OUTPATIENT)
Dept: PULMONOLOGY | Facility: CLINIC | Age: 72
End: 2017-07-13

## 2017-07-13 NOTE — PROGRESS NOTES
Spoke with pt via phone. He had over night oximetry done through [facility] ([facility] did this from an old order from April). Pt states did not think it worked and had very difficult time getting a hold of them. Machine picked up and he wanted to know if it worked. RN left message for Luann at [facility] to advise. RN also spoke to Maria Elena who pt's oxygen is through- pt did not use them for OO and was informed that there might be a [facility] who [facility] not [facility]. RN will review with [facility] and call patient back.   Sandra Beaulieu RN BSN

## 2017-07-14 ENCOUNTER — ANTICOAGULATION THERAPY VISIT (OUTPATIENT)
Dept: ANTICOAGULATION | Facility: CLINIC | Age: 72
End: 2017-07-14

## 2017-07-14 DIAGNOSIS — I48.92 ATRIAL FLUTTER WITH RAPID VENTRICULAR RESPONSE (H): ICD-10-CM

## 2017-07-14 DIAGNOSIS — Z79.01 LONG-TERM (CURRENT) USE OF ANTICOAGULANTS: ICD-10-CM

## 2017-07-14 LAB — INR PPP: 2

## 2017-07-14 NOTE — PROGRESS NOTES
ANTICOAGULATION FOLLOW-UP CLINIC VISIT    Patient Name:  Rohan Monroe  Date:  7/14/2017  Contact Type:  Telephone    SUBJECTIVE:     Patient Findings     Positives No Problem Findings           OBJECTIVE    INR   Date Value Ref Range Status   07/14/2017 2.0  Final       ASSESSMENT / PLAN  INR assessment THER    Recheck INR In: 1 WEEK    INR Location [facility] INR      Anticoagulation Summary as of 7/14/2017     INR goal 2.0-3.0   Today's INR 2.0   Maintenance plan 7.5 mg (5 mg x 1.5) on Fri; 5 mg (5 mg x 1) all other days   Full instructions 7.5 mg on Fri; 5 mg all other days   Weekly total 37.5 mg   Plan last modified Christal Alfaro RN (7/14/2017)   Next INR check 7/21/2017   Priority INR   Target end date 4/20/2017    Indications   Long-term (current) use of anticoagulants [Z79.01] [Z79.01]  Atrial flutter with rapid ventricular response (H) [I48.92]         Anticoagulation Episode Summary     INR check location     Preferred lab     Send INR reminders to [facility]    Comments 3 months therapy, then reassess. Call [phone]. Do not leave message.       Anticoagulation Care Providers     Provider Role Specialty Phone number    Jamie Foster MD [facility] Internal Medicine [phone]            See the Encounter Report to view Anticoagulation Flowsheet and Dosing Calendar (Go to Encounters tab in chart review, and find the Anticoagulation Therapy Visit)    Spoke with Elizabeth BALL and with Denis.  He reports no changes in health, diet, medications.  He will be discharged from home care today.  He will have his next INR drawn at the [facility] on 7/21/17.  Explained that he have a lab only appointment and the [facility] will call him when we got results.  He verbalized understanding.      Christal Alfaro, RN

## 2017-07-14 NOTE — MR AVS SNAPSHOT
Rohan Howard Mabel   7/14/2017   Anticoagulation Therapy Visit    Description:  71 year old male   Provider:  Christal Alfaro, RN   Department:  [facility]           INR as of 7/14/2017     Today's INR 2.0      Anticoagulation Summary as of 7/14/2017     INR goal 2.0-3.0   Today's INR 2.0   Full instructions 7.5 mg on Fri; 5 mg all other days   Next INR check 7/21/2017    Indications   Long-term (current) use of anticoagulants [Z79.01] [Z79.01]  Atrial flutter with rapid ventricular response (H) [I48.92]         July 2017 Details    Sun Mon Tue Wed Thu Fri Sat           1                 2               3               4               5               6               7               8                 9               10               11               12               13               14      7.5 mg   See details      15      5 mg           16      5 mg         17      5 mg         18      5 mg         19      5 mg         20      5 mg         21            22                 23               24               25               26               27               28               29                 30               31                     Date Details   07/14 This INR check       Date of next INR:  7/21/2017         How to take your warfarin dose     To take:  5 mg Take 1 of the 5 mg tablets.    To take:  7.5 mg Take 1.5 of the 5 mg tablets.

## 2017-07-17 DIAGNOSIS — K74.60 CIRRHOSIS OF LIVER WITHOUT ASCITES, UNSPECIFIED HEPATIC CIRRHOSIS TYPE (H): Primary | ICD-10-CM

## 2017-07-19 ENCOUNTER — CARE COORDINATION (OUTPATIENT)
Dept: PULMONOLOGY | Facility: CLINIC | Age: 72
End: 2017-07-19

## 2017-07-19 DIAGNOSIS — J84.9 ILD (INTERSTITIAL LUNG DISEASE) (H): ICD-10-CM

## 2017-07-19 DIAGNOSIS — R09.02 HYPOXIA: ICD-10-CM

## 2017-07-19 NOTE — PROGRESS NOTES
Telephone Encounter: Incoming    Reason for Call: Received results of overnight oximetry done through [facility], shows that patient would benefit using oxygen at night, 2 LPM recommended. Repeat overnight oximetry on oxygen.     Nursing Action/Patient Instruction: Informed patient.     Patient Response/Questions/Concerns: Agreed with plan, will start using oxygen at night.

## 2017-07-21 ENCOUNTER — TELEPHONE (OUTPATIENT)
Dept: INTERNAL MEDICINE | Facility: CLINIC | Age: 72
End: 2017-07-21

## 2017-07-21 ENCOUNTER — ANTICOAGULATION THERAPY VISIT (OUTPATIENT)
Dept: ANTICOAGULATION | Facility: CLINIC | Age: 72
End: 2017-07-21

## 2017-07-21 DIAGNOSIS — I48.92 ATRIAL FLUTTER (H): Primary | ICD-10-CM

## 2017-07-21 DIAGNOSIS — I48.92 ATRIAL FLUTTER (H): ICD-10-CM

## 2017-07-21 DIAGNOSIS — K74.60 CIRRHOSIS OF LIVER WITHOUT ASCITES, UNSPECIFIED HEPATIC CIRRHOSIS TYPE (H): ICD-10-CM

## 2017-07-21 DIAGNOSIS — Z79.01 LONG-TERM (CURRENT) USE OF ANTICOAGULANTS: ICD-10-CM

## 2017-07-21 DIAGNOSIS — I48.92 ATRIAL FLUTTER WITH RAPID VENTRICULAR RESPONSE (H): ICD-10-CM

## 2017-07-21 LAB
ERYTHROCYTE [DISTWIDTH] IN BLOOD BY AUTOMATED COUNT: 15.8 % (ref 10–15)
HCT VFR BLD AUTO: 35.7 % (ref 40–53)
HGB BLD-MCNC: 11.5 G/DL (ref 13.3–17.7)
INR PPP: 3.2 (ref 0.86–1.14)
MCH RBC QN AUTO: 30.7 PG (ref 26.5–33)
MCHC RBC AUTO-ENTMCNC: 32.2 G/DL (ref 31.5–36.5)
MCV RBC AUTO: 95 FL (ref 78–100)
PLATELET # BLD AUTO: 288 10E9/L (ref 150–450)
RBC # BLD AUTO: 3.75 10E12/L (ref 4.4–5.9)
WBC # BLD AUTO: 9.4 10E9/L (ref 4–11)

## 2017-07-21 PROCEDURE — 36415 COLL VENOUS BLD VENIPUNCTURE: CPT | Performed by: INTERNAL MEDICINE

## 2017-07-21 PROCEDURE — 85027 COMPLETE CBC AUTOMATED: CPT | Performed by: FAMILY MEDICINE

## 2017-07-21 PROCEDURE — 80048 BASIC METABOLIC PNL TOTAL CA: CPT | Performed by: INTERNAL MEDICINE

## 2017-07-21 PROCEDURE — 85610 PROTHROMBIN TIME: CPT | Performed by: INTERNAL MEDICINE

## 2017-07-21 PROCEDURE — 80076 HEPATIC FUNCTION PANEL: CPT | Performed by: INTERNAL MEDICINE

## 2017-07-21 NOTE — PROGRESS NOTES
ANTICOAGULATION FOLLOW-UP CLINIC VISIT    Patient Name:  Rohan Monroe  Date:  7/21/2017  Contact Type:  Telephone    SUBJECTIVE:     Patient Findings     Comments No significant findings           OBJECTIVE    INR   Date Value Ref Range Status   07/21/2017 3.20 (H) 0.86 - 1.14 Final       ASSESSMENT / PLAN  INR assessment SUPRA    Recheck INR In: 1 WEEK    INR Location Clinic      Anticoagulation Summary as of 7/21/2017     INR goal 2.0-3.0   Today's INR 3.20!   Maintenance plan No maintenance plan   Full instructions 7/22: 5 mg; 7/23: 5 mg; 7/24: 5 mg; 7/25: 5 mg; 7/26: 5 mg; 7/27: 5 mg   Plan last modified Raysa Triplett (7/21/2017)   Next INR check 7/28/2017   Priority INR   Target end date 4/20/2017    Indications   Long-term (current) use of anticoagulants [Z79.01] [Z79.01]  Atrial flutter with rapid ventricular response (H) [I48.92]         Anticoagulation Episode Summary     INR check location     Preferred lab     Send INR reminders to [facility]    Comments 3 months therapy, then reassess. Call [phone]. Do not leave message.       Anticoagulation Care Providers     Provider Role Specialty Phone number    Jamie Foster MD [facility] Internal Medicine [phone]            See the Encounter Report to view Anticoagulation Flowsheet and Dosing Calendar (Go to Encounters tab in chart review, and find the Anticoagulation Therapy Visit)    Spoke with patient and informed him of his INR results and the dosing/follow-up plan. He reported no changes in health, diet, exercise, or medications. He also stated that he hasn't had any falls, missed doses, or signs of bleeding or clotting.    Raysa Triplett PharmD3

## 2017-07-21 NOTE — TELEPHONE ENCOUNTER
[place] lab called and cannot view INR order for patient currently in clinic. I placed new order for INR.  Parvin Clemente RN

## 2017-07-21 NOTE — MR AVS SNAPSHOT
Rohan Howard Mabel   7/21/2017   Anticoagulation Therapy Visit    Description:  71 year old male   Provider:  [facility]   Department:  [facility]           INR as of 7/21/2017     Today's INR 3.20!      Anticoagulation Summary as of 7/21/2017     INR goal 2.0-3.0   Today's INR 3.20!   Full instructions 7/22: 5 mg; 7/23: 5 mg; 7/24: 5 mg; 7/25: 5 mg; 7/26: 5 mg; 7/27: 5 mg   Next INR check 7/28/2017    Indications   Long-term (current) use of anticoagulants [Z79.01] [Z79.01]  Atrial flutter with rapid ventricular response (H) [I48.92]         July 2017 Details    Sun Mon Tue Wed Thu Fri Sat           1                 2               3               4               5               6               7               8                 9               10               11               12               13               14               15                 16               17               18               19               20               21         See details      22      5 mg           23      5 mg         24      5 mg         25      5 mg         26      5 mg         27      5 mg         28            29                 30               31                     Date Details   07/21 This INR check       Date of next INR:  7/28/2017         How to take your warfarin dose     To take:  5 mg Take 1 of the 5 mg tablets.

## 2017-07-22 LAB
ALBUMIN SERPL-MCNC: 3.5 G/DL (ref 3.4–5)
ALP SERPL-CCNC: 111 U/L (ref 40–150)
ALT SERPL W P-5'-P-CCNC: 22 U/L (ref 0–70)
ANION GAP SERPL CALCULATED.3IONS-SCNC: 10 MMOL/L (ref 3–14)
AST SERPL W P-5'-P-CCNC: 20 U/L (ref 0–45)
BILIRUB DIRECT SERPL-MCNC: 0.2 MG/DL (ref 0–0.2)
BILIRUB SERPL-MCNC: 0.6 MG/DL (ref 0.2–1.3)
BUN SERPL-MCNC: 52 MG/DL (ref 7–30)
CALCIUM SERPL-MCNC: 8.5 MG/DL (ref 8.5–10.1)
CHLORIDE SERPL-SCNC: 104 MMOL/L (ref 94–109)
CO2 SERPL-SCNC: 23 MMOL/L (ref 20–32)
CREAT SERPL-MCNC: 2.39 MG/DL (ref 0.66–1.25)
GFR SERPL CREATININE-BSD FRML MDRD: 27 ML/MIN/1.7M2
GLUCOSE SERPL-MCNC: 112 MG/DL (ref 70–99)
POTASSIUM SERPL-SCNC: 4.1 MMOL/L (ref 3.4–5.3)
PROT SERPL-MCNC: 7.7 G/DL (ref 6.8–8.8)
SODIUM SERPL-SCNC: 137 MMOL/L (ref 133–144)

## 2017-07-25 ENCOUNTER — HOSPITAL ENCOUNTER (OUTPATIENT)
Dept: CARDIAC REHAB | Facility: CLINIC | Age: 72
End: 2017-07-25
Attending: STUDENT IN AN ORGANIZED HEALTH CARE EDUCATION/TRAINING PROGRAM
Payer: MEDICARE

## 2017-07-25 ENCOUNTER — TELEPHONE (OUTPATIENT)
Dept: INTERNAL MEDICINE | Facility: CLINIC | Age: 72
End: 2017-07-25

## 2017-07-25 PROCEDURE — G0237 THERAPEUTIC PROCD STRG ENDUR: HCPCS

## 2017-07-25 PROCEDURE — 40000244 ZZH STATISTIC VISIT PULM REHAB

## 2017-07-25 PROCEDURE — G0238 OTH RESP PROC, INDIV: HCPCS

## 2017-07-25 NOTE — TELEPHONE ENCOUNTER
----- Message from Dee Hernandez sent at 7/25/2017  1:59 PM CDT -----  Regarding: BLOOD PRESSURE  Contact: [phone]  Please call Denis, [facility] took his blood pressure today and it was high and he was concerned.    180/90 at Rehab. At home B/P now 160/90. Normally B/P is 120/75.   Denies dizziness, lightheadedness or SOB.  Pt will monitor his B/P 3x times a day and will call in results.  Willow Bob RN 2:32 PM on 7/25/2017.

## 2017-08-07 ENCOUNTER — INFUSION THERAPY VISIT (OUTPATIENT)
Dept: INFUSION THERAPY | Facility: CLINIC | Age: 72
End: 2017-08-07
Attending: INTERNAL MEDICINE
Payer: MEDICARE

## 2017-08-07 ENCOUNTER — ANTICOAGULATION THERAPY VISIT (OUTPATIENT)
Dept: ANTICOAGULATION | Facility: CLINIC | Age: 72
End: 2017-08-07

## 2017-08-07 VITALS
BODY MASS INDEX: 29.34 KG/M2 | TEMPERATURE: 96.5 F | DIASTOLIC BLOOD PRESSURE: 80 MMHG | SYSTOLIC BLOOD PRESSURE: 153 MMHG | HEART RATE: 75 BPM | WEIGHT: 187.3 LBS

## 2017-08-07 DIAGNOSIS — I48.92 ATRIAL FLUTTER (H): ICD-10-CM

## 2017-08-07 DIAGNOSIS — I48.92 ATRIAL FLUTTER WITH RAPID VENTRICULAR RESPONSE (H): ICD-10-CM

## 2017-08-07 DIAGNOSIS — D86.0 SARCOIDOSIS OF LUNG (H): Primary | ICD-10-CM

## 2017-08-07 DIAGNOSIS — D86.9 SARCOIDOSIS: ICD-10-CM

## 2017-08-07 DIAGNOSIS — Z79.01 LONG-TERM (CURRENT) USE OF ANTICOAGULANTS: ICD-10-CM

## 2017-08-07 LAB — INR PPP: 2.5 (ref 0.86–1.14)

## 2017-08-07 PROCEDURE — 96413 CHEMO IV INFUSION 1 HR: CPT

## 2017-08-07 PROCEDURE — 25000128 H RX IP 250 OP 636: Mod: ZF | Performed by: INTERNAL MEDICINE

## 2017-08-07 PROCEDURE — 85610 PROTHROMBIN TIME: CPT | Performed by: INTERNAL MEDICINE

## 2017-08-07 PROCEDURE — 96415 CHEMO IV INFUSION ADDL HR: CPT

## 2017-08-07 RX ORDER — ACETAMINOPHEN 325 MG/1
650 TABLET ORAL ONCE
Status: CANCELLED
Start: 2017-08-07 | End: 2017-08-07

## 2017-08-07 RX ADMIN — INFLIXIMAB 400 MG: 100 INJECTION, POWDER, LYOPHILIZED, FOR SOLUTION INTRAVENOUS at 14:39

## 2017-08-07 NOTE — PROGRESS NOTES
Infusion Nursing Note  Rohan Monroe presents today to [facility] for:   Chief Complaint   Patient presents with     Infusion     Remicade     During today's [facility] appointment, orders from Dr. Perlman were completed.  Frequency: monthly    Progress note:  Patient identification verified by name and date of birth.  Assessment completed.  Vitals recorded in Doc Flowsheets.  Patient was provided with education regarding infusion and possible side effects.  Patient verbalized understanding.     Premedications: were not ordered.  Infusion Rates: infusion given over approximately 2 hrs.  Labs: INR drawn per pt request.   Vascular access: peripheral IV placed today.  Treatment Conditions: ~~~ NOTE: If the patient answers yes to any of the questions below, hold the infusion and contact ordering provider or on-call provider.    1. Have you recently had an elevated temperature, fever, chills, productive cough, coughing for 3 weeks or longer or hemoptysis,  abnormal vital signs, night sweats,  chest pain or have you noticed a decrease in your appetite, unexplained weight loss or fatigue? No  2. Do you have any open wounds or new incisions? No  3. Do you have any recent or upcoming hospitalizations, surgeries or dental procedures? No  4. Do you currently have or recently have had any signs of illness or infection or are you on any antibiotics? No  5. Have you had any new, sudden or worsening abdominal pain? No  6. Have you or anyone in your household received a live vaccination in the past 4 weeks? Please note:  No live vaccines while on biologic/chemotherapy until 6 months after the last treatment.  Patient can receive the flu vaccine (shot only) and the pneumovax.  It is optimal for the patient to get these vaccines mid cycle, but they can be given at any time as long as it is not on the day of the infusion. No  7. Have you recently been diagnosed with  any new nervous system diseases (ie. Multiple sclerosis, Guillain Orange, seizures, neurological changes) or cancer diagnosis? Are you on any form of radiation or chemotherapy? No  8. Are you pregnant or breast feeding or do you have plans of pregnancy in the future? No  9. Have you been having any signs of worsening depression or suicidal ideations?  (benlysta only) No  10. Have there been any other new onset medical symptoms? No    Patient tolerated infusion: well    Discharge Plan:   Follow up plan of care with: ongoing infusions at [facility]. and primary medical doctor.  Discharge instructions were reviewed with patient.  Patient/representative verbalized understanding of discharge instructions and all questions answered.  Patient discharged from [facility] in stable condition.    Rachel Ardon RN      Administrations This Visit       Admin Date Action                   inFLIXimab (REMICADE) 400 mg in NaCl 0.9 % 315 mL non-oncology use 08/07/2017 New Bag                      BP (!) 173/93  Pulse 75  Temp 96.5  F (35.8  C) (Oral)  Wt 85 kg (187 lb 4.8 oz)  BMI 29.34 kg/m2

## 2017-08-07 NOTE — PATIENT INSTRUCTIONS
Infliximab Solution for injection  What is this medicine?  INFLIXIMAB (in FLIX i mab) is used to treat Crohn's disease and ulcerative colitis. It is also used to treat ankylosing spondylitis, psoriasis, and some forms of arthritis.  This medicine may be used for other purposes; ask your health care provider or pharmacist if you have questions.  What should I tell my health care provider before I take this medicine?  They need to know if you have any of these conditions:    diabetes    exposure to tuberculosis    heart failure    hepatitis or liver disease    immune system problems    infection    lung or breathing disease, like COPD    multiple sclerosis    current or past resident of [place]    seizure disorder    an unusual or allergic reaction to infliximab, mouse proteins, other medicines, foods, dyes, or preservatives    pregnant or trying to get pregnant    breast-feeding  How should I use this medicine?  This medicine is for injection into a vein. It is usually given by a health care professional in a hospital or clinic setting.  A special MedGuide will be given to you by the pharmacist with each prescription and refill. Be sure to read this information carefully each time.  Talk to your pediatrician regarding the use of this medicine in children. Special care may be needed.  Overdosage: If you think you have taken too much of this medicine contact a poison control center or emergency room at once.  NOTE: This medicine is only for you. Do not share this medicine with others.  What if I miss a dose?  It is important not to miss your dose. Call your doctor or health care professional if you are unable to keep an appointment.  What may interact with this medicine?  Do not take this medicine with any of the following medications:    anakinra    rilonacept  This medicine may also interact with the following medications:    vaccines  This list may not describe all possible interactions.  Give your health care provider a list of all the medicines, herbs, non-prescription drugs, or dietary supplements you use. Also tell them if you smoke, drink alcohol, or use illegal drugs. Some items may interact with your medicine.  What should I watch for while using this medicine?  Visit your doctor or health care professional for regular checks on your progress.  If you get a cold or other infection while receiving this medicine, call your doctor or health care professional. Do not treat yourself. This medicine may decrease your body's ability to fight infections. Before beginning therapy, your doctor may do a test to see if you have been exposed to tuberculosis.  This medicine may make the symptoms of heart failure worse in some patients. If you notice symptoms such as increased shortness of breath or swelling of the ankles or legs, contact your health care provider right away.  If you are going to have surgery or dental work, tell your health care professional or dentist that you have received this medicine.  If you take this medicine for plaque psoriasis, stay out of the sun. If you cannot avoid being in the sun, wear protective clothing and use sunscreen. Do not use sun lamps or tanning beds/booths.  What side effects may I notice from receiving this medicine?  Side effects that you should report to your doctor or health care professional as soon as possible:    allergic reactions like skin rash, itching or hives, swelling of the face, lips, or tongue    chest pain    fever or chills, usually related to the infusion    muscle or joint pain    red, scaly patches or raised bumps on the skin    signs of infection - fever or chills, cough, sore throat, pain or difficulty passing urine    swollen lymph nodes in the neck, underarm, or groin areas    unexplained weight loss    unusual bleeding or bruising    unusually weak or tired    yellowing of the eyes or skin  Side effects that usually do not require medical  attention (report to your doctor or health care professional if they continue or are bothersome):    headache    heartburn or stomach pain    nausea, vomiting  This list may not describe all possible side effects. Call your doctor for medical advice about side effects. You may report side effects to FDA at 6-552-FDA-5428.  Where should I keep my medicine?  This drug is given in a hospital or clinic and will not be stored at home.  NOTE:This sheet is a summary. It may not cover all possible information. If you have questions about this medicine, talk to your doctor, pharmacist, or health care provider. Copyright  2016 [employer] Standard

## 2017-08-07 NOTE — PROGRESS NOTES
ANTICOAGULATION FOLLOW-UP CLINIC VISIT    Patient Name:  Rohan Monroe  Date:  8/7/2017  Contact Type:  Telephone    SUBJECTIVE:     Patient Findings     Positives No Problem Findings    Comments Pt has been successfully self dosing for ~ the last 10 days            OBJECTIVE    INR   Date Value Ref Range Status   08/07/2017 2.50 (H) 0.86 - 1.14 Final       ASSESSMENT / PLAN  INR assessment THER    Recheck INR In: 9 DAYS    INR Location Clinic      Anticoagulation Summary as of 8/7/2017     INR goal 2.0-3.0   Today's INR 2.50   Maintenance plan 5 mg (5 mg x 1) every day   Full instructions 5 mg every day   Weekly total 35 mg   Plan last modified Paige Moscoso, RN (8/7/2017)   Next INR check 8/17/2017   Priority INR   Target end date 4/20/2017    Indications   Long-term (current) use of anticoagulants [Z79.01] [Z79.01]  Atrial flutter with rapid ventricular response (H) [I48.92]         Anticoagulation Episode Summary     INR check location     Preferred lab     Send INR reminders to [facility]    Comments 3 months therapy, then reassess.        Anticoagulation Care Providers     Provider Role Specialty Phone number    Jamie Foster MD [facility] Internal Medicine [phone]            See the Encounter Report to view Anticoagulation Flowsheet and Dosing Calendar (Go to Encounters tab in chart review, and find the Anticoagulation Therapy Visit)    Spoke with patient. Gave them their lab results and new warfarin recommendation.  No changes in health, medication, or diet. No missed doses, no falls. No signs or symptoms of bleed or clotting.  Will initiate maintenance dose today as pt has been taking this for quite sometime & remains therapeutic.  Next INR to be checked when pt sees MD on 8/17    Paige Moscoso, RN

## 2017-08-07 NOTE — MR AVS SNAPSHOT
After Visit Summary   8/7/2017    Rohan Monroe    MRN: [id number]           Patient Information     Date Of Birth          1945        Visit Information        Provider Department      8/7/2017 2:00 PM UC [facility] ATC; UC SPEC INFUSION [facility]        Today's Diagnoses     Sarcoidosis of lung (HCC)    -  1    SARCOIDOSIS-systemic        Atrial flutter (H)          Care Instructions      Infliximab Solution for injection  What is this medicine?  INFLIXIMAB (in FLIX i mab) is used to treat Crohn's disease and ulcerative colitis. It is also used to treat ankylosing spondylitis, psoriasis, and some forms of arthritis.  This medicine may be used for other purposes; ask your health care provider or pharmacist if you have questions.  What should I tell my health care provider before I take this medicine?  They need to know if you have any of these conditions:    diabetes    exposure to tuberculosis    heart failure    hepatitis or liver disease    immune system problems    infection    lung or breathing disease, like COPD    multiple sclerosis    current or past resident of [place]    seizure disorder    an unusual or allergic reaction to infliximab, mouse proteins, other medicines, foods, dyes, or preservatives    pregnant or trying to get pregnant    breast-feeding  How should I use this medicine?  This medicine is for injection into a vein. It is usually given by a health care professional in a hospital or clinic setting.  A special MedGuide will be given to you by the pharmacist with each prescription and refill. Be sure to read this information carefully each time.  Talk to your pediatrician regarding the use of this medicine in children. Special care may be needed.  Overdosage: If you think you have taken too much of this medicine contact a poison control center or emergency room at once.  NOTE: This medicine is only  for you. Do not share this medicine with others.  What if I miss a dose?  It is important not to miss your dose. Call your doctor or health care professional if you are unable to keep an appointment.  What may interact with this medicine?  Do not take this medicine with any of the following medications:    anakinra    rilonacept  This medicine may also interact with the following medications:    vaccines  This list may not describe all possible interactions. Give your health care provider a list of all the medicines, herbs, non-prescription drugs, or dietary supplements you use. Also tell them if you smoke, drink alcohol, or use illegal drugs. Some items may interact with your medicine.  What should I watch for while using this medicine?  Visit your doctor or health care professional for regular checks on your progress.  If you get a cold or other infection while receiving this medicine, call your doctor or health care professional. Do not treat yourself. This medicine may decrease your body's ability to fight infections. Before beginning therapy, your doctor may do a test to see if you have been exposed to tuberculosis.  This medicine may make the symptoms of heart failure worse in some patients. If you notice symptoms such as increased shortness of breath or swelling of the ankles or legs, contact your health care provider right away.  If you are going to have surgery or dental work, tell your health care professional or dentist that you have received this medicine.  If you take this medicine for plaque psoriasis, stay out of the sun. If you cannot avoid being in the sun, wear protective clothing and use sunscreen. Do not use sun lamps or tanning beds/booths.  What side effects may I notice from receiving this medicine?  Side effects that you should report to your doctor or health care professional as soon as possible:    allergic reactions like skin rash, itching or hives, swelling of the face, lips, or  tongue    chest pain    fever or chills, usually related to the infusion    muscle or joint pain    red, scaly patches or raised bumps on the skin    signs of infection - fever or chills, cough, sore throat, pain or difficulty passing urine    swollen lymph nodes in the neck, underarm, or groin areas    unexplained weight loss    unusual bleeding or bruising    unusually weak or tired    yellowing of the eyes or skin  Side effects that usually do not require medical attention (report to your doctor or health care professional if they continue or are bothersome):    headache    heartburn or stomach pain    nausea, vomiting  This list may not describe all possible side effects. Call your doctor for medical advice about side effects. You may report side effects to FDA at 7-701-KXD-5448.  Where should I keep my medicine?  This drug is given in a hospital or clinic and will not be stored at home.  NOTE:This sheet is a summary. It may not cover all possible information. If you have questions about this medicine, talk to your doctor, pharmacist, or health care provider. Copyright  2016 [employer]                Follow-ups after your visit        Your next 10 appointments already scheduled     Aug 08, 2017  1:00 PM CDT   CONSULT with  Pulmonary Rehab 1   [facility] ([facility])    [address]  [facility] [address]   [phone]            Aug 10, 2017 12:00 PM CDT   Pulmonary Treatment with  Pulmonary Rehab 2   [facility] ([facility])    [address]  [facility] [address]   [phone]            Aug 10, 2017  1:50 PM CDT   (Arrive by 1:35 PM)   Return Visit with Vega Avelar MD   [facility] ([facility])    [address]  4th Floor  [facility] [address] [phone]            Aug 15, 2017 12:00 PM CDT   Pulmonary Treatment with  Pulmonary  Rehab 2   [facility] ([facility])    [address]  [facility] [address]   [phone]            Aug 17, 2017 12:00 PM CDT   Pulmonary Treatment with  Pulmonary Rehab 2   [facility] ([facility])    [address]  [facility] [address]   [phone]            Aug 17, 2017  1:00 PM CDT   PFT VISIT with  PFL B   [facility] Pulmonary Function Testing ([facility])    [address]  3rd Floor  [facility] [address]   [phone]            Aug 17, 2017  1:30 PM CDT   (Arrive by 1:15 PM)   Return Interstitial Lung with David Morris Perlman, MD   [facility] ([facility])    [address]  3rd Floor  [facility] [address]   [phone]            Aug 22, 2017 12:00 PM CDT   Pulmonary Treatment with  Pulmonary Rehab 2   [facility] ([facility])    [address]  [facility] [address]   [phone]            Aug 24, 2017 12:00 PM CDT   Pulmonary Treatment with  Pulmonary Rehab 2   [facility] ([facility])    [address]  [facility] [address]   [phone]            Aug 29, 2017 10:30 AM CDT   US ABDOMEN COMPLETE with US2   [facility] US ([facility])    [address]  1st Floor  [facility] [address] [phone]           Please bring a list of your medicines (including vitamins, minerals and over-the-counter drugs). Also, tell your doctor about any allergies you may have. Wear comfortable clothes and leave your valuables at home.  Adults: No eating or drinking for 8 hours before the exam. You may take medicine with a small sip of water.  Children: - Children 6+ years: No food or drink for 6 hours before exam. - Children 1-5 years: No food or  drink for 4 hours before exam. - Infants, breast-fed: may have breast milk up to 2 hours before exam. - Infants, formula: may have bottle until 4 hours before exam.  Please call the Imaging Department at your exam site with any questions.              Who to contact     If you have questions or need follow up information about today's clinic visit or your schedule please contact [facility] PROCED[facility] directly at [phone].  Normal or non-critical lab and imaging results will be communicated to you by [employer], letter or phone within 4 business days after the clinic has received the results. If you do not hear from us within 7 days, please contact the clinic through [employer] or phone. If you have a critical or abnormal lab result, we will notify you by phone as soon as possible.  Submit refill requests through [employer] or call your pharmacy and they will forward the refill request to us. Please allow 3 business days for your refill to be completed.          Additional Information About Your Visit        [employer] Information     [employer] gives you secure access to your electronic health record. If you see a primary care provider, you can also send messages to your care team and make appointments. If you have questions, please call your primary care clinic.  If you do not have a primary care provider, please call [phone] and they will assist you.        Care EveryWhere ID     This is your Care EveryWhere ID. This could be used by other organizations to access your [facility] medical records  [id number]        Your Vitals Were     Pulse Temperature BMI (Body Mass Index)             75 96.5  F (35.8  C) (Oral) 29.34 kg/m2          Blood Pressure from Last 3 Encounters:   08/07/17 (!) 173/93   07/10/17 136/74   06/27/17 142/77    Weight from Last 3 Encounters:   08/07/17 85 kg (187 lb 4.8 oz)   07/10/17 85.2 kg (187 lb 14.4 oz)   06/21/17 84.1 kg (185 lb 8 oz)              We  Performed the Following     INR          Today's Medication Changes          These changes are accurate as of: 8/7/17  3:10 PM.  If you have any questions, ask your nurse or doctor.               These medicines have changed or have updated prescriptions.        Dose/Directions    methotrexate 2.5 MG tablet CHEMO   This may have changed:    - how much to take  - how to take this  - when to take this  - additional instructions   Used for:  Sarcoidosis (H)        Dose:  7.5 mg   Take 3 tablets (7.5 mg) by mouth once a week On Mondays   Quantity:  48 tablet   Refills:  3       Urea 40 % Crea   Commonly known as:  CARMOL 40   This may have changed:  additional instructions   Used for:  Dermatophytosis of nail, Corns and callosities        To feet daily   Quantity:  199 g   Refills:  4                Primary Care Provider Office Phone # Fax #    Jamie Foster [phone]       [facility] [address] [address]        Equal Access to Services     ISIAH MONTOYA : Hadii aad ku hadasho [place], waaxda luqadaha, qaybta kaalmada adeegyada, nelly munson . So [facility] [phone].    ATENCIÓN: Si habla español, tiene a mcfadden disposición servicios gratuitos de asistencia lingüística. LlJoint [facility] [phone].    We comply with applicable federal civil rights laws and [place] laws. We do not discriminate on the basis of race, color, national origin, age, disability sex, sexual orientation or gender identity.            Thank you!     Thank you for choosing [facility] PROCEDURE  for your care. Our goal is always to provide you with excellent care. Hearing back from our patients is one way we can continue to improve our services. Please take a few minutes to complete the written survey that you may receive in the mail after your visit with us. Thank you!             Your Updated Medication List - Protect others around you:  Learn how to safely use, store and throw away your medicines at www.disposemymeds.org.          This list is accurate as of: 8/7/17  3:10 PM.  Always use your most recent med list.                   Brand Name Dispense Instructions for use Diagnosis    albuterol 108 (90 BASE) MCG/ACT Inhaler    PROAIR HFA/PROVENTIL HFA/VENTOLIN HFA    3 Inhaler    Inhale 2 puffs into the lungs every 6 hours as needed for shortness of breath / dyspnea    Sarcoidosis (H)       atorvastatin 40 MG tablet    LIPITOR    30 tablet    Take 1 tablet (40 mg) by mouth daily    Coronary artery disease involving native coronary artery of native heart without angina pectoris, CAD S/P percutaneous coronary angioplasty       blood glucose monitoring lancets     2 Box    Use to test blood sugar 2 times daily or as directed.  102 lancets per box.  3 month supply.    Type 2 diabetes, HbA1C goal < 8% (H)       blood glucose monitoring meter device kit    no brand specified    1 kit    Use to test blood sugar 2 times daily.    Type 2 diabetes mellitus with diabetic nephropathy (H)       blood glucose monitoring test strip    ACCU-CHEK RONNIE PLUS    200 each    Use to test blood sugar 2 times daily or as directed.  3 month supply.    Type 2 diabetes, HbA1C goal < 8% (H)       clopidogrel 75 MG tablet    PLAVIX    90 tablet    Take 1 tablet (75 mg) by mouth daily    CAD S/P percutaneous coronary angioplasty, Coronary artery disease involving native coronary artery of native heart without angina pectoris       fluticasone-vilanterol 100-25 MCG/INH oral inhaler    BREO ELLIPTA    3 Inhaler    Inhale 1 puff into the lungs daily    Chronic obstructive pulmonary disease, unspecified COPD type (H)       furosemide 20 MG tablet    LASIX    180 tablet    Take 3 tablets (60 mg) by mouth 2 times daily    Pulmonary hypertension (H)       inFLIXimab 100 MG injection    REMICADE     Inject 100 mg into the vein every 28 days        levothyroxine 137 MCG tablet     SYNTHROID/LEVOTHROID    90 tablet    Take 1 tablet (137 mcg) by mouth daily    Hypothyroidism       methotrexate 2.5 MG tablet CHEMO     48 tablet    Take 3 tablets (7.5 mg) by mouth once a week On Mondays    Sarcoidosis (H)       metoprolol 100 MG tablet    LOPRESSOR    60 tablet    Take 1 tablet (100 mg) by mouth 2 times daily    Hypertension secondary to other renal disorders       mirtazapine 15 MG tablet    REMERON     Take 15 mg by mouth At Bedtime.        MULTIVITAMIN & MINERAL PO      Take 1 tablet by mouth daily.        omeprazole 20 MG CR capsule    priLOSEC    90 capsule    Take 1 capsule (20 mg) by mouth daily    CAD S/P percutaneous coronary angioplasty, Coronary artery disease involving native coronary artery of native heart without angina pectoris, Sarcoidosis of lung (H)       * order for DME     1 Device    She requested for a 4 wheel walker, would like to change it to 4 wheel walker with a seat and oxygen tank durant.   Need this faxed over to her  [phone]    Sarcoidosis, lung (H), COPD (chronic obstructive pulmonary disease) (H), Abnormal gait, Congestive heart failure (H)       * order for DME     1 Device    Updated Oxygen: Patient requires supplemental Oxygen 3 LPM via nasal canula with activity and 2 LPM nocturnally. Please provide patient with a portable oxygen concentrator for improved mobility.  Okay to spot check or titrated for conserving to keep stats above 90%. Oxygen will be for a lifetime.    ILD (interstitial lung disease) (H), Hypoxia       polyethylene glycol powder    MIRALAX    510 g    Take 17 g (1 capful) by mouth daily    Constipation, unspecified constipation type       sildenafil 20 MG tablet    REVATIO/VIAGRA    90 tablet    Take 1 tablet (20 mg) by mouth 3 times daily    Pulmonary hypertension (H)       tiotropium 18 MCG capsule    SPIRIVA HANDIHALER    90 capsule    Inhale contents of one capsule daily.    Chronic obstructive pulmonary disease, unspecified COPD type  (H)       Urea 40 % Crea    CARMOL 40    199 g    To feet daily    Dermatophytosis of nail, Corns and callosities       warfarin 5 MG tablet    COUMADIN    30 tablet    Take 1 tablet (5 mg) by mouth daily    Atrial flutter with rapid ventricular response (H)       * Notice:  This list has 2 medication(s) that are the same as other medications prescribed for you. Read the directions carefully, and ask your doctor or other care provider to review them with you.

## 2017-08-07 NOTE — MR AVS SNAPSHOT
Rohan Howard Mabel   8/7/2017   Anticoagulation Therapy Visit    Description:  71 year old male   Provider:  Paige Moscoso, RN   Department:  [facility]           INR as of 8/7/2017     Today's INR 2.50      Anticoagulation Summary as of 8/7/2017     INR goal 2.0-3.0   Today's INR 2.50   Full instructions 5 mg every day   Next INR check 8/17/2017    Indications   Long-term (current) use of anticoagulants [Z79.01] [Z79.01]  Atrial flutter with rapid ventricular response (H) [I48.92]         August 2017 Details    Sun Mon Tue Wed Thu Fri Sat       1               2               3               4               5                 6               7      5 mg   See details      8      5 mg         9      5 mg         10      5 mg         11      5 mg         12      5 mg           13      5 mg         14      5 mg         15      5 mg         16      5 mg         17            18               19                 20               21               22               23               24               25               26                 27               28               29               30               31                  Date Details   08/07 This INR check       Date of next INR:  8/17/2017         How to take your warfarin dose     To take:  5 mg Take 1 of the 5 mg tablets.

## 2017-08-08 ENCOUNTER — HOSPITAL ENCOUNTER (OUTPATIENT)
Dept: CARDIAC REHAB | Facility: CLINIC | Age: 72
End: 2017-08-08
Attending: STUDENT IN AN ORGANIZED HEALTH CARE EDUCATION/TRAINING PROGRAM
Payer: MEDICARE

## 2017-08-08 PROCEDURE — G0238 OTH RESP PROC, INDIV: HCPCS | Performed by: CLINICAL EXERCISE PHYSIOLOGIST

## 2017-08-08 PROCEDURE — 40000244 ZZH STATISTIC VISIT PULM REHAB: Performed by: CLINICAL EXERCISE PHYSIOLOGIST

## 2017-08-10 ENCOUNTER — OFFICE VISIT (OUTPATIENT)
Dept: INTERNAL MEDICINE | Facility: CLINIC | Age: 72
End: 2017-08-10

## 2017-08-10 VITALS
BODY MASS INDEX: 29.62 KG/M2 | HEART RATE: 84 BPM | SYSTOLIC BLOOD PRESSURE: 125 MMHG | WEIGHT: 189.1 LBS | DIASTOLIC BLOOD PRESSURE: 76 MMHG

## 2017-08-10 DIAGNOSIS — I10 ESSENTIAL HYPERTENSION: Primary | ICD-10-CM

## 2017-08-10 ASSESSMENT — PAIN SCALES - GENERAL: PAINLEVEL: NO PAIN (0)

## 2017-08-10 NOTE — MR AVS SNAPSHOT
After Visit Summary   8/10/2017    Rohan Monroe    MRN: [id number]           Patient Information     Date Of Birth          1945        Visit Information        Provider Department      8/10/2017 1:50 PM Vega Avelar MD [facility]        Today's Diagnoses     Essential hypertension    -  1      Care Instructions    Primary Care Center Medication Refill Request Information:  * Please contact your pharmacy regarding ANY request for medication refills.  ** [facility] Prescription Fax = [phone]  * Please allow 3 business days for routine medication refills.  * Please allow 5 business days for controlled substance medication refills.     Primary Care Center Test Result notification information:  *You will be notified with in 7-10 days of your appointment day regarding the results of your test.  If you are on MyChart you will be notified as soon as the provider has reviewed the results and signed off on them.    [facility] [phone]             Follow-ups after your visit        Your next 10 appointments already scheduled     Aug 29, 2017 10:00 AM CDT   Lab with  LAB   [facility] Lab ([facility])    [address] [place] [address] [phone]            Aug 29, 2017 10:30 AM CDT   US ABDOMEN COMPLETE with [address] [place] [address] Imaging Center US ([facility])    [address] [place] [address] [phone]           Please bring a list of your medicines (including vitamins, minerals and over-the-counter drugs). Also, tell your doctor about any allergies you may have. Wear comfortable clothes and leave your valuables at home.  Adults: No eating or drinking for 8 hours before the exam. You may take medicine with a small sip of water.  Children: - Children 6+ years: No food or drink for 6 hours before exam. - Children 1-5 years: No food or drink for 4 hours before  exam. - Infants, breast-fed: may have breast milk up to 2 hours before exam. - Infants, formula: may have bottle until 4 hours before exam.  Please call the Imaging Department at your exam site with any questions.            Aug 29, 2017 11:30 AM CDT   (Arrive by 11:15 AM)   Return General Liver with Moses Orosco MD   [facility] Hepatology ([facility])    [address] [facility]  3rd Floor  [facility] [address] [phone]            Aug 31, 2017  1:00 PM CDT   Lab with UC LAB   [facility] Lab ([facility])    [address] [place] [address]  1st Floor  [facility] [address] [phone]            Aug 31, 2017  1:30 PM CDT   (Arrive by 1:15 PM)   RETURN HEART FAILURE with Diane Paige MD   [facility] Heart Care ([facility])    [address] [place] [address]  3rd [facility] [address] [phone]            Sep 05, 2017 12:00 PM CDT   Infusion 180 with UC SPEC INFUSION, UC 47 ATC   [facility] Procedure ([facility])    [address] [facility]  2nd [facility] [address] [phone]            Sep 07, 2017  2:00 PM CDT   Cardiac Evaluation with  Cardiac Rehab 3   [facility] Cardiac Rehab ([facility])    [address]  [facility] [address]   [phone]            Sep 08, 2017  9:30 AM CDT   RETURN GLAUCOMA with Kina Greco MD   [facility] ([facility])    [address]  [address] [address]  [facility] [address]   [phone]            Oct 04, 2017 12:00 PM CDT   Infusion 180 with UC SPEC INFUSION   [facility] ([facility])    [address] [facility] [address] [facility]  2nd [facility] [id number] [phone]              Who to contact     Please call your clinic at [phone] to:    Ask questions about your  health    Make or cancel appointments    Discuss your medicines    Learn about your test results    Speak to your doctor   If you have compliments or concerns about an experience at your clinic, or if you wish to file a complaint, please contact [facility] Patient Relations at [phone] or email us at [email or web address]         Additional Information About Your Visit        MyChart Information     [employer] gives you secure access to your electronic health record. If you see a primary care provider, you can also send messages to your care team and make appointments. If you have questions, please call your primary care clinic.  If you do not have a primary care provider, please call [phone] and they will assist you.      [email or web address] is an electronic gateway that provides easy, online access to your medical records. With [email or web address], you can request a clinic appointment, read your test results, renew a prescription or communicate with your care team.     To access your existing account, please contact your [facility] or call [phone] for assistance.        Care EveryWhere ID     This is your Care EveryWhere ID. This could be used by other organizations to access your [facility] medical records  [id number]        Your Vitals Were     Pulse BMI (Body Mass Index)                84 29.62 kg/m2           Blood Pressure from Last 3 Encounters:   08/17/17 (!) 169/91   08/10/17 125/76   08/07/17 153/80    Weight from Last 3 Encounters:   08/17/17 86.9 kg (191 lb 9.6 oz)   08/10/17 85.8 kg (189 lb 1.6 oz)   08/07/17 85 kg (187 lb 4.8 oz)              Today, you had the following     No orders found for display         Today's Medication Changes          These changes are accurate as of: 8/10/17 11:59 PM.  If you have any questions, ask your nurse or doctor.               These medicines have changed or have updated prescriptions.        Dose/Directions     methotrexate 2.5 MG tablet CHEMO   This may have changed:    - how much to take  - how to take this  - when to take this  - additional instructions   Used for:  Sarcoidosis (H)        Dose:  7.5 mg   Take 3 tablets (7.5 mg) by mouth once a week On Mondays   Quantity:  48 tablet   Refills:  3       Urea 40 % Crea   Commonly known as:  CARMOL 40   This may have changed:  additional instructions   Used for:  Dermatophytosis of nail, Corns and callosities        To feet daily   Quantity:  199 g   Refills:  4                Primary Care Provider Office Phone # Fax #    Jamie Foster [phone]       [address] [address]        Equal Access to Services     ISIAH MONTOYA : Alejandro Lewis, wapaulda ning, qaybta kaalmada vesna, nelly jacinto. So [facility] [phone].    ATENCIÓN: Si habla español, tiene a mcfadden disposición servicios gratuitos de asistencia lingüística. Llame al [phone].    We comply with applicable federal civil rights laws and [place] laws. We do not discriminate on the basis of race, color, national origin, age, disability sex, sexual orientation or gender identity.            Thank you!     Thank you for choosing [facility]  for your care. Our goal is always to provide you with excellent care. Hearing back from our patients is one way we can continue to improve our services. Please take a few minutes to complete the written survey that you may receive in the mail after your visit with us. Thank you!             Your Updated Medication List - Protect others around you: Learn how to safely use, store and throw away your medicines at www.disposemymeds.org.          This list is accurate as of: 8/10/17 11:59 PM.  Always use your most recent med list.                   Brand Name Dispense Instructions for use Diagnosis    albuterol 108 (90 BASE) MCG/ACT Inhaler    PROAIR HFA/PROVENTIL HFA/VENTOLIN  HFA    3 Inhaler    Inhale 2 puffs into the lungs every 6 hours as needed for shortness of breath / dyspnea    Sarcoidosis (H)       atorvastatin 40 MG tablet    LIPITOR    30 tablet    Take 1 tablet (40 mg) by mouth daily    Coronary artery disease involving native coronary artery of native heart without angina pectoris, CAD S/P percutaneous coronary angioplasty       blood glucose monitoring lancets     2 Box    Use to test blood sugar 2 times daily or as directed.  102 lancets per box.  3 month supply.    Type 2 diabetes, HbA1C goal < 8% (H)       blood glucose monitoring meter device kit    no brand specified    1 kit    Use to test blood sugar 2 times daily.    Type 2 diabetes mellitus with diabetic nephropathy (H)       blood glucose monitoring test strip    ACCU-CHEK RONNIE PLUS    200 each    Use to test blood sugar 2 times daily or as directed.  3 month supply.    Type 2 diabetes, HbA1C goal < 8% (H)       clopidogrel 75 MG tablet    PLAVIX    90 tablet    Take 1 tablet (75 mg) by mouth daily    CAD S/P percutaneous coronary angioplasty, Coronary artery disease involving native coronary artery of native heart without angina pectoris       fluticasone-vilanterol 100-25 MCG/INH oral inhaler    BREO ELLIPTA    3 Inhaler    Inhale 1 puff into the lungs daily    Chronic obstructive pulmonary disease, unspecified COPD type (H)       furosemide 20 MG tablet    LASIX    180 tablet    Take 3 tablets (60 mg) by mouth 2 times daily    Pulmonary hypertension (H)       inFLIXimab 100 MG injection    REMICADE     Inject 100 mg into the vein every 28 days        levothyroxine 137 MCG tablet    SYNTHROID/LEVOTHROID    90 tablet    Take 1 tablet (137 mcg) by mouth daily    Hypothyroidism       methotrexate 2.5 MG tablet CHEMO     48 tablet    Take 3 tablets (7.5 mg) by mouth once a week On Mondays    Sarcoidosis (H)       metoprolol 100 MG tablet    LOPRESSOR    60 tablet    Take 1 tablet (100 mg) by mouth 2 times daily     Hypertension secondary to other renal disorders       mirtazapine 15 MG tablet    REMERON     Take 15 mg by mouth At Bedtime.        MULTIVITAMIN & MINERAL PO      Take 1 tablet by mouth daily.        omeprazole 20 MG CR capsule    priLOSEC    90 capsule    Take 1 capsule (20 mg) by mouth daily    CAD S/P percutaneous coronary angioplasty, Coronary artery disease involving native coronary artery of native heart without angina pectoris, Sarcoidosis of lung (H)       * order for DME     1 Device    She requested for a 4 wheel walker, would like to change it to 4 wheel walker with a seat and oxygen tank durant.   Need this faxed over to her  [phone]    Sarcoidosis, lung (H), COPD (chronic obstructive pulmonary disease) (H), Abnormal gait, Congestive heart failure (H)       * order for DME     1 Device    Updated Oxygen: Patient requires supplemental Oxygen 3 LPM via nasal canula with activity and 2 LPM nocturnally. Please provide patient with a portable oxygen concentrator for improved mobility.  Okay to spot check or titrated for conserving to keep stats above 90%. Oxygen will be for a lifetime.    ILD (interstitial lung disease) (H), Hypoxia       polyethylene glycol powder    MIRALAX    510 g    Take 17 g (1 capful) by mouth daily    Constipation, unspecified constipation type       sildenafil 20 MG tablet    REVATIO/VIAGRA    90 tablet    Take 1 tablet (20 mg) by mouth 3 times daily    Pulmonary hypertension (H)       tiotropium 18 MCG capsule    SPIRIVA HANDIHALER    90 capsule    Inhale contents of one capsule daily.    Chronic obstructive pulmonary disease, unspecified COPD type (H)       Urea 40 % Crea    CARMOL 40    199 g    To feet daily    Dermatophytosis of nail, Corns and callosities       warfarin 5 MG tablet    COUMADIN    30 tablet    Take 1 tablet (5 mg) by mouth daily    Atrial flutter with rapid ventricular response (H)       * Notice:  This list has 2 medication(s) that are the same as other  medications prescribed for you. Read the directions carefully, and ask your doctor or other care provider to review them with you.

## 2017-08-10 NOTE — PROGRESS NOTES
PRIMARY CARE CENTER       SUBJECTIVE:  Rohan Monroe is a 71 year old male with a PMHx of pulmonary and cardiac sarcoid, CAD s/p PCI x2 2008 and x2 5/2017, pulm HTN, COPD, aflutter, hep c cirrhosis, CKD who comes in for concern for elevated BP    Hypertension Follow-up      Outpatient blood pressures are being checked at home.  Results are 117//95 (one value). Overall <140/90.    Chest Pain? :No     Low Salt Diet: no added salt    Daily NSAID Use?No     Did patient take their HTN pills today/last night as usual?  Yes       Adherence and Exercise  Medication side effects: no  How often is a medication missed? Never    Medications and allergies reviewed by me today.     ROS:   Constitutional, HEENT, cardiovascular, pulmonary, gi and gu systems are negative, except as otherwise noted.    OBJECTIVE:/76  Pulse 84  Wt 85.8 kg (189 lb 1.6 oz)  BMI 29.62 kg/m2   Wt Readings from Last 1 Encounters:   08/10/17 85.8 kg (189 lb 1.6 oz)     GEN: pleasant, nad  HEENT: nc/at, eomi, anicteric  CV: rrr, no m/r/g  PULM: bibasilar crackles, no wheeze, breathing comfortably on nasal cannula  LE: 1+ LE edema to the knee     ASSESSMENT/PLAN:    Rohan was seen today for hypertension.    Diagnoses and all orders for this visit:    Essential hypertension  Patient's BP is overall in control, <140/90. Notes intermittent elevated BP sometimes associated with anxiety. Also notes that in the AM and afternoon prior to taking metoprolol, his BP is elevated. On metoprolol for a flutter and sildenafil for pulm HTN. Metoprolol tartrate half-life 3-4 hours. Unsurprising that BP spikes prior to next dose. Given well-controlled BP and underlying pulm HTN, will not make changes today.   - reassurance provided   - continue metoprolol and sildenafil   - patient to discuss option of starting metoprolol XL with cardiologist    Pt should return to clinic for f/u as previously scheduled with PCP    Vega Avelar,  MD  Aug 10, 2017    Plan of care discussed with Sam.     While the patient was in clinic, I reviewed the pertinent medical history and results.  I discussed the current findings on physical examination, as well as the patient s diagnosis and treatment plan with the resident and agree with the information as documented with the following exceptions: none.   Taiwo Vargas MD

## 2017-08-10 NOTE — PATIENT INSTRUCTIONS
[facility] Medication Refill Request Information:  * Please contact your pharmacy regarding ANY request for medication refills.  ** [facility] Prescription Fax = [phone]  * Please allow 3 business days for routine medication refills.  * Please allow 5 business days for controlled substance medication refills.     [facility] Test Result notification information:  *You will be notified with in 7-10 days of your appointment day regarding the results of your test.  If you are on MyChart you will be notified as soon as the provider has reviewed the results and signed off on them.    [facility] [phone]

## 2017-08-10 NOTE — NURSING NOTE
Chief Complaint   Patient presents with     Hypertension     pt here for high blood pressure     Jennifer Falcon CMA at 2:20 PM on 8/10/2017.

## 2017-08-11 DIAGNOSIS — Z98.61 CAD S/P PERCUTANEOUS CORONARY ANGIOPLASTY: ICD-10-CM

## 2017-08-11 DIAGNOSIS — I25.10 CORONARY ARTERY DISEASE INVOLVING NATIVE CORONARY ARTERY OF NATIVE HEART WITHOUT ANGINA PECTORIS: Primary | ICD-10-CM

## 2017-08-11 DIAGNOSIS — I25.10 CAD S/P PERCUTANEOUS CORONARY ANGIOPLASTY: ICD-10-CM

## 2017-08-14 ENCOUNTER — ANTICOAGULATION THERAPY VISIT (OUTPATIENT)
Dept: ANTICOAGULATION | Facility: CLINIC | Age: 72
End: 2017-08-14

## 2017-08-14 DIAGNOSIS — I48.92 ATRIAL FLUTTER WITH RAPID VENTRICULAR RESPONSE (H): ICD-10-CM

## 2017-08-14 DIAGNOSIS — Z79.01 LONG-TERM (CURRENT) USE OF ANTICOAGULANTS: ICD-10-CM

## 2017-08-17 ENCOUNTER — OFFICE VISIT (OUTPATIENT)
Dept: PULMONOLOGY | Facility: CLINIC | Age: 72
End: 2017-08-17
Attending: INTERNAL MEDICINE
Payer: MEDICARE

## 2017-08-17 ENCOUNTER — PRE VISIT (OUTPATIENT)
Dept: CARDIOLOGY | Facility: CLINIC | Age: 72
End: 2017-08-17

## 2017-08-17 ENCOUNTER — ANTICOAGULATION THERAPY VISIT (OUTPATIENT)
Dept: ANTICOAGULATION | Facility: CLINIC | Age: 72
End: 2017-08-17

## 2017-08-17 VITALS
RESPIRATION RATE: 16 BRPM | SYSTOLIC BLOOD PRESSURE: 169 MMHG | DIASTOLIC BLOOD PRESSURE: 91 MMHG | WEIGHT: 191.6 LBS | BODY MASS INDEX: 30.01 KG/M2 | OXYGEN SATURATION: 96 % | HEART RATE: 80 BPM

## 2017-08-17 DIAGNOSIS — K74.60 CIRRHOSIS OF LIVER WITHOUT ASCITES, UNSPECIFIED HEPATIC CIRRHOSIS TYPE (H): ICD-10-CM

## 2017-08-17 DIAGNOSIS — Z98.61 CAD S/P PERCUTANEOUS CORONARY ANGIOPLASTY: Primary | ICD-10-CM

## 2017-08-17 DIAGNOSIS — D86.9 SARCOIDOSIS: ICD-10-CM

## 2017-08-17 DIAGNOSIS — D86.0 SARCOIDOSIS OF LUNG (H): ICD-10-CM

## 2017-08-17 DIAGNOSIS — I48.92 ATRIAL FLUTTER WITH RAPID VENTRICULAR RESPONSE (H): ICD-10-CM

## 2017-08-17 DIAGNOSIS — I25.10 CAD S/P PERCUTANEOUS CORONARY ANGIOPLASTY: ICD-10-CM

## 2017-08-17 DIAGNOSIS — I25.10 CAD S/P PERCUTANEOUS CORONARY ANGIOPLASTY: Primary | ICD-10-CM

## 2017-08-17 DIAGNOSIS — D86.9 SARCOIDOSIS: Primary | ICD-10-CM

## 2017-08-17 DIAGNOSIS — I25.10 CORONARY ARTERY DISEASE INVOLVING NATIVE CORONARY ARTERY OF NATIVE HEART WITHOUT ANGINA PECTORIS: Primary | ICD-10-CM

## 2017-08-17 DIAGNOSIS — I48.92 ATRIAL FLUTTER (H): ICD-10-CM

## 2017-08-17 DIAGNOSIS — Z98.61 CAD S/P PERCUTANEOUS CORONARY ANGIOPLASTY: ICD-10-CM

## 2017-08-17 DIAGNOSIS — Z79.01 LONG-TERM (CURRENT) USE OF ANTICOAGULANTS: ICD-10-CM

## 2017-08-17 DIAGNOSIS — N18.30 CKD (CHRONIC KIDNEY DISEASE) STAGE 3, GFR 30-59 ML/MIN (H): ICD-10-CM

## 2017-08-17 LAB
AFP SERPL-MCNC: 3.9 UG/L (ref 0–8)
ALBUMIN SERPL-MCNC: 3.5 G/DL (ref 3.4–5)
ALP SERPL-CCNC: 129 U/L (ref 40–150)
ALT SERPL W P-5'-P-CCNC: 20 U/L (ref 0–70)
ANION GAP SERPL CALCULATED.3IONS-SCNC: 9 MMOL/L (ref 3–14)
AST SERPL W P-5'-P-CCNC: 17 U/L (ref 0–45)
BILIRUB DIRECT SERPL-MCNC: 0.2 MG/DL (ref 0–0.2)
BILIRUB SERPL-MCNC: 0.6 MG/DL (ref 0.2–1.3)
BUN SERPL-MCNC: 52 MG/DL (ref 7–30)
CALCIUM SERPL-MCNC: 9 MG/DL (ref 8.5–10.1)
CHLORIDE SERPL-SCNC: 100 MMOL/L (ref 94–109)
CO2 SERPL-SCNC: 27 MMOL/L (ref 20–32)
CREAT SERPL-MCNC: 1.8 MG/DL (ref 0.66–1.25)
ERYTHROCYTE [DISTWIDTH] IN BLOOD BY AUTOMATED COUNT: 14.6 % (ref 10–15)
GFR SERPL CREATININE-BSD FRML MDRD: 37 ML/MIN/1.7M2
GLUCOSE SERPL-MCNC: 138 MG/DL (ref 70–99)
HCT VFR BLD AUTO: 37.4 % (ref 40–53)
HGB BLD-MCNC: 11.8 G/DL (ref 13.3–17.7)
INR PPP: 3.36 (ref 0.86–1.14)
MCH RBC QN AUTO: 29.6 PG (ref 26.5–33)
MCHC RBC AUTO-ENTMCNC: 31.6 G/DL (ref 31.5–36.5)
MCV RBC AUTO: 94 FL (ref 78–100)
PLATELET # BLD AUTO: 276 10E9/L (ref 150–450)
POTASSIUM SERPL-SCNC: 4.1 MMOL/L (ref 3.4–5.3)
PROT SERPL-MCNC: 8 G/DL (ref 6.8–8.8)
PTH-INTACT SERPL-MCNC: 163 PG/ML (ref 12–72)
RBC # BLD AUTO: 3.99 10E12/L (ref 4.4–5.9)
SODIUM SERPL-SCNC: 136 MMOL/L (ref 133–144)
WBC # BLD AUTO: 8.5 10E9/L (ref 4–11)

## 2017-08-17 PROCEDURE — 36415 COLL VENOUS BLD VENIPUNCTURE: CPT | Performed by: INTERNAL MEDICINE

## 2017-08-17 PROCEDURE — 80076 HEPATIC FUNCTION PANEL: CPT | Performed by: INTERNAL MEDICINE

## 2017-08-17 PROCEDURE — 85027 COMPLETE CBC AUTOMATED: CPT | Performed by: INTERNAL MEDICINE

## 2017-08-17 PROCEDURE — 82105 ALPHA-FETOPROTEIN SERUM: CPT | Performed by: INTERNAL MEDICINE

## 2017-08-17 PROCEDURE — 99212 OFFICE O/P EST SF 10 MIN: CPT | Mod: ZF

## 2017-08-17 PROCEDURE — 83970 ASSAY OF PARATHORMONE: CPT | Performed by: INTERNAL MEDICINE

## 2017-08-17 PROCEDURE — 85610 PROTHROMBIN TIME: CPT | Performed by: INTERNAL MEDICINE

## 2017-08-17 PROCEDURE — 80048 BASIC METABOLIC PNL TOTAL CA: CPT | Performed by: INTERNAL MEDICINE

## 2017-08-17 PROCEDURE — 82306 VITAMIN D 25 HYDROXY: CPT | Performed by: INTERNAL MEDICINE

## 2017-08-17 ASSESSMENT — PAIN SCALES - GENERAL: PAINLEVEL: NO PAIN (0)

## 2017-08-17 NOTE — MR AVS SNAPSHOT
After Visit Summary   8/17/2017    Rohan Monroe    MRN: [id number]           Patient Information     Date Of Birth          1945        Visit Information        Provider Department      8/17/2017 1:30 PM Perlman, David Morris, MD [facility]        Today's Diagnoses     Sarcoidosis (H)    -  1       Follow-ups after your visit        Your next 10 appointments already scheduled     Aug 22, 2017 12:00 PM CDT   Pulmonary Treatment with Sh Pulmonary Rehab 1   [facility] ([facility])    [address]  [facility] [address]   [phone]            Aug 24, 2017 12:00 PM CDT   Pulmonary Treatment with Sh Pulmonary Rehab 2   [facility] ([facility])    [address]  [facility] [address]   [phone]            Aug 29, 2017 10:00 AM CDT   Lab with  LAB   [facility] ([facility])    [address] [place] [id number] [phone]            Aug 29, 2017 10:30 AM CDT   US ABDOMEN COMPLETE with [address] [place] [address] Imaging Center US ([facility])    [address] [place] [id number] [phone]           Please bring a list of your medicines (including vitamins, minerals and over-the-counter drugs). Also, tell your doctor about any allergies you may have. Wear comfortable clothes and leave your valuables at home.  Adults: No eating or drinking for 8 hours before the exam. You may take medicine with a small sip of water.  Children: - Children 6+ years: No food or drink for 6 hours before exam. - Children 1-5 years: No food or drink for 4 hours before exam. - Infants, breast-fed: may have breast milk up to 2 hours before exam. - Infants, formula: may have bottle until 4 hours before exam.  Please call the Imaging Department at your exam site with any  questions.            Aug 29, 2017 11:30 AM CDT   (Arrive by 11:15 AM)   Return General Liver with Moses Orosco MD   [facility] ([facility])    [address]  3rd Floor  [facility] [address]   [phone]            Aug 29, 2017 12:00 PM CDT   Pulmonary Treatment with Sh Pulmonary Rehab 2   [facility] ([facility])    [address]  [facility] [address]   [phone]            Aug 31, 2017 12:00 PM CDT   Pulmonary Treatment with Sh Pulmonary Rehab 2   [facility] ([facility])    [address]  [facility] [address]   [phone]            Aug 31, 2017  1:00 PM CDT   Lab with UC LAB   [facility] Lab ([facility])    [address]  [address] [facility] [address]   [phone]            Aug 31, 2017  1:30 PM CDT   (Arrive by 1:15 PM)   RETURN HEART FAILURE with Diane Paige MD   [facility] ([facility])    [address]  [address] [facility] [address]   [phone]            Sep 05, 2017 12:00 PM CDT   Infusion 180 with UC SPEC INFUSION   [facility] ([facility])    [address]  [address] [facility] [address]   [phone]              Future tests that were ordered for you today     Open Future Orders        Priority Expected Expires Ordered    General PFT Lab (Please always keep checked) Routine  8/17/2018 8/17/2017    Pulmonary Function Test Routine  8/17/2018 8/17/2017    Basic metabolic panel Routine 8/31/2017 12/17/2017 8/17/2017    CBC with platelets Routine 8/31/2017 12/17/2017 8/17/2017    CARDIAC REHAB REFERRAL Routine 8/17/2017 2/28/2018 8/17/2017            Who to contact     If you have questions or need follow up information about today's clinic visit or your schedule  please contact [facility] directly at [phone].  Normal or non-critical lab and imaging results will be communicated to you by [employer], letter or phone within 4 business days after the clinic has received the results. If you do not hear from us within 7 days, please contact the clinic through In[employer]lyt or phone. If you have a critical or abnormal lab result, we will notify you by phone as soon as possible.  Submit refill requests through [employer] or call your pharmacy and they will forward the refill request to us. Please allow 3 business days for your refill to be completed.          Additional Information About Your Visit        [employer] Information     [employer] gives you secure access to your electronic health record. If you see a primary care provider, you can also send messages to your care team and make appointments. If you have questions, please call your primary care clinic.  If you do not have a primary care provider, please call [phone] and they will assist you.        Care EveryWhere ID     This is your Care EveryWhere ID. This could be used by other organizations to access your [facility] medical records  [id number]        Your Vitals Were     Pulse Respirations Pulse Oximetry BMI (Body Mass Index)          80 16 96% 30.01 kg/m2         Blood Pressure from Last 3 Encounters:   08/17/17 (!) 169/91   08/10/17 125/76   08/07/17 153/80    Weight from Last 3 Encounters:   08/17/17 86.9 kg (191 lb 9.6 oz)   08/10/17 85.8 kg (189 lb 1.6 oz)   08/07/17 85 kg (187 lb 4.8 oz)                 Today's Medication Changes          These changes are accurate as of: 8/17/17  3:28 PM.  If you have any questions, ask your nurse or doctor.               These medicines have changed or have updated prescriptions.        Dose/Directions    methotrexate 2.5 MG tablet CHEMO   This may have changed:    - how much to take  - how to take this  - when to take this  - additional instructions    Used for:  Sarcoidosis (H)        Dose:  7.5 mg   Take 3 tablets (7.5 mg) by mouth once a week On Mondays   Quantity:  48 tablet   Refills:  3       Urea 40 % Crea   Commonly known as:  CARMOL 40   This may have changed:  additional instructions   Used for:  Dermatophytosis of nail, Corns and callosities        To feet daily   Quantity:  199 g   Refills:  4                Primary Care Provider Office Phone # Fax #    Jamie Foster [phone] [address] [address]        Equal Access to Services     [facility]: Hadii rhys ku hadasho Soomaali, waaxda luqadaha, qaybta kaalmada adeforrestyaisaias, nelly munson . So [facility] [phone].    ATENCIÓN: Si habla español, tiene a mcfadden disposición servicios gratuitos de asistencia lingüística. Ll[facility] [phone].    We comply with applicable federal civil rights laws and [place] laws. We do not discriminate on the basis of race, color, national origin, age, disability sex, sexual orientation or gender identity.            Thank you!     Thank you for choosing [facility]  for your care. Our goal is always to provide you with excellent care. Hearing back from our patients is one way we can continue to improve our services. Please take a few minutes to complete the written survey that you may receive in the mail after your visit with us. Thank you!             Your Updated Medication List - Protect others around you: Learn how to safely use, store and throw away your medicines at www.disposemymeds.org.          This list is accurate as of: 8/17/17  3:28 PM.  Always use your most recent med list.                   Brand Name Dispense Instructions for use Diagnosis    albuterol 108 (90 BASE) MCG/ACT Inhaler    PROAIR HFA/PROVENTIL HFA/VENTOLIN HFA    3 Inhaler    Inhale 2 puffs into the lungs every 6 hours as needed for shortness of breath / dyspnea    Sarcoidosis (H)        atorvastatin 40 MG tablet    LIPITOR    30 tablet    Take 1 tablet (40 mg) by mouth daily    Coronary artery disease involving native coronary artery of native heart without angina pectoris, CAD S/P percutaneous coronary angioplasty       blood glucose monitoring lancets     2 Box    Use to test blood sugar 2 times daily or as directed.  102 lancets per box.  3 month supply.    Type 2 diabetes, HbA1C goal < 8% (H)       blood glucose monitoring meter device kit    no brand specified    1 kit    Use to test blood sugar 2 times daily.    Type 2 diabetes mellitus with diabetic nephropathy (H)       blood glucose monitoring test strip    ACCU-CHEK RONNIE PLUS    200 each    Use to test blood sugar 2 times daily or as directed.  3 month supply.    Type 2 diabetes, HbA1C goal < 8% (H)       clopidogrel 75 MG tablet    PLAVIX    90 tablet    Take 1 tablet (75 mg) by mouth daily    CAD S/P percutaneous coronary angioplasty, Coronary artery disease involving native coronary artery of native heart without angina pectoris       fluticasone-vilanterol 100-25 MCG/INH oral inhaler    BREO ELLIPTA    3 Inhaler    Inhale 1 puff into the lungs daily    Chronic obstructive pulmonary disease, unspecified COPD type (H)       furosemide 20 MG tablet    LASIX    180 tablet    Take 3 tablets (60 mg) by mouth 2 times daily    Pulmonary hypertension (H)       inFLIXimab 100 MG injection    REMICADE     Inject 100 mg into the vein every 28 days        levothyroxine 137 MCG tablet    SYNTHROID/LEVOTHROID    90 tablet    Take 1 tablet (137 mcg) by mouth daily    Hypothyroidism       methotrexate 2.5 MG tablet CHEMO     48 tablet    Take 3 tablets (7.5 mg) by mouth once a week On Mondays    Sarcoidosis (H)       metoprolol 100 MG tablet    LOPRESSOR    60 tablet    Take 1 tablet (100 mg) by mouth 2 times daily    Hypertension secondary to other renal disorders       mirtazapine 15 MG tablet    REMERON     Take 15 mg by mouth At Bedtime.         MULTIVITAMIN & MINERAL PO      Take 1 tablet by mouth daily.        omeprazole 20 MG CR capsule    priLOSEC    90 capsule    Take 1 capsule (20 mg) by mouth daily    CAD S/P percutaneous coronary angioplasty, Coronary artery disease involving native coronary artery of native heart without angina pectoris, Sarcoidosis of lung (H)       * order for DME     1 Device    She requested for a 4 wheel walker, would like to change it to 4 wheel walker with a seat and oxygen tank durant.   Need this faxed over to her  [phone]    Sarcoidosis, lung (H), COPD (chronic obstructive pulmonary disease) (H), Abnormal gait, Congestive heart failure (H)       * order for DME     1 Device    Updated Oxygen: Patient requires supplemental Oxygen 3 LPM via nasal canula with activity and 2 LPM nocturnally. Please provide patient with a portable oxygen concentrator for improved mobility.  Okay to spot check or titrated for conserving to keep stats above 90%. Oxygen will be for a lifetime.    ILD (interstitial lung disease) (H), Hypoxia       polyethylene glycol powder    MIRALAX    510 g    Take 17 g (1 capful) by mouth daily    Constipation, unspecified constipation type       sildenafil 20 MG tablet    REVATIO/VIAGRA    90 tablet    Take 1 tablet (20 mg) by mouth 3 times daily    Pulmonary hypertension (H)       tiotropium 18 MCG capsule    SPIRIVA HANDIHALER    90 capsule    Inhale contents of one capsule daily.    Chronic obstructive pulmonary disease, unspecified COPD type (H)       Urea 40 % Crea    CARMOL 40    199 g    To feet daily    Dermatophytosis of nail, Corns and callosities       warfarin 5 MG tablet    COUMADIN    30 tablet    Take 1 tablet (5 mg) by mouth daily    Atrial flutter with rapid ventricular response (H)       * Notice:  This list has 2 medication(s) that are the same as other medications prescribed for you. Read the directions carefully, and ask your doctor or other care provider to review them with you.

## 2017-08-17 NOTE — PROGRESS NOTES
ANTICOAGULATION FOLLOW-UP CLINIC VISIT    Patient Name:  Rohan Monroe  Date:  8/17/2017  Contact Type:  Telephone    SUBJECTIVE:     Patient Findings     Positives Unexplained INR or factor level change           OBJECTIVE    INR   Date Value Ref Range Status   08/17/2017 3.36 (H) 0.86 - 1.14 Final       ASSESSMENT / PLAN  INR assessment SUPRA    Recheck INR In: 5 DAYS    INR Location Clinic      Anticoagulation Summary as of 8/17/2017     INR goal 2.0-3.0   Today's INR 3.36!   Maintenance plan 5 mg (5 mg x 1) every day   Full instructions 8/17: 2.5 mg; Otherwise 5 mg every day   Weekly total 35 mg   Plan last modified Paige Moscoso, RN (8/7/2017)   Next INR check 8/22/2017   Priority INR   Target end date 4/20/2017    Indications   Long-term (current) use of anticoagulants [Z79.01] [Z79.01]  Atrial flutter with rapid ventricular response (H) [I48.92]         Anticoagulation Episode Summary     INR check location     Preferred lab     Send INR reminders to [facility]    Comments 3 months therapy, then reassess.        Anticoagulation Care Providers     Provider Role Specialty Phone number    Jamie Foster MD [facility] [phone]            See the Encounter Report to view Anticoagulation Flowsheet and Dosing Calendar (Go to Encounters tab in chart review, and find the Anticoagulation Therapy Visit)    Spoke with Denis. Gave him his lab results and new warfarin recommendation.  No changes in health, medication, or diet. No missed doses, no falls. No signs or symptoms of bleed or clotting.  Adjusted for the supra therapeutic result.  Recheck on Tuesday at appointment.    Deacon Adams RN

## 2017-08-17 NOTE — NURSING NOTE
Chief Complaint   Patient presents with     Interstitial Lung Disease (ILD)     Patient is being seen for ILD follow up      Paige Gillette CMA at 1:39 PM on 8/17/2017

## 2017-08-17 NOTE — MR AVS SNAPSHOT
Rohan Howard Gitalejandro   8/17/2017   Anticoagulation Therapy Visit    Description:  71 year old male   Provider:  Deacon Adams, RN   Department:  [facility]           INR as of 8/17/2017     Today's INR 3.36!      Anticoagulation Summary as of 8/17/2017     INR goal 2.0-3.0   Today's INR 3.36!   Full instructions 8/17: 2.5 mg; Otherwise 5 mg every day   Next INR check 8/22/2017    Indications   Long-term (current) use of anticoagulants [Z79.01] [Z79.01]  Atrial flutter with rapid ventricular response (H) [I48.92]         August 2017 Details    Sun Mon Tue Wed Thu Fri Sat       1               2               3               4               5                 6               7               8               9               10               11               12                 13               14               15               16               17      2.5 mg   See details      18      5 mg         19      5 mg           20      5 mg         21      5 mg         22            23               24               25               26                 27               28               29               30               31                  Date Details   08/17 This INR check       Date of next INR:  8/22/2017         How to take your warfarin dose     To take:  2.5 mg Take 0.5 of a 5 mg tablet.    To take:  5 mg Take 1 of the 5 mg tablets.

## 2017-08-17 NOTE — LETTER
8/17/2017       RE: Rohan Monroe  [address]  [place] [address]     Dear Colleague,    Thank you for referring your patient, Rohan Monroe, to the [facility] at [facility]. Please see a copy of my visit note below.    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 He has had fairly aggressive disease requiring chronic therapy he's been on Remicade now for several years. There was a time where we were able to space his Remicade up to 8 weeks however he had worsening of his breathing recently and we have decreased the interval in between his Remicade infusions back to 4 weeks. Overall he states he is doing recently well things considered he has been doing chronic rehabilitation and his breathing has improved. He is able to do his activities of daily living although he still has certainly limited by his shortness of breath. Of note in May he underwent a cardiac catheterization for coronary artery disease with planned stent placement. This was complicated with a iliac pseudoaneurysm and retroperitoneal hematoma requiring a fairly prolonged hospital stay. He had some acute kidney injury during this time but it improved.      Current Outpatient Prescriptions   Medication     order for DME     order for DME     omeprazole (PRILOSEC) 20 MG CR capsule     atorvastatin (LIPITOR) 40 MG tablet     clopidogrel (PLAVIX) 75 MG tablet     sildenafil (REVATIO/VIAGRA) 20 MG tablet     furosemide (LASIX) 20 MG tablet     levothyroxine (SYNTHROID/LEVOTHROID) 137 MCG tablet     warfarin (COUMADIN) 5 MG tablet     polyethylene  glycol (MIRALAX) powder     tiotropium (SPIRIVA HANDIHALER) 18 MCG capsule     albuterol (PROAIR HFA/PROVENTIL HFA/VENTOLIN HFA) 108 (90 BASE) MCG/ACT Inhaler     fluticasone-vilanterol (BREO ELLIPTA) 100-25 MCG/INH oral inhaler     inFLIXimab (REMICADE) 100 MG injection     metoprolol (LOPRESSOR) 100 MG tablet     methotrexate 2.5 MG tablet     blood glucose monitoring (ACCU-CHEK RONNIE PLUS) test strip     blood glucose monitoring (ACCU-CHEK FASTCLIX) lancets     blood glucose monitoring (NO BRAND SPECIFIED) meter device kit     Urea (CARMOL 40) 40 % CREA     Multiple Vitamins-Minerals (MULTIVITAMIN & MINERAL PO)     mirtazapine (REMERON) 15 MG tablet     No current facility-administered medications for this visit.      Allergies   Allergen Reactions     Prednisone Other (See Comments)     He reports that he can't sleep for days and can't use small to massive doses of prednisone   Pt. Does not do well with high doses of Prednisone, His MD says that Prednisone is counter indicated. Prednisone failed to treat his sarcoidosis      Past Medical History:   Diagnosis Date     Atrial flutter (H)      Cataract of both eyes      Chronic infection     Hep C     Congestive heart failure, unspecified      Coronary artery disease      Depressive disorder, not elsewhere classified     Depression (non-psychotic)     ERM OS (epiretinal membrane, left eye)      Generalized osteoarthrosis, unspecified site      Glaucoma suspect      Hypertension      Lichen planus      Other psoriasis      PVD (posterior vitreous detachment), left eye      Sarcoidosis (H)      Sarcoidosis (H)      Type II or unspecified type diabetes mellitus without mention of complication, not stated as uncontrolled      Unspecified hypothyroidism     Hypothyroidism     Unspecified viral hepatitis C without hepatic coma      Viral warts, unspecified        Past Surgical History:   Procedure Laterality Date     ANESTHESIA CARDIOVERSION N/A 1/19/2017    Procedure:  ANESTHESIA CARDIOVERSION;  Surgeon: GENERIC ANESTHESIA PROVIDER;  Location: [facility]     ANESTHESIA CARDIOVERSION N/A 1/23/2017    Procedure: ANESTHESIA CARDIOVERSION;  Surgeon: GENERIC ANESTHESIA PROVIDER;  Location[facility]     ANESTHESIA CARDIOVERSION N/A 1/24/2017    Procedure: ANESTHESIA CARDIOVERSION;  Surgeon: GENERIC ANESTHESIA PROVIDER;  Location: [facility]     ARTHROPLASTY HIP  8/24/2011    Procedure:ARTHROPLASTY HIP; Right Total Hip Arthroplasty  Choice anesthesia; Surgeon:LESLI WILKINSON; [facility]     BIOPSY       cardiac stent      s/p     CARDIAC SURGERY       CATARACT IOL, RT/LT  9/15/2015    LE     COLONOSCOPY       CORONARY ANGIOGRAPHY ADULT ORDER       H ABLATION ATRIAL FLUTTER       HC REMOVAL OF TONSILS,<11 Y/O      Tonsils <10y.o.     HC REPAIR INCISIONAL HERNIA,REDUCIBLE      Hernia Repair, Incisional, Unilateral     HEART CATH, ANGIOPLASTY       JOINT REPLACEMENT      1 month ago--right hip     LIGATN/STRIP LONG & SHORT SAPHEN         Social History     Social History     Marital status:      Spouse name: N/A     Number of children: 2     Years of education: N/A     Occupational History      [employer]     Social History Main Topics     Smoking status: Former Smoker     Packs/day: 1.00     Years: 30.00     Types: Cigarettes     Start date: 12/30/1960     Quit date: 7/22/1994     Smokeless tobacco: Never Used     Alcohol use No     Drug use: No     Sexual activity: Yes     Partners: Female     Other Topics Concern     Blood Transfusions No     Exercise Yes     Social History Narrative    Dairy/d 2 servings/d    Caffeine little servings/d    Exercise 3 x week    Sunscreen used - Yes    Seatbelts used - Yes    Working smoke/CO detectors in the home - Yes    Guns stored in the home - No    Self Breast Exams - NOT APPLICABLE    Self Testicular Exam - No    Eye Exam up to date - Yes    Dental Exam up to date - Yes    Pap Smear up to date - NOT APPLICABLE    Mammogram up to date - NOT  APPLICABLE    PSA up to date - Yes    Dexa Scan up to date - NOT APPLICABLE    Flex Sig / Colonoscopy up to date - Yes    Immunizations up to date - Unsure    Abuse: Current or Past(Physical, Sexual or Emotional)- No    Do you feel safe in your environment - Yes       Family History   Problem Relation Age of Onset     HEART DISEASE Father      irreg heart beat     Circulatory Father      varicose veins     Prostate Cancer Father      HEART DISEASE Mother      heart attack     Arthritis Mother      OSTEOPOROSIS Mother      Thyroid Disease Mother      Eye Disorder Maternal Grandmother      glaucoma     DIABETES Sister      type 2     Kidney Cancer Sister      DIABETES Sister      Glaucoma No family hx of      Macular Degeneration No family hx of      CANCER No family hx of      no skin cancer       ROS Pulmonary    A complete ROS was otherwise negative except as noted in the HPI.  Vitals:    08/17/17 1337   BP: (!) 169/91   BP Location: Right arm   Patient Position: Chair   Cuff Size: Adult Regular   Pulse: 80   Resp: 16   SpO2: 96%   Weight: 86.9 kg (191 lb 9.6 oz)     Exam:   GENERAL APPEARANCE: Well developed, well nourished, alert, and in no apparent distress.  NECK: supple, no masses, no thyromegaly.  LYMPHATICS: No significant axillary, cervical, or supraclavicular nodes.  RESP: good air flow throughout, - few bibasilar crackles  CV: Normal S1, S2, regular rhythm, normal rate, no rub, no murmur,  no gallop, no LE edema.   ABDOMEN:  Bowel sounds normal, soft, nontender, no HSM or masses.   MS: extremities normal- no clubbing, no cyanosis.  SKIN: no rash on limited exam  NEURO: Mentation intact, speech normal, normal strength and tone, normal gait and stance  PSYCH: mentation appears normal. and affect normal/bright  Results: I have reviewed all imaging, PFTs and other relavent tests, please see below for details, PFT and imaging results were reviewed with the patient.  PFTs: Severe obstruction with moderate to  severe reduction in DLCO, overall stable.  Assessment and plan:     71-year-old male with pulmonary and cardiac sarcoidosis coronary artery disease and liver cirrhosis. All things considered the patient is doing reasonably well able to be fairly functional on his current regimen. Given that he feels relatively well don't think which make any changes to continue with his every 4 week Remicade and his methotrexate at 7.5 mg. He has follow-up scheduled with Dr. Paige soon. Otherwise I will plan to see him back in 3-4 month appointment function tests he will call sooner with any changes in his breathing.    CBC   Recent Labs   Lab Test  08/17/17   1234  07/21/17   1433   RBC  3.99*  3.75*   HGB  11.8*  11.5*   HCT  37.4*  35.7*   PLT  276  288       Basic Metabolic Panel  Recent Labs   Lab Test  08/17/17   1234  07/21/17   1433   05/13/17   0116  05/13/17   0035   NA  136  137   < >   --    --    POTASSIUM  4.1  4.1   < >   --    --    CHLORIDE  100  104   < >   --    --    CO2  27  23   < >   --    --    BUN  52*  52*   < >   --    --    CT   --    --    --   Plasma, Thawed  PlateletPheresis LeukoReduced Irradiated  Plasma, Thawed  Plasma, Thawed  Plasma, Thawed  Apheresis Plasma Thawed  Plasma, Thawed  PlateletPheresis,LeukoRed Irrad (Part 2)  Red Blood Cells Leukocyte Reduced  Red Blood Cells Leukocyte Reduced  Red Blood Cells Leukocyte Reduced  Red Blood Cells Leukocyte Reduced  Red Blood Cells Leukocyte Reduced  Red Blood Cells LeukoReduced (Part 2)   GLC  138*  112*   < >   --    --    RAFFY  9.0  8.5   < >   --    --     < > = values in this interval not displayed.       INR  Recent Labs   Lab Test  08/17/17   1234  08/07/17   1420   INR  3.36*  2.50*       PFT  PFT Latest Ref Rng & Units 8/17/2017   FVC L 2.59   FEV1 L 1.00   FVC% % 66   FEV1% % 33         Again, thank you for allowing me to participate in the care of your patient.      Sincerely,    David Morris Perlman, MD

## 2017-08-18 LAB — DEPRECATED CALCIDIOL+CALCIFEROL SERPL-MC: 21 UG/L (ref 20–75)

## 2017-08-23 LAB
DLCOCOR-%PRED-PRE: 44 %
DLCOCOR-PRE: 10.89 ML/MIN/MMHG
DLCOUNC-%PRED-PRE: 40 %
DLCOUNC-PRE: 9.92 ML/MIN/MMHG
DLCOUNC-PRED: 24.68 ML/MIN/MMHG
ERV-%PRED-PRE: 133 %
ERV-PRE: 1.11 L
ERV-PRED: 0.83 L
EXPTIME-PRE: 12.42 SEC
FEF2575-%PRED-PRE: 15 %
FEF2575-PRE: 0.35 L/SEC
FEF2575-PRED: 2.2 L/SEC
FEFMAX-%PRED-PRE: 40 %
FEFMAX-PRE: 3.12 L/SEC
FEFMAX-PRED: 7.65 L/SEC
FEV1-%PRED-PRE: 33 %
FEV1-PRE: 1 L
FEV1FEV6-PRE: 45 %
FEV1FEV6-PRED: 77 %
FEV1FVC-PRE: 39 %
FEV1FVC-PRED: 76 %
FEV1SVC-PRE: 39 %
FEV1SVC-PRED: 67 %
FIFMAX-PRE: 4.65 L/SEC
FVC-%PRED-PRE: 66 %
FVC-PRE: 2.59 L
FVC-PRED: 3.9 L
IC-%PRED-PRE: 40 %
IC-PRE: 1.44 L
IC-PRED: 3.58 L
VA-%PRED-PRE: 58 %
VA-PRE: 3.8 L
VC-%PRED-PRE: 57 %
VC-PRE: 2.55 L
VC-PRED: 4.41 L

## 2017-08-24 ENCOUNTER — TELEPHONE (OUTPATIENT)
Dept: GASTROENTEROLOGY | Facility: CLINIC | Age: 72
End: 2017-08-24

## 2017-08-27 NOTE — PROGRESS NOTES
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 He has had fairly aggressive disease requiring chronic therapy he's been on Remicade now for several years. There was a time where we were able to space his Remicade up to 8 weeks however he had worsening of his breathing recently and we have decreased the interval in between his Remicade infusions back to 4 weeks. Overall he states he is doing recently well things considered he has been doing chronic rehabilitation and his breathing has improved. He is able to do his activities of daily living although he still has certainly limited by his shortness of breath. Of note in May he underwent a cardiac catheterization for coronary artery disease with planned stent placement. This was complicated with a iliac pseudoaneurysm and retroperitoneal hematoma requiring a fairly prolonged hospital stay. He had some acute kidney injury during this time but it improved.      Current Outpatient Prescriptions   Medication     order for DME     order for DME     omeprazole (PRILOSEC) 20 MG CR capsule     atorvastatin (LIPITOR) 40 MG tablet     clopidogrel (PLAVIX) 75 MG tablet     sildenafil (REVATIO/VIAGRA) 20 MG tablet     furosemide (LASIX) 20 MG tablet     levothyroxine (SYNTHROID/LEVOTHROID) 137 MCG tablet     warfarin (COUMADIN) 5 MG tablet     polyethylene glycol (MIRALAX) powder     tiotropium (SPIRIVA HANDIHALER) 18 MCG capsule     albuterol (PROAIR HFA/PROVENTIL HFA/VENTOLIN HFA) 108 (90 BASE) MCG/ACT Inhaler     fluticasone-vilanterol (BREO ELLIPTA) 100-25 MCG/INH oral inhaler     inFLIXimab (REMICADE) 100 MG injection     metoprolol (LOPRESSOR) 100 MG tablet     methotrexate 2.5 MG  tablet     blood glucose monitoring (ACCU-CHEK RONNIE PLUS) test strip     blood glucose monitoring (ACCU-CHEK FASTCLIX) lancets     blood glucose monitoring (NO BRAND SPECIFIED) meter device kit     Urea (CARMOL 40) 40 % CREA     Multiple Vitamins-Minerals (MULTIVITAMIN & MINERAL PO)     mirtazapine (REMERON) 15 MG tablet     No current facility-administered medications for this visit.      Allergies   Allergen Reactions     Prednisone Other (See Comments)     He reports that he can't sleep for days and can't use small to massive doses of prednisone   Pt. Does not do well with high doses of Prednisone, His MD says that Prednisone is counter indicated. Prednisone failed to treat his sarcoidosis      Past Medical History:   Diagnosis Date     Atrial flutter (H)      Cataract of both eyes      Chronic infection     Hep C     Congestive heart failure, unspecified      Coronary artery disease      Depressive disorder, not elsewhere classified     Depression (non-psychotic)     ERM OS (epiretinal membrane, left eye)      Generalized osteoarthrosis, unspecified site      Glaucoma suspect      Hypertension      Lichen planus      Other psoriasis      PVD (posterior vitreous detachment), left eye      Sarcoidosis (H)      Sarcoidosis (H)      Type II or unspecified type diabetes mellitus without mention of complication, not stated as uncontrolled      Unspecified hypothyroidism     Hypothyroidism     Unspecified viral hepatitis C without hepatic coma      Viral warts, unspecified        Past Surgical History:   Procedure Laterality Date     ANESTHESIA CARDIOVERSION N/A 1/19/2017    Procedure: ANESTHESIA CARDIOVERSION;  Surgeon: GENERIC ANESTHESIA PROVIDER;  Location: [facility]     ANESTHESIA CARDIOVERSION N/A 1/23/2017    Procedure: ANESTHESIA CARDIOVERSION;  Surgeon: GENERIC ANESTHESIA PROVIDER;  Location[facility]     ANESTHESIA CARDIOVERSION N/A 1/24/2017    Procedure: ANESTHESIA CARDIOVERSION;  Surgeon: GENERIC ANESTHESIA  PROVIDER;  Location: [facility]     ARTHROPLASTY HIP  8/24/2011    Procedure:ARTHROPLASTY HIP; Right Total Hip Arthroplasty  Choice anesthesia; Surgeon:LESLI WILKINSON; [facility]     BIOPSY       cardiac stent      s/p     CARDIAC SURGERY       CATARACT IOL, RT/LT  9/15/2015    LE     COLONOSCOPY       CORONARY ANGIOGRAPHY ADULT ORDER       H ABLATION ATRIAL FLUTTER       HC REMOVAL OF TONSILS,<11 Y/O      Tonsils <10y.o.     HC REPAIR INCISIONAL HERNIA,REDUCIBLE      Hernia Repair, Incisional, Unilateral     HEART CATH, ANGIOPLASTY       JOINT REPLACEMENT      1 month ago--right hip     LIGATN/STRIP LONG & SHORT SAPHEN         Social History     Social History     Marital status:      Spouse name: N/A     Number of children: 2     Years of education: N/A     Occupational History      [employer]     Social History Main Topics     Smoking status: Former Smoker     Packs/day: 1.00     Years: 30.00     Types: Cigarettes     Start date: 12/30/1960     Quit date: 7/22/1994     Smokeless tobacco: Never Used     Alcohol use No     Drug use: No     Sexual activity: Yes     Partners: Female     Other Topics Concern     Blood Transfusions No     Exercise Yes     Social History Narrative    Dairy/d 2 servings/d    Caffeine little servings/d    Exercise 3 x week    Sunscreen used - Yes    Seatbelts used - Yes    Working smoke/CO detectors in the home - Yes    Guns stored in the home - No    Self Breast Exams - NOT APPLICABLE    Self Testicular Exam - No    Eye Exam up to date - Yes    Dental Exam up to date - Yes    Pap Smear up to date - NOT APPLICABLE    Mammogram up to date - NOT APPLICABLE    PSA up to date - Yes    Dexa Scan up to date - NOT APPLICABLE    Flex Sig / Colonoscopy up to date - Yes    Immunizations up to date - Unsure    Abuse: Current or Past(Physical, Sexual or Emotional)- No    Do you feel safe in your environment - Yes       Family History   Problem Relation Age of Onset     HEART DISEASE  Father      irreg heart beat     Circulatory Father      varicose veins     Prostate Cancer Father      HEART DISEASE Mother      heart attack     Arthritis Mother      OSTEOPOROSIS Mother      Thyroid Disease Mother      Eye Disorder Maternal Grandmother      glaucoma     DIABETES Sister      type 2     Kidney Cancer Sister      DIABETES Sister      Glaucoma No family hx of      Macular Degeneration No family hx of      CANCER No family hx of      no skin cancer       ROS Pulmonary    A complete ROS was otherwise negative except as noted in the HPI.  Vitals:    08/17/17 1337   BP: (!) 169/91   BP Location: Right arm   Patient Position: Chair   Cuff Size: Adult Regular   Pulse: 80   Resp: 16   SpO2: 96%   Weight: 86.9 kg (191 lb 9.6 oz)     Exam:   GENERAL APPEARANCE: Well developed, well nourished, alert, and in no apparent distress.  NECK: supple, no masses, no thyromegaly.  LYMPHATICS: No significant axillary, cervical, or supraclavicular nodes.  RESP: good air flow throughout, - few bibasilar crackles  CV: Normal S1, S2, regular rhythm, normal rate, no rub, no murmur,  no gallop, no LE edema.   ABDOMEN:  Bowel sounds normal, soft, nontender, no HSM or masses.   MS: extremities normal- no clubbing, no cyanosis.  SKIN: no rash on limited exam  NEURO: Mentation intact, speech normal, normal strength and tone, normal gait and stance  PSYCH: mentation appears normal. and affect normal/bright  Results: I have reviewed all imaging, PFTs and other relavent tests, please see below for details, PFT and imaging results were reviewed with the patient.  PFTs: Severe obstruction with moderate to severe reduction in DLCO, overall stable.  Assessment and plan:     71-year-old male with pulmonary and cardiac sarcoidosis coronary artery disease and liver cirrhosis. All things considered the patient is doing reasonably well able to be fairly functional on his current regimen. Given that he feels relatively well don't think which  make any changes to continue with his every 4 week Remicade and his methotrexate at 7.5 mg. He has follow-up scheduled with Dr. Paige soon. Otherwise I will plan to see him back in 3-4 month appointment function tests he will call sooner with any changes in his breathing.    CBC   Recent Labs   Lab Test  08/17/17   1234  07/21/17   1433   RBC  3.99*  3.75*   HGB  11.8*  11.5*   HCT  37.4*  35.7*   PLT  276  288       Basic Metabolic Panel  Recent Labs   Lab Test  08/17/17   1234  07/21/17   1433   05/13/17   0116  05/13/17   0035   NA  136  137   < >   --    --    POTASSIUM  4.1  4.1   < >   --    --    CHLORIDE  100  104   < >   --    --    CO2  27  23   < >   --    --    BUN  52*  52*   < >   --    --    CT   --    --    --   Plasma, Thawed  PlateletPheresis LeukoReduced Irradiated  Plasma, Thawed  Plasma, Thawed  Plasma, Thawed  Apheresis Plasma Thawed  Plasma, Thawed  PlateletPheresis,LeukoRed Irrad (Part 2)  Red Blood Cells Leukocyte Reduced  Red Blood Cells Leukocyte Reduced  Red Blood Cells Leukocyte Reduced  Red Blood Cells Leukocyte Reduced  Red Blood Cells Leukocyte Reduced  Red Blood Cells LeukoReduced (Part 2)   GLC  138*  112*   < >   --    --    RAFFY  9.0  8.5   < >   --    --     < > = values in this interval not displayed.       INR  Recent Labs   Lab Test  08/17/17   1234  08/07/17   1420   INR  3.36*  2.50*       PFT  PFT Latest Ref Rng & Units 8/17/2017   FVC L 2.59   FEV1 L 1.00   FVC% % 66   FEV1% % 33           CC:

## 2017-08-29 ENCOUNTER — ANTICOAGULATION THERAPY VISIT (OUTPATIENT)
Dept: ANTICOAGULATION | Facility: CLINIC | Age: 72
End: 2017-08-29

## 2017-08-29 ENCOUNTER — OFFICE VISIT (OUTPATIENT)
Dept: GASTROENTEROLOGY | Facility: CLINIC | Age: 72
End: 2017-08-29
Attending: INTERNAL MEDICINE
Payer: MEDICARE

## 2017-08-29 VITALS
HEIGHT: 67 IN | SYSTOLIC BLOOD PRESSURE: 168 MMHG | BODY MASS INDEX: 29.79 KG/M2 | TEMPERATURE: 97.6 F | HEART RATE: 79 BPM | DIASTOLIC BLOOD PRESSURE: 80 MMHG | WEIGHT: 189.8 LBS | OXYGEN SATURATION: 97 %

## 2017-08-29 DIAGNOSIS — D86.9 SARCOIDOSIS: ICD-10-CM

## 2017-08-29 DIAGNOSIS — K74.60 CIRRHOSIS OF LIVER WITHOUT ASCITES, UNSPECIFIED HEPATIC CIRRHOSIS TYPE (H): Primary | ICD-10-CM

## 2017-08-29 DIAGNOSIS — Z98.61 CAD S/P PERCUTANEOUS CORONARY ANGIOPLASTY: ICD-10-CM

## 2017-08-29 DIAGNOSIS — I48.92 ATRIAL FLUTTER WITH RAPID VENTRICULAR RESPONSE (H): ICD-10-CM

## 2017-08-29 DIAGNOSIS — I25.10 CAD S/P PERCUTANEOUS CORONARY ANGIOPLASTY: ICD-10-CM

## 2017-08-29 DIAGNOSIS — Z79.01 LONG-TERM (CURRENT) USE OF ANTICOAGULANTS: ICD-10-CM

## 2017-08-29 DIAGNOSIS — D86.0 SARCOIDOSIS OF LUNG (H): ICD-10-CM

## 2017-08-29 DIAGNOSIS — I48.92 ATRIAL FLUTTER (H): ICD-10-CM

## 2017-08-29 LAB
ALBUMIN SERPL-MCNC: 3.4 G/DL (ref 3.4–5)
ALP SERPL-CCNC: 151 U/L (ref 40–150)
ALT SERPL W P-5'-P-CCNC: 21 U/L (ref 0–70)
ANION GAP SERPL CALCULATED.3IONS-SCNC: 9 MMOL/L (ref 3–14)
AST SERPL W P-5'-P-CCNC: 14 U/L (ref 0–45)
BILIRUB DIRECT SERPL-MCNC: 0.2 MG/DL (ref 0–0.2)
BILIRUB SERPL-MCNC: 0.5 MG/DL (ref 0.2–1.3)
BUN SERPL-MCNC: 52 MG/DL (ref 7–30)
CALCIUM SERPL-MCNC: 8.8 MG/DL (ref 8.5–10.1)
CHLORIDE SERPL-SCNC: 99 MMOL/L (ref 94–109)
CO2 SERPL-SCNC: 26 MMOL/L (ref 20–32)
CREAT SERPL-MCNC: 1.97 MG/DL (ref 0.66–1.25)
ERYTHROCYTE [DISTWIDTH] IN BLOOD BY AUTOMATED COUNT: 14.6 % (ref 10–15)
GFR SERPL CREATININE-BSD FRML MDRD: 33 ML/MIN/1.7M2
GLUCOSE SERPL-MCNC: 204 MG/DL (ref 70–99)
HCT VFR BLD AUTO: 34.8 % (ref 40–53)
HGB BLD-MCNC: 11.5 G/DL (ref 13.3–17.7)
INR PPP: 2.59 (ref 0.86–1.14)
MCH RBC QN AUTO: 30.4 PG (ref 26.5–33)
MCHC RBC AUTO-ENTMCNC: 33 G/DL (ref 31.5–36.5)
MCV RBC AUTO: 92 FL (ref 78–100)
PLATELET # BLD AUTO: 252 10E9/L (ref 150–450)
POTASSIUM SERPL-SCNC: 4.3 MMOL/L (ref 3.4–5.3)
PROT SERPL-MCNC: 7.9 G/DL (ref 6.8–8.8)
RBC # BLD AUTO: 3.78 10E12/L (ref 4.4–5.9)
SODIUM SERPL-SCNC: 134 MMOL/L (ref 133–144)
WBC # BLD AUTO: 10.3 10E9/L (ref 4–11)

## 2017-08-29 PROCEDURE — 99212 OFFICE O/P EST SF 10 MIN: CPT | Mod: ZF

## 2017-08-29 ASSESSMENT — PAIN SCALES - GENERAL: PAINLEVEL: NO PAIN (0)

## 2017-08-29 NOTE — LETTER
8/29/2017      RE: Rohan Monroe  [address]  [place] [address]       I had the pleasure of seeing Denis Monroe for followup in the Liver Clinic at the [facility] on 08/29/2017.  Mr. Monroe returns for followup of cirrhosis caused by chronic hepatitis C.  He is a sustained virologic responder to hepatitis C treatment.      He is having a number of ongoing issues at this point in time.  However, they do not really relate to his liver disease.  His sarcoidosis did flare back in May, and he is on home oxygen, as well as on monthly Remicade infusions.  He has some chronic kidney disease which by biopsy suggests is related to diabetic nephropathy.  Finally, he does have some diastolic dysfunction and is status post ablation for recurrent atrial flutter.      He denies any abdominal pain, itching or skin rash.  He does have a moderate amount of fatigue, but most of this is related to his heart and lung disease.  He denies any increased abdominal girth.  He has some mild lower extremity edema.  He denies any fevers or chills.  He has a chronic cough and shortness of breath.  He denies any nausea or vomiting, diarrhea or constipation.  His appetite has been good, but his weight has actually decreased the past 6 months.  He has not had any gastrointestinal bleeding or any overt signs of hepatic encephalopathy.       Current Outpatient Prescriptions   Medication     order for DME     order for DME     omeprazole (PRILOSEC) 20 MG CR capsule     atorvastatin (LIPITOR) 40 MG tablet     clopidogrel (PLAVIX) 75 MG tablet     sildenafil (REVATIO/VIAGRA) 20 MG tablet     furosemide (LASIX) 20 MG tablet     levothyroxine (SYNTHROID/LEVOTHROID) 137 MCG tablet     warfarin (COUMADIN) 5 MG tablet     polyethylene glycol (MIRALAX) powder     tiotropium (SPIRIVA HANDIHALER) 18 MCG capsule     albuterol (PROAIR HFA/PROVENTIL HFA/VENTOLIN HFA) 108 (90 BASE) MCG/ACT Inhaler      fluticasone-vilanterol (BREO ELLIPTA) 100-25 MCG/INH oral inhaler     inFLIXimab (REMICADE) 100 MG injection     metoprolol (LOPRESSOR) 100 MG tablet     methotrexate 2.5 MG tablet     blood glucose monitoring (ACCU-CHEK RONNIE PLUS) test strip     blood glucose monitoring (ACCU-CHEK FASTCLIX) lancets     blood glucose monitoring (NO BRAND SPECIFIED) meter device kit     Urea (CARMOL 40) 40 % CREA     Multiple Vitamins-Minerals (MULTIVITAMIN & MINERAL PO)     mirtazapine (REMERON) 15 MG tablet     No current facility-administered medications for this visit.      B/P: 168/80, T: 97.6, P: 79, R: Data Unavailable    In general, he appears healthy but is on home oxygen.  HEENT exam shows no scleral icterus or temporal muscle wasting.  His chest shows decreased breath sounds bilaterally with some expiratory wheezing.  His abdominal exam shows no increase in girth.  No masses or tenderness to palpation are present.  His liver is 10 cm in span without left lobe enlargement.  No spleen tip is palpable, and extremity exam shows just a trace of edema.  Skin exam shows no stigmata of chronic liver disease.  Neurologic exam shows no asterixis.       Recent Results (from the past 168 hour(s))   Hepatic panel    Collection Time: 08/29/17  9:59 AM   Result Value Ref Range    Bilirubin Direct 0.2 0.0 - 0.2 mg/dL    Bilirubin Total 0.5 0.2 - 1.3 mg/dL    Albumin 3.4 3.4 - 5.0 g/dL    Protein Total 7.9 6.8 - 8.8 g/dL    Alkaline Phosphatase 151 (H) 40 - 150 U/L    ALT 21 0 - 70 U/L    AST 14 0 - 45 U/L   INR    Collection Time: 08/29/17  9:59 AM   Result Value Ref Range    INR 2.59 (H) 0.86 - 1.14   Basic metabolic panel    Collection Time: 08/29/17  9:59 AM   Result Value Ref Range    Sodium 134 133 - 144 mmol/L    Potassium 4.3 3.4 - 5.3 mmol/L    Chloride 99 94 - 109 mmol/L    Carbon Dioxide 26 20 - 32 mmol/L    Anion Gap 9 3 - 14 mmol/L    Glucose 204 (H) 70 - 99 mg/dL    Urea Nitrogen 52 (H) 7 - 30 mg/dL    Creatinine 1.97 (H)  0.66 - 1.25 mg/dL    GFR Estimate 33 (L) >60 mL/min/1.7m2    GFR Estimate If Black 40 (L) >60 mL/min/1.7m2    Calcium 8.8 8.5 - 10.1 mg/dL   CBC with platelets    Collection Time: 08/29/17  9:59 AM   Result Value Ref Range    WBC 10.3 4.0 - 11.0 10e9/L    RBC Count 3.78 (L) 4.4 - 5.9 10e12/L    Hemoglobin 11.5 (L) 13.3 - 17.7 g/dL    Hematocrit 34.8 (L) 40.0 - 53.0 %    MCV 92 78 - 100 fl    MCH 30.4 26.5 - 33.0 pg    MCHC 33.0 31.5 - 36.5 g/dL    RDW 14.6 10.0 - 15.0 %    Platelet Count 252 150 - 450 10e9/L      My impression is that Mr. Monroe has cirrhosis caused by chronic hepatitis C and is a sustained virologic responder to hepatitis C treatment.  He did have an ultrasound today that shows no mass lesions and no ascites.  The liver appears to be fairly minimally cirrhotic.  From a liver standpoint, I will not be making any change to his medical regimen.  I will see him back in the clinic again in 6 months.  Certainly most of his other problems seem to be much more pressing than his liver disease at this point.      Thank you very much for allowing me to participate in the care of this patient.  If you have any questions regarding my recommendations, please do not hesitate to contact me.       Moses Orosco MD      Professor of Medicine  [employer]      Executive Medical Director, Solid Organ Transplant Program  [facility]

## 2017-08-29 NOTE — PROGRESS NOTES
ANTICOAGULATION FOLLOW-UP CLINIC VISIT    Patient Name:  Rohan Monroe  Date:  8/29/2017  Contact Type:  Telephone    SUBJECTIVE:     Patient Findings     Positives No Problem Findings           OBJECTIVE    INR   Date Value Ref Range Status   08/29/2017 2.59 (H) 0.86 - 1.14 Final       ASSESSMENT / PLAN  INR assessment THER    Recheck INR In: 8 DAYS    INR Location Clinic      Anticoagulation Summary as of 8/29/2017     INR goal 2.0-3.0   Today's INR 2.59   Maintenance plan 5 mg (5 mg x 1) every day   Full instructions 5 mg every day   Weekly total 35 mg   No change documented Deacon Adams RN   Plan last modified Paige Moscoso RN (8/7/2017)   Next INR check 9/6/2017   Priority INR   Target end date 4/20/2017    Indications   Long-term (current) use of anticoagulants [Z79.01] [Z79.01]  Atrial flutter with rapid ventricular response (H) [I48.92]         Anticoagulation Episode Summary     INR check location     Preferred lab     Send INR reminders to [facility]    Comments 3 months therapy, then reassess.        Anticoagulation Care Providers     Provider Role Specialty Phone number    Jamie Foster MD [facility] [phone]            See the Encounter Report to view Anticoagulation Flowsheet and Dosing Calendar (Go to Encounters tab in chart review, and find the Anticoagulation Therapy Visit)    Spoke with patient. Gave them their lab results and new warfarin recommendation.  No changes in health, medication, or diet. No missed doses, no falls. No signs or symptoms of bleed or clotting.    Deacon Adams RN

## 2017-08-29 NOTE — NURSING NOTE
Chief Complaint   Patient presents with     RECHECK     Cirrhosis of Liver without Ascites   Pt roomed, vitals, meds, and allergies reviewed with pt. Pt ready for provider.  Aj Weber, CMA

## 2017-08-29 NOTE — MR AVS SNAPSHOT
Rohan Howard Mabel   8/29/2017   Anticoagulation Therapy Visit    Description:  71 year old male   Provider:  Deacon Adams RN   Department:  [facility]           INR as of 8/29/2017     Today's INR 2.59      Anticoagulation Summary as of 8/29/2017     INR goal 2.0-3.0   Today's INR 2.59   Full instructions 5 mg every day   Next INR check 9/6/2017    Indications   Long-term (current) use of anticoagulants [Z79.01] [Z79.01]  Atrial flutter with rapid ventricular response (H) [I48.92]         August 2017 Details    Sun Mon Tue Wed Thu Fri Sat       1               2               3               4               5                 6               7               8               9               10               11               12                 13               14               15               16               17               18               19                 20               21               22               23               24               25               26                 27               28               29      5 mg   See details      30      5 mg         31      5 mg            Date Details   08/29 This INR check               How to take your warfarin dose     To take:  5 mg Take 1 of the 5 mg tablets.           September 2017 Details    Sun Mon Tue Wed Thu Fri Sat          1      5 mg         2      5 mg           3      5 mg         4      5 mg         5      5 mg         6            7               8               9                 10               11               12               13               14               15               16                 17               18               19               20               21               22               23                 24               25               26               27               28               29               30                Date Details   No additional details    Date of next INR:  9/6/2017         How to take your  warfarin dose     To take:  5 mg Take 1 of the 5 mg tablets.

## 2017-08-29 NOTE — MR AVS SNAPSHOT
After Visit Summary   8/29/2017    Rohan Monroe    MRN: [id number]           Patient Information     Date Of Birth          1945        Visit Information        Provider Department      8/29/2017 11:30 AM Moses Orosco MD [facility]        Today's Diagnoses     Cirrhosis of liver without ascites, unspecified hepatic cirrhosis type (H)    -  1       Follow-ups after your visit        Follow-up notes from your care team     Return in about 6 months (around 2/28/2018).      Your next 10 appointments already scheduled     Aug 31, 2017  1:00 PM CDT   Lab with UC LAB   [facility] ([facility])    [address]  1st Floor  [place] [address]   [phone]            Aug 31, 2017  1:30 PM CDT   (Arrive by 1:15 PM)   RETURN HEART FAILURE with Diane Paige MD   [facility] ([facility])    [address]  3rd Floor  [place] [place] [facility] [address]   [phone]            Sep 05, 2017 12:00 PM CDT   Infusion 180 with UC SPEC INFUSION, UC 47 ATC   [facility] ([facility])    [address]  2nd Floor  [facility] [address]   [phone]            Sep 07, 2017  2:00 PM CDT   Cardiac Evaluation with  Cardiac Rehab 3   [facility] ([facility])    [address]  [facility] [address]   [phone]            Sep 08, 2017  9:30 AM CDT   RETURN GLAUCOMA with Kina Greco MD   [facility] ([facility])    [address]  [facility] [address]   [phone]            Oct 04, 2017 12:00 PM CDT   Infusion 180 with UC SPEC INFUSION, UC 47 ATC   [facility] ([facility])    [address]  2nd Floor  [facility] [address] [phone]             Oct 24, 2017  1:30 PM CDT   Lab with  LAB   [facility] ([facility])    [address]  1st Floor  [facility] [address] [phone]            Oct 24, 2017  2:30 PM CDT   (Arrive by 2:00 PM)   Return Visit with Ollie Michel MD   [facility] ([facility])    [address]  3rd Floor  [facility] [address] [phone]              Future tests that were ordered for you today     Open Standing Orders        Priority Remaining Interval Expires Ordered    US abdomen complete [AMF869] Routine 2/2 Every 6 Months 8/29/2018 8/29/2017          Open Future Orders        Priority Expected Expires Ordered    CBC with platelets [ZHV267] Routine 2/25/2018 8/29/2018 8/29/2017    INR [FBV9629] Routine 2/25/2018 8/29/2018 8/29/2017    AFP tumor marker [HGE962] Routine 2/25/2018 8/29/2018 8/29/2017    Hepatic Panel [LAB20] Routine 2/25/2018 8/29/2018 8/29/2017    Basic metabolic panel [LAB15] Routine 2/25/2018 8/29/2018 8/29/2017            Who to contact     If you have questions or need follow up information about today's clinic visit or your schedule please contact [facility] directly at [phone].  Normal or non-critical lab and imaging results will be communicated to you by MyChart, letter or phone within 4 business days after the clinic has received the results. If you do not hear from us within 7 days, please contact the clinic through [employer] or phone. If you have a critical or abnormal lab result, we will notify you by phone as soon as possible.  Submit refill requests through [employer] or call your pharmacy and they will forward the refill request to us. Please allow 3 business days for your refill to be completed.          Additional Information About Your Visit        MyChart Information     [employer] gives you secure access to your electronic health record. If you see a primary care provider, you can also send  "messages to your care team and make appointments. If you have questions, please call your primary care clinic.  If you do not have a primary care provider, please call [phone] and they will assist you.        Care EveryWhere ID     This is your Care EveryWhere ID. This could be used by other organizations to access your [place] medical records  [id number]        Your Vitals Were     Pulse Temperature Height Pulse Oximetry BMI (Body Mass Index)       79 97.6  F (36.4  C) (Oral) 1.702 m (5' 7\") 97% 29.73 kg/m2        Blood Pressure from Last 3 Encounters:   08/29/17 168/80   08/17/17 (!) 169/91   08/10/17 125/76    Weight from Last 3 Encounters:   08/29/17 86.1 kg (189 lb 12.8 oz)   08/17/17 86.9 kg (191 lb 9.6 oz)   08/10/17 85.8 kg (189 lb 1.6 oz)              We Performed the Following     Schedule follow up appointments          Today's Medication Changes          These changes are accurate as of: 8/29/17 11:59 PM.  If you have any questions, ask your nurse or doctor.               These medicines have changed or have updated prescriptions.        Dose/Directions    methotrexate 2.5 MG tablet CHEMO   This may have changed:    - how much to take  - how to take this  - when to take this  - additional instructions   Used for:  Sarcoidosis (H)        Dose:  7.5 mg   Take 3 tablets (7.5 mg) by mouth once a week On Mondays   Quantity:  48 tablet   Refills:  3       Urea 40 % Crea   Commonly known as:  CARMOL 40   This may have changed:  additional instructions   Used for:  Dermatophytosis of nail, Corns and callosities        To feet daily   Quantity:  199 g   Refills:  4                Primary Care Provider Office Phone # Fax #    Jamie Foster [phone]       [address] [address]        Equal Access to Services     ISIAH MONTOYA AH: Alejandro Lewis, wapaulda luqadaha, qaybta kaalmanelly russ. So [facility] " [phone].    ATENCIÓN: Si too arroyo, tiene a mcfadden disposición servicios gratuitos de asistencia lingüística. Osman felder [phone].    We comply with applicable federal civil rights laws and [place] laws. We do not discriminate on the basis of race, color, national origin, age, disability sex, sexual orientation or gender identity.            Thank you!     Thank you for choosing [facility]  for your care. Our goal is always to provide you with excellent care. Hearing back from our patients is one way we can continue to improve our services. Please take a few minutes to complete the written survey that you may receive in the mail after your visit with us. Thank you!             Your Updated Medication List - Protect others around you: Learn how to safely use, store and throw away your medicines at www.disposemymeds.org.          This list is accurate as of: 8/29/17 11:59 PM.  Always use your most recent med list.                   Brand Name Dispense Instructions for use Diagnosis    albuterol 108 (90 BASE) MCG/ACT Inhaler    PROAIR HFA/PROVENTIL HFA/VENTOLIN HFA    3 Inhaler    Inhale 2 puffs into the lungs every 6 hours as needed for shortness of breath / dyspnea    Sarcoidosis (H)       atorvastatin 40 MG tablet    LIPITOR    30 tablet    Take 1 tablet (40 mg) by mouth daily    Coronary artery disease involving native coronary artery of native heart without angina pectoris, CAD S/P percutaneous coronary angioplasty       blood glucose monitoring lancets     2 Box    Use to test blood sugar 2 times daily or as directed.  102 lancets per box.  3 month supply.    Type 2 diabetes, HbA1C goal < 8% (H)       blood glucose monitoring meter device kit    no brand specified    1 kit    Use to test blood sugar 2 times daily.    Type 2 diabetes mellitus with diabetic nephropathy (H)       blood glucose monitoring test strip    ACCU-CHEK RONNIE PLUS    200 each    Use to test blood sugar 2 times daily or as  directed.  3 month supply.    Type 2 diabetes, HbA1C goal < 8% (H)       clopidogrel 75 MG tablet    PLAVIX    90 tablet    Take 1 tablet (75 mg) by mouth daily    CAD S/P percutaneous coronary angioplasty, Coronary artery disease involving native coronary artery of native heart without angina pectoris       fluticasone-vilanterol 100-25 MCG/INH oral inhaler    BREO ELLIPTA    3 Inhaler    Inhale 1 puff into the lungs daily    Chronic obstructive pulmonary disease, unspecified COPD type (H)       furosemide 20 MG tablet    LASIX    180 tablet    Take 3 tablets (60 mg) by mouth 2 times daily    Pulmonary hypertension (H)       inFLIXimab 100 MG injection    REMICADE     Inject 100 mg into the vein every 28 days        levothyroxine 137 MCG tablet    SYNTHROID/LEVOTHROID    90 tablet    Take 1 tablet (137 mcg) by mouth daily    Hypothyroidism       methotrexate 2.5 MG tablet CHEMO     48 tablet    Take 3 tablets (7.5 mg) by mouth once a week On Mondays    Sarcoidosis (H)       metoprolol 100 MG tablet    LOPRESSOR    60 tablet    Take 1 tablet (100 mg) by mouth 2 times daily    Hypertension secondary to other renal disorders       mirtazapine 15 MG tablet    REMERON     Take 15 mg by mouth At Bedtime.        MULTIVITAMIN & MINERAL PO      Take 1 tablet by mouth daily.        omeprazole 20 MG CR capsule    priLOSEC    90 capsule    Take 1 capsule (20 mg) by mouth daily    CAD S/P percutaneous coronary angioplasty, Coronary artery disease involving native coronary artery of native heart without angina pectoris, Sarcoidosis of lung (H)       * order for DME     1 Device    She requested for a 4 wheel walker, would like to change it to 4 wheel walker with a seat and oxygen tank durant.   Need this faxed over to her  [phone]    Sarcoidosis, lung (H), COPD (chronic obstructive pulmonary disease) (H), Abnormal gait, Congestive heart failure (H)       * order for DME     1 Device    Updated Oxygen: Patient requires  supplemental Oxygen 3 LPM via nasal canula with activity and 2 LPM nocturnally. Please provide patient with a portable oxygen concentrator for improved mobility.  Okay to spot check or titrated for conserving to keep stats above 90%. Oxygen will be for a lifetime.    ILD (interstitial lung disease) (H), Hypoxia       polyethylene glycol powder    MIRALAX    510 g    Take 17 g (1 capful) by mouth daily    Constipation, unspecified constipation type       sildenafil 20 MG tablet    REVATIO/VIAGRA    90 tablet    Take 1 tablet (20 mg) by mouth 3 times daily    Pulmonary hypertension (H)       tiotropium 18 MCG capsule    SPIRIVA HANDIHALER    90 capsule    Inhale contents of one capsule daily.    Chronic obstructive pulmonary disease, unspecified COPD type (H)       Urea 40 % Crea    CARMOL 40    199 g    To feet daily    Dermatophytosis of nail, Corns and callosities       warfarin 5 MG tablet    COUMADIN    30 tablet    Take 1 tablet (5 mg) by mouth daily    Atrial flutter with rapid ventricular response (H)       * Notice:  This list has 2 medication(s) that are the same as other medications prescribed for you. Read the directions carefully, and ask your doctor or other care provider to review them with you.

## 2017-08-29 NOTE — PROGRESS NOTES
I had the pleasure of seeing Denis Monroe for followup in the Liver Clinic at the [facility] on 08/29/2017.  Mr. Monroe returns for followup of cirrhosis caused by chronic hepatitis C.  He is a sustained virologic responder to hepatitis C treatment.      He is having a number of ongoing issues at this point in time.  However, they do not really relate to his liver disease.  His sarcoidosis did flare back in May, and he is on home oxygen, as well as on monthly Remicade infusions.  He has some chronic kidney disease which by biopsy suggests is related to diabetic nephropathy.  Finally, he does have some diastolic dysfunction and is status post ablation for recurrent atrial flutter.      He denies any abdominal pain, itching or skin rash.  He does have a moderate amount of fatigue, but most of this is related to his heart and lung disease.  He denies any increased abdominal girth.  He has some mild lower extremity edema.  He denies any fevers or chills.  He has a chronic cough and shortness of breath.  He denies any nausea or vomiting, diarrhea or constipation.  His appetite has been good, but his weight has actually decreased the past 6 months.  He has not had any gastrointestinal bleeding or any overt signs of hepatic encephalopathy.       Current Outpatient Prescriptions   Medication     order for DME     order for DME     omeprazole (PRILOSEC) 20 MG CR capsule     atorvastatin (LIPITOR) 40 MG tablet     clopidogrel (PLAVIX) 75 MG tablet     sildenafil (REVATIO/VIAGRA) 20 MG tablet     furosemide (LASIX) 20 MG tablet     levothyroxine (SYNTHROID/LEVOTHROID) 137 MCG tablet     warfarin (COUMADIN) 5 MG tablet     polyethylene glycol (MIRALAX) powder     tiotropium (SPIRIVA HANDIHALER) 18 MCG capsule     albuterol (PROAIR HFA/PROVENTIL HFA/VENTOLIN HFA) 108 (90 BASE) MCG/ACT Inhaler     fluticasone-vilanterol (BREO ELLIPTA) 100-25 MCG/INH oral inhaler     inFLIXimab (REMICADE) 100 MG injection      metoprolol (LOPRESSOR) 100 MG tablet     methotrexate 2.5 MG tablet     blood glucose monitoring (ACCU-CHEK RONNIE PLUS) test strip     blood glucose monitoring (ACCU-CHEK FASTCLIX) lancets     blood glucose monitoring (NO BRAND SPECIFIED) meter device kit     Urea (CARMOL 40) 40 % CREA     Multiple Vitamins-Minerals (MULTIVITAMIN & MINERAL PO)     mirtazapine (REMERON) 15 MG tablet     No current facility-administered medications for this visit.      B/P: 168/80, T: 97.6, P: 79, R: Data Unavailable    In general, he appears healthy but is on home oxygen.  HEENT exam shows no scleral icterus or temporal muscle wasting.  His chest shows decreased breath sounds bilaterally with some expiratory wheezing.  His abdominal exam shows no increase in girth.  No masses or tenderness to palpation are present.  His liver is 10 cm in span without left lobe enlargement.  No spleen tip is palpable, and extremity exam shows just a trace of edema.  Skin exam shows no stigmata of chronic liver disease.  Neurologic exam shows no asterixis.       Recent Results (from the past 168 hour(s))   Hepatic panel    Collection Time: 08/29/17  9:59 AM   Result Value Ref Range    Bilirubin Direct 0.2 0.0 - 0.2 mg/dL    Bilirubin Total 0.5 0.2 - 1.3 mg/dL    Albumin 3.4 3.4 - 5.0 g/dL    Protein Total 7.9 6.8 - 8.8 g/dL    Alkaline Phosphatase 151 (H) 40 - 150 U/L    ALT 21 0 - 70 U/L    AST 14 0 - 45 U/L   INR    Collection Time: 08/29/17  9:59 AM   Result Value Ref Range    INR 2.59 (H) 0.86 - 1.14   Basic metabolic panel    Collection Time: 08/29/17  9:59 AM   Result Value Ref Range    Sodium 134 133 - 144 mmol/L    Potassium 4.3 3.4 - 5.3 mmol/L    Chloride 99 94 - 109 mmol/L    Carbon Dioxide 26 20 - 32 mmol/L    Anion Gap 9 3 - 14 mmol/L    Glucose 204 (H) 70 - 99 mg/dL    Urea Nitrogen 52 (H) 7 - 30 mg/dL    Creatinine 1.97 (H) 0.66 - 1.25 mg/dL    GFR Estimate 33 (L) >60 mL/min/1.7m2    GFR Estimate If Black 40 (L) >60 mL/min/1.7m2     Calcium 8.8 8.5 - 10.1 mg/dL   CBC with platelets    Collection Time: 08/29/17  9:59 AM   Result Value Ref Range    WBC 10.3 4.0 - 11.0 10e9/L    RBC Count 3.78 (L) 4.4 - 5.9 10e12/L    Hemoglobin 11.5 (L) 13.3 - 17.7 g/dL    Hematocrit 34.8 (L) 40.0 - 53.0 %    MCV 92 78 - 100 fl    MCH 30.4 26.5 - 33.0 pg    MCHC 33.0 31.5 - 36.5 g/dL    RDW 14.6 10.0 - 15.0 %    Platelet Count 252 150 - 450 10e9/L      My impression is that Mr. Monroe has cirrhosis caused by chronic hepatitis C and is a sustained virologic responder to hepatitis C treatment.  He did have an ultrasound today that shows no mass lesions and no ascites.  The liver appears to be fairly minimally cirrhotic.  From a liver standpoint, I will not be making any change to his medical regimen.  I will see him back in the clinic again in 6 months.  Certainly most of his other problems seem to be much more pressing than his liver disease at this point.      Thank you very much for allowing me to participate in the care of this patient.  If you have any questions regarding my recommendations, please do not hesitate to contact me.       Moses Orosco MD      Professor of Medicine  [employer]      Executive Medical Director, Solid Organ Transplant Program  [facility]

## 2017-08-31 ENCOUNTER — OFFICE VISIT (OUTPATIENT)
Dept: CARDIOLOGY | Facility: CLINIC | Age: 72
End: 2017-08-31
Attending: INTERNAL MEDICINE
Payer: MEDICARE

## 2017-08-31 VITALS
OXYGEN SATURATION: 98 % | DIASTOLIC BLOOD PRESSURE: 97 MMHG | SYSTOLIC BLOOD PRESSURE: 162 MMHG | HEART RATE: 77 BPM | BODY MASS INDEX: 29.76 KG/M2 | WEIGHT: 189.6 LBS | HEIGHT: 67 IN

## 2017-08-31 DIAGNOSIS — I50.30 (HFPEF) HEART FAILURE WITH PRESERVED EJECTION FRACTION (H): Primary | ICD-10-CM

## 2017-08-31 DIAGNOSIS — I48.92 ATRIAL FLUTTER (H): ICD-10-CM

## 2017-08-31 PROCEDURE — 99214 OFFICE O/P EST MOD 30 MIN: CPT | Mod: GC | Performed by: INTERNAL MEDICINE

## 2017-08-31 PROCEDURE — 99212 OFFICE O/P EST SF 10 MIN: CPT | Mod: ZF

## 2017-08-31 RX ORDER — LOSARTAN POTASSIUM 50 MG/1
50 TABLET ORAL DAILY
Qty: 90 TABLET | Refills: 3 | Status: SHIPPED | OUTPATIENT
Start: 2017-08-31 | End: 2018-08-06

## 2017-08-31 ASSESSMENT — PAIN SCALES - GENERAL: PAINLEVEL: NO PAIN (0)

## 2017-08-31 NOTE — MR AVS SNAPSHOT
After Visit Summary   8/31/2017    Lou Rao    MRN: [id number]           Patient Information     Date Of Birth          1945        Visit Information        Provider Department      8/31/2017 1:30 PM Diane Paige MD [facility]        Today's Diagnoses     (HFpEF) heart failure with preserved ejection fraction (H)    -  1      Care Instructions    You were seen today in the Cardiovascular Clinic at the [facility].     Cardiology Providers you saw during your visit: Dr. Diane Paige    Diagnosis: Heart Failure     Results for LOU RAO (MRN [id number]) as of 8/31/2017 13:58   Ref. Range 8/29/2017 09:59   Sodium Latest Ref Range: 133 - 144 mmol/L 134   Potassium Latest Ref Range: 3.4 - 5.3 mmol/L 4.3   Chloride Latest Ref Range: 94 - 109 mmol/L 99   Carbon Dioxide Latest Ref Range: 20 - 32 mmol/L 26   Urea Nitrogen Latest Ref Range: 7 - 30 mg/dL 52 (H)   Creatinine Latest Ref Range: 0.66 - 1.25 mg/dL 1.97 (H)   GFR Estimate Latest Ref Range: >60 mL/min/1.7m2 33 (L)   GFR Estimate If Black Latest Ref Range: >60 mL/min/1.7m2 40 (L)   Calcium Latest Ref Range: 8.5 - 10.1 mg/dL 8.8   Anion Gap Latest Ref Range: 3 - 14 mmol/L 9   Albumin Latest Ref Range: 3.4 - 5.0 g/dL 3.4   Protein Total Latest Ref Range: 6.8 - 8.8 g/dL 7.9   Bilirubin Total Latest Ref Range: 0.2 - 1.3 mg/dL 0.5   Alkaline Phosphatase Latest Ref Range: 40 - 150 U/L 151 (H)   ALT Latest Ref Range: 0 - 70 U/L 21   AST Latest Ref Range: 0 - 45 U/L 14   Bilirubin Direct Latest Ref Range: 0.0 - 0.2 mg/dL 0.2   Glucose Latest Ref Range: 70 - 99 mg/dL 204 (H)   WBC Latest Ref Range: 4.0 - 11.0 10e9/L 10.3   Hemoglobin Latest Ref Range: 13.3 - 17.7 g/dL 11.5 (L)   Hematocrit Latest Ref Range: 40.0 - 53.0 % 34.8 (L)   Platelet Count Latest Ref Range: 150 - 450 10e9/L 252   RBC Count Latest Ref Range: 4.4 - 5.9 10e12/L 3.78 (L)   MCV Latest Ref Range: 78 - 100 fl 92   MCH Latest  "Ref Range: 26.5 - 33.0 pg 30.4   MCHC Latest Ref Range: 31.5 - 36.5 g/dL 33.0   RDW Latest Ref Range: 10.0 - 15.0 % 14.6   INR Latest Ref Range: 0.86 - 1.14  2.59 (H)       Recommendations:  1. Please start taking losartan 50mg daily  2. Please have a lab recheck next week (BMP).     Eat a heart healthy, low sodium diet.  Get 20 to 30 minutes of aerobic exercise 4 to 5 times per week as tolerated. (Examples of aerobic exercise include: walking, bicycling, swimming, running).    Follow-up:   Please make a Return Heart Failure Dr. Paige in 6 months with echocardiogram and labs prior.     For emergencies call 911.    Thank you for your visit today!     Please call if you have any questions or concerns.    Questions and [phone].  MICHAEL Avila (Heart Failure Nurse) [phone].   First press #1 for the [employer] and then press #3 for \"Medical Questions\" to reach us Cardiology Nurses.      On Call Cardiologist for after hours or on weekends: [phone]   option #4 and ask to speak to the on-call Cardiologist. Inform them you are a CORE/heart failure patient at the [place].             Follow-ups after your visit        Your next 10 appointments already scheduled     Sep 05, 2017 11:30 AM CDT   Lab with  LAB   [facility] ([facility])    [address]  1st Floor  [facility] [address] [phone]            Sep 05, 2017 12:00 PM CDT   Infusion 180 with  SPEC INFUSION,  47 ATC   [facility] ([facility])    [address]  [address] [facility] [address] [phone]            Sep 07, 2017  2:00 PM CDT   Cardiac Evaluation with  Cardiac Rehab 3   [facility] Cardiac Rehab ([facility])    [address], [address]  [facility] [address]   [phone]            Sep 08, 2017  9:30 AM CDT   RETURN GLAUCOMA with Kina Greco MD   [facility] " ([email or web address])    [address]  [address]  9th [address] Clin [address]a  [facility] [address]   [phone]            Oct 04, 2017 12:00 PM CDT   Infusion 180 with UC SPEC INFUSION, UC 47 ATC   [facility] Specialty [facility] Procedure ([facility])    [address]  2nd Floor  [facility] [address] [phone]            Oct 24, 2017  1:30 PM CDT   Lab with UC LAB   [facility] Lab ([facility])    [address]  [address] [facility] [address]   [phone]            Oct 24, 2017  2:30 PM CDT   (Arrive by 2:00 PM)   Return Visit with Ollie Michel MD   [facility] Nephrology ([facility])    [address]  [address] [facility] [address] [phone]            Dec 14, 2017 12:30 PM CST   PFT VISIT with WALTER PFL B   [facility] Pulmonary Function Testing ([facility])    [address]  [address] [facility] [address] [phone]              Future tests that were ordered for you today     Open Future Orders        Priority Expected Expires Ordered    Basic metabolic panel Routine  9/15/2017 8/31/2017    Echocardiogram Complete Routine  8/31/2019 8/31/2017            Who to contact     If you have questions or need follow up information about today's clinic visit or your schedule please contact [facility] HEART [facility] directly at [phone].  Normal or non-critical lab and imaging results will be communicated to you by MyChart, letter or phone within 4 business days after the clinic has received the results. If you do not hear from us within 7 days, please contact the clinic through [email or web address] or phone. If you have a critical or abnormal lab result, we will notify you by phone as soon as possible.  Submit refill requests through [employer] or call your pharmacy and they will forward the refill request to us. Please allow 3 business days  "for your refill to be completed.          Additional Information About Your Visit        [employer] Information     [employer] gives you secure access to your electronic health record. If you see a primary care provider, you can also send messages to your care team and make appointments. If you have questions, please call your primary care clinic.  If you do not have a primary care provider, please call [phone] and they will assist you.        Care EveryWhere ID     This is your Care EveryWhere ID. This could be used by other organizations to access your [facility] medical records  [id number]        Your Vitals Were     Pulse Height Pulse Oximetry BMI (Body Mass Index)          77 1.702 m (5' 7\") 98% 29.7 kg/m2         Blood Pressure from Last 3 Encounters:   08/31/17 (!) 162/97   08/29/17 168/80   08/17/17 (!) 169/91    Weight from Last 3 Encounters:   08/31/17 86 kg (189 lb 9.6 oz)   08/29/17 86.1 kg (189 lb 12.8 oz)   08/17/17 86.9 kg (191 lb 9.6 oz)                 Today's Medication Changes          These changes are accurate as of: 8/31/17  2:39 PM.  If you have any questions, ask your nurse or doctor.               Start taking these medicines.        Dose/Directions    losartan 50 MG tablet   Commonly known as:  COZAAR   Used for:  (HFpEF) heart failure with preserved ejection fraction (H)   Started by:  Diane Paige MD        Dose:  50 mg   Take 1 tablet (50 mg) by mouth daily   Quantity:  90 tablet   Refills:  3         These medicines have changed or have updated prescriptions.        Dose/Directions    methotrexate 2.5 MG tablet CHEMO   This may have changed:    - how much to take  - how to take this  - when to take this  - additional instructions   Used for:  Sarcoidosis (H)        Dose:  7.5 mg   Take 3 tablets (7.5 mg) by mouth once a week On Mondays   Quantity:  48 tablet   Refills:  3       Urea 40 % Crea   Commonly known as:  CARMOL 40   This may have changed:  additional instructions "   Used for:  Dermatophytosis of nail, Corns and callosities        To feet daily   Quantity:  199 g   Refills:  4            Where to get your medicines      These medications were sent to [id number] - [place], [place] - [address], [facility] [address]     Phone:  [phone]     losartan 50 MG tablet                Primary Care Provider Office Phone # Fax #    Jamie Foster [phone]       [address] [address]        Equal Access to Services     [facility]: Hadii aad ku hadasho Soomaali, waaxda luqadaha, qaybta kaalmada adeegyada, waxsilvio waite haycarolina munson . So [facility] [phone].    ATENCIÓN: Si habla español, tiene a mcfadden disposición servicios gratuitos de asistencia lingüística. [facility] [phone].    We comply with applicable federal civil rights laws and [place] laws. We do not discriminate on the basis of race, color, national origin, age, disability sex, sexual orientation or gender identity.            Thank you!     Thank you for choosing [facility]  for your care. Our goal is always to provide you with excellent care. Hearing back from our patients is one way we can continue to improve our services. Please take a few minutes to complete the written survey that you may receive in the mail after your visit with us. Thank you!             Your Updated Medication List - Protect others around you: Learn how to safely use, store and throw away your medicines at www.disposemymeds.org.          This list is accurate as of: 8/31/17  2:39 PM.  Always use your most recent med list.                   Brand Name Dispense Instructions for use Diagnosis    albuterol 108 (90 BASE) MCG/ACT Inhaler    PROAIR HFA/PROVENTIL HFA/VENTOLIN HFA    3 Inhaler    Inhale 2 puffs into the lungs every 6 hours as needed for shortness of breath / dyspnea    Sarcoidosis (H)       atorvastatin 40 MG  tablet    LIPITOR    30 tablet    Take 1 tablet (40 mg) by mouth daily    Coronary artery disease involving native coronary artery of native heart without angina pectoris, CAD S/P percutaneous coronary angioplasty       blood glucose monitoring lancets     2 Box    Use to test blood sugar 2 times daily or as directed.  102 lancets per box.  3 month supply.    Type 2 diabetes, HbA1C goal < 8% (H)       blood glucose monitoring meter device kit    no brand specified    1 kit    Use to test blood sugar 2 times daily.    Type 2 diabetes mellitus with diabetic nephropathy (H)       blood glucose monitoring test strip    ACCU-CHEK RONNIE PLUS    200 each    Use to test blood sugar 2 times daily or as directed.  3 month supply.    Type 2 diabetes, HbA1C goal < 8% (H)       clopidogrel 75 MG tablet    PLAVIX    90 tablet    Take 1 tablet (75 mg) by mouth daily    CAD S/P percutaneous coronary angioplasty, Coronary artery disease involving native coronary artery of native heart without angina pectoris       fluticasone-vilanterol 100-25 MCG/INH oral inhaler    BREO ELLIPTA    3 Inhaler    Inhale 1 puff into the lungs daily    Chronic obstructive pulmonary disease, unspecified COPD type (H)       furosemide 20 MG tablet    LASIX    180 tablet    Take 3 tablets (60 mg) by mouth 2 times daily    Pulmonary hypertension (H)       inFLIXimab 100 MG injection    REMICADE     Inject 100 mg into the vein every 28 days        levothyroxine 137 MCG tablet    SYNTHROID/LEVOTHROID    90 tablet    Take 1 tablet (137 mcg) by mouth daily    Hypothyroidism       losartan 50 MG tablet    COZAAR    90 tablet    Take 1 tablet (50 mg) by mouth daily    (HFpEF) heart failure with preserved ejection fraction (H)       methotrexate 2.5 MG tablet CHEMO     48 tablet    Take 3 tablets (7.5 mg) by mouth once a week On Mondays    Sarcoidosis (H)       metoprolol 100 MG tablet    LOPRESSOR    60 tablet    Take 1 tablet (100 mg) by mouth 2 times daily     Hypertension secondary to other renal disorders       mirtazapine 15 MG tablet    REMERON     Take 15 mg by mouth At Bedtime.        MULTIVITAMIN & MINERAL PO      Take 1 tablet by mouth daily.        omeprazole 20 MG CR capsule    priLOSEC    90 capsule    Take 1 capsule (20 mg) by mouth daily    CAD S/P percutaneous coronary angioplasty, Coronary artery disease involving native coronary artery of native heart without angina pectoris, Sarcoidosis of lung (H)       * order for DME     1 Device    She requested for a 4 wheel walker, would like to change it to 4 wheel walker with a seat and oxygen tank durant.   Need this faxed over to her  [phone]    Sarcoidosis, lung (H), COPD (chronic obstructive pulmonary disease) (H), Abnormal gait, Congestive heart failure (H)       * order for DME     1 Device    Updated Oxygen: Patient requires supplemental Oxygen 3 LPM via nasal canula with activity and 2 LPM nocturnally. Please provide patient with a portable oxygen concentrator for improved mobility.  Okay to spot check or titrated for conserving to keep stats above 90%. Oxygen will be for a lifetime.    ILD (interstitial lung disease) (H), Hypoxia       polyethylene glycol powder    MIRALAX    510 g    Take 17 g (1 capful) by mouth daily    Constipation, unspecified constipation type       sildenafil 20 MG tablet    REVATIO/VIAGRA    90 tablet    Take 1 tablet (20 mg) by mouth 3 times daily    Pulmonary hypertension (H)       tiotropium 18 MCG capsule    SPIRIVA HANDIHALER    90 capsule    Inhale contents of one capsule daily.    Chronic obstructive pulmonary disease, unspecified COPD type (H)       Urea 40 % Crea    CARMOL 40    199 g    To feet daily    Dermatophytosis of nail, Corns and callosities       warfarin 5 MG tablet    COUMADIN    30 tablet    Take 1 tablet (5 mg) by mouth daily    Atrial flutter with rapid ventricular response (H)       * Notice:  This list has 2 medication(s) that are the same as other  medications prescribed for you. Read the directions carefully, and ask your doctor or other care provider to review them with you.

## 2017-08-31 NOTE — PATIENT INSTRUCTIONS
You were seen today in the Cardiovascular Clinic at the [facility].     Cardiology Providers you saw during your visit: Dr. Diane Paige    Diagnosis: Heart Failure     Results for LOU RAO (MRN [id number]) as of 8/31/2017 13:58   Ref. Range 8/29/2017 09:59   Sodium Latest Ref Range: 133 - 144 mmol/L 134   Potassium Latest Ref Range: 3.4 - 5.3 mmol/L 4.3   Chloride Latest Ref Range: 94 - 109 mmol/L 99   Carbon Dioxide Latest Ref Range: 20 - 32 mmol/L 26   Urea Nitrogen Latest Ref Range: 7 - 30 mg/dL 52 (H)   Creatinine Latest Ref Range: 0.66 - 1.25 mg/dL 1.97 (H)   GFR Estimate Latest Ref Range: >60 mL/min/1.7m2 33 (L)   GFR Estimate If Black Latest Ref Range: >60 mL/min/1.7m2 40 (L)   Calcium Latest Ref Range: 8.5 - 10.1 mg/dL 8.8   Anion Gap Latest Ref Range: 3 - 14 mmol/L 9   Albumin Latest Ref Range: 3.4 - 5.0 g/dL 3.4   Protein Total Latest Ref Range: 6.8 - 8.8 g/dL 7.9   Bilirubin Total Latest Ref Range: 0.2 - 1.3 mg/dL 0.5   Alkaline Phosphatase Latest Ref Range: 40 - 150 U/L 151 (H)   ALT Latest Ref Range: 0 - 70 U/L 21   AST Latest Ref Range: 0 - 45 U/L 14   Bilirubin Direct Latest Ref Range: 0.0 - 0.2 mg/dL 0.2   Glucose Latest Ref Range: 70 - 99 mg/dL 204 (H)   WBC Latest Ref Range: 4.0 - 11.0 10e9/L 10.3   Hemoglobin Latest Ref Range: 13.3 - 17.7 g/dL 11.5 (L)   Hematocrit Latest Ref Range: 40.0 - 53.0 % 34.8 (L)   Platelet Count Latest Ref Range: 150 - 450 10e9/L 252   RBC Count Latest Ref Range: 4.4 - 5.9 10e12/L 3.78 (L)   MCV Latest Ref Range: 78 - 100 fl 92   MCH Latest Ref Range: 26.5 - 33.0 pg 30.4   MCHC Latest Ref Range: 31.5 - 36.5 g/dL 33.0   RDW Latest Ref Range: 10.0 - 15.0 % 14.6   INR Latest Ref Range: 0.86 - 1.14  2.59 (H)       Recommendations:  1. Please start taking losartan 50mg daily  2. Please have a lab recheck next week (BMP).     Eat a heart healthy, low sodium diet.  Get 20 to 30 minutes of aerobic exercise 4 to 5 times per week as tolerated.  "(Examples of aerobic exercise include: walking, bicycling, swimming, running).    Follow-up:   Please make a Return Heart Failure Dr. Paige in 6 months with echocardiogram and labs prior.     For emergencies call 911.    Thank you for your visit today!     Please call if you have any questions or concerns.    Questions and [phone].  MICHAEL Avila (Heart Failure Nurse) [phone].   First press #1 for the [employer] and then press #3 for \"Medical Questions\" to reach us Cardiology Nurses.      On Call Cardiologist for after hours or on weekends: [phone]   option #4 and ask to speak to the on-call Cardiologist. Inform them you are a CORE/heart failure patient at the [place].     "

## 2017-08-31 NOTE — PROGRESS NOTES
August 31, 2017    Dear Colleagues:      I had the pleasure of seeing Rohan Monroe in the [facility] today.   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 He recently underwent cardiac stenting to his LAD and LCx for which he feels much improved. This procedure was complicated by RADHA and retroperitoneal bleed for which he received a right iliac stent and subsequently needed thrombin injection for left-sided pseudoaneurysm. His kidney function improved and he had no anuric episodes.     Since completing the procedures above, he has been doing well. He denies any PND, orthopnea, LE edema. He is using around 2L/min of oxygen delivery and 5L pulsed with walking and feels that with oxygen, he can walk without any difficulties. He does note that walking up hill or stairs is hard. In the past year, he is able to do more. Energy level is imprved. States that his weight has been stable as well. He states that he has has issues with elevated blood pressures and has been off his amlodipine (LE swelling) and losartan with his RADHA. High blood pressures have been noted in his pulmonary rehab clinic as well. He continues on warfarin and clopidogrel for his recent cardiac stent placements.    His recent cardiac history is as follows.  He has a history of atrial flutter and underwent a pulmonary vein isolation.  He was in the hospital for control of the atrial arrhythmias and had intermittent amiodarone loading.  Due to toxicity from the amiodarone, this was stopped.     Denies lightheadedness, chest pain, worsening shortness of breath or cough, abnormal bleeding, blood in urine/stool, fevers,  chills, nausea.    PAST MEDICAL HISTORY:  Past Medical History:   Diagnosis Date     Atrial flutter (H)      Cataract of both eyes      Chronic infection     Hep C     Congestive heart failure, unspecified      Coronary artery disease      Depressive disorder, not elsewhere classified     Depression (non-psychotic)     ERM OS (epiretinal membrane, left eye)      Generalized osteoarthrosis, unspecified site      Glaucoma suspect      Hypertension      Lichen planus      Other psoriasis      PVD (posterior vitreous detachment), left eye      Sarcoidosis (H)      Sarcoidosis (H)      Type II or unspecified type diabetes mellitus without mention of complication, not stated as uncontrolled      Unspecified hypothyroidism     Hypothyroidism     Unspecified viral hepatitis C without hepatic coma      Viral warts, unspecified        SOCIAL HISTORY:  The patient was an  in the [employer] and stopped working 3 years ago.  He lives with his son, who is in college and is studying to be a .  He is .  He did drink heavily in his past.  He stopped in 1992.  Smoking he stopped in 1994.  He denies any other illicit drug use.      FAMILY HISTORY:  There is no family history of autoimmune diseases or sudden cardiac death.     CURRENT MEDICATIONS:  Current Outpatient Prescriptions   Medication Sig Dispense Refill     order for DME Updated Oxygen: Patient requires supplemental Oxygen 3 LPM via nasal canula with activity and 2 LPM nocturnally. Please provide patient with a portable oxygen concentrator for improved mobility.  Okay to spot check or titrated for conserving to keep stats above 90%. Oxygen will be for a lifetime. 1 Device 0     order for DME She requested for a 4 wheel walker, would like to change it to 4 wheel walker with a seat and oxygen tank durant.     Need this faxed over to her   [phone] 1 Device 1     omeprazole (PRILOSEC) 20 MG CR capsule Take 1  capsule (20 mg) by mouth daily 90 capsule 3     atorvastatin (LIPITOR) 40 MG tablet Take 1 tablet (40 mg) by mouth daily 30 tablet 3     clopidogrel (PLAVIX) 75 MG tablet Take 1 tablet (75 mg) by mouth daily 90 tablet 3     sildenafil (REVATIO/VIAGRA) 20 MG tablet Take 1 tablet (20 mg) by mouth 3 times daily 90 tablet 3     furosemide (LASIX) 20 MG tablet Take 3 tablets (60 mg) by mouth 2 times daily 180 tablet 0     levothyroxine (SYNTHROID/LEVOTHROID) 137 MCG tablet Take 1 tablet (137 mcg) by mouth daily 90 tablet 1     warfarin (COUMADIN) 5 MG tablet Take 1 tablet (5 mg) by mouth daily 30 tablet 6     polyethylene glycol (MIRALAX) powder Take 17 g (1 capful) by mouth daily 510 g 1     tiotropium (SPIRIVA HANDIHALER) 18 MCG capsule Inhale contents of one capsule daily. 90 capsule 3     albuterol (PROAIR HFA/PROVENTIL HFA/VENTOLIN HFA) 108 (90 BASE) MCG/ACT Inhaler Inhale 2 puffs into the lungs every 6 hours as needed for shortness of breath / dyspnea 3 Inhaler 6     fluticasone-vilanterol (BREO ELLIPTA) 100-25 MCG/INH oral inhaler Inhale 1 puff into the lungs daily 3 Inhaler 3     inFLIXimab (REMICADE) 100 MG injection Inject 100 mg into the vein every 28 days        metoprolol (LOPRESSOR) 100 MG tablet Take 1 tablet (100 mg) by mouth 2 times daily 60 tablet 11     methotrexate 2.5 MG tablet Take 3 tablets (7.5 mg) by mouth once a week On Mondays (Patient taking differently: Take 2.5mg by mouth weekly on Mondays.) 48 tablet 3     blood glucose monitoring (ACCU-CHEK RONNIE PLUS) test strip Use to test blood sugar 2 times daily or as directed.  3 month supply. 200 each 3     blood glucose monitoring (ACCU-CHEK FASTCLIX) lancets Use to test blood sugar 2 times daily or as directed.  102 lancets per box.  3 month supply. 2 Box 3     blood glucose monitoring (NO BRAND SPECIFIED) meter device kit Use to test blood sugar 2 times daily. 1 kit 0     Urea (CARMOL 40) 40 % CREA To feet daily (Patient taking differently: Apply  "to feet daily as needed.) 199 g 4     Multiple Vitamins-Minerals (MULTIVITAMIN & MINERAL PO) Take 1 tablet by mouth daily.       mirtazapine (REMERON) 15 MG tablet Take 15 mg by mouth At Bedtime.         ROS:   Constitutional: No fever, chills, or sweats. Weight has been relatively stable   ENT: No visual disturbance, ear ache, epistaxis, sore throat.   Allergies/Immunologic: Negative.   Respiratory: + intermittent cough, no hemoptysis.   Cardiovascular: As per HPI.   GI: No nausea, vomiting, hematemesis, melena, or hematochezia.   : No urinary frequency, dysuria, or hematuria.   Integument: Negative.   Psychiatric: frustrated with his health   Neuro: Negative.   Endocrinology: Negative.   Musculoskeletal: sore on his backside which is new in the last few months    EXAM:  BP (!) 162/97 (BP Location: Left arm, Patient Position: Chair, Cuff Size: Adult Regular)  Pulse 77  Ht 1.702 m (5' 7\")  Wt 86 kg (189 lb 9.6 oz)  SpO2 98%  BMI 29.7 kg/m2  General: appears comfortable, alert and articulate, sitting up in chair, on oxygen  Head: normocephalic, atraumatic  Eyes: anicteric sclera, EOMI  Neck: no adenopathy  Orophyarynx: moist mucosa, no lesions, dentition intact  Heart: PMI unable to be appreciated, regular, S1/S2, trace systolic murmur at the apex, no gallop, rub, estimated JVP <10, peripheral pulses palpable in UE     Lungs: slight expiratory wheeze, no crackles   Abdomen:  Non distended, non-tender, bowel sounds present, no hepatosplenomegaly  Extremities: no clubbing, cyanosis; mild LE edema bilaterally  Neurological: normal speech and affect, no gross motor deficits    Labs:  CBC RESULTS:  Lab Results   Component Value Date    WBC 10.3 08/29/2017    RBC 3.78 (L) 08/29/2017    HGB 11.5 (L) 08/29/2017    HCT 34.8 (L) 08/29/2017    MCV 92 08/29/2017    MCH 30.4 08/29/2017    MCHC 33.0 08/29/2017    RDW 14.6 08/29/2017     08/29/2017       CMP RESULTS:  Lab Results   Component Value Date     " 08/29/2017    POTASSIUM 4.3 08/29/2017    CHLORIDE 99 08/29/2017    CO2 26 08/29/2017    ANIONGAP 9 08/29/2017     (H) 08/29/2017    BUN 52 (H) 08/29/2017    CR 1.97 (H) 08/29/2017    GFRESTIMATED 33 (L) 08/29/2017    GFRESTBLACK 40 (L) 08/29/2017    RAFFY 8.8 08/29/2017    BILITOTAL 0.5 08/29/2017    ALBUMIN 3.4 08/29/2017    ALKPHOS 151 (H) 08/29/2017    ALT 21 08/29/2017    AST 14 08/29/2017        INR RESULTS:  Lab Results   Component Value Date    INR 2.59 (H) 08/29/2017       Lab Results   Component Value Date    MAG 2.0 05/13/2017     Lab Results   Component Value Date    NTBNPI 5090 (H) 05/15/2017     Lab Results   Component Value Date    NTBNP 674 (H) 03/16/2015       Echo - 2017  Interpretation Summary  Borderline reduced left ventricular systolic function, (EF 50-55%). Left  ventricular diastolic function is indeterminate.  The right ventricle is normal size; global RV function is mildly reduced.  No significant valvular pathology.  The inferior vena cava is normal; estimated mean RA pressure is <3 mmHg.  In comparison to prior limited study dated 1/19/17, the LV function is  unchanged, however much improved from chronologically earlier study of the  same date.    Left Ventricle  Left ventricular size is normal. Mild concentric wall thickening consistent  with left ventricular hypertrophy is present. Left ventricular diastolic  function is indeterminate. Borderline (EF 50-55%) reduced left ventricular  function is present.     Right Ventricle  The right ventricle is normal size. Global right ventricular function is  mildly reduced.     Atria  The left atrium appears normal. Mild to moderate right atrial enlargement is  present.     Mitral Valve  The mitral valve is normal.        Aortic Valve  Mild aortic valve sclerosis is present.     Tricuspid Valve  Trace tricuspid insufficiency is present. Pulmonary artery systolic pressure  cannot be assessed.     Pulmonic Valve  The pulmonic valve cannot be  assessed.     Vessels  The inferior vena cava is normal. The aorta root is normal. Estimated mean  right atrial pressure is <3 mmHg.     Pericardium  No pericardial effusion is present.     MMode/2D Measurements & Calculations  IVSd: 1.2 cm  LVIDd: 4.4 cm  LVIDs: 3.0 cm  LVPWd: 1.00 cm  FS: 31.8 %  EDV(Teich): 88.9 ml  ESV(Teich): 35.5 ml     LV mass(C)d: 166.9 grams  Ao root diam: 3.3 cm  asc Aorta Diam: 3.0 cm  LA/Ao: 1.2  LVOT diam: 2.1 cm  LVOT area: 3.4 cm2  LA Volume (BP): 59.5 ml  LA Volume Index (BP): 29.3 ml/m2        Doppler Measurements & Calculations  MV E max jhonatan: 65.1 cm/sec  MV A max jhonatan: 81.9 cm/sec  MV E/A: 0.79  MV mean P.4 mmHg  MV V2 VTI: 19.5 cm  MVA(VTI): 2.7 cm2     MV dec time: 0.21 sec  Ao V2 max: 107.9 cm/sec  Ao max P.7 mmHg  LV V1 VTI: 15.8 cm  SV(LVOT): 52.8 ml  PA acc time: 0.10 sec  TR max jhonatan: 285.0 cm/sec  TR max P.5 mmHg  Lateral E/e': 7.6  Medial E/e': 9.7     EKG demonstrated sinus rhythm with PACs.      Last pulmonary function tests were in 2017, shows FEV1 of 2.74, which is 69% of predicted, an FEV1 of 39% of predicted at 1.16.  DLCO was not performed.      1.  Coronary Angiogram 2017  1. LM is without angiographic evidence of disease.   2. LAD supplies the entire apex (type 3) and gives rise to septal perforators, D1 and D2. The mLAD has a 75% hazy stenosis just distal to the existing stent, D1 has a 60% ostial stenosis and D2 has serial 80% in-stent stenoses and the remainder of the vessel has mild luminal irregularities.   3. LCX gives rise to OM1 and OM2 vessels. The pLCx has a 30% stenosis, OM1 has a 70% stenosis in a small vessel and the remainder of the vessel has mild luminal irregularities.   4. RCA was not studied as was recently evaluated.       PERCUTANEOUS CORONARY INTERVENTION:  1. LAD Lesions:  A 6Fr XB-3.5 guide catheter was positioned at the ostium of the LM. Heparin was administered to achieve a goal ACT > 250 sec. A Christiano Blue wire was  advanced across the D2 in-stent lesion and positioned in the distal D2. A 1.5x15mm balloon was used to pre-dilate the lesion. A [id number] Synergy drug eluting stent was successfully deployed across the D2 lesion with inflation to 11 ashley. The wire was then repositioned in the dLAD. The mLAD lesion was treated with a 2.05p26gh Synergy drug eluting stent with inflation to 14 ashley. Final angiography showed no evidence of perforation or dissection with residual stenosis of 0% and JOHN 3 flow. No complications.      COMPLICATIONS:  1. None      SUMMARY:   1. Successful deployment of a [id number] Synergy drug eluting stent to the D2.  2. Successful deployment of a 2.43p23bm Synergy drug eluting stent to the mLAD.     2.  CT Abd 5/13/2017  1. Enlarging right retroperitoneal hematoma with active extravasation from the right external iliac artery.  2. Small left heterogeneous pleural effusion with indeterminate density. Findings are of uncertain etiology but hemorrhage or infection within the pleural space can have this appearance.  3. Changes in the lung consistent with sarcoidosis.  4. Nonspecific lymphadenopathy associated with the celiac axis.     3.  Placement of covered stent to right external iliac artery 5/13/2017  1. Diagnostic angiography demonstrating a pseudoaneurysm with the neck  arising from distal right external iliac artery with associated active  extravasation.  2. Successful stent exclusion of the pseudoaneurysm with a 6 mm x 39 mm Atrium I cast balloon expandable stent graft.        Assessment and Plan:   In summary, this is a very pleasant 72-year-old man with a history of pulmonary sarcoidosis and a presumptive diagnosis of cardiac sarcoid based on the fact that he has high filling pressures and atrial arrhythmias but without a confirmed tissue diagnosis of cardiac sarcoidosis, who is maintained on infliximab and methotrexate, with decent control of his pulmonary symptoms over the last several years with  need for supplemental oxygen.     He reports feeling much improved with diuresis and with coronary revascularization which occurred in May 2017. This was complicated by an RP bleed requiring covered stent to the iliac artery, RADHA which improved, and left femoral pseudoaneurysm (occurred with procedure to stent his RP bleed)  that required thrombin injection.     His blood pressure has been high in pulmonary rehab and continues to be high at home as well as in the cardiac clinic. He appears to be euvolemic on exam. He has proteinuria on his urinalysis and his nephrology team has been attempting to resume his ARB. We will start on losartan 50mg to help with the proteinuria and the blood pressure.    Given that he has extensive pulmonary sarcoidosis, we will would like to hold off on further workup for potential active cardiac sarcoidosis. Given history of coronary artery disease, he will continue on warfarin and clopidogrel for at least one year.     Other:   With regard to his atrial flutter, he is presently in sinus on metoprolol 100 b.i.d. as well as anticoagulated, currently in sinus rhythm.       Chronic kidney disease with proteinuria. Starting on losartan as above.    Please feel free to contact me if you have any further questions.      Patient discussed with Dr. Paige.    Nicanor Mcdowell MD  Advanced Heart Failure/Heart Transplant Fellow  [facility]   [phone]     I have seen and examined the patient with the house staff on August 31, 2017 and agree with the outlined assessment and plan.      Diane Paige MD   of Medicine   [facility]         CC  Patient Care Team:  Jamie Foster MD as PCP - General (Internal Medicine)  Jamie Foster MD as Referring Physician (Internal Medicine)  Kina Greco MD as MD (Ophthalmology)  Perlman, David Morris, MD as MD (Internal Medicine)  Haley Renner DO as  Referring Physician (Student in organized health care education/training program)  Chicho Mejia, EDDYM (Podiatry)  Macey Roy MD as MD (Internal Medicine)  Thompson Gonzalez MD as MD (Cardiology)  Karlene Farrell, RN as Nurse Coordinator (Cardiology)  Madalyn Fajardo MD PhD as MD (Family Practice)  Jaclyn Pina, RN as Nurse Coordinator (Clinical Cardiac Electrophysiology)  Brian Vazquez MD as MD (Clinical Cardiac Electrophysiology)  Elizabeth Cabral, VINNY CNP as Nurse Practitioner (Nurse Practitioner)  Maria Victoria Palmer MD as MD (Radiology)  Jaclyn Pina, RN as Nurse Coordinator (Clinical Cardiac Electrophysiology)  Brian Vazquez MD as MD (Clinical Cardiac Electrophysiology)  ELIZABETH CABRAL

## 2017-08-31 NOTE — LETTER
8/31/2017    RE: Rohan Monroe  [address]  [place] [id number]       Dear Colleague,    Thank you for the opportunity to participate in the care of your patient, Rohan Monroe, at the [facility]. Please see a copy of my visit note below.    See above      August 31, 2017    Dear Colleagues:      I had the pleasure of seeing Rohan Monroe in the [facility] today.   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 He recently underwent cardiac stenting to his LAD and LCx for which he feels much improved. This procedure was complicated by RADHA and retroperitoneal bleed for which he received a right iliac stent and subsequently needed thrombin injection for left-sided pseudoaneurysm. His kidney function improved and he had no anuric episodes.     Since completing the procedures above, he has been doing well. He denies any PND, orthopnea, LE edema. He is using around 2L/min of oxygen delivery and 5L pulsed with walking and feels that with oxygen, he can walk without any difficulties. He does note that walking up hill or stairs is hard. In the past year, he is able to do more. Energy level is imprved. States that his weight has been stable as well. He states that he has has issues with elevated blood pressures and has been off his amlodipine (LE swelling) and losartan with his RADHA. High blood pressures have been noted in his pulmonary rehab clinic as well. He continues on warfarin and clopidogrel for his recent cardiac stent placements.    His recent cardiac history is as follows.  He has a  history of atrial flutter and underwent a pulmonary vein isolation.  He was in the hospital for control of the atrial arrhythmias and had intermittent amiodarone loading.  Due to toxicity from the amiodarone, this was stopped.     Denies lightheadedness, chest pain, worsening shortness of breath or cough, abnormal bleeding, blood in urine/stool, fevers, chills, nausea.    PAST MEDICAL HISTORY:  Past Medical History:   Diagnosis Date     Atrial flutter (H)      Cataract of both eyes      Chronic infection     Hep C     Congestive heart failure, unspecified      Coronary artery disease      Depressive disorder, not elsewhere classified     Depression (non-psychotic)     ERM OS (epiretinal membrane, left eye)      Generalized osteoarthrosis, unspecified site      Glaucoma suspect      Hypertension      Lichen planus      Other psoriasis      PVD (posterior vitreous detachment), left eye      Sarcoidosis (H)      Sarcoidosis (H)      Type II or unspecified type diabetes mellitus without mention of complication, not stated as uncontrolled      Unspecified hypothyroidism     Hypothyroidism     Unspecified viral hepatitis C without hepatic coma      Viral warts, unspecified        SOCIAL HISTORY:  The patient was an  in the [employer] and stopped working 3 years ago.  He lives with his son, who is in college and is studying to be a .  He is .  He did drink heavily in his past.  He stopped in 1992.  Smoking he stopped in 1994.  He denies any other illicit drug use.      FAMILY HISTORY:  There is no family history of autoimmune diseases or sudden cardiac death.     CURRENT MEDICATIONS:  Current Outpatient Prescriptions   Medication Sig Dispense Refill     order for DME Updated Oxygen: Patient requires supplemental Oxygen 3 LPM via nasal canula with activity and 2 LPM nocturnally. Please provide patient with a portable oxygen concentrator for improved  mobility.  Okay to spot check or titrated for conserving to keep stats above 90%. Oxygen will be for a lifetime. 1 Device 0     order for DME She requested for a 4 wheel walker, would like to change it to 4 wheel walker with a seat and oxygen tank durant.     Need this faxed over to her   [phone] 1 Device 1     omeprazole (PRILOSEC) 20 MG CR capsule Take 1 capsule (20 mg) by mouth daily 90 capsule 3     atorvastatin (LIPITOR) 40 MG tablet Take 1 tablet (40 mg) by mouth daily 30 tablet 3     clopidogrel (PLAVIX) 75 MG tablet Take 1 tablet (75 mg) by mouth daily 90 tablet 3     sildenafil (REVATIO/VIAGRA) 20 MG tablet Take 1 tablet (20 mg) by mouth 3 times daily 90 tablet 3     furosemide (LASIX) 20 MG tablet Take 3 tablets (60 mg) by mouth 2 times daily 180 tablet 0     levothyroxine (SYNTHROID/LEVOTHROID) 137 MCG tablet Take 1 tablet (137 mcg) by mouth daily 90 tablet 1     warfarin (COUMADIN) 5 MG tablet Take 1 tablet (5 mg) by mouth daily 30 tablet 6     polyethylene glycol (MIRALAX) powder Take 17 g (1 capful) by mouth daily 510 g 1     tiotropium (SPIRIVA HANDIHALER) 18 MCG capsule Inhale contents of one capsule daily. 90 capsule 3     albuterol (PROAIR HFA/PROVENTIL HFA/VENTOLIN HFA) 108 (90 BASE) MCG/ACT Inhaler Inhale 2 puffs into the lungs every 6 hours as needed for shortness of breath / dyspnea 3 Inhaler 6     fluticasone-vilanterol (BREO ELLIPTA) 100-25 MCG/INH oral inhaler Inhale 1 puff into the lungs daily 3 Inhaler 3     inFLIXimab (REMICADE) 100 MG injection Inject 100 mg into the vein every 28 days        metoprolol (LOPRESSOR) 100 MG tablet Take 1 tablet (100 mg) by mouth 2 times daily 60 tablet 11     methotrexate 2.5 MG tablet Take 3 tablets (7.5 mg) by mouth once a week On Mondays (Patient taking differently: Take 2.5mg by mouth weekly on Mondays.) 48 tablet 3     blood glucose monitoring (ACCU-CHEK RONNIE PLUS) test strip Use to test blood sugar 2 times daily or as directed.  3 month supply.  "200 each 3     blood glucose monitoring (ACCU-CHEK FASTCLIX) lancets Use to test blood sugar 2 times daily or as directed.  102 lancets per box.  3 month supply. 2 Box 3     blood glucose monitoring (NO BRAND SPECIFIED) meter device kit Use to test blood sugar 2 times daily. 1 kit 0     Urea (CARMOL 40) 40 % CREA To feet daily (Patient taking differently: Apply to feet daily as needed.) 199 g 4     Multiple Vitamins-Minerals (MULTIVITAMIN & MINERAL PO) Take 1 tablet by mouth daily.       mirtazapine (REMERON) 15 MG tablet Take 15 mg by mouth At Bedtime.         ROS:   Constitutional: No fever, chills, or sweats. Weight has been relatively stable   ENT: No visual disturbance, ear ache, epistaxis, sore throat.   Allergies/Immunologic: Negative.   Respiratory: + intermittent cough, no hemoptysis.   Cardiovascular: As per HPI.   GI: No nausea, vomiting, hematemesis, melena, or hematochezia.   : No urinary frequency, dysuria, or hematuria.   Integument: Negative.   Psychiatric: frustrated with his health   Neuro: Negative.   Endocrinology: Negative.   Musculoskeletal: sore on his backside which is new in the last few months    EXAM:  BP (!) 162/97 (BP Location: Left arm, Patient Position: Chair, Cuff Size: Adult Regular)  Pulse 77  Ht 1.702 m (5' 7\")  Wt 86 kg (189 lb 9.6 oz)  SpO2 98%  BMI 29.7 kg/m2  General: appears comfortable, alert and articulate, sitting up in chair, on oxygen  Head: normocephalic, atraumatic  Eyes: anicteric sclera, EOMI  Neck: no adenopathy  Orophyarynx: moist mucosa, no lesions, dentition intact  Heart: PMI unable to be appreciated, regular, S1/S2, trace systolic murmur at the apex, no gallop, rub, estimated JVP <10, peripheral pulses palpable in UE     Lungs: slight expiratory wheeze, no crackles   Abdomen:  Non distended, non-tender, bowel sounds present, no hepatosplenomegaly  Extremities: no clubbing, cyanosis; mild LE edema bilaterally  Neurological: normal speech and affect, no " gross motor deficits    Labs:  CBC RESULTS:  Lab Results   Component Value Date    WBC 10.3 08/29/2017    RBC 3.78 (L) 08/29/2017    HGB 11.5 (L) 08/29/2017    HCT 34.8 (L) 08/29/2017    MCV 92 08/29/2017    MCH 30.4 08/29/2017    MCHC 33.0 08/29/2017    RDW 14.6 08/29/2017     08/29/2017       CMP RESULTS:  Lab Results   Component Value Date     08/29/2017    POTASSIUM 4.3 08/29/2017    CHLORIDE 99 08/29/2017    CO2 26 08/29/2017    ANIONGAP 9 08/29/2017     (H) 08/29/2017    BUN 52 (H) 08/29/2017    CR 1.97 (H) 08/29/2017    GFRESTIMATED 33 (L) 08/29/2017    GFRESTBLACK 40 (L) 08/29/2017    RAFFY 8.8 08/29/2017    BILITOTAL 0.5 08/29/2017    ALBUMIN 3.4 08/29/2017    ALKPHOS 151 (H) 08/29/2017    ALT 21 08/29/2017    AST 14 08/29/2017        INR RESULTS:  Lab Results   Component Value Date    INR 2.59 (H) 08/29/2017       Lab Results   Component Value Date    MAG 2.0 05/13/2017     Lab Results   Component Value Date    NTBNPI 5090 (H) 05/15/2017     Lab Results   Component Value Date    NTBNP 674 (H) 03/16/2015       Echo - 2017  Interpretation Summary  Borderline reduced left ventricular systolic function, (EF 50-55%). Left  ventricular diastolic function is indeterminate.  The right ventricle is normal size; global RV function is mildly reduced.  No significant valvular pathology.  The inferior vena cava is normal; estimated mean RA pressure is <3 mmHg.  In comparison to prior limited study dated 1/19/17, the LV function is  unchanged, however much improved from chronologically earlier study of the  same date.    Left Ventricle  Left ventricular size is normal. Mild concentric wall thickening consistent  with left ventricular hypertrophy is present. Left ventricular diastolic  function is indeterminate. Borderline (EF 50-55%) reduced left ventricular  function is present.     Right Ventricle  The right ventricle is normal size. Global right ventricular function is  mildly  reduced.     Atria  The left atrium appears normal. Mild to moderate right atrial enlargement is  present.     Mitral Valve  The mitral valve is normal.        Aortic Valve  Mild aortic valve sclerosis is present.     Tricuspid Valve  Trace tricuspid insufficiency is present. Pulmonary artery systolic pressure  cannot be assessed.     Pulmonic Valve  The pulmonic valve cannot be assessed.     Vessels  The inferior vena cava is normal. The aorta root is normal. Estimated mean  right atrial pressure is <3 mmHg.     Pericardium  No pericardial effusion is present.     MMode/2D Measurements & Calculations  IVSd: 1.2 cm  LVIDd: 4.4 cm  LVIDs: 3.0 cm  LVPWd: 1.00 cm  FS: 31.8 %  EDV(Teich): 88.9 ml  ESV(Teich): 35.5 ml     LV mass(C)d: 166.9 grams  Ao root diam: 3.3 cm  asc Aorta Diam: 3.0 cm  LA/Ao: 1.2  LVOT diam: 2.1 cm  LVOT area: 3.4 cm2  LA Volume (BP): 59.5 ml  LA Volume Index (BP): 29.3 ml/m2        Doppler Measurements & Calculations  MV E max jhonatan: 65.1 cm/sec  MV A max jhonatan: 81.9 cm/sec  MV E/A: 0.79  MV mean P.4 mmHg  MV V2 VTI: 19.5 cm  MVA(VTI): 2.7 cm2     MV dec time: 0.21 sec  Ao V2 max: 107.9 cm/sec  Ao max P.7 mmHg  LV V1 VTI: 15.8 cm  SV(LVOT): 52.8 ml  PA acc time: 0.10 sec  TR max jhonatan: 285.0 cm/sec  TR max P.5 mmHg  Lateral E/e': 7.6  Medial E/e': 9.7     EKG demonstrated sinus rhythm with PACs.      Last pulmonary function tests were in 2017, shows FEV1 of 2.74, which is 69% of predicted, an FEV1 of 39% of predicted at 1.16.  DLCO was not performed.      1.  Coronary Angiogram 2017  1. LM is without angiographic evidence of disease.   2. LAD supplies the entire apex (type 3) and gives rise to septal perforators, D1 and D2. The mLAD has a 75% hazy stenosis just distal to the existing stent, D1 has a 60% ostial stenosis and D2 has serial 80% in-stent stenoses and the remainder of the vessel has mild luminal irregularities.   3. LCX gives rise to OM1 and OM2 vessels. The pLCx has  a 30% stenosis, OM1 has a 70% stenosis in a small vessel and the remainder of the vessel has mild luminal irregularities.   4. RCA was not studied as was recently evaluated.       PERCUTANEOUS CORONARY INTERVENTION:  1. LAD Lesions:  A 6Fr XB-3.5 guide catheter was positioned at the ostium of the LM. Heparin was administered to achieve a goal ACT > 250 sec. A Christiano Blue wire was advanced across the D2 in-stent lesion and positioned in the distal D2. A 1.5x15mm balloon was used to pre-dilate the lesion. A [id number] Synergy drug eluting stent was successfully deployed across the D2 lesion with inflation to 11 ashley. The wire was then repositioned in the dLAD. The mLAD lesion was treated with a [id number] Synergy drug eluting stent with inflation to 14 ashley. Final angiography showed no evidence of perforation or dissection with residual stenosis of 0% and JOHN 3 flow. No complications.      COMPLICATIONS:  1. None      SUMMARY:   1. Successful deployment of a [id number] Synergy drug eluting stent to the D2.  2. Successful deployment of a [id number] Synergy drug eluting stent to the mLAD.     2.  CT Abd 5/13/2017  1. Enlarging right retroperitoneal hematoma with active extravasation from the right external iliac artery.  2. Small left heterogeneous pleural effusion with indeterminate density. Findings are of uncertain etiology but hemorrhage or infection within the pleural space can have this appearance.  3. Changes in the lung consistent with sarcoidosis.  4. Nonspecific lymphadenopathy associated with the celiac axis.     3.  Placement of covered stent to right external iliac artery 5/13/2017  1. Diagnostic angiography demonstrating a pseudoaneurysm with the neck  arising from distal right external iliac artery with associated active  extravasation.  2. Successful stent exclusion of the pseudoaneurysm with a 6 mm x 39 mm Atrium I cast balloon expandable stent graft.        Assessment and Plan:   In summary, this is a very  pleasant 72-year-old man with a history of pulmonary sarcoidosis and a presumptive diagnosis of cardiac sarcoid based on the fact that he has high filling pressures and atrial arrhythmias but without a confirmed tissue diagnosis of cardiac sarcoidosis, who is maintained on infliximab and methotrexate, with decent control of his pulmonary symptoms over the last several years with need for supplemental oxygen.     He reports feeling much improved with diuresis and with coronary revascularization which occurred in May 2017. This was complicated by an RP bleed requiring covered stent to the iliac artery, RADHA which improved, and left femoral pseudoaneurysm (occurred with procedure to stent his RP bleed)  that required thrombin injection.     His blood pressure has been high in pulmonary rehab and continues to be high at home as well as in the cardiac clinic. He appears to be euvolemic on exam. He has proteinuria on his urinalysis and his nephrology team has been attempting to resume his ARB. We will start on losartan 50mg to help with the proteinuria and the blood pressure.    Given that he has extensive pulmonary sarcoidosis, we will would like to hold off on further workup for potential active cardiac sarcoidosis. Given history of coronary artery disease, he will continue on warfarin and clopidogrel for at least one year.     Other:   With regard to his atrial flutter, he is presently in sinus on metoprolol 100 b.i.d. as well as anticoagulated, currently in sinus rhythm.       Chronic kidney disease with proteinuria. Starting on losartan as above.    Please feel free to contact me if you have any further questions.      Patient discussed with Dr. Paige.    Nicanor Mcdowell MD  Advanced Heart Failure/Heart Transplant Fellow  [facility] Division of Cardiology   [phone]     I have seen and examined the patient with the house staff on August 31, 2017 and agree with the outlined assessment and  plan.      Diane Paige MD   of Medicine   [facility] Division of Cardiology         CC  Patient Care Team:  Jamie Foster MD as PCP - General (Internal Medicine)  Jamie Foster MD as Referring Physician (Internal Medicine)  Kina Greco MD as MD (Ophthalmology)  Perlman, David Morris, MD as MD (Internal Medicine)  Haley Renner DO as Referring Physician (Student in organized health care education/training program)  Chicho Mejia, AGATHA (Podiatry)  Macey Roy MD as MD (Internal Medicine)  Thompson Gonzalez MD as MD (Cardiology)  Karlene Farrell RN as Nurse Coordinator (Cardiology)  Madalyn Fajardo MD PhD as MD (Family Practice)  Jaclyn Pina RN as Nurse Coordinator (Clinical Cardiac Electrophysiology)  Brian Vazquez MD as MD (Clinical Cardiac Electrophysiology)  Elizabeth Cabral, APRN CNP as Nurse Practitioner (Nurse Practitioner)  Maria Victoria Palmer MD as MD (Radiology)  Jaclyn Pina RN as Nurse Coordinator (Clinical Cardiac Electrophysiology)  Brian Vazquez MD as MD (Clinical Cardiac Electrophysiology)  ELIZABETH CABRAL

## 2017-08-31 NOTE — NURSING NOTE
Chief Complaint   Patient presents with     Follow Up For     3 mo. follow up for CAD/Sarcoid/ labs     Vitals were taken and medications were reconciled.     Suzanna Schumacher MA    1:17 PM

## 2017-09-05 ENCOUNTER — ANTICOAGULATION THERAPY VISIT (OUTPATIENT)
Dept: ANTICOAGULATION | Facility: CLINIC | Age: 72
End: 2017-09-05

## 2017-09-05 ENCOUNTER — INFUSION THERAPY VISIT (OUTPATIENT)
Dept: INFUSION THERAPY | Facility: CLINIC | Age: 72
End: 2017-09-05
Attending: INTERNAL MEDICINE
Payer: MEDICARE

## 2017-09-05 VITALS
SYSTOLIC BLOOD PRESSURE: 146 MMHG | DIASTOLIC BLOOD PRESSURE: 78 MMHG | OXYGEN SATURATION: 98 % | WEIGHT: 191.3 LBS | HEART RATE: 77 BPM | BODY MASS INDEX: 29.96 KG/M2 | TEMPERATURE: 96 F

## 2017-09-05 DIAGNOSIS — D86.0 SARCOIDOSIS OF LUNG (H): Primary | ICD-10-CM

## 2017-09-05 DIAGNOSIS — I48.92 ATRIAL FLUTTER (H): ICD-10-CM

## 2017-09-05 DIAGNOSIS — N18.4 CKD (CHRONIC KIDNEY DISEASE) STAGE 4, GFR 15-29 ML/MIN (H): ICD-10-CM

## 2017-09-05 DIAGNOSIS — Z79.01 LONG-TERM (CURRENT) USE OF ANTICOAGULANTS: ICD-10-CM

## 2017-09-05 DIAGNOSIS — I50.30 (HFPEF) HEART FAILURE WITH PRESERVED EJECTION FRACTION (H): ICD-10-CM

## 2017-09-05 DIAGNOSIS — I48.92 ATRIAL FLUTTER WITH RAPID VENTRICULAR RESPONSE (H): ICD-10-CM

## 2017-09-05 DIAGNOSIS — D86.9 SARCOIDOSIS: ICD-10-CM

## 2017-09-05 LAB
ALBUMIN SERPL-MCNC: 3.4 G/DL (ref 3.4–5)
ANION GAP SERPL CALCULATED.3IONS-SCNC: 7 MMOL/L (ref 3–14)
BUN SERPL-MCNC: 46 MG/DL (ref 7–30)
CALCIUM SERPL-MCNC: 8.5 MG/DL (ref 8.5–10.1)
CHLORIDE SERPL-SCNC: 99 MMOL/L (ref 94–109)
CO2 SERPL-SCNC: 28 MMOL/L (ref 20–32)
CREAT SERPL-MCNC: 2.13 MG/DL (ref 0.66–1.25)
CREAT UR-MCNC: 61 MG/DL
GFR SERPL CREATININE-BSD FRML MDRD: 31 ML/MIN/1.7M2
GLUCOSE SERPL-MCNC: 147 MG/DL (ref 70–99)
INR PPP: 1.91 (ref 0.86–1.14)
PHOSPHATE SERPL-MCNC: 2.8 MG/DL (ref 2.5–4.5)
POTASSIUM SERPL-SCNC: 4.1 MMOL/L (ref 3.4–5.3)
PROT UR-MCNC: 1.21 G/L
PROT/CREAT 24H UR: 1.98 G/G CR (ref 0–0.2)
SODIUM SERPL-SCNC: 134 MMOL/L (ref 133–144)

## 2017-09-05 PROCEDURE — 80069 RENAL FUNCTION PANEL: CPT | Performed by: INTERNAL MEDICINE

## 2017-09-05 PROCEDURE — 25000128 H RX IP 250 OP 636: Mod: ZF | Performed by: INTERNAL MEDICINE

## 2017-09-05 PROCEDURE — 36415 COLL VENOUS BLD VENIPUNCTURE: CPT | Performed by: INTERNAL MEDICINE

## 2017-09-05 PROCEDURE — 84156 ASSAY OF PROTEIN URINE: CPT | Performed by: INTERNAL MEDICINE

## 2017-09-05 PROCEDURE — 85610 PROTHROMBIN TIME: CPT | Performed by: INTERNAL MEDICINE

## 2017-09-05 PROCEDURE — 96413 CHEMO IV INFUSION 1 HR: CPT

## 2017-09-05 PROCEDURE — 96415 CHEMO IV INFUSION ADDL HR: CPT

## 2017-09-05 RX ORDER — ACETAMINOPHEN 325 MG/1
650 TABLET ORAL ONCE
Status: CANCELLED
Start: 2017-09-05 | End: 2017-09-05

## 2017-09-05 RX ADMIN — INFLIXIMAB 400 MG: 100 INJECTION, POWDER, LYOPHILIZED, FOR SOLUTION INTRAVENOUS at 12:36

## 2017-09-05 NOTE — PATIENT INSTRUCTIONS
Infliximab Solution for injection  What is this medicine?  INFLIXIMAB (in FLIX i mab) is used to treat Crohn's disease and ulcerative colitis. It is also used to treat ankylosing spondylitis, psoriasis, and some forms of arthritis.  This medicine may be used for other purposes; ask your health care provider or pharmacist if you have questions.  What should I tell my health care provider before I take this medicine?  They need to know if you have any of these conditions:    diabetes    exposure to tuberculosis    heart failure    hepatitis or liver disease    immune system problems    infection    lung or breathing disease, like COPD    multiple sclerosis    current or past resident of [place]    seizure disorder    an unusual or allergic reaction to infliximab, mouse proteins, other medicines, foods, dyes, or preservatives    pregnant or trying to get pregnant    breast-feeding  How should I use this medicine?  This medicine is for injection into a vein. It is usually given by a health care professional in a hospital or clinic setting.  A special MedGuide will be given to you by the pharmacist with each prescription and refill. Be sure to read this information carefully each time.  Talk to your pediatrician regarding the use of this medicine in children. Special care may be needed.  Overdosage: If you think you have taken too much of this medicine contact a poison control center or emergency room at once.  NOTE: This medicine is only for you. Do not share this medicine with others.  What if I miss a dose?  It is important not to miss your dose. Call your doctor or health care professional if you are unable to keep an appointment.  What may interact with this medicine?  Do not take this medicine with any of the following medications:    anakinra    rilonacept  This medicine may also interact with the following medications:    vaccines  This list may not describe all possible interactions.  Give your health care provider a list of all the medicines, herbs, non-prescription drugs, or dietary supplements you use. Also tell them if you smoke, drink alcohol, or use illegal drugs. Some items may interact with your medicine.  What should I watch for while using this medicine?  Visit your doctor or health care professional for regular checks on your progress.  If you get a cold or other infection while receiving this medicine, call your doctor or health care professional. Do not treat yourself. This medicine may decrease your body's ability to fight infections. Before beginning therapy, your doctor may do a test to see if you have been exposed to tuberculosis.  This medicine may make the symptoms of heart failure worse in some patients. If you notice symptoms such as increased shortness of breath or swelling of the ankles or legs, contact your health care provider right away.  If you are going to have surgery or dental work, tell your health care professional or dentist that you have received this medicine.  If you take this medicine for plaque psoriasis, stay out of the sun. If you cannot avoid being in the sun, wear protective clothing and use sunscreen. Do not use sun lamps or tanning beds/booths.  What side effects may I notice from receiving this medicine?  Side effects that you should report to your doctor or health care professional as soon as possible:    allergic reactions like skin rash, itching or hives, swelling of the face, lips, or tongue    chest pain    fever or chills, usually related to the infusion    muscle or joint pain    red, scaly patches or raised bumps on the skin    signs of infection - fever or chills, cough, sore throat, pain or difficulty passing urine    swollen lymph nodes in the neck, underarm, or groin areas    unexplained weight loss    unusual bleeding or bruising    unusually weak or tired    yellowing of the eyes or skin  Side effects that usually do not require medical  attention (report to your doctor or health care professional if they continue or are bothersome):    headache    heartburn or stomach pain    nausea, vomiting  This list may not describe all possible side effects. Call your doctor for medical advice about side effects. You may report side effects to FDA at 4-210-FDA-9891.  Where should I keep my medicine?  This drug is given in a hospital or clinic and will not be stored at home.  NOTE:This sheet is a summary. It may not cover all possible information. If you have questions about this medicine, talk to your doctor, pharmacist, or health care provider. Copyright  2016 Gold Standard

## 2017-09-05 NOTE — MR AVS SNAPSHOT
Rohan Howard Mabel   9/5/2017   Anticoagulation Therapy Visit    Description:  71 year old male   Provider:  Marcia Elias, RN   Department:  [facility]           INR as of 9/5/2017     Today's INR 1.91!      Anticoagulation Summary as of 9/5/2017     INR goal 2.0-3.0   Today's INR 1.91!   Full instructions 5 mg every day   Next INR check 9/12/2017    Indications   Long-term (current) use of anticoagulants [Z79.01] [Z79.01]  Atrial flutter with rapid ventricular response (H) [I48.92]         September 2017 Details    Sun Mon Tue Wed Thu Fri Sat          1               2                 3               4               5      5 mg   See details      6      5 mg         7      5 mg         8      5 mg         9      5 mg           10      5 mg         11      5 mg         12            13               14               15               16                 17               18               19               20               21               22               23                 24               25               26               27               28               29               30                Date Details   09/05 This INR check       Date of next INR:  9/12/2017         How to take your warfarin dose     To take:  5 mg Take 1 of the 5 mg tablets.

## 2017-09-05 NOTE — PROGRESS NOTES
Infusion Nursing Note  Rohan Monroe presents [facility] for:   Chief Complaint   Patient presents with     Infusion     Remicade     During today's [facility] appointment, orders from Dr. Perlman were completed.  Frequency: every 4 weeks    Progress note:  Patient identification verified by name and date of birth.  Assessment completed.  Vitals recorded in Doc Flowsheets.  Patient was provided with education regarding infusion and possible side effects.  Patient verbalized understanding.     Premedications: were not ordered.  Infusion Rates: infusion given over approximately 1 hour.  Labs: were drawn prior to appointment in lab per pt.  Vascular access: peripheral IV placed today.  Treatment Conditions: Biologic/Chemo Checklist ~~~ NOTE: If the patient answers yes to any of the questions below, hold the infusion and contact ordering provider or on-call provider.    1. Have you recently had an elevated temperature, fever, chills, productive cough, coughing for 3 weeks or longer or hemoptysis,  abnormal vital signs, night sweats,  chest pain or have you noticed a decrease in your appetite, unexplained weight loss or fatigue? No  2. Do you have any open wounds or new incisions? No  3. Do you have any recent or upcoming hospitalizations, surgeries or dental procedures? No  4. Do you currently have or recently have had any signs of illness or infection or are you on any antibiotics? No  5. Have you had any new, sudden or worsening abdominal pain? No  6. Have you or anyone in your household received a live vaccination in the past 4 weeks? Please note:  No live vaccines while on biologic/chemotherapy until 6 months after the last treatment.  Patient can receive the flu vaccine (shot only) and the pneumovax.  It is optimal for the patient to get these vaccines mid cycle, but they can be given at any time as long as it is not on the day of the  infusion. No  7. Have you recently been diagnosed with any new nervous system diseases (ie. Multiple sclerosis, Guillain [place], seizures, neurological changes) or cancer diagnosis? Are you on any form of radiation or chemotherapy? No  8. Are you pregnant or breast feeding or do you have plans of pregnancy in the future? N/A  9. Have you been having any signs of worsening depression or suicidal ideations?  (benlysta only) n/A  10. Have there been any other new onset medical symptoms? No    Patient tolerated infusion: well    Discharge Plan:   Follow up plan of care with: ongoing infusions at [facility]. and primary medical doctor.  Discharge instructions were reviewed with patient.  Patient/representative verbalized understanding of discharge instructions and all questions answered.  Patient discharged from [facility] in stable condition.    Rachel Ardon RN       Administrations This Visit     inFLIXimab (REMICADE) 400 mg in NaCl 0.9 % 315 mL non-oncology use     Admin Date Action Dose Rate Route Administered By          09/05/2017 New Bag 400 mg 157.5 mL/hr Intravenous Rachel Ardon RN                           /79  Pulse 77  Temp 96  F (35.6  C) (Oral)  Wt 86.8 kg (191 lb 4.8 oz)  SpO2 98%  BMI 29.96 kg/m2

## 2017-09-05 NOTE — MR AVS SNAPSHOT
After Visit Summary   9/5/2017    Rohan Monroe    MRN: [id number]           Patient Information     Date Of Birth          1945        Visit Information        Provider Department      9/5/2017 12:00 PM UC 47 ATC; UC SPEC INFUSION [facility]        Today's Diagnoses     Sarcoidosis of lung (HCC)    -  1    SARCOIDOSIS-systemic          Care Instructions      Infliximab Solution for injection  What is this medicine?  INFLIXIMAB (in FLIX i mab) is used to treat Crohn's disease and ulcerative colitis. It is also used to treat ankylosing spondylitis, psoriasis, and some forms of arthritis.  This medicine may be used for other purposes; ask your health care provider or pharmacist if you have questions.  What should I tell my health care provider before I take this medicine?  They need to know if you have any of these conditions:    diabetes    exposure to tuberculosis    heart failure    hepatitis or liver disease    immune system problems    infection    lung or breathing disease, like COPD    multiple sclerosis    current or past resident of [place]    seizure disorder    an unusual or allergic reaction to infliximab, mouse proteins, other medicines, foods, dyes, or preservatives    pregnant or trying to get pregnant    breast-feeding  How should I use this medicine?  This medicine is for injection into a vein. It is usually given by a health care professional in a hospital or clinic setting.  A special MedGuide will be given to you by the pharmacist with each prescription and refill. Be sure to read this information carefully each time.  Talk to your pediatrician regarding the use of this medicine in children. Special care may be needed.  Overdosage: If you think you have taken too much of this medicine contact a poison control center or emergency room at once.  NOTE: This medicine is only for you. Do not share this  medicine with others.  What if I miss a dose?  It is important not to miss your dose. Call your doctor or health care professional if you are unable to keep an appointment.  What may interact with this medicine?  Do not take this medicine with any of the following medications:    anakinra    rilonacept  This medicine may also interact with the following medications:    vaccines  This list may not describe all possible interactions. Give your health care provider a list of all the medicines, herbs, non-prescription drugs, or dietary supplements you use. Also tell them if you smoke, drink alcohol, or use illegal drugs. Some items may interact with your medicine.  What should I watch for while using this medicine?  Visit your doctor or health care professional for regular checks on your progress.  If you get a cold or other infection while receiving this medicine, call your doctor or health care professional. Do not treat yourself. This medicine may decrease your body's ability to fight infections. Before beginning therapy, your doctor may do a test to see if you have been exposed to tuberculosis.  This medicine may make the symptoms of heart failure worse in some patients. If you notice symptoms such as increased shortness of breath or swelling of the ankles or legs, contact your health care provider right away.  If you are going to have surgery or dental work, tell your health care professional or dentist that you have received this medicine.  If you take this medicine for plaque psoriasis, stay out of the sun. If you cannot avoid being in the sun, wear protective clothing and use sunscreen. Do not use sun lamps or tanning beds/booths.  What side effects may I notice from receiving this medicine?  Side effects that you should report to your doctor or health care professional as soon as possible:    allergic reactions like skin rash, itching or hives, swelling of the face, lips, or tongue    chest pain    fever or  chills, usually related to the infusion    muscle or joint pain    red, scaly patches or raised bumps on the skin    signs of infection - fever or chills, cough, sore throat, pain or difficulty passing urine    swollen lymph nodes in the neck, underarm, or groin areas    unexplained weight loss    unusual bleeding or bruising    unusually weak or tired    yellowing of the eyes or skin  Side effects that usually do not require medical attention (report to your doctor or health care professional if they continue or are bothersome):    headache    heartburn or stomach pain    nausea, vomiting  This list may not describe all possible side effects. Call your doctor for medical advice about side effects. You may report side effects to FDA at 1-671-RIZ-8645.  Where should I keep my medicine?  This drug is given in a hospital or clinic and will not be stored at home.  NOTE:This sheet is a summary. It may not cover all possible information. If you have questions about this medicine, talk to your doctor, pharmacist, or health care provider. Copyright  2016 Gold Standard                Follow-ups after your visit        Your next 10 appointments already scheduled     Sep 07, 2017  2:00 PM CDT   Cardiac Evaluation with  Cardiac Rehab 3   [facility] ([facility])    [address], [address]  [facility] [address]   [phone]            Sep 08, 2017  9:30 AM CDT   RETURN GLAUCOMA with Kina Greco MD   [facility] Clinic ([facility])    [address]  [address] [address]  [facility] [address]   [phone]            Oct 04, 2017 12:00 PM CDT   Infusion 180 with  SPEC INFUSION,  47 ATC   [facility] ([facility])    [address]  2nd Floor  [facility] [id number] [phone]            Oct 24, 2017  1:30 PM CDT   Lab with  LAB    Health Lab ([facility])    [address]  1st Floor  [facility] [address]   [phone]            Oct 24, 2017  2:30 PM CDT   (Arrive by 2:00 PM)   Return Visit with Ollie Michel MD   [facility] ([facility])    [address]  3rd Floor  [facility] [address]   [phone]            Nov 01, 2017 12:00 PM CDT   Infusion 180 with UC SPEC INFUSION, UC 47 ATC   [facility] Specialty and Procedure ([facility])    [address]  [address]nd [facility] [address]   [phone]            Nov 29, 2017 12:00 PM CST   Infusion 180 with UC SPEC INFUSION, UC 47 ATC   [facility] Special[facility] Procedure ([facility])    [address]  [address] [facility] [id number] [phone]              Who to contact     If you have questions or need follow up information about today's clinic visit or your schedule please contact [facility] directly at [phone].  Normal or non-critical lab and imaging results will be communicated to you by [email or web address], letter or phone within 4 business days after the clinic has received the results. If you do not hear from us within 7 days, please contact the clinic through [email or web address] or phone. If you have a critical or abnormal lab result, we will notify you by phone as soon as possible.  Submit refill requests through [employer] or call your pharmacy and they will forward the refill request to us. Please allow 3 business days for your refill to be completed.          Additional Information About Your Visit        [email or web address] Information     [employer] gives you secure access to your electronic health record. If you see a primary care provider, you can also send messages to your care team and make appointments. If you have questions, please call your primary care clinic.  If you do not  have a primary care provider, please call [phone] and they will assist you.        Care EveryWhere ID     This is your Care EveryWhere ID. This could be used by other organizations to access your [facility] medical records  [id number]        Your Vitals Were     Pulse Temperature Pulse Oximetry BMI (Body Mass Index)          77 96  F (35.6  C) (Oral) 98% 29.96 kg/m2         Blood Pressure from Last 3 Encounters:   09/05/17 146/78   08/31/17 (!) 162/97   08/29/17 168/80    Weight from Last 3 Encounters:   09/05/17 86.8 kg (191 lb 4.8 oz)   08/31/17 86 kg (189 lb 9.6 oz)   08/29/17 86.1 kg (189 lb 12.8 oz)              Today, you had the following     No orders found for display         Today's Medication Changes          These changes are accurate as of: 9/5/17  2:54 PM.  If you have any questions, ask your nurse or doctor.               These medicines have changed or have updated prescriptions.        Dose/Directions    methotrexate 2.5 MG tablet CHEMO   This may have changed:    - how much to take  - how to take this  - when to take this  - additional instructions   Used for:  Sarcoidosis (H)        Dose:  7.5 mg   Take 3 tablets (7.5 mg) by mouth once a week On Mondays   Quantity:  48 tablet   Refills:  3       Urea 40 % Crea   Commonly known as:  CARMOL 40   This may have changed:  additional instructions   Used for:  Dermatophytosis of nail, Corns and callosities        To feet daily   Quantity:  199 g   Refills:  4                Primary Care Provider Office Phone # Fax #    Jamie W MD Cristian [phone] [address] [address]        Equal Access to Services     [facility]: Hadalthea Lewis, judi barclay, qaeevlio kaalmanelly russ. So [facility] [phone].    ATENCIÓN: Si habla español, tiene a mcfadden disposición servicios gratuitos de asistencia lingüística. Llame al [phone].    We comply with  applicable federal civil rights laws and [place] laws. We do not discriminate on the basis of race, color, national origin, age, disability sex, sexual orientation or gender identity.            Thank you!     Thank you for choosing [facility]  for your care. Our goal is always to provide you with excellent care. Hearing back from our patients is one way we can continue to improve our services. Please take a few minutes to complete the written survey that you may receive in the mail after your visit with us. Thank you!             Your Updated Medication List - Protect others around you: Learn how to safely use, store and throw away your medicines at www.disposemymeds.org.          This list is accurate as of: 9/5/17  2:54 PM.  Always use your most recent med list.                   Brand Name Dispense Instructions for use Diagnosis    albuterol 108 (90 BASE) MCG/ACT Inhaler    PROAIR HFA/PROVENTIL HFA/VENTOLIN HFA    3 Inhaler    Inhale 2 puffs into the lungs every 6 hours as needed for shortness of breath / dyspnea    Sarcoidosis (H)       atorvastatin 40 MG tablet    LIPITOR    30 tablet    Take 1 tablet (40 mg) by mouth daily    Coronary artery disease involving native coronary artery of native heart without angina pectoris, CAD S/P percutaneous coronary angioplasty       blood glucose monitoring lancets     2 Box    Use to test blood sugar 2 times daily or as directed.  102 lancets per box.  3 month supply.    Type 2 diabetes, HbA1C goal < 8% (H)       blood glucose monitoring meter device kit    no brand specified    1 kit    Use to test blood sugar 2 times daily.    Type 2 diabetes mellitus with diabetic nephropathy (H)       blood glucose monitoring test strip    ACCU-CHEK RONNIE PLUS    200 each    Use to test blood sugar 2 times daily or as directed.  3 month supply.    Type 2 diabetes, HbA1C goal < 8% (H)       clopidogrel 75 MG tablet    PLAVIX    90  tablet    Take 1 tablet (75 mg) by mouth daily    CAD S/P percutaneous coronary angioplasty, Coronary artery disease involving native coronary artery of native heart without angina pectoris       fluticasone-vilanterol 100-25 MCG/INH oral inhaler    BREO ELLIPTA    3 Inhaler    Inhale 1 puff into the lungs daily    Chronic obstructive pulmonary disease, unspecified COPD type (H)       furosemide 20 MG tablet    LASIX    180 tablet    Take 3 tablets (60 mg) by mouth 2 times daily    Pulmonary hypertension (H)       inFLIXimab 100 MG injection    REMICADE     Inject 100 mg into the vein every 28 days        levothyroxine 137 MCG tablet    SYNTHROID/LEVOTHROID    90 tablet    Take 1 tablet (137 mcg) by mouth daily    Hypothyroidism       losartan 50 MG tablet    COZAAR    90 tablet    Take 1 tablet (50 mg) by mouth daily    (HFpEF) heart failure with preserved ejection fraction (H)       methotrexate 2.5 MG tablet CHEMO     48 tablet    Take 3 tablets (7.5 mg) by mouth once a week On Mondays    Sarcoidosis (H)       metoprolol 100 MG tablet    LOPRESSOR    60 tablet    Take 1 tablet (100 mg) by mouth 2 times daily    Hypertension secondary to other renal disorders       mirtazapine 15 MG tablet    REMERON     Take 15 mg by mouth At Bedtime.        MULTIVITAMIN & MINERAL PO      Take 1 tablet by mouth daily.        omeprazole 20 MG CR capsule    priLOSEC    90 capsule    Take 1 capsule (20 mg) by mouth daily    CAD S/P percutaneous coronary angioplasty, Coronary artery disease involving native coronary artery of native heart without angina pectoris, Sarcoidosis of lung (H)       * order for DME     1 Device    She requested for a 4 wheel walker, would like to change it to 4 wheel walker with a seat and oxygen tank durant.   Need this faxed over to her  [phone]    Sarcoidosis, lung (H), COPD (chronic obstructive pulmonary disease) (H), Abnormal gait, Congestive heart failure (H)       * order for DME     1 Device     Updated Oxygen: Patient requires supplemental Oxygen 3 LPM via nasal canula with activity and 2 LPM nocturnally. Please provide patient with a portable oxygen concentrator for improved mobility.  Okay to spot check or titrated for conserving to keep stats above 90%. Oxygen will be for a lifetime.    ILD (interstitial lung disease) (H), Hypoxia       polyethylene glycol powder    MIRALAX    510 g    Take 17 g (1 capful) by mouth daily    Constipation, unspecified constipation type       sildenafil 20 MG tablet    REVATIO/VIAGRA    90 tablet    Take 1 tablet (20 mg) by mouth 3 times daily    Pulmonary hypertension (H)       tiotropium 18 MCG capsule    SPIRIVA HANDIHALER    90 capsule    Inhale contents of one capsule daily.    Chronic obstructive pulmonary disease, unspecified COPD type (H)       Urea 40 % Crea    CARMOL 40    199 g    To feet daily    Dermatophytosis of nail, Corns and callosities       warfarin 5 MG tablet    COUMADIN    30 tablet    Take 1 tablet (5 mg) by mouth daily    Atrial flutter with rapid ventricular response (H)       * Notice:  This list has 2 medication(s) that are the same as other medications prescribed for you. Read the directions carefully, and ask your doctor or other care provider to review them with you.

## 2017-09-05 NOTE — PROGRESS NOTES
ANTICOAGULATION FOLLOW-UP CLINIC VISIT    Patient Name:  Rohan Monroe  Date:  9/5/2017  Contact Type:  Telephone    SUBJECTIVE:     Patient Findings     Positives Change in medications    Comments Pt started [place] 8/31/17           OBJECTIVE    INR   Date Value Ref Range Status   09/05/2017 1.91 (H) 0.86 - 1.14 Final       ASSESSMENT / PLAN  INR assessment THER    Recheck INR In: 1 WEEK    INR Location Clinic      Anticoagulation Summary as of 9/5/2017     INR goal 2.0-3.0   Today's INR 1.91!   Maintenance plan 5 mg (5 mg x 1) every day   Full instructions 5 mg every day   Weekly total 35 mg   Plan last modified Paige Moscoso, RN (8/7/2017)   Next INR check 9/12/2017   Priority INR   Target end date 4/20/2017    Indications   Long-term (current) use of anticoagulants [Z79.01] [Z79.01]  Atrial flutter with rapid ventricular response (H) [I48.92]         Anticoagulation Episode Summary     INR check location     Preferred lab     Send INR reminders to [facility]    Comments 3 months therapy, then reassess.        Anticoagulation Care Providers     Provider Role Specialty Phone number    Jamie Foster MD [facility] Internal Medicine [phone]            See the Encounter Report to view Anticoagulation Flowsheet and Dosing Calendar (Go to Encounters tab in chart review, and find the Anticoagulation Therapy Visit)    Spoke with patient. Gave them their lab results and new warfarin recommendation.  No changes in health, medication, or diet. No missed doses, no falls. No signs or symptoms of bleed or clotting.     Marcia Elias RN

## 2017-09-06 DIAGNOSIS — I50.22 CHRONIC SYSTOLIC HEART FAILURE (H): Primary | ICD-10-CM

## 2017-09-06 NOTE — PROGRESS NOTES
Date: 9/6/2017  Time of Call: 9:13 AM  Diagnosis:  Heart failure  VORB - Ordering provider: Diane Paige MD   Order: BMP on 9/21/17  Order received by: Alondra Mercedes RN   Follow-up/additional notes:

## 2017-09-07 ENCOUNTER — HOSPITAL ENCOUNTER (OUTPATIENT)
Dept: CARDIAC REHAB | Facility: CLINIC | Age: 72
End: 2017-09-07
Attending: INTERNAL MEDICINE
Payer: MEDICARE

## 2017-09-07 DIAGNOSIS — Z98.61 CAD S/P PERCUTANEOUS CORONARY ANGIOPLASTY: ICD-10-CM

## 2017-09-07 DIAGNOSIS — I25.10 CORONARY ARTERY DISEASE INVOLVING NATIVE CORONARY ARTERY OF NATIVE HEART WITHOUT ANGINA PECTORIS: ICD-10-CM

## 2017-09-07 DIAGNOSIS — I48.92 ATRIAL FLUTTER WITH RAPID VENTRICULAR RESPONSE (H): ICD-10-CM

## 2017-09-07 DIAGNOSIS — I25.10 CAD S/P PERCUTANEOUS CORONARY ANGIOPLASTY: ICD-10-CM

## 2017-09-07 PROCEDURE — 93798 PHYS/QHP OP CAR RHAB W/ECG: CPT | Performed by: REHABILITATION PRACTITIONER

## 2017-09-07 PROCEDURE — 40000116 ZZH STATISTIC OP CR VISIT: Performed by: REHABILITATION PRACTITIONER

## 2017-09-12 ENCOUNTER — ANTICOAGULATION THERAPY VISIT (OUTPATIENT)
Dept: ANTICOAGULATION | Facility: CLINIC | Age: 72
End: 2017-09-12

## 2017-09-12 ENCOUNTER — OFFICE VISIT (OUTPATIENT)
Dept: NEPHROLOGY | Facility: CLINIC | Age: 72
End: 2017-09-12
Attending: INTERNAL MEDICINE
Payer: MEDICARE

## 2017-09-12 VITALS
HEIGHT: 67 IN | DIASTOLIC BLOOD PRESSURE: 69 MMHG | SYSTOLIC BLOOD PRESSURE: 111 MMHG | OXYGEN SATURATION: 97 % | HEART RATE: 69 BPM

## 2017-09-12 DIAGNOSIS — I50.22 CHRONIC SYSTOLIC HEART FAILURE (H): ICD-10-CM

## 2017-09-12 DIAGNOSIS — I48.92 ATRIAL FLUTTER WITH RAPID VENTRICULAR RESPONSE (H): ICD-10-CM

## 2017-09-12 DIAGNOSIS — I48.92 ATRIAL FLUTTER (H): ICD-10-CM

## 2017-09-12 DIAGNOSIS — N18.30 CKD (CHRONIC KIDNEY DISEASE) STAGE 3, GFR 30-59 ML/MIN (H): Primary | ICD-10-CM

## 2017-09-12 DIAGNOSIS — Z79.01 LONG-TERM (CURRENT) USE OF ANTICOAGULANTS: ICD-10-CM

## 2017-09-12 LAB
ANION GAP SERPL CALCULATED.3IONS-SCNC: 8 MMOL/L (ref 3–14)
BUN SERPL-MCNC: 53 MG/DL (ref 7–30)
CALCIUM SERPL-MCNC: 9 MG/DL (ref 8.5–10.1)
CHLORIDE SERPL-SCNC: 100 MMOL/L (ref 94–109)
CO2 SERPL-SCNC: 26 MMOL/L (ref 20–32)
CREAT SERPL-MCNC: 2.45 MG/DL (ref 0.66–1.25)
GFR SERPL CREATININE-BSD FRML MDRD: 26 ML/MIN/1.7M2
GLUCOSE SERPL-MCNC: 126 MG/DL (ref 70–99)
INR PPP: 2.12 (ref 0.86–1.14)
POTASSIUM SERPL-SCNC: 4.6 MMOL/L (ref 3.4–5.3)
SODIUM SERPL-SCNC: 134 MMOL/L (ref 133–144)

## 2017-09-12 PROCEDURE — 99212 OFFICE O/P EST SF 10 MIN: CPT | Mod: ZF

## 2017-09-12 ASSESSMENT — PAIN SCALES - GENERAL: PAINLEVEL: NO PAIN (0)

## 2017-09-12 NOTE — PROGRESS NOTES
ANTICOAGULATION FOLLOW-UP CLINIC VISIT    Patient Name:  Rohan Monroe  Date:  9/12/2017  Contact Type:  Telephone    SUBJECTIVE:     Patient Findings     Positives No Problem Findings           OBJECTIVE    INR   Date Value Ref Range Status   09/12/2017 2.12 (H) 0.86 - 1.14 Final       ASSESSMENT / PLAN  INR assessment THER    Recheck INR In: 10 DAYS    INR Location Clinic      Anticoagulation Summary as of 9/12/2017     INR goal 2.0-3.0   Today's INR 2.12   Maintenance plan 5 mg (5 mg x 1) every day   Full instructions 5 mg every day   Weekly total 35 mg   No change documented Christal Alfaro RN   Plan last modified Paige Moscoso RN (8/7/2017)   Next INR check 9/21/2017   Priority INR   Target end date 4/20/2017    Indications   Long-term (current) use of anticoagulants [Z79.01] [Z79.01]  Atrial flutter with rapid ventricular response (H) [I48.92]         Anticoagulation Episode Summary     INR check location     Preferred lab     Send INR reminders to [facility]    Comments 3 months therapy, then reassess.        Anticoagulation Care Providers     Provider Role Specialty Phone number    Jamie Foster MD [facility] Internal Medicine [phone]            See the Encounter Report to view Anticoagulation Flowsheet and Dosing Calendar (Go to Encounters tab in chart review, and find the Anticoagulation Therapy Visit)    Spoke with Denis.  He reports no changes in health, diet, medication.  He has a lab appointment on 9/21/17 and will check INR then.      Christal Alfaro, RN

## 2017-09-12 NOTE — NURSING NOTE
"Chief Complaint   Patient presents with     RECHECK     Follow up RADHA, CKD stage 3.       Initial /69  Pulse 69  Ht 1.702 m (5' 7\")  SpO2 97% Estimated body mass index is 29.96 kg/(m^2) as calculated from the following:    Height as of 8/31/17: 1.702 m (5' 7\").    Weight as of 9/5/17: 86.8 kg (191 lb 4.8 oz).  Medication Reconciliation: complete   Fatmata Steinberg., CMA    "

## 2017-09-12 NOTE — MR AVS SNAPSHOT
After Visit Summary   9/12/2017    Rohan Monroe    MRN: [id number]           Patient Information     Date Of Birth          1945        Visit Information        Provider Department      9/12/2017 3:30 PM Ollie Michel MD [facility] Nephrology        Today's Diagnoses     CKD (chronic kidney disease) stage 3, GFR 30-59 ml/min    -  1       Follow-ups after your visit        Follow-up notes from your care team     Return in about 3 months (around 12/12/2017).      Your next 10 appointments already scheduled     Oct 25, 2017 10:00 AM CDT   US AORTA/IVC/ILIAC DUPLEX LIMITED with UCUSV1   [facility] ([facility])    [address] [place] [address]  [address] [facility] [address] [phone]           Please bring a list of your medicines (including vitamins, minerals and over-the-counter drugs). Also, tell your doctor about any allergies you may have. Wear comfortable clothes and leave your valuables at home.  Adults: No eating or drinking for 8 hours before the exam. You may take medicine with a small sip of water.  Children: - Children 6+ years: No food or drink for 6 hours before exam. - Children 1-5 years: No food or drink for 4 hours before exam. - Infants, breast-fed: may have breast milk up to 2 hours before exam. - Infants, formula: may have bottle until 4 hours before exam.  Please call the Imaging Department at your exam site with any questions.            Oct 25, 2017 11:40 AM CDT   (Arrive by 11:25 AM)   Return Visit with Maria Victoria Palmer MD   [facility] ([facility])    [address] [place] [id number] [phone]            Oct 26, 2017  1:00 PM CDT   Cardiac Treatment with  Cardiac Rehab 1   [facility] ([facility])    [address], [address]  [facility] [address] [phone]            Oct 27, 2017   3:00 PM CDT   Cardiac Treatment with Sh Cardiac Rehab 1   [facility] ([facility])    [address]  [place] [address]   [phone]            Oct 30, 2017  1:00 PM CDT   Cardiac Treatment with Sh Cardiac Rehab 1   [facility] ([facility])    [address]  [place] [address]   [phone]            Nov 01, 2017 12:00 PM CDT   Infusion 180 with UC SPEC INFUSION, UC 47 ATC   [facility] ([facility])    [address]  2nd [address]  [facility] [address]   [phone]            Nov 02, 2017  1:00 PM CDT   Cardiac Treatment with Sh Cardiac Rehab 1   [facility] ([facility])    [address]  [place] [address]   [phone]            Nov 03, 2017  3:00 PM CDT   Cardiac Treatment with Sh Cardiac Rehab 1   [facility] ([facility])    [address]  [place] [address]   [phone]            Nov 06, 2017  1:00 PM CST   Cardiac Treatment with Sh Cardiac Rehab 1   [facility] ([facility])    [address]  [place] [address]   [phone]              Who to contact     If you have questions or need follow up information about today's clinic visit or your schedule please contact [facility] NEPH[facility] directly at [phone].  Normal or non-critical lab and imaging results will be communicated to you by MyChart, letter or phone within 4 business days after the clinic has received the results. If you do not hear from us within 7 days, please contact the clinic through MyChart or phone. If you have a critical or abnormal lab result, we will notify you by phone as soon as possible.  Submit refill requests through [employer] or call your pharmacy and they will forward  "the refill request to us. Please allow 3 business days for your refill to be completed.          Additional Information About Your Visit        Bplatshart Information     [employer] gives you secure access to your electronic health record. If you see a primary care provider, you can also send messages to your care team and make appointments. If you have questions, please call your primary care clinic.  If you do not have a primary care provider, please call [phone] and they will assist you.        Care EveryWhere ID     This is your Care EveryWhere ID. This could be used by other organizations to access your [place] medical records  [id number]        Your Vitals Were     Pulse Height Pulse Oximetry             69 1.702 m (5' 7\") 97%          Blood Pressure from Last 3 Encounters:   10/04/17 154/82   09/12/17 111/69   09/05/17 146/78    Weight from Last 3 Encounters:   10/04/17 84.2 kg (185 lb 9.6 oz)   09/05/17 86.8 kg (191 lb 4.8 oz)   08/31/17 86 kg (189 lb 9.6 oz)              Today, you had the following     No orders found for display         Today's Medication Changes          These changes are accurate as of: 9/12/17 11:59 PM.  If you have any questions, ask your nurse or doctor.               These medicines have changed or have updated prescriptions.        Dose/Directions    Urea 40 % Crea   Commonly known as:  CARMOL 40   This may have changed:  additional instructions   Used for:  Dermatophytosis of nail, Corns and callosities        To feet daily   Quantity:  199 g   Refills:  4                Primary Care Provider Office Phone # Fax #    Jamie Foster [phone] [address] [address]        Equal Access to Services     [facility]: Hadii rhys Lewis, jdui barclay, qaybta kaalmada nelly malagon. So [facility] [phone].    ATENCIÓN: Si habla español, tiene a mcfadden disposición servicios gratuitos de " hoa lingüística. Osman al [phone].    We comply with applicable federal civil rights laws and [place] laws. We do not discriminate on the basis of race, color, national origin, age, disability, sex, sexual orientation, or gender identity.            Thank you!     Thank you for choosing [facility] NEPHROLOGY  for your care. Our goal is always to provide you with excellent care. Hearing back from our patients is one way we can continue to improve our services. Please take a few minutes to complete the written survey that you may receive in the mail after your visit with us. Thank you!             Your Updated Medication List - Protect others around you: Learn how to safely use, store and throw away your medicines at www.disposemymeds.org.          This list is accurate as of: 9/12/17 11:59 PM.  Always use your most recent med list.                   Brand Name Dispense Instructions for use Diagnosis    albuterol 108 (90 BASE) MCG/ACT Inhaler    PROAIR HFA/PROVENTIL HFA/VENTOLIN HFA    3 Inhaler    Inhale 2 puffs into the lungs every 6 hours as needed for shortness of breath / dyspnea    Sarcoidosis       blood glucose monitoring lancets     2 Box    Use to test blood sugar 2 times daily or as directed.  102 lancets per box.  3 month supply.    Type 2 diabetes, HbA1C goal < 8% (H)       blood glucose monitoring meter device kit    no brand specified    1 kit    Use to test blood sugar 2 times daily.    Type 2 diabetes mellitus with diabetic nephropathy (H)       blood glucose monitoring test strip    ACCU-CHEK RONNIE PLUS    200 each    Use to test blood sugar 2 times daily or as directed.  3 month supply.    Type 2 diabetes, HbA1C goal < 8% (H)       clopidogrel 75 MG tablet    PLAVIX    90 tablet    Take 1 tablet (75 mg) by mouth daily    CAD S/P percutaneous coronary angioplasty, Coronary artery disease involving native coronary artery of native heart without angina pectoris       fluticasone-vilanterol  100-25 MCG/INH oral inhaler    BREO ELLIPTA    3 Inhaler    Inhale 1 puff into the lungs daily    Chronic obstructive pulmonary disease, unspecified COPD type (H)       inFLIXimab 100 MG injection    REMICADE     Inject 100 mg into the vein every 28 days        losartan 50 MG tablet    COZAAR    90 tablet    Take 1 tablet (50 mg) by mouth daily    (HFpEF) heart failure with preserved ejection fraction (H)       metoprolol 100 MG tablet    LOPRESSOR    60 tablet    Take 1 tablet (100 mg) by mouth 2 times daily    Hypertension secondary to other renal disorders       mirtazapine 15 MG tablet    REMERON     Take 15 mg by mouth At Bedtime.        MULTIVITAMIN & MINERAL PO      Take 1 tablet by mouth daily.        omeprazole 20 MG CR capsule    priLOSEC    90 capsule    Take 1 capsule (20 mg) by mouth daily    CAD S/P percutaneous coronary angioplasty, Coronary artery disease involving native coronary artery of native heart without angina pectoris, Sarcoidosis of lung (H)       * order for DME     1 Device    She requested for a 4 wheel walker, would like to change it to 4 wheel walker with a seat and oxygen tank durant.   Need this faxed over to her  [phone]    Sarcoidosis, lung (H), COPD (chronic obstructive pulmonary disease) (H), Abnormal gait, Congestive heart failure (H)       * order for DME     1 Device    Updated Oxygen: Patient requires supplemental Oxygen 3 LPM via nasal canula with activity and 2 LPM nocturnally. Please provide patient with a portable oxygen concentrator for improved mobility.  Okay to spot check or titrated for conserving to keep stats above 90%. Oxygen will be for a lifetime.    ILD (interstitial lung disease) (H), Hypoxia       polyethylene glycol powder    MIRALAX    510 g    Take 17 g (1 capful) by mouth daily    Constipation, unspecified constipation type       tiotropium 18 MCG capsule    SPIRIVA HANDIHALER    90 capsule    Inhale contents of one capsule daily.    Chronic  obstructive pulmonary disease, unspecified COPD type (H)       Urea 40 % Crea    CARMOL 40    199 g    To feet daily    Dermatophytosis of nail, Corns and callosities       * Notice:  This list has 2 medication(s) that are the same as other medications prescribed for you. Read the directions carefully, and ask your doctor or other care provider to review them with you.

## 2017-09-12 NOTE — PROGRESS NOTES
Nephrology Follow-up Clinic Note  September 12, 2017        Reason for visit: CKD follow-up    HPI:  Mr Monroe is a pleasant 71 year old man here for follow-up of CKD and proteinuria. His past medical hx is significant for sarcoid (affecting lungs and heart), hep C s/p treatment with cirrhosis (and SVR), CAD s/p stents, COPD, He denies a personal history of UTIs or kidney stones. Given unclear etiology of CKD and presence of RBCs, WBCs, and proteinuria we proceeded with kidney biopsy on 11/13/2015. Results were consistent with diabetic changes and vascular disease as etiology for CKD.  Patient now working with endocrine and diabetes educator regarding his DM.  He was hospitalized this past Jan for atrial flutter and hypervolemia.  He did undergo cardioversion and subsequent ablation.  In May, he then underwent cardiac stenting to his LAD and LCx. This procedure was complicated by a retroperitoneal bleed for which he received a right iliac stent and subsequently needed thrombin injection for left-sided external iliac pseudoaneurysm. His Cr has risen significantly during these hospitalizations but has improved to ~2 mg/dL.  His main complaint has been dyspnea that did significantly improve after his cardiac revascularization.  He has been on less oxygenation though does continue to have worsening dyspnea with exertion particularly walking up hills.  He continues on remicade infusions and methotrexate. He recently restarted losartan at 50 mg daily and continues on metoprolol 100 mg BID and furosemide at 60 mg BID.  Of note, he is on sildenafil at 20 mg TID (pulmonary HTN).  He denies fever, palpitations and chest pain.  His cardiology team is following his blood pressure closely.  Of note, he is now on coumadin and plavix.       Review of Systems:   A 10 point review of systems was negative except as noted above.    Active Medical Issues:  Patient Active Problem List   Diagnosis     Hypothyroidism      SARCOIDOSIS-systemic     Generalized osteoarthrosis, unspecified site     Viral warts     Other psoriasis     Hypertrophy of prostate without urinary obstruction     Diabetes mellitus, type 2 (H)     CAD (coronary artery disease)     Type 2 diabetes, HbA1C goal < 8% (H)     CARDIOVASCULAR SCREENING; LDL GOAL LESS THAN 100     Atrial flutter with rapid ventricular response (H)     CKD (chronic kidney disease) stage 3, GFR 30-59 ml/min     Hip joint pain     Hyperlipidemia LDL goal <70     Acute exacerbation of chronic obstructive pulmonary disease (COPD) (H)     Cellulitis     Immunosuppression (H)     Hyponatremia     RADHA (acute kidney injury) (HCC)     COPD (chronic obstructive pulmonary disease) (H)     Cirrhosis of liver (H)     Pneumonia     Proteinuria     Microscopic hematuria     Sarcoidosis of lung (HCC)     Hyperlipidemia     Atrial flutter (H)     Long-term (current) use of anticoagulants [Z79.01]     Hypoxia     CAD S/P percutaneous coronary angioplasty     Chest pain       PMHx, PSHx, FamHx, SocHx: reviewed in EPIC.    Current Medications:  Medications reviewed by me.  Current Outpatient Prescriptions   Medication Sig Dispense Refill     losartan (COZAAR) 50 MG tablet Take 1 tablet (50 mg) by mouth daily 90 tablet 3     order for DME Updated Oxygen: Patient requires supplemental Oxygen 3 LPM via nasal canula with activity and 2 LPM nocturnally. Please provide patient with a portable oxygen concentrator for improved mobility.  Okay to spot check or titrated for conserving to keep stats above 90%. Oxygen will be for a lifetime. 1 Device 0     order for DME She requested for a 4 wheel walker, would like to change it to 4 wheel walker with a seat and oxygen tank durant.     Need this faxed over to her   [phone] 1 Device 1     omeprazole (PRILOSEC) 20 MG CR capsule Take 1 capsule (20 mg) by mouth daily 90 capsule 3     atorvastatin (LIPITOR) 40 MG tablet Take 1 tablet (40 mg) by mouth daily 30 tablet 3      clopidogrel (PLAVIX) 75 MG tablet Take 1 tablet (75 mg) by mouth daily 90 tablet 3     sildenafil (REVATIO/VIAGRA) 20 MG tablet Take 1 tablet (20 mg) by mouth 3 times daily 90 tablet 3     furosemide (LASIX) 20 MG tablet Take 3 tablets (60 mg) by mouth 2 times daily 180 tablet 0     levothyroxine (SYNTHROID/LEVOTHROID) 137 MCG tablet Take 1 tablet (137 mcg) by mouth daily 90 tablet 1     warfarin (COUMADIN) 5 MG tablet Take 1 tablet (5 mg) by mouth daily 30 tablet 6     polyethylene glycol (MIRALAX) powder Take 17 g (1 capful) by mouth daily 510 g 1     tiotropium (SPIRIVA HANDIHALER) 18 MCG capsule Inhale contents of one capsule daily. 90 capsule 3     albuterol (PROAIR HFA/PROVENTIL HFA/VENTOLIN HFA) 108 (90 BASE) MCG/ACT Inhaler Inhale 2 puffs into the lungs every 6 hours as needed for shortness of breath / dyspnea 3 Inhaler 6     fluticasone-vilanterol (BREO ELLIPTA) 100-25 MCG/INH oral inhaler Inhale 1 puff into the lungs daily 3 Inhaler 3     inFLIXimab (REMICADE) 100 MG injection Inject 100 mg into the vein every 28 days        metoprolol (LOPRESSOR) 100 MG tablet Take 1 tablet (100 mg) by mouth 2 times daily 60 tablet 11     methotrexate 2.5 MG tablet Take 3 tablets (7.5 mg) by mouth once a week On Mondays (Patient taking differently: Take 2.5mg by mouth weekly on Mondays.) 48 tablet 3     blood glucose monitoring (ACCU-CHEK RONNIE PLUS) test strip Use to test blood sugar 2 times daily or as directed.  3 month supply. 200 each 3     blood glucose monitoring (ACCU-CHEK FASTCLIX) lancets Use to test blood sugar 2 times daily or as directed.  102 lancets per box.  3 month supply. 2 Box 3     blood glucose monitoring (NO BRAND SPECIFIED) meter device kit Use to test blood sugar 2 times daily. 1 kit 0     Urea (CARMOL 40) 40 % CREA To feet daily (Patient taking differently: Apply to feet daily as needed.) 199 g 4     Multiple Vitamins-Minerals (MULTIVITAMIN & MINERAL PO) Take 1 tablet by mouth daily.        "mirtazapine (REMERON) 15 MG tablet Take 15 mg by mouth At Bedtime.         Physical Exam:   /69  Pulse 69  Ht 1.702 m (5' 7\")  SpO2 97%   Gen: notably SOB  Heart: regular rhythm, normal rate, no rub  Lungs: shallow breaths but clear  Abd: soft, non-tender, non-distended  : No CVA tenderness  Ext: trace-1+ LE edema  MSK: No joint effusions  Skin: No concerning rash  Neuro: No gross focal deficit  Psych: Mood and affect appropriate     Labs:   Today's labs reviewed by me.  Electrolytes/Renal -   Recent Labs   Lab Test  09/12/17   1438  09/05/17   1144  08/29/17   0959   05/13/17   0551   05/12/17 2028   05/08/17   1518   04/24/17   1305   NA  134  134  134   < >  139   --   138   < >  136   < >  138   POTASSIUM  4.6  4.1  4.3   < >  4.6   < >  3.6   < >  4.2   < >  4.0   CHLORIDE  100  99  99   < >  104   --   102   < >  99   < >  102   CO2  26  28  26   < >  24   --   26   < >  27   < >  30   BUN  53*  46*  52*   < >  38*   --   40*   < >  44*   < >  29   CR  2.45*  2.13*  1.97*   < >  2.03*   --   1.84*   < >  1.94*   < >  1.82*   GLC  126*  147*  204*   < >  166*   --   166*   < >  138*   < >  166*   RAFFY  9.0  8.5  8.8   < >  7.5*   --   7.9*   < >  8.7   < >  8.8   MAG   --    --    --    --   2.0   --   2.1   --    --    --   2.0   PHOS   --   2.8   --    --   3.4   --    --    --   3.6   --    --     < > = values in this interval not displayed.     CBC -   Recent Labs   Lab Test  08/29/17   0959  08/17/17   1234  07/21/17   1433   WBC  10.3  8.5  9.4   HGB  11.5*  11.8*  11.5*   PLT  252  276  288     LFTs -   Recent Labs   Lab Test  09/05/17   1144  08/29/17   0959  08/17/17   1234  07/21/17   1433   ALKPHOS   --   151*  129  111   BILITOTAL   --   0.5  0.6  0.6   ALT   --   21  20  22   AST   --   14  17  20   PROTTOTAL   --   7.9  8.0  7.7   ALBUMIN  3.4  3.4  3.5  3.5       Recent Labs   Lab Test  09/05/17   1137  05/08/17   1650  04/18/17   1212  02/28/17   1206  12/26/16   1015  08/30/16   " 1320  07/18/16   1324  03/15/16   1107  02/29/16   1424  10/27/15   1339  10/07/15   1032  01/27/11   1507  11/16/10   0950   UTPG  1.98*  1.87*  3.08*  6.07*  3.60*  1.40*  2.44*  1.50*  2.29*  2.00*  1.81*  0.02  0.10     PTH -   Recent Labs   Lab Test  08/17/17   1234  02/28/17   1150  03/15/16   1116  01/27/11   1435   PTHI  163*  183*  98*  24     IRON STUDIES -   Recent Labs   Lab Test  02/28/17   1150  03/15/16   1116   IRON  61  91   FEB  316  346   IRONSAT  19  26   TIFFANIE  181  176         Assessment & Plan:   CKD stage 3b with proteinuria and hematuria:  Etiology 2/2 DM and renovascular disease.  Patient underwent kidney biopsy on 11/13/2015 to better evaluate his CKD. Tissue sample was limited but with tissue/cortex present patient had changes consistent with diabetic nephropathy and also mild to moderate renal arteriosclerosis.  Serologic work-up for vasculitis and monoclonal gammopathy was negative. Hep C related kidney disease unlikely given he has had treatment with sustained virologic response.  He has had several bouts of RADHA, with the most recent occurring after his cardiac revascularization in May, 2017 and consequently likely has a new baseline.  His Cr now seems relatively stable (while on losartan) at ~2.2 mg/dL with an eGFR of 30 ml/min.  He does have significant proteinuria though this is much improved since restarting losartan (6.1 g/g down to 1.1 g/g).  --discussed that moving forward it will be important to control his DM and HTN with regards to his CKD   --continue losartan 50 mg for now with goal of getting back to max dose given DM, HTN, proteinuria and CKD.  Albuminuria not at goal in past despite max dose losartan. Regardless, he would benefit from RAAS blockade for renoprotection and cardiovascular health.  However, would not start double RAAS blockade for management of proteinuria given that risks outweigh the benefits.    ---no electrolyte, acid/base or anemia issues at this  time  --PTH mildly elevated at 163, given normal corrected Ca and Phos.  No need for active vitamin D (e.g. calcitriol) at this time.  Will continue to monitor recheck in 6-12 month.  --follow-up in 4 months    Hypertension/Volume: Cardiology has made a number of changes to his antihypertensive regimen and is following him closely.  SBP at goal of <130. Intravascularly euvolemic on exam. Currently, he is on lasix (60 mg BID), metoprolol (100 mg BID) and losartan (50 mg).  Of note, he is on sildenafil (20 mg TID).    --will defer further management to cardiology  --as mentioned, would like to keep him on losartan for renoprotection/proteinuria (please see problem 1)      Total time spent was >40 minutes, and more than 50% of face to face time was spent in counseling and/or coordination of care regarding principles of multidisciplinary care, medication management, and chronic kidney disease education.  Ollie Michel MD

## 2017-09-12 NOTE — MR AVS SNAPSHOT
Rohan Howard Mabel   9/12/2017   Anticoagulation Therapy Visit    Description:  71 year old male   Provider:  Christal Alfaro, RN   Department:  [facility]           INR as of 9/12/2017     Today's INR 2.12      Anticoagulation Summary as of 9/12/2017     INR goal 2.0-3.0   Today's INR 2.12   Full instructions 5 mg every day   Next INR check 9/21/2017    Indications   Long-term (current) use of anticoagulants [Z79.01] [Z79.01]  Atrial flutter with rapid ventricular response (H) [I48.92]         September 2017 Details    Sun Mon Tue Wed Thu Fri Sat          1               2                 3               4               5               6               7               8               9                 10               11               12      5 mg   See details      13      5 mg         14      5 mg         15      5 mg         16      5 mg           17      5 mg         18      5 mg         19      5 mg         20      5 mg         21            22               23                 24               25               26               27               28               29               30                Date Details   09/12 This INR check       Date of next INR:  9/21/2017         How to take your warfarin dose     To take:  5 mg Take 1 of the 5 mg tablets.

## 2017-09-12 NOTE — LETTER
9/12/2017      RE: Rohan Monroe  [address]  [place] [address]       Nephrology Follow-up Clinic Note  September 12, 2017        Reason for visit: CKD follow-up    HPI:  Mr Monroe is a pleasant 71 year old man here for follow-up of CKD and proteinuria. His past medical hx is significant for sarcoid (affecting lungs and heart), hep C s/p treatment with cirrhosis (and SVR), CAD s/p stents, COPD, He denies a personal history of UTIs or kidney stones. Given unclear etiology of CKD and presence of RBCs, WBCs, and proteinuria we proceeded with kidney biopsy on 11/13/2015. Results were consistent with diabetic changes and vascular disease as etiology for CKD.  Patient now working with endocrine and diabetes educator regarding his DM.  He was hospitalized this past Jan for atrial flutter and hypervolemia.  He did undergo cardioversion and subsequent ablation.  In May, he then underwent cardiac stenting to his LAD and LCx. This procedure was complicated by a retroperitoneal bleed for which he received a right iliac stent and subsequently needed thrombin injection for left-sided external iliac pseudoaneurysm. His Cr has risen significantly during these hospitalizations but has improved to ~2 mg/dL.  His main complaint has been dyspnea that did significantly improve after his cardiac revascularization.  He has been on less oxygenation though does continue to have worsening dyspnea with exertion particularly walking up hills.  He continues on remicade infusions and methotrexate. He recently restarted losartan at 50 mg daily and continues on metoprolol 100 mg BID and furosemide at 60 mg BID.  Of note, he is on sildenafil at 20 mg TID (pulmonary HTN).  He denies fever, palpitations and chest pain.  His cardiology team is following his blood pressure closely.  Of note, he is now on coumadin and plavix.       Review of Systems:   A 10 point review of systems was negative except as noted  above.    Active Medical Issues:  Patient Active Problem List   Diagnosis     Hypothyroidism     SARCOIDOSIS-systemic     Generalized osteoarthrosis, unspecified site     Viral warts     Other psoriasis     Hypertrophy of prostate without urinary obstruction     Diabetes mellitus, type 2 (H)     CAD (coronary artery disease)     Type 2 diabetes, HbA1C goal < 8% (H)     CARDIOVASCULAR SCREENING; LDL GOAL LESS THAN 100     Atrial flutter with rapid ventricular response (H)     CKD (chronic kidney disease) stage 3, GFR 30-59 ml/min     Hip joint pain     Hyperlipidemia LDL goal <70     Acute exacerbation of chronic obstructive pulmonary disease (COPD) (H)     Cellulitis     Immunosuppression (H)     Hyponatremia     RADHA (acute kidney injury) (HCC)     COPD (chronic obstructive pulmonary disease) (H)     Cirrhosis of liver (H)     Pneumonia     Proteinuria     Microscopic hematuria     Sarcoidosis of lung (HCC)     Hyperlipidemia     Atrial flutter (H)     Long-term (current) use of anticoagulants [Z79.01]     Hypoxia     CAD S/P percutaneous coronary angioplasty     Chest pain       PMHx, PSHx, FamHx, SocHx: reviewed in EPIC.    Current Medications:  Medications reviewed by me.  Current Outpatient Prescriptions   Medication Sig Dispense Refill     losartan (COZAAR) 50 MG tablet Take 1 tablet (50 mg) by mouth daily 90 tablet 3     order for DME Updated Oxygen: Patient requires supplemental Oxygen 3 LPM via nasal canula with activity and 2 LPM nocturnally. Please provide patient with a portable oxygen concentrator for improved mobility.  Okay to spot check or titrated for conserving to keep stats above 90%. Oxygen will be for a lifetime. 1 Device 0     order for DME She requested for a 4 wheel walker, would like to change it to 4 wheel walker with a seat and oxygen tank durant.     Need this faxed over to her   [phone] 1 Device 1     omeprazole (PRILOSEC) 20 MG CR capsule Take 1 capsule (20 mg) by mouth daily 90  capsule 3     atorvastatin (LIPITOR) 40 MG tablet Take 1 tablet (40 mg) by mouth daily 30 tablet 3     clopidogrel (PLAVIX) 75 MG tablet Take 1 tablet (75 mg) by mouth daily 90 tablet 3     sildenafil (REVATIO/VIAGRA) 20 MG tablet Take 1 tablet (20 mg) by mouth 3 times daily 90 tablet 3     furosemide (LASIX) 20 MG tablet Take 3 tablets (60 mg) by mouth 2 times daily 180 tablet 0     levothyroxine (SYNTHROID/LEVOTHROID) 137 MCG tablet Take 1 tablet (137 mcg) by mouth daily 90 tablet 1     warfarin (COUMADIN) 5 MG tablet Take 1 tablet (5 mg) by mouth daily 30 tablet 6     polyethylene glycol (MIRALAX) powder Take 17 g (1 capful) by mouth daily 510 g 1     tiotropium (SPIRIVA HANDIHALER) 18 MCG capsule Inhale contents of one capsule daily. 90 capsule 3     albuterol (PROAIR HFA/PROVENTIL HFA/VENTOLIN HFA) 108 (90 BASE) MCG/ACT Inhaler Inhale 2 puffs into the lungs every 6 hours as needed for shortness of breath / dyspnea 3 Inhaler 6     fluticasone-vilanterol (BREO ELLIPTA) 100-25 MCG/INH oral inhaler Inhale 1 puff into the lungs daily 3 Inhaler 3     inFLIXimab (REMICADE) 100 MG injection Inject 100 mg into the vein every 28 days        metoprolol (LOPRESSOR) 100 MG tablet Take 1 tablet (100 mg) by mouth 2 times daily 60 tablet 11     methotrexate 2.5 MG tablet Take 3 tablets (7.5 mg) by mouth once a week On Mondays (Patient taking differently: Take 2.5mg by mouth weekly on Mondays.) 48 tablet 3     blood glucose monitoring (ACCU-CHEK RONNIE PLUS) test strip Use to test blood sugar 2 times daily or as directed.  3 month supply. 200 each 3     blood glucose monitoring (ACCU-CHEK FASTCLIX) lancets Use to test blood sugar 2 times daily or as directed.  102 lancets per box.  3 month supply. 2 Box 3     blood glucose monitoring (NO BRAND SPECIFIED) meter device kit Use to test blood sugar 2 times daily. 1 kit 0     Urea (CARMOL 40) 40 % CREA To feet daily (Patient taking differently: Apply to feet daily as needed.) 199 g 4  "    Multiple Vitamins-Minerals (MULTIVITAMIN & MINERAL PO) Take 1 tablet by mouth daily.       mirtazapine (REMERON) 15 MG tablet Take 15 mg by mouth At Bedtime.         Physical Exam:   /69  Pulse 69  Ht 1.702 m (5' 7\")  SpO2 97%   Gen: notably SOB  Heart: regular rhythm, normal rate, no rub  Lungs: shallow breaths but clear  Abd: soft, non-tender, non-distended  : No CVA tenderness  Ext: trace-1+ LE edema  MSK: No joint effusions  Skin: No concerning rash  Neuro: No gross focal deficit  Psych: Mood and affect appropriate     Labs:   Today's labs reviewed by me.  Electrolytes/Renal -   Recent Labs   Lab Test  09/12/17   1438  09/05/17   1144  08/29/17   0959   05/13/17   0551   05/12/17   2028   05/08/17   1518   04/24/17   1305   NA  134  134  134   < >  139   --   138   < >  136   < >  138   POTASSIUM  4.6  4.1  4.3   < >  4.6   < >  3.6   < >  4.2   < >  4.0   CHLORIDE  100  99  99   < >  104   --   102   < >  99   < >  102   CO2  26  28  26   < >  24   --   26   < >  27   < >  30   BUN  53*  46*  52*   < >  38*   --   40*   < >  44*   < >  29   CR  2.45*  2.13*  1.97*   < >  2.03*   --   1.84*   < >  1.94*   < >  1.82*   GLC  126*  147*  204*   < >  166*   --   166*   < >  138*   < >  166*   RAFFY  9.0  8.5  8.8   < >  7.5*   --   7.9*   < >  8.7   < >  8.8   MAG   --    --    --    --   2.0   --   2.1   --    --    --   2.0   PHOS   --   2.8   --    --   3.4   --    --    --   3.6   --    --     < > = values in this interval not displayed.     CBC -   Recent Labs   Lab Test  08/29/17   0959  08/17/17   1234  07/21/17   1433   WBC  10.3  8.5  9.4   HGB  11.5*  11.8*  11.5*   PLT  252  276  288     LFTs -   Recent Labs   Lab Test  09/05/17   1144  08/29/17   0959  08/17/17   1234  07/21/17   1433   ALKPHOS   --   151*  129  111   BILITOTAL   --   0.5  0.6  0.6   ALT   --   21  20  22   AST   --   14  17  20   PROTTOTAL   --   7.9  8.0  7.7   ALBUMIN  3.4  3.4  3.5  3.5       Recent Labs   Lab Test  " 09/05/17   1137  05/08/17   1650  04/18/17   1212  02/28/17   1206  12/26/16   1015  08/30/16   1320  07/18/16   1324  03/15/16   1107  02/29/16   1424  10/27/15   1339  10/07/15   1032  01/27/11   1507  11/16/10   0950   UTPG  1.98*  1.87*  3.08*  6.07*  3.60*  1.40*  2.44*  1.50*  2.29*  2.00*  1.81*  0.02  0.10     PTH -   Recent Labs   Lab Test  08/17/17   1234  02/28/17   1150  03/15/16   1116  01/27/11   1435   PTHI  163*  183*  98*  24     IRON STUDIES -   Recent Labs   Lab Test  02/28/17   1150  03/15/16   1116   IRON  61  91   FEB  316  346   IRONSAT  19  26   TIFFANIE  181  176         Assessment & Plan:   CKD stage 3b with proteinuria and hematuria:  Etiology 2/2 DM and renovascular disease.  Patient underwent kidney biopsy on 11/13/2015 to better evaluate his CKD. Tissue sample was limited but with tissue/cortex present patient had changes consistent with diabetic nephropathy and also mild to moderate renal arteriosclerosis.  Serologic work-up for vasculitis and monoclonal gammopathy was negative. Hep C related kidney disease unlikely given he has had treatment with sustained virologic response.  He has had several bouts of RADHA, with the most recent occurring after his cardiac revascularization in May, 2017 and consequently likely has a new baseline.  His Cr now seems relatively stable (while on losartan) at ~2.2 mg/dL with an eGFR of 30 ml/min.  He does have significant proteinuria though this is much improved since restarting losartan (6.1 g/g down to 1.1 g/g).  --discussed that moving forward it will be important to control his DM and HTN with regards to his CKD   --continue losartan 50 mg for now with goal of getting back to max dose given DM, HTN, proteinuria and CKD.  Albuminuria not at goal in past despite max dose losartan. Regardless, he would benefit from RAAS blockade for renoprotection and cardiovascular health.  However, would not start double RAAS blockade for management of proteinuria given  that risks outweigh the benefits.    ---no electrolyte, acid/base or anemia issues at this time  --PTH mildly elevated at 163, given normal corrected Ca and Phos.  No need for active vitamin D (e.g. calcitriol) at this time.  Will continue to monitor recheck in 6-12 month.  --follow-up in 4 months    Hypertension/Volume: Cardiology has made a number of changes to his antihypertensive regimen and is following him closely.  SBP at goal of <130. Intravascularly euvolemic on exam. Currently, he is on lasix (60 mg BID), metoprolol (100 mg BID) and losartan (50 mg).  Of note, he is on sildenafil (20 mg TID).    --will defer further management to cardiology  --as mentioned, would like to keep him on losartan for renoprotection/proteinuria (please see problem 1)      Total time spent was >40 minutes, and more than 50% of face to face time was spent in counseling and/or coordination of care regarding principles of multidisciplinary care, medication management, and chronic kidney disease education.  Ollie Michel MD

## 2017-09-13 ENCOUNTER — HOSPITAL ENCOUNTER (OUTPATIENT)
Dept: CARDIAC REHAB | Facility: CLINIC | Age: 72
End: 2017-09-13
Attending: INTERNAL MEDICINE
Payer: MEDICARE

## 2017-09-13 PROCEDURE — 93798 PHYS/QHP OP CAR RHAB W/ECG: CPT

## 2017-09-13 PROCEDURE — 40000116 ZZH STATISTIC OP CR VISIT

## 2017-09-15 ENCOUNTER — HOSPITAL ENCOUNTER (OUTPATIENT)
Dept: CARDIAC REHAB | Facility: CLINIC | Age: 72
End: 2017-09-15
Attending: INTERNAL MEDICINE
Payer: MEDICARE

## 2017-09-15 PROCEDURE — 40000116 ZZH STATISTIC OP CR VISIT: Performed by: OCCUPATIONAL THERAPIST

## 2017-09-15 PROCEDURE — 93798 PHYS/QHP OP CAR RHAB W/ECG: CPT | Performed by: OCCUPATIONAL THERAPIST

## 2017-09-18 ENCOUNTER — HOSPITAL ENCOUNTER (OUTPATIENT)
Dept: CARDIAC REHAB | Facility: CLINIC | Age: 72
End: 2017-09-18
Attending: INTERNAL MEDICINE
Payer: MEDICARE

## 2017-09-18 DIAGNOSIS — I25.10 CORONARY ARTERY DISEASE INVOLVING NATIVE CORONARY ARTERY OF NATIVE HEART WITHOUT ANGINA PECTORIS: ICD-10-CM

## 2017-09-18 DIAGNOSIS — I25.10 CAD S/P PERCUTANEOUS CORONARY ANGIOPLASTY: ICD-10-CM

## 2017-09-18 DIAGNOSIS — Z98.61 CAD S/P PERCUTANEOUS CORONARY ANGIOPLASTY: ICD-10-CM

## 2017-09-18 PROCEDURE — 93798 PHYS/QHP OP CAR RHAB W/ECG: CPT

## 2017-09-18 PROCEDURE — 40000116 ZZH STATISTIC OP CR VISIT

## 2017-09-18 RX ORDER — ATORVASTATIN CALCIUM 40 MG/1
TABLET, FILM COATED ORAL
Qty: 30 TABLET | Refills: 11 | Status: SHIPPED | OUTPATIENT
Start: 2017-09-18 | End: 2018-09-25

## 2017-09-20 ENCOUNTER — HOSPITAL ENCOUNTER (OUTPATIENT)
Dept: CARDIAC REHAB | Facility: CLINIC | Age: 72
End: 2017-09-20
Attending: INTERNAL MEDICINE
Payer: MEDICARE

## 2017-09-20 PROCEDURE — 40000116 ZZH STATISTIC OP CR VISIT: Performed by: REHABILITATION PRACTITIONER

## 2017-09-20 PROCEDURE — 93798 PHYS/QHP OP CAR RHAB W/ECG: CPT | Performed by: REHABILITATION PRACTITIONER

## 2017-09-22 ENCOUNTER — HOSPITAL ENCOUNTER (OUTPATIENT)
Dept: CARDIAC REHAB | Facility: CLINIC | Age: 72
End: 2017-09-22
Attending: INTERNAL MEDICINE
Payer: MEDICARE

## 2017-09-22 PROCEDURE — 40000116 ZZH STATISTIC OP CR VISIT: Performed by: OCCUPATIONAL THERAPIST

## 2017-09-22 PROCEDURE — 93798 PHYS/QHP OP CAR RHAB W/ECG: CPT | Performed by: OCCUPATIONAL THERAPIST

## 2017-09-25 ENCOUNTER — CARE COORDINATION (OUTPATIENT)
Dept: CARDIOLOGY | Facility: CLINIC | Age: 72
End: 2017-09-25

## 2017-09-25 NOTE — PROGRESS NOTES
"Received message below from patient this morning via [employer]:    \"I believe I'm experiencing negative symptoms from Losartan, which I went on recently. I am very dopey and have little energy, plus uncharacteristic naps. My blood pressure is quite low now -- maybe too low  -- like 90/60. Can I take a lower dosage than 50 mg or maybe another medication?\"      Called and spoke with patient.  He feels lightheaded and \"dopey\" on the losartan 50 mg. It had been started for hypertension and for renal protective effects.Of note: his last creatinine was 2.4, which is up slightly from previous; he is followed by nephrology. Instructed him to hold the losartan today. I will review with Dr. Paige and see if we can restart at 25 mg daily.      "

## 2017-09-26 ENCOUNTER — CARE COORDINATION (OUTPATIENT)
Dept: CARDIOLOGY | Facility: CLINIC | Age: 72
End: 2017-09-26

## 2017-09-26 DIAGNOSIS — I27.20 PULMONARY HYPERTENSION (H): ICD-10-CM

## 2017-09-26 RX ORDER — SILDENAFIL CITRATE 20 MG/1
20 TABLET ORAL 3 TIMES DAILY
Qty: 270 TABLET | Refills: 1 | Status: SHIPPED | OUTPATIENT
Start: 2017-09-26 | End: 2018-05-24

## 2017-09-26 NOTE — PROGRESS NOTES
"Ron called in yesterday to report he is having low BP with systolic BP in the 90's with accompanying symptoms of feeling lightheaded and \"dopey\". He had recently been placed on losartan 50 mg daily for high BP's noted during cardiac rehab and for renoprotection. Today he took 25 mg and he is feeling much better on the lower dose without low BP. He has rehab tomorrow so we can see if they document any high BP at that time. In addition, he labs drawn about 10 days ago and his creatinine was 2.4, which is little higher than 2.1 a few weeks ago.  He has another remicade infusion next week and I asked that he get another BMP prior to his infusion to make sure the creatinine isn't continuing to increase.  "

## 2017-09-27 ENCOUNTER — HOSPITAL ENCOUNTER (OUTPATIENT)
Dept: CARDIAC REHAB | Facility: CLINIC | Age: 72
End: 2017-09-27
Attending: INTERNAL MEDICINE
Payer: MEDICARE

## 2017-09-27 PROCEDURE — 93798 PHYS/QHP OP CAR RHAB W/ECG: CPT | Performed by: REHABILITATION PRACTITIONER

## 2017-09-27 PROCEDURE — 40000116 ZZH STATISTIC OP CR VISIT: Performed by: REHABILITATION PRACTITIONER

## 2017-09-29 ENCOUNTER — HOSPITAL ENCOUNTER (OUTPATIENT)
Dept: CARDIAC REHAB | Facility: CLINIC | Age: 72
End: 2017-09-29
Attending: INTERNAL MEDICINE
Payer: MEDICARE

## 2017-09-29 PROCEDURE — 93798 PHYS/QHP OP CAR RHAB W/ECG: CPT

## 2017-09-29 PROCEDURE — 40000116 ZZH STATISTIC OP CR VISIT

## 2017-10-02 DIAGNOSIS — N17.9 AKI (ACUTE KIDNEY INJURY) (H): ICD-10-CM

## 2017-10-02 DIAGNOSIS — D86.9 SARCOIDOSIS: ICD-10-CM

## 2017-10-02 DIAGNOSIS — I48.92 ATRIAL FLUTTER WITH RAPID VENTRICULAR RESPONSE (H): Primary | ICD-10-CM

## 2017-10-04 ENCOUNTER — INFUSION THERAPY VISIT (OUTPATIENT)
Dept: INFUSION THERAPY | Facility: CLINIC | Age: 72
End: 2017-10-04
Attending: INTERNAL MEDICINE
Payer: MEDICARE

## 2017-10-04 ENCOUNTER — ANTICOAGULATION THERAPY VISIT (OUTPATIENT)
Dept: ANTICOAGULATION | Facility: CLINIC | Age: 72
End: 2017-10-04

## 2017-10-04 VITALS
BODY MASS INDEX: 29.07 KG/M2 | SYSTOLIC BLOOD PRESSURE: 154 MMHG | WEIGHT: 185.6 LBS | HEART RATE: 76 BPM | DIASTOLIC BLOOD PRESSURE: 82 MMHG

## 2017-10-04 DIAGNOSIS — I48.92 ATRIAL FLUTTER (H): ICD-10-CM

## 2017-10-04 DIAGNOSIS — I48.92 ATRIAL FLUTTER WITH RAPID VENTRICULAR RESPONSE (H): ICD-10-CM

## 2017-10-04 DIAGNOSIS — N17.9 AKI (ACUTE KIDNEY INJURY) (H): ICD-10-CM

## 2017-10-04 DIAGNOSIS — D86.9 SARCOIDOSIS: ICD-10-CM

## 2017-10-04 DIAGNOSIS — D86.0 SARCOIDOSIS OF LUNG (H): Primary | ICD-10-CM

## 2017-10-04 DIAGNOSIS — N18.4 CKD (CHRONIC KIDNEY DISEASE) STAGE 4, GFR 15-29 ML/MIN (H): ICD-10-CM

## 2017-10-04 DIAGNOSIS — Z79.01 LONG-TERM (CURRENT) USE OF ANTICOAGULANTS: ICD-10-CM

## 2017-10-04 LAB
ALBUMIN SERPL-MCNC: 3.4 G/DL (ref 3.4–5)
ANION GAP SERPL CALCULATED.3IONS-SCNC: 6 MMOL/L (ref 3–14)
BUN SERPL-MCNC: 57 MG/DL (ref 7–30)
CALCIUM SERPL-MCNC: 8.7 MG/DL (ref 8.5–10.1)
CHLORIDE SERPL-SCNC: 100 MMOL/L (ref 94–109)
CO2 SERPL-SCNC: 27 MMOL/L (ref 20–32)
CREAT SERPL-MCNC: 2.12 MG/DL (ref 0.66–1.25)
CREAT UR-MCNC: 32 MG/DL
GFR SERPL CREATININE-BSD FRML MDRD: 31 ML/MIN/1.7M2
GLUCOSE SERPL-MCNC: 186 MG/DL (ref 70–99)
INR PPP: 3.09 (ref 0.86–1.14)
PHOSPHATE SERPL-MCNC: 2.9 MG/DL (ref 2.5–4.5)
POTASSIUM SERPL-SCNC: 4.5 MMOL/L (ref 3.4–5.3)
PROT UR-MCNC: 0.36 G/L
PROT/CREAT 24H UR: 1.13 G/G CR (ref 0–0.2)
SODIUM SERPL-SCNC: 133 MMOL/L (ref 133–144)

## 2017-10-04 PROCEDURE — 85610 PROTHROMBIN TIME: CPT | Performed by: INTERNAL MEDICINE

## 2017-10-04 PROCEDURE — 25000128 H RX IP 250 OP 636: Mod: ZF | Performed by: INTERNAL MEDICINE

## 2017-10-04 PROCEDURE — 84156 ASSAY OF PROTEIN URINE: CPT | Performed by: INTERNAL MEDICINE

## 2017-10-04 PROCEDURE — 96415 CHEMO IV INFUSION ADDL HR: CPT

## 2017-10-04 PROCEDURE — 36415 COLL VENOUS BLD VENIPUNCTURE: CPT | Performed by: INTERNAL MEDICINE

## 2017-10-04 PROCEDURE — 80069 RENAL FUNCTION PANEL: CPT | Performed by: INTERNAL MEDICINE

## 2017-10-04 PROCEDURE — 96413 CHEMO IV INFUSION 1 HR: CPT

## 2017-10-04 RX ORDER — ACETAMINOPHEN 325 MG/1
650 TABLET ORAL ONCE
Status: CANCELLED
Start: 2017-10-04 | End: 2017-10-04

## 2017-10-04 RX ADMIN — INFLIXIMAB 400 MG: 100 INJECTION, POWDER, LYOPHILIZED, FOR SOLUTION INTRAVENOUS at 12:49

## 2017-10-04 NOTE — PROGRESS NOTES
~~~ NOTE: If the patient answers yes to any of the questions below, hold the infusion and contact ordering provider or on-call provider.    1. Have you recently had an elevated temperature, fever, chills, productive cough, coughing for 3 weeks or longer or hemoptysis,  abnormal vital signs, night sweats,  chest pain or have you noticed a decrease in your appetite, unexplained weight loss or fatigue? No  2. Do you have any open wounds or new incisions? No  3. Do you have any recent or upcoming hospitalizations, surgeries or dental procedures? No  4. Do you currently have or recently have had any signs of illness or infection or are you on any antibiotics? No  5. Have you had any new, sudden or worsening abdominal pain? No  6. Have you or anyone in your household received a live vaccination in the past 4 weeks? Please note:  No live vaccines while on biologic/chemotherapy until 6 months after the last treatment.  Patient can receive the flu vaccine (shot only) and the pneumovax.  It is optimal for the patient to get these vaccines mid cycle, but they can be given at any time as long as it is not on the day of the infusion. No  7. Have you recently been diagnosed with any new nervous system diseases (ie. Multiple sclerosis, Guillain Vergas, seizures, neurological changes) or cancer diagnosis? Are you on any form of radiation or chemotherapy? No  8. Are you pregnant or breast feeding or do you have plans of pregnancy in the future? No  9. Have you been having any signs of worsening depression or suicidal ideations?  (benlysta only) No  10. Have there been any other new onset medical symptoms? No  Nursing Note  Rohan Monroe presents today to [facility] for:   Chief Complaint   Patient presents with     Infusion     Remicade     During today's [facility] appointment, orders from Dr. Perlman were completed.  Frequency: monthly    Progress note:  Patient  identification verified by name and date of birth.  Assessment completed.  Vitals recorded in Doc Flowsheets.  Patient was provided with education regarding infusion and possible side effects.  Patient verbalized understanding.      needed: No  Premedications: were not ordered.  Infusion Rates: Remicade given over approximately 2 hours.  Approximate Infusion length:2 hours.   Labs: were drawn prior to appointment.  Vascular access: peripheral IV placed today.  Treatment Conditions: Biological checklist performed prior to infusion.  Inform patient if any fever, chills or signs of infection, new symptoms, abdominal pain, heart palpitations, shortness of breath, reaction, weakness, neurological changes, seek medical attention immediately and should not receive infusions. No live virus vaccines prior to or during treatment or up to 6 months post infusion. If the patient has an upcoming procedure or surgery, this should be discussed with the rheumatologist and surgeon or provider.  Patient tolerated infusion: well.    Drug Waste Record? No     Discharge Plan:   Follow up plan of care with: ongoing infusions at [facility].  Discharge instructions were reviewed with patient.  Patient/representative verbalized understanding of discharge instructions and all questions answered.  Patient discharged from [facility] in stable condition.    Yuliet Farrell RN    Administrations This Visit     inFLIXimab (REMICADE) 400 mg in NaCl 0.9 % 315 mL non-oncology use     Admin Date Action Dose Rate Route Administered By          10/04/2017 New Bag 400 mg 157.5 mL/hr Intravenous Yuliet Farrell RN                         /74  Pulse 65  Wt 84.2 kg (185 lb 9.6 oz)  BMI 29.07 kg/m2

## 2017-10-04 NOTE — PATIENT INSTRUCTIONS
Dear Rohan Monroe    Thank you for choosing [facility] ([facility]) for your infusion.  The following information is a summary of our appointment as well as important reminders.          EDUCATION POST BIOLOGICAL/CHEMOTHERAPY INFUSION  Call the triage nurse at your clinic or seek medical attention if you have chills and/or temperature greater than or equal to 100.5, uncontrolled nausea/vomiting, diarrhea, constipation, dizziness, shortness of breath, chest pain, heart palpitations, weakness or any other new or concerning symptoms, questions or concerns.  You can not have any live virus vaccines prior to or during treatment or up to 6 months post infusion.  If you have an upcoming surgery, medical procedure or dental procedure during treatment, this should be discussed with your ordering physician and your surgeon/dentist.  If you are having any concerning symptom, if you are unsure if you should get your next infusion or wish to speak to a provider before your next infusion, please call your care coordinator or triage nurse at your clinic to notify them so we can adequately serve you.          Additional information: you had your infusion of Remicade 400 mg via IV today.      We look forward in seeing you on your next appointment here at [facility].  Please don t hesitate to call us at [phone] to reschedule any of your appointments or to speak with one of the [facility] registered nurses.  It was a pleasure taking care of you today.    Sincerely,  Yuliet Farrell RN  [facility]  [facility]  [address] [place] [address]  [address]  Phone:  [phone]

## 2017-10-04 NOTE — MR AVS SNAPSHOT
After Visit Summary   10/4/2017    Rohan Monroe    MRN: [id number]           Patient Information     Date Of Birth          1945        Visit Information        Provider Department      10/4/2017 12:00 PM UC [facility] ATC;  SPEC INFUSION [facility] Special[facility]        Today's Diagnoses     Sarcoidosis of lung (HCC)    -  1    SARCOIDOSIS-systemic          Care Instructions    Dear Rohan Monroe    Thank you for choosing [facility] ([facility]) for your infusion.  The following information is a summary of our appointment as well as important reminders.          EDUCATION POST BIOLOGICAL/CHEMOTHERAPY INFUSION  Call the triage nurse at your clinic or seek medical attention if you have chills and/or temperature greater than or equal to 100.5, uncontrolled nausea/vomiting, diarrhea, constipation, dizziness, shortness of breath, chest pain, heart palpitations, weakness or any other new or concerning symptoms, questions or concerns.  You can not have any live virus vaccines prior to or during treatment or up to 6 months post infusion.  If you have an upcoming surgery, medical procedure or dental procedure during treatment, this should be discussed with your ordering physician and your surgeon/dentist.  If you are having any concerning symptom, if you are unsure if you should get your next infusion or wish to speak to a provider before your next infusion, please call your care coordinator or triage nurse at your clinic to notify them so we can adequately serve you.          Additional information: you had your infusion of Remicade 400 mg via IV today.      We look forward in seeing you on your next appointment here at [facility].  Please don t hesitate to call us at [phone] to reschedule any of your appointments or to speak with one of the [facility] registered nurses.  It was a pleasure taking care of you  today.    Sincerely,  Yuliet Farrell, RN  [facility]  Special[facility]fusion [facility]  [address] [place] [address]  [address]  Phone:  [phone]            Follow-ups after your visit        Your next 10 appointments already scheduled     Oct 05, 2017  1:00 PM CDT   Cardiac Treatment with  Cardiac Rehab 1   [facility] ([facility])    [address]  [place] [address]   [phone]            Oct 06, 2017  2:30 PM CDT   CONSULT with  Cardiac Rehab 1   [facility] ([facility])    [address]  [place] [address]   [phone]            Oct 06, 2017  3:00 PM CDT   Cardiac Treatment with  Cardiac Rehab 1   [facility] ([facility])    [address]  [place] [address]   [phone]            Oct 09, 2017  1:00 PM CDT   Cardiac Treatment with  Cardiac Rehab 1   [facility] ([facility])    [address]  [place] [address]   [phone]            Oct 11, 2017  1:00 PM CDT   Cardiac Treatment with  Cardiac Rehab 1   [facility] ([facility])    [address]  [place] [address]   [phone]            Oct 13, 2017  3:00 PM CDT   Cardiac Treatment with  Cardiac Rehab 1   [facility] ([facility])    [address]  [place] [address]   [phone]            Oct 16, 2017  1:00 PM CDT   Cardiac Treatment with  Cardiac Rehab 1   [facility] ([facility])    [address]  [place] [address]   [phone]            Oct 18, 2017  1:00 PM CDT   Cardiac Treatment with  Cardiac Rehab 1   [facility] ([facility])    [address]  [place] [address]   [phone]            Oct 20, 2017  3:00 PM CDT   Cardiac Treatment with Sh Cardiac Rehab 1   [facility] ([facility])    [address]  [place] [address]   [phone]            Oct 23, 2017  1:00 PM CDT   Cardiac Treatment with Sh Cardiac Rehab 1   [facility] ([facility])    [address]  [place] [address]   [phone]              Who to contact     If you have questions or need follow up information about today's clinic visit or your schedule please contact [facility] SPECIALTY AND PROCEDURE directly at [phone].  Normal or non-critical lab and imaging results will be communicated to you by [employer], letter or phone within 4 business days after the clinic has received the results. If you do not hear from us within 7 days, please contact the clinic through [employer] or phone. If you have a critical or abnormal lab result, we will notify you by phone as soon as possible.  Submit refill requests through [employer] or call your pharmacy and they will forward the refill request to us. Please allow 3 business days for your refill to be completed.          Additional Information About Your Visit        MyChart Information     [employer] gives you secure access to your electronic health record. If you see a primary care provider, you can also send messages to your care team and make appointments. If you have questions, please call your primary care clinic.  If you do not have a primary care provider, please call [phone] and they will assist you.        Care EveryWhere ID     This is your Care EveryWhere ID. This could be used by other organizations to access your [facility] medical records  [id number]        Your Vitals Were     Pulse BMI (Body Mass Index)                76 29.07 kg/m2           Blood Pressure from Last 3 Encounters:   10/04/17  154/82   09/12/17 111/69   09/05/17 146/78    Weight from Last 3 Encounters:   10/04/17 84.2 kg (185 lb 9.6 oz)   09/05/17 86.8 kg (191 lb 4.8 oz)   08/31/17 86 kg (189 lb 9.6 oz)              Today, you had the following     No orders found for display         Today's Medication Changes          These changes are accurate as of: 10/4/17  2:48 PM.  If you have any questions, ask your nurse or doctor.               These medicines have changed or have updated prescriptions.        Dose/Directions    Urea 40 % Crea   Commonly known as:  CARMOL 40   This may have changed:  additional instructions   Used for:  Dermatophytosis of nail, Corns and callosities        To feet daily   Quantity:  199 g   Refills:  4                Primary Care Provider Office Phone # Fax #    Jamie Foster [phone]       [address] [address]        Equal Access to Services     [facility] AH: Hadii rhys ku hadasho Sonathanali, waaxda luqadaha, qaybta kaalmada adeegyada, waxay mariann munson . So [facility] [phone].    ATENCIÓN: Si habla español, tiene a mcfadden disposición servicios gratuitos de asistencia lingüística. Llame al [phone].    We comply with applicable federal civil rights laws and [place] laws. We do not discriminate on the basis of race, color, national origin, age, disability, sex, sexual orientation, or gender identity.            Thank you!     Thank you for choosing [facility] SPECIALTY AND PROCEDURE  for your care. Our goal is always to provide you with excellent care. Hearing back from our patients is one way we can continue to improve our services. Please take a few minutes to complete the written survey that you may receive in the mail after your visit with us. Thank you!             Your Updated Medication List - Protect others around you: Learn how to safely use, store and throw away your medicines at www.disposemymeds.org.           This list is accurate as of: 10/4/17  2:48 PM.  Always use your most recent med list.                   Brand Name Dispense Instructions for use Diagnosis    albuterol 108 (90 BASE) MCG/ACT Inhaler    PROAIR HFA/PROVENTIL HFA/VENTOLIN HFA    3 Inhaler    Inhale 2 puffs into the lungs every 6 hours as needed for shortness of breath / dyspnea    Sarcoidosis       atorvastatin 40 MG tablet    LIPITOR    30 tablet    TAKE ONE TABLET BY MOUTH ONE TIME DAILY    Coronary artery disease involving native coronary artery of native heart without angina pectoris, CAD S/P percutaneous coronary angioplasty       blood glucose monitoring lancets     2 Box    Use to test blood sugar 2 times daily or as directed.  102 lancets per box.  3 month supply.    Type 2 diabetes, HbA1C goal < 8% (H)       blood glucose monitoring meter device kit    no brand specified    1 kit    Use to test blood sugar 2 times daily.    Type 2 diabetes mellitus with diabetic nephropathy (H)       blood glucose monitoring test strip    ACCU-CHEK RONNIE PLUS    200 each    Use to test blood sugar 2 times daily or as directed.  3 month supply.    Type 2 diabetes, HbA1C goal < 8% (H)       clopidogrel 75 MG tablet    PLAVIX    90 tablet    Take 1 tablet (75 mg) by mouth daily    CAD S/P percutaneous coronary angioplasty, Coronary artery disease involving native coronary artery of native heart without angina pectoris       fluticasone-vilanterol 100-25 MCG/INH oral inhaler    BREO ELLIPTA    3 Inhaler    Inhale 1 puff into the lungs daily    Chronic obstructive pulmonary disease, unspecified COPD type (H)       furosemide 20 MG tablet    LASIX    180 tablet    Take 3 tablets (60 mg) by mouth 2 times daily    Pulmonary hypertension       inFLIXimab 100 MG injection    REMICADE     Inject 100 mg into the vein every 28 days        levothyroxine 137 MCG tablet    SYNTHROID/LEVOTHROID    90 tablet    Take 1 tablet (137 mcg) by mouth daily    Hypothyroidism        losartan 50 MG tablet    COZAAR    90 tablet    Take 1 tablet (50 mg) by mouth daily    (HFpEF) heart failure with preserved ejection fraction (H)       methotrexate 2.5 MG tablet CHEMO     48 tablet    Take 3 tablets (7.5 mg) by mouth once a week On Mondays    Sarcoidosis       metoprolol 100 MG tablet    LOPRESSOR    60 tablet    Take 1 tablet (100 mg) by mouth 2 times daily    Hypertension secondary to other renal disorders       mirtazapine 15 MG tablet    REMERON     Take 15 mg by mouth At Bedtime.        MULTIVITAMIN & MINERAL PO      Take 1 tablet by mouth daily.        omeprazole 20 MG CR capsule    priLOSEC    90 capsule    Take 1 capsule (20 mg) by mouth daily    CAD S/P percutaneous coronary angioplasty, Coronary artery disease involving native coronary artery of native heart without angina pectoris, Sarcoidosis of lung (H)       * order for DME     1 Device    She requested for a 4 wheel walker, would like to change it to 4 wheel walker with a seat and oxygen tank durant.   Need this faxed over to her  [phone]    Sarcoidosis, lung (H), COPD (chronic obstructive pulmonary disease) (H), Abnormal gait, Congestive heart failure (H)       * order for DME     1 Device    Updated Oxygen: Patient requires supplemental Oxygen 3 LPM via nasal canula with activity and 2 LPM nocturnally. Please provide patient with a portable oxygen concentrator for improved mobility.  Okay to spot check or titrated for conserving to keep stats above 90%. Oxygen will be for a lifetime.    ILD (interstitial lung disease) (H), Hypoxia       polyethylene glycol powder    MIRALAX    510 g    Take 17 g (1 capful) by mouth daily    Constipation, unspecified constipation type       sildenafil 20 MG tablet    REVATIO    270 tablet    Take 1 tablet (20 mg) by mouth 3 times daily    Pulmonary hypertension       tiotropium 18 MCG capsule    SPIRIVA HANDIHALER    90 capsule    Inhale contents of one capsule daily.    Chronic obstructive  pulmonary disease, unspecified COPD type (H)       Urea 40 % Crea    CARMOL 40    199 g    To feet daily    Dermatophytosis of nail, Corns and callosities       warfarin 5 MG tablet    COUMADIN    30 tablet    Take 1 tablet (5 mg) by mouth daily    Atrial flutter with rapid ventricular response (H)       * Notice:  This list has 2 medication(s) that are the same as other medications prescribed for you. Read the directions carefully, and ask your doctor or other care provider to review them with you.

## 2017-10-04 NOTE — PROGRESS NOTES
ANTICOAGULATION FOLLOW-UP CLINIC VISIT    Patient Name:  Rohan Monroe  Date:  10/4/2017  Contact Type:  Telephone    SUBJECTIVE:     Patient Findings     Positives No Problem Findings    Comments Denis did mention that he gets nose bleeds a couple times a week in the mornings.  He said he sometimes wakes up with dried blood in his nose.  He states these do not last long and are not troublesome to him.  He was instructed to inform us if the frequency of these increase or if the duration of bleeding increases.           OBJECTIVE    INR   Date Value Ref Range Status   10/04/2017 3.09 (H) 0.86 - 1.14 Final       ASSESSMENT / PLAN  INR assessment SUPRA    Recheck INR In: 1 WEEK    INR Location Clinic      Anticoagulation Summary as of 10/4/2017     INR goal 2.0-3.0   Today's INR 3.09!   Maintenance plan 5 mg (5 mg x 1) every day   Full instructions 5 mg every day   Weekly total 35 mg   Plan last modified Paige Moscoso RN (8/7/2017)   Next INR check 10/11/2017   Priority INR   Target end date 4/20/2017    Indications   Long-term (current) use of anticoagulants [Z79.01] [Z79.01]  Atrial flutter with rapid ventricular response (H) [I48.92]         Anticoagulation Episode Summary     INR check location     Preferred lab     Send INR reminders to [facility]    Comments 3 months therapy, then reassess.        Anticoagulation Care Providers     Provider Role Specialty Phone number    Jamie Foster MD [facility] Internal Medicine [phone]            See the Encounter Report to view Anticoagulation Flowsheet and Dosing Calendar (Go to Encounters tab in chart review, and find the Anticoagulation Therapy Visit)    Spoke with Denis and gave him his INR results and coumadin dosing recommendations.  Asked him to have INR checked in one week.  He reported no changes in health, diet or medications and no falls.  He did mention he gets nose bleeds a couple times a week, but is not concerned about them.  He  reports no other signs or symptoms of bleeding or clotting.    Aleshia Argueta, PharmD-2

## 2017-10-04 NOTE — MR AVS SNAPSHOT
Rohan Howard Mabel   10/4/2017   Anticoagulation Therapy Visit    Description:  71 year old male   Provider:  Joe Canales, [facility]   Department:  [facility]           INR as of 10/4/2017     Today's INR 3.09!      Anticoagulation Summary as of 10/4/2017     INR goal 2.0-3.0   Today's INR 3.09!   Full instructions 5 mg every day   Next INR check 10/11/2017    Indications   Long-term (current) use of anticoagulants [Z79.01] [Z79.01]  Atrial flutter with rapid ventricular response (H) [I48.92]         October 2017 Details    Sun Mon Tue Wed Thu Fri Sat     1               2               3               4      5 mg   See details      5      5 mg         6      5 mg         7      5 mg           8      5 mg         9      5 mg         10      5 mg         11            12               13               14                 15               16               17               18               19               20               21                 22               23               24               25               26               27               28                 29               30               31                    Date Details   10/04 This INR check       Date of next INR:  10/11/2017         How to take your warfarin dose     To take:  5 mg Take 1 of the 5 mg tablets.

## 2017-10-05 ENCOUNTER — HOSPITAL ENCOUNTER (OUTPATIENT)
Dept: CARDIAC REHAB | Facility: CLINIC | Age: 72
End: 2017-10-05
Attending: INTERNAL MEDICINE
Payer: MEDICARE

## 2017-10-05 PROCEDURE — 40000116 ZZH STATISTIC OP CR VISIT: Performed by: CLINICAL EXERCISE PHYSIOLOGIST

## 2017-10-05 PROCEDURE — 93798 PHYS/QHP OP CAR RHAB W/ECG: CPT | Performed by: CLINICAL EXERCISE PHYSIOLOGIST

## 2017-10-06 ENCOUNTER — HOSPITAL ENCOUNTER (OUTPATIENT)
Dept: CARDIAC REHAB | Facility: CLINIC | Age: 72
End: 2017-10-06
Attending: INTERNAL MEDICINE
Payer: MEDICARE

## 2017-10-06 PROCEDURE — 93797 PHYS/QHP OP CAR RHAB WO ECG: CPT | Performed by: REHABILITATION PRACTITIONER

## 2017-10-06 PROCEDURE — 93798 PHYS/QHP OP CAR RHAB W/ECG: CPT | Performed by: REHABILITATION PRACTITIONER

## 2017-10-06 PROCEDURE — 40000116 ZZH STATISTIC OP CR VISIT: Performed by: REHABILITATION PRACTITIONER

## 2017-10-06 PROCEDURE — 40000575 ZZH STATISTIC OP CARDIAC VISIT #2: Performed by: REHABILITATION PRACTITIONER

## 2017-10-09 ENCOUNTER — HOSPITAL ENCOUNTER (OUTPATIENT)
Dept: CARDIAC REHAB | Facility: CLINIC | Age: 72
End: 2017-10-09
Attending: INTERNAL MEDICINE
Payer: MEDICARE

## 2017-10-09 PROCEDURE — 40000116 ZZH STATISTIC OP CR VISIT

## 2017-10-09 PROCEDURE — 93798 PHYS/QHP OP CAR RHAB W/ECG: CPT

## 2017-10-10 ENCOUNTER — TELEPHONE (OUTPATIENT)
Dept: INTERVENTIONAL RADIOLOGY/VASCULAR | Facility: CLINIC | Age: 72
End: 2017-10-10

## 2017-10-10 DIAGNOSIS — I72.3 ANEURYSM OF ILIAC ARTERY (H): Primary | ICD-10-CM

## 2017-10-10 NOTE — TELEPHONE ENCOUNTER
I called and spoke with patient.  He is scheduled per his preference for f/up for Right external iliac stent with Dr. Knox with US prior.  I have sent [employer] message with appt details and pt confirmed with patient.  AGNES Barboza RN, BSN  Interventional Radiology Care Coordinator   Phone:  [phone]

## 2017-10-11 ENCOUNTER — HOSPITAL ENCOUNTER (OUTPATIENT)
Dept: CARDIAC REHAB | Facility: CLINIC | Age: 72
End: 2017-10-11
Attending: INTERNAL MEDICINE
Payer: MEDICARE

## 2017-10-11 PROCEDURE — 40000116 ZZH STATISTIC OP CR VISIT: Performed by: REHABILITATION PRACTITIONER

## 2017-10-11 PROCEDURE — 93798 PHYS/QHP OP CAR RHAB W/ECG: CPT | Performed by: REHABILITATION PRACTITIONER

## 2017-10-12 DIAGNOSIS — I48.92 ATRIAL FLUTTER WITH RAPID VENTRICULAR RESPONSE (H): ICD-10-CM

## 2017-10-12 DIAGNOSIS — E03.9 HYPOTHYROIDISM: ICD-10-CM

## 2017-10-12 DIAGNOSIS — D86.9 SARCOIDOSIS: ICD-10-CM

## 2017-10-12 DIAGNOSIS — I50.33 ACUTE ON CHRONIC DIASTOLIC HEART FAILURE (H): ICD-10-CM

## 2017-10-12 RX ORDER — WARFARIN SODIUM 5 MG/1
TABLET ORAL
Qty: 90 TABLET | Refills: 5 | Status: SHIPPED | OUTPATIENT
Start: 2017-10-12 | End: 2018-03-29

## 2017-10-13 ENCOUNTER — HOSPITAL ENCOUNTER (OUTPATIENT)
Dept: CARDIAC REHAB | Facility: CLINIC | Age: 72
End: 2017-10-13
Attending: INTERNAL MEDICINE
Payer: MEDICARE

## 2017-10-13 DIAGNOSIS — I48.92 ATRIAL FLUTTER WITH RAPID VENTRICULAR RESPONSE (H): ICD-10-CM

## 2017-10-13 DIAGNOSIS — I27.20 PULMONARY HYPERTENSION (H): ICD-10-CM

## 2017-10-13 PROCEDURE — 40000116 ZZH STATISTIC OP CR VISIT: Performed by: OCCUPATIONAL THERAPIST

## 2017-10-13 PROCEDURE — 93798 PHYS/QHP OP CAR RHAB W/ECG: CPT | Performed by: OCCUPATIONAL THERAPIST

## 2017-10-13 RX ORDER — FUROSEMIDE 20 MG
TABLET ORAL
Qty: 180 TABLET | Refills: 2 | Status: SHIPPED | OUTPATIENT
Start: 2017-10-13 | End: 2017-10-13

## 2017-10-13 RX ORDER — FUROSEMIDE 20 MG
60 TABLET ORAL 2 TIMES DAILY
Qty: 240 TABLET | Refills: 11 | Status: SHIPPED | OUTPATIENT
Start: 2017-10-13 | End: 2018-03-08

## 2017-10-16 ENCOUNTER — HOSPITAL ENCOUNTER (OUTPATIENT)
Dept: CARDIAC REHAB | Facility: CLINIC | Age: 72
End: 2017-10-16
Attending: INTERNAL MEDICINE
Payer: MEDICARE

## 2017-10-16 PROCEDURE — 40000116 ZZH STATISTIC OP CR VISIT: Performed by: OCCUPATIONAL THERAPIST

## 2017-10-16 PROCEDURE — 40000575 ZZH STATISTIC OP CARDIAC VISIT #2: Performed by: REHABILITATION PRACTITIONER

## 2017-10-16 PROCEDURE — 93798 PHYS/QHP OP CAR RHAB W/ECG: CPT | Performed by: OCCUPATIONAL THERAPIST

## 2017-10-16 PROCEDURE — 93797 PHYS/QHP OP CAR RHAB WO ECG: CPT | Performed by: REHABILITATION PRACTITIONER

## 2017-10-16 RX ORDER — LEVOTHYROXINE SODIUM 137 UG/1
137 TABLET ORAL DAILY
Qty: 90 TABLET | Refills: 1 | Status: SHIPPED | OUTPATIENT
Start: 2017-10-16 | End: 2018-02-26 | Stop reason: DRUGHIGH

## 2017-10-16 NOTE — TELEPHONE ENCOUNTER
levothyroxine 137 MCG       Last Written Prescription Date:  4/13/17  Last Fill Quantity: 90,   # refills: 1  Last Office Visit : 8/10/17  GAL GOODWIN  Future Office visit:   NONE  TSH   Date Value Ref Range Status   04/27/2017 0.70 0.40 - 4.00 mU/L Final

## 2017-10-20 ENCOUNTER — HOSPITAL ENCOUNTER (OUTPATIENT)
Dept: CARDIAC REHAB | Facility: CLINIC | Age: 72
End: 2017-10-20
Attending: INTERNAL MEDICINE
Payer: MEDICARE

## 2017-10-20 PROCEDURE — 93798 PHYS/QHP OP CAR RHAB W/ECG: CPT | Performed by: OCCUPATIONAL THERAPIST

## 2017-10-20 PROCEDURE — 40000116 ZZH STATISTIC OP CR VISIT: Performed by: OCCUPATIONAL THERAPIST

## 2017-10-23 ENCOUNTER — HOSPITAL ENCOUNTER (OUTPATIENT)
Dept: CARDIAC REHAB | Facility: CLINIC | Age: 72
End: 2017-10-23
Attending: INTERNAL MEDICINE
Payer: MEDICARE

## 2017-10-23 PROCEDURE — 93797 PHYS/QHP OP CAR RHAB WO ECG: CPT | Mod: XU

## 2017-10-23 PROCEDURE — 93798 PHYS/QHP OP CAR RHAB W/ECG: CPT | Performed by: OCCUPATIONAL THERAPIST

## 2017-10-23 PROCEDURE — 40000116 ZZH STATISTIC OP CR VISIT: Performed by: OCCUPATIONAL THERAPIST

## 2017-10-23 PROCEDURE — 40000575 ZZH STATISTIC OP CARDIAC VISIT #2

## 2017-10-25 ENCOUNTER — ANTICOAGULATION THERAPY VISIT (OUTPATIENT)
Dept: ANTICOAGULATION | Facility: CLINIC | Age: 72
End: 2017-10-25

## 2017-10-25 ENCOUNTER — OFFICE VISIT (OUTPATIENT)
Dept: RADIOLOGY | Facility: CLINIC | Age: 72
End: 2017-10-25
Attending: RADIOLOGY
Payer: MEDICARE

## 2017-10-25 VITALS
OXYGEN SATURATION: 93 % | HEIGHT: 67 IN | BODY MASS INDEX: 29.49 KG/M2 | WEIGHT: 187.9 LBS | DIASTOLIC BLOOD PRESSURE: 72 MMHG | HEART RATE: 67 BPM | TEMPERATURE: 97.3 F | SYSTOLIC BLOOD PRESSURE: 130 MMHG | RESPIRATION RATE: 16 BRPM

## 2017-10-25 DIAGNOSIS — I48.92 ATRIAL FLUTTER WITH RAPID VENTRICULAR RESPONSE (H): ICD-10-CM

## 2017-10-25 DIAGNOSIS — I48.92 ATRIAL FLUTTER (H): ICD-10-CM

## 2017-10-25 DIAGNOSIS — I72.9 PSEUDOANEURYSM FOLLOWING PROCEDURE (H): Primary | ICD-10-CM

## 2017-10-25 DIAGNOSIS — Z79.01 LONG-TERM (CURRENT) USE OF ANTICOAGULANTS: ICD-10-CM

## 2017-10-25 DIAGNOSIS — N18.4 CKD (CHRONIC KIDNEY DISEASE) STAGE 4, GFR 15-29 ML/MIN (H): ICD-10-CM

## 2017-10-25 DIAGNOSIS — T81.718A PSEUDOANEURYSM FOLLOWING PROCEDURE (H): Primary | ICD-10-CM

## 2017-10-25 LAB
ALBUMIN SERPL-MCNC: 4 G/DL (ref 3.4–5)
ANION GAP SERPL CALCULATED.3IONS-SCNC: 8 MMOL/L (ref 3–14)
BUN SERPL-MCNC: 55 MG/DL (ref 7–30)
CALCIUM SERPL-MCNC: 8.7 MG/DL (ref 8.5–10.1)
CHLORIDE SERPL-SCNC: 100 MMOL/L (ref 94–109)
CO2 SERPL-SCNC: 26 MMOL/L (ref 20–32)
CREAT SERPL-MCNC: 2.38 MG/DL (ref 0.66–1.25)
CREAT UR-MCNC: 38 MG/DL
GFR SERPL CREATININE-BSD FRML MDRD: 27 ML/MIN/1.7M2
GLUCOSE SERPL-MCNC: 122 MG/DL (ref 70–99)
INR PPP: 2.13 (ref 0.86–1.14)
PHOSPHATE SERPL-MCNC: 3.1 MG/DL (ref 2.5–4.5)
POTASSIUM SERPL-SCNC: 4.4 MMOL/L (ref 3.4–5.3)
PROT UR-MCNC: 0.39 G/L
PROT/CREAT 24H UR: 1.02 G/G CR (ref 0–0.2)
SODIUM SERPL-SCNC: 133 MMOL/L (ref 133–144)

## 2017-10-25 PROCEDURE — 84156 ASSAY OF PROTEIN URINE: CPT | Performed by: INTERNAL MEDICINE

## 2017-10-25 PROCEDURE — 99212 OFFICE O/P EST SF 10 MIN: CPT | Mod: ZF

## 2017-10-25 PROCEDURE — 99213 OFFICE O/P EST LOW 20 MIN: CPT

## 2017-10-25 PROCEDURE — 85610 PROTHROMBIN TIME: CPT | Performed by: INTERNAL MEDICINE

## 2017-10-25 PROCEDURE — 80069 RENAL FUNCTION PANEL: CPT | Performed by: INTERNAL MEDICINE

## 2017-10-25 ASSESSMENT — PAIN SCALES - GENERAL: PAINLEVEL: NO PAIN (0)

## 2017-10-25 NOTE — PROGRESS NOTES
ANTICOAGULATION FOLLOW-UP CLINIC VISIT    Patient Name:  Rohan Monroe  Date:  10/25/2017  Contact Type:  Telephone    SUBJECTIVE:        OBJECTIVE    INR   Date Value Ref Range Status   10/25/2017 2.13 (H) 0.86 - 1.14 Final       ASSESSMENT / PLAN  INR assessment THER    Recheck INR In: 4 WEEKS    INR Location Clinic      Anticoagulation Summary as of 10/25/2017     INR goal 2.0-3.0   Today's INR 2.13   Maintenance plan 5 mg (5 mg x 1) every day   Full instructions 5 mg every day   Weekly total 35 mg   No change documented Delphine Kaur, RN   Plan last modified Paige Moscoso RN (8/7/2017)   Next INR check 11/22/2017   Priority INR   Target end date 4/20/2017    Indications   Long-term (current) use of anticoagulants [Z79.01] [Z79.01]  Atrial flutter with rapid ventricular response (H) [I48.92]         Anticoagulation Episode Summary     INR check location     Preferred lab     Send INR reminders to [facility]    Comments 3 months therapy, then reassess.        Anticoagulation Care Providers     Provider Role Specialty Phone number    Jamie Foster MD [facility] Internal Medicine [phone]            See the Encounter Report to view Anticoagulation Flowsheet and Dosing Calendar (Go to Encounters tab in chart review, and find the Anticoagulation Therapy Visit)    Left message for patient with results and dosing recommendations. Asked patient to call back to report any missed doses, falls, signs and symptoms of bleeding or clotting, any changes in health, medication, or diet. Asked patient to call back with any questions or concerns.     Delphine Kaur, RN

## 2017-10-25 NOTE — NURSING NOTE
"Oncology Rooming Note    October 25, 2017 11:40 AM   Rohan Monroe is a 71 year old male who presents for:    Chief Complaint   Patient presents with     Oncology Clinic Visit     Stents placed     Initial Vitals: /72  Pulse 67  Temp 97.3  F (36.3  C) (Oral)  Resp 16  Ht 1.702 m (5' 7.01\")  Wt 85.2 kg (187 lb 14.4 oz)  SpO2 93%  BMI 29.42 kg/m2 Estimated body mass index is 29.42 kg/(m^2) as calculated from the following:    Height as of this encounter: 1.702 m (5' 7.01\").    Weight as of this encounter: 85.2 kg (187 lb 14.4 oz). Body surface area is 2.01 meters squared.  No Pain (0) Comment: Data Unavailable   No LMP for male patient.  Allergies reviewed: Yes  Medications reviewed: Yes    Medications: Medication refills not needed today.  Pharmacy name entered into [employer]: [id number] - [facility] [address]    Clinical concerns: No new concerns at this time.    10 minutes for nursing intake (face to face time)     Cecilia Frgeoso LPN            "

## 2017-10-25 NOTE — MR AVS SNAPSHOT
After Visit Summary   10/25/2017    Rohan Monroe    MRN: [id number]           Patient Information     Date Of Birth          1945        Visit Information        Provider Department      10/25/2017 11:40 AM Maria Victoria Palmer MD [facility]        Today's Diagnoses     Pseudoaneurysm following procedure (H)    -  1       Follow-ups after your visit        Your next 10 appointments already scheduled     Oct 26, 2017  1:00 PM CDT   Cardiac Treatment with  Cardiac Rehab 1   [facility] ([facility])    [address]  [facility] [address]   [phone]            Oct 27, 2017  3:00 PM CDT   Cardiac Treatment with  Cardiac Rehab 1   [facility] ([facility])    [address]  [facility] [address]   [phone]            Oct 30, 2017  1:00 PM CDT   Cardiac Treatment with  Cardiac Rehab 1   [facility] ([facility])    [address]  [facility] [address]   [phone]            Nov 01, 2017 12:00 PM CDT   Infusion 180 with UC SPEC INFUSION, UC 47 ATC   [facility] ([facility])    [address] [place] [address]   [phone]            Nov 02, 2017  1:00 PM CDT   Cardiac Treatment with  Cardiac Rehab 1   [facility] ([facility])    [address]  [facility] [address]   [phone]            Nov 03, 2017  3:00 PM CDT   Cardiac Treatment with  Cardiac Rehab 1   [facility] ([facility])    [address]  [facility] [address]   [phone]            Nov 06, 2017  1:00 PM CST   Cardiac Treatment with  Cardiac Rehab 1   [facility] ([facility])    [address]  [facility] [address]   [phone]            Nov 29, 2017 12:00 PM CST   Infusion 180 with UC SPEC INFUSION, UC 47 ATC   [facility] ([facility])    [address]  2nd Floor  [facility] [id number] [phone]              Who to contact     If you have questions or need follow up information about today's clinic visit or your schedule please contact [facility] directly at [phone].  Normal or non-critical lab and imaging results will be communicated to you by Triptrottinghart, letter or phone within 4 business days after the clinic has received the results. If you do not hear from us within 7 days, please contact the clinic through [employer] or phone. If you have a critical or abnormal lab result, we will notify you by phone as soon as possible.  Submit refill requests through Rev or call your pharmacy and they will forward the refill request to us. Please allow 3 business days for your refill to be completed.          Additional Information About Your Visit        MyChart Information     Rev gives you secure access to your electronic health record. If you see a primary care provider, you can also send messages to your care team and make appointments. If you have questions, please call your primary care clinic.  If you do not have a primary care provider, please call [phone] and they will assist you.        Care EveryWhere ID     This is your Care EveryWhere ID. This could be used by other organizations to access your [place] medical records  [id number]        Your Vitals Were     Pulse Temperature Respirations Height Pulse Oximetry BMI (Body Mass Index)    67 97.3  F (36.3  C) (Oral) 16 1.702 m (5' 7.01\") 93% 29.42 kg/m2       Blood Pressure from Last 3 Encounters:   10/25/17 130/72   10/04/17 154/82   09/12/17 111/69    Weight from Last 3 Encounters:   10/25/17 85.2 kg (187 " lb 14.4 oz)   10/04/17 84.2 kg (185 lb 9.6 oz)   09/05/17 86.8 kg (191 lb 4.8 oz)               Primary Care Provider Office Phone # Fax #    Jamie DAMEON MD Cristian [phone] [address] [address]        Equal Access to Services     [facility] : Hadii aad ku hadasho Soomaali, waaxda luqadaha, qaybta kaalmada adeegyada, waxay idiin hayaan adeeg kharash la'aan ah. So [facility] [phone].    ATENCIÓN: Si habla español, tiene a mcfadden disposición servicios gratuitos de asistencia lingüística. Sofieame al [phone].    We comply with applicable federal civil rights laws and [place] laws. We do not discriminate on the basis of race, color, national origin, age, disability, sex, sexual orientation, or gender identity.            Thank you!     Thank you for choosing [facility]  for your care. Our goal is always to provide you with excellent care. Hearing back from our patients is one way we can continue to improve our services. Please take a few minutes to complete the written survey that you may receive in the mail after your visit with us. Thank you!             Your Updated Medication List - Protect others around you: Learn how to safely use, store and throw away your medicines at www.disposemymeds.org.          This list is accurate as of: 10/25/17 11:59 PM.  Always use your most recent med list.                   Brand Name Dispense Instructions for use Diagnosis    albuterol 108 (90 BASE) MCG/ACT Inhaler    PROAIR HFA/PROVENTIL HFA/VENTOLIN HFA    3 Inhaler    Inhale 2 puffs into the lungs every 6 hours as needed for shortness of breath / dyspnea    Sarcoidosis       atorvastatin 40 MG tablet    LIPITOR    30 tablet    TAKE ONE TABLET BY MOUTH ONE TIME DAILY    Coronary artery disease involving native coronary artery of native heart without angina pectoris, CAD S/P percutaneous coronary angioplasty       blood glucose monitoring lancets     2 Box    Use to  test blood sugar 2 times daily or as directed.  102 lancets per box.  3 month supply.    Type 2 diabetes, HbA1C goal < 8% (H)       blood glucose monitoring meter device kit    no brand specified    1 kit    Use to test blood sugar 2 times daily.    Type 2 diabetes mellitus with diabetic nephropathy (H)       blood glucose monitoring test strip    ACCU-CHEK RONNIE PLUS    200 each    Use to test blood sugar 2 times daily or as directed.  3 month supply.    Type 2 diabetes, HbA1C goal < 8% (H)       clopidogrel 75 MG tablet    PLAVIX    90 tablet    Take 1 tablet (75 mg) by mouth daily    CAD S/P percutaneous coronary angioplasty, Coronary artery disease involving native coronary artery of native heart without angina pectoris       fluticasone-vilanterol 100-25 MCG/INH oral inhaler    BREO ELLIPTA    3 Inhaler    Inhale 1 puff into the lungs daily    Chronic obstructive pulmonary disease, unspecified COPD type (H)       furosemide 20 MG tablet    LASIX    240 tablet    Take 3 tablets (60 mg) by mouth 2 times daily May take extra 20 mg as needed for wt .gain >3#    Pulmonary hypertension       inFLIXimab 100 MG injection    REMICADE     Inject 100 mg into the vein every 28 days        levothyroxine 137 MCG tablet    SYNTHROID/LEVOTHROID    90 tablet    Take 1 tablet (137 mcg) by mouth daily    Hypothyroidism       losartan 50 MG tablet    COZAAR    90 tablet    Take 1 tablet (50 mg) by mouth daily    (HFpEF) heart failure with preserved ejection fraction (H)       methotrexate 2.5 MG tablet CHEMO     48 tablet    Take 3 tablets (7.5 mg) by mouth once a week On Mondays    Sarcoidosis       metoprolol 100 MG tablet    LOPRESSOR    60 tablet    Take 1 tablet (100 mg) by mouth 2 times daily    Hypertension secondary to other renal disorders       mirtazapine 15 MG tablet    REMERON     Take 15 mg by mouth At Bedtime.        MULTIVITAMIN & MINERAL PO      Take 1 tablet by mouth daily.        omeprazole 20 MG CR capsule     priLOSEC    90 capsule    Take 1 capsule (20 mg) by mouth daily    CAD S/P percutaneous coronary angioplasty, Coronary artery disease involving native coronary artery of native heart without angina pectoris, Sarcoidosis of lung (H)       * order for DME     1 Device    She requested for a 4 wheel walker, would like to change it to 4 wheel walker with a seat and oxygen tank durant.   Need this faxed over to her  [phone]    Sarcoidosis, lung (H), COPD (chronic obstructive pulmonary disease) (H), Abnormal gait, Congestive heart failure (H)       * order for DME     1 Device    Updated Oxygen: Patient requires supplemental Oxygen 3 LPM via nasal canula with activity and 2 LPM nocturnally. Please provide patient with a portable oxygen concentrator for improved mobility.  Okay to spot check or titrated for conserving to keep stats above 90%. Oxygen will be for a lifetime.    ILD (interstitial lung disease) (H), Hypoxia       polyethylene glycol powder    MIRALAX    510 g    Take 17 g (1 capful) by mouth daily    Constipation, unspecified constipation type       sildenafil 20 MG tablet    REVATIO    270 tablet    Take 1 tablet (20 mg) by mouth 3 times daily    Pulmonary hypertension       tiotropium 18 MCG capsule    SPIRIVA HANDIHALER    90 capsule    Inhale contents of one capsule daily.    Chronic obstructive pulmonary disease, unspecified COPD type (H)       Urea 40 % Crea    CARMOL 40    199 g    To feet daily    Dermatophytosis of nail, Corns and callosities       warfarin 5 MG tablet    COUMADIN    90 tablet    TAKE ONE TABLET BY MOUTH ONE TIME DAILY    Atrial flutter with rapid ventricular response (H)       * Notice:  This list has 2 medication(s) that are the same as other medications prescribed for you. Read the directions carefully, and ask your doctor or other care provider to review them with you.

## 2017-10-25 NOTE — PROGRESS NOTES
"    Interventional Radiology Clinic Visit    Date of this visit: 10/25/2017    Rohan Monroe returns for follow up post right external iliac stent placement on 5/13/2017 in the setting of acute retroperitoneal hemorrhage.     Primary Physician: Jamie Foster        History Of Present Illness:    Rohan Monroe is a 71 year old male with a diagnosis of right retroperitoneal hemorrhage following coronary angiography on a background of CAD, CHF, DMII, COPD, HepC cirrhosis, and sarcoidosis who underwent right external iliac artery stent placement on 5/13/2017 in the setting of retroperitoneal hemorrhage following coronary catheterization. Of note, stent placement was complicated by pseudoaneurysm formation at the left common femoral artery access site which was treated with thrombin injection.    Since stent placement, Mr Monroe has been doing well and is enrolled in a cardiac rehabilitation program during which he exercises 3 times per week. He denies right leg pain or weakness during exercise and states \"if I didn't know I had a stent in my leg, I wouldn't have any way of telling\". Regarding his treated left pseudoaneurysm, he states that he still feels a bump at the site, but it has been decreasing in size since it was treated. No claudication.    Mr. Monroe is currently on coumadin and Plavix for coronary disease.    Review of Systems:    General: Negative for recent fever.  Skin: Negative for jaundice.  Eyes: Negative for jaundice.  Respiratory: Negative for new shortness of breath.  Cardiovascular: Negative for chest pain.  Gastrointestinal: Negative for abdominal pain or swelling, nausea, vomiting, or diarrhea.  Musculoskeletal: Positive for symmetric ankle swelling.       Past Medical/Surgical History:    Past Medical History:   Diagnosis Date     Atrial flutter (H)      Cataract of both eyes      Chronic infection     Hep C     Congestive heart failure, unspecified      Coronary artery " disease      Depressive disorder, not elsewhere classified     Depression (non-psychotic)     ERM OS (epiretinal membrane, left eye)      Generalized osteoarthrosis, unspecified site      Glaucoma suspect      Hypertension      Lichen planus      Other psoriasis      PVD (posterior vitreous detachment), left eye      Sarcoidosis      Sarcoidosis      Type II or unspecified type diabetes mellitus without mention of complication, not stated as uncontrolled      Unspecified hypothyroidism     Hypothyroidism     Unspecified viral hepatitis C without hepatic coma      Viral warts, unspecified      Past Surgical History:   Procedure Laterality Date     ANESTHESIA CARDIOVERSION N/A 1/19/2017    Procedure: ANESTHESIA CARDIOVERSION;  Surgeon: GENERIC ANESTHESIA PROVIDER;  Location: [facility] OR     ANESTHESIA CARDIOVERSION N/A 1/23/2017    Procedure: ANESTHESIA CARDIOVERSION;  Surgeon: GENERIC ANESTHESIA PROVIDER;  Location: [facility] OR     ANESTHESIA CARDIOVERSION N/A 1/24/2017    Procedure: ANESTHESIA CARDIOVERSION;  Surgeon: GENERIC ANESTHESIA PROVIDER;  Location: [facility] OR     ARTHROPLASTY HIP  8/24/2011    Procedure:ARTHROPLASTY HIP; Right Total Hip Arthroplasty  Choice anesthesia; Surgeon:LESLI WILKINSON; Location:[facility] OR     BIOPSY       cardiac stent      s/p     CARDIAC SURGERY       CATARACT IOL, RT/LT  9/15/2015    LE     COLONOSCOPY       CORONARY ANGIOGRAPHY ADULT ORDER       H ABLATION ATRIAL FLUTTER       HC REMOVAL OF TONSILS,<11 Y/O      Tonsils <10y.o.     HC REPAIR INCISIONAL HERNIA,REDUCIBLE      Hernia Repair, Incisional, Unilateral     HEART CATH, ANGIOPLASTY       JOINT REPLACEMENT      1 month ago--right hip     LIGATN/STRIP LONG & SHORT SAPHEN         Current Medications:    Current Outpatient Prescriptions   Medication Sig Dispense Refill     levothyroxine (SYNTHROID/LEVOTHROID) 137 MCG tablet Take 1 tablet (137 mcg) by mouth daily 90 tablet 1     furosemide (LASIX) 20 MG tablet Take 3 tablets (60 mg) by mouth 2  times daily May take extra 20 mg as needed for wt .gain >3# 240 tablet 11     warfarin (COUMADIN) 5 MG tablet TAKE ONE TABLET BY MOUTH ONE TIME DAILY  90 tablet 5     sildenafil (REVATIO) 20 MG tablet Take 1 tablet (20 mg) by mouth 3 times daily 270 tablet 1     atorvastatin (LIPITOR) 40 MG tablet TAKE ONE TABLET BY MOUTH ONE TIME DAILY  30 tablet 11     methotrexate 2.5 MG tablet CHEMO Take 3 tablets (7.5 mg) by mouth once a week On Mondays 48 tablet 3     losartan (COZAAR) 50 MG tablet Take 1 tablet (50 mg) by mouth daily 90 tablet 3     order for DME Updated Oxygen: Patient requires supplemental Oxygen 3 LPM via nasal canula with activity and 2 LPM nocturnally. Please provide patient with a portable oxygen concentrator for improved mobility.  Okay to spot check or titrated for conserving to keep stats above 90%. Oxygen will be for a lifetime. 1 Device 0     omeprazole (PRILOSEC) 20 MG CR capsule Take 1 capsule (20 mg) by mouth daily 90 capsule 3     clopidogrel (PLAVIX) 75 MG tablet Take 1 tablet (75 mg) by mouth daily 90 tablet 3     tiotropium (SPIRIVA HANDIHALER) 18 MCG capsule Inhale contents of one capsule daily. 90 capsule 3     inFLIXimab (REMICADE) 100 MG injection Inject 100 mg into the vein every 28 days        metoprolol (LOPRESSOR) 100 MG tablet Take 1 tablet (100 mg) by mouth 2 times daily 60 tablet 11     blood glucose monitoring (ACCU-CHEK RONNIE PLUS) test strip Use to test blood sugar 2 times daily or as directed.  3 month supply. 200 each 3     blood glucose monitoring (ACCU-CHEK FASTCLIX) lancets Use to test blood sugar 2 times daily or as directed.  102 lancets per box.  3 month supply. 2 Box 3     blood glucose monitoring (NO BRAND SPECIFIED) meter device kit Use to test blood sugar 2 times daily. 1 kit 0     Multiple Vitamins-Minerals (MULTIVITAMIN & MINERAL PO) Take 1 tablet by mouth daily.       mirtazapine (REMERON) 15 MG tablet Take 15 mg by mouth At Bedtime.       order for DME She  requested for a 4 wheel walker, would like to change it to 4 wheel walker with a seat and oxygen tank durant.     Need this faxed over to her   [phone] (Patient not taking: Reported on 10/25/2017) 1 Device 1     polyethylene glycol (MIRALAX) powder Take 17 g (1 capful) by mouth daily (Patient not taking: Reported on 10/25/2017) 510 g 1     albuterol (PROAIR HFA/PROVENTIL HFA/VENTOLIN HFA) 108 (90 BASE) MCG/ACT Inhaler Inhale 2 puffs into the lungs every 6 hours as needed for shortness of breath / dyspnea (Patient not taking: Reported on 10/25/2017) 3 Inhaler 6     fluticasone-vilanterol (BREO ELLIPTA) 100-25 MCG/INH oral inhaler Inhale 1 puff into the lungs daily (Patient not taking: Reported on 10/25/2017) 3 Inhaler 3     Urea (CARMOL 40) 40 % CREA To feet daily (Patient not taking: Reported on 10/25/2017) 199 g 4       Allergies:    Prednisone    Family History:    Family History   Problem Relation Age of Onset     HEART DISEASE Father      irreg heart beat     Circulatory Father      varicose veins     Prostate Cancer Father      HEART DISEASE Mother      heart attack     Arthritis Mother      OSTEOPOROSIS Mother      Thyroid Disease Mother      Eye Disorder Maternal Grandmother      glaucoma     DIABETES Sister      type 2     Kidney Cancer Sister      DIABETES Sister      Glaucoma No family hx of      Macular Degeneration No family hx of      CANCER No family hx of      no skin cancer       Social History:    Social History     Social History     Marital status:      Spouse name: N/A     Number of children: 2     Years of education: N/A     Occupational History      [employer]     Social History Main Topics     Smoking status: Former Smoker     Packs/day: 1.00     Years: 30.00     Types: Cigarettes     Start date: 12/30/1960     Quit date: 7/22/1994     Smokeless tobacco: Never Used     Alcohol use No     Drug use: No     Sexual activity: Yes     Partners: Female     Other Topics Concern  "    Blood Transfusions No     Exercise Yes     Social History Narrative    Dairy/d 2 servings/d    Caffeine little servings/d    Exercise 3 x week    Sunscreen used - Yes    Seatbelts used - Yes    Working smoke/CO detectors in the home - Yes    Guns stored in the home - No    Self Breast Exams - NOT APPLICABLE    Self Testicular Exam - No    Eye Exam up to date - Yes    Dental Exam up to date - Yes    Pap Smear up to date - NOT APPLICABLE    Mammogram up to date - NOT APPLICABLE    PSA up to date - Yes    Dexa Scan up to date - NOT APPLICABLE    Flex Sig / Colonoscopy up to date - Yes    Immunizations up to date - Unsure    Abuse: Current or Past(Physical, Sexual or Emotional)- No    Do you feel safe in your environment - Yes       Physical Exam:    /72  Pulse 67  Temp 97.3  F (36.3  C) (Oral)  Resp 16  Ht 1.702 m (5' 7.01\")  Wt 85.2 kg (187 lb 14.4 oz)  SpO2 93%  BMI 29.42 kg/m2     GENERAL APPEARANCE: healthy, alert and no distress  PSYCHIATRIC: mentation appears normal and affect normal.  EYES: No jaundice.  SKIN: No jaundice.   RESP: lungs clear to auscultation  CARDIOVASCULAR: regular rates and rhythm, normal S1 S2, no murmur  MUSCULOSKELETAL: symmetric 2 + pedal; edema, stable per patient.  VASCULAR: Small nonpulsatible nodule overlying the left common femoral artery at site of previously treated pseudoaneurysm.  2+ right DP pulses in the the standing and seated positions.    Laboratory Studies:    Lab Results   Component Value Date    HGB 11.5 08/29/2017     Lab Results   Component Value Date     08/29/2017     Lab Results   Component Value Date    WBC 10.3 08/29/2017       Lab Results   Component Value Date    INR 2.13 10/25/2017     Lab Results   Component Value Date    CR 2.38 10/25/2017     Lab Results   Component Value Date    BUN 55 10/25/2017       Imaging:     I reviewed the ultrasound from today. It shows a widely patent right common femoral artery stent without evidence of " hemodynamically significant stenosis.    ASSESSMENT/PLAN:      Rohan Monroe is a 71 year old male with iatrogenic right external iliac pseudoaneurysm and retroperitoneal hematoma who is status post stent placement on 5/13/2017, as well as left common femoral pseudoaneurysm post thrombin injection. Follow up imaging shows a widely patent stent and patient denies any symptoms of claudication to suggest clinical significant stentosis. We discussed red flag signs for complication or stent fracture including thrombus that can cause right leg weakness, pain, or decreased temperature that could suggest thrombosis/fracture which would necessitate further evaluation and contact information was provided as well as guidance to seek emergency care if the happened outside of business hours. Recommendation made to obtain a pelvis radiograph to evaluation stent structure to ensure there is no sign of early stent fracture given placement across the hip. If he would develop stent fracture, we discussed relining with a Viabahn stent. Currently, he has finally recovered from his hospitalization and give prior complications from angiography, we will reserve additional procedures for the future after further observation.    Regarding left common femoral pseudoaneurysm, this has decreased in size and is non-pulsatile and no further follow up or treatment is needed.    Patient instructed to follow up in clinic in 3 months with repeat arterial ultrasound and right pelvis radiograph. He should return or call sooner if worrisome symptoms develop. Anticoagulation should continue per cardiology recommendations.    I was present for the patient visit and agree with the assessment and plan outlined by Dr. Gonzales.      It was a pleasure to see Mr. Monroe in clinic today. Thank you for involving the interventional Radiology service in his care.     A total of 30 minutes was spent with this patient, over 50% time was for counseling.       Maria Victoria Palmer MD  Interventional Radiology Attending                 [facility]    CC  Patient Care Team:  Jamie Foster MD as PCP - General (Internal Medicine)  Jamie Foster MD as Referring Physician (Internal Medicine)  Kina Greco MD as MD (Ophthalmology)  Perlman, David Morris, MD as MD (Internal Medicine)  Haley Renner DO as Referring Physician (Student in organized health care education/training program)  Chicho Mejia, AGATHA (Podiatry)  Macey Roy MD as MD (Internal Medicine)  Madalyn Fajardo MD PhD as MD (Family Practice)  Jaclyn Pina, RN as Nurse Coordinator (Clinical Cardiac Electrophysiology)  Brian Vazquez MD as MD (Clinical Cardiac Electrophysiology)  Elizabeth Cabral APRN CNP as Nurse Practitioner (Nurse Practitioner)  Maria Victoria Palmer MD as MD (Radiology)  Jaclyn Pina, RN as Nurse Coordinator (Clinical Cardiac Electrophysiology)  Brian Vazquez MD as MD (Clinical Cardiac Electrophysiology)  SELF, REFERRED

## 2017-10-25 NOTE — LETTER
"10/25/2017       RE: Rohan Monroe  [address]    [place] [address]     Dear Colleague,    Thank you for referring your patient, Rohan Monroe, to the [facility]. Please see a copy of my visit note below.        Interventional Radiology Clinic Visit    Date of this visit: 10/25/2017    Rohan Monroe returns for follow up post right external iliac stent placement on 5/13/2017 in the setting of acute retroperitoneal hemorrhage.     Primary Physician: Jamie Foster        History Of Present Illness:    Rohan Monroe is a 71 year old male with a diagnosis of right retroperitoneal hemorrhage following coronary angiography on a background of CAD, CHF, DMII, COPD, HepC cirrhosis, and sarcoidosis who underwent right external iliac artery stent placement on 5/13/2017 in the setting of retroperitoneal hemorrhage following coronary catheterization. Of note, stent placement was complicated by pseudoaneurysm formation at the left common femoral artery access site which was treated with thrombin injection.    Since stent placement, Mr Monroe has been doing well and is enrolled in a cardiac rehabilitation program during which he exercises 3 times per week. He denies right leg pain or weakness during exercise and states \"if I didn't know I had a stent in my leg, I wouldn't have any way of telling\". Regarding his treated left pseudoaneurysm, he states that he still feels a bump at the site, but it has been decreasing in size since it was treated. No claudication.    Mr. Monroe is currently on coumadin and Plavix for coronary disease.    Review of Systems:    General: Negative for recent fever.  Skin: Negative for jaundice.  Eyes: Negative for jaundice.  Respiratory: Negative for new shortness of breath.  Cardiovascular: Negative for chest pain.  Gastrointestinal: Negative for abdominal pain or swelling, nausea, vomiting, or " diarrhea.  Musculoskeletal: Positive for symmetric ankle swelling.       Past Medical/Surgical History:    Past Medical History:   Diagnosis Date     Atrial flutter (H)      Cataract of both eyes      Chronic infection     Hep C     Congestive heart failure, unspecified      Coronary artery disease      Depressive disorder, not elsewhere classified     Depression (non-psychotic)     ERM OS (epiretinal membrane, left eye)      Generalized osteoarthrosis, unspecified site      Glaucoma suspect      Hypertension      Lichen planus      Other psoriasis      PVD (posterior vitreous detachment), left eye      Sarcoidosis      Sarcoidosis      Type II or unspecified type diabetes mellitus without mention of complication, not stated as uncontrolled      Unspecified hypothyroidism     Hypothyroidism     Unspecified viral hepatitis C without hepatic coma      Viral warts, unspecified      Past Surgical History:   Procedure Laterality Date     ANESTHESIA CARDIOVERSION N/A 1/19/2017    Procedure: ANESTHESIA CARDIOVERSION;  Surgeon: GENERIC ANESTHESIA PROVIDER;  Location: [facility] OR     ANESTHESIA CARDIOVERSION N/A 1/23/2017    Procedure: ANESTHESIA CARDIOVERSION;  Surgeon: GENERIC ANESTHESIA PROVIDER;  Location: [facility] OR     ANESTHESIA CARDIOVERSION N/A 1/24/2017    Procedure: ANESTHESIA CARDIOVERSION;  Surgeon: GENERIC ANESTHESIA PROVIDER;  Location: [facility] OR     ARTHROPLASTY HIP  8/24/2011    Procedure:ARTHROPLASTY HIP; Right Total Hip Arthroplasty  Choice anesthesia; Surgeon:LESLI WILKINSON; Location:[facility] OR     BIOPSY       cardiac stent      s/p     CARDIAC SURGERY       CATARACT IOL, RT/LT  9/15/2015    LE     COLONOSCOPY       CORONARY ANGIOGRAPHY ADULT ORDER       H ABLATION ATRIAL FLUTTER       HC REMOVAL OF TONSILS,<11 Y/O      Tonsils <10y.o.     HC REPAIR INCISIONAL HERNIA,REDUCIBLE      Hernia Repair, Incisional, Unilateral     HEART CATH, ANGIOPLASTY       JOINT REPLACEMENT      1 month ago--right hip     LIGATN/STRIP LONG &  SHORT SAPHEN         Current Medications:    Current Outpatient Prescriptions   Medication Sig Dispense Refill     levothyroxine (SYNTHROID/LEVOTHROID) 137 MCG tablet Take 1 tablet (137 mcg) by mouth daily 90 tablet 1     furosemide (LASIX) 20 MG tablet Take 3 tablets (60 mg) by mouth 2 times daily May take extra 20 mg as needed for wt .gain >3# 240 tablet 11     warfarin (COUMADIN) 5 MG tablet TAKE ONE TABLET BY MOUTH ONE TIME DAILY  90 tablet 5     sildenafil (REVATIO) 20 MG tablet Take 1 tablet (20 mg) by mouth 3 times daily 270 tablet 1     atorvastatin (LIPITOR) 40 MG tablet TAKE ONE TABLET BY MOUTH ONE TIME DAILY  30 tablet 11     methotrexate 2.5 MG tablet CHEMO Take 3 tablets (7.5 mg) by mouth once a week On Mondays 48 tablet 3     losartan (COZAAR) 50 MG tablet Take 1 tablet (50 mg) by mouth daily 90 tablet 3     order for DME Updated Oxygen: Patient requires supplemental Oxygen 3 LPM via nasal canula with activity and 2 LPM nocturnally. Please provide patient with a portable oxygen concentrator for improved mobility.  Okay to spot check or titrated for conserving to keep stats above 90%. Oxygen will be for a lifetime. 1 Device 0     omeprazole (PRILOSEC) 20 MG CR capsule Take 1 capsule (20 mg) by mouth daily 90 capsule 3     clopidogrel (PLAVIX) 75 MG tablet Take 1 tablet (75 mg) by mouth daily 90 tablet 3     tiotropium (SPIRIVA HANDIHALER) 18 MCG capsule Inhale contents of one capsule daily. 90 capsule 3     inFLIXimab (REMICADE) 100 MG injection Inject 100 mg into the vein every 28 days        metoprolol (LOPRESSOR) 100 MG tablet Take 1 tablet (100 mg) by mouth 2 times daily 60 tablet 11     blood glucose monitoring (ACCU-CHEK RONNIE PLUS) test strip Use to test blood sugar 2 times daily or as directed.  3 month supply. 200 each 3     blood glucose monitoring (ACCU-CHEK FASTCLIX) lancets Use to test blood sugar 2 times daily or as directed.  102 lancets per box.  3 month supply. 2 Box 3     blood glucose  monitoring (NO BRAND SPECIFIED) meter device kit Use to test blood sugar 2 times daily. 1 kit 0     Multiple Vitamins-Minerals (MULTIVITAMIN & MINERAL PO) Take 1 tablet by mouth daily.       mirtazapine (REMERON) 15 MG tablet Take 15 mg by mouth At Bedtime.       order for DME She requested for a 4 wheel walker, would like to change it to 4 wheel walker with a seat and oxygen tank durant.     Need this faxed over to her   [phone] (Patient not taking: Reported on 10/25/2017) 1 Device 1     polyethylene glycol (MIRALAX) powder Take 17 g (1 capful) by mouth daily (Patient not taking: Reported on 10/25/2017) 510 g 1     albuterol (PROAIR HFA/PROVENTIL HFA/VENTOLIN HFA) 108 (90 BASE) MCG/ACT Inhaler Inhale 2 puffs into the lungs every 6 hours as needed for shortness of breath / dyspnea (Patient not taking: Reported on 10/25/2017) 3 Inhaler 6     fluticasone-vilanterol (BREO ELLIPTA) 100-25 MCG/INH oral inhaler Inhale 1 puff into the lungs daily (Patient not taking: Reported on 10/25/2017) 3 Inhaler 3     Urea (CARMOL 40) 40 % CREA To feet daily (Patient not taking: Reported on 10/25/2017) 199 g 4       Allergies:    Prednisone    Family History:    Family History   Problem Relation Age of Onset     HEART DISEASE Father      irreg heart beat     Circulatory Father      varicose veins     Prostate Cancer Father      HEART DISEASE Mother      heart attack     Arthritis Mother      OSTEOPOROSIS Mother      Thyroid Disease Mother      Eye Disorder Maternal Grandmother      glaucoma     DIABETES Sister      type 2     Kidney Cancer Sister      DIABETES Sister      Glaucoma No family hx of      Macular Degeneration No family hx of      CANCER No family hx of      no skin cancer       Social History:    Social History     Social History     Marital status:      Spouse name: N/A     Number of children: 2     Years of education: N/A     Occupational History      [employer]     Social History Main Topics      "Smoking status: Former Smoker     Packs/day: 1.00     Years: 30.00     Types: Cigarettes     Start date: 12/30/1960     Quit date: 7/22/1994     Smokeless tobacco: Never Used     Alcohol use No     Drug use: No     Sexual activity: Yes     Partners: Female     Other Topics Concern     Blood Transfusions No     Exercise Yes     Social History Narrative    Dairy/d 2 servings/d    Caffeine little servings/d    Exercise 3 x week    Sunscreen used - Yes    Seatbelts used - Yes    Working smoke/CO detectors in the home - Yes    Guns stored in the home - No    Self Breast Exams - NOT APPLICABLE    Self Testicular Exam - No    Eye Exam up to date - Yes    Dental Exam up to date - Yes    Pap Smear up to date - NOT APPLICABLE    Mammogram up to date - NOT APPLICABLE    PSA up to date - Yes    Dexa Scan up to date - NOT APPLICABLE    Flex Sig / Colonoscopy up to date - Yes    Immunizations up to date - Unsure    Abuse: Current or Past(Physical, Sexual or Emotional)- No    Do you feel safe in your environment - Yes       Physical Exam:    /72  Pulse 67  Temp 97.3  F (36.3  C) (Oral)  Resp 16  Ht 1.702 m (5' 7.01\")  Wt 85.2 kg (187 lb 14.4 oz)  SpO2 93%  BMI 29.42 kg/m2     GENERAL APPEARANCE: healthy, alert and no distress  PSYCHIATRIC: mentation appears normal and affect normal.  EYES: No jaundice.  SKIN: No jaundice.   RESP: lungs clear to auscultation  CARDIOVASCULAR: regular rates and rhythm, normal S1 S2, no murmur  MUSCULOSKELETAL: symmetric 2 + pedal; edema, stable per patient.  VASCULAR: Small nonpulsatible nodule overlying the left common femoral artery at site of previously treated pseudoaneurysm.  2+ right DP pulses in the the standing and seated positions.    Laboratory Studies:    Lab Results   Component Value Date    HGB 11.5 08/29/2017     Lab Results   Component Value Date     08/29/2017     Lab Results   Component Value Date    WBC 10.3 08/29/2017       Lab Results   Component Value Date    " INR 2.13 10/25/2017     Lab Results   Component Value Date    CR 2.38 10/25/2017     Lab Results   Component Value Date    BUN 55 10/25/2017       Imaging:     I reviewed the ultrasound from today. It shows a widely patent right common femoral artery stent without evidence of hemodynamically significant stenosis.    ASSESSMENT/PLAN:      Rohan Monroe is a 71 year old male with iatrogenic right external iliac pseudoaneurysm and retroperitoneal hematoma who is status post stent placement on 5/13/2017, as well as left common femoral pseudoaneurysm post thrombin injection. Follow up imaging shows a widely patent stent and patient denies any symptoms of claudication to suggest clinical significant stentosis. We discussed red flag signs for complication or stent fracture including thrombus that can cause right leg weakness, pain, or decreased temperature that could suggest thrombosis/fracture which would necessitate further evaluation and contact information was provided as well as guidance to seek emergency care if the happened outside of business hours. Recommendation made to obtain a pelvis radiograph to evaluation stent structure to ensure there is no sign of early stent fracture given placement across the hip. If he would develop stent fracture, we discussed relining with a Viabahn stent. Currently, he has finally recovered from his hospitalization and give prior complications from angiography, we will reserve additional procedures for the future after further observation.    Regarding left common femoral pseudoaneurysm, this has decreased in size and is non-pulsatile and no further follow up or treatment is needed.    Patient instructed to follow up in clinic in 3 months with repeat arterial ultrasound and right pelvis radiograph. He should return or call sooner if worrisome symptoms develop. Anticoagulation should continue per cardiology recommendations.    I was present for the patient visit and agree  with the assessment and plan outlined by Dr. Gonzales.      It was a pleasure to see Mr. Monroe in clinic today. Thank you for involving the interventional Radiology service in his care.     A total of 30 minutes was spent with this patient, over 50% time was for counseling.      Maria Victoria Palmer MD  Interventional Radiology Attending                 [facility]    CC  Patient Care Team:  Jaime Foster MD as PCP - General (Internal Medicine)  Kina Greco MD as MD (Ophthalmology)  Perlman, David Morris, MD as MD (Internal Medicine)  Haley Renner DO as Referring Physician (Student in organized health care education/training program)  Chicho Mejia, AGATHA (Podiatry)  Macey Roy MD as MD (Internal Medicine)  Madalyn Fajardo MD PhD as MD (Family Practice)  Jaclyn Pina, RN as Nurse Coordinator (Clinical Cardiac Electrophysiology)  Brian Vazquez MD as MD (Clinical Cardiac Electrophysiology)  Elizabeth Cabral APRN CNP as Nurse Practitioner (Nurse Practitioner)

## 2017-10-25 NOTE — MR AVS SNAPSHOT
Rohan Howard Gitalejandro   10/25/2017   Anticoagulation Therapy Visit    Description:  71 year old male   Provider:  Delphine Kaur, RN   Department:  [facility]           INR as of 10/25/2017     Today's INR 2.13      Anticoagulation Summary as of 10/25/2017     INR goal 2.0-3.0   Today's INR 2.13   Full instructions 5 mg every day   Next INR check 11/22/2017    Indications   Long-term (current) use of anticoagulants [Z79.01] [Z79.01]  Atrial flutter with rapid ventricular response (H) [I48.92]         October 2017 Details    Sun Mon Tue Wed Thu Fri Sat     1               2               3               4               5               6               7                 8               9               10               11               12               13               14                 15               16               17               18               19               20               21                 22               23               24               25      5 mg   See details      26      5 mg         27      5 mg         28      5 mg           29      5 mg         30      5 mg         31      5 mg              Date Details   10/25 This INR check               How to take your warfarin dose     To take:  5 mg Take 1 of the 5 mg tablets.           November 2017 Details    Sun Mon Tue Wed Thu Fri Sat        1      5 mg         2      5 mg         3      5 mg         4      5 mg           5      5 mg         6      5 mg         7      5 mg         8      5 mg         9      5 mg         10      5 mg         11      5 mg           12      5 mg         13      5 mg         14      5 mg         15      5 mg         16      5 mg         17      5 mg         18      5 mg           19      5 mg         20      5 mg         21      5 mg         22            23               24               25                 26               27               28               29               30                  Date  Details   No additional details    Date of next INR:  11/22/2017         How to take your warfarin dose     To take:  5 mg Take 1 of the 5 mg tablets.

## 2017-10-26 ENCOUNTER — HOSPITAL ENCOUNTER (OUTPATIENT)
Dept: CARDIAC REHAB | Facility: CLINIC | Age: 72
End: 2017-10-26
Attending: INTERNAL MEDICINE
Payer: MEDICARE

## 2017-10-26 PROCEDURE — 40000116 ZZH STATISTIC OP CR VISIT: Performed by: CLINICAL EXERCISE PHYSIOLOGIST

## 2017-10-26 PROCEDURE — 93798 PHYS/QHP OP CAR RHAB W/ECG: CPT | Performed by: CLINICAL EXERCISE PHYSIOLOGIST

## 2017-11-01 ENCOUNTER — ANTICOAGULATION THERAPY VISIT (OUTPATIENT)
Dept: ANTICOAGULATION | Facility: CLINIC | Age: 72
End: 2017-11-01

## 2017-11-01 ENCOUNTER — INFUSION THERAPY VISIT (OUTPATIENT)
Dept: INFUSION THERAPY | Facility: CLINIC | Age: 72
End: 2017-11-01
Attending: INTERNAL MEDICINE
Payer: MEDICARE

## 2017-11-01 VITALS
SYSTOLIC BLOOD PRESSURE: 135 MMHG | DIASTOLIC BLOOD PRESSURE: 66 MMHG | OXYGEN SATURATION: 98 % | TEMPERATURE: 95.7 F | HEART RATE: 74 BPM | WEIGHT: 188.3 LBS | BODY MASS INDEX: 29.48 KG/M2

## 2017-11-01 DIAGNOSIS — I48.92 ATRIAL FLUTTER (H): Primary | ICD-10-CM

## 2017-11-01 DIAGNOSIS — D86.9 SARCOIDOSIS: ICD-10-CM

## 2017-11-01 DIAGNOSIS — I48.92 ATRIAL FLUTTER WITH RAPID VENTRICULAR RESPONSE (H): ICD-10-CM

## 2017-11-01 DIAGNOSIS — D86.0 SARCOIDOSIS OF LUNG (H): ICD-10-CM

## 2017-11-01 DIAGNOSIS — Z79.01 LONG-TERM (CURRENT) USE OF ANTICOAGULANTS: ICD-10-CM

## 2017-11-01 LAB — INR PPP: 2 (ref 0.86–1.14)

## 2017-11-01 PROCEDURE — 25000128 H RX IP 250 OP 636: Mod: ZF | Performed by: INTERNAL MEDICINE

## 2017-11-01 PROCEDURE — 96415 CHEMO IV INFUSION ADDL HR: CPT

## 2017-11-01 PROCEDURE — 85610 PROTHROMBIN TIME: CPT | Performed by: INTERNAL MEDICINE

## 2017-11-01 PROCEDURE — 96413 CHEMO IV INFUSION 1 HR: CPT

## 2017-11-01 RX ADMIN — INFLIXIMAB 400 MG: 100 INJECTION, POWDER, LYOPHILIZED, FOR SOLUTION INTRAVENOUS at 12:33

## 2017-11-01 NOTE — PROGRESS NOTES
ANTICOAGULATION FOLLOW-UP CLINIC VISIT    Patient Name:  Rohan Monroe  Date:  11/1/2017  Contact Type:  Telephone    SUBJECTIVE:     Patient Findings     Positives No Problem Findings           OBJECTIVE    INR   Date Value Ref Range Status   11/01/2017 2.00 (H) 0.86 - 1.14 Final       ASSESSMENT / PLAN  INR assessment THER    Recheck INR In: 4 WEEKS    INR Location Clinic      Anticoagulation Summary as of 11/1/2017     INR goal 2.0-3.0   Today's INR 2.00   Maintenance plan 5 mg (5 mg x 1) every day   Full instructions 5 mg every day   Weekly total 35 mg   Plan last modified Paige Moscoso, RN (8/7/2017)   Next INR check 11/29/2017   Priority INR   Target end date 4/20/2017    Indications   Long-term (current) use of anticoagulants [Z79.01] [Z79.01]  Atrial flutter with rapid ventricular response (H) [I48.92]         Anticoagulation Episode Summary     INR check location     Preferred lab     Send INR reminders to [facility]    Comments 3 months therapy, then reassess.        Anticoagulation Care Providers     Provider Role Specialty Phone number    Jamie Foster MD [facility] Internal Medicine [phone]            See the Encounter Report to view Anticoagulation Flowsheet and Dosing Calendar (Go to Encounters tab in chart review, and find the Anticoagulation Therapy Visit)    Spoke with patient. Gave them their lab results and new warfarin recommendation.  No changes in health, medication, or diet. No missed doses, no falls. No signs or symptoms of bleed or clotting.     Marcia Elias, RN

## 2017-11-01 NOTE — MR AVS SNAPSHOT
Rohan Howard Mabel   11/1/2017   Anticoagulation Therapy Visit    Description:  71 year old male   Provider:  Marcia Elias, RN   Department:  [facility]           INR as of 11/1/2017     Today's INR 2.00      Anticoagulation Summary as of 11/1/2017     INR goal 2.0-3.0   Today's INR 2.00   Full instructions 5 mg every day   Next INR check 11/29/2017    Indications   Long-term (current) use of anticoagulants [Z79.01] [Z79.01]  Atrial flutter with rapid ventricular response (H) [I48.92]         November 2017 Details    Sun Mon Tue Wed Thu Fri Sat        1      5 mg   See details      2      5 mg         3      5 mg         4      5 mg           5      5 mg         6      5 mg         7      5 mg         8      5 mg         9      5 mg         10      5 mg         11      5 mg           12      5 mg         13      5 mg         14      5 mg         15      5 mg         16      5 mg         17      5 mg         18      5 mg           19      5 mg         20      5 mg         21      5 mg         22      5 mg         23      5 mg         24      5 mg         25      5 mg           26      5 mg         27      5 mg         28      5 mg         29            30                  Date Details   11/01 This INR check       Date of next INR:  11/29/2017         How to take your warfarin dose     To take:  5 mg Take 1 of the 5 mg tablets.

## 2017-11-01 NOTE — PROGRESS NOTES
Infusion Nursing Note  Rohan Monroe presents today to [facility] for:   Chief Complaint   Patient presents with     Infusion     Remicade     During today's [facility] appointment, orders from Dr. Perlman were completed.  Frequency: every 4 weeks    Progress note:  Patient identification verified by name and date of birth.  Assessment completed.  Vitals recorded in Doc Flowsheets.  Patient was provided with education regarding infusion and possible side effects.  Patient verbalized understanding.     Premedications: were not ordered.  Infusion Rates: infusion given over approximately 2 hours.  Labs: were drawn per orders.   Vascular access: peripheral IV placed today.  Treatment Conditions: ~~~ NOTE: If the patient answers yes to any of the questions below, hold the infusion and contact ordering provider or on-call provider.    1. Have you recently had an elevated temperature, fever, chills, productive cough, coughing for 3 weeks or longer or hemoptysis,  abnormal vital signs, night sweats,  chest pain or have you noticed a decrease in your appetite, unexplained weight loss or fatigue? No  2. Do you have any open wounds or new incisions? No  3. Do you have any recent or upcoming hospitalizations, surgeries or dental procedures? No  4. Do you currently have or recently have had any signs of illness or infection or are you on any antibiotics? No  5. Have you had any new, sudden or worsening abdominal pain? No  6. Have you or anyone in your household received a live vaccination in the past 4 weeks? Please note:  No live vaccines while on biologic/chemotherapy until 6 months after the last treatment.  Patient can receive the flu vaccine (shot only) and the pneumovax.  It is optimal for the patient to get these vaccines mid cycle, but they can be given at any time as long as it is not on the day of the infusion. No  7. Have you recently been diagnosed  with any new nervous system diseases (ie. Multiple sclerosis, Guillain [place], seizures, neurological changes) or cancer diagnosis? Are you on any form of radiation or chemotherapy? No  8. Are you pregnant or breast feeding or do you have plans of pregnancy in the future? N/A  9. Have you been having any signs of worsening depression or suicidal ideations?  (benlysta only) N/A  10. Have there been any other new onset medical symptoms? No    Patient tolerated infusion: well    Discharge Plan:   Follow up plan of care with: ongoing infusions at [facility]. and primary medical doctor.  Discharge instructions were reviewed with patient.  Patient/representative verbalized understanding of discharge instructions and all questions answered.  Patient discharged from [facility] in stable condition.    Rachel Ardon RN  Administrations This Visit     inFLIXimab (REMICADE) 400 mg in NaCl 0.9 % 315 mL non-oncology use     Admin Date Action Dose Rate Route Administered By          11/01/2017 New Bag 400 mg 157.5 mL/hr Intravenous Rachel Ardon RN                             Wt 85.4 kg (188 lb 4.8 oz)  BMI 29.48 kg/m2

## 2017-11-01 NOTE — MR AVS SNAPSHOT
After Visit Summary   11/1/2017    Rohan Monroe    MRN: [id number]           Patient Information     Date Of Birth          1945        Visit Information        Provider Department      11/1/2017 12:00 PM UC 47 ATC; UC SPEC INFUSION [facility]        Today's Diagnoses     Atrial flutter (H)    -  1    Sarcoidosis of lung (HCC)        SARCOIDOSIS-systemic           Follow-ups after your visit        Your next 10 appointments already scheduled     Nov 02, 2017  1:00 PM CDT   Cardiac Treatment with  Cardiac Rehab 1   [facility] ([facility])    [address]  [place] [address]   [phone]            Nov 03, 2017  3:00 PM CDT   Cardiac Treatment with  Cardiac Rehab 1   [facility] ([facility])    [address]  [facility] [address]   [phone]            Nov 06, 2017  1:00 PM CST   Cardiac Treatment with  Cardiac Rehab 1   [facility] ([facility])    [address]  [facility] [address]   [phone]            Nov 29, 2017 12:00 PM CST   Infusion 180 with UC SPEC INFUSION, UC 47 ATC   [facility] ([facility])    [address] [place] [address] [phone]            Dec 14, 2017 12:30 PM CST   PFT VISIT with WALTER PFL JULY   [facility] Pulmonary Function Testing ([facility])    [address] [id number] [phone]            Dec 14, 2017  1:00 PM CST   (Arrive by 12:45 PM)   Return Interstitial Lung with David Morris Perlman, MD   [facility] ([facility])    [address] [place] [address] [phone]            Dec 27, 2017 12:00  PM CST   Infusion 180 with UC SPEC INFUSION, UC 47 ATC   [facility] Special[facility] Procedure ([facility])    [address]  2[address] Floor  [facility] [address] [phone]            Aj 15, 2018  1:45 PM CST   RETURN GLAUCOMA with Kina Greco MD   [facility] ([facility])    [address]  [address]  [facility] [address] [phone]              Who to contact     If you have questions or need follow up information about today's clinic visit or your schedule please contact [facility] SPECIALTY AND PROCEDURE directly at [phone].  Normal or non-critical lab and imaging results will be communicated to you by [employer], letter or phone within 4 business days after the clinic has received the results. If you do not hear from us within 7 days, please contact the clinic through [employer] or phone. If you have a critical or abnormal lab result, we will notify you by phone as soon as possible.  Submit refill requests through [employer] or call your pharmacy and they will forward the refill request to us. Please allow 3 business days for your refill to be completed.          Additional Information About Your Visit        [employer] Information     [employer] gives you secure access to your electronic health record. If you see a primary care provider, you can also send messages to your care team and make appointments. If you have questions, please call your primary care clinic.  If you do not have a primary care provider, please call [phone] and they will assist you.        Care EveryWhere ID     This is your Care EveryWhere ID. This could be used by other organizations to access your [place] medical records  [id number]        Your Vitals Were     Pulse Temperature Pulse Oximetry BMI (Body Mass Index)          74 95.7  F (35.4  C) (Oral) 98% 29.48 kg/m2         Blood Pressure from Last 3  Encounters:   11/01/17 135/66   10/25/17 130/72   10/04/17 154/82    Weight from Last 3 Encounters:   11/01/17 85.4 kg (188 lb 4.8 oz)   10/25/17 85.2 kg (187 lb 14.4 oz)   10/04/17 84.2 kg (185 lb 9.6 oz)              We Performed the Following     INR        Primary Care Provider Office Phone # Fax #    Jamie Foster [phone] [address] [address]        Equal Access to Services     [facility]: Hadii aad ku hadasho Soomaali, waaxda luqadaha, qaybta kaalmada adeforrestyaisaias, nelly munson . So [facility] [phone].    ATENCIÓN: Si habla español, tiene a mcfadden disposición servicios gratuitos de asistencia lingüística. [facility] [phone].    We comply with applicable federal civil rights laws and [place] laws. We do not discriminate on the basis of race, color, national origin, age, disability, sex, sexual orientation, or gender identity.            Thank you!     Thank you for choosing [facility] SPECIALTY AND PROCEDURE  for your care. Our goal is always to provide you with excellent care. Hearing back from our patients is one way we can continue to improve our services. Please take a few minutes to complete the written survey that you may receive in the mail after your visit with us. Thank you!             Your Updated Medication List - Protect others around you: Learn how to safely use, store and throw away your medicines at www.disposemymeds.org.          This list is accurate as of: 11/1/17  3:10 PM.  Always use your most recent med list.                   Brand Name Dispense Instructions for use Diagnosis    albuterol 108 (90 BASE) MCG/ACT Inhaler    PROAIR HFA/PROVENTIL HFA/VENTOLIN HFA    3 Inhaler    Inhale 2 puffs into the lungs every 6 hours as needed for shortness of breath / dyspnea    Sarcoidosis       atorvastatin 40 MG tablet    LIPITOR    30 tablet    TAKE ONE TABLET BY MOUTH ONE TIME DAILY     Coronary artery disease involving native coronary artery of native heart without angina pectoris, CAD S/P percutaneous coronary angioplasty       blood glucose monitoring lancets     2 Box    Use to test blood sugar 2 times daily or as directed.  102 lancets per box.  3 month supply.    Type 2 diabetes, HbA1C goal < 8% (H)       blood glucose monitoring meter device kit    no brand specified    1 kit    Use to test blood sugar 2 times daily.    Type 2 diabetes mellitus with diabetic nephropathy (H)       blood glucose monitoring test strip    ACCU-CHEK RONNIE PLUS    200 each    Use to test blood sugar 2 times daily or as directed.  3 month supply.    Type 2 diabetes, HbA1C goal < 8% (H)       clopidogrel 75 MG tablet    PLAVIX    90 tablet    Take 1 tablet (75 mg) by mouth daily    CAD S/P percutaneous coronary angioplasty, Coronary artery disease involving native coronary artery of native heart without angina pectoris       fluticasone-vilanterol 100-25 MCG/INH oral inhaler    BREO ELLIPTA    3 Inhaler    Inhale 1 puff into the lungs daily    Chronic obstructive pulmonary disease, unspecified COPD type (H)       furosemide 20 MG tablet    LASIX    240 tablet    Take 3 tablets (60 mg) by mouth 2 times daily May take extra 20 mg as needed for wt .gain >3#    Pulmonary hypertension       inFLIXimab 100 MG injection    REMICADE     Inject 100 mg into the vein every 28 days        levothyroxine 137 MCG tablet    SYNTHROID/LEVOTHROID    90 tablet    Take 1 tablet (137 mcg) by mouth daily    Hypothyroidism       losartan 50 MG tablet    COZAAR    90 tablet    Take 1 tablet (50 mg) by mouth daily    (HFpEF) heart failure with preserved ejection fraction (H)       methotrexate 2.5 MG tablet CHEMO     48 tablet    Take 3 tablets (7.5 mg) by mouth once a week On Mondays    Sarcoidosis       metoprolol 100 MG tablet    LOPRESSOR    60 tablet    Take 1 tablet (100 mg) by mouth 2 times daily    Hypertension secondary to other  renal disorders       mirtazapine 15 MG tablet    REMERON     Take 15 mg by mouth At Bedtime.        MULTIVITAMIN & MINERAL PO      Take 1 tablet by mouth daily.        omeprazole 20 MG CR capsule    priLOSEC    90 capsule    Take 1 capsule (20 mg) by mouth daily    CAD S/P percutaneous coronary angioplasty, Coronary artery disease involving native coronary artery of native heart without angina pectoris, Sarcoidosis of lung (H)       * order for DME     1 Device    She requested for a 4 wheel walker, would like to change it to 4 wheel walker with a seat and oxygen tank durant.   Need this faxed over to her  [phone]    Sarcoidosis, lung (H), COPD (chronic obstructive pulmonary disease) (H), Abnormal gait, Congestive heart failure (H)       * order for DME     1 Device    Updated Oxygen: Patient requires supplemental Oxygen 3 LPM via nasal canula with activity and 2 LPM nocturnally. Please provide patient with a portable oxygen concentrator for improved mobility.  Okay to spot check or titrated for conserving to keep stats above 90%. Oxygen will be for a lifetime.    ILD (interstitial lung disease) (H), Hypoxia       polyethylene glycol powder    MIRALAX    510 g    Take 17 g (1 capful) by mouth daily    Constipation, unspecified constipation type       sildenafil 20 MG tablet    REVATIO    270 tablet    Take 1 tablet (20 mg) by mouth 3 times daily    Pulmonary hypertension       tiotropium 18 MCG capsule    SPIRIVA HANDIHALER    90 capsule    Inhale contents of one capsule daily.    Chronic obstructive pulmonary disease, unspecified COPD type (H)       Urea 40 % Crea    CARMOL 40    199 g    To feet daily    Dermatophytosis of nail, Corns and callosities       warfarin 5 MG tablet    COUMADIN    90 tablet    TAKE ONE TABLET BY MOUTH ONE TIME DAILY    Atrial flutter with rapid ventricular response (H)       * Notice:  This list has 2 medication(s) that are the same as other medications prescribed for you. Read  the directions carefully, and ask your doctor or other care provider to review them with you.

## 2017-11-02 ENCOUNTER — HOSPITAL ENCOUNTER (OUTPATIENT)
Dept: CARDIAC REHAB | Facility: CLINIC | Age: 72
End: 2017-11-02
Attending: INTERNAL MEDICINE
Payer: MEDICARE

## 2017-11-02 PROCEDURE — 93798 PHYS/QHP OP CAR RHAB W/ECG: CPT | Performed by: OCCUPATIONAL THERAPIST

## 2017-11-02 PROCEDURE — 40000116 ZZH STATISTIC OP CR VISIT: Performed by: OCCUPATIONAL THERAPIST

## 2017-11-03 ENCOUNTER — HOSPITAL ENCOUNTER (OUTPATIENT)
Dept: CARDIAC REHAB | Facility: CLINIC | Age: 72
End: 2017-11-03
Attending: INTERNAL MEDICINE
Payer: MEDICARE

## 2017-11-03 PROCEDURE — 40000116 ZZH STATISTIC OP CR VISIT

## 2017-11-03 PROCEDURE — 93798 PHYS/QHP OP CAR RHAB W/ECG: CPT

## 2017-11-08 ENCOUNTER — MYC MEDICAL ADVICE (OUTPATIENT)
Dept: PULMONOLOGY | Facility: CLINIC | Age: 72
End: 2017-11-08

## 2017-11-08 DIAGNOSIS — D86.0 SARCOIDOSIS OF LUNG (H): Primary | ICD-10-CM

## 2017-11-09 RX ORDER — AZITHROMYCIN 250 MG/1
TABLET, FILM COATED ORAL
Qty: 6 TABLET | Refills: 0 | Status: SHIPPED | OUTPATIENT
Start: 2017-11-09 | End: 2018-01-09

## 2017-11-09 NOTE — TELEPHONE ENCOUNTER
Telephone Encounter: Outgoing    Reason for Outgoing Call: pt sent message via [employer] regarding sx.     Nursing Action/Patient Instruction: Pt reports fever 100-101, increase sob, cough that feels moving into chest, using O2 at 3 liters. Spoke with Dr. Perlman who advised Zpak and if sx do not get better needs to go be seen/ED.     Patient Response/Questions/Concerns: RN spoke with patient and reviewed care plan. Pt verbalized understanding and Rx was sent to requested pharmacy.   Sandra Beaulieu, RN BSN

## 2017-11-13 DIAGNOSIS — I72.3 ANEURYSM OF ILIAC ARTERY (H): Primary | ICD-10-CM

## 2017-11-15 ENCOUNTER — TELEPHONE (OUTPATIENT)
Dept: INTERNAL MEDICINE | Facility: CLINIC | Age: 72
End: 2017-11-15

## 2017-11-15 NOTE — TELEPHONE ENCOUNTER
----- Message from Dee Hernandez sent at 11/15/2017  2:18 PM CST -----  Regarding: Please call  Contact: [phone]  Patient is in a lot of pain after throwing his back out.  I offered him to come to the walk in clinic for ortho but he said he can't make it.  Wants to talk to a nurse.    Threw back out x3 days ago-using ice.  No c/o's of buttock or leg pain. Able to walk around with minimal discomfort. Pt to use heat on low and pillows under legs in bed. Tylenol for pain/discomfort.  F/U in clinic if not better in 2-3 days.  Willow Bob RN 2:41 PM on 11/15/2017.

## 2017-11-22 ENCOUNTER — HOSPITAL ENCOUNTER (OUTPATIENT)
Dept: CARDIAC REHAB | Facility: CLINIC | Age: 72
End: 2017-11-22
Attending: INTERNAL MEDICINE
Payer: MEDICARE

## 2017-11-22 PROCEDURE — 40000116 ZZH STATISTIC OP CR VISIT

## 2017-11-22 PROCEDURE — 93798 PHYS/QHP OP CAR RHAB W/ECG: CPT

## 2017-11-28 RX ORDER — ACETAMINOPHEN 325 MG/1
650 TABLET ORAL ONCE
Status: CANCELLED
Start: 2017-11-28 | End: 2017-11-28

## 2017-11-29 ENCOUNTER — INFUSION THERAPY VISIT (OUTPATIENT)
Dept: INFUSION THERAPY | Facility: CLINIC | Age: 72
End: 2017-11-29
Attending: INTERNAL MEDICINE
Payer: MEDICARE

## 2017-11-29 VITALS
RESPIRATION RATE: 18 BRPM | SYSTOLIC BLOOD PRESSURE: 182 MMHG | BODY MASS INDEX: 28.11 KG/M2 | HEART RATE: 64 BPM | WEIGHT: 179.5 LBS | OXYGEN SATURATION: 98 % | TEMPERATURE: 95.5 F | DIASTOLIC BLOOD PRESSURE: 79 MMHG

## 2017-11-29 DIAGNOSIS — D86.0 SARCOIDOSIS OF LUNG (H): Primary | ICD-10-CM

## 2017-11-29 DIAGNOSIS — D86.9 SARCOIDOSIS: ICD-10-CM

## 2017-11-29 PROCEDURE — 96415 CHEMO IV INFUSION ADDL HR: CPT

## 2017-11-29 PROCEDURE — 96413 CHEMO IV INFUSION 1 HR: CPT

## 2017-11-29 PROCEDURE — 25000128 H RX IP 250 OP 636: Mod: ZF | Performed by: INTERNAL MEDICINE

## 2017-11-29 RX ORDER — ACETAMINOPHEN 325 MG/1
650 TABLET ORAL ONCE
Status: CANCELLED
Start: 2017-11-29 | End: 2017-11-29

## 2017-11-29 RX ADMIN — INFLIXIMAB 400 MG: 100 INJECTION, POWDER, LYOPHILIZED, FOR SOLUTION INTRAVENOUS at 12:57

## 2017-11-29 NOTE — MR AVS SNAPSHOT
After Visit Summary   11/29/2017    Rohan Monroe    MRN: [id number]           Patient Information     Date Of Birth          1945        Visit Information        Provider Department      11/29/2017 12:00 PM UC 47 ATC; UC SPEC INFUSION [facility] Procedure        Today's Diagnoses     Sarcoidosis of lung (HCC)    -  1    SARCOIDOSIS-systemic           Follow-ups after your visit        Your next 10 appointments already scheduled     Dec 01, 2017  3:00 PM CST   Cardiac Treatment with  Cardiac Rehab 1   [facility] ([facility])    [address]  [facility] [address]   [phone]            Dec 14, 2017 12:30 PM CST   PFT VISIT with WALTER PFL JULY   [facility] Pulmonary Function Testing ([facility])    [address]  3rd [facility] [address] [phone]            Dec 14, 2017  1:00 PM CST   (Arrive by 12:45 PM)   Return Interstitial Lung with David Morris Perlman, MD   [facility] ([facility])    [address]  [address] [facility] [address] [phone]            Dec 27, 2017 12:00 PM CST   Infusion 180 with UC SPEC INFUSION, UC 47 ATC   [facility] Specialty [facility] Procedure ([facility])    [address]  2nd Floor  [facility] [address] [phone]            Aj 15, 2018  1:45 PM CST   RETURN GLAUCOMA with Kina Greco MD   [facility] Clinic ([facility])    [address]  [address]  9th Fl Clin 9a  [facility] [address]   [phone]            Jan 24, 2018  9:00 AM CST   US AORTA/IVC/ILIAC DUPLEX LIMITED with UCUSV1   [facility] [place] [facility] ([facility])    [address]  [address] [facility] [address] [phone]           Please bring a list of your  medicines (including vitamins, minerals and over-the-counter drugs). Also, tell your doctor about any allergies you may have. Wear comfortable clothes and leave your valuables at home.  Adults: No eating or drinking for 8 hours before the exam. You may take medicine with a small sip of water.  Children: - Children 6+ years: No food or drink for 6 hours before exam. - Children 1-5 years: No food or drink for 4 hours before exam. - Infants, breast-fed: may have breast milk up to 2 hours before exam. - Infants, formula: may have bottle until 4 hours before exam.  Please call the Imaging Department at your exam site with any questions.            Jan 24, 2018 10:00 AM CST   (Arrive by 9:45 AM)   XR PELVIS AND HIP BILATERAL 2 VIEWS with UCXR1   [facility] [place] [facility] ([facility])    [address] [place] [address] [phone]           Please bring a list of your current medicines to your exam. (Include vitamins, minerals and over-thecounter medicines.) Leave your valuables at home.  Tell your doctor if there is a chance you may be pregnant.  You do not need to do anything special for this exam.            Jan 24, 2018 10:40 AM CST   (Arrive by 10:25 AM)   Return Visit with Maria Victoria Palmer MD   [facility] ([facility])    [address] [place] [address] [phone]            Jan 24, 2018 12:00 PM CST   Infusion 180 with UC SPEC INFUSION   [facility] Special[facility] Procedure ([facility])    [address] [place] [address] [phone]              Who to contact     If you have questions or need follow up information about today's clinic visit or your schedule please contact [facility] SPECIALTY AND PROCEDURE directly at [phone].  Normal or non-critical lab and imaging results  will be communicated to you by MyChart, letter or phone within 4 business days after the clinic has received the results. If you do not hear from us within 7 days, please contact the clinic through [employer] or phone. If you have a critical or abnormal lab result, we will notify you by phone as soon as possible.  Submit refill requests through [employer] or call your pharmacy and they will forward the refill request to us. Please allow 3 business days for your refill to be completed.          Additional Information About Your Visit        [employer] Information     [employer] gives you secure access to your electronic health record. If you see a primary care provider, you can also send messages to your care team and make appointments. If you have questions, please call your primary care clinic.  If you do not have a primary care provider, please call [phone] and they will assist you.        Care EveryWhere ID     This is your Care EveryWhere ID. This could be used by other organizations to access your [place] medical records  [id number]        Your Vitals Were     Pulse Temperature Respirations Pulse Oximetry BMI (Body Mass Index)       64 95.5  F (35.3  C) (Oral) 18 98% 28.11 kg/m2        Blood Pressure from Last 3 Encounters:   11/29/17 182/79   11/01/17 135/66   10/25/17 130/72    Weight from Last 3 Encounters:   11/29/17 81.4 kg (179 lb 8 oz)   11/01/17 85.4 kg (188 lb 4.8 oz)   10/25/17 85.2 kg (187 lb 14.4 oz)              Today, you had the following     No orders found for display       Primary Care Provider Office Phone # Fax #    Jamie Foster [phone] [address] [address]        Equal Access to Services     [facility] : Hadii rhys Lewis, wapaulda luqadaha, qaybta kaalmada vesna, nelly jacinto. So [facility] [phone].    ATENCIÓN: Si habla español, tiene a mcfadden disposición servicios gratuitos de asistencia  lingüísticaShawn Brewer al [phone].    We comply with applicable federal civil rights laws and [place] laws. We do not discriminate on the basis of race, color, national origin, age, disability, sex, sexual orientation, or gender identity.            Thank you!     Thank you for choosing [facility] SPECIALTY AND PROCEDURE  for your care. Our goal is always to provide you with excellent care. Hearing back from our patients is one way we can continue to improve our services. Please take a few minutes to complete the written survey that you may receive in the mail after your visit with us. Thank you!             Your Updated Medication List - Protect others around you: Learn how to safely use, store and throw away your medicines at www.disposemymeds.org.          This list is accurate as of: 11/29/17  3:56 PM.  Always use your most recent med list.                   Brand Name Dispense Instructions for use Diagnosis    albuterol 108 (90 BASE) MCG/ACT Inhaler    PROAIR HFA/PROVENTIL HFA/VENTOLIN HFA    3 Inhaler    Inhale 2 puffs into the lungs every 6 hours as needed for shortness of breath / dyspnea    Sarcoidosis       atorvastatin 40 MG tablet    LIPITOR    30 tablet    TAKE ONE TABLET BY MOUTH ONE TIME DAILY    Coronary artery disease involving native coronary artery of native heart without angina pectoris, CAD S/P percutaneous coronary angioplasty       azithromycin 250 MG tablet    ZITHROMAX    6 tablet    Take 2 tabs on day one and 1 tab every day after till finished    Sarcoidosis of lung (H)       blood glucose monitoring lancets     2 Box    Use to test blood sugar 2 times daily or as directed.  102 lancets per box.  3 month supply.    Type 2 diabetes, HbA1C goal < 8% (H)       blood glucose monitoring meter device kit    no brand specified    1 kit    Use to test blood sugar 2 times daily.    Type 2 diabetes mellitus with diabetic nephropathy (H)       blood glucose monitoring test  strip    ACCU-CHEK RONNIE PLUS    200 each    Use to test blood sugar 2 times daily or as directed.  3 month supply.    Type 2 diabetes, HbA1C goal < 8% (H)       clopidogrel 75 MG tablet    PLAVIX    90 tablet    Take 1 tablet (75 mg) by mouth daily    CAD S/P percutaneous coronary angioplasty, Coronary artery disease involving native coronary artery of native heart without angina pectoris       fluticasone-vilanterol 100-25 MCG/INH oral inhaler    BREO ELLIPTA    3 Inhaler    Inhale 1 puff into the lungs daily    Chronic obstructive pulmonary disease, unspecified COPD type (H)       furosemide 20 MG tablet    LASIX    240 tablet    Take 3 tablets (60 mg) by mouth 2 times daily May take extra 20 mg as needed for wt .gain >3#    Pulmonary hypertension       inFLIXimab 100 MG injection    REMICADE     Inject 100 mg into the vein every 28 days        levothyroxine 137 MCG tablet    SYNTHROID/LEVOTHROID    90 tablet    Take 1 tablet (137 mcg) by mouth daily    Hypothyroidism       losartan 50 MG tablet    COZAAR    90 tablet    Take 1 tablet (50 mg) by mouth daily    (HFpEF) heart failure with preserved ejection fraction (H)       methotrexate 2.5 MG tablet CHEMO     48 tablet    Take 3 tablets (7.5 mg) by mouth once a week On Mondays    Sarcoidosis       metoprolol 100 MG tablet    LOPRESSOR    60 tablet    Take 1 tablet (100 mg) by mouth 2 times daily    Hypertension secondary to other renal disorders       mirtazapine 15 MG tablet    REMERON     Take 15 mg by mouth At Bedtime.        MULTIVITAMIN & MINERAL PO      Take 1 tablet by mouth daily.        omeprazole 20 MG CR capsule    priLOSEC    90 capsule    Take 1 capsule (20 mg) by mouth daily    CAD S/P percutaneous coronary angioplasty, Coronary artery disease involving native coronary artery of native heart without angina pectoris, Sarcoidosis of lung (H)       * order for DME     1 Device    She requested for a 4 wheel walker, would like to change it to 4 wheel  walker with a seat and oxygen tank durant.   Need this faxed over to her  [phone]    Sarcoidosis, lung (H), COPD (chronic obstructive pulmonary disease) (H), Abnormal gait, Congestive heart failure (H)       * order for DME     1 Device    Updated Oxygen: Patient requires supplemental Oxygen 3 LPM via nasal canula with activity and 2 LPM nocturnally. Please provide patient with a portable oxygen concentrator for improved mobility.  Okay to spot check or titrated for conserving to keep stats above 90%. Oxygen will be for a lifetime.    ILD (interstitial lung disease) (H), Hypoxia       polyethylene glycol powder    MIRALAX    510 g    Take 17 g (1 capful) by mouth daily    Constipation, unspecified constipation type       sildenafil 20 MG tablet    REVATIO    270 tablet    Take 1 tablet (20 mg) by mouth 3 times daily    Pulmonary hypertension       tiotropium 18 MCG capsule    SPIRIVA HANDIHALER    90 capsule    Inhale contents of one capsule daily.    Chronic obstructive pulmonary disease, unspecified COPD type (H)       Urea 40 % Crea    CARMOL 40    199 g    To feet daily    Dermatophytosis of nail, Corns and callosities       warfarin 5 MG tablet    COUMADIN    90 tablet    TAKE ONE TABLET BY MOUTH ONE TIME DAILY    Atrial flutter with rapid ventricular response (H)       * Notice:  This list has 2 medication(s) that are the same as other medications prescribed for you. Read the directions carefully, and ask your doctor or other care provider to review them with you.

## 2017-11-29 NOTE — PROGRESS NOTES
Infusion Nursing Note  Rohan Monroe presents today to [facility] for:   Chief Complaint   Patient presents with     Remicade Infusion     During today's [facility] appointment, orders from Dr. Perlman were completed.  Frequency: every 4 weeks    Progress note:  Patient identification verified by name and date of birth.  Assessment completed.  Vitals recorded in Doc Flowsheets.  Patient was provided with education regarding infusion and possible side effects.  Patient verbalized understanding.     Premedications: were not ordered.  Infusion Rates: infusion given over approximately 2 hours.  Labs: were NOT drawn per orders.   Vascular access: peripheral IV placed today.  BP elevated, patient did not take BP medication today. Educated patient  Treatment Conditions: ~~~ NOTE: If the patient answers yes to any of the questions below, hold the infusion and contact ordering provider or on-call provider.    1. Have you recently had an elevated temperature, fever, chills, productive cough, coughing for 3 weeks or longer or hemoptysis,  abnormal vital signs, night sweats,  chest pain or have you noticed a decrease in your appetite, unexplained weight loss or fatigue? No  2. Do you have any open wounds or new incisions? No  3. Do you have any recent or upcoming hospitalizations, surgeries or dental procedures? No  4. Do you currently have or recently have had any signs of illness or infection or are you on any antibiotics? No  5. Have you had any new, sudden or worsening abdominal pain? No  6. Have you or anyone in your household received a live vaccination in the past 4 weeks? Please note:  No live vaccines while on biologic/chemotherapy until 6 months after the last treatment.  Patient can receive the flu vaccine (shot only) and the pneumovax.  It is optimal for the patient to get these vaccines mid cycle, but they can be given at any time as long as it is  not on the day of the infusion. No  7. Have you recently been diagnosed with any new nervous system diseases (ie. Multiple sclerosis, Guillain [place], seizures, neurological changes) or cancer diagnosis? Are you on any form of radiation or chemotherapy? No  8. Are you pregnant or breast feeding or do you have plans of pregnancy in the future? N/A  9. Have you been having any signs of worsening depression or suicidal ideations?  (benlysta only) N/A  10. Have there been any other new onset medical symptoms? No    Patient tolerated infusion: well    Discharge Plan:   Follow up plan of care with: ongoing infusions at [facility]. and primary medical doctor.  Discharge instructions were reviewed with patient.  Patient/representative verbalized understanding of discharge instructions and all questions answered.  Patient discharged from [facility] in stable condition.    Tosha Morgan RN    Administrations This Visit     inFLIXimab (REMICADE) 400 mg in NaCl 0.9 % 315 mL infusion     Admin Date Action Dose Rate Route Administered By          11/29/2017 New Bag 400 mg 157.5 mL/hr Intravenous Tosha Morgan RN                             /71 (BP Location: Left arm)  Pulse 64  Temp 95.5  F (35.3  C) (Oral)  Resp 18  Wt 81.4 kg (179 lb 8 oz)  SpO2 98%  BMI 28.11 kg/m2

## 2017-12-04 ENCOUNTER — HOSPITAL ENCOUNTER (OUTPATIENT)
Dept: CARDIAC REHAB | Facility: CLINIC | Age: 72
End: 2017-12-04
Attending: INTERNAL MEDICINE
Payer: MEDICARE

## 2017-12-04 PROCEDURE — 93798 PHYS/QHP OP CAR RHAB W/ECG: CPT

## 2017-12-04 PROCEDURE — 40000575 ZZH STATISTIC OP CARDIAC VISIT #2

## 2017-12-04 PROCEDURE — 93797 PHYS/QHP OP CAR RHAB WO ECG: CPT

## 2017-12-04 PROCEDURE — 40000116 ZZH STATISTIC OP CR VISIT

## 2017-12-06 ENCOUNTER — HOSPITAL ENCOUNTER (OUTPATIENT)
Dept: CARDIAC REHAB | Facility: CLINIC | Age: 72
End: 2017-12-06
Attending: INTERNAL MEDICINE
Payer: MEDICARE

## 2017-12-06 PROCEDURE — 40000116 ZZH STATISTIC OP CR VISIT: Performed by: REHABILITATION PRACTITIONER

## 2017-12-06 PROCEDURE — 93798 PHYS/QHP OP CAR RHAB W/ECG: CPT | Performed by: REHABILITATION PRACTITIONER

## 2017-12-08 ENCOUNTER — HOSPITAL ENCOUNTER (OUTPATIENT)
Dept: CARDIAC REHAB | Facility: CLINIC | Age: 72
End: 2017-12-08
Attending: INTERNAL MEDICINE
Payer: MEDICARE

## 2017-12-08 PROCEDURE — 93798 PHYS/QHP OP CAR RHAB W/ECG: CPT | Performed by: OCCUPATIONAL THERAPIST

## 2017-12-08 PROCEDURE — 40000116 ZZH STATISTIC OP CR VISIT: Performed by: OCCUPATIONAL THERAPIST

## 2017-12-13 ENCOUNTER — HOSPITAL ENCOUNTER (OUTPATIENT)
Dept: CARDIAC REHAB | Facility: CLINIC | Age: 72
End: 2017-12-13
Attending: INTERNAL MEDICINE
Payer: MEDICARE

## 2017-12-13 PROCEDURE — 40000575 ZZH STATISTIC OP CARDIAC VISIT #2

## 2017-12-13 PROCEDURE — 93797 PHYS/QHP OP CAR RHAB WO ECG: CPT

## 2017-12-13 PROCEDURE — 93798 PHYS/QHP OP CAR RHAB W/ECG: CPT

## 2017-12-13 PROCEDURE — 40000116 ZZH STATISTIC OP CR VISIT

## 2017-12-14 ENCOUNTER — OFFICE VISIT (OUTPATIENT)
Dept: PULMONOLOGY | Facility: CLINIC | Age: 72
End: 2017-12-14
Attending: INTERNAL MEDICINE
Payer: MEDICARE

## 2017-12-14 DIAGNOSIS — Z23 ENCOUNTER FOR IMMUNIZATION: ICD-10-CM

## 2017-12-14 DIAGNOSIS — J44.9 CHRONIC OBSTRUCTIVE PULMONARY DISEASE, UNSPECIFIED COPD TYPE (H): ICD-10-CM

## 2017-12-14 DIAGNOSIS — D86.9 SARCOIDOSIS: ICD-10-CM

## 2017-12-14 PROCEDURE — 25000128 H RX IP 250 OP 636: Mod: ZF | Performed by: INTERNAL MEDICINE

## 2017-12-14 PROCEDURE — G0008 ADMIN INFLUENZA VIRUS VAC: HCPCS | Mod: ZF

## 2017-12-14 PROCEDURE — 90662 IIV NO PRSV INCREASED AG IM: CPT | Mod: ZF | Performed by: INTERNAL MEDICINE

## 2017-12-14 PROCEDURE — 99212 OFFICE O/P EST SF 10 MIN: CPT | Mod: ZF

## 2017-12-14 RX ORDER — ALBUTEROL SULFATE 90 UG/1
2 AEROSOL, METERED RESPIRATORY (INHALATION) EVERY 6 HOURS PRN
Qty: 3 INHALER | Refills: 6 | Status: SHIPPED | OUTPATIENT
Start: 2017-12-14 | End: 2018-05-02

## 2017-12-14 RX ORDER — TIOTROPIUM BROMIDE 18 UG/1
CAPSULE ORAL; RESPIRATORY (INHALATION)
Qty: 90 CAPSULE | Refills: 3 | Status: SHIPPED | OUTPATIENT
Start: 2017-12-14 | End: 2018-05-02

## 2017-12-14 RX ADMIN — INFLUENZA A VIRUSA/MICHIGAN/45/2015 X-275 (H1N1) ANTIGEN (FORMALDEHYDE INACTIVATED), INFLUENZA A VIRUS A/HONG KONG/4801/2014 X-263B (H3N2) ANTIGEN (FORMALDEHYDE INACTIVATED), AND INFLUENZA B VIRUS B/BRISBANE/60/2008 ANTIGEN (FORMALDEHYDE INACTIVATED) 0.5 ML: 60; 60; 60 INJECTION, SUSPENSION INTRAMUSCULAR at 13:45

## 2017-12-14 ASSESSMENT — PAIN SCALES - GENERAL: PAINLEVEL: NO PAIN (0)

## 2017-12-14 NOTE — MR AVS SNAPSHOT
After Visit Summary   12/14/2017    Rohan Monroe    MRN: [id number]           Patient Information     Date Of Birth          1945        Visit Information        Provider Department      12/14/2017 1:00 PM Perlman, David Morris, MD [facility]        Today's Diagnoses     Chronic obstructive pulmonary disease, unspecified COPD type (H)        Sarcoidosis        Encounter for immunization            Follow-ups after your visit        Follow-up notes from your care team     Return in about 4 months (around 4/14/2018).      Your next 10 appointments already scheduled     Dec 22, 2017  3:00 PM CST   Cardiac Treatment with  Cardiac Rehab 1   [facility] ([facility])    [address]  [facility] [address]   [phone]            Dec 27, 2017 12:00 PM CST   Infusion 180 with UC SPEC INFUSION, UC 47 ATC   [facility] Specialty [facility] Procedure ([facility])    [address] [place] [id number] [phone]            Dec 29, 2017  3:00 PM CST   Cardiac Treatment with  Cardiac Rehab 1   [facility] ([facility])    [address]  [facility] [address]   [phone]            Jan 03, 2018  1:00 PM CST   Cardiac Treatment with  Cardiac Rehab 1   [facility] ([facility])    [address]  [facility] [address]   [phone]            Aj 15, 2018  1:45 PM CST   RETURN GLAUCOMA with Kina Greco MD   [facility] Clinic ([facility])    [address]  [address]  [facility] [address] [phone]            Jan 24, 2018  9:00 AM CST   US AORTA/IVC/ILIAC DUPLEX LIMITED with UCUSV1   [facility] US ([facility])    [address] [place] [address]  [facility] [address] [phone]           Please bring a list of your medicines (including vitamins, minerals and over-the-counter drugs). Also, tell your doctor about any allergies you may have. Wear comfortable clothes and leave your valuables at home.  Adults: No eating or drinking for 8 hours before the exam. You may take medicine with a small sip of water.  Children: - Children 6+ years: No food or drink for 6 hours before exam. - Children 1-5 years: No food or drink for 4 hours before exam. - Infants, breast-fed: may have breast milk up to 2 hours before exam. - Infants, formula: may have bottle until 4 hours before exam.  Please call the Imaging Department at your exam site with any questions.            Jan 24, 2018 10:00 AM CST   (Arrive by 9:45 AM)   XR PELVIS AND HIP BILATERAL 2 VIEWS with UCXR1   [facility] [place] [facility] ([facility])    [address] [place] [id number] [phone]           Please bring a list of your current medicines to your exam. (Include vitamins, minerals and over-thecounter medicines.) Leave your valuables at home.  Tell your doctor if there is a chance you may be pregnant.  You do not need to do anything special for this exam.            Jan 24, 2018 10:40 AM CST   (Arrive by 10:25 AM)   Return Visit with Maria Victoria Palmer MD   [facility] ([facility] [place])    [address] [place] [id number] [phone]            Jan 24, 2018 12:00 PM CST   Infusion 180 with  SPEC INFUSION   [facility] Specialty and Procedure ([facility] [place])    [address] [place] [id number] [phone]              Who to contact     If you have questions or need follow up information about today's clinic visit or your schedule please contact [facility]  directly at [phone].  Normal or non-critical lab and imaging results will be communicated to you by MyChart, letter or phone within 4 business days after the clinic has received the results. If you do not hear from us within 7 days, please contact the clinic through [employer] or phone. If you have a critical or abnormal lab result, we will notify you by phone as soon as possible.  Submit refill requests through [employer] or call your pharmacy and they will forward the refill request to us. Please allow 3 business days for your refill to be completed.          Additional Information About Your Visit        [employer] Information     [employer] gives you secure access to your electronic health record. If you see a primary care provider, you can also send messages to your care team and make appointments. If you have questions, please call your primary care clinic.  If you do not have a primary care provider, please call [phone] and they will assist you.        Care EveryWhere ID     This is your Care EveryWhere ID. This could be used by other organizations to access your [facility] medical records  [id number]         Blood Pressure from Last 3 Encounters:   No data found for BP    Weight from Last 3 Encounters:   No data found for Wt              Today, you had the following     No orders found for display         Where to get your medicines      These medications were sent to [id number] - [facility] [address] [address]     Phone:  [phone]     albuterol 108 (90 BASE) MCG/ACT Inhaler    fluticasone-vilanterol 100-25 MCG/INH oral inhaler    methotrexate 2.5 MG tablet CHEMO    tiotropium 18 MCG capsule          Primary Care Provider Office Phone # Fax #    Jamie Foster [phone]       [address] [address]        Equal Access to Services     ISIAH MONTOYA : judi Rizvi  ning dalilaevelio guzmannelly yusuf ah. So [facility] [phone].    ATENCIÓN: Si too arroyo, tiene a mcfadden disposición servicios gratuitos de asistencia lingüística. Osman al [phone].    We comply with applicable federal civil rights laws and [place] laws. We do not discriminate on the basis of race, color, national origin, age, disability, sex, sexual orientation, or gender identity.            Thank you!     Thank you for choosing [facility]  for your care. Our goal is always to provide you with excellent care. Hearing back from our patients is one way we can continue to improve our services. Please take a few minutes to complete the written survey that you may receive in the mail after your visit with us. Thank you!             Your Updated Medication List - Protect others around you: Learn how to safely use, store and throw away your medicines at www.disposemymeds.org.          This list is accurate as of: 12/14/17 11:59 PM.  Always use your most recent med list.                   Brand Name Dispense Instructions for use Diagnosis    albuterol 108 (90 BASE) MCG/ACT Inhaler    PROAIR HFA/PROVENTIL HFA/VENTOLIN HFA    3 Inhaler    Inhale 2 puffs into the lungs every 6 hours as needed for shortness of breath / dyspnea    Sarcoidosis       atorvastatin 40 MG tablet    LIPITOR    30 tablet    TAKE ONE TABLET BY MOUTH ONE TIME DAILY    Coronary artery disease involving native coronary artery of native heart without angina pectoris, CAD S/P percutaneous coronary angioplasty       azithromycin 250 MG tablet    ZITHROMAX    6 tablet    Take 2 tabs on day one and 1 tab every day after till finished    Sarcoidosis of lung (H)       blood glucose monitoring lancets     2 Box    Use to test blood sugar 2 times daily or as directed.  102 lancets per box.  3 month supply.    Type 2 diabetes, HbA1C goal < 8% (H)       blood glucose monitoring meter  device kit    no brand specified    1 kit    Use to test blood sugar 2 times daily.    Type 2 diabetes mellitus with diabetic nephropathy (H)       blood glucose monitoring test strip    ACCU-CHEK RONNIE PLUS    200 each    Use to test blood sugar 2 times daily or as directed.  3 month supply.    Type 2 diabetes, HbA1C goal < 8% (H)       clopidogrel 75 MG tablet    PLAVIX    90 tablet    Take 1 tablet (75 mg) by mouth daily    CAD S/P percutaneous coronary angioplasty, Coronary artery disease involving native coronary artery of native heart without angina pectoris       fluticasone-vilanterol 100-25 MCG/INH oral inhaler    BREO ELLIPTA    3 Inhaler    Inhale 1 puff into the lungs daily    Chronic obstructive pulmonary disease, unspecified COPD type (H)       furosemide 20 MG tablet    LASIX    240 tablet    Take 3 tablets (60 mg) by mouth 2 times daily May take extra 20 mg as needed for wt .gain >3#    Pulmonary hypertension       inFLIXimab 100 MG injection    REMICADE     Inject 100 mg into the vein every 28 days        levothyroxine 137 MCG tablet    SYNTHROID/LEVOTHROID    90 tablet    Take 1 tablet (137 mcg) by mouth daily    Hypothyroidism       losartan 50 MG tablet    COZAAR    90 tablet    Take 1 tablet (50 mg) by mouth daily    (HFpEF) heart failure with preserved ejection fraction (H)       methotrexate 2.5 MG tablet CHEMO     48 tablet    Take 3 tablets (7.5 mg) by mouth once a week On Mondays    Sarcoidosis       metoprolol 100 MG tablet    LOPRESSOR    60 tablet    Take 1 tablet (100 mg) by mouth 2 times daily    Hypertension secondary to other renal disorders       mirtazapine 15 MG tablet    REMERON     Take 15 mg by mouth At Bedtime.        MULTIVITAMIN & MINERAL PO      Take 1 tablet by mouth daily.        omeprazole 20 MG CR capsule    priLOSEC    90 capsule    Take 1 capsule (20 mg) by mouth daily    CAD S/P percutaneous coronary angioplasty, Coronary artery disease involving native coronary  artery of native heart without angina pectoris, Sarcoidosis of lung (H)       * order for DME     1 Device    She requested for a 4 wheel walker, would like to change it to 4 wheel walker with a seat and oxygen tank durant.   Need this faxed over to her  [phone]    Sarcoidosis, lung (H), COPD (chronic obstructive pulmonary disease) (H), Abnormal gait, Congestive heart failure (H)       * order for DME     1 Device    Updated Oxygen: Patient requires supplemental Oxygen 3 LPM via nasal canula with activity and 2 LPM nocturnally. Please provide patient with a portable oxygen concentrator for improved mobility.  Okay to spot check or titrated for conserving to keep stats above 90%. Oxygen will be for a lifetime.    ILD (interstitial lung disease) (H), Hypoxia       polyethylene glycol powder    MIRALAX    510 g    Take 17 g (1 capful) by mouth daily    Constipation, unspecified constipation type       sildenafil 20 MG tablet    REVATIO    270 tablet    Take 1 tablet (20 mg) by mouth 3 times daily    Pulmonary hypertension       tiotropium 18 MCG capsule    SPIRIVA HANDIHALER    90 capsule    Inhale contents of one capsule daily.    Chronic obstructive pulmonary disease, unspecified COPD type (H)       Urea 40 % Crea    CARMOL 40    199 g    To feet daily    Dermatophytosis of nail, Corns and callosities       warfarin 5 MG tablet    COUMADIN    90 tablet    TAKE ONE TABLET BY MOUTH ONE TIME DAILY    Atrial flutter with rapid ventricular response (H)       * Notice:  This list has 2 medication(s) that are the same as other medications prescribed for you. Read the directions carefully, and ask your doctor or other care provider to review them with you.

## 2017-12-14 NOTE — NURSING NOTE
Chief Complaint   Patient presents with     Interstitial Lung Disease (ILD)     Patient is being seen for ILD follow up      Paige Gillette CMA at 12:41 PM on 12/14/2017

## 2017-12-14 NOTE — LETTER
12/14/2017       RE: Rohan Monroe  [address]  [place] [address]     Dear Colleague,    Thank you for referring your patient, Rohan Monroe, to the [facility] at [facility]. Please see a copy of my visit note below.    Reason for Visit  Rohan Monroe is a 71 year old year old male who is being seen for Interstitial Lung Disease (ILD) (Patient is being seen for ILD follow up)    ILD HPI    Rohan Monroe is a 71-year-old male with complicated history of pulmonary and cardiac sarcoidosis as well as COPD and coronary artery disease. He's been maintained on infliximab every 4 weeks we had gone to every 8 weeks for a period of time however his breathing got worse so he went back to every 4 weeks and has been doing well on this. He is also on methotrexate 7.5 mg weekly. He's been doing cardiac rehabilitation and feels that this is been helping him. Overall feels he is doing well has some mild to moderate dyspnea and exertion but is doing his rehabilitation is able to do all his activities of daily generally does not have any other new complaints is tolerating his medications well.      Current Outpatient Prescriptions   Medication     tiotropium (SPIRIVA HANDIHALER) 18 MCG capsule     fluticasone-vilanterol (BREO ELLIPTA) 100-25 MCG/INH oral inhaler     albuterol (PROAIR HFA/PROVENTIL HFA/VENTOLIN HFA) 108 (90 BASE) MCG/ACT Inhaler     methotrexate 2.5 MG tablet CHEMO     azithromycin (ZITHROMAX) 250 MG tablet     levothyroxine (SYNTHROID/LEVOTHROID) 137 MCG tablet     furosemide (LASIX) 20 MG tablet     warfarin (COUMADIN) 5 MG tablet     sildenafil (REVATIO) 20 MG tablet     atorvastatin (LIPITOR) 40 MG tablet     losartan (COZAAR) 50 MG tablet     order for DME     order for DME     omeprazole (PRILOSEC) 20 MG CR capsule     clopidogrel (PLAVIX) 75 MG tablet     polyethylene glycol  (MIRALAX) powder     inFLIXimab (REMICADE) 100 MG injection     metoprolol (LOPRESSOR) 100 MG tablet     blood glucose monitoring (ACCU-CHEK RONNIE PLUS) test strip     blood glucose monitoring (ACCU-CHEK FASTCLIX) lancets     blood glucose monitoring (NO BRAND SPECIFIED) meter device kit     Urea (CARMOL 40) 40 % CREA     Multiple Vitamins-Minerals (MULTIVITAMIN & MINERAL PO)     mirtazapine (REMERON) 15 MG tablet     [DISCONTINUED] methotrexate 2.5 MG tablet CHEMO     [DISCONTINUED] tiotropium (SPIRIVA HANDIHALER) 18 MCG capsule     [DISCONTINUED] albuterol (PROAIR HFA/PROVENTIL HFA/VENTOLIN HFA) 108 (90 BASE) MCG/ACT Inhaler     No current facility-administered medications for this visit.      Allergies   Allergen Reactions     Prednisone Other (See Comments)     He reports that he can't sleep for days and can't use small to massive doses of prednisone   Pt. Does not do well with high doses of Prednisone, His MD says that Prednisone is counter indicated. Prednisone failed to treat his sarcoidosis      Past Medical History:   Diagnosis Date     Atrial flutter (H)      Cataract of both eyes      Chronic infection     Hep C     Congestive heart failure, unspecified      Coronary artery disease      Depressive disorder, not elsewhere classified     Depression (non-psychotic)     ERM OS (epiretinal membrane, left eye)      Generalized osteoarthrosis, unspecified site      Glaucoma suspect      Hypertension      Lichen planus      Other psoriasis      PVD (posterior vitreous detachment), left eye      Sarcoidosis      Sarcoidosis      Type II or unspecified type diabetes mellitus without mention of complication, not stated as uncontrolled      Unspecified hypothyroidism     Hypothyroidism     Unspecified viral hepatitis C without hepatic coma      Viral warts, unspecified        Past Surgical History:   Procedure Laterality Date     ANESTHESIA CARDIOVERSION N/A 1/19/2017    Procedure: ANESTHESIA CARDIOVERSION;  Surgeon:  GENERIC ANESTHESIA PROVIDER;  Location: [facility]     ANESTHESIA CARDIOVERSION N/A 1/23/2017    Procedure: ANESTHESIA CARDIOVERSION;  Surgeon: GENERIC ANESTHESIA PROVIDER;  [facility]: [facility]     ANESTHESIA CARDIOVERSION N/A 1/24/2017    Procedure: ANESTHESIA CARDIOVERSION;  Surgeon: GENERIC ANESTHESIA PROVIDER;  Location: [facility]     ARTHROPLASTY HIP  8/24/2011    Procedure:ARTHROPLASTY HIP; Right Total Hip Arthroplasty  Choice anesthesia; Surgeon:LESLI WILKINSON; [facility]     BIOPSY       cardiac stent      s/p     CARDIAC SURGERY       CATARACT IOL, RT/LT  9/15/2015    LE     COLONOSCOPY       CORONARY ANGIOGRAPHY ADULT ORDER       H ABLATION ATRIAL FLUTTER       HC REMOVAL OF TONSILS,<11 Y/O      Tonsils <10y.o.     HC REPAIR INCISIONAL HERNIA,REDUCIBLE      Hernia Repair, Incisional, Unilateral     HEART CATH, ANGIOPLASTY       JOINT REPLACEMENT      1 month ago--right hip     LIGATN/STRIP LONG & SHORT SAPHEN         Social History     Social History     Marital status:      Spouse name: N/A     Number of children: 2     Years of education: N/A     Occupational History      [employer]     Social History Main Topics     Smoking status: Former Smoker     Packs/day: 1.00     Years: 30.00     Types: Cigarettes     Start date: 12/30/1960     Quit date: 7/22/1994     Smokeless tobacco: Never Used     Alcohol use No     Drug use: No     Sexual activity: Yes     Partners: Female     Other Topics Concern     Blood Transfusions No     Exercise Yes     Social History Narrative    Dairy/d 2 servings/d    Caffeine little servings/d    Exercise 3 x week    Sunscreen used - Yes    Seatbelts used - Yes    Working smoke/CO detectors in the home - Yes    Guns stored in the home - No    Self Breast Exams - NOT APPLICABLE    Self Testicular Exam - No    Eye Exam up to date - Yes    Dental Exam up to date - Yes    Pap Smear up to date - NOT APPLICABLE    Mammogram up to date - NOT APPLICABLE    PSA up to date - Yes     Dexa Scan up to date - NOT APPLICABLE    Flex Sig / Colonoscopy up to date - Yes    Immunizations up to date - Unsure    Abuse: Current or Past(Physical, Sexual or Emotional)- No    Do you feel safe in your environment - Yes       Family History   Problem Relation Age of Onset     HEART DISEASE Father      irreg heart beat     Circulatory Father      varicose veins     Prostate Cancer Father      HEART DISEASE Mother      heart attack     Arthritis Mother      OSTEOPOROSIS Mother      Thyroid Disease Mother      Eye Disorder Maternal Grandmother      glaucoma     DIABETES Sister      type 2     Kidney Cancer Sister      DIABETES Sister      Glaucoma No family hx of      Macular Degeneration No family hx of      CANCER No family hx of      no skin cancer       ROS Pulmonary  A complete ROS was otherwise negative except as noted in the HPI.  Vitals:    12/14/17 1243   BP: (P) 122/64   BP Location: (P) Right arm   Patient Position: (P) Chair   Cuff Size: (P) Adult Large   Pulse: (P) 60   Resp: (P) 16   SpO2: (P) 99%   Weight: (P) 78.9 kg (174 lb)     Exam:   GENERAL APPEARANCE: Well developed, well nourished, alert, and in no apparent distress.  NECK: supple, no masses, no thyromegaly.  LYMPHATICS: No significant axillary, cervical, or supraclavicular nodes.  RESP: good air flow throughout, - no crackles, rhonchi or wheezes.  CV: Normal S1, S2, regular rhythm, normal rate, no rub, no murmur,  no gallop, no LE edema.   ABDOMEN:  Bowel sounds normal, soft, nontender, no HSM or masses.   MS: extremities normal- no clubbing, no cyanosis.  SKIN: no rash on limited exam  PSYCH: mentation appears normal. and affect normal/bright  Results: I have reviewed all imaging, PFTs and other relavent tests, please see below for details, PFT and imaging results were reviewed with the patient.  PFTs: Moderate obstruction, improved from previous  Assessment and plan:     71-year-old male with pulmonary and cardiac sarcoidosis  significant obstructive lung disease and coronary artery disease. Overall he is doing relatively well we will continue his current regimen his labs have been unremarkable. He does have a history of liver cirrhosis is been stable and given he is on a fairly low dose of methotrexate with no elevation in his LFTs are think we can continue to treat this indefinitely. I will plan to see the patient back in 4 months function tests he will call back sooner with any changes in his breathing.    CBC   Recent Labs   Lab Test  08/29/17   0959  08/17/17   1234   RBC  3.78*  3.99*   HGB  11.5*  11.8*   HCT  34.8*  37.4*   PLT  252  276       Basic Metabolic Panel  Recent Labs   Lab Test  10/25/17   1105  10/04/17   1113   05/13/17   0116  05/13/17   0035   NA  133  133   < >   --    --    POTASSIUM  4.4  4.5   < >   --    --    CHLORIDE  100  100   < >   --    --    CO2  26  27   < >   --    --    BUN  55*  57*   < >   --    --    CT   --    --    --   Plasma, Thawed  PlateletPheresis LeukoReduced Irradiated  Plasma, Thawed  Plasma, Thawed  Plasma, Thawed  Apheresis Plasma Thawed  Plasma, Thawed  PlateletPheresis,LeukoRed Irrad (Part 2)  Red Blood Cells Leukocyte Reduced  Red Blood Cells Leukocyte Reduced  Red Blood Cells Leukocyte Reduced  Red Blood Cells Leukocyte Reduced  Red Blood Cells Leukocyte Reduced  Red Blood Cells LeukoReduced (Part 2)   GLC  122*  186*   < >   --    --    RAFFY  8.7  8.7   < >   --    --     < > = values in this interval not displayed.       INR  Recent Labs   Lab Test  11/01/17   1212  10/25/17   1105   INR  2.00*  2.13*       PFT  PFT Latest Ref Rng & Units 12/14/2017   FVC L 2.85   FEV1 L 1.18   FVC% % 73   FEV1% % 40     CC:  Again, thank you for allowing me to participate in the care of your patient.    Sincerely,  David Morris Perlman, MD

## 2017-12-14 NOTE — PROGRESS NOTES
Reason for Visit  Rohan Monroe is a 71 year old year old male who is being seen for Interstitial Lung Disease (ILD) (Patient is being seen for ILD follow up)    ILD HPI    Rohan Monroe is a 71-year-old male with complicated history of pulmonary and cardiac sarcoidosis as well as COPD and coronary artery disease. He's been maintained on infliximab every 4 weeks we had gone to every 8 weeks for a period of time however his breathing got worse so he went back to every 4 weeks and has been doing well on this. He is also on methotrexate 7.5 mg weekly. He's been doing cardiac rehabilitation and feels that this is been helping him. Overall feels he is doing well has some mild to moderate dyspnea and exertion but is doing his rehabilitation is able to do all his activities of daily generally does not have any other new complaints is tolerating his medications well.      Current Outpatient Prescriptions   Medication     tiotropium (SPIRIVA HANDIHALER) 18 MCG capsule     fluticasone-vilanterol (BREO ELLIPTA) 100-25 MCG/INH oral inhaler     albuterol (PROAIR HFA/PROVENTIL HFA/VENTOLIN HFA) 108 (90 BASE) MCG/ACT Inhaler     methotrexate 2.5 MG tablet CHEMO     azithromycin (ZITHROMAX) 250 MG tablet     levothyroxine (SYNTHROID/LEVOTHROID) 137 MCG tablet     furosemide (LASIX) 20 MG tablet     warfarin (COUMADIN) 5 MG tablet     sildenafil (REVATIO) 20 MG tablet     atorvastatin (LIPITOR) 40 MG tablet     losartan (COZAAR) 50 MG tablet     order for DME     order for DME     omeprazole (PRILOSEC) 20 MG CR capsule     clopidogrel (PLAVIX) 75 MG tablet     polyethylene glycol (MIRALAX) powder     inFLIXimab (REMICADE) 100 MG injection     metoprolol (LOPRESSOR) 100 MG tablet     blood glucose monitoring (ACCU-CHEK RONNIE PLUS) test strip     blood glucose monitoring (ACCU-CHEK FASTCLIX) lancets     blood glucose monitoring (NO BRAND SPECIFIED) meter device kit     Urea (CARMOL 40) 40 % CREA     Multiple  Vitamins-Minerals (MULTIVITAMIN & MINERAL PO)     mirtazapine (REMERON) 15 MG tablet     [DISCONTINUED] methotrexate 2.5 MG tablet CHEMO     [DISCONTINUED] tiotropium (SPIRIVA HANDIHALER) 18 MCG capsule     [DISCONTINUED] albuterol (PROAIR HFA/PROVENTIL HFA/VENTOLIN HFA) 108 (90 BASE) MCG/ACT Inhaler     No current facility-administered medications for this visit.      Allergies   Allergen Reactions     Prednisone Other (See Comments)     He reports that he can't sleep for days and can't use small to massive doses of prednisone   Pt. Does not do well with high doses of Prednisone, His MD says that Prednisone is counter indicated. Prednisone failed to treat his sarcoidosis      Past Medical History:   Diagnosis Date     Atrial flutter (H)      Cataract of both eyes      Chronic infection     Hep C     Congestive heart failure, unspecified      Coronary artery disease      Depressive disorder, not elsewhere classified     Depression (non-psychotic)     ERM OS (epiretinal membrane, left eye)      Generalized osteoarthrosis, unspecified site      Glaucoma suspect      Hypertension      Lichen planus      Other psoriasis      PVD (posterior vitreous detachment), left eye      Sarcoidosis      Sarcoidosis      Type II or unspecified type diabetes mellitus without mention of complication, not stated as uncontrolled      Unspecified hypothyroidism     Hypothyroidism     Unspecified viral hepatitis C without hepatic coma      Viral warts, unspecified        Past Surgical History:   Procedure Laterality Date     ANESTHESIA CARDIOVERSION N/A 1/19/2017    Procedure: ANESTHESIA CARDIOVERSION;  Surgeon: GENERIC ANESTHESIA PROVIDER;  Location: [facility]     ANESTHESIA CARDIOVERSION N/A 1/23/2017    Procedure: ANESTHESIA CARDIOVERSION;  Surgeon: GENERIC ANESTHESIA PROVIDER;  Location: [facility]     ANESTHESIA CARDIOVERSION N/A 1/24/2017    Procedure: ANESTHESIA CARDIOVERSION;  Surgeon: GENERIC ANESTHESIA PROVIDER;  [facility]      ARTHROPLASTY HIP  8/24/2011    Procedure:ARTHROPLASTY HIP; Right Total Hip Arthroplasty  Choice anesthesia; Surgeon:LESLI WILKINSON; Location:[facility]     BIOPSY       cardiac stent      s/p     CARDIAC SURGERY       CATARACT IOL, RT/LT  9/15/2015    LE     COLONOSCOPY       CORONARY ANGIOGRAPHY ADULT ORDER       H ABLATION ATRIAL FLUTTER       HC REMOVAL OF TONSILS,<11 Y/O      Tonsils <10y.o.     HC REPAIR INCISIONAL HERNIA,REDUCIBLE      Hernia Repair, Incisional, Unilateral     HEART CATH, ANGIOPLASTY       JOINT REPLACEMENT      1 month ago--right hip     LIGATN/STRIP LONG & SHORT SAPHEN         Social History     Social History     Marital status:      Spouse name: N/A     Number of children: 2     Years of education: N/A     Occupational History      [employer]     Social History Main Topics     Smoking status: Former Smoker     Packs/day: 1.00     Years: 30.00     Types: Cigarettes     Start date: 12/30/1960     Quit date: 7/22/1994     Smokeless tobacco: Never Used     Alcohol use No     Drug use: No     Sexual activity: Yes     Partners: Female     Other Topics Concern     Blood Transfusions No     Exercise Yes     Social History Narrative    Dairy/d 2 servings/d    Caffeine little servings/d    Exercise 3 x week    Sunscreen used - Yes    Seatbelts used - Yes    Working smoke/CO detectors in the home - Yes    Guns stored in the home - No    Self Breast Exams - NOT APPLICABLE    Self Testicular Exam - No    Eye Exam up to date - Yes    Dental Exam up to date - Yes    Pap Smear up to date - NOT APPLICABLE    Mammogram up to date - NOT APPLICABLE    PSA up to date - Yes    Dexa Scan up to date - NOT APPLICABLE    Flex Sig / Colonoscopy up to date - Yes    Immunizations up to date - Unsure    Abuse: Current or Past(Physical, Sexual or Emotional)- No    Do you feel safe in your environment - Yes       Family History   Problem Relation Age of Onset     HEART DISEASE Father      irreg heart beat      Circulatory Father      varicose veins     Prostate Cancer Father      HEART DISEASE Mother      heart attack     Arthritis Mother      OSTEOPOROSIS Mother      Thyroid Disease Mother      Eye Disorder Maternal Grandmother      glaucoma     DIABETES Sister      type 2     Kidney Cancer Sister      DIABETES Sister      Glaucoma No family hx of      Macular Degeneration No family hx of      CANCER No family hx of      no skin cancer       ROS Pulmonary    A complete ROS was otherwise negative except as noted in the HPI.  Vitals:    12/14/17 1243   BP: (P) 122/64   BP Location: (P) Right arm   Patient Position: (P) Chair   Cuff Size: (P) Adult Large   Pulse: (P) 60   Resp: (P) 16   SpO2: (P) 99%   Weight: (P) 78.9 kg (174 lb)     Exam:   GENERAL APPEARANCE: Well developed, well nourished, alert, and in no apparent distress.  NECK: supple, no masses, no thyromegaly.  LYMPHATICS: No significant axillary, cervical, or supraclavicular nodes.  RESP: good air flow throughout, - no crackles, rhonchi or wheezes.  CV: Normal S1, S2, regular rhythm, normal rate, no rub, no murmur,  no gallop, no LE edema.   ABDOMEN:  Bowel sounds normal, soft, nontender, no HSM or masses.   MS: extremities normal- no clubbing, no cyanosis.  SKIN: no rash on limited exam  PSYCH: mentation appears normal. and affect normal/bright  Results: I have reviewed all imaging, PFTs and other relavent tests, please see below for details, PFT and imaging results were reviewed with the patient.  PFTs: Moderate obstruction, improved from previous  Assessment and plan:     71-year-old male with pulmonary and cardiac sarcoidosis significant obstructive lung disease and coronary artery disease. Overall he is doing relatively well we will continue his current regimen his labs have been unremarkable. He does have a history of liver cirrhosis is been stable and given he is on a fairly low dose of methotrexate with no elevation in his LFTs are think we can  continue to treat this indefinitely. I will plan to see the patient back in 4 months function tests he will call back sooner with any changes in his breathing.    CBC   Recent Labs   Lab Test  08/29/17   0959  08/17/17   1234   RBC  3.78*  3.99*   HGB  11.5*  11.8*   HCT  34.8*  37.4*   PLT  252  276       Basic Metabolic Panel  Recent Labs   Lab Test  10/25/17   1105  10/04/17   1113   05/13/17   0116  05/13/17   0035   NA  133  133   < >   --    --    POTASSIUM  4.4  4.5   < >   --    --    CHLORIDE  100  100   < >   --    --    CO2  26  27   < >   --    --    BUN  55*  57*   < >   --    --    CT   --    --    --   Plasma, Thawed  PlateletPheresis LeukoReduced Irradiated  Plasma, Thawed  Plasma, Thawed  Plasma, Thawed  Apheresis Plasma Thawed  Plasma, Thawed  PlateletPheresis,LeukoRed Irrad (Part 2)  Red Blood Cells Leukocyte Reduced  Red Blood Cells Leukocyte Reduced  Red Blood Cells Leukocyte Reduced  Red Blood Cells Leukocyte Reduced  Red Blood Cells Leukocyte Reduced  Red Blood Cells LeukoReduced (Part 2)   GLC  122*  186*   < >   --    --    RAFFY  8.7  8.7   < >   --    --     < > = values in this interval not displayed.       INR  Recent Labs   Lab Test  11/01/17   1212  10/25/17   1105   INR  2.00*  2.13*       PFT  PFT Latest Ref Rng & Units 12/14/2017   FVC L 2.85   FEV1 L 1.18   FVC% % 73   FEV1% % 40           CC:

## 2017-12-15 ENCOUNTER — HOSPITAL ENCOUNTER (OUTPATIENT)
Dept: CARDIAC REHAB | Facility: CLINIC | Age: 72
End: 2017-12-15
Attending: INTERNAL MEDICINE
Payer: MEDICARE

## 2017-12-15 PROCEDURE — 93798 PHYS/QHP OP CAR RHAB W/ECG: CPT | Performed by: REHABILITATION PRACTITIONER

## 2017-12-15 PROCEDURE — 40000116 ZZH STATISTIC OP CR VISIT: Performed by: REHABILITATION PRACTITIONER

## 2017-12-18 ENCOUNTER — HOSPITAL ENCOUNTER (OUTPATIENT)
Dept: CARDIAC REHAB | Facility: CLINIC | Age: 72
End: 2017-12-18
Attending: INTERNAL MEDICINE
Payer: MEDICARE

## 2017-12-18 PROCEDURE — 93798 PHYS/QHP OP CAR RHAB W/ECG: CPT

## 2017-12-18 PROCEDURE — 40000116 ZZH STATISTIC OP CR VISIT

## 2017-12-20 ENCOUNTER — HOSPITAL ENCOUNTER (OUTPATIENT)
Dept: CARDIAC REHAB | Facility: CLINIC | Age: 72
End: 2017-12-20
Attending: INTERNAL MEDICINE
Payer: MEDICARE

## 2017-12-20 PROCEDURE — 93798 PHYS/QHP OP CAR RHAB W/ECG: CPT

## 2017-12-20 PROCEDURE — 40000116 ZZH STATISTIC OP CR VISIT

## 2017-12-22 ENCOUNTER — HOSPITAL ENCOUNTER (OUTPATIENT)
Dept: CARDIAC REHAB | Facility: CLINIC | Age: 72
End: 2017-12-22
Attending: INTERNAL MEDICINE
Payer: MEDICARE

## 2017-12-22 PROCEDURE — 93798 PHYS/QHP OP CAR RHAB W/ECG: CPT | Performed by: OCCUPATIONAL THERAPIST

## 2017-12-22 PROCEDURE — 40000116 ZZH STATISTIC OP CR VISIT: Performed by: OCCUPATIONAL THERAPIST

## 2017-12-27 ENCOUNTER — INFUSION THERAPY VISIT (OUTPATIENT)
Dept: INFUSION THERAPY | Facility: CLINIC | Age: 72
End: 2017-12-27
Attending: INTERNAL MEDICINE
Payer: MEDICARE

## 2017-12-27 ENCOUNTER — ANTICOAGULATION THERAPY VISIT (OUTPATIENT)
Dept: ANTICOAGULATION | Facility: CLINIC | Age: 72
End: 2017-12-27

## 2017-12-27 VITALS
TEMPERATURE: 96.1 F | HEART RATE: 71 BPM | WEIGHT: 173.2 LBS | BODY MASS INDEX: 27.12 KG/M2 | OXYGEN SATURATION: 100 % | RESPIRATION RATE: 17 BRPM | DIASTOLIC BLOOD PRESSURE: 76 MMHG | SYSTOLIC BLOOD PRESSURE: 135 MMHG

## 2017-12-27 DIAGNOSIS — D86.9 SARCOIDOSIS: ICD-10-CM

## 2017-12-27 DIAGNOSIS — I48.92 ATRIAL FLUTTER WITH RAPID VENTRICULAR RESPONSE (H): ICD-10-CM

## 2017-12-27 DIAGNOSIS — Z79.01 LONG-TERM (CURRENT) USE OF ANTICOAGULANTS: ICD-10-CM

## 2017-12-27 DIAGNOSIS — D86.0 SARCOIDOSIS OF LUNG (H): Primary | ICD-10-CM

## 2017-12-27 DIAGNOSIS — I48.92 ATRIAL FLUTTER (H): ICD-10-CM

## 2017-12-27 LAB — INR PPP: 2.54 (ref 0.86–1.14)

## 2017-12-27 PROCEDURE — 85610 PROTHROMBIN TIME: CPT | Performed by: INTERNAL MEDICINE

## 2017-12-27 PROCEDURE — 25000128 H RX IP 250 OP 636: Mod: ZF | Performed by: INTERNAL MEDICINE

## 2017-12-27 PROCEDURE — 96413 CHEMO IV INFUSION 1 HR: CPT

## 2017-12-27 RX ORDER — ACETAMINOPHEN 325 MG/1
650 TABLET ORAL ONCE
Status: CANCELLED
Start: 2017-12-27 | End: 2017-12-27

## 2017-12-27 RX ADMIN — INFLIXIMAB 400 MG: 100 INJECTION, POWDER, LYOPHILIZED, FOR SOLUTION INTRAVENOUS at 12:13

## 2017-12-27 ASSESSMENT — PAIN SCALES - GENERAL: PAINLEVEL: NO PAIN (0)

## 2017-12-27 NOTE — MR AVS SNAPSHOT
After Visit Summary   12/27/2017    Rohan Monroe    MRN: [id number]           Patient Information     Date Of Birth          1945        Visit Information        Provider Department      12/27/2017 12:00 PM  47 ATC;  SPEC INFUSION [facility] Specialty and Procedure        Today's Diagnoses     Sarcoidosis of lung (HCC)    -  1    SARCOIDOSIS-systemic        Atrial flutter (H)          Care Instructions    Dear Rohan Monroe    Thank you for choosing [facility] ([facility]) for your infusion.  The following information is a summary of our appointment as well as important reminders.    Additional information: You received Remicade today IV and will return in 1 month for next infusion.     We look forward in seeing you on your next appointment here at [facility].  Please don t hesitate to call us at [phone] to reschedule any of your appointments or to speak with one of the [facility] registered nurses.  It was a pleasure taking care of you today.    Sincerely,    [facility]  Specialty Infusion & Procedure Center  [address] [place] [address]  [address]  Phone:  [phone]    EDUCATION POST BIOLOGICAL/CHEMOTHERAPY INFUSION  Call the triage nurse at your clinic or seek medical attention if you have chills and/or temperature greater than or equal to 100.5, uncontrolled nausea/vomiting, diarrhea, constipation, dizziness, shortness of breath, chest pain, heart palpitations, weakness or any other new or concerning symptoms, questions or concerns.  You can not have any live virus vaccines prior to or during treatment or up to 6 months post infusion.  If you have an upcoming surgery, medical procedure or dental procedure during treatment, this should be discussed with your ordering physician and your surgeon/dentist.  If you are having any concerning symptom, if you are  unsure if you should get your next infusion or wish to speak to a provider before your next infusion, please call your care coordinator or triage nurse at your clinic to notify them so we can adequately serve you.          Follow-ups after your visit        Your next 10 appointments already scheduled     Jan 03, 2018  1:00 PM CST   Cardiac Treatment with  Cardiac Rehab 1   [facility] ([facility])    [address], [address]  [facility] [address]   [phone]            Aj 15, 2018  1:45 PM CST   RETURN GLAUCOMA with Kina Greco MD   [facility] Clinic ([facility])    [address]  [address] [facility]  9th [address] Clin 9a  [facility] [address] [phone]            Jan 24, 2018  9:00 AM CST   US AORTA/IVC/ILIAC DUPLEX LIMITED with UCUSV1   [facility] US ([facility])    [address] [place] [address] [phone]           Please bring a list of your medicines (including vitamins, minerals and over-the-counter drugs). Also, tell your doctor about any allergies you may have. Wear comfortable clothes and leave your valuables at home.  Adults: No eating or drinking for 8 hours before the exam. You may take medicine with a small sip of water.  Children: - Children 6+ years: No food or drink for 6 hours before exam. - Children 1-5 years: No food or drink for 4 hours before exam. - Infants, breast-fed: may have breast milk up to 2 hours before exam. - Infants, formula: may have bottle until 4 hours before exam.  Please call the Imaging Department at your exam site with any questions.            Jan 24, 2018 10:00 AM CST   (Arrive by 9:45 AM)   XR PELVIS AND HIP BILATERAL 2 VIEWS with UCXR1   [facility] Xray ([facility])    [address] [place] [address] [phone]           Please bring a list of your current medicines to  your exam. (Include vitamins, minerals and over-thecounter medicines.) Leave your valuables at home.  Tell your doctor if there is a chance you may be pregnant.  You do not need to do anything special for this exam.            Jan 24, 2018 10:40 AM CST   (Arrive by 10:25 AM)   Return Visit with Maria Victoria Palmer MD   [facility] ([facility])    [address] [place] [address]  2nd Floor  [facility] [address] [phone]            Jan 24, 2018 12:00 PM CST   Infusion 180 with UC SPEC INFUSION, UC 47 ATC   [facility] Specialty and Procedure ([facility])    [address] [facility]  2nd Floor  [facility] [id number] [phone]            Feb 07, 2018  2:00 PM CST   Lab with UC LAB   [facility] ([facility])    [address] [place] [address]  1st Floor  [facility] [id number] [phone]            Feb 07, 2018  3:00 PM CST   (Arrive by 2:30 PM)   Return Visit with Ollie Michel MD   [facility] Nephrology ([facility])    [address] [place] [address]  3rd Floor  [facility] [id number] [phone]            Feb 21, 2018 12:00 PM CST   Infusion 180 with UC SPEC INFUSION   [facility] Specialty and Procedure ([facility])    [address] [place] [address]  2nd [facility] [address] [phone]              Who to contact     If you have questions or need follow up information about today's clinic visit or your schedule please contact [facility] SPECIALTY AND PROCEDURE directly at [phone].  Normal or non-critical lab and imaging results will be communicated to you by MyChart, letter or phone within 4 business days after the clinic has received the results. If you do not hear from us within 7 days, please contact the clinic through MyChart or phone. If you have a critical or abnormal lab  result, we will notify you by phone as soon as possible.  Submit refill requests through [employer] or call your pharmacy and they will forward the refill request to us. Please allow 3 business days for your refill to be completed.          Additional Information About Your Visit        [employer] Information     [employer] gives you secure access to your electronic health record. If you see a primary care provider, you can also send messages to your care team and make appointments. If you have questions, please call your primary care clinic.  If you do not have a primary care provider, please call [phone] and they will assist you.        Care EveryWhere ID     This is your Care EveryWhere ID. This could be used by other organizations to access your [facility] medical records  [id number]        Your Vitals Were     Pulse Temperature Respirations Pulse Oximetry BMI (Body Mass Index)       71 96.1  F (35.6  C) (Oral) 17 100% 27.12 kg/m2        Blood Pressure from Last 3 Encounters:   12/27/17 135/76   12/14/17 (P) 122/64   11/29/17 182/79    Weight from Last 3 Encounters:   12/27/17 78.6 kg (173 lb 3.2 oz)   12/14/17 (P) 78.9 kg (174 lb)   11/29/17 81.4 kg (179 lb 8 oz)              We Performed the Following     INR        Primary Care Provider Office Phone # Fax #    Jamie Foster [phone] [address] [address]        Equal Access to Services     ISIAH MONTOYA : Hadii rhys ku hadasho Soomaali, waaxda luqadaha, qaybta kaalmada adeegyada, nelly jacinto. So [facility] [phone].    ATENCIÓN: Si habla español, tiene a mcfadden disposición servicios gratuitos de asistencia lingüística. Llame al [phone].    We comply with applicable federal civil rights laws and [place] laws. We do not discriminate on the basis of race, color, national origin, age, disability, sex, sexual orientation, or gender identity.            Thank you!     Thank you for  [facility] SPECIALTY [facility] PROCEDURE  for your care. Our goal is always to provide you with excellent care. Hearing back from our patients is one way we can continue to improve our services. Please take a few minutes to complete the written survey that you may receive in the mail after your visit with us. Thank you!             Your Updated Medication List - Protect others around you: Learn how to safely use, store and throw away your medicines at www.disposemymeds.org.          This list is accurate as of: 12/27/17  1:25 PM.  Always use your most recent med list.                   Brand Name Dispense Instructions for use Diagnosis    albuterol 108 (90 BASE) MCG/ACT Inhaler    PROAIR HFA/PROVENTIL HFA/VENTOLIN HFA    3 Inhaler    Inhale 2 puffs into the lungs every 6 hours as needed for shortness of breath / dyspnea    Sarcoidosis       atorvastatin 40 MG tablet    LIPITOR    30 tablet    TAKE ONE TABLET BY MOUTH ONE TIME DAILY    Coronary artery disease involving native coronary artery of native heart without angina pectoris, CAD S/P percutaneous coronary angioplasty       azithromycin 250 MG tablet    ZITHROMAX    6 tablet    Take 2 tabs on day one and 1 tab every day after till finished    Sarcoidosis of lung (H)       blood glucose monitoring lancets     2 Box    Use to test blood sugar 2 times daily or as directed.  102 lancets per box.  3 month supply.    Type 2 diabetes, HbA1C goal < 8% (H)       blood glucose monitoring meter device kit    no brand specified    1 kit    Use to test blood sugar 2 times daily.    Type 2 diabetes mellitus with diabetic nephropathy (H)       blood glucose monitoring test strip    ACCU-CHEK RONNIE PLUS    200 each    Use to test blood sugar 2 times daily or as directed.  3 month supply.    Type 2 diabetes, HbA1C goal < 8% (H)       clopidogrel 75 MG tablet    PLAVIX    90 tablet    Take 1 tablet (75 mg) by mouth daily    CAD S/P percutaneous coronary  angioplasty, Coronary artery disease involving native coronary artery of native heart without angina pectoris       fluticasone-vilanterol 100-25 MCG/INH oral inhaler    BREO ELLIPTA    3 Inhaler    Inhale 1 puff into the lungs daily    Chronic obstructive pulmonary disease, unspecified COPD type (H)       furosemide 20 MG tablet    LASIX    240 tablet    Take 3 tablets (60 mg) by mouth 2 times daily May take extra 20 mg as needed for wt .gain >3#    Pulmonary hypertension       inFLIXimab 100 MG injection    REMICADE     Inject 100 mg into the vein every 28 days        levothyroxine 137 MCG tablet    SYNTHROID/LEVOTHROID    90 tablet    Take 1 tablet (137 mcg) by mouth daily    Hypothyroidism       losartan 50 MG tablet    COZAAR    90 tablet    Take 1 tablet (50 mg) by mouth daily    (HFpEF) heart failure with preserved ejection fraction (H)       methotrexate 2.5 MG tablet CHEMO     48 tablet    Take 3 tablets (7.5 mg) by mouth once a week On Mondays    Sarcoidosis       metoprolol 100 MG tablet    LOPRESSOR    60 tablet    Take 1 tablet (100 mg) by mouth 2 times daily    Hypertension secondary to other renal disorders       mirtazapine 15 MG tablet    REMERON     Take 15 mg by mouth At Bedtime.        MULTIVITAMIN & MINERAL PO      Take 1 tablet by mouth daily.        omeprazole 20 MG CR capsule    priLOSEC    90 capsule    Take 1 capsule (20 mg) by mouth daily    CAD S/P percutaneous coronary angioplasty, Coronary artery disease involving native coronary artery of native heart without angina pectoris, Sarcoidosis of lung (H)       * order for DME     1 Device    She requested for a 4 wheel walker, would like to change it to 4 wheel walker with a seat and oxygen tank durant.   Need this faxed over to her  [phone]    Sarcoidosis, lung (H), COPD (chronic obstructive pulmonary disease) (H), Abnormal gait, Congestive heart failure (H)       * order for DME     1 Device    Updated Oxygen: Patient requires  supplemental Oxygen 3 LPM via nasal canula with activity and 2 LPM nocturnally. Please provide patient with a portable oxygen concentrator for improved mobility.  Okay to spot check or titrated for conserving to keep stats above 90%. Oxygen will be for a lifetime.    ILD (interstitial lung disease) (H), Hypoxia       polyethylene glycol powder    MIRALAX    510 g    Take 17 g (1 capful) by mouth daily    Constipation, unspecified constipation type       sildenafil 20 MG tablet    REVATIO    270 tablet    Take 1 tablet (20 mg) by mouth 3 times daily    Pulmonary hypertension       tiotropium 18 MCG capsule    SPIRIVA HANDIHALER    90 capsule    Inhale contents of one capsule daily.    Chronic obstructive pulmonary disease, unspecified COPD type (H)       Urea 40 % Crea    CARMOL 40    199 g    To feet daily    Dermatophytosis of nail, Corns and callosities       warfarin 5 MG tablet    COUMADIN    90 tablet    TAKE ONE TABLET BY MOUTH ONE TIME DAILY    Atrial flutter with rapid ventricular response (H)       * Notice:  This list has 2 medication(s) that are the same as other medications prescribed for you. Read the directions carefully, and ask your doctor or other care provider to review them with you.

## 2017-12-27 NOTE — MR AVS SNAPSHOT
Rohan Howard Mabel   12/27/2017   Anticoagulation Therapy Visit    Description:  71 year old male   Provider:  Delphine Kaur, RN   Department:  [facility]           INR as of 12/27/2017     Today's INR 2.54      Anticoagulation Summary as of 12/27/2017     INR goal 2.0-3.0   Today's INR 2.54   Full instructions 5 mg every day   Next INR check 2/7/2018    Indications   Long-term (current) use of anticoagulants [Z79.01] [Z79.01]  Atrial flutter with rapid ventricular response (H) [I48.92]         December 2017 Details    Sun Mon Tue Wed Thu Fri Sat          1               2                 3               4               5               6               7               8               9                 10               11               12               13               14               15               16                 17               18               19               20               21               22               23                 24               25               26               27      5 mg   See details      28      5 mg         29      5 mg         30      5 mg           31      5 mg                Date Details   12/27 This INR check               How to take your warfarin dose     To take:  5 mg Take 1 of the 5 mg tablets.           January 2018 Details    Sun Mon Tue Wed Thu Fri Sat      1      5 mg         2      5 mg         3      5 mg         4      5 mg         5      5 mg         6      5 mg           7      5 mg         8      5 mg         9      5 mg         10      5 mg         11      5 mg         12      5 mg         13      5 mg           14      5 mg         15      5 mg         16      5 mg         17      5 mg         18      5 mg         19      5 mg         20      5 mg           21      5 mg         22      5 mg         23      5 mg         24      5 mg         25      5 mg         26      5 mg         27      5 mg           28      5 mg         29      5 mg          30      5 mg         31      5 mg             Date Details   No additional details            How to take your warfarin dose     To take:  5 mg Take 1 of the 5 mg tablets.           February 2018 Details    Sun Mon Tue Wed Thu Fri Sat         1      5 mg         2      5 mg         3      5 mg           4      5 mg         5      5 mg         6      5 mg         7            8               9               10                 11               12               13               14               15               16               17                 18               19               20               21               22               23               24                 25               26               27               28                   Date Details   No additional details    Date of next INR:  2/7/2018         How to take your warfarin dose     To take:  5 mg Take 1 of the 5 mg tablets.

## 2017-12-27 NOTE — PATIENT INSTRUCTIONS
Dear Rohan Monroe    Thank you for choosing [facility] Specialty Infusion [facility] Procedure Center ([facility]) for your infusion.  The following information is a summary of our appointment as well as important reminders.    Additional information: You received Remicade today IV and will return in 1 month for next infusion.     We look forward in seeing you on your next appointment here at [facility].  Please don t hesitate to call us at [phone] to reschedule any of your appointments or to speak with one of the [facility] registered nurses.  It was a pleasure taking care of you today.    Sincerely,    [facility]  Specialty Infusion & Procedure Center  [address] [place] [address]  [address]  Phone:  [phone]    EDUCATION POST BIOLOGICAL/CHEMOTHERAPY INFUSION  Call the triage nurse at your clinic or seek medical attention if you have chills and/or temperature greater than or equal to 100.5, uncontrolled nausea/vomiting, diarrhea, constipation, dizziness, shortness of breath, chest pain, heart palpitations, weakness or any other new or concerning symptoms, questions or concerns.  You can not have any live virus vaccines prior to or during treatment or up to 6 months post infusion.  If you have an upcoming surgery, medical procedure or dental procedure during treatment, this should be discussed with your ordering physician and your surgeon/dentist.  If you are having any concerning symptom, if you are unsure if you should get your next infusion or wish to speak to a provider before your next infusion, please call your care coordinator or triage nurse at your clinic to notify them so we can adequately serve you.

## 2017-12-27 NOTE — PROGRESS NOTES
~~~ NOTE: If the patient answers yes to any of the questions below, hold the infusion and contact ordering provider or on-call provider.    1. Have you recently had an elevated temperature, fever, chills, productive cough, coughing for 3 weeks or longer or hemoptysis,  abnormal vital signs, night sweats,  chest pain or have you noticed a decrease in your appetite, unexplained weight loss or fatigue? No  2. Do you have any open wounds or new incisions? No  3. Do you have any recent or upcoming hospitalizations, surgeries or dental procedures? No  4. Do you currently have or recently have had any signs of illness or infection or are you on any antibiotics? No  5. Have you had any new, sudden or worsening abdominal pain? No  6. Have you or anyone in your household received a live vaccination in the past 4 weeks? Please note:  No live vaccines while on biologic/chemotherapy until 6 months after the last treatment.  Patient can receive the flu vaccine (shot only) and the pneumovax.  It is optimal for the patient to get these vaccines mid cycle, but they can be given at any time as long as it is not on the day of the infusion. No  7. Have you recently been diagnosed with any new nervous system diseases (ie. Multiple sclerosis, Guillain Ronceverte, seizures, neurological changes) or cancer diagnosis? Are you on any form of radiation or chemotherapy? No  8. Are you pregnant or breast feeding or do you have plans of pregnancy in the future? No  9. Have you been having any signs of worsening depression or suicidal ideations?  (benlysta only) N/A  10. Have there been any other new onset medical symptoms? No  Nursing Note  Rohan Lee Monroe [facility] for:   Chief Complaint   Patient presents with     Infusion     Remicade     Blood Draw     INR     During today's [facility] appointment, orders from Dr Perlman were completed.  Frequency:  monthly    Progress note:  Patient identification verified by name and date of birth.  Assessment completed.  Vitals recorded in Doc Flowsheets.  Patient was provided with education regarding infusion and possible side effects.  Patient verbalized understanding.      needed: No  Premedications: Pt refused  Infusion Rates: 315 ml/hr.  Approximate Infusion length:1 hours.   Labs: Per standing order and pt request gilbert INR today.  Vascular access: peripheral IV placed today.  Treatment Conditions: RN reviewed the following with patient: Medication hand-out provided to patient, Inform patient if any fever, chills or signs of infection, new symptoms, abdominal pain, heart palpitations, shortness of breath, reaction, weakness, neurological changes, seek medical attention immediately and should not receive infusions. No live virus vaccines prior to or during treatment or up to 6 months post infusion. If the patient has an upcoming procedure or surgery, this should be discussed with the rheumatologist and surgeon or provider.  Patient tolerated infusion: well.    Drug Waste Record? No     Discharge Plan:   Follow up plan of care with: ongoing infusions at [facility].  Discharge instructions were reviewed with patient.  Patient/representative verbalized understanding of discharge instructions and all questions answered.  Patient discharged from [facility] in stable condition.    SANYA DELGADO, MICHAEL    Administrations This Visit     inFLIXimab (REMICADE) 400 mg in NaCl 0.9 % 315 mL infusion     Admin Date Action Dose Rate Route Administered By          12/27/2017 New Bag 400 mg 157.5 mL/hr Intravenous Sanya Delgado, RN                         /76 (BP Location: Left arm, Patient Position: Sitting, Cuff Size: Adult Regular)  Pulse 71  Temp 96.1  F (35.6  C) (Oral)  Resp 17  Wt 78.6 kg (173 lb 3.2 oz)  SpO2 100%  BMI 27.12 kg/m2

## 2017-12-27 NOTE — PROGRESS NOTES
ANTICOAGULATION FOLLOW-UP CLINIC VISIT    Patient Name:  Rohan Monroe  Date:  12/27/2017  Contact Type:  Telephone    SUBJECTIVE:        OBJECTIVE    INR   Date Value Ref Range Status   12/27/2017 2.54 (H) 0.86 - 1.14 Final       ASSESSMENT / PLAN  INR assessment THER    Recheck INR In: 6 WEEKS    INR Location Clinic      Anticoagulation Summary as of 12/27/2017     INR goal 2.0-3.0   Today's INR 2.54   Maintenance plan 5 mg (5 mg x 1) every day   Full instructions 5 mg every day   Weekly total 35 mg   No change documented Delphine Kaur, RN   Plan last modified Paige Moscoso RN (8/7/2017)   Next INR check 2/7/2018   Priority INR   Target end date 4/20/2017    Indications   Long-term (current) use of anticoagulants [Z79.01] [Z79.01]  Atrial flutter with rapid ventricular response (H) [I48.92]         Anticoagulation Episode Summary     INR check location     Preferred lab     Send INR reminders to [facility]    Comments 3 months therapy, then reassess.        Anticoagulation Care Providers     Provider Role Specialty Phone number    Jamie Foster MD [facility] [phone]            See the Encounter Report to view Anticoagulation Flowsheet and Dosing Calendar (Go to Encounters tab in chart review, and find the Anticoagulation Therapy Visit)    Left message for patient with results and dosing recommendations. Asked patient to call back to report any missed doses, falls, signs and symptoms of bleeding or clotting, any changes in health, medication, or diet. Asked patient to call back with any questions or concerns.     Delphine Kaur, RN

## 2017-12-28 ENCOUNTER — TELEPHONE (OUTPATIENT)
Dept: INTERNAL MEDICINE | Facility: CLINIC | Age: 72
End: 2017-12-28

## 2017-12-29 ENCOUNTER — TELEPHONE (OUTPATIENT)
Dept: INTERNAL MEDICINE | Facility: CLINIC | Age: 72
End: 2017-12-29

## 2017-12-29 LAB
DLCOUNC-%PRED-PRE: 36 %
DLCOUNC-PRE: 8.94 ML/MIN/MMHG
DLCOUNC-PRED: 24.61 ML/MIN/MMHG
ERV-%PRED-PRE: 116 %
ERV-PRE: 1.14 L
ERV-PRED: 0.98 L
EXPTIME-PRE: 10.54 SEC
FEF2575-%PRED-PRE: 20 %
FEF2575-PRE: 0.45 L/SEC
FEF2575-PRED: 2.19 L/SEC
FEFMAX-%PRED-PRE: 43 %
FEFMAX-PRE: 3.28 L/SEC
FEFMAX-PRED: 7.61 L/SEC
FEV1-%PRED-PRE: 40 %
FEV1-PRE: 1.18 L
FEV1FEV6-PRE: 46 %
FEV1FEV6-PRED: 77 %
FEV1FVC-PRE: 41 %
FEV1FVC-PRED: 76 %
FEV1SVC-PRE: 46 %
FEV1SVC-PRED: 66 %
FIFMAX-PRE: 5.11 L/SEC
FVC-%PRED-PRE: 73 %
FVC-PRE: 2.85 L
FVC-PRED: 3.89 L
IC-%PRED-PRE: 41 %
IC-PRE: 1.43 L
IC-PRED: 3.43 L
VA-%PRED-PRE: 59 %
VA-PRE: 3.91 L
VC-%PRED-PRE: 58 %
VC-PRE: 2.57 L
VC-PRED: 4.41 L

## 2017-12-29 NOTE — TELEPHONE ENCOUNTER
Pt calling, states that he might have left toe (middle toe) infection from toe nail clipping. It is red and swollen. No warm to touch. He called 911 today because he was afraid of getting sepsis and paramedic told him it was not sepsis. I coordinated scheduling tomorrow.

## 2017-12-29 NOTE — TELEPHONE ENCOUNTER
I received a call from Rohan today regarding a red/swollen toe. Rohan reported his left middle toe is red and swollen, stated he did have an appointment in clinic, but he canceled it this morning as he felt his toe was improved. He stated that it is still red/swollen now, though, and he is unable to come in for an appointment. States it is too difficult for him to go out in this weather. Would like to know what he can do at home. Pt stated only one toe is reddened, appears a callus is coming off, states some redness under the callus. No reported discharge. Advised pt that he can perform some warm water soaks. Stated after soak he should ensure his toe/foot is dry, ensure clean/dry socks. Advised OTC antibiotic ointment as needed. Informed pt that if redness does not resolve or worsens, that he should be evaluated in clinic or at urgent care if over the weekend. He voiced understanding. No further questions at this time.    Makenzie Rosales RN

## 2018-01-05 ENCOUNTER — HOSPITAL ENCOUNTER (EMERGENCY)
Facility: CLINIC | Age: 73
Discharge: HOME | End: 2018-01-05
Attending: EMERGENCY MEDICINE | Admitting: EMERGENCY MEDICINE
Payer: MEDICARE

## 2018-01-05 ENCOUNTER — APPOINTMENT (OUTPATIENT)
Dept: GENERAL RADIOLOGY | Facility: CLINIC | Age: 73
End: 2018-01-05
Attending: EMERGENCY MEDICINE
Payer: MEDICARE

## 2018-01-05 VITALS
OXYGEN SATURATION: 100 % | WEIGHT: 182.32 LBS | DIASTOLIC BLOOD PRESSURE: 76 MMHG | TEMPERATURE: 98.6 F | HEART RATE: 84 BPM | SYSTOLIC BLOOD PRESSURE: 159 MMHG | RESPIRATION RATE: 20 BRPM | BODY MASS INDEX: 28.55 KG/M2

## 2018-01-05 DIAGNOSIS — L08.9 DIABETIC FOOT INFECTION (H): ICD-10-CM

## 2018-01-05 DIAGNOSIS — E11.628 DIABETIC FOOT INFECTION (H): ICD-10-CM

## 2018-01-05 DIAGNOSIS — M25.562 ACUTE PAIN OF LEFT KNEE: Primary | ICD-10-CM

## 2018-01-05 DIAGNOSIS — M54.50 BILATERAL LOW BACK PAIN WITHOUT SCIATICA, UNSPECIFIED CHRONICITY: ICD-10-CM

## 2018-01-05 LAB
ANION GAP SERPL CALCULATED.3IONS-SCNC: 10 MMOL/L (ref 3–14)
APPEARANCE FLD: NORMAL
BASOPHILS # BLD AUTO: 0 10E9/L (ref 0–0.2)
BASOPHILS NFR BLD AUTO: 0.2 %
BUN SERPL-MCNC: 42 MG/DL (ref 7–30)
CALCIUM SERPL-MCNC: 8.8 MG/DL (ref 8.5–10.1)
CHLORIDE SERPL-SCNC: 101 MMOL/L (ref 94–109)
CO2 SERPL-SCNC: 25 MMOL/L (ref 20–32)
COLOR FLD: YELLOW
CREAT SERPL-MCNC: 1.87 MG/DL (ref 0.66–1.25)
CRP SERPL-MCNC: 110 MG/L (ref 0–8)
CRYSTALS SNV MICRO: ABNORMAL
DIFFERENTIAL METHOD BLD: ABNORMAL
EOSINOPHIL # BLD AUTO: 0.1 10E9/L (ref 0–0.7)
EOSINOPHIL NFR BLD AUTO: 0.7 %
ERYTHROCYTE [DISTWIDTH] IN BLOOD BY AUTOMATED COUNT: 14.9 % (ref 10–15)
GFR SERPL CREATININE-BSD FRML MDRD: 35 ML/MIN/1.7M2
GLUCOSE FLD-MCNC: 16 MG/DL
GLUCOSE SERPL-MCNC: 154 MG/DL (ref 70–99)
GRAM STN SPEC: NORMAL
GRAM STN SPEC: NORMAL
HCT VFR BLD AUTO: 33 % (ref 40–53)
HGB BLD-MCNC: 10.6 G/DL (ref 13.3–17.7)
IMM GRANULOCYTES # BLD: 0 10E9/L (ref 0–0.4)
IMM GRANULOCYTES NFR BLD: 0.3 %
INR PPP: 2.28 (ref 0.86–1.14)
LACTATE BLD-SCNC: 0.7 MMOL/L (ref 0.7–2)
LYMPHOCYTES # BLD AUTO: 0.7 10E9/L (ref 0.8–5.3)
LYMPHOCYTES NFR BLD AUTO: 4.9 %
LYMPHOCYTES NFR FLD MANUAL: 1 %
MCH RBC QN AUTO: 30.3 PG (ref 26.5–33)
MCHC RBC AUTO-ENTMCNC: 32.1 G/DL (ref 31.5–36.5)
MCV RBC AUTO: 94 FL (ref 78–100)
MONOCYTES # BLD AUTO: 0.3 10E9/L (ref 0–1.3)
MONOCYTES NFR BLD AUTO: 2.3 %
MONOS+MACROS NFR FLD MANUAL: 4 %
NEUTROPHILS # BLD AUTO: 12.4 10E9/L (ref 1.6–8.3)
NEUTROPHILS NFR BLD AUTO: 91.6 %
NEUTS BAND NFR FLD MANUAL: 95 %
NRBC # BLD AUTO: 0 10*3/UL
NRBC BLD AUTO-RTO: 0 /100
PLATELET # BLD AUTO: 303 10E9/L (ref 150–450)
POTASSIUM SERPL-SCNC: 3.7 MMOL/L (ref 3.4–5.3)
PROT FLD-MCNC: 4.8 G/DL
RBC # BLD AUTO: 3.5 10E12/L (ref 4.4–5.9)
SODIUM SERPL-SCNC: 136 MMOL/L (ref 133–144)
SPECIMEN SOURCE FLD: NORMAL
SPECIMEN SOURCE: NORMAL
URATE SERPL-MCNC: 12.6 MG/DL (ref 3.5–7.2)
WBC # BLD AUTO: 13.5 10E9/L (ref 4–11)
WBC # FLD AUTO: NORMAL /UL

## 2018-01-05 PROCEDURE — 83605 ASSAY OF LACTIC ACID: CPT | Performed by: EMERGENCY MEDICINE

## 2018-01-05 PROCEDURE — 72170 X-RAY EXAM OF PELVIS: CPT

## 2018-01-05 PROCEDURE — 87070 CULTURE OTHR SPECIMN AEROBIC: CPT | Performed by: EMERGENCY MEDICINE

## 2018-01-05 PROCEDURE — 84157 ASSAY OF PROTEIN OTHER: CPT | Performed by: EMERGENCY MEDICINE

## 2018-01-05 PROCEDURE — 89060 EXAM SYNOVIAL FLUID CRYSTALS: CPT | Performed by: EMERGENCY MEDICINE

## 2018-01-05 PROCEDURE — 73560 X-RAY EXAM OF KNEE 1 OR 2: CPT | Mod: LT

## 2018-01-05 PROCEDURE — A9270 NON-COVERED ITEM OR SERVICE: HCPCS | Mod: GY | Performed by: EMERGENCY MEDICINE

## 2018-01-05 PROCEDURE — 84550 ASSAY OF BLOOD/URIC ACID: CPT | Performed by: EMERGENCY MEDICINE

## 2018-01-05 PROCEDURE — 86140 C-REACTIVE PROTEIN: CPT | Performed by: EMERGENCY MEDICINE

## 2018-01-05 PROCEDURE — 87205 SMEAR GRAM STAIN: CPT | Performed by: EMERGENCY MEDICINE

## 2018-01-05 PROCEDURE — 85025 COMPLETE CBC W/AUTO DIFF WBC: CPT | Performed by: EMERGENCY MEDICINE

## 2018-01-05 PROCEDURE — 72100 X-RAY EXAM L-S SPINE 2/3 VWS: CPT

## 2018-01-05 PROCEDURE — 89051 BODY FLUID CELL COUNT: CPT | Performed by: EMERGENCY MEDICINE

## 2018-01-05 PROCEDURE — 99285 EMERGENCY DEPT VISIT HI MDM: CPT | Mod: 25 | Performed by: EMERGENCY MEDICINE

## 2018-01-05 PROCEDURE — 20610 DRAIN/INJ JOINT/BURSA W/O US: CPT | Mod: Z6 | Performed by: EMERGENCY MEDICINE

## 2018-01-05 PROCEDURE — 87015 SPECIMEN INFECT AGNT CONCNTJ: CPT | Performed by: EMERGENCY MEDICINE

## 2018-01-05 PROCEDURE — 20610 DRAIN/INJ JOINT/BURSA W/O US: CPT | Performed by: EMERGENCY MEDICINE

## 2018-01-05 PROCEDURE — 85610 PROTHROMBIN TIME: CPT | Performed by: EMERGENCY MEDICINE

## 2018-01-05 PROCEDURE — 82945 GLUCOSE OTHER FLUID: CPT | Performed by: EMERGENCY MEDICINE

## 2018-01-05 PROCEDURE — 25000132 ZZH RX MED GY IP 250 OP 250 PS 637: Mod: GY | Performed by: EMERGENCY MEDICINE

## 2018-01-05 PROCEDURE — 99284 EMERGENCY DEPT VISIT MOD MDM: CPT | Mod: 25 | Performed by: EMERGENCY MEDICINE

## 2018-01-05 PROCEDURE — 80048 BASIC METABOLIC PNL TOTAL CA: CPT | Performed by: EMERGENCY MEDICINE

## 2018-01-05 RX ORDER — OXYCODONE HYDROCHLORIDE 5 MG/1
5 TABLET ORAL EVERY 6 HOURS PRN
Qty: 12 TABLET | Refills: 0 | Status: SHIPPED | OUTPATIENT
Start: 2018-01-05 | End: 2018-01-05

## 2018-01-05 RX ORDER — HYDROCODONE BITARTRATE AND ACETAMINOPHEN 5; 325 MG/1; MG/1
1 TABLET ORAL ONCE
Status: COMPLETED | OUTPATIENT
Start: 2018-01-05 | End: 2018-01-05

## 2018-01-05 RX ORDER — COLCHICINE 0.6 MG/1
TABLET ORAL
Qty: 30 TABLET | Refills: 0 | Status: SHIPPED | OUTPATIENT
Start: 2018-01-05 | End: 2018-12-05

## 2018-01-05 RX ORDER — DOXYCYCLINE 100 MG/1
100 CAPSULE ORAL 2 TIMES DAILY
Qty: 20 CAPSULE | Refills: 0 | Status: SHIPPED | OUTPATIENT
Start: 2018-01-05 | End: 2018-02-26

## 2018-01-05 RX ORDER — CEPHALEXIN 500 MG/1
500 CAPSULE ORAL 3 TIMES DAILY
Qty: 30 CAPSULE | Refills: 0 | Status: SHIPPED | OUTPATIENT
Start: 2018-01-05 | End: 2018-01-15

## 2018-01-05 RX ORDER — LIDOCAINE HYDROCHLORIDE 10 MG/ML
10 INJECTION, SOLUTION INFILTRATION; PERINEURAL ONCE
Status: DISCONTINUED | OUTPATIENT
Start: 2018-01-05 | End: 2018-01-05 | Stop reason: HOSPADM

## 2018-01-05 RX ORDER — HYDROCODONE BITARTRATE AND ACETAMINOPHEN 5; 325 MG/1; MG/1
1-2 TABLET ORAL EVERY 4 HOURS PRN
Qty: 15 TABLET | Refills: 0 | Status: SHIPPED | OUTPATIENT
Start: 2018-01-05 | End: 2018-02-26

## 2018-01-05 RX ADMIN — HYDROCODONE BITARTRATE AND ACETAMINOPHEN 1 TABLET: 5; 325 TABLET ORAL at 07:58

## 2018-01-05 ASSESSMENT — ENCOUNTER SYMPTOMS
SHORTNESS OF BREATH: 0
ABDOMINAL PAIN: 0
BACK PAIN: 1
ARTHRALGIAS: 1
WOUND: 1
FEVER: 0

## 2018-01-05 NOTE — ED NOTES
Pt arrived for back pain and leg pain which started a few days ago. He also has a sore toe which he attributes to diabetes. He says his left knee is also edematous and painful. He reported a fever at home and is afebrile here. He has a 20g IV in his right wrist from EMS. He says it would be very hard for him to get to a doctor in this cold weather.

## 2018-01-05 NOTE — DISCHARGE INSTRUCTIONS
The antibiotics can affect your blood thinner level (INR). You should have your INR checked on Monday - your dose may need to be adjusted while taking the antibiotics. Do not take iron or Multivitamin while taking these antibiotics. Follow up with your podiatrist as soon as possible, as well as your primary care provider. Return to the ER with new or worsening concerns.

## 2018-01-05 NOTE — ED NOTES
Bed: ED09  Expected date:   Expected time:   Means of arrival:   Comments:  [facility]  72 M  Back pain

## 2018-01-05 NOTE — ED AVS SNAPSHOT
[facility], [place], Emergency Department    [address] [place] [id number]    Phone:  [phone]                                       Rohan Monroe   MRN: [id number]    Department:  [facility], Emergency Department   Date of Visit:  1/5/2018           After Visit Summary Signature Page     I have received my discharge instructions, and my questions have been answered. I have discussed any challenges I see with this plan with the nurse or doctor.    ..........................................................................................................................................  Patient/Patient Representative Signature      ..........................................................................................................................................  Patient Representative Print Name and Relationship to Patient    ..................................................               ................................................  Date                                            Time    ..........................................................................................................................................  Reviewed by Signature/Title    ...................................................              ..............................................  Date                                                            Time

## 2018-01-05 NOTE — ED PROVIDER NOTES
History     Chief Complaint   Patient presents with     Back Pain     Fever     Knee Pain     HPI  Rohan Monroe is a 72 year old male who presents to the ER with a primary complaint of left knee pain for approximately 24 hours.  He states that he had had pain in the left third toe for a few days, after a callus a torn off.  He also complained of a sore area in the bottom of his foot, he felt related to a corn.  He states that he, in addition, developed some low back pain, making it difficult for him to move around.  However, this seemed to been improving over the last day.  He reports waking up yesterday with severe left knee pain and acute swelling.  He reports feeling feverish, had a temp of 99.6 at home.  He denies any falls or other injuries.  He is taking Coumadin, does not feel that he bumped the knee.  Again, he states that the back is actually feeling improved now, though still hurts some.  He states the knee is the primary thing that is bothering him.  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  Per the chart review he did get a Remicade infusion within the last week and a half, or so.  He denies any cough, change in breathing, chest pain, abdominal pain, vomiting, diarrhea, dysuria, hematuria.    Past Medical History:   Diagnosis Date     Atrial flutter (H)      Cataract of both eyes      Chronic infection     Hep C     Congestive heart failure, unspecified      Coronary artery disease      Depressive disorder, not elsewhere classified     Depression (non-psychotic)     ERM OS (epiretinal membrane, left eye)      Generalized osteoarthrosis, unspecified site      Glaucoma suspect      Hypertension      Lichen planus      Other psoriasis      PVD (posterior vitreous detachment), left eye      Sarcoidosis      Sarcoidosis      Type II or unspecified type diabetes mellitus without mention of complication, not stated as  uncontrolled      Unspecified hypothyroidism     Hypothyroidism     Unspecified viral hepatitis C without hepatic coma      Viral warts, unspecified        Past Surgical History:   Procedure Laterality Date     ANESTHESIA CARDIOVERSION N/A 1/19/2017    Procedure: ANESTHESIA CARDIOVERSION;  Surgeon: GENERIC ANESTHESIA PROVIDER;  Location: [facility] OR     ANESTHESIA CARDIOVERSION N/A 1/23/2017    Procedure: ANESTHESIA CARDIOVERSION;  Surgeon: GENERIC ANESTHESIA PROVIDER;  Location: [facility] OR     ANESTHESIA CARDIOVERSION N/A 1/24/2017    Procedure: ANESTHESIA CARDIOVERSION;  Surgeon: GENERIC ANESTHESIA PROVIDER;  Location: [facility] OR     ARTHROPLASTY HIP  8/24/2011    Procedure:ARTHROPLASTY HIP; Right Total Hip Arthroplasty  Choice anesthesia; Surgeon:LESLI WILKINSON; Location:[facility] OR     BIOPSY       cardiac stent      s/p     CARDIAC SURGERY       CATARACT IOL, RT/LT  9/15/2015    LE     COLONOSCOPY       CORONARY ANGIOGRAPHY ADULT ORDER       H ABLATION ATRIAL FLUTTER       HC REMOVAL OF TONSILS,<9 Y/O      Tonsils <10y.o.     HC REPAIR INCISIONAL HERNIA,REDUCIBLE      Hernia Repair, Incisional, Unilateral     HEART CATH, ANGIOPLASTY       JOINT REPLACEMENT      1 month ago--right hip     LIGATN/STRIP LONG & SHORT SAPHEN         Family History   Problem Relation Age of Onset     HEART DISEASE Father      irreg heart beat     Circulatory Father      varicose veins     Prostate Cancer Father      HEART DISEASE Mother      heart attack     Arthritis Mother      OSTEOPOROSIS Mother      Thyroid Disease Mother      Eye Disorder Maternal Grandmother      glaucoma     DIABETES Sister      type 2     Kidney Cancer Sister      DIABETES Sister      Glaucoma No family hx of      Macular Degeneration No family hx of      CANCER No family hx of      no skin cancer       Social History   Substance Use Topics     Smoking status: Former Smoker     Packs/day: 1.00     Years: 30.00     Types: Cigarettes     Start date: 12/30/1960     Quit date:  7/22/1994     Smokeless tobacco: Never Used     Alcohol use No           I have reviewed the Medications, Allergies, Past Medical and Surgical History, and Social History in the Epic system.    Review of Systems   Constitutional: Negative for fever.   Respiratory: Negative for shortness of breath.    Cardiovascular: Negative for chest pain.   Gastrointestinal: Negative for abdominal pain.   Musculoskeletal: Positive for arthralgias (left knee pain) and back pain.   Skin: Positive for wound (left 3rd toe).   All other systems reviewed and are negative.      Physical Exam   BP: 141/88  Pulse: 109  Temp: 98.6  F (37  C)  Weight: 82.7 kg (182 lb 5.1 oz)  SpO2: 100 %      Physical Exam   Constitutional: No distress.   HENT:   Head: Atraumatic.   Mouth/Throat: Oropharynx is clear and moist. No oropharyngeal exudate.   Eyes: Pupils are equal, round, and reactive to light. No scleral icterus.   Cardiovascular: Normal heart sounds and intact distal pulses.    Pulmonary/Chest: Breath sounds normal. No respiratory distress.   Abdominal: Soft. Bowel sounds are normal. There is no tenderness.   Musculoskeletal: He exhibits tenderness (Some tenderness in the lumbar and pelvic region, though seems inconsistent, and changing location with repeat exam.). He exhibits no edema.        Legs:  Skin: Skin is warm. No rash noted. He is not diaphoretic.       ED Course     ED Course     Arthrocentesis  Date/Time: 1/5/2018 6:00 AM  Performed by: JAVY ANDRES  Authorized by: JAVY ANDRES   Consent: Verbal consent obtained. Written consent obtained.  Risks and benefits: risks, benefits and alternatives were discussed  Consent given by: patient  Patient understanding: patient states understanding of the procedure being performed  Patient consent: the patient's understanding of the procedure matches consent given  Procedure consent: procedure consent matches procedure scheduled  Relevant documents: relevant documents present and  verified  Test results: test results available and properly labeled  Site marked: the operative site was marked  Imaging studies: imaging studies available  Patient identity confirmed: verbally with patient  Indications: joint swelling, pain and possible septic joint   Body area: knee  Joint: left knee  Local anesthesia used: yes  Anesthesia: local infiltration    Anesthesia:  Local anesthesia used: yes  Local Anesthetic: lidocaine 1% without epinephrine  Anesthetic total: 2 mL    Sedation:  Patient sedated: no  Preparation: Patient was prepped and draped in the usual sterile fashion.  Needle size: 20 G  Ultrasound guidance: yes  Approach: medial  Aspirate: cloudy  Aspirate amount: 12 mL  Patient tolerance: Patient tolerated the procedure well with no immediate complications                   Critical Care time:  none             Labs Ordered and Resulted from Time of ED Arrival Up to the Time of Departure from the ED   CBC WITH PLATELETS DIFFERENTIAL - Abnormal; Notable for the following:        Result Value    WBC 13.5 (*)     RBC Count 3.50 (*)     Hemoglobin 10.6 (*)     Hematocrit 33.0 (*)     Absolute Neutrophil 12.4 (*)     Absolute Lymphocytes 0.7 (*)     All other components within normal limits   BASIC METABOLIC PANEL - Abnormal; Notable for the following:     Glucose 154 (*)     Urea Nitrogen 42 (*)     Creatinine 1.87 (*)     GFR Estimate 35 (*)     GFR Estimate If Black 43 (*)     All other components within normal limits   INR - Abnormal; Notable for the following:     INR 2.28 (*)     All other components within normal limits   CRP INFLAMMATION - Abnormal; Notable for the following:     CRP Inflammation 110.0 (*)     All other components within normal limits   LACTIC ACID WHOLE BLOOD   CRYSTAL ID SYNOVIAL FLUID   CELL COUNT WITH DIFFERENTIAL FLUID   GLUCOSE FLUID   PROTEIN FLUID   GRAM STAIN   FLUID CULTURE AEROBIC BACTERIAL            Assessments & Plan (with Medical Decision Making)   The patient  had acute onset of left knee effusion and pain.  He is quite uncomfortable with even minimal movement of the knee.  It is slightly warm.  He is immunosuppressed with Remicade, as well as methotrexate.  Because of this, I am concerned for septic arthritis.  He does have a little bit of a white count of 13.5, and his CRP is 110.0 INR is 2.28.  The did discuss with him the risks and benefits of arthrocentesis, including the slightly increased risk of bleeding given his use of Coumadin and Plavix.  Also discussed risk of infection.  He verbalized understanding, and was agreeable, did sign informed consent.  X-rays had been taken prior, which did not show any evidence of bony abnormality.  There had been no trauma.  He did ask for x-rays of his back and pelvis as well, which did not show acute abnormality.  We are awaiting fluid analysis, including cell count differential, crystal analysis, Gram stain, at this time.    The patient does appear to have an infection of his left third toe.  I do think he should be treated for infection.  It has not spread past the toe at this point, is only been going on a few days.  I would err on the side of treating with Keflex as well as doxycycline, to cover this diabetic foot infection.  He is instructed to follow-up with as soon as possible podiatry, states he has a podiatrist.    In regards to his back pain, there is been no trauma.  X-rays were read as unremarkable.  He has had an abdominal ultrasound within the last year which does not show evidence of AAA.  Does not sound consistent with kidney stone.  Admits that the back pain is actually improving, the knee is his primary concern at this point.    Did give the patient 1 dose of Vicodin here.  Patient will be signed out to the oncoming provider at this time pending his joint fluid analysis.  If not concerning, and patient is able to ambulate safely, he can be discharged home.  If not, he may require admission for  observation.    Dictation Disclaimer: Some of this Note has been completed with voice-recognition dictation software. Although errors are generally corrected real-time, there is the potential for a rare error to be present in the completed chart.      I have reviewed the nursing notes.    I have reviewed the findings, diagnosis, plan and need for follow up with the patient.    New Prescriptions    CEPHALEXIN (KEFLEX) 500 MG CAPSULE    Take 1 capsule (500 mg) by mouth 3 times daily for 10 days    DOXYCYCLINE (VIBRAMYCIN) 100 MG CAPSULE    Take 1 capsule (100 mg) by mouth 2 times daily    OXYCODONE IR (ROXICODONE) 5 MG TABLET    Take 1 tablet (5 mg) by mouth every 6 hours as needed for pain       Final diagnoses:   Diabetic foot infection (H)   Bilateral low back pain without sciatica, unspecified chronicity   Acute pain of left knee       1/5/2018   [facility], [place], EMERGENCY DEPARTMENT     Maggy López MD  01/05/18 0724

## 2018-01-05 NOTE — ED NOTES
10:56 AM  She was seen and examined.  He was signed out to me at shift change pending arthrocentesis fluid analysis of his left knee.  This reveals monosodium urate crystals consistent with acute gouty arthritis.  Uric acid was added to his laboratories which is markedly elevated.  We discussed at length different treatment options.  Nonsteroidal anti-inflammatories are contraindicated as patient is on Coumadin.  He has had significant side effects on prednisone and does not wish to consider this medication.  Will initiate colchicine and will also use Vicodin for pain.  As his uric acid is markedly elevated, I have recommended follow-up with his primary physician to discuss treatment options in the next 1-2 weeks.     Frank Monterroso MD  01/05/18 [id number]

## 2018-01-05 NOTE — PROGRESS NOTES
Emergency Social Work Services Note    Date of  Intervention: 01/05/18  Last Emergency Department Visit:  01/05/18  Care Plan:  none  Collaborated with:  ED RN    Data:  Mr. Rohan Monroe is a 72 year old  male who presented to [facility] via EMS d/t knee pain. He is anticipate d/c to home setting with oral antibiotics. ED SW consulted via chart review given Denis having difficulty ambulating.     Intervention:  ED SW met with Denis to discuss his home situation. He lives in at [facility] along with his 23 year old son (currently a student). Denis usually ambulates independently, has home oxygen and son for support prn. Discussed his ambulation status as he declined to get up to use bathroom and requested bed pan. He noted he is having pain but feels that once he is home, will anticipate ambulating short distances to get around his apartment. He denied any safety concerns and noted his son is available. He requested d/c transportation as son not available at this time. He has his apartment keys with him and in agreement with d/c plan.     Assessment:  none    Plan:    Anticipated Disposition:  Densi from home setting with family support.     Barriers to d/c plan: none    Follow Up:  Once Denis has d/c rx's, ED Sw agreed to arrange w/c transportation. Denis expressed understanding that this will be private pay.     [employer] [facility] [employer] (Ph: [phone])    KT Del Toro, [facility] [place]  Social Work Services, Emergency Dept [facility]  Pager: [phone] Mon-Sat 9 am - 9 pm, on-call/after hours pager [phone]

## 2018-01-05 NOTE — ED AVS SNAPSHOT
[facility], Emergency Department    [address] [facility] [facility] [address]    Phone:  [phone]                                       Rohan Monroe   MRN: [id number]    Department:  [facility], Emergency Department   Date of Visit:  1/5/2018           Patient Information     Date Of Birth          1945        Your diagnoses for this visit were:     Diabetic foot infection (H)     Bilateral low back pain without sciatica, unspecified chronicity     Acute pain of left knee        You were seen by Maggy López MD and Frank Monterroso MD.      Follow-up Information     Follow up with Jamie Foster MD.    Specialty:  Internal Medicine    Contact information:    [address] [id number] [phone]          Discharge Instructions       The antibiotics can affect your blood thinner level (INR). You should have your INR checked on Monday - your dose may need to be adjusted while taking the antibiotics. Do not take iron or Multivitamin while taking these antibiotics. Follow up with your podiatrist as soon as possible, as well as your primary care provider. Return to the ER with new or worsening concerns.    Discharge References/Attachments     BACK AND NECK PAIN, GENERAL (ENGLISH)    DIABETIC FOOT CARE (ENGLISH)    GOUT DIET (ENGLISH)    GOUT, EATING TO PREVENT (ENGLISH)    GOUT, WHAT IS (ENGLISH)    GOUT ATTACKS, TREATING (ENGLISH)      Future Appointments        Provider Department Dept Phone Center    1/10/2018 3:00 PM [facility] [phone] [facility]    1/15/2018 1:45 PM Kina Greco MD [facility] [phone] [facility]    1/24/2018 9:00 AM [facility] VASCULAR ULTRASOUND ROOM [address] [place] [address] [phone] [facility]    1/24/2018 10:00 AM [facility] CENTER XRAY ROOM [address] [place] [address] Xray [phone] [facility]    1/24/2018 10:40 AM Maria Victoria Palmer MD [facility]  [facility] [phone] [facility]    1/24/2018 12:00 PM [facility] Specialty Infusion [facility] Specialty and Procedure [phone] [facility]    2/7/2018 2:00 PM Lab [facility] Lab [phone] [facility]    2/7/2018 3:00 PM Ollie Michel MD [facility] Nephrology [phone] [facility]    2/21/2018 12:00 PM [facility] Specialty Infusion [facility] Specialty and Procedure [phone] [facility]    2/26/2018 1:00 PM Lab [facility] Lab [phone] [facility]    2/26/2018 2:00 PM Moses Orosco MD [facility] Hepatology [phone] [facility]    3/6/2018 12:15 PM Sandee Middleton MD Eye Clinic [phone] [facility]    3/8/2018 10:00 AM Lab [facility] Lab [phone] [facility]    3/8/2018 10:30 AM [facility] IMAGING [facility] ECHO ROOM 1 [facility] Echo [phone] [facility]    3/8/2018 11:30 AM Diane Paige MD [facility] Heart Care [phone] [facility]    4/12/2018 1:00 PM PFL 6 minute Walk [facility] Pulmonary Function Testing [phone] [facility]    4/12/2018 1:30 PM Pulmonary Function Lab [facility] Pulmonary Function Testing [phone] [facility]    4/12/2018 2:00 PM David Morris Perlman, MD [facility] [phone] [facility]      24 Hour Appointment Hotline       To make an appointment at any [facility] clinic, call [phone]). If you don't have a family doctor or clinic, we will help you find one. [facility] clinics are conveniently located to serve the needs of you and your family.             Review of your medicines      START taking        Dose / Directions Last dose taken    cephALEXin 500 MG capsule   Commonly known as:  KEFLEX   Dose:  500 mg   Quantity:  30 capsule        Take 1 capsule (500 mg) by mouth 3 times daily for 10 days   Refills:  0        colchicine 0.6 MG tablet   Commonly known as:  COLCRYS   Quantity:  30 tablet        2 tabs at the onset of flare, then 1 tab every 2 hrs until  pain is better or diarrhea occurs, up to 10 doses. Wait 3 days until taking again   Refills:  0        doxycycline 100 MG capsule   Commonly known as:  VIBRAMYCIN   Dose:  100 mg   Quantity:  20 capsule        Take 1 capsule (100 mg) by mouth 2 times daily   Refills:  0        HYDROcodone-acetaminophen 5-325 MG per tablet   Commonly known as:  NORCO   Dose:  1-2 tablet   Quantity:  15 tablet        Take 1-2 tablets by mouth every 4 hours as needed for moderate to severe pain   Refills:  0        oxyCODONE IR 5 MG tablet   Commonly known as:  ROXICODONE   Dose:  5 mg   Quantity:  12 tablet        Take 1 tablet (5 mg) by mouth every 6 hours as needed for pain   Refills:  0          Our records show that you are taking the medicines listed below. If these are incorrect, please call your family doctor or clinic.        Dose / Directions Last dose taken    albuterol 108 (90 BASE) MCG/ACT Inhaler   Commonly known as:  PROAIR HFA/PROVENTIL HFA/VENTOLIN HFA   Dose:  2 puff   Quantity:  3 Inhaler        Inhale 2 puffs into the lungs every 6 hours as needed for shortness of breath / dyspnea   Refills:  6        atorvastatin 40 MG tablet   Commonly known as:  LIPITOR   Quantity:  30 tablet        TAKE ONE TABLET BY MOUTH ONE TIME DAILY   Refills:  11        azithromycin 250 MG tablet   Commonly known as:  ZITHROMAX   Quantity:  6 tablet        Take 2 tabs on day one and 1 tab every day after till finished   Refills:  0        blood glucose monitoring lancets   Quantity:  2 Box        Use to test blood sugar 2 times daily or as directed.  102 lancets per box.  3 month supply.   Refills:  3        blood glucose monitoring meter device kit   Commonly known as:  no brand specified   Quantity:  1 kit        Use to test blood sugar 2 times daily.   Refills:  0        blood glucose monitoring test strip   Commonly known as:  ACCU-CHEK RONNIE PLUS   Quantity:  200 each        Use to test blood sugar 2 times daily or as directed.  3  month supply.   Refills:  3        clopidogrel 75 MG tablet   Commonly known as:  PLAVIX   Dose:  75 mg   Quantity:  90 tablet        Take 1 tablet (75 mg) by mouth daily   Refills:  3        fluticasone-vilanterol 100-25 MCG/INH oral inhaler   Commonly known as:  BREO ELLIPTA   Dose:  1 puff   Quantity:  3 Inhaler        Inhale 1 puff into the lungs daily   Refills:  3        furosemide 20 MG tablet   Commonly known as:  LASIX   Dose:  60 mg   Quantity:  240 tablet        Take 3 tablets (60 mg) by mouth 2 times daily May take extra 20 mg as needed for wt .gain >3#   Refills:  11        inFLIXimab 100 MG injection   Commonly known as:  REMICADE   Dose:  100 mg        Inject 100 mg into the vein every 28 days   Refills:  0        levothyroxine 137 MCG tablet   Commonly known as:  SYNTHROID/LEVOTHROID   Dose:  137 mcg   Quantity:  90 tablet        Take 1 tablet (137 mcg) by mouth daily   Refills:  1        losartan 50 MG tablet   Commonly known as:  COZAAR   Dose:  50 mg   Quantity:  90 tablet        Take 1 tablet (50 mg) by mouth daily   Refills:  3        methotrexate 2.5 MG tablet CHEMO   Dose:  7.5 mg   Quantity:  48 tablet        Take 3 tablets (7.5 mg) by mouth once a week On Mondays   Refills:  3        metoprolol 100 MG tablet   Commonly known as:  LOPRESSOR   Dose:  100 mg   Quantity:  60 tablet        Take 1 tablet (100 mg) by mouth 2 times daily   Refills:  11        mirtazapine 15 MG tablet   Commonly known as:  REMERON   Dose:  15 mg        Take 15 mg by mouth At Bedtime.   Refills:  0        MULTIVITAMIN & MINERAL PO   Dose:  1 tablet        Take 1 tablet by mouth daily.   Refills:  0        omeprazole 20 MG CR capsule   Commonly known as:  priLOSEC   Dose:  20 mg   Quantity:  90 capsule        Take 1 capsule (20 mg) by mouth daily   Refills:  3        * order for DME   Quantity:  1 Device        She requested for a 4 wheel walker, would like to change it to 4 wheel walker with a seat and oxygen tank  bhargav.   Need this faxed over to her  [phone]   Refills:  1        * order for DME   Quantity:  1 Device        Updated Oxygen: Patient requires supplemental Oxygen 3 LPM via nasal canula with activity and 2 LPM nocturnally. Please provide patient with a portable oxygen concentrator for improved mobility.  Okay to spot check or titrated for conserving to keep stats above 90%. Oxygen will be for a lifetime.   Refills:  0        polyethylene glycol powder   Commonly known as:  MIRALAX   Dose:  1 capful   Quantity:  510 g        Take 17 g (1 capful) by mouth daily   Refills:  1        sildenafil 20 MG tablet   Commonly known as:  REVATIO   Dose:  20 mg   Quantity:  270 tablet        Take 1 tablet (20 mg) by mouth 3 times daily   Refills:  1        tiotropium 18 MCG capsule   Commonly known as:  SPIRIVA HANDIHALER   Quantity:  90 capsule        Inhale contents of one capsule daily.   Refills:  3        Urea 40 % Crea   Commonly known as:  CARMOL 40   Quantity:  199 g        To feet daily   Refills:  4        warfarin 5 MG tablet   Commonly known as:  COUMADIN   Quantity:  90 tablet        TAKE ONE TABLET BY MOUTH ONE TIME DAILY   Refills:  5        * Notice:  This list has 2 medication(s) that are the same as other medications prescribed for you. Read the directions carefully, and ask your doctor or other care provider to review them with you.            Prescriptions were sent or printed at these locations (5 Prescriptions)                   Other Prescriptions                Printed at Department/Unit printer (5 of 5)         cephALEXin (KEFLEX) 500 MG capsule               doxycycline (VIBRAMYCIN) 100 MG capsule               oxyCODONE IR (ROXICODONE) 5 MG tablet               colchicine (COLCRYS) 0.6 MG tablet               HYDROcodone-acetaminophen (NORCO) 5-325 MG per tablet                Procedures and tests performed during your visit     Arthrocentesis    Basic metabolic panel    CBC with platelets  differential    CRP inflammation    Cell count with differential fluid    Crystal ID joint fluid    Fluid Culture Aerobic Bacterial    Glucose fluid    Gram stain    INR    Knee XR, 1-2 views, left    Lactic acid    Lumbar spine XR, 2-3 views    Pelvis XR, 1-2 views    Protein fluid    Uric acid      Orders Needing Specimen Collection     None      Pending Results     Date and Time Order Name Status Description    1/5/2018 0622 Fluid Culture Aerobic Bacterial In process             Pending Culture Results     Date and Time Order Name Status Description    1/5/2018 0622 Fluid Culture Aerobic Bacterial In process             Pending Results Instructions     If you had any lab results that were not finalized at the time of your Discharge, you can call the ED Lab Result RN at [phone]. You will be contacted by this team for any positive Lab results or changes in treatment. The nurses are available 7 days a week from 10A to 6:30P.  You can leave a message 24 hours per day and they will return your call.        Thank you for choosing [facility]       Thank you for choosing [facility] for your care. Our goal is always to provide you with excellent care. Hearing back from our patients is one way we can continue to improve our services. Please take a few minutes to complete the written survey that you may receive in the mail after you visit with us. Thank you!        [employer] Information     [employer] gives you secure access to your electronic health record. If you see a primary care provider, you can also send messages to your care team and make appointments. If you have questions, please call your primary care clinic.  If you do not have a primary care provider, please call [phone] and they will assist you.        Care EveryWhere ID     This is your Care EveryWhere ID. This could be used by other organizations to access your [facility] medical records  [id number]        Equal Access to Services     ISIAH MONTOYA : Alejandro  rhys Lewis, judi barclay, nelly gonzáles. So [facility] [phone].    ATENCIÓN: Si habla español, tiene a mcfadden disposición servicios gratuitos de asistencia lingüística. Llame al [phone].    We comply with applicable federal civil rights laws and [place] laws. We do not discriminate on the basis of race, color, national origin, age, disability, sex, sexual orientation, or gender identity.            After Visit Summary       This is your record. Keep this with you and show to your community pharmacist(s) and doctor(s) at your next visit.

## 2018-01-08 ENCOUNTER — ANTICOAGULATION THERAPY VISIT (OUTPATIENT)
Dept: ANTICOAGULATION | Facility: CLINIC | Age: 73
End: 2018-01-08

## 2018-01-08 ENCOUNTER — TELEPHONE (OUTPATIENT)
Dept: INTERNAL MEDICINE | Facility: CLINIC | Age: 73
End: 2018-01-08

## 2018-01-08 DIAGNOSIS — Z79.01 LONG-TERM (CURRENT) USE OF ANTICOAGULANTS: ICD-10-CM

## 2018-01-08 DIAGNOSIS — I48.92 ATRIAL FLUTTER WITH RAPID VENTRICULAR RESPONSE (H): ICD-10-CM

## 2018-01-08 NOTE — TELEPHONE ENCOUNTER
Dear Willow;    I reviewed ED note from 1/5/18. 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 Also ED physician was concerned about cellulitis and toe infection. Would have him stop colchicine and be seen in [facility] ASAP to formulate a plan.    SAVANNA Foster    Appt for 1PM today with Dr Duong.  Willow Bob RN 7:24 AM on 1/9/2018.

## 2018-01-08 NOTE — MR AVS SNAPSHOT
Rohan Howard Mabel   1/8/2018   Anticoagulation Therapy Visit    Description:  72 year old male   Provider:  Marcia Elias, RN   Department:  [facility]           INR as of 1/8/2018     Today's INR 2.28 (1/5/2018)      Anticoagulation Summary as of 1/8/2018     INR goal 2.0-3.0   Today's INR 2.28 (1/5/2018)   Full instructions 5 mg every day   Next INR check 1/15/2018    Indications   Long-term (current) use of anticoagulants [Z79.01] [Z79.01]  Atrial flutter with rapid ventricular response (H) [I48.92]         January 2018 Details    Sun Mon Tue Wed Thu Fri Sat      1               2               3               4               5               6                 7               8      5 mg   See details      9      5 mg         10      5 mg         11      5 mg         12      5 mg         13      5 mg           14      5 mg         15            16               17               18               19               20                 21               22               23               24               25               26               27                 28               29               30               31                   Date Details   01/08 This INR check       Date of next INR:  1/15/2018         How to take your warfarin dose     To take:  5 mg Take 1 of the 5 mg tablets.

## 2018-01-08 NOTE — TELEPHONE ENCOUNTER
Pt reports he was seen in the ER on Friday, diagnosed with gout and was given medications for this.     Has questions about the medications he was prescribed, does not feel like they are compatible, it helps but causes horrible side effects (nausea and diarrhea).     Re:   doxycycline (VIBRAMYCIN) 100 MG capsule     cephALEXin (KEFLEX) 500 MG capsule     colchicine (COLCRYS) 0.6 MG tablet              Seen ER 01/5/2017 for gout.Pt states he had nausea, vomiting and diarrhea from taking colchicine. Pt doesn't want to take it. Also, states the antibiotics are causing diarrhea. Gout is getting better. Wants to stop all medications due to diarrhea. Pt requesting different medication.

## 2018-01-08 NOTE — PROGRESS NOTES
ANTICOAGULATION FOLLOW-UP CLINIC VISIT    Patient Name:  Rohan Monroe  Date:  1/8/2018  Contact Type:  Telephone    SUBJECTIVE:     Patient Findings     Positives Change in medications, Other complaints, Antibiotic use or infection    Comments Pt seen in the ED 1/5 for back pain/fever/knee pain. Pt started on Keflex + Doxycycline for 10 days due to Lft 3rd toe infection. Also Uric Acid was elevated and pt is started on Colchicine and f/u with PCP. Pt also got a one time dose of Vicodin            OBJECTIVE    INR   Date Value Ref Range Status   01/05/2018 2.28 (H) 0.86 - 1.14 Final       ASSESSMENT / PLAN  INR assessment THER    Recheck INR In: 1 WEEK    INR Location Clinic      Anticoagulation Summary as of 1/8/2018     INR goal 2.0-3.0   Today's INR 2.28 (1/5/2018)   Maintenance plan 5 mg (5 mg x 1) every day   Full instructions 5 mg every day   Weekly total 35 mg   Plan last modified Paige Moscoso RN (8/7/2017)   Next INR check 1/15/2018   Priority INR   Target end date 4/20/2017    Indications   Long-term (current) use of anticoagulants [Z79.01] [Z79.01]  Atrial flutter with rapid ventricular response (H) [I48.92]         Anticoagulation Episode Summary     INR check location     Preferred lab     Send INR reminders to [facility]    Comments 3 months therapy, then reassess.        Anticoagulation Care Providers     Provider Role Specialty Phone number    Jamie Foster MD [facility] Internal Medicine [phone]            See the Encounter Report to view Anticoagulation Flowsheet and Dosing Calendar (Go to Encounters tab in chart review, and find the Anticoagulation Therapy Visit)    Left message for patient with results and dosing recommendations. Asked patient to call back to report any missed doses, falls, signs and symptoms of bleeding or clotting, any changes in health, medication, or diet. Asked patient to call back with any questions or concerns.     Marcia Elias RN

## 2018-01-09 ENCOUNTER — OFFICE VISIT (OUTPATIENT)
Dept: INTERNAL MEDICINE | Facility: CLINIC | Age: 73
End: 2018-01-09
Payer: MEDICARE

## 2018-01-09 VITALS
DIASTOLIC BLOOD PRESSURE: 83 MMHG | BODY MASS INDEX: 28.58 KG/M2 | TEMPERATURE: 97.5 F | SYSTOLIC BLOOD PRESSURE: 137 MMHG | WEIGHT: 182.5 LBS | HEART RATE: 77 BPM | RESPIRATION RATE: 16 BRPM

## 2018-01-09 DIAGNOSIS — L97.521 DIABETIC ULCER OF TOE OF LEFT FOOT ASSOCIATED WITH TYPE 2 DIABETES MELLITUS, LIMITED TO BREAKDOWN OF SKIN (H): Primary | ICD-10-CM

## 2018-01-09 DIAGNOSIS — E11.621 DIABETIC ULCER OF TOE OF LEFT FOOT ASSOCIATED WITH TYPE 2 DIABETES MELLITUS, LIMITED TO BREAKDOWN OF SKIN (H): Primary | ICD-10-CM

## 2018-01-09 ASSESSMENT — PAIN SCALES - GENERAL: PAINLEVEL: NO PAIN (0)

## 2018-01-09 NOTE — PATIENT INSTRUCTIONS
[facility]: [phone]     [facility] Medication Refill Request Information:  * Please contact your pharmacy regarding ANY request for medication refills.  ** [facility] Prescription Fax = [phone]  * Please allow 3 business days for routine medication refills.  * Please allow 5 business days for controlled substance medication refills.     [facility] Test Result notification information:  *You will be notified with in 7-10 days of your appointment day regarding the results of your test.  If you are on MyChart you will be notified as soon as the provider has reviewed the results and signed off on them.

## 2018-01-09 NOTE — NURSING NOTE
Covered left 3rd toe with non adherent strips and wrapped with Kerlix per provider. DRU Thomas LPN

## 2018-01-09 NOTE — NURSING NOTE
Chief Complaint   Patient presents with     Cellulitis     patient states he is here to recheck gout and cellulitis     JOSEFINA HANSON at 1:04 PM on 1/9/2018.

## 2018-01-09 NOTE — PROGRESS NOTES
He is here [address] [place] [address]   Venita Duong MD  1/9/2018     Chief Complaint:   Left foot and third toe pain.       History of Present Illness:   Rohan Monroe is a diabetic 72 year old male with a past medical history significant for COPD, sarcoidosis (pulmonary and cardiac), CAD, hypertension, CHF, cirrhosis, hepatitis C, and CKD among others, who presents for follow-up regarding left foot and third toe pain after recent evaluation for these complaints in an emergency department. He is an established patient of Dr. Jamie Foster.     Of note, initial history was taken by medical student, Gretel Greer, and then confirmed by Dr. Venita Duong.     Today, the patient states he was evaluated at the emergency department on 1/5/2018 secondary to left knee and leg pain that was unfamiliar to him. He was unable to sleep secondary to the pain and therefore presented for further evaluation. After an arthrodesis was performed, he was informed he had gout which was shocking to him as he does not carry this as a prior diagnosis.     Additionally, he was complaining of pain in his left third toe following removal of a callus three weeks ago. He was concerned as the lesion underneath the callus was not healing properly. The lesion was not painful but was weeping some pus. He believes his symptoms in his left knee have improved since his visit in the ED with regards to both pain and swelling. He was given a prescription for colchicine and Keflex which have offered significant relief of his discomfort, though he has only taken 1-2 doses of each. He was additionally provided a prescription for Vicodin which he has not used. He has had some side effects from the medications, including nausea, vomiting, and diarrhea which are all improving. He additionally mentions he has been urinating more frequently than normal. He has not noticed any true fevers but states his temperature was elevated at  "home compared to \"how low [he] typically runs.\" The sore on his left third toe has remained relatively unchanged. The redness is not increasing. Today is the first day he has been able to get out of bed, dressed, drive, and attend an activity outside of his home in three weeks.     Other concerns discussed:  He mentions concern for a corn along the plantar aspect of his left first MTP joint. He voices this is rather painful, especially when he is more active at his cardiac rehab program.      Review of Systems:   Pertinent items are noted in HPI.  All other systems are negative.    Active Medications:      cephALEXin (KEFLEX) 500 MG capsule, Take 1 capsule (500 mg) by mouth 3 times daily for 10 days, Disp: 30 capsule, Rfl: 0     doxycycline (VIBRAMYCIN) 100 MG capsule, Take 1 capsule (100 mg) by mouth 2 times daily, Disp: 20 capsule, Rfl: 0     colchicine (COLCRYS) 0.6 MG tablet, 2 tabs at the onset of flare, then 1 tab every 2 hrs until pain is better or diarrhea occurs, up to 10 doses. Wait 3 days until taking again, Disp: 30 tablet, Rfl: 0     HYDROcodone-acetaminophen (NORCO) 5-325 MG per tablet, Take 1-2 tablets by mouth every 4 hours as needed for moderate to severe pain, Disp: 15 tablet, Rfl: 0     tiotropium (SPIRIVA HANDIHALER) 18 MCG capsule, Inhale contents of one capsule daily., Disp: 90 capsule, Rfl: 3     fluticasone-vilanterol (BREO ELLIPTA) 100-25 MCG/INH oral inhaler, Inhale 1 puff into the lungs daily, Disp: 3 Inhaler, Rfl: 3     albuterol (PROAIR HFA/PROVENTIL HFA/VENTOLIN HFA) 108 (90 BASE) MCG/ACT Inhaler, Inhale 2 puffs into the lungs every 6 hours as needed for shortness of breath / dyspnea, Disp: 3 Inhaler, Rfl: 6     methotrexate 2.5 MG tablet CHEMO, Take 3 tablets (7.5 mg) by mouth once a week On Mondays, Disp: 48 tablet, Rfl: 3     azithromycin (ZITHROMAX) 250 MG tablet, Take 2 tabs on day one and 1 tab every day after till finished, Disp: 6 tablet, Rfl: 0     levothyroxine " (SYNTHROID/LEVOTHROID) 137 MCG tablet, Take 1 tablet (137 mcg) by mouth daily, Disp: 90 tablet, Rfl: 1     furosemide (LASIX) 20 MG tablet, Take 3 tablets (60 mg) by mouth 2 times daily May take extra 20 mg as needed for wt .gain >3#, Disp: 240 tablet, Rfl: 11     warfarin (COUMADIN) 5 MG tablet, TAKE ONE TABLET BY MOUTH ONE TIME DAILY , Disp: 90 tablet, Rfl: 5     sildenafil (REVATIO) 20 MG tablet, Take 1 tablet (20 mg) by mouth 3 times daily, Disp: 270 tablet, Rfl: 1     atorvastatin (LIPITOR) 40 MG tablet, TAKE ONE TABLET BY MOUTH ONE TIME DAILY , Disp: 30 tablet, Rfl: 11     losartan (COZAAR) 50 MG tablet, Take 1 tablet (50 mg) by mouth daily, Disp: 90 tablet, Rfl: 3     order for DME, Updated Oxygen: Patient requires supplemental Oxygen 3 LPM via nasal canula with activity and 2 LPM nocturnally. Please provide patient with a portable oxygen concentrator for improved mobility.  Okay to spot check or titrated for conserving to keep stats above 90%. Oxygen will be for a lifetime., Disp: 1 Device, Rfl: 0     order for DME, She requested for a 4 wheel walker, would like to change it to 4 wheel walker with a seat and oxygen tank durant.   Need this faxed over to her  [phone], Disp: 1 Device, Rfl: 1     omeprazole (PRILOSEC) 20 MG CR capsule, Take 1 capsule (20 mg) by mouth daily, Disp: 90 capsule, Rfl: 3     clopidogrel (PLAVIX) 75 MG tablet, Take 1 tablet (75 mg) by mouth daily, Disp: 90 tablet, Rfl: 3     polyethylene glycol (MIRALAX) powder, Take 17 g (1 capful) by mouth daily, Disp: 510 g, Rfl: 1     inFLIXimab (REMICADE) 100 MG injection, Inject 100 mg into the vein every 28 days , Disp: , Rfl:      metoprolol (LOPRESSOR) 100 MG tablet, Take 1 tablet (100 mg) by mouth 2 times daily, Disp: 60 tablet, Rfl: 11     blood glucose monitoring (ACCU-CHEK RONNIE PLUS) test strip, Use to test blood sugar 2 times daily or as directed.  3 month supply., Disp: 200 each, Rfl: 3     blood glucose monitoring (ACCU-CHEK  FASTCLIX) lancets, Use to test blood sugar 2 times daily or as directed.  102 lancets per box.  3 month supply., Disp: 2 Box, Rfl: 3     blood glucose monitoring (NO BRAND SPECIFIED) meter device kit, Use to test blood sugar 2 times daily., Disp: 1 kit, Rfl: 0     Urea (CARMOL 40) 40 % CREA, To feet daily, Disp: 199 g, Rfl: 4     Multiple Vitamins-Minerals (MULTIVITAMIN & MINERAL PO), Take 1 tablet by mouth daily., Disp: , Rfl:      mirtazapine (REMERON) 15 MG tablet, Take 15 mg by mouth At Bedtime., Disp: , Rfl:       Allergies:   Prednisone.      Past Medical History:  Atrial flutter.   Cataract.   Chronic hepatitis C infection.   Congestive heart failure.  Coronary artery disease.  Depression.  Epiretinal membrane (left eye).  Generalized osteoarthrosis  Glaucoma suspect  Hypertension  Lichen planus  Psoriasis  Posterior vitreous detachment (left eye).  Sarcoidosis (lung)  Type II diabetes mellitus, uncomplicated, not stated as controlled.  Hypothyroidism.  Viral warts, unspecified.  Hypertrophy of prostate without urinary obstruction.  Cellulitis  Hyponatremia.  Proteinuria.  Chronic obstructive pulmonary disease.  Hypothyroidism.   COPD.   Osteoarthrosis.   CKD.   Cellulitis.   Immunosuppressive status.   Hyponatremia.   Acute kidney injury.   Liver cirrhosis.   Pneumonia.      Past Surgical History:  Anesthesia cardioversion x multiple.   Right hip arthroplasty.  Cardiac stenting.   Cataract IOL (left)  Tonsillectomy.  Hernia repair.  Heart angioplasty.    Family History:   Father: Positive for heart disease, varicose veins, prostate cancer.   Mother: Positive for heart attack, arthritis, osteoporosis, thyroid disease.   Sister: Positive for type II diabetes, kidney cancer.      Social History:   The patient is a former smoker. He denies alcohol use.      Physical Exam:   /83  Pulse 77  Temp 97.5  F (36.4  C)  Resp 16  Wt 82.8 kg (182 lb 8 oz)  BMI 28.58 kg/m2   Constitutional: Healthy, alert, no  distress and cooperative  Head: Normocephalic.   Musculoskeletal:  Mild effusion of left knee, no erythema, warmth or tenderness.. Arthrocentesis site healing well without erythema or evidence for infection.   Skin: Left third toe with significant edema and erythema with open sore on plantar aspect.  the toe is swollen. There is no lymphangitis. There is mild crusting over the wound but no purulence.   DP and PT pulses are reduced and there is 1+ foot and pretibial edema     Recent Labs:   Component      Latest Ref Rng & Units 1/5/2018 1/5/2018           5:20 AM  5:50 AM   WBC      4.0 - 11.0 10e9/L 13.5 (H)    RBC Count      4.4 - 5.9 10e12/L 3.50 (L)    Hemoglobin      13.3 - 17.7 g/dL 10.6 (L)    Hematocrit      40.0 - 53.0 % 33.0 (L)    MCV      78 - 100 fl 94    MCH      26.5 - 33.0 pg 30.3    MCHC      31.5 - 36.5 g/dL 32.1    RDW      10.0 - 15.0 % 14.9    Platelet Count      150 - 450 10e9/L 303    Diff Method       Automated Method    % Neutrophils      % 91.6    % Lymphocytes      % 4.9    % Monocytes      % 2.3    % Eosinophils      % 0.7    % Basophils      % 0.2    % Immature Granulocytes      % 0.3    Nucleated RBCs      0 /100 0    Absolute Neutrophil      1.6 - 8.3 10e9/L 12.4 (H)    Absolute Lymphocytes      0.8 - 5.3 10e9/L 0.7 (L)    Absolute Monocytes      0.0 - 1.3 10e9/L 0.3    Absolute Eosinophils      0.0 - 0.7 10e9/L 0.1    Absolute Basophils      0.0 - 0.2 10e9/L 0.0    Abs Immature Granulocytes      0 - 0.4 10e9/L 0.0    Absolute Nucleated RBC       0.0    Sodium      133 - 144 mmol/L 136    Potassium      3.4 - 5.3 mmol/L 3.7    Chloride      94 - 109 mmol/L 101    Carbon Dioxide      20 - 32 mmol/L 25    Anion Gap      3 - 14 mmol/L 10    Glucose      70 - 99 mg/dL 154 (H)    Urea Nitrogen      7 - 30 mg/dL 42 (H)    Creatinine      0.66 - 1.25 mg/dL 1.87 (H)    GFR Estimate      >60 mL/min/1.7m2 35 (L)    GFR Estimate If Black      >60 mL/min/1.7m2 43 (L)    Calcium      8.5 - 10.1  mg/dL 8.8    Body Fluid Analysis Source           Color Fluid           Appearance Fluid           WBC Fluid      /uL     % Neutrophils Fluid      %     % Lymphocytes Fluid      %     % Mono/Macro Fluid      %     Specimen Description           Gram Stain Smear           Glucose Fluid Source           Glucose Fluid      mg/dL     Protein Total Fluid Source           Protein Total Fluid      g/dL     Culture Micro           INR      0.86 - 1.14 2.28 (H)    CRP Inflammation      0.0 - 8.0 mg/L 110.0 (H)    Lactic Acid      0.7 - 2.0 mmol/L  0.7   Crystal Analysis      NOCRYS:No clincally significant crystals seen.     Uric Acid      3.5 - 7.2 mg/dL 12.6 (H)      Component      Latest Ref Rng & Units 1/5/2018           6:28 AM   WBC      4.0 - 11.0 10e9/L    RBC Count      4.4 - 5.9 10e12/L    Hemoglobin      13.3 - 17.7 g/dL    Hematocrit      40.0 - 53.0 %    MCV      78 - 100 fl    MCH      26.5 - 33.0 pg    MCHC      31.5 - 36.5 g/dL    RDW      10.0 - 15.0 %    Platelet Count      150 - 450 10e9/L    Diff Method          % Neutrophils      %    % Lymphocytes      %    % Monocytes      %    % Eosinophils      %    % Basophils      %    % Immature Granulocytes      %    Nucleated RBCs      0 /100    Absolute Neutrophil      1.6 - 8.3 10e9/L    Absolute Lymphocytes      0.8 - 5.3 10e9/L    Absolute Monocytes      0.0 - 1.3 10e9/L    Absolute Eosinophils      0.0 - 0.7 10e9/L    Absolute Basophils      0.0 - 0.2 10e9/L    Abs Immature Granulocytes      0 - 0.4 10e9/L    Absolute Nucleated RBC          Sodium      133 - 144 mmol/L    Potassium      3.4 - 5.3 mmol/L    Chloride      94 - 109 mmol/L    Carbon Dioxide      20 - 32 mmol/L    Anion Gap      3 - 14 mmol/L    Glucose      70 - 99 mg/dL    Urea Nitrogen      7 - 30 mg/dL    Creatinine      0.66 - 1.25 mg/dL    GFR Estimate      >60 mL/min/1.7m2    GFR Estimate If Black      >60 mL/min/1.7m2    Calcium      8.5 - 10.1 mg/dL    Body Fluid Analysis Source        Synovial fluid   Color Fluid       Yellow   Appearance Fluid       Cloudy   WBC Fluid      /uL 35618   % Neutrophils Fluid      % 95   % Lymphocytes Fluid      % 1   % Mono/Macro Fluid      % 4   Specimen Description       Synovial fluid   Gram Stain Smear          Glucose Fluid Source       Synovial fluid   Glucose Fluid      mg/dL 16   Protein Total Fluid Source       Synovial fluid   Protein Total Fluid      g/dL 4.8   Culture Micro       Culture negative monitoring continues   INR      0.86 - 1.14    CRP Inflammation      0.0 - 8.0 mg/L    Lactic Acid      0.7 - 2.0 mmol/L    Crystal Analysis      NOCRYS:No clincally significant crystals seen. Positive for intracellular crystals, consistent with monosodium urate crystals. (A)   Uric Acid      3.5 - 7.2 mg/dL      Component      Latest Ref Rng & Units 1/5/2018 1/5/2018           6:28 AM  6:28 AM   WBC      4.0 - 11.0 10e9/L     RBC Count      4.4 - 5.9 10e12/L     Hemoglobin      13.3 - 17.7 g/dL     Hematocrit      40.0 - 53.0 %     MCV      78 - 100 fl     MCH      26.5 - 33.0 pg     MCHC      31.5 - 36.5 g/dL     RDW      10.0 - 15.0 %     Platelet Count      150 - 450 10e9/L     Diff Method           % Neutrophils      %     % Lymphocytes      %     % Monocytes      %     % Eosinophils      %     % Basophils      %     % Immature Granulocytes      %     Nucleated RBCs      0 /100     Absolute Neutrophil      1.6 - 8.3 10e9/L     Absolute Lymphocytes      0.8 - 5.3 10e9/L     Absolute Monocytes      0.0 - 1.3 10e9/L     Absolute Eosinophils      0.0 - 0.7 10e9/L     Absolute Basophils      0.0 - 0.2 10e9/L     Abs Immature Granulocytes      0 - 0.4 10e9/L     Absolute Nucleated RBC           Sodium      133 - 144 mmol/L     Potassium      3.4 - 5.3 mmol/L     Chloride      94 - 109 mmol/L     Carbon Dioxide      20 - 32 mmol/L     Anion Gap      3 - 14 mmol/L     Glucose      70 - 99 mg/dL     Urea Nitrogen      7 - 30 mg/dL     Creatinine      0.66 - 1.25  mg/dL     GFR Estimate      >60 mL/min/1.7m2     GFR Estimate If Black      >60 mL/min/1.7m2     Calcium      8.5 - 10.1 mg/dL     Body Fluid Analysis Source           Color Fluid           Appearance Fluid           WBC Fluid      /uL     % Neutrophils Fluid      %     % Lymphocytes Fluid      %     % Mono/Macro Fluid      %     Specimen Description       Synovial fluid    Gram Stain Smear       No organisms seen . . . Many . . .   Glucose Fluid Source           Glucose Fluid      mg/dL     Protein Total Fluid Source           Protein Total Fluid      g/dL     Culture Micro           INR      0.86 - 1.14     CRP Inflammation      0.0 - 8.0 mg/L     Lactic Acid      0.7 - 2.0 mmol/L     Crystal Analysis      NOCRYS:No clincally significant crystals seen.     Uric Acid      3.5 - 7.2 mg/dL          Assessment and Plan:  1. Acute gout left knee. Resolving after treatment with colchicine.  The diarrhea that he had is almost certainly due to the colchicine. He is not a candidate for prednisone or NSAIDs. Plan to observe. Uric acid level was 12.3 putting him at increased risk for recurrence. I told him that, and if he has recurrence of gout, which should reduce the uric acid using allopurinol. We'll delay that decision for now. He does not drink alcohol but was informed to avoid this, as well as red meats and other high protein foods.     2.  Diabetic toe ulcer left third toe.  I'm very concerned about this infection. He has not completed his antibiotics, in fact he only took a days worth, with excessive nausea and vomiting on and accommodation. New drugs. We will start doxycycline as prescribed in the ED twice daily and see him back in 10 days to reassess. At that point we should recheck a CRP and get an x-ray. It is possible he may need to have tissue oxygen levels monitored as well in order to promote wound healing.  Differential diagnosis includes osteomyelitis. He was encouraged to continue with regular dressing  changes. He will follow up with his podiatrist for further evaluation of possible osteomyelitis if the sore does not heal.     Follow-up: Return in about 10 days (around 1/19/2018).         Scribe Disclosure:   I, Neida Huizar, am serving as a scribe to document services personally performed by Venita Duong MD at this visit, based upon the provider's statements to me. All documentation has been reviewed by the aforementioned provider prior to being entered into the official medical record.     Portions of this medical record were completed by a scribe. UPON MY REVIEW AND AUTHENTICATION BY ELECTRONIC SIGNATURE, this confirms (a) I performed the applicable clinical services, and (b) the record is accurate.     Venita Duong MD

## 2018-01-09 NOTE — MR AVS SNAPSHOT
After Visit Summary   1/9/2018    Rohan Monroe    MRN: [id number]           Patient Information     Date Of Birth          1945        Visit Information        Provider Department      1/9/2018 1:00 PM Venita Duong MD [facility]        Today's Diagnoses     Diabetic ulcer of toe of left foot associated with type 2 diabetes mellitus, limited to breakdown of skin (H)    -  1      Care Instructions    Primary Care Center: [phone]     Primary Care Center Medication Refill Request Information:  * Please contact your pharmacy regarding ANY request for medication refills.  ** [facility] Prescription Fax = [phone]  * Please allow 3 business days for routine medication refills.  * Please allow 5 business days for controlled substance medication refills.     Primary Care Center Test Result notification information:  *You will be notified with in 7-10 days of your appointment day regarding the results of your test.  If you are on MyChart you will be notified as soon as the provider has reviewed the results and signed off on them.            Follow-ups after your visit        Additional Services     PODIATRY/FOOT & ANKLE SURGERY REFERRAL       Your provider has referred you to: FMG: [facility] [phone]   [email or web address]  [email or web address]: [facility]: [address] [place] [address] Patient Scheduling Line: [phone] option 1 (scheduling and directions)    Please be aware that coverage of these services is subject to the terms and limitations of your health insurance plan.  Call member services at your health plan with any benefit or coverage questions.      Please bring the following to your appointment:  >>   Any x-rays, CTs or MRIs which have been performed.  Contact the facility where they were done to arrange for  prior to your scheduled appointment.    >>   List of current medications    >>   This referral request   >>   Any documents/labs given to you for this referral                  Follow-up notes from your care team     Return in about 10 days (around 1/19/2018).      Your next 10 appointments already scheduled     Aj 10, 2018  3:00 PM CST   Cardiac Treatment with  Cardiac Rehab 1   [facility] ([facility])    [address]  [facility] [address]   [phone]            Aj 15, 2018  1:45 PM CST   RETURN GLAUCOMA with Kina Greco MD   Eye Clinic ([facility])    [address]  [address]  9th Fl Clin 9a  [facility] [id number] [phone]            Jan 22, 2018  3:05 PM CST   (Arrive by 2:50 PM)   Return Visit with Jamie Foster MD   [facility] ([facility])    [address]  4th [facility] [id number] [phone]            Jan 23, 2018  3:00 PM CST   (Arrive by 2:45 PM)   NEW FOOT/ANKLE with Jose David Wing DPM   [facility] Orthopaedic Clinic ([facility])    [address]  4th Floor  [facility] [id number] [phone]            Jan 24, 2018  9:00 AM CST   US AORTA/IVC/ILIAC DUPLEX LIMITED with UCUSV1   [facility] Imaging Center US ([facility])    [address]  1st Floor  [facility] [id number] [phone]           Please bring a list of your medicines (including vitamins, minerals and over-the-counter drugs). Also, tell your doctor about any allergies you may have. Wear comfortable clothes and leave your valuables at home.  Adults: No eating or drinking for 8 hours before the exam. You may take medicine with a small sip of water.  Children: - Children 6+ years: No food or drink for 6 hours before exam. - Children 1-5 years: No food or drink for 4 hours before exam. - Infants, breast-fed: may have breast milk up to 2 hours before exam. - Infants, formula:  may have bottle until 4 hours before exam.  Please call the Imaging Department at your exam site with any questions.            Jan 24, 2018 10:00 AM CST   (Arrive by 9:45 AM)   XR PELVIS AND HIP BILATERAL 2 VIEWS with UCXR1   [facility] [place] [facility] ([facility])    [address]  1st Floor  [facility] [id number] [phone]           Please bring a list of your current medicines to your exam. (Include vitamins, minerals and over-thecounter medicines.) Leave your valuables at home.  Tell your doctor if there is a chance you may be pregnant.  You do not need to do anything special for this exam.            Jan 24, 2018 10:40 AM CST   (Arrive by 10:25 AM)   Return Visit with Maria Victoria Palmer MD   [facility] ([facility])    [address] [place] [address]  [address]  [facility] [id number] [phone]            Jan 24, 2018 12:00 PM CST   Infusion 180 with UC SPEC INFUSION, UC 47 ATC   [facility] Specialty [facility] Procedure ([facility])    [address]  [address]  [facility] [id number] [phone]            Feb 07, 2018  2:00 PM CST   Lab with UC LAB   [facility] ([facility])    [address] [place] [address]  1st Floor  [facility] [id number] [phone]              Who to contact     Please call your clinic at [phone] to:    Ask questions about your health    Make or cancel appointments    Discuss your medicines    Learn about your test results    Speak to your doctor   If you have compliments or concerns about an experience at your clinic, or if you wish to file a complaint, please contact [facility] Patient Relations at [phone] or email us at [email or web address]         Additional Information About Your Visit        MyChart Information     [employer] gives you secure access to your electronic  health record. If you see a primary care provider, you can also send messages to your care team and make appointments. If you have questions, please call your primary care clinic.  If you do not have a primary care provider, please call [phone] and they will assist you.      [employer] is an electronic gateway that provides easy, online access to your medical records. With [employer], you can request a clinic appointment, read your test results, renew a prescription or communicate with your care team.     To access your existing account, please contact your [facility] or call [phone] for assistance.        Care EveryWhere ID     This is your Care EveryWhere ID. This could be used by other organizations to access your [facility] medical records  [id number]        Your Vitals Were     Pulse Temperature Respirations BMI (Body Mass Index)          77 97.5  F (36.4  C) 16 28.58 kg/m2         Blood Pressure from Last 3 Encounters:   01/09/18 137/83   01/05/18 159/76   12/27/17 135/76    Weight from Last 3 Encounters:   01/09/18 82.8 kg (182 lb 8 oz)   01/05/18 82.7 kg (182 lb 5.1 oz)   12/27/17 78.6 kg (173 lb 3.2 oz)              We Performed the Following     PODIATRY/FOOT & ANKLE SURGERY REFERRAL          Today's Medication Changes          These changes are accurate as of: 1/9/18  2:18 PM.  If you have any questions, ask your nurse or doctor.               These medicines have changed or have updated prescriptions.        Dose/Directions    * order for DME   This may have changed:  Another medication with the same name was added. Make sure you understand how and when to take each.   Used for:  Sarcoidosis, lung (H), COPD (chronic obstructive pulmonary disease) (H), Abnormal gait, Congestive heart failure (H)   Changed by:  Jamie Foster MD        She requested for a 4 wheel walker, would like to change it to 4 wheel walker with a seat and oxygen tank durant.   Need this faxed  over to her  [phone]   Quantity:  1 Device   Refills:  1       * order for DME   This may have changed:  Another medication with the same name was added. Make sure you understand how and when to take each.   Used for:  ILD (interstitial lung disease) (H), Hypoxia   Changed by:  Perlman, David Morris, MD        Updated Oxygen: Patient requires supplemental Oxygen 3 LPM via nasal canula with activity and 2 LPM nocturnally. Please provide patient with a portable oxygen concentrator for improved mobility.  Okay to spot check or titrated for conserving to keep stats above 90%. Oxygen will be for a lifetime.   Quantity:  1 Device   Refills:  0       * order for DME   This may have changed:  You were already taking a medication with the same name, and this prescription was added. Make sure you understand how and when to take each.   Used for:  Diabetic ulcer of toe of left foot associated with type 2 diabetes mellitus, limited to breakdown of skin (H)   Changed by:  Venita Duong MD        Equipment being ordered: Nonadherent 2 x 2 dressings, cotton gauze  Dress wound daily   Quantity:  10 Units   Refills:  1       * Notice:  This list has 3 medication(s) that are the same as other medications prescribed for you. Read the directions carefully, and ask your doctor or other care provider to review them with you.      Stop taking these medicines if you haven't already. Please contact your care team if you have questions.     azithromycin 250 MG tablet   Commonly known as:  ZITHROMAX   Stopped by:  Venita Duong MD                Where to get your medicines      Some of these will need a paper prescription and others can be bought over the counter.  Ask your nurse if you have questions.     Bring a paper prescription for each of these medications     order for DME                Primary Care Provider Office Phone # Fax #    Jamie Foster [phone]       [address]  [facility] [address]        Equal Access to Services     ISIAH MONTOYA : Hadii rhys jamison dhruv Lewis, wapaulda luqadaha, qaybta kaalmanelly russgaylenic jacinto. So [facility] [phone].    ATENCIÓN: Si habla español, tiene a mcfadden disposición servicios gratuitos de asistencia lingüística. [facility] [phone].    We comply with applicable federal civil rights laws and [place] laws. We do not discriminate on the basis of race, color, national origin, age, disability, sex, sexual orientation, or gender identity.            Thank you!     Thank you for choosing [facility]  for your care. Our goal is always to provide you with excellent care. Hearing back from our patients is one way we can continue to improve our services. Please take a few minutes to complete the written survey that you may receive in the mail after your visit with us. Thank you!             Your Updated Medication List - Protect others around you: Learn how to safely use, store and throw away your medicines at www.disposemymeds.org.          This list is accurate as of: 1/9/18  2:18 PM.  Always use your most recent med list.                   Brand Name Dispense Instructions for use Diagnosis    albuterol 108 (90 BASE) MCG/ACT Inhaler    PROAIR HFA/PROVENTIL HFA/VENTOLIN HFA    3 Inhaler    Inhale 2 puffs into the lungs every 6 hours as needed for shortness of breath / dyspnea    Sarcoidosis       atorvastatin 40 MG tablet    LIPITOR    30 tablet    TAKE ONE TABLET BY MOUTH ONE TIME DAILY    Coronary artery disease involving native coronary artery of native heart without angina pectoris, CAD S/P percutaneous coronary angioplasty       blood glucose monitoring lancets     2 Box    Use to test blood sugar 2 times daily or as directed.  102 lancets per box.  3 month supply.    Type 2 diabetes, HbA1C goal < 8% (H)       blood glucose monitoring meter device kit    no brand specified    1 kit    Use to test  blood sugar 2 times daily.    Type 2 diabetes mellitus with diabetic nephropathy (H)       blood glucose monitoring test strip    ACCU-CHEK RONNIE PLUS    200 each    Use to test blood sugar 2 times daily or as directed.  3 month supply.    Type 2 diabetes, HbA1C goal < 8% (H)       cephALEXin 500 MG capsule    KEFLEX    30 capsule    Take 1 capsule (500 mg) by mouth 3 times daily for 10 days        clopidogrel 75 MG tablet    PLAVIX    90 tablet    Take 1 tablet (75 mg) by mouth daily    CAD S/P percutaneous coronary angioplasty, Coronary artery disease involving native coronary artery of native heart without angina pectoris       colchicine 0.6 MG tablet    COLCRYS    30 tablet    2 tabs at the onset of flare, then 1 tab every 2 hrs until pain is better or diarrhea occurs, up to 10 doses. Wait 3 days until taking again        doxycycline 100 MG capsule    VIBRAMYCIN    20 capsule    Take 1 capsule (100 mg) by mouth 2 times daily        fluticasone-vilanterol 100-25 MCG/INH oral inhaler    BREO ELLIPTA    3 Inhaler    Inhale 1 puff into the lungs daily    Chronic obstructive pulmonary disease, unspecified COPD type (H)       furosemide 20 MG tablet    LASIX    240 tablet    Take 3 tablets (60 mg) by mouth 2 times daily May take extra 20 mg as needed for wt .gain >3#    Pulmonary hypertension       HYDROcodone-acetaminophen 5-325 MG per tablet    NORCO    15 tablet    Take 1-2 tablets by mouth every 4 hours as needed for moderate to severe pain        inFLIXimab 100 MG injection    REMICADE     Inject 100 mg into the vein every 28 days        levothyroxine 137 MCG tablet    SYNTHROID/LEVOTHROID    90 tablet    Take 1 tablet (137 mcg) by mouth daily    Hypothyroidism       losartan 50 MG tablet    COZAAR    90 tablet    Take 1 tablet (50 mg) by mouth daily    (HFpEF) heart failure with preserved ejection fraction (H)       methotrexate 2.5 MG tablet CHEMO     48 tablet    Take 3 tablets (7.5 mg) by mouth once a week  On Mondays    Sarcoidosis       metoprolol 100 MG tablet    LOPRESSOR    60 tablet    Take 1 tablet (100 mg) by mouth 2 times daily    Hypertension secondary to other renal disorders       mirtazapine 15 MG tablet    REMERON     Take 15 mg by mouth At Bedtime.        MULTIVITAMIN & MINERAL PO      Take 1 tablet by mouth daily.        omeprazole 20 MG CR capsule    priLOSEC    90 capsule    Take 1 capsule (20 mg) by mouth daily    CAD S/P percutaneous coronary angioplasty, Coronary artery disease involving native coronary artery of native heart without angina pectoris, Sarcoidosis of lung (H)       * order for DME     1 Device    She requested for a 4 wheel walker, would like to change it to 4 wheel walker with a seat and oxygen tank durant.   Need this faxed over to her  [phone]    Sarcoidosis, lung (H), COPD (chronic obstructive pulmonary disease) (H), Abnormal gait, Congestive heart failure (H)       * order for DME     1 Device    Updated Oxygen: Patient requires supplemental Oxygen 3 LPM via nasal canula with activity and 2 LPM nocturnally. Please provide patient with a portable oxygen concentrator for improved mobility.  Okay to spot check or titrated for conserving to keep stats above 90%. Oxygen will be for a lifetime.    ILD (interstitial lung disease) (H), Hypoxia       * order for DME     10 Units    Equipment being ordered: Nonadherent 2 x 2 dressings, cotton gauze  Dress wound daily    Diabetic ulcer of toe of left foot associated with type 2 diabetes mellitus, limited to breakdown of skin (H)       polyethylene glycol powder    MIRALAX    510 g    Take 17 g (1 capful) by mouth daily    Constipation, unspecified constipation type       sildenafil 20 MG tablet    REVATIO    270 tablet    Take 1 tablet (20 mg) by mouth 3 times daily    Pulmonary hypertension       tiotropium 18 MCG capsule    SPIRIVA HANDIHALER    90 capsule    Inhale contents of one capsule daily.    Chronic obstructive pulmonary  disease, unspecified COPD type (H)       Urea 40 % Crea    CARMOL 40    199 g    To feet daily    Dermatophytosis of nail, Corns and callosities       warfarin 5 MG tablet    COUMADIN    90 tablet    TAKE ONE TABLET BY MOUTH ONE TIME DAILY    Atrial flutter with rapid ventricular response (H)       * Notice:  This list has 3 medication(s) that are the same as other medications prescribed for you. Read the directions carefully, and ask your doctor or other care provider to review them with you.

## 2018-01-10 LAB
BACTERIA SPEC CULT: NO GROWTH
SPECIMEN SOURCE: NORMAL

## 2018-01-10 NOTE — TELEPHONE ENCOUNTER
APPT INFO    Date /Time: 1/23/18 3PM   Reason for Appt: Diabetic Ulcer of Left Foot Toe   Ref Provider/Clinic: Dr. Duong   Are there internal records? Yes/No?  IF YES, list clinic names: Yes -     [facility]  [facility] ED/Hosp -- 1/5/18   Are there outside records? Yes/No? no   Patient Contact (Y/N) & Call Details: No - internal referral   Action: Chart reviewed

## 2018-01-12 ENCOUNTER — TELEPHONE (OUTPATIENT)
Dept: INTERNAL MEDICINE | Facility: CLINIC | Age: 73
End: 2018-01-12

## 2018-01-12 NOTE — TELEPHONE ENCOUNTER
Pt calling, states that he does not know how to change his dressing on his left toe.  He was encouraged to change the dressing daily during the appt on 1/9 and was provided dressing supplies.  He is afraid/nervous to touch the wound dressing and does not know how to change.  He will come for the nurse visit appt on Monday.

## 2018-01-22 ENCOUNTER — OFFICE VISIT (OUTPATIENT)
Dept: INTERNAL MEDICINE | Facility: CLINIC | Age: 73
End: 2018-01-22
Payer: MEDICARE

## 2018-01-22 VITALS
BODY MASS INDEX: 26.1 KG/M2 | SYSTOLIC BLOOD PRESSURE: 106 MMHG | WEIGHT: 166.7 LBS | TEMPERATURE: 97.5 F | OXYGEN SATURATION: 98 % | HEART RATE: 74 BPM | DIASTOLIC BLOOD PRESSURE: 72 MMHG

## 2018-01-22 DIAGNOSIS — L97.529 ULCER OF TOE OF LEFT FOOT, UNSPECIFIED ULCER STAGE (H): Primary | ICD-10-CM

## 2018-01-22 ASSESSMENT — PAIN SCALES - GENERAL: PAINLEVEL: NO PAIN (0)

## 2018-01-22 NOTE — NURSING NOTE
Covered left 3rd toe with non-adherent dressing, wrapped with Kerlix and secured with paper tape. DRU Thomas LPN

## 2018-01-22 NOTE — PROGRESS NOTES
Chief complaint:  Rohan Monroe is a 72 year old male presents for   Chief Complaint   Patient presents with     WOUND CARE     Tore skin on toe left foot, had pain, went to ED was Dx with Gout, medication given did not agree with patient. Pain is better, concerned not heeling. Would like to discuss plan for next gout attack.         SUBJECTIVE:  Rohan Monroe is a 72 year old male with PMH of sarcoidosis (pulmonary and cardiac), COPD, CAD, HTN, CKD, cirrhosis, and diabetes who presents for follow-up of acute gouty arthritis and diabetic foot ulcer. He was last seen in clinic on 1/9/2018 with Dr Duong for follow-up after an ED visit where he was diagnosed with acute gout of his left knee and was also noted to have an ulceration in his left 3rd toe. He only took several doses of colchicine due to severe nausea and vomiting. At his last visit, he was advised to complete a 10 day course of doxycycline for his foot ulceration.     Today, he reports that the gout symptoms of his left knee have fully resolved. Denies any pain, erythema, or edema. He has full ROM in that knee. This was his first episode of gout, and he would like to discuss what to do in the event of another attack. He is not an ideal candidate for prednisone (bad reaction in the past) or NSAIDs (on warfarin d/t history of atrial flutter).     Today, he is most concerned about his toe ulceration. He completed a 10-day course of doxycycline. He notes that it had seemed to be healing, but then when he changed the dressing on Sunday he noticed a small amount of pus. He denies any pain, fevers, or chills. He has not noticed any spreading erythema. He is discouraged that he cannot go to cardiac rehab due this causing him limited mobility. He does have an appointment tomorrow with podiatry.     He would also like a pharmacy referral to review all his medications. He takes many medications daily, and thinks it would be helpful to review them with  someone to make sure they are all correct.     Medications and allergies were reviewed by me today.     SocHx:   History   Smoking Status     Former Smoker     Packs/day: 1.00     Years: 30.00     Types: Cigarettes     Start date: 12/30/1960     Quit date: 7/22/1994   Smokeless Tobacco     Never Used        Patient Active Problem List   Diagnosis     Hypothyroidism     SARCOIDOSIS-systemic     Generalized osteoarthrosis, unspecified site     Viral warts     Other psoriasis     Hypertrophy of prostate without urinary obstruction     Diabetes mellitus, type 2 (H)     CAD (coronary artery disease)     Type 2 diabetes, HbA1C goal < 8% (H)     CARDIOVASCULAR SCREENING; LDL GOAL LESS THAN 100     Atrial flutter with rapid ventricular response (H)     CKD (chronic kidney disease) stage 3, GFR 30-59 ml/min     Hip joint pain     Hyperlipidemia LDL goal <70     Acute exacerbation of chronic obstructive pulmonary disease (COPD) (H)     Cellulitis     Immunosuppression (H)     Hyponatremia     RADHA (acute kidney injury) (HCC)     COPD (chronic obstructive pulmonary disease) (H)     Cirrhosis of liver (H)     Pneumonia     Proteinuria     Microscopic hematuria     Sarcoidosis of lung (HCC)     Hyperlipidemia     Atrial flutter (H)     Long-term (current) use of anticoagulants [Z79.01]     Hypoxia     CAD S/P percutaneous coronary angioplasty     Chest pain     Current Outpatient Prescriptions   Medication     order for DME     HYDROcodone-acetaminophen (NORCO) 5-325 MG per tablet     tiotropium (SPIRIVA HANDIHALER) 18 MCG capsule     fluticasone-vilanterol (BREO ELLIPTA) 100-25 MCG/INH oral inhaler     albuterol (PROAIR HFA/PROVENTIL HFA/VENTOLIN HFA) 108 (90 BASE) MCG/ACT Inhaler     methotrexate 2.5 MG tablet CHEMO     levothyroxine (SYNTHROID/LEVOTHROID) 137 MCG tablet     furosemide (LASIX) 20 MG tablet     warfarin (COUMADIN) 5 MG tablet     sildenafil (REVATIO) 20 MG tablet     atorvastatin (LIPITOR) 40 MG tablet      losartan (COZAAR) 50 MG tablet     order for DME     order for DME     omeprazole (PRILOSEC) 20 MG CR capsule     clopidogrel (PLAVIX) 75 MG tablet     polyethylene glycol (MIRALAX) powder     inFLIXimab (REMICADE) 100 MG injection     metoprolol (LOPRESSOR) 100 MG tablet     blood glucose monitoring (ACCU-CHEK RONNIE PLUS) test strip     blood glucose monitoring (ACCU-CHEK FASTCLIX) lancets     blood glucose monitoring (NO BRAND SPECIFIED) meter device kit     Urea (CARMOL 40) 40 % CREA     Multiple Vitamins-Minerals (MULTIVITAMIN & MINERAL PO)     mirtazapine (REMERON) 15 MG tablet     doxycycline (VIBRAMYCIN) 100 MG capsule     colchicine (COLCRYS) 0.6 MG tablet       Review Of Systems   5 point ROS completed and negative except noted above, including Gen, CV, Resp, GI, MS    PE:  /72  Pulse 74  Temp 97.5  F (36.4  C) (Oral)  Wt 75.6 kg (166 lb 11.2 oz)  SpO2 98%  BMI 26.1 kg/m2  Gen: No distress, comfortable, pleasant   Eyes: Anicteric, normal extra-ocular movements   Cardiovascular: Regular rate and rhythm, normal S1 and S2, no murmurs, rubs or gallops. Peripheral pulses full and symmetric   Respiratory: Clear to auscultation, no wheezes or crackles, normal breath sounds   MSK: Left knee without erythema or edema, full range of motion.   Skin: Left 3rd toe with shallow ulceration on plantar aspect. No purulence, no surrounding erythema. No lymphangitis. Seems to be healing compared to its appearance at last visit. Peripheral pulses strong and intact.   Psychological: Appropriate mood       ASSESSMENT/PLAN:  Rohan Monroe is a 72 year old male with PMH of sarcoidosis (pulmonary and cardiac), COPD, CAD, HTN, CKD, cirrhosis, and diabetes who presents for follow-up of acute gouty arthritis and diabetic foot ulcer.       Acute gout of L knee, resolved: First episode of acute gouty arthritis, now fully resolved. Concerned about possible options if he were to have another attack, given that he has an allergy  to prednisone, NSAIDs are contraindicated due to warfarin use, and had severe N/V after colchicine. His symptoms resolved after only a couple doses of colchicine. May attempt colchicine again if has another attack. Given this was his first attack, will hold off for now on long term treatment with something like allopurinol.     Diabetic foot ulcer: Small ulceration on left 3rd toe, which appears to be healing compared to last clinic visit. Completed the 10 day course of doxycycline. No systemic signs of infection at this time. Given improved appearance, no need at this time for further antibiotics or imaging. He was encouraged to follow-up with his podiatry appointment tomorrow.     RTC: 4-5 weeks     Gretel Greer, MS4       Staff note; I personally interviewed pt. And conducted physical examination. I agree with above assessment and plan. He will see Dr. Wing Podiatry tomorrow. I will see him back in about one month    SAVANNA Foster MD    Total time spent 25 minutes.  More than 50% of the time spent with Mr. Monroe on counseling / coordinating his care

## 2018-01-22 NOTE — MR AVS SNAPSHOT
After Visit Summary   1/22/2018    Rohan Monroe    MRN: [id number]           Patient Information     Date Of Birth          1945        Visit Information        Provider Department      1/22/2018 3:05 PM Jamie Foster MD [facility]         Follow-ups after your visit        Your next 10 appointments already scheduled     Jan 23, 2018  3:00 PM CST   (Arrive by 2:45 PM)   NEW FOOT/ANKLE with Jose David Wing DPM   [facility] ([facility])    [address] [place] [address] [id number] [phone]            Jan 24, 2018  9:00 AM CST   US AORTA/IVC/ILIAC DUPLEX LIMITED with UCUSV1   [facility] ([facility])    [address] [place] [address] [id number] [phone]           Please bring a list of your medicines (including vitamins, minerals and over-the-counter drugs). Also, tell your doctor about any allergies you may have. Wear comfortable clothes and leave your valuables at home.  Adults: No eating or drinking for 8 hours before the exam. You may take medicine with a small sip of water.  Children: - Children 6+ years: No food or drink for 6 hours before exam. - Children 1-5 years: No food or drink for 4 hours before exam. - Infants, breast-fed: may have breast milk up to 2 hours before exam. - Infants, formula: may have bottle until 4 hours before exam.  Please call the Imaging Department at your exam site with any questions.            Jan 24, 2018 10:00 AM CST   (Arrive by 9:45 AM)   XR PELVIS AND HIP BILATERAL 2 VIEWS with UCXR1   [facility] ([facility])    [address] [place] [address] [id number] [phone]           Please bring a list of your current medicines to your exam. (Include vitamins, minerals and over-thecounter medicines.) Leave your valuables at home.  Tell your  doctor if there is a chance you may be pregnant.  You do not need to do anything special for this exam.            Jan 24, 2018 10:40 AM CST   (Arrive by 10:25 AM)   Return Visit with Maria Victoria Palmer MD   [facility] ([facility])    [address]  [address]  [facility] [address]   [phone]            Jan 24, 2018 12:00 PM CST   Infusion 180 with UC SPEC INFUSION, UC 47 ATC   [facility] ([facility])    [address]  [address]  [place] [address] [phone]            Jan 26, 2018  3:00 PM CST   Cardiac Treatment with  Cardiac Rehab 1   [facility] ([facility])    [address], [address]  [facility] [address]   [phone]            Feb 07, 2018  2:00 PM CST   Lab with UC LAB   [facility] ([facility])    [address]  1st Floor  [place] [address] [phone]            Feb 07, 2018  3:00 PM CST   (Arrive by 2:30 PM)   Return Visit with Ollie Michel MD   [facility] ([facility])    [address]  [address]  [place] [address] [phone]            Feb 21, 2018 12:00 PM CST   Infusion 180 with UC SPEC INFUSION   [facility] ([facility])    [address]  [address]  [facility] [id number] [phone]              Who to contact     Please call your clinic at [phone] to:    Ask questions about your health    Make or cancel appointments    Discuss your medicines    Learn about your test results    Speak to your doctor   If you have compliments or concerns about an experience at your clinic, or if you wish to file a complaint, please contact [facility] Patient Relations at [phone] or email us at  [email or web address]         Additional Information About Your Visit        [email or web address] Information     [employer] gives you secure access to your electronic health record. If you see a primary care provider, you can also send messages to your care team and make appointments. If you have questions, please call your primary care clinic.  If you do not have a primary care provider, please call [phone] and they will assist you.      [employer] is an electronic gateway that provides easy, online access to your medical records. With [employer], you can request a clinic appointment, read your test results, renew a prescription or communicate with your care team.     To access your existing account, please contact your [facility] or call [phone] for assistance.        Care EveryWhere ID     This is your Care EveryWhere ID. This could be used by other organizations to access your [place] medical records  [id number]        Your Vitals Were     Pulse Temperature Pulse Oximetry BMI (Body Mass Index)          74 97.5  F (36.4  C) (Oral) 98% 26.1 kg/m2         Blood Pressure from Last 3 Encounters:   01/22/18 106/72   01/09/18 137/83   01/05/18 159/76    Weight from Last 3 Encounters:   01/22/18 75.6 kg (166 lb 11.2 oz)   01/09/18 82.8 kg (182 lb 8 oz)   01/05/18 82.7 kg (182 lb 5.1 oz)              Today, you had the following     No orders found for display       Primary Care Provider Office Phone # Fax #    Jamie Foster [phone] [address] [address]        Equal Access to Services     ISIAH MONTOYA : Hadii aad ku hadasho Soomaali, waaxda luqadaha, qaybta kaalmada vesna, nelly jacinto. So [facility] [phone].    ATENCIÓN: Si habla español, tiene a mcfadden disposición servicios gratuitos de asistencia lingüística. Llame al [phone].    We comply with applicable federal civil rights laws and [place]  laws. We do not discriminate on the basis of race, color, national origin, age, disability, sex, sexual orientation, or gender identity.            Thank you!     Thank you for choosing [facility]  for your care. Our goal is always to provide you with excellent care. Hearing back from our patients is one way we can continue to improve our services. Please take a few minutes to complete the written survey that you may receive in the mail after your visit with us. Thank you!             Your Updated Medication List - Protect others around you: Learn how to safely use, store and throw away your medicines at www.disposemymeds.org.          This list is accurate as of: 1/22/18  3:48 PM.  Always use your most recent med list.                   Brand Name Dispense Instructions for use Diagnosis    albuterol 108 (90 BASE) MCG/ACT Inhaler    PROAIR HFA/PROVENTIL HFA/VENTOLIN HFA    3 Inhaler    Inhale 2 puffs into the lungs every 6 hours as needed for shortness of breath / dyspnea    Sarcoidosis       atorvastatin 40 MG tablet    LIPITOR    30 tablet    TAKE ONE TABLET BY MOUTH ONE TIME DAILY    Coronary artery disease involving native coronary artery of native heart without angina pectoris, CAD S/P percutaneous coronary angioplasty       blood glucose monitoring lancets     2 Box    Use to test blood sugar 2 times daily or as directed.  102 lancets per box.  3 month supply.    Type 2 diabetes, HbA1C goal < 8% (H)       blood glucose monitoring meter device kit    no brand specified    1 kit    Use to test blood sugar 2 times daily.    Type 2 diabetes mellitus with diabetic nephropathy (H)       blood glucose monitoring test strip    ACCU-CHEK RONNIE PLUS    200 each    Use to test blood sugar 2 times daily or as directed.  3 month supply.    Type 2 diabetes, HbA1C goal < 8% (H)       clopidogrel 75 MG tablet    PLAVIX    90 tablet    Take 1 tablet (75 mg) by mouth daily    CAD S/P percutaneous coronary  angioplasty, Coronary artery disease involving native coronary artery of native heart without angina pectoris       colchicine 0.6 MG tablet    COLCRYS    30 tablet    2 tabs at the onset of flare, then 1 tab every 2 hrs until pain is better or diarrhea occurs, up to 10 doses. Wait 3 days until taking again        doxycycline 100 MG capsule    VIBRAMYCIN    20 capsule    Take 1 capsule (100 mg) by mouth 2 times daily        fluticasone-vilanterol 100-25 MCG/INH oral inhaler    BREO ELLIPTA    3 Inhaler    Inhale 1 puff into the lungs daily    Chronic obstructive pulmonary disease, unspecified COPD type (H)       furosemide 20 MG tablet    LASIX    240 tablet    Take 3 tablets (60 mg) by mouth 2 times daily May take extra 20 mg as needed for wt .gain >3#    Pulmonary hypertension       HYDROcodone-acetaminophen 5-325 MG per tablet    NORCO    15 tablet    Take 1-2 tablets by mouth every 4 hours as needed for moderate to severe pain        inFLIXimab 100 MG injection    REMICADE     Inject 100 mg into the vein every 28 days        levothyroxine 137 MCG tablet    SYNTHROID/LEVOTHROID    90 tablet    Take 1 tablet (137 mcg) by mouth daily    Hypothyroidism       losartan 50 MG tablet    COZAAR    90 tablet    Take 1 tablet (50 mg) by mouth daily    (HFpEF) heart failure with preserved ejection fraction (H)       methotrexate 2.5 MG tablet CHEMO     48 tablet    Take 3 tablets (7.5 mg) by mouth once a week On Mondays    Sarcoidosis       metoprolol tartrate 100 MG tablet    LOPRESSOR    60 tablet    Take 1 tablet (100 mg) by mouth 2 times daily    Hypertension secondary to other renal disorders       mirtazapine 15 MG tablet    REMERON     Take 15 mg by mouth At Bedtime.        MULTIVITAMIN & MINERAL PO      Take 1 tablet by mouth daily.        omeprazole 20 MG CR capsule    priLOSEC    90 capsule    Take 1 capsule (20 mg) by mouth daily    CAD S/P percutaneous coronary angioplasty, Coronary artery disease involving  native coronary artery of native heart without angina pectoris, Sarcoidosis of lung (H)       * order for DME     1 Device    She requested for a 4 wheel walker, would like to change it to 4 wheel walker with a seat and oxygen tank durant.   Need this faxed over to her  [phone]    Sarcoidosis, lung (H), COPD (chronic obstructive pulmonary disease) (H), Abnormal gait, Congestive heart failure (H)       * order for DME     1 Device    Updated Oxygen: Patient requires supplemental Oxygen 3 LPM via nasal canula with activity and 2 LPM nocturnally. Please provide patient with a portable oxygen concentrator for improved mobility.  Okay to spot check or titrated for conserving to keep stats above 90%. Oxygen will be for a lifetime.    ILD (interstitial lung disease) (H), Hypoxia       * order for DME     10 Units    Equipment being ordered: Nonadherent 2 x 2 dressings, cotton gauze  Dress wound daily    Diabetic ulcer of toe of left foot associated with type 2 diabetes mellitus, limited to breakdown of skin (H)       polyethylene glycol powder    MIRALAX    510 g    Take 17 g (1 capful) by mouth daily    Constipation, unspecified constipation type       sildenafil 20 MG tablet    REVATIO    270 tablet    Take 1 tablet (20 mg) by mouth 3 times daily    Pulmonary hypertension       tiotropium 18 MCG capsule    SPIRIVA HANDIHALER    90 capsule    Inhale contents of one capsule daily.    Chronic obstructive pulmonary disease, unspecified COPD type (H)       Urea 40 % Crea    CARMOL 40    199 g    To feet daily    Dermatophytosis of nail, Corns and callosities       warfarin 5 MG tablet    COUMADIN    90 tablet    TAKE ONE TABLET BY MOUTH ONE TIME DAILY    Atrial flutter with rapid ventricular response (H)       * Notice:  This list has 3 medication(s) that are the same as other medications prescribed for you. Read the directions carefully, and ask your doctor or other care provider to review them with you.

## 2018-01-22 NOTE — NURSING NOTE
Chief Complaint   Patient presents with     WOUND CARE     Tore skin on toe left foot, had pain, went to ED was Dx with Gout, medication given did not agree with patient. Pain is better, concerned not heeling. Would like to discuss plan for next gout attack.      Rooming Note  Blood Pressure   BP Readings from Last 1 Encounters:   01/22/18 150/79    Single BP recheck started, 3:03 PM (4 minutes)

## 2018-01-23 ENCOUNTER — PRE VISIT (OUTPATIENT)
Dept: ORTHOPEDICS | Facility: CLINIC | Age: 73
End: 2018-01-23

## 2018-01-23 ENCOUNTER — OFFICE VISIT (OUTPATIENT)
Dept: ORTHOPEDICS | Facility: CLINIC | Age: 73
End: 2018-01-23
Payer: MEDICARE

## 2018-01-23 DIAGNOSIS — E11.621 DIABETIC ULCER OF TOE OF LEFT FOOT ASSOCIATED WITH TYPE 2 DIABETES MELLITUS, WITH FAT LAYER EXPOSED (H): ICD-10-CM

## 2018-01-23 DIAGNOSIS — B35.1 DERMATOPHYTOSIS OF NAIL: ICD-10-CM

## 2018-01-23 DIAGNOSIS — E11.42 TYPE 2 DIABETES MELLITUS WITH DIABETIC POLYNEUROPATHY, WITHOUT LONG-TERM CURRENT USE OF INSULIN (H): Primary | ICD-10-CM

## 2018-01-23 DIAGNOSIS — L97.522 DIABETIC ULCER OF TOE OF LEFT FOOT ASSOCIATED WITH TYPE 2 DIABETES MELLITUS, WITH FAT LAYER EXPOSED (H): ICD-10-CM

## 2018-01-23 DIAGNOSIS — L84 TYLOMA: ICD-10-CM

## 2018-01-23 ASSESSMENT — ENCOUNTER SYMPTOMS
PALPITATIONS: 0
HYPERTENSION: 0
SLEEP DISTURBANCES DUE TO BREATHING: 0
SYNCOPE: 0
SHORTNESS OF BREATH: 1
MUSCLE WEAKNESS: 0
NECK PAIN: 0
LEG PAIN: 0
BRUISES/BLEEDS EASILY: 0
EXERCISE INTOLERANCE: 0
LIGHT-HEADEDNESS: 0
JOINT SWELLING: 1
MYALGIAS: 0
ORTHOPNEA: 0
ARTHRALGIAS: 1
DYSPNEA ON EXERTION: 1
HYPOTENSION: 0
HEMOPTYSIS: 0
STIFFNESS: 0
SNORES LOUDLY: 0
COUGH: 0
SPUTUM PRODUCTION: 0
COUGH DISTURBING SLEEP: 0
WHEEZING: 0
MUSCLE CRAMPS: 0
POSTURAL DYSPNEA: 0
BACK PAIN: 1
SWOLLEN GLANDS: 0

## 2018-01-23 NOTE — MR AVS SNAPSHOT
After Visit Summary   1/23/2018    Rohan Monroe    MRN: [id number]           Patient Information     Date Of Birth          1945        Visit Information        Provider Department      1/23/2018 3:00 PM Jose David Wing DPM [facility]        Today's Diagnoses     Type 2 diabetes mellitus with diabetic polyneuropathy, without long-term current use of insulin (H)    -  1    Dermatophytosis of nail        Tyloma        Diabetic ulcer of toe of left foot associated with type 2 diabetes mellitus, with fat layer exposed (H)           Follow-ups after your visit        Your next 10 appointments already scheduled     Jan 24, 2018 12:00 PM CST   Infusion 180 with UC SPEC INFUSION, UC 47 ATC   [facility] Special[facility] Procedure ([facility])    [address]  [address]  [facility] [address] [phone]            Jan 26, 2018  3:00 PM CST   Cardiac Treatment with Sh Cardiac Rehab 1   [facility] ([facility])    [address], [address]  [facility] [id number] [phone]            Jan 31, 2018  9:30 AM CST   US AORTA/IVC/ILIAC DUPLEX LIMITED with UCUSV2   [facility] US ([facility])    [address]  1st Floor  [facility] [address] [phone]           Please bring a list of your medicines (including vitamins, minerals and over-the-counter drugs). Also, tell your doctor about any allergies you may have. Wear comfortable clothes and leave your valuables at home.  Adults: No eating or drinking for 8 hours before the exam. You may take medicine with a small sip of water.  Children: - Children 6+ years: No food or drink for 6 hours before exam. - Children 1-5 years: No food or drink for 4 hours before exam. - Infants, breast-fed: may have breast milk up to 2 hours before exam. - Infants, formula: may have bottle until  4 hours before exam.  Please call the Imaging Department at your exam site with any questions.            Jan 31, 2018 10:40 AM CST   (Arrive by 10:25 AM)   XR PELVIS AND HIP BILATERAL 2 VIEWS with UCXR1   [facility] Xray ([facility])    [address] [place] [address]  1st Floor  [facility] [id number] [phone]           Please bring a list of your current medicines to your exam. (Include vitamins, minerals and over-thecounter medicines.) Leave your valuables at home.  Tell your doctor if there is a chance you may be pregnant.  You do not need to do anything special for this exam.            Jan 31, 2018 11:40 AM CST   (Arrive by 11:25 AM)   Return Visit with Maria Victoria Palmer MD   [facility] ([facility])    [address] [place] [address]  [address]  [facility] [id number] [phone]            Feb 06, 2018  1:00 PM CST   (Arrive by 12:45 PM)   RETURN FOOT/ANKLE with Jose David Wing DPM   [facility] ([facility])    [address] [place] [address]  4th Floor  [facility] [address] [phone]            Feb 07, 2018  2:00 PM CST   Lab with [facility]   [facility] Lab ([facility])    [address] [place] [address]  1st Floor  [facility] [id number] [phone]            Feb 07, 2018  3:00 PM CST   (Arrive by 2:30 PM)   Return Visit with Ollie Michel MD   [facility] Nephrology ([facility])    [address] [place] [address]  [address]  [facility] [id number] [phone]            Feb 21, 2018 12:00 PM CST   Infusion 180 with  SPEC INFUSION   [facility] ([facility])    [address] [place] [address]  [address]  [facility] [id number] [phone]              Who to contact     Please call your clinic at [phone] to:    Ask questions about your health    Make or cancel  appointments    Discuss your medicines    Learn about your test results    Speak to your doctor   If you have compliments or concerns about an experience at your clinic, or if you wish to file a complaint, please contact [facility] Patient Relations at [phone] or email us at [email or web address]         Additional Information About Your Visit        MyChart Information     [employer] gives you secure access to your electronic health record. If you see a primary care provider, you can also send messages to your care team and make appointments. If you have questions, please call your primary care clinic.  If you do not have a primary care provider, please call [phone] and they will assist you.      [employer] is an electronic gateway that provides easy, online access to your medical records. With [employer], you can request a clinic appointment, read your test results, renew a prescription or communicate with your care team.     To access your existing account, please contact your [facility] or call [phone] for assistance.        Care EveryWhere ID     This is your Care EveryWhere ID. This could be used by other organizations to access your [facility] medical records  [id number]         Blood Pressure from Last 3 Encounters:   01/22/18 106/72   01/09/18 137/83   01/05/18 159/76    Weight from Last 3 Encounters:   01/22/18 75.6 kg (166 lb 11.2 oz)   01/09/18 82.8 kg (182 lb 8 oz)   01/05/18 82.7 kg (182 lb 5.1 oz)              We Performed the Following     DEBRIDE SKIN/SUBQ TISSUE     DEBRIDEMENT OF NAILS, 6 OR MORE     TRIM HYPERKERATOTIC SKIN LESION, ONE        Primary Care Provider Office Phone # Fax #    Jamie Foster [phone]       [address] [address]        Equal Access to Services     ISIAH MONTOYA : juid Rizvi, nelly gonzáles  eric munson ah. So [facility] [phone].    ATENCIÓN: Si habla dina, tiene a mcfadden disposición servicios gratuitos de asistencia lingüística. Osman al [phone].    We comply with applicable federal civil rights laws and [place] laws. We do not discriminate on the basis of race, color, national origin, age, disability, sex, sexual orientation, or gender identity.            Thank you!     Thank you for choosing [facility]  for your care. Our goal is always to provide you with excellent care. Hearing back from our patients is one way we can continue to improve our services. Please take a few minutes to complete the written survey that you may receive in the mail after your visit with us. Thank you!             Your Updated Medication List - Protect others around you: Learn how to safely use, store and throw away your medicines at www.disposemymeds.org.          This list is accurate as of: 1/23/18  5:39 PM.  Always use your most recent med list.                   Brand Name Dispense Instructions for use Diagnosis    albuterol 108 (90 BASE) MCG/ACT Inhaler    PROAIR HFA/PROVENTIL HFA/VENTOLIN HFA    3 Inhaler    Inhale 2 puffs into the lungs every 6 hours as needed for shortness of breath / dyspnea    Sarcoidosis       atorvastatin 40 MG tablet    LIPITOR    30 tablet    TAKE ONE TABLET BY MOUTH ONE TIME DAILY    Coronary artery disease involving native coronary artery of native heart without angina pectoris, CAD S/P percutaneous coronary angioplasty       blood glucose monitoring lancets     2 Box    Use to test blood sugar 2 times daily or as directed.  102 lancets per box.  3 month supply.    Type 2 diabetes, HbA1C goal < 8% (H)       blood glucose monitoring meter device kit    no brand specified    1 kit    Use to test blood sugar 2 times daily.    Type 2 diabetes mellitus with diabetic nephropathy (H)       blood glucose monitoring test strip    ACCU-CHEK RONNIE PLUS    200 each     Use to test blood sugar 2 times daily or as directed.  3 month supply.    Type 2 diabetes, HbA1C goal < 8% (H)       clopidogrel 75 MG tablet    PLAVIX    90 tablet    Take 1 tablet (75 mg) by mouth daily    CAD S/P percutaneous coronary angioplasty, Coronary artery disease involving native coronary artery of native heart without angina pectoris       colchicine 0.6 MG tablet    COLCRYS    30 tablet    2 tabs at the onset of flare, then 1 tab every 2 hrs until pain is better or diarrhea occurs, up to 10 doses. Wait 3 days until taking again        doxycycline 100 MG capsule    VIBRAMYCIN    20 capsule    Take 1 capsule (100 mg) by mouth 2 times daily        fluticasone-vilanterol 100-25 MCG/INH oral inhaler    BREO ELLIPTA    3 Inhaler    Inhale 1 puff into the lungs daily    Chronic obstructive pulmonary disease, unspecified COPD type (H)       furosemide 20 MG tablet    LASIX    240 tablet    Take 3 tablets (60 mg) by mouth 2 times daily May take extra 20 mg as needed for wt .gain >3#    Pulmonary hypertension       HYDROcodone-acetaminophen 5-325 MG per tablet    NORCO    15 tablet    Take 1-2 tablets by mouth every 4 hours as needed for moderate to severe pain        inFLIXimab 100 MG injection    REMICADE     Inject 100 mg into the vein every 28 days        levothyroxine 137 MCG tablet    SYNTHROID/LEVOTHROID    90 tablet    Take 1 tablet (137 mcg) by mouth daily    Hypothyroidism       losartan 50 MG tablet    COZAAR    90 tablet    Take 1 tablet (50 mg) by mouth daily    (HFpEF) heart failure with preserved ejection fraction (H)       methotrexate 2.5 MG tablet CHEMO     48 tablet    Take 3 tablets (7.5 mg) by mouth once a week On Mondays    Sarcoidosis       metoprolol tartrate 100 MG tablet    LOPRESSOR    60 tablet    Take 1 tablet (100 mg) by mouth 2 times daily    Hypertension secondary to other renal disorders       mirtazapine 15 MG tablet    REMERON     Take 15 mg by mouth At Bedtime.         MULTIVITAMIN & MINERAL PO      Take 1 tablet by mouth daily.        omeprazole 20 MG CR capsule    priLOSEC    90 capsule    Take 1 capsule (20 mg) by mouth daily    CAD S/P percutaneous coronary angioplasty, Coronary artery disease involving native coronary artery of native heart without angina pectoris, Sarcoidosis of lung (H)       * order for DME     1 Device    She requested for a 4 wheel walker, would like to change it to 4 wheel walker with a seat and oxygen tank durant.   Need this faxed over to her  [phone]    Sarcoidosis, lung (H), COPD (chronic obstructive pulmonary disease) (H), Abnormal gait, Congestive heart failure (H)       * order for DME     1 Device    Updated Oxygen: Patient requires supplemental Oxygen 3 LPM via nasal canula with activity and 2 LPM nocturnally. Please provide patient with a portable oxygen concentrator for improved mobility.  Okay to spot check or titrated for conserving to keep stats above 90%. Oxygen will be for a lifetime.    ILD (interstitial lung disease) (H), Hypoxia       * order for DME     10 Units    Equipment being ordered: Nonadherent 2 x 2 dressings, cotton gauze  Dress wound daily    Diabetic ulcer of toe of left foot associated with type 2 diabetes mellitus, limited to breakdown of skin (H)       polyethylene glycol powder    MIRALAX    510 g    Take 17 g (1 capful) by mouth daily    Constipation, unspecified constipation type       sildenafil 20 MG tablet    REVATIO    270 tablet    Take 1 tablet (20 mg) by mouth 3 times daily    Pulmonary hypertension       tiotropium 18 MCG capsule    SPIRIVA HANDIHALER    90 capsule    Inhale contents of one capsule daily.    Chronic obstructive pulmonary disease, unspecified COPD type (H)       Urea 40 % Crea    CARMOL 40    199 g    To feet daily    Dermatophytosis of nail, Corns and callosities       warfarin 5 MG tablet    COUMADIN    90 tablet    TAKE ONE TABLET BY MOUTH ONE TIME DAILY    Atrial flutter with rapid  ventricular response (H)       * Notice:  This list has 3 medication(s) that are the same as other medications prescribed for you. Read the directions carefully, and ask your doctor or other care provider to review them with you.

## 2018-01-23 NOTE — LETTER
1/23/2018       RE: Rohan Monroe  [address]  [place] [address]     Dear Colleague,    Thank you for referring your patient, Rohan Monroe, to the [facility] at [facility]. Please see a copy of my visit note below.    Chief Complaint:   Chief Complaint   Patient presents with     Consult     Type 2 Diabetic. Toe nails. Issues, Left middle toe. Corn, left foot. Pt stated that he has seen Dr. Mejia in the past. Pt stated that he has other health issues and has seen a podiatrist in a year. Pt is wondering if he is eligible for new shoes.         Allergies   Allergen Reactions     Prednisone Other (See Comments)     He reports that he can't sleep for days and can't use small to massive doses of prednisone   Pt. Does not do well with high doses of Prednisone, His MD says that Prednisone is counter indicated. Prednisone failed to treat his sarcoidosis        Subjective: Rohan is a 72 year old male who presents to the clinic today for diabetic foot exam and management.  He relates that he was previously seen by Dr. Mejia in clinic.  He was coming regularly to get his diabetic foot exam, however he has had other health issues that he has been working through.  He relates that he has long thickened toenails that are causing him pain.  He also has a callus on the bottom of the left foot.  He also relates that he had a callus on the left third distal digit.  He pulled the soft a few weeks ago, and he related that there was a wound underneath.  He has been keeping the area covered, however the wound is not fully healed.  He was told by Dr. Foster to come back and schedule to have this wound looked at.    ROS:     Objective    DP and PT pulses are nonpalpable bilaterally.  Capillary refill time is greater than 3 seconds.  Diminished pedal hair.  Protective sensation is diminished bilaterally.  Equinus is noted bilaterally.   Severe hammertoes and pes planus noted bilaterally.  Hammertoes are somewhat reducible in nature.  Nails are thickened, elongated, dystrophic, yellow, with subungual debris that is consistent with onychomycosis.  Hyperkeratotic lesion is noted on the plantar surface of the left foot at the third metatarsal head.  A diabetic pressure wound is noted at left  distal 3rd digit measuring 1.5cm x 1cm x 0.1cm.    Ochoa Classification: 2    Wound base: Pink/Granulation    Edges: hyperkeratotic    Drainage: light/serous    Odor: no    Undermining: no    Bone Exposure: No    Clinical Signs of Infection: No    After obtaining patient consent, the wound was irrigated with copious amounts of saline. A scalpel was then used to debride the wound into the subcutaneous tissue. The wound edges were debrided to healthy, bleeding tissue. Given the patient's lack of sensation, no anesthesia was necessary for the procedure.     Assessment: 72-year-old with type 3 diabetes with neuropathy with fungal nails ×10, tyloma, and new diabetic wound secondary to pressure from the hammertoe on the left third digit.    Plan:   - Pt seen and evaluated  - Nails were debrided ×10.  Tyloma was debrided ×1.  -The wound was debrided as described.  I discussed with him the etiology of this wound.  This is coming from the constant hammertoe in the shoe.  I related to him that if we take the pressure off, this is a greater chance of healing.  However, because of the pressure and shape of this toe, this may not heal fully without having the toe surgically corrected.  He understands this.  - Today, the toe was cleansed and Medihoney was applied to the wound.  A dry sterile dressing was then applied to the toe.  This should be changed daily.  - Pt to return to clinic in 2 weeks for wound assessment.      Sincerely,    Jose David Wing DPM

## 2018-01-23 NOTE — PROGRESS NOTES
Chief Complaint:   Chief Complaint   Patient presents with     Consult     Type 2 Diabetic. Toe nails. Issues, Left middle toe. Corn, left foot. Pt stated that he has seen Dr. Mejia in the past. Pt stated that he has other health issues and has seen a podiatrist in a year. Pt is wondering if he is eligible for new shoes.            Allergies   Allergen Reactions     Prednisone Other (See Comments)     He reports that he can't sleep for days and can't use small to massive doses of prednisone   Pt. Does not do well with high doses of Prednisone, His MD says that Prednisone is counter indicated. Prednisone failed to treat his sarcoidosis          Subjective: Rohan is a 72 year old male who presents to the clinic today for diabetic foot exam and management.  He relates that he was previously seen by Dr. Mejia in clinic.  He was coming regularly to get his diabetic foot exam, however he has had other health issues that he has been working through.  He relates that he has long thickened toenails that are causing him pain.  He also has a callus on the bottom of the left foot.  He also relates that he had a callus on the left third distal digit.  He pulled the soft a few weeks ago, and he related that there was a wound underneath.  He has been keeping the area covered, however the wound is not fully healed.  He was told by Dr. Foster to come back and schedule to have this wound looked at.    ROS: Answers for HPI/ROS submitted by the patient on 1/23/2018   General Symptoms: No  Skin Symptoms: No  HENT Symptoms: No  EYE SYMPTOMS: No  HEART SYMPTOMS: Yes  LUNG SYMPTOMS: Yes  INTESTINAL SYMPTOMS: No  URINARY SYMPTOMS: No  REPRODUCTIVE SYMPTOMS: No  SKELETAL SYMPTOMS: Yes  BLOOD SYMPTOMS: Yes  NERVOUS SYSTEM SYMPTOMS: No  MENTAL HEALTH SYMPTOMS: No  Cough: No  Sputum or phlegm: No  Coughing up blood: No  Difficulty breating or shortness of breath: Yes  Snoring: No  Wheezing: No  Difficulty breathing on exertion:  Yes  Nighttime Cough: No  Difficulty breathing when lying flat: No  Chest pain or pressure: No  Fast or irregular heartbeat: No  Pain in legs with walking: No  Trouble breathing while lying down: No  Fingers or toes appear blue: No  High blood pressure: No  Low blood pressure: No  Fainting: No  Murmurs: No  Pacemaker: No  Varicose veins: No  Edema or swelling: Yes  Wake up at night with shortness of breath: No  Light-headedness: No  Exercise intolerance: No  Back pain: Yes  Muscle aches: No  Neck pain: No  Swollen joints: Yes  Joint pain: Yes  Bone pain: Yes  Muscle cramps: No  Muscle weakness: No  Joint stiffness: No  Bone fracture: No  Anemia: No  Swollen glands: No  Easy bleeding or bruising: No      Objective    DP and PT pulses are nonpalpable bilaterally.  Capillary refill time is greater than 3 seconds.  Diminished pedal hair.  Protective sensation is diminished bilaterally.  Equinus is noted bilaterally.  Severe hammertoes and pes planus noted bilaterally.  Hammertoes are somewhat reducible in nature.  Nails are thickened, elongated, dystrophic, yellow, with subungual debris that is consistent with onychomycosis.  Hyperkeratotic lesion is noted on the plantar surface of the left foot at the third metatarsal head.  A diabetic pressure wound is noted at left  distal 3rd digit measuring 1.5cm x 1cm x 0.1cm.    Ochoa Classification: 2    Wound base: Pink/Granulation    Edges: hyperkeratotic    Drainage: light/serous    Odor: no    Undermining: no    Bone Exposure: No    Clinical Signs of Infection: No    After obtaining patient consent, the wound was irrigated with copious amounts of saline. A scalpel was then used to debride the wound into the subcutaneous tissue. The wound edges were debrided to healthy, bleeding tissue. Given the patient's lack of sensation, no anesthesia was necessary for the procedure.       Assessment: 72-year-old with type 3 diabetes with neuropathy with fungal nails ×10, tyloma, and new  diabetic wound secondary to pressure from the hammertoe on the left third digit.    Plan:   - Pt seen and evaluated  - Nails were debrided ×10.  Tyloma was debrided ×1.  -The wound was debrided as described.  I discussed with him the etiology of this wound.  This is coming from the constant hammertoe in the shoe.  I related to him that if we take the pressure off, this is a greater chance of healing.  However, because of the pressure and shape of this toe, this may not heal fully without having the toe surgically corrected.  He understands this.  - Today, the toe was cleansed and Medihoney was applied to the wound.  A dry sterile dressing was then applied to the toe.  This should be changed daily.  - Pt to return to clinic in 2 weeks for wound assessment.

## 2018-01-23 NOTE — NURSING NOTE
Reason For Visit:   Chief Complaint   Patient presents with     Consult     Type 2 Diabetic. Toe nails. Issues, Left middle toe. Corn, left foot. Pt stated that he has seen Dr. Mejia in the past. Pt stated that he has other health issues and has seen a podiatrist in a year. Pt is wondering if he is eligible for new shoes.         Pain Assessment  Patient Currently in Pain: Denies              Current Outpatient Prescriptions   Medication Sig Dispense Refill     order for DME Equipment being ordered: Nonadherent 2 x 2 dressings, cotton gauze   Dress wound daily 10 Units 1     doxycycline (VIBRAMYCIN) 100 MG capsule Take 1 capsule (100 mg) by mouth 2 times daily (Patient not taking: Reported on 1/9/2018) 20 capsule 0     colchicine (COLCRYS) 0.6 MG tablet 2 tabs at the onset of flare, then 1 tab every 2 hrs until pain is better or diarrhea occurs, up to 10 doses. Wait 3 days until taking again (Patient not taking: Reported on 1/22/2018) 30 tablet 0     HYDROcodone-acetaminophen (NORCO) 5-325 MG per tablet Take 1-2 tablets by mouth every 4 hours as needed for moderate to severe pain 15 tablet 0     tiotropium (SPIRIVA HANDIHALER) 18 MCG capsule Inhale contents of one capsule daily. 90 capsule 3     fluticasone-vilanterol (BREO ELLIPTA) 100-25 MCG/INH oral inhaler Inhale 1 puff into the lungs daily 3 Inhaler 3     albuterol (PROAIR HFA/PROVENTIL HFA/VENTOLIN HFA) 108 (90 BASE) MCG/ACT Inhaler Inhale 2 puffs into the lungs every 6 hours as needed for shortness of breath / dyspnea 3 Inhaler 6     methotrexate 2.5 MG tablet CHEMO Take 3 tablets (7.5 mg) by mouth once a week On Mondays 48 tablet 3     levothyroxine (SYNTHROID/LEVOTHROID) 137 MCG tablet Take 1 tablet (137 mcg) by mouth daily 90 tablet 1     furosemide (LASIX) 20 MG tablet Take 3 tablets (60 mg) by mouth 2 times daily May take extra 20 mg as needed for wt .gain >3# 240 tablet 11     warfarin (COUMADIN) 5 MG tablet TAKE ONE TABLET BY MOUTH ONE TIME DAILY   90 tablet 5     sildenafil (REVATIO) 20 MG tablet Take 1 tablet (20 mg) by mouth 3 times daily 270 tablet 1     atorvastatin (LIPITOR) 40 MG tablet TAKE ONE TABLET BY MOUTH ONE TIME DAILY  30 tablet 11     losartan (COZAAR) 50 MG tablet Take 1 tablet (50 mg) by mouth daily 90 tablet 3     order for DME Updated Oxygen: Patient requires supplemental Oxygen 3 LPM via nasal canula with activity and 2 LPM nocturnally. Please provide patient with a portable oxygen concentrator for improved mobility.  Okay to spot check or titrated for conserving to keep stats above 90%. Oxygen will be for a lifetime. 1 Device 0     order for DME She requested for a 4 wheel walker, would like to change it to 4 wheel walker with a seat and oxygen tank durant.     Need this faxed over to her   [phone] 1 Device 1     omeprazole (PRILOSEC) 20 MG CR capsule Take 1 capsule (20 mg) by mouth daily 90 capsule 3     clopidogrel (PLAVIX) 75 MG tablet Take 1 tablet (75 mg) by mouth daily 90 tablet 3     polyethylene glycol (MIRALAX) powder Take 17 g (1 capful) by mouth daily 510 g 1     inFLIXimab (REMICADE) 100 MG injection Inject 100 mg into the vein every 28 days        metoprolol (LOPRESSOR) 100 MG tablet Take 1 tablet (100 mg) by mouth 2 times daily 60 tablet 11     blood glucose monitoring (ACCU-CHEK RONNIE PLUS) test strip Use to test blood sugar 2 times daily or as directed.  3 month supply. 200 each 3     blood glucose monitoring (ACCU-CHEK FASTCLIX) lancets Use to test blood sugar 2 times daily or as directed.  102 lancets per box.  3 month supply. 2 Box 3     blood glucose monitoring (NO BRAND SPECIFIED) meter device kit Use to test blood sugar 2 times daily. 1 kit 0     Urea (CARMOL 40) 40 % CREA To feet daily 199 g 4     Multiple Vitamins-Minerals (MULTIVITAMIN & MINERAL PO) Take 1 tablet by mouth daily.       mirtazapine (REMERON) 15 MG tablet Take 15 mg by mouth At Bedtime.            Allergies   Allergen Reactions     Prednisone  Other (See Comments)     He reports that he can't sleep for days and can't use small to massive doses of prednisone   Pt. Does not do well with high doses of Prednisone, His MD says that Prednisone is counter indicated. Prednisone failed to treat his sarcoidosis

## 2018-01-24 ENCOUNTER — INFUSION THERAPY VISIT (OUTPATIENT)
Dept: INFUSION THERAPY | Facility: CLINIC | Age: 73
End: 2018-01-24
Attending: INTERNAL MEDICINE
Payer: MEDICARE

## 2018-01-24 ENCOUNTER — ANTICOAGULATION THERAPY VISIT (OUTPATIENT)
Dept: ANTICOAGULATION | Facility: CLINIC | Age: 73
End: 2018-01-24

## 2018-01-24 VITALS
SYSTOLIC BLOOD PRESSURE: 137 MMHG | TEMPERATURE: 97.5 F | WEIGHT: 168.8 LBS | BODY MASS INDEX: 26.43 KG/M2 | DIASTOLIC BLOOD PRESSURE: 70 MMHG | OXYGEN SATURATION: 98 %

## 2018-01-24 DIAGNOSIS — I48.92 ATRIAL FLUTTER WITH RAPID VENTRICULAR RESPONSE (H): ICD-10-CM

## 2018-01-24 DIAGNOSIS — D86.9 SARCOIDOSIS: ICD-10-CM

## 2018-01-24 DIAGNOSIS — Z79.01 LONG-TERM (CURRENT) USE OF ANTICOAGULANTS: ICD-10-CM

## 2018-01-24 DIAGNOSIS — I48.92 ATRIAL FLUTTER (H): ICD-10-CM

## 2018-01-24 DIAGNOSIS — D86.0 SARCOIDOSIS OF LUNG (H): Primary | ICD-10-CM

## 2018-01-24 LAB — INR PPP: 1.83 (ref 0.86–1.14)

## 2018-01-24 PROCEDURE — 25000128 H RX IP 250 OP 636: Mod: ZF | Performed by: INTERNAL MEDICINE

## 2018-01-24 PROCEDURE — 96415 CHEMO IV INFUSION ADDL HR: CPT

## 2018-01-24 PROCEDURE — 85610 PROTHROMBIN TIME: CPT | Performed by: INTERNAL MEDICINE

## 2018-01-24 PROCEDURE — 96413 CHEMO IV INFUSION 1 HR: CPT

## 2018-01-24 RX ORDER — ACETAMINOPHEN 325 MG/1
650 TABLET ORAL ONCE
Status: CANCELLED
Start: 2018-01-24 | End: 2018-01-24

## 2018-01-24 RX ADMIN — INFLIXIMAB 400 MG: 100 INJECTION, POWDER, LYOPHILIZED, FOR SOLUTION INTRAVENOUS at 12:27

## 2018-01-24 NOTE — MR AVS SNAPSHOT
After Visit Summary   1/24/2018    Rohan Monroe    MRN: [id number]           Patient Information     Date Of Birth          1945        Visit Information        Provider Department      1/24/2018 12:00 PM UC 47 ATC; UC SPEC INFUSION [facility]        Today's Diagnoses     Sarcoidosis of lung (HCC)    -  1    SARCOIDOSIS-systemic        Atrial flutter (H)           Follow-ups after your visit        Your next 10 appointments already scheduled     Jan 26, 2018  3:00 PM CST   Cardiac Treatment with  Cardiac Rehab 1   [facility] ([facility])    [address], [address]  [facility] [address]   [phone]            Jan 31, 2018  9:30 AM CST   US AORTA/IVC/ILIAC DUPLEX LIMITED with UCUSV2   [facility] US ([facility])    [address] [place] [address] [phone]           Please bring a list of your medicines (including vitamins, minerals and over-the-counter drugs). Also, tell your doctor about any allergies you may have. Wear comfortable clothes and leave your valuables at home.  Adults: No eating or drinking for 8 hours before the exam. You may take medicine with a small sip of water.  Children: - Children 6+ years: No food or drink for 6 hours before exam. - Children 1-5 years: No food or drink for 4 hours before exam. - Infants, breast-fed: may have breast milk up to 2 hours before exam. - Infants, formula: may have bottle until 4 hours before exam.  Please call the Imaging Department at your exam site with any questions.            Jan 31, 2018 10:40 AM CST   (Arrive by 10:25 AM)   XR PELVIS AND HIP BILATERAL 2 VIEWS with UCXR1   [facility] Xray ([facility])    [address] [address] [phone]           Please bring a list of your current  medicines to your exam. (Include vitamins, minerals and over-thecounter medicines.) Leave your valuables at home.  Tell your doctor if there is a chance you may be pregnant.  You do not need to do anything special for this exam.            Jan 31, 2018 11:40 AM CST   (Arrive by 11:25 AM)   Return Visit with Maria Victoria Palmer MD   [facility] ([facility])    [address]  [address]  [facility] [address] [phone]            Feb 06, 2018  1:00 PM CST   (Arrive by 12:45 PM)   RETURN FOOT/ANKLE with Jose David Wing DPM   [facility] ([facility])    [address]  4th Floor  [facility] [id number] [phone]            Feb 07, 2018  2:00 PM CST   Lab with UC LAB   [facility] ([facility])    [address]  1st Floor  [facility] [id number] [phone]            Feb 07, 2018  3:00 PM CST   (Arrive by 2:30 PM)   Return Visit with Ollie Michel MD   [facility] ([facility])    [address]  [address]  [facility] [id number] [phone]            Feb 21, 2018 12:00 PM CST   Infusion 180 with UC SPEC INFUSION, UC 47 ATC   [facility] Specialty [facility] Procedure ([facility])    [address]  [address]  [facility] [address] [phone]            Feb 26, 2018  1:00 PM CST   Lab with UC LAB   [facility] ([facility])    [address]  1st Floor  [facility] [id number] [phone]              Who to contact     If you have questions or need follow up information about today's clinic visit or your schedule please contact [facility] SPECIALTY AND PROCEDURE directly at [phone].  Normal or non-critical lab and imaging results will be communicated to you by MyChart, letter or phone  within 4 business days after the clinic has received the results. If you do not hear from us within 7 days, please contact the clinic through [employer] or phone. If you have a critical or abnormal lab result, we will notify you by phone as soon as possible.  Submit refill requests through [employer] or call your pharmacy and they will forward the refill request to us. Please allow 3 business days for your refill to be completed.          Additional Information About Your Visit        [employer] Information     [employer] gives you secure access to your electronic health record. If you see a primary care provider, you can also send messages to your care team and make appointments. If you have questions, please call your primary care clinic.  If you do not have a primary care provider, please call [phone] and they will assist you.        Care EveryWhere ID     This is your Care EveryWhere ID. This could be used by other organizations to access your [place] medical records  [id number]        Your Vitals Were     Temperature Pulse Oximetry BMI (Body Mass Index)             97.5  F (36.4  C) (Oral) 98% 26.43 kg/m2          Blood Pressure from Last 3 Encounters:   01/24/18 137/70   01/22/18 106/72   01/09/18 137/83    Weight from Last 3 Encounters:   01/24/18 76.6 kg (168 lb 12.8 oz)   01/22/18 75.6 kg (166 lb 11.2 oz)   01/09/18 82.8 kg (182 lb 8 oz)              We Performed the Following     INR        Primary Care Provider Office Phone # Fax #    Jamie Foster [phone]       [address] [address]        Equal Access to Services     [facility] : Hadii aad ku hadasho Soomaali, waaxda luqadaha, qaybta kaalmada vesna, nelly jacinto. So [facility] [phone].    ATENCIÓN: Si habla español, tiene a mcfadden disposición servicios gratuitos de asistencia lingüística. Osman al [phone].    We comply with applicable federal civil rights laws and [place]  laws. We do not discriminate on the basis of race, color, national origin, age, disability, sex, sexual orientation, or gender identity.            Thank you!     Thank you for choosing [facility] SPECIALTY AND PROCEDURE  for your care. Our goal is always to provide you with excellent care. Hearing back from our patients is one way we can continue to improve our services. Please take a few minutes to complete the written survey that you may receive in the mail after your visit with us. Thank you!             Your Updated Medication List - Protect others around you: Learn how to safely use, store and throw away your medicines at www.disposemymeds.org.          This list is accurate as of 1/24/18  2:48 PM.  Always use your most recent med list.                   Brand Name Dispense Instructions for use Diagnosis    albuterol 108 (90 BASE) MCG/ACT Inhaler    PROAIR HFA/PROVENTIL HFA/VENTOLIN HFA    3 Inhaler    Inhale 2 puffs into the lungs every 6 hours as needed for shortness of breath / dyspnea    Sarcoidosis       atorvastatin 40 MG tablet    LIPITOR    30 tablet    TAKE ONE TABLET BY MOUTH ONE TIME DAILY    Coronary artery disease involving native coronary artery of native heart without angina pectoris, CAD S/P percutaneous coronary angioplasty       blood glucose monitoring lancets     2 Box    Use to test blood sugar 2 times daily or as directed.  102 lancets per box.  3 month supply.    Type 2 diabetes, HbA1C goal < 8% (H)       blood glucose monitoring meter device kit    no brand specified    1 kit    Use to test blood sugar 2 times daily.    Type 2 diabetes mellitus with diabetic nephropathy (H)       blood glucose monitoring test strip    ACCU-CHEK RONNIE PLUS    200 each    Use to test blood sugar 2 times daily or as directed.  3 month supply.    Type 2 diabetes, HbA1C goal < 8% (H)       clopidogrel 75 MG tablet    PLAVIX    90 tablet    Take 1 tablet (75 mg) by mouth daily    CAD  S/P percutaneous coronary angioplasty, Coronary artery disease involving native coronary artery of native heart without angina pectoris       colchicine 0.6 MG tablet    COLCRYS    30 tablet    2 tabs at the onset of flare, then 1 tab every 2 hrs until pain is better or diarrhea occurs, up to 10 doses. Wait 3 days until taking again        doxycycline 100 MG capsule    VIBRAMYCIN    20 capsule    Take 1 capsule (100 mg) by mouth 2 times daily        fluticasone-vilanterol 100-25 MCG/INH oral inhaler    BREO ELLIPTA    3 Inhaler    Inhale 1 puff into the lungs daily    Chronic obstructive pulmonary disease, unspecified COPD type (H)       furosemide 20 MG tablet    LASIX    240 tablet    Take 3 tablets (60 mg) by mouth 2 times daily May take extra 20 mg as needed for wt .gain >3#    Pulmonary hypertension       HYDROcodone-acetaminophen 5-325 MG per tablet    NORCO    15 tablet    Take 1-2 tablets by mouth every 4 hours as needed for moderate to severe pain        inFLIXimab 100 MG injection    REMICADE     Inject 100 mg into the vein every 28 days        levothyroxine 137 MCG tablet    SYNTHROID/LEVOTHROID    90 tablet    Take 1 tablet (137 mcg) by mouth daily    Hypothyroidism       losartan 50 MG tablet    COZAAR    90 tablet    Take 1 tablet (50 mg) by mouth daily    (HFpEF) heart failure with preserved ejection fraction (H)       methotrexate 2.5 MG tablet CHEMO     48 tablet    Take 3 tablets (7.5 mg) by mouth once a week On Mondays    Sarcoidosis       metoprolol tartrate 100 MG tablet    LOPRESSOR    60 tablet    Take 1 tablet (100 mg) by mouth 2 times daily    Hypertension secondary to other renal disorders       mirtazapine 15 MG tablet    REMERON     Take 15 mg by mouth At Bedtime.        MULTIVITAMIN & MINERAL PO      Take 1 tablet by mouth daily.        omeprazole 20 MG CR capsule    priLOSEC    90 capsule    Take 1 capsule (20 mg) by mouth daily    CAD S/P percutaneous coronary angioplasty, Coronary  artery disease involving native coronary artery of native heart without angina pectoris, Sarcoidosis of lung (H)       * order for DME     1 Device    She requested for a 4 wheel walker, would like to change it to 4 wheel walker with a seat and oxygen tank durant.   Need this faxed over to her  [phone]    Sarcoidosis, lung (H), COPD (chronic obstructive pulmonary disease) (H), Abnormal gait, Congestive heart failure (H)       * order for DME     1 Device    Updated Oxygen: Patient requires supplemental Oxygen 3 LPM via nasal canula with activity and 2 LPM nocturnally. Please provide patient with a portable oxygen concentrator for improved mobility.  Okay to spot check or titrated for conserving to keep stats above 90%. Oxygen will be for a lifetime.    ILD (interstitial lung disease) (H), Hypoxia       * order for DME     10 Units    Equipment being ordered: Nonadherent 2 x 2 dressings, cotton gauze  Dress wound daily    Diabetic ulcer of toe of left foot associated with type 2 diabetes mellitus, limited to breakdown of skin (H)       polyethylene glycol powder    MIRALAX    510 g    Take 17 g (1 capful) by mouth daily    Constipation, unspecified constipation type       sildenafil 20 MG tablet    REVATIO    270 tablet    Take 1 tablet (20 mg) by mouth 3 times daily    Pulmonary hypertension       tiotropium 18 MCG capsule    SPIRIVA HANDIHALER    90 capsule    Inhale contents of one capsule daily.    Chronic obstructive pulmonary disease, unspecified COPD type (H)       Urea 40 % Crea    CARMOL 40    199 g    To feet daily    Dermatophytosis of nail, Corns and callosities       warfarin 5 MG tablet    COUMADIN    90 tablet    TAKE ONE TABLET BY MOUTH ONE TIME DAILY    Atrial flutter with rapid ventricular response (H)       * Notice:  This list has 3 medication(s) that are the same as other medications prescribed for you. Read the directions carefully, and ask your doctor or other care provider to review them  with you.

## 2018-01-24 NOTE — PROGRESS NOTES
Infusion Nursing Note  Rohan Monroe [facility] for:   Chief Complaint   Patient presents with     Infusion     remicade     During [facility]'s [facility] appointment, orders from Dr. Perlman were completed.  Frequency: every 4 weeks    Progress note:  Patient identification verified by name and date of birth.  Assessment completed.  Vitals recorded in Doc Flowsheets.  Patient was provided with education regarding infusion and possible side effects.  Patient verbalized understanding. 2    Premedications: pt has not historically taken premeds  Infusion Rates: infusion given over approximately 2 hours.  Labs: were not ordered for this appointment.  Vascular access: peripheral IV placed today.  Treatment Conditions: Biologic/Chemo Checklist ~~~ NOTE: If the patient answers yes to any of the questions below, hold the infusion and contact ordering provider or on-call provider.    1. Have you recently had an elevated temperature, fever, chills, productive cough, coughing for 3 weeks or longer or hemoptysis,  abnormal vital signs, night sweats,  chest pain or have you noticed a decrease in your appetite, unexplained weight loss or fatigue? No  2. Do you have any open wounds or new incisions? No  3. Do you have any recent or upcoming hospitalizations, surgeries or dental procedures? No  4. Do you currently have or recently have had any signs of illness or infection or are you on any antibiotics? No  5. Have you had any new, sudden or worsening abdominal pain? No  6. Have you or anyone in your household received a live vaccination in the past 4 weeks? Please note:  No live vaccines while on biologic/chemotherapy until 6 months after the last treatment.  Patient can receive the flu vaccine (shot only) and the pneumovax.  It is optimal for the patient to get these vaccines mid cycle, but they can be given at any time as long as it is not on the  day of the infusion. No  7. Have you recently been diagnosed with any new nervous system diseases (ie. Multiple sclerosis, Guillain [place], seizures, neurological changes) or cancer diagnosis? Are you on any form of radiation or chemotherapy? No  8. Are you pregnant or breast feeding or do you have plans of pregnancy in the future? N/A  9. Have you been having any signs of worsening depression or suicidal ideations?  (benlysta only) N/A  10. Have there been any other new onset medical symptoms? Yes, recently diagnosed with gout.    Patient tolerated infusion: well    Discharge Plan:   Follow up plan of care with: ongoing infusions [facility]. and primary medical doctor.  Discharge instructions were reviewed with patient.  Patient/representative verbalized understanding of discharge instructions and all questions answered.  Patient discharged [facility] in stable condition.    Rachel Ardon RN       Administrations This Visit     inFLIXimab (REMICADE) 400 mg in NaCl 0.9 % 315 mL infusion     Admin Date Action Dose Rate Route Administered By          01/24/2018 New Bag 400 mg 157.5 mL/hr Intravenous Rachel Ardon RN                           /62  Temp 97.5  F (36.4  C) (Oral)  Wt 76.6 kg (168 lb 12.8 oz)  SpO2 98%  BMI 26.43 kg/m2

## 2018-01-24 NOTE — MR AVS SNAPSHOT
Rohan Monroe   1/24/2018   Anticoagulation Therapy Visit    Description:  72 year old male   Provider:  Delphine Kaur, RN   Department:  [facility]           INR as of 1/24/2018     Today's INR 1.83!      Anticoagulation Summary as of 1/24/2018     INR goal 2.0-3.0   Today's INR 1.83!   Full instructions 1/24: 7.5 mg; Otherwise 5 mg every day   Next INR check 2/7/2018    Indications   Long-term (current) use of anticoagulants [Z79.01] [Z79.01]  Atrial flutter with rapid ventricular response (H) [I48.92]         January 2018 Details    Sun Mon Tue Wed Thu Fri Sat      1               2               3               4               5               6                 7               8               9               10               11               12               13                 14               15               16               17               18               19               20                 21               22               23               24      7.5 mg   See details      25      5 mg         26      5 mg         27      5 mg           28      5 mg         29      5 mg         30      5 mg         31      5 mg             Date Details   01/24 This INR check               How to take your warfarin dose     To take:  5 mg Take 1 of the 5 mg tablets.    To take:  7.5 mg Take 1.5 of the 5 mg tablets.           February 2018 Details    Sun Mon Tue Wed Thu Fri Sat         1      5 mg         2      5 mg         3      5 mg           4      5 mg         5      5 mg         6      5 mg         7            8               9               10                 11               12               13               14               15               16               17                 18               19               20               21               22               23               24                 25               26               27               28                   Date Details   No  additional details    Date of next INR:  2/7/2018         How to take your warfarin dose     To take:  5 mg Take 1 of the 5 mg tablets.

## 2018-01-26 ENCOUNTER — TELEPHONE (OUTPATIENT)
Dept: NEPHROLOGY | Facility: CLINIC | Age: 73
End: 2018-01-26

## 2018-01-26 ENCOUNTER — HOSPITAL ENCOUNTER (OUTPATIENT)
Dept: CARDIAC REHAB | Facility: CLINIC | Age: 73
End: 2018-01-26
Attending: INTERNAL MEDICINE
Payer: MEDICARE

## 2018-01-26 DIAGNOSIS — I15.1 HYPERTENSION SECONDARY TO OTHER RENAL DISORDERS: ICD-10-CM

## 2018-01-26 PROCEDURE — 40000116 ZZH STATISTIC OP CR VISIT: Performed by: REHABILITATION PRACTITIONER

## 2018-01-26 PROCEDURE — 93798 PHYS/QHP OP CAR RHAB W/ECG: CPT | Performed by: REHABILITATION PRACTITIONER

## 2018-01-26 RX ORDER — METOPROLOL TARTRATE 100 MG
100 TABLET ORAL 2 TIMES DAILY
Qty: 60 TABLET | Refills: 11 | Status: ON HOLD | OUTPATIENT
Start: 2018-01-26 | End: 2018-11-26

## 2018-01-26 NOTE — TELEPHONE ENCOUNTER
"Patient reports that blood pressures are doing \"good\", in epic below and that he had swelling but this is better now (LE), weight 169. Patient reports that he has been in the Cardiac Rehab Program and this is great. Currently taking lasix 60 mg BID, losartan 50 mg daily, metoprolol 100 mg BID. Patient reports to be doing well though he is concerned about taking omeprazole and his kidneys. Patient states that he thinks he is taking too many medications. Will update Dr. Michel and follow up with recommendations.  Elisa Cardona LPN  Nephrology  [facility]  [phone]    "

## 2018-01-28 ENCOUNTER — CARE COORDINATION (OUTPATIENT)
Dept: CARDIOLOGY | Facility: CLINIC | Age: 73
End: 2018-01-28

## 2018-01-28 DIAGNOSIS — I50.9 CONGESTIVE HEART FAILURE (H): Primary | ICD-10-CM

## 2018-01-29 ENCOUNTER — HOSPITAL ENCOUNTER (OUTPATIENT)
Dept: CARDIAC REHAB | Facility: CLINIC | Age: 73
End: 2018-01-29
Attending: INTERNAL MEDICINE
Payer: MEDICARE

## 2018-01-29 DIAGNOSIS — N18.30 CKD (CHRONIC KIDNEY DISEASE) STAGE 3, GFR 30-59 ML/MIN (H): Primary | ICD-10-CM

## 2018-01-29 DIAGNOSIS — D63.1 ANEMIA IN CHRONIC KIDNEY DISEASE (CODE): ICD-10-CM

## 2018-01-29 PROCEDURE — 93798 PHYS/QHP OP CAR RHAB W/ECG: CPT

## 2018-01-29 PROCEDURE — 40000116 ZZH STATISTIC OP CR VISIT

## 2018-01-29 NOTE — PROGRESS NOTES
Outpatient cardiac rehab recommendations received to continue with monitored exercise.  New order placed.

## 2018-01-31 ENCOUNTER — HOSPITAL ENCOUNTER (OUTPATIENT)
Dept: CARDIAC REHAB | Facility: CLINIC | Age: 73
End: 2018-01-31
Attending: INTERNAL MEDICINE
Payer: MEDICARE

## 2018-01-31 PROCEDURE — 40000116 ZZH STATISTIC OP CR VISIT: Performed by: OCCUPATIONAL THERAPIST

## 2018-01-31 PROCEDURE — 93798 PHYS/QHP OP CAR RHAB W/ECG: CPT | Performed by: OCCUPATIONAL THERAPIST

## 2018-02-02 ENCOUNTER — HOSPITAL ENCOUNTER (OUTPATIENT)
Dept: CARDIAC REHAB | Facility: CLINIC | Age: 73
End: 2018-02-02
Attending: INTERNAL MEDICINE
Payer: MEDICARE

## 2018-02-02 PROCEDURE — 40000116 ZZH STATISTIC OP CR VISIT: Performed by: REHABILITATION PRACTITIONER

## 2018-02-02 PROCEDURE — 93798 PHYS/QHP OP CAR RHAB W/ECG: CPT | Performed by: REHABILITATION PRACTITIONER

## 2018-02-05 ENCOUNTER — HOSPITAL ENCOUNTER (OUTPATIENT)
Dept: CARDIAC REHAB | Facility: CLINIC | Age: 73
End: 2018-02-05
Attending: INTERNAL MEDICINE
Payer: MEDICARE

## 2018-02-05 PROCEDURE — 40000116 ZZH STATISTIC OP CR VISIT: Performed by: OCCUPATIONAL THERAPIST

## 2018-02-05 PROCEDURE — 93798 PHYS/QHP OP CAR RHAB W/ECG: CPT | Mod: KX | Performed by: OCCUPATIONAL THERAPIST

## 2018-02-05 NOTE — ADDENDUM NOTE
Encounter addended by: Gerardo Kruger EP on: 2/5/2018  4:02 PM<BR>     Actions taken: Charge Capture section accepted

## 2018-02-09 ENCOUNTER — HOSPITAL ENCOUNTER (OUTPATIENT)
Dept: CARDIAC REHAB | Facility: CLINIC | Age: 73
End: 2018-02-09
Attending: INTERNAL MEDICINE
Payer: MEDICARE

## 2018-02-09 PROCEDURE — 40000116 ZZH STATISTIC OP CR VISIT: Performed by: OCCUPATIONAL THERAPIST

## 2018-02-09 PROCEDURE — 93798 PHYS/QHP OP CAR RHAB W/ECG: CPT | Mod: KX | Performed by: OCCUPATIONAL THERAPIST

## 2018-02-12 ENCOUNTER — HOSPITAL ENCOUNTER (OUTPATIENT)
Dept: CARDIAC REHAB | Facility: CLINIC | Age: 73
End: 2018-02-12
Attending: INTERNAL MEDICINE
Payer: MEDICARE

## 2018-02-12 PROCEDURE — 93798 PHYS/QHP OP CAR RHAB W/ECG: CPT | Mod: KX | Performed by: OCCUPATIONAL THERAPIST

## 2018-02-12 PROCEDURE — 40000116 ZZH STATISTIC OP CR VISIT: Mod: KX | Performed by: OCCUPATIONAL THERAPIST

## 2018-02-13 NOTE — ADDENDUM NOTE
Encounter addended by: Gerardo Kruger EP on: 2/13/2018  3:34 PM<BR>     Actions taken: Charge Capture section accepted

## 2018-02-14 ENCOUNTER — RADIANT APPOINTMENT (OUTPATIENT)
Dept: ULTRASOUND IMAGING | Facility: CLINIC | Age: 73
End: 2018-02-14
Attending: RADIOLOGY
Payer: MEDICARE

## 2018-02-14 ENCOUNTER — RADIANT APPOINTMENT (OUTPATIENT)
Dept: GENERAL RADIOLOGY | Facility: CLINIC | Age: 73
End: 2018-02-14
Attending: RADIOLOGY
Payer: MEDICARE

## 2018-02-14 ENCOUNTER — OFFICE VISIT (OUTPATIENT)
Dept: RADIOLOGY | Facility: CLINIC | Age: 73
End: 2018-02-14
Attending: RADIOLOGY
Payer: MEDICARE

## 2018-02-14 VITALS
TEMPERATURE: 95.8 F | RESPIRATION RATE: 22 BRPM | BODY MASS INDEX: 26.71 KG/M2 | HEART RATE: 65 BPM | OXYGEN SATURATION: 99 % | SYSTOLIC BLOOD PRESSURE: 153 MMHG | DIASTOLIC BLOOD PRESSURE: 88 MMHG | WEIGHT: 170.6 LBS

## 2018-02-14 DIAGNOSIS — I72.3 ANEURYSM OF ILIAC ARTERY (H): ICD-10-CM

## 2018-02-14 DIAGNOSIS — I72.3 PSEUDOANEURYSM OF ILIAC ARTERY (H): Primary | ICD-10-CM

## 2018-02-14 PROCEDURE — 40000114 ZZH STATISTIC NO CHARGE CLINIC VISIT

## 2018-02-14 NOTE — LETTER
2/14/2018       RE: Rohan Monroe  [address]  [place] [address]     Dear Colleague,    Thank you for referring your patient, Rohan Monroe, to the [facility]. Please see a copy of my visit note below.        Interventional Radiology Clinic Visit  Chief Complaint   Patient presents with     Oncology Clinic Visit     Pt is here for a RTN     HPI: Mr Monroe is a 72 year old gentleman who returns to clinic today for followup of right EIA stent placed 5/13/2017 in the setting of retroperitoneal bleed following cardiac angiography. Stent placement was complicated by left CFA PSA treated successfully with US guided thrombin injection. Since last visit, Mr Monroe states he has been doing well. He denies groin pain, leg pain with walking, lower extremity swelling, or skin changes/wounds on his legs or feet. His shortness of breath feels improved with cardiac rehab and he denies chest pain. He is taking his warfarin and plavix per cardiology team recommendations.    PMH:   Past Medical History:   Diagnosis Date     Atrial flutter (H)      Cataract of both eyes      Chronic infection     Hep C     Congestive heart failure, unspecified      Coronary artery disease      Depressive disorder      Depressive disorder, not elsewhere classified     Depression (non-psychotic)     ERM OS (epiretinal membrane, left eye)      Generalized osteoarthrosis, unspecified site      Glaucoma suspect      Hypertension      Lichen planus      Other psoriasis      PVD (posterior vitreous detachment), left eye      Sarcoidosis      Sarcoidosis      Type II or unspecified type diabetes mellitus without mention of complication, not stated as uncontrolled      Unspecified hypothyroidism     Hypothyroidism     Unspecified viral hepatitis C without hepatic coma      Viral warts, unspecified        PSH:   Past Surgical History:   Procedure Laterality Date     ANESTHESIA CARDIOVERSION N/A  1/19/2017    Procedure: ANESTHESIA CARDIOVERSION;  Surgeon: GENERIC ANESTHESIA PROVIDER;  Location: [facility] OR     ANESTHESIA CARDIOVERSION N/A 1/23/2017    Procedure: ANESTHESIA CARDIOVERSION;  Surgeon: GENERIC ANESTHESIA PROVIDER;  Location: [facility] OR     ANESTHESIA CARDIOVERSION N/A 1/24/2017    Procedure: ANESTHESIA CARDIOVERSION;  Surgeon: GENERIC ANESTHESIA PROVIDER;  Location: [facility] OR     ARTHROPLASTY HIP  8/24/2011    Procedure:ARTHROPLASTY HIP; Right Total Hip Arthroplasty  Choice anesthesia; Surgeon:LESLI WILKINSON; Location:[facility] OR     BIOPSY       C PELVIS/HIP JOINT SURGERY UNLISTED       cardiac stent      s/p     CARDIAC SURGERY       CATARACT IOL, RT/LT  9/15/2015    LE     COLONOSCOPY       CORONARY ANGIOGRAPHY ADULT ORDER       H ABLATION ATRIAL FLUTTER       HC REMOVAL OF TONSILS,<9 Y/O      Tonsils <10y.o.     HC REPAIR INCISIONAL HERNIA,REDUCIBLE      Hernia Repair, Incisional, Unilateral     HEART CATH, ANGIOPLASTY       JOINT REPLACEMENT      1 month ago--right hip     LIGATN/STRIP LONG & SHORT SAPHEN         Family History:   Family History   Problem Relation Age of Onset     HEART DISEASE Father      irreg heart beat     Circulatory Father      varicose veins     Prostate Cancer Father      HEART DISEASE Mother      heart attack     Arthritis Mother      OSTEOPOROSIS Mother      Thyroid Disease Mother      Hypertension Mother      Eye Disorder Maternal Grandmother      glaucoma     DIABETES Sister      type 2     Kidney Cancer Sister      DIABETES Sister      Glaucoma No family hx of      Macular Degeneration No family hx of      CANCER No family hx of      no skin cancer       Social History:   Social History   Substance Use Topics     Smoking status: Former Smoker     Packs/day: 1.00     Years: 30.00     Types: Cigarettes     Start date: 12/30/1960     Quit date: 7/22/1994     Smokeless tobacco: Never Used     Alcohol use No     Medications:   Current Outpatient Prescriptions   Medication Sig  Dispense Refill     metoprolol tartrate (LOPRESSOR) 100 MG tablet Take 1 tablet (100 mg) by mouth 2 times daily 60 tablet 11     tiotropium (SPIRIVA HANDIHALER) 18 MCG capsule Inhale contents of one capsule daily. 90 capsule 3     fluticasone-vilanterol (BREO ELLIPTA) 100-25 MCG/INH oral inhaler Inhale 1 puff into the lungs daily 3 Inhaler 3     albuterol (PROAIR HFA/PROVENTIL HFA/VENTOLIN HFA) 108 (90 BASE) MCG/ACT Inhaler Inhale 2 puffs into the lungs every 6 hours as needed for shortness of breath / dyspnea 3 Inhaler 6     methotrexate 2.5 MG tablet CHEMO Take 3 tablets (7.5 mg) by mouth once a week On Mondays 48 tablet 3     levothyroxine (SYNTHROID/LEVOTHROID) 137 MCG tablet Take 1 tablet (137 mcg) by mouth daily 90 tablet 1     furosemide (LASIX) 20 MG tablet Take 3 tablets (60 mg) by mouth 2 times daily May take extra 20 mg as needed for wt .gain >3# 240 tablet 11     warfarin (COUMADIN) 5 MG tablet TAKE ONE TABLET BY MOUTH ONE TIME DAILY  90 tablet 5     sildenafil (REVATIO) 20 MG tablet Take 1 tablet (20 mg) by mouth 3 times daily 270 tablet 1     atorvastatin (LIPITOR) 40 MG tablet TAKE ONE TABLET BY MOUTH ONE TIME DAILY  30 tablet 11     losartan (COZAAR) 50 MG tablet Take 1 tablet (50 mg) by mouth daily 90 tablet 3     omeprazole (PRILOSEC) 20 MG CR capsule Take 1 capsule (20 mg) by mouth daily 90 capsule 3     clopidogrel (PLAVIX) 75 MG tablet Take 1 tablet (75 mg) by mouth daily 90 tablet 3     inFLIXimab (REMICADE) 100 MG injection Inject 100 mg into the vein every 28 days        blood glucose monitoring (ACCU-CHEK RONNIE PLUS) test strip Use to test blood sugar 2 times daily or as directed.  3 month supply. 200 each 3     blood glucose monitoring (ACCU-CHEK FASTCLIX) lancets Use to test blood sugar 2 times daily or as directed.  102 lancets per box.  3 month supply. 2 Box 3     blood glucose monitoring (NO BRAND SPECIFIED) meter device kit Use to test blood sugar 2 times daily. 1 kit 0     Urea  (CARMOL 40) 40 % CREA To feet daily 199 g 4     Multiple Vitamins-Minerals (MULTIVITAMIN & MINERAL PO) Take 1 tablet by mouth daily.       mirtazapine (REMERON) 15 MG tablet Take 15 mg by mouth At Bedtime.       order for DME Equipment being ordered: Nonadherent 2 x 2 dressings, cotton gauze   Dress wound daily 10 Units 1     doxycycline (VIBRAMYCIN) 100 MG capsule Take 1 capsule (100 mg) by mouth 2 times daily (Patient not taking: Reported on 1/9/2018) 20 capsule 0     colchicine (COLCRYS) 0.6 MG tablet 2 tabs at the onset of flare, then 1 tab every 2 hrs until pain is better or diarrhea occurs, up to 10 doses. Wait 3 days until taking again (Patient not taking: Reported on 1/22/2018) 30 tablet 0     HYDROcodone-acetaminophen (NORCO) 5-325 MG per tablet Take 1-2 tablets by mouth every 4 hours as needed for moderate to severe pain (Patient not taking: Reported on 2/14/2018) 15 tablet 0     order for DME Updated Oxygen: Patient requires supplemental Oxygen 3 LPM via nasal canula with activity and 2 LPM nocturnally. Please provide patient with a portable oxygen concentrator for improved mobility.  Okay to spot check or titrated for conserving to keep stats above 90%. Oxygen will be for a lifetime. 1 Device 0     order for DME She requested for a 4 wheel walker, would like to change it to 4 wheel walker with a seat and oxygen tank durant.     Need this faxed over to her   [phone] 1 Device 1     polyethylene glycol (MIRALAX) powder Take 17 g (1 capful) by mouth daily (Patient not taking: Reported on 2/14/2018) 510 g 1     ROS:   Constitutional: No fever, chills, or sweats. No weight gain/loss  Respiratory: No cough, hemoptysis  Cardiovascular: As per HPI.     EXAM:  /88 (BP Location: Right arm, Patient Position: Right side, Cuff Size: Adult Regular)  Pulse 65  Temp 95.8  F (35.4  C) (Tympanic)  Resp 22  Wt 77.4 kg (170 lb 9.6 oz)  SpO2 99%  BMI 26.71 kg/m2  Rohan is a 72 year old male  Physical Exam    Constitutional: He is oriented to person, place, and time and well-developed, well-nourished, and in no distress.   HENT:   Head: Normocephalic and atraumatic.   Eyes: No scleral icterus.   Cardiovascular: Normal rate and regular rhythm.    Pulmonary/Chest: Effort normal and breath sounds normal.   Abdominal: Soft. There is no tenderness.   Neurological: He is alert and oriented to person, place, and time.   Psychiatric: Affect and judgment normal.   Vascular: Palpable bilateral TP/DP pulses    Labs:   Lab Results   Component Value Date    WBC 13.5 01/05/2018     Lab Results   Component Value Date    INR 1.83 01/24/2018      Lab Results   Component Value Date     01/05/2018        Imaging Results: RLE arterial US today reviewed by me. It shows widely patent stent with normal triphasic waveforms with leg straight and in flexion. XR also reviewed by me and shows no evidence of stent fracture or deformation.    Assessment and Plan: Mr Monroe is a 72 year old male who presents to clinic for followup of right EIA stent placed in the setting of retroperitoneal hemorrhage on 5/13/2017 with PSA of the left CFA access site treated with US guided thrombin injection. Mr Monroe continues to do well with regards to the stent. He denies any groin pain, leg pain, claudication symptoms, or symptoms of embolization. He continues his Plavix and warfarin as prescribed by his cardiac team.    Return to clinic in 6 months with repeat pelvis XR (neutral and hip flexion positions) and right pelvic arterial US with leg straight and in flexion.    I was present for the entire clinic visit, personally reviewed imaging, and agree with the assessment and plan documented by Dr. Gonzales.    It was a pleasure to see Mr. Monroe in clinic today. Thank you for involving the Interventional Radiology service in his care.     I spent approximately 15 minutes with this patient, over 50% was for counseling.     Maria Victoria Palmer,  MD  Interventional Radiology Attending                     [facility]

## 2018-02-14 NOTE — MR AVS SNAPSHOT
After Visit Summary   2/14/2018    Rohan Monroe    MRN: [id number]           Patient Information     Date Of Birth          1945        Visit Information        Provider Department      2/14/2018 11:00 AM Maria Victoria Palmer MD [facility]        Today's Diagnoses     Pseudoaneurysm of iliac artery (H)    -  1       Follow-ups after your visit        Your next 10 appointments already scheduled     Feb 16, 2018  3:00 PM CST   Cardiac Treatment with  Cardiac Rehab 1   [facility] ([facility])    [address]  [facility] [address]   [phone]            Feb 19, 2018  1:00 PM CST   Cardiac Treatment with  Cardiac Rehab 1   [facility] ([facility])    [address]  [facility] [address]   [phone]            Feb 21, 2018 12:00 PM CST   Infusion 180 with UC SPEC INFUSION, UC 47 ATC   [facility] ([facility])    [address] [place] [address]  [address]  [facility] [address]   [phone]            Feb 23, 2018  3:00 PM CST   Cardiac Treatment with  Cardiac Rehab 1   [facility] ([facility])    [address]  [facility] [address]   [phone]            Feb 26, 2018  8:35 AM CST   (Arrive by 8:20 AM)   Return Visit with Jamie Foster MD   [facility] ([facility])    [address] [place] [address]  4th Floor  [facility] [id number] [phone]            Feb 26, 2018  1:00 PM CST   Lab with UC LAB   [facility] Lab ([facility])    [address] [place] [address]  1st Floor  [facility] [id number] [phone]            Feb 26, 2018  2:00 PM CST   (Arrive by 1:45 PM)   Return General Liver with Moses Orosco MD   [facility] Hepatology ([facility])     [address]  [address]  [facility] [address]   [phone]            Feb 28, 2018  1:00 PM CST   Cardiac Treatment with Sh Cardiac Rehab 1   [facility] ([facility])    [address]  [place] [address]   [phone]            Mar 02, 2018  3:00 PM CST   Cardiac Treatment with Sh Cardiac Rehab 1   [facility] ([facility])    [address]  [facility] [address]   [phone]              Who to contact     If you have questions or need follow up information about today's clinic visit or your schedule please contact [facility] directly at [phone].  Normal or non-critical lab and imaging results will be communicated to you by MyChart, letter or phone within 4 business days after the clinic has received the results. If you do not hear from us within 7 days, please contact the clinic through Upow[employer] or phone. If you have a critical or abnormal lab result, we will notify you by phone as soon as possible.  Submit refill requests through UserTesting or call your pharmacy and they will forward the refill request to us. Please allow 3 business days for your refill to be completed.          Additional Information About Your Visit        MyChart Information     UserTesting gives you secure access to your electronic health record. If you see a primary care provider, you can also send messages to your care team and make appointments. If you have questions, please call your primary care clinic.  If you do not have a primary care provider, please call [phone] and they will assist you.        Care EveryWhere ID     This is your Care EveryWhere ID. This could be used by other organizations to access your [place] medical records  [id number]        Your Vitals Were     Pulse Temperature Respirations Pulse Oximetry BMI (Body Mass Index)       65 95.8  F (35.4  C) (Tympanic) 22 99%  26.71 kg/m2        Blood Pressure from Last 3 Encounters:   02/14/18 153/88   01/24/18 137/70   01/22/18 106/72    Weight from Last 3 Encounters:   02/14/18 77.4 kg (170 lb 9.6 oz)   01/24/18 76.6 kg (168 lb 12.8 oz)   01/22/18 75.6 kg (166 lb 11.2 oz)              Today, you had the following     No orders found for display       Primary Care Provider Office Phone # Fax #    Jamie Foster [phone] [address] [address]        Equal Access to Services     [facility]: Hadii rhys Lewis, waaxda luqadaha, qaybta kaalmada vesna, nelly munson . So [facility] [phone].    ATENCIÓN: Si habla español, tiene a mcfadden disposición servicios gratuitos de asistencia lingüística. [facility] [phone].    We comply with applicable federal civil rights laws and [place] laws. We do not discriminate on the basis of race, color, national origin, age, disability, sex, sexual orientation, or gender identity.            Thank you!     Thank you for choosing [facility]  for your care. Our goal is always to provide you with excellent care. Hearing back from our patients is one way we can continue to improve our services. Please take a few minutes to complete the written survey that you may receive in the mail after your visit with us. Thank you!             Your Updated Medication List - Protect others around you: Learn how to safely use, store and throw away your medicines at www.disposemymeds.org.          This list is accurate as of 2/14/18 12:32 PM.  Always use your most recent med list.                   Brand Name Dispense Instructions for use Diagnosis    albuterol 108 (90 BASE) MCG/ACT Inhaler    PROAIR HFA/PROVENTIL HFA/VENTOLIN HFA    3 Inhaler    Inhale 2 puffs into the lungs every 6 hours as needed for shortness of breath / dyspnea    Sarcoidosis       atorvastatin 40 MG tablet    LIPITOR    30 tablet    TAKE  ONE TABLET BY MOUTH ONE TIME DAILY    Coronary artery disease involving native coronary artery of native heart without angina pectoris, CAD S/P percutaneous coronary angioplasty       blood glucose monitoring lancets     2 Box    Use to test blood sugar 2 times daily or as directed.  102 lancets per box.  3 month supply.    Type 2 diabetes, HbA1C goal < 8% (H)       blood glucose monitoring meter device kit    no brand specified    1 kit    Use to test blood sugar 2 times daily.    Type 2 diabetes mellitus with diabetic nephropathy (H)       blood glucose monitoring test strip    ACCU-CHEK RONNIE PLUS    200 each    Use to test blood sugar 2 times daily or as directed.  3 month supply.    Type 2 diabetes, HbA1C goal < 8% (H)       clopidogrel 75 MG tablet    PLAVIX    90 tablet    Take 1 tablet (75 mg) by mouth daily    CAD S/P percutaneous coronary angioplasty, Coronary artery disease involving native coronary artery of native heart without angina pectoris       colchicine 0.6 MG tablet    COLCRYS    30 tablet    2 tabs at the onset of flare, then 1 tab every 2 hrs until pain is better or diarrhea occurs, up to 10 doses. Wait 3 days until taking again        doxycycline 100 MG capsule    VIBRAMYCIN    20 capsule    Take 1 capsule (100 mg) by mouth 2 times daily        fluticasone-vilanterol 100-25 MCG/INH oral inhaler    BREO ELLIPTA    3 Inhaler    Inhale 1 puff into the lungs daily    Chronic obstructive pulmonary disease, unspecified COPD type (H)       furosemide 20 MG tablet    LASIX    240 tablet    Take 3 tablets (60 mg) by mouth 2 times daily May take extra 20 mg as needed for wt .gain >3#    Pulmonary hypertension       HYDROcodone-acetaminophen 5-325 MG per tablet    NORCO    15 tablet    Take 1-2 tablets by mouth every 4 hours as needed for moderate to severe pain        inFLIXimab 100 MG injection    REMICADE     Inject 100 mg into the vein every 28 days        levothyroxine 137 MCG tablet     SYNTHROID/LEVOTHROID    90 tablet    Take 1 tablet (137 mcg) by mouth daily    Hypothyroidism       losartan 50 MG tablet    COZAAR    90 tablet    Take 1 tablet (50 mg) by mouth daily    (HFpEF) heart failure with preserved ejection fraction (H)       methotrexate 2.5 MG tablet CHEMO     48 tablet    Take 3 tablets (7.5 mg) by mouth once a week On Mondays    Sarcoidosis       metoprolol tartrate 100 MG tablet    LOPRESSOR    60 tablet    Take 1 tablet (100 mg) by mouth 2 times daily    Hypertension secondary to other renal disorders       mirtazapine 15 MG tablet    REMERON     Take 15 mg by mouth At Bedtime.        MULTIVITAMIN & MINERAL PO      Take 1 tablet by mouth daily.        omeprazole 20 MG CR capsule    priLOSEC    90 capsule    Take 1 capsule (20 mg) by mouth daily    CAD S/P percutaneous coronary angioplasty, Coronary artery disease involving native coronary artery of native heart without angina pectoris, Sarcoidosis of lung (H)       * order for DME     1 Device    She requested for a 4 wheel walker, would like to change it to 4 wheel walker with a seat and oxygen tank durant.   Need this faxed over to her  [phone]    Sarcoidosis, lung (H), COPD (chronic obstructive pulmonary disease) (H), Abnormal gait, Congestive heart failure (H)       * order for DME     1 Device    Updated Oxygen: Patient requires supplemental Oxygen 3 LPM via nasal canula with activity and 2 LPM nocturnally. Please provide patient with a portable oxygen concentrator for improved mobility.  Okay to spot check or titrated for conserving to keep stats above 90%. Oxygen will be for a lifetime.    ILD (interstitial lung disease) (H), Hypoxia       * order for DME     10 Units    Equipment being ordered: Nonadherent 2 x 2 dressings, cotton gauze  Dress wound daily    Diabetic ulcer of toe of left foot associated with type 2 diabetes mellitus, limited to breakdown of skin (H)       polyethylene glycol powder    MIRALAX    510 g     Take 17 g (1 capful) by mouth daily    Constipation, unspecified constipation type       sildenafil 20 MG tablet    REVATIO    270 tablet    Take 1 tablet (20 mg) by mouth 3 times daily    Pulmonary hypertension       tiotropium 18 MCG capsule    SPIRIVA HANDIHALER    90 capsule    Inhale contents of one capsule daily.    Chronic obstructive pulmonary disease, unspecified COPD type (H)       Urea 40 % Crea    CARMOL 40    199 g    To feet daily    Dermatophytosis of nail, Corns and callosities       warfarin 5 MG tablet    COUMADIN    90 tablet    TAKE ONE TABLET BY MOUTH ONE TIME DAILY    Atrial flutter with rapid ventricular response (H)       * Notice:  This list has 3 medication(s) that are the same as other medications prescribed for you. Read the directions carefully, and ask your doctor or other care provider to review them with you.

## 2018-02-14 NOTE — PROGRESS NOTES
Interventional Radiology Clinic Visit  Chief Complaint   Patient presents with     Oncology Clinic Visit     Pt is here for a RTN     HPI: Mr Monroe is a 72 year old gentleman who returns to clinic today for followup of right EIA stent placed 5/13/2017 in the setting of retroperitoneal bleed following cardiac angiography. Stent placement was complicated by left CFA PSA treated successfully with US guided thrombin injection. Since last visit, Mr Monroe states he has been doing well. He denies groin pain, leg pain with walking, lower extremity swelling, or skin changes/wounds on his legs or feet. His shortness of breath feels improved with cardiac rehab and he denies chest pain. He is taking his warfarin and plavix per cardiology team recommendations.    PMH:   Past Medical History:   Diagnosis Date     Atrial flutter (H)      Cataract of both eyes      Chronic infection     Hep C     Congestive heart failure, unspecified      Coronary artery disease      Depressive disorder      Depressive disorder, not elsewhere classified     Depression (non-psychotic)     ERM OS (epiretinal membrane, left eye)      Generalized osteoarthrosis, unspecified site      Glaucoma suspect      Hypertension      Lichen planus      Other psoriasis      PVD (posterior vitreous detachment), left eye      Sarcoidosis      Sarcoidosis      Type II or unspecified type diabetes mellitus without mention of complication, not stated as uncontrolled      Unspecified hypothyroidism     Hypothyroidism     Unspecified viral hepatitis C without hepatic coma      Viral warts, unspecified        PSH:   Past Surgical History:   Procedure Laterality Date     ANESTHESIA CARDIOVERSION N/A 1/19/2017    Procedure: ANESTHESIA CARDIOVERSION;  Surgeon: GENERIC ANESTHESIA PROVIDER;  Location: [facility] OR     ANESTHESIA CARDIOVERSION N/A 1/23/2017    Procedure: ANESTHESIA CARDIOVERSION;  Surgeon: GENERIC ANESTHESIA PROVIDER;  Location: [facility] OR     ANESTHESIA  CARDIOVERSION N/A 1/24/2017    Procedure: ANESTHESIA CARDIOVERSION;  Surgeon: GENERIC ANESTHESIA PROVIDER;  Location: [facility] OR     ARTHROPLASTY HIP  8/24/2011    Procedure:ARTHROPLASTY HIP; Right Total Hip Arthroplasty  Choice anesthesia; Surgeon:LESLI WILKINSON; Location:[facility]     BIOPSY       C PELVIS/HIP JOINT SURGERY UNLISTED       cardiac stent      s/p     CARDIAC SURGERY       CATARACT IOL, RT/LT  9/15/2015    LE     COLONOSCOPY       CORONARY ANGIOGRAPHY ADULT ORDER       H ABLATION ATRIAL FLUTTER       HC REMOVAL OF TONSILS,<9 Y/O      Tonsils <10y.o.     HC REPAIR INCISIONAL HERNIA,REDUCIBLE      Hernia Repair, Incisional, Unilateral     HEART CATH, ANGIOPLASTY       JOINT REPLACEMENT      1 month ago--right hip     LIGATN/STRIP LONG & SHORT SAPHEN         Family History:   Family History   Problem Relation Age of Onset     HEART DISEASE Father      irreg heart beat     Circulatory Father      varicose veins     Prostate Cancer Father      HEART DISEASE Mother      heart attack     Arthritis Mother      OSTEOPOROSIS Mother      Thyroid Disease Mother      Hypertension Mother      Eye Disorder Maternal Grandmother      glaucoma     DIABETES Sister      type 2     Kidney Cancer Sister      DIABETES Sister      Glaucoma No family hx of      Macular Degeneration No family hx of      CANCER No family hx of      no skin cancer       Social History:   Social History   Substance Use Topics     Smoking status: Former Smoker     Packs/day: 1.00     Years: 30.00     Types: Cigarettes     Start date: 12/30/1960     Quit date: 7/22/1994     Smokeless tobacco: Never Used     Alcohol use No     Medications:   Current Outpatient Prescriptions   Medication Sig Dispense Refill     metoprolol tartrate (LOPRESSOR) 100 MG tablet Take 1 tablet (100 mg) by mouth 2 times daily 60 tablet 11     tiotropium (SPIRIVA HANDIHALER) 18 MCG capsule Inhale contents of one capsule daily. 90 capsule 3     fluticasone-vilanterol (BREO  ELLIPTA) 100-25 MCG/INH oral inhaler Inhale 1 puff into the lungs daily 3 Inhaler 3     albuterol (PROAIR HFA/PROVENTIL HFA/VENTOLIN HFA) 108 (90 BASE) MCG/ACT Inhaler Inhale 2 puffs into the lungs every 6 hours as needed for shortness of breath / dyspnea 3 Inhaler 6     methotrexate 2.5 MG tablet CHEMO Take 3 tablets (7.5 mg) by mouth once a week On Mondays 48 tablet 3     levothyroxine (SYNTHROID/LEVOTHROID) 137 MCG tablet Take 1 tablet (137 mcg) by mouth daily 90 tablet 1     furosemide (LASIX) 20 MG tablet Take 3 tablets (60 mg) by mouth 2 times daily May take extra 20 mg as needed for wt .gain >3# 240 tablet 11     warfarin (COUMADIN) 5 MG tablet TAKE ONE TABLET BY MOUTH ONE TIME DAILY  90 tablet 5     sildenafil (REVATIO) 20 MG tablet Take 1 tablet (20 mg) by mouth 3 times daily 270 tablet 1     atorvastatin (LIPITOR) 40 MG tablet TAKE ONE TABLET BY MOUTH ONE TIME DAILY  30 tablet 11     losartan (COZAAR) 50 MG tablet Take 1 tablet (50 mg) by mouth daily 90 tablet 3     omeprazole (PRILOSEC) 20 MG CR capsule Take 1 capsule (20 mg) by mouth daily 90 capsule 3     clopidogrel (PLAVIX) 75 MG tablet Take 1 tablet (75 mg) by mouth daily 90 tablet 3     inFLIXimab (REMICADE) 100 MG injection Inject 100 mg into the vein every 28 days        blood glucose monitoring (ACCU-CHEK RONNIE PLUS) test strip Use to test blood sugar 2 times daily or as directed.  3 month supply. 200 each 3     blood glucose monitoring (ACCU-CHEK FASTCLIX) lancets Use to test blood sugar 2 times daily or as directed.  102 lancets per box.  3 month supply. 2 Box 3     blood glucose monitoring (NO BRAND SPECIFIED) meter device kit Use to test blood sugar 2 times daily. 1 kit 0     Urea (CARMOL 40) 40 % CREA To feet daily 199 g 4     Multiple Vitamins-Minerals (MULTIVITAMIN & MINERAL PO) Take 1 tablet by mouth daily.       mirtazapine (REMERON) 15 MG tablet Take 15 mg by mouth At Bedtime.       order for DME Equipment being ordered: Nonadherent 2 x  2 dressings, cotton gauze   Dress wound daily 10 Units 1     doxycycline (VIBRAMYCIN) 100 MG capsule Take 1 capsule (100 mg) by mouth 2 times daily (Patient not taking: Reported on 1/9/2018) 20 capsule 0     colchicine (COLCRYS) 0.6 MG tablet 2 tabs at the onset of flare, then 1 tab every 2 hrs until pain is better or diarrhea occurs, up to 10 doses. Wait 3 days until taking again (Patient not taking: Reported on 1/22/2018) 30 tablet 0     HYDROcodone-acetaminophen (NORCO) 5-325 MG per tablet Take 1-2 tablets by mouth every 4 hours as needed for moderate to severe pain (Patient not taking: Reported on 2/14/2018) 15 tablet 0     order for DME Updated Oxygen: Patient requires supplemental Oxygen 3 LPM via nasal canula with activity and 2 LPM nocturnally. Please provide patient with a portable oxygen concentrator for improved mobility.  Okay to spot check or titrated for conserving to keep stats above 90%. Oxygen will be for a lifetime. 1 Device 0     order for DME She requested for a 4 wheel walker, would like to change it to 4 wheel walker with a seat and oxygen tank durant.     Need this faxed over to her   [phone] 1 Device 1     polyethylene glycol (MIRALAX) powder Take 17 g (1 capful) by mouth daily (Patient not taking: Reported on 2/14/2018) 510 g 1     ROS:   Constitutional: No fever, chills, or sweats. No weight gain/loss  Respiratory: No cough, hemoptysis  Cardiovascular: As per HPI.     EXAM:  /88 (BP Location: Right arm, Patient Position: Right side, Cuff Size: Adult Regular)  Pulse 65  Temp 95.8  F (35.4  C) (Tympanic)  Resp 22  Wt 77.4 kg (170 lb 9.6 oz)  SpO2 99%  BMI 26.71 kg/m2  Rohan is a 72 year old male  Physical Exam   Constitutional: He is oriented to person, place, and time and well-developed, well-nourished, and in no distress.   HENT:   Head: Normocephalic and atraumatic.   Eyes: No scleral icterus.   Cardiovascular: Normal rate and regular rhythm.    Pulmonary/Chest: Effort  normal and breath sounds normal.   Abdominal: Soft. There is no tenderness.   Neurological: He is alert and oriented to person, place, and time.   Psychiatric: Affect and judgment normal.   Vascular: Palpable bilateral TP/DP pulses    Labs:   Lab Results   Component Value Date    WBC 13.5 01/05/2018     Lab Results   Component Value Date    INR 1.83 01/24/2018      Lab Results   Component Value Date     01/05/2018        Imaging Results: RLE arterial US today reviewed by me. It shows widely patent stent with normal triphasic waveforms with leg straight and in flexion. XR also reviewed by me and shows no evidence of stent fracture or deformation.    Assessment and Plan: Mr Monroe is a 72 year old male who presents to clinic for followup of right EIA stent placed in the setting of retroperitoneal hemorrhage on 5/13/2017 with PSA of the left CFA access site treated with US guided thrombin injection. Mr Monroe continues to do well with regards to the stent. He denies any groin pain, leg pain, claudication symptoms, or symptoms of embolization. He continues his Plavix and warfarin as prescribed by his cardiac team.    Return to clinic in 6 months with repeat pelvis XR (neutral and hip flexion positions) and right pelvic arterial US with leg straight and in flexion.    I was present for the entire clinic visit, personally reviewed imaging, and agree with the assessment and plan documented by Dr. Gonzales.    It was a pleasure to see Mr. Monroe in clinic today. Thank you for involving the Interventional Radiology service in his care.     I spent approximately 15 minutes with this patient, over 50% was for counseling.     Maria Victoria Palmer MD  Interventional Radiology Attending                     [facility]

## 2018-02-16 ENCOUNTER — HOSPITAL ENCOUNTER (OUTPATIENT)
Dept: CARDIAC REHAB | Facility: CLINIC | Age: 73
End: 2018-02-16
Attending: INTERNAL MEDICINE
Payer: MEDICARE

## 2018-02-16 PROCEDURE — 93798 PHYS/QHP OP CAR RHAB W/ECG: CPT | Mod: KX

## 2018-02-16 PROCEDURE — 40000116 ZZH STATISTIC OP CR VISIT

## 2018-02-21 ENCOUNTER — INFUSION THERAPY VISIT (OUTPATIENT)
Dept: INFUSION THERAPY | Facility: CLINIC | Age: 73
End: 2018-02-21
Attending: INTERNAL MEDICINE
Payer: MEDICARE

## 2018-02-21 ENCOUNTER — ANTICOAGULATION THERAPY VISIT (OUTPATIENT)
Dept: ANTICOAGULATION | Facility: CLINIC | Age: 73
End: 2018-02-21

## 2018-02-21 VITALS
BODY MASS INDEX: 27.12 KG/M2 | RESPIRATION RATE: 16 BRPM | DIASTOLIC BLOOD PRESSURE: 72 MMHG | SYSTOLIC BLOOD PRESSURE: 148 MMHG | WEIGHT: 173.2 LBS | HEART RATE: 81 BPM | TEMPERATURE: 97.8 F

## 2018-02-21 DIAGNOSIS — I48.92 ATRIAL FLUTTER (H): ICD-10-CM

## 2018-02-21 DIAGNOSIS — D86.9 SARCOIDOSIS: ICD-10-CM

## 2018-02-21 DIAGNOSIS — D86.0 SARCOIDOSIS OF LUNG (H): Primary | ICD-10-CM

## 2018-02-21 DIAGNOSIS — Z79.01 LONG-TERM (CURRENT) USE OF ANTICOAGULANTS: ICD-10-CM

## 2018-02-21 DIAGNOSIS — I48.92 ATRIAL FLUTTER WITH RAPID VENTRICULAR RESPONSE (H): ICD-10-CM

## 2018-02-21 LAB — INR PPP: 1.7 (ref 0.86–1.14)

## 2018-02-21 PROCEDURE — 96415 CHEMO IV INFUSION ADDL HR: CPT

## 2018-02-21 PROCEDURE — 96413 CHEMO IV INFUSION 1 HR: CPT

## 2018-02-21 PROCEDURE — 85610 PROTHROMBIN TIME: CPT | Performed by: INTERNAL MEDICINE

## 2018-02-21 PROCEDURE — 25000128 H RX IP 250 OP 636: Mod: ZF | Performed by: INTERNAL MEDICINE

## 2018-02-21 RX ORDER — ACETAMINOPHEN 325 MG/1
650 TABLET ORAL ONCE
Status: CANCELLED
Start: 2018-02-21 | End: 2018-02-21

## 2018-02-21 RX ADMIN — INFLIXIMAB 400 MG: 100 INJECTION, POWDER, LYOPHILIZED, FOR SOLUTION INTRAVENOUS at 12:45

## 2018-02-21 NOTE — PROGRESS NOTES
Nursing Note  Rohan Monroe presents today to [facility] for:   Chief Complaint   Patient presents with     Infusion     Remicade infusion     During today's [facility] appointment, orders from Dr. perlman were completed.  Frequency: every 4 weeks.    Progress note:  Patient identification verified by name and date of birth.  Assessment completed.  Vitals recorded in Doc Flowsheets.  Patient was provided with education regarding infusion and possible side effects.  Patient verbalized understanding.      needed: No  Premedications: historically patient has not taken pre-medications prior to infusion.  Infusion Rates: infusion given over approximately 2 hours.  Labs: INR drawn per open orders and pt request.  Vascular access: peripheral IV placed today.  Treatment Conditions: Biologic/Chemo Checklist     ~~~ NOTE: If the patient answers yes to any of the questions below, hold the infusion and contact ordering provider or on-call provider.    1. Have you recently had an elevated temperature, fever, chills, productive cough, coughing for 3 weeks or longer or hemoptysis,  abnormal vital signs, night sweats,  chest pain or have you noticed a decrease in your appetite, unexplained weight loss or fatigue? No  2. Do you have any open wounds or new incisions? No  3. Do you have any recent or upcoming hospitalizations, surgeries or dental procedures? No  4. Do you currently have or recently have had any signs of illness or infection or are you on any antibiotics? No  5. Have you had any new, sudden or worsening abdominal pain? No  6. Have you or anyone in your household received a live vaccination in the past 4 weeks? Please note:  No live vaccines while on biologic/chemotherapy until 6 months after the last treatment.  Patient can receive the flu vaccine (shot only) and the pneumovax.  It is optimal for the patient to get these vaccines mid cycle,  but they can be given at any time as long as it is not on the day of the infusion. No  7. Have you recently been diagnosed with any new nervous system diseases (ie. Multiple sclerosis, Guillain [place], seizures, neurological changes) or cancer diagnosis? Are you on any form of radiation or chemotherapy? No  8. Are you pregnant or breast feeding or do you have plans of pregnancy in the future? No  9. Have you been having any signs of worsening depression or suicidal ideations?  (benlysta only) No  10. Have there been any other new onset medical symptoms? No    Patient tolerated infusion: well.    Administrations This Visit     inFLIXimab (REMICADE) 400 mg in sodium chloride 0.9 % 315 mL infusion     Admin Date Action Dose Rate Route Administered By          02/21/2018 New Bag 400 mg 157.5 mL/hr Intravenous Kay Jose, RN                             Discharge Plan:   Follow up plan of care with: ongoing infusions at [facility].  Discharge instructions were reviewed with patient.  Patient/representative verbalized understanding of discharge instructions and all questions answered.  Patient discharged from [facility] in stable condition.    Kay Jose RN        /67  Pulse 81  Temp 97.8  F (36.6  C) (Oral)  Resp 16  Wt 78.6 kg (173 lb 3.2 oz)  BMI 27.12 kg/m2

## 2018-02-21 NOTE — MR AVS SNAPSHOT
Rohan Howard Gitalejandro   2/21/2018   Anticoagulation Therapy Visit    Description:  72 year old male   Provider:  Delphine Kaur, RN   Department:  [facility]           INR as of 2/21/2018     Today's INR 1.70!      Anticoagulation Summary as of 2/21/2018     INR goal 2.0-3.0   Today's INR 1.70!   Full instructions 7.5 mg on Wed; 5 mg all other days   Next INR check 3/7/2018    Indications   Long-term (current) use of anticoagulants [Z79.01] [Z79.01]  Atrial flutter with rapid ventricular response (H) [I48.92]         February 2018 Details    Sun Mon Tue Wed Thu Fri Sat         1               2               3                 4               5               6               7               8               9               10                 11               12               13               14               15               16               17                 18               19               20               21      7.5 mg   See details      22      5 mg         23      5 mg         24      5 mg           25      5 mg         26      5 mg         27      5 mg         28      7.5 mg             Date Details   02/21 This INR check               How to take your warfarin dose     To take:  5 mg Take 1 of the 5 mg tablets.    To take:  7.5 mg Take 1.5 of the 5 mg tablets.           March 2018 Details    Sun Mon Tue Wed Thu Fri Sat         1      5 mg         2      5 mg         3      5 mg           4      5 mg         5      5 mg         6      5 mg         7            8               9               10                 11               12               13               14               15               16               17                 18               19               20               21               22               23               24                 25               26               27               28               29               30               31                Date Details   No additional  details    Date of next INR:  3/7/2018         How to take your warfarin dose     To take:  5 mg Take 1 of the 5 mg tablets.    To take:  7.5 mg Take 1.5 of the 5 mg tablets.

## 2018-02-21 NOTE — MR AVS SNAPSHOT
After Visit Summary   2/21/2018    Rohan Monroe    MRN: [id number]           Patient Information     Date Of Birth          1945        Visit Information        Provider Department      2/21/2018 12:00 PM UC 47 ATC; UC SPEC INFUSION [facility] Specialty and Procedure        Today's Diagnoses     Sarcoidosis of lung (HCC)    -  1    SARCOIDOSIS-systemic        Atrial flutter (H)          Care Instructions    Dear Rohan Monroe    Thank you for choosing [facility] ([facility]) for your infusion.  The following information is a summary of our appointment as well as important reminders.      POST-INFUSION OF BIOLOGICAL MEDICATION:    Reviewed with patient.  Given biologic medication or medication hand-out. Inform patient if any fever, chills or signs of infection, new symptoms, abdominal pain, heart palpitations, shortness of breath, reaction, weakness, neurological changes, seek medical attention immediately and should not receive infusions. No live virus vaccines prior to or during treatment or up to 6 months post infusion. If the patient has an upcoming procedure or surgery, this should be discussed with the rheumatologist and surgeon or provider.    We look forward in seeing you on your next appointment here at [facility].  Please don t hesitate to call us at [phone] to reschedule any of your appointments or to speak with one of the [facility] registered nurses.  It was a pleasure taking care of you today.    Sincerely,    [facility]  Specialty Infusion & Procedure Center  [address] [place] [address]  [address]  Phone:  [phone]            Follow-ups after your visit        Your next 10 appointments already scheduled     Feb 23, 2018  3:00 PM CST   Cardiac Treatment with  Cardiac Rehab 1   [facility] Cardiac Rehab ([facility])    [address]  [facility] [address]   [phone]            Feb 26, 2018  8:35 AM CST   (Arrive by 8:20 AM)   Return Visit with Jamie Foster MD   [facility] ([facility])    [address]  4th Floor  [facility] [address]   [phone]            Feb 26, 2018  1:00 PM CST   Lab with [facility] ([facility])    [address]  1st Floor  [facility] [id number] [phone]            Feb 26, 2018  2:00 PM CST   (Arrive by 1:45 PM)   Return General Liver with Moses Orosco MD   [facility] Hepatology ([facility])    [address] [place] [address]  [address]  [facility] [address]   [phone]            Feb 28, 2018  1:00 PM CST   Cardiac Treatment with  Cardiac Rehab 1   [facility] Cardiac Rehab ([facility])    [address]  [facility] [address]   [phone]            Mar 02, 2018  3:00 PM CST   Cardiac Treatment with  Cardiac Rehab 1   [facility] Cardiac Rehab ([facility])    [address]  [facility] [address]   [phone]            Mar 05, 2018  1:30 PM CST   RETURN GLAUCOMA with Kina Greco MD   Eye Clinic ([facility])    [address] [place] [address]   [phone]            Mar 06, 2018 12:15 PM CST   RETURN RETINA with Sandee Middleton MD   Eye Clinic ([facility])    [address] [address]   [phone]            Mar 08, 2018 10:30 AM CST   Ech Complete with [id number]    Health Echo ([facility])    [address]  3rd Floor  [facility] [id number] [phone]           1. Please bring or wear a comfortable two-piece outfit. 2. You may eat, drink and take your normal medicines. 3. For any  questions that cannot be answered, please contact the ordering physician            Mar 08, 2018 11:30 AM CST   (Arrive by 11:15 AM)   RETURN HEART FAILURE with Diane Paige MD   [facility] ([facility])    [address] [facility]  [address] [place] [address] [phone]              Who to contact     If you have questions or need follow up information about today's clinic visit or your schedule please contact [facility] SPECIALTY AND PROCEDURE directly at [phone].  Normal or non-critical lab and imaging results will be communicated to you by [employer], letter or phone within 4 business days after the clinic has received the results. If you do not hear from us within 7 days, please contact the clinic through [employer] or phone. If you have a critical or abnormal lab result, we will notify you by phone as soon as possible.  Submit refill requests through [employer] or call your pharmacy and they will forward the refill request to us. Please allow 3 business days for your refill to be completed.          Additional Information About Your Visit        [employer] Information     [employer] gives you secure access to your electronic health record. If you see a primary care provider, you can also send messages to your care team and make appointments. If you have questions, please call your primary care clinic.  If you do not have a primary care provider, please call [phone] and they will assist you.        Care EveryWhere ID     This is your Care EveryWhere ID. This could be used by other organizations to access your [place] medical records  [id number]        Your Vitals Were     Pulse Temperature Respirations BMI (Body Mass Index)          81 97.8  F (36.6  C) (Oral) 16 27.12 kg/m2         Blood Pressure from Last 3 Encounters:   02/21/18 124/67   02/14/18 153/88   01/24/18 137/70    Weight from Last 3 Encounters:   02/21/18 78.6 kg (173 lb 3.2  oz)   02/14/18 77.4 kg (170 lb 9.6 oz)   01/24/18 76.6 kg (168 lb 12.8 oz)              We Performed the Following     INR        Primary Care Provider Office Phone # Fax #    Jamie Foster [phone] [address] [address]        Equal Access to Services     [facility]: Hadii aad ku hadasho Soomaali, waaxda luqadaha, qaybta kaalmada adeegyada, waxay idiin hayaan adeeg khgaylesh la'aan ah. So [facility] [phone].    ATENCIÓN: Si habla español, tiene a mcfadden disposición servicios gratuitos de asistencia lingüística. Llame al [phone].    We comply with applicable federal civil rights laws and [place] laws. We do not discriminate on the basis of race, color, national origin, age, disability, sex, sexual orientation, or gender identity.            Thank you!     Thank you for choosing [facility] SPECIALTY AND PROCEDURE  for your care. Our goal is always to provide you with excellent care. Hearing back from our patients is one way we can continue to improve our services. Please take a few minutes to complete the written survey that you may receive in the mail after your visit with us. Thank you!             Your Updated Medication List - Protect others around you: Learn how to safely use, store and throw away your medicines at www.disposemymeds.org.          This list is accurate as of 2/21/18 12:48 PM.  Always use your most recent med list.                   Brand Name Dispense Instructions for use Diagnosis    albuterol 108 (90 BASE) MCG/ACT Inhaler    PROAIR HFA/PROVENTIL HFA/VENTOLIN HFA    3 Inhaler    Inhale 2 puffs into the lungs every 6 hours as needed for shortness of breath / dyspnea    Sarcoidosis       atorvastatin 40 MG tablet    LIPITOR    30 tablet    TAKE ONE TABLET BY MOUTH ONE TIME DAILY    Coronary artery disease involving native coronary artery of native heart without angina pectoris, CAD S/P percutaneous coronary angioplasty        blood glucose monitoring lancets     2 Box    Use to test blood sugar 2 times daily or as directed.  102 lancets per box.  3 month supply.    Type 2 diabetes, HbA1C goal < 8% (H)       blood glucose monitoring meter device kit    no brand specified    1 kit    Use to test blood sugar 2 times daily.    Type 2 diabetes mellitus with diabetic nephropathy (H)       blood glucose monitoring test strip    ACCU-CHEK RONNIE PLUS    200 each    Use to test blood sugar 2 times daily or as directed.  3 month supply.    Type 2 diabetes, HbA1C goal < 8% (H)       clopidogrel 75 MG tablet    PLAVIX    90 tablet    Take 1 tablet (75 mg) by mouth daily    CAD S/P percutaneous coronary angioplasty, Coronary artery disease involving native coronary artery of native heart without angina pectoris       colchicine 0.6 MG tablet    COLCRYS    30 tablet    2 tabs at the onset of flare, then 1 tab every 2 hrs until pain is better or diarrhea occurs, up to 10 doses. Wait 3 days until taking again        doxycycline 100 MG capsule    VIBRAMYCIN    20 capsule    Take 1 capsule (100 mg) by mouth 2 times daily        fluticasone-vilanterol 100-25 MCG/INH oral inhaler    BREO ELLIPTA    3 Inhaler    Inhale 1 puff into the lungs daily    Chronic obstructive pulmonary disease, unspecified COPD type (H)       furosemide 20 MG tablet    LASIX    240 tablet    Take 3 tablets (60 mg) by mouth 2 times daily May take extra 20 mg as needed for wt .gain >3#    Pulmonary hypertension       HYDROcodone-acetaminophen 5-325 MG per tablet    NORCO    15 tablet    Take 1-2 tablets by mouth every 4 hours as needed for moderate to severe pain        inFLIXimab 100 MG injection    REMICADE     Inject 100 mg into the vein every 28 days        levothyroxine 137 MCG tablet    SYNTHROID/LEVOTHROID    90 tablet    Take 1 tablet (137 mcg) by mouth daily    Hypothyroidism       losartan 50 MG tablet    COZAAR    90 tablet    Take 1 tablet (50 mg) by mouth daily     (HFpEF) heart failure with preserved ejection fraction (H)       methotrexate 2.5 MG tablet CHEMO     48 tablet    Take 3 tablets (7.5 mg) by mouth once a week On Mondays    Sarcoidosis       metoprolol tartrate 100 MG tablet    LOPRESSOR    60 tablet    Take 1 tablet (100 mg) by mouth 2 times daily    Hypertension secondary to other renal disorders       mirtazapine 15 MG tablet    REMERON     Take 15 mg by mouth At Bedtime.        MULTIVITAMIN & MINERAL PO      Take 1 tablet by mouth daily.        omeprazole 20 MG CR capsule    priLOSEC    90 capsule    Take 1 capsule (20 mg) by mouth daily    CAD S/P percutaneous coronary angioplasty, Coronary artery disease involving native coronary artery of native heart without angina pectoris, Sarcoidosis of lung (H)       * order for DME     1 Device    She requested for a 4 wheel walker, would like to change it to 4 wheel walker with a seat and oxygen tank durant.   Need this faxed over to her  [phone]    Sarcoidosis, lung (H), COPD (chronic obstructive pulmonary disease) (H), Abnormal gait, Congestive heart failure (H)       * order for DME     1 Device    Updated Oxygen: Patient requires supplemental Oxygen 3 LPM via nasal canula with activity and 2 LPM nocturnally. Please provide patient with a portable oxygen concentrator for improved mobility.  Okay to spot check or titrated for conserving to keep stats above 90%. Oxygen will be for a lifetime.    ILD (interstitial lung disease) (H), Hypoxia       * order for DME     10 Units    Equipment being ordered: Nonadherent 2 x 2 dressings, cotton gauze  Dress wound daily    Diabetic ulcer of toe of left foot associated with type 2 diabetes mellitus, limited to breakdown of skin (H)       polyethylene glycol powder    MIRALAX    510 g    Take 17 g (1 capful) by mouth daily    Constipation, unspecified constipation type       sildenafil 20 MG tablet    REVATIO    270 tablet    Take 1 tablet (20 mg) by mouth 3 times daily     Pulmonary hypertension       tiotropium 18 MCG capsule    SPIRIVA HANDIHALER    90 capsule    Inhale contents of one capsule daily.    Chronic obstructive pulmonary disease, unspecified COPD type (H)       Urea 40 % Crea    CARMOL 40    199 g    To feet daily    Dermatophytosis of nail, Corns and callosities       warfarin 5 MG tablet    COUMADIN    90 tablet    TAKE ONE TABLET BY MOUTH ONE TIME DAILY    Atrial flutter with rapid ventricular response (H)       * Notice:  This list has 3 medication(s) that are the same as other medications prescribed for you. Read the directions carefully, and ask your doctor or other care provider to review them with you.

## 2018-02-21 NOTE — PATIENT INSTRUCTIONS
Dear Rohan Monroe    Thank you for choosing [facility] ([facility]) for your infusion.  The following information is a summary of our appointment as well as important reminders.      POST-INFUSION OF BIOLOGICAL MEDICATION:    Reviewed with patient.  Given biologic medication or medication hand-out. Inform patient if any fever, chills or signs of infection, new symptoms, abdominal pain, heart palpitations, shortness of breath, reaction, weakness, neurological changes, seek medical attention immediately and should not receive infusions. No live virus vaccines prior to or during treatment or up to 6 months post infusion. If the patient has an upcoming procedure or surgery, this should be discussed with the rheumatologist and surgeon or provider.    We look forward in seeing you on your next appointment here at [facility].  Please don t hesitate to call us at [phone] to reschedule any of your appointments or to speak with one of the [facility] registered nurses.  It was a pleasure taking care of you today.    Sincerely,    [facility]  Specialty Infusion & Procedure Center  [address] [place] [address]  [address]  Phone:  [phone]

## 2018-02-21 NOTE — PROGRESS NOTES
ANTICOAGULATION FOLLOW-UP CLINIC VISIT    Patient Name:  Rohan Monroe  Date:  2/21/2018  Contact Type:  Telephone    SUBJECTIVE:     Patient Findings     Positives Unexplained INR or factor level change           OBJECTIVE    INR   Date Value Ref Range Status   02/21/2018 1.70 (H) 0.86 - 1.14 Final       ASSESSMENT / PLAN  INR assessment SUB    Recheck INR In: 2 WEEKS    INR Location Clinic      Anticoagulation Summary as of 2/21/2018     INR goal 2.0-3.0   Today's INR 1.70!   Maintenance plan 7.5 mg (5 mg x 1.5) on Wed; 5 mg (5 mg x 1) all other days   Full instructions 7.5 mg on Wed; 5 mg all other days   Weekly total 37.5 mg   Plan last modified Delphine Kaur RN (2/21/2018)   Next INR check 3/7/2018   Priority INR   Target end date 4/20/2017    Indications   Long-term (current) use of anticoagulants [Z79.01] [Z79.01]  Atrial flutter with rapid ventricular response (H) [I48.92]         Anticoagulation Episode Summary     INR check location     Preferred lab     Send INR reminders to [facility]    Comments 3 months therapy, then reassess.        Anticoagulation Care Providers     Provider Role Specialty Phone number    Jamie Foster MD [facility] Internal Medicine [phone]            See the Encounter Report to view Anticoagulation Flowsheet and Dosing Calendar (Go to Encounters tab in chart review, and find the Anticoagulation Therapy Visit)    Spoke with Rodrigo Kaur RN

## 2018-02-23 ENCOUNTER — HOSPITAL ENCOUNTER (OUTPATIENT)
Dept: CARDIAC REHAB | Facility: CLINIC | Age: 73
End: 2018-02-23
Attending: INTERNAL MEDICINE
Payer: MEDICARE

## 2018-02-23 ENCOUNTER — PRE VISIT (OUTPATIENT)
Dept: CARDIOLOGY | Facility: CLINIC | Age: 73
End: 2018-02-23

## 2018-02-23 PROCEDURE — 40000116 ZZH STATISTIC OP CR VISIT: Performed by: REHABILITATION PRACTITIONER

## 2018-02-23 PROCEDURE — 93798 PHYS/QHP OP CAR RHAB W/ECG: CPT | Mod: KX | Performed by: REHABILITATION PRACTITIONER

## 2018-02-26 ENCOUNTER — OFFICE VISIT (OUTPATIENT)
Dept: INTERNAL MEDICINE | Facility: CLINIC | Age: 73
End: 2018-02-26
Payer: MEDICARE

## 2018-02-26 ENCOUNTER — ANTICOAGULATION THERAPY VISIT (OUTPATIENT)
Dept: ANTICOAGULATION | Facility: CLINIC | Age: 73
End: 2018-02-26

## 2018-02-26 ENCOUNTER — OFFICE VISIT (OUTPATIENT)
Dept: GASTROENTEROLOGY | Facility: CLINIC | Age: 73
End: 2018-02-26
Attending: INTERNAL MEDICINE
Payer: MEDICARE

## 2018-02-26 VITALS
WEIGHT: 169 LBS | HEART RATE: 78 BPM | RESPIRATION RATE: 16 BRPM | SYSTOLIC BLOOD PRESSURE: 100 MMHG | OXYGEN SATURATION: 94 % | DIASTOLIC BLOOD PRESSURE: 55 MMHG | BODY MASS INDEX: 26.46 KG/M2

## 2018-02-26 VITALS — BODY MASS INDEX: 26.53 KG/M2 | HEIGHT: 67 IN | WEIGHT: 169 LBS

## 2018-02-26 DIAGNOSIS — E06.3 HYPOTHYROIDISM DUE TO HASHIMOTO'S THYROIDITIS: Primary | ICD-10-CM

## 2018-02-26 DIAGNOSIS — I48.92 ATRIAL FLUTTER (H): ICD-10-CM

## 2018-02-26 DIAGNOSIS — I48.92 ATRIAL FLUTTER WITH RAPID VENTRICULAR RESPONSE (H): ICD-10-CM

## 2018-02-26 DIAGNOSIS — N18.30 CKD (CHRONIC KIDNEY DISEASE) STAGE 3, GFR 30-59 ML/MIN (H): ICD-10-CM

## 2018-02-26 DIAGNOSIS — K74.60 CIRRHOSIS OF LIVER WITHOUT ASCITES, UNSPECIFIED HEPATIC CIRRHOSIS TYPE (H): ICD-10-CM

## 2018-02-26 DIAGNOSIS — D63.1 ANEMIA IN CHRONIC KIDNEY DISEASE (CODE): ICD-10-CM

## 2018-02-26 DIAGNOSIS — Z79.01 LONG-TERM (CURRENT) USE OF ANTICOAGULANTS: ICD-10-CM

## 2018-02-26 DIAGNOSIS — R94.6 ABNORMAL FINDING ON THYROID FUNCTION TEST: Primary | ICD-10-CM

## 2018-02-26 LAB
AFP SERPL-MCNC: 3.2 UG/L (ref 0–8)
ALBUMIN SERPL-MCNC: 3.7 G/DL (ref 3.4–5)
ALP SERPL-CCNC: 128 U/L (ref 40–150)
ALT SERPL W P-5'-P-CCNC: 42 U/L (ref 0–70)
ANION GAP SERPL CALCULATED.3IONS-SCNC: 8 MMOL/L (ref 3–14)
AST SERPL W P-5'-P-CCNC: 24 U/L (ref 0–45)
BILIRUB DIRECT SERPL-MCNC: 0.2 MG/DL (ref 0–0.2)
BILIRUB SERPL-MCNC: 0.6 MG/DL (ref 0.2–1.3)
BUN SERPL-MCNC: 48 MG/DL (ref 7–30)
CALCIUM SERPL-MCNC: 8.7 MG/DL (ref 8.5–10.1)
CHLORIDE SERPL-SCNC: 100 MMOL/L (ref 94–109)
CO2 SERPL-SCNC: 26 MMOL/L (ref 20–32)
CREAT SERPL-MCNC: 2.16 MG/DL (ref 0.66–1.25)
CREAT UR-MCNC: 64 MG/DL
ERYTHROCYTE [DISTWIDTH] IN BLOOD BY AUTOMATED COUNT: 13.6 % (ref 10–15)
FERRITIN SERPL-MCNC: 164 NG/ML (ref 26–388)
GFR SERPL CREATININE-BSD FRML MDRD: 30 ML/MIN/1.7M2
GLUCOSE SERPL-MCNC: 146 MG/DL (ref 70–99)
HCT VFR BLD AUTO: 34.3 % (ref 40–53)
HGB BLD-MCNC: 11 G/DL (ref 13.3–17.7)
INR PPP: 1.95 (ref 0.86–1.14)
IRON SATN MFR SERPL: 18 % (ref 15–46)
IRON SERPL-MCNC: 49 UG/DL (ref 35–180)
MCH RBC QN AUTO: 30.2 PG (ref 26.5–33)
MCHC RBC AUTO-ENTMCNC: 32.1 G/DL (ref 31.5–36.5)
MCV RBC AUTO: 94 FL (ref 78–100)
MICROALBUMIN UR-MCNC: 434 MG/L
MICROALBUMIN/CREAT UR: 678.12 MG/G CR (ref 0–17)
PHOSPHATE SERPL-MCNC: 3.5 MG/DL (ref 2.5–4.5)
PLATELET # BLD AUTO: 277 10E9/L (ref 150–450)
POTASSIUM SERPL-SCNC: 4.3 MMOL/L (ref 3.4–5.3)
PROT SERPL-MCNC: 8.1 G/DL (ref 6.8–8.8)
PROT UR-MCNC: 0.49 G/L
PROT/CREAT 24H UR: 0.76 G/G CR (ref 0–0.2)
RBC # BLD AUTO: 3.64 10E12/L (ref 4.4–5.9)
SODIUM SERPL-SCNC: 134 MMOL/L (ref 133–144)
T4 FREE SERPL-MCNC: 1.38 NG/DL (ref 0.76–1.46)
TIBC SERPL-MCNC: 271 UG/DL (ref 240–430)
TSH SERPL DL<=0.005 MIU/L-ACNC: 0.07 MU/L (ref 0.4–4)
WBC # BLD AUTO: 7.6 10E9/L (ref 4–11)

## 2018-02-26 PROCEDURE — 82105 ALPHA-FETOPROTEIN SERUM: CPT | Performed by: INTERNAL MEDICINE

## 2018-02-26 PROCEDURE — 83540 ASSAY OF IRON: CPT | Performed by: INTERNAL MEDICINE

## 2018-02-26 PROCEDURE — 84450 TRANSFERASE (AST) (SGOT): CPT | Performed by: INTERNAL MEDICINE

## 2018-02-26 PROCEDURE — 85027 COMPLETE CBC AUTOMATED: CPT | Performed by: INTERNAL MEDICINE

## 2018-02-26 PROCEDURE — 82248 BILIRUBIN DIRECT: CPT | Performed by: INTERNAL MEDICINE

## 2018-02-26 PROCEDURE — 85610 PROTHROMBIN TIME: CPT | Performed by: INTERNAL MEDICINE

## 2018-02-26 PROCEDURE — 84075 ASSAY ALKALINE PHOSPHATASE: CPT | Performed by: INTERNAL MEDICINE

## 2018-02-26 PROCEDURE — 83550 IRON BINDING TEST: CPT | Performed by: INTERNAL MEDICINE

## 2018-02-26 PROCEDURE — 84155 ASSAY OF PROTEIN SERUM: CPT | Performed by: INTERNAL MEDICINE

## 2018-02-26 PROCEDURE — 84156 ASSAY OF PROTEIN URINE: CPT | Performed by: INTERNAL MEDICINE

## 2018-02-26 PROCEDURE — 80069 RENAL FUNCTION PANEL: CPT | Performed by: INTERNAL MEDICINE

## 2018-02-26 PROCEDURE — 82728 ASSAY OF FERRITIN: CPT | Performed by: INTERNAL MEDICINE

## 2018-02-26 PROCEDURE — 84460 ALANINE AMINO (ALT) (SGPT): CPT | Performed by: INTERNAL MEDICINE

## 2018-02-26 PROCEDURE — 82247 BILIRUBIN TOTAL: CPT | Performed by: INTERNAL MEDICINE

## 2018-02-26 PROCEDURE — G0463 HOSPITAL OUTPT CLINIC VISIT: HCPCS | Mod: ZF

## 2018-02-26 PROCEDURE — 82043 UR ALBUMIN QUANTITATIVE: CPT | Performed by: INTERNAL MEDICINE

## 2018-02-26 RX ORDER — LEVOTHYROXINE SODIUM 125 UG/1
125 TABLET ORAL DAILY
Qty: 90 TABLET | Refills: 3 | Status: SHIPPED | OUTPATIENT
Start: 2018-02-26

## 2018-02-26 ASSESSMENT — PAIN SCALES - GENERAL
PAINLEVEL: NO PAIN (0)
PAINLEVEL: NO PAIN (0)

## 2018-02-26 NOTE — PROGRESS NOTES
Chief complaint:  Rohan Monroe is a 72 year old male presents for   Chief Complaint   Patient presents with     Recheck Medication     pt is here for a medication follow up       HPI:    Pt. Comes in for follow up today. He continues cardiac rehab at [facility] and this is going very well for him. He continues to exercises and is feeling overall better. No other new HEENT, cardiopulmonary, abdominal, , neurological, systemic complaints.         SocHx:   History   Smoking Status     Former Smoker     Packs/day: 1.00     Years: 30.00     Types: Cigarettes     Start date: 12/30/1960     Quit date: 7/22/1994   Smokeless Tobacco     Never Used        Patient Active Problem List   Diagnosis     Hypothyroidism     SARCOIDOSIS-systemic     Generalized osteoarthrosis, unspecified site     Viral warts     Other psoriasis     Hypertrophy of prostate without urinary obstruction     Diabetes mellitus, type 2 (H)     CAD (coronary artery disease)     Type 2 diabetes, HbA1C goal < 8% (H)     CARDIOVASCULAR SCREENING; LDL GOAL LESS THAN 100     Atrial flutter with rapid ventricular response (H)     CKD (chronic kidney disease) stage 3, GFR 30-59 ml/min     Hip joint pain     Hyperlipidemia LDL goal <70     Acute exacerbation of chronic obstructive pulmonary disease (COPD) (H)     Cellulitis     Immunosuppression (H)     Hyponatremia     RADHA (acute kidney injury) (HCC)     COPD (chronic obstructive pulmonary disease) (H)     Cirrhosis of liver (H)     Pneumonia     Proteinuria     Microscopic hematuria     Sarcoidosis of lung (HCC)     Hyperlipidemia     Atrial flutter (H)     Long-term (current) use of anticoagulants [Z79.01]     Hypoxia     CAD S/P percutaneous coronary angioplasty     Chest pain     Dermatophytosis of nail     Tyloma     Current Outpatient Prescriptions   Medication     order for DME     HYDROcodone-acetaminophen (NORCO) 5-325 MG per tablet     tiotropium (SPIRIVA HANDIHALER) 18 MCG capsule      fluticasone-vilanterol (BREO ELLIPTA) 100-25 MCG/INH oral inhaler     albuterol (PROAIR HFA/PROVENTIL HFA/VENTOLIN HFA) 108 (90 BASE) MCG/ACT Inhaler     methotrexate 2.5 MG tablet CHEMO     levothyroxine (SYNTHROID/LEVOTHROID) 137 MCG tablet     furosemide (LASIX) 20 MG tablet     warfarin (COUMADIN) 5 MG tablet     sildenafil (REVATIO) 20 MG tablet     atorvastatin (LIPITOR) 40 MG tablet     losartan (COZAAR) 50 MG tablet     order for DME     order for DME     omeprazole (PRILOSEC) 20 MG CR capsule     clopidogrel (PLAVIX) 75 MG tablet     polyethylene glycol (MIRALAX) powder     inFLIXimab (REMICADE) 100 MG injection     metoprolol (LOPRESSOR) 100 MG tablet     blood glucose monitoring (ACCU-CHEK RONNIE PLUS) test strip     blood glucose monitoring (ACCU-CHEK FASTCLIX) lancets     blood glucose monitoring (NO BRAND SPECIFIED) meter device kit     Urea (CARMOL 40) 40 % CREA     Multiple Vitamins-Minerals (MULTIVITAMIN & MINERAL PO)     mirtazapine (REMERON) 15 MG tablet     doxycycline (VIBRAMYCIN) 100 MG capsule     colchicine (COLCRYS) 0.6 MG tablet       Review Of Systems   5 point ROS completed and negative except noted above, including Gen, CV, Resp, GI, MS    PE:    Vitals noted gen nad cooperative alert, HEENT, wearing NC O2, oropharynx clear no exudate, neck supple nl rom, LCTA with fair air movement, RRR, S1, S2, abdomen, nt, nd, grossly normal neurological exam.        ASSESSMENT/PLAN:      1. He remains on Remicade monthly for ILD/pulmoanry sarcoid. Seen by Dr. Perlman, Pulmonary 12/2017.  2. CKD; seen by Dr. Michel 9/2017  3. Added TSH onto labs to be drawn today. Low TSH and will follow.  4. Check with insurance for new Shingles vaccine  5. He continues with Cardiac rehab and has Cardiology appt. With Dr. Paige 3/8/2018  6. See by Dr. Orosco today, GI for cirrhosis from Hep C  7. Seen by Dr. May Interventional Radiology,  2/14/2018 for follow up R EIA stent

## 2018-02-26 NOTE — NURSING NOTE
Chief Complaint   Patient presents with     Recheck Medication     pt is here for a medication follow up        Josselyn Horton CMA at 12:00 PM on 2/26/2018

## 2018-02-26 NOTE — PROGRESS NOTES
I had the pleasure of seeing Denis Monroe for followup in the Liver Clinic at the [facility] on 02/26/2018.  Mr. Monroe returns for followup of cirrhosis caused by chronic hepatitis C.  He is a sustained virologic responder to hepatitis C treatment.        From a liver standpoint, he is doing well.  He denies any abdominal pain, itching or skin rash.  He does have fatigue related to his chronic lung disease.  He denies any increased abdominal girth or lower extremity edema.  He denies any fevers or chills.  He has a chronic cough and a moderate amount of shortness of breath.  He does use 3 liters of oxygen.  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      He denies any nausea, vomiting, diarrhea or constipation.  His appetite is good, and his weight is down and that is intentional.     Current Outpatient Prescriptions   Medication     levothyroxine (SYNTHROID/LEVOTHROID) 125 MCG tablet     metoprolol tartrate (LOPRESSOR) 100 MG tablet     colchicine (COLCRYS) 0.6 MG tablet     tiotropium (SPIRIVA HANDIHALER) 18 MCG capsule     fluticasone-vilanterol (BREO ELLIPTA) 100-25 MCG/INH oral inhaler     albuterol (PROAIR HFA/PROVENTIL HFA/VENTOLIN HFA) 108 (90 BASE) MCG/ACT Inhaler     methotrexate 2.5 MG tablet CHEMO     furosemide (LASIX) 20 MG tablet     warfarin (COUMADIN) 5 MG tablet     sildenafil (REVATIO) 20 MG tablet     atorvastatin (LIPITOR) 40 MG tablet     losartan (COZAAR) 50 MG tablet     order for DME     omeprazole (PRILOSEC) 20 MG CR capsule     clopidogrel (PLAVIX) 75 MG tablet     polyethylene glycol (MIRALAX) powder     inFLIXimab (REMICADE) 100 MG injection     blood glucose monitoring (ACCU-CHEK RONNIE PLUS) test strip     blood glucose monitoring (ACCU-CHEK FASTCLIX) lancets     blood glucose monitoring (NO BRAND SPECIFIED) meter device kit     Urea (CARMOL 40) 40 % CREA     Multiple  Vitamins-Minerals (MULTIVITAMIN & MINERAL PO)     mirtazapine (REMERON) 15 MG tablet     [DISCONTINUED] levothyroxine (SYNTHROID/LEVOTHROID) 137 MCG tablet     order for DME     No current facility-administered medications for this visit.      B/P: Data Unavailable, T: [drinking tea, denies fevers[, P: Data Unavailable, R: Data Unavailable    HEENT exam shows no scleral icterus and no temporal muscle wasting.   His chest exam shows bibasilar rales and some expiratory wheezing.  His abdominal exam shows no obvious ascites.  No masses or tenderness to palpation are present.  His liver is 10-11 cm in span with a slightly prominent left lobe.  No spleen tip is palpable, and extremity exam shows no edema.  Skin exam shows no stigmata of chronic liver disease.  Neurologic exam shows no asterixis.     Recent Results (from the past 168 hour(s))   INR    Collection Time: 02/21/18 12:19 PM   Result Value Ref Range    INR 1.70 (H) 0.86 - 1.14   Protein  random urine with Creat Ratio    Collection Time: 02/26/18  1:16 PM   Result Value Ref Range    Protein Random Urine 0.49 g/L    Protein Total Urine g/gr Creatinine 0.76 (H) 0 - 0.2 g/g Cr   Albumin Random Urine Quantitative with Creat Ratio    Collection Time: 02/26/18  1:16 PM   Result Value Ref Range    Creatinine Urine 64 mg/dL    Albumin Urine mg/L PENDING mg/L    Albumin Urine mg/g Cr PENDING 0 - 17 mg/g Cr   INR    Collection Time: 02/26/18  1:17 PM   Result Value Ref Range    INR 1.95 (H) 0.86 - 1.14   CBC with platelets [ASU335]    Collection Time: 02/26/18  1:17 PM   Result Value Ref Range    WBC 7.6 4.0 - 11.0 10e9/L    RBC Count 3.64 (L) 4.4 - 5.9 10e12/L    Hemoglobin 11.0 (L) 13.3 - 17.7 g/dL    Hematocrit 34.3 (L) 40.0 - 53.0 %    MCV 94 78 - 100 fl    MCH 30.2 26.5 - 33.0 pg    MCHC 32.1 31.5 - 36.5 g/dL    RDW 13.6 10.0 - 15.0 %    Platelet Count 277 150 - 450 10e9/L   Renal panel    Collection Time: 02/26/18  1:17 PM   Result Value Ref Range    Sodium 134 133  - 144 mmol/L    Potassium 4.3 3.4 - 5.3 mmol/L    Chloride 100 94 - 109 mmol/L    Carbon Dioxide 26 20 - 32 mmol/L    Anion Gap 8 3 - 14 mmol/L    Glucose 146 (H) 70 - 99 mg/dL    Urea Nitrogen 48 (H) 7 - 30 mg/dL    Creatinine 2.16 (H) 0.66 - 1.25 mg/dL    GFR Estimate 30 (L) >60 mL/min/1.7m2    GFR Estimate If Black 36 (L) >60 mL/min/1.7m2    Calcium 8.7 8.5 - 10.1 mg/dL    Phosphorus 3.5 2.5 - 4.5 mg/dL    Albumin 3.7 3.4 - 5.0 g/dL   Ferritin    Collection Time: 02/26/18  1:17 PM   Result Value Ref Range    Ferritin 164 26 - 388 ng/mL   Iron and iron binding capacity    Collection Time: 02/26/18  1:17 PM   Result Value Ref Range    Iron 49 35 - 180 ug/dL    Iron Binding Cap 271 240 - 430 ug/dL    Iron Saturation Index 18 15 - 46 %   Alkaline phosphatase    Collection Time: 02/26/18  1:17 PM   Result Value Ref Range    Alkaline Phosphatase 128 40 - 150 U/L   ALT    Collection Time: 02/26/18  1:17 PM   Result Value Ref Range    ALT 42 0 - 70 U/L   AST    Collection Time: 02/26/18  1:17 PM   Result Value Ref Range    AST 24 0 - 45 U/L   Bilirubin  total    Collection Time: 02/26/18  1:17 PM   Result Value Ref Range    Bilirubin Total 0.6 0.2 - 1.3 mg/dL   Bilirubin direct    Collection Time: 02/26/18  1:17 PM   Result Value Ref Range    Bilirubin Direct 0.2 0.0 - 0.2 mg/dL   Protein total    Collection Time: 02/26/18  1:17 PM   Result Value Ref Range    Protein Total 8.1 6.8 - 8.8 g/dL   TSH with free T4 reflex    Collection Time: 02/26/18  1:18 PM   Result Value Ref Range    TSH 0.07 (L) 0.40 - 4.00 mU/L   T4 free    Collection Time: 02/26/18  1:18 PM   Result Value Ref Range    T4 Free 1.38 0.76 - 1.46 ng/dL      My impression is that Mr. 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  He is due for an ultrasound which I will go ahead and order.  He is otherwise up-to-date with regard to vaccines, variceal and other cancer screening.  He is somewhat hyperthyroid based on his low TSH, and I will lower his  thyroid dose to 125 mcg per day.  I will let Dr. Foster know about that.        Otherwise, he is thinking about moving to [place] at least part-time, and I have given him the name of Dr. Uziel Li at [facility] to see.  I do think he should be seen at a medical center like 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  My plan will be to see him back in the clinic in 6 months.  He is still planning on spending some time here in [place] as well.      Thank you very much for allowing me to participate in the care of this patient.  If you have any questions regarding my recommendations, please do not hesitate to contact me.       Moses Orosco MD      Professor of Medicine  [employer]      Executive Medical Director, Solid Organ Transplant Program  [facility]

## 2018-02-26 NOTE — PROGRESS NOTES
ANTICOAGULATION FOLLOW-UP CLINIC VISIT    Patient Name:  Rohan Monroe  Date:  2/26/2018  Contact Type:  Telephone    SUBJECTIVE:     Patient Findings     Positives No Problem Findings           OBJECTIVE    INR   Date Value Ref Range Status   02/26/2018 1.95 (H) 0.86 - 1.14 Final       ASSESSMENT / PLAN  INR assessment THER    Recheck INR In: 10 DAYS    INR Location Clinic      Anticoagulation Summary as of 2/26/2018     INR goal 2.0-3.0   Today's INR 1.95!   Maintenance plan 7.5 mg (5 mg x 1.5) on Wed; 5 mg (5 mg x 1) all other days   Full instructions 7.5 mg on Wed; 5 mg all other days   Weekly total 37.5 mg   No change documented Delphine Kaur RN   Plan last modified Delphine Kaur RN (2/21/2018)   Next INR check 3/8/2018   Priority INR   Target end date 4/20/2017    Indications   Long-term (current) use of anticoagulants [Z79.01] [Z79.01]  Atrial flutter with rapid ventricular response (H) [I48.92]         Anticoagulation Episode Summary     INR check location     Preferred lab     Send INR reminders to [facility]    Comments 3 months therapy, then reassess.        Anticoagulation Care Providers     Provider Role Specialty Phone number    Jamie Foster MD [facility] Internal Medicine [phone]            See the Encounter Report to view Anticoagulation Flowsheet and Dosing Calendar (Go to Encounters tab in chart review, and find the Anticoagulation Therapy Visit)    Spoke with Rodrigo Kaur RN

## 2018-02-26 NOTE — NURSING NOTE
Chief Complaint   Patient presents with     RECHECK     Cirrhosis of Liver without ascites       Blood pressure done at earlier visit was 100/55    Jeannie Gaytan [facility]  2/26/2018 2:05 PM

## 2018-02-26 NOTE — LETTER
2/26/2018      RE: Rohan Monroe  [address]  [place] [address]       I had the pleasure of seeing Denis Monroe for followup in the Liver Clinic at the [facility] on 02/26/2018.  Mr. Monroe returns for followup of cirrhosis caused by chronic hepatitis C.  He is a sustained virologic responder to hepatitis C treatment.        From a liver standpoint, he is doing well.  He denies any abdominal pain, itching or skin rash.  He does have fatigue related to his chronic lung disease.  He denies any increased abdominal girth or lower extremity edema.  He denies any fevers or chills.  He has a chronic cough and a moderate amount of shortness of breath.  He does use 3 liters of oxygen.  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      He denies any nausea, vomiting, diarrhea or constipation.  His appetite is good, and his weight is down and that is intentional.     Current Outpatient Prescriptions   Medication     levothyroxine (SYNTHROID/LEVOTHROID) 125 MCG tablet     metoprolol tartrate (LOPRESSOR) 100 MG tablet     colchicine (COLCRYS) 0.6 MG tablet     tiotropium (SPIRIVA HANDIHALER) 18 MCG capsule     fluticasone-vilanterol (BREO ELLIPTA) 100-25 MCG/INH oral inhaler     albuterol (PROAIR HFA/PROVENTIL HFA/VENTOLIN HFA) 108 (90 BASE) MCG/ACT Inhaler     methotrexate 2.5 MG tablet CHEMO     furosemide (LASIX) 20 MG tablet     warfarin (COUMADIN) 5 MG tablet     sildenafil (REVATIO) 20 MG tablet     atorvastatin (LIPITOR) 40 MG tablet     losartan (COZAAR) 50 MG tablet     order for DME     omeprazole (PRILOSEC) 20 MG CR capsule     clopidogrel (PLAVIX) 75 MG tablet     polyethylene glycol (MIRALAX) powder     inFLIXimab (REMICADE) 100 MG injection     blood glucose monitoring (ACCU-CHEK RONNIE PLUS) test strip     blood glucose monitoring (ACCU-CHEK FASTCLIX) lancets      blood glucose monitoring (NO BRAND SPECIFIED) meter device kit     Urea (CARMOL 40) 40 % CREA     Multiple Vitamins-Minerals (MULTIVITAMIN & MINERAL PO)     mirtazapine (REMERON) 15 MG tablet     [DISCONTINUED] levothyroxine (SYNTHROID/LEVOTHROID) 137 MCG tablet     order for DME     No current facility-administered medications for this visit.      B/P: Data Unavailable, T: [drinking tea, denies fevers[, P: Data Unavailable, R: Data Unavailable    HEENT exam shows no scleral icterus and no temporal muscle wasting.   His chest exam shows bibasilar rales and some expiratory wheezing.  His abdominal exam shows no obvious ascites.  No masses or tenderness to palpation are present.  His liver is 10-11 cm in span with a slightly prominent left lobe.  No spleen tip is palpable, and extremity exam shows no edema.  Skin exam shows no stigmata of chronic liver disease.  Neurologic exam shows no asterixis.     Recent Results (from the past 168 hour(s))   INR    Collection Time: 02/21/18 12:19 PM   Result Value Ref Range    INR 1.70 (H) 0.86 - 1.14   Protein  random urine with Creat Ratio    Collection Time: 02/26/18  1:16 PM   Result Value Ref Range    Protein Random Urine 0.49 g/L    Protein Total Urine g/gr Creatinine 0.76 (H) 0 - 0.2 g/g Cr   Albumin Random Urine Quantitative with Creat Ratio    Collection Time: 02/26/18  1:16 PM   Result Value Ref Range    Creatinine Urine 64 mg/dL    Albumin Urine mg/L PENDING mg/L    Albumin Urine mg/g Cr PENDING 0 - 17 mg/g Cr   INR    Collection Time: 02/26/18  1:17 PM   Result Value Ref Range    INR 1.95 (H) 0.86 - 1.14   CBC with platelets [AQN861]    Collection Time: 02/26/18  1:17 PM   Result Value Ref Range    WBC 7.6 4.0 - 11.0 10e9/L    RBC Count 3.64 (L) 4.4 - 5.9 10e12/L    Hemoglobin 11.0 (L) 13.3 - 17.7 g/dL    Hematocrit 34.3 (L) 40.0 - 53.0 %    MCV 94 78 - 100 fl    MCH 30.2 26.5 - 33.0 pg    MCHC 32.1 31.5 - 36.5 g/dL    RDW 13.6 10.0 - 15.0 %    Platelet Count 277 150 -  450 10e9/L   Renal panel    Collection Time: 02/26/18  1:17 PM   Result Value Ref Range    Sodium 134 133 - 144 mmol/L    Potassium 4.3 3.4 - 5.3 mmol/L    Chloride 100 94 - 109 mmol/L    Carbon Dioxide 26 20 - 32 mmol/L    Anion Gap 8 3 - 14 mmol/L    Glucose 146 (H) 70 - 99 mg/dL    Urea Nitrogen 48 (H) 7 - 30 mg/dL    Creatinine 2.16 (H) 0.66 - 1.25 mg/dL    GFR Estimate 30 (L) >60 mL/min/1.7m2    GFR Estimate If Black 36 (L) >60 mL/min/1.7m2    Calcium 8.7 8.5 - 10.1 mg/dL    Phosphorus 3.5 2.5 - 4.5 mg/dL    Albumin 3.7 3.4 - 5.0 g/dL   Ferritin    Collection Time: 02/26/18  1:17 PM   Result Value Ref Range    Ferritin 164 26 - 388 ng/mL   Iron and iron binding capacity    Collection Time: 02/26/18  1:17 PM   Result Value Ref Range    Iron 49 35 - 180 ug/dL    Iron Binding Cap 271 240 - 430 ug/dL    Iron Saturation Index 18 15 - 46 %   Alkaline phosphatase    Collection Time: 02/26/18  1:17 PM   Result Value Ref Range    Alkaline Phosphatase 128 40 - 150 U/L   ALT    Collection Time: 02/26/18  1:17 PM   Result Value Ref Range    ALT 42 0 - 70 U/L   AST    Collection Time: 02/26/18  1:17 PM   Result Value Ref Range    AST 24 0 - 45 U/L   Bilirubin  total    Collection Time: 02/26/18  1:17 PM   Result Value Ref Range    Bilirubin Total 0.6 0.2 - 1.3 mg/dL   Bilirubin direct    Collection Time: 02/26/18  1:17 PM   Result Value Ref Range    Bilirubin Direct 0.2 0.0 - 0.2 mg/dL   Protein total    Collection Time: 02/26/18  1:17 PM   Result Value Ref Range    Protein Total 8.1 6.8 - 8.8 g/dL   TSH with free T4 reflex    Collection Time: 02/26/18  1:18 PM   Result Value Ref Range    TSH 0.07 (L) 0.40 - 4.00 mU/L   T4 free    Collection Time: 02/26/18  1:18 PM   Result Value Ref Range    T4 Free 1.38 0.76 - 1.46 ng/dL      My impression is that Mr. 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  He is due for an ultrasound which I will go ahead and order.  He is otherwise up-to-date with regard to vaccines,  variceal and other cancer screening.  He is somewhat hyperthyroid based on his low TSH, and I will lower his thyroid dose to 125 mcg per day.  I will let Dr. Foster know about that.        Otherwise, he is thinking about moving to [place] at least part-time, and I have given him the name of Dr. Uziel Li at [facility] to see.  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  My plan will be to see him back in the clinic in 6 months.  He is still planning on spending some time here in [place] as well.      Thank you very much for allowing me to participate in the care of this patient.  If you have any questions regarding my recommendations, please do not hesitate to contact me.       Moses Orosco MD      Professor of Medicine  [employer]      Executive Medical Director, Solid Organ Transplant Program  [facility]

## 2018-02-26 NOTE — MR AVS SNAPSHOT
After Visit Summary   2/26/2018    Rohan Monroe    MRN: [id number]           Patient Information     Date Of Birth          1945        Visit Information        Provider Department      2/26/2018 10:35 AM Jamie Foster MD [facility]        Today's Diagnoses     Abnormal finding on thyroid function test    -  1       Follow-ups after your visit        Your next 10 appointments already scheduled     Feb 26, 2018  1:00 PM CST   Lab with  LAB   [facility] ([facility])    [address]  1st Floor  [facility] [id number] [phone]            Feb 26, 2018  2:00 PM CST   (Arrive by 1:45 PM)   Return General Liver with Moses Orosco MD   [facility] ([facility])    [address]  [address]  [facility] [address] [phone]            Feb 28, 2018  1:00 PM CST   Cardiac Treatment with Sh Cardiac Rehab 1   [facility] ([facility])    [address]  [facility] [address]   [phone]            Mar 02, 2018  3:00 PM CST   Cardiac Treatment with Sh Cardiac Rehab 1   [facility] ([facility])    [address]  [facility] [address]   [phone]            Mar 05, 2018  1:30 PM CST   RETURN GLAUCOMA with Kina Greco MD   [facility] ([facility])    [facility]  [address] [place] [address] Clin [address] [place] [address]   [phone]            Mar 05, 2018  3:00 PM CST   Cardiac Treatment with Sh Cardiac Rehab 1   [facility] ([facility])    [address]  [facility] [address]   [phone]            Mar 06, 2018 12:15 PM CST   RETURN RETINA with Sandee Middleton MD   [facility] ([facility])    [facility]  [address] [place] [address] Clin 9a  [facility] [address]    [phone]            Mar 07, 2018  1:00 PM CST   Cardiac Treatment with  Cardiac Rehab 1   [facility] ([facility])    [address]  [facility] [address]   [phone]            Mar 08, 2018 10:30 AM CST   Ech Complete with [id number]   [facility] ([facility])    [address]  3rd Floor  [facility] [address] [phone]           1. Please bring or wear a comfortable two-piece outfit. 2. You may eat, drink and take your normal medicines. 3. For any questions that cannot be answered, please contact the ordering physician            Mar 08, 2018 11:30 AM CST   (Arrive by 11:15 AM)   RETURN HEART FAILURE with Diane Paige MD   [facility] ([facility])    [address]  [address]  [facility] [address] [phone]              Who to contact     Please call your clinic at [phone] to:    Ask questions about your health    Make or cancel appointments    Discuss your medicines    Learn about your test results    Speak to your doctor            Additional Information About Your Visit        [employer] Information     [employer] gives you secure access to your electronic health record. If you see a primary care provider, you can also send messages to your care team and make appointments. If you have questions, please call your primary care clinic.  If you do not have a primary care provider, please call [phone] and they will assist you.      [employer] is an electronic gateway that provides easy, online access to your medical records. With [employer], you can request a clinic appointment, read your test results, renew a prescription or communicate with your care team.     To access your existing account, please contact your [facility] or call [phone] for assistance.        Care EveryWhere ID     This is your Care EveryWhere ID.  This could be used by other organizations to access your [facility] medical records  [id number]        Your Vitals Were     Pulse Respirations Pulse Oximetry BMI (Body Mass Index)          78 16 94% 26.46 kg/m2         Blood Pressure from Last 3 Encounters:   02/26/18 100/55   02/21/18 148/72   02/14/18 153/88    Weight from Last 3 Encounters:   02/26/18 76.7 kg (169 lb)   02/21/18 78.6 kg (173 lb 3.2 oz)   02/14/18 77.4 kg (170 lb 9.6 oz)              We Performed the Following     TSH with free T4 reflex        Primary Care Provider Office Phone # Fax #    Jamie Foster [phone]       [address] [address]        Equal Access to Services     [facility]: Hadii aad ku hadasho Soleslie, waaxda luqadaha, qaybta kaalmada adeforrestyaisaias, nelly munson . So [facility] [phone].    ATENCIÓN: Si habla español, tiene a mcfadden disposición servicios gratuitos de asistencia lingüística. sOman al [phone].    We comply with applicable federal civil rights laws and [place] laws. We do not discriminate on the basis of race, color, national origin, age, disability, sex, sexual orientation, or gender identity.            Thank you!     Thank you for choosing [facility]  for your care. Our goal is always to provide you with excellent care. Hearing back from our patients is one way we can continue to improve our services. Please take a few minutes to complete the written survey that you may receive in the mail after your visit with us. Thank you!             Your Updated Medication List - Protect others around you: Learn how to safely use, store and throw away your medicines at www.disposemymeds.org.          This list is accurate as of 2/26/18 12:46 PM.  Always use your most recent med list.                   Brand Name Dispense Instructions for use Diagnosis    albuterol 108 (90 BASE) MCG/ACT Inhaler    PROAIR HFA/PROVENTIL HFA/VENTOLIN HFA     3 Inhaler    Inhale 2 puffs into the lungs every 6 hours as needed for shortness of breath / dyspnea    Sarcoidosis       atorvastatin 40 MG tablet    LIPITOR    30 tablet    TAKE ONE TABLET BY MOUTH ONE TIME DAILY    Coronary artery disease involving native coronary artery of native heart without angina pectoris, CAD S/P percutaneous coronary angioplasty       blood glucose monitoring lancets     2 Box    Use to test blood sugar 2 times daily or as directed.  102 lancets per box.  3 month supply.    Type 2 diabetes, HbA1C goal < 8% (H)       blood glucose monitoring meter device kit    no brand specified    1 kit    Use to test blood sugar 2 times daily.    Type 2 diabetes mellitus with diabetic nephropathy (H)       blood glucose monitoring test strip    ACCU-CHEK RONNIE PLUS    200 each    Use to test blood sugar 2 times daily or as directed.  3 month supply.    Type 2 diabetes, HbA1C goal < 8% (H)       clopidogrel 75 MG tablet    PLAVIX    90 tablet    Take 1 tablet (75 mg) by mouth daily    CAD S/P percutaneous coronary angioplasty, Coronary artery disease involving native coronary artery of native heart without angina pectoris       colchicine 0.6 MG tablet    COLCRYS    30 tablet    2 tabs at the onset of flare, then 1 tab every 2 hrs until pain is better or diarrhea occurs, up to 10 doses. Wait 3 days until taking again        doxycycline 100 MG capsule    VIBRAMYCIN    20 capsule    Take 1 capsule (100 mg) by mouth 2 times daily        fluticasone-vilanterol 100-25 MCG/INH oral inhaler    BREO ELLIPTA    3 Inhaler    Inhale 1 puff into the lungs daily    Chronic obstructive pulmonary disease, unspecified COPD type (H)       furosemide 20 MG tablet    LASIX    240 tablet    Take 3 tablets (60 mg) by mouth 2 times daily May take extra 20 mg as needed for wt .gain >3#    Pulmonary hypertension       HYDROcodone-acetaminophen 5-325 MG per tablet    NORCO    15 tablet    Take 1-2 tablets by mouth every 4  hours as needed for moderate to severe pain        inFLIXimab 100 MG injection    REMICADE     Inject 100 mg into the vein every 28 days        levothyroxine 137 MCG tablet    SYNTHROID/LEVOTHROID    90 tablet    Take 1 tablet (137 mcg) by mouth daily    Hypothyroidism       losartan 50 MG tablet    COZAAR    90 tablet    Take 1 tablet (50 mg) by mouth daily    (HFpEF) heart failure with preserved ejection fraction (H)       methotrexate 2.5 MG tablet CHEMO     48 tablet    Take 3 tablets (7.5 mg) by mouth once a week On Mondays    Sarcoidosis       metoprolol tartrate 100 MG tablet    LOPRESSOR    60 tablet    Take 1 tablet (100 mg) by mouth 2 times daily    Hypertension secondary to other renal disorders       mirtazapine 15 MG tablet    REMERON     Take 15 mg by mouth At Bedtime.        MULTIVITAMIN & MINERAL PO      Take 1 tablet by mouth daily.        omeprazole 20 MG CR capsule    priLOSEC    90 capsule    Take 1 capsule (20 mg) by mouth daily    CAD S/P percutaneous coronary angioplasty, Coronary artery disease involving native coronary artery of native heart without angina pectoris, Sarcoidosis of lung (H)       * order for DME     1 Device    She requested for a 4 wheel walker, would like to change it to 4 wheel walker with a seat and oxygen tank durant.   Need this faxed over to her  [phone]    Sarcoidosis, lung (H), COPD (chronic obstructive pulmonary disease) (H), Abnormal gait, Congestive heart failure (H)       * order for DME     1 Device    Updated Oxygen: Patient requires supplemental Oxygen 3 LPM via nasal canula with activity and 2 LPM nocturnally. Please provide patient with a portable oxygen concentrator for improved mobility.  Okay to spot check or titrated for conserving to keep stats above 90%. Oxygen will be for a lifetime.    ILD (interstitial lung disease) (H), Hypoxia       * order for DME     10 Units    Equipment being ordered: Nonadherent 2 x 2 dressings, cotton gauze  Dress wound  daily    Diabetic ulcer of toe of left foot associated with type 2 diabetes mellitus, limited to breakdown of skin (H)       polyethylene glycol powder    MIRALAX    510 g    Take 17 g (1 capful) by mouth daily    Constipation, unspecified constipation type       sildenafil 20 MG tablet    REVATIO    270 tablet    Take 1 tablet (20 mg) by mouth 3 times daily    Pulmonary hypertension       tiotropium 18 MCG capsule    SPIRIVA HANDIHALER    90 capsule    Inhale contents of one capsule daily.    Chronic obstructive pulmonary disease, unspecified COPD type (H)       Urea 40 % Crea    CARMOL 40    199 g    To feet daily    Dermatophytosis of nail, Corns and callosities       warfarin 5 MG tablet    COUMADIN    90 tablet    TAKE ONE TABLET BY MOUTH ONE TIME DAILY    Atrial flutter with rapid ventricular response (H)       * Notice:  This list has 3 medication(s) that are the same as other medications prescribed for you. Read the directions carefully, and ask your doctor or other care provider to review them with you.

## 2018-02-26 NOTE — MR AVS SNAPSHOT
Rohan Monroe   2/26/2018   Anticoagulation Therapy Visit    Description:  72 year old male   Provider:  Delphine Kaur, RN   Department:  [facility]           INR as of 2/26/2018     Today's INR 1.95!      Anticoagulation Summary as of 2/26/2018     INR goal 2.0-3.0   Today's INR 1.95!   Full instructions 7.5 mg on Wed; 5 mg all other days   Next INR check 3/8/2018    Indications   Long-term (current) use of anticoagulants [Z79.01] [Z79.01]  Atrial flutter with rapid ventricular response (H) [I48.92]         February 2018 Details    Sun Mon Tue Wed Thu Fri Sat         1               2               3                 4               5               6               7               8               9               10                 11               12               13               14               15               16               17                 18               19               20               21               22               23               24                 25               26      5 mg   See details      27      5 mg         28      7.5 mg             Date Details   02/26 This INR check               How to take your warfarin dose     To take:  5 mg Take 1 of the 5 mg tablets.    To take:  7.5 mg Take 1.5 of the 5 mg tablets.           March 2018 Details    Sun Mon Tue Wed Thu Fri Sat         1      5 mg         2      5 mg         3      5 mg           4      5 mg         5      5 mg         6      5 mg         7      7.5 mg         8            9               10                 11               12               13               14               15               16               17                 18               19               20               21               22               23               24                 25               26               27               28               29               30               31                Date Details   No additional details    Date  of next INR:  3/8/2018         How to take your warfarin dose     To take:  5 mg Take 1 of the 5 mg tablets.    To take:  7.5 mg Take 1.5 of the 5 mg tablets.

## 2018-02-26 NOTE — MR AVS SNAPSHOT
After Visit Summary   2/26/2018    Rohan Monroe    MRN: [id number]           Patient Information     Date Of Birth          1945        Visit Information        Provider Department      2/26/2018 2:00 PM Moses Orosco MD [facility]        Today's Diagnoses     Hypothyroidism due to Hashimoto's thyroiditis    -  1    Cirrhosis of liver without ascites, unspecified hepatic cirrhosis type (H)           Follow-ups after your visit        Your next 10 appointments already scheduled     Feb 28, 2018  1:00 PM CST   Cardiac Treatment with Sh Cardiac Rehab 1   [facility] ([facility])    [address]  [facility] [address]   [phone]            Mar 02, 2018  3:00 PM CST   Cardiac Treatment with Sh Cardiac Rehab 1   [facility] ([facility])    [address]  [facility] [address]   [phone]            Mar 05, 2018  1:30 PM CST   RETURN GLAUCOMA with Kina Greco MD   [facility] ([facility])    [address] [facility] [address] [place] [address]   [phone]            Mar 05, 2018  3:00 PM CST   Cardiac Treatment with Sh Cardiac Rehab 1   [facility] ([facility])    [address]  [facility] [address]   [phone]            Mar 06, 2018 12:15 PM CST   RETURN RETINA with Sandee Middleton MD   Eye Clinic ([facility])    [place] [address] [address]   [phone]            Mar 07, 2018  1:00 PM CST   Cardiac Treatment with Sh Cardiac Rehab 1   [facility] ([facility])    [address]  [facility] [address]   [phone]            Mar 08, 2018 10:00 AM CST   Lab with  LAB    Health Lab ([facility])    [address]  1st Floor  [facility] [address] [phone]            Mar 08, 2018 10:30 AM CST   Ech Complete with [id number]   [facility] ([facility])    [address]  3rd Floor  [facility] [id number] [phone]           1. Please bring or wear a comfortable two-piece outfit. 2. You may eat, drink and take your normal medicines. 3. For any questions that cannot be answered, please contact the ordering physician            Mar 08, 2018 11:30 AM CST   (Arrive by 11:15 AM)   RETURN HEART FAILURE with Diane Paige MD   [facility] ([facility])    [address]  [address]  [facility] [address] [phone]            Mar 09, 2018  3:00 PM CST   Cardiac Treatment with  Cardiac Rehab 1   [facility] ([facility])    [address]  [facility] [id number] [phone]              Future tests that were ordered for you today     Open Standing Orders        Priority Remaining Interval Expires Ordered    US abdomen complete [AZX277] Routine 2/2 Every 6 Months 2/26/2019 2/26/2018          Open Future Orders        Priority Expected Expires Ordered    Hepatic Panel [LAB20] Routine 8/25/2018 2/26/2019 2/26/2018    Basic metabolic panel [LAB15] Routine 8/25/2018 2/26/2019 2/26/2018    CBC with platelets [BFW147] Routine 8/25/2018 2/26/2019 2/26/2018    INR [JDF2186] Routine 8/25/2018 2/26/2019 2/26/2018    AFP tumor marker [QII408] Routine 8/25/2018 2/26/2019 2/26/2018    US Abdomen Limited* Routine  2/26/2019 2/26/2018            Who to contact     If you have questions or need follow up information about today's clinic visit or your schedule please contact [facility] directly at [phone].  Normal or non-critical lab and imaging results will be communicated to you by MyChart, letter or phone within 4 business days after the clinic has received the results. If  "you do not hear from us within 7 days, please contact the clinic through [facility] or phone. If you have a critical or abnormal lab result, we will notify you by phone as soon as possible.  Submit refill requests through [facility] or call your pharmacy and they will forward the refill request to us. Please allow 3 business days for your refill to be completed.          Additional Information About Your Visit        [employer] Information     [facility] gives you secure access to your electronic health record. If you see a primary care provider, you can also send messages to your care team and make appointments. If you have questions, please call your primary care clinic.  If you do not have a primary care provider, please call [phone] and they will assist you.        Care EveryWhere ID     This is your Care EveryWhere ID. This could be used by other organizations to access your [place] medical records  [id number]        Your Vitals Were     Height BMI (Body Mass Index)                1.702 m (5' 7\") 26.47 kg/m2           Blood Pressure from Last 3 Encounters:   02/26/18 100/55   02/21/18 148/72   02/14/18 153/88    Weight from Last 3 Encounters:   02/26/18 76.7 kg (169 lb)   02/26/18 76.7 kg (169 lb)   02/21/18 78.6 kg (173 lb 3.2 oz)              We Performed the Following     Schedule follow up appointments          Today's Medication Changes          These changes are accurate as of 2/26/18  2:40 PM.  If you have any questions, ask your nurse or doctor.               These medicines have changed or have updated prescriptions.        Dose/Directions    levothyroxine 125 MCG tablet   Commonly known as:  SYNTHROID/LEVOTHROID   This may have changed:    - medication strength  - how much to take   Used for:  Hypothyroidism due to Hashimoto's thyroiditis   Changed by:  Moses Orosco MD        Dose:  125 mcg   Take 1 tablet (125 mcg) by mouth daily   Quantity:  90 tablet   Refills:  3            Where to get your " medicines      These medications were sent to [id number] - [place], [place] - [address]  [address] [facility] [address]     Phone:  [phone]     levothyroxine 125 MCG tablet                Primary Care Provider Office Phone # Fax #    Jamie Foster [phone]        [address] [address]        Equal Access to Services     ISIAH MONTOYA : Hadii aad ku hadasho Soomaali, waaxda luqadaha, qaybta kaalmada adeegyada, waxay idiin hayaan adeeg kharash la'carolina . So [facility] [phone].    ATENCIÓN: Si habla español, tiene a mcfadden disposición servicios gratuitos de asistencia lingüística. [facility] [phone].    We comply with applicable federal civil rights laws and [place] laws. We do not discriminate on the basis of race, color, national origin, age, disability, sex, sexual orientation, or gender identity.            Thank you!     Thank you for choosing [facility]  for your care. Our goal is always to provide you with excellent care. Hearing back from our patients is one way we can continue to improve our services. Please take a few minutes to complete the written survey that you may receive in the mail after your visit with us. Thank you!             Your Updated Medication List - Protect others around you: Learn how to safely use, store and throw away your medicines at www.disposemymeds.org.          This list is accurate as of 2/26/18  2:40 PM.  Always use your most recent med list.                   Brand Name Dispense Instructions for use Diagnosis    albuterol 108 (90 BASE) MCG/ACT Inhaler    PROAIR HFA/PROVENTIL HFA/VENTOLIN HFA    3 Inhaler    Inhale 2 puffs into the lungs every 6 hours as needed for shortness of breath / dyspnea    Sarcoidosis       atorvastatin 40 MG tablet    LIPITOR    30 tablet    TAKE ONE TABLET BY MOUTH ONE TIME DAILY    Coronary artery disease involving native coronary artery of native  heart without angina pectoris, CAD S/P percutaneous coronary angioplasty       blood glucose monitoring lancets     2 Box    Use to test blood sugar 2 times daily or as directed.  102 lancets per box.  3 month supply.    Type 2 diabetes, HbA1C goal < 8% (H)       blood glucose monitoring meter device kit    no brand specified    1 kit    Use to test blood sugar 2 times daily.    Type 2 diabetes mellitus with diabetic nephropathy (H)       blood glucose monitoring test strip    ACCU-CHEK RONNIE PLUS    200 each    Use to test blood sugar 2 times daily or as directed.  3 month supply.    Type 2 diabetes, HbA1C goal < 8% (H)       clopidogrel 75 MG tablet    PLAVIX    90 tablet    Take 1 tablet (75 mg) by mouth daily    CAD S/P percutaneous coronary angioplasty, Coronary artery disease involving native coronary artery of native heart without angina pectoris       colchicine 0.6 MG tablet    COLCRYS    30 tablet    2 tabs at the onset of flare, then 1 tab every 2 hrs until pain is better or diarrhea occurs, up to 10 doses. Wait 3 days until taking again        fluticasone-vilanterol 100-25 MCG/INH oral inhaler    BREO ELLIPTA    3 Inhaler    Inhale 1 puff into the lungs daily    Chronic obstructive pulmonary disease, unspecified COPD type (H)       furosemide 20 MG tablet    LASIX    240 tablet    Take 3 tablets (60 mg) by mouth 2 times daily May take extra 20 mg as needed for wt .gain >3#    Pulmonary hypertension       inFLIXimab 100 MG injection    REMICADE     Inject 100 mg into the vein every 28 days        levothyroxine 125 MCG tablet    SYNTHROID/LEVOTHROID    90 tablet    Take 1 tablet (125 mcg) by mouth daily    Hypothyroidism due to Hashimoto's thyroiditis       losartan 50 MG tablet    COZAAR    90 tablet    Take 1 tablet (50 mg) by mouth daily    (HFpEF) heart failure with preserved ejection fraction (H)       methotrexate 2.5 MG tablet CHEMO     48 tablet    Take 3 tablets (7.5 mg) by mouth once a week On  Mondays    Sarcoidosis       metoprolol tartrate 100 MG tablet    LOPRESSOR    60 tablet    Take 1 tablet (100 mg) by mouth 2 times daily    Hypertension secondary to other renal disorders       mirtazapine 15 MG tablet    REMERON     Take 15 mg by mouth At Bedtime.        MULTIVITAMIN & MINERAL PO      Take 1 tablet by mouth daily.        omeprazole 20 MG CR capsule    priLOSEC    90 capsule    Take 1 capsule (20 mg) by mouth daily    CAD S/P percutaneous coronary angioplasty, Coronary artery disease involving native coronary artery of native heart without angina pectoris, Sarcoidosis of lung (H)       * order for DME     1 Device    She requested for a 4 wheel walker, would like to change it to 4 wheel walker with a seat and oxygen tank durant.   Need this faxed over to her  [phone]    Sarcoidosis, lung (H), COPD (chronic obstructive pulmonary disease) (H), Abnormal gait, Congestive heart failure (H)       * order for DME     1 Device    Updated Oxygen: Patient requires supplemental Oxygen 3 LPM via nasal canula with activity and 2 LPM nocturnally. Please provide patient with a portable oxygen concentrator for improved mobility.  Okay to spot check or titrated for conserving to keep stats above 90%. Oxygen will be for a lifetime.    ILD (interstitial lung disease) (H), Hypoxia       polyethylene glycol powder    MIRALAX    510 g    Take 17 g (1 capful) by mouth daily    Constipation, unspecified constipation type       sildenafil 20 MG tablet    REVATIO    270 tablet    Take 1 tablet (20 mg) by mouth 3 times daily    Pulmonary hypertension       tiotropium 18 MCG capsule    SPIRIVA HANDIHALER    90 capsule    Inhale contents of one capsule daily.    Chronic obstructive pulmonary disease, unspecified COPD type (H)       Urea 40 % Crea    CARMOL 40    199 g    To feet daily    Dermatophytosis of nail, Corns and callosities       warfarin 5 MG tablet    COUMADIN    90 tablet    TAKE ONE TABLET BY MOUTH ONE TIME  DAILY    Atrial flutter with rapid ventricular response (H)       * Notice:  This list has 2 medication(s) that are the same as other medications prescribed for you. Read the directions carefully, and ask your doctor or other care provider to review them with you.

## 2018-02-28 ENCOUNTER — HOSPITAL ENCOUNTER (OUTPATIENT)
Dept: CARDIAC REHAB | Facility: CLINIC | Age: 73
End: 2018-02-28
Attending: INTERNAL MEDICINE
Payer: MEDICARE

## 2018-02-28 PROCEDURE — 40000116 ZZH STATISTIC OP CR VISIT: Performed by: REHABILITATION PRACTITIONER

## 2018-02-28 PROCEDURE — 93798 PHYS/QHP OP CAR RHAB W/ECG: CPT | Mod: KX | Performed by: REHABILITATION PRACTITIONER

## 2018-03-02 ENCOUNTER — HOSPITAL ENCOUNTER (OUTPATIENT)
Dept: CARDIAC REHAB | Facility: CLINIC | Age: 73
End: 2018-03-02
Attending: INTERNAL MEDICINE
Payer: MEDICARE

## 2018-03-02 PROCEDURE — 93798 PHYS/QHP OP CAR RHAB W/ECG: CPT | Mod: KX | Performed by: REHABILITATION PRACTITIONER

## 2018-03-02 PROCEDURE — 40000116 ZZH STATISTIC OP CR VISIT: Performed by: REHABILITATION PRACTITIONER

## 2018-03-04 ENCOUNTER — TELEPHONE (OUTPATIENT)
Dept: INTERNAL MEDICINE | Facility: CLINIC | Age: 73
End: 2018-03-04

## 2018-03-04 DIAGNOSIS — R94.6 ABNORMAL FINDING ON THYROID FUNCTION TEST: Primary | ICD-10-CM

## 2018-03-04 NOTE — TELEPHONE ENCOUNTER
Dear Willow;    Please see results note I sent to Rohan and please give him a call (no rush)    (1) Please confirm he has been taking 125 mcg thyroid replacement consistently (and not too much)    (2) He will need recheck thyroid in about 3 weeks (order future placed this encounter)    ThanksSAVANNA        Dear Denis;    Your thyroid lab suggests some over-replacement. My nurse Willow will give you follow up call for recommendations.    SAVANNA Foster MD      Spoke to pt and he will start taking Levothyroxine at 125 mcg and retest in a few weeks.  Clinic numbers given for questions or concerns.  Willow Bob RN 2:35 PM on 3/5/2018.

## 2018-03-07 ENCOUNTER — HOSPITAL ENCOUNTER (OUTPATIENT)
Dept: CARDIAC REHAB | Facility: CLINIC | Age: 73
End: 2018-03-07
Attending: INTERNAL MEDICINE
Payer: MEDICARE

## 2018-03-07 PROCEDURE — 93798 PHYS/QHP OP CAR RHAB W/ECG: CPT | Mod: KX | Performed by: REHABILITATION PRACTITIONER

## 2018-03-07 PROCEDURE — 40000116 ZZH STATISTIC OP CR VISIT: Performed by: REHABILITATION PRACTITIONER

## 2018-03-08 ENCOUNTER — OFFICE VISIT (OUTPATIENT)
Dept: CARDIOLOGY | Facility: CLINIC | Age: 73
End: 2018-03-08
Attending: INTERNAL MEDICINE
Payer: MEDICARE

## 2018-03-08 ENCOUNTER — ANTICOAGULATION THERAPY VISIT (OUTPATIENT)
Dept: ANTICOAGULATION | Facility: CLINIC | Age: 73
End: 2018-03-08

## 2018-03-08 ENCOUNTER — RADIANT APPOINTMENT (OUTPATIENT)
Dept: CARDIOLOGY | Facility: CLINIC | Age: 73
End: 2018-03-08
Payer: MEDICARE

## 2018-03-08 VITALS
BODY MASS INDEX: 25.19 KG/M2 | WEIGHT: 166.2 LBS | HEART RATE: 66 BPM | DIASTOLIC BLOOD PRESSURE: 84 MMHG | OXYGEN SATURATION: 100 % | SYSTOLIC BLOOD PRESSURE: 138 MMHG | HEIGHT: 68 IN

## 2018-03-08 DIAGNOSIS — I48.92 ATRIAL FLUTTER WITH RAPID VENTRICULAR RESPONSE (H): ICD-10-CM

## 2018-03-08 DIAGNOSIS — I48.3 TYPICAL ATRIAL FLUTTER (H): Primary | ICD-10-CM

## 2018-03-08 DIAGNOSIS — N18.4 CKD (CHRONIC KIDNEY DISEASE) STAGE 4, GFR 15-29 ML/MIN (H): ICD-10-CM

## 2018-03-08 DIAGNOSIS — I50.30 (HFPEF) HEART FAILURE WITH PRESERVED EJECTION FRACTION (H): ICD-10-CM

## 2018-03-08 DIAGNOSIS — I27.20 PULMONARY HYPERTENSION (H): ICD-10-CM

## 2018-03-08 DIAGNOSIS — Z79.01 LONG-TERM (CURRENT) USE OF ANTICOAGULANTS: ICD-10-CM

## 2018-03-08 DIAGNOSIS — I48.92 ATRIAL FLUTTER (H): ICD-10-CM

## 2018-03-08 LAB
ALBUMIN SERPL-MCNC: 3.7 G/DL (ref 3.4–5)
ANION GAP SERPL CALCULATED.3IONS-SCNC: 9 MMOL/L (ref 3–14)
BUN SERPL-MCNC: 58 MG/DL (ref 7–30)
CALCIUM SERPL-MCNC: 8.7 MG/DL (ref 8.5–10.1)
CHLORIDE SERPL-SCNC: 101 MMOL/L (ref 94–109)
CO2 SERPL-SCNC: 25 MMOL/L (ref 20–32)
CREAT SERPL-MCNC: 2.08 MG/DL (ref 0.66–1.25)
CREAT UR-MCNC: 72 MG/DL
GFR SERPL CREATININE-BSD FRML MDRD: 31 ML/MIN/1.7M2
GLUCOSE SERPL-MCNC: 155 MG/DL (ref 70–99)
INR PPP: 1.26 (ref 0.86–1.14)
PHOSPHATE SERPL-MCNC: 4 MG/DL (ref 2.5–4.5)
POTASSIUM SERPL-SCNC: 3.9 MMOL/L (ref 3.4–5.3)
PROT UR-MCNC: 0.6 G/L
PROT/CREAT 24H UR: 0.84 G/G CR (ref 0–0.2)
SODIUM SERPL-SCNC: 135 MMOL/L (ref 133–144)

## 2018-03-08 PROCEDURE — 0298T ZZC EXT ECG > 48HR TO 21 DAY REVIEW AND INTERPRETATN: CPT | Performed by: INTERNAL MEDICINE

## 2018-03-08 PROCEDURE — 36415 COLL VENOUS BLD VENIPUNCTURE: CPT | Performed by: INTERNAL MEDICINE

## 2018-03-08 PROCEDURE — 99215 OFFICE O/P EST HI 40 MIN: CPT | Mod: 25 | Performed by: INTERNAL MEDICINE

## 2018-03-08 PROCEDURE — 85610 PROTHROMBIN TIME: CPT | Performed by: INTERNAL MEDICINE

## 2018-03-08 PROCEDURE — 84156 ASSAY OF PROTEIN URINE: CPT | Performed by: INTERNAL MEDICINE

## 2018-03-08 PROCEDURE — 80069 RENAL FUNCTION PANEL: CPT | Performed by: INTERNAL MEDICINE

## 2018-03-08 PROCEDURE — 0296T ZIO PATCH HOLTER: CPT | Mod: ZF | Performed by: INTERNAL MEDICINE

## 2018-03-08 PROCEDURE — G0463 HOSPITAL OUTPT CLINIC VISIT: HCPCS | Mod: 25,ZF

## 2018-03-08 RX ORDER — FUROSEMIDE 20 MG
40 TABLET ORAL 2 TIMES DAILY
Qty: 240 TABLET | Refills: 11 | Status: ON HOLD | OUTPATIENT
Start: 2018-03-08 | End: 2018-11-26

## 2018-03-08 RX ADMIN — Medication 6 ML: at 11:30

## 2018-03-08 ASSESSMENT — PAIN SCALES - GENERAL: PAINLEVEL: NO PAIN (0)

## 2018-03-08 NOTE — PATIENT INSTRUCTIONS
Stop taking plavix.   Reduce furosemide (lasix) to 40 mg (two 20 mg tabs) twice a day   Wear a cardiac monitor for 1 week - if this is negative we will stop coumadin   Stop taking omeprazole- if you have heartburn call us   Return to clinic in 6 months     In June- stop plavix and start ASA 81 mg daily     Return to clinic to 6 months with Dr. Paige.

## 2018-03-08 NOTE — MR AVS SNAPSHOT
Rohan Howard Gitalejandro   3/8/2018   Anticoagulation Therapy Visit    Description:  72 year old male   Provider:  Joe Canales [facility]   Department:  [facility]           INR as of 3/8/2018     Today's INR 1.26!      Anticoagulation Summary as of 3/8/2018     INR goal 2.0-3.0   Today's INR 1.26!   Full instructions 3/8: 7.5 mg; 3/9: 7.5 mg; 3/10: 7.5 mg; 3/11: 5 mg   Next INR check 3/12/2018    Indications   Long-term (current) use of anticoagulants [Z79.01] [Z79.01]  Atrial flutter with rapid ventricular response (H) [I48.92]         March 2018 Details    Sun Mon Tue Wed Thu Fri Sat         1               2               3                 4               5               6               7               8      7.5 mg   See details      9      7.5 mg         10      7.5 mg           11      5 mg         12            13               14               15               16               17                 18               19               20               21               22               23               24                 25               26               27               28               29               30               31                Date Details   03/08 This INR check       Date of next INR:  3/12/2018         How to take your warfarin dose     To take:  5 mg Take 1 of the 5 mg tablets.    To take:  7.5 mg Take 1.5 of the 5 mg tablets.

## 2018-03-08 NOTE — PROGRESS NOTES
"  ANTICOAGULATION FOLLOW-UP CLINIC VISIT    Patient Name:  Rohan Monroe  Date:  3/8/2018  Contact Type:  Telephone    SUBJECTIVE:     Patient Findings     Comments Rohan said his thyroid levels have been off           OBJECTIVE    INR   Date Value Ref Range Status   03/08/2018 1.26 (H) 0.86 - 1.14 Final       ASSESSMENT / PLAN  INR assessment SUB    Recheck INR In: 5 DAYS    INR Location Clinic      Anticoagulation Summary as of 3/8/2018     INR goal 2.0-3.0   Today's INR 1.26!   Maintenance plan No maintenance plan   Full instructions 3/8: 7.5 mg; 3/9: 7.5 mg; 3/10: 7.5 mg; 3/11: 5 mg   Plan last modified Raysa Triplett (3/8/2018)   Next INR check 3/12/2018   Priority INR   Target end date 4/20/2017    Indications   Long-term (current) use of anticoagulants [Z79.01] [Z79.01]  Atrial flutter with rapid ventricular response (H) [I48.92]         Anticoagulation Episode Summary     INR check location     Preferred lab     Send INR reminders to [facility]    Comments 3 months therapy, then reassess.        Anticoagulation Care Providers     Provider Role Specialty Phone number    Jamie Foster MD [facility] Internal Medicine [phone]            See the Encounter Report to view Anticoagulation Flowsheet and Dosing Calendar (Go to Encounters tab in chart review, and find the Anticoagulation Therapy Visit)    Spoke with Rohan and gave him his INR results and the dosing/follow-up plan. He said that he hasn't missed any doses and has been taking his warfarin as recommended. He said he hasn't had any changes in his medications, diet, or exercise. He does say that his thyroid \"has been off.\" Asked Rohan if he was told to stop his warfarin, as notes say that three months of therapy is recommended. He said that he is supposed to still be taking warfarin. He reports no falls or signs of bleeding or clotting.    Raysa Triplett PharmD3/Panda Canales [facility]               "

## 2018-03-08 NOTE — MR AVS SNAPSHOT
After Visit Summary   3/8/2018    Rohan Monroe    MRN: [id number]           Patient Information     Date Of Birth          1945        Visit Information        Provider Department      3/8/2018 11:30 AM Diane Paige MD [facility]        Today's Diagnoses     Typical atrial flutter (H)    -  1    Pulmonary hypertension          Care Instructions    Stop taking plavix.   Reduce furosemide (lasix) to 40 mg (two 20 mg tabs) twice a day   Wear a cardiac monitor for 1 week - if this is negative we will stop coumadin   Stop taking omeprazole- if you have heartburn call us   Return to clinic in 6 months     In June- stop plavix and start ASA 81 mg daily     Return to clinic to 6 months with Dr. Paige.                   Follow-ups after your visit        Additional Services     Follow-Up with Cardiologist                 Follow-up notes from your care team     Return in about 6 months (around 9/8/2018).      Your next 10 appointments already scheduled     Mar 09, 2018  3:00 PM CST   Cardiac Treatment with Sh Cardiac Rehab 1   [facility] ([facility])    [address]  [facility] [address]   [phone]            Mar 12, 2018  1:00 PM CDT   Cardiac Treatment with Sh Cardiac Rehab 1   [facility])    [address]  [facility] [address]   [phone]            Mar 14, 2018  1:00 PM CDT   Cardiac Treatment with Sh Cardiac Rehab 1   [facility] ([facility])    [address]  [facility] [address]   [phone]            Mar 16, 2018  3:00 PM CDT   Cardiac Treatment with Sh Cardiac Rehab 1   [facility] ([facility])    [address]  [facility] [address]   [phone]            Mar 19, 2018  1:00 PM CDT   Cardiac Treatment with Sh Cardiac Rehab 1    [facility] ([facility])    [address]  [place] [address]   [phone]            Mar 21, 2018 12:00 PM CDT   Infusion 180 with UC SPEC INFUSION, UC 47 ATC   [facility] ([facility])    [address]  [address]  [facility] [address] [phone]            Mar 23, 2018  3:00 PM CDT   Cardiac Treatment with  Cardiac Rehab 1   [facility] ([facility])    [address]  [place] [address]   [phone]            Apr 10, 2018  2:15 PM CDT   RETURN RETINA with Sandee Middleton MD   [facility] ([facility])    [address]  [facility] [address] [phone]            Apr 12, 2018  1:30 PM CDT   PFT VISIT with WALTER PFL PELON   [facility] Pulmonary Function Testing ([facility])    [address]  [address]  [facility] [id number] [phone]              Future tests that were ordered for you today     Open Future Orders        Priority Expected Expires Ordered    Follow-Up with Cardiologist Routine 9/4/2018 12/3/2018 3/8/2018    N terminal pro BNP outpatient Routine 9/4/2018 12/3/2018 3/8/2018    Comprehensive metabolic panel Routine 9/4/2018 12/3/2018 3/8/2018    Ziopatch Holter Monitor - Adult Routine 3/8/2018 5/7/2018 3/8/2018            Who to contact     If you have questions or need follow up information about today's clinic visit or your schedule please contact [facility] directly at [phone].  Normal or non-critical lab and imaging results will be communicated to you by MyChart, letter or phone within 4 business days after the clinic has received the results. If you do not hear from us within 7 days, please contact the clinic through MyChart or phone. If you have a critical or abnormal lab result,  "we will notify you by phone as soon as possible.  Submit refill requests through Tomorrow or call your pharmacy and they will forward the refill request to us. Please allow 3 business days for your refill to be completed.          Additional Information About Your Visit        [employer] Information     Tomorrow gives you secure access to your electronic health record. If you see a primary care provider, you can also send messages to your care team and make appointments. If you have questions, please call your primary care clinic.  If you do not have a primary care provider, please call [phone] and they will assist you.        Care EveryWhere ID     This is your Care EveryWhere ID. This could be used by other organizations to access your [place] medical records  [id number]        Your Vitals Were     Pulse Height Pulse Oximetry BMI (Body Mass Index)          66 1.727 m (5' 8\") 100% 25.27 kg/m2         Blood Pressure from Last 3 Encounters:   03/08/18 138/84   02/26/18 100/55   02/21/18 148/72    Weight from Last 3 Encounters:   03/08/18 75.4 kg (166 lb 3.2 oz)   02/26/18 76.7 kg (169 lb)   02/26/18 76.7 kg (169 lb)                 Today's Medication Changes          These changes are accurate as of 3/8/18 12:58 PM.  If you have any questions, ask your nurse or doctor.               These medicines have changed or have updated prescriptions.        Dose/Directions    furosemide 20 MG tablet   Commonly known as:  LASIX   This may have changed:  how much to take   Used for:  Pulmonary hypertension   Changed by:  Diane Paige MD        Dose:  40 mg   Take 2 tablets (40 mg) by mouth 2 times daily May take extra 20 mg as needed for wt .gain >3#   Quantity:  240 tablet   Refills:  11         Stop taking these medicines if you haven't already. Please contact your care team if you have questions.     omeprazole 20 MG CR capsule   Commonly known as:  priLOSEC   Stopped by:  Diane Paige MD           "      Where to get your medicines      These medications were sent to [id number] - [place], [place] - [address]  [address] [address]     Phone:  [phone]     furosemide 20 MG tablet                Primary Care Provider Office Phone # Fax #    Jamie DAMEON Foster [phone]       [address] [address]        Equal Access to Services     ISIAH MONTOYA : Hadii aad ku hadasho Soomaali, waaxda luqadaha, qaybta kaalmada adeegyada, waxay idiin hayaan adeeg kharash la'carolina . So [facility] [phone].    ATENCIÓN: Si habla español, tiene a mcfadden disposición servicios gratuitos de asistencia lingüística. [facility] [phone].    We comply with applicable federal civil rights laws and [place] laws. We do not discriminate on the basis of race, color, national origin, age, disability, sex, sexual orientation, or gender identity.            Thank you!     Thank you for choosing [facility]  for your care. Our goal is always to provide you with excellent care. Hearing back from our patients is one way we can continue to improve our services. Please take a few minutes to complete the written survey that you may receive in the mail after your visit with us. Thank you!             Your Updated Medication List - Protect others around you: Learn how to safely use, store and throw away your medicines at www.disposemymeds.org.          This list is accurate as of 3/8/18 12:58 PM.  Always use your most recent med list.                   Brand Name Dispense Instructions for use Diagnosis    albuterol 108 (90 BASE) MCG/ACT Inhaler    PROAIR HFA/PROVENTIL HFA/VENTOLIN HFA    3 Inhaler    Inhale 2 puffs into the lungs every 6 hours as needed for shortness of breath / dyspnea    Sarcoidosis       atorvastatin 40 MG tablet    LIPITOR    30 tablet    TAKE ONE TABLET BY MOUTH ONE TIME DAILY    Coronary artery disease involving native coronary  artery of native heart without angina pectoris, CAD S/P percutaneous coronary angioplasty       blood glucose monitoring lancets     2 Box    Use to test blood sugar 2 times daily or as directed.  102 lancets per box.  3 month supply.    Type 2 diabetes, HbA1C goal < 8% (H)       blood glucose monitoring meter device kit    no brand specified    1 kit    Use to test blood sugar 2 times daily.    Type 2 diabetes mellitus with diabetic nephropathy (H)       blood glucose monitoring test strip    ACCU-CHEK RONNIE PLUS    200 each    Use to test blood sugar 2 times daily or as directed.  3 month supply.    Type 2 diabetes, HbA1C goal < 8% (H)       clopidogrel 75 MG tablet    PLAVIX    90 tablet    Take 1 tablet (75 mg) by mouth daily    CAD S/P percutaneous coronary angioplasty, Coronary artery disease involving native coronary artery of native heart without angina pectoris       colchicine 0.6 MG tablet    COLCRYS    30 tablet    2 tabs at the onset of flare, then 1 tab every 2 hrs until pain is better or diarrhea occurs, up to 10 doses. Wait 3 days until taking again        fluticasone-vilanterol 100-25 MCG/INH oral inhaler    BREO ELLIPTA    3 Inhaler    Inhale 1 puff into the lungs daily    Chronic obstructive pulmonary disease, unspecified COPD type (H)       furosemide 20 MG tablet    LASIX    240 tablet    Take 2 tablets (40 mg) by mouth 2 times daily May take extra 20 mg as needed for wt .gain >3#    Pulmonary hypertension       inFLIXimab 100 MG injection    REMICADE     Inject 100 mg into the vein every 28 days        levothyroxine 125 MCG tablet    SYNTHROID/LEVOTHROID    90 tablet    Take 1 tablet (125 mcg) by mouth daily    Hypothyroidism due to Hashimoto's thyroiditis       losartan 50 MG tablet    COZAAR    90 tablet    Take 1 tablet (50 mg) by mouth daily    (HFpEF) heart failure with preserved ejection fraction (H)       methotrexate 2.5 MG tablet CHEMO     48 tablet    Take 3 tablets (7.5 mg) by mouth  once a week On Mondays    Sarcoidosis       metoprolol tartrate 100 MG tablet    LOPRESSOR    60 tablet    Take 1 tablet (100 mg) by mouth 2 times daily    Hypertension secondary to other renal disorders       mirtazapine 15 MG tablet    REMERON     Take 15 mg by mouth At Bedtime.        MULTIVITAMIN & MINERAL PO      Take 1 tablet by mouth daily.        * order for DME     1 Device    She requested for a 4 wheel walker, would like to change it to 4 wheel walker with a seat and oxygen tank durant.   Need this faxed over to her  [phone]    Sarcoidosis, lung (H), COPD (chronic obstructive pulmonary disease) (H), Abnormal gait, Congestive heart failure (H)       * order for DME     1 Device    Updated Oxygen: Patient requires supplemental Oxygen 3 LPM via nasal canula with activity and 2 LPM nocturnally. Please provide patient with a portable oxygen concentrator for improved mobility.  Okay to spot check or titrated for conserving to keep stats above 90%. Oxygen will be for a lifetime.    ILD (interstitial lung disease) (H), Hypoxia       polyethylene glycol powder    MIRALAX    510 g    Take 17 g (1 capful) by mouth daily    Constipation, unspecified constipation type       sildenafil 20 MG tablet    REVATIO    270 tablet    Take 1 tablet (20 mg) by mouth 3 times daily    Pulmonary hypertension       tiotropium 18 MCG capsule    SPIRIVA HANDIHALER    90 capsule    Inhale contents of one capsule daily.    Chronic obstructive pulmonary disease, unspecified COPD type (H)       Urea 40 % Crea    CARMOL 40    199 g    To feet daily    Dermatophytosis of nail, Corns and callosities       warfarin 5 MG tablet    COUMADIN    90 tablet    TAKE ONE TABLET BY MOUTH ONE TIME DAILY    Atrial flutter with rapid ventricular response (H)       * Notice:  This list has 2 medication(s) that are the same as other medications prescribed for you. Read the directions carefully, and ask your doctor or other care provider to review them  with you.

## 2018-03-08 NOTE — NURSING NOTE
Cardiac Monitors: Patient was instructed regarding the indication, function, care and prompt return of a holter  monitor. The monitor was placed on the patient with instructions regarding care of the skin electrodes and monitor, as well as documentation in the patient diary. Patient demonstrated understanding of this information and agreed to call with further questions or concerns.  Labs: Patient was given results of the laboratory testing obtained today. Patient was instructed to return for the next laboratory testing in prn . Patient demonstrated understanding of this information and agreed to call with further questions or concerns.   Med Reconcile: Reviewed and verified all current medications with the patient. The updated medication list was printed and given to the patient.  Return Appointment: Patient given instructions regarding scheduling next clinic visit. Patient demonstrated understanding of this information and agreed to call with further questions or concerns.  Medication Change: Patient was educated regarding prescribed medication change, including discussion of the indication, administration, side effects, and when to report to MD or RN. Patient demonstrated understanding of this information and agreed to call with further questions or concerns.  Patient stated he understood all health information given and agreed to call with further questions or concerns.

## 2018-03-08 NOTE — PROGRESS NOTES
[facility]  March 8, 2018      Dear Colleagues:      We had the pleasure of seeing Rohan Monroe in the [facility] today.   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 He recently underwent cardiac stenting to his LAD and LCx for which he feels much improved. This procedure was complicated by RADHA and retroperitoneal bleed for which he received a right iliac stent and subsequently needed thrombin injection for left-sided pseudoaneurysm. His kidney function improved and he had no anuric episodes.     Since completing the procedures above, he has been doing well. He denies any PND, orthopnea, LE edema. He is using around 3L/min of oxygen delivery; he can walk without any difficulties. He does note that walking up hill or stairs is hard. In the past year, he is able to do more. Energy level is imprved. States that his weight has been stable as well. He is participating in Cardiopulmonary rehab and doing well, enjoys this. Yesterday his BP was 98/58 prior to exercise and 116/60 after exercise.     He has several concerns regarding his medications; he is frustrated by recent episode of hyperthryoidism leading to insomnia and anxiety (Levothyroxine dose has been adjusted). He is also frustrated by fluctuations in Warfarin. He wants to reduce his medication regimen if possible, and asks about stopping Omeprazole or Atorvastatin (discussed rationale for statin use).     Cardiac arrhythmia history is as follows: presented to the hospital in AFL with RVR January 2017, underwent DCCV but had an early recurrence of AFL. Thus underwent CTI ablation 1/23/17. Same  day post-op he had an episode of Afib and required Amiodarone loading with subsequent DCCV. Amiodarone toxicity developed and this was stopped. There has been no recurrence of any arrhythmias since then, and he denies any palpitations or racing heart. He inquires as to whether he still requires Warfarin.     Denies lightheadedness, chest pain, worsening shortness of breath or cough, abnormal bleeding, blood in urine/stool, fevers, chills, nausea.    PAST MEDICAL HISTORY:  Past Medical History:   Diagnosis Date     Atrial flutter (H)      Cataract of both eyes      Chronic infection     Hep C     Congestive heart failure, unspecified      Coronary artery disease      Depressive disorder      Depressive disorder, not elsewhere classified     Depression (non-psychotic)     ERM OS (epiretinal membrane, left eye)      Generalized osteoarthrosis, unspecified site      Glaucoma suspect      Hypertension      Lichen planus      Other psoriasis      PVD (posterior vitreous detachment), left eye      Sarcoidosis      Sarcoidosis      Type II or unspecified type diabetes mellitus without mention of complication, not stated as uncontrolled      Unspecified hypothyroidism     Hypothyroidism     Unspecified viral hepatitis C without hepatic coma      Viral warts, unspecified        SOCIAL HISTORY:  The patient was an  in the [employer] and stopped working 3 years ago.  He lives with his son, who is in college and is studying to be a .  He is .  He did drink heavily in his past.  He stopped in 1992.  Smoking he stopped in 1994.  He denies any other illicit drug use.      FAMILY HISTORY:  There is no family history of autoimmune diseases or sudden cardiac death.     CURRENT MEDICATIONS:  Current Outpatient Prescriptions   Medication Sig Dispense Refill     levothyroxine (SYNTHROID/LEVOTHROID) 125 MCG tablet Take 1 tablet (125 mcg) by mouth daily 90 tablet 3      metoprolol tartrate (LOPRESSOR) 100 MG tablet Take 1 tablet (100 mg) by mouth 2 times daily 60 tablet 11     colchicine (COLCRYS) 0.6 MG tablet 2 tabs at the onset of flare, then 1 tab every 2 hrs until pain is better or diarrhea occurs, up to 10 doses. Wait 3 days until taking again 30 tablet 0     tiotropium (SPIRIVA HANDIHALER) 18 MCG capsule Inhale contents of one capsule daily. 90 capsule 3     fluticasone-vilanterol (BREO ELLIPTA) 100-25 MCG/INH oral inhaler Inhale 1 puff into the lungs daily 3 Inhaler 3     albuterol (PROAIR HFA/PROVENTIL HFA/VENTOLIN HFA) 108 (90 BASE) MCG/ACT Inhaler Inhale 2 puffs into the lungs every 6 hours as needed for shortness of breath / dyspnea 3 Inhaler 6     methotrexate 2.5 MG tablet CHEMO Take 3 tablets (7.5 mg) by mouth once a week On Mondays 48 tablet 3     furosemide (LASIX) 20 MG tablet Take 3 tablets (60 mg) by mouth 2 times daily May take extra 20 mg as needed for wt .gain >3# 240 tablet 11     warfarin (COUMADIN) 5 MG tablet TAKE ONE TABLET BY MOUTH ONE TIME DAILY  90 tablet 5     sildenafil (REVATIO) 20 MG tablet Take 1 tablet (20 mg) by mouth 3 times daily 270 tablet 1     atorvastatin (LIPITOR) 40 MG tablet TAKE ONE TABLET BY MOUTH ONE TIME DAILY  30 tablet 11     losartan (COZAAR) 50 MG tablet Take 1 tablet (50 mg) by mouth daily 90 tablet 3     order for DME Updated Oxygen: Patient requires supplemental Oxygen 3 LPM via nasal canula with activity and 2 LPM nocturnally. Please provide patient with a portable oxygen concentrator for improved mobility.  Okay to spot check or titrated for conserving to keep stats above 90%. Oxygen will be for a lifetime. 1 Device 0     order for DME She requested for a 4 wheel walker, would like to change it to 4 wheel walker with a seat and oxygen tank durant.     Need this faxed over to her   [phone] 1 Device 1     omeprazole (PRILOSEC) 20 MG CR capsule Take 1 capsule (20 mg) by mouth daily 90 capsule 3     clopidogrel (PLAVIX)  "75 MG tablet Take 1 tablet (75 mg) by mouth daily 90 tablet 3     polyethylene glycol (MIRALAX) powder Take 17 g (1 capful) by mouth daily 510 g 1     inFLIXimab (REMICADE) 100 MG injection Inject 100 mg into the vein every 28 days        blood glucose monitoring (ACCU-CHEK RONNIE PLUS) test strip Use to test blood sugar 2 times daily or as directed.  3 month supply. 200 each 3     blood glucose monitoring (ACCU-CHEK FASTCLIX) lancets Use to test blood sugar 2 times daily or as directed.  102 lancets per box.  3 month supply. 2 Box 3     blood glucose monitoring (NO BRAND SPECIFIED) meter device kit Use to test blood sugar 2 times daily. 1 kit 0     Urea (CARMOL 40) 40 % CREA To feet daily 199 g 4     Multiple Vitamins-Minerals (MULTIVITAMIN & MINERAL PO) Take 1 tablet by mouth daily.       mirtazapine (REMERON) 15 MG tablet Take 15 mg by mouth At Bedtime.         ROS:   Constitutional: No fever, chills, or sweats. Weight has been relatively stable   ENT: No visual disturbance, ear ache, epistaxis, sore throat.   Allergies/Immunologic: Negative.   Respiratory: See HPI  Cardiovascular: See HPI  GI: No nausea, vomiting, hematemesis, melena, or hematochezia.   : No urinary frequency, dysuria, or hematuria.   Integument: Negative.   Psychiatric: Negative.  Neuro: Negative.   Endocrinology: Recent anxiety/insomnia due to hyperthryoidism  Musculoskeletal: Negative.     EXAM:  /84 (BP Location: Left arm, Patient Position: Chair, Cuff Size: Adult Regular)  Pulse 66  Ht 1.727 m (5' 8\")  Wt 75.4 kg (166 lb 3.2 oz)  SpO2 100%  BMI 25.27 kg/m2  General: appears comfortable, alert and articulate, sitting up in chair, on oxygen, chronically ill-appearing male, well dressed  Head: normocephalic, atraumatic  Eyes: anicteric sclera, EOMI  Neck: no adenopathy  Orophyarynx: moist mucosa, no lesions, dentition intact  Heart: PMI unable to be appreciated, regular, S1/S2, trace systolic murmur at the apex, no gallop, rub, " estimated JVP <7  Lungs: clear to auscultation bilaterally, no wheezing or crackles   Abdomen:  Non distended, non-tender, bowel sounds present no fluid wave  Extremities: no clubbing, cyanosis; no lower extremity edema  Neurological: normal speech and affect, no gross motor deficits    Labs:  CBC RESULTS:  Lab Results   Component Value Date    WBC 7.6 02/26/2018    RBC 3.64 (L) 02/26/2018    HGB 11.0 (L) 02/26/2018    HCT 34.3 (L) 02/26/2018    MCV 94 02/26/2018    MCH 30.2 02/26/2018    MCHC 32.1 02/26/2018    RDW 13.6 02/26/2018     02/26/2018       CMP RESULTS:  Lab Results   Component Value Date     03/08/2018    POTASSIUM 3.9 03/08/2018    CHLORIDE 101 03/08/2018    CO2 25 03/08/2018    ANIONGAP 9 03/08/2018     (H) 03/08/2018    BUN 58 (H) 03/08/2018    CR 2.08 (H) 03/08/2018    GFRESTIMATED 31 (L) 03/08/2018    GFRESTBLACK 38 (L) 03/08/2018    RAFFY 8.7 03/08/2018    BILITOTAL 0.6 02/26/2018    ALBUMIN 3.7 03/08/2018    ALKPHOS 128 02/26/2018    ALT 42 02/26/2018    AST 24 02/26/2018      INR RESULTS:  Lab Results   Component Value Date    INR 1.26 (H) 03/08/2018     Lab Results   Component Value Date    MAG 2.0 05/13/2017     Lab Results   Component Value Date    NTBNPI 5090 (H) 05/15/2017     Lab Results   Component Value Date    NTBNP 674 (H) 03/16/2015       Echo - 2017:  Borderline reduced left ventricular systolic function, (EF 50-55%). Left ventricular diastolic function is indeterminate.  The right ventricle is normal size; global RV function is mildly reduced.   No significant valvular pathology.  The inferior vena cava is normal; estimated mean RA pressure is <3 mmHg.  In comparison to prior limited study dated 1/19/17, the LV function is unchanged, however much improved from chronologically earlier study of the same date.    Echocardiogram 3/8/18:  Personally reviewed: normal EF with normal Bi v function and normal diastolic filling pattern; sinus rhythm.   IMPRESSION: Global and  regional left ventricular function is normal with an EF of 60-65%.  The right ventricle is normal in size and function.  No significant valvular abnormalities.  No pericardial effusion.    PFTs 2/24/17:  FEV1 of 2.74, which is 69% of predicted, an FEV1 of 39% of predicted at 1.16.  DLCO was not performed.      Coronary Angiogram 5/12/2017  1. LM is without angiographic evidence of disease.   2. LAD supplies the entire apex (type 3) and gives rise to septal perforators, D1 and D2. The mLAD has a 75% hazy stenosis just distal to the existing stent, D1 has a 60% ostial stenosis and D2 has serial 80% in-stent stenoses and the remainder of the vessel has mild luminal irregularities.   3. LCX gives rise to OM1 and OM2 vessels. The pLCx has a 30% stenosis, OM1 has a 70% stenosis in a small vessel and the remainder of the vessel has mild luminal irregularities.   4. RCA was not studied as was recently evaluated.   PERCUTANEOUS CORONARY INTERVENTION:  1. Successful deployment of a [id number] Synergy drug eluting stent to the D2.  2. Successful deployment of a [id number] Synergy drug eluting stent to the mLAD.     CT Abd 5/13/2017  1. Enlarging right retroperitoneal hematoma with active extravasation from the right external iliac artery.  2. Small left heterogeneous pleural effusion with indeterminate density. Findings are of uncertain etiology but hemorrhage or infection within the pleural space can have this appearance.  3. Changes in the lung consistent with sarcoidosis.  4. Nonspecific lymphadenopathy associated with the celiac axis.     Placement of covered stent to right external iliac artery 5/13/2017  1. Diagnostic angiography demonstrating a pseudoaneurysm with the neck arising from distal right external iliac artery with associated active extravasation.  2. Successful stent exclusion of the pseudoaneurysm with a 6 mm x 39 mm Atrium I cast balloon expandable stent  graft.    ================================================================================================================    Assessment and Plan:   In summary, this is a very pleasant 72-year-old man with a history of pulmonary sarcoidosis and a presumptive diagnosis of cardiac sarcoid based on the fact that he has high filling pressures and atrial arrhythmias but without a confirmed tissue diagnosis of cardiac sarcoidosis, who is maintained on infliximab and methotrexate, with decent control of his pulmonary symptoms over the last several years with need for supplemental oxygen due to ILD and chronic hypoxic respiratory failure. Prior year was complicated by coronary revascularization with resultant RP bleed (treated with covered stent to right EIA) and left femoral pseudoaneurysm (treated with thrombin injection).     He is doing well at cardiopulmonary rehab, and his BP appears well controlled at present. He is euvolemic today, and his echocardiogram reveals normal biventricular function without elevated filling pressures. We will lower his diuretic dose slightly today. Given that he has extensive pulmonary sarcoidosis, we will defer any further workup for potential cardiac sarcoidosis. 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     Given history of coronary artery disease and prior PCI, he is to continue Plavix through 5/12/18 and then resume single antiplatelet therapy with ASA 81mg lifelong.     His echo today, and all prior EKGs since his AFL ablation, indicate that he is in sinus rhythm. He does not have any palpitations. He would like to stop anticoagulation with Warfarin if possible. His CHADSVASc = 4. For this reason, we will obtain a 7day Ziopatch to assess for any clinically silent Afib. If none, then Warfarin can be discontinued with watchful waiting for any future development of Afib.     Today's  Plan:  --Decrease lasix to 40mg BID, monitor weight  --7d Ziopatch to assess for any silent atrial fibrillation (if none, we plan to d/c Warfarin)  --Continue Plavix until June 2018 then stop and resume ASA 81mg  --Continue cardaic rehabilitation  --D/c Omeprazole per his request; monitor for any GERD recurrence    Return to clinic - 6 months    Patient discussed with Dr. Paige.    Cornelia Watson MD  Cardiology Fellow  [phone]    I have seen and examined the patient with the house staff on March 8, 2018 and agree with the outlined assessment and plan.      Diane Paige MD   of Medicine   [facility] of Cardiology       CC  Patient Care Team:  Jamie Foster MD as PCP - General (Internal Medicine)  Jamie Foster MD as Referring Physician (Internal Medicine)  Kina Greco MD as MD (Ophthalmology)  Perlman, David Morris, MD as MD (Internal Medicine)  Haley Renner DO as Referring Physician (Student in organized health care education/training program)  Chicho Mejia DPM (Podiatry)  Macey Roy MD as MD (Internal Medicine)  Madalyn Fajardo MD PhD as MD (Family Practice)  Jaclyn Pina RN as Nurse Coordinator (Clinical Cardiac Electrophysiology)  Brian Vazquez MD as MD (Clinical Cardiac Electrophysiology)  Elizabeth Cabral, VINNY CNP as Nurse Practitioner (Nurse Practitioner)  Maria Victoria Palmer MD as MD (Radiology)  Jaclyn Pina RN as Nurse Coordinator (Clinical Cardiac Electrophysiology)  Brian Vazquez MD as MD (Clinical Cardiac Electrophysiology)  Sandee Middleton MD as MD (Ophthalmology)  ELIZABETH CABRAL

## 2018-03-08 NOTE — TELEPHONE ENCOUNTER
Reviewed omeprazole with Dr. Micehl, per MD would follow up with Dr. Paige, from kidney standpoint,would be ok to discontinue. Informed patient.  Elisa Cardona LPN  Nephrology  [phone]

## 2018-03-08 NOTE — NURSING NOTE
Per Diane Mercado patient to have 7 days ziopatch monitor placed.  Diagnosis: Typical Aflutter  Monitor placed: Yes  Patient Instructed: Yes  Patient verbalized understanding: Yes  Holter # [id number]  Placed By: Suzanna Schumacher CMA

## 2018-03-08 NOTE — LETTER
3/8/2018      RE: Rohan Monroe  [address]  [place] [address]       Dear Colleague,    Thank you for the opportunity to participate in the care of your patient, Rohan Monroe, at the [facility] HEART [facility]. Please see a copy of my visit note below.       Advanced Heart Failure Cardiology Clinic  March 8, 2018      Dear Colleagues:      We had the pleasure of seeing Rohan Monroe in the [facility] today.   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 He recently underwent cardiac stenting to his LAD and LCx for which he feels much improved. This procedure was complicated by RADHA and retroperitoneal bleed for which he received a right iliac stent and subsequently needed thrombin injection for left-sided pseudoaneurysm. His kidney function improved and he had no anuric episodes.     Since completing the procedures above, he has been doing well. He denies any PND, orthopnea, LE edema. He is using around 3L/min of oxygen delivery; he can walk without any difficulties. He does note that walking up hill or stairs is hard. In the past year, he is able to do more. Energy level is imprved. States that his weight has been stable as well. He is participating in Cardiopulmonary rehab and doing well, enjoys this. Yesterday his BP was 98/58 prior to exercise and 116/60 after exercise.     He has several concerns regarding his medications; he is frustrated by recent episode of hyperthryoidism leading to insomnia and anxiety (Levothyroxine dose has been adjusted). He is also frustrated by fluctuations in  Warfarin. He wants to reduce his medication regimen if possible, and asks about stopping Omeprazole or Atorvastatin (discussed rationale for statin use).     Cardiac arrhythmia history is as follows: presented to the hospital in AFL with RVR January 2017, underwent DCCV but had an early recurrence of AFL. Thus underwent CTI ablation 1/23/17. Same day post-op he had an episode of Afib and required Amiodarone loading with subsequent DCCV. Amiodarone toxicity developed and this was stopped. There has been no recurrence of any arrhythmias since then, and he denies any palpitations or racing heart. He inquires as to whether he still requires Warfarin.     Denies lightheadedness, chest pain, worsening shortness of breath or cough, abnormal bleeding, blood in urine/stool, fevers, chills, nausea.    PAST MEDICAL HISTORY:  Past Medical History:   Diagnosis Date     Atrial flutter (H)      Cataract of both eyes      Chronic infection     Hep C     Congestive heart failure, unspecified      Coronary artery disease      Depressive disorder      Depressive disorder, not elsewhere classified     Depression (non-psychotic)     ERM OS (epiretinal membrane, left eye)      Generalized osteoarthrosis, unspecified site      Glaucoma suspect      Hypertension      Lichen planus      Other psoriasis      PVD (posterior vitreous detachment), left eye      Sarcoidosis      Sarcoidosis      Type II or unspecified type diabetes mellitus without mention of complication, not stated as uncontrolled      Unspecified hypothyroidism     Hypothyroidism     Unspecified viral hepatitis C without hepatic coma      Viral warts, unspecified        SOCIAL HISTORY:  The patient was an  in the [place] and stopped working 3 years ago.  He lives with his son, who is in college and is studying to be a .  He is .  He did drink heavily in his past.  He stopped in 1992.  Smoking he stopped in  1994.  He denies any other illicit drug use.      FAMILY HISTORY:  There is no family history of autoimmune diseases or sudden cardiac death.     CURRENT MEDICATIONS:  Current Outpatient Prescriptions   Medication Sig Dispense Refill     levothyroxine (SYNTHROID/LEVOTHROID) 125 MCG tablet Take 1 tablet (125 mcg) by mouth daily 90 tablet 3     metoprolol tartrate (LOPRESSOR) 100 MG tablet Take 1 tablet (100 mg) by mouth 2 times daily 60 tablet 11     colchicine (COLCRYS) 0.6 MG tablet 2 tabs at the onset of flare, then 1 tab every 2 hrs until pain is better or diarrhea occurs, up to 10 doses. Wait 3 days until taking again 30 tablet 0     tiotropium (SPIRIVA HANDIHALER) 18 MCG capsule Inhale contents of one capsule daily. 90 capsule 3     fluticasone-vilanterol (BREO ELLIPTA) 100-25 MCG/INH oral inhaler Inhale 1 puff into the lungs daily 3 Inhaler 3     albuterol (PROAIR HFA/PROVENTIL HFA/VENTOLIN HFA) 108 (90 BASE) MCG/ACT Inhaler Inhale 2 puffs into the lungs every 6 hours as needed for shortness of breath / dyspnea 3 Inhaler 6     methotrexate 2.5 MG tablet CHEMO Take 3 tablets (7.5 mg) by mouth once a week On Mondays 48 tablet 3     furosemide (LASIX) 20 MG tablet Take 3 tablets (60 mg) by mouth 2 times daily May take extra 20 mg as needed for wt .gain >3# 240 tablet 11     warfarin (COUMADIN) 5 MG tablet TAKE ONE TABLET BY MOUTH ONE TIME DAILY  90 tablet 5     sildenafil (REVATIO) 20 MG tablet Take 1 tablet (20 mg) by mouth 3 times daily 270 tablet 1     atorvastatin (LIPITOR) 40 MG tablet TAKE ONE TABLET BY MOUTH ONE TIME DAILY  30 tablet 11     losartan (COZAAR) 50 MG tablet Take 1 tablet (50 mg) by mouth daily 90 tablet 3     order for DME Updated Oxygen: Patient requires supplemental Oxygen 3 LPM via nasal canula with activity and 2 LPM nocturnally. Please provide patient with a portable oxygen concentrator for improved mobility.  Okay to spot check or titrated for conserving to keep stats above 90%. Oxygen  "will be for a lifetime. 1 Device 0     order for DME She requested for a 4 wheel walker, would like to change it to 4 wheel walker with a seat and oxygen tank udrant.     Need this faxed over to her   [phone] 1 Device 1     omeprazole (PRILOSEC) 20 MG CR capsule Take 1 capsule (20 mg) by mouth daily 90 capsule 3     clopidogrel (PLAVIX) 75 MG tablet Take 1 tablet (75 mg) by mouth daily 90 tablet 3     polyethylene glycol (MIRALAX) powder Take 17 g (1 capful) by mouth daily 510 g 1     inFLIXimab (REMICADE) 100 MG injection Inject 100 mg into the vein every 28 days        blood glucose monitoring (ACCU-CHEK RONNIE PLUS) test strip Use to test blood sugar 2 times daily or as directed.  3 month supply. 200 each 3     blood glucose monitoring (ACCU-CHEK FASTCLIX) lancets Use to test blood sugar 2 times daily or as directed.  102 lancets per box.  3 month supply. 2 Box 3     blood glucose monitoring (NO BRAND SPECIFIED) meter device kit Use to test blood sugar 2 times daily. 1 kit 0     Urea (CARMOL 40) 40 % CREA To feet daily 199 g 4     Multiple Vitamins-Minerals (MULTIVITAMIN & MINERAL PO) Take 1 tablet by mouth daily.       mirtazapine (REMERON) 15 MG tablet Take 15 mg by mouth At Bedtime.         ROS:   Constitutional: No fever, chills, or sweats. Weight has been relatively stable   ENT: No visual disturbance, ear ache, epistaxis, sore throat.   Allergies/Immunologic: Negative.   Respiratory: See HPI  Cardiovascular: See HPI  GI: No nausea, vomiting, hematemesis, melena, or hematochezia.   : No urinary frequency, dysuria, or hematuria.   Integument: Negative.   Psychiatric: Negative.  Neuro: Negative.   Endocrinology: Recent anxiety/insomnia due to hyperthryoidism  Musculoskeletal: Negative.     EXAM:  /84 (BP Location: Left arm, Patient Position: Chair, Cuff Size: Adult Regular)  Pulse 66  Ht 1.727 m (5' 8\")  Wt 75.4 kg (166 lb 3.2 oz)  SpO2 100%  BMI 25.27 kg/m2  General: appears comfortable, " alert and articulate, sitting up in chair, on oxygen, chronically ill-appearing male, well dressed  Head: normocephalic, atraumatic  Eyes: anicteric sclera, EOMI  Neck: no adenopathy  Orophyarynx: moist mucosa, no lesions, dentition intact  Heart: PMI unable to be appreciated, regular, S1/S2, trace systolic murmur at the apex, no gallop, rub, estimated JVP <7  Lungs: clear to auscultation bilaterally, no wheezing or crackles   Abdomen:  Non distended, non-tender, bowel sounds present no fluid wave  Extremities: no clubbing, cyanosis; no lower extremity edema  Neurological: normal speech and affect, no gross motor deficits    Labs:  CBC RESULTS:  Lab Results   Component Value Date    WBC 7.6 02/26/2018    RBC 3.64 (L) 02/26/2018    HGB 11.0 (L) 02/26/2018    HCT 34.3 (L) 02/26/2018    MCV 94 02/26/2018    MCH 30.2 02/26/2018    MCHC 32.1 02/26/2018    RDW 13.6 02/26/2018     02/26/2018       CMP RESULTS:  Lab Results   Component Value Date     03/08/2018    POTASSIUM 3.9 03/08/2018    CHLORIDE 101 03/08/2018    CO2 25 03/08/2018    ANIONGAP 9 03/08/2018     (H) 03/08/2018    BUN 58 (H) 03/08/2018    CR 2.08 (H) 03/08/2018    GFRESTIMATED 31 (L) 03/08/2018    GFRESTBLACK 38 (L) 03/08/2018    RAFFY 8.7 03/08/2018    BILITOTAL 0.6 02/26/2018    ALBUMIN 3.7 03/08/2018    ALKPHOS 128 02/26/2018    ALT 42 02/26/2018    AST 24 02/26/2018      INR RESULTS:  Lab Results   Component Value Date    INR 1.26 (H) 03/08/2018     Lab Results   Component Value Date    MAG 2.0 05/13/2017     Lab Results   Component Value Date    NTBNPI 5090 (H) 05/15/2017     Lab Results   Component Value Date    NTBNP 674 (H) 03/16/2015       Echo - 2017:  Borderline reduced left ventricular systolic function, (EF 50-55%). Left ventricular diastolic function is indeterminate.  The right ventricle is normal size; global RV function is mildly reduced.   No significant valvular pathology.  The inferior vena cava is normal; estimated  mean RA pressure is <3 mmHg.  In comparison to prior limited study dated 1/19/17, the LV function is unchanged, however much improved from chronologically earlier study of the same date.    Echocardiogram 3/8/18:  Personally reviewed: normal EF with normal Bi v function and normal diastolic filling pattern; sinus rhythm.   IMPRESSION: Global and regional left ventricular function is normal with an EF of 60-65%.  The right ventricle is normal in size and function.  No significant valvular abnormalities.  No pericardial effusion.    PFTs 2/24/17:  FEV1 of 2.74, which is 69% of predicted, an FEV1 of 39% of predicted at 1.16.  DLCO was not performed.      Coronary Angiogram 5/12/2017  1. LM is without angiographic evidence of disease.   2. LAD supplies the entire apex (type 3) and gives rise to septal perforators, D1 and D2. The mLAD has a 75% hazy stenosis just distal to the existing stent, D1 has a 60% ostial stenosis and D2 has serial 80% in-stent stenoses and the remainder of the vessel has mild luminal irregularities.   3. LCX gives rise to OM1 and OM2 vessels. The pLCx has a 30% stenosis, OM1 has a 70% stenosis in a small vessel and the remainder of the vessel has mild luminal irregularities.   4. RCA was not studied as was recently evaluated.   PERCUTANEOUS CORONARY INTERVENTION:  1. Successful deployment of a [id number] Synergy drug eluting stent to the D2.  2. Successful deployment of a [id number] Synergy drug eluting stent to the mLAD.     CT Abd 5/13/2017  1. Enlarging right retroperitoneal hematoma with active extravasation from the right external iliac artery.  2. Small left heterogeneous pleural effusion with indeterminate density. Findings are of uncertain etiology but hemorrhage or infection within the pleural space can have this appearance.  3. Changes in the lung consistent with sarcoidosis.  4. Nonspecific lymphadenopathy associated with the celiac axis.     Placement of covered stent to right external  iliac artery 5/13/2017  1. Diagnostic angiography demonstrating a pseudoaneurysm with the neck arising from distal right external iliac artery with associated active extravasation.  2. Successful stent exclusion of the pseudoaneurysm with a 6 mm x 39 mm Atrium I cast balloon expandable stent graft.    ================================================================================================================    Assessment and Plan:   In summary, this is a very pleasant 72-year-old man with a history of pulmonary sarcoidosis and a presumptive diagnosis of cardiac sarcoid based on the fact that he has high filling pressures and atrial arrhythmias but without a confirmed tissue diagnosis of cardiac sarcoidosis, who is maintained on infliximab and methotrexate, with decent control of his pulmonary symptoms over the last several years with need for supplemental oxygen due to ILD and chronic hypoxic respiratory failure. Prior year was complicated by coronary revascularization with resultant RP bleed (treated with covered stent to right EIA) and left femoral pseudoaneurysm (treated with thrombin injection).     He is doing well at cardiopulmonary rehab, and his BP appears well controlled at present. He is euvolemic today, and his echocardiogram reveals normal biventricular function without elevated filling pressures. We will lower his diuretic dose slightly today. Given that he has extensive pulmonary sarcoidosis, we will defer any further workup for potential cardiac sarcoidosis. 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     Given history of coronary artery disease and prior PCI, he is to continue Plavix through 5/12/18 and then resume single antiplatelet therapy with ASA 81mg lifelong.     His echo today, and all prior EKGs since his AFL ablation, indicate that he is in sinus rhythm. He does not have any palpitations.  He would like to stop anticoagulation with Warfarin if possible. His CHADSVASc = 4. For this reason, we will obtain a 7day Ziopatch to assess for any clinically silent Afib. If none, then Warfarin can be discontinued with watchful waiting for any future development of Afib.     Today's Plan:  --Decrease lasix to 40mg BID, monitor weight  --7d Ziopatch to assess for any silent atrial fibrillation (if none, we plan to d/c Warfarin)  --Continue Plavix until June 2018 then stop and resume ASA 81mg  --Continue cardaic rehabilitation  --D/c Omeprazole per his request; monitor for any GERD recurrence    Return to clinic - 6 months    Patient discussed with Dr. Paige.    Cornelia Watson MD  Cardiology Fellow  [phone]    I have seen and examined the patient with the house staff on March 8, 2018 and agree with the outlined assessment and plan.      Diane Paige MD   of Medicine   [facility] Division of Cardiology       CC  Patient Care Team:  Jamie Foster MD as PCP - General (Internal Medicine)  Jamie Foster MD as Referring Physician (Internal Medicine)  Kina Greco MD as MD (Ophthalmology)  Perlman, David Morris, MD as MD (Internal Medicine)  Haley Renner DO as Referring Physician (Student in organized health care education/training program)  Chicho Mejia DPM (Podiatry)  Macey Roy MD as MD (Internal Medicine)  Madalyn Fajardo MD PhD as MD (Family Practice)  Jaclyn Pina, RN as Nurse Coordinator (Clinical Cardiac Electrophysiology)  Brian Vazquez MD as MD (Clinical Cardiac Electrophysiology)  Elizabeth Cabral, APRN CNP as Nurse Practitioner (Nurse Practitioner)  Maria Victoria Palmer MD as MD (Radiology)  Jaclyn Pina, RN as Nurse Coordinator (Clinical Cardiac Electrophysiology)  Brian Vazquez MD as MD (Clinical Cardiac Electrophysiology)  Sandee Middleton MD as MD (Ophthalmology)  ELIZABETH CABRAL

## 2018-03-08 NOTE — NURSING NOTE
Chief Complaint   Patient presents with     Follow Up For     6 mo. follow up  Echo today     Vitals were taken and medications were reconciled.   Suzanna Schumacher MA    11:40 AM

## 2018-03-09 ENCOUNTER — HOSPITAL ENCOUNTER (OUTPATIENT)
Dept: CARDIAC REHAB | Facility: CLINIC | Age: 73
End: 2018-03-09
Attending: INTERNAL MEDICINE
Payer: MEDICARE

## 2018-03-09 PROCEDURE — 40000116 ZZH STATISTIC OP CR VISIT: Mod: KX

## 2018-03-09 PROCEDURE — 93798 PHYS/QHP OP CAR RHAB W/ECG: CPT

## 2018-03-12 ENCOUNTER — TELEPHONE (OUTPATIENT)
Dept: INTERNAL MEDICINE | Facility: CLINIC | Age: 73
End: 2018-03-12

## 2018-03-12 NOTE — TELEPHONE ENCOUNTER
Regarding: Thyroid issues   Contact: [phone]  Patient reports his thyroid is acting up, causing him to have anxiety and insomnia. He states he was told by Dr. Foster to call back if this does not improve. Would like this to be addressed with a nurse today. Wants to know what he should do.     Patient recently had his thyroid medication adjust.     March 12, 2018 12:52 PM  Spoke with patient. His dose of levothyroxine was decreased last week from 137mcg daily to 125mcg daily, based on lab results. Patient is continuing to experience symptoms of hyperthyroid, including shaking hands, anxiety, and insomnia. He feels that these symptoms are exacerbated when he takes his dose, so yesterday he took a half dose, and he hasn't taken any so far today. These symptoms are very concerning to him, and he states they are negatively impacting his quality of life. I did discuss that when levothyroxine dose is changed, it takes some time for the body to reach it's new steady state. He is wondering if lab work should be rechecked sooner than 3 weeks.   Savanna Doan RN, Care Coordinator

## 2018-03-12 NOTE — TELEPHONE ENCOUNTER
Reviewed instructions from Dr. Foster with patient. Patient verbalized understanding. He will plan to continue taking levothyroxine 62.5mcg daily (1/2 of 125mcg tab), and recheck labs in 3 weeks.

## 2018-03-12 NOTE — TELEPHONE ENCOUNTER
Dear Savanna;    Thanks for the follow up.     Hard to know because he has just decreased his dose.    (1) If he wants to recheck labs OK (order there) but as you pointed out may be difficult to interpret    (2) He wants to take half dose (of 125 mcg?) OK as long as he takes some dose consistently for a few weeks    (3) If sxs. Severe/worse he should be seen     Thanks, SAVANNA Foster

## 2018-03-19 ENCOUNTER — HOSPITAL ENCOUNTER (OUTPATIENT)
Dept: CARDIAC REHAB | Facility: CLINIC | Age: 73
End: 2018-03-19
Attending: INTERNAL MEDICINE
Payer: MEDICARE

## 2018-03-19 PROCEDURE — 40000116 ZZH STATISTIC OP CR VISIT: Performed by: REHABILITATION PRACTITIONER

## 2018-03-19 PROCEDURE — 93798 PHYS/QHP OP CAR RHAB W/ECG: CPT | Mod: KX | Performed by: REHABILITATION PRACTITIONER

## 2018-03-21 ENCOUNTER — ANTICOAGULATION THERAPY VISIT (OUTPATIENT)
Dept: ANTICOAGULATION | Facility: CLINIC | Age: 73
End: 2018-03-21

## 2018-03-21 ENCOUNTER — INFUSION THERAPY VISIT (OUTPATIENT)
Dept: INFUSION THERAPY | Facility: CLINIC | Age: 73
End: 2018-03-21
Attending: INTERNAL MEDICINE
Payer: MEDICARE

## 2018-03-21 VITALS
OXYGEN SATURATION: 97 % | BODY MASS INDEX: 25.8 KG/M2 | HEART RATE: 82 BPM | WEIGHT: 169.7 LBS | TEMPERATURE: 97.8 F | SYSTOLIC BLOOD PRESSURE: 153 MMHG | DIASTOLIC BLOOD PRESSURE: 90 MMHG

## 2018-03-21 DIAGNOSIS — I48.92 ATRIAL FLUTTER WITH RAPID VENTRICULAR RESPONSE (H): ICD-10-CM

## 2018-03-21 DIAGNOSIS — I48.92 ATRIAL FLUTTER (H): Primary | ICD-10-CM

## 2018-03-21 DIAGNOSIS — D86.0 SARCOIDOSIS OF LUNG (H): ICD-10-CM

## 2018-03-21 DIAGNOSIS — R94.6 ABNORMAL FINDING ON THYROID FUNCTION TEST: ICD-10-CM

## 2018-03-21 DIAGNOSIS — D86.9 SARCOIDOSIS: ICD-10-CM

## 2018-03-21 DIAGNOSIS — Z79.01 LONG-TERM (CURRENT) USE OF ANTICOAGULANTS: ICD-10-CM

## 2018-03-21 LAB
INR PPP: 1.48 (ref 0.86–1.14)
T4 FREE SERPL-MCNC: 0.77 NG/DL (ref 0.76–1.46)
TSH SERPL DL<=0.005 MIU/L-ACNC: 7.94 MU/L (ref 0.4–4)

## 2018-03-21 PROCEDURE — 84439 ASSAY OF FREE THYROXINE: CPT | Performed by: INTERNAL MEDICINE

## 2018-03-21 PROCEDURE — 85610 PROTHROMBIN TIME: CPT | Performed by: INTERNAL MEDICINE

## 2018-03-21 PROCEDURE — 96413 CHEMO IV INFUSION 1 HR: CPT

## 2018-03-21 PROCEDURE — 25000128 H RX IP 250 OP 636: Mod: ZF | Performed by: INTERNAL MEDICINE

## 2018-03-21 PROCEDURE — 96415 CHEMO IV INFUSION ADDL HR: CPT

## 2018-03-21 PROCEDURE — 84443 ASSAY THYROID STIM HORMONE: CPT | Performed by: INTERNAL MEDICINE

## 2018-03-21 RX ORDER — ACETAMINOPHEN 325 MG/1
650 TABLET ORAL ONCE
Status: CANCELLED
Start: 2018-03-21 | End: 2018-03-21

## 2018-03-21 RX ADMIN — INFLIXIMAB 400 MG: 100 INJECTION, POWDER, LYOPHILIZED, FOR SOLUTION INTRAVENOUS at 13:33

## 2018-03-21 NOTE — PROGRESS NOTES
ANTICOAGULATION FOLLOW-UP CLINIC VISIT    Patient Name:  Rohan Monroe  Date:  3/21/2018  Contact Type:  Telephone    SUBJECTIVE:     Patient Findings     Comments Denis reports that he has been taking warfarin 7.5mg Wed and 5mg all other days.  He reports that his thyroid medication is still being adjusted.           OBJECTIVE    INR   Date Value Ref Range Status   03/21/2018 1.48 (H) 0.86 - 1.14 Final       ASSESSMENT / PLAN  INR assessment SUB    Recheck INR In: 1 WEEK    INR Location Clinic      Anticoagulation Summary as of 3/21/2018     INR goal 2.0-3.0   Today's INR 1.48!   Maintenance plan 7.5 mg (5 mg x 1.5) on Mon, Wed, Fri; 5 mg (5 mg x 1) all other days   Full instructions 7.5 mg on Mon, Wed, Fri; 5 mg all other days   Weekly total 42.5 mg   Plan last modified Delphine Kaur RN (3/21/2018)   Next INR check 3/28/2018   Priority INR   Target end date 4/20/2017    Indications   Long-term (current) use of anticoagulants [Z79.01] [Z79.01]  Atrial flutter with rapid ventricular response (H) [I48.92]         Anticoagulation Episode Summary     INR check location     Preferred lab     Send INR reminders to [facility]    Comments 3 months therapy, then reassess.        Anticoagulation Care Providers     Provider Role Specialty Phone number    Jamie Foster MD [facility] [phone]            See the Encounter Report to view Anticoagulation Flowsheet and Dosing Calendar (Go to Encounters tab in chart review, and find the Anticoagulation Therapy Visit)    Spoke with Denis.     Delphine Kaur RN

## 2018-03-21 NOTE — PATIENT INSTRUCTIONS
Infliximab Solution for injection  What is this medicine?  INFLIXIMAB (in FLIX i mab) is used to treat Crohn's disease and ulcerative colitis. It is also used to treat ankylosing spondylitis, psoriasis, and some forms of arthritis.  This medicine may be used for other purposes; ask your health care provider or pharmacist if you have questions.  What should I tell my health care provider before I take this medicine?  They need to know if you have any of these conditions:    diabetes    exposure to tuberculosis    heart failure    hepatitis or liver disease    immune system problems    infection    lung or breathing disease, like COPD    multiple sclerosis    current or past resident of [place]    seizure disorder    an unusual or allergic reaction to infliximab, mouse proteins, other medicines, foods, dyes, or preservatives    pregnant or trying to get pregnant    breast-feeding  How should I use this medicine?  This medicine is for injection into a vein. It is usually given by a health care professional in a hospital or clinic setting.  A special MedGuide will be given to you by the pharmacist with each prescription and refill. Be sure to read this information carefully each time.  Talk to your pediatrician regarding the use of this medicine in children. Special care may be needed.  Overdosage: If you think you have taken too much of this medicine contact a poison control center or emergency room at once.  NOTE: This medicine is only for you. Do not share this medicine with others.  What if I miss a dose?  It is important not to miss your dose. Call your doctor or health care professional if you are unable to keep an appointment.  What may interact with this medicine?  Do not take this medicine with any of the following medications:    anakinra    rilonacept  This medicine may also interact with the following medications:    vaccines  This list may not describe all possible interactions.  Give your health care provider a list of all the medicines, herbs, non-prescription drugs, or dietary supplements you use. Also tell them if you smoke, drink alcohol, or use illegal drugs. Some items may interact with your medicine.  What should I watch for while using this medicine?  Visit your doctor or health care professional for regular checks on your progress.  If you get a cold or other infection while receiving this medicine, call your doctor or health care professional. Do not treat yourself. This medicine may decrease your body's ability to fight infections. Before beginning therapy, your doctor may do a test to see if you have been exposed to tuberculosis.  This medicine may make the symptoms of heart failure worse in some patients. If you notice symptoms such as increased shortness of breath or swelling of the ankles or legs, contact your health care provider right away.  If you are going to have surgery or dental work, tell your health care professional or dentist that you have received this medicine.  If you take this medicine for plaque psoriasis, stay out of the sun. If you cannot avoid being in the sun, wear protective clothing and use sunscreen. Do not use sun lamps or tanning beds/booths.  What side effects may I notice from receiving this medicine?  Side effects that you should report to your doctor or health care professional as soon as possible:    allergic reactions like skin rash, itching or hives, swelling of the face, lips, or tongue    chest pain    fever or chills, usually related to the infusion    muscle or joint pain    red, scaly patches or raised bumps on the skin    signs of infection - fever or chills, cough, sore throat, pain or difficulty passing urine    swollen lymph nodes in the neck, underarm, or groin areas    unexplained weight loss    unusual bleeding or bruising    unusually weak or tired    yellowing of the eyes or skin  Side effects that usually do not require medical  attention (report to your doctor or health care professional if they continue or are bothersome):    headache    heartburn or stomach pain    nausea, vomiting  This list may not describe all possible side effects. Call your doctor for medical advice about side effects. You may report side effects to FDA at 5-183-FDA-2151.  Where should I keep my medicine?  This drug is given in a hospital or clinic and will not be stored at home.  NOTE:This sheet is a summary. It may not cover all possible information. If you have questions about this medicine, talk to your doctor, pharmacist, or health care provider. Copyright  2016 Gold Standard

## 2018-03-21 NOTE — MR AVS SNAPSHOT
Rohan Howard Gitalejandro   3/21/2018   Anticoagulation Therapy Visit    Description:  72 year old male   Provider:  Delphine Kaur, RN   Department:  [facility]           INR as of 3/21/2018     Today's INR 1.48!      Anticoagulation Summary as of 3/21/2018     INR goal 2.0-3.0   Today's INR 1.48!   Full instructions 7.5 mg on Mon, Wed, Fri; 5 mg all other days   Next INR check 3/28/2018    Indications   Long-term (current) use of anticoagulants [Z79.01] [Z79.01]  Atrial flutter with rapid ventricular response (H) [I48.92]         March 2018 Details    Sun Mon Tue Wed Thu Fri Sat         1               2               3                 4               5               6               7               8               9               10                 11               12               13               14               15               16               17                 18               19               20               21      7.5 mg   See details      22      5 mg         23      7.5 mg         24      5 mg           25      5 mg         26      7.5 mg         27      5 mg         28            29               30               31                Date Details   03/21 This INR check       Date of next INR:  3/28/2018         How to take your warfarin dose     To take:  5 mg Take 1 of the 5 mg tablets.    To take:  7.5 mg Take 1.5 of the 5 mg tablets.

## 2018-03-21 NOTE — PROGRESS NOTES
Infusion Nursing Note  Rohan Monroe presents [facility] for:   Chief Complaint   Patient presents with     Infusion     remicade     During today's [facility] appointment, orders from Dr. Perlman were completed.  Frequency: monthly    Progress note:  Patient identification verified by name and date of birth.  Assessment completed.  Vitals recorded in Doc Flowsheets.  Patient was provided with education regarding infusion and possible side effects.  Patient verbalized understanding.     Premedications: were not ordered.  Infusion Rates: infusion given over approximately 2 hours.  Labs: were drawn per orders.   Vascular access: peripheral IV placed today.  Treatment Conditions: Biologic/Chemo Checklist ~~~ NOTE: If the patient answers yes to any of the questions below, hold the infusion and contact ordering provider or on-call provider.    1. Have you recently had an elevated temperature, fever, chills, productive cough, coughing for 3 weeks or longer or hemoptysis,  abnormal vital signs, night sweats,  chest pain or have you noticed a decrease in your appetite, unexplained weight loss or fatigue? No  2. Do you have any open wounds or new incisions? No  3. Do you have any recent or upcoming hospitalizations, surgeries or dental procedures? No  4. Do you currently have or recently have had any signs of illness or infection or are you on any antibiotics? No  5. Have you had any new, sudden or worsening abdominal pain? No  6. Have you or anyone in your household received a live vaccination in the past 4 weeks? Please note:  No live vaccines while on biologic/chemotherapy until 6 months after the last treatment.  Patient can receive the flu vaccine (shot only) and the pneumovax.  It is optimal for the patient to get these vaccines mid cycle, but they can be given at any time as long as it is not on the day of the infusion. No  7. Have you  recently been diagnosed with any new nervous system diseases (ie. Multiple sclerosis, Guillain Marcus, seizures, neurological changes) or cancer diagnosis? Are you on any form of radiation or chemotherapy? No  8. Are you pregnant or breast feeding or do you have plans of pregnancy in the future? No  9. Have you been having any signs of worsening depression or suicidal ideations?  (benlysta only) No  10. Have there been any other new onset medical symptoms? No    Patient tolerated infusion: well    Discharge Plan:   Follow up plan of care with: ongoing infusions at [facility]. and primary medical doctor.  Discharge instructions were reviewed with patient.  Patient/representative verbalized understanding of discharge instructions and all questions answered.  Patient discharged from [facility] in stable condition.    Rachel Ardon RN    Administrations This Visit     inFLIXimab (REMICADE) 400 mg in sodium chloride 0.9 % 315 mL infusion     Admin Date Action Dose Rate Route Administered By          03/21/2018 New Bag 400 mg 157.5 mL/hr Intravenous Rosa Rouse RN                         /90  Pulse 82  Temp 97.8  F (36.6  C) (Oral)  Wt 77 kg (169 lb 11.2 oz)  SpO2 97%  BMI 25.8 kg/m2

## 2018-03-21 NOTE — MR AVS SNAPSHOT
After Visit Summary   3/21/2018    Rohan Monroe    MRN: [id number]           Patient Information     Date Of Birth          1945        Visit Information        Provider Department      3/21/2018 12:00 PM UC 47 ATC; UC SPEC INFUSION [facility]        Today's Diagnoses     Atrial flutter (H)    -  1    Abnormal finding on thyroid function test        Sarcoidosis of lung (HCC)        SARCOIDOSIS-systemic          Care Instructions      Infliximab Solution for injection  What is this medicine?  INFLIXIMAB (in FLIX i mab) is used to treat Crohn's disease and ulcerative colitis. It is also used to treat ankylosing spondylitis, psoriasis, and some forms of arthritis.  This medicine may be used for other purposes; ask your health care provider or pharmacist if you have questions.  What should I tell my health care provider before I take this medicine?  They need to know if you have any of these conditions:    diabetes    exposure to tuberculosis    heart failure    hepatitis or liver disease    immune system problems    infection    lung or breathing disease, like COPD    multiple sclerosis    current or past resident of [place]    seizure disorder    an unusual or allergic reaction to infliximab, mouse proteins, other medicines, foods, dyes, or preservatives    pregnant or trying to get pregnant    breast-feeding  How should I use this medicine?  This medicine is for injection into a vein. It is usually given by a health care professional in a hospital or clinic setting.  A special MedGuide will be given to you by the pharmacist with each prescription and refill. Be sure to read this information carefully each time.  Talk to your pediatrician regarding the use of this medicine in children. Special care may be needed.  Overdosage: If you think you have taken too much of this medicine contact a poison control center or  emergency room at once.  NOTE: This medicine is only for you. Do not share this medicine with others.  What if I miss a dose?  It is important not to miss your dose. Call your doctor or health care professional if you are unable to keep an appointment.  What may interact with this medicine?  Do not take this medicine with any of the following medications:    anakinra    rilonacept  This medicine may also interact with the following medications:    vaccines  This list may not describe all possible interactions. Give your health care provider a list of all the medicines, herbs, non-prescription drugs, or dietary supplements you use. Also tell them if you smoke, drink alcohol, or use illegal drugs. Some items may interact with your medicine.  What should I watch for while using this medicine?  Visit your doctor or health care professional for regular checks on your progress.  If you get a cold or other infection while receiving this medicine, call your doctor or health care professional. Do not treat yourself. This medicine may decrease your body's ability to fight infections. Before beginning therapy, your doctor may do a test to see if you have been exposed to tuberculosis.  This medicine may make the symptoms of heart failure worse in some patients. If you notice symptoms such as increased shortness of breath or swelling of the ankles or legs, contact your health care provider right away.  If you are going to have surgery or dental work, tell your health care professional or dentist that you have received this medicine.  If you take this medicine for plaque psoriasis, stay out of the sun. If you cannot avoid being in the sun, wear protective clothing and use sunscreen. Do not use sun lamps or tanning beds/booths.  What side effects may I notice from receiving this medicine?  Side effects that you should report to your doctor or health care professional as soon as possible:    allergic reactions like skin rash,  itching or hives, swelling of the face, lips, or tongue    chest pain    fever or chills, usually related to the infusion    muscle or joint pain    red, scaly patches or raised bumps on the skin    signs of infection - fever or chills, cough, sore throat, pain or difficulty passing urine    swollen lymph nodes in the neck, underarm, or groin areas    unexplained weight loss    unusual bleeding or bruising    unusually weak or tired    yellowing of the eyes or skin  Side effects that usually do not require medical attention (report to your doctor or health care professional if they continue or are bothersome):    headache    heartburn or stomach pain    nausea, vomiting  This list may not describe all possible side effects. Call your doctor for medical advice about side effects. You may report side effects to FDA at 7-156-SRU-7689.  Where should I keep my medicine?  This drug is given in a hospital or clinic and will not be stored at home.  NOTE:This sheet is a summary. It may not cover all possible information. If you have questions about this medicine, talk to your doctor, pharmacist, or health care provider. Copyright  2016 Gold Standard                Follow-ups after your visit        Your next 10 appointments already scheduled     Mar 23, 2018  3:00 PM CDT   Cardiac Treatment with  Cardiac Rehab 1   [facility] ([facility])    [address], [address]  [facility] [address]   [phone]            Apr 10, 2018  2:15 PM CDT   RETURN RETINA with Sandee Middleton MD   [facility] ([facility])    [address]  [address]  [facility] [address]   [phone]            Apr 12, 2018  1:00 PM CDT   Six Minute Walk with UC PFL 6 MINUTE WALK 1   [facility] Function [facility] ([facility])    [address]  3rd [address]  [facility] [address] [phone]            Apr 12, 2018   1:30 PM CDT   PFT VISIT with WALTER PFL PELON   [facility] Pulmonary Function Testing ([facility])    [address]  3rd Floor  [facility] [address] [phone]            Apr 12, 2018  2:00 PM CDT   (Arrive by 1:45 PM)   Return Interstitial Lung with David Morris Perlman, MD   [facility] ([facility])    [address]  [address]  [facility] [address] [phone]            Apr 18, 2018 12:00 PM CDT   Infusion 180 with UC SPEC INFUSION, UC 47 ATC   [facility] ([facility])    [address]  [address]  [facility] [address] [phone]            May 16, 2018 12:00 PM CDT   Infusion 180 with UC SPEC INFUSION, UC 47 ATC   [facility] ([facility])    [address]  [address]  [facility] [address] [phone]            May 29, 2018 11:35 AM CDT   (Arrive by 11:20 AM)   Return Visit with Jamie Foster MD   [facility] ([facility])    [address]  4th [address]  [facility] [address] [phone]              Who to contact     If you have questions or need follow up information about today's clinic visit or your schedule please contact [facility] directly at [phone].  Normal or non-critical lab and imaging results will be communicated to you by MyChart, letter or phone within 4 business days after the clinic has received the results. If you do not hear from us within 7 days, please contact the clinic through MyChart or phone. If you have a critical or abnormal lab result, we will notify you by phone as soon as possible.  Submit refill requests through [employer] or call your pharmacy and they will forward the refill request to us. Please allow 3  business days for your refill to be completed.          Additional Information About Your Visit        MyChart Information     [employer] gives you secure access to your electronic health record. If you see a primary care provider, you can also send messages to your care team and make appointments. If you have questions, please call your primary care clinic.  If you do not have a primary care provider, please call [phone] and they will assist you.        Care EveryWhere ID     This is your Care EveryWhere ID. This could be used by other organizations to access your [place] medical records  [id number]        Your Vitals Were     Pulse Temperature Pulse Oximetry BMI (Body Mass Index)          82 97.8  F (36.6  C) (Oral) 97% 25.8 kg/m2         Blood Pressure from Last 3 Encounters:   03/21/18 (!) 188/97   03/08/18 138/84   02/26/18 100/55    Weight from Last 3 Encounters:   03/21/18 77 kg (169 lb 11.2 oz)   03/08/18 75.4 kg (166 lb 3.2 oz)   02/26/18 76.7 kg (169 lb)              We Performed the Following     INR     T4 free     TSH with free T4 reflex        Primary Care Provider Office Phone # Fax #    Jamie W MD Cristian [phone]       [address] [address]        Equal Access to Services     ISIAH MONTOYA : Hadii rhys ku hadasho Soomaali, waaxda luqadaha, qaybta kaalmada adeegyada, nelly waite haycarolina munson . So [facility] [phone].    ATENCIÓN: Si habla español, tiene a mcfadden disposición servicios gratuitos de asistencia lingüística. Llame al [phone].    We comply with applicable federal civil rights laws and [place] laws. We do not discriminate on the basis of race, color, national origin, age, disability, sex, sexual orientation, or gender identity.            Thank you!     Thank you for choosing [facility]  for your care. Our goal is always to provide you with excellent care. Hearing back from our  patients is one way we can continue to improve our services. Please take a few minutes to complete the written survey that you may receive in the mail after your visit with us. Thank you!             Your Updated Medication List - Protect others around you: Learn how to safely use, store and throw away your medicines at www.disposemymeds.org.          This list is accurate as of 3/21/18  4:17 PM.  Always use your most recent med list.                   Brand Name Dispense Instructions for use Diagnosis    albuterol 108 (90 BASE) MCG/ACT Inhaler    PROAIR HFA/PROVENTIL HFA/VENTOLIN HFA    3 Inhaler    Inhale 2 puffs into the lungs every 6 hours as needed for shortness of breath / dyspnea    Sarcoidosis       atorvastatin 40 MG tablet    LIPITOR    30 tablet    TAKE ONE TABLET BY MOUTH ONE TIME DAILY    Coronary artery disease involving native coronary artery of native heart without angina pectoris, CAD S/P percutaneous coronary angioplasty       blood glucose monitoring lancets     2 Box    Use to test blood sugar 2 times daily or as directed.  102 lancets per box.  3 month supply.    Type 2 diabetes, HbA1C goal < 8% (H)       blood glucose monitoring meter device kit    no brand specified    1 kit    Use to test blood sugar 2 times daily.    Type 2 diabetes mellitus with diabetic nephropathy (H)       blood glucose monitoring test strip    ACCU-CHEK RONNIE PLUS    200 each    Use to test blood sugar 2 times daily or as directed.  3 month supply.    Type 2 diabetes, HbA1C goal < 8% (H)       clopidogrel 75 MG tablet    PLAVIX    90 tablet    Take 1 tablet (75 mg) by mouth daily    CAD S/P percutaneous coronary angioplasty, Coronary artery disease involving native coronary artery of native heart without angina pectoris       colchicine 0.6 MG tablet    COLCRYS    30 tablet    2 tabs at the onset of flare, then 1 tab every 2 hrs until pain is better or diarrhea occurs, up to 10 doses. Wait 3 days until taking again         fluticasone-vilanterol 100-25 MCG/INH oral inhaler    BREO ELLIPTA    3 Inhaler    Inhale 1 puff into the lungs daily    Chronic obstructive pulmonary disease, unspecified COPD type (H)       furosemide 20 MG tablet    LASIX    240 tablet    Take 2 tablets (40 mg) by mouth 2 times daily May take extra 20 mg as needed for wt .gain >3#    Pulmonary hypertension       inFLIXimab 100 MG injection    REMICADE     Inject 100 mg into the vein every 28 days        levothyroxine 125 MCG tablet    SYNTHROID/LEVOTHROID    90 tablet    Take 1 tablet (125 mcg) by mouth daily    Hypothyroidism due to Hashimoto's thyroiditis       losartan 50 MG tablet    COZAAR    90 tablet    Take 1 tablet (50 mg) by mouth daily    (HFpEF) heart failure with preserved ejection fraction (H)       methotrexate 2.5 MG tablet CHEMO     48 tablet    Take 3 tablets (7.5 mg) by mouth once a week On Mondays    Sarcoidosis       metoprolol tartrate 100 MG tablet    LOPRESSOR    60 tablet    Take 1 tablet (100 mg) by mouth 2 times daily    Hypertension secondary to other renal disorders       mirtazapine 15 MG tablet    REMERON     Take 15 mg by mouth At Bedtime.        MULTIVITAMIN & MINERAL PO      Take 1 tablet by mouth daily.        * order for DME     1 Device    She requested for a 4 wheel walker, would like to change it to 4 wheel walker with a seat and oxygen tank durant.   Need this faxed over to her  [phone]    Sarcoidosis, lung (H), COPD (chronic obstructive pulmonary disease) (H), Abnormal gait, Congestive heart failure (H)       * order for DME     1 Device    Updated Oxygen: Patient requires supplemental Oxygen 3 LPM via nasal canula with activity and 2 LPM nocturnally. Please provide patient with a portable oxygen concentrator for improved mobility.  Okay to spot check or titrated for conserving to keep stats above 90%. Oxygen will be for a lifetime.    ILD (interstitial lung disease) (H), Hypoxia       polyethylene glycol powder     MIRALAX    510 g    Take 17 g (1 capful) by mouth daily    Constipation, unspecified constipation type       sildenafil 20 MG tablet    REVATIO    270 tablet    Take 1 tablet (20 mg) by mouth 3 times daily    Pulmonary hypertension       tiotropium 18 MCG capsule    SPIRIVA HANDIHALER    90 capsule    Inhale contents of one capsule daily.    Chronic obstructive pulmonary disease, unspecified COPD type (H)       Urea 40 % Crea    CARMOL 40    199 g    To feet daily    Dermatophytosis of nail, Corns and callosities       warfarin 5 MG tablet    COUMADIN    90 tablet    TAKE ONE TABLET BY MOUTH ONE TIME DAILY    Atrial flutter with rapid ventricular response (H)       * Notice:  This list has 2 medication(s) that are the same as other medications prescribed for you. Read the directions carefully, and ask your doctor or other care provider to review them with you.

## 2018-03-23 ENCOUNTER — NURSE TRIAGE (OUTPATIENT)
Dept: NURSING | Facility: CLINIC | Age: 73
End: 2018-03-23

## 2018-03-23 ENCOUNTER — TELEPHONE (OUTPATIENT)
Dept: INTERNAL MEDICINE | Facility: CLINIC | Age: 73
End: 2018-03-23

## 2018-03-23 DIAGNOSIS — R94.6 ABNORMAL FINDING ON THYROID FUNCTION TEST: Primary | ICD-10-CM

## 2018-03-24 NOTE — TELEPHONE ENCOUNTER
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 This is prescribed by Dr. Perlman, his pulmonologist. He states he does not have an oncologist. His concern tonight is T 100.2 orally. He denies any other sx - states he always has some SOB w/ his COPD/sarcoidosis but no worse than usual. Denies cough or congestion. Denies stiff neck or severe headache. Denies pain in any location. Says his son noticed he felt warm to the touch and also he felt chilly so he took his temp. His last WBC on 2/26/18 was normal at 7.6. Triage questions all negative. Disc'd home care advice w/ pt. Advised to observe for now but call back right away to FNA  if any new sx or if Temp gets to 100.5 orally. Pt voiced understanding, agreement w/ plan, questions answered. Delores Nguyen RN/FNA      Additional Information    Negative: Shock suspected (e.g., cold/pale/clammy skin, too weak to stand, low BP, rapid pulse)    Negative: Difficult to awaken or acting confused  (e.g., disoriented, slurred speech)    Negative: Bluish lips, tongue, or face now    Negative: New onset rash with many purple (or blood-colored) spots or dots    Negative: Sounds like a life-threatening emergency to the triager    Negative: Other symptom is present, see that guideline  (e.g., symptoms of cough, runny nose, sore throat, earache, abdominal pain, diarrhea, vomiting)    Negative: Fever > 104 F (40 C)    Negative: [1] Neutropenia known or suspected (e.g., recent cancer chemotherapy) AND [2] fever > 100.4 F (38.0 C)    Negative: [1] Neutropenia known or suspected (e.g., recent cancer chemotherapy) AND [2] signs or symptoms of suspected infection are present    Negative: [1] Headache AND [2] stiff neck (can't touch chin to chest)    Negative: Difficulty breathing    Negative: [1] Drinking very little AND [2] dehydration suspected (e.g., no urine > 12 hours, very dry mouth, very lightheaded)    Negative: Patient sounds very sick or weak  to the triager  (Exception: mild weakness and hasn't taken fever medicine)    Negative: [1] Fever > 101 F (38.3 C) AND [2] age > 60    Negative: [1] Fever > 101 F (38.3 C) AND [2] co-morbidity risk factor for serious infection (e.g., COPD, heart failure, liver failure, renal failure with dialysis )    Negative: [1] Fever > 101 F (38.3 C) AND [2] bedridden (e.g., nursing home patient, CVA, chronic illness, recovering from surgery)    Negative: [1] Fever >  100.5 F (38.1 C) AND weak immune system (e.g., diabetes, splenectomy, leukemia, HIV infection, chronic steroid use)    Negative: [1] Fever > 100.5 F (38.1 C) AND [2] indwelling urinary catheter (e.g., Fung, Coude)    Negative: [1] Fever > 100.5 F (38.1 C) AND [2] port (portacath), central line, or PICC line    Negative: [1] Fever > 100.5 F (38.1 C) AND [2] surgery in the last month    Negative: [1] Fever > 100.5 F (38.1 C) AND [2] foreign travel to a developing country in the last month    [1] Fever AND [2] no signs of serious infection or localizing symptoms (all other triage questions negative)    Negative: Fever present > 3 days (72 hours)    Protocols used: CANCER - FEVER-ADULT-

## 2018-03-29 ENCOUNTER — ANTICOAGULATION THERAPY VISIT (OUTPATIENT)
Dept: ANTICOAGULATION | Facility: CLINIC | Age: 73
End: 2018-03-29

## 2018-03-29 ENCOUNTER — CARE COORDINATION (OUTPATIENT)
Dept: CARDIOLOGY | Facility: CLINIC | Age: 73
End: 2018-03-29

## 2018-03-29 DIAGNOSIS — Z79.01 LONG-TERM (CURRENT) USE OF ANTICOAGULANTS: ICD-10-CM

## 2018-03-29 DIAGNOSIS — I48.92 ATRIAL FLUTTER WITH RAPID VENTRICULAR RESPONSE (H): ICD-10-CM

## 2018-03-29 NOTE — MR AVS SNAPSHOT
Rohan Howard Mabel   3/29/2018   Anticoagulation Therapy Visit    Description:  72 year old male   Provider:  Delphine Kaur, RN   Department:  [facility]           INR as of 3/29/2018     Today's INR       Anticoagulation Summary as of 3/29/2018     INR goal 2.0-3.0   Today's INR    Full instructions 7.5 mg on Mon, Wed, Fri; 5 mg all other days   Next INR check     Indications   Long-term (current) use of anticoagulants [Z79.01] [Z79.01]  Atrial flutter with rapid ventricular response (H) [I48.92]         Anticoagulation Episode Summary     Resolved date 3/29/2018    Resolved reason Therapy  Complete

## 2018-04-02 ENCOUNTER — HOSPITAL ENCOUNTER (OUTPATIENT)
Dept: CARDIAC REHAB | Facility: CLINIC | Age: 73
End: 2018-04-02
Attending: INTERNAL MEDICINE
Payer: MEDICARE

## 2018-04-02 PROCEDURE — 40000116 ZZH STATISTIC OP CR VISIT: Performed by: REHABILITATION PRACTITIONER

## 2018-04-02 PROCEDURE — 93798 PHYS/QHP OP CAR RHAB W/ECG: CPT | Performed by: REHABILITATION PRACTITIONER

## 2018-04-06 ENCOUNTER — HOSPITAL ENCOUNTER (OUTPATIENT)
Dept: CARDIAC REHAB | Facility: CLINIC | Age: 73
End: 2018-04-06
Attending: INTERNAL MEDICINE
Payer: MEDICARE

## 2018-04-06 PROCEDURE — 40000116 ZZH STATISTIC OP CR VISIT

## 2018-04-06 PROCEDURE — 93798 PHYS/QHP OP CAR RHAB W/ECG: CPT | Mod: KX

## 2018-04-10 ENCOUNTER — TELEPHONE (OUTPATIENT)
Dept: INTERNAL MEDICINE | Facility: CLINIC | Age: 73
End: 2018-04-10

## 2018-04-10 DIAGNOSIS — D86.9 SARCOIDOSIS: Primary | ICD-10-CM

## 2018-04-11 NOTE — ADDENDUM NOTE
Encounter addended by: Jose David Avendano EP on: 4/11/2018  7:28 AM<BR>     Actions taken: Episode edited, Charge Capture section accepted

## 2018-04-12 ENCOUNTER — OFFICE VISIT (OUTPATIENT)
Dept: PULMONOLOGY | Facility: CLINIC | Age: 73
End: 2018-04-12
Attending: INTERNAL MEDICINE
Payer: MEDICARE

## 2018-04-12 VITALS
HEIGHT: 68 IN | BODY MASS INDEX: 25.45 KG/M2 | RESPIRATION RATE: 20 BRPM | OXYGEN SATURATION: 94 % | WEIGHT: 167.9 LBS | SYSTOLIC BLOOD PRESSURE: 150 MMHG | HEART RATE: 62 BPM | DIASTOLIC BLOOD PRESSURE: 84 MMHG

## 2018-04-12 DIAGNOSIS — J44.9 CHRONIC OBSTRUCTIVE PULMONARY DISEASE, UNSPECIFIED COPD TYPE (H): ICD-10-CM

## 2018-04-12 DIAGNOSIS — D86.9 SARCOIDOSIS: ICD-10-CM

## 2018-04-12 DIAGNOSIS — D86.0 SARCOIDOSIS OF LUNG (H): Primary | ICD-10-CM

## 2018-04-12 DIAGNOSIS — D86.9 SARCOIDOSIS: Primary | ICD-10-CM

## 2018-04-12 LAB
6 MIN WALK (FT): 700 FT
6 MIN WALK (M): 213 M

## 2018-04-12 PROCEDURE — G0463 HOSPITAL OUTPT CLINIC VISIT: HCPCS | Mod: ZF

## 2018-04-12 ASSESSMENT — PAIN SCALES - GENERAL: PAINLEVEL: NO PAIN (0)

## 2018-04-12 NOTE — NURSING NOTE
Chief Complaint   Patient presents with     Interstitial Lung Disease (ILD)     4 month follow up on Rohan and his ILD/Sarcoids     Yossi iDaz CMA at 12:59 PM on 4/12/2018

## 2018-04-12 NOTE — LETTER
4/12/2018     RE: Rohan Monroe  [address]  [place] [address]     Dear Colleague,    Thank you for referring your patient, Rohan Monroe, to the [facility] at [facility]. Please see a copy of my visit note below.    Reason for Visit  Rohan Monroe is a 72 year old year old male who is being seen for Interstitial Lung Disease (ILD) (4 month follow up on Rohan and his ILD/Sarcoids)    ILD HPI    Rohan Monroe is a 72-year-old male with a history of a pulmonary sarcoidosis seen today for follow-up.  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  He has had a history of atrial fibrillation and follows with Dr. Diane Paige in the cardiology clinic.  He returns to clinic today overall stating that he actually has been doing relatively well we did the last visit increase the frequency of his Remicade infusions back to every 4 weeks as he seemed to be doing worse with the every 8 weeks scheduling.  He continues on 10 mg of methotrexate as well.  Overall he is tolerating the medications well and feels his breathing is stable.  He does have significant dyspnea on exertion and is limited he uses his inhalers for his COPD regularly.  Otherwise overall feels he is stable and doing relatively well with no other new complaints today      Current Outpatient Prescriptions   Medication     furosemide (LASIX) 20 MG tablet     levothyroxine (SYNTHROID/LEVOTHROID) 125 MCG tablet     metoprolol tartrate (LOPRESSOR) 100 MG tablet     colchicine (COLCRYS) 0.6 MG tablet     tiotropium (SPIRIVA HANDIHALER) 18 MCG capsule     fluticasone-vilanterol (BREO ELLIPTA) 100-25 MCG/INH oral inhaler     albuterol (PROAIR HFA/PROVENTIL HFA/VENTOLIN HFA) 108 (90 BASE) MCG/ACT Inhaler     methotrexate 2.5 MG tablet CHEMO      sildenafil (REVATIO) 20 MG tablet     atorvastatin (LIPITOR) 40 MG tablet     losartan (COZAAR) 50 MG tablet     order for DME     order for DME     clopidogrel (PLAVIX) 75 MG tablet     polyethylene glycol (MIRALAX) powder     inFLIXimab (REMICADE) 100 MG injection     blood glucose monitoring (ACCU-CHEK RONNIE PLUS) test strip     blood glucose monitoring (ACCU-CHEK FASTCLIX) lancets     blood glucose monitoring (NO BRAND SPECIFIED) meter device kit     Urea (CARMOL 40) 40 % CREA     Multiple Vitamins-Minerals (MULTIVITAMIN & MINERAL PO)     mirtazapine (REMERON) 15 MG tablet     No current facility-administered medications for this visit.      Allergies   Allergen Reactions     Prednisone Other (See Comments)     He reports that he can't sleep for days and can't use small to massive doses of prednisone   Pt. Does not do well with high doses of Prednisone, His MD says that Prednisone is counter indicated. Prednisone failed to treat his sarcoidosis      Past Medical History:   Diagnosis Date     Atrial flutter (H)      Cataract of both eyes      Chronic infection     Hep C     Congestive heart failure, unspecified      Coronary artery disease      Depressive disorder      Depressive disorder, not elsewhere classified     Depression (non-psychotic)     ERM OS (epiretinal membrane, left eye)      Generalized osteoarthrosis, unspecified site      Glaucoma suspect      Hypertension      Lichen planus      Other psoriasis      PVD (posterior vitreous detachment), left eye      Sarcoidosis      Sarcoidosis      Type II or unspecified type diabetes mellitus without mention of complication, not stated as uncontrolled      Unspecified hypothyroidism     Hypothyroidism     Unspecified viral hepatitis C without hepatic coma      Viral warts, unspecified        Past Surgical History:   Procedure Laterality Date     ANESTHESIA CARDIOVERSION N/A 1/19/2017    Procedure: ANESTHESIA CARDIOVERSION;  Surgeon: GENERIC ANESTHESIA  PROVIDER;  Location: [facility]     ANESTHESIA CARDIOVERSION N/A 1/23/2017    Procedure: ANESTHESIA CARDIOVERSION;  Surgeon: GENERIC ANESTHESIA PROVIDER;  Location[facility]     ANESTHESIA CARDIOVERSION N/A 1/24/2017    Procedure: ANESTHESIA CARDIOVERSION;  Surgeon: GENERIC ANESTHESIA PROVIDER;  Location: [facility]     ARTHROPLASTY HIP  8/24/2011    Procedure:ARTHROPLASTY HIP; Right Total Hip Arthroplasty  Choice anesthesia; Surgeon:LESLI WILKINSON; [facility]     BIOPSY       C PELVIS/HIP JOINT SURGERY UNLISTED       cardiac stent      s/p     CARDIAC SURGERY       CATARACT IOL, RT/LT  9/15/2015    LE     COLONOSCOPY       CORONARY ANGIOGRAPHY ADULT ORDER       H ABLATION ATRIAL FLUTTER       HC REMOVAL OF TONSILS,<13 Y/O      Tonsils <10y.o.     HC REPAIR INCISIONAL HERNIA,REDUCIBLE      Hernia Repair, Incisional, Unilateral     HEART CATH, ANGIOPLASTY       JOINT REPLACEMENT      1 month ago--right hip     LIGATN/STRIP LONG & SHORT SAPHEN         Social History     Social History     Marital status:      Spouse name: N/A     Number of children: 2     Years of education: N/A     Occupational History      [employer]     Social History Main Topics     Smoking status: Former Smoker     Packs/day: 1.00     Years: 30.00     Types: Cigarettes     Start date: 12/30/1960     Quit date: 7/22/1994     Smokeless tobacco: Never Used     Alcohol use No     Drug use: No     Sexual activity: Not Currently     Partners: Female     Birth control/ protection: Post-menopausal     Other Topics Concern     Blood Transfusions No     Exercise Yes     Social History Narrative    Dairy/d 2 servings/d    Caffeine little servings/d    Exercise 3 x week    Sunscreen used - Yes    Seatbelts used - Yes    Working smoke/CO detectors in the home - Yes    Guns stored in the home - No    Self Breast Exams - NOT APPLICABLE    Self Testicular Exam - No    Eye Exam up to date - Yes    Dental Exam up to date - Yes    Pap Smear up to date -  "NOT APPLICABLE    Mammogram up to date - NOT APPLICABLE    PSA up to date - Yes    Dexa Scan up to date - NOT APPLICABLE    Flex Sig / Colonoscopy up to date - Yes    Immunizations up to date - Unsure    Abuse: Current or Past(Physical, Sexual or Emotional)- No    Do you feel safe in your environment - Yes       Family History   Problem Relation Age of Onset     HEART DISEASE Father      irreg heart beat     Circulatory Father      varicose veins     Prostate Cancer Father      HEART DISEASE Mother      heart attack     Arthritis Mother      OSTEOPOROSIS Mother      Thyroid Disease Mother      Hypertension Mother      Eye Disorder Maternal Grandmother      glaucoma     DIABETES Sister      type 2     Kidney Cancer Sister      DIABETES Sister      Glaucoma No family hx of      Macular Degeneration No family hx of      CANCER No family hx of      no skin cancer       ROS Pulmonary    A complete ROS was otherwise negative except as noted in the HPI.  Vitals:    04/12/18 1301   BP: 150/84   Pulse: 62   Resp: 20   SpO2: 94%   Weight: 76.2 kg (167 lb 14.4 oz)   Height: 1.727 m (5' 8\")     Exam:   GENERAL APPEARANCE: Well developed, well nourished, alert, and in no apparent distress.  NECK: supple, no masses, no thyromegaly.  LYMPHATICS: No significant axillary, cervical, or supraclavicular nodes.  RESP: good air flow throughout, - decreased BS bilaterally, no wheezes or crackles.  CV: Normal S1, S2, regular rhythm, normal rate, no rub, no murmur,  no gallop, no LE edema.   ABDOMEN:  Bowel sounds normal, soft, nontender, no HSM or masses.   MS: extremities normal- no clubbing, no cyanosis.  SKIN: no rash on limited exam  NEURO: Mentation intact, speech normal, normal strength and tone, normal gait and stance  PSYCH: mentation appears normal. and affect normal/bright  Results: I have reviewed all imaging, PFTs and other relavent tests, please see below for details, PFT and imaging results were reviewed with the " patient.    PFTs: severe obstruction, severe reduction in DLCO, stable.    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  Recent history of atrial fibrillation although he has been in stable sinus rhythm and after discussing with Dr. Paige in undergoing his patch monitor he was taken off his anticoagulation.  He is tolerated this well.  He is contemplating moving to [place] for family reasons.  And I told him that we would help facilitate the transfer of his medical care if this happens.  Otherwise he is tolerating the current regimen well we will continue with the Remicade every 4 weeks and with the 10 mg of methotrexate.  He will continue on his inhalers.  I will continue see him every 4 months with pulmonary function test he will call sooner with any changes in his breathing.    CBC   Recent Labs   Lab Test  02/26/18   1317  01/05/18   0520   RBC  3.64*  3.50*   HGB  11.0*  10.6*   HCT  34.3*  33.0*   PLT  277  303       Basic Metabolic Panel  Recent Labs   Lab Test  03/08/18   1001  02/26/18   1317   05/13/17   0116  05/13/17   0035   NA  135  134   < >   --    --    POTASSIUM  3.9  4.3   < >   --    --    CHLORIDE  101  100   < >   --    --    CO2  25  26   < >   --    --    BUN  58*  48*   < >   --    --    CT   --    --    --   Plasma, Thawed  PlateletPheresis LeukoReduced Irradiated  Plasma, Thawed  Plasma, Thawed  Plasma, Thawed  Apheresis Plasma Thawed  Plasma, Thawed  PlateletPheresis,LeukoRed Irrad (Part 2)  Red Blood Cells Leukocyte Reduced  Red Blood Cells Leukocyte Reduced  Red Blood Cells Leukocyte Reduced  Red Blood Cells Leukocyte Reduced  Red Blood Cells Leukocyte Reduced  Red Blood Cells LeukoReduced (Part 2)   GLC  155*  146*   < >   --    --    RAFFY  8.7  8.7   < >   --    --     < > = values in this interval not displayed.       INR  Recent Labs   Lab Test  03/21/18   1312  03/08/18   1001   INR  1.48*  1.26*       PFT  PFT Latest Ref Rng &  Units 4/12/2018   FVC L 2.67   FEV1 L 1.00   FVC% % 68   FEV1% % 34         CC:    Again, thank you for allowing me to participate in the care of your patient.      Sincerely,    David Morris Perlman, MD

## 2018-04-12 NOTE — PROGRESS NOTES
Reason for Visit  Rohan Monroe is a 72 year old year old male who is being seen for Interstitial Lung Disease (ILD) (4 month follow up on Rohan and his ILD/Sarcoids)    ILD HPI    Rohan Monroe is a 72-year-old male with a history of a pulmonary sarcoidosis seen today for follow-up.  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  He has had a history of atrial fibrillation and follows with Dr. Diane Paige in the cardiology clinic.  He returns to clinic today overall stating that he actually has been doing relatively well we did the last visit increase the frequency of his Remicade infusions back to every 4 weeks as he seemed to be doing worse with the every 8 weeks scheduling.  He continues on 10 mg of methotrexate as well.  Overall he is tolerating the medications well and feels his breathing is stable.  He does have significant dyspnea on exertion and is limited he uses his inhalers for his COPD regularly.  Otherwise overall feels he is stable and doing relatively well with no other new complaints today      Current Outpatient Prescriptions   Medication     furosemide (LASIX) 20 MG tablet     levothyroxine (SYNTHROID/LEVOTHROID) 125 MCG tablet     metoprolol tartrate (LOPRESSOR) 100 MG tablet     colchicine (COLCRYS) 0.6 MG tablet     tiotropium (SPIRIVA HANDIHALER) 18 MCG capsule     fluticasone-vilanterol (BREO ELLIPTA) 100-25 MCG/INH oral inhaler     albuterol (PROAIR HFA/PROVENTIL HFA/VENTOLIN HFA) 108 (90 BASE) MCG/ACT Inhaler     methotrexate 2.5 MG tablet CHEMO     sildenafil (REVATIO) 20 MG tablet     atorvastatin (LIPITOR) 40 MG tablet     losartan (COZAAR) 50 MG tablet     order for DME     order for DME     clopidogrel (PLAVIX) 75 MG tablet     polyethylene glycol (MIRALAX) powder     inFLIXimab (REMICADE) 100 MG injection     blood glucose monitoring (ACCU-CHEK RONNIE PLUS) test strip     blood  glucose monitoring (ACCU-CHEK FASTCLIX) lancets     blood glucose monitoring (NO BRAND SPECIFIED) meter device kit     Urea (CARMOL 40) 40 % CREA     Multiple Vitamins-Minerals (MULTIVITAMIN & MINERAL PO)     mirtazapine (REMERON) 15 MG tablet     No current facility-administered medications for this visit.      Allergies   Allergen Reactions     Prednisone Other (See Comments)     He reports that he can't sleep for days and can't use small to massive doses of prednisone   Pt. Does not do well with high doses of Prednisone, His MD says that Prednisone is counter indicated. Prednisone failed to treat his sarcoidosis      Past Medical History:   Diagnosis Date     Atrial flutter (H)      Cataract of both eyes      Chronic infection     Hep C     Congestive heart failure, unspecified      Coronary artery disease      Depressive disorder      Depressive disorder, not elsewhere classified     Depression (non-psychotic)     ERM OS (epiretinal membrane, left eye)      Generalized osteoarthrosis, unspecified site      Glaucoma suspect      Hypertension      Lichen planus      Other psoriasis      PVD (posterior vitreous detachment), left eye      Sarcoidosis      Sarcoidosis      Type II or unspecified type diabetes mellitus without mention of complication, not stated as uncontrolled      Unspecified hypothyroidism     Hypothyroidism     Unspecified viral hepatitis C without hepatic coma      Viral warts, unspecified        Past Surgical History:   Procedure Laterality Date     ANESTHESIA CARDIOVERSION N/A 1/19/2017    Procedure: ANESTHESIA CARDIOVERSION;  Surgeon: GENERIC ANESTHESIA PROVIDER;  [facility]: [facility]     ANESTHESIA CARDIOVERSION N/A 1/23/2017    Procedure: ANESTHESIA CARDIOVERSION;  Surgeon: GENERIC ANESTHESIA PROVIDER;  [facility]     ANESTHESIA CARDIOVERSION N/A 1/24/2017    Procedure: ANESTHESIA CARDIOVERSION;  Surgeon: GENERIC ANESTHESIA PROVIDER;  [facility]     ARTHROPLASTY HIP  8/24/2011     Procedure:ARTHROPLASTY HIP; Right Total Hip Arthroplasty  Choice anesthesia; Surgeon:LESLI WILKINSON; Location[facility]     BIOPSY       C PELVIS/HIP JOINT SURGERY UNLISTED       cardiac stent      s/p     CARDIAC SURGERY       CATARACT IOL, RT/LT  9/15/2015    LE     COLONOSCOPY       CORONARY ANGIOGRAPHY ADULT ORDER       H ABLATION ATRIAL FLUTTER       HC REMOVAL OF TONSILS,<13 Y/O      Tonsils <10y.o.     HC REPAIR INCISIONAL HERNIA,REDUCIBLE      Hernia Repair, Incisional, Unilateral     HEART CATH, ANGIOPLASTY       JOINT REPLACEMENT      1 month ago--right hip     LIGATN/STRIP LONG & SHORT SAPHEN         Social History     Social History     Marital status:      Spouse name: N/A     Number of children: 2     Years of education: N/A     Occupational History      [employer]     Social History Main Topics     Smoking status: Former Smoker     Packs/day: 1.00     Years: 30.00     Types: Cigarettes     Start date: 12/30/1960     Quit date: 7/22/1994     Smokeless tobacco: Never Used     Alcohol use No     Drug use: No     Sexual activity: Not Currently     Partners: Female     Birth control/ protection: Post-menopausal     Other Topics Concern     Blood Transfusions No     Exercise Yes     Social History Narrative    Dairy/d 2 servings/d    Caffeine little servings/d    Exercise 3 x week    Sunscreen used - Yes    Seatbelts used - Yes    Working smoke/CO detectors in the home - Yes    Guns stored in the home - No    Self Breast Exams - NOT APPLICABLE    Self Testicular Exam - No    Eye Exam up to date - Yes    Dental Exam up to date - Yes    Pap Smear up to date - NOT APPLICABLE    Mammogram up to date - NOT APPLICABLE    PSA up to date - Yes    Dexa Scan up to date - NOT APPLICABLE    Flex Sig / Colonoscopy up to date - Yes    Immunizations up to date - Unsure    Abuse: Current or Past(Physical, Sexual or Emotional)- No    Do you feel safe in your environment - Yes       Family History   Problem  "Relation Age of Onset     HEART DISEASE Father      irreg heart beat     Circulatory Father      varicose veins     Prostate Cancer Father      HEART DISEASE Mother      heart attack     Arthritis Mother      OSTEOPOROSIS Mother      Thyroid Disease Mother      Hypertension Mother      Eye Disorder Maternal Grandmother      glaucoma     DIABETES Sister      type 2     Kidney Cancer Sister      DIABETES Sister      Glaucoma No family hx of      Macular Degeneration No family hx of      CANCER No family hx of      no skin cancer       ROS Pulmonary    A complete ROS was otherwise negative except as noted in the HPI.  Vitals:    04/12/18 1301   BP: 150/84   Pulse: 62   Resp: 20   SpO2: 94%   Weight: 76.2 kg (167 lb 14.4 oz)   Height: 1.727 m (5' 8\")     Exam:   GENERAL APPEARANCE: Well developed, well nourished, alert, and in no apparent distress.  NECK: supple, no masses, no thyromegaly.  LYMPHATICS: No significant axillary, cervical, or supraclavicular nodes.  RESP: good air flow throughout, - decreased BS bilaterally, no wheezes or crackles.  CV: Normal S1, S2, regular rhythm, normal rate, no rub, no murmur,  no gallop, no LE edema.   ABDOMEN:  Bowel sounds normal, soft, nontender, no HSM or masses.   MS: extremities normal- no clubbing, no cyanosis.  SKIN: no rash on limited exam  NEURO: Mentation intact, speech normal, normal strength and tone, normal gait and stance  PSYCH: mentation appears normal. and affect normal/bright  Results: I have reviewed all imaging, PFTs and other relavent tests, please see below for details, PFT and imaging results were reviewed with the patient.    PFTs: severe obstruction, severe reduction in DLCO, stable.    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  Recent history of atrial fibrillation although he has been in stable sinus rhythm and after discussing with Dr. Paige in undergoing his patch monitor he was taken off his anticoagulation.  He is " tolerated this well.  He is contemplating moving to [place] for family reasons.  And I told him that we would help facilitate the transfer of his medical care if this happens.  Otherwise he is tolerating the current regimen well we will continue with the Remicade every 4 weeks and with the 10 mg of methotrexate.  He will continue on his inhalers.  I will continue see him every 4 months with pulmonary function test he will call sooner with any changes in his breathing.    CBC   Recent Labs   Lab Test  02/26/18   1317  01/05/18   0520   RBC  3.64*  3.50*   HGB  11.0*  10.6*   HCT  34.3*  33.0*   PLT  277  303       Basic Metabolic Panel  Recent Labs   Lab Test  03/08/18   1001  02/26/18   1317   05/13/17   0116  05/13/17   0035   NA  135  134   < >   --    --    POTASSIUM  3.9  4.3   < >   --    --    CHLORIDE  101  100   < >   --    --    CO2  25  26   < >   --    --    BUN  58*  48*   < >   --    --    CT   --    --    --   Plasma, Thawed  PlateletPheresis LeukoReduced Irradiated  Plasma, Thawed  Plasma, Thawed  Plasma, Thawed  Apheresis Plasma Thawed  Plasma, Thawed  PlateletPheresis,LeukoRed Irrad (Part 2)  Red Blood Cells Leukocyte Reduced  Red Blood Cells Leukocyte Reduced  Red Blood Cells Leukocyte Reduced  Red Blood Cells Leukocyte Reduced  Red Blood Cells Leukocyte Reduced  Red Blood Cells LeukoReduced (Part 2)   GLC  155*  146*   < >   --    --    RAFFY  8.7  8.7   < >   --    --     < > = values in this interval not displayed.       INR  Recent Labs   Lab Test  03/21/18   1312  03/08/18   1001   INR  1.48*  1.26*       PFT  PFT Latest Ref Rng & Units 4/12/2018   FVC L 2.67   FEV1 L 1.00   FVC% % 68   FEV1% % 34           CC:

## 2018-04-12 NOTE — MR AVS SNAPSHOT
After Visit Summary   4/12/2018    Rohan Monroe    MRN: [id number]           Patient Information     Date Of Birth          1945        Visit Information        Provider Department      4/12/2018 2:00 PM Perlman, David Morris, MD [facility]        Today's Diagnoses     Sarcoidosis    -  1       Follow-ups after your visit        Follow-up notes from your care team     Return in about 4 months (around 8/12/2018).      Your next 10 appointments already scheduled     Apr 24, 2018  3:30 PM CDT   Pulmonary Eval with  Pulmonary Rehab 2   [facility] ([facility])    [address]  [facility] [address]   [phone]            May 16, 2018 12:00 PM CDT   Infusion 180 with UC SPEC INFUSION, UC 47 ATC   [facility] ([facility])    [address]  [address]  [facility] [address] [phone]            May 29, 2018 11:35 AM CDT   (Arrive by 11:20 AM)   Return Visit with Jamie Foster MD   [facility] ([facility])    [address]  4th Floor  [facility] [address] [phone]            Jun 13, 2018  1:00 PM CDT   Infusion 180 with UC SPEC INFUSION, UC 42 ATC   [facility] ([facility])    [address]  [address]  [facility] [address] [phone]            Jun 29, 2018  9:15 AM CDT   RETURN GLAUCOMA with Kina Greco MD   [facility] ([facility])    [facility]  [address] [place] [address]  [address] [address]  [facility] [address]   [phone]            Jul 02, 2018 10:15 AM CDT   RETURN RETINA with Sandee Middleton MD   [facility] ([facility])    [address]  [address] [address]  [facility] [address]  "  [phone]            Jul 11, 2018 12:00 PM CDT   Infusion 180 with UC SPEC INFUSION, UC 50 ATC   [facility] ([facility])    [address]  [address]  [facility] [address] [phone]              Who to contact     If you have questions or need follow up information about today's clinic visit or your schedule please contact [facility] directly at [phone].  Normal or non-critical lab and imaging results will be communicated to you by SecureMediahart, letter or phone within 4 business days after the clinic has received the results. If you do not hear from us within 7 days, please contact the clinic through Bocomt or phone. If you have a critical or abnormal lab result, we will notify you by phone as soon as possible.  Submit refill requests through Simperium or call your pharmacy and they will forward the refill request to us. Please allow 3 business days for your refill to be completed.          Additional Information About Your Visit        Simperium Information     Simperium gives you secure access to your electronic health record. If you see a primary care provider, you can also send messages to your care team and make appointments. If you have questions, please call your primary care clinic.  If you do not have a primary care provider, please call [phone] and they will assist you.        Care EveryWhere ID     This is your Care EveryWhere ID. This could be used by other organizations to access your [facility] medical records  [id number]        Your Vitals Were     Pulse Respirations Height Pulse Oximetry BMI (Body Mass Index)       62 20 1.727 m (5' 8\") 94% 25.53 kg/m2        Blood Pressure from Last 3 Encounters:   No data found for BP    Weight from Last 3 Encounters:   No data found for Wt              Today, you had the following     No orders found for display       Primary Care Provider " Office Phone # Fax #    Jamie XIAO MD Cristian [phone] [address] [address]        Equal Access to Services     ISIAH MONTOYA : Alejandro rhys jamison dhruv Soleslie, waaxda luqadaha, qaybta kaalmada vesna, nelly udcarolina jacinto. So [facility] [phone].    ATENCIÓN: Si habla español, tiene a mcfadden disposición servicios gratuitos de asistencia lingüística. Llame al [phone].    We comply with applicable federal civil rights laws and [place] laws. We do not discriminate on the basis of race, color, national origin, age, disability, sex, sexual orientation, or gender identity.            Thank you!     Thank you for choosing [facility]  for your care. Our goal is always to provide you with excellent care. Hearing back from our patients is one way we can continue to improve our services. Please take a few minutes to complete the written survey that you may receive in the mail after your visit with us. Thank you!             Your Updated Medication List - Protect others around you: Learn how to safely use, store and throw away your medicines at www.disposemymeds.org.          This list is accurate as of 4/12/18 11:59 PM.  Always use your most recent med list.                   Brand Name Dispense Instructions for use Diagnosis    albuterol 108 (90 Base) MCG/ACT Inhaler    PROAIR HFA/PROVENTIL HFA/VENTOLIN HFA    3 Inhaler    Inhale 2 puffs into the lungs every 6 hours as needed for shortness of breath / dyspnea    Sarcoidosis       atorvastatin 40 MG tablet    LIPITOR    30 tablet    TAKE ONE TABLET BY MOUTH ONE TIME DAILY    Coronary artery disease involving native coronary artery of native heart without angina pectoris, CAD S/P percutaneous coronary angioplasty       blood glucose monitoring lancets     2 Box    Use to test blood sugar 2 times daily or as directed.  102 lancets per box.  3 month supply.    Type 2 diabetes,  HbA1C goal < 8% (H)       blood glucose monitoring meter device kit    no brand specified    1 kit    Use to test blood sugar 2 times daily.    Type 2 diabetes mellitus with diabetic nephropathy (H)       blood glucose monitoring test strip    ACCU-CHEK RONNIE PLUS    200 each    Use to test blood sugar 2 times daily or as directed.  3 month supply.    Type 2 diabetes, HbA1C goal < 8% (H)       clopidogrel 75 MG tablet    PLAVIX    90 tablet    Take 1 tablet (75 mg) by mouth daily    CAD S/P percutaneous coronary angioplasty, Coronary artery disease involving native coronary artery of native heart without angina pectoris       colchicine 0.6 MG tablet    COLCRYS    30 tablet    2 tabs at the onset of flare, then 1 tab every 2 hrs until pain is better or diarrhea occurs, up to 10 doses. Wait 3 days until taking again        fluticasone-vilanterol 100-25 MCG/INH oral inhaler    BREO ELLIPTA    3 Inhaler    Inhale 1 puff into the lungs daily    Chronic obstructive pulmonary disease, unspecified COPD type (H)       furosemide 20 MG tablet    LASIX    240 tablet    Take 2 tablets (40 mg) by mouth 2 times daily May take extra 20 mg as needed for wt .gain >3#    Pulmonary hypertension       inFLIXimab 100 MG injection    REMICADE     Inject 100 mg into the vein every 28 days        levothyroxine 125 MCG tablet    SYNTHROID/LEVOTHROID    90 tablet    Take 1 tablet (125 mcg) by mouth daily    Hypothyroidism due to Hashimoto's thyroiditis       losartan 50 MG tablet    COZAAR    90 tablet    Take 1 tablet (50 mg) by mouth daily    (HFpEF) heart failure with preserved ejection fraction (H)       methotrexate 2.5 MG tablet CHEMO     48 tablet    Take 3 tablets (7.5 mg) by mouth once a week On Mondays    Sarcoidosis       metoprolol tartrate 100 MG tablet    LOPRESSOR    60 tablet    Take 1 tablet (100 mg) by mouth 2 times daily    Hypertension secondary to other renal disorders       mirtazapine 15 MG tablet    REMERON     Take  15 mg by mouth At Bedtime.        MULTIVITAMIN & MINERAL PO      Take 1 tablet by mouth daily.        * order for DME     1 Device    She requested for a 4 wheel walker, would like to change it to 4 wheel walker with a seat and oxygen tank durant.   Need this faxed over to her  [phone]    Sarcoidosis, lung (H), COPD (chronic obstructive pulmonary disease) (H), Abnormal gait, Congestive heart failure (H)       * order for DME     1 Device    Updated Oxygen: Patient requires supplemental Oxygen 3 LPM via nasal canula with activity and 2 LPM nocturnally. Please provide patient with a portable oxygen concentrator for improved mobility.  Okay to spot check or titrated for conserving to keep stats above 90%. Oxygen will be for a lifetime.    ILD (interstitial lung disease) (H), Hypoxia       polyethylene glycol powder    MIRALAX    510 g    Take 17 g (1 capful) by mouth daily    Constipation, unspecified constipation type       sildenafil 20 MG tablet    REVATIO    270 tablet    Take 1 tablet (20 mg) by mouth 3 times daily    Pulmonary hypertension       tiotropium 18 MCG capsule    SPIRIVA HANDIHALER    90 capsule    Inhale contents of one capsule daily.    Chronic obstructive pulmonary disease, unspecified COPD type (H)       Urea 40 % Crea    CARMOL 40    199 g    To feet daily    Dermatophytosis of nail, Corns and callosities       * Notice:  This list has 2 medication(s) that are the same as other medications prescribed for you. Read the directions carefully, and ask your doctor or other care provider to review them with you.

## 2018-04-18 ENCOUNTER — INFUSION THERAPY VISIT (OUTPATIENT)
Dept: INFUSION THERAPY | Facility: CLINIC | Age: 73
End: 2018-04-18
Attending: INTERNAL MEDICINE
Payer: MEDICARE

## 2018-04-18 VITALS — SYSTOLIC BLOOD PRESSURE: 144 MMHG | BODY MASS INDEX: 25.38 KG/M2 | DIASTOLIC BLOOD PRESSURE: 75 MMHG | WEIGHT: 166.9 LBS

## 2018-04-18 DIAGNOSIS — D86.9 SARCOIDOSIS: ICD-10-CM

## 2018-04-18 DIAGNOSIS — D86.0 SARCOIDOSIS OF LUNG (H): Primary | ICD-10-CM

## 2018-04-18 DIAGNOSIS — R94.6 ABNORMAL FINDING ON THYROID FUNCTION TEST: ICD-10-CM

## 2018-04-18 LAB — TSH SERPL DL<=0.005 MIU/L-ACNC: 2.05 MU/L (ref 0.4–4)

## 2018-04-18 PROCEDURE — 96415 CHEMO IV INFUSION ADDL HR: CPT

## 2018-04-18 PROCEDURE — 96413 CHEMO IV INFUSION 1 HR: CPT

## 2018-04-18 PROCEDURE — 84443 ASSAY THYROID STIM HORMONE: CPT | Performed by: INTERNAL MEDICINE

## 2018-04-18 PROCEDURE — 25000128 H RX IP 250 OP 636: Mod: ZF | Performed by: INTERNAL MEDICINE

## 2018-04-18 RX ORDER — ACETAMINOPHEN 325 MG/1
650 TABLET ORAL ONCE
Status: CANCELLED
Start: 2018-04-18 | End: 2018-04-18

## 2018-04-18 RX ADMIN — INFLIXIMAB 400 MG: 100 INJECTION, POWDER, LYOPHILIZED, FOR SOLUTION INTRAVENOUS at 12:37

## 2018-04-18 NOTE — PROGRESS NOTES
~~~ NOTE: If the patient answers yes to any of the questions below, hold the infusion and contact ordering provider or on-call provider.    1. Have you recently had an elevated temperature, fever, chills, productive cough, coughing for 3 weeks or longer or hemoptysis,  abnormal vital signs, night sweats,  chest pain or have you noticed a decrease in your appetite, unexplained weight loss or fatigue? No  2. Do you have any open wounds or new incisions? No  3. Do you have any recent or upcoming hospitalizations, surgeries or dental procedures? No  4. Do you currently have or recently have had any signs of illness or infection or are you on any antibiotics? No  5. Have you had any new, sudden or worsening abdominal pain? No  6. Have you or anyone in your household received a live vaccination in the past 4 weeks? Please note:  No live vaccines while on biologic/chemotherapy until 6 months after the last treatment.  Patient can receive the flu vaccine (shot only) and the pneumovax.  It is optimal for the patient to get these vaccines mid cycle, but they can be given at any time as long as it is not on the day of the infusion. No  7. Have you recently been diagnosed with any new nervous system diseases (ie. Multiple sclerosis, Guillain Rickman, seizures, neurological changes) or cancer diagnosis? Are you on any form of radiation or chemotherapy? No  8. Are you pregnant or breast feeding or do you have plans of pregnancy in the future? No  9. Have you been having any signs of worsening depression or suicidal ideations?  (benlysta only) No  10. Have there been any other new onset medical symptoms? No  Nursing Note  Rohan Monroe [facility]:   Chief Complaint   Patient presents with     Infusion     Remicade     During today's [facility] appointment, orders from Dr. Perlman were completed.  Frequency: monthly    Progress note:  Patient  identification verified by name and date of birth.  Assessment completed.  Vitals recorded in Doc Flowsheets.  Patient was provided with education regarding infusion and possible side effects.  Patient verbalized understanding.      needed: No  Premedications: were not ordered.  Infusion Rates: Remicade given over approximately 2 hours.  Approximate Infusion length:2 hours.   Labs: were drawn prior to appointment.  Vascular access: peripheral IV placed today.  Treatment Conditions: Biological checklist performed prior to infusion.  Inform patient if any fever, chills or signs of infection, new symptoms, abdominal pain, heart palpitations, shortness of breath, reaction, weakness, neurological changes, seek medical attention immediately and should not receive infusions. No live virus vaccines prior to or during treatment or up to 6 months post infusion. If the patient has an upcoming procedure or surgery, this should be discussed with the rheumatologist and surgeon or provider.  Patient tolerated infusion: well.    Drug Waste Record? No     Discharge Plan:   Follow up plan of care with: ongoing infusions at [facility].  Discharge instructions were reviewed with patient.  Patient/representative verbalized understanding of discharge instructions and all questions answered.  Patient discharged from [facility] in stable condition.    Yuliet Farrell RN       Administrations This Visit     inFLIXimab (REMICADE) 400 mg in sodium chloride 0.9 % 315 mL infusion     Admin Date Action Dose Rate Route Administered By          04/18/2018 New Bag 400 mg 157.5 mL/hr Intravenous Yuliet Farrell RN                           Wt 75.7 kg (166 lb 14.4 oz)  BMI 25.38 kg/m2

## 2018-04-18 NOTE — PATIENT INSTRUCTIONS
Dear Rohan Monroe    Thank you for choosing [facility] ([facility]) for your infusion.  The following information is a summary of our appointment as well as important reminders.          EDUCATION POST BIOLOGICAL/CHEMOTHERAPY INFUSION  Call the triage nurse at your clinic or seek medical attention if you have chills and/or temperature greater than or equal to 100.5, uncontrolled nausea/vomiting, diarrhea, constipation, dizziness, shortness of breath, chest pain, heart palpitations, weakness or any other new or concerning symptoms, questions or concerns.  You can not have any live virus vaccines prior to or during treatment or up to 6 months post infusion.  If you have an upcoming surgery, medical procedure or dental procedure during treatment, this should be discussed with your ordering physician and your surgeon/dentist.  If you are having any concerning symptom, if you are unsure if you should get your next infusion or wish to speak to a provider before your next infusion, please call your care coordinator or triage nurse at your clinic to notify them so we can adequately serve you.          Additional information: you had your infusion of Remicade 400 mg via IV today.      We look forward in seeing you on your next appointment here at [facility].  Please don t hesitate to call us at [phone] to reschedule any of your appointments or to speak with one of the [facility] registered nurses.  It was a pleasure taking care of you today.    Sincerely,  Yuliet Farrell RN  [facility]  [facility]  [address] [place] [address]  [address]  Phone:  [phone]

## 2018-04-18 NOTE — MR AVS SNAPSHOT
After Visit Summary   4/18/2018    Rohan Monroe    MRN: [id number]           Patient Information     Date Of Birth          1945        Visit Information        Provider Department      4/18/2018 12:00 PM  47 ATC;  SPEC INFUSION [facility] Specialty [facility] Procedure        Today's Diagnoses     Sarcoidosis of lung (HCC)    -  1    SARCOIDOSIS-systemic        Abnormal finding on thyroid function test          Care Instructions    Dear Rohan Monroe    Thank you for choosing [facility] ([facility]) for your infusion.  The following information is a summary of our appointment as well as important reminders.          EDUCATION POST BIOLOGICAL/CHEMOTHERAPY INFUSION  Call the triage nurse at your clinic or seek medical attention if you have chills and/or temperature greater than or equal to 100.5, uncontrolled nausea/vomiting, diarrhea, constipation, dizziness, shortness of breath, chest pain, heart palpitations, weakness or any other new or concerning symptoms, questions or concerns.  You can not have any live virus vaccines prior to or during treatment or up to 6 months post infusion.  If you have an upcoming surgery, medical procedure or dental procedure during treatment, this should be discussed with your ordering physician and your surgeon/dentist.  If you are having any concerning symptom, if you are unsure if you should get your next infusion or wish to speak to a provider before your next infusion, please call your care coordinator or triage nurse at your clinic to notify them so we can adequately serve you.          Additional information: you had your infusion of Remicade 400 mg via IV today.      We look forward in seeing you on your next appointment here at [facility].  Please don t hesitate to call us at [phone] to reschedule any of your appointments or to speak with one of the [facility]  registered nurses.  It was a pleasure taking care of you today.    Sincerely,  Yuliet Farrell, RN  [facility]  Specialty Infusion & Procedure Center  [address] [place]  [address]  Phone:  [phone]            Follow-ups after your visit        Your next 10 appointments already scheduled     Apr 19, 2018  2:00 PM CDT   Cardiac Discharge with  CARDIAC REHAB 4   [facility] Cardiac Rehab ([facility])    [address]  [facility] [id number] [phone]            Apr 24, 2018  3:30 PM CDT   Pulmonary Eval with  Pulmonary Rehab 2   [facility] Cardiac [facility] ([facility])    [address]  [facility] [id number] [phone]            May 16, 2018 12:00 PM CDT   Infusion 180 with UC SPEC INFUSION, UC 47 ATC   [facility] Specialty [facility] Procedure ([facility])    [address]  [address]  [facility] [id number] [phone]            May 29, 2018 11:35 AM CDT   (Arrive by 11:20 AM)   Return Visit with Jamie Foster MD   [facility] ([facility])    [address]  4th Floor  [facility] [id number] [phone]            Jun 13, 2018  1:00 PM CDT   Infusion 180 with UC SPEC INFUSION, UC 42 ATC   [facility] Specialty and Procedure ([facility])    [address]  [address]  [facility] [id number] [phone]            Jun 29, 2018  9:15 AM CDT   RETURN GLAUCOMA with Kina Greco MD   [facility] ([facility])    [address]  [address]  [facility] [id number] [phone]            Jul 02, 2018 10:15 AM CDT   RETURN RETINA with Sandee Middleton MD   Eye Clinic ([facility])    [address]  [address] [facility]  [address] [address]  [place]  [place] [address]   [phone]            Jul 11, 2018 12:00 PM CDT   Infusion 180 with UC SPEC INFUSION   [facility] Specialty and Procedure ([facility])    [address]  [address]  [facility] [id number] [phone]              Who to contact     If you have questions or need follow up information about today's clinic visit or your schedule please contact [facility] SPECIALTY AND PROCEDURE directly at [phone].  Normal or non-critical lab and imaging results will be communicated to you by [employer], letter or phone within 4 business days after the clinic has received the results. If you do not hear from us within 7 days, please contact the clinic through [facility] or phone. If you have a critical or abnormal lab result, we will notify you by phone as soon as possible.  Submit refill requests through [facility] or call your pharmacy and they will forward the refill request to us. Please allow 3 business days for your refill to be completed.          Additional Information About Your Visit        [employer] Information     [facility] gives you secure access to your electronic health record. If you see a primary care provider, you can also send messages to your care team and make appointments. If you have questions, please call your primary care clinic.  If you do not have a primary care provider, please call [phone] and they will assist you.        Care EveryWhere ID     This is your Care EveryWhere ID. This could be used by other organizations to access your [place] medical records  [id number]        Your Vitals Were     BMI (Body Mass Index)                   25.38 kg/m2            Blood Pressure from Last 3 Encounters:   04/18/18 144/75   04/12/18 150/84   03/21/18 153/90    Weight from Last 3 Encounters:   04/18/18 75.7 kg (166 lb 14.4 oz)   04/12/18 76.2 kg (167 lb 14.4 oz)   03/21/18 77 kg (169 lb 11.2 oz)              We  Performed the Following     TSH with free T4 reflex        Primary Care Provider Office Phone # Fax #    Jamie Foster [phone] [address] [address]        Equal Access to Services     ISIAH MONTOYA : Hadii aad ku duaneo Soomaali, waaxda luqadaha, qaybta kaalmada adeegyada, nelly mcdanieln peggy rand laEmmacarolina jacinto. So [facility] [phone].    ATENCIÓN: Si habla español, tiene a mcfadden disposición servicios gratuitos de asistencia lingüística. Llame al [phone].    We comply with applicable federal civil rights laws and [place] laws. We do not discriminate on the basis of race, color, national origin, age, disability, sex, sexual orientation, or gender identity.            Thank you!     Thank you for choosing [facility] SPECIALTY AND PROCEDURE  for your care. Our goal is always to provide you with excellent care. Hearing back from our patients is one way we can continue to improve our services. Please take a few minutes to complete the written survey that you may receive in the mail after your visit with us. Thank you!             Your Updated Medication List - Protect others around you: Learn how to safely use, store and throw away your medicines at www.disposemymeds.org.          This list is accurate as of 4/18/18  3:05 PM.  Always use your most recent med list.                   Brand Name Dispense Instructions for use Diagnosis    albuterol 108 (90 Base) MCG/ACT Inhaler    PROAIR HFA/PROVENTIL HFA/VENTOLIN HFA    3 Inhaler    Inhale 2 puffs into the lungs every 6 hours as needed for shortness of breath / dyspnea    Sarcoidosis       atorvastatin 40 MG tablet    LIPITOR    30 tablet    TAKE ONE TABLET BY MOUTH ONE TIME DAILY    Coronary artery disease involving native coronary artery of native heart without angina pectoris, CAD S/P percutaneous coronary angioplasty       blood glucose monitoring lancets     2 Box    Use to test blood  sugar 2 times daily or as directed.  102 lancets per box.  3 month supply.    Type 2 diabetes, HbA1C goal < 8% (H)       blood glucose monitoring meter device kit    no brand specified    1 kit    Use to test blood sugar 2 times daily.    Type 2 diabetes mellitus with diabetic nephropathy (H)       blood glucose monitoring test strip    ACCU-CHEK RONNIE PLUS    200 each    Use to test blood sugar 2 times daily or as directed.  3 month supply.    Type 2 diabetes, HbA1C goal < 8% (H)       clopidogrel 75 MG tablet    PLAVIX    90 tablet    Take 1 tablet (75 mg) by mouth daily    CAD S/P percutaneous coronary angioplasty, Coronary artery disease involving native coronary artery of native heart without angina pectoris       colchicine 0.6 MG tablet    COLCRYS    30 tablet    2 tabs at the onset of flare, then 1 tab every 2 hrs until pain is better or diarrhea occurs, up to 10 doses. Wait 3 days until taking again        fluticasone-vilanterol 100-25 MCG/INH oral inhaler    BREO ELLIPTA    3 Inhaler    Inhale 1 puff into the lungs daily    Chronic obstructive pulmonary disease, unspecified COPD type (H)       furosemide 20 MG tablet    LASIX    240 tablet    Take 2 tablets (40 mg) by mouth 2 times daily May take extra 20 mg as needed for wt .gain >3#    Pulmonary hypertension       inFLIXimab 100 MG injection    REMICADE     Inject 100 mg into the vein every 28 days        levothyroxine 125 MCG tablet    SYNTHROID/LEVOTHROID    90 tablet    Take 1 tablet (125 mcg) by mouth daily    Hypothyroidism due to Hashimoto's thyroiditis       losartan 50 MG tablet    COZAAR    90 tablet    Take 1 tablet (50 mg) by mouth daily    (HFpEF) heart failure with preserved ejection fraction (H)       methotrexate 2.5 MG tablet CHEMO     48 tablet    Take 3 tablets (7.5 mg) by mouth once a week On Mondays    Sarcoidosis       metoprolol tartrate 100 MG tablet    LOPRESSOR    60 tablet    Take 1 tablet (100 mg) by mouth 2 times daily     Hypertension secondary to other renal disorders       mirtazapine 15 MG tablet    REMERON     Take 15 mg by mouth At Bedtime.        MULTIVITAMIN & MINERAL PO      Take 1 tablet by mouth daily.        * order for DME     1 Device    She requested for a 4 wheel walker, would like to change it to 4 wheel walker with a seat and oxygen tank durant.   Need this faxed over to her  [phone]    Sarcoidosis, lung (H), COPD (chronic obstructive pulmonary disease) (H), Abnormal gait, Congestive heart failure (H)       * order for DME     1 Device    Updated Oxygen: Patient requires supplemental Oxygen 3 LPM via nasal canula with activity and 2 LPM nocturnally. Please provide patient with a portable oxygen concentrator for improved mobility.  Okay to spot check or titrated for conserving to keep stats above 90%. Oxygen will be for a lifetime.    ILD (interstitial lung disease) (H), Hypoxia       polyethylene glycol powder    MIRALAX    510 g    Take 17 g (1 capful) by mouth daily    Constipation, unspecified constipation type       sildenafil 20 MG tablet    REVATIO    270 tablet    Take 1 tablet (20 mg) by mouth 3 times daily    Pulmonary hypertension       tiotropium 18 MCG capsule    SPIRIVA HANDIHALER    90 capsule    Inhale contents of one capsule daily.    Chronic obstructive pulmonary disease, unspecified COPD type (H)       Urea 40 % Crea    CARMOL 40    199 g    To feet daily    Dermatophytosis of nail, Corns and callosities       * Notice:  This list has 2 medication(s) that are the same as other medications prescribed for you. Read the directions carefully, and ask your doctor or other care provider to review them with you.

## 2018-04-19 ENCOUNTER — HOSPITAL ENCOUNTER (OUTPATIENT)
Dept: CARDIAC REHAB | Facility: CLINIC | Age: 73
End: 2018-04-19
Attending: INTERNAL MEDICINE
Payer: MEDICARE

## 2018-04-19 PROCEDURE — 93798 PHYS/QHP OP CAR RHAB W/ECG: CPT | Mod: KX

## 2018-04-19 PROCEDURE — 40000116 ZZH STATISTIC OP CR VISIT: Mod: KX

## 2018-04-24 ENCOUNTER — HOSPITAL ENCOUNTER (OUTPATIENT)
Dept: CARDIAC REHAB | Facility: CLINIC | Age: 73
End: 2018-04-24
Attending: INTERNAL MEDICINE
Payer: MEDICARE

## 2018-04-24 LAB
DLCOUNC-%PRED-PRE: 37 %
DLCOUNC-PRE: 9.3 ML/MIN/MMHG
DLCOUNC-PRED: 24.53 ML/MIN/MMHG
ERV-%PRED-PRE: 111 %
ERV-PRE: 1.15 L
ERV-PRED: 1.03 L
EXPTIME-PRE: 13.27 SEC
FEF2575-%PRED-PRE: 15 %
FEF2575-PRE: 0.33 L/SEC
FEF2575-PRED: 2.18 L/SEC
FEFMAX-%PRED-PRE: 35 %
FEFMAX-PRE: 2.7 L/SEC
FEFMAX-PRED: 7.58 L/SEC
FEV1-%PRED-PRE: 34 %
FEV1-PRE: 1 L
FEV1FEV6-PRE: 44 %
FEV1FEV6-PRED: 77 %
FEV1FVC-PRE: 37 %
FEV1FVC-PRED: 73 %
FEV1SVC-PRE: 39 %
FEV1SVC-PRED: 66 %
FIFMAX-PRE: 4.03 L/SEC
FVC-%PRED-PRE: 68 %
FVC-PRE: 2.67 L
FVC-PRED: 3.88 L
IC-%PRED-PRE: 41 %
IC-PRE: 1.38 L
IC-PRED: 3.37 L
VA-%PRED-PRE: 63 %
VA-PRE: 4.12 L
VC-%PRED-PRE: 57 %
VC-PRE: 2.54 L
VC-PRED: 4.4 L

## 2018-04-24 PROCEDURE — G0238 OTH RESP PROC, INDIV: HCPCS | Performed by: CLINICAL EXERCISE PHYSIOLOGIST

## 2018-04-24 PROCEDURE — 40000244 ZZH STATISTIC VISIT PULM REHAB: Performed by: CLINICAL EXERCISE PHYSIOLOGIST

## 2018-05-01 ENCOUNTER — HOSPITAL ENCOUNTER (OUTPATIENT)
Dept: CARDIAC REHAB | Facility: CLINIC | Age: 73
End: 2018-05-01
Attending: INTERNAL MEDICINE
Payer: MEDICARE

## 2018-05-01 PROCEDURE — 40000244 ZZH STATISTIC VISIT PULM REHAB

## 2018-05-01 PROCEDURE — G0238 OTH RESP PROC, INDIV: HCPCS

## 2018-05-02 DIAGNOSIS — D86.9 SARCOIDOSIS: ICD-10-CM

## 2018-05-02 DIAGNOSIS — J44.9 CHRONIC OBSTRUCTIVE PULMONARY DISEASE, UNSPECIFIED COPD TYPE (H): ICD-10-CM

## 2018-05-02 RX ORDER — ALBUTEROL SULFATE 90 UG/1
2 AEROSOL, METERED RESPIRATORY (INHALATION) EVERY 6 HOURS PRN
Qty: 3 INHALER | Refills: 6 | Status: SHIPPED | OUTPATIENT
Start: 2018-05-02

## 2018-05-02 RX ORDER — TIOTROPIUM BROMIDE 18 UG/1
CAPSULE ORAL; RESPIRATORY (INHALATION)
Qty: 90 CAPSULE | Refills: 3 | Status: SHIPPED | OUTPATIENT
Start: 2018-05-02 | End: 2018-12-05

## 2018-05-04 ENCOUNTER — CARE COORDINATION (OUTPATIENT)
Dept: CARDIOLOGY | Facility: CLINIC | Age: 73
End: 2018-05-04

## 2018-05-04 DIAGNOSIS — I25.10 CORONARY ARTERY DISEASE INVOLVING NATIVE CORONARY ARTERY OF NATIVE HEART WITHOUT ANGINA PECTORIS: ICD-10-CM

## 2018-05-04 DIAGNOSIS — Z98.61 CAD S/P PERCUTANEOUS CORONARY ANGIOPLASTY: ICD-10-CM

## 2018-05-04 DIAGNOSIS — I25.10 CAD S/P PERCUTANEOUS CORONARY ANGIOPLASTY: ICD-10-CM

## 2018-05-04 RX ORDER — CLOPIDOGREL BISULFATE 75 MG/1
TABLET ORAL
Qty: 12 TABLET | Refills: 0 | Status: SHIPPED | OUTPATIENT
Start: 2018-05-04 | End: 2018-09-13

## 2018-05-04 NOTE — PROGRESS NOTES
Sent in refill for 14 days of Plavix to patients pharmacy. Then he will be able to stop Plavix and restart aspirin 81 mg daily by mouth as directed by Dr. Paige at office visit on 3/8/2018. I spoke with the patient about these instructions he stated understanding.

## 2018-05-08 ENCOUNTER — HOSPITAL ENCOUNTER (OUTPATIENT)
Dept: CARDIAC REHAB | Facility: CLINIC | Age: 73
End: 2018-05-08
Attending: INTERNAL MEDICINE
Payer: MEDICARE

## 2018-05-08 PROCEDURE — 40000244 ZZH STATISTIC VISIT PULM REHAB: Performed by: CLINICAL EXERCISE PHYSIOLOGIST

## 2018-05-08 PROCEDURE — G0239 OTH RESP PROC, GROUP: HCPCS | Performed by: CLINICAL EXERCISE PHYSIOLOGIST

## 2018-05-10 ENCOUNTER — HOSPITAL ENCOUNTER (OUTPATIENT)
Dept: CARDIAC REHAB | Facility: CLINIC | Age: 73
End: 2018-05-10
Attending: INTERNAL MEDICINE
Payer: MEDICARE

## 2018-05-10 PROCEDURE — 40000244 ZZH STATISTIC VISIT PULM REHAB: Performed by: CLINICAL EXERCISE PHYSIOLOGIST

## 2018-05-10 PROCEDURE — G0239 OTH RESP PROC, GROUP: HCPCS | Performed by: CLINICAL EXERCISE PHYSIOLOGIST

## 2018-05-15 ENCOUNTER — HOSPITAL ENCOUNTER (OUTPATIENT)
Dept: CARDIAC REHAB | Facility: CLINIC | Age: 73
End: 2018-05-15
Attending: INTERNAL MEDICINE
Payer: MEDICARE

## 2018-05-15 PROCEDURE — G0239 OTH RESP PROC, GROUP: HCPCS | Performed by: CLINICAL EXERCISE PHYSIOLOGIST

## 2018-05-15 PROCEDURE — 40000244 ZZH STATISTIC VISIT PULM REHAB: Performed by: CLINICAL EXERCISE PHYSIOLOGIST

## 2018-05-16 ENCOUNTER — INFUSION THERAPY VISIT (OUTPATIENT)
Dept: INFUSION THERAPY | Facility: CLINIC | Age: 73
End: 2018-05-16
Attending: INTERNAL MEDICINE
Payer: MEDICARE

## 2018-05-16 VITALS
SYSTOLIC BLOOD PRESSURE: 129 MMHG | OXYGEN SATURATION: 96 % | WEIGHT: 167.3 LBS | DIASTOLIC BLOOD PRESSURE: 66 MMHG | RESPIRATION RATE: 20 BRPM | BODY MASS INDEX: 25.44 KG/M2 | TEMPERATURE: 97.7 F

## 2018-05-16 DIAGNOSIS — D86.0 SARCOIDOSIS OF LUNG (H): Primary | ICD-10-CM

## 2018-05-16 DIAGNOSIS — D86.9 SARCOIDOSIS: ICD-10-CM

## 2018-05-16 PROCEDURE — 96413 CHEMO IV INFUSION 1 HR: CPT

## 2018-05-16 PROCEDURE — 96415 CHEMO IV INFUSION ADDL HR: CPT

## 2018-05-16 PROCEDURE — 25000128 H RX IP 250 OP 636: Mod: ZF | Performed by: INTERNAL MEDICINE

## 2018-05-16 RX ORDER — ACETAMINOPHEN 325 MG/1
650 TABLET ORAL ONCE
Status: CANCELLED
Start: 2018-05-16 | End: 2018-05-16

## 2018-05-16 RX ADMIN — INFLIXIMAB 400 MG: 100 INJECTION, POWDER, LYOPHILIZED, FOR SOLUTION INTRAVENOUS at 12:12

## 2018-05-16 NOTE — PATIENT INSTRUCTIONS
Dear Rohan Monroe    Thank you for choosing [facility] Specialty Infusion and Procedure Center ([facility]) for your infusion.  The following information is a summary of our appointment as well as important reminders.      EDUCATION POST BIOLOGICAL/CHEMOTHERAPY INFUSION  Call the triage nurse at your clinic or seek medical attention if you have chills and/or temperature greater than or equal to 100.5, uncontrolled nausea/vomiting, diarrhea, constipation, dizziness, shortness of breath, chest pain, heart palpitations, weakness or any other new or concerning symptoms, questions or concerns.  You can not have any live virus vaccines prior to or during treatment or up to 6 months post infusion.  If you have an upcoming surgery, medical procedure or dental procedure during treatment, this should be discussed with your ordering physician and your surgeon/dentist.  If you are having any concerning symptom, if you are unsure if you should get your next infusion or wish to speak to a provider before your next infusion, please call your care coordinator or triage nurse at your clinic to notify them so we can adequately serve you.      Infliximab Solution for injection  What is this medicine?  INFLIXIMAB (in FLIX i mab) is used to treat Crohn's disease and ulcerative colitis. It is also used to treat ankylosing spondylitis, psoriasis, and some forms of arthritis.  This medicine may be used for other purposes; ask your health care provider or pharmacist if you have questions.  What should I tell my health care provider before I take this medicine?  They need to know if you have any of these conditions:    diabetes    exposure to tuberculosis    heart failure    hepatitis or liver disease    immune system problems    infection    lung or breathing disease, like COPD    multiple sclerosis    current or past resident of [place]    seizure disorder    an unusual or allergic  reaction to infliximab, mouse proteins, other medicines, foods, dyes, or preservatives    pregnant or trying to get pregnant    breast-feeding  How should I use this medicine?  This medicine is for injection into a vein. It is usually given by a health care professional in a hospital or clinic setting.  A special MedGuide will be given to you by the pharmacist with each prescription and refill. Be sure to read this information carefully each time.  Talk to your pediatrician regarding the use of this medicine in children. Special care may be needed.  Overdosage: If you think you have taken too much of this medicine contact a poison control center or emergency room at once.  NOTE: This medicine is only for you. Do not share this medicine with others.  What if I miss a dose?  It is important not to miss your dose. Call your doctor or health care professional if you are unable to keep an appointment.  What may interact with this medicine?  Do not take this medicine with any of the following medications:    anakinra    rilonacept  This medicine may also interact with the following medications:    vaccines  This list may not describe all possible interactions. Give your health care provider a list of all the medicines, herbs, non-prescription drugs, or dietary supplements you use. Also tell them if you smoke, drink alcohol, or use illegal drugs. Some items may interact with your medicine.  What should I watch for while using this medicine?  Visit your doctor or health care professional for regular checks on your progress.  If you get a cold or other infection while receiving this medicine, call your doctor or health care professional. Do not treat yourself. This medicine may decrease your body's ability to fight infections. Before beginning therapy, your doctor may do a test to see if you have been exposed to tuberculosis.  This medicine may make the symptoms of heart failure worse in some patients. If you notice symptoms  such as increased shortness of breath or swelling of the ankles or legs, contact your health care provider right away.  If you are going to have surgery or dental work, tell your health care professional or dentist that you have received this medicine.  If you take this medicine for plaque psoriasis, stay out of the sun. If you cannot avoid being in the sun, wear protective clothing and use sunscreen. Do not use sun lamps or tanning beds/booths.  What side effects may I notice from receiving this medicine?  Side effects that you should report to your doctor or health care professional as soon as possible:    allergic reactions like skin rash, itching or hives, swelling of the face, lips, or tongue    chest pain    fever or chills, usually related to the infusion    muscle or joint pain    red, scaly patches or raised bumps on the skin    signs of infection - fever or chills, cough, sore throat, pain or difficulty passing urine    swollen lymph nodes in the neck, underarm, or groin areas    unexplained weight loss    unusual bleeding or bruising    unusually weak or tired    yellowing of the eyes or skin  Side effects that usually do not require medical attention (report to your doctor or health care professional if they continue or are bothersome):    headache    heartburn or stomach pain    nausea, vomiting  This list may not describe all possible side effects. Call your doctor for medical advice about side effects. You may report side effects to FDA at 3-581-FDA-9501.  Where should I keep my medicine?  This drug is given in a hospital or clinic and will not be stored at home.  NOTE:This sheet is a summary. It may not cover all possible information. If you have questions about this medicine, talk to your doctor, pharmacist, or health care provider. Copyright  2016 Gold Standard    We look forward in seeing you on your next appointment here at [facility].  Please don t hesitate to call us at [phone] to reschedule any  of your appointments or to speak with one of the [facility] registered nurses.  It was a pleasure taking care of you today.    Sincerely,    [facility]  Specialty Infusion & Procedure Center  [address] [place] [address]  [address]  Phone:  [phone]

## 2018-05-16 NOTE — PROGRESS NOTES
Nursing Note  Rohan Monroe presents today to [facility] for:   Chief Complaint   Patient presents with     Infusion     Remicade     During today's [facility] appointment, orders from Dr. David Perlman were completed.  Frequency: every 4 weeks    Progress note:  Patient identification verified by name and date of birth.  Assessment completed.  Vitals recorded in Doc Flowsheets.  Patient was provided with education regarding infusion and possible side effects.  Patient verbalized understanding.      needed: No  Premedications: were not ordered.  Infusion Rates: infusion given over approximately 2 hours.  Labs: were not ordered for this appointment.  Vascular access: peripheral IV placed today.  Treatment Conditions: Biologic/Chemo Checklist ~~~ NOTE: If the patient answers yes to any of the questions below, hold the infusion and contact ordering provider or on-call provider.    1. Have you recently had an elevated temperature, fever, chills, productive cough, coughing for 3 weeks or longer or hemoptysis,  abnormal vital signs, night sweats,  chest pain or have you noticed a decrease in your appetite, unexplained weight loss or fatigue? No  2. Do you have any open wounds or new incisions? No  3. Do you have any recent or upcoming hospitalizations, surgeries or dental procedures? No  4. Do you currently have or recently have had any signs of illness or infection or are you on any antibiotics? No  5. Have you had any new, sudden or worsening abdominal pain? No  6. Have you or anyone in your household received a live vaccination in the past 4 weeks? Please note:  No live vaccines while on biologic/chemotherapy until 6 months after the last treatment.  Patient can receive the flu vaccine (shot only) and the pneumovax.  It is optimal for the patient to get these vaccines mid cycle, but they can be given at any time as long as it is not on the  day of the infusion. No  7. Have you recently been diagnosed with any new nervous system diseases (ie. Multiple sclerosis, Guillain Boyd, seizures, neurological changes) or cancer diagnosis? Are you on any form of radiation or chemotherapy? No  8. Are you pregnant or breast feeding or do you have plans of pregnancy in the future? No  9. Have you been having any signs of worsening depression or suicidal ideations?  (benlysta only) No  10. Have there been any other new onset medical symptoms? No    Patient tolerated infusion: well.    Discharge Plan:   Follow up plan of care with: ongoing infusions at [facility].  Discharge instructions were reviewed with patient.  Patient/representative verbalized understanding of discharge instructions and all questions answered.  Patient discharged from [facility] in stable condition.    Erlin Webster RN    Administrations This Visit     inFLIXimab (REMICADE) 400 mg in sodium chloride 0.9 % 315 mL infusion     Admin Date Action Dose Rate Route Administered By          05/16/2018 New Bag 400 mg 157.5 mL/hr Intravenous Erlin Webster RN                        /69  Temp 97.7  F (36.5  C) (Oral)  Resp 20  Wt 75.9 kg (167 lb 4.8 oz)  SpO2 96%  BMI 25.44 kg/m2

## 2018-05-16 NOTE — MR AVS SNAPSHOT
After Visit Summary   5/16/2018    Rohan Monroe    MRN: [id number]           Patient Information     Date Of Birth          1945        Visit Information        Provider Department      5/16/2018 12:00 PM UC 47 ATC; UC SPEC INFUSION [facility] Special[facility]        Today's Diagnoses     Sarcoidosis of lung (HCC)    -  1    SARCOIDOSIS-systemic          Care Instructions    Dear Rohan Monroe    Thank you for choosing [facility] ([facility]) for your infusion.  The following information is a summary of our appointment as well as important reminders.      EDUCATION POST BIOLOGICAL/CHEMOTHERAPY INFUSION  Call the triage nurse at your clinic or seek medical attention if you have chills and/or temperature greater than or equal to 100.5, uncontrolled nausea/vomiting, diarrhea, constipation, dizziness, shortness of breath, chest pain, heart palpitations, weakness or any other new or concerning symptoms, questions or concerns.  You can not have any live virus vaccines prior to or during treatment or up to 6 months post infusion.  If you have an upcoming surgery, medical procedure or dental procedure during treatment, this should be discussed with your ordering physician and your surgeon/dentist.  If you are having any concerning symptom, if you are unsure if you should get your next infusion or wish to speak to a provider before your next infusion, please call your care coordinator or triage nurse at your clinic to notify them so we can adequately serve you.      Infliximab Solution for injection  What is this medicine?  INFLIXIMAB (in FLIX i mab) is used to treat Crohn's disease and ulcerative colitis. It is also used to treat ankylosing spondylitis, psoriasis, and some forms of arthritis.  This medicine may be used for other purposes; ask your health care provider or pharmacist if you have  questions.  What should I tell my health care provider before I take this medicine?  They need to know if you have any of these conditions:    diabetes    exposure to tuberculosis    heart failure    hepatitis or liver disease    immune system problems    infection    lung or breathing disease, like COPD    multiple sclerosis    current or past resident of [place]    seizure disorder    an unusual or allergic reaction to infliximab, mouse proteins, other medicines, foods, dyes, or preservatives    pregnant or trying to get pregnant    breast-feeding  How should I use this medicine?  This medicine is for injection into a vein. It is usually given by a health care professional in a hospital or clinic setting.  A special MedGuide will be given to you by the pharmacist with each prescription and refill. Be sure to read this information carefully each time.  Talk to your pediatrician regarding the use of this medicine in children. Special care may be needed.  Overdosage: If you think you have taken too much of this medicine contact a poison control center or emergency room at once.  NOTE: This medicine is only for you. Do not share this medicine with others.  What if I miss a dose?  It is important not to miss your dose. Call your doctor or health care professional if you are unable to keep an appointment.  What may interact with this medicine?  Do not take this medicine with any of the following medications:    anakinra    rilonacept  This medicine may also interact with the following medications:    vaccines  This list may not describe all possible interactions. Give your health care provider a list of all the medicines, herbs, non-prescription drugs, or dietary supplements you use. Also tell them if you smoke, drink alcohol, or use illegal drugs. Some items may interact with your medicine.  What should I watch for while using this medicine?  Visit your doctor or health care professional for  regular checks on your progress.  If you get a cold or other infection while receiving this medicine, call your doctor or health care professional. Do not treat yourself. This medicine may decrease your body's ability to fight infections. Before beginning therapy, your doctor may do a test to see if you have been exposed to tuberculosis.  This medicine may make the symptoms of heart failure worse in some patients. If you notice symptoms such as increased shortness of breath or swelling of the ankles or legs, contact your health care provider right away.  If you are going to have surgery or dental work, tell your health care professional or dentist that you have received this medicine.  If you take this medicine for plaque psoriasis, stay out of the sun. If you cannot avoid being in the sun, wear protective clothing and use sunscreen. Do not use sun lamps or tanning beds/booths.  What side effects may I notice from receiving this medicine?  Side effects that you should report to your doctor or health care professional as soon as possible:    allergic reactions like skin rash, itching or hives, swelling of the face, lips, or tongue    chest pain    fever or chills, usually related to the infusion    muscle or joint pain    red, scaly patches or raised bumps on the skin    signs of infection - fever or chills, cough, sore throat, pain or difficulty passing urine    swollen lymph nodes in the neck, underarm, or groin areas    unexplained weight loss    unusual bleeding or bruising    unusually weak or tired    yellowing of the eyes or skin  Side effects that usually do not require medical attention (report to your doctor or health care professional if they continue or are bothersome):    headache    heartburn or stomach pain    nausea, vomiting  This list may not describe all possible side effects. Call your doctor for medical advice about side effects. You may report side effects to FDA at 4-675-FDA-6604.  Where should  I keep my medicine?  This drug is given in a hospital or clinic and will not be stored at home.  NOTE:This sheet is a summary. It may not cover all possible information. If you have questions about this medicine, talk to your doctor, pharmacist, or health care provider. Copyright  2016 Gold Standard    We look forward in seeing you on your next appointment here at [facility].  Please don t hesitate to call us at [phone] to reschedule any of your appointments or to speak with one of the [facility] registered nurses.  It was a pleasure taking care of you today.    Sincerely,    [facility]  Specialty Infusion & Procedure Center  [address] [place] [address]  Phone:  [phone]            Follow-ups after your visit        Your next 10 appointments already scheduled     May 17, 2018 12:00 PM CDT   Pulmonary Treatment with  Pulmonary Rehab 2   [facility] ([facility])    [address]  [facility] [address]   [phone]            May 22, 2018  2:00 PM CDT   Pulmonary Treatment with  Pulmonary Rehab 2   [facility] ([facility])    [address]  [facility] [address]   [phone]            May 24, 2018  2:00 PM CDT   Pulmonary Treatment with  Pulmonary Rehab 2   [facility] ([facility])    [address]  [facility] [address]   [phone]            May 29, 2018 11:35 AM CDT   (Arrive by 11:20 AM)   Return Visit with Jamie Foster MD   [facility] ([facility])    [address]  [address] [facility] [address] [phone]            May 29, 2018  2:00 PM CDT   Pulmonary Treatment with  Pulmonary Rehab 2   [facility] ([facility])    [address]  [facility] [address]   [phone]            May  31, 2018 12:00 PM CDT   Pulmonary Treatment with Sh Pulmonary Rehab 2   [facility] ([facility])    [address]  [place] [address]   [phone]            Jun 05, 2018 12:00 PM CDT   Pulmonary Treatment with Sh Pulmonary Rehab 2   [facility] ([facility])    [address]  [place] [address]   [phone]            Jun 07, 2018 12:00 PM CDT   Pulmonary Treatment with Sh Pulmonary Rehab 2   [facility] ([facility])    [address]  [place] [address]   [phone]            Jun 12, 2018 12:00 PM CDT   Pulmonary Treatment with Sh Pulmonary Rehab 2   [facility] ([facility])    [address]  [place] [address]   [phone]            Jun 13, 2018  1:00 PM CDT   Infusion 180 with UC SPEC INFUSION   [facility] Specialty and Procedure ([facility])    [address]  [address]  [facility] [id number] [phone]              Who to contact     If you have questions or need follow up information about today's clinic visit or your schedule please contact [facility] SPECIALTY AND PROCEDURE directly at [phone].  Normal or non-critical lab and imaging results will be communicated to you by [employer], letter or phone within 4 business days after the clinic has received the results. If you do not hear from us within 7 days, please contact the clinic through [employer] or phone. If you have a critical or abnormal lab result, we will notify you by phone as soon as possible.  Submit refill requests through [employer] or call your pharmacy and they will forward the refill request to us. Please allow 3 business days for your refill to be completed.          Additional Information About Your Visit        [facility]  Information     [employer] gives you secure access to your electronic health record. If you see a primary care provider, you can also send messages to your care team and make appointments. If you have questions, please call your primary care clinic.  If you do not have a primary care provider, please call [phone] and they will assist you.        Care EveryWhere ID     This is your Care EveryWhere ID. This could be used by other organizations to access your [facility] medical records  [id number]        Your Vitals Were     Temperature Respirations Pulse Oximetry BMI (Body Mass Index)          97.7  F (36.5  C) (Oral) 20 96% 25.44 kg/m2         Blood Pressure from Last 3 Encounters:   05/16/18 129/66   04/18/18 144/75   04/12/18 150/84    Weight from Last 3 Encounters:   05/16/18 75.9 kg (167 lb 4.8 oz)   04/18/18 75.7 kg (166 lb 14.4 oz)   04/12/18 76.2 kg (167 lb 14.4 oz)              Today, you had the following     No orders found for display       Primary Care Provider Office Phone # Fax #    Jamie Foster [phone] [address] [address]        Equal Access to Services     ISIAH MONTOYA : Hadii aad ku hadasho Soomaali, waaxda luqadaha, qaybta kaalmada adeegyada, waxay janiein hayjosen peggy rand laadilson jacinto. So [facility] [phone].    ATENCIÓN: Si habla español, tiene a mcfadden disposición servicios gratuitos de asistencia lingüística. Llame al [phone].    We comply with applicable federal civil rights laws and [place] laws. We do not discriminate on the basis of race, color, national origin, age, disability, sex, sexual orientation, or gender identity.            Thank you!     Thank you for choosing [facility] SPECIALTY AND PROCEDURE  for your care. Our goal is always to provide you with excellent care. Hearing back from our patients is one way we can continue to improve our services. Please take a few minutes to complete the written  survey that you may receive in the mail after your visit with us. Thank you!             Your Updated Medication List - Protect others around you: Learn how to safely use, store and throw away your medicines at www.disposemymeds.org.          This list is accurate as of 5/16/18  2:26 PM.  Always use your most recent med list.                   Brand Name Dispense Instructions for use Diagnosis    albuterol 108 (90 Base) MCG/ACT Inhaler    PROAIR HFA/PROVENTIL HFA/VENTOLIN HFA    3 Inhaler    Inhale 2 puffs into the lungs every 6 hours as needed for shortness of breath / dyspnea    Sarcoidosis       atorvastatin 40 MG tablet    LIPITOR    30 tablet    TAKE ONE TABLET BY MOUTH ONE TIME DAILY    Coronary artery disease involving native coronary artery of native heart without angina pectoris, CAD S/P percutaneous coronary angioplasty       blood glucose monitoring lancets     2 Box    Use to test blood sugar 2 times daily or as directed.  102 lancets per box.  3 month supply.    Type 2 diabetes, HbA1C goal < 8% (H)       blood glucose monitoring meter device kit    no brand specified    1 kit    Use to test blood sugar 2 times daily.    Type 2 diabetes mellitus with diabetic nephropathy (H)       blood glucose monitoring test strip    ACCU-CHEK RONNIE PLUS    200 each    Use to test blood sugar 2 times daily or as directed.  3 month supply.    Type 2 diabetes, HbA1C goal < 8% (H)       clopidogrel 75 MG tablet    PLAVIX    12 tablet    TAKE ONE TABLET BY MOUTH ONE TIME DAILY    CAD S/P percutaneous coronary angioplasty, Coronary artery disease involving native coronary artery of native heart without angina pectoris       colchicine 0.6 MG tablet    COLCRYS    30 tablet    2 tabs at the onset of flare, then 1 tab every 2 hrs until pain is better or diarrhea occurs, up to 10 doses. Wait 3 days until taking again        fluticasone-vilanterol 100-25 MCG/INH oral inhaler    BREO ELLIPTA    3 Inhaler    Inhale 1 puff into the  lungs daily    Chronic obstructive pulmonary disease, unspecified COPD type (H)       furosemide 20 MG tablet    LASIX    240 tablet    Take 2 tablets (40 mg) by mouth 2 times daily May take extra 20 mg as needed for wt .gain >3#    Pulmonary hypertension       inFLIXimab 100 MG injection    REMICADE     Inject 100 mg into the vein every 28 days        levothyroxine 125 MCG tablet    SYNTHROID/LEVOTHROID    90 tablet    Take 1 tablet (125 mcg) by mouth daily    Hypothyroidism due to Hashimoto's thyroiditis       losartan 50 MG tablet    COZAAR    90 tablet    Take 1 tablet (50 mg) by mouth daily    (HFpEF) heart failure with preserved ejection fraction (H)       methotrexate 2.5 MG tablet CHEMO     48 tablet    Take 3 tablets (7.5 mg) by mouth once a week On Mondays    Sarcoidosis       metoprolol tartrate 100 MG tablet    LOPRESSOR    60 tablet    Take 1 tablet (100 mg) by mouth 2 times daily    Hypertension secondary to other renal disorders       mirtazapine 15 MG tablet    REMERON     Take 15 mg by mouth At Bedtime.        MULTIVITAMIN & MINERAL PO      Take 1 tablet by mouth daily.        * order for DME     1 Device    She requested for a 4 wheel walker, would like to change it to 4 wheel walker with a seat and oxygen tank durant.   Need this faxed over to her  [phone]    Sarcoidosis, lung (H), COPD (chronic obstructive pulmonary disease) (H), Abnormal gait, Congestive heart failure (H)       * order for DME     1 Device    Updated Oxygen: Patient requires supplemental Oxygen 3 LPM via nasal canula with activity and 2 LPM nocturnally. Please provide patient with a portable oxygen concentrator for improved mobility.  Okay to spot check or titrated for conserving to keep stats above 90%. Oxygen will be for a lifetime.    ILD (interstitial lung disease) (H), Hypoxia       polyethylene glycol powder    MIRALAX    510 g    Take 17 g (1 capful) by mouth daily    Constipation, unspecified constipation type        sildenafil 20 MG tablet    REVATIO    270 tablet    Take 1 tablet (20 mg) by mouth 3 times daily    Pulmonary hypertension       tiotropium 18 MCG capsule    SPIRIVA HANDIHALER    90 capsule    Inhale contents of one capsule daily.    Chronic obstructive pulmonary disease, unspecified COPD type (H)       Urea 40 % Crea    CARMOL 40    199 g    To feet daily    Dermatophytosis of nail, Corns and callosities       * Notice:  This list has 2 medication(s) that are the same as other medications prescribed for you. Read the directions carefully, and ask your doctor or other care provider to review them with you.

## 2018-05-17 ENCOUNTER — MYC MEDICAL ADVICE (OUTPATIENT)
Dept: INTERNAL MEDICINE | Facility: CLINIC | Age: 73
End: 2018-05-17

## 2018-05-17 DIAGNOSIS — J44.1 COPD EXACERBATION (H): ICD-10-CM

## 2018-05-17 DIAGNOSIS — I50.9 CONGESTIVE HEART FAILURE (H): Primary | ICD-10-CM

## 2018-05-22 ENCOUNTER — HOSPITAL ENCOUNTER (OUTPATIENT)
Dept: CARDIAC REHAB | Facility: CLINIC | Age: 73
End: 2018-05-22
Attending: INTERNAL MEDICINE
Payer: MEDICARE

## 2018-05-22 PROCEDURE — G0239 OTH RESP PROC, GROUP: HCPCS

## 2018-05-22 PROCEDURE — 40000244 ZZH STATISTIC VISIT PULM REHAB

## 2018-05-24 ENCOUNTER — HOSPITAL ENCOUNTER (OUTPATIENT)
Dept: CARDIAC REHAB | Facility: CLINIC | Age: 73
End: 2018-05-24
Attending: INTERNAL MEDICINE
Payer: MEDICARE

## 2018-05-24 DIAGNOSIS — I27.20 PULMONARY HYPERTENSION (H): ICD-10-CM

## 2018-05-24 PROCEDURE — 40000244 ZZH STATISTIC VISIT PULM REHAB

## 2018-05-24 PROCEDURE — G0239 OTH RESP PROC, GROUP: HCPCS

## 2018-05-30 RX ORDER — SILDENAFIL CITRATE 20 MG/1
TABLET ORAL
Qty: 270 TABLET | Refills: 3 | Status: SHIPPED | OUTPATIENT
Start: 2018-05-30

## 2018-06-05 ENCOUNTER — HOSPITAL ENCOUNTER (OUTPATIENT)
Dept: CARDIAC REHAB | Facility: CLINIC | Age: 73
End: 2018-06-05
Attending: INTERNAL MEDICINE
Payer: MEDICARE

## 2018-06-05 PROCEDURE — 40000244 ZZH STATISTIC VISIT PULM REHAB: Performed by: CLINICAL EXERCISE PHYSIOLOGIST

## 2018-06-05 PROCEDURE — G0239 OTH RESP PROC, GROUP: HCPCS | Performed by: CLINICAL EXERCISE PHYSIOLOGIST

## 2018-06-07 ENCOUNTER — HOSPITAL ENCOUNTER (OUTPATIENT)
Dept: CARDIAC REHAB | Facility: CLINIC | Age: 73
End: 2018-06-07
Attending: INTERNAL MEDICINE
Payer: MEDICARE

## 2018-06-07 PROCEDURE — G0239 OTH RESP PROC, GROUP: HCPCS | Performed by: CLINICAL EXERCISE PHYSIOLOGIST

## 2018-06-07 PROCEDURE — 40000244 ZZH STATISTIC VISIT PULM REHAB: Performed by: CLINICAL EXERCISE PHYSIOLOGIST

## 2018-06-12 ENCOUNTER — HOSPITAL ENCOUNTER (OUTPATIENT)
Dept: CARDIAC REHAB | Facility: CLINIC | Age: 73
End: 2018-06-12
Attending: INTERNAL MEDICINE
Payer: MEDICARE

## 2018-06-12 PROCEDURE — G0239 OTH RESP PROC, GROUP: HCPCS | Performed by: CLINICAL EXERCISE PHYSIOLOGIST

## 2018-06-12 PROCEDURE — 40000244 ZZH STATISTIC VISIT PULM REHAB: Performed by: CLINICAL EXERCISE PHYSIOLOGIST

## 2018-06-13 ENCOUNTER — INFUSION THERAPY VISIT (OUTPATIENT)
Dept: INFUSION THERAPY | Facility: CLINIC | Age: 73
End: 2018-06-13
Attending: INTERNAL MEDICINE
Payer: MEDICARE

## 2018-06-13 VITALS
RESPIRATION RATE: 18 BRPM | TEMPERATURE: 97.5 F | SYSTOLIC BLOOD PRESSURE: 147 MMHG | BODY MASS INDEX: 25.77 KG/M2 | DIASTOLIC BLOOD PRESSURE: 62 MMHG | OXYGEN SATURATION: 100 % | WEIGHT: 169.5 LBS

## 2018-06-13 DIAGNOSIS — D86.9 SARCOIDOSIS: ICD-10-CM

## 2018-06-13 DIAGNOSIS — D86.0 SARCOIDOSIS OF LUNG (H): Primary | ICD-10-CM

## 2018-06-13 PROCEDURE — 96415 CHEMO IV INFUSION ADDL HR: CPT

## 2018-06-13 PROCEDURE — 96413 CHEMO IV INFUSION 1 HR: CPT

## 2018-06-13 PROCEDURE — 25000128 H RX IP 250 OP 636: Mod: ZF | Performed by: INTERNAL MEDICINE

## 2018-06-13 RX ORDER — ACETAMINOPHEN 325 MG/1
650 TABLET ORAL ONCE
Status: CANCELLED
Start: 2018-06-13 | End: 2018-06-13

## 2018-06-13 RX ADMIN — INFLIXIMAB 400 MG: 100 INJECTION, POWDER, LYOPHILIZED, FOR SOLUTION INTRAVENOUS at 13:50

## 2018-06-13 NOTE — PATIENT INSTRUCTIONS
Dear Rohan Monroe    Thank you for choosing [facility] Specialty Infusion [facility] Procedure [facility] ([facility]) for your infusion.  The following information is a summary of our appointment as well as important reminders.      We look forward in seeing you on your next appointment here at [facility].  Please don t hesitate to call us at [phone] to reschedule any of your appointments or to speak with one of the [facility] registered nurses.  It was a pleasure taking care of you today.    Sincerely,    [facility]  Specialty Infusion & Procedure Center  [address] [place] [address]  [address]  Phone:  [phone]

## 2018-06-13 NOTE — MR AVS SNAPSHOT
After Visit Summary   6/13/2018    Rohan Monroe    MRN: [id number]           Patient Information     Date Of Birth          1945        Visit Information        Provider Department      6/13/2018 1:00 PM UC 42 ATC; UC SPEC [facility]        Today's Diagnoses     Sarcoidosis of lung (HCC)    -  1    SARCOIDOSIS-systemic          Care Instructions    Dear Rohan Monroe    Thank you for choosing [facility] ([facility]) for your infusion.  The following information is a summary of our appointment as well as important reminders.      We look forward in seeing you on your next appointment here at [facility].  Please don t hesitate to call us at [phone] to reschedule any of your appointments or to speak with one of the [facility] registered nurses.  It was a pleasure taking care of you today.    Sincerely,    [facility]  Special[facility] Infusion [facility] Center  [address] [place] [address]  [id number]  Phone:  [phone]            Follow-ups after your visit        Your next 10 appointments already scheduled     Jun 14, 2018 12:00 PM CDT   Pulmonary Treatment with Sh Pulmonary Rehab 2   [facility] ([facility])    [address]  [facility] [address]   [phone]            Jun 19, 2018  2:00 PM CDT   Pulmonary Treatment with Sh Pulmonary Rehab 2   [facility] ([facility])    [address]  [facility] [address]   [phone]            Jun 21, 2018 12:00 PM CDT   Pulmonary Treatment with Sh Pulmonary Rehab 2   [facility] ([facility])    [address]  [facility] [address]   [phone]            Jun 26, 2018 12:00 PM CDT   Pulmonary Treatment with Sh Pulmonary Rehab 2   [facility] ([facility])    [address]  [place] [address]   [phone]            Jun 28, 2018 12:00 PM CDT   Pulmonary Treatment with Sh Pulmonary Rehab 2   [facility] ([facility])    [address]  [place] [address]   [phone]            Jun 29, 2018  9:15 AM CDT   RETURN GLAUCOMA with Kina Greco MD   [facility] ([facility])    [facility]  [address]  [facility] [address]   [phone]            Jul 02, 2018 10:15 AM CDT   RETURN RETINA with Sandee Middleton MD   [facility] Clinic ([facility])    [facility]  [address]  [facility] [address]   [phone]            Jul 03, 2018  2:00 PM CDT   Pulmonary Treatment with Sh Pulmonary Rehab 2   [facility] ([facility])    [address]  [place] [address]   [phone]            Jul 11, 2018 12:00 PM CDT   Infusion 180 with UC SPEC INFUSION, UC 50 ATC   [facility] Specialty and Procedure ([facility])    [address]  [address]  [facility] [id number] [phone]              Who to contact     If you have questions or need follow up information about today's clinic visit or your schedule please contact [facility] SPECIALTY AND PROCEDURE directly at [phone].  Normal or non-critical lab and imaging results will be communicated to you by MyChart, letter or phone within 4 business days after the clinic has received the results. If you do not hear from us within 7 days, please contact the clinic through MyChart or phone. If you have a critical or abnormal lab result, we will notify you by phone as soon as possible.  Submit refill requests through [employer] or call your pharmacy and they will forward the  refill request to us. Please allow 3 business days for your refill to be completed.          Additional Information About Your Visit        MyChart Information     [employer] gives you secure access to your electronic health record. If you see a primary care provider, you can also send messages to your care team and make appointments. If you have questions, please call your primary care clinic.  If you do not have a primary care provider, please call [phone] and they will assist you.        Care EveryWhere ID     This is your Care EveryWhere ID. This could be used by other organizations to access your [facility] medical records  [id number]        Your Vitals Were     Temperature Respirations Pulse Oximetry BMI (Body Mass Index)          97.5  F (36.4  C) (Oral) 18 100% 25.77 kg/m2         Blood Pressure from Last 3 Encounters:   06/13/18 147/62   05/16/18 129/66   04/18/18 144/75    Weight from Last 3 Encounters:   06/13/18 76.9 kg (169 lb 8 oz)   05/16/18 75.9 kg (167 lb 4.8 oz)   04/18/18 75.7 kg (166 lb 14.4 oz)              Today, you had the following     No orders found for display       Primary Care Provider Office Phone # Fax #    Jamie Foster [phone]       [address] [address]        Equal Access to Services     ISIAH MONTOYA : Hadii aad ku hadasho Soleslie, waaxda luqadaha, qaybta kaalmada vesna, nelly jacinto. So [facility] [phone].    ATENCIÓN: Si habla español, tiene a mcfadden disposición servicios gratuitos de asistencia lingüística. Llame al [phone].    We comply with applicable federal civil rights laws and [place] laws. We do not discriminate on the basis of race, color, national origin, age, disability, sex, sexual orientation, or gender identity.            Thank you!     Thank you for choosing [facility] SPECIALTY AND PROCEDURE  for your care. Our goal is always to provide you with  excellent care. Hearing back from our patients is one way we can continue to improve our services. Please take a few minutes to complete the written survey that you may receive in the mail after your visit with us. Thank you!             Your Updated Medication List - Protect others around you: Learn how to safely use, store and throw away your medicines at www.disposemymeds.org.          This list is accurate as of 6/13/18  5:08 PM.  Always use your most recent med list.                   Brand Name Dispense Instructions for use Diagnosis    albuterol 108 (90 Base) MCG/ACT Inhaler    PROAIR HFA/PROVENTIL HFA/VENTOLIN HFA    3 Inhaler    Inhale 2 puffs into the lungs every 6 hours as needed for shortness of breath / dyspnea    Sarcoidosis       atorvastatin 40 MG tablet    LIPITOR    30 tablet    TAKE ONE TABLET BY MOUTH ONE TIME DAILY    Coronary artery disease involving native coronary artery of native heart without angina pectoris, CAD S/P percutaneous coronary angioplasty       blood glucose monitoring lancets     2 Box    Use to test blood sugar 2 times daily or as directed.  102 lancets per box.  3 month supply.    Type 2 diabetes, HbA1C goal < 8% (H)       blood glucose monitoring meter device kit    no brand specified    1 kit    Use to test blood sugar 2 times daily.    Type 2 diabetes mellitus with diabetic nephropathy (H)       blood glucose monitoring test strip    ACCU-CHEK RONNIE PLUS    200 each    Use to test blood sugar 2 times daily or as directed.  3 month supply.    Type 2 diabetes, HbA1C goal < 8% (H)       clopidogrel 75 MG tablet    PLAVIX    12 tablet    TAKE ONE TABLET BY MOUTH ONE TIME DAILY    CAD S/P percutaneous coronary angioplasty, Coronary artery disease involving native coronary artery of native heart without angina pectoris       colchicine 0.6 MG tablet    COLCRYS    30 tablet    2 tabs at the onset of flare, then 1 tab every 2 hrs until pain is better or diarrhea occurs, up to 10  doses. Wait 3 days until taking again        fluticasone-vilanterol 100-25 MCG/INH oral inhaler    BREO ELLIPTA    3 Inhaler    Inhale 1 puff into the lungs daily    Chronic obstructive pulmonary disease, unspecified COPD type (H)       furosemide 20 MG tablet    LASIX    240 tablet    Take 2 tablets (40 mg) by mouth 2 times daily May take extra 20 mg as needed for wt .gain >3#    Pulmonary hypertension       inFLIXimab 100 MG injection    REMICADE     Inject 100 mg into the vein every 28 days        levothyroxine 125 MCG tablet    SYNTHROID/LEVOTHROID    90 tablet    Take 1 tablet (125 mcg) by mouth daily    Hypothyroidism due to Hashimoto's thyroiditis       losartan 50 MG tablet    COZAAR    90 tablet    Take 1 tablet (50 mg) by mouth daily    (HFpEF) heart failure with preserved ejection fraction (H)       methotrexate 2.5 MG tablet CHEMO     48 tablet    Take 3 tablets (7.5 mg) by mouth once a week On Mondays    Sarcoidosis       metoprolol tartrate 100 MG tablet    LOPRESSOR    60 tablet    Take 1 tablet (100 mg) by mouth 2 times daily    Hypertension secondary to other renal disorders       mirtazapine 15 MG tablet    REMERON     Take 15 mg by mouth At Bedtime.        MULTIVITAMIN & MINERAL PO      Take 1 tablet by mouth daily.        * order for DME     1 Device    She requested for a 4 wheel walker, would like to change it to 4 wheel walker with a seat and oxygen tank durant.   Need this faxed over to her  [phone]    Sarcoidosis, lung (H), COPD (chronic obstructive pulmonary disease) (H), Abnormal gait, Congestive heart failure (H)       * order for DME     1 Device    Updated Oxygen: Patient requires supplemental Oxygen 3 LPM via nasal canula with activity and 2 LPM nocturnally. Please provide patient with a portable oxygen concentrator for improved mobility.  Okay to spot check or titrated for conserving to keep stats above 90%. Oxygen will be for a lifetime.    ILD (interstitial lung disease) (H),  Hypoxia       polyethylene glycol powder    MIRALAX    510 g    Take 17 g (1 capful) by mouth daily    Constipation, unspecified constipation type       sildenafil 20 MG tablet    REVATIO    270 tablet    TAKE ONE TABLET BY MOUTH THREE TIMES DAILY    Pulmonary hypertension       tiotropium 18 MCG capsule    SPIRIVA HANDIHALER    90 capsule    Inhale contents of one capsule daily.    Chronic obstructive pulmonary disease, unspecified COPD type (H)       Urea 40 % Crea    CARMOL 40    199 g    To feet daily    Dermatophytosis of nail, Corns and callosities       * Notice:  This list has 2 medication(s) that are the same as other medications prescribed for you. Read the directions carefully, and ask your doctor or other care provider to review them with you.

## 2018-06-13 NOTE — PROGRESS NOTES
Nursing Note  Rohan Monroe [facility] for:   Chief Complaint   Patient presents with     Infusion     Remicade     During today's [facility] appointment, orders from Dr. Perlman were completed.  Frequency: every 4 weeks    Progress note:  Patient identification verified by name and date of birth.  Assessment completed.  Vitals recorded in Doc Flowsheets.  Patient was provided with education regarding infusion and possible side effects.  Patient verbalized understanding.      needed: No  Premedications: were not ordered.  Infusion Rates: infusion given over approximately 2 hours.  Approximate Infusion length:2 hours.   Labs: were not ordered for this appointment.  Vascular access: peripheral IV placed today.  Treatment Conditions: Biologic/Chemo Checklist   ~~~ NOTE: If the patient answers yes to any of the questions below, hold the infusion and contact ordering provider or on-call provider.    1. Have you recently had an elevated temperature, fever, chills, productive cough, coughing for 3 weeks or longer or hemoptysis,  abnormal vital signs, night sweats,  chest pain or have you noticed a decrease in your appetite, unexplained weight loss or fatigue? No  2. Do you have any open wounds or new incisions? No  3. Do you have any recent or upcoming hospitalizations, surgeries or dental procedures? No  4. Do you currently have or recently have had any signs of illness or infection or are you on any antibiotics? No  5. Have you had any new, sudden or worsening abdominal pain? No  6. Have you or anyone in your household received a live vaccination in the past 4 weeks? Please note:  No live vaccines while on biologic/chemotherapy until 6 months after the last treatment.  Patient can receive the flu vaccine (shot only) and the pneumovax.  It is optimal for the patient to get these vaccines mid cycle, but they can be given at any  time as long as it is not on the day of the infusion. No  7. Have you recently been diagnosed with any new nervous system diseases (ie. Multiple sclerosis, Guillain [place], seizures, neurological changes) or cancer diagnosis? Are you on any form of radiation or chemotherapy? No  8. Are you pregnant or breast feeding or do you have plans of pregnancy in the future? No  9. Have you been having any signs of worsening depression or suicidal ideations?  (benlysta only) No  10. Have there been any other new onset medical symptoms? No    Patient tolerated infusion: well.    Discharge Plan:   Follow up plan of care with: ongoing infusions at [facility].  Discharge instructions were reviewed with patient. Note: Patient will be moving to [place] in the near future. Will keep upcoming appointments but will have patient cancel if not needed.   Patient/representative verbalized understanding of discharge instructions and all questions answered.  Patient discharged from [facility] in stable condition.    Erlin Webster RN    Administrations This Visit     inFLIXimab (REMICADE) 400 mg in sodium chloride 0.9 % 315 mL infusion     Admin Date Action Dose Rate Route Administered By          06/13/2018 New Bag 400 mg 157.5 mL/hr Intravenous Erlin Webster RN                       /75 (BP Location: Left arm)  Resp 20  Wt 76.9 kg (169 lb 8 oz)  SpO2 100%  BMI 25.77 kg/m2

## 2018-06-14 ENCOUNTER — HOSPITAL ENCOUNTER (OUTPATIENT)
Dept: CARDIAC REHAB | Facility: CLINIC | Age: 73
End: 2018-06-14
Attending: INTERNAL MEDICINE
Payer: MEDICARE

## 2018-06-14 PROCEDURE — G0239 OTH RESP PROC, GROUP: HCPCS | Performed by: CLINICAL EXERCISE PHYSIOLOGIST

## 2018-06-14 PROCEDURE — 40000244 ZZH STATISTIC VISIT PULM REHAB: Performed by: CLINICAL EXERCISE PHYSIOLOGIST

## 2018-06-19 ENCOUNTER — HOSPITAL ENCOUNTER (OUTPATIENT)
Dept: CARDIAC REHAB | Facility: CLINIC | Age: 73
End: 2018-06-19
Attending: INTERNAL MEDICINE
Payer: MEDICARE

## 2018-06-19 PROCEDURE — G0239 OTH RESP PROC, GROUP: HCPCS | Performed by: CLINICAL EXERCISE PHYSIOLOGIST

## 2018-06-19 PROCEDURE — 40000244 ZZH STATISTIC VISIT PULM REHAB: Performed by: CLINICAL EXERCISE PHYSIOLOGIST

## 2018-06-21 ENCOUNTER — HOSPITAL ENCOUNTER (OUTPATIENT)
Dept: CARDIAC REHAB | Facility: CLINIC | Age: 73
End: 2018-06-21
Attending: INTERNAL MEDICINE
Payer: MEDICARE

## 2018-06-21 PROCEDURE — G0239 OTH RESP PROC, GROUP: HCPCS

## 2018-06-21 PROCEDURE — 40000244 ZZH STATISTIC VISIT PULM REHAB

## 2018-06-26 ENCOUNTER — HOSPITAL ENCOUNTER (OUTPATIENT)
Dept: CARDIAC REHAB | Facility: CLINIC | Age: 73
End: 2018-06-26
Attending: INTERNAL MEDICINE
Payer: MEDICARE

## 2018-06-26 PROCEDURE — G0239 OTH RESP PROC, GROUP: HCPCS

## 2018-06-26 PROCEDURE — 40000244 ZZH STATISTIC VISIT PULM REHAB

## 2018-06-30 ENCOUNTER — MEDICAL CORRESPONDENCE (OUTPATIENT)
Dept: HEALTH INFORMATION MANAGEMENT | Facility: CLINIC | Age: 73
End: 2018-06-30

## 2018-07-03 ENCOUNTER — HOSPITAL ENCOUNTER (OUTPATIENT)
Dept: CARDIAC REHAB | Facility: CLINIC | Age: 73
End: 2018-07-03
Attending: INTERNAL MEDICINE
Payer: MEDICARE

## 2018-07-03 PROCEDURE — G0239 OTH RESP PROC, GROUP: HCPCS | Performed by: CLINICAL EXERCISE PHYSIOLOGIST

## 2018-07-03 PROCEDURE — 40000244 ZZH STATISTIC VISIT PULM REHAB: Performed by: CLINICAL EXERCISE PHYSIOLOGIST

## 2018-07-05 ENCOUNTER — HOSPITAL ENCOUNTER (OUTPATIENT)
Dept: CARDIAC REHAB | Facility: CLINIC | Age: 73
End: 2018-07-05
Attending: INTERNAL MEDICINE
Payer: MEDICARE

## 2018-07-05 PROCEDURE — G0239 OTH RESP PROC, GROUP: HCPCS

## 2018-07-05 PROCEDURE — 40000244 ZZH STATISTIC VISIT PULM REHAB

## 2018-07-10 ENCOUNTER — HOSPITAL ENCOUNTER (OUTPATIENT)
Dept: CARDIAC REHAB | Facility: CLINIC | Age: 73
End: 2018-07-10
Attending: INTERNAL MEDICINE
Payer: MEDICARE

## 2018-07-10 PROCEDURE — G0239 OTH RESP PROC, GROUP: HCPCS | Performed by: REHABILITATION PRACTITIONER

## 2018-07-10 PROCEDURE — 40000244 ZZH STATISTIC VISIT PULM REHAB: Performed by: REHABILITATION PRACTITIONER

## 2018-07-11 ENCOUNTER — INFUSION THERAPY VISIT (OUTPATIENT)
Dept: INFUSION THERAPY | Facility: CLINIC | Age: 73
End: 2018-07-11
Attending: INTERNAL MEDICINE
Payer: MEDICARE

## 2018-07-11 VITALS
DIASTOLIC BLOOD PRESSURE: 81 MMHG | WEIGHT: 161 LBS | TEMPERATURE: 97.6 F | RESPIRATION RATE: 20 BRPM | OXYGEN SATURATION: 100 % | SYSTOLIC BLOOD PRESSURE: 179 MMHG | BODY MASS INDEX: 24.48 KG/M2

## 2018-07-11 DIAGNOSIS — D86.9 SARCOIDOSIS: ICD-10-CM

## 2018-07-11 DIAGNOSIS — D86.0 SARCOIDOSIS OF LUNG (H): Primary | ICD-10-CM

## 2018-07-11 PROCEDURE — 96415 CHEMO IV INFUSION ADDL HR: CPT

## 2018-07-11 PROCEDURE — 96413 CHEMO IV INFUSION 1 HR: CPT

## 2018-07-11 PROCEDURE — 25000128 H RX IP 250 OP 636: Mod: ZF | Performed by: INTERNAL MEDICINE

## 2018-07-11 RX ORDER — ACETAMINOPHEN 325 MG/1
650 TABLET ORAL ONCE
Status: CANCELLED
Start: 2018-07-11 | End: 2018-07-11

## 2018-07-11 RX ADMIN — INFLIXIMAB 400 MG: 100 INJECTION, POWDER, LYOPHILIZED, FOR SOLUTION INTRAVENOUS at 12:38

## 2018-07-11 NOTE — PATIENT INSTRUCTIONS
Dear Rohan Monroe    Thank you for choosing [facility] ([facility]) for your infusion.  The following information is a summary of our appointment as well as important reminders.        Infliximab Solution for injection  What is this medicine?  INFLIXIMAB (in FLIX i mab) is used to treat Crohn's disease and ulcerative colitis. It is also used to treat ankylosing spondylitis, psoriasis, and some forms of arthritis.  This medicine may be used for other purposes; ask your health care provider or pharmacist if you have questions.  What should I tell my health care provider before I take this medicine?  They need to know if you have any of these conditions:    diabetes    exposure to tuberculosis    heart failure    hepatitis or liver disease    immune system problems    infection    lung or breathing disease, like COPD    multiple sclerosis    current or past resident of [place]    seizure disorder    an unusual or allergic reaction to infliximab, mouse proteins, other medicines, foods, dyes, or preservatives    pregnant or trying to get pregnant    breast-feeding  How should I use this medicine?  This medicine is for injection into a vein. It is usually given by a health care professional in a hospital or clinic setting.  A special MedGuide will be given to you by the pharmacist with each prescription and refill. Be sure to read this information carefully each time.  Talk to your pediatrician regarding the use of this medicine in children. Special care may be needed.  Overdosage: If you think you have taken too much of this medicine contact a poison control center or emergency room at once.  NOTE: This medicine is only for you. Do not share this medicine with others.  What if I miss a dose?  It is important not to miss your dose. Call your doctor or health care professional if you are unable to keep an appointment.  What may  interact with this medicine?  Do not take this medicine with any of the following medications:    anakinra    rilonacept  This medicine may also interact with the following medications:    vaccines  This list may not describe all possible interactions. Give your health care provider a list of all the medicines, herbs, non-prescription drugs, or dietary supplements you use. Also tell them if you smoke, drink alcohol, or use illegal drugs. Some items may interact with your medicine.  What should I watch for while using this medicine?  Visit your doctor or health care professional for regular checks on your progress.  If you get a cold or other infection while receiving this medicine, call your doctor or health care professional. Do not treat yourself. This medicine may decrease your body's ability to fight infections. Before beginning therapy, your doctor may do a test to see if you have been exposed to tuberculosis.  This medicine may make the symptoms of heart failure worse in some patients. If you notice symptoms such as increased shortness of breath or swelling of the ankles or legs, contact your health care provider right away.  If you are going to have surgery or dental work, tell your health care professional or dentist that you have received this medicine.  If you take this medicine for plaque psoriasis, stay out of the sun. If you cannot avoid being in the sun, wear protective clothing and use sunscreen. Do not use sun lamps or tanning beds/booths.  What side effects may I notice from receiving this medicine?  Side effects that you should report to your doctor or health care professional as soon as possible:    allergic reactions like skin rash, itching or hives, swelling of the face, lips, or tongue    chest pain    fever or chills, usually related to the infusion    muscle or joint pain    red, scaly patches or raised bumps on the skin    signs of infection - fever or chills, cough, sore throat, pain or  difficulty passing urine    swollen lymph nodes in the neck, underarm, or groin areas    unexplained weight loss    unusual bleeding or bruising    unusually weak or tired    yellowing of the eyes or skin  Side effects that usually do not require medical attention (report to your doctor or health care professional if they continue or are bothersome):    headache    heartburn or stomach pain    nausea, vomiting  This list may not describe all possible side effects. Call your doctor for medical advice about side effects. You may report side effects to FDA at 7-108-FDG-1134.  Where should I keep my medicine?  This drug is given in a hospital or clinic and will not be stored at home.  NOTE:This sheet is a summary. It may not cover all possible information. If you have questions about this medicine, talk to your doctor, pharmacist, or health care provider. Copyright  2016 Gold Standard    We look forward in seeing you on your next appointment here at [facility].  Please don t hesitate to call us at [phone] to reschedule any of your appointments or to speak with one of the [facility] registered nurses.  It was a pleasure taking care of you today.    Sincerely,    [facility]  Specialty Infusion & Procedure Center  [address] [place]  [address]  Phone:  [phone]

## 2018-07-11 NOTE — PROGRESS NOTES
Nursing Note  Rohan Monroe presents today to [facility] for:   Chief Complaint   Patient presents with     Infusion     Remicade     During today's [facility] appointment, orders from Dr. Perlman were completed.  Frequency: monthly    Progress note:  Patient identification verified by name and date of birth.  Assessment completed.  Vitals recorded in Doc Flowsheets.  Patient was provided with education regarding infusion and possible side effects.  Patient verbalized understanding.      needed: No  Premedications: declined due to: patient states he has never needed Tylenol. Staff message sent to ordering provider/ RN coordinator to remove order per patient request.   Infusion Rates: infusion given over approximately 2 hours.  Approximate Infusion length:2.5 hours.   Labs: were not ordered for this appointment.  Vascular access: peripheral IV placed today.  Treatment Conditions: Biologic/Chemo Checklist ~~~ NOTE: If the patient answers yes to any of the questions below, hold the infusion and contact ordering provider or on-call provider.    1. Have you recently had an elevated temperature, fever, chills, productive cough, coughing for 3 weeks or longer or hemoptysis,  abnormal vital signs, night sweats,  chest pain or have you noticed a decrease in your appetite, unexplained weight loss or fatigue? No  2. Do you have any open wounds or new incisions? No  3. Do you have any recent or upcoming hospitalizations, surgeries or dental procedures? No  4. Do you currently have or recently have had any signs of illness or infection or are you on any antibiotics? No  5. Have you had any new, sudden or worsening abdominal pain? No  6. Have you or anyone in your household received a live vaccination in the past 4 weeks? Please note:  No live vaccines while on biologic/chemotherapy until 6 months after the last treatment.  Patient can receive the flu  vaccine (shot only) and the pneumovax.  It is optimal for the patient to get these vaccines mid cycle, but they can be given at any time as long as it is not on the day of the infusion. No  7. Have you recently been diagnosed with any new nervous system diseases (ie. Multiple sclerosis, Guillain [place], seizures, neurological changes) or cancer diagnosis? Are you on any form of radiation or chemotherapy? No  8. Are you pregnant or breast feeding or do you have plans of pregnancy in the future? No  9. Have you been having any signs of worsening depression or suicidal ideations?  (benlysta only) No  10. Have there been any other new onset medical symptoms? No    Patient tolerated infusion: well.    BP elevated post infusion. Patient reported feeling well, denies change/ new symptoms. Currently on blood pressure medications and sates levothyroxine dose was recently increased. Has a home blood pressure machine. Encouraged patient to recheck at this afternoon and contact PCP if remains elevated. Patient agreed with plan.     Discharge Plan:   Patient declined AVS.  Follow up plan of care with: ongoing infusions at [facility].  Discharge instructions were reviewed with patient.  Patient/representative verbalized understanding of discharge instructions and all questions answered.  Patient discharged from [facility] in stable condition.    Erlin Webster RN    Administrations This Visit     inFLIXimab (REMICADE) 400 mg in sodium chloride 0.9 % 315 mL infusion     Admin Date Action Dose Rate Route Administered By          07/11/2018 New Bag 400 mg 157.5 mL/hr Intravenous Erlin Webster, MICHAEL                        /71 (BP Location: Left arm)  Temp 97.6  F (36.4  C) (Oral)  Resp 20  Wt 73 kg (161 lb)  SpO2 97%  BMI 24.48 kg/m2

## 2018-07-11 NOTE — MR AVS SNAPSHOT
After Visit Summary   7/11/2018    Rohan Monroe    MRN: [id number]           Patient Information     Date Of Birth          1945        Visit Information        Provider Department      7/11/2018 12:00 PM UC 50 ATC; UC SPEC INFUSION [facility] Special[facility] Procedure        Today's Diagnoses     Sarcoidosis of lung (HCC)    -  1    SARCOIDOSIS-systemic          Care Instructions    Dear Rohan Monroe    Thank you for choosing [facility] ([facility]) for your infusion.  The following information is a summary of our appointment as well as important reminders.        Infliximab Solution for injection  What is this medicine?  INFLIXIMAB (in FLIX i mab) is used to treat Crohn's disease and ulcerative colitis. It is also used to treat ankylosing spondylitis, psoriasis, and some forms of arthritis.  This medicine may be used for other purposes; ask your health care provider or pharmacist if you have questions.  What should I tell my health care provider before I take this medicine?  They need to know if you have any of these conditions:    diabetes    exposure to tuberculosis    heart failure    hepatitis or liver disease    immune system problems    infection    lung or breathing disease, like COPD    multiple sclerosis    current or past resident of [place]    seizure disorder    an unusual or allergic reaction to infliximab, mouse proteins, other medicines, foods, dyes, or preservatives    pregnant or trying to get pregnant    breast-feeding  How should I use this medicine?  This medicine is for injection into a vein. It is usually given by a health care professional in a hospital or clinic setting.  A special MedGuide will be given to you by the pharmacist with each prescription and refill. Be sure to read this information carefully each time.  Talk to your pediatrician  regarding the use of this medicine in children. Special care may be needed.  Overdosage: If you think you have taken too much of this medicine contact a poison control center or emergency room at once.  NOTE: This medicine is only for you. Do not share this medicine with others.  What if I miss a dose?  It is important not to miss your dose. Call your doctor or health care professional if you are unable to keep an appointment.  What may interact with this medicine?  Do not take this medicine with any of the following medications:    anakinra    rilonacept  This medicine may also interact with the following medications:    vaccines  This list may not describe all possible interactions. Give your health care provider a list of all the medicines, herbs, non-prescription drugs, or dietary supplements you use. Also tell them if you smoke, drink alcohol, or use illegal drugs. Some items may interact with your medicine.  What should I watch for while using this medicine?  Visit your doctor or health care professional for regular checks on your progress.  If you get a cold or other infection while receiving this medicine, call your doctor or health care professional. Do not treat yourself. This medicine may decrease your body's ability to fight infections. Before beginning therapy, your doctor may do a test to see if you have been exposed to tuberculosis.  This medicine may make the symptoms of heart failure worse in some patients. If you notice symptoms such as increased shortness of breath or swelling of the ankles or legs, contact your health care provider right away.  If you are going to have surgery or dental work, tell your health care professional or dentist that you have received this medicine.  If you take this medicine for plaque psoriasis, stay out of the sun. If you cannot avoid being in the sun, wear protective clothing and use sunscreen. Do not use sun lamps or tanning beds/booths.  What side effects may I  notice from receiving this medicine?  Side effects that you should report to your doctor or health care professional as soon as possible:    allergic reactions like skin rash, itching or hives, swelling of the face, lips, or tongue    chest pain    fever or chills, usually related to the infusion    muscle or joint pain    red, scaly patches or raised bumps on the skin    signs of infection - fever or chills, cough, sore throat, pain or difficulty passing urine    swollen lymph nodes in the neck, underarm, or groin areas    unexplained weight loss    unusual bleeding or bruising    unusually weak or tired    yellowing of the eyes or skin  Side effects that usually do not require medical attention (report to your doctor or health care professional if they continue or are bothersome):    headache    heartburn or stomach pain    nausea, vomiting  This list may not describe all possible side effects. Call your doctor for medical advice about side effects. You may report side effects to FDA at 4-917-UDU-0361.  Where should I keep my medicine?  This drug is given in a hospital or clinic and will not be stored at home.  NOTE:This sheet is a summary. It may not cover all possible information. If you have questions about this medicine, talk to your doctor, pharmacist, or health care provider. Copyright  2016 Gold Standard    We look forward in seeing you on your next appointment here at [facility].  Please don t hesitate to call us at [phone] to reschedule any of your appointments or to speak with one of the [facility] registered nurses.  It was a pleasure taking care of you today.    Sincerely,    [facility]  Specialty Infusion & Procedure Center  [address] [place] [address]  [address]  Phone:  [phone]            Follow-ups after your visit        Your next 10 appointments already scheduled     Jul 12, 2018  2:00 PM CDT   Pulmonary Treatment with  Pulmonary Rehab 2   [facility]  Cardiac Rehab ([facility])    [address]  [place] [address]   [phone]            Jul 17, 2018 12:00 PM CDT   Pulmonary Treatment with  Pulmonary Rehab 2   [facility] [place] [facility] ([facility])    [address]  [place] [address]   [phone]            Jul 19, 2018 12:00 PM CDT   Pulmonary Treatment with  Pulmonary Rehab 2   [facility] [place] [facility] ([facility])    [address]  [place] [address]   [phone]            Jul 24, 2018 12:00 PM CDT   Pulmonary Treatment with  Pulmonary Rehab 2   [facility] [place] [facility] ([facility])    [address]  [place] [address]   [phone]            Jul 26, 2018 12:00 PM CDT   Pulmonary Treatment with  Pulmonary Rehab 2   [facility] [place] [facility] ([facility])    [address]  [place] [address]   [phone]            Jul 31, 2018 12:00 PM CDT   Pulmonary Treatment with  Pulmonary Rehab 2   [facility] [place] [facility] ([facility])    [address]  [place] [address]   [phone]            Aug 08, 2018 12:00 PM CDT   Infusion 180 with  SPEC INFUSION, UC 47 ATC   [facility] Special[facility] ([facility]    [address]  [address]  [facility] [id number] [phone]            Aug 09, 2018  1:00 PM CDT   PFT VISIT with  PFL B   [facility] Pulmonary Function Testing ([facility]    [address]  3[address] Floor  [facility] [id number] [phone]            Aug 09, 2018  1:30 PM CDT   (Arrive by 1:15 PM)   Return Interstitial Lung with David Morris Perlman, MD   [facility]    [address]  [address] [place] [address]  [address] [phone]              Who to contact     If you have questions or need follow up information about today's clinic visit or your schedule please contact [facility] SPECIALTY AND PROCEDURE directly at [phone].  Normal or non-critical lab and imaging results will be communicated to you by MyChart, letter or phone within 4 business days after the clinic has received the results. If you do not hear from us within 7 days, please contact the clinic through [email or web address] or phone. If you have a critical or abnormal lab result, we will notify you by phone as soon as possible.  Submit refill requests through getbetter! or call your pharmacy and they will forward the refill request to us. Please allow 3 business days for your refill to be completed.          Additional Information About Your Visit        [email or web address] Information     getbetter! gives you secure access to your electronic health record. If you see a primary care provider, you can also send messages to your care team and make appointments. If you have questions, please call your primary care clinic.  If you do not have a primary care provider, please call [phone] and they will assist you.        Care EveryWhere ID     This is your Care EveryWhere ID. This could be used by other organizations to access your [place] medical records  [id number]        Your Vitals Were     Temperature Respirations Pulse Oximetry BMI (Body Mass Index)          97.6  F (36.4  C) (Oral) 20 100% 24.48 kg/m2         Blood Pressure from Last 3 Encounters:   07/11/18 179/81   06/13/18 147/62   05/16/18 129/66    Weight from Last 3 Encounters:   07/11/18 73 kg (161 lb)   06/13/18 76.9 kg (169 lb 8 oz)   05/16/18 75.9 kg (167 lb 4.8 oz)              Today, you had the following     No orders found for display       Primary Care Provider Office Phone # Fax #    Jamie Foster [phone] [address]  [address]        Equal Access to Services     [facility] : Hadii rhys jamison dhruv Lewis, wapaulda luqadaha, qaybta meganmontananelly russ. So [facility] [phone].    ATENCIÓN: Si habla español, tiene a mcfadden disposición servicios gratuitos de asistencia lingüística. Sofieame al [phone].    We comply with applicable federal civil rights laws and [place] laws. We do not discriminate on the basis of race, color, national origin, age, disability, sex, sexual orientation, or gender identity.            Thank you!     Thank you for choosing [facility] SPECIALTY AND PROCEDURE  for your care. Our goal is always to provide you with excellent care. Hearing back from our patients is one way we can continue to improve our services. Please take a few minutes to complete the written survey that you may receive in the mail after your visit with us. Thank you!             Your Updated Medication List - Protect others around you: Learn how to safely use, store and throw away your medicines at www.disposemymeds.org.          This list is accurate as of 7/11/18  2:48 PM.  Always use your most recent med list.                   Brand Name Dispense Instructions for use Diagnosis    albuterol 108 (90 Base) MCG/ACT Inhaler    PROAIR HFA/PROVENTIL HFA/VENTOLIN HFA    3 Inhaler    Inhale 2 puffs into the lungs every 6 hours as needed for shortness of breath / dyspnea    Sarcoidosis       atorvastatin 40 MG tablet    LIPITOR    30 tablet    TAKE ONE TABLET BY MOUTH ONE TIME DAILY    Coronary artery disease involving native coronary artery of native heart without angina pectoris, CAD S/P percutaneous coronary angioplasty       blood glucose monitoring lancets     2 Box    Use to test blood sugar 2 times daily or as directed.  102 lancets per box.  3 month supply.    Type 2 diabetes, HbA1C goal < 8% (H)       blood glucose monitoring meter device kit    no brand specified    1 kit     Use to test blood sugar 2 times daily.    Type 2 diabetes mellitus with diabetic nephropathy (H)       blood glucose monitoring test strip    ACCU-CHEK RONNIE PLUS    200 each    Use to test blood sugar 2 times daily or as directed.  3 month supply.    Type 2 diabetes, HbA1C goal < 8% (H)       clopidogrel 75 MG tablet    PLAVIX    12 tablet    TAKE ONE TABLET BY MOUTH ONE TIME DAILY    CAD S/P percutaneous coronary angioplasty, Coronary artery disease involving native coronary artery of native heart without angina pectoris       colchicine 0.6 MG tablet    COLCRYS    30 tablet    2 tabs at the onset of flare, then 1 tab every 2 hrs until pain is better or diarrhea occurs, up to 10 doses. Wait 3 days until taking again        fluticasone-vilanterol 100-25 MCG/INH oral inhaler    BREO ELLIPTA    3 Inhaler    Inhale 1 puff into the lungs daily    Chronic obstructive pulmonary disease, unspecified COPD type (H)       furosemide 20 MG tablet    LASIX    240 tablet    Take 2 tablets (40 mg) by mouth 2 times daily May take extra 20 mg as needed for wt .gain >3#    Pulmonary hypertension       inFLIXimab 100 MG injection    REMICADE     Inject 100 mg into the vein every 28 days        levothyroxine 125 MCG tablet    SYNTHROID/LEVOTHROID    90 tablet    Take 1 tablet (125 mcg) by mouth daily    Hypothyroidism due to Hashimoto's thyroiditis       losartan 50 MG tablet    COZAAR    90 tablet    Take 1 tablet (50 mg) by mouth daily    (HFpEF) heart failure with preserved ejection fraction (H)       methotrexate 2.5 MG tablet CHEMO     48 tablet    Take 3 tablets (7.5 mg) by mouth once a week On Mondays    Sarcoidosis       metoprolol tartrate 100 MG tablet    LOPRESSOR    60 tablet    Take 1 tablet (100 mg) by mouth 2 times daily    Hypertension secondary to other renal disorders       mirtazapine 15 MG tablet    REMERON     Take 15 mg by mouth At Bedtime.        MULTIVITAMIN & MINERAL PO      Take 1 tablet by mouth daily.         * order for DME     1 Device    She requested for a 4 wheel walker, would like to change it to 4 wheel walker with a seat and oxygen tank durant.   Need this faxed over to her  [phone]    Sarcoidosis, lung (H), COPD (chronic obstructive pulmonary disease) (H), Abnormal gait, Congestive heart failure (H)       * order for DME     1 Device    Updated Oxygen: Patient requires supplemental Oxygen 3 LPM via nasal canula with activity and 2 LPM nocturnally. Please provide patient with a portable oxygen concentrator for improved mobility.  Okay to spot check or titrated for conserving to keep stats above 90%. Oxygen will be for a lifetime.    ILD (interstitial lung disease) (H), Hypoxia       polyethylene glycol powder    MIRALAX    510 g    Take 17 g (1 capful) by mouth daily    Constipation, unspecified constipation type       sildenafil 20 MG tablet    REVATIO    270 tablet    TAKE ONE TABLET BY MOUTH THREE TIMES DAILY    Pulmonary hypertension       tiotropium 18 MCG capsule    SPIRIVA HANDIHALER    90 capsule    Inhale contents of one capsule daily.    Chronic obstructive pulmonary disease, unspecified COPD type (H)       Urea 40 % Crea    CARMOL 40    199 g    To feet daily    Dermatophytosis of nail, Corns and callosities       * Notice:  This list has 2 medication(s) that are the same as other medications prescribed for you. Read the directions carefully, and ask your doctor or other care provider to review them with you.

## 2018-07-17 ENCOUNTER — HOSPITAL ENCOUNTER (OUTPATIENT)
Dept: CARDIAC REHAB | Facility: CLINIC | Age: 73
End: 2018-07-17
Attending: INTERNAL MEDICINE
Payer: MEDICARE

## 2018-07-17 PROCEDURE — 40000244 ZZH STATISTIC VISIT PULM REHAB: Performed by: CLINICAL EXERCISE PHYSIOLOGIST

## 2018-07-17 PROCEDURE — G0239 OTH RESP PROC, GROUP: HCPCS | Performed by: CLINICAL EXERCISE PHYSIOLOGIST

## 2018-07-26 ENCOUNTER — HOSPITAL ENCOUNTER (OUTPATIENT)
Dept: CARDIAC REHAB | Facility: CLINIC | Age: 73
End: 2018-07-26
Attending: INTERNAL MEDICINE
Payer: MEDICARE

## 2018-07-26 PROCEDURE — G0239 OTH RESP PROC, GROUP: HCPCS | Performed by: REHABILITATION PRACTITIONER

## 2018-07-26 PROCEDURE — 40000244 ZZH STATISTIC VISIT PULM REHAB: Performed by: REHABILITATION PRACTITIONER

## 2018-08-06 DIAGNOSIS — I50.30 (HFPEF) HEART FAILURE WITH PRESERVED EJECTION FRACTION (H): ICD-10-CM

## 2018-08-07 ENCOUNTER — HOSPITAL ENCOUNTER (OUTPATIENT)
Dept: CARDIAC REHAB | Facility: CLINIC | Age: 73
End: 2018-08-07
Attending: INTERNAL MEDICINE
Payer: MEDICARE

## 2018-08-07 ENCOUNTER — TELEPHONE (OUTPATIENT)
Dept: CARE COORDINATION | Facility: CLINIC | Age: 73
End: 2018-08-07

## 2018-08-07 PROCEDURE — G0239 OTH RESP PROC, GROUP: HCPCS | Performed by: CLINICAL EXERCISE PHYSIOLOGIST

## 2018-08-07 PROCEDURE — 40000244 ZZH STATISTIC VISIT PULM REHAB: Performed by: CLINICAL EXERCISE PHYSIOLOGIST

## 2018-08-07 RX ORDER — LOSARTAN POTASSIUM 50 MG/1
TABLET ORAL
Qty: 90 TABLET | Refills: 3 | Status: ON HOLD | OUTPATIENT
Start: 2018-08-07 | End: 2018-11-26

## 2018-08-07 NOTE — TELEPHONE ENCOUNTER
Social Work Intervention  [facility]    Data/Intervention:    Patient Name:  Rohan Monroe  /Age:  1945 (72 year old)    Visit Type: telephone  Referral Source: PCC  Reason for Referral:  Assist Patient with insurance questions regarding his upcoming move to [place]    Collaborated With:    -Patient    Patient Concerns/Issues:   [place] 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 Patient reported he is in no hurry for the move, but would like to get insurance and Remicade infusions figured out before the move.    Intervention/Education/Resources Provided:   discussed the different insurance options. Patient reported he may have insurance benefits through his custodial with [facility]. Patient will check on this.  also provided Patient with the phone number for the Health Insurance Counseling and Advocacy Program for [place] ([phone]) for Patient to contact to get assistance in selecting a Medicare supplement and Rx plan. Patient wishes to get an appointment with a doctor at the [facility] clinic who came recommended by Dr. Orosco, but is having difficulty scheduling an appointment. Patient has sent message to Dr. Orosco asking for assistance.    Assessment/Plan:  Patient to contact [facility] and Health Insurance Counseling and Advocacy to figure out his Medicare supplement and Rx benefits. Patient will contact  with any additional questions or concerns.    Provided patient/family with contact information and availability.    KT Beasley  Outpatient Specialty Clinics  Direct Phone: [phone]  Pager:  [phone]

## 2018-08-08 ENCOUNTER — INFUSION THERAPY VISIT (OUTPATIENT)
Dept: INFUSION THERAPY | Facility: CLINIC | Age: 73
End: 2018-08-08
Attending: INTERNAL MEDICINE
Payer: MEDICARE

## 2018-08-08 VITALS
OXYGEN SATURATION: 100 % | BODY MASS INDEX: 23.99 KG/M2 | TEMPERATURE: 98 F | WEIGHT: 157.8 LBS | SYSTOLIC BLOOD PRESSURE: 125 MMHG | DIASTOLIC BLOOD PRESSURE: 64 MMHG | RESPIRATION RATE: 18 BRPM

## 2018-08-08 DIAGNOSIS — D86.9 SARCOIDOSIS: ICD-10-CM

## 2018-08-08 DIAGNOSIS — D86.0 SARCOIDOSIS OF LUNG (H): Primary | ICD-10-CM

## 2018-08-08 PROCEDURE — 96415 CHEMO IV INFUSION ADDL HR: CPT

## 2018-08-08 PROCEDURE — 25000128 H RX IP 250 OP 636: Mod: ZF | Performed by: INTERNAL MEDICINE

## 2018-08-08 PROCEDURE — 96413 CHEMO IV INFUSION 1 HR: CPT

## 2018-08-08 RX ADMIN — INFLIXIMAB 400 MG: 100 INJECTION, POWDER, LYOPHILIZED, FOR SOLUTION INTRAVENOUS at 12:33

## 2018-08-08 NOTE — PROGRESS NOTES
Nursing Note  Rohan Monroe presents today [facility] for:   Chief Complaint   Patient presents with     Remicade Infusion   During today's [facility] appointment, orders from Dr. Perlman were completed.  Frequency: monthly    Progress note:  Patient identification verified by name and date of birth.  Assessment completed.  Vitals recorded in Doc Flowsheets.  Patient was provided with education regarding infusion and possible side effects.  Patient verbalized understanding.      needed: No  Premedications: declined..   Infusion Rates: infusion given over approximately 2 hours.  Approximate Infusion length:2.5 hours.   Labs: were not ordered for this appointment.  Vascular access: peripheral IV placed today.  Treatment Conditions: Biologic/Chemo Checklist ~~~ NOTE: If the patient answers yes to any of the questions below, hold the infusion and contact ordering provider or on-call provider.    1. Have you recently had an elevated temperature, fever, chills, productive cough, coughing for 3 weeks or longer or hemoptysis,  abnormal vital signs, night sweats,  chest pain or have you noticed a decrease in your appetite, unexplained weight loss or fatigue? No  2. Do you have any open wounds or new incisions? No  3. Do you have any recent or upcoming hospitalizations, surgeries or dental procedures? No  4. Do you currently have or recently have had any signs of illness or infection or are you on any antibiotics? No  5. Have you had any new, sudden or worsening abdominal pain? No  6. Have you or anyone in your household received a live vaccination in the past 4 weeks? Please note:  No live vaccines while on biologic/chemotherapy until 6 months after the last treatment.  Patient can receive the flu vaccine (shot only) and the pneumovax.  It is optimal for the patient to get these vaccines mid cycle, but they can be given at any time as long as it  is not on the day of the infusion. No  7. Have you recently been diagnosed with any new nervous system diseases (ie. Multiple sclerosis, Guillain Stafford, seizures, neurological changes) or cancer diagnosis? Are you on any form of radiation or chemotherapy? No  8. Are you pregnant or breast feeding or do you have plans of pregnancy in the future? No  9. Have you been having any signs of worsening depression or suicidal ideations?  (benlysta only) No  10. Have there been any other new onset medical symptoms? No    Patient tolerated infusion: well.    BP elevated post infusion. Patient reported feeling well, denies change/ new symptoms. Currently on blood pressure medications.    Discharge Plan:   Patient declined AVS.  Follow up plan of care with: ongoing infusions at [facility].  Discharge instructions were reviewed with patient.  Patient/representative verbalized understanding of discharge instructions and all questions answered.  Patient discharged from [facility] in stable condition.      Abby Miguel RN    Administrations This Visit     inFLIXimab (REMICADE) 400 mg in sodium chloride 0.9 % 315 mL infusion     Admin Date Action Dose Rate Route Administered By          08/08/2018 New Bag 400 mg 157.5 mL/hr Intravenous Abby Miguel RN                         /64  Temp 98  F (36.7  C) (Oral)  Resp 18  Wt 71.6 kg (157 lb 12.8 oz)  SpO2 100%  BMI 23.99 kg/m2

## 2018-08-08 NOTE — MR AVS SNAPSHOT
After Visit Summary   8/8/2018    Rohan Monroe    MRN: [id number]           Patient Information     Date Of Birth          1945        Visit Information        Provider Department      8/8/2018 12:00 PM UC 47 ATC; UC SPEC INFUSION [facility]        Today's Diagnoses     Sarcoidosis of lung (HCC)    -  1    SARCOIDOSIS-systemic          Care Instructions    Dear Rohan Monroe    Thank you for choosing [facility] ([facility]) for your infusion.  The following information is a summary of our appointment as well as important reminders.            We look forward in seeing you on your next appointment here at [facility].  Please don t hesitate to call us at [phone] to reschedule any of your appointments or to speak with one of the [facility] registered nurses.  It was a pleasure taking care of you today.    Sincerely,    [facility]  Specialty Infusion & Procedure Center  [address] [place] [address]  [address]  Phone:  [phone]            Follow-ups after your visit        Your next 10 appointments already scheduled     Aug 13, 2018 12:40 PM CDT   (Arrive by 12:25 PM)   RETURN FOOT/ANKLE with Chicho Mejia DPM   [facility] ([facility])    [address] [place] [address]  4th [facility] [address] [phone]            Aug 14, 2018  8:45 AM CDT   RETURN RETINA with Sandee Middleton MD   Eye Clinic ([facility])    [address] Clin [address]  [facility] [id number] [phone]            Aug 14, 2018  1:45 PM CDT   Pulmonary Treatment with  Pulmonary Rehab 2   [facility] ([facility])    [address], [address]  [facility] [address] [phone]            Aug 16, 2018  2:00 PM CDT   Pulmonary  Treatment with  Pulmonary Rehab 2   [facility] ([facility])    [address], [address]  [place] [address] [phone]            Aug 17, 2018 10:00 AM CDT   PFT VISIT with  PFL C   [facility] Pulmonary Function Testing ([facility])    [address]  3rd Floor  [facility] [id number] [phone]            Aug 17, 2018 10:30 AM CDT   (Arrive by 10:15 AM)   Return Interstitial Lung with David Morris Perlman, MD   [facility] ([facility])    [address]  [address]  [facility] [id number] [phone]            Aug 22, 2018 10:00 AM CDT   US AORTA/IVC/ILIAC DUPLEX LIMITED with UCUSV1   [facility] US ([facility])    [address]  1st [facility] [address] [phone]           Please bring a list of your medicines (including vitamins, minerals and over-the-counter drugs). Also, tell your doctor about any allergies you may have. Wear comfortable clothes and leave your valuables at home.  Adults: No eating or drinking for 8 hours before the exam. You may take medicine with a small sip of water.  Children: - Infants, breast-fed: may have breast milk up to 2 hours before exam. - Infants, formula: may have bottle until 4 hours before exam. - Children 1-5 years: No food or drink for 4 hours before exam. - Children 6 -12 years: No food or drink for 6 hours before exam. - Children over 12 years: No food or drink for 8 hours before exam. - J Tube Fed: No need to stop feedings.  Please call the Imaging Department at your exam site with any questions.            Aug 22, 2018 11:00 AM CDT   XR PELVIS AND HIP BILATERAL 2 VIEWS with UCXR1   [facility] Xray ([facility])    [address]  1st [facility] [address] [phone]           Please bring a list of  your current medicines to your exam. (Include vitamins, minerals and over-thecounter medicines.) Leave your valuables at home.  Tell your doctor if there is a chance you may be pregnant.  You do not need to do anything special for this exam.            Aug 22, 2018 11:40 AM CDT   (Arrive by 11:25 AM)   Return Visit with Maria Victoria Palmer MD   [facility] ([facility])    [address]  [address]  [facility] [id number] [phone]            Aug 24, 2018  8:45 AM CDT   RETURN GLAUCOMA with Kina Greco MD   Eye Clinic ([facility])    [address]  9th Fl Clin 9a  [facility] [id number] [phone]              Who to contact     If you have questions or need follow up information about today's clinic visit or your schedule please contact [facility]REAT[facility] SPECIALTY AND PROCEDURE directly at [phone].  Normal or non-critical lab and imaging results will be communicated to you by [employer], letter or phone within 4 business days after the clinic has received the results. If you do not hear from us within 7 days, please contact the clinic through [employer] or phone. If you have a critical or abnormal lab result, we will notify you by phone as soon as possible.  Submit refill requests through [employer] or call your pharmacy and they will forward the refill request to us. Please allow 3 business days for your refill to be completed.          Additional Information About Your Visit        [employer] Information     [employer] gives you secure access to your electronic health record. If you see a primary care provider, you can also send messages to your care team and make appointments. If you have questions, please call your primary care clinic.  If you do not have a primary care provider, please call [phone] and they will assist you.        Care EveryWhere ID     This is your Care EveryWhere ID.  This could be used by other organizations to access your [facility] medical records  [id number]        Your Vitals Were     Temperature Respirations Pulse Oximetry BMI (Body Mass Index)          98  F (36.7  C) (Oral) 18 100% 23.99 kg/m2         Blood Pressure from Last 3 Encounters:   08/08/18 125/64   07/11/18 179/81   06/13/18 147/62    Weight from Last 3 Encounters:   08/08/18 71.6 kg (157 lb 12.8 oz)   07/11/18 73 kg (161 lb)   06/13/18 76.9 kg (169 lb 8 oz)              Today, you had the following     No orders found for display       Primary Care Provider Office Phone # Fax #    Jamie Foster [phone] [address] [address]        Equal Access to Services     JEREMI MONTOYA : Hadii aad ku hadasho Soleslie, waaxda luqadaha, qaybta kaalmada vesna, nelly munson . So [facility] [phone].    ATENCIÓN: Si habla español, tiene a mcfadden disposición servicios gratuitos de asistencia lingüística. Osman al [phone].    We comply with applicable federal civil rights laws and [place] laws. We do not discriminate on the basis of race, color, national origin, age, disability, sex, sexual orientation, or gender identity.            Thank you!     Thank you for choosing [facility] SPECIALTY AND PROCEDURE  for your care. Our goal is always to provide you with excellent care. Hearing back from our patients is one way we can continue to improve our services. Please take a few minutes to complete the written survey that you may receive in the mail after your visit with us. Thank you!             Your Updated Medication List - Protect others around you: Learn how to safely use, store and throw away your medicines at www.disposemymeds.org.          This list is accurate as of 8/8/18  4:24 PM.  Always use your most recent med list.                   Brand Name Dispense Instructions for use Diagnosis    albuterol 108 (90 Base)  MCG/ACT Inhaler    PROAIR HFA/PROVENTIL HFA/VENTOLIN HFA    3 Inhaler    Inhale 2 puffs into the lungs every 6 hours as needed for shortness of breath / dyspnea    Sarcoidosis       atorvastatin 40 MG tablet    LIPITOR    30 tablet    TAKE ONE TABLET BY MOUTH ONE TIME DAILY    Coronary artery disease involving native coronary artery of native heart without angina pectoris, CAD S/P percutaneous coronary angioplasty       blood glucose monitoring lancets     2 Box    Use to test blood sugar 2 times daily or as directed.  102 lancets per box.  3 month supply.    Type 2 diabetes, HbA1C goal < 8% (H)       blood glucose monitoring meter device kit    no brand specified    1 kit    Use to test blood sugar 2 times daily.    Type 2 diabetes mellitus with diabetic nephropathy (H)       blood glucose monitoring test strip    ACCU-CHEK RONNIE PLUS    200 each    Use to test blood sugar 2 times daily or as directed.  3 month supply.    Type 2 diabetes, HbA1C goal < 8% (H)       clopidogrel 75 MG tablet    PLAVIX    12 tablet    TAKE ONE TABLET BY MOUTH ONE TIME DAILY    CAD S/P percutaneous coronary angioplasty, Coronary artery disease involving native coronary artery of native heart without angina pectoris       colchicine 0.6 MG tablet    COLCRYS    30 tablet    2 tabs at the onset of flare, then 1 tab every 2 hrs until pain is better or diarrhea occurs, up to 10 doses. Wait 3 days until taking again        fluticasone-vilanterol 100-25 MCG/INH oral inhaler    BREO ELLIPTA    3 Inhaler    Inhale 1 puff into the lungs daily    Chronic obstructive pulmonary disease, unspecified COPD type (H)       furosemide 20 MG tablet    LASIX    240 tablet    Take 2 tablets (40 mg) by mouth 2 times daily May take extra 20 mg as needed for wt .gain >3#    Pulmonary hypertension       inFLIXimab 100 MG injection    REMICADE     Inject 100 mg into the vein every 28 days        levothyroxine 125 MCG tablet    SYNTHROID/LEVOTHROID    90 tablet     Take 1 tablet (125 mcg) by mouth daily    Hypothyroidism due to Hashimoto's thyroiditis       losartan 50 MG tablet    COZAAR    90 tablet    TAKE ONE TABLET BY MOUTH ONE TIME DAILY    (HFpEF) heart failure with preserved ejection fraction (H)       methotrexate 2.5 MG tablet CHEMO     48 tablet    Take 3 tablets (7.5 mg) by mouth once a week On Mondays    Sarcoidosis       metoprolol tartrate 100 MG tablet    LOPRESSOR    60 tablet    Take 1 tablet (100 mg) by mouth 2 times daily    Hypertension secondary to other renal disorders       mirtazapine 15 MG tablet    REMERON     Take 15 mg by mouth At Bedtime.        MULTIVITAMIN & MINERAL PO      Take 1 tablet by mouth daily.        * order for DME     1 Device    She requested for a 4 wheel walker, would like to change it to 4 wheel walker with a seat and oxygen tank durant.   Need this faxed over to her  [phone]    Sarcoidosis, lung (H), COPD (chronic obstructive pulmonary disease) (H), Abnormal gait, Congestive heart failure (H)       * order for DME     1 Device    Updated Oxygen: Patient requires supplemental Oxygen 3 LPM via nasal canula with activity and 2 LPM nocturnally. Please provide patient with a portable oxygen concentrator for improved mobility.  Okay to spot check or titrated for conserving to keep stats above 90%. Oxygen will be for a lifetime.    ILD (interstitial lung disease) (H), Hypoxia       polyethylene glycol powder    MIRALAX    510 g    Take 17 g (1 capful) by mouth daily    Constipation, unspecified constipation type       sildenafil 20 MG tablet    REVATIO    270 tablet    TAKE ONE TABLET BY MOUTH THREE TIMES DAILY    Pulmonary hypertension       tiotropium 18 MCG capsule    SPIRIVA HANDIHALER    90 capsule    Inhale contents of one capsule daily.    Chronic obstructive pulmonary disease, unspecified COPD type (H)       Urea 40 % Crea    CARMOL 40    199 g    To feet daily    Dermatophytosis of nail, Corns and callosities       *  Notice:  This list has 2 medication(s) that are the same as other medications prescribed for you. Read the directions carefully, and ask your doctor or other care provider to review them with you.

## 2018-08-08 NOTE — PATIENT INSTRUCTIONS
Dear Rohan Monroe    Thank you for choosing [facility] Specialty Infusion [facility] Procedure [facility] ([facility]) for your infusion.  The following information is a summary of our appointment as well as important reminders.            We look forward in seeing you on your next appointment here at [facility].  Please don t hesitate to call us at [phone] to reschedule any of your appointments or to speak with one of the [facility] registered nurses.  It was a pleasure taking care of you today.    Sincerely,    [facility]  Specialty Infusion & Procedure Center  [address] [place] [address]  [address]  Phone:  [phone]

## 2018-08-13 ENCOUNTER — TELEPHONE (OUTPATIENT)
Dept: PULMONOLOGY | Facility: CLINIC | Age: 73
End: 2018-08-13

## 2018-08-13 ENCOUNTER — OFFICE VISIT (OUTPATIENT)
Dept: ORTHOPEDICS | Facility: CLINIC | Age: 73
End: 2018-08-13
Payer: MEDICARE

## 2018-08-13 DIAGNOSIS — B35.1 DERMATOPHYTOSIS OF NAIL: ICD-10-CM

## 2018-08-13 DIAGNOSIS — L84 TYLOMA: Primary | ICD-10-CM

## 2018-08-13 DIAGNOSIS — B35.3 TINEA PEDIS OF BOTH FEET: ICD-10-CM

## 2018-08-13 DIAGNOSIS — E11.49 TYPE II OR UNSPECIFIED TYPE DIABETES MELLITUS WITH NEUROLOGICAL MANIFESTATIONS, NOT STATED AS UNCONTROLLED(250.60) (H): ICD-10-CM

## 2018-08-13 RX ORDER — CICLOPIROX OLAMINE 7.7 MG/G
CREAM TOPICAL 2 TIMES DAILY
Qty: 90 G | Refills: 6 | Status: SHIPPED | OUTPATIENT
Start: 2018-08-13 | End: 2018-11-18

## 2018-08-13 NOTE — NURSING NOTE
Reason For Visit:   Chief Complaint   Patient presents with     RECHECK     Corn , left foot. Pt stated that this is painful. Pt stated that he also has problems with other toes. Last visit: 11-. Pt stated that he seen Dr. Wing a few months ago wound but since then the wound has scabbed but looks/feels abnormal.        Pain Assessment  Patient Currently in Pain: Denies  Primary Pain Location: Foot  Pain Orientation: Left  Aggravating Factors: Walking, Standing       Current Outpatient Prescriptions   Medication Sig Dispense Refill     albuterol (PROAIR HFA/PROVENTIL HFA/VENTOLIN HFA) 108 (90 Base) MCG/ACT Inhaler Inhale 2 puffs into the lungs every 6 hours as needed for shortness of breath / dyspnea 3 Inhaler 6     atorvastatin (LIPITOR) 40 MG tablet TAKE ONE TABLET BY MOUTH ONE TIME DAILY  30 tablet 11     blood glucose monitoring (ACCU-CHEK RONNIE PLUS) test strip Use to test blood sugar 2 times daily or as directed.  3 month supply. 200 each 3     blood glucose monitoring (ACCU-CHEK FASTCLIX) lancets Use to test blood sugar 2 times daily or as directed.  102 lancets per box.  3 month supply. 2 Box 3     blood glucose monitoring (NO BRAND SPECIFIED) meter device kit Use to test blood sugar 2 times daily. 1 kit 0     clopidogrel (PLAVIX) 75 MG tablet TAKE ONE TABLET BY MOUTH ONE TIME DAILY  12 tablet 0     colchicine (COLCRYS) 0.6 MG tablet 2 tabs at the onset of flare, then 1 tab every 2 hrs until pain is better or diarrhea occurs, up to 10 doses. Wait 3 days until taking again 30 tablet 0     fluticasone-vilanterol (BREO ELLIPTA) 100-25 MCG/INH oral inhaler Inhale 1 puff into the lungs daily 3 Inhaler 3     furosemide (LASIX) 20 MG tablet Take 2 tablets (40 mg) by mouth 2 times daily May take extra 20 mg as needed for wt .gain >3# 240 tablet 11     inFLIXimab (REMICADE) 100 MG injection Inject 100 mg into the vein every 28 days        levothyroxine (SYNTHROID/LEVOTHROID) 125 MCG tablet Take 1 tablet  (125 mcg) by mouth daily 90 tablet 3     losartan (COZAAR) 50 MG tablet TAKE ONE TABLET BY MOUTH ONE TIME DAILY  90 tablet 3     methotrexate 2.5 MG tablet CHEMO Take 3 tablets (7.5 mg) by mouth once a week On Mondays 48 tablet 3     metoprolol tartrate (LOPRESSOR) 100 MG tablet Take 1 tablet (100 mg) by mouth 2 times daily 60 tablet 11     mirtazapine (REMERON) 15 MG tablet Take 15 mg by mouth At Bedtime.       Multiple Vitamins-Minerals (MULTIVITAMIN & MINERAL PO) Take 1 tablet by mouth daily.       order for DME Updated Oxygen: Patient requires supplemental Oxygen 3 LPM via nasal canula with activity and 2 LPM nocturnally. Please provide patient with a portable oxygen concentrator for improved mobility.  Okay to spot check or titrated for conserving to keep stats above 90%. Oxygen will be for a lifetime. 1 Device 0     order for DME She requested for a 4 wheel walker, would like to change it to 4 wheel walker with a seat and oxygen tank durant.     Need this faxed over to her   [phone] 1 Device 1     polyethylene glycol (MIRALAX) powder Take 17 g (1 capful) by mouth daily 510 g 1     sildenafil (REVATIO) 20 MG tablet TAKE ONE TABLET BY MOUTH THREE TIMES DAILY  270 tablet 3     tiotropium (SPIRIVA HANDIHALER) 18 MCG capsule Inhale contents of one capsule daily. 90 capsule 3     Urea (CARMOL 40) 40 % CREA To feet daily 199 g 4          Allergies   Allergen Reactions     Prednisone Other (See Comments)     He reports that he can't sleep for days and can't use small to massive doses of prednisone   Pt. Does not do well with high doses of Prednisone, His MD says that Prednisone is counter indicated. Prednisone failed to treat his sarcoidosis

## 2018-08-13 NOTE — MR AVS SNAPSHOT
After Visit Summary   8/13/2018    Rohan Monroe    MRN: [id number]           Patient Information     Date Of Birth          1945        Visit Information        Provider Department      8/13/2018 12:40 PM Chicho Mejia DPM [facility]        Today's Diagnoses     Tyloma    -  1    Dermatophytosis of nail        Type II or unspecified type diabetes mellitus with neurological manifestations, not stated as uncontrolled(250.60) (H)        Tinea pedis of both feet           Follow-ups after your visit        Your next 10 appointments already scheduled     Aug 14, 2018  2:00 PM CDT   Pulmonary Treatment with  Pulmonary Rehab 2   [facility] ([facility])    [address]  [facility] [address]   [phone]            Aug 16, 2018  2:00 PM CDT   Pulmonary Treatment with  Pulmonary Rehab 2   [facility] ([facility])    [address]  [facility] [address]   [phone]            Aug 17, 2018 10:00 AM CDT   PFT VISIT with  PFL C   [facility] ([facility])    [address]  3rd Floor  [facility] [id number] [phone]            Aug 17, 2018 10:30 AM CDT   (Arrive by 10:15 AM)   Return Interstitial Lung with David Morris Perlman, MD   [facility] ([facility])    [address]  [address]  [facility] [id number] [phone]            Aug 22, 2018 10:00 AM CDT   US AORTA/IVC/ILIAC DUPLEX LIMITED with UCUSV1   [facility] ([facility])    [address]  1st Floor  [facility] [id number] [phone]           Please bring a list of your medicines (including vitamins, minerals and over-the-counter drugs). Also, tell your doctor about any allergies you may have. Wear comfortable clothes and  leave your valuables at home.  Adults: No eating or drinking for 8 hours before the exam. You may take medicine with a small sip of water.  Children: - Infants, breast-fed: may have breast milk up to 2 hours before exam. - Infants, formula: may have bottle until 4 hours before exam. - Children 1-5 years: No food or drink for 4 hours before exam. - Children 6 -12 years: No food or drink for 6 hours before exam. - Children over 12 years: No food or drink for 8 hours before exam. - J Tube Fed: No need to stop feedings.  Please call the Imaging Department at your exam site with any questions.            Aug 22, 2018 11:00 AM CDT   XR PELVIS AND HIP BILATERAL 2 VIEWS with UCXR1   [facility] ([facility])    [address] [place] [id number] [phone]           Please bring a list of your current medicines to your exam. (Include vitamins, minerals and over-thecounter medicines.) Leave your valuables at home.  Tell your doctor if there is a chance you may be pregnant.  You do not need to do anything special for this exam.            Aug 22, 2018 11:40 AM CDT   (Arrive by 11:25 AM)   Return Visit with Maria Victoria Palmer MD   [facility] ([facility])    [address] [place] [address]  [address] [place] [id number] [phone]            Aug 28, 2018 10:00 AM CDT   Lab with  LAB   [facility] ([facility])    [address] [place] [id number] [phone]            Aug 28, 2018 10:30 AM CDT   US ABDOMEN LIMITED with UCUS2   [facility] ([facility])    [address] [place] [id number] [phone]           Please bring a list of your medicines (including vitamins, minerals and over-the-counter drugs). Also, tell your doctor about any allergies you may have. Wear comfortable clothes and  leave your valuables at home.  Adults: No eating or drinking for 8 hours before the exam. You may take medicine with a small sip of water.  Children: - Infants, breast-fed: may have breast milk up to 2 hours before exam. - Infants, formula: may have bottle until 4 hours before exam. - Children 1-5 years: No food or drink for 4 hours before exam. - Children 6 -12 years: No food or drink for 6 hours before exam. - Children over 12 years: No food or drink for 8 hours before exam. - J Tube Fed: No need to stop feedings.  Please call the Imaging Department at your exam site with any questions.            Aug 28, 2018 11:45 AM CDT   (Arrive by 11:30 AM)   Return General Liver with Moses Orosco MD   [facility] ([facility])    [address] [place] [address]  [address] [place] [id number] [phone]              Who to contact     Please call your clinic at [phone] to:    Ask questions about your health    Make or cancel appointments    Discuss your medicines    Learn about your test results    Speak to your doctor            Additional Information About Your Visit        [employer] Information     [employer] gives you secure access to your electronic health record. If you see a primary care provider, you can also send messages to your care team and make appointments. If you have questions, please call your primary care clinic.  If you do not have a primary care provider, please call [phone] and they will assist you.      [employer] is an electronic gateway that provides easy, online access to your medical records. With [employer], you can request a clinic appointment, read your test results, renew a prescription or communicate with your care team.     To access your existing account, please contact your [facility] or call [phone] for assistance.        Care EveryWhere ID     This is your Care EveryWhere ID. This could be used by other organizations to  access your [place] medical records  [id number]         Blood Pressure from Last 3 Encounters:   08/08/18 125/64   07/11/18 179/81   06/13/18 147/62    Weight from Last 3 Encounters:   08/08/18 71.6 kg (157 lb 12.8 oz)   07/11/18 73 kg (161 lb)   06/13/18 76.9 kg (169 lb 8 oz)              We Performed the Following     DEBRIDEMENT OF NAILS, 6 OR MORE     TRIM HYPERKERATOTIC SKIN LESION,2-4          Today's Medication Changes          These changes are accurate as of 8/13/18 11:59 PM.  If you have any questions, ask your nurse or doctor.               Start taking these medicines.        Dose/Directions    ciclopirox 0.77 % cream   Commonly known as:  LOPROX   Used for:  Dermatophytosis of nail, Tinea pedis of both feet   Started by:  Chicho Mejia DPM        Apply topically 2 times daily To feet and toenails.   Quantity:  90 g   Refills:  6            Where to get your medicines      These medications were [facility] to [id number] - [place], [place] - [facility] [address]     Phone:  [phone]     ciclopirox 0.77 % cream                Primary Care Provider Office Phone # Fax #    Jamie Foster [phone] [address] [address]        Equal Access to Services     [facility]I [facility] AH: Hadii rhys jamison hadasho Soleslie, waaxda luqadaha, qaybta kaalmada adeforrestyada, nelly jacinto. So [facility] [phone].    ATENCIÓN: Si habla español, tiene a mcfadden disposición servicios gratuitos de asistencia lingüística. Llame al [phone].    We comply with applicable federal civil rights laws and [place] laws. We do not discriminate on the basis of race, color, national origin, age, disability, sex, sexual orientation, or gender identity.            Thank you!     Thank you for choosing [facility]  for your care. Our goal is always to provide you with excellent care. Hearing  back from our patients is one way we can continue to improve our services. Please take a few minutes to complete the written survey that you may receive in the mail after your visit with us. Thank you!             Your Updated Medication List - Protect others around you: Learn how to safely use, store and throw away your medicines at www.disposemymeds.org.          This list is accurate as of 8/13/18 11:59 PM.  Always use your most recent med list.                   Brand Name Dispense Instructions for use Diagnosis    albuterol 108 (90 Base) MCG/ACT inhaler    PROAIR HFA/PROVENTIL HFA/VENTOLIN HFA    3 Inhaler    Inhale 2 puffs into the lungs every 6 hours as needed for shortness of breath / dyspnea    Sarcoidosis       atorvastatin 40 MG tablet    LIPITOR    30 tablet    TAKE ONE TABLET BY MOUTH ONE TIME DAILY    Coronary artery disease involving native coronary artery of native heart without angina pectoris, CAD S/P percutaneous coronary angioplasty       blood glucose monitoring lancets     2 Box    Use to test blood sugar 2 times daily or as directed.  102 lancets per box.  3 month supply.    Type 2 diabetes, HbA1C goal < 8% (H)       blood glucose monitoring meter device kit    no brand specified    1 kit    Use to test blood sugar 2 times daily.    Type 2 diabetes mellitus with diabetic nephropathy (H)       blood glucose monitoring test strip    ACCU-CHEK RONNIE PLUS    200 each    Use to test blood sugar 2 times daily or as directed.  3 month supply.    Type 2 diabetes, HbA1C goal < 8% (H)       ciclopirox 0.77 % cream    LOPROX    90 g    Apply topically 2 times daily To feet and toenails.    Dermatophytosis of nail, Tinea pedis of both feet       clopidogrel 75 MG tablet    PLAVIX    12 tablet    TAKE ONE TABLET BY MOUTH ONE TIME DAILY    CAD S/P percutaneous coronary angioplasty, Coronary artery disease involving native coronary artery of native heart without angina pectoris       colchicine 0.6 MG tablet     COLCRYS    30 tablet    2 tabs at the onset of flare, then 1 tab every 2 hrs until pain is better or diarrhea occurs, up to 10 doses. Wait 3 days until taking again        fluticasone-vilanterol 100-25 MCG/INH inhaler    BREO ELLIPTA    3 Inhaler    Inhale 1 puff into the lungs daily    Chronic obstructive pulmonary disease, unspecified COPD type (H)       furosemide 20 MG tablet    LASIX    240 tablet    Take 2 tablets (40 mg) by mouth 2 times daily May take extra 20 mg as needed for wt .gain >3#    Pulmonary hypertension       inFLIXimab 100 MG injection    REMICADE     Inject 100 mg into the vein every 28 days        levothyroxine 125 MCG tablet    SYNTHROID/LEVOTHROID    90 tablet    Take 1 tablet (125 mcg) by mouth daily    Hypothyroidism due to Hashimoto's thyroiditis       losartan 50 MG tablet    COZAAR    90 tablet    TAKE ONE TABLET BY MOUTH ONE TIME DAILY    (HFpEF) heart failure with preserved ejection fraction (H)       methotrexate 2.5 MG tablet CHEMO     48 tablet    Take 3 tablets (7.5 mg) by mouth once a week On Mondays    Sarcoidosis       metoprolol tartrate 100 MG tablet    LOPRESSOR    60 tablet    Take 1 tablet (100 mg) by mouth 2 times daily    Hypertension secondary to other renal disorders       mirtazapine 15 MG tablet    REMERON     Take 15 mg by mouth At Bedtime.        MULTIVITAMIN & MINERAL PO      Take 1 tablet by mouth daily.        * order for DME     1 Device    She requested for a 4 wheel walker, would like to change it to 4 wheel walker with a seat and oxygen tank durant.   Need this faxed over to her  [phone]    Sarcoidosis, lung (H), COPD (chronic obstructive pulmonary disease) (H), Abnormal gait, Congestive heart failure (H)       * order for DME     1 Device    Updated Oxygen: Patient requires supplemental Oxygen 3 LPM via nasal canula with activity and 2 LPM nocturnally. Please provide patient with a portable oxygen concentrator for improved mobility.  Okay to spot  check or titrated for conserving to keep stats above 90%. Oxygen will be for a lifetime.    ILD (interstitial lung disease) (H), Hypoxia       polyethylene glycol powder    MIRALAX    510 g    Take 17 g (1 capful) by mouth daily    Constipation, unspecified constipation type       sildenafil 20 MG tablet    REVATIO    270 tablet    TAKE ONE TABLET BY MOUTH THREE TIMES DAILY    Pulmonary hypertension       tiotropium 18 MCG capsule    SPIRIVA HANDIHALER    90 capsule    Inhale contents of one capsule daily.    Chronic obstructive pulmonary disease, unspecified COPD type (H)       Urea 40 % Crea    CARMOL 40    199 g    To feet daily    Dermatophytosis of nail, Corns and callosities       * Notice:  This list has 2 medication(s) that are the same as other medications prescribed for you. Read the directions carefully, and ask your doctor or other care provider to review them with you.

## 2018-08-13 NOTE — TELEPHONE ENCOUNTER
----- Message from David Morris Perlman, MD sent at 8/13/2018 10:06 AM CDT -----  Hi,    Can you amend the protocol or contact them? Thanks,    DP  ----- Message -----     From: Abby Miguel RN     Sent: 8/8/2018   3:48 PM       To: David Morris Perlman, MD    Pt would like to have his remicade over one hour like GI patients can. He had it once that way before it was clear that the remicade protocol did not cover his diagnosis.    You can make a note in the theory plan or contact up at [phone] and talk to the charge RN.    Thanks,  Abby Miguel RN

## 2018-08-13 NOTE — LETTER
8/13/2018       RE: Rohan Monroe  [address]  [place] [address]     Dear Colleague,    Thank you for referring your patient, Rohan Monroe, to the [facility]. Please see a copy of my visit note below.    Past Medical History:   Diagnosis Date     Atrial flutter (H)      Cataract of both eyes      Chronic infection     Hep C     Congestive heart failure, unspecified      Coronary artery disease      Depressive disorder      Depressive disorder, not elsewhere classified     Depression (non-psychotic)     ERM OS (epiretinal membrane, left eye)      Generalized osteoarthrosis, unspecified site      Glaucoma suspect      Hypertension      Lichen planus      Other psoriasis      PVD (posterior vitreous detachment), left eye      Sarcoidosis      Sarcoidosis      Type II or unspecified type diabetes mellitus without mention of complication, not stated as uncontrolled      Unspecified hypothyroidism     Hypothyroidism     Unspecified viral hepatitis C without hepatic coma      Viral warts, unspecified      Patient Active Problem List   Diagnosis     Hypothyroidism     SARCOIDOSIS-systemic     Generalized osteoarthrosis, unspecified site     Viral warts     Other psoriasis     Hypertrophy of prostate without urinary obstruction     Diabetes mellitus, type 2 (H)     CAD (coronary artery disease)     Type 2 diabetes, HbA1C goal < 8% (H)     CARDIOVASCULAR SCREENING; LDL GOAL LESS THAN 100     Atrial flutter with rapid ventricular response (H)     CKD (chronic kidney disease) stage 3, GFR 30-59 ml/min     Hip joint pain     Hyperlipidemia LDL goal <70     Acute exacerbation of chronic obstructive pulmonary disease (COPD) (H)     Cellulitis     Immunosuppression (H)     Hyponatremia     RADHA (acute kidney injury) (HCC)     COPD (chronic obstructive pulmonary disease) (H)     Cirrhosis of liver (H)     Pneumonia      Proteinuria     Microscopic hematuria     Sarcoidosis of lung (HCC)     Hyperlipidemia     Atrial flutter (H)     Long-term (current) use of anticoagulants [Z79.01]     Hypoxia     CAD S/P percutaneous coronary angioplasty     Chest pain     Dermatophytosis of nail     Tyloma     Past Surgical History:   Procedure Laterality Date     ANESTHESIA CARDIOVERSION N/A 1/19/2017    Procedure: ANESTHESIA CARDIOVERSION;  Surgeon: GENERIC ANESTHESIA PROVIDER;  Location: [facility] OR     ANESTHESIA CARDIOVERSION N/A 1/23/2017    Procedure: ANESTHESIA CARDIOVERSION;  Surgeon: GENERIC ANESTHESIA PROVIDER;  Location: [facility] OR     ANESTHESIA CARDIOVERSION N/A 1/24/2017    Procedure: ANESTHESIA CARDIOVERSION;  Surgeon: GENERIC ANESTHESIA PROVIDER;  Location: [facility] OR     ARTHROPLASTY HIP  8/24/2011    Procedure:ARTHROPLASTY HIP; Right Total Hip Arthroplasty  Choice anesthesia; Surgeon:LESLI WILKINSON; Location:[facility] OR     BIOPSY       C PELVIS/HIP JOINT SURGERY UNLISTED       cardiac stent      s/p     CARDIAC SURGERY       CATARACT IOL, RT/LT  9/15/2015    LE     COLONOSCOPY       CORONARY ANGIOGRAPHY ADULT ORDER       H ABLATION ATRIAL FLUTTER       HC REMOVAL OF TONSILS,<9 Y/O      Tonsils <10y.o.     HC REPAIR INCISIONAL HERNIA,REDUCIBLE      Hernia Repair, Incisional, Unilateral     HEART CATH, ANGIOPLASTY       JOINT REPLACEMENT      1 month ago--right hip     LIGATN/STRIP LONG & SHORT SAPHEN       Social History     Social History     Marital status:      Spouse name: N/A     Number of children: 2     Years of education: N/A     Occupational History      [employer]     Social History Main Topics     Smoking status: Former Smoker     Packs/day: 1.00     Years: 30.00     Types: Cigarettes     Start date: 12/30/1960     Quit date: 7/22/1994     Smokeless tobacco: Never Used     Alcohol use No     Drug use: No     Sexual activity: Not Currently     Partners: Female     Birth control/ protection: Post-menopausal     Other  Topics Concern     Blood Transfusions No     Exercise Yes     Social History Narrative    Dairy/d 2 servings/d    Caffeine little servings/d    Exercise 3 x week    Sunscreen used - Yes    Seatbelts used - Yes    Working smoke/CO detectors in the home - Yes    Guns stored in the home - No    Self Breast Exams - NOT APPLICABLE    Self Testicular Exam - No    Eye Exam up to date - Yes    Dental Exam up to date - Yes    Pap Smear up to date - NOT APPLICABLE    Mammogram up to date - NOT APPLICABLE    PSA up to date - Yes    Dexa Scan up to date - NOT APPLICABLE    Flex Sig / Colonoscopy up to date - Yes    Immunizations up to date - Unsure    Abuse: Current or Past(Physical, Sexual or Emotional)- No    Do you feel safe in your environment - Yes     Family History   Problem Relation Age of Onset     HEART DISEASE Father      irreg heart beat     Circulatory Father      varicose veins     Prostate Cancer Father      HEART DISEASE Mother      heart attack     Arthritis Mother      Osteoperosis Mother      Thyroid Disease Mother      Hypertension Mother      Eye Disorder Maternal Grandmother      glaucoma     Diabetes Sister      type 2     Kidney Cancer Sister      Diabetes Sister      Glaucoma No family hx of      Macular Degeneration No family hx of      Cancer No family hx of      no skin cancer   SUBJECTIVE FINDINGS:  A 72-year-old male returns to clinic for toenail care and calluses that are painful.  He relates since I have seen him last, he has had gout.  He has had some heart disease.  He relates that he has had an ulcer on his left third toe.  He relates that he is wearing diabetic shoes with insoles.  No recent injuries.  No numbness or tingling in his feet, but he relates he occasionally gets paresthesias in his feet.      OBJECTIVE FINDINGS:  DP and PT are 2/4 bilaterally.  He has dorsally contracted digits 1-5 bilaterally.  He has hyperkeratotic tissue buildup, distal hallux bilaterally.  He has nucleated  hyperkeratotic tissue buildup with ecchymosis, plantar 2-4 MPJs, left foot.  He has hyperkeratotic tissue buildup with subungual dried blood and superficial skin breakdown on the left distal third toe.  There is no erythema, no drainage, no odor, no calor bilaterally.  He has dystrophic thickened brittle nails with subungual debris, dystrophy and discoloration 1-5 bilaterally to differing degrees.  Sharp/dull is decreased with 5.07 [place] monofilament on the plantar 2-4 MPJs bilaterally.  Otherwise, it is intact, although he says he feels it several times when I am not applying pressure.  There is some dry scaly skin in a moccasin pattern bilaterally.  He has healing abrasions on the right second, third and fourth toes dorsally and dorsolateral left fifth toe.  He relates using the pool and he may have scraped them.  They are intact with no abscesses.      ASSESSMENT AND PLAN:  Tylomas with preulcerative changes, left foot.  Onychomycosis bilaterally.  He is diabetic with peripheral neuropathy.  Diagnosis and treatment options discussed with the patient.  The tylomas bilaterally were sharp debrided with a tissue cutter and a #15 blade upon consent.  Six+ nails bilaterally were debrided upon consent.  Prescription for Loprox cream given and use discussed with him.  He also has some tinea pedis changes.  I am going to have him put Betadine and a Band-Aid on the left third toe.  If there is no drainage, he can d/c this.  If there is drainage, he will contact it.  He will return to clinic and see me in about 2 months.     Again, thank you for allowing me to participate in the care of your patient.      Sincerely,    Chicho Mejia DPM

## 2018-08-13 NOTE — PROGRESS NOTES
Past Medical History:   Diagnosis Date     Atrial flutter (H)      Cataract of both eyes      Chronic infection     Hep C     Congestive heart failure, unspecified      Coronary artery disease      Depressive disorder      Depressive disorder, not elsewhere classified     Depression (non-psychotic)     ERM OS (epiretinal membrane, left eye)      Generalized osteoarthrosis, unspecified site      Glaucoma suspect      Hypertension      Lichen planus      Other psoriasis      PVD (posterior vitreous detachment), left eye      Sarcoidosis      Sarcoidosis      Type II or unspecified type diabetes mellitus without mention of complication, not stated as uncontrolled      Unspecified hypothyroidism     Hypothyroidism     Unspecified viral hepatitis C without hepatic coma      Viral warts, unspecified      Patient Active Problem List   Diagnosis     Hypothyroidism     SARCOIDOSIS-systemic     Generalized osteoarthrosis, unspecified site     Viral warts     Other psoriasis     Hypertrophy of prostate without urinary obstruction     Diabetes mellitus, type 2 (H)     CAD (coronary artery disease)     Type 2 diabetes, HbA1C goal < 8% (H)     CARDIOVASCULAR SCREENING; LDL GOAL LESS THAN 100     Atrial flutter with rapid ventricular response (H)     CKD (chronic kidney disease) stage 3, GFR 30-59 ml/min     Hip joint pain     Hyperlipidemia LDL goal <70     Acute exacerbation of chronic obstructive pulmonary disease (COPD) (H)     Cellulitis     Immunosuppression (H)     Hyponatremia     RADHA (acute kidney injury) (HCC)     COPD (chronic obstructive pulmonary disease) (H)     Cirrhosis of liver (H)     Pneumonia     Proteinuria     Microscopic hematuria     Sarcoidosis of lung (HCC)     Hyperlipidemia     Atrial flutter (H)     Long-term (current) use of anticoagulants [Z79.01]     Hypoxia     CAD S/P percutaneous coronary angioplasty     Chest pain     Dermatophytosis of nail     Tyloma     Past Surgical History:   Procedure  Laterality Date     ANESTHESIA CARDIOVERSION N/A 1/19/2017    Procedure: ANESTHESIA CARDIOVERSION;  Surgeon: GENERIC ANESTHESIA PROVIDER;  Location: [facility] OR     ANESTHESIA CARDIOVERSION N/A 1/23/2017    Procedure: ANESTHESIA CARDIOVERSION;  Surgeon: GENERIC ANESTHESIA PROVIDER;  Location: [facility] OR     ANESTHESIA CARDIOVERSION N/A 1/24/2017    Procedure: ANESTHESIA CARDIOVERSION;  Surgeon: GENERIC ANESTHESIA PROVIDER;  Location: [facility] OR     ARTHROPLASTY HIP  8/24/2011    Procedure:ARTHROPLASTY HIP; Right Total Hip Arthroplasty  Choice anesthesia; Surgeon:LESLI WILKINSON; Location:[facility] OR     BIOPSY       C PELVIS/HIP JOINT SURGERY UNLISTED       cardiac stent      s/p     CARDIAC SURGERY       CATARACT IOL, RT/LT  9/15/2015    LE     COLONOSCOPY       CORONARY ANGIOGRAPHY ADULT ORDER       H ABLATION ATRIAL FLUTTER       HC REMOVAL OF TONSILS,<11 Y/O      Tonsils <10y.o.     HC REPAIR INCISIONAL HERNIA,REDUCIBLE      Hernia Repair, Incisional, Unilateral     HEART CATH, ANGIOPLASTY       JOINT REPLACEMENT      1 month ago--right hip     LIGATN/STRIP LONG & SHORT SAPHEN       Social History     Social History     Marital status:      Spouse name: N/A     Number of children: 2     Years of education: N/A     Occupational History      [employer]     Social History Main Topics     Smoking status: Former Smoker     Packs/day: 1.00     Years: 30.00     Types: Cigarettes     Start date: 12/30/1960     Quit date: 7/22/1994     Smokeless tobacco: Never Used     Alcohol use No     Drug use: No     Sexual activity: Not Currently     Partners: Female     Birth control/ protection: Post-menopausal     Other Topics Concern     Blood Transfusions No     Exercise Yes     Social History Narrative    Dairy/d 2 servings/d    Caffeine little servings/d    Exercise 3 x week    Sunscreen used - Yes    Seatbelts used - Yes    Working smoke/CO detectors in the home - Yes    Guns stored in the home - No    Self Breast Exams - NOT  APPLICABLE    Self Testicular Exam - No    Eye Exam up to date - Yes    Dental Exam up to date - Yes    Pap Smear up to date - NOT APPLICABLE    Mammogram up to date - NOT APPLICABLE    PSA up to date - Yes    Dexa Scan up to date - NOT APPLICABLE    Flex Sig / Colonoscopy up to date - Yes    Immunizations up to date - Unsure    Abuse: Current or Past(Physical, Sexual or Emotional)- No    Do you feel safe in your environment - Yes     Family History   Problem Relation Age of Onset     HEART DISEASE Father      irreg heart beat     Circulatory Father      varicose veins     Prostate Cancer Father      HEART DISEASE Mother      heart attack     Arthritis Mother      Osteoperosis Mother      Thyroid Disease Mother      Hypertension Mother      Eye Disorder Maternal Grandmother      glaucoma     Diabetes Sister      type 2     Kidney Cancer Sister      Diabetes Sister      Glaucoma No family hx of      Macular Degeneration No family hx of      Cancer No family hx of      no skin cancer   SUBJECTIVE FINDINGS:  A 72-year-old male returns to clinic for toenail care and calluses that are painful.  He relates since I have seen him last, he has had gout.  He has had some heart disease.  He relates that he has had an ulcer on his left third toe.  He relates that he is wearing diabetic shoes with insoles.  No recent injuries.  No numbness or tingling in his feet, but he relates he occasionally gets paresthesias in his feet.      OBJECTIVE FINDINGS:  DP and PT are 2/4 bilaterally.  He has dorsally contracted digits 1-5 bilaterally.  He has hyperkeratotic tissue buildup, distal hallux bilaterally.  He has nucleated hyperkeratotic tissue buildup with ecchymosis, plantar 2-4 MPJs, left foot.  He has hyperkeratotic tissue buildup with subungual dried blood and superficial skin breakdown on the left distal third toe.  There is no erythema, no drainage, no odor, no calor bilaterally.  He has dystrophic thickened brittle nails with  subungual debris, dystrophy and discoloration 1-5 bilaterally to differing degrees.  Sharp/dull is decreased with 5.07 Wittenberg-Francy monofilament on the plantar 2-4 MPJs bilaterally.  Otherwise, it is intact, although he says he feels it several times when I am not applying pressure.  There is some dry scaly skin in a moccasin pattern bilaterally.  He has healing abrasions on the right second, third and fourth toes dorsally and dorsolateral left fifth toe.  He relates using the pool and he may have scraped them.  They are intact with no abscesses.      ASSESSMENT AND PLAN:  Tylomas with preulcerative changes, left foot.  Onychomycosis bilaterally.  He is diabetic with peripheral neuropathy.  Diagnosis and treatment options discussed with the patient.  The tylomas bilaterally were sharp debrided with a tissue cutter and a #15 blade upon consent.  Six+ nails bilaterally were debrided upon consent.  Prescription for Loprox cream given and use discussed with him.  He also has some tinea pedis changes.  I am going to have him put Betadine and a Band-Aid on the left third toe.  If there is no drainage, he can d/c this.  If there is drainage, he will contact it.  He will return to clinic and see me in about 2 months.

## 2018-08-14 ENCOUNTER — HOSPITAL ENCOUNTER (OUTPATIENT)
Dept: CARDIAC REHAB | Facility: CLINIC | Age: 73
End: 2018-08-14
Attending: INTERNAL MEDICINE
Payer: MEDICARE

## 2018-08-14 PROCEDURE — 40000244 ZZH STATISTIC VISIT PULM REHAB: Performed by: REHABILITATION PRACTITIONER

## 2018-08-14 PROCEDURE — G0239 OTH RESP PROC, GROUP: HCPCS | Performed by: REHABILITATION PRACTITIONER

## 2018-08-16 ENCOUNTER — HOSPITAL ENCOUNTER (OUTPATIENT)
Dept: CARDIAC REHAB | Facility: CLINIC | Age: 73
End: 2018-08-16
Attending: INTERNAL MEDICINE
Payer: MEDICARE

## 2018-08-16 PROCEDURE — G0239 OTH RESP PROC, GROUP: HCPCS

## 2018-08-16 PROCEDURE — 40000244 ZZH STATISTIC VISIT PULM REHAB

## 2018-08-17 ENCOUNTER — OFFICE VISIT (OUTPATIENT)
Dept: PULMONOLOGY | Facility: CLINIC | Age: 73
End: 2018-08-17
Attending: INTERNAL MEDICINE
Payer: MEDICARE

## 2018-08-17 VITALS
SYSTOLIC BLOOD PRESSURE: 121 MMHG | HEART RATE: 58 BPM | OXYGEN SATURATION: 96 % | RESPIRATION RATE: 16 BRPM | DIASTOLIC BLOOD PRESSURE: 67 MMHG

## 2018-08-17 DIAGNOSIS — D86.0 SARCOIDOSIS OF LUNG (H): Primary | ICD-10-CM

## 2018-08-17 DIAGNOSIS — D86.9 SARCOIDOSIS: Primary | ICD-10-CM

## 2018-08-17 ASSESSMENT — PAIN SCALES - GENERAL: PAINLEVEL: NO PAIN (0)

## 2018-08-17 NOTE — MR AVS SNAPSHOT
After Visit Summary   8/17/2018    Rohan Monroe    MRN: [id number]           Patient Information     Date Of Birth          1945        Visit Information        Provider Department      8/17/2018 10:30 AM Perlman, David Morris, MD [facility]        Today's Diagnoses     Sarcoidosis    -  1       Follow-ups after your visit        Your next 10 appointments already scheduled     Sep 18, 2018  2:00 PM CDT   Pulmonary Treatment with Sh Pulmonary Rehab 2   [facility] ([facility])    [address]  [facility] [address]   [phone]            Sep 20, 2018  2:00 PM CDT   Pulmonary Treatment with Sh Pulmonary Rehab 2   [facility] ([facility])    [address]  [facility] [address]   [phone]            Sep 25, 2018  2:00 PM CDT   Pulmonary Treatment with Sh Pulmonary Rehab 2   [facility] ([facility])    [address]  [facility] [address]   [phone]            Sep 27, 2018  2:00 PM CDT   Pulmonary Treatment with Sh Pulmonary Rehab 2   [facility] ([facility])    [address]  [facility] [address]   [phone]            Oct 02, 2018  3:00 PM CDT   RETURN RETINA with Sandee Middleton MD   [facility] ([facility])    [address]  [facility] [address]   [phone]            Oct 04, 2018 12:00 PM CDT   Infusion 180 with UC SPEC INFUSION, UC 47 ATC   [facility] ([facility])    [address] [place] [address]  [address]  [facility] [address]   [phone]            Oct 22, 2018 12:40 PM CDT   (Arrive by 12:25 PM)   RETURN FOOT/ANKLE with Chicho Mejia DPM   [facility] ([facility])    [address]  4th Floor  [facility] [address] [phone]            Nov 01, 2018 11:30 AM CDT   LAB with UC LAB   [facility] ([facility])    [address]  1st Floor  [facility] [id number] [phone]           Please do not eat 10-12 hours before your appointment if you are coming in fasting for labs on lipids, cholesterol, or glucose (sugar). This does not apply to pregnant women. Water, hot tea and black coffee (with nothing added) are okay. Do not drink other fluids, diet soda or chew gum.            Nov 01, 2018 12:00 PM CDT   Infusion 180 with UC SPEC INFUSION   [facility] ([facility])    [address]  [address]  [facility] [address] [phone]              Who to contact     If you have questions or need follow up information about today's clinic visit or your schedule please contact [facility] directly at [phone].  Normal or non-critical lab and imaging results will be communicated to you by [employer], letter or phone within 4 business days after the clinic has received the results. If you do not hear from us within 7 days, please contact the clinic through [employer] or phone. If you have a critical or abnormal lab result, we will notify you by phone as soon as possible.  Submit refill requests through [facility] or call your pharmacy and they will forward the refill request to us. Please allow 3 business days for your refill to be completed.          Additional Information About Your Visit        [facility] Information     [facility] gives you secure access to your electronic health record. If you see a primary care provider, you can also send messages to your care team and make appointments. If you have questions, please call your primary care clinic.  If you do not have a primary care provider, please  call [phone] and they will assist you.        Care EveryWhere ID     This is your Care EveryWhere ID. This could be used by other organizations to access your [place] medical records  [id number]        Your Vitals Were     Pulse Respirations Pulse Oximetry             58 16 96%          Blood Pressure from Last 3 Encounters:   09/13/18 157/74   09/06/18 125/72   08/30/18 97/61    Weight from Last 3 Encounters:   09/13/18 73.7 kg (162 lb 8 oz)   09/06/18 74.8 kg (164 lb 14.4 oz)   08/30/18 72.8 kg (160 lb 8 oz)               Primary Care Provider Office Phone # Fax #    Jamie Foster [phone]       0 [address] [address]        Equal Access to Services     ISIAH MONTOYA : Hadii rhys jamison hadasho Soleslie, waaxda luqadaha, qaybta kaalmada adeforrestyada, nelly munson . So [facility] [phone].    ATENCIÓN: Si habla español, tiene a mcfadden disposición servicios gratuitos de asistencia lingüística. [facility] [phone].    We comply with applicable federal civil rights laws and [place] laws. We do not discriminate on the basis of race, color, national origin, age, disability, sex, sexual orientation, or gender identity.            Thank you!     Thank you for choosing [facility]  for your care. Our goal is always to provide you with excellent care. Hearing back from our patients is one way we can continue to improve our services. Please take a few minutes to complete the written survey that you may receive in the mail after your visit with us. Thank you!             Your Updated Medication List - Protect others around you: Learn how to safely use, store and throw away your medicines at www.disposemymeds.org.          This list is accurate as of 8/17/18 11:59 PM.  Always use your most recent med list.                   Brand Name Dispense Instructions for use Diagnosis    albuterol 108 (90 Base) MCG/ACT inhaler     PROAIR HFA/PROVENTIL HFA/VENTOLIN HFA    3 Inhaler    Inhale 2 puffs into the lungs every 6 hours as needed for shortness of breath / dyspnea    Sarcoidosis       atorvastatin 40 MG tablet    LIPITOR    30 tablet    TAKE ONE TABLET BY MOUTH ONE TIME DAILY    Coronary artery disease involving native coronary artery of native heart without angina pectoris, CAD S/P percutaneous coronary angioplasty       blood glucose monitoring lancets     2 Box    Use to test blood sugar 2 times daily or as directed.  102 lancets per box.  3 month supply.    Type 2 diabetes, HbA1C goal < 8% (H)       blood glucose monitoring meter device kit    no brand specified    1 kit    Use to test blood sugar 2 times daily.    Type 2 diabetes mellitus with diabetic nephropathy (H)       blood glucose monitoring test strip    ACCU-CHEK RONNIE PLUS    200 each    Use to test blood sugar 2 times daily or as directed.  3 month supply.    Type 2 diabetes, HbA1C goal < 8% (H)       ciclopirox 0.77 % cream    LOPROX    90 g    Apply topically 2 times daily To feet and toenails.    Dermatophytosis of nail, Tinea pedis of both feet       colchicine 0.6 MG tablet    COLCRYS    30 tablet    2 tabs at the onset of flare, then 1 tab every 2 hrs until pain is better or diarrhea occurs, up to 10 doses. Wait 3 days until taking again        fluticasone-vilanterol 100-25 MCG/INH inhaler    BREO ELLIPTA    3 Inhaler    Inhale 1 puff into the lungs daily    Chronic obstructive pulmonary disease, unspecified COPD type (H)       furosemide 20 MG tablet    LASIX    240 tablet    Take 2 tablets (40 mg) by mouth 2 times daily May take extra 20 mg as needed for wt .gain >3#    Pulmonary hypertension       inFLIXimab 100 MG injection    REMICADE     Inject 100 mg into the vein every 28 days        levothyroxine 125 MCG tablet    SYNTHROID/LEVOTHROID    90 tablet    Take 1 tablet (125 mcg) by mouth daily    Hypothyroidism due to Hashimoto's thyroiditis       losartan 50  MG tablet    COZAAR    90 tablet    TAKE ONE TABLET BY MOUTH ONE TIME DAILY    (HFpEF) heart failure with preserved ejection fraction (H)       methotrexate 2.5 MG tablet CHEMO     48 tablet    Take 3 tablets (7.5 mg) by mouth once a week On Mondays    Sarcoidosis       metoprolol tartrate 100 MG tablet    LOPRESSOR    60 tablet    Take 1 tablet (100 mg) by mouth 2 times daily    Hypertension secondary to other renal disorders       mirtazapine 15 MG tablet    REMERON     Take 15 mg by mouth At Bedtime.        MULTIVITAMIN & MINERAL PO      Take 1 tablet by mouth daily.        * order for DME     1 Device    She requested for a 4 wheel walker, would like to change it to 4 wheel walker with a seat and oxygen tank durant.   Need this faxed over to her  [phone]    Sarcoidosis, lung (H), COPD (chronic obstructive pulmonary disease) (H), Abnormal gait, Congestive heart failure (H)       * order for DME     1 Device    Updated Oxygen: Patient requires supplemental Oxygen 3 LPM via nasal canula with activity and 2 LPM nocturnally. Please provide patient with a portable oxygen concentrator for improved mobility.  Okay to spot check or titrated for conserving to keep stats above 90%. Oxygen will be for a lifetime.    ILD (interstitial lung disease) (H), Hypoxia       polyethylene glycol powder    MIRALAX    510 g    Take 17 g (1 capful) by mouth daily    Constipation, unspecified constipation type       sildenafil 20 MG tablet    REVATIO    270 tablet    TAKE ONE TABLET BY MOUTH THREE TIMES DAILY    Pulmonary hypertension       tiotropium 18 MCG capsule    SPIRIVA HANDIHALER    90 capsule    Inhale contents of one capsule daily.    Chronic obstructive pulmonary disease, unspecified COPD type (H)       Urea 40 % Crea    CARMOL 40    199 g    To feet daily    Dermatophytosis of nail, Corns and callosities       * Notice:  This list has 2 medication(s) that are the same as other medications prescribed for you. Read the  directions carefully, and ask your doctor or other care provider to review them with you.

## 2018-08-17 NOTE — NURSING NOTE
Chief Complaint   Patient presents with     Interstitial Lung Disease (ILD)     Patient is being seen for ILD follow up      Paige Gillette CMA at 10:10 AM on 8/17/2018

## 2018-08-17 NOTE — LETTER
8/17/2018       RE: Rohan Monroe  [address]  [place] [address]     Dear Colleague,    Thank you for referring your patient, Rohan Monroe, to the [facility] at [facility]. Please see a copy of my visit note below.    Reason for Visit  Rohan Monroe is a 72 year old year old male who is being seen for Interstitial Lung Disease (ILD) (Patient is being seen for ILD follow up)    ILD HPI    Rohan Monroe is a 72-year-old male with a history of pulmonary sarcoidosis was seen today for follow-up.  Overall he states he is doing well he has a complicated course currently maintained on Remicade and methotrexate.  Overall states his breathing is stable he still has moderate dyspnea on exertion which has been relatively stable over the past several years.  He continues on  infliximab infusions every 4 weeks and 10 mg of methotrexate daily.  Otherwise doing well no other new complaints today.  He is planning to move to [place] and is interested in establishing care at [facility] is asking about a possible referral there.  72-year-old femal      Current Outpatient Prescriptions   Medication     albuterol (PROAIR HFA/PROVENTIL HFA/VENTOLIN HFA) 108 (90 Base) MCG/ACT Inhaler     atorvastatin (LIPITOR) 40 MG tablet     blood glucose monitoring (ACCU-CHEK RONNIE PLUS) test strip     blood glucose monitoring (ACCU-CHEK FASTCLIX) lancets     blood glucose monitoring (NO BRAND SPECIFIED) meter device kit     ciclopirox (LOPROX) 0.77 % cream     colchicine (COLCRYS) 0.6 MG tablet     fluticasone-vilanterol (BREO ELLIPTA) 100-25 MCG/INH oral inhaler     furosemide (LASIX) 20 MG tablet     inFLIXimab (REMICADE) 100 MG injection     levothyroxine (SYNTHROID/LEVOTHROID) 125 MCG tablet     losartan (COZAAR) 50 MG tablet     methotrexate 2.5 MG tablet CHEMO     metoprolol tartrate (LOPRESSOR) 100 MG tablet      mirtazapine (REMERON) 15 MG tablet     Multiple Vitamins-Minerals (MULTIVITAMIN & MINERAL PO)     order for DME     order for DME     polyethylene glycol (MIRALAX) powder     sildenafil (REVATIO) 20 MG tablet     tiotropium (SPIRIVA HANDIHALER) 18 MCG capsule     Urea (CARMOL 40) 40 % CREA     No current facility-administered medications for this visit.      Allergies   Allergen Reactions     Prednisone Other (See Comments)     He reports that he can't sleep for days and can't use small to massive doses of prednisone   Pt. Does not do well with high doses of Prednisone, His MD says that Prednisone is counter indicated. Prednisone failed to treat his sarcoidosis      Past Medical History:   Diagnosis Date     Atrial flutter (H)      Cataract of both eyes      Chronic infection     Hep C     Congestive heart failure, unspecified      Coronary artery disease      Depressive disorder      Depressive disorder, not elsewhere classified     Depression (non-psychotic)     ERM OS (epiretinal membrane, left eye)      Generalized osteoarthrosis, unspecified site      Glaucoma suspect      Hypertension      Lichen planus      Other psoriasis      PVD (posterior vitreous detachment), left eye      Sarcoidosis      Sarcoidosis      Type II or unspecified type diabetes mellitus without mention of complication, not stated as uncontrolled      Unspecified hypothyroidism     Hypothyroidism     Unspecified viral hepatitis C without hepatic coma      Viral warts, unspecified        Past Surgical History:   Procedure Laterality Date     ANESTHESIA CARDIOVERSION N/A 1/19/2017    Procedure: ANESTHESIA CARDIOVERSION;  Surgeon: GENERIC ANESTHESIA PROVIDER;  Location: [facility]     ANESTHESIA CARDIOVERSION N/A 1/23/2017    Procedure: ANESTHESIA CARDIOVERSION;  Surgeon: GENERIC ANESTHESIA PROVIDER;  Location: [facility] OR     ANESTHESIA CARDIOVERSION N/A 1/24/2017    Procedure: ANESTHESIA CARDIOVERSION;  Surgeon: GENERIC ANESTHESIA PROVIDER;  Location:  [facility] OR     ARTHROPLASTY HIP  8/24/2011    Procedure:ARTHROPLASTY HIP; Right Total Hip Arthroplasty  Choice anesthesia; Surgeon:LESLI WILKINSON; Location:[facility] OR     BIOPSY       C PELVIS/HIP JOINT SURGERY UNLISTED       cardiac stent      s/p     CARDIAC SURGERY       CATARACT IOL, RT/LT  9/15/2015    LE     COLONOSCOPY       CORONARY ANGIOGRAPHY ADULT ORDER       H ABLATION ATRIAL FLUTTER       HC REMOVAL OF TONSILS,<13 Y/O      Tonsils <10y.o.     HC REPAIR INCISIONAL HERNIA,REDUCIBLE      Hernia Repair, Incisional, Unilateral     HEART CATH, ANGIOPLASTY       JOINT REPLACEMENT      1 month ago--right hip     LIGATN/STRIP LONG & SHORT SAPHEN         Social History     Social History     Marital status:      Spouse name: N/A     Number of children: 2     Years of education: N/A     Occupational History      [employer]     Social History Main Topics     Smoking status: Former Smoker     Packs/day: 1.00     Years: 30.00     Types: Cigarettes     Start date: 12/30/1960     Quit date: 7/22/1994     Smokeless tobacco: Never Used     Alcohol use No     Drug use: No     Sexual activity: Not Currently     Partners: Female     Birth control/ protection: Post-menopausal     Other Topics Concern     Blood Transfusions No     Exercise Yes     Social History Narrative    Dairy/d 2 servings/d    Caffeine little servings/d    Exercise 3 x week    Sunscreen used - Yes    Seatbelts used - Yes    Working smoke/CO detectors in the home - Yes    Guns stored in the home - No    Self Breast Exams - NOT APPLICABLE    Self Testicular Exam - No    Eye Exam up to date - Yes    Dental Exam up to date - Yes    Pap Smear up to date - NOT APPLICABLE    Mammogram up to date - NOT APPLICABLE    PSA up to date - Yes    Dexa Scan up to date - NOT APPLICABLE    Flex Sig / Colonoscopy up to date - Yes    Immunizations up to date - Unsure    Abuse: Current or Past(Physical, Sexual or Emotional)- No    Do you feel safe in your  environment - Yes       Family History   Problem Relation Age of Onset     HEART DISEASE Father      irreg heart beat     Circulatory Father      varicose veins     Prostate Cancer Father      HEART DISEASE Mother      heart attack     Arthritis Mother      Osteoporosis Mother      Thyroid Disease Mother      Hypertension Mother      Eye Disorder Maternal Grandmother      glaucoma     Diabetes Sister      type 2     Kidney Cancer Sister      Diabetes Sister      Glaucoma No family hx of      Macular Degeneration No family hx of      Cancer No family hx of      no skin cancer       ROS Pulmonary    A complete ROS was otherwise negative except as noted in the HPI.  Vitals:    08/17/18 1009   BP: 121/67   BP Location: Right arm   Patient Position: Chair   Cuff Size: Adult Regular   Pulse: 58   Resp: 16   SpO2: 96%     Exam:   GENERAL APPEARANCE: Well developed, well nourished, alert, and in no apparent distress.  EYES: PERRL, EOMI  HENT: nasal mucosa with out hyperemia or edema, no nasal polyps.  MOUTH: Oral mucosa is moist, without any lesions, no tonsillar enlargement, no oropharyngeal exudate.  NECK: supple, no masses, no thyromegaly.  LYMPHATICS: No significant axillary, cervical, or supraclavicular nodes.  RESP: good air flow throughout, - no crackles, rhonchi or wheezes.  CV: Normal S1, S2, regular rhythm, normal rate, no rub, no murmur,  no gallop, no LE edema.   ABDOMEN:  Bowel sounds normal, soft, nontender, no HSM or masses.   MS: extremities normal- no clubbing, no cyanosis.  SKIN: no rash on limited exam  NEURO: Mentation intact, speech normal, normal strength and tone, normal gait and stance  PSYCH: mentation appears normal. and affect normal/bright  Results: I have reviewed all imaging, PFTs and other relavent tests, please see below for details, PFT and imaging results were reviewed with the patient.    Assessment and plan:    72-year-old male with a history of pulmonary and cardiac sarcoidosis currently  stable pulmonary function test on his current regimen.  We will plan to continue his current regimen I will inquire with my colleagues at [facility] regarding a good sarcoid clinic for him there.      CBC   Recent Labs   Lab Test  08/28/18   1009  02/26/18   1317   RBC  4.14*  3.64*   HGB  13.0*  11.0*   HCT  39.6*  34.3*   PLT  240  277       Basic Metabolic Panel  Recent Labs   Lab Test  09/13/18   1115  08/28/18   1009   05/13/17   0116  05/13/17   0035   NA  137  136   < >   --    --    POTASSIUM  4.2  4.5   < >   --    --    CHLORIDE  104  104   < >   --    --    CO2  27  24   < >   --    --    BUN  36*  46*   < >   --    --    CT   --    --    --   Plasma, Thawed  PlateletPheresis LeukoReduced Irradiated  Plasma, Thawed  Plasma, Thawed  Plasma, Thawed  Apheresis Plasma Thawed  Plasma, Thawed  PlateletPheresis,LeukoRed Irrad (Part 2)  Red Blood Cells Leukocyte Reduced  Red Blood Cells Leukocyte Reduced  Red Blood Cells Leukocyte Reduced  Red Blood Cells Leukocyte Reduced  Red Blood Cells Leukocyte Reduced  Red Blood Cells LeukoReduced (Part 2)   GLC  121*  146*   < >   --    --    RAFFY  8.9  9.2   < >   --    --     < > = values in this interval not displayed.       INR  Recent Labs   Lab Test  08/28/18   1009  03/21/18   1312   INR  0.97  1.48*       PFT  PFT Latest Ref Rng & Units 8/17/2018   FVC L 2.74   FEV1 L 1.13   FVC% % 70   FEV1% % 38     Again, thank you for allowing me to participate in the care of your patient.      Sincerely,    David Morris Perlman, MD

## 2018-08-21 ENCOUNTER — HOSPITAL ENCOUNTER (OUTPATIENT)
Dept: CARDIAC REHAB | Facility: CLINIC | Age: 73
End: 2018-08-21
Attending: INTERNAL MEDICINE
Payer: MEDICARE

## 2018-08-21 PROCEDURE — G0239 OTH RESP PROC, GROUP: HCPCS | Performed by: CLINICAL EXERCISE PHYSIOLOGIST

## 2018-08-21 PROCEDURE — 40000244 ZZH STATISTIC VISIT PULM REHAB: Performed by: CLINICAL EXERCISE PHYSIOLOGIST

## 2018-08-22 ENCOUNTER — RADIANT APPOINTMENT (OUTPATIENT)
Dept: ULTRASOUND IMAGING | Facility: CLINIC | Age: 73
End: 2018-08-22
Attending: RADIOLOGY
Payer: MEDICARE

## 2018-08-22 ENCOUNTER — OFFICE VISIT (OUTPATIENT)
Dept: RADIOLOGY | Facility: CLINIC | Age: 73
End: 2018-08-22
Attending: RADIOLOGY
Payer: MEDICARE

## 2018-08-22 ENCOUNTER — RADIANT APPOINTMENT (OUTPATIENT)
Dept: GENERAL RADIOLOGY | Facility: CLINIC | Age: 73
End: 2018-08-22
Attending: RADIOLOGY
Payer: MEDICARE

## 2018-08-22 VITALS
BODY MASS INDEX: 24.55 KG/M2 | WEIGHT: 162 LBS | RESPIRATION RATE: 16 BRPM | TEMPERATURE: 96.6 F | OXYGEN SATURATION: 100 % | DIASTOLIC BLOOD PRESSURE: 84 MMHG | HEIGHT: 68 IN | SYSTOLIC BLOOD PRESSURE: 155 MMHG | HEART RATE: 73 BPM

## 2018-08-22 DIAGNOSIS — I72.3 PSEUDOANEURYSM OF ILIAC ARTERY (H): ICD-10-CM

## 2018-08-22 DIAGNOSIS — I72.3 PSEUDOANEURYSM OF ILIAC ARTERY (H): Primary | ICD-10-CM

## 2018-08-22 DIAGNOSIS — N18.30 CKD (CHRONIC KIDNEY DISEASE) STAGE 3, GFR 30-59 ML/MIN (H): Primary | ICD-10-CM

## 2018-08-22 PROCEDURE — G0463 HOSPITAL OUTPT CLINIC VISIT: HCPCS | Mod: ZF

## 2018-08-22 ASSESSMENT — PAIN SCALES - GENERAL: PAINLEVEL: NO PAIN (0)

## 2018-08-22 NOTE — LETTER
8/22/2018       RE: Rohan Monroe  [address]  [place] [address]     Dear Colleague,    Thank you for referring your patient, Rohan Monroe, to the [facility]. Please see a copy of my visit note below.        INTERVENTIONAL RADIOLOGY CONSULTATION    Name: Rohan Monroe  Age: 72 year old   Referring Physician: Dr. Deleon   REASON FOR REFERRAL: follow-up iliac stent     HPI: RTC for followup of right EIA stent placed 5/13/2017 in the setting of retroperitoneal bleed following cardiac angiography. Stent placement was complicated by left CFA PSA treated successfully with US guided thrombin injection. Doing well. No leg pain or claudication. Able to walk treadmill over 30 minutes with no leg pain. Cardiac and pulmonary rehab going well and his dyspnea has not at all worsened. Remains on home O2 with no increased requirement. No chest pain or palpitations.    PAST MEDICAL HISTORY:   Past Medical History:   Diagnosis Date     Atrial flutter (H)      Cataract of both eyes      Chronic infection     Hep C     Congestive heart failure, unspecified      Coronary artery disease      Depressive disorder      Depressive disorder, not elsewhere classified     Depression (non-psychotic)     ERM OS (epiretinal membrane, left eye)      Generalized osteoarthrosis, unspecified site      Glaucoma suspect      Hypertension      Lichen planus      Other psoriasis      PVD (posterior vitreous detachment), left eye      Sarcoidosis      Sarcoidosis      Type II or unspecified type diabetes mellitus without mention of complication, not stated as uncontrolled      Unspecified hypothyroidism     Hypothyroidism     Unspecified viral hepatitis C without hepatic coma      Viral warts, unspecified        PAST SURGICAL HISTORY:   Past Surgical History:   Procedure Laterality Date     ANESTHESIA CARDIOVERSION N/A 1/19/2017    Procedure: ANESTHESIA CARDIOVERSION;  Surgeon: GENERIC  ANESTHESIA PROVIDER;  Location: [facility] OR     ANESTHESIA CARDIOVERSION N/A 1/23/2017    Procedure: ANESTHESIA CARDIOVERSION;  Surgeon: GENERIC ANESTHESIA PROVIDER;  Location: [facility] OR     ANESTHESIA CARDIOVERSION N/A 1/24/2017    Procedure: ANESTHESIA CARDIOVERSION;  Surgeon: GENERIC ANESTHESIA PROVIDER;  Location: [facility] OR     ARTHROPLASTY HIP  8/24/2011    Procedure:ARTHROPLASTY HIP; Right Total Hip Arthroplasty  Choice anesthesia; Surgeon:LESLI WILKINSON; Location:[facility] OR     BIOPSY       C PELVIS/HIP JOINT SURGERY UNLISTED       cardiac stent      s/p     CARDIAC SURGERY       CATARACT IOL, RT/LT  9/15/2015    LE     COLONOSCOPY       CORONARY ANGIOGRAPHY ADULT ORDER       H ABLATION ATRIAL FLUTTER       HC REMOVAL OF TONSILS,<13 Y/O      Tonsils <10y.o.     HC REPAIR INCISIONAL HERNIA,REDUCIBLE      Hernia Repair, Incisional, Unilateral     HEART CATH, ANGIOPLASTY       JOINT REPLACEMENT      1 month ago--right hip     LIGATN/STRIP LONG & SHORT SAPHEN         FAMILY HISTORY:   Family History   Problem Relation Age of Onset     HEART DISEASE Father      irreg heart beat     Circulatory Father      varicose veins     Prostate Cancer Father      HEART DISEASE Mother      heart attack     Arthritis Mother      Osteoperosis Mother      Thyroid Disease Mother      Hypertension Mother      Eye Disorder Maternal Grandmother      glaucoma     Diabetes Sister      type 2     Kidney Cancer Sister      Diabetes Sister      Glaucoma No family hx of      Macular Degeneration No family hx of      Cancer No family hx of      no skin cancer       SOCIAL HISTORY:   Social History   Substance Use Topics     Smoking status: Former Smoker     Packs/day: 1.00     Years: 30.00     Types: Cigarettes     Start date: 12/30/1960     Quit date: 7/22/1994     Smokeless tobacco: Never Used     Alcohol use No       PROBLEM LIST:   Patient Active Problem List    Diagnosis Date Noted     Dermatophytosis of nail 01/23/2018     Priority: Medium     Tyloma  01/23/2018     Priority: Medium     CAD S/P percutaneous coronary angioplasty 05/12/2017     Priority: Medium     Chest pain 05/12/2017     Priority: Medium     Hypoxia 04/22/2017     Priority: Medium     Long-term (current) use of anticoagulants [Z79.01] 01/20/2017     Priority: Medium     Atrial flutter (H) 01/18/2017     Priority: Medium     Hyperlipidemia 05/13/2016     Priority: Medium     Sarcoidosis of lung (HCC) 03/21/2016     Priority: Medium     Proteinuria 10/30/2015     Priority: Medium     Microscopic hematuria 10/30/2015     Priority: Medium     Pneumonia 06/25/2014     Priority: Medium     Cirrhosis of liver (H) 04/16/2014     Priority: Medium     COPD (chronic obstructive pulmonary disease) (H) 01/22/2014     Priority: Medium     Hyponatremia 09/01/2013     Priority: Medium     Diagnosis updated by automated process. Provider to review and confirm.       RADHA (acute kidney injury) (HCC) 09/01/2013     Priority: Medium     Cellulitis 08/31/2013     Priority: Medium     Immunosuppression (H) 08/31/2013     Priority: Medium     Hyperlipidemia LDL goal <70 09/26/2011     Priority: Medium     Acute exacerbation of chronic obstructive pulmonary disease (COPD) (H) 09/26/2011     Priority: Medium     Hip joint pain 09/20/2011     Priority: Medium     Atrial flutter with rapid ventricular response (H) 03/28/2011     Priority: Medium     CKD (chronic kidney disease) stage 3, GFR 30-59 ml/min 03/28/2011     Priority: Medium     Type 2 diabetes, HbA1C goal < 8% (H) 10/31/2010     Priority: Medium     A1C 6.4, 2011        CARDIOVASCULAR SCREENING; LDL GOAL LESS THAN 100 10/31/2010     Priority: Medium     CAD (coronary artery disease) 05/30/2008     Priority: Medium     Diabetes mellitus, type 2 (H)      Priority: Medium     Problem list name updated by automated process. Provider to review       Hypertrophy of prostate without urinary obstruction 10/13/2006     Priority: Medium     Problem list name updated by  automated process. Provider to review       Hypothyroidism 07/22/2005     Priority: Medium     Hypothyroidism  Problem list name updated by automated process. Provider to review       SARCOIDOSIS-systemic 07/22/2005     Priority: Medium     Generalized osteoarthrosis, unspecified site 07/22/2005     Priority: Medium     Viral warts 07/22/2005     Priority: Medium     Problem list name updated by automated process. Provider to review       Other psoriasis 07/22/2005     Priority: Medium       MEDICATIONS:   Prescription Medications as of 8/22/2018             albuterol (PROAIR HFA/PROVENTIL HFA/VENTOLIN HFA) 108 (90 Base) MCG/ACT Inhaler Inhale 2 puffs into the lungs every 6 hours as needed for shortness of breath / dyspnea    atorvastatin (LIPITOR) 40 MG tablet TAKE ONE TABLET BY MOUTH ONE TIME DAILY     blood glucose monitoring (ACCU-CHEK RONNIE PLUS) test strip Use to test blood sugar 2 times daily or as directed.  3 month supply.    blood glucose monitoring (ACCU-CHEK FASTCLIX) lancets Use to test blood sugar 2 times daily or as directed.  102 lancets per box.  3 month supply.    blood glucose monitoring (NO BRAND SPECIFIED) meter device kit Use to test blood sugar 2 times daily.    ciclopirox (LOPROX) 0.77 % cream Apply topically 2 times daily To feet and toenails.    clopidogrel (PLAVIX) 75 MG tablet TAKE ONE TABLET BY MOUTH ONE TIME DAILY     colchicine (COLCRYS) 0.6 MG tablet 2 tabs at the onset of flare, then 1 tab every 2 hrs until pain is better or diarrhea occurs, up to 10 doses. Wait 3 days until taking again    fluticasone-vilanterol (BREO ELLIPTA) 100-25 MCG/INH oral inhaler Inhale 1 puff into the lungs daily    furosemide (LASIX) 20 MG tablet Take 2 tablets (40 mg) by mouth 2 times daily May take extra 20 mg as needed for wt .gain >3#    inFLIXimab (REMICADE) 100 MG injection Inject 100 mg into the vein every 28 days     levothyroxine (SYNTHROID/LEVOTHROID) 125 MCG tablet Take 1 tablet (125 mcg) by  "mouth daily    losartan (COZAAR) 50 MG tablet TAKE ONE TABLET BY MOUTH ONE TIME DAILY     methotrexate 2.5 MG tablet CHEMO Take 3 tablets (7.5 mg) by mouth once a week On Mondays    metoprolol tartrate (LOPRESSOR) 100 MG tablet Take 1 tablet (100 mg) by mouth 2 times daily    mirtazapine (REMERON) 15 MG tablet Take 15 mg by mouth At Bedtime.    Multiple Vitamins-Minerals (MULTIVITAMIN & MINERAL PO) Take 1 tablet by mouth daily.    order for DME Updated Oxygen: Patient requires supplemental Oxygen 3 LPM via nasal canula with activity and 2 LPM nocturnally. Please provide patient with a portable oxygen concentrator for improved mobility.  Okay to spot check or titrated for conserving to keep stats above 90%. Oxygen will be for a lifetime.    order for DME She requested for a 4 wheel walker, would like to change it to 4 wheel walker with a seat and oxygen tank durant.     Need this faxed over to her   [phone]    polyethylene glycol (MIRALAX) powder Take 17 g (1 capful) by mouth daily    sildenafil (REVATIO) 20 MG tablet TAKE ONE TABLET BY MOUTH THREE TIMES DAILY     tiotropium (SPIRIVA HANDIHALER) 18 MCG capsule Inhale contents of one capsule daily.    Urea (CARMOL 40) 40 % CREA To feet daily          ALLERGIES:   Prednisone    ROS:  Respiratory: baseline dyspnea, no change  Cardiovascular: negative  Musculoskeletal: negative  Psychiatric: negative  Hematologic/Lymphatic/Immunologic: negative      Physical Examination:   VITALS:   /84 (BP Location: Left arm, Patient Position: Sitting, Cuff Size: Adult Regular)  Pulse 73  Temp 96.6  F (35.9  C) (Oral)  Resp 16  Ht 1.727 m (5' 7.99\")  Wt 73.5 kg (162 lb)  SpO2 100%  BMI 24.64 kg/m2  Constitutional: alert and no distress  Cardiovascular: RRR.   Respiratory: Normal respiratory effort  Psychiatric: affect normal/bright and mentation appears normal.  Vascular: No lower extremity edema. Venous discoloration without ulcer right foot.   Brachial  Radial  Ulnar "  Femoral  Popliteal  DP  PT    Left       2/2  2/2    Right       2/2  2/2        Labs:    BMP RESULTS:  Lab Results   Component Value Date     03/08/2018    POTASSIUM 3.9 03/08/2018    CHLORIDE 101 03/08/2018    CO2 25 03/08/2018    ANIONGAP 9 03/08/2018     (H) 03/08/2018    BUN 58 (H) 03/08/2018    CR 2.08 (H) 03/08/2018    GFRESTIMATED 31 (L) 03/08/2018    GFRESTBLACK 38 (L) 03/08/2018    RAFFY 8.7 03/08/2018        CBC RESULTS:  Lab Results   Component Value Date    WBC 7.6 02/26/2018    RBC 3.64 (L) 02/26/2018    HGB 11.0 (L) 02/26/2018    HCT 34.3 (L) 02/26/2018    MCV 94 02/26/2018    MCH 30.2 02/26/2018    MCHC 32.1 02/26/2018    RDW 13.6 02/26/2018     02/26/2018       INR/PTT:  Lab Results   Component Value Date    INR 1.48 (H) 03/21/2018    PTT 34 05/13/2017       Diagnostic studies: Imaging reviewed by me. XR shows no stent fracture. US shows no stenosis.    Assessment: Mr Monroe is a 72 year old male who presents to clinic for followup of right EIA stent placed in the setting of retroperitoneal hemorrhage on 5/13/2017 with PSA of the left CFA access site treated with US guided thrombin injection. Mr Monroe continues to do well with regards to the stent. He denies leg pain, claudication symptoms, or symptoms of embolization. He continues his ASA as prescribed by his cardiac team.     Plan:  Return to clinic with repeat duplex ultrasound of the stent in 6 months or sooner if indicated.  He is considering moving to [place], and we can assist transfer of care, which would ultimately involve yearly ultrasound followup.    It was a pleasure to see Mr. Monroe in clinic today. Thank you for involving the Interventional Radiology service in his care.    I spent a total of 20 minutes with this patient, over 50% time was for counseling and care coordination.    Maria Victoria Palmer MD  Interventional Radiology Attending    [facility]    CC  Patient Care  Team:  Jamie Foster MD as PCP - General (Internal Medicine)  Kina Greco MD as MD (Ophthalmology)  Perlman, David Morris, MD as MD (Internal Medicine)  Haley Renner DO as Referring Physician (Student in organized health care education/training program)  Chicho Mejia, AGATHA (Podiatry)  Macey Roy MD as MD (Internal Medicine)  Madalyn Fajardo MD PhD as MD (Family Practice)  Jaclyn Pina, RN as Nurse Coordinator (Clinical Cardiac Electrophysiology)  Brian Vazquez MD as MD (Clinical Cardiac Electrophysiology)  Elizabeth Cabral APRN CNP as Nurse Practitioner (Nurse Practitioner)  Maria Victoria Palmer MD as MD (Radiology)  Jaclyn Pina, RN as Nurse Coordinator (Clinical Cardiac Electrophysiology)  Brian Vazquez MD as MD (Clinical Cardiac Electrophysiology)  Sandee Middleton MD as MD (Ophthalmology)

## 2018-08-22 NOTE — NURSING NOTE
"Oncology Rooming Note    August 22, 2018 11:03 AM   Rohan Monroe is a 72 year old male who presents for:    Chief Complaint   Patient presents with     RECHECK     return 6 mo     Initial Vitals: There were no vitals taken for this visit. Estimated body mass index is 23.99 kg/(m^2) as calculated from the following:    Height as of 4/12/18: 1.727 m (5' 8\").    Weight as of 8/8/18: 71.6 kg (157 lb 12.8 oz). There is no height or weight on file to calculate BSA.  Data Unavailable Comment: Data Unavailable   No LMP for male patient.  Allergies reviewed: Yes  Medications reviewed: Yes    Medications: Medication refills not needed today.  Pharmacy name entered into [employer] Management: [id number] - [address]    Clinical concerns: none      8 minutes for nursing intake (face to face time)     Zo ANAND Tate              "

## 2018-08-22 NOTE — PROGRESS NOTES
INTERVENTIONAL RADIOLOGY CONSULTATION    Name: Rohan Monroe  Age: 72 year old   Referring Physician: Dr. Deleon   REASON FOR REFERRAL: follow-up iliac stent     HPI: RTC for followup of right EIA stent placed 5/13/2017 in the setting of retroperitoneal bleed following cardiac angiography. Stent placement was complicated by left CFA PSA treated successfully with US guided thrombin injection. Doing well. No leg pain or claudication. Able to walk treadmill over 30 minutes with no leg pain. Cardiac and pulmonary rehab going well and his dyspnea has not at all worsened. Remains on home O2 with no increased requirement. No chest pain or palpitations.    PAST MEDICAL HISTORY:   Past Medical History:   Diagnosis Date     Atrial flutter (H)      Cataract of both eyes      Chronic infection     Hep C     Congestive heart failure, unspecified      Coronary artery disease      Depressive disorder      Depressive disorder, not elsewhere classified     Depression (non-psychotic)     ERM OS (epiretinal membrane, left eye)      Generalized osteoarthrosis, unspecified site      Glaucoma suspect      Hypertension      Lichen planus      Other psoriasis      PVD (posterior vitreous detachment), left eye      Sarcoidosis      Sarcoidosis      Type II or unspecified type diabetes mellitus without mention of complication, not stated as uncontrolled      Unspecified hypothyroidism     Hypothyroidism     Unspecified viral hepatitis C without hepatic coma      Viral warts, unspecified        PAST SURGICAL HISTORY:   Past Surgical History:   Procedure Laterality Date     ANESTHESIA CARDIOVERSION N/A 1/19/2017    Procedure: ANESTHESIA CARDIOVERSION;  Surgeon: GENERIC ANESTHESIA PROVIDER;  Location: [facility] OR     ANESTHESIA CARDIOVERSION N/A 1/23/2017    Procedure: ANESTHESIA CARDIOVERSION;  Surgeon: GENERIC ANESTHESIA PROVIDER;  Location: [facility] OR     ANESTHESIA CARDIOVERSION N/A 1/24/2017    Procedure: ANESTHESIA CARDIOVERSION;  Surgeon: GENERIC  ANESTHESIA PROVIDER;  Location: [facility] OR     ARTHROPLASTY HIP  8/24/2011    Procedure:ARTHROPLASTY HIP; Right Total Hip Arthroplasty  Choice anesthesia; Surgeon:LESLI WILKINSON; Location:[facility] OR     BIOPSY       C PELVIS/HIP JOINT SURGERY UNLISTED       cardiac stent      s/p     CARDIAC SURGERY       CATARACT IOL, RT/LT  9/15/2015    LE     COLONOSCOPY       CORONARY ANGIOGRAPHY ADULT ORDER       H ABLATION ATRIAL FLUTTER       HC REMOVAL OF TONSILS,<13 Y/O      Tonsils <10y.o.     HC REPAIR INCISIONAL HERNIA,REDUCIBLE      Hernia Repair, Incisional, Unilateral     HEART CATH, ANGIOPLASTY       JOINT REPLACEMENT      1 month ago--right hip     LIGATN/STRIP LONG & SHORT SAPHEN         FAMILY HISTORY:   Family History   Problem Relation Age of Onset     HEART DISEASE Father      irreg heart beat     Circulatory Father      varicose veins     Prostate Cancer Father      HEART DISEASE Mother      heart attack     Arthritis Mother      Osteoperosis Mother      Thyroid Disease Mother      Hypertension Mother      Eye Disorder Maternal Grandmother      glaucoma     Diabetes Sister      type 2     Kidney Cancer Sister      Diabetes Sister      Glaucoma No family hx of      Macular Degeneration No family hx of      Cancer No family hx of      no skin cancer       SOCIAL HISTORY:   Social History   Substance Use Topics     Smoking status: Former Smoker     Packs/day: 1.00     Years: 30.00     Types: Cigarettes     Start date: 12/30/1960     Quit date: 7/22/1994     Smokeless tobacco: Never Used     Alcohol use No       PROBLEM LIST:   Patient Active Problem List    Diagnosis Date Noted     Dermatophytosis of nail 01/23/2018     Priority: Medium     Tyloma 01/23/2018     Priority: Medium     CAD S/P percutaneous coronary angioplasty 05/12/2017     Priority: Medium     Chest pain 05/12/2017     Priority: Medium     Hypoxia 04/22/2017     Priority: Medium     Long-term (current) use of anticoagulants [Z79.01] 01/20/2017      Priority: Medium     Atrial flutter (H) 01/18/2017     Priority: Medium     Hyperlipidemia 05/13/2016     Priority: Medium     Sarcoidosis of lung (HCC) 03/21/2016     Priority: Medium     Proteinuria 10/30/2015     Priority: Medium     Microscopic hematuria 10/30/2015     Priority: Medium     Pneumonia 06/25/2014     Priority: Medium     Cirrhosis of liver (H) 04/16/2014     Priority: Medium     COPD (chronic obstructive pulmonary disease) (H) 01/22/2014     Priority: Medium     Hyponatremia 09/01/2013     Priority: Medium     Diagnosis updated by automated process. Provider to review and confirm.       RADHA (acute kidney injury) (HCC) 09/01/2013     Priority: Medium     Cellulitis 08/31/2013     Priority: Medium     Immunosuppression (H) 08/31/2013     Priority: Medium     Hyperlipidemia LDL goal <70 09/26/2011     Priority: Medium     Acute exacerbation of chronic obstructive pulmonary disease (COPD) (H) 09/26/2011     Priority: Medium     Hip joint pain 09/20/2011     Priority: Medium     Atrial flutter with rapid ventricular response (H) 03/28/2011     Priority: Medium     CKD (chronic kidney disease) stage 3, GFR 30-59 ml/min 03/28/2011     Priority: Medium     Type 2 diabetes, HbA1C goal < 8% (H) 10/31/2010     Priority: Medium     A1C 6.4, 2011        CARDIOVASCULAR SCREENING; LDL GOAL LESS THAN 100 10/31/2010     Priority: Medium     CAD (coronary artery disease) 05/30/2008     Priority: Medium     Diabetes mellitus, type 2 (H)      Priority: Medium     Problem list name updated by automated process. Provider to review       Hypertrophy of prostate without urinary obstruction 10/13/2006     Priority: Medium     Problem list name updated by automated process. Provider to review       Hypothyroidism 07/22/2005     Priority: Medium     Hypothyroidism  Problem list name updated by automated process. Provider to review       SARCOIDOSIS-systemic 07/22/2005     Priority: Medium     Generalized osteoarthrosis,  unspecified site 07/22/2005     Priority: Medium     Viral warts 07/22/2005     Priority: Medium     Problem list name updated by automated process. Provider to review       Other psoriasis 07/22/2005     Priority: Medium       MEDICATIONS:   Prescription Medications as of 8/22/2018             albuterol (PROAIR HFA/PROVENTIL HFA/VENTOLIN HFA) 108 (90 Base) MCG/ACT Inhaler Inhale 2 puffs into the lungs every 6 hours as needed for shortness of breath / dyspnea    atorvastatin (LIPITOR) 40 MG tablet TAKE ONE TABLET BY MOUTH ONE TIME DAILY     blood glucose monitoring (ACCU-CHEK RONNIE PLUS) test strip Use to test blood sugar 2 times daily or as directed.  3 month supply.    blood glucose monitoring (ACCU-CHEK FASTCLIX) lancets Use to test blood sugar 2 times daily or as directed.  102 lancets per box.  3 month supply.    blood glucose monitoring (NO BRAND SPECIFIED) meter device kit Use to test blood sugar 2 times daily.    ciclopirox (LOPROX) 0.77 % cream Apply topically 2 times daily To feet and toenails.    clopidogrel (PLAVIX) 75 MG tablet TAKE ONE TABLET BY MOUTH ONE TIME DAILY     colchicine (COLCRYS) 0.6 MG tablet 2 tabs at the onset of flare, then 1 tab every 2 hrs until pain is better or diarrhea occurs, up to 10 doses. Wait 3 days until taking again    fluticasone-vilanterol (BREO ELLIPTA) 100-25 MCG/INH oral inhaler Inhale 1 puff into the lungs daily    furosemide (LASIX) 20 MG tablet Take 2 tablets (40 mg) by mouth 2 times daily May take extra 20 mg as needed for wt .gain >3#    inFLIXimab (REMICADE) 100 MG injection Inject 100 mg into the vein every 28 days     levothyroxine (SYNTHROID/LEVOTHROID) 125 MCG tablet Take 1 tablet (125 mcg) by mouth daily    losartan (COZAAR) 50 MG tablet TAKE ONE TABLET BY MOUTH ONE TIME DAILY     methotrexate 2.5 MG tablet CHEMO Take 3 tablets (7.5 mg) by mouth once a week On Mondays    metoprolol tartrate (LOPRESSOR) 100 MG tablet Take 1 tablet (100 mg) by mouth 2 times  "daily    mirtazapine (REMERON) 15 MG tablet Take 15 mg by mouth At Bedtime.    Multiple Vitamins-Minerals (MULTIVITAMIN & MINERAL PO) Take 1 tablet by mouth daily.    order for DME Updated Oxygen: Patient requires supplemental Oxygen 3 LPM via nasal canula with activity and 2 LPM nocturnally. Please provide patient with a portable oxygen concentrator for improved mobility.  Okay to spot check or titrated for conserving to keep stats above 90%. Oxygen will be for a lifetime.    order for DME She requested for a 4 wheel walker, would like to change it to 4 wheel walker with a seat and oxygen tank durant.     Need this faxed over to her   [phone]    polyethylene glycol (MIRALAX) powder Take 17 g (1 capful) by mouth daily    sildenafil (REVATIO) 20 MG tablet TAKE ONE TABLET BY MOUTH THREE TIMES DAILY     tiotropium (SPIRIVA HANDIHALER) 18 MCG capsule Inhale contents of one capsule daily.    Urea (CARMOL 40) 40 % CREA To feet daily          ALLERGIES:   Prednisone    ROS:  Respiratory: baseline dyspnea, no change  Cardiovascular: negative  Musculoskeletal: negative  Psychiatric: negative  Hematologic/Lymphatic/Immunologic: negative      Physical Examination:   VITALS:   /84 (BP Location: Left arm, Patient Position: Sitting, Cuff Size: Adult Regular)  Pulse 73  Temp 96.6  F (35.9  C) (Oral)  Resp 16  Ht 1.727 m (5' 7.99\")  Wt 73.5 kg (162 lb)  SpO2 100%  BMI 24.64 kg/m2  Constitutional: alert and no distress  Cardiovascular: RRR.   Respiratory: Normal respiratory effort  Psychiatric: affect normal/bright and mentation appears normal.  Vascular: No lower extremity edema. Venous discoloration without ulcer right foot.   Brachial  Radial  Ulnar  Femoral  Popliteal  DP  PT    Left       2/2  2/2    Right       2/2  2/2        Labs:    BMP RESULTS:  Lab Results   Component Value Date     03/08/2018    POTASSIUM 3.9 03/08/2018    CHLORIDE 101 03/08/2018    CO2 25 03/08/2018    ANIONGAP 9 03/08/2018    "  (H) 03/08/2018    BUN 58 (H) 03/08/2018    CR 2.08 (H) 03/08/2018    GFRESTIMATED 31 (L) 03/08/2018    GFRESTBLACK 38 (L) 03/08/2018    RAFFY 8.7 03/08/2018        CBC RESULTS:  Lab Results   Component Value Date    WBC 7.6 02/26/2018    RBC 3.64 (L) 02/26/2018    HGB 11.0 (L) 02/26/2018    HCT 34.3 (L) 02/26/2018    MCV 94 02/26/2018    MCH 30.2 02/26/2018    MCHC 32.1 02/26/2018    RDW 13.6 02/26/2018     02/26/2018       INR/PTT:  Lab Results   Component Value Date    INR 1.48 (H) 03/21/2018    PTT 34 05/13/2017       Diagnostic studies: Imaging reviewed by me. XR shows no stent fracture. US shows no stenosis.    Assessment: Mr Monroe is a 72 year old male who presents to clinic for followup of right EIA stent placed in the setting of retroperitoneal hemorrhage on 5/13/2017 with PSA of the left CFA access site treated with US guided thrombin injection. Mr Monroe continues to do well with regards to the stent. He denies leg pain, claudication symptoms, or symptoms of embolization. He continues his ASA as prescribed by his cardiac team.     Plan:  Return to clinic with repeat duplex ultrasound of the stent in 6 months or sooner if indicated.  He is considering moving to [place], and we can assist transfer of care, which would ultimately involve yearly ultrasound followup.    It was a pleasure to see Mr. Monroe in clinic today. Thank you for involving the Interventional Radiology service in his care.    I spent a total of 20 minutes with this patient, over 50% time was for counseling and care coordination.    Maria Victoria [place] MD  Interventional Radiology Attending    [facility]      CC  Patient Care Team:  Jamie Foster MD as PCP - General (Internal Medicine)  Jamie Foster MD as Referring Physician (Internal Medicine)  Kina Greco MD as MD (Ophthalmology)  Perlman, David Morris, MD as MD (Internal Medicine)  Haley Renner DO as Referring  Physician (Student in organized health care education/training program)  Chicho Mejia, EDDYM (Podiatry)  Macey Roy MD as MD (Internal Medicine)  Madalyn Fajardo MD PhD as MD (Family Practice)  Jaclyn Pina, RN as Nurse Coordinator (Clinical Cardiac Electrophysiology)  Brian Vazquez MD as MD (Clinical Cardiac Electrophysiology)  Elizabeth Cabral, VINNY CNP as Nurse Practitioner (Nurse Practitioner)  Maria Victoria [place] MD as MD (Radiology)  Jaclyn Pina, RN as Nurse Coordinator (Clinical Cardiac Electrophysiology)  Brian Vazquez MD as MD (Clinical Cardiac Electrophysiology)  Sandee Middleton MD as MD (Ophthalmology)  SELF, REFERRED

## 2018-08-22 NOTE — MR AVS SNAPSHOT
After Visit Summary   8/22/2018    Rohan Monroe    MRN: [id number]           Patient Information     Date Of Birth          1945        Visit Information        Provider Department      8/22/2018 11:40 AM Maria Victoria [place] MD [facility]        Today's Diagnoses     Pseudoaneurysm of iliac artery (H)    -  1       Follow-ups after your visit        Your next 10 appointments already scheduled     Aug 23, 2018  2:00 PM CDT   Pulmonary Treatment with Sh Pulmonary Rehab 2   [facility] ([facility])    [address], [address]  [facility] [address]   [phone]            Aug 28, 2018 10:00 AM CDT   Lab with [facility]   [facility] ([facility])    [address] [place] [address] [phone]            Aug 28, 2018 10:30 AM CDT   US ABDOMEN LIMITED with UCUS2   [facility] US ([facility])    [address] [place] [address] [phone]           Please bring a list of your medicines (including vitamins, minerals and over-the-counter drugs). Also, tell your doctor about any allergies you may have. Wear comfortable clothes and leave your valuables at home.  Adults: No eating or drinking for 8 hours before the exam. You may take medicine with a small sip of water.  Children: - Infants, breast-fed: may have breast milk up to 2 hours before exam. - Infants, formula: may have bottle until 4 hours before exam. - Children 1-5 years: No food or drink for 4 hours before exam. - Children 6 -12 years: No food or drink for 6 hours before exam. - Children over 12 years: No food or drink for 8 hours before exam. - J Tube Fed: No need to stop feedings.  Please call the Imaging Department at your exam site with any questions.            Aug 28, 2018 11:45 AM CDT   (Arrive by 11:30 AM)   Return General Liver with Moses URIBE  MD Kwesi   [facility] ([facility])    [address]  [address]  [facility] [address]   [phone]            Aug 28, 2018  2:00 PM CDT   Pulmonary Treatment with  Pulmonary Rehab 2   [facility] ([facility])    [address]  [facility] [address]   [phone]            Aug 30, 2018  2:00 PM CDT   (Arrive by 1:30 PM)   Return Visit with Ollie Michel MD   [facility] Nephrology ([facility])    [address]  [address]  [facility] [address]   [phone]            Sep 04, 2018  2:00 PM CDT   Pulmonary Treatment with  Pulmonary Rehab 2   [facility] ([facility])    [address]  [facility] [address]   [phone]            Sep 06, 2018  1:00 PM CDT   Infusion 180 with UC SPEC INFUSION, UC 43 ATC   [facility] Procedure ([facility])    [address]  [address]  [facility] [address]   [phone]            Sep 11, 2018  2:00 PM CDT   Pulmonary Treatment with  Pulmonary Rehab 2   [facility] ([facility])    [address]  [facility] [address]   [phone]              Future tests that were ordered for you today     Open Future Orders        Priority Expected Expires Ordered    US Aorta/IVC/Iliac Duplex Limited Routine  8/22/2019 8/22/2018    XR Pelvis and Hip Bilateral 2 Views Routine 8/22/2018 8/22/2019 8/22/2018            Who to contact     If you have questions or need follow up information about today's clinic visit or your schedule please contact [facility] directly at [phone].  Normal or non-critical lab and imaging results will be communicated to you by MyChart, letter or phone within 4 business days after the clinic has received the results. If  "you do not hear from us within 7 days, please contact the clinic through MPV or phone. If you have a critical or abnormal lab result, we will notify you by phone as soon as possible.  Submit refill requests through MPV or call your pharmacy and they will forward the refill request to us. Please allow 3 business days for your refill to be completed.          Additional Information About Your Visit        [facility] Information     [facility] gives you secure access to your electronic health record. If you see a primary care provider, you can also send messages to your care team and make appointments. If you have questions, please call your primary care clinic.  If you do not have a primary care provider, please call [phone] and they will assist you.        Care EveryWhere ID     This is your Care EveryWhere ID. This could be used by other organizations to access your [place] medical records  [id number]        Your Vitals Were     Pulse Temperature Respirations Height Pulse Oximetry BMI (Body Mass Index)    73 96.6  F (35.9  C) (Oral) 16 1.727 m (5' 7.99\") 100% 24.64 kg/m2       Blood Pressure from Last 3 Encounters:   08/22/18 155/84   08/17/18 121/67   08/08/18 125/64    Weight from Last 3 Encounters:   08/22/18 73.5 kg (162 lb)   08/08/18 71.6 kg (157 lb 12.8 oz)   07/11/18 73 kg (161 lb)               Primary Care Provider Office Phone # Fax #    Jamie Foster [phone] [address] [address]        Equal Access to Services     [facility]: Hadii rhys jamison hadasho Soomaali, waaxda luqadaha, qaybta kaalmanelly russ . So [facility] [phone].    ATENCIÓN: Si habla español, tiene a mcfadden disposición servicios gratuitos de asistencia lingüística. Llame al [phone].    We comply with applicable federal civil rights laws and [place] laws. We do not discriminate on the basis of race, color, national origin, age, " disability, sex, sexual orientation, or gender identity.            Thank you!     Thank you for choosing [facility]  for your care. Our goal is always to provide you with excellent care. Hearing back from our patients is one way we can continue to improve our services. Please take a few minutes to complete the written survey that you may receive in the mail after your visit with us. Thank you!             Your Updated Medication List - Protect others around you: Learn how to safely use, store and throw away your medicines at www.disposemymeds.org.          This list is accurate as of 8/22/18 12:02 PM.  Always use your most recent med list.                   Brand Name Dispense Instructions for use Diagnosis    albuterol 108 (90 Base) MCG/ACT inhaler    PROAIR HFA/PROVENTIL HFA/VENTOLIN HFA    3 Inhaler    Inhale 2 puffs into the lungs every 6 hours as needed for shortness of breath / dyspnea    Sarcoidosis       atorvastatin 40 MG tablet    LIPITOR    30 tablet    TAKE ONE TABLET BY MOUTH ONE TIME DAILY    Coronary artery disease involving native coronary artery of native heart without angina pectoris, CAD S/P percutaneous coronary angioplasty       blood glucose monitoring lancets     2 Box    Use to test blood sugar 2 times daily or as directed.  102 lancets per box.  3 month supply.    Type 2 diabetes, HbA1C goal < 8% (H)       blood glucose monitoring meter device kit    no brand specified    1 kit    Use to test blood sugar 2 times daily.    Type 2 diabetes mellitus with diabetic nephropathy (H)       blood glucose monitoring test strip    ACCU-CHEK RONNIE PLUS    200 each    Use to test blood sugar 2 times daily or as directed.  3 month supply.    Type 2 diabetes, HbA1C goal < 8% (H)       ciclopirox 0.77 % cream    LOPROX    90 g    Apply topically 2 times daily To feet and toenails.    Dermatophytosis of nail, Tinea pedis of both feet       clopidogrel 75 MG tablet    PLAVIX    12 tablet     TAKE ONE TABLET BY MOUTH ONE TIME DAILY    CAD S/P percutaneous coronary angioplasty, Coronary artery disease involving native coronary artery of native heart without angina pectoris       colchicine 0.6 MG tablet    COLCRYS    30 tablet    2 tabs at the onset of flare, then 1 tab every 2 hrs until pain is better or diarrhea occurs, up to 10 doses. Wait 3 days until taking again        fluticasone-vilanterol 100-25 MCG/INH inhaler    BREO ELLIPTA    3 Inhaler    Inhale 1 puff into the lungs daily    Chronic obstructive pulmonary disease, unspecified COPD type (H)       furosemide 20 MG tablet    LASIX    240 tablet    Take 2 tablets (40 mg) by mouth 2 times daily May take extra 20 mg as needed for wt .gain >3#    Pulmonary hypertension       inFLIXimab 100 MG injection    REMICADE     Inject 100 mg into the vein every 28 days        levothyroxine 125 MCG tablet    SYNTHROID/LEVOTHROID    90 tablet    Take 1 tablet (125 mcg) by mouth daily    Hypothyroidism due to Hashimoto's thyroiditis       losartan 50 MG tablet    COZAAR    90 tablet    TAKE ONE TABLET BY MOUTH ONE TIME DAILY    (HFpEF) heart failure with preserved ejection fraction (H)       methotrexate 2.5 MG tablet CHEMO     48 tablet    Take 3 tablets (7.5 mg) by mouth once a week On Mondays    Sarcoidosis       metoprolol tartrate 100 MG tablet    LOPRESSOR    60 tablet    Take 1 tablet (100 mg) by mouth 2 times daily    Hypertension secondary to other renal disorders       mirtazapine 15 MG tablet    REMERON     Take 15 mg by mouth At Bedtime.        MULTIVITAMIN & MINERAL PO      Take 1 tablet by mouth daily.        * order for DME     1 Device    She requested for a 4 wheel walker, would like to change it to 4 wheel walker with a seat and oxygen tank durant.   Need this faxed over to her  [phone]    Sarcoidosis, lung (H), COPD (chronic obstructive pulmonary disease) (H), Abnormal gait, Congestive heart failure (H)       * order for DME     1  Device    Updated Oxygen: Patient requires supplemental Oxygen 3 LPM via nasal canula with activity and 2 LPM nocturnally. Please provide patient with a portable oxygen concentrator for improved mobility.  Okay to spot check or titrated for conserving to keep stats above 90%. Oxygen will be for a lifetime.    ILD (interstitial lung disease) (H), Hypoxia       polyethylene glycol powder    MIRALAX    510 g    Take 17 g (1 capful) by mouth daily    Constipation, unspecified constipation type       sildenafil 20 MG tablet    REVATIO    270 tablet    TAKE ONE TABLET BY MOUTH THREE TIMES DAILY    Pulmonary hypertension       tiotropium 18 MCG capsule    SPIRIVA HANDIHALER    90 capsule    Inhale contents of one capsule daily.    Chronic obstructive pulmonary disease, unspecified COPD type (H)       Urea 40 % Crea    CARMOL 40    199 g    To feet daily    Dermatophytosis of nail, Corns and callosities       * Notice:  This list has 2 medication(s) that are the same as other medications prescribed for you. Read the directions carefully, and ask your doctor or other care provider to review them with you.

## 2018-08-23 ENCOUNTER — HOSPITAL ENCOUNTER (OUTPATIENT)
Dept: CARDIAC REHAB | Facility: CLINIC | Age: 73
End: 2018-08-23
Attending: INTERNAL MEDICINE
Payer: MEDICARE

## 2018-08-23 PROCEDURE — 40000244 ZZH STATISTIC VISIT PULM REHAB

## 2018-08-23 PROCEDURE — G0239 OTH RESP PROC, GROUP: HCPCS

## 2018-08-27 ENCOUNTER — TELEPHONE (OUTPATIENT)
Dept: GASTROENTEROLOGY | Facility: CLINIC | Age: 73
End: 2018-08-27

## 2018-08-27 NOTE — TELEPHONE ENCOUNTER
Patient contacted and reminded of upcoming appointment.  Patient confirmed they will be attending.  Patient instructed to bring updated medications list to appointment.  Reviewed medication.  Aj Weber, CMA

## 2018-08-28 ENCOUNTER — RADIANT APPOINTMENT (OUTPATIENT)
Dept: ULTRASOUND IMAGING | Facility: CLINIC | Age: 73
End: 2018-08-28
Attending: INTERNAL MEDICINE
Payer: MEDICARE

## 2018-08-28 ENCOUNTER — OFFICE VISIT (OUTPATIENT)
Dept: GASTROENTEROLOGY | Facility: CLINIC | Age: 73
End: 2018-08-28
Attending: INTERNAL MEDICINE
Payer: MEDICARE

## 2018-08-28 VITALS
SYSTOLIC BLOOD PRESSURE: 163 MMHG | TEMPERATURE: 97.6 F | WEIGHT: 160.6 LBS | HEART RATE: 67 BPM | RESPIRATION RATE: 20 BRPM | HEIGHT: 68 IN | DIASTOLIC BLOOD PRESSURE: 84 MMHG | BODY MASS INDEX: 24.34 KG/M2 | OXYGEN SATURATION: 98 %

## 2018-08-28 DIAGNOSIS — K74.60 CIRRHOSIS OF LIVER WITHOUT ASCITES, UNSPECIFIED HEPATIC CIRRHOSIS TYPE (H): ICD-10-CM

## 2018-08-28 DIAGNOSIS — N18.30 CKD (CHRONIC KIDNEY DISEASE) STAGE 3, GFR 30-59 ML/MIN (H): ICD-10-CM

## 2018-08-28 DIAGNOSIS — K74.60 CIRRHOSIS OF LIVER WITHOUT ASCITES, UNSPECIFIED HEPATIC CIRRHOSIS TYPE (H): Primary | ICD-10-CM

## 2018-08-28 LAB
AFP SERPL-MCNC: 4.2 UG/L (ref 0–8)
ALBUMIN SERPL-MCNC: 3.7 G/DL (ref 3.4–5)
ALP SERPL-CCNC: 116 U/L (ref 40–150)
ALT SERPL W P-5'-P-CCNC: 29 U/L (ref 0–70)
ANION GAP SERPL CALCULATED.3IONS-SCNC: 7 MMOL/L (ref 3–14)
AST SERPL W P-5'-P-CCNC: 23 U/L (ref 0–45)
BILIRUB DIRECT SERPL-MCNC: 0.2 MG/DL (ref 0–0.2)
BILIRUB SERPL-MCNC: 0.8 MG/DL (ref 0.2–1.3)
BUN SERPL-MCNC: 46 MG/DL (ref 7–30)
CALCIUM SERPL-MCNC: 9.2 MG/DL (ref 8.5–10.1)
CHLORIDE SERPL-SCNC: 104 MMOL/L (ref 94–109)
CO2 SERPL-SCNC: 24 MMOL/L (ref 20–32)
CREAT SERPL-MCNC: 1.9 MG/DL (ref 0.66–1.25)
CREAT UR-MCNC: 21 MG/DL
ERYTHROCYTE [DISTWIDTH] IN BLOOD BY AUTOMATED COUNT: 14.2 % (ref 10–15)
GFR SERPL CREATININE-BSD FRML MDRD: 35 ML/MIN/1.7M2
GLUCOSE SERPL-MCNC: 146 MG/DL (ref 70–99)
HCT VFR BLD AUTO: 39.6 % (ref 40–53)
HGB BLD-MCNC: 13 G/DL (ref 13.3–17.7)
INR PPP: 0.97 (ref 0.86–1.14)
MCH RBC QN AUTO: 31.4 PG (ref 26.5–33)
MCHC RBC AUTO-ENTMCNC: 32.8 G/DL (ref 31.5–36.5)
MCV RBC AUTO: 96 FL (ref 78–100)
MICROALBUMIN UR-MCNC: 604 MG/L
MICROALBUMIN/CREAT UR: 2903.85 MG/G CR (ref 0–17)
PHOSPHATE SERPL-MCNC: 3.3 MG/DL (ref 2.5–4.5)
PLATELET # BLD AUTO: 240 10E9/L (ref 150–450)
POTASSIUM SERPL-SCNC: 4.5 MMOL/L (ref 3.4–5.3)
PROT SERPL-MCNC: 8.1 G/DL (ref 6.8–8.8)
PROT UR-MCNC: 0.79 G/L
PROT/CREAT 24H UR: 3.82 G/G CR (ref 0–0.2)
PTH-INTACT SERPL-MCNC: 87 PG/ML (ref 18–80)
RBC # BLD AUTO: 4.14 10E12/L (ref 4.4–5.9)
SODIUM SERPL-SCNC: 136 MMOL/L (ref 133–144)
WBC # BLD AUTO: 8.9 10E9/L (ref 4–11)

## 2018-08-28 PROCEDURE — 82306 VITAMIN D 25 HYDROXY: CPT | Performed by: INTERNAL MEDICINE

## 2018-08-28 PROCEDURE — 80069 RENAL FUNCTION PANEL: CPT | Performed by: INTERNAL MEDICINE

## 2018-08-28 PROCEDURE — 36415 COLL VENOUS BLD VENIPUNCTURE: CPT | Performed by: INTERNAL MEDICINE

## 2018-08-28 PROCEDURE — 84156 ASSAY OF PROTEIN URINE: CPT | Performed by: INTERNAL MEDICINE

## 2018-08-28 PROCEDURE — 85610 PROTHROMBIN TIME: CPT | Performed by: INTERNAL MEDICINE

## 2018-08-28 PROCEDURE — 82248 BILIRUBIN DIRECT: CPT | Performed by: INTERNAL MEDICINE

## 2018-08-28 PROCEDURE — 83970 ASSAY OF PARATHORMONE: CPT | Performed by: INTERNAL MEDICINE

## 2018-08-28 PROCEDURE — G0463 HOSPITAL OUTPT CLINIC VISIT: HCPCS | Mod: ZF

## 2018-08-28 PROCEDURE — 82043 UR ALBUMIN QUANTITATIVE: CPT | Performed by: INTERNAL MEDICINE

## 2018-08-28 PROCEDURE — 84450 TRANSFERASE (AST) (SGOT): CPT | Performed by: INTERNAL MEDICINE

## 2018-08-28 PROCEDURE — 82247 BILIRUBIN TOTAL: CPT | Performed by: INTERNAL MEDICINE

## 2018-08-28 PROCEDURE — 82105 ALPHA-FETOPROTEIN SERUM: CPT | Performed by: INTERNAL MEDICINE

## 2018-08-28 PROCEDURE — 84075 ASSAY ALKALINE PHOSPHATASE: CPT | Performed by: INTERNAL MEDICINE

## 2018-08-28 PROCEDURE — 84155 ASSAY OF PROTEIN SERUM: CPT | Performed by: INTERNAL MEDICINE

## 2018-08-28 PROCEDURE — 85027 COMPLETE CBC AUTOMATED: CPT | Performed by: INTERNAL MEDICINE

## 2018-08-28 PROCEDURE — 84460 ALANINE AMINO (ALT) (SGPT): CPT | Performed by: INTERNAL MEDICINE

## 2018-08-28 ASSESSMENT — PAIN SCALES - GENERAL: PAINLEVEL: NO PAIN (0)

## 2018-08-28 NOTE — PATIENT INSTRUCTIONS
Preventive Care:    Diabetic Eye Exam Screening: During our visit today, we discussed that it is recommended you receive diabetic eye exam screening. Please call or make an appointment with your primary care provider to discuss this with them. You may also call the [facility] scheduling line ([phone]) to set up an eye exam at one of the [facility] Eye Clinics.

## 2018-08-28 NOTE — MR AVS SNAPSHOT
After Visit Summary   8/28/2018    Rohan Monroe    MRN: [id number]           Patient Information     Date Of Birth          1945        Visit Information        Provider Department      8/28/2018 11:45 AM Moses Orosco MD [facility] Hepatology        Today's Diagnoses     Cirrhosis of liver without ascites, unspecified hepatic cirrhosis type (H)    -  1      Care Instructions    Preventive Care:    Diabetic Eye Exam Screening: During our visit today, we discussed that it is recommended you receive diabetic eye exam screening. Please call or make an appointment with your primary care provider to discuss this with them. You may also call the [facility] scheduling line ([phone]) to set up an eye exam at one of the [facility] Eye Clinics.                Follow-ups after your visit        Follow-up notes from your care team     Return if symptoms worsen or fail to improve.      Your next 10 appointments already scheduled     Aug 30, 2018  2:00 PM CDT   (Arrive by 1:30 PM)   Return Visit with Ollie Michel MD   [facility] Nephrology ([facility] [place])    [address]  [address]  [facility] [address]   [phone]            Sep 04, 2018  2:00 PM CDT   Pulmonary Treatment with Sh Pulmonary Rehab 2   [facility] ([facility])    [address], [address]  [facility] [address]   [phone]            Sep 06, 2018  1:00 PM CDT   Infusion 180 with UC SPEC INFUSION, UC 43 ATC   [facility] ([facility])    [address]  [address]  [place] [facility] [address]   [phone]            Sep 11, 2018  2:00 PM CDT   Pulmonary Treatment with Sh Pulmonary Rehab 2   [facility] ([facility])    [address]  [facility] [address]   [phone]            Sep 13, 2018 11:00 AM CDT   Lab with UC  LAB    [facility] ([facility])    [address]  1st Floor  [place] [address]   [phone]            Sep 13, 2018 11:30 AM CDT   (Arrive by 11:15 AM)   RETURN HEART FAILURE with Diane Paige MD   [facility] ([facility])    [address]  [address]  [facility] [address]   [phone]            Sep 13, 2018  2:00 PM CDT   Pulmonary Treatment with Sh Pulmonary Rehab 2   [facility] ([facility])    [address]  [facility] [address]   [phone]            Sep 18, 2018  2:00 PM CDT   Pulmonary Treatment with Sh Pulmonary Rehab 2   [facility] ([facility])    [address]  [facility] [address]   [phone]            Sep 20, 2018  2:00 PM CDT   Pulmonary Treatment with Sh Pulmonary Rehab 2   [facility] ([facility])    [address]  [facility] [address]   [phone]              Who to contact     If you have questions or need follow up information about today's clinic visit or your schedule please contact [facility] HEPATOLOGY directly at [phone].  Normal or non-critical lab and imaging results will be communicated to you by [employer], letter or phone within 4 business days after the clinic has received the results. If you do not hear from us within 7 days, please contact the clinic through [employer] or phone. If you have a critical or abnormal lab result, we will notify you by phone as soon as possible.  Submit refill requests through [employer] or call your pharmacy and they will forward the refill request to us. Please allow 3 business days for your refill to be completed.          Additional Information About Your Visit        [employer] Information     [employer] gives you secure access to your electronic health record. If you see a primary care provider,  "you can also send messages to your care team and make appointments. If you have questions, please call your primary care clinic.  If you do not have a primary care provider, please call [phone] and they will assist you.        Care EveryWhere ID     This is your Care EveryWhere ID. This could be used by other organizations to access your [facility] medical records  [id number]        Your Vitals Were     Pulse Temperature Respirations Height Pulse Oximetry BMI (Body Mass Index)    67 97.6  F (36.4  C) (Oral) 20 1.727 m (5' 8\") 98% 24.42 kg/m2       Blood Pressure from Last 3 Encounters:   08/28/18 163/84   08/22/18 155/84   08/17/18 121/67    Weight from Last 3 Encounters:   08/28/18 72.8 kg (160 lb 9.6 oz)   08/22/18 73.5 kg (162 lb)   08/08/18 71.6 kg (157 lb 12.8 oz)              Today, you had the following     No orders found for display       Primary Care Provider Office Phone # Fax #    Jamie Foster [phone] [address] [address]        Equal Access to Services     JEREMI MONTOYA : Hadii rhys ku hadasho Soomaali, waaxda luqadaha, qaybta kaalmada adeegyada, nelly jacinto. So [facility] [phone].    ATENCIÓN: Si habla español, tiene a mcfadden disposición servicios gratuitos de asistencia lingüística. Llame al [phone].    We comply with applicable federal civil rights laws and [place] laws. We do not discriminate on the basis of race, color, national origin, age, disability, sex, sexual orientation, or gender identity.            Thank you!     Thank you for choosing [facility] HEPATOLOGY  for your care. Our goal is always to provide you with excellent care. Hearing back from our patients is one way we can continue to improve our services. Please take a few minutes to complete the written survey that you may receive in the mail after your visit with us. Thank you!             Your Updated Medication List - Protect others around " you: Learn how to safely use, store and throw away your medicines at www.disposemymeds.org.          This list is accurate as of 8/28/18  1:21 PM.  Always use your most recent med list.                   Brand Name Dispense Instructions for use Diagnosis    albuterol 108 (90 Base) MCG/ACT inhaler    PROAIR HFA/PROVENTIL HFA/VENTOLIN HFA    3 Inhaler    Inhale 2 puffs into the lungs every 6 hours as needed for shortness of breath / dyspnea    Sarcoidosis       atorvastatin 40 MG tablet    LIPITOR    30 tablet    TAKE ONE TABLET BY MOUTH ONE TIME DAILY    Coronary artery disease involving native coronary artery of native heart without angina pectoris, CAD S/P percutaneous coronary angioplasty       blood glucose monitoring lancets     2 Box    Use to test blood sugar 2 times daily or as directed.  102 lancets per box.  3 month supply.    Type 2 diabetes, HbA1C goal < 8% (H)       blood glucose monitoring meter device kit    no brand specified    1 kit    Use to test blood sugar 2 times daily.    Type 2 diabetes mellitus with diabetic nephropathy (H)       blood glucose monitoring test strip    ACCU-CHEK RONNIE PLUS    200 each    Use to test blood sugar 2 times daily or as directed.  3 month supply.    Type 2 diabetes, HbA1C goal < 8% (H)       ciclopirox 0.77 % cream    LOPROX    90 g    Apply topically 2 times daily To feet and toenails.    Dermatophytosis of nail, Tinea pedis of both feet       clopidogrel 75 MG tablet    PLAVIX    12 tablet    TAKE ONE TABLET BY MOUTH ONE TIME DAILY    CAD S/P percutaneous coronary angioplasty, Coronary artery disease involving native coronary artery of native heart without angina pectoris       colchicine 0.6 MG tablet    COLCRYS    30 tablet    2 tabs at the onset of flare, then 1 tab every 2 hrs until pain is better or diarrhea occurs, up to 10 doses. Wait 3 days until taking again        fluticasone-vilanterol 100-25 MCG/INH inhaler    BREO ELLIPTA    3 Inhaler    Inhale 1 puff  into the lungs daily    Chronic obstructive pulmonary disease, unspecified COPD type (H)       furosemide 20 MG tablet    LASIX    240 tablet    Take 2 tablets (40 mg) by mouth 2 times daily May take extra 20 mg as needed for wt .gain >3#    Pulmonary hypertension       inFLIXimab 100 MG injection    REMICADE     Inject 100 mg into the vein every 28 days        levothyroxine 125 MCG tablet    SYNTHROID/LEVOTHROID    90 tablet    Take 1 tablet (125 mcg) by mouth daily    Hypothyroidism due to Hashimoto's thyroiditis       losartan 50 MG tablet    COZAAR    90 tablet    TAKE ONE TABLET BY MOUTH ONE TIME DAILY    (HFpEF) heart failure with preserved ejection fraction (H)       methotrexate 2.5 MG tablet CHEMO     48 tablet    Take 3 tablets (7.5 mg) by mouth once a week On Mondays    Sarcoidosis       metoprolol tartrate 100 MG tablet    LOPRESSOR    60 tablet    Take 1 tablet (100 mg) by mouth 2 times daily    Hypertension secondary to other renal disorders       mirtazapine 15 MG tablet    REMERON     Take 15 mg by mouth At Bedtime.        MULTIVITAMIN & MINERAL PO      Take 1 tablet by mouth daily.        * order for DME     1 Device    She requested for a 4 wheel walker, would like to change it to 4 wheel walker with a seat and oxygen tank durant.   Need this faxed over to her  [phone]    Sarcoidosis, lung (H), COPD (chronic obstructive pulmonary disease) (H), Abnormal gait, Congestive heart failure (H)       * order for DME     1 Device    Updated Oxygen: Patient requires supplemental Oxygen 3 LPM via nasal canula with activity and 2 LPM nocturnally. Please provide patient with a portable oxygen concentrator for improved mobility.  Okay to spot check or titrated for conserving to keep stats above 90%. Oxygen will be for a lifetime.    ILD (interstitial lung disease) (H), Hypoxia       polyethylene glycol powder    MIRALAX    510 g    Take 17 g (1 capful) by mouth daily    Constipation, unspecified  constipation type       sildenafil 20 MG tablet    REVATIO    270 tablet    TAKE ONE TABLET BY MOUTH THREE TIMES DAILY    Pulmonary hypertension       tiotropium 18 MCG capsule    SPIRIVA HANDIHALER    90 capsule    Inhale contents of one capsule daily.    Chronic obstructive pulmonary disease, unspecified COPD type (H)       Urea 40 % Crea    CARMOL 40    199 g    To feet daily    Dermatophytosis of nail, Corns and callosities       * Notice:  This list has 2 medication(s) that are the same as other medications prescribed for you. Read the directions carefully, and ask your doctor or other care provider to review them with you.

## 2018-08-28 NOTE — LETTER
8/28/2018      RE: Rohan Monroe  [address]  [place] [address]       I had the pleasure of seeing Rohan Monroe for followup in the Liver Clinic at the [facility] on 08/28/2018.  Mr. Monroe returns for followup of cirrhosis caused by chronic hepatitis C.  He also suffers from very severe sarcoid related liver disease, type 2 diabetes and chronic kidney disease.      He is actually feeling reasonably well at this point in time.  He denies any abdominal pain, itching or skin rash.  He does have a mild to moderate amount of fatigue related to his lung disease.  He denies any increased abdominal girth or lower extremity edema.  He denies any fevers or chills.  He does have a chronic cough and some shortness of breath, particularly on exertion.  He denies any nausea, vomiting, diarrhea or constipation.  His appetite is good, and his weight is down markedly intentionally. He does report that he has been in cardiac and pulmonary rehabilitation since September and with that has lost a great deal of weight.      He has not had any gastrointestinal bleeding or any overt signs of hepatic encephalopathy and there have been no other new events since he was last seen.     Current Outpatient Prescriptions   Medication     albuterol (PROAIR HFA/PROVENTIL HFA/VENTOLIN HFA) 108 (90 Base) MCG/ACT Inhaler     atorvastatin (LIPITOR) 40 MG tablet     blood glucose monitoring (ACCU-CHEK RONNIE PLUS) test strip     blood glucose monitoring (ACCU-CHEK FASTCLIX) lancets     blood glucose monitoring (NO BRAND SPECIFIED) meter device kit     ciclopirox (LOPROX) 0.77 % cream     clopidogrel (PLAVIX) 75 MG tablet     colchicine (COLCRYS) 0.6 MG tablet     fluticasone-vilanterol (BREO ELLIPTA) 100-25 MCG/INH oral inhaler     furosemide (LASIX) 20 MG tablet     inFLIXimab (REMICADE) 100 MG injection     levothyroxine (SYNTHROID/LEVOTHROID) 125 MCG tablet     losartan (COZAAR) 50 MG  tablet     methotrexate 2.5 MG tablet CHEMO     metoprolol tartrate (LOPRESSOR) 100 MG tablet     mirtazapine (REMERON) 15 MG tablet     Multiple Vitamins-Minerals (MULTIVITAMIN & MINERAL PO)     order for DME     order for DME     polyethylene glycol (MIRALAX) powder     sildenafil (REVATIO) 20 MG tablet     tiotropium (SPIRIVA HANDIHALER) 18 MCG capsule     Urea (CARMOL 40) 40 % CREA     No current facility-administered medications for this visit.      B/P: 163/84, T: 97.6, P: 67, R: 20    He looks relatively well.  Clearly he is much more fit than what he was when I saw him last.  HEENT exam shows no scleral icterus or temporal muscle wasting.  His chest is clear.  His chest exam shows decreased breath sounds bilaterally.  Abdominal exam shows no increase in girth.  No masses or tenderness to palpation are present.  His liver is 10 cm in span without left lobe enlargement.  No spleen tip is palpable.  Extremity exam shows no edema.  Skin exam shows no stigmata of chronic liver disease.  Neurologic exam shows no asterixis.     Recent Results (from the past 168 hour(s))   CBC with platelets [FKB376]    Collection Time: 08/28/18 10:09 AM   Result Value Ref Range    WBC 8.9 4.0 - 11.0 10e9/L    RBC Count 4.14 (L) 4.4 - 5.9 10e12/L    Hemoglobin 13.0 (L) 13.3 - 17.7 g/dL    Hematocrit 39.6 (L) 40.0 - 53.0 %    MCV 96 78 - 100 fl    MCH 31.4 26.5 - 33.0 pg    MCHC 32.8 31.5 - 36.5 g/dL    RDW 14.2 10.0 - 15.0 %    Platelet Count 240 150 - 450 10e9/L   INR [GUK0856]    Collection Time: 08/28/18 10:09 AM   Result Value Ref Range    INR 0.97 0.86 - 1.14   AFP tumor marker [[id number]]    Collection Time: 08/28/18 10:09 AM   Result Value Ref Range    Alpha Fetoprotein 4.2 0 - 8 ug/L   Renal panel    Collection Time: 08/28/18 10:09 AM   Result Value Ref Range    Sodium 136 133 - 144 mmol/L    Potassium 4.5 3.4 - 5.3 mmol/L    Chloride 104 94 - 109 mmol/L    Carbon Dioxide 24 20 - 32 mmol/L    Anion Gap 7 3 - 14 mmol/L     Glucose 146 (H) 70 - 99 mg/dL    Urea Nitrogen 46 (H) 7 - 30 mg/dL    Creatinine 1.90 (H) 0.66 - 1.25 mg/dL    GFR Estimate 35 (L) >60 mL/min/1.7m2    GFR Estimate If Black 42 (L) >60 mL/min/1.7m2    Calcium 9.2 8.5 - 10.1 mg/dL    Phosphorus 3.3 2.5 - 4.5 mg/dL    Albumin 3.7 3.4 - 5.0 g/dL   Parathyroid Hormone Intact    Collection Time: 08/28/18 10:09 AM   Result Value Ref Range    Parathyroid Hormone Intact 87 (H) 18 - 80 pg/mL   Alkaline phosphatase    Collection Time: 08/28/18 10:09 AM   Result Value Ref Range    Alkaline Phosphatase 116 40 - 150 U/L   ALT    Collection Time: 08/28/18 10:09 AM   Result Value Ref Range    ALT 29 0 - 70 U/L   AST    Collection Time: 08/28/18 10:09 AM   Result Value Ref Range    AST 23 0 - 45 U/L   Bilirubin  total    Collection Time: 08/28/18 10:09 AM   Result Value Ref Range    Bilirubin Total 0.8 0.2 - 1.3 mg/dL   Bilirubin direct    Collection Time: 08/28/18 10:09 AM   Result Value Ref Range    Bilirubin Direct 0.2 0.0 - 0.2 mg/dL   Protein total    Collection Time: 08/28/18 10:09 AM   Result Value Ref Range    Protein Total 8.1 6.8 - 8.8 g/dL   Protein  random urine with Creat Ratio    Collection Time: 08/28/18 10:13 AM   Result Value Ref Range    Protein Random Urine 0.79 g/L    Protein Total Urine g/gr Creatinine 3.82 (H) 0 - 0.2 g/g Cr   Albumin Random Urine Quantitative with Creat Ratio    Collection Time: 08/28/18 10:13 AM   Result Value Ref Range    Creatinine Urine 21 mg/dL    Albumin Urine mg/L 604 mg/L    Albumin Urine mg/g Cr 2903.85 (H) 0 - 17 mg/g Cr      He did have an ultrasound exam which shows no abnormal lesions and a cirrhotic liver.  He has no ascites and no other abnormalities are seen.      My impression is that Mr. 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  He is in the process of moving to the [facility] full-time.  We will work into trying to get him set up with both a hepatologist as well as pulmonary specialist.  Dr. Perlman will get him set  up there and I will contact my colleagues out there to see if we might be able to find someone in [facility] to look after his liver disease.  I, otherwise, will not be making any other change to his medical regimen.  He is up-to-date with regard to cancer screening and variceal screening.  I will simply see him back in the clinic again as needed, but most likely he will get all of his care in [place].      Thank you very much for allowing me to participate in the care of this patient.  If you have any questions regarding my recommendations, please do not hesitate to contact me.       Moses Orosco MD      Professor of Medicine  [employer]      Executive Medical Director, Solid Organ Transplant Program  [facility]    Moses Orosco MD

## 2018-08-28 NOTE — NURSING NOTE
"Chief Complaint   Patient presents with     RECHECK     Cirrhosis       /84 (BP Location: Right arm, Patient Position: Sitting, Cuff Size: Adult Regular)  Pulse 67  Temp 97.6  F (36.4  C) (Oral)  Resp 20  Ht 1.727 m (5' 8\")  Wt 72.8 kg (160 lb 9.6 oz)  SpO2 98%  BMI 24.42 kg/m2    Jeannie Gaytan [facility]  8/28/2018 11:47 AM      "

## 2018-08-28 NOTE — LETTER
8/28/2018       RE: Rohan Monroe  [address]  [place] [address]     Dear Colleague,    Thank you for referring your patient, Rohan Monroe, to the [facility] HEPATOLOGY at [facility]. Please see a copy of my visit note below.    I had the pleasure of seeing Rohan Monroe for followup in the Liver Clinic at the [facility] on 08/28/2018.  Mr. Monroe returns for followup of cirrhosis caused by chronic hepatitis C.  He also suffers from very severe sarcoid related liver disease, type 2 diabetes and chronic kidney disease.      He is actually feeling reasonably well at this point in time.  He denies any abdominal pain, itching or skin rash.  He does have a mild to moderate amount of fatigue related to his lung disease.  He denies any increased abdominal girth or lower extremity edema.  He denies any fevers or chills.  He does have a chronic cough and some shortness of breath, particularly on exertion.  He denies any nausea, vomiting, diarrhea or constipation.  His appetite is good, and his weight is down markedly intentionally. He does report that he has been in cardiac and pulmonary rehabilitation since September and with that has lost a great deal of weight.      He has not had any gastrointestinal bleeding or any overt signs of hepatic encephalopathy and there have been no other new events since he was last seen.     Current Outpatient Prescriptions   Medication     albuterol (PROAIR HFA/PROVENTIL HFA/VENTOLIN HFA) 108 (90 Base) MCG/ACT Inhaler     atorvastatin (LIPITOR) 40 MG tablet     blood glucose monitoring (ACCU-CHEK RONNIE PLUS) test strip     blood glucose monitoring (ACCU-CHEK FASTCLIX) lancets     blood glucose monitoring (NO BRAND SPECIFIED) meter device kit     ciclopirox (LOPROX) 0.77 % cream     clopidogrel (PLAVIX) 75 MG tablet     colchicine (COLCRYS) 0.6 MG tablet     fluticasone-vilanterol (BREO  ELLIPTA) 100-25 MCG/INH oral inhaler     furosemide (LASIX) 20 MG tablet     inFLIXimab (REMICADE) 100 MG injection     levothyroxine (SYNTHROID/LEVOTHROID) 125 MCG tablet     losartan (COZAAR) 50 MG tablet     methotrexate 2.5 MG tablet CHEMO     metoprolol tartrate (LOPRESSOR) 100 MG tablet     mirtazapine (REMERON) 15 MG tablet     Multiple Vitamins-Minerals (MULTIVITAMIN & MINERAL PO)     order for DME     order for DME     polyethylene glycol (MIRALAX) powder     sildenafil (REVATIO) 20 MG tablet     tiotropium (SPIRIVA HANDIHALER) 18 MCG capsule     Urea (CARMOL 40) 40 % CREA     No current facility-administered medications for this visit.      B/P: 163/84, T: 97.6, P: 67, R: 20    He looks relatively well.  Clearly he is much more fit than what he was when I saw him last.  HEENT exam shows no scleral icterus or temporal muscle wasting.  His chest is clear.  His chest exam shows decreased breath sounds bilaterally.  Abdominal exam shows no increase in girth.  No masses or tenderness to palpation are present.  His liver is 10 cm in span without left lobe enlargement.  No spleen tip is palpable.  Extremity exam shows no edema.  Skin exam shows no stigmata of chronic liver disease.  Neurologic exam shows no asterixis.     Recent Results (from the past 168 hour(s))   CBC with platelets [[id number]]    Collection Time: 08/28/18 10:09 AM   Result Value Ref Range    WBC 8.9 4.0 - 11.0 10e9/L    RBC Count 4.14 (L) 4.4 - 5.9 10e12/L    Hemoglobin 13.0 (L) 13.3 - 17.7 g/dL    Hematocrit 39.6 (L) 40.0 - 53.0 %    MCV 96 78 - 100 fl    MCH 31.4 26.5 - 33.0 pg    MCHC 32.8 31.5 - 36.5 g/dL    RDW 14.2 10.0 - 15.0 %    Platelet Count 240 150 - 450 10e9/L   INR [[id number]]    Collection Time: 08/28/18 10:09 AM   Result Value Ref Range    INR 0.97 0.86 - 1.14   AFP tumor marker [[id number]]    Collection Time: 08/28/18 10:09 AM   Result Value Ref Range    Alpha Fetoprotein 4.2 0 - 8 ug/L   Renal panel    Collection Time: 08/28/18 10:09  AM   Result Value Ref Range    Sodium 136 133 - 144 mmol/L    Potassium 4.5 3.4 - 5.3 mmol/L    Chloride 104 94 - 109 mmol/L    Carbon Dioxide 24 20 - 32 mmol/L    Anion Gap 7 3 - 14 mmol/L    Glucose 146 (H) 70 - 99 mg/dL    Urea Nitrogen 46 (H) 7 - 30 mg/dL    Creatinine 1.90 (H) 0.66 - 1.25 mg/dL    GFR Estimate 35 (L) >60 mL/min/1.7m2    GFR Estimate If Black 42 (L) >60 mL/min/1.7m2    Calcium 9.2 8.5 - 10.1 mg/dL    Phosphorus 3.3 2.5 - 4.5 mg/dL    Albumin 3.7 3.4 - 5.0 g/dL   Parathyroid Hormone Intact    Collection Time: 08/28/18 10:09 AM   Result Value Ref Range    Parathyroid Hormone Intact 87 (H) 18 - 80 pg/mL   Alkaline phosphatase    Collection Time: 08/28/18 10:09 AM   Result Value Ref Range    Alkaline Phosphatase 116 40 - 150 U/L   ALT    Collection Time: 08/28/18 10:09 AM   Result Value Ref Range    ALT 29 0 - 70 U/L   AST    Collection Time: 08/28/18 10:09 AM   Result Value Ref Range    AST 23 0 - 45 U/L   Bilirubin  total    Collection Time: 08/28/18 10:09 AM   Result Value Ref Range    Bilirubin Total 0.8 0.2 - 1.3 mg/dL   Bilirubin direct    Collection Time: 08/28/18 10:09 AM   Result Value Ref Range    Bilirubin Direct 0.2 0.0 - 0.2 mg/dL   Protein total    Collection Time: 08/28/18 10:09 AM   Result Value Ref Range    Protein Total 8.1 6.8 - 8.8 g/dL   Protein  random urine with Creat Ratio    Collection Time: 08/28/18 10:13 AM   Result Value Ref Range    Protein Random Urine 0.79 g/L    Protein Total Urine g/gr Creatinine 3.82 (H) 0 - 0.2 g/g Cr   Albumin Random Urine Quantitative with Creat Ratio    Collection Time: 08/28/18 10:13 AM   Result Value Ref Range    Creatinine Urine 21 mg/dL    Albumin Urine mg/L 604 mg/L    Albumin Urine mg/g Cr 2903.85 (H) 0 - 17 mg/g Cr      He did have an ultrasound exam which shows no abnormal lesions and a cirrhotic liver.  He has no ascites and no other abnormalities are seen.      My impression is that Mr. Monroe has cirrhosis caused by chronic hepatitis  SHU.  He is in the process of moving to the [facility] full-[facility].  We will work into trying to get him set up with both a hepatologist as well as pulmonary specialist.  Dr. Perlman will get him set up there and I will contact my colleagues out there to see if we might be able to find someone in [facility] to look after his liver disease.  I, otherwise, will not be making any other change to his medical regimen.  He is up-to-date with regard to cancer screening and variceal screening.  I will simply see him back in the clinic again as needed, but most likely he will get all of his care in [place].      Thank you very much for allowing me to participate in the care of this patient.  If you have any questions regarding my recommendations, please do not hesitate to contact me.       Moses Orosco MD      Professor of Medicine  [employer]      Executive Medical Director, Solid Organ Transplant Program  [facility]

## 2018-08-28 NOTE — PROGRESS NOTES
I had the pleasure of seeing Rohan Monroe for followup in the Liver Clinic at the [facility] on 08/28/2018.  Mr. Monroe returns for followup of cirrhosis caused by chronic hepatitis C.  He also suffers from very severe sarcoid related liver disease, type 2 diabetes and chronic kidney disease.      He is actually feeling reasonably well at this point in time.  He denies any abdominal pain, itching or skin rash.  He does have a mild to moderate amount of fatigue related to his lung disease.  He denies any increased abdominal girth or lower extremity edema.  He denies any fevers or chills.  He does have a chronic cough and some shortness of breath, particularly on exertion.  He denies any nausea, vomiting, diarrhea or constipation.  His appetite is good, and his weight is down markedly intentionally. He does report that he has been in cardiac and pulmonary rehabilitation since September and with that has lost a great deal of weight.      He has not had any gastrointestinal bleeding or any overt signs of hepatic encephalopathy and there have been no other new events since he was last seen.     Current Outpatient Prescriptions   Medication     albuterol (PROAIR HFA/PROVENTIL HFA/VENTOLIN HFA) 108 (90 Base) MCG/ACT Inhaler     atorvastatin (LIPITOR) 40 MG tablet     blood glucose monitoring (ACCU-CHEK RONNIE PLUS) test strip     blood glucose monitoring (ACCU-CHEK FASTCLIX) lancets     blood glucose monitoring (NO BRAND SPECIFIED) meter device kit     ciclopirox (LOPROX) 0.77 % cream     clopidogrel (PLAVIX) 75 MG tablet     colchicine (COLCRYS) 0.6 MG tablet     fluticasone-vilanterol (BREO ELLIPTA) 100-25 MCG/INH oral inhaler     furosemide (LASIX) 20 MG tablet     inFLIXimab (REMICADE) 100 MG injection     levothyroxine (SYNTHROID/LEVOTHROID) 125 MCG tablet     losartan (COZAAR) 50 MG tablet     methotrexate 2.5 MG tablet CHEMO     metoprolol tartrate (LOPRESSOR) 100 MG tablet      mirtazapine (REMERON) 15 MG tablet     Multiple Vitamins-Minerals (MULTIVITAMIN & MINERAL PO)     order for DME     order for DME     polyethylene glycol (MIRALAX) powder     sildenafil (REVATIO) 20 MG tablet     tiotropium (SPIRIVA HANDIHALER) 18 MCG capsule     Urea (CARMOL 40) 40 % CREA     No current facility-administered medications for this visit.      B/P: 163/84, T: 97.6, P: 67, R: 20    He looks relatively well.  Clearly he is much more fit than what he was when I saw him last.  HEENT exam shows no scleral icterus or temporal muscle wasting.  His chest is clear.  His chest exam shows decreased breath sounds bilaterally.  Abdominal exam shows no increase in girth.  No masses or tenderness to palpation are present.  His liver is 10 cm in span without left lobe enlargement.  No spleen tip is palpable.  Extremity exam shows no edema.  Skin exam shows no stigmata of chronic liver disease.  Neurologic exam shows no asterixis.     Recent Results (from the past 168 hour(s))   CBC with platelets [[id number]]    Collection Time: 08/28/18 10:09 AM   Result Value Ref Range    WBC 8.9 4.0 - 11.0 10e9/L    RBC Count 4.14 (L) 4.4 - 5.9 10e12/L    Hemoglobin 13.0 (L) 13.3 - 17.7 g/dL    Hematocrit 39.6 (L) 40.0 - 53.0 %    MCV 96 78 - 100 fl    MCH 31.4 26.5 - 33.0 pg    MCHC 32.8 31.5 - 36.5 g/dL    RDW 14.2 10.0 - 15.0 %    Platelet Count 240 150 - 450 10e9/L   INR [[id number]]    Collection Time: 08/28/18 10:09 AM   Result Value Ref Range    INR 0.97 0.86 - 1.14   AFP tumor marker [[id number]]    Collection Time: 08/28/18 10:09 AM   Result Value Ref Range    Alpha Fetoprotein 4.2 0 - 8 ug/L   Renal panel    Collection Time: 08/28/18 10:09 AM   Result Value Ref Range    Sodium 136 133 - 144 mmol/L    Potassium 4.5 3.4 - 5.3 mmol/L    Chloride 104 94 - 109 mmol/L    Carbon Dioxide 24 20 - 32 mmol/L    Anion Gap 7 3 - 14 mmol/L    Glucose 146 (H) 70 - 99 mg/dL    Urea Nitrogen 46 (H) 7 - 30 mg/dL    Creatinine 1.90 (H) 0.66 - 1.25  mg/dL    GFR Estimate 35 (L) >60 mL/min/1.7m2    GFR Estimate If Black 42 (L) >60 mL/min/1.7m2    Calcium 9.2 8.5 - 10.1 mg/dL    Phosphorus 3.3 2.5 - 4.5 mg/dL    Albumin 3.7 3.4 - 5.0 g/dL   Parathyroid Hormone Intact    Collection Time: 08/28/18 10:09 AM   Result Value Ref Range    Parathyroid Hormone Intact 87 (H) 18 - 80 pg/mL   Alkaline phosphatase    Collection Time: 08/28/18 10:09 AM   Result Value Ref Range    Alkaline Phosphatase 116 40 - 150 U/L   ALT    Collection Time: 08/28/18 10:09 AM   Result Value Ref Range    ALT 29 0 - 70 U/L   AST    Collection Time: 08/28/18 10:09 AM   Result Value Ref Range    AST 23 0 - 45 U/L   Bilirubin  total    Collection Time: 08/28/18 10:09 AM   Result Value Ref Range    Bilirubin Total 0.8 0.2 - 1.3 mg/dL   Bilirubin direct    Collection Time: 08/28/18 10:09 AM   Result Value Ref Range    Bilirubin Direct 0.2 0.0 - 0.2 mg/dL   Protein total    Collection Time: 08/28/18 10:09 AM   Result Value Ref Range    Protein Total 8.1 6.8 - 8.8 g/dL   Protein  random urine with Creat Ratio    Collection Time: 08/28/18 10:13 AM   Result Value Ref Range    Protein Random Urine 0.79 g/L    Protein Total Urine g/gr Creatinine 3.82 (H) 0 - 0.2 g/g Cr   Albumin Random Urine Quantitative with Creat Ratio    Collection Time: 08/28/18 10:13 AM   Result Value Ref Range    Creatinine Urine 21 mg/dL    Albumin Urine mg/L 604 mg/L    Albumin Urine mg/g Cr 2903.85 (H) 0 - 17 mg/g Cr      He did have an ultrasound exam which shows no abnormal lesions and a cirrhotic liver.  He has no ascites and no other abnormalities are seen.      My impression is that Mr. 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  He is in the process of moving to the [facility] full-[facility].  We will work into trying to get him set up with both a hepatologist as well as pulmonary specialist.  Dr. Perlman will get him set up there and I will contact my colleagues out there to see if we might be able to find someone in  [facility] to look after his liver disease.  I, otherwise, will not be making any other change to his medical regimen.  He is up-to-date with regard to cancer screening and variceal screening.  I will simply see him back in the clinic again as needed, but most likely he will get all of his care in [place].      Thank you very much for allowing me to participate in the care of this patient.  If you have any questions regarding my recommendations, please do not hesitate to contact me.       Moses Orosco MD      Professor of Medicine  [employer]      Executive Medical Director, Solid Organ Transplant Program  [facility]

## 2018-08-29 ENCOUNTER — TELEPHONE (OUTPATIENT)
Dept: NEPHROLOGY | Facility: CLINIC | Age: 73
End: 2018-08-29

## 2018-08-29 NOTE — TELEPHONE ENCOUNTER
Talked with patient and confirmed he will be here on 8.30.18 and patient already had labs done  DENISSE BAL CMA

## 2018-08-30 ENCOUNTER — OFFICE VISIT (OUTPATIENT)
Dept: NEPHROLOGY | Facility: CLINIC | Age: 73
End: 2018-08-30
Attending: INTERNAL MEDICINE
Payer: MEDICARE

## 2018-08-30 VITALS
HEART RATE: 77 BPM | DIASTOLIC BLOOD PRESSURE: 61 MMHG | WEIGHT: 160.5 LBS | SYSTOLIC BLOOD PRESSURE: 97 MMHG | TEMPERATURE: 98.3 F | BODY MASS INDEX: 24.32 KG/M2 | RESPIRATION RATE: 16 BRPM | OXYGEN SATURATION: 98 % | HEIGHT: 68 IN

## 2018-08-30 DIAGNOSIS — N18.30 CKD (CHRONIC KIDNEY DISEASE) STAGE 3, GFR 30-59 ML/MIN (H): Primary | ICD-10-CM

## 2018-08-30 LAB
DEPRECATED CALCIDIOL+CALCIFEROL SERPL-MC: <30 UG/L (ref 20–75)
VITAMIN D2 SERPL-MCNC: <5 UG/L
VITAMIN D3 SERPL-MCNC: 25 UG/L

## 2018-08-30 PROCEDURE — G0463 HOSPITAL OUTPT CLINIC VISIT: HCPCS | Mod: ZF

## 2018-08-30 ASSESSMENT — PAIN SCALES - GENERAL: PAINLEVEL: NO PAIN (0)

## 2018-08-30 NOTE — LETTER
8/30/2018      RE: Rohan Monroe  [address]  [place] [address]       Nephrology Follow-up Clinic Note  August 30, 2018        Reason for visit: CKD follow-up    HPI:  Mr Monroe is a pleasant 72 year old man here for follow-up of CKD and proteinuria. His past medical hx is significant for sarcoid (affecting lungs and heart), hep C s/p treatment with cirrhosis (and SVR), CAD s/p stents, COPD, He denies a personal history of UTIs or kidney stones. Given unclear etiology of CKD and presence of RBCs, WBCs, and proteinuria we proceeded with kidney biopsy on 11/13/2015. Results were consistent with diabetic changes and vascular disease as etiology for CKD.  Patient now working with endocrine and diabetes educator regarding his DM.  He was hospitalized in Jan,2017 for atrial flutter and hypervolemia.  He did undergo cardioversion and subsequent ablation.  In May, 2017 he underwent cardiac stenting to his LAD and LCx. This procedure was complicated by a retroperitoneal bleed for which he received a right iliac stent and subsequently needed thrombin injection for left-sided external iliac pseudoaneurysm. His Cr has risen significantly during these hospitalizations but has improved to ~2 mg/dL.  His main complaint has been dyspnea that did significantly improve after his cardiac revascularization.  He has been on less oxygenation though does continue to have dyspnea with exertion particularly walking up hills.  His symptoms are much improved since working with cardiac and pulmonary rehab.  He continues on remicade infusions and methotrexate. He continues on losartan at 50 mg daily and continues on metoprolol 100 mg BID and furosemide at 40 mg BID.  Of note, he is on sildenafil at 20 mg TID (pulmonary HTN).  He denies fever, palpitations and chest pain.  His cardiology team is following his blood pressure closely.  Of note, he is no longer on coumadin or plavix but continues on ASA (81 mg).        Review of Systems:   A 10 point review of systems was negative except as noted above.    Active Medical Issues:  Patient Active Problem List   Diagnosis     Hypothyroidism     SARCOIDOSIS-systemic     Generalized osteoarthrosis, unspecified site     Viral warts     Other psoriasis     Hypertrophy of prostate without urinary obstruction     Diabetes mellitus, type 2 (H)     CAD (coronary artery disease)     Type 2 diabetes, HbA1C goal < 8% (H)     CARDIOVASCULAR SCREENING; LDL GOAL LESS THAN 100     Atrial flutter with rapid ventricular response (H)     CKD (chronic kidney disease) stage 3, GFR 30-59 ml/min     Hip joint pain     Hyperlipidemia LDL goal <70     Acute exacerbation of chronic obstructive pulmonary disease (COPD) (H)     Cellulitis     Immunosuppression (H)     Hyponatremia     RADHA (acute kidney injury) (HCC)     COPD (chronic obstructive pulmonary disease) (H)     Cirrhosis of liver (H)     Pneumonia     Proteinuria     Microscopic hematuria     Sarcoidosis of lung (HCC)     Hyperlipidemia     Atrial flutter (H)     Long-term (current) use of anticoagulants [Z79.01]     Hypoxia     CAD S/P percutaneous coronary angioplasty     Chest pain     Dermatophytosis of nail     Tyloma       PMHx, PSHx, FamHx, SocHx: reviewed in EPIC.    Current Medications:  Medications reviewed by me.  Current Outpatient Prescriptions   Medication Sig Dispense Refill     albuterol (PROAIR HFA/PROVENTIL HFA/VENTOLIN HFA) 108 (90 Base) MCG/ACT Inhaler Inhale 2 puffs into the lungs every 6 hours as needed for shortness of breath / dyspnea 3 Inhaler 6     atorvastatin (LIPITOR) 40 MG tablet TAKE ONE TABLET BY MOUTH ONE TIME DAILY  30 tablet 11     blood glucose monitoring (ACCU-CHEK RONNIE PLUS) test strip Use to test blood sugar 2 times daily or as directed.  3 month supply. (Patient not taking: Reported on 9/13/2018) 200 each 3     blood glucose monitoring (ACCU-CHEK FASTCLIX) lancets Use to test blood sugar 2 times daily or  as directed.  102 lancets per box.  3 month supply. (Patient not taking: Reported on 9/13/2018) 2 Box 3     blood glucose monitoring (NO BRAND SPECIFIED) meter device kit Use to test blood sugar 2 times daily. (Patient not taking: Reported on 9/13/2018) 1 kit 0     ciclopirox (LOPROX) 0.77 % cream Apply topically 2 times daily To feet and toenails. 90 g 6     colchicine (COLCRYS) 0.6 MG tablet 2 tabs at the onset of flare, then 1 tab every 2 hrs until pain is better or diarrhea occurs, up to 10 doses. Wait 3 days until taking again 30 tablet 0     fluticasone-vilanterol (BREO ELLIPTA) 100-25 MCG/INH oral inhaler Inhale 1 puff into the lungs daily 3 Inhaler 3     furosemide (LASIX) 20 MG tablet Take 2 tablets (40 mg) by mouth 2 times daily May take extra 20 mg as needed for wt .gain >3# (Patient not taking: Reported on 9/13/2018) 240 tablet 11     inFLIXimab (REMICADE) 100 MG injection Inject 100 mg into the vein every 28 days        levothyroxine (SYNTHROID/LEVOTHROID) 125 MCG tablet Take 1 tablet (125 mcg) by mouth daily 90 tablet 3     losartan (COZAAR) 50 MG tablet TAKE ONE TABLET BY MOUTH ONE TIME DAILY  90 tablet 3     methotrexate 2.5 MG tablet CHEMO Take 3 tablets (7.5 mg) by mouth once a week On Mondays 48 tablet 3     metoprolol tartrate (LOPRESSOR) 100 MG tablet Take 1 tablet (100 mg) by mouth 2 times daily 60 tablet 11     mirtazapine (REMERON) 15 MG tablet Take 15 mg by mouth At Bedtime.       Multiple Vitamins-Minerals (MULTIVITAMIN & MINERAL PO) Take 1 tablet by mouth daily.       order for DME Updated Oxygen: Patient requires supplemental Oxygen 3 LPM via nasal canula with activity and 2 LPM nocturnally. Please provide patient with a portable oxygen concentrator for improved mobility.  Okay to spot check or titrated for conserving to keep stats above 90%. Oxygen will be for a lifetime. 1 Device 0     order for DME She requested for a 4 wheel walker, would like to change it to 4 wheel walker with a  "seat and oxygen tank durant.     Need this faxed over to her   [phone] 1 Device 1     polyethylene glycol (MIRALAX) powder Take 17 g (1 capful) by mouth daily 510 g 1     sildenafil (REVATIO) 20 MG tablet TAKE ONE TABLET BY MOUTH THREE TIMES DAILY  270 tablet 3     tiotropium (SPIRIVA HANDIHALER) 18 MCG capsule Inhale contents of one capsule daily. 90 capsule 3     Urea (CARMOL 40) 40 % CREA To feet daily 199 g 4       Physical Exam:   BP 97/61  Pulse 77  Temp 98.3  F (36.8  C) (Oral)  Resp 16  Ht 1.727 m (5' 8\")  Wt 72.8 kg (160 lb 8 oz)  SpO2 98%  BMI 24.4 kg/m2   Gen: notably SOB  Heart: regular rhythm, normal rate, no rub  Lungs: shallow breaths but clear  Abd: soft, non-tender, non-distended  : No CVA tenderness  Ext: trace LE edema  MSK: No joint effusions  Skin: No concerning rash  Neuro: No gross focal deficit  Psych: Mood and affect appropriate     Labs:   Today's labs reviewed by me.  Electrolytes/Renal -   Recent Labs   Lab Test  09/13/18   1115  08/28/18   1009  03/08/18   1001  02/26/18   1317   05/13/17   0551   05/12/17 2028 04/24/17   1305   NA  137  136  135  134   < >  139   --   138   < >  138   POTASSIUM  4.2  4.5  3.9  4.3   < >  4.6   < >  3.6   < >  4.0   CHLORIDE  104  104  101  100   < >  104   --   102   < >  102   CO2  27  24  25  26   < >  24   --   26   < >  30   BUN  36*  46*  58*  48*   < >  38*   --   40*   < >  29   CR  1.89*  1.90*  2.08*  2.16*   < >  2.03*   --   1.84*   < >  1.82*   GLC  121*  146*  155*  146*   < >  166*   --   166*   < >  166*   RAFFY  8.9  9.2  8.7  8.7   < >  7.5*   --   7.9*   < >  8.8   MAG   --    --    --    --    --   2.0   --   2.1   --   2.0   PHOS   --   3.3  4.0  3.5   < >  3.4   --    --    < >   --     < > = values in this interval not displayed.     CBC -   Recent Labs   Lab Test  08/28/18   1009  02/26/18   1317  01/05/18   0520   WBC  8.9  7.6  13.5*   HGB  13.0*  11.0*  10.6*   PLT  240  277  303     LFTs -   Recent Labs   Lab " Test  09/13/18   1115  08/28/18   1009  03/08/18   1001  02/26/18   1317   ALKPHOS  110  116   --   128   BILITOTAL  0.8  0.8   --   0.6   ALT  30  29   --   42   AST  26  23   --   24   PROTTOTAL  7.8  8.1   --   8.1   ALBUMIN  3.6  3.7  3.7  3.7       Recent Labs   Lab Test  08/28/18   1013  03/08/18   1013  02/26/18   1316  10/25/17   1105  10/04/17   1122  09/05/17   1137  05/08/17   1650  04/18/17   1212  02/28/17   1206  12/26/16   1015  08/30/16   1320  07/18/16   1324  03/15/16   1107  02/29/16   1424  10/27/15   1339  10/07/15   1032  01/27/11   1507  11/16/10   0950   UTPG  3.82*  0.84*  0.76*  1.02*  1.13*  1.98*  1.87*  3.08*  6.07*  3.60*  1.40*  2.44*  1.50*  2.29*  2.00*  1.81*  0.02  0.10     PTH -   Recent Labs   Lab Test  08/28/18   1009  08/17/17   1234  02/28/17   1150  03/15/16   1116  01/27/11   1435   PTHI  87*  163*  183*  98*  24     IRON STUDIES -   Recent Labs   Lab Test  02/26/18   1317  02/28/17   1150  03/15/16   1116   IRON  49  61  91   FEB  271  316  346   IRONSAT  18  19 26   TIFFANIE  164  181  176         Assessment & Plan:   CKD stage 3b with proteinuria and hematuria:  Etiology 2/2 DM and renovascular disease.  Patient underwent kidney biopsy on 11/13/2015 to better evaluate his CKD. Tissue sample was limited but with tissue/cortex present patient had changes consistent with diabetic nephropathy and also mild to moderate renal arteriosclerosis.  Serologic work-up for vasculitis and monoclonal gammopathy have been negative. Hep C related kidney disease unlikely given he has had treatment with sustained virologic response.  He has had several bouts of RADHA, with the most recent occurring after his cardiac revascularization in May, 2017 and consequently likely has a new baseline.  His Cr now seems relatively stable (while on losartan) at ~1.9 mg/dL with an eGFR of 35 ml/min.  He does have significant proteinuria though this is much improved since restarting losartan (6.1 g/g down to 3.8  g/g).  --discussed that moving forward it will be important to control his DM and HTN with regards to his CKD   --continue losartan 50 mg for now with goal of getting back to max dose given DM, HTN, proteinuria and CKD.  Albuminuria not at goal in past despite max dose losartan in past. Regardless, he would benefit from RAAS blockade for renoprotection and cardiovascular health.  However, would not start double RAAS blockade for management of proteinuria given that risks outweigh the benefits.    --will attempt to increase his losartan to 100 mg daily at next visit should his proteinuria remain > 1 g/g  ---no electrolyte, acid/base or anemia issues at this time  --PTH mildly elevated at 83.  Given normal corrected Ca and Phos.  No need for active vitamin D (e.g. calcitriol) at this time.  Will continue to monitor recheck in 3 months.  --follow-up in 3 months    Hypertension/Volume: Cardiology has made a number of changes to his antihypertensive regimen and is following him closely.  SBP at goal of <130. Intravascularly euvolemic on exam. Currently, he is on lasix (40 mg BID), metoprolol (100 mg BID) and losartan (50 mg).  Of note, he is on sildenafil (20 mg TID).    --will defer further management to cardiology  --as mentioned, would like to keep him on losartan for renoprotection/proteinuria (please see problem 1)      Total time spent was >40 minutes, and more than 50% of face to face time was spent in counseling and/or coordination of care regarding principles of multidisciplinary care, medication management, and chronic kidney disease education.  Ollie Michel MD

## 2018-08-30 NOTE — NURSING NOTE
"Chief Complaint   Patient presents with     RECHECK     CKD       BP 97/61  Pulse 77  Temp 98.3  F (36.8  C) (Oral)  Resp 16  Ht 1.727 m (5' 8\")  Wt 72.8 kg (160 lb 8 oz)  SpO2 98%  BMI 24.4 kg/m2    Jeannie Gaytan [facility]  8/30/2018 1:59 PM      "

## 2018-08-30 NOTE — MR AVS SNAPSHOT
After Visit Summary   8/30/2018    Rohan Monroe    MRN: [id number]           Patient Information     Date Of Birth          1945        Visit Information        Provider Department      8/30/2018 2:00 PM Ollie Michel MD [facility] Nephrology        Today's Diagnoses     CKD (chronic kidney disease) stage 3, GFR 30-59 ml/min    -  1      Care Instructions    Please remember to keep your annual scheduled diabetic eye exam.          Follow-ups after your visit        Follow-up notes from your care team     Return in about 3 months (around 11/30/2018).      Your next 10 appointments already scheduled     Sep 18, 2018  2:00 PM CDT   Pulmonary Treatment with Sh Pulmonary Rehab 2   [facility] ([facility])    [address]  [facility] [address]   [phone]            Sep 20, 2018  2:00 PM CDT   Pulmonary Treatment with Sh Pulmonary Rehab 2   [facility] ([facility])    [address]  [facility] [address]   [phone]            Sep 25, 2018  2:00 PM CDT   Pulmonary Treatment with Sh Pulmonary Rehab 2   [facility] ([facility])    [address]  [facility] [address]   [phone]            Sep 27, 2018  2:00 PM CDT   Pulmonary Treatment with Sh Pulmonary Rehab 2   [facility] ([facility])    [address]  [facility] [address]   [phone]            Oct 02, 2018  3:00 PM CDT   RETURN RETINA with Sandee Middleton MD   [facility] ([facility])    [address]  [facility] [address]   [phone]            Oct 04, 2018 12:00 PM CDT   Infusion 180 with UC SPEC INFUSION, UC 47 ATC   [facility] ([facility])    [address] [place] [address]  [address]  [facility] [address]   [phone]            Oct 22, 2018 12:40 PM CDT   (Arrive by 12:25 PM)   RETURN FOOT/ANKLE with Chicho Mejia DPM   [facility] ([facility])    [address] [place] [address]  4th Floor  [facility] [address] [phone]            Nov 01, 2018 11:30 AM CDT   LAB with  LAB   [facility] ([facility])    [address] [place] [address]  [address] [facility] [address] [phone]           Please do not eat 10-12 hours before your appointment if you are coming in fasting for labs on lipids, cholesterol, or glucose (sugar). This does not apply to pregnant women. Water, hot tea and black coffee (with nothing added) are okay. Do not drink other fluids, diet soda or chew gum.            Nov 01, 2018 12:00 PM CDT   Infusion 180 with UC SPEC INFUSION   [facility] ([facility])    [address] [place] [address]  [address]  [facility] [address] [phone]              Who to contact     If you have questions or need follow up information about today's clinic visit or your schedule please contact [facility] NEPHROLOG[facility] directly at [phone].  Normal or non-critical lab and imaging results will be communicated to you by MyChart, letter or phone within 4 business days after the clinic has received the results. If you do not hear from us within 7 days, please contact the clinic through [employer] or phone. If you have a critical or abnormal lab result, we will notify you by phone as soon as possible.  Submit refill requests through [employer] or call your pharmacy and they will forward the refill request to us. Please allow 3 business days for your refill to be completed.          Additional Information About Your Visit        [employer] Information     [employer] gives you secure access to your electronic health record. If you see a primary care provider,  "you can also send messages to your care team and make appointments. If you have questions, please call your primary care clinic.  If you do not have a primary care provider, please call [phone] and they will assist you.        Care EveryWhere ID     This is your Care EveryWhere ID. This could be used by other organizations to access your [facility] medical records  [id number]        Your Vitals Were     Pulse Temperature Respirations Height Pulse Oximetry BMI (Body Mass Index)    77 98.3  F (36.8  C) (Oral) 16 1.727 m (5' 8\") 98% 24.4 kg/m2       Blood Pressure from Last 3 Encounters:   09/13/18 157/74   09/06/18 125/72   08/30/18 97/61    Weight from Last 3 Encounters:   09/13/18 73.7 kg (162 lb 8 oz)   09/06/18 74.8 kg (164 lb 14.4 oz)   08/30/18 72.8 kg (160 lb 8 oz)              Today, you had the following     No orders found for display       Primary Care Provider Office Phone # Fax #    Jamie Foster [phone]       [address] [address]        Equal Access to Services     JEREMI MONTOYA : Hadii rhys jamison hadasho Soomaali, waaxda luqadaha, qaybta kaalmada adeegyada, nelly jacinto. So [facility] [place] [facility] [phone].    ATENCIÓN: Si habla español, tiene a mcfadden disposición servicios gratuitos de asistencia lingüística. Llame al [phone].    We comply with applicable federal civil rights laws and [place] laws. We do not discriminate on the basis of race, color, national origin, age, disability, sex, sexual orientation, or gender identity.            Thank you!     Thank you for choosing [facility] NEPHROLOG[facility]  for your care. Our goal is always to provide you with excellent care. Hearing back from our patients is one way we can continue to improve our services. Please take a few minutes to complete the written survey that you may receive in the mail after your visit with us. Thank you!             Your Updated Medication List - Protect others around " you: Learn how to safely use, store and throw away your medicines at www.disposemymeds.org.          This list is accurate as of 8/30/18 11:59 PM.  Always use your most recent med list.                   Brand Name Dispense Instructions for use Diagnosis    albuterol 108 (90 Base) MCG/ACT inhaler    PROAIR HFA/PROVENTIL HFA/VENTOLIN HFA    3 Inhaler    Inhale 2 puffs into the lungs every 6 hours as needed for shortness of breath / dyspnea    Sarcoidosis       atorvastatin 40 MG tablet    LIPITOR    30 tablet    TAKE ONE TABLET BY MOUTH ONE TIME DAILY    Coronary artery disease involving native coronary artery of native heart without angina pectoris, CAD S/P percutaneous coronary angioplasty       blood glucose monitoring lancets     2 Box    Use to test blood sugar 2 times daily or as directed.  102 lancets per box.  3 month supply.    Type 2 diabetes, HbA1C goal < 8% (H)       blood glucose monitoring meter device kit    no brand specified    1 kit    Use to test blood sugar 2 times daily.    Type 2 diabetes mellitus with diabetic nephropathy (H)       blood glucose monitoring test strip    ACCU-CHEK RONNIE PLUS    200 each    Use to test blood sugar 2 times daily or as directed.  3 month supply.    Type 2 diabetes, HbA1C goal < 8% (H)       ciclopirox 0.77 % cream    LOPROX    90 g    Apply topically 2 times daily To feet and toenails.    Dermatophytosis of nail, Tinea pedis of both feet       colchicine 0.6 MG tablet    COLCRYS    30 tablet    2 tabs at the onset of flare, then 1 tab every 2 hrs until pain is better or diarrhea occurs, up to 10 doses. Wait 3 days until taking again        fluticasone-vilanterol 100-25 MCG/INH inhaler    BREO ELLIPTA    3 Inhaler    Inhale 1 puff into the lungs daily    Chronic obstructive pulmonary disease, unspecified COPD type (H)       furosemide 20 MG tablet    LASIX    240 tablet    Take 2 tablets (40 mg) by mouth 2 times daily May take extra 20 mg as needed for wt .gain >3#     Pulmonary hypertension       inFLIXimab 100 MG injection    REMICADE     Inject 100 mg into the vein every 28 days        levothyroxine 125 MCG tablet    SYNTHROID/LEVOTHROID    90 tablet    Take 1 tablet (125 mcg) by mouth daily    Hypothyroidism due to Hashimoto's thyroiditis       losartan 50 MG tablet    COZAAR    90 tablet    TAKE ONE TABLET BY MOUTH ONE TIME DAILY    (HFpEF) heart failure with preserved ejection fraction (H)       methotrexate 2.5 MG tablet CHEMO     48 tablet    Take 3 tablets (7.5 mg) by mouth once a week On Mondays    Sarcoidosis       metoprolol tartrate 100 MG tablet    LOPRESSOR    60 tablet    Take 1 tablet (100 mg) by mouth 2 times daily    Hypertension secondary to other renal disorders       mirtazapine 15 MG tablet    REMERON     Take 15 mg by mouth At Bedtime.        MULTIVITAMIN & MINERAL PO      Take 1 tablet by mouth daily.        * order for DME     1 Device    She requested for a 4 wheel walker, would like to change it to 4 wheel walker with a seat and oxygen tank durant.   Need this faxed over to her  [phone]    Sarcoidosis, lung (H), COPD (chronic obstructive pulmonary disease) (H), Abnormal gait, Congestive heart failure (H)       * order for DME     1 Device    Updated Oxygen: Patient requires supplemental Oxygen 3 LPM via nasal canula with activity and 2 LPM nocturnally. Please provide patient with a portable oxygen concentrator for improved mobility.  Okay to spot check or titrated for conserving to keep stats above 90%. Oxygen will be for a lifetime.    ILD (interstitial lung disease) (H), Hypoxia       polyethylene glycol powder    MIRALAX    510 g    Take 17 g (1 capful) by mouth daily    Constipation, unspecified constipation type       sildenafil 20 MG tablet    REVATIO    270 tablet    TAKE ONE TABLET BY MOUTH THREE TIMES DAILY    Pulmonary hypertension       tiotropium 18 MCG capsule    SPIRIVA HANDIHALER    90 capsule    Inhale contents of one capsule  daily.    Chronic obstructive pulmonary disease, unspecified COPD type (H)       Urea 40 % Crea    CARMOL 40    199 g    To feet daily    Dermatophytosis of nail, Corns and callosities       * Notice:  This list has 2 medication(s) that are the same as other medications prescribed for you. Read the directions carefully, and ask your doctor or other care provider to review them with you.

## 2018-08-30 NOTE — LETTER
8/30/2018       RE: Rohan Monroe  [address]  [place] [address]     Dear Colleague,    Thank you for referring your patient, Rohan Monroe, to the [facility] at [facility]. Please see a copy of my visit note below.    Nephrology Follow-up Clinic Note  August 30, 2018        Reason for visit: CKD follow-up    HPI:  Mr Monroe is a pleasant 72 year old man here for follow-up of CKD and proteinuria. His past medical hx is significant for sarcoid (affecting lungs and heart), hep C s/p treatment with cirrhosis (and SVR), CAD s/p stents, COPD, He denies a personal history of UTIs or kidney stones. Given unclear etiology of CKD and presence of RBCs, WBCs, and proteinuria we proceeded with kidney biopsy on 11/13/2015. Results were consistent with diabetic changes and vascular disease as etiology for CKD.  Patient now working with endocrine and diabetes educator regarding his DM.  He was hospitalized in Jan,2017 for atrial flutter and hypervolemia.  He did undergo cardioversion and subsequent ablation.  In May, 2017 he underwent cardiac stenting to his LAD and LCx. This procedure was complicated by a retroperitoneal bleed for which he received a right iliac stent and subsequently needed thrombin injection for left-sided external iliac pseudoaneurysm. His Cr has risen significantly during these hospitalizations but has improved to ~2 mg/dL.  His main complaint has been dyspnea that did significantly improve after his cardiac revascularization.  He has been on less oxygenation though does continue to have dyspnea with exertion particularly walking up hills.  His symptoms are much improved since working with cardiac and pulmonary rehab.  He continues on remicade infusions and methotrexate. He continues on losartan at 50 mg daily and continues on metoprolol 100 mg BID and furosemide at 40 mg BID.  Of note, he is on sildenafil at 20 mg TID  (pulmonary HTN).  He denies fever, palpitations and chest pain.  His cardiology team is following his blood pressure closely.  Of note, he is no longer on coumadin or plavix but continues on ASA (81 mg).       Review of Systems:   A 10 point review of systems was negative except as noted above.    Active Medical Issues:  Patient Active Problem List   Diagnosis     Hypothyroidism     SARCOIDOSIS-systemic     Generalized osteoarthrosis, unspecified site     Viral warts     Other psoriasis     Hypertrophy of prostate without urinary obstruction     Diabetes mellitus, type 2 (H)     CAD (coronary artery disease)     Type 2 diabetes, HbA1C goal < 8% (H)     CARDIOVASCULAR SCREENING; LDL GOAL LESS THAN 100     Atrial flutter with rapid ventricular response (H)     CKD (chronic kidney disease) stage 3, GFR 30-59 ml/min     Hip joint pain     Hyperlipidemia LDL goal <70     Acute exacerbation of chronic obstructive pulmonary disease (COPD) (H)     Cellulitis     Immunosuppression (H)     Hyponatremia     RADHA (acute kidney injury) (HCC)     COPD (chronic obstructive pulmonary disease) (H)     Cirrhosis of liver (H)     Pneumonia     Proteinuria     Microscopic hematuria     Sarcoidosis of lung (HCC)     Hyperlipidemia     Atrial flutter (H)     Long-term (current) use of anticoagulants [Z79.01]     Hypoxia     CAD S/P percutaneous coronary angioplasty     Chest pain     Dermatophytosis of nail     Tyloma       PMHx, PSHx, FamHx, SocHx: reviewed in EPIC.    Current Medications:  Medications reviewed by me.  Current Outpatient Prescriptions   Medication Sig Dispense Refill     albuterol (PROAIR HFA/PROVENTIL HFA/VENTOLIN HFA) 108 (90 Base) MCG/ACT Inhaler Inhale 2 puffs into the lungs every 6 hours as needed for shortness of breath / dyspnea 3 Inhaler 6     atorvastatin (LIPITOR) 40 MG tablet TAKE ONE TABLET BY MOUTH ONE TIME DAILY  30 tablet 11     blood glucose monitoring (ACCU-CHEK RONNIE PLUS) test strip Use to test blood  sugar 2 times daily or as directed.  3 month supply. (Patient not taking: Reported on 9/13/2018) 200 each 3     blood glucose monitoring (ACCU-CHEK FASTCLIX) lancets Use to test blood sugar 2 times daily or as directed.  102 lancets per box.  3 month supply. (Patient not taking: Reported on 9/13/2018) 2 Box 3     blood glucose monitoring (NO BRAND SPECIFIED) meter device kit Use to test blood sugar 2 times daily. (Patient not taking: Reported on 9/13/2018) 1 kit 0     ciclopirox (LOPROX) 0.77 % cream Apply topically 2 times daily To feet and toenails. 90 g 6     colchicine (COLCRYS) 0.6 MG tablet 2 tabs at the onset of flare, then 1 tab every 2 hrs until pain is better or diarrhea occurs, up to 10 doses. Wait 3 days until taking again 30 tablet 0     fluticasone-vilanterol (BREO ELLIPTA) 100-25 MCG/INH oral inhaler Inhale 1 puff into the lungs daily 3 Inhaler 3     furosemide (LASIX) 20 MG tablet Take 2 tablets (40 mg) by mouth 2 times daily May take extra 20 mg as needed for wt .gain >3# (Patient not taking: Reported on 9/13/2018) 240 tablet 11     inFLIXimab (REMICADE) 100 MG injection Inject 100 mg into the vein every 28 days        levothyroxine (SYNTHROID/LEVOTHROID) 125 MCG tablet Take 1 tablet (125 mcg) by mouth daily 90 tablet 3     losartan (COZAAR) 50 MG tablet TAKE ONE TABLET BY MOUTH ONE TIME DAILY  90 tablet 3     methotrexate 2.5 MG tablet CHEMO Take 3 tablets (7.5 mg) by mouth once a week On Mondays 48 tablet 3     metoprolol tartrate (LOPRESSOR) 100 MG tablet Take 1 tablet (100 mg) by mouth 2 times daily 60 tablet 11     mirtazapine (REMERON) 15 MG tablet Take 15 mg by mouth At Bedtime.       Multiple Vitamins-Minerals (MULTIVITAMIN & MINERAL PO) Take 1 tablet by mouth daily.       order for DME Updated Oxygen: Patient requires supplemental Oxygen 3 LPM via nasal canula with activity and 2 LPM nocturnally. Please provide patient with a portable oxygen concentrator for improved mobility.  Okay to spot  "check or titrated for conserving to keep stats above 90%. Oxygen will be for a lifetime. 1 Device 0     order for DME She requested for a 4 wheel walker, would like to change it to 4 wheel walker with a seat and oxygen tank durant.     Need this faxed over to her   [phone] 1 Device 1     polyethylene glycol (MIRALAX) powder Take 17 g (1 capful) by mouth daily 510 g 1     sildenafil (REVATIO) 20 MG tablet TAKE ONE TABLET BY MOUTH THREE TIMES DAILY  270 tablet 3     tiotropium (SPIRIVA HANDIHALER) 18 MCG capsule Inhale contents of one capsule daily. 90 capsule 3     Urea (CARMOL 40) 40 % CREA To feet daily 199 g 4       Physical Exam:   BP 97/61  Pulse 77  Temp 98.3  F (36.8  C) (Oral)  Resp 16  Ht 1.727 m (5' 8\")  Wt 72.8 kg (160 lb 8 oz)  SpO2 98%  BMI 24.4 kg/m2   Gen: notably SOB  Heart: regular rhythm, normal rate, no rub  Lungs: shallow breaths but clear  Abd: soft, non-tender, non-distended  : No CVA tenderness  Ext: trace LE edema  MSK: No joint effusions  Skin: No concerning rash  Neuro: No gross focal deficit  Psych: Mood and affect appropriate     Labs:   Today's labs reviewed by me.  Electrolytes/Renal -   Recent Labs   Lab Test  09/13/18   1115  08/28/18   1009  03/08/18   1001  02/26/18   1317   05/13/17   0551   05/12/17   2028   04/24/17   1305   NA  137  136  135  134   < >  139   --   138   < >  138   POTASSIUM  4.2  4.5  3.9  4.3   < >  4.6   < >  3.6   < >  4.0   CHLORIDE  104  104  101  100   < >  104   --   102   < >  102   CO2  27  24  25  26   < >  24   --   26   < >  30   BUN  36*  46*  58*  48*   < >  38*   --   40*   < >  29   CR  1.89*  1.90*  2.08*  2.16*   < >  2.03*   --   1.84*   < >  1.82*   GLC  121*  146*  155*  146*   < >  166*   --   166*   < >  166*   RAFFY  8.9  9.2  8.7  8.7   < >  7.5*   --   7.9*   < >  8.8   MAG   --    --    --    --    --   2.0   --   2.1   --   2.0   PHOS   --   3.3  4.0  3.5   < >  3.4   --    --    < >   --     < > = values in this interval " not displayed.     CBC -   Recent Labs   Lab Test  08/28/18   1009  02/26/18   1317  01/05/18   0520   WBC  8.9  7.6  13.5*   HGB  13.0*  11.0*  10.6*   PLT  240  277  303     LFTs -   Recent Labs   Lab Test  09/13/18   1115  08/28/18   1009  03/08/18   1001  02/26/18   1317   ALKPHOS  110  116   --   128   BILITOTAL  0.8  0.8   --   0.6   ALT  30  29   --   42   AST  26  23   --   24   PROTTOTAL  7.8  8.1   --   8.1   ALBUMIN  3.6  3.7  3.7  3.7       Recent Labs   Lab Test  08/28/18   1013  03/08/18   1013  02/26/18   1316  10/25/17   1105  10/04/17   1122  09/05/17   1137  05/08/17   1650  04/18/17   1212  02/28/17   1206  12/26/16   1015  08/30/16   1320  07/18/16   1324  03/15/16   1107  02/29/16   1424  10/27/15   1339  10/07/15   1032  01/27/11   1507  11/16/10   0950   UTPG  3.82*  0.84*  0.76*  1.02*  1.13*  1.98*  1.87*  3.08*  6.07*  3.60*  1.40*  2.44*  1.50*  2.29*  2.00*  1.81*  0.02  0.10     PTH -   Recent Labs   Lab Test  08/28/18   1009  08/17/17   1234  02/28/17   1150  03/15/16   1116  01/27/11   1435   PTHI  87*  163*  183*  98*  24     IRON STUDIES -   Recent Labs   Lab Test  02/26/18   1317  02/28/17   1150  03/15/16   1116   IRON  49  61  91   FEB  271  316  346   IRONSAT  18  19 26   TIFFANIE  164  181  176         Assessment & Plan:   CKD stage 3b with proteinuria and hematuria:  Etiology 2/2 DM and renovascular disease.  Patient underwent kidney biopsy on 11/13/2015 to better evaluate his CKD. Tissue sample was limited but with tissue/cortex present patient had changes consistent with diabetic nephropathy and also mild to moderate renal arteriosclerosis.  Serologic work-up for vasculitis and monoclonal gammopathy have been negative. Hep C related kidney disease unlikely given he has had treatment with sustained virologic response.  He has had several bouts of RADHA, with the most recent occurring after his cardiac revascularization in May, 2017 and consequently likely has a new baseline.  His Cr  now seems relatively stable (while on losartan) at ~1.9 mg/dL with an eGFR of 35 ml/min.  He does have significant proteinuria though this is much improved since restarting losartan (6.1 g/g down to 3.8 g/g).  --discussed that moving forward it will be important to control his DM and HTN with regards to his CKD   --continue losartan 50 mg for now with goal of getting back to max dose given DM, HTN, proteinuria and CKD.  Albuminuria not at goal in past despite max dose losartan in past. Regardless, he would benefit from RAAS blockade for renoprotection and cardiovascular health.  However, would not start double RAAS blockade for management of proteinuria given that risks outweigh the benefits.    --will attempt to increase his losartan to 100 mg daily at next visit should his proteinuria remain > 1 g/g  ---no electrolyte, acid/base or anemia issues at this time  --PTH mildly elevated at 83.  Given normal corrected Ca and Phos.  No need for active vitamin D (e.g. calcitriol) at this time.  Will continue to monitor recheck in 3 months.  --follow-up in 3 months    Hypertension/Volume: Cardiology has made a number of changes to his antihypertensive regimen and is following him closely.  SBP at goal of <130. Intravascularly euvolemic on exam. Currently, he is on lasix (40 mg BID), metoprolol (100 mg BID) and losartan (50 mg).  Of note, he is on sildenafil (20 mg TID).    --will defer further management to cardiology  --as mentioned, would like to keep him on losartan for renoprotection/proteinuria (please see problem 1)      Total time spent was >40 minutes, and more than 50% of face to face time was spent in counseling and/or coordination of care regarding principles of multidisciplinary care, medication management, and chronic kidney disease education.  Ollie Michel MD     Again, thank you for allowing me to participate in the care of your patient.      Sincerely,    Ollie Michel MD

## 2018-09-04 ENCOUNTER — HOSPITAL ENCOUNTER (OUTPATIENT)
Dept: CARDIAC REHAB | Facility: CLINIC | Age: 73
End: 2018-09-04
Attending: INTERNAL MEDICINE
Payer: MEDICARE

## 2018-09-04 PROCEDURE — G0239 OTH RESP PROC, GROUP: HCPCS | Performed by: CLINICAL EXERCISE PHYSIOLOGIST

## 2018-09-04 PROCEDURE — 40000244 ZZH STATISTIC VISIT PULM REHAB: Performed by: CLINICAL EXERCISE PHYSIOLOGIST

## 2018-09-06 ENCOUNTER — INFUSION THERAPY VISIT (OUTPATIENT)
Dept: INFUSION THERAPY | Facility: CLINIC | Age: 73
End: 2018-09-06
Attending: INTERNAL MEDICINE
Payer: MEDICARE

## 2018-09-06 VITALS
WEIGHT: 164.9 LBS | OXYGEN SATURATION: 100 % | RESPIRATION RATE: 18 BRPM | SYSTOLIC BLOOD PRESSURE: 125 MMHG | DIASTOLIC BLOOD PRESSURE: 72 MMHG | BODY MASS INDEX: 25.07 KG/M2 | HEART RATE: 67 BPM

## 2018-09-06 DIAGNOSIS — D86.9 SARCOIDOSIS: ICD-10-CM

## 2018-09-06 DIAGNOSIS — D86.0 SARCOIDOSIS OF LUNG (H): Primary | ICD-10-CM

## 2018-09-06 PROCEDURE — 96413 CHEMO IV INFUSION 1 HR: CPT

## 2018-09-06 PROCEDURE — 25000128 H RX IP 250 OP 636: Mod: ZF | Performed by: INTERNAL MEDICINE

## 2018-09-06 RX ADMIN — INFLIXIMAB 400 MG: 100 INJECTION, POWDER, LYOPHILIZED, FOR SOLUTION INTRAVENOUS at 13:38

## 2018-09-06 NOTE — PROGRESS NOTES
~~~ NOTE: If the patient answers yes to any of the questions below, hold the infusion and contact ordering provider or on-call provider.    1. Have you recently had an elevated temperature, fever, chills, productive cough, coughing for 3 weeks or longer or hemoptysis,  abnormal vital signs, night sweats,  chest pain or have you noticed a decrease in your appetite, unexplained weight loss or fatigue? No  2. Do you have any open wounds or new incisions? No  3. Do you have any recent or upcoming hospitalizations, surgeries or dental procedures? No  4. Do you currently have or recently have had any signs of illness or infection or are you on any antibiotics? No  5. Have you had any new, sudden or worsening abdominal pain? No  6. Have you or anyone in your household received a live vaccination in the past 4 weeks? Please note:  No live vaccines while on biologic/chemotherapy until 6 months after the last treatment.  Patient can receive the flu vaccine (shot only) and the pneumovax.  It is optimal for the patient to get these vaccines mid cycle, but they can be given at any time as long as it is not on the day of the infusion. No  7. Have you recently been diagnosed with any new nervous system diseases (ie. Multiple sclerosis, Guillain [place], seizures, neurological changes) or cancer diagnosis? Are you on any form of radiation or chemotherapy? No  8. Are you pregnant or breast feeding or do you have plans of pregnancy in the future? No  9. Have you been having any signs of worsening depression or suicidal ideations?  (benlysta only) No  10. Have there been any other new onset medical symptoms? No  Nursing Note  Rohan Monroe presents [facility] for:   Chief Complaint   Patient presents with     Infusion     Remicade     During today's [facility] appointment, orders from Dr. Perlman were completed.  Frequency: monthly    Progress note:  Patient  identification verified by name and date of birth.  Assessment completed.  Vitals recorded in Doc Flowsheets.  Patient was provided with education regarding infusion and possible side effects.  Patient verbalized understanding.      needed: No  Premedications: were not ordered.  Infusion Rates: Remicade given over approximately 1 hour.  Approximate Infusion length: 1 hour.  Labs: were not ordered.  Vascular access: peripheral IV placed today.  Treatment Conditions: Biological checklist performed prior to infusion.  Inform patient if any fever, chills or signs of infection, new symptoms, abdominal pain, heart palpitations, shortness of breath, reaction, weakness, neurological changes, seek medical attention immediately and should not receive infusions. No live virus vaccines prior to or during treatment or up to 6 months post infusion. If the patient has an upcoming procedure or surgery, this should be discussed with the rheumatologist and surgeon or provider.  Patient tolerated infusion: well.    Drug Waste Record? No     Discharge Plan:   Follow up plan of care with: ongoing infusions at [facility].  Discharge instructions were reviewed with patient.  Patient/representative verbalized understanding of discharge instructions and all questions answered.  Patient discharged from [facility] in stable condition.    Yuliet Farrell RN          Administrations This Visit     inFLIXimab (REMICADE) 400 mg in sodium chloride 0.9 % 315 mL infusion     Admin Date Action Dose Rate Route Administered By          09/06/2018 New Bag 400 mg 315 mL/hr Intravenous Yuliet Farrell RN                             Wt 74.8 kg (164 lb 14.4 oz)  BMI 25.07 kg/m2

## 2018-09-06 NOTE — PATIENT INSTRUCTIONS
Dear Rohan Monroe    Thank you for choosing [facility] ([facility]) for your infusion.  The following information is a summary of our appointment as well as important reminders.          EDUCATION POST BIOLOGICAL/CHEMOTHERAPY INFUSION  Call the triage nurse at your clinic or seek medical attention if you have chills and/or temperature greater than or equal to 100.5, uncontrolled nausea/vomiting, diarrhea, constipation, dizziness, shortness of breath, chest pain, heart palpitations, weakness or any other new or concerning symptoms, questions or concerns.  You can not have any live virus vaccines prior to or during treatment or up to 6 months post infusion.  If you have an upcoming surgery, medical procedure or dental procedure during treatment, this should be discussed with your ordering physician and your surgeon/dentist.  If you are having any concerning symptom, if you are unsure if you should get your next infusion or wish to speak to a provider before your next infusion, please call your care coordinator or triage nurse at your clinic to notify them so we can adequately serve you.          Additional information: you had your infusion of Remicade 400 mg via IV today.      We look forward in seeing you on your next appointment here at [facility].  Please don t hesitate to call us at [phone] to reschedule any of your appointments or to speak with one of the [facility] registered nurses.  It was a pleasure taking care of you today.    Sincerely,  Yuliet Farrell RN  [facility]  [address] [place] [address]  [address]  Phone:  [phone]

## 2018-09-06 NOTE — MR AVS SNAPSHOT
After Visit Summary   9/6/2018    Rohan Monroe    MRN: [id number]           Patient Information     Date Of Birth          1945        Visit Information        Provider Department      9/6/2018 1:00 PM  41 ATC;  SPEC INFUSION [facility] Specialty [facility] Procedure        Today's Diagnoses     Sarcoidosis of lung (HCC)    -  1    SARCOIDOSIS-systemic          Care Instructions    Dear Rohan Monroe    Thank you for choosing [facility] ([facility]) for your infusion.  The following information is a summary of our appointment as well as important reminders.          EDUCATION POST BIOLOGICAL/CHEMOTHERAPY INFUSION  Call the triage nurse at your clinic or seek medical attention if you have chills and/or temperature greater than or equal to 100.5, uncontrolled nausea/vomiting, diarrhea, constipation, dizziness, shortness of breath, chest pain, heart palpitations, weakness or any other new or concerning symptoms, questions or concerns.  You can not have any live virus vaccines prior to or during treatment or up to 6 months post infusion.  If you have an upcoming surgery, medical procedure or dental procedure during treatment, this should be discussed with your ordering physician and your surgeon/dentist.  If you are having any concerning symptom, if you are unsure if you should get your next infusion or wish to speak to a provider before your next infusion, please call your care coordinator or triage nurse at your clinic to notify them so we can adequately serve you.          Additional information: you had your infusion of Remicade 400 mg via IV today.      We look forward in seeing you on your next appointment here at [facility].  Please don t hesitate to call us at [phone] to reschedule any of your appointments or to speak with one of the [facility] registered nurses.  It was a pleasure taking care of you  today.    Sincerely,  Yuliet Farrell, RN  [facility]  Specialty Infusion [facility] Procedure Center  [address] [place] [address]  [address]  Phone:  [phone]            Follow-ups after your visit        Your next 10 appointments already scheduled     Sep 11, 2018  2:00 PM CDT   Pulmonary Treatment with  Pulmonary Rehab 2   [facility] ([facility])    [address]  [facility] [address]   [phone]            Sep 13, 2018 11:00 AM CDT   Lab with  LAB   [facility] ([facility])    [address] [place] [address]  1st Floor  [facility] [address] [phone]            Sep 13, 2018 11:30 AM CDT   (Arrive by 11:15 AM)   RETURN HEART FAILURE with Diane Paige MD   [facility] ([facility])    [address] [place] [address]  [address]  [facility] [address] [phone]            Sep 13, 2018  2:00 PM CDT   Pulmonary Treatment with  Pulmonary Rehab 2   [facility] ([facility])    [address]  [facility] [address]   [phone]            Sep 18, 2018  2:00 PM CDT   Pulmonary Treatment with  Pulmonary Rehab 2   [facility] ([facility])    [address]  [facility] [address]   [phone]            Sep 20, 2018  2:00 PM CDT   Pulmonary Treatment with  Pulmonary Rehab 2   [facility] ([facility])    [address]  [facility] [address]   [phone]            Sep 25, 2018  2:00 PM CDT   Pulmonary Treatment with  Pulmonary Rehab 2   [facility] ([facility])    [address]  [facility] [address]   [phone]            Sep 27, 2018  2:00 PM CDT   Pulmonary Treatment with  Pulmonary Rehab 2   [facility]    [address]  [facility] [address]   [phone]            Oct 02, 2018  3:00 PM CDT   RETURN RETINA with Sandee Middleton MD   [facility] ([facility])    [address]  [facility] [address]   [phone]            Oct 04, 2018 12:00 PM CDT   Infusion 180 with UC SPEC INFUSION   [facility] Specialty and Procedure ([facility])    [address]  [address]  [facility] [address] [phone]              Who to contact     If you have questions or need follow up information about today's clinic visit or your schedule please contact [facility] SPECIALTY AND PROCEDURE directly at [phone].  Normal or non-critical lab and imaging results will be communicated to you by Xsilonhart, letter or phone within 4 business days after the clinic has received the results. If you do not hear from us within 7 days, please contact the clinic through Xsilonhart or phone. If you have a critical or abnormal lab result, we will notify you by phone as soon as possible.  Submit refill requests through [employer] or call your pharmacy and they will forward the refill request to us. Please allow 3 business days for your refill to be completed.          Additional Information About Your Visit        MyChart Information     [employer] gives you secure access to your electronic health record. If you see a primary care provider, you can also send messages to your care team and make appointments. If you have questions, please call your primary care clinic.  If you do not have a primary care provider, please call [phone] and they will assist you.        Care EveryWhere ID     This is your Care EveryWhere ID. This could be used by other organizations to access your [facility] medical records  [id number]        Your Vitals Were     Pulse Respirations Pulse Oximetry BMI  (Body Mass Index)          67 18 100% 25.07 kg/m2         Blood Pressure from Last 3 Encounters:   09/06/18 125/72   08/30/18 97/61   08/28/18 163/84    Weight from Last 3 Encounters:   09/06/18 74.8 kg (164 lb 14.4 oz)   08/30/18 72.8 kg (160 lb 8 oz)   08/28/18 72.8 kg (160 lb 9.6 oz)              Today, you had the following     No orders found for display       Primary Care Provider Office Phone # Fax #    Jamie Foster [phone] [address] [address]        Equal Access to Services     ISIAH MONTOYA : Alejandro Lewis, judi barclay, kavon guzmanalmaisaias malagon, nelly jacinto. So [facility] [phone].    ATENCIÓN: Si habla español, tiene a mcfadden disposición servicios gratuitos de asistencia lingüística. Llame al [phone].    We comply with applicable federal civil rights laws and [place] laws. We do not discriminate on the basis of race, color, national origin, age, disability, sex, sexual orientation, or gender identity.            Thank you!     Thank you for choosing [facility] SPECIALTY AND PROCEDURE  for your care. Our goal is always to provide you with excellent care. Hearing back from our patients is one way we can continue to improve our services. Please take a few minutes to complete the written survey that you may receive in the mail after your visit with us. Thank you!             Your Updated Medication List - Protect others around you: Learn how to safely use, store and throw away your medicines at www.disposemymeds.org.          This list is accurate as of 9/6/18  3:18 PM.  Always use your most recent med list.                   Brand Name Dispense Instructions for use Diagnosis    albuterol 108 (90 Base) MCG/ACT inhaler    PROAIR HFA/PROVENTIL HFA/VENTOLIN HFA    3 Inhaler    Inhale 2 puffs into the lungs every 6 hours as needed for shortness of breath / dyspnea    Sarcoidosis        atorvastatin 40 MG tablet    LIPITOR    30 tablet    TAKE ONE TABLET BY MOUTH ONE TIME DAILY    Coronary artery disease involving native coronary artery of native heart without angina pectoris, CAD S/P percutaneous coronary angioplasty       blood glucose monitoring lancets     2 Box    Use to test blood sugar 2 times daily or as directed.  102 lancets per box.  3 month supply.    Type 2 diabetes, HbA1C goal < 8% (H)       blood glucose monitoring meter device kit    no brand specified    1 kit    Use to test blood sugar 2 times daily.    Type 2 diabetes mellitus with diabetic nephropathy (H)       blood glucose monitoring test strip    ACCU-CHEK RONNIE PLUS    200 each    Use to test blood sugar 2 times daily or as directed.  3 month supply.    Type 2 diabetes, HbA1C goal < 8% (H)       ciclopirox 0.77 % cream    LOPROX    90 g    Apply topically 2 times daily To feet and toenails.    Dermatophytosis of nail, Tinea pedis of both feet       clopidogrel 75 MG tablet    PLAVIX    12 tablet    TAKE ONE TABLET BY MOUTH ONE TIME DAILY    CAD S/P percutaneous coronary angioplasty, Coronary artery disease involving native coronary artery of native heart without angina pectoris       colchicine 0.6 MG tablet    COLCRYS    30 tablet    2 tabs at the onset of flare, then 1 tab every 2 hrs until pain is better or diarrhea occurs, up to 10 doses. Wait 3 days until taking again        fluticasone-vilanterol 100-25 MCG/INH inhaler    BREO ELLIPTA    3 Inhaler    Inhale 1 puff into the lungs daily    Chronic obstructive pulmonary disease, unspecified COPD type (H)       furosemide 20 MG tablet    LASIX    240 tablet    Take 2 tablets (40 mg) by mouth 2 times daily May take extra 20 mg as needed for wt .gain >3#    Pulmonary hypertension       inFLIXimab 100 MG injection    REMICADE     Inject 100 mg into the vein every 28 days        levothyroxine 125 MCG tablet    SYNTHROID/LEVOTHROID    90 tablet    Take 1 tablet (125 mcg) by  mouth daily    Hypothyroidism due to Hashimoto's thyroiditis       losartan 50 MG tablet    COZAAR    90 tablet    TAKE ONE TABLET BY MOUTH ONE TIME DAILY    (HFpEF) heart failure with preserved ejection fraction (H)       methotrexate 2.5 MG tablet CHEMO     48 tablet    Take 3 tablets (7.5 mg) by mouth once a week On Mondays    Sarcoidosis       metoprolol tartrate 100 MG tablet    LOPRESSOR    60 tablet    Take 1 tablet (100 mg) by mouth 2 times daily    Hypertension secondary to other renal disorders       mirtazapine 15 MG tablet    REMERON     Take 15 mg by mouth At Bedtime.        MULTIVITAMIN & MINERAL PO      Take 1 tablet by mouth daily.        * order for DME     1 Device    She requested for a 4 wheel walker, would like to change it to 4 wheel walker with a seat and oxygen tank durant.   Need this faxed over to her  [phone]    Sarcoidosis, lung (H), COPD (chronic obstructive pulmonary disease) (H), Abnormal gait, Congestive heart failure (H)       * order for DME     1 Device    Updated Oxygen: Patient requires supplemental Oxygen 3 LPM via nasal canula with activity and 2 LPM nocturnally. Please provide patient with a portable oxygen concentrator for improved mobility.  Okay to spot check or titrated for conserving to keep stats above 90%. Oxygen will be for a lifetime.    ILD (interstitial lung disease) (H), Hypoxia       polyethylene glycol powder    MIRALAX    510 g    Take 17 g (1 capful) by mouth daily    Constipation, unspecified constipation type       sildenafil 20 MG tablet    REVATIO    270 tablet    TAKE ONE TABLET BY MOUTH THREE TIMES DAILY    Pulmonary hypertension       tiotropium 18 MCG capsule    SPIRIVA HANDIHALER    90 capsule    Inhale contents of one capsule daily.    Chronic obstructive pulmonary disease, unspecified COPD type (H)       Urea 40 % Crea    CARMOL 40    199 g    To feet daily    Dermatophytosis of nail, Corns and callosities       * Notice:  This list has 2  medication(s) that are the same as other medications prescribed for you. Read the directions carefully, and ask your doctor or other care provider to review them with you.

## 2018-09-13 ENCOUNTER — OFFICE VISIT (OUTPATIENT)
Dept: CARDIOLOGY | Facility: CLINIC | Age: 73
End: 2018-09-13
Attending: INTERNAL MEDICINE
Payer: MEDICARE

## 2018-09-13 ENCOUNTER — HOSPITAL ENCOUNTER (OUTPATIENT)
Dept: CARDIAC REHAB | Facility: CLINIC | Age: 73
End: 2018-09-13
Attending: INTERNAL MEDICINE
Payer: MEDICARE

## 2018-09-13 VITALS
SYSTOLIC BLOOD PRESSURE: 157 MMHG | HEART RATE: 62 BPM | WEIGHT: 162.5 LBS | DIASTOLIC BLOOD PRESSURE: 74 MMHG | HEIGHT: 69 IN | BODY MASS INDEX: 24.07 KG/M2 | OXYGEN SATURATION: 99 %

## 2018-09-13 DIAGNOSIS — I48.3 TYPICAL ATRIAL FLUTTER (H): ICD-10-CM

## 2018-09-13 DIAGNOSIS — I27.20 PULMONARY HYPERTENSION (H): ICD-10-CM

## 2018-09-13 LAB
ALBUMIN SERPL-MCNC: 3.6 G/DL (ref 3.4–5)
ALP SERPL-CCNC: 110 U/L (ref 40–150)
ALT SERPL W P-5'-P-CCNC: 30 U/L (ref 0–70)
ANION GAP SERPL CALCULATED.3IONS-SCNC: 6 MMOL/L (ref 3–14)
AST SERPL W P-5'-P-CCNC: 26 U/L (ref 0–45)
BILIRUB SERPL-MCNC: 0.8 MG/DL (ref 0.2–1.3)
BUN SERPL-MCNC: 36 MG/DL (ref 7–30)
CALCIUM SERPL-MCNC: 8.9 MG/DL (ref 8.5–10.1)
CHLORIDE SERPL-SCNC: 104 MMOL/L (ref 94–109)
CO2 SERPL-SCNC: 27 MMOL/L (ref 20–32)
CREAT SERPL-MCNC: 1.89 MG/DL (ref 0.66–1.25)
GFR SERPL CREATININE-BSD FRML MDRD: 35 ML/MIN/1.7M2
GLUCOSE SERPL-MCNC: 121 MG/DL (ref 70–99)
NT-PROBNP SERPL-MCNC: 2688 PG/ML (ref 0–125)
POTASSIUM SERPL-SCNC: 4.2 MMOL/L (ref 3.4–5.3)
PROT SERPL-MCNC: 7.8 G/DL (ref 6.8–8.8)
SODIUM SERPL-SCNC: 137 MMOL/L (ref 133–144)

## 2018-09-13 PROCEDURE — 80053 COMPREHEN METABOLIC PANEL: CPT | Performed by: INTERNAL MEDICINE

## 2018-09-13 PROCEDURE — G0463 HOSPITAL OUTPT CLINIC VISIT: HCPCS | Mod: ZF

## 2018-09-13 PROCEDURE — 99214 OFFICE O/P EST MOD 30 MIN: CPT | Mod: ZP | Performed by: INTERNAL MEDICINE

## 2018-09-13 PROCEDURE — 36415 COLL VENOUS BLD VENIPUNCTURE: CPT | Performed by: INTERNAL MEDICINE

## 2018-09-13 PROCEDURE — G0239 OTH RESP PROC, GROUP: HCPCS

## 2018-09-13 PROCEDURE — 40000244 ZZH STATISTIC VISIT PULM REHAB

## 2018-09-13 PROCEDURE — 83880 ASSAY OF NATRIURETIC PEPTIDE: CPT | Performed by: INTERNAL MEDICINE

## 2018-09-13 ASSESSMENT — PAIN SCALES - GENERAL: PAINLEVEL: NO PAIN (0)

## 2018-09-13 NOTE — PATIENT INSTRUCTIONS
Cardiology Providers you saw during your visit:  Dr. Paige    Medication changes:  No medication changes    Follow up:    Please return to clinic for follow-up:  With Dr. Paige in 1 year with labs and ECHO prior.  If you end up moving to [place] this year, you can establish care at [facility]. For Sarcoid cardiologist Dr. Gagnon.      Please call if you have :  1. Weight gain of more than 2 pounds in a day or 5 pounds in a week  2. Increased shortness of breath, swelling or bloating  3. Dizziness, lightheadedness   4. Any questions or concerns.       Follow the American Heart Association Diet and Lifestyle recommendations:  Limit saturated fat, trans fat, sodium, red meat, sweets and sugar-sweetened beverages. If you choose to eat red meat, compare labels and select the leanest cuts available.  Aim for at least 150 minutes of moderate physical activity or 75 minutes of vigorous physical activity - or an equal combination of both - each week.      During business hours: [phone], press option # 1 to be routed to the [facility] then option # 3 for medical questions to speak with a nurse      After hours, weekends or holidays: On Call Cardiologist- [phone]   option #4 and ask to speak to the on-call Cardiologist. Inform them you are a CORE/heart failure patient at the [facility].      Jef Santiago RN  Cardiology Nurse Care Coordinator    Keep up the good work!    Take Care!

## 2018-09-13 NOTE — LETTER
9/13/2018      RE: Rohan Monroe  [address]  [place] [address]       Dear Colleague,    Thank you for the opportunity to participate in the care of your patient, Rohan Monroe, at the [facility]. Please see a copy of my visit note below.         [facility]  March 8, 2018      Dear Colleagues:      We had the pleasure of seeing Rohan Monroe in the [facility] today.   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 He recently underwent cardiac stenting to his LAD and LCx for which he feels much improved. This procedure was complicated by RADHA and retroperitoneal bleed for which he received a right iliac stent and subsequently needed thrombin injection for left-sided pseudoaneurysm. His kidney function improved and he had no anuric episodes.     Since completing the procedures above, he has been doing well, better than the past.  He has finished cardiac rehab but is still going through pulmonary rehab, which he does at the same placed with the same group of people.  He really thinks it has helped keep his strength and energy level up. He denies any PND, orthopnea, LE edema. He is using around 3L/min of oxygen delivery.  He does note that his blood pressure does seem to fluctuate a lot.  Most of the time is 120s-130s systolic, but ranges from 90s-150s.       Cardiac arrhythmia history is as follows: presented to the hospital in AFL with RVR January 2017, underwent DCCV but had an early recurrence of AFL. Thus underwent CTI ablation 1/23/17. Same day post-op  he had an episode of Afib and required Amiodarone loading with subsequent DCCV. Amiodarone toxicity developed and this was stopped. There has been no recurrence of any arrhythmias since then, and he denies any palpitations or racing heart. He wanted to come off warfarin, thus 1 week ziopatch performed did not show any atrial fibrillation, thus has been off warfarin.    PAST MEDICAL HISTORY:  Past Medical History:   Diagnosis Date     Atrial flutter (H)      Cataract of both eyes      Chronic infection     Hep C     Congestive heart failure, unspecified      Coronary artery disease      Depressive disorder      Depressive disorder, not elsewhere classified     Depression (non-psychotic)     ERM OS (epiretinal membrane, left eye)      Generalized osteoarthrosis, unspecified site      Glaucoma suspect      Hypertension      Lichen planus      Other psoriasis      PVD (posterior vitreous detachment), left eye      Sarcoidosis      Sarcoidosis      Type II or unspecified type diabetes mellitus without mention of complication, not stated as uncontrolled      Unspecified hypothyroidism     Hypothyroidism     Unspecified viral hepatitis C without hepatic coma      Viral warts, unspecified        SOCIAL HISTORY:  The patient was an  in the [employer] and stopped working 3 years ago.  He lives with his son, who is in college and is studying to be a .  He is .  He did drink heavily in his past.  He stopped in 1992.  Smoking he stopped in 1994.  He denies any other illicit drug use.      FAMILY HISTORY:  There is no family history of autoimmune diseases or sudden cardiac death.     CURRENT MEDICATIONS:  Current Outpatient Prescriptions   Medication Sig Dispense Refill     albuterol (PROAIR HFA/PROVENTIL HFA/VENTOLIN HFA) 108 (90 Base) MCG/ACT Inhaler Inhale 2 puffs into the lungs every 6 hours as needed for shortness of breath / dyspnea 3 Inhaler 6      atorvastatin (LIPITOR) 40 MG tablet TAKE ONE TABLET BY MOUTH ONE TIME DAILY  30 tablet 11     ciclopirox (LOPROX) 0.77 % cream Apply topically 2 times daily To feet and toenails. 90 g 6     colchicine (COLCRYS) 0.6 MG tablet 2 tabs at the onset of flare, then 1 tab every 2 hrs until pain is better or diarrhea occurs, up to 10 doses. Wait 3 days until taking again 30 tablet 0     fluticasone-vilanterol (BREO ELLIPTA) 100-25 MCG/INH oral inhaler Inhale 1 puff into the lungs daily 3 Inhaler 3     inFLIXimab (REMICADE) 100 MG injection Inject 100 mg into the vein every 28 days        levothyroxine (SYNTHROID/LEVOTHROID) 125 MCG tablet Take 1 tablet (125 mcg) by mouth daily 90 tablet 3     losartan (COZAAR) 50 MG tablet TAKE ONE TABLET BY MOUTH ONE TIME DAILY  90 tablet 3     methotrexate 2.5 MG tablet CHEMO Take 3 tablets (7.5 mg) by mouth once a week On Mondays 48 tablet 3     metoprolol tartrate (LOPRESSOR) 100 MG tablet Take 1 tablet (100 mg) by mouth 2 times daily 60 tablet 11     mirtazapine (REMERON) 15 MG tablet Take 15 mg by mouth At Bedtime.       Multiple Vitamins-Minerals (MULTIVITAMIN & MINERAL PO) Take 1 tablet by mouth daily.       order for DME Updated Oxygen: Patient requires supplemental Oxygen 3 LPM via nasal canula with activity and 2 LPM nocturnally. Please provide patient with a portable oxygen concentrator for improved mobility.  Okay to spot check or titrated for conserving to keep stats above 90%. Oxygen will be for a lifetime. 1 Device 0     order for DME She requested for a 4 wheel walker, would like to change it to 4 wheel walker with a seat and oxygen tank durant.     Need this faxed over to her   [phone] 1 Device 1     polyethylene glycol (MIRALAX) powder Take 17 g (1 capful) by mouth daily 510 g 1     sildenafil (REVATIO) 20 MG tablet TAKE ONE TABLET BY MOUTH THREE TIMES DAILY  270 tablet 3     tiotropium (SPIRIVA HANDIHALER) 18 MCG capsule Inhale contents of one capsule daily. 90  "capsule 3     Urea (CARMOL 40) 40 % CREA To feet daily 199 g 4     blood glucose monitoring (ACCU-CHEK RONNIE PLUS) test strip Use to test blood sugar 2 times daily or as directed.  3 month supply. (Patient not taking: Reported on 9/13/2018) 200 each 3     blood glucose monitoring (ACCU-CHEK FASTCLIX) lancets Use to test blood sugar 2 times daily or as directed.  102 lancets per box.  3 month supply. (Patient not taking: Reported on 9/13/2018) 2 Box 3     blood glucose monitoring (NO BRAND SPECIFIED) meter device kit Use to test blood sugar 2 times daily. (Patient not taking: Reported on 9/13/2018) 1 kit 0     clopidogrel (PLAVIX) 75 MG tablet TAKE ONE TABLET BY MOUTH ONE TIME DAILY  (Patient not taking: Reported on 9/13/2018) 12 tablet 0     furosemide (LASIX) 20 MG tablet Take 2 tablets (40 mg) by mouth 2 times daily May take extra 20 mg as needed for wt .gain >3# (Patient not taking: Reported on 9/13/2018) 240 tablet 11       ROS:   Constitutional: No fever, chills, or sweats. Weight has been relatively stable   ENT: No visual disturbance, ear ache, epistaxis, sore throat.   Allergies/Immunologic: Negative.   Respiratory: See HPI  Cardiovascular: See HPI  GI: No nausea, vomiting, hematemesis, melena, or hematochezia.   : No urinary frequency, dysuria, or hematuria.   Integument: Negative.   Psychiatric: Negative.  Neuro: Negative.   Endocrinology: Recent anxiety/insomnia due to hyperthryoidism  Musculoskeletal: Negative.     EXAM:  /74 (BP Location: Left arm, Patient Position: Chair, Cuff Size: Adult Regular)  Pulse 62  Ht 1.753 m (5' 9\")  Wt 73.7 kg (162 lb 8 oz)  SpO2 99%  BMI 24 kg/m2     General: appears comfortable, alert and articulate, sitting up in chair, on oxygen, chronically ill-appearing male  Head: normocephalic, atraumatic  Eyes: anicteric sclera, EOMI  Neck: no adenopathy  Orophyarynx: moist mucosa, no lesions, dentition intact  Heart: PMI unable to be appreciated, regular, S1/S2, trace " systolic murmur at the apex, no gallop, rub, estimated JVP <8  Lungs: clear to auscultation bilaterally, no wheezing or crackles   Abdomen:  Non distended, non-tender, bowel sounds present no fluid wave  Extremities: no clubbing, cyanosis; no lower extremity edema  Neurological: normal speech and affect, no gross motor deficits    Labs:  CBC RESULTS:  Lab Results   Component Value Date    WBC 8.9 08/28/2018    RBC 4.14 (L) 08/28/2018    HGB 13.0 (L) 08/28/2018    HCT 39.6 (L) 08/28/2018    MCV 96 08/28/2018    MCH 31.4 08/28/2018    MCHC 32.8 08/28/2018    RDW 14.2 08/28/2018     08/28/2018     CMP RESULTS:  Lab Results   Component Value Date     09/13/2018    POTASSIUM 4.2 09/13/2018    CHLORIDE 104 09/13/2018    CO2 27 09/13/2018    ANIONGAP 6 09/13/2018     (H) 09/13/2018    BUN 36 (H) 09/13/2018    CR 1.89 (H) 09/13/2018    GFRESTIMATED 35 (L) 09/13/2018    GFRESTBLACK 42 (L) 09/13/2018    RAFFY 8.9 09/13/2018    BILITOTAL 0.8 09/13/2018    ALBUMIN 3.6 09/13/2018    ALKPHOS 110 09/13/2018    ALT 30 09/13/2018    AST 26 09/13/2018      INR RESULTS:  Lab Results   Component Value Date    INR 0.97 08/28/2018     Lab Results   Component Value Date    NTBNP 2688 (H) 09/13/2018       Echo - 2017:  Borderline reduced left ventricular systolic function, (EF 50-55%). Left ventricular diastolic function is indeterminate.  The right ventricle is normal size; global RV function is mildly reduced.   No significant valvular pathology.  The inferior vena cava is normal; estimated mean RA pressure is <3 mmHg.  In comparison to prior limited study dated 1/19/17, the LV function is unchanged, however much improved from chronologically earlier study of the same date.    Echocardiogram 3/8/18:  Personally reviewed: normal EF with normal Bi v function and normal diastolic filling pattern; sinus rhythm.   IMPRESSION: Global and regional left ventricular function is normal with an EF of 60-65%.  The right ventricle  is normal in size and function.  No significant valvular abnormalities.  No pericardial effusion.    PFTs 2/24/17:  FEV1 of 2.74, which is 69% of predicted, an FEV1 of 39% of predicted at 1.16.  DLCO was not performed.      Coronary Angiogram 5/12/2017  1. LM is without angiographic evidence of disease.   2. LAD supplies the entire apex (type 3) and gives rise to septal perforators, D1 and D2. The mLAD has a 75% hazy stenosis just distal to the existing stent, D1 has a 60% ostial stenosis and D2 has serial 80% in-stent stenoses and the remainder of the vessel has mild luminal irregularities.   3. LCX gives rise to OM1 and OM2 vessels. The pLCx has a 30% stenosis, OM1 has a 70% stenosis in a small vessel and the remainder of the vessel has mild luminal irregularities.   4. RCA was not studied as was recently evaluated.   PERCUTANEOUS CORONARY INTERVENTION:  1. Successful deployment of a [id number] Synergy drug eluting stent to the D2.  2. Successful deployment of a [id number] Synergy drug eluting stent to the mLAD.     CT Abd 5/13/2017  1. Enlarging right retroperitoneal hematoma with active extravasation from the right external iliac artery.  2. Small left heterogeneous pleural effusion with indeterminate density. Findings are of uncertain etiology but hemorrhage or infection within the pleural space can have this appearance.  3. Changes in the lung consistent with sarcoidosis.  4. Nonspecific lymphadenopathy associated with the celiac axis.     Placement of covered stent to right external iliac artery 5/13/2017  1. Diagnostic angiography demonstrating a pseudoaneurysm with the neck arising from distal right external iliac artery with associated active extravasation.  2. Successful stent exclusion of the pseudoaneurysm with a 6 mm x 39 mm Atrium I cast balloon expandable stent graft.    ================================================================================================================    Assessment and  Plan:   In summary, this is a very pleasant 72-year-old man with a history of pulmonary sarcoidosis and a presumptive diagnosis of cardiac sarcoid based on the fact that he has high filling pressures and atrial arrhythmias but without a confirmed tissue diagnosis of cardiac sarcoidosis, who is maintained on infliximab and methotrexate, with decent control of his pulmonary symptoms over the last several years with need for supplemental oxygen due to ILD and chronic hypoxic respiratory failure. Would only plan for a PET scan if there was any change in cardiac function or new arrhythmia.     He is euvolemic today, and his echocardiogram reveals normal biventricular function without elevated filling pressures. States he is taking 40mg lasix once a day.  No changes for today.     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 Stable.     Given history of coronary artery disease and prior PCI, he is now off plavix and continues on asa 81.      paroxysmal atrial fib - He is no longer on warfarin (his request) due to no longer having detectable atrial fibrillation on recent EKGs, echo, and ziopatch.  His CHADSVASc = 4, watchful waiting for any future development of Afib.     Today's Plan:  --no major changes. Continue current therapies as stated above.     We are setting him up with a sarcoid team at [facility].     Patient discussed with Dr. Paige.    Jaja John MD PGY5  Cardiology Fellow  [phone]    I have seen and examined the patient with the house staff on September 13, 2018 and agree with the outlined assessment and plan.      Diane Paige MD   of Medicine   [facility] of Cardiology     CC  Patient Care Team:  Jamie Foster MD as PCP - General (Internal Medicine)  Kina Greco MD as MD (Ophthalmology)  Perlman, David Morris, MD as MD (Internal Medicine)  Haley Renner DO as Referring Physician (Student  in organized health care education/training program)  Chicho Mejia, AGATHA (Podiatry)  Macey Roy MD as MD (Internal Medicine)  Madalyn Fajardo MD PhD as MD (Family Practice)  Jaclyn Pina, MICHAEL as Nurse Coordinator (Clinical Cardiac Electrophysiology)  Brian Vazquez MD as MD (Clinical Cardiac Electrophysiology)  Elizabeth Cabral, VINNY CNP as Nurse Practitioner (Nurse Practitioner)  Maria Victoria Palmer MD as MD (Radiology)  Sandee Middleton MD as MD (Ophthalmology)  Jef Santiago, MICHAEL as Nurse Coordinator (Cardiology)

## 2018-09-13 NOTE — PROGRESS NOTES
[facility]  March 8, 2018      Dear Colleagues:      We had the pleasure of seeing Rohan Monroe in the [facility] today.   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 He recently underwent cardiac stenting to his LAD and LCx for which he feels much improved. This procedure was complicated by RADHA and retroperitoneal bleed for which he received a right iliac stent and subsequently needed thrombin injection for left-sided pseudoaneurysm. His kidney function improved and he had no anuric episodes.     Since completing the procedures above, he has been doing well, better than the past.  He has finished cardiac rehab but is still going through pulmonary rehab, which he does at the same placed with the same group of people.  He really thinks it has helped keep his strength and energy level up. He denies any PND, orthopnea, LE edema. He is using around 3L/min of oxygen delivery.  He does note that his blood pressure does seem to fluctuate a lot.  Most of the time is 120s-130s systolic, but ranges from 90s-150s.       Cardiac arrhythmia history is as follows: presented to the hospital in AFL with RVR January 2017, underwent DCCV but had an early recurrence of AFL. Thus underwent CTI ablation 1/23/17. Same day post-op he had an episode of Afib and required Amiodarone loading with subsequent DCCV. Amiodarone toxicity developed and this was stopped. There has been no recurrence of any arrhythmias since then, and he denies any palpitations or racing heart. He wanted to come off warfarin, thus 1 week ziopatch performed did not show any atrial fibrillation, thus  has been off warfarin.    PAST MEDICAL HISTORY:  Past Medical History:   Diagnosis Date     Atrial flutter (H)      Cataract of both eyes      Chronic infection     Hep C     Congestive heart failure, unspecified      Coronary artery disease      Depressive disorder      Depressive disorder, not elsewhere classified     Depression (non-psychotic)     ERM OS (epiretinal membrane, left eye)      Generalized osteoarthrosis, unspecified site      Glaucoma suspect      Hypertension      Lichen planus      Other psoriasis      PVD (posterior vitreous detachment), left eye      Sarcoidosis      Sarcoidosis      Type II or unspecified type diabetes mellitus without mention of complication, not stated as uncontrolled      Unspecified hypothyroidism     Hypothyroidism     Unspecified viral hepatitis C without hepatic coma      Viral warts, unspecified        SOCIAL HISTORY:  The patient was an  in the [employer] and stopped working 3 years ago.  He lives with his son, who is in college and is studying to be a .  He is .  He did drink heavily in his past.  He stopped in 1992.  Smoking he stopped in 1994.  He denies any other illicit drug use.      FAMILY HISTORY:  There is no family history of autoimmune diseases or sudden cardiac death.     CURRENT MEDICATIONS:  Current Outpatient Prescriptions   Medication Sig Dispense Refill     albuterol (PROAIR HFA/PROVENTIL HFA/VENTOLIN HFA) 108 (90 Base) MCG/ACT Inhaler Inhale 2 puffs into the lungs every 6 hours as needed for shortness of breath / dyspnea 3 Inhaler 6     atorvastatin (LIPITOR) 40 MG tablet TAKE ONE TABLET BY MOUTH ONE TIME DAILY  30 tablet 11     ciclopirox (LOPROX) 0.77 % cream Apply topically 2 times daily To feet and toenails. 90 g 6     colchicine (COLCRYS) 0.6 MG tablet 2 tabs at the onset of flare, then 1 tab every 2 hrs until pain is better or diarrhea occurs, up to 10 doses. Wait 3 days until  taking again 30 tablet 0     fluticasone-vilanterol (BREO ELLIPTA) 100-25 MCG/INH oral inhaler Inhale 1 puff into the lungs daily 3 Inhaler 3     inFLIXimab (REMICADE) 100 MG injection Inject 100 mg into the vein every 28 days        levothyroxine (SYNTHROID/LEVOTHROID) 125 MCG tablet Take 1 tablet (125 mcg) by mouth daily 90 tablet 3     losartan (COZAAR) 50 MG tablet TAKE ONE TABLET BY MOUTH ONE TIME DAILY  90 tablet 3     methotrexate 2.5 MG tablet CHEMO Take 3 tablets (7.5 mg) by mouth once a week On Mondays 48 tablet 3     metoprolol tartrate (LOPRESSOR) 100 MG tablet Take 1 tablet (100 mg) by mouth 2 times daily 60 tablet 11     mirtazapine (REMERON) 15 MG tablet Take 15 mg by mouth At Bedtime.       Multiple Vitamins-Minerals (MULTIVITAMIN & MINERAL PO) Take 1 tablet by mouth daily.       order for DME Updated Oxygen: Patient requires supplemental Oxygen 3 LPM via nasal canula with activity and 2 LPM nocturnally. Please provide patient with a portable oxygen concentrator for improved mobility.  Okay to spot check or titrated for conserving to keep stats above 90%. Oxygen will be for a lifetime. 1 Device 0     order for DME She requested for a 4 wheel walker, would like to change it to 4 wheel walker with a seat and oxygen tank durant.     Need this faxed over to her   [phone] 1 Device 1     polyethylene glycol (MIRALAX) powder Take 17 g (1 capful) by mouth daily 510 g 1     sildenafil (REVATIO) 20 MG tablet TAKE ONE TABLET BY MOUTH THREE TIMES DAILY  270 tablet 3     tiotropium (SPIRIVA HANDIHALER) 18 MCG capsule Inhale contents of one capsule daily. 90 capsule 3     Urea (CARMOL 40) 40 % CREA To feet daily 199 g 4     blood glucose monitoring (ACCU-CHEK RONNIE PLUS) test strip Use to test blood sugar 2 times daily or as directed.  3 month supply. (Patient not taking: Reported on 9/13/2018) 200 each 3     blood glucose monitoring (ACCU-CHEK FASTCLIX) lancets Use to test blood sugar 2 times daily or as  "directed.  102 lancets per box.  3 month supply. (Patient not taking: Reported on 9/13/2018) 2 Box 3     blood glucose monitoring (NO BRAND SPECIFIED) meter device kit Use to test blood sugar 2 times daily. (Patient not taking: Reported on 9/13/2018) 1 kit 0     clopidogrel (PLAVIX) 75 MG tablet TAKE ONE TABLET BY MOUTH ONE TIME DAILY  (Patient not taking: Reported on 9/13/2018) 12 tablet 0     furosemide (LASIX) 20 MG tablet Take 2 tablets (40 mg) by mouth 2 times daily May take extra 20 mg as needed for wt .gain >3# (Patient not taking: Reported on 9/13/2018) 240 tablet 11       ROS:   Constitutional: No fever, chills, or sweats. Weight has been relatively stable   ENT: No visual disturbance, ear ache, epistaxis, sore throat.   Allergies/Immunologic: Negative.   Respiratory: See HPI  Cardiovascular: See HPI  GI: No nausea, vomiting, hematemesis, melena, or hematochezia.   : No urinary frequency, dysuria, or hematuria.   Integument: Negative.   Psychiatric: Negative.  Neuro: Negative.   Endocrinology: Recent anxiety/insomnia due to hyperthryoidism  Musculoskeletal: Negative.     EXAM:  /74 (BP Location: Left arm, Patient Position: Chair, Cuff Size: Adult Regular)  Pulse 62  Ht 1.753 m (5' 9\")  Wt 73.7 kg (162 lb 8 oz)  SpO2 99%  BMI 24 kg/m2     General: appears comfortable, alert and articulate, sitting up in chair, on oxygen, chronically ill-appearing male  Head: normocephalic, atraumatic  Eyes: anicteric sclera, EOMI  Neck: no adenopathy  Orophyarynx: moist mucosa, no lesions, dentition intact  Heart: PMI unable to be appreciated, regular, S1/S2, trace systolic murmur at the apex, no gallop, rub, estimated JVP <8  Lungs: clear to auscultation bilaterally, no wheezing or crackles   Abdomen:  Non distended, non-tender, bowel sounds present no fluid wave  Extremities: no clubbing, cyanosis; no lower extremity edema  Neurological: normal speech and affect, no gross motor deficits    Labs:  CBC " RESULTS:  Lab Results   Component Value Date    WBC 8.9 08/28/2018    RBC 4.14 (L) 08/28/2018    HGB 13.0 (L) 08/28/2018    HCT 39.6 (L) 08/28/2018    MCV 96 08/28/2018    MCH 31.4 08/28/2018    MCHC 32.8 08/28/2018    RDW 14.2 08/28/2018     08/28/2018     CMP RESULTS:  Lab Results   Component Value Date     09/13/2018    POTASSIUM 4.2 09/13/2018    CHLORIDE 104 09/13/2018    CO2 27 09/13/2018    ANIONGAP 6 09/13/2018     (H) 09/13/2018    BUN 36 (H) 09/13/2018    CR 1.89 (H) 09/13/2018    GFRESTIMATED 35 (L) 09/13/2018    GFRESTBLACK 42 (L) 09/13/2018    RAFFY 8.9 09/13/2018    BILITOTAL 0.8 09/13/2018    ALBUMIN 3.6 09/13/2018    ALKPHOS 110 09/13/2018    ALT 30 09/13/2018    AST 26 09/13/2018      INR RESULTS:  Lab Results   Component Value Date    INR 0.97 08/28/2018     Lab Results   Component Value Date    NTBNP 2688 (H) 09/13/2018       Echo - 2017:  Borderline reduced left ventricular systolic function, (EF 50-55%). Left ventricular diastolic function is indeterminate.  The right ventricle is normal size; global RV function is mildly reduced.   No significant valvular pathology.  The inferior vena cava is normal; estimated mean RA pressure is <3 mmHg.  In comparison to prior limited study dated 1/19/17, the LV function is unchanged, however much improved from chronologically earlier study of the same date.    Echocardiogram 3/8/18:  Personally reviewed: normal EF with normal Bi v function and normal diastolic filling pattern; sinus rhythm.   IMPRESSION: Global and regional left ventricular function is normal with an EF of 60-65%.  The right ventricle is normal in size and function.  No significant valvular abnormalities.  No pericardial effusion.    PFTs 2/24/17:  FEV1 of 2.74, which is 69% of predicted, an FEV1 of 39% of predicted at 1.16.  DLCO was not performed.      Coronary Angiogram 5/12/2017  1. LM is without angiographic evidence of disease.   2. LAD supplies the entire apex (type  3) and gives rise to septal perforators, D1 and D2. The mLAD has a 75% hazy stenosis just distal to the existing stent, D1 has a 60% ostial stenosis and D2 has serial 80% in-stent stenoses and the remainder of the vessel has mild luminal irregularities.   3. LCX gives rise to OM1 and OM2 vessels. The pLCx has a 30% stenosis, OM1 has a 70% stenosis in a small vessel and the remainder of the vessel has mild luminal irregularities.   4. RCA was not studied as was recently evaluated.   PERCUTANEOUS CORONARY INTERVENTION:  1. Successful deployment of a [id number] Synergy drug eluting stent to the D2.  2. Successful deployment of a [id number] Synergy drug eluting stent to the mLAD.     CT Abd 5/13/2017  1. Enlarging right retroperitoneal hematoma with active extravasation from the right external iliac artery.  2. Small left heterogeneous pleural effusion with indeterminate density. Findings are of uncertain etiology but hemorrhage or infection within the pleural space can have this appearance.  3. Changes in the lung consistent with sarcoidosis.  4. Nonspecific lymphadenopathy associated with the celiac axis.     Placement of covered stent to right external iliac artery 5/13/2017  1. Diagnostic angiography demonstrating a pseudoaneurysm with the neck arising from distal right external iliac artery with associated active extravasation.  2. Successful stent exclusion of the pseudoaneurysm with a 6 mm x 39 mm Atrium I cast balloon expandable stent graft.    ================================================================================================================    Assessment and Plan:   In summary, this is a very pleasant 72-year-old man with a history of pulmonary sarcoidosis and a presumptive diagnosis of cardiac sarcoid based on the fact that he has high filling pressures and atrial arrhythmias but without a confirmed tissue diagnosis of cardiac sarcoidosis, who is maintained on infliximab and methotrexate, with  decent control of his pulmonary symptoms over the last several years with need for supplemental oxygen due to ILD and chronic hypoxic respiratory failure. Would only plan for a PET scan if there was any change in cardiac function or new arrhythmia.     He is euvolemic today, and his echocardiogram reveals normal biventricular function without elevated filling pressures. States he is taking 40mg lasix once a day.  No changes for today.     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 Stable.     Given history of coronary artery disease and prior PCI, he is now off plavix and continues on asa 81.      paroxysmal atrial fib - He is no longer on warfarin (his request) due to no longer having detectable atrial fibrillation on recent EKGs, echo, and ziopatch.  His CHADSVASc = 4, watchful waiting for any future development of Afib.     Today's Plan:  --no major changes. Continue current therapies as stated above.     We are setting him up with a sarcoid team at [facility].     Patient discussed with Dr. Paige.    Jaja John MD PGY5  Cardiology Fellow  [phone]    I have seen and examined the patient with the house staff on September 13, 2018 and agree with the outlined assessment and plan.      Diane Paige MD   of Medicine   [facility]           CC  Patient Care Team:  Jamie Foster MD as PCP - General (Internal Medicine)  Jamie Foster MD as Referring Physician (Internal Medicine)  Kina Greco MD as MD (Ophthalmology)  Perlman, David Morris, MD as MD (Internal Medicine)  Haley Renner DO as Referring Physician (Student in organized health care education/training program)  Chicho Mejia DPM (Podiatry)  Macey Roy MD as MD (Internal Medicine)  Madalyn Fajardo MD PhD as MD (Family Practice)  Jaclyn Pina, RN as Nurse Coordinator (Clinical Cardiac  Electrophysiology)  Brian Vazquez MD as MD (Clinical Cardiac Electrophysiology)  Elizabeth Cabral, APRN CNP as Nurse Practitioner (Nurse Practitioner)  Maria Victoria Palmer MD as MD (Radiology)  Jaclyn Pina, MICHAEL as Nurse Coordinator (Clinical Cardiac Electrophysiology)  Brian Vazquez MD as MD (Clinical Cardiac Electrophysiology)  Sandee Middleton MD as MD (Ophthalmology)  Diane Paige MD as MD (Cardiology)  Jef Santiago, MICHAEL as Nurse Coordinator (Cardiology)  ELIZABETH CABRAL

## 2018-09-13 NOTE — NURSING NOTE
Diet: Patient instructed regarding a heart healthy diet, including discussion of reduced fat and sodium intake. Patient demonstrated understanding of this information and agreed to call with further questions or concerns.  Labs: Patient was given results of the laboratory testing obtained today. Patient demonstrated understanding of this information and agreed to call with further questions or concerns.     Med Reconcile: Reviewed and verified all current medications with the patient. The updated medication list was printed and given to the patient.  Return Appointment: Patient given instructions regarding scheduling next clinic visit. Patient demonstrated understanding of this information and agreed to call with further questions or concerns. Return in 1 year with labs and ECHO prior.    Patient stated he understood all health information given and agreed to call with further questions or concerns.

## 2018-09-13 NOTE — NURSING NOTE
Vitals completed successfully and medication reconciled. Francheska Chan MA  Chief Complaint   Patient presents with     Follow Up For     HFpEF

## 2018-09-13 NOTE — MR AVS SNAPSHOT
After Visit Summary   9/13/2018    Rohan Monroe    MRN: [id number]           Patient Information     Date Of Birth          1945        Visit Information        Provider Department      9/13/2018 11:30 AM Diane Paige MD [facility]        Today's Diagnoses     Pulmonary hypertension        Typical atrial flutter (H)          Care Instructions    Cardiology Providers you saw during your visit:  Dr. Paige    Medication changes:  No medication changes    Follow up:    Please return to clinic for follow-up:  With Dr. Paige in 1 year with labs and ECHO prior.  If you end up moving to [place] this year, you can establish care at [facility]. For Sarcoid cardiologist Dr. Gagnon.      Please call if you have :  1. Weight gain of more than 2 pounds in a day or 5 pounds in a week  2. Increased shortness of breath, swelling or bloating  3. Dizziness, lightheadedness   4. Any questions or concerns.       Follow the American Heart Association Diet and Lifestyle recommendations:  Limit saturated fat, trans fat, sodium, red meat, sweets and sugar-sweetened beverages. If you choose to eat red meat, compare labels and select the leanest cuts available.  Aim for at least 150 minutes of moderate physical activity or 75 minutes of vigorous physical activity - or an equal combination of both - each week.      During business hours: [phone], press option # 1 to be routed to the [facility] then option # 3 for medical questions to speak with a nurse      After hours, weekends or holidays: On Call Cardiologist- [phone]   option #4 and ask to speak to the on-call Cardiologist. Inform them you are a CORE/heart failure patient at the [facility].      Jef Santiago, RN  Cardiology Nurse Care Coordinator    Keep up the good work!    Take Care!                Follow-ups after your visit        Your next 10 appointments already scheduled     Sep 13, 2018  2:00 PM CDT   Pulmonary  Treatment with Sh Pulmonary Rehab 2   [facility] ([facility])    [address]  [place] [address]   [phone]            Sep 18, 2018  2:00 PM CDT   Pulmonary Treatment with Sh Pulmonary Rehab 2   [facility] ([facility])    [address]  [place] [address]   [phone]            Sep 20, 2018  2:00 PM CDT   Pulmonary Treatment with Sh Pulmonary Rehab 2   [facility] ([facility])    [address]  [place] [address]   [phone]            Sep 25, 2018  2:00 PM CDT   Pulmonary Treatment with Sh Pulmonary Rehab 2   [facility] ([facility])    [address]  [place] [address]   [phone]            Sep 27, 2018  2:00 PM CDT   Pulmonary Treatment with  Pulmonary Rehab 2   [facility] ([facility])    [address]  [place] [address]   [phone]            Oct 02, 2018  3:00 PM CDT   RETURN RETINA with Sandee Middleton MD   [facility] ([facility])    [address]  [facility] [address]   [phone]            Oct 04, 2018 12:00 PM CDT   Infusion 180 with [facility],  47 ATC   [facility] ([facility])    [address]  [address]  [place] [place] [address]   [phone]            Oct 22, 2018 12:40 PM CDT   (Arrive by 12:25 PM)   RETURN FOOT/ANKLE with Chicho Mejia [facility] ([facility])    [address]  4th Floor  [place] [place] [facility] [address]   [phone]            Nov 01, 2018 11:30 AM CDT   LAB with [facility]   [facility] ([facility])    [address]  1st [address]  [facility]  "[id number] [phone]           Please do not eat 10-12 hours before your appointment if you are coming in fasting for labs on lipids, cholesterol, or glucose (sugar). This does not apply to pregnant women. Water, hot tea and black coffee (with nothing added) are okay. Do not drink other fluids, diet soda or chew gum.              Who to contact     If you have questions or need follow up information about today's clinic visit or your schedule please contact [facility] directly at [phone].  Normal or non-critical lab and imaging results will be communicated to you by CCBR-SYNARChart, letter or phone within 4 business days after the clinic has received the results. If you do not hear from us within 7 days, please contact the clinic through [facility] or phone. If you have a critical or abnormal lab result, we will notify you by phone as soon as possible.  Submit refill requests through [employer] or call your pharmacy and they will forward the refill request to us. Please allow 3 business days for your refill to be completed.          Additional Information About Your Visit        Recognia Information     [employer]ia gives you secure access to your electronic health record. If you see a primary care provider, you can also send messages to your care team and make appointments. If you have questions, please call your primary care clinic.  If you do not have a primary care provider, please call [phone] and they will assist you.        Care EveryWhere ID     This is your Care EveryWhere ID. This could be used by other organizations to access your [facility] medical records  [id number]        Your Vitals Were     Pulse Height Pulse Oximetry BMI (Body Mass Index)          62 1.753 m (5' 9\") 99% 24 kg/m2         Blood Pressure from Last 3 Encounters:   09/13/18 157/74   09/06/18 125/72   08/30/18 97/61    Weight from Last 3 Encounters:   09/13/18 73.7 kg (162 lb 8 oz)   09/06/18 74.8 kg (164 lb 14.4 oz)   08/30/18 72.8 " kg (160 lb 8 oz)              We Performed the Following     Follow-Up with Cardiologist        Primary Care Provider Office Phone # Fax #    Jamie Foster [phone]       [address] [address]        Equal Access to Services     LA NENAJEREMI LINDSAY : Hadii aad ku hadasho Soomaali, waaxda luqadaha, qaybta kaalmada adeegyada, waxay idiin hayaan adeeg giorgi layungyarely jacinto. So [facility] [phone].    ATENCIÓN: Si habla español, tiene a mcfadden disposición servicios gratuitos de asistencia lingüística. Llame al [phone].    We comply with applicable federal civil rights laws and [place] laws. We do not discriminate on the basis of race, color, national origin, age, disability, sex, sexual orientation, or gender identity.            Thank you!     Thank you for choosing [facility]  for your care. Our goal is always to provide you with excellent care. Hearing back from our patients is one way we can continue to improve our services. Please take a few minutes to complete the written survey that you may receive in the mail after your visit with us. Thank you!             Your Updated Medication List - Protect others around you: Learn how to safely use, store and throw away your medicines at www.disposemymeds.org.          This list is accurate as of 9/13/18 12:30 PM.  Always use your most recent med list.                   Brand Name Dispense Instructions for use Diagnosis    albuterol 108 (90 Base) MCG/ACT inhaler    PROAIR HFA/PROVENTIL HFA/VENTOLIN HFA    3 Inhaler    Inhale 2 puffs into the lungs every 6 hours as needed for shortness of breath / dyspnea    Sarcoidosis       atorvastatin 40 MG tablet    LIPITOR    30 tablet    TAKE ONE TABLET BY MOUTH ONE TIME DAILY    Coronary artery disease involving native coronary artery of native heart without angina pectoris, CAD S/P percutaneous coronary angioplasty       blood glucose monitoring lancets     2 Box    Use to test blood  sugar 2 times daily or as directed.  102 lancets per box.  3 month supply.    Type 2 diabetes, HbA1C goal < 8% (H)       blood glucose monitoring meter device kit    no brand specified    1 kit    Use to test blood sugar 2 times daily.    Type 2 diabetes mellitus with diabetic nephropathy (H)       blood glucose monitoring test strip    ACCU-CHEK RONNIE PLUS    200 each    Use to test blood sugar 2 times daily or as directed.  3 month supply.    Type 2 diabetes, HbA1C goal < 8% (H)       ciclopirox 0.77 % cream    LOPROX    90 g    Apply topically 2 times daily To feet and toenails.    Dermatophytosis of nail, Tinea pedis of both feet       colchicine 0.6 MG tablet    COLCRYS    30 tablet    2 tabs at the onset of flare, then 1 tab every 2 hrs until pain is better or diarrhea occurs, up to 10 doses. Wait 3 days until taking again        fluticasone-vilanterol 100-25 MCG/INH inhaler    BREO ELLIPTA    3 Inhaler    Inhale 1 puff into the lungs daily    Chronic obstructive pulmonary disease, unspecified COPD type (H)       furosemide 20 MG tablet    LASIX    240 tablet    Take 2 tablets (40 mg) by mouth 2 times daily May take extra 20 mg as needed for wt .gain >3#    Pulmonary hypertension       inFLIXimab 100 MG injection    REMICADE     Inject 100 mg into the vein every 28 days        levothyroxine 125 MCG tablet    SYNTHROID/LEVOTHROID    90 tablet    Take 1 tablet (125 mcg) by mouth daily    Hypothyroidism due to Hashimoto's thyroiditis       losartan 50 MG tablet    COZAAR    90 tablet    TAKE ONE TABLET BY MOUTH ONE TIME DAILY    (HFpEF) heart failure with preserved ejection fraction (H)       methotrexate 2.5 MG tablet CHEMO     48 tablet    Take 3 tablets (7.5 mg) by mouth once a week On Mondays    Sarcoidosis       metoprolol tartrate 100 MG tablet    LOPRESSOR    60 tablet    Take 1 tablet (100 mg) by mouth 2 times daily    Hypertension secondary to other renal disorders       mirtazapine 15 MG tablet     REMERON     Take 15 mg by mouth At Bedtime.        MULTIVITAMIN & MINERAL PO      Take 1 tablet by mouth daily.        * order for DME     1 Device    She requested for a 4 wheel walker, would like to change it to 4 wheel walker with a seat and oxygen tank durant.   Need this faxed over to her  [phone]    Sarcoidosis, lung (H), COPD (chronic obstructive pulmonary disease) (H), Abnormal gait, Congestive heart failure (H)       * order for DME     1 Device    Updated Oxygen: Patient requires supplemental Oxygen 3 LPM via nasal canula with activity and 2 LPM nocturnally. Please provide patient with a portable oxygen concentrator for improved mobility.  Okay to spot check or titrated for conserving to keep stats above 90%. Oxygen will be for a lifetime.    ILD (interstitial lung disease) (H), Hypoxia       polyethylene glycol powder    MIRALAX    510 g    Take 17 g (1 capful) by mouth daily    Constipation, unspecified constipation type       sildenafil 20 MG tablet    REVATIO    270 tablet    TAKE ONE TABLET BY MOUTH THREE TIMES DAILY    Pulmonary hypertension       tiotropium 18 MCG capsule    SPIRIVA HANDIHALER    90 capsule    Inhale contents of one capsule daily.    Chronic obstructive pulmonary disease, unspecified COPD type (H)       Urea 40 % Crea    CARMOL 40    199 g    To feet daily    Dermatophytosis of nail, Corns and callosities       * Notice:  This list has 2 medication(s) that are the same as other medications prescribed for you. Read the directions carefully, and ask your doctor or other care provider to review them with you.

## 2018-09-17 NOTE — PROGRESS NOTES
Nephrology Follow-up Clinic Note  August 30, 2018        Reason for visit: CKD follow-up    HPI:  Mr Monroe is a pleasant 72 year old man here for follow-up of CKD and proteinuria. His past medical hx is significant for sarcoid (affecting lungs and heart), hep C s/p treatment with cirrhosis (and SVR), CAD s/p stents, COPD, He denies a personal history of UTIs or kidney stones. Given unclear etiology of CKD and presence of RBCs, WBCs, and proteinuria we proceeded with kidney biopsy on 11/13/2015. Results were consistent with diabetic changes and vascular disease as etiology for CKD.  Patient now working with endocrine and diabetes educator regarding his DM.  He was hospitalized in Jan,2017 for atrial flutter and hypervolemia.  He did undergo cardioversion and subsequent ablation.  In May, 2017 he underwent cardiac stenting to his LAD and LCx. This procedure was complicated by a retroperitoneal bleed for which he received a right iliac stent and subsequently needed thrombin injection for left-sided external iliac pseudoaneurysm. His Cr has risen significantly during these hospitalizations but has improved to ~2 mg/dL.  His main complaint has been dyspnea that did significantly improve after his cardiac revascularization.  He has been on less oxygenation though does continue to have dyspnea with exertion particularly walking up hills.  His symptoms are much improved since working with cardiac and pulmonary rehab.  He continues on remicade infusions and methotrexate. He continues on losartan at 50 mg daily and continues on metoprolol 100 mg BID and furosemide at 40 mg BID.  Of note, he is on sildenafil at 20 mg TID (pulmonary HTN).  He denies fever, palpitations and chest pain.  His cardiology team is following his blood pressure closely.  Of note, he is no longer on coumadin or plavix but continues on ASA (81 mg).       Review of Systems:   A 10 point review of systems was negative except as noted above.    Active  Medical Issues:  Patient Active Problem List   Diagnosis     Hypothyroidism     SARCOIDOSIS-systemic     Generalized osteoarthrosis, unspecified site     Viral warts     Other psoriasis     Hypertrophy of prostate without urinary obstruction     Diabetes mellitus, type 2 (H)     CAD (coronary artery disease)     Type 2 diabetes, HbA1C goal < 8% (H)     CARDIOVASCULAR SCREENING; LDL GOAL LESS THAN 100     Atrial flutter with rapid ventricular response (H)     CKD (chronic kidney disease) stage 3, GFR 30-59 ml/min     Hip joint pain     Hyperlipidemia LDL goal <70     Acute exacerbation of chronic obstructive pulmonary disease (COPD) (H)     Cellulitis     Immunosuppression (H)     Hyponatremia     RADHA (acute kidney injury) (HCC)     COPD (chronic obstructive pulmonary disease) (H)     Cirrhosis of liver (H)     Pneumonia     Proteinuria     Microscopic hematuria     Sarcoidosis of lung (HCC)     Hyperlipidemia     Atrial flutter (H)     Long-term (current) use of anticoagulants [Z79.01]     Hypoxia     CAD S/P percutaneous coronary angioplasty     Chest pain     Dermatophytosis of nail     Tyloma       PMHx, PSHx, FamHx, SocHx: reviewed in EPIC.    Current Medications:  Medications reviewed by me.  Current Outpatient Prescriptions   Medication Sig Dispense Refill     albuterol (PROAIR HFA/PROVENTIL HFA/VENTOLIN HFA) 108 (90 Base) MCG/ACT Inhaler Inhale 2 puffs into the lungs every 6 hours as needed for shortness of breath / dyspnea 3 Inhaler 6     atorvastatin (LIPITOR) 40 MG tablet TAKE ONE TABLET BY MOUTH ONE TIME DAILY  30 tablet 11     blood glucose monitoring (ACCU-CHEK RONNIE PLUS) test strip Use to test blood sugar 2 times daily or as directed.  3 month supply. (Patient not taking: Reported on 9/13/2018) 200 each 3     blood glucose monitoring (ACCU-CHEK FASTCLIX) lancets Use to test blood sugar 2 times daily or as directed.  102 lancets per box.  3 month supply. (Patient not taking: Reported on 9/13/2018) 2  Box 3     blood glucose monitoring (NO BRAND SPECIFIED) meter device kit Use to test blood sugar 2 times daily. (Patient not taking: Reported on 9/13/2018) 1 kit 0     ciclopirox (LOPROX) 0.77 % cream Apply topically 2 times daily To feet and toenails. 90 g 6     colchicine (COLCRYS) 0.6 MG tablet 2 tabs at the onset of flare, then 1 tab every 2 hrs until pain is better or diarrhea occurs, up to 10 doses. Wait 3 days until taking again 30 tablet 0     fluticasone-vilanterol (BREO ELLIPTA) 100-25 MCG/INH oral inhaler Inhale 1 puff into the lungs daily 3 Inhaler 3     furosemide (LASIX) 20 MG tablet Take 2 tablets (40 mg) by mouth 2 times daily May take extra 20 mg as needed for wt .gain >3# (Patient not taking: Reported on 9/13/2018) 240 tablet 11     inFLIXimab (REMICADE) 100 MG injection Inject 100 mg into the vein every 28 days        levothyroxine (SYNTHROID/LEVOTHROID) 125 MCG tablet Take 1 tablet (125 mcg) by mouth daily 90 tablet 3     losartan (COZAAR) 50 MG tablet TAKE ONE TABLET BY MOUTH ONE TIME DAILY  90 tablet 3     methotrexate 2.5 MG tablet CHEMO Take 3 tablets (7.5 mg) by mouth once a week On Mondays 48 tablet 3     metoprolol tartrate (LOPRESSOR) 100 MG tablet Take 1 tablet (100 mg) by mouth 2 times daily 60 tablet 11     mirtazapine (REMERON) 15 MG tablet Take 15 mg by mouth At Bedtime.       Multiple Vitamins-Minerals (MULTIVITAMIN & MINERAL PO) Take 1 tablet by mouth daily.       order for DME Updated Oxygen: Patient requires supplemental Oxygen 3 LPM via nasal canula with activity and 2 LPM nocturnally. Please provide patient with a portable oxygen concentrator for improved mobility.  Okay to spot check or titrated for conserving to keep stats above 90%. Oxygen will be for a lifetime. 1 Device 0     order for DME She requested for a 4 wheel walker, would like to change it to 4 wheel walker with a seat and oxygen tank durant.     Need this faxed over to her   [phone] 1 Device 1      "polyethylene glycol (MIRALAX) powder Take 17 g (1 capful) by mouth daily 510 g 1     sildenafil (REVATIO) 20 MG tablet TAKE ONE TABLET BY MOUTH THREE TIMES DAILY  270 tablet 3     tiotropium (SPIRIVA HANDIHALER) 18 MCG capsule Inhale contents of one capsule daily. 90 capsule 3     Urea (CARMOL 40) 40 % CREA To feet daily 199 g 4       Physical Exam:   BP 97/61  Pulse 77  Temp 98.3  F (36.8  C) (Oral)  Resp 16  Ht 1.727 m (5' 8\")  Wt 72.8 kg (160 lb 8 oz)  SpO2 98%  BMI 24.4 kg/m2   Gen: notably SOB  Heart: regular rhythm, normal rate, no rub  Lungs: shallow breaths but clear  Abd: soft, non-tender, non-distended  : No CVA tenderness  Ext: trace LE edema  MSK: No joint effusions  Skin: No concerning rash  Neuro: No gross focal deficit  Psych: Mood and affect appropriate     Labs:   Today's labs reviewed by me.  Electrolytes/Renal -   Recent Labs   Lab Test  09/13/18   1115  08/28/18   1009  03/08/18   1001  02/26/18   1317   05/13/17   0551   05/12/17 2028   04/24/17   1305   NA  137  136  135  134   < >  139   --   138   < >  138   POTASSIUM  4.2  4.5  3.9  4.3   < >  4.6   < >  3.6   < >  4.0   CHLORIDE  104  104  101  100   < >  104   --   102   < >  102   CO2  27  24  25  26   < >  24   --   26   < >  30   BUN  36*  46*  58*  48*   < >  38*   --   40*   < >  29   CR  1.89*  1.90*  2.08*  2.16*   < >  2.03*   --   1.84*   < >  1.82*   GLC  121*  146*  155*  146*   < >  166*   --   166*   < >  166*   RAFFY  8.9  9.2  8.7  8.7   < >  7.5*   --   7.9*   < >  8.8   MAG   --    --    --    --    --   2.0   --   2.1   --   2.0   PHOS   --   3.3  4.0  3.5   < >  3.4   --    --    < >   --     < > = values in this interval not displayed.     CBC -   Recent Labs   Lab Test  08/28/18   1009  02/26/18   1317  01/05/18   0520   WBC  8.9  7.6  13.5*   HGB  13.0*  11.0*  10.6*   PLT  240  277  303     LFTs -   Recent Labs   Lab Test  09/13/18   1115  08/28/18   1009  03/08/18   1001  02/26/18   1317   ALKPHOS  110  116 "   --   128   BILITOTAL  0.8  0.8   --   0.6   ALT  30  29   --   42   AST  26  23   --   24   PROTTOTAL  7.8  8.1   --   8.1   ALBUMIN  3.6  3.7  3.7  3.7       Recent Labs   Lab Test  08/28/18   1013  03/08/18   1013  02/26/18   1316  10/25/17   1105  10/04/17   1122  09/05/17   1137  05/08/17   1650  04/18/17   1212  02/28/17   1206  12/26/16   1015  08/30/16   1320  07/18/16   1324  03/15/16   1107  02/29/16   1424  10/27/15   1339  10/07/15   1032  01/27/11   1507  11/16/10   0950   UTPG  3.82*  0.84*  0.76*  1.02*  1.13*  1.98*  1.87*  3.08*  6.07*  3.60*  1.40*  2.44*  1.50*  2.29*  2.00*  1.81*  0.02  0.10     PTH -   Recent Labs   Lab Test  08/28/18   1009  08/17/17   1234  02/28/17   1150  03/15/16   1116  01/27/11   1435   PTHI  87*  163*  183*  98*  24     IRON STUDIES -   Recent Labs   Lab Test  02/26/18   1317  02/28/17   1150  03/15/16   1116   IRON  49  61  91   FEB  271  316  346   IRONSAT  18  19  26   TIFFANIE  164  181  176         Assessment & Plan:   CKD stage 3b with proteinuria and hematuria:  Etiology 2/2 DM and renovascular disease.  Patient underwent kidney biopsy on 11/13/2015 to better evaluate his CKD. Tissue sample was limited but with tissue/cortex present patient had changes consistent with diabetic nephropathy and also mild to moderate renal arteriosclerosis.  Serologic work-up for vasculitis and monoclonal gammopathy have been negative. Hep C related kidney disease unlikely given he has had treatment with sustained virologic response.  He has had several bouts of RADHA, with the most recent occurring after his cardiac revascularization in May, 2017 and consequently likely has a new baseline.  His Cr now seems relatively stable (while on losartan) at ~1.9 mg/dL with an eGFR of 35 ml/min.  He does have significant proteinuria though this is much improved since restarting losartan (6.1 g/g down to 3.8 g/g).  --discussed that moving forward it will be important to control his DM and HTN with  regards to his CKD   --continue losartan 50 mg for now with goal of getting back to max dose given DM, HTN, proteinuria and CKD.  Albuminuria not at goal in past despite max dose losartan in past. Regardless, he would benefit from RAAS blockade for renoprotection and cardiovascular health.  However, would not start double RAAS blockade for management of proteinuria given that risks outweigh the benefits.    --will attempt to increase his losartan to 100 mg daily at next visit should his proteinuria remain > 1 g/g  ---no electrolyte, acid/base or anemia issues at this time  --PTH mildly elevated at 83.  Given normal corrected Ca and Phos.  No need for active vitamin D (e.g. calcitriol) at this time.  Will continue to monitor recheck in 3 months.  --follow-up in 3 months    Hypertension/Volume: Cardiology has made a number of changes to his antihypertensive regimen and is following him closely.  SBP at goal of <130. Intravascularly euvolemic on exam. Currently, he is on lasix (40 mg BID), metoprolol (100 mg BID) and losartan (50 mg).  Of note, he is on sildenafil (20 mg TID).    --will defer further management to cardiology  --as mentioned, would like to keep him on losartan for renoprotection/proteinuria (please see problem 1)      Total time spent was >40 minutes, and more than 50% of face to face time was spent in counseling and/or coordination of care regarding principles of multidisciplinary care, medication management, and chronic kidney disease education.  Ollie Michel MD

## 2018-09-18 ENCOUNTER — HOSPITAL ENCOUNTER (OUTPATIENT)
Dept: CARDIAC REHAB | Facility: CLINIC | Age: 73
End: 2018-09-18
Attending: INTERNAL MEDICINE
Payer: MEDICARE

## 2018-09-18 PROCEDURE — G0239 OTH RESP PROC, GROUP: HCPCS | Performed by: CLINICAL EXERCISE PHYSIOLOGIST

## 2018-09-18 PROCEDURE — 40000244 ZZH STATISTIC VISIT PULM REHAB: Performed by: CLINICAL EXERCISE PHYSIOLOGIST

## 2018-09-18 NOTE — PROGRESS NOTES
Reason for Visit  Rohan Monroe is a 72 year old year old male who is being seen for Interstitial Lung Disease (ILD) (Patient is being seen for ILD follow up)    ILD HPI    Rohan Monroe is a 72-year-old male with a history of pulmonary sarcoidosis was seen today for follow-up.  Overall he states he is doing well he has a complicated course currently maintained on Remicade and methotrexate.  Overall states his breathing is stable he still has moderate dyspnea on exertion which has been relatively stable over the past several years.  He continues on  infliximab infusions every 4 weeks and 10 mg of methotrexate daily.  Otherwise doing well no other new complaints today.  He is planning to move to [place] and is interested in establishing care at [facility] is asking about a possible referral there.  72-year-old femal      Current Outpatient Prescriptions   Medication     albuterol (PROAIR HFA/PROVENTIL HFA/VENTOLIN HFA) 108 (90 Base) MCG/ACT Inhaler     atorvastatin (LIPITOR) 40 MG tablet     blood glucose monitoring (ACCU-CHEK RONNIE PLUS) test strip     blood glucose monitoring (ACCU-CHEK FASTCLIX) lancets     blood glucose monitoring (NO BRAND SPECIFIED) meter device kit     ciclopirox (LOPROX) 0.77 % cream     colchicine (COLCRYS) 0.6 MG tablet     fluticasone-vilanterol (BREO ELLIPTA) 100-25 MCG/INH oral inhaler     furosemide (LASIX) 20 MG tablet     inFLIXimab (REMICADE) 100 MG injection     levothyroxine (SYNTHROID/LEVOTHROID) 125 MCG tablet     losartan (COZAAR) 50 MG tablet     methotrexate 2.5 MG tablet CHEMO     metoprolol tartrate (LOPRESSOR) 100 MG tablet     mirtazapine (REMERON) 15 MG tablet     Multiple Vitamins-Minerals (MULTIVITAMIN & MINERAL PO)     order for DME     order for DME     polyethylene glycol (MIRALAX) powder     sildenafil (REVATIO) 20 MG tablet     tiotropium (SPIRIVA HANDIHALER) 18 MCG capsule     Urea (CARMOL 40) 40 % CREA     No current facility-administered  medications for this visit.      Allergies   Allergen Reactions     Prednisone Other (See Comments)     He reports that he can't sleep for days and can't use small to massive doses of prednisone   Pt. Does not do well with high doses of Prednisone, His MD says that Prednisone is counter indicated. Prednisone failed to treat his sarcoidosis      Past Medical History:   Diagnosis Date     Atrial flutter (H)      Cataract of both eyes      Chronic infection     Hep C     Congestive heart failure, unspecified      Coronary artery disease      Depressive disorder      Depressive disorder, not elsewhere classified     Depression (non-psychotic)     ERM OS (epiretinal membrane, left eye)      Generalized osteoarthrosis, unspecified site      Glaucoma suspect      Hypertension      Lichen planus      Other psoriasis      PVD (posterior vitreous detachment), left eye      Sarcoidosis      Sarcoidosis      Type II or unspecified type diabetes mellitus without mention of complication, not stated as uncontrolled      Unspecified hypothyroidism     Hypothyroidism     Unspecified viral hepatitis C without hepatic coma      Viral warts, unspecified        Past Surgical History:   Procedure Laterality Date     ANESTHESIA CARDIOVERSION N/A 1/19/2017    Procedure: ANESTHESIA CARDIOVERSION;  Surgeon: GENERIC ANESTHESIA PROVIDER;  Location: [facility] OR     ANESTHESIA CARDIOVERSION N/A 1/23/2017    Procedure: ANESTHESIA CARDIOVERSION;  Surgeon: GENERIC ANESTHESIA PROVIDER;  Location: [facility] OR     ANESTHESIA CARDIOVERSION N/A 1/24/2017    Procedure: ANESTHESIA CARDIOVERSION;  Surgeon: GENERIC ANESTHESIA PROVIDER;  Location: [facility] OR     ARTHROPLASTY HIP  8/24/2011    Procedure:ARTHROPLASTY HIP; Right Total Hip Arthroplasty  Choice anesthesia; Surgeon:LESLI WILKINSON; Location:[facility] OR     BIOPSY       C PELVIS/HIP JOINT SURGERY UNLISTED       cardiac stent      s/p     CARDIAC SURGERY       CATARACT IOL, RT/LT  9/15/2015    LE     COLONOSCOPY        CORONARY ANGIOGRAPHY ADULT ORDER       H ABLATION ATRIAL FLUTTER       HC REMOVAL OF TONSILS,<11 Y/O      Tonsils <10y.o.     HC REPAIR INCISIONAL HERNIA,REDUCIBLE      Hernia Repair, Incisional, Unilateral     HEART CATH, ANGIOPLASTY       JOINT REPLACEMENT      1 month ago--right hip     LIGATN/STRIP LONG & SHORT SAPHEN         Social History     Social History     Marital status:      Spouse name: N/A     Number of children: 2     Years of education: N/A     Occupational History      [employer]     Social History Main Topics     Smoking status: Former Smoker     Packs/day: 1.00     Years: 30.00     Types: Cigarettes     Start date: 12/30/1960     Quit date: 7/22/1994     Smokeless tobacco: Never Used     Alcohol use No     Drug use: No     Sexual activity: Not Currently     Partners: Female     Birth control/ protection: Post-menopausal     Other Topics Concern     Blood Transfusions No     Exercise Yes     Social History Narrative    Dairy/d 2 servings/d    Caffeine little servings/d    Exercise 3 x week    Sunscreen used - Yes    Seatbelts used - Yes    Working smoke/CO detectors in the home - Yes    Guns stored in the home - No    Self Breast Exams - NOT APPLICABLE    Self Testicular Exam - No    Eye Exam up to date - Yes    Dental Exam up to date - Yes    Pap Smear up to date - NOT APPLICABLE    Mammogram up to date - NOT APPLICABLE    PSA up to date - Yes    Dexa Scan up to date - NOT APPLICABLE    Flex Sig / Colonoscopy up to date - Yes    Immunizations up to date - Unsure    Abuse: Current or Past(Physical, Sexual or Emotional)- No    Do you feel safe in your environment - Yes       Family History   Problem Relation Age of Onset     HEART DISEASE Father      irreg heart beat     Circulatory Father      varicose veins     Prostate Cancer Father      HEART DISEASE Mother      heart attack     Arthritis Mother      Osteoporosis Mother      Thyroid Disease Mother      Hypertension Mother       Eye Disorder Maternal Grandmother      glaucoma     Diabetes Sister      type 2     Kidney Cancer Sister      Diabetes Sister      Glaucoma No family hx of      Macular Degeneration No family hx of      Cancer No family hx of      no skin cancer       ROS Pulmonary    A complete ROS was otherwise negative except as noted in the HPI.  Vitals:    08/17/18 1009   BP: 121/67   BP Location: Right arm   Patient Position: Chair   Cuff Size: Adult Regular   Pulse: 58   Resp: 16   SpO2: 96%     Exam:   GENERAL APPEARANCE: Well developed, well nourished, alert, and in no apparent distress.  EYES: PERRL, EOMI  HENT: nasal mucosa with out hyperemia or edema, no nasal polyps.  MOUTH: Oral mucosa is moist, without any lesions, no tonsillar enlargement, no oropharyngeal exudate.  NECK: supple, no masses, no thyromegaly.  LYMPHATICS: No significant axillary, cervical, or supraclavicular nodes.  RESP: good air flow throughout, - no crackles, rhonchi or wheezes.  CV: Normal S1, S2, regular rhythm, normal rate, no rub, no murmur,  no gallop, no LE edema.   ABDOMEN:  Bowel sounds normal, soft, nontender, no HSM or masses.   MS: extremities normal- no clubbing, no cyanosis.  SKIN: no rash on limited exam  NEURO: Mentation intact, speech normal, normal strength and tone, normal gait and stance  PSYCH: mentation appears normal. and affect normal/bright  Results: I have reviewed all imaging, PFTs and other relavent tests, please see below for details, PFT and imaging results were reviewed with the patient.    Assessment and plan:    72-year-old male with a history of pulmonary and cardiac sarcoidosis currently stable pulmonary function test on his current regimen.  We will plan to continue his current regimen I will inquire with my colleagues at [facility] regarding a good sarcoid clinic for him there.      CBC   Recent Labs   Lab Test  08/28/18   1009  02/26/18   1317   RBC  4.14*  3.64*   HGB  13.0*  11.0*   HCT  39.6*  34.3*   PLT  240  277        Basic Metabolic Panel  Recent Labs   Lab Test  09/13/18   1115  08/28/18   1009   05/13/17   0116  05/13/17   0035   NA  137  136   < >   --    --    POTASSIUM  4.2  4.5   < >   --    --    CHLORIDE  104  104   < >   --    --    CO2  27  24   < >   --    --    BUN  36*  46*   < >   --    --    CT   --    --    --   Plasma, Thawed  PlateletPheresis LeukoReduced Irradiated  Plasma, Thawed  Plasma, Thawed  Plasma, Thawed  Apheresis Plasma Thawed  Plasma, Thawed  PlateletPheresis,LeukoRed Irrad (Part 2)  Red Blood Cells Leukocyte Reduced  Red Blood Cells Leukocyte Reduced  Red Blood Cells Leukocyte Reduced  Red Blood Cells Leukocyte Reduced  Red Blood Cells Leukocyte Reduced  Red Blood Cells LeukoReduced (Part 2)   GLC  121*  146*   < >   --    --    RAFFY  8.9  9.2   < >   --    --     < > = values in this interval not displayed.       INR  Recent Labs   Lab Test  08/28/18   1009  03/21/18   1312   INR  0.97  1.48*       PFT  PFT Latest Ref Rng & Units 8/17/2018   FVC L 2.74   FEV1 L 1.13   FVC% % 70   FEV1% % 38           CC:

## 2018-09-20 DIAGNOSIS — D86.9 SARCOIDOSIS: Primary | ICD-10-CM

## 2018-09-21 DIAGNOSIS — D86.85 SARCOID, CARDIAC: Primary | ICD-10-CM

## 2018-09-25 ENCOUNTER — HOSPITAL ENCOUNTER (OUTPATIENT)
Dept: CARDIAC REHAB | Facility: CLINIC | Age: 73
End: 2018-09-25
Attending: INTERNAL MEDICINE
Payer: MEDICARE

## 2018-09-25 DIAGNOSIS — I25.10 CAD S/P PERCUTANEOUS CORONARY ANGIOPLASTY: ICD-10-CM

## 2018-09-25 DIAGNOSIS — E78.5 HYPERLIPIDEMIA LDL GOAL <70: Primary | ICD-10-CM

## 2018-09-25 DIAGNOSIS — Z98.61 CAD S/P PERCUTANEOUS CORONARY ANGIOPLASTY: ICD-10-CM

## 2018-09-25 DIAGNOSIS — I25.10 CORONARY ARTERY DISEASE INVOLVING NATIVE CORONARY ARTERY OF NATIVE HEART WITHOUT ANGINA PECTORIS: ICD-10-CM

## 2018-09-25 PROCEDURE — G0239 OTH RESP PROC, GROUP: HCPCS | Performed by: REHABILITATION PRACTITIONER

## 2018-09-25 PROCEDURE — 40000244 ZZH STATISTIC VISIT PULM REHAB: Performed by: REHABILITATION PRACTITIONER

## 2018-09-25 RX ORDER — ATORVASTATIN CALCIUM 40 MG/1
TABLET, FILM COATED ORAL
Qty: 90 TABLET | Refills: 3 | Status: SHIPPED | OUTPATIENT
Start: 2018-09-25

## 2018-09-27 ENCOUNTER — HOSPITAL ENCOUNTER (OUTPATIENT)
Dept: CARDIAC REHAB | Facility: CLINIC | Age: 73
End: 2018-09-27
Attending: INTERNAL MEDICINE
Payer: MEDICARE

## 2018-09-27 PROCEDURE — 40000244 ZZH STATISTIC VISIT PULM REHAB: Performed by: REHABILITATION PRACTITIONER

## 2018-09-27 PROCEDURE — G0239 OTH RESP PROC, GROUP: HCPCS | Performed by: REHABILITATION PRACTITIONER

## 2018-10-01 LAB
DLCOUNC-%PRED-PRE: 44 %
DLCOUNC-PRE: 10.92 ML/MIN/MMHG
DLCOUNC-PRED: 24.45 ML/MIN/MMHG
ERV-%PRED-PRE: 91 %
ERV-PRE: 1.02 L
ERV-PRED: 1.12 L
EXPTIME-PRE: 10.29 SEC
FEF2575-%PRED-PRE: 21 %
FEF2575-PRE: 0.46 L/SEC
FEF2575-PRED: 2.16 L/SEC
FEFMAX-%PRED-PRE: 43 %
FEFMAX-PRE: 3.32 L/SEC
FEFMAX-PRED: 7.54 L/SEC
FEV1-%PRED-PRE: 38 %
FEV1-PRE: 1.13 L
FEV1FEV6-PRE: 45 %
FEV1FEV6-PRED: 77 %
FEV1FVC-PRE: 41 %
FEV1FVC-PRED: 73 %
FEV1SVC-PRE: 45 %
FEV1SVC-PRED: 66 %
FIFMAX-PRE: 5.27 L/SEC
FVC-%PRED-PRE: 70 %
FVC-PRE: 2.74 L
FVC-PRED: 3.87 L
IC-%PRED-PRE: 45 %
IC-PRE: 1.49 L
IC-PRED: 3.27 L
VA-%PRED-PRE: 64 %
VA-PRE: 4.23 L
VC-%PRED-PRE: 57 %
VC-PRE: 2.51 L
VC-PRED: 4.39 L

## 2018-10-02 ENCOUNTER — OFFICE VISIT (OUTPATIENT)
Dept: OPHTHALMOLOGY | Facility: CLINIC | Age: 73
End: 2018-10-02
Attending: OPHTHALMOLOGY
Payer: MEDICARE

## 2018-10-02 DIAGNOSIS — E11.3293 MILD NONPROLIFERATIVE DIABETIC RETINOPATHY OF BOTH EYES WITHOUT MACULAR EDEMA ASSOCIATED WITH TYPE 2 DIABETES MELLITUS (H): ICD-10-CM

## 2018-10-02 DIAGNOSIS — H43.812 VITREOUS DEGENERATION AND DETACHMENT OF LEFT EYE: ICD-10-CM

## 2018-10-02 DIAGNOSIS — H35.373 EPIRETINAL MEMBRANE (ERM) OF BOTH EYES: Primary | ICD-10-CM

## 2018-10-02 DIAGNOSIS — H43.811 VITREORETINAL DEGENERATION OF RIGHT EYE: ICD-10-CM

## 2018-10-02 PROCEDURE — 92134 CPTRZ OPH DX IMG PST SGM RTA: CPT | Mod: ZF | Performed by: OPHTHALMOLOGY

## 2018-10-02 PROCEDURE — G0463 HOSPITAL OUTPT CLINIC VISIT: HCPCS | Mod: ZF

## 2018-10-02 PROCEDURE — 92015 DETERMINE REFRACTIVE STATE: CPT | Mod: GY,ZF

## 2018-10-02 ASSESSMENT — SLIT LAMP EXAM - LIDS
COMMENTS: NORMAL
COMMENTS: NORMAL

## 2018-10-02 ASSESSMENT — VISUAL ACUITY
OS_SC: 20/30
OS_SC+: -1
OD_SC+: -2
OD_SC: 20/15
METHOD: SNELLEN - LINEAR

## 2018-10-02 ASSESSMENT — TONOMETRY
OD_IOP_MMHG: 12
OS_IOP_MMHG: 12
IOP_METHOD: TONOPEN

## 2018-10-02 ASSESSMENT — REFRACTION_MANIFEST
OS_SPHERE: -0.50
OD_SPHERE: -0.25
OS_ADD: +2.75
OS_CYLINDER: +0.50
OS_AXIS: 170
OD_CYLINDER: +0.50
OD_AXIS: 160
OD_ADD: +2.75

## 2018-10-02 ASSESSMENT — CONF VISUAL FIELD
OD_NORMAL: 1
OS_NORMAL: 1
METHOD: COUNTING FINGERS

## 2018-10-02 ASSESSMENT — PACHYMETRY
OS_CT(UM): 524
OD_CT(UM): 530
EXAM_DATE: 2008

## 2018-10-02 ASSESSMENT — EXTERNAL EXAM - RIGHT EYE: OD_EXAM: NORMAL

## 2018-10-02 ASSESSMENT — EXTERNAL EXAM - LEFT EYE: OS_EXAM: NORMAL

## 2018-10-02 ASSESSMENT — CUP TO DISC RATIO
OD_RATIO: 0.75
OS_RATIO: 0.85

## 2018-10-02 NOTE — NURSING NOTE
Chief Complaints and History of Present Illnesses   Patient presents with     Annual Eye Exam     1 year f/u for ERM LE >RE     HPI    Affected eye(s):  Both   Symptoms:     No floaters   No flashes   No redness   No tearing   No Dryness         Do you have eye pain now?:  No      Comments:  Pt here for a 1 year f/u for ERM LE >RE. Pt states vision has been about the same x 1 year. Pt states vision in RE is better than LE.     Tayler Ramos, [facility] 2:31 PM October 2, 2018

## 2018-10-02 NOTE — PROGRESS NOTES
CC -   ERM, DM    INTERVAL HISTORY -   Had pulmonary & cardiac problems since last visit here, VA stable,      HPI -   Rohan Monroe is a  72  year old year-old patient referred by Dr Greco for ERM evaluation   H/o sarcoidosis with pulmonary & cardiac findings  H/o DM II borderline, HTn    PAST OCULAR SURGERY  S/p CE/IOL LE 9/15/15   S/p CE/IOL RE 9/29/15      RETINAL IMAGING:  OCT  10-2-18  OD - mild ERM no CME  OS -  2+ ERM, 2+ distortion, no CME      ASSESSMENT & PLAN    1. ERM OS > OD   - VA good, not bothered   - observe    2. PVD OU   - S/Sx RD    3.  Mild NPDR OU with DM II no DME   - BP/BG control    4.  Sarcoidosis   - no ocular involvement at present    5.  OAG OS > OD   - sees Angus   - last visit 3/2017      Return to clinic: 1 years, OCT both eyes    Adonay Castellanos MD  PGY-3 Ophthalmology Resident  [phone]    ATTESTATION     Attending Physician Attestation:      Complete documentation of historical and exam elements from today's encounter can be found in the full encounter summary report (not reduplicated in this progress note).  I personally obtained the chief complaint(s) and history of present illness.  I confirmed and edited as necessary the review of systems, past medical/surgical history, family history, social history, and examination findings as documented by others; and I examined the patient myself.  I personally reviewed the relevant tests, images, and reports as documented above.  I personally reviewed the ophthalmic test(s) associated with this encounter, agree with the interpretation(s) as documented by the resident/fellow, and have edited the corresponding report(s) as necessary.   I formulated and edited as necessary the assessment and plan and discussed the findings and management plan with the patient and family    Sandee Middleton MD, PhD  , Vitreoretinal Surgery  Department of Ophthalmology  [facility]

## 2018-10-02 NOTE — MR AVS SNAPSHOT
After Visit Summary   10/2/2018    Rohan Monroe    MRN: [id number]           Patient Information     Date Of Birth          1945        Visit Information        Provider Department      10/2/2018 3:00 PM Sandee Middleton MD [facility]        Today's Diagnoses     Epiretinal membrane (ERM) of both eyes    -  1    Vitreoretinal degeneration of right eye        Vitreous degeneration and detachment of left eye        Mild nonproliferative diabetic retinopathy of both eyes without macular edema associated with type 2 diabetes mellitus (H)           Follow-ups after your visit        Follow-up notes from your care team     Return in about 1 year (around 10/2/2019) for DFE, Macular OCT.      Your next 10 appointments already scheduled     Oct 04, 2018 12:00 PM CDT   Infusion 180 with UC SPEC INFUSION, UC 47 ATC   [facility] ([facility])    [address] [place] [address]  [address]  [facility] [address]   [phone]            Oct 09, 2018  2:00 PM CDT   Pulmonary Treatment with Sh Pulmonary Rehab 2   [facility])    [address]  [facility] [address]   [phone]            Oct 11, 2018  2:00 PM CDT   Pulmonary Treatment with Sh Pulmonary Rehab 2   [facility])    [address]  [facility] [address]   [phone]            Oct 16, 2018  2:00 PM CDT   Pulmonary Treatment with Sh Pulmonary Rehab 2   [facility] ([facility])    [address]  [facility] [address]   [phone]            Oct 18, 2018  2:00 PM CDT   Pulmonary Treatment with Sh Pulmonary Rehab 2   [facility] ([facility])    [address]  [facility] [address]   [phone]            Oct 22, 2018 12:40 PM CDT    (Arrive by 12:25 PM)   RETURN FOOT/ANKLE with Chicho Mejia DPM   [facility] ([facility])    [address]  [address]  [facility] [address] [phone]            Oct 23, 2018  2:00 PM CDT   Pulmonary Treatment with Sh Pulmonary Rehab 2   [facility] ([facility])    [address]  [place] [address]   [phone]            Oct 25, 2018  2:00 PM CDT   Pulmonary Treatment with Sh Pulmonary Rehab 2   [facility] ([facility])    [address]  [facility] [address]   [phone]            Oct 30, 2018  2:00 PM CDT   Pulmonary Treatment with Sh Pulmonary Rehab 2   [facility] ([facility])    [address]  [facility] [address]   [phone]              Future tests that were ordered for you today     Open Future Orders        Priority Expected Expires Ordered    OCT Retina Spectralis OU (both eyes) Routine  4/4/2020 10/2/2018            Who to contact     Please call your clinic at [phone] to:    Ask questions about your health    Make or cancel appointments    Discuss your medicines    Learn about your test results    Speak to your doctor            Additional Information About Your Visit        P[employer]uphart Information     [employer] gives you secure access to your electronic health record. If you see a primary care provider, you can also send messages to your care team and make appointments. If you have questions, please call your primary care clinic.  If you do not have a primary care provider, please call [phone] and they will assist you.      [employer] is an electronic gateway that provides easy, online access to your medical records. With [employer], you can request a clinic appointment, read your test results, renew a prescription or communicate with your care team.     To access your  existing account, please contact your [facility] or call [phone] for assistance.        Care EveryWhere ID     This is your Care EveryWhere ID. This could be used by other organizations to access your [place] medical records  [id number]         Blood Pressure from Last 3 Encounters:   09/13/18 157/74   09/06/18 125/72   08/30/18 97/61    Weight from Last 3 Encounters:   09/13/18 73.7 kg (162 lb 8 oz)   09/06/18 74.8 kg (164 lb 14.4 oz)   08/30/18 72.8 kg (160 lb 8 oz)              We Performed the Following     OCT Retina Spectralis OU (both eyes)        Primary Care Provider Office Phone # Fax #    Jamie Foster [phone]       [address] [address]        Equal Access to Services     JEREMI MONTOYA : Hadii aad ku hadasho Soleslie, waaxda luqadaha, qaybta kaalmada adeegyada, waxay janiein haycarolina munson . So [facility] [phone].    ATENCIÓN: Si habla español, tiene a mcfadden disposición servicios gratuitos de asistencia lingüística. Llame al [phone].    We comply with applicable federal civil rights laws and [place] laws. We do not discriminate on the basis of race, color, national origin, age, disability, sex, sexual orientation, or gender identity.            Thank you!     Thank you for choosing [facility]  for your care. Our goal is always to provide you with excellent care. Hearing back from our patients is one way we can continue to improve our services. Please take a few minutes to complete the written survey that you may receive in the mail after your visit with us. Thank you!             Your Updated Medication List - Protect others around you: Learn how to safely use, store and throw away your medicines at www.disposemymeds.org.          This list is accurate as of 10/2/18  4:42 PM.  Always use your most recent med list.                   Brand Name Dispense Instructions for use Diagnosis    albuterol 108 (90  Base) MCG/ACT inhaler    PROAIR HFA/PROVENTIL HFA/VENTOLIN HFA    3 Inhaler    Inhale 2 puffs into the lungs every 6 hours as needed for shortness of breath / dyspnea    Sarcoidosis       atorvastatin 40 MG tablet    LIPITOR    90 tablet    TAKE ONE TABLET BY MOUTH ONE TIME DAILY    Coronary artery disease involving native coronary artery of native heart without angina pectoris, CAD S/P percutaneous coronary angioplasty, Hyperlipidemia LDL goal <70       blood glucose monitoring lancets     2 Box    Use to test blood sugar 2 times daily or as directed.  102 lancets per box.  3 month supply.    Type 2 diabetes, HbA1C goal < 8% (H)       blood glucose monitoring meter device kit    no brand specified    1 kit    Use to test blood sugar 2 times daily.    Type 2 diabetes mellitus with diabetic nephropathy (H)       blood glucose monitoring test strip    ACCU-CHEK RONNIE PLUS    200 each    Use to test blood sugar 2 times daily or as directed.  3 month supply.    Type 2 diabetes, HbA1C goal < 8% (H)       ciclopirox 0.77 % cream    LOPROX    90 g    Apply topically 2 times daily To feet and toenails.    Dermatophytosis of nail, Tinea pedis of both feet       colchicine 0.6 MG tablet    COLCRYS    30 tablet    2 tabs at the onset of flare, then 1 tab every 2 hrs until pain is better or diarrhea occurs, up to 10 doses. Wait 3 days until taking again        fluticasone-vilanterol 100-25 MCG/INH inhaler    BREO ELLIPTA    3 Inhaler    Inhale 1 puff into the lungs daily    Chronic obstructive pulmonary disease, unspecified COPD type (H)       furosemide 20 MG tablet    LASIX    240 tablet    Take 2 tablets (40 mg) by mouth 2 times daily May take extra 20 mg as needed for wt .gain >3#    Pulmonary hypertension (H)       inFLIXimab 100 MG injection    REMICADE     Inject 100 mg into the vein every 28 days        levothyroxine 125 MCG tablet    SYNTHROID/LEVOTHROID    90 tablet    Take 1 tablet (125 mcg) by mouth daily     Hypothyroidism due to Hashimoto's thyroiditis       losartan 50 MG tablet    COZAAR    90 tablet    TAKE ONE TABLET BY MOUTH ONE TIME DAILY    (HFpEF) heart failure with preserved ejection fraction (H)       methotrexate 2.5 MG tablet CHEMO     48 tablet    Take 3 tablets (7.5 mg) by mouth once a week On Mondays    Sarcoidosis       metoprolol tartrate 100 MG tablet    LOPRESSOR    60 tablet    Take 1 tablet (100 mg) by mouth 2 times daily    Hypertension secondary to other renal disorders       mirtazapine 15 MG tablet    REMERON     Take 15 mg by mouth At Bedtime.        MULTIVITAMIN & MINERAL PO      Take 1 tablet by mouth daily.        * order for DME     1 Device    She requested for a 4 wheel walker, would like to change it to 4 wheel walker with a seat and oxygen tank durant.   Need this faxed over to her  [phone]    Sarcoidosis, lung (H), COPD (chronic obstructive pulmonary disease) (H), Abnormal gait, Congestive heart failure (H)       * order for DME     1 Device    Updated Oxygen: Patient requires supplemental Oxygen 3 LPM via nasal canula with activity and 2 LPM nocturnally. Please provide patient with a portable oxygen concentrator for improved mobility.  Okay to spot check or titrated for conserving to keep stats above 90%. Oxygen will be for a lifetime.    ILD (interstitial lung disease) (H), Hypoxia       polyethylene glycol powder    MIRALAX    510 g    Take 17 g (1 capful) by mouth daily    Constipation, unspecified constipation type       sildenafil 20 MG tablet    REVATIO    270 tablet    TAKE ONE TABLET BY MOUTH THREE TIMES DAILY    Pulmonary hypertension (H)       tiotropium 18 MCG capsule    SPIRIVA HANDIHALER    90 capsule    Inhale contents of one capsule daily.    Chronic obstructive pulmonary disease, unspecified COPD type (H)       Urea 40 % Crea    CARMOL 40    199 g    To feet daily    Dermatophytosis of nail, Corns and callosities       * Notice:  This list has 2 medication(s)  that are the same as other medications prescribed for you. Read the directions carefully, and ask your doctor or other care provider to review them with you.

## 2018-10-04 ENCOUNTER — INFUSION THERAPY VISIT (OUTPATIENT)
Dept: INFUSION THERAPY | Facility: CLINIC | Age: 73
End: 2018-10-04
Attending: INTERNAL MEDICINE
Payer: MEDICARE

## 2018-10-04 VITALS
RESPIRATION RATE: 16 BRPM | WEIGHT: 170.2 LBS | SYSTOLIC BLOOD PRESSURE: 145 MMHG | HEART RATE: 55 BPM | BODY MASS INDEX: 25.13 KG/M2 | DIASTOLIC BLOOD PRESSURE: 74 MMHG | TEMPERATURE: 97.8 F

## 2018-10-04 DIAGNOSIS — D86.0 SARCOIDOSIS OF LUNG (H): Primary | ICD-10-CM

## 2018-10-04 DIAGNOSIS — D86.9 SARCOIDOSIS: ICD-10-CM

## 2018-10-04 PROCEDURE — 96413 CHEMO IV INFUSION 1 HR: CPT

## 2018-10-04 PROCEDURE — 25000128 H RX IP 250 OP 636: Mod: ZF | Performed by: INTERNAL MEDICINE

## 2018-10-04 RX ADMIN — INFLIXIMAB 400 MG: 100 INJECTION, POWDER, LYOPHILIZED, FOR SOLUTION INTRAVENOUS at 12:39

## 2018-10-04 ASSESSMENT — PAIN SCALES - GENERAL: PAINLEVEL: NO PAIN (0)

## 2018-10-04 NOTE — MR AVS SNAPSHOT
After Visit Summary   10/4/2018    Rohan Monroe    MRN: [id number]           Patient Information     Date Of Birth          1945        Visit Information        Provider Department      10/4/2018 12:00 PM UC 47 ATC; UC SPEC [facility]        Today's Diagnoses     Sarcoidosis of lung (HCC)    -  1    SARCOIDOSIS-systemic           Follow-ups after your visit        Your next 10 appointments already scheduled     Oct 09, 2018  2:00 PM CDT   Pulmonary Treatment with  Pulmonary Rehab 2   [facility] ([facility])    [address]  [facility] [address]   [phone]            Oct 11, 2018  2:00 PM CDT   Pulmonary Treatment with  Pulmonary Rehab 2   [facility] ([facility])    [address]  [facility] [address]   [phone]            Oct 16, 2018  2:00 PM CDT   Pulmonary Treatment with  Pulmonary Rehab 2   [facility] ([facility])    [address]  [facility] [address]   [phone]            Oct 18, 2018  2:00 PM CDT   Pulmonary Treatment with  Pulmonary Rehab 2   [facility] ([facility])    [address]  [facility] [address]   [phone]            Oct 22, 2018 12:40 PM CDT   (Arrive by 12:25 PM)   RETURN FOOT/ANKLE with Chicho Mejia DPM   [facility] ([facility])    [address] [place] [id number] [phone]            Oct 23, 2018  2:00 PM CDT   Pulmonary Treatment with  Pulmonary Rehab 2   [facility] ([facility])    [address]  [facility] [address]   [phone]            Oct 25, 2018  2:00 PM CDT   Pulmonary Treatment with  Pulmonary Rehab 2   [facility]  Cardiac Rehab ([facility])    [address]  [place] [address]   [phone]            Oct 30, 2018  2:00 PM CDT   Pulmonary Treatment with  Pulmonary Rehab 2   [facility] Cardiac [facility]hab ([facility])    [address]  [place] [address]   [phone]            Nov 01, 2018 11:30 AM CDT   LAB with  LAB   [facility] ([facility])    [address]  1st Floor  [facility] [id number] [phone]           Please do not eat 10-12 hours before your appointment if you are coming in fasting for labs on lipids, cholesterol, or glucose (sugar). This does not apply to pregnant women. Water, hot tea and black coffee (with nothing added) are okay. Do not drink other fluids, diet soda or chew gum.            Nov 01, 2018 12:00 PM CDT   Infusion 180 with  SPEC INFUSION   [facility] Specialty [facility] Procedure ([facility])    [address]  [address]  [facility] [id number] [phone]              Who to contact     If you have questions or need follow up information about today's clinic visit or your schedule please contact [facility] SPECIALTY AND PROCEDURE directly at [phone].  Normal or non-critical lab and imaging results will be communicated to you by Livestarhart, letter or phone within 4 business days after the clinic has received the results. If you do not hear from us within 7 days, please contact the clinic through Livestarhart or phone. If you have a critical or abnormal lab result, we will notify you by phone as soon as possible.  Submit refill requests through [employer]2T[employer] or call your pharmacy and they will forward the refill request to us. Please allow 3 business days for your refill to be completed.          Additional Information About Your Visit        W[employer] Information     W[employer] gives you secure access to your  electronic health record. If you see a primary care provider, you can also send messages to your care team and make appointments. If you have questions, please call your primary care clinic.  If you do not have a primary care provider, please call [phone] and they will assist you.        Care EveryWhere ID     This is your Care EveryWhere ID. This could be used by other organizations to access your [place] medical records  [id number]        Your Vitals Were     Pulse Temperature Respirations BMI (Body Mass Index)          55 97.8  F (36.6  C) (Oral) 16 25.13 kg/m2         Blood Pressure from Last 3 Encounters:   10/04/18 145/74   09/13/18 157/74   09/06/18 125/72    Weight from Last 3 Encounters:   10/04/18 77.2 kg (170 lb 3.2 oz)   09/13/18 73.7 kg (162 lb 8 oz)   09/06/18 74.8 kg (164 lb 14.4 oz)              Today, you had the following     No orders found for display       Primary Care Provider Office Phone # Fax #    Jamie Foster [phone] [address] [address]        Equal Access to Services     JEREMI MONTOYA : Hadii rhys zepedao Soleslie, waaxda luqadaha, qaybta kaalmada adeegyada, nelly jacinto. So [facility] [phone].    ATENCIÓN: Si habla español, tiene a mcfadden disposición servicios gratuitos de asistencia lingüística. Ll[facility] [phone].    We comply with applicable federal civil rights laws and [place] laws. We do not discriminate on the basis of race, color, national origin, age, disability, sex, sexual orientation, or gender identity.            Thank you!     Thank you for choosing [facility] SPECIALTY AND PROCEDURE  for your care. Our goal is always to provide you with excellent care. Hearing back from our patients is one way we can continue to improve our services. Please take a few minutes to complete the written survey that you may receive in the mail after your visit with us. Thank  you!             Your Updated Medication List - Protect others around you: Learn how to safely use, store and throw away your medicines at www.disposemymeds.org.          This list is accurate as of 10/4/18  6:01 PM.  Always use your most recent med list.                   Brand Name Dispense Instructions for use Diagnosis    albuterol 108 (90 Base) MCG/ACT inhaler    PROAIR HFA/PROVENTIL HFA/VENTOLIN HFA    3 Inhaler    Inhale 2 puffs into the lungs every 6 hours as needed for shortness of breath / dyspnea    Sarcoidosis       atorvastatin 40 MG tablet    LIPITOR    90 tablet    TAKE ONE TABLET BY MOUTH ONE TIME DAILY    Coronary artery disease involving native coronary artery of native heart without angina pectoris, CAD S/P percutaneous coronary angioplasty, Hyperlipidemia LDL goal <70       blood glucose monitoring lancets     2 Box    Use to test blood sugar 2 times daily or as directed.  102 lancets per box.  3 month supply.    Type 2 diabetes, HbA1C goal < 8% (H)       blood glucose monitoring meter device kit    no brand specified    1 kit    Use to test blood sugar 2 times daily.    Type 2 diabetes mellitus with diabetic nephropathy (H)       blood glucose monitoring test strip    ACCU-CHEK RONNIE PLUS    200 each    Use to test blood sugar 2 times daily or as directed.  3 month supply.    Type 2 diabetes, HbA1C goal < 8% (H)       ciclopirox 0.77 % cream    LOPROX    90 g    Apply topically 2 times daily To feet and toenails.    Dermatophytosis of nail, Tinea pedis of both feet       colchicine 0.6 MG tablet    COLCRYS    30 tablet    2 tabs at the onset of flare, then 1 tab every 2 hrs until pain is better or diarrhea occurs, up to 10 doses. Wait 3 days until taking again        fluticasone-vilanterol 100-25 MCG/INH inhaler    BREO ELLIPTA    3 Inhaler    Inhale 1 puff into the lungs daily    Chronic obstructive pulmonary disease, unspecified COPD type (H)       furosemide 20 MG tablet    LASIX    240  tablet    Take 2 tablets (40 mg) by mouth 2 times daily May take extra 20 mg as needed for wt .gain >3#    Pulmonary hypertension (H)       inFLIXimab 100 MG injection    REMICADE     Inject 100 mg into the vein every 28 days        levothyroxine 125 MCG tablet    SYNTHROID/LEVOTHROID    90 tablet    Take 1 tablet (125 mcg) by mouth daily    Hypothyroidism due to Hashimoto's thyroiditis       losartan 50 MG tablet    COZAAR    90 tablet    TAKE ONE TABLET BY MOUTH ONE TIME DAILY    (HFpEF) heart failure with preserved ejection fraction (H)       methotrexate 2.5 MG tablet CHEMO     48 tablet    Take 3 tablets (7.5 mg) by mouth once a week On Mondays    Sarcoidosis       metoprolol tartrate 100 MG tablet    LOPRESSOR    60 tablet    Take 1 tablet (100 mg) by mouth 2 times daily    Hypertension secondary to other renal disorders       mirtazapine 15 MG tablet    REMERON     Take 15 mg by mouth At Bedtime.        MULTIVITAMIN & MINERAL PO      Take 1 tablet by mouth daily.        * order for DME     1 Device    She requested for a 4 wheel walker, would like to change it to 4 wheel walker with a seat and oxygen tank durant.   Need this faxed over to her  [phone]    Sarcoidosis, lung (H), COPD (chronic obstructive pulmonary disease) (H), Abnormal gait, Congestive heart failure (H)       * order for DME     1 Device    Updated Oxygen: Patient requires supplemental Oxygen 3 LPM via nasal canula with activity and 2 LPM nocturnally. Please provide patient with a portable oxygen concentrator for improved mobility.  Okay to spot check or titrated for conserving to keep stats above 90%. Oxygen will be for a lifetime.    ILD (interstitial lung disease) (H), Hypoxia       polyethylene glycol powder    MIRALAX    510 g    Take 17 g (1 capful) by mouth daily    Constipation, unspecified constipation type       sildenafil 20 MG tablet    REVATIO    270 tablet    TAKE ONE TABLET BY MOUTH THREE TIMES DAILY    Pulmonary  hypertension (H)       tiotropium 18 MCG capsule    SPIRIVA HANDIHALER    90 capsule    Inhale contents of one capsule daily.    Chronic obstructive pulmonary disease, unspecified COPD type (H)       Urea 40 % Crea    CARMOL 40    199 g    To feet daily    Dermatophytosis of nail, Corns and callosities       * Notice:  This list has 2 medication(s) that are the same as other medications prescribed for you. Read the directions carefully, and ask your doctor or other care provider to review them with you.

## 2018-10-04 NOTE — PROGRESS NOTES

## 2018-10-04 NOTE — PROGRESS NOTES
Nursing Note  Rohan Monroe [facility] for:   Chief Complaint   Patient presents with     Remicade Infusion     During today's [facility] appointment, orders from Dr. Perlman were completed.  Frequency: monthly    Progress note:  Patient identification verified by name and date of birth.  Assessment completed.  Vitals recorded in Doc Flowsheets.  Patient was provided with education regarding infusion and possible side effects.  Patient verbalized understanding.      needed: No  Premedications: none  Infusion Rates: infusion given over approximately 1 hours.  Approximate Infusion length: 1 hour  Labs: were not ordered for this appointment.  Vascular access: peripheral IV placed today.  Treatment Conditions: Biologic/Chemo Checklist ~~~ NOTE: If the patient answers yes to any of the questions below, hold the infusion and contact ordering provider or on-call provider.    Patient tolerated infusion: well.    Administrations This Visit     inFLIXimab (REMICADE) 400 mg in sodium chloride 0.9 % 315 mL infusion     Admin Date Action Dose Rate Route Administered By          10/04/2018 New Bag 400 mg 315 mL/hr Intravenous Tosha Morgan RN                       Discharge Plan:   Patient declined AVS.  Follow up plan of care with: ongoing infusions at [facility].  Discharge instructions were reviewed with patient.  Patient/representative verbalized understanding of discharge instructions and all questions answered.  Patient discharged from [facility] in stable condition.    Tosha Morgan RN       /62  Pulse 55  Temp 97.8  F (36.6  C) (Oral)  Resp 16  Wt 77.2 kg (170 lb 3.2 oz)  BMI 25.13 kg/m2

## 2018-10-10 ENCOUNTER — CARE COORDINATION (OUTPATIENT)
Dept: PULMONOLOGY | Facility: CLINIC | Age: 73
End: 2018-10-10

## 2018-10-10 DIAGNOSIS — D86.9 SARCOIDOSIS: ICD-10-CM

## 2018-10-10 DIAGNOSIS — D86.9 SARCOIDOSIS: Primary | ICD-10-CM

## 2018-10-10 RX ORDER — AZITHROMYCIN 250 MG/1
TABLET, FILM COATED ORAL
Qty: 6 TABLET | Refills: 0 | Status: SHIPPED | OUTPATIENT
Start: 2018-10-10 | End: 2018-10-10

## 2018-10-10 RX ORDER — AZITHROMYCIN 250 MG/1
TABLET, FILM COATED ORAL
Qty: 6 TABLET | Refills: 0 | Status: SHIPPED | OUTPATIENT
Start: 2018-10-10 | End: 2018-11-18

## 2018-10-10 NOTE — PROGRESS NOTES
Patient called with symptoms of productive cough with yellowish sputum and crackling in lungs.  Symptoms started a couple of days ago. Denies fevers or chest pain.  Discussed with Dr. Perlman who prescribed a z-natacha.  Prescription sent to patients desired pharmacy.  Patient instructed to notify pulmonary or seek medical care if symptoms get worse.  Patient agrees with plan.

## 2018-10-16 ENCOUNTER — RADIANT APPOINTMENT (OUTPATIENT)
Dept: CT IMAGING | Facility: CLINIC | Age: 73
End: 2018-10-16
Attending: INTERNAL MEDICINE
Payer: MEDICARE

## 2018-10-16 ENCOUNTER — PATIENT OUTREACH (OUTPATIENT)
Dept: PULMONOLOGY | Facility: CLINIC | Age: 73
End: 2018-10-16

## 2018-10-16 ENCOUNTER — OFFICE VISIT (OUTPATIENT)
Dept: PULMONOLOGY | Facility: CLINIC | Age: 73
End: 2018-10-16
Attending: INTERNAL MEDICINE
Payer: MEDICARE

## 2018-10-16 VITALS
BODY MASS INDEX: 23.7 KG/M2 | WEIGHT: 160 LBS | RESPIRATION RATE: 16 BRPM | SYSTOLIC BLOOD PRESSURE: 144 MMHG | HEIGHT: 69 IN | HEART RATE: 51 BPM | DIASTOLIC BLOOD PRESSURE: 74 MMHG | OXYGEN SATURATION: 99 %

## 2018-10-16 DIAGNOSIS — D86.9 SARCOIDOSIS: Primary | ICD-10-CM

## 2018-10-16 DIAGNOSIS — D86.9 SARCOIDOSIS: ICD-10-CM

## 2018-10-16 LAB
6 MIN WALK (FT): 600 FT
6 MIN WALK (M): 183 M

## 2018-10-16 PROCEDURE — G0463 HOSPITAL OUTPT CLINIC VISIT: HCPCS | Mod: ZF

## 2018-10-16 RX ORDER — LEVOFLOXACIN 750 MG/1
750 TABLET, FILM COATED ORAL EVERY OTHER DAY
Qty: 10 TABLET | Refills: 0 | Status: SHIPPED | OUTPATIENT
Start: 2018-10-16 | End: 2018-11-18

## 2018-10-16 ASSESSMENT — PAIN SCALES - GENERAL: PAINLEVEL: NO PAIN (0)

## 2018-10-16 NOTE — PROGRESS NOTES
Patient completed z-natacha a couple of days ago. He is feeling slightly more short of breath and gets winded walked across the room.  His cough is unchanged.  Discussed with Dr. Perlman who would like patient to have PFT's and a chest CT scan today. Patient is aware of this plan and agrees to it.  He will be coming in this afternoon for testing.

## 2018-10-16 NOTE — LETTER
10/16/2018       RE: Rohan Monroe  [address]  [place] [address]     Dear Colleague,    Thank you for referring your patient, oRhan Monroe, to the [facility]ENCE [facility] at [facility]. Please see a copy of my visit note below.    Reason for Visit  Rohan Monroe is a 72 year old year old male who is being seen for RECHECK (Interstitial Lung )    ILD HPI    Rohan Monroe is a 72-year-old male with advanced pulmonary sarcoidosis seen today for urgent visit for worsening dyspnea.  We obtained a CT scan which showed a lower lobe infiltrate.  The patient has had worsening dyspnea with some increased cough with productive sputum.  Denies any fevers at home.  Overall actually feels that he is a little bit better in the last couple of days.  Otherwise no new complaints today.      Current Outpatient Prescriptions   Medication     albuterol (PROAIR HFA/PROVENTIL HFA/VENTOLIN HFA) 108 (90 Base) MCG/ACT Inhaler     atorvastatin (LIPITOR) 40 MG tablet     ciclopirox (LOPROX) 0.77 % cream     colchicine (COLCRYS) 0.6 MG tablet     fluticasone-vilanterol (BREO ELLIPTA) 100-25 MCG/INH oral inhaler     furosemide (LASIX) 20 MG tablet     inFLIXimab (REMICADE) 100 MG injection     levofloxacin (LEVAQUIN) 750 MG tablet     levothyroxine (SYNTHROID/LEVOTHROID) 125 MCG tablet     losartan (COZAAR) 50 MG tablet     methotrexate 2.5 MG tablet CHEMO     metoprolol tartrate (LOPRESSOR) 100 MG tablet     mirtazapine (REMERON) 15 MG tablet     Multiple Vitamins-Minerals (MULTIVITAMIN & MINERAL PO)     order for DME     order for DME     sildenafil (REVATIO) 20 MG tablet     tiotropium (SPIRIVA HANDIHALER) 18 MCG capsule     Urea (CARMOL 40) 40 % CREA     azithromycin (ZITHROMAX) 250 MG tablet     blood glucose monitoring (ACCU-CHEK RONNIE PLUS) test strip     blood glucose monitoring (ACCU-CHEK FASTCLIX) lancets     blood  glucose monitoring (NO BRAND SPECIFIED) meter device kit     polyethylene glycol (MIRALAX) powder     No current facility-administered medications for this visit.      Allergies   Allergen Reactions     Prednisone Other (See Comments)     He reports that he can't sleep for days and can't use small to massive doses of prednisone   Pt. Does not do well with high doses of Prednisone, His MD says that Prednisone is counter indicated. Prednisone failed to treat his sarcoidosis      Past Medical History:   Diagnosis Date     Atrial flutter (H)      Cataract of both eyes      Chronic infection     Hep C     Congestive heart failure, unspecified      Coronary artery disease      Depressive disorder      Depressive disorder, not elsewhere classified     Depression (non-psychotic)     ERM OS (epiretinal membrane, left eye)      Generalized osteoarthrosis, unspecified site      Glaucoma suspect      Hypertension      Lichen planus      Other psoriasis      PVD (posterior vitreous detachment), left eye      Sarcoidosis      Sarcoidosis      Type II or unspecified type diabetes mellitus without mention of complication, not stated as uncontrolled      Unspecified hypothyroidism     Hypothyroidism     Unspecified viral hepatitis C without hepatic coma      Viral warts, unspecified        Past Surgical History:   Procedure Laterality Date     ANESTHESIA CARDIOVERSION N/A 1/19/2017    Procedure: ANESTHESIA CARDIOVERSION;  Surgeon: GENERIC ANESTHESIA PROVIDER;  Location: [facility] OR     ANESTHESIA CARDIOVERSION N/A 1/23/2017    Procedure: ANESTHESIA CARDIOVERSION;  Surgeon: GENERIC ANESTHESIA PROVIDER;  Location: [facility] OR     ANESTHESIA CARDIOVERSION N/A 1/24/2017    Procedure: ANESTHESIA CARDIOVERSION;  Surgeon: GENERIC ANESTHESIA PROVIDER;  Location: [facility] OR     ARTHROPLASTY HIP  8/24/2011    Procedure:ARTHROPLASTY HIP; Right Total Hip Arthroplasty  Choice anesthesia; Surgeon:LESLI WILKINSON; Location:[facility] OR     BIOPSY       C PELVIS/HIP  JOINT SURGERY UNLISTED       cardiac stent      s/p     CARDIAC SURGERY       CATARACT IOL, RT/LT  9/15/2015    LE     COLONOSCOPY       CORONARY ANGIOGRAPHY ADULT ORDER       H ABLATION ATRIAL FLUTTER       HC REMOVAL OF TONSILS,<9 Y/O      Tonsils <10y.o.     HC REPAIR INCISIONAL HERNIA,REDUCIBLE      Hernia Repair, Incisional, Unilateral     HEART CATH, ANGIOPLASTY       JOINT REPLACEMENT      1 month ago--right hip     LIGATN/STRIP LONG & SHORT SAPHEN         Social History     Social History     Marital status:      Spouse name: N/A     Number of children: 2     Years of education: N/A     Occupational History      [employer]     Social History Main Topics     Smoking status: Former Smoker     Packs/day: 1.00     Years: 30.00     Types: Cigarettes     Start date: 12/30/1960     Quit date: 7/22/1994     Smokeless tobacco: Never Used     Alcohol use No     Drug use: No     Sexual activity: Not Currently     Partners: Female     Birth control/ protection: Post-menopausal     Other Topics Concern     Blood Transfusions No     Exercise Yes     Social History Narrative    Dairy/d 2 servings/d    Caffeine little servings/d    Exercise 3 x week    Sunscreen used - Yes    Seatbelts used - Yes    Working smoke/CO detectors in the home - Yes    Guns stored in the home - No    Self Breast Exams - NOT APPLICABLE    Self Testicular Exam - No    Eye Exam up to date - Yes    Dental Exam up to date - Yes    Pap Smear up to date - NOT APPLICABLE    Mammogram up to date - NOT APPLICABLE    PSA up to date - Yes    Dexa Scan up to date - NOT APPLICABLE    Flex Sig / Colonoscopy up to date - Yes    Immunizations up to date - Unsure    Abuse: Current or Past(Physical, Sexual or Emotional)- No    Do you feel safe in your environment - Yes       Family History   Problem Relation Age of Onset     HEART DISEASE Father      irreg heart beat     Circulatory Father      varicose veins     Prostate Cancer Father      HEART  "DISEASE Mother      heart attack     Arthritis Mother      Osteoporosis Mother      Thyroid Disease Mother      Hypertension Mother      Eye Disorder Maternal Grandmother      glaucoma     Diabetes Sister      type 2     Kidney Cancer Sister      Diabetes Sister      Glaucoma No family hx of      Macular Degeneration No family hx of      Cancer No family hx of      no skin cancer       ROS Pulmonary    A complete ROS was otherwise negative except as noted in the HPI.  Vitals:    10/16/18 1440   BP: 144/74   BP Location: Right arm   Patient Position: Chair   Cuff Size: Adult Regular   Pulse: 51   Resp: 16   SpO2: 99%   Weight: 72.6 kg (160 lb)   Height: 1.753 m (5' 9.02\")     Exam:   GENERAL APPEARANCE: Well developed, well nourished, alert, and in no apparent distress.  NECK: supple, no masses, no thyromegaly.  LYMPHATICS: No significant axillary, cervical, or supraclavicular nodes.  RESP: good air flow throughout, - no crackles, rhonchi or wheezes.  CV: Normal S1, S2, regular rhythm, normal rate, no rub, no murmur,  no gallop, no LE edema.   ABDOMEN:  Bowel sounds normal, soft, nontender, no HSM or masses.   MS: extremities normal- no clubbing, no cyanosis.  SKIN: no rash on limited exam  NEURO: Mentation intact, speech normal, normal strength and tone, normal gait and stance  PSYCH: mentation appears normal. and affect normal/bright  Results: I have reviewed all imaging, PFTs and other relavent tests, please see below for details, PFT and imaging results were reviewed with the patient.  PFTs: Severe obstruction with severe reduction in diffusing capacity.  These are down significantly from previous.    Chest CT (reviewed by me) demonstrates presence of his pulmonary sarcoidosis plus new patchy airspace opacities in the right lower lobe consistent with infection.    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  " Likely pneumonia given the appearance on the CT scan.  We will give him a two-week course of Levaquin and make sure that he continues to improve.  The patient is planning to move to [place] soon and we are helping him facilitate his transfer of care.  When I plan to see the patient back unless needed.      CBC   Recent Labs   Lab Test  08/28/18   1009  02/26/18   1317   RBC  4.14*  3.64*   HGB  13.0*  11.0*   HCT  39.6*  34.3*   PLT  240  277       Basic Metabolic Panel  Recent Labs   Lab Test  09/13/18   1115  08/28/18   1009   05/13/17   0116  05/13/17   0035   NA  137  136   < >   --    --    POTASSIUM  4.2  4.5   < >   --    --    CHLORIDE  104  104   < >   --    --    CO2  27  24   < >   --    --    BUN  36*  46*   < >   --    --    CT   --    --    --   Plasma, Thawed  PlateletPheresis LeukoReduced Irradiated  Plasma, Thawed  Plasma, Thawed  Plasma, Thawed  Apheresis Plasma Thawed  Plasma, Thawed  PlateletPheresis,LeukoRed Irrad (Part 2)  Red Blood Cells Leukocyte Reduced  Red Blood Cells Leukocyte Reduced  Red Blood Cells Leukocyte Reduced  Red Blood Cells Leukocyte Reduced  Red Blood Cells Leukocyte Reduced  Red Blood Cells LeukoReduced (Part 2)   GLC  121*  146*   < >   --    --    RAFFY  8.9  9.2   < >   --    --     < > = values in this interval not displayed.       INR  Recent Labs   Lab Test  08/28/18   1009  03/21/18   1312   INR  0.97  1.48*       PFT  PFT Latest Ref Rng & Units 10/16/2018   FVC L 1.98   FEV1 L 0.80   FVC% % 51   FEV1% % 27         Again, thank you for allowing me to participate in the care of your patient.      Sincerely,    David Morris Perlman, MD

## 2018-10-16 NOTE — NURSING NOTE
Chief Complaint   Patient presents with     RECHECK     Interstitial Lung       Darrius Davey CMA

## 2018-10-16 NOTE — MR AVS SNAPSHOT
After Visit Summary   10/16/2018    Rohan Monroe    MRN: [id number]           Patient Information     Date Of Birth          1945        Visit Information        Provider Department      10/16/2018 2:30 PM Perlman, David Morris, MD [facility]        Today's Diagnoses     Sarcoidosis    -  1       Follow-ups after your visit        Your next 10 appointments already scheduled     Oct 25, 2018  2:00 PM CDT   Pulmonary Treatment with Sh Pulmonary Rehab 2   [facility] ([facility])    [address]  [facility] [address]   [phone]            Oct 30, 2018  2:00 PM CDT   Pulmonary Treatment with Sh Pulmonary Rehab 2   [facility] ([facility])    [address]  [facility] [address]   [phone]            Nov 01, 2018 11:30 AM CDT   LAB with UC LAB   [facility] ([facility])    [address]  1st Floor  [facility] [id number] [phone]           Please do not eat 10-12 hours before your appointment if you are coming in fasting for labs on lipids, cholesterol, or glucose (sugar). This does not apply to pregnant women. Water, hot tea and black coffee (with nothing added) are okay. Do not drink other fluids, diet soda or chew gum.            Nov 01, 2018 12:00 PM CDT   Infusion 180 with UC SPEC INFUSION, UC 49 ATC   [facility] ([facility])    [address]  [address]  [facility] [id number] [phone]            Nov 19, 2018 10:00 AM CST   (Arrive by 9:30 AM)   Return Visit with Ollie Michel MD   [facility] Nephrology ([facility])    [address]  [address]  [facility] [id number] [phone]            Nov 29, 2018  1:00 PM CST   Infusion 180 with UC SPEC INFUSION, UC 43 ATC    [facility] ([facility])    [address]  [address]  [facility] [address] [phone]            Nov 30, 2018 10:30 AM CST   PFT VISIT with WALTER PFL C   [facility] Pulmonary Function Testing ([facility])    [address]  3rd Floor  [facility] [address] [phone]            Nov 30, 2018 11:00 AM CST   (Arrive by 10:45 AM)   Return Interstitial Lung with David Morris Perlman, MD   [facility] ([facility])    [address]  [address]  [facility] [address] [phone]              Who to contact     If you have questions or need follow up information about today's clinic visit or your schedule please contact [facility] directly at [phone].  Normal or non-critical lab and imaging results will be communicated to you by [employer] System[employer]art, letter or phone within 4 business days after the clinic has received the results. If you do not hear from us within 7 days, please contact the clinic through [employer] or phone. If you have a critical or abnormal lab result, we will notify you by phone as soon as possible.  Submit refill requests through VALOREM or call your pharmacy and they will forward the refill request to us. Please allow 3 business days for your refill to be completed.          Additional Information About Your Visit        [employer] System[employer]art Information     [employer] gives you secure access to your electronic health record. If you see a primary care provider, you can also send messages to your care team and make appointments. If you have questions, please call your primary care clinic.  If you do not have a primary care provider, please call [phone] and they will assist you.        Care EveryWhere ID     This is your Care EveryWhere ID. This could be used by other organizations to access your  "[place] medical records  [id number]        Your Vitals Were     Pulse Respirations Height Pulse Oximetry BMI (Body Mass Index)       51 16 1.753 m (5' 9.02\") 99% 23.62 kg/m2        Blood Pressure from Last 3 Encounters:   10/16/18 144/74   10/04/18 145/74   09/13/18 157/74    Weight from Last 3 Encounters:   10/16/18 72.6 kg (160 lb)   10/04/18 77.2 kg (170 lb 3.2 oz)   09/13/18 73.7 kg (162 lb 8 oz)              Today, you had the following     No orders found for display         Today's Medication Changes          These changes are accurate as of 10/16/18 11:59 PM.  If you have any questions, ask your nurse or doctor.               Start taking these medicines.        Dose/Directions    levofloxacin 750 MG tablet   Commonly known as:  LEVAQUIN   Used for:  Sarcoidosis   Started by:  Perlman, David Morris, MD        Dose:  750 mg   Take 1 tablet (750 mg) by mouth every other day   Quantity:  10 tablet   Refills:  0            Where to get your medicines      These medications were sent to [id number] - [place] [id number] [address] [address]     Phone:  [phone]     levofloxacin 750 MG tablet                Primary Care Provider Office Phone # Fax #    Jamie Foster [phone]       0 [address] [address]        Equal Access to Services     [facility] : Hadii rhys ku hadasho Soomaali, waaxda luqadaha, qaybta kaalmada adeegyada, waxsilvio mariann reyes adeforrest munson . So [facility] [phone].    ATENCIÓN: Si habla español, tiene a mcfadden disposición servicios gratuitos de asistencia lingüística. Llame al [phone].    We comply with applicable federal civil rights laws and [place] laws. We do not discriminate on the basis of race, color, national origin, age, disability, sex, sexual orientation, or gender identity.            Thank you!     Thank you for choosing [facility]  for your care. Our goal is always to provide you with excellent care. Hearing back from our patients is one way we can continue to improve our services. Please take a few minutes to complete the written survey that you may receive in the mail after your visit with us. Thank you!             Your Updated Medication List - Protect others around you: Learn how to safely use, store and throw away your medicines at www.disposemymeds.org.          This list is accurate as of 10/16/18 11:59 PM.  Always use your most recent med list.                   Brand Name Dispense Instructions for use Diagnosis    albuterol 108 (90 Base) MCG/ACT inhaler    PROAIR HFA/PROVENTIL HFA/VENTOLIN HFA    3 Inhaler    Inhale 2 puffs into the lungs every 6 hours as needed for shortness of breath / dyspnea    Sarcoidosis       atorvastatin 40 MG tablet    LIPITOR    90 tablet    TAKE ONE TABLET BY MOUTH ONE TIME DAILY    Coronary artery disease involving native coronary artery of native heart without angina pectoris, CAD S/P percutaneous coronary angioplasty, Hyperlipidemia LDL goal <70       azithromycin 250 MG tablet    ZITHROMAX    6 tablet    Take 2 tablets on the first day followed by one tablet daily for the next four days.    Sarcoidosis       blood glucose monitoring lancets     2 Box    Use to test blood sugar 2 times daily or as directed.  102 lancets per box.  3 month supply.    Type 2 diabetes, HbA1C goal < 8% (H)       blood glucose monitoring meter device kit    no brand specified    1 kit    Use to test blood sugar 2 times daily.    Type 2 diabetes mellitus with diabetic nephropathy (H)       blood glucose monitoring test strip    ACCU-CHEK RONNIE PLUS    200 each    Use to test blood sugar 2 times daily or as directed.  3 month supply.    Type 2 diabetes, HbA1C goal < 8% (H)       ciclopirox 0.77 % cream    LOPROX    90 g    Apply topically 2 times daily To feet and toenails.    Dermatophytosis of nail, Tinea pedis of  both feet       colchicine 0.6 MG tablet    COLCRYS    30 tablet    2 tabs at the onset of flare, then 1 tab every 2 hrs until pain is better or diarrhea occurs, up to 10 doses. Wait 3 days until taking again        fluticasone-vilanterol 100-25 MCG/INH inhaler    BREO ELLIPTA    3 Inhaler    Inhale 1 puff into the lungs daily    Chronic obstructive pulmonary disease, unspecified COPD type (H)       furosemide 20 MG tablet    LASIX    240 tablet    Take 2 tablets (40 mg) by mouth 2 times daily May take extra 20 mg as needed for wt .gain >3#    Pulmonary hypertension (H)       inFLIXimab 100 MG injection    REMICADE     Inject 100 mg into the vein every 28 days        levofloxacin 750 MG tablet    LEVAQUIN    10 tablet    Take 1 tablet (750 mg) by mouth every other day    Sarcoidosis       levothyroxine 125 MCG tablet    SYNTHROID/LEVOTHROID    90 tablet    Take 1 tablet (125 mcg) by mouth daily    Hypothyroidism due to Hashimoto's thyroiditis       losartan 50 MG tablet    COZAAR    90 tablet    TAKE ONE TABLET BY MOUTH ONE TIME DAILY    (HFpEF) heart failure with preserved ejection fraction (H)       methotrexate 2.5 MG tablet CHEMO     48 tablet    Take 3 tablets (7.5 mg) by mouth once a week On Mondays    Sarcoidosis       metoprolol tartrate 100 MG tablet    LOPRESSOR    60 tablet    Take 1 tablet (100 mg) by mouth 2 times daily    Hypertension secondary to other renal disorders       mirtazapine 15 MG tablet    REMERON     Take 15 mg by mouth At Bedtime.        MULTIVITAMIN & MINERAL PO      Take 1 tablet by mouth daily.        * order for DME     1 Device    She requested for a 4 wheel walker, would like to change it to 4 wheel walker with a seat and oxygen tank durant.   Need this faxed over to her  [phone]    Sarcoidosis, lung (H), COPD (chronic obstructive pulmonary disease) (H), Abnormal gait, Congestive heart failure (H)       * order for DME     1 Device    Updated Oxygen: Patient requires  supplemental Oxygen 3 LPM via nasal canula with activity and 2 LPM nocturnally. Please provide patient with a portable oxygen concentrator for improved mobility.  Okay to spot check or titrated for conserving to keep stats above 90%. Oxygen will be for a lifetime.    ILD (interstitial lung disease) (H), Hypoxia       polyethylene glycol powder    MIRALAX    510 g    Take 17 g (1 capful) by mouth daily    Constipation, unspecified constipation type       sildenafil 20 MG tablet    REVATIO    270 tablet    TAKE ONE TABLET BY MOUTH THREE TIMES DAILY    Pulmonary hypertension (H)       tiotropium 18 MCG capsule    SPIRIVA HANDIHALER    90 capsule    Inhale contents of one capsule daily.    Chronic obstructive pulmonary disease, unspecified COPD type (H)       Urea 40 % Crea    CARMOL 40    199 g    To feet daily    Dermatophytosis of nail, Corns and callosities       * Notice:  This list has 2 medication(s) that are the same as other medications prescribed for you. Read the directions carefully, and ask your doctor or other care provider to review them with you.

## 2018-10-17 LAB
DLCOUNC-%PRED-PRE: 30 %
DLCOUNC-PRE: 7.47 ML/MIN/MMHG
DLCOUNC-PRED: 24.42 ML/MIN/MMHG
ERV-%PRED-PRE: 83 %
ERV-PRE: 0.92 L
ERV-PRED: 1.1 L
EXPTIME-PRE: 12.89 SEC
FEF2575-%PRED-PRE: 13 %
FEF2575-PRE: 0.3 L/SEC
FEF2575-PRED: 2.16 L/SEC
FEFMAX-%PRED-PRE: 28 %
FEFMAX-PRE: 2.11 L/SEC
FEFMAX-PRED: 7.52 L/SEC
FEV1-%PRED-PRE: 27 %
FEV1-PRE: 0.8 L
FEV1FEV6-PRE: 46 %
FEV1FEV6-PRED: 77 %
FEV1FVC-PRE: 40 %
FEV1FVC-PRED: 73 %
FEV1SVC-PRE: 41 %
FEV1SVC-PRED: 66 %
FIFMAX-PRE: 2.8 L/SEC
FRCPLETH-%PRED-PRE: 127 %
FRCPLETH-PRE: 4.66 L
FRCPLETH-PRED: 3.65 L
FVC-%PRED-PRE: 51 %
FVC-PRE: 1.98 L
FVC-PRED: 3.86 L
IC-%PRED-PRE: 31 %
IC-PRE: 1.04 L
IC-PRED: 3.29 L
RVPLETH-%PRED-PRE: 139 %
RVPLETH-PRE: 3.74 L
RVPLETH-PRED: 2.69 L
TLCPLETH-%PRED-PRE: 83 %
TLCPLETH-PRE: 5.7 L
TLCPLETH-PRED: 6.82 L
VA-%PRED-PRE: 53 %
VA-PRE: 3.51 L
VC-%PRED-PRE: 44 %
VC-PRE: 1.96 L
VC-PRED: 4.39 L

## 2018-10-23 NOTE — PROGRESS NOTES
Reason for Visit  Rohan Monroe is a 72 year old year old male who is being seen for RECHECK (Interstitial Lung )    ILD HPI    Rohan Monroe is a 72-year-old male with advanced pulmonary sarcoidosis seen today for urgent visit for worsening dyspnea.  We obtained a CT scan which showed a lower lobe infiltrate.  The patient has had worsening dyspnea with some increased cough with productive sputum.  Denies any fevers at home.  Overall actually feels that he is a little bit better in the last couple of days.  Otherwise no new complaints today.      Current Outpatient Prescriptions   Medication     albuterol (PROAIR HFA/PROVENTIL HFA/VENTOLIN HFA) 108 (90 Base) MCG/ACT Inhaler     atorvastatin (LIPITOR) 40 MG tablet     ciclopirox (LOPROX) 0.77 % cream     colchicine (COLCRYS) 0.6 MG tablet     fluticasone-vilanterol (BREO ELLIPTA) 100-25 MCG/INH oral inhaler     furosemide (LASIX) 20 MG tablet     inFLIXimab (REMICADE) 100 MG injection     levofloxacin (LEVAQUIN) 750 MG tablet     levothyroxine (SYNTHROID/LEVOTHROID) 125 MCG tablet     losartan (COZAAR) 50 MG tablet     methotrexate 2.5 MG tablet CHEMO     metoprolol tartrate (LOPRESSOR) 100 MG tablet     mirtazapine (REMERON) 15 MG tablet     Multiple Vitamins-Minerals (MULTIVITAMIN & MINERAL PO)     order for DME     order for DME     sildenafil (REVATIO) 20 MG tablet     tiotropium (SPIRIVA HANDIHALER) 18 MCG capsule     Urea (CARMOL 40) 40 % CREA     azithromycin (ZITHROMAX) 250 MG tablet     blood glucose monitoring (ACCU-CHEK RONNIE PLUS) test strip     blood glucose monitoring (ACCU-CHEK FASTCLIX) lancets     blood glucose monitoring (NO BRAND SPECIFIED) meter device kit     polyethylene glycol (MIRALAX) powder     No current facility-administered medications for this visit.      Allergies   Allergen Reactions     Prednisone Other (See Comments)     He reports that he can't sleep for days and can't use small to massive doses of prednisone   Pt.  Does not do well with high doses of Prednisone, His MD says that Prednisone is counter indicated. Prednisone failed to treat his sarcoidosis      Past Medical History:   Diagnosis Date     Atrial flutter (H)      Cataract of both eyes      Chronic infection     Hep C     Congestive heart failure, unspecified      Coronary artery disease      Depressive disorder      Depressive disorder, not elsewhere classified     Depression (non-psychotic)     ERM OS (epiretinal membrane, left eye)      Generalized osteoarthrosis, unspecified site      Glaucoma suspect      Hypertension      Lichen planus      Other psoriasis      PVD (posterior vitreous detachment), left eye      Sarcoidosis      Sarcoidosis      Type II or unspecified type diabetes mellitus without mention of complication, not stated as uncontrolled      Unspecified hypothyroidism     Hypothyroidism     Unspecified viral hepatitis C without hepatic coma      Viral warts, unspecified        Past Surgical History:   Procedure Laterality Date     ANESTHESIA CARDIOVERSION N/A 1/19/2017    Procedure: ANESTHESIA CARDIOVERSION;  Surgeon: GENERIC ANESTHESIA PROVIDER;  Location: [facility] OR     ANESTHESIA CARDIOVERSION N/A 1/23/2017    Procedure: ANESTHESIA CARDIOVERSION;  Surgeon: GENERIC ANESTHESIA PROVIDER;  Location: [facility] OR     ANESTHESIA CARDIOVERSION N/A 1/24/2017    Procedure: ANESTHESIA CARDIOVERSION;  Surgeon: GENERIC ANESTHESIA PROVIDER;  Location: [facility] OR     ARTHROPLASTY HIP  8/24/2011    Procedure:ARTHROPLASTY HIP; Right Total Hip Arthroplasty  Choice anesthesia; Surgeon:LESLI WILKINSON; Location:[facility] OR     BIOPSY       C PELVIS/HIP JOINT SURGERY UNLISTED       cardiac stent      s/p     CARDIAC SURGERY       CATARACT IOL, RT/LT  9/15/2015    LE     COLONOSCOPY       CORONARY ANGIOGRAPHY ADULT ORDER       H ABLATION ATRIAL FLUTTER       HC REMOVAL OF TONSILS,<11 Y/O      Tonsils <10y.o.     HC REPAIR INCISIONAL HERNIA,REDUCIBLE      Hernia Repair, Incisional,  Unilateral     HEART CATH, ANGIOPLASTY       JOINT REPLACEMENT      1 month ago--right hip     LIGATN/STRIP LONG & SHORT SAPHEN         Social History     Social History     Marital status:      Spouse name: N/A     Number of children: 2     Years of education: N/A     Occupational History      [employer]     Social History Main Topics     Smoking status: Former Smoker     Packs/day: 1.00     Years: 30.00     Types: Cigarettes     Start date: 12/30/1960     Quit date: 7/22/1994     Smokeless tobacco: Never Used     Alcohol use No     Drug use: No     Sexual activity: Not Currently     Partners: Female     Birth control/ protection: Post-menopausal     Other Topics Concern     Blood Transfusions No     Exercise Yes     Social History Narrative    Dairy/d 2 servings/d    Caffeine little servings/d    Exercise 3 x week    Sunscreen used - Yes    Seatbelts used - Yes    Working smoke/CO detectors in the home - Yes    Guns stored in the home - No    Self Breast Exams - NOT APPLICABLE    Self Testicular Exam - No    Eye Exam up to date - Yes    Dental Exam up to date - Yes    Pap Smear up to date - NOT APPLICABLE    Mammogram up to date - NOT APPLICABLE    PSA up to date - Yes    Dexa Scan up to date - NOT APPLICABLE    Flex Sig / Colonoscopy up to date - Yes    Immunizations up to date - Unsure    Abuse: Current or Past(Physical, Sexual or Emotional)- No    Do you feel safe in your environment - Yes       Family History   Problem Relation Age of Onset     HEART DISEASE Father      irreg heart beat     Circulatory Father      varicose veins     Prostate Cancer Father      HEART DISEASE Mother      heart attack     Arthritis Mother      Osteoporosis Mother      Thyroid Disease Mother      Hypertension Mother      Eye Disorder Maternal Grandmother      glaucoma     Diabetes Sister      type 2     Kidney Cancer Sister      Diabetes Sister      Glaucoma No family hx of      Macular Degeneration No family hx of  "     Cancer No family hx of      no skin cancer       ROS Pulmonary    A complete ROS was otherwise negative except as noted in the HPI.  Vitals:    10/16/18 1440   BP: 144/74   BP Location: Right arm   Patient Position: Chair   Cuff Size: Adult Regular   Pulse: 51   Resp: 16   SpO2: 99%   Weight: 72.6 kg (160 lb)   Height: 1.753 m (5' 9.02\")     Exam:   GENERAL APPEARANCE: Well developed, well nourished, alert, and in no apparent distress.  NECK: supple, no masses, no thyromegaly.  LYMPHATICS: No significant axillary, cervical, or supraclavicular nodes.  RESP: good air flow throughout, - no crackles, rhonchi or wheezes.  CV: Normal S1, S2, regular rhythm, normal rate, no rub, no murmur,  no gallop, no LE edema.   ABDOMEN:  Bowel sounds normal, soft, nontender, no HSM or masses.   MS: extremities normal- no clubbing, no cyanosis.  SKIN: no rash on limited exam  NEURO: Mentation intact, speech normal, normal strength and tone, normal gait and stance  PSYCH: mentation appears normal. and affect normal/bright  Results: I have reviewed all imaging, PFTs and other relavent tests, please see below for details, PFT and imaging results were reviewed with the patient.  PFTs: Severe obstruction with severe reduction in diffusing capacity.  These are down significantly from previous.    Chest CT (reviewed by me) demonstrates presence of his pulmonary sarcoidosis plus new patchy airspace opacities in the right lower lobe consistent with infection.    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  Likely pneumonia given the appearance on the CT scan.  We will give him a two-week course of Levaquin and make sure that he continues to improve.  The patient is planning to move to [place] soon and we are helping him facilitate his transfer of care.  When I plan to see the patient back unless needed.      CBC   Recent Labs   Lab " Test  08/28/18   1009  02/26/18   1317   RBC  4.14*  3.64*   HGB  13.0*  11.0*   HCT  39.6*  34.3*   PLT  240  277       Basic Metabolic Panel  Recent Labs   Lab Test  09/13/18   1115  08/28/18   1009   05/13/17   0116  05/13/17   0035   NA  137  136   < >   --    --    POTASSIUM  4.2  4.5   < >   --    --    CHLORIDE  104  104   < >   --    --    CO2  27  24   < >   --    --    BUN  36*  46*   < >   --    --    CT   --    --    --   Plasma, Thawed  PlateletPheresis LeukoReduced Irradiated  Plasma, Thawed  Plasma, Thawed  Plasma, Thawed  Apheresis Plasma Thawed  Plasma, Thawed  PlateletPheresis,LeukoRed Irrad (Part 2)  Red Blood Cells Leukocyte Reduced  Red Blood Cells Leukocyte Reduced  Red Blood Cells Leukocyte Reduced  Red Blood Cells Leukocyte Reduced  Red Blood Cells Leukocyte Reduced  Red Blood Cells LeukoReduced (Part 2)   GLC  121*  146*   < >   --    --    RAFFY  8.9  9.2   < >   --    --     < > = values in this interval not displayed.       INR  Recent Labs   Lab Test  08/28/18   1009  03/21/18   1312   INR  0.97  1.48*       PFT  PFT Latest Ref Rng & Units 10/16/2018   FVC L 1.98   FEV1 L 0.80   FVC% % 51   FEV1% % 27           CC:

## 2018-10-29 ENCOUNTER — PATIENT OUTREACH (OUTPATIENT)
Dept: PULMONOLOGY | Facility: CLINIC | Age: 73
End: 2018-10-29

## 2018-10-29 NOTE — PROGRESS NOTES
Patient called regarding recent pneumonia, feeling better but has one more day of anitbiotics left. Scheduled for his Remicade infusion this Thursday and wondering if Dr Perlman will okay getting it this week. Discussed with Dr Perlman who wants patient to be one week out from finishing antibiotic before getting infusion. Informed patient he will call the infusion center and reschedule infusion.

## 2018-11-01 DIAGNOSIS — N18.30 CKD (CHRONIC KIDNEY DISEASE) STAGE 3, GFR 30-59 ML/MIN (H): Primary | ICD-10-CM

## 2018-11-08 ENCOUNTER — INFUSION THERAPY VISIT (OUTPATIENT)
Dept: INFUSION THERAPY | Facility: CLINIC | Age: 73
End: 2018-11-08
Attending: INTERNAL MEDICINE
Payer: MEDICARE

## 2018-11-08 VITALS
RESPIRATION RATE: 18 BRPM | SYSTOLIC BLOOD PRESSURE: 146 MMHG | BODY MASS INDEX: 25.02 KG/M2 | HEART RATE: 71 BPM | DIASTOLIC BLOOD PRESSURE: 86 MMHG | TEMPERATURE: 97.7 F | WEIGHT: 169.5 LBS

## 2018-11-08 DIAGNOSIS — N18.30 CKD (CHRONIC KIDNEY DISEASE) STAGE 3, GFR 30-59 ML/MIN (H): ICD-10-CM

## 2018-11-08 DIAGNOSIS — D86.0 SARCOIDOSIS OF LUNG (H): Primary | ICD-10-CM

## 2018-11-08 DIAGNOSIS — D86.9 SARCOIDOSIS: ICD-10-CM

## 2018-11-08 LAB
ALBUMIN SERPL-MCNC: 2.9 G/DL (ref 3.4–5)
ANION GAP SERPL CALCULATED.3IONS-SCNC: 6 MMOL/L (ref 3–14)
BUN SERPL-MCNC: 38 MG/DL (ref 7–30)
CALCIUM SERPL-MCNC: 8.3 MG/DL (ref 8.5–10.1)
CHLORIDE SERPL-SCNC: 101 MMOL/L (ref 94–109)
CO2 SERPL-SCNC: 30 MMOL/L (ref 20–32)
CREAT SERPL-MCNC: 1.94 MG/DL (ref 0.66–1.25)
ERYTHROCYTE [DISTWIDTH] IN BLOOD BY AUTOMATED COUNT: 14.1 % (ref 10–15)
GFR SERPL CREATININE-BSD FRML MDRD: 34 ML/MIN/1.7M2
GLUCOSE SERPL-MCNC: 126 MG/DL (ref 70–99)
HCT VFR BLD AUTO: 36.4 % (ref 40–53)
HGB BLD-MCNC: 11.6 G/DL (ref 13.3–17.7)
MCH RBC QN AUTO: 30.9 PG (ref 26.5–33)
MCHC RBC AUTO-ENTMCNC: 31.9 G/DL (ref 31.5–36.5)
MCV RBC AUTO: 97 FL (ref 78–100)
PHOSPHATE SERPL-MCNC: 3.2 MG/DL (ref 2.5–4.5)
PLATELET # BLD AUTO: 289 10E9/L (ref 150–450)
POTASSIUM SERPL-SCNC: 4.4 MMOL/L (ref 3.4–5.3)
PTH-INTACT SERPL-MCNC: 138 PG/ML (ref 18–80)
RBC # BLD AUTO: 3.76 10E12/L (ref 4.4–5.9)
SODIUM SERPL-SCNC: 137 MMOL/L (ref 133–144)
WBC # BLD AUTO: 9.2 10E9/L (ref 4–11)

## 2018-11-08 PROCEDURE — 25000128 H RX IP 250 OP 636: Mod: ZF | Performed by: INTERNAL MEDICINE

## 2018-11-08 PROCEDURE — 82306 VITAMIN D 25 HYDROXY: CPT | Performed by: INTERNAL MEDICINE

## 2018-11-08 PROCEDURE — 85027 COMPLETE CBC AUTOMATED: CPT | Performed by: INTERNAL MEDICINE

## 2018-11-08 PROCEDURE — 96413 CHEMO IV INFUSION 1 HR: CPT

## 2018-11-08 PROCEDURE — 83970 ASSAY OF PARATHORMONE: CPT | Performed by: INTERNAL MEDICINE

## 2018-11-08 PROCEDURE — 80069 RENAL FUNCTION PANEL: CPT | Performed by: INTERNAL MEDICINE

## 2018-11-08 RX ADMIN — INFLIXIMAB 400 MG: 100 INJECTION, POWDER, LYOPHILIZED, FOR SOLUTION INTRAVENOUS at 12:42

## 2018-11-08 ASSESSMENT — PAIN SCALES - GENERAL: PAINLEVEL: NO PAIN (0)

## 2018-11-08 NOTE — MR AVS SNAPSHOT
After Visit Summary   11/8/2018    Rohan Monroe    MRN: [id number]           Patient Information     Date Of Birth          1945        Visit Information        Provider Department      11/8/2018 12:00 PM  47 ATC;  SPEC INFUSION [facility] Specialty [facility] Procedure        Today's Diagnoses     Sarcoidosis of lung (HCC)    -  1    SARCOIDOSIS-systemic        CKD (chronic kidney disease) stage 3, GFR 30-59 ml/min (H)          Care Instructions    Dear Rohan Monroe    Thank you for choosing [facility] ([facility]) for your infusion.  The following information is a summary of our appointment as well as important reminders.      EDUCATION POST BIOLOGICAL/CHEMOTHERAPY INFUSION  Call the triage nurse at your clinic or seek medical attention if you have chills and/or temperature greater than or equal to 100.5, uncontrolled nausea/vomiting, diarrhea, constipation, dizziness, shortness of breath, chest pain, heart palpitations, weakness or any other new or concerning symptoms, questions or concerns.  You can not have any live virus vaccines prior to or during treatment or up to 6 months post infusion.  If you have an upcoming surgery, medical procedure or dental procedure during treatment, this should be discussed with your ordering physician and your surgeon/dentist.  If you are having any concerning symptom, if you are unsure if you should get your next infusion or wish to speak to a provider before your next infusion, please call your care coordinator or triage nurse at your clinic to notify them so we can adequately serve you.    We look forward in seeing you on your next appointment here at [facility].  Please don t hesitate to call us at [phone] to reschedule any of your appointments or to speak with one of the [facility] registered nurses.  It was a pleasure taking care of you  today.    Sincerely,    [facility]  Specialty Infusion & Procedure Center  [address] [place]  [address]  Phone:  [phone]              Follow-ups after your visit        Your next 10 appointments already scheduled     Nov 19, 2018 10:00 AM CST   (Arrive by 9:30 AM)   Return Visit with Ollie Michel MD   [facility] ([facility])    [address]  [address]  [facility] [id number] [phone]            Nov 30, 2018 10:30 AM CST   PFT VISIT with WALTER PFL C   [facility] Pulmonary Function Testing ([facility])    [address]  3rd Floor  [facility] [id number] [phone]            Nov 30, 2018 11:00 AM CST   (Arrive by 10:45 AM)   Return Interstitial Lung with David Morris Perlman, MD   [facility] ([facility])    [address]  [address]  [facility] [address] [phone]            Dec 06, 2018  1:00 PM CST   Infusion 180 with UC SPEC INFUSION, UC 41 ATC   [facility] Specialty and Procedure ([facility])    [address]  [address]  [facility] [id number] [phone]            Dec 10, 2018  9:20 AM CST   (Arrive by 9:05 AM)   [facility]    [address]  4th Floor  [facility] [id number] [phone]            Dec 19, 2018 10:30 AM CST   US AORTA/IVC/ILIAC DUPLEX LIMITED with UCUSV2   [facility] US ([facility])    [address]  1st Floor  [facility] [id number] [phone]           How do I prepare for my exam? (Food and drink instructions) Adults: No eating, smoking, gum chewing or drinking for 8 hours before the exam. You may take medicine with a small sip of water.  Children: *  Infants, breast-fed: may have breast milk up to 2 hours before exam. * Infants, formula: may have bottle until 4 hours before exam. * Children 1-5 years: No food or drink for 4 hours before exam. * Children 6 -12 years: No food or drink for 6 hours before exam. * Children over 12 years: No food or drink for 8 hours before exam.  * J Tube Fed: No need to stop feedings.  What should I wear: Wear comfortable clothes.  How long does the exam take: Most ultrasounds take 30 to 60 minutes.  What should I bring: Bring a list of your medicines, including vitamins, minerals and over-the-counter drugs. It is safest to leave personal items at home.  Do I need a :  No  is needed.  What do I need to tell my doctor: Tell your doctor about any allergies you may have.  What should I do after the exam: No restrictions, You may resume normal activities.  What is this test: An ultrasound uses sound waves to make pictures of the body. Sound waves do not cause pain. The only discomfort may be the pressure of the wand against your skin or full bladder.  Who should I call with questions: If you have any questions, please call the Imaging Department where you will have your exam. Directions, parking instructions, and other information is available on our website, [email or web address].            Dec 19, 2018 11:40 AM CST   (Arrive by 11:25 AM)   Return Visit with Maria Victoria Palmer MD   [facility] ([facility])    [address] [place] [address]  [address]  [facility] [address] [phone]            Dec 27, 2018  1:00 PM CST   Infusion 180 with UC SPEC INFUSION, UC 41 ATC   [facility] Specialty and Procedure ([facility])    [address] [place] [address]  [address]  [facility] [address] [phone]              Who to contact     If you have questions or need follow up information about today's clinic visit or your schedule please  contact [facility] SPECIALTY AND PROCEDURE directly at [phone].  Normal or non-critical lab and imaging results will be communicated to you by MyChart, letter or phone within 4 business days after the clinic has received the results. If you do not hear from us within 7 days, please contact the clinic through [employer] or phone. If you have a critical or abnormal lab result, we will notify you by phone as soon as possible.  Submit refill requests through [employer] or call your pharmacy and they will forward the refill request to us. Please allow 3 business days for your refill to be completed.          Additional Information About Your Visit        [employer] Information     [employer] gives you secure access to your electronic health record. If you see a primary care provider, you can also send messages to your care team and make appointments. If you have questions, please call your primary care clinic.  If you do not have a primary care provider, please call [phone] and they will assist you.        Care EveryWhere ID     This is your Care EveryWhere ID. This could be used by other organizations to access your [place] medical records  [id number]        Your Vitals Were     Pulse Temperature Respirations BMI (Body Mass Index)          80 97.7  F (36.5  C) (Oral) 18 25.02 kg/m2         Blood Pressure from Last 3 Encounters:   11/08/18 147/79   10/16/18 144/74   10/04/18 145/74    Weight from Last 3 Encounters:   11/08/18 76.9 kg (169 lb 8 oz)   10/16/18 72.6 kg (160 lb)   10/04/18 77.2 kg (170 lb 3.2 oz)              We Performed the Following     25 Hydroxyvitamin D2 and D3     Albumin Random Urine Quantitative with Creat Ratio     CBC with platelets     Parathyroid Hormone Intact     Protein  random urine with Creat Ratio     Renal panel        Primary Care Provider Office Phone # Fax #    Jamie Foster [phone] [address] [address]         Equal Access to Services     [facility] : Hadii rhys jamison dhruv Lewis, waaxda luqadaha, qaybta kamontananelly russ. So [facility] [phone].    ATENCIÓN: Si habla español, tiene a mcfadden disposición servicios gratuitos de asistencia lingüística. Sofieame al [phone].    We comply with applicable federal civil rights laws and [place] laws. We do not discriminate on the basis of race, color, national origin, age, disability, sex, sexual orientation, or gender identity.            Thank you!     Thank you for choosing [facility] SPECIALTY AND PROCEDURE  for your care. Our goal is always to provide you with excellent care. Hearing back from our patients is one way we can continue to improve our services. Please take a few minutes to complete the written survey that you may receive in the mail after your visit with us. Thank you!             Your Updated Medication List - Protect others around you: Learn how to safely use, store and throw away your medicines at www.disposemymeds.org.          This list is accurate as of 11/8/18  1:50 PM.  Always use your most recent med list.                   Brand Name Dispense Instructions for use Diagnosis    albuterol 108 (90 Base) MCG/ACT inhaler    PROAIR HFA/PROVENTIL HFA/VENTOLIN HFA    3 Inhaler    Inhale 2 puffs into the lungs every 6 hours as needed for shortness of breath / dyspnea    Sarcoidosis       atorvastatin 40 MG tablet    LIPITOR    90 tablet    TAKE ONE TABLET BY MOUTH ONE TIME DAILY    Coronary artery disease involving native coronary artery of native heart without angina pectoris, CAD S/P percutaneous coronary angioplasty, Hyperlipidemia LDL goal <70       azithromycin 250 MG tablet    ZITHROMAX    6 tablet    Take 2 tablets on the first day followed by one tablet daily for the next four days.    Sarcoidosis       blood glucose monitoring lancets     2 Box    Use to test blood sugar 2 times  daily or as directed.  102 lancets per box.  3 month supply.    Type 2 diabetes, HbA1C goal < 8% (H)       blood glucose monitoring meter device kit    no brand specified    1 kit    Use to test blood sugar 2 times daily.    Type 2 diabetes mellitus with diabetic nephropathy (H)       blood glucose monitoring test strip    ACCU-CHEK RONNIE PLUS    200 each    Use to test blood sugar 2 times daily or as directed.  3 month supply.    Type 2 diabetes, HbA1C goal < 8% (H)       ciclopirox 0.77 % cream    LOPROX    90 g    Apply topically 2 times daily To feet and toenails.    Dermatophytosis of nail, Tinea pedis of both feet       colchicine 0.6 MG tablet    COLCRYS    30 tablet    2 tabs at the onset of flare, then 1 tab every 2 hrs until pain is better or diarrhea occurs, up to 10 doses. Wait 3 days until taking again        fluticasone-vilanterol 100-25 MCG/INH inhaler    BREO ELLIPTA    3 Inhaler    Inhale 1 puff into the lungs daily    Chronic obstructive pulmonary disease, unspecified COPD type (H)       furosemide 20 MG tablet    LASIX    240 tablet    Take 2 tablets (40 mg) by mouth 2 times daily May take extra 20 mg as needed for wt .gain >3#    Pulmonary hypertension (H)       inFLIXimab 100 MG injection    REMICADE     Inject 100 mg into the vein every 28 days        levofloxacin 750 MG tablet    LEVAQUIN    10 tablet    Take 1 tablet (750 mg) by mouth every other day    Sarcoidosis       levothyroxine 125 MCG tablet    SYNTHROID/LEVOTHROID    90 tablet    Take 1 tablet (125 mcg) by mouth daily    Hypothyroidism due to Hashimoto's thyroiditis       losartan 50 MG tablet    COZAAR    90 tablet    TAKE ONE TABLET BY MOUTH ONE TIME DAILY    (HFpEF) heart failure with preserved ejection fraction (H)       methotrexate 2.5 MG tablet CHEMO     48 tablet    Take 3 tablets (7.5 mg) by mouth once a week On Mondays    Sarcoidosis       metoprolol tartrate 100 MG tablet    LOPRESSOR    60 tablet    Take 1 tablet (100 mg)  by mouth 2 times daily    Hypertension secondary to other renal disorders       mirtazapine 15 MG tablet    REMERON     Take 15 mg by mouth At Bedtime.        MULTIVITAMIN & MINERAL PO      Take 1 tablet by mouth daily.        * order for DME     1 Device    She requested for a 4 wheel walker, would like to change it to 4 wheel walker with a seat and oxygen tank durant.   Need this faxed over to her  [phone]    Sarcoidosis, lung (H), COPD (chronic obstructive pulmonary disease) (H), Abnormal gait, Congestive heart failure (H)       * order for DME     1 Device    Updated Oxygen: Patient requires supplemental Oxygen 3 LPM via nasal canula with activity and 2 LPM nocturnally. Please provide patient with a portable oxygen concentrator for improved mobility.  Okay to spot check or titrated for conserving to keep stats above 90%. Oxygen will be for a lifetime.    ILD (interstitial lung disease) (H), Hypoxia       polyethylene glycol powder    MIRALAX    510 g    Take 17 g (1 capful) by mouth daily    Constipation, unspecified constipation type       sildenafil 20 MG tablet    REVATIO    270 tablet    TAKE ONE TABLET BY MOUTH THREE TIMES DAILY    Pulmonary hypertension (H)       tiotropium 18 MCG capsule    SPIRIVA HANDIHALER    90 capsule    Inhale contents of one capsule daily.    Chronic obstructive pulmonary disease, unspecified COPD type (H)       Urea 40 % Crea    CARMOL 40    199 g    To feet daily    Dermatophytosis of nail, Corns and callosities       * Notice:  This list has 2 medication(s) that are the same as other medications prescribed for you. Read the directions carefully, and ask your doctor or other care provider to review them with you.

## 2018-11-08 NOTE — PROGRESS NOTES
Nursing Note  Rohan Monroe [facility] for:   Chief Complaint   Patient presents with     Infusion     Remicade     During today's [facility] appointment, orders from Dr. Jamie Schultz were completed.  Frequency: monthly    Progress note:  Patient identification verified by name and date of birth.  Assessment completed.  Vitals recorded in Doc Flowsheets.  Patient was provided with education regarding infusion and possible side effects.  Patient verbalized understanding.     Patient moving to [place] in January. Patient wanting to have an infusion done at the very end of December prior to moving in order to give himself to arrange infusions at his new location. Patient and Dr. Schultz had discussed this already. Epic Inbasket message sent to Antoine to change patient's orders to allow patient to receive infusion 1 week early at the end of December vs first week in January.      needed: No  Premedications: were not ordered.  Infusion Rates: Remicade given over approximately 1 hour.  Labs: were drawn per orders- canceled his afternoon lab appointment to have labs drawn prior to his infusion instead.  Vascular access: peripheral IV placed today.  Treatment Conditions:   ~~~ NOTE: If the patient answers yes to any of the questions below, hold the infusion and contact ordering provider or on-call provider.    1. Have you recently had an elevated temperature, fever, chills, productive cough, coughing for 3 weeks or longer or hemoptysis,  abnormal vital signs, night sweats,  chest pain or have you noticed a decrease in your appetite, unexplained weight loss or fatigue? No  2. Do you have any open wounds or new incisions? No  3. Do you have any recent or upcoming hospitalizations, surgeries or dental procedures? No  4. Do you currently have or recently have had any signs of illness or infection or are you on any  antibiotics? No  5. Have you had any new, sudden or worsening abdominal pain? No  6. Have you or anyone in your household received a live vaccination in the past 4 weeks? Please note:  No live vaccines while on biologic/chemotherapy until 6 months after the last treatment.  Patient can receive the flu vaccine (shot only) and the pneumovax.  It is optimal for the patient to get these vaccines mid cycle, but they can be given at any time as long as it is not on the day of the infusion. No  7. Have you recently been diagnosed with any new nervous system diseases (ie. Multiple sclerosis, Guillain [place], seizures, neurological changes) or cancer diagnosis? Are you on any form of radiation or chemotherapy? No  8. Are you pregnant or breast feeding or do you have plans of pregnancy in the future? No  9. Have you been having any signs of worsening depression or suicidal ideations?  (benlysta only) No  10. Have there been any other new onset medical symptoms? No    Patient tolerated infusion: well.    Drug Waste Record? No     Discharge Plan:   Follow up plan of care with: ongoing infusions at [facility].  Discharge instructions were reviewed with patient.  Patient/representative verbalized understanding of discharge instructions and all questions answered.  Patient discharged from [facility] in stable condition.    Ericka Severson, RN    Administrations This Visit     inFLIXimab (REMICADE) 400 mg in sodium chloride 0.9 % 315 mL infusion     Admin Date Action Dose Rate Route Administered By          11/08/2018 New Bag 400 mg 315 mL/hr Intravenous Severson, Ericka, RN                         /79  Pulse 80  Temp 97.7  F (36.5  C) (Oral)  Resp 18  Wt 76.9 kg (169 lb 8 oz)  BMI 25.02 kg/m2

## 2018-11-08 NOTE — PATIENT INSTRUCTIONS
Dear oRhan Monroe    Thank you for choosing [facility] Specialty Infusion [facility] Procedure Center ([facility]) for your infusion.  The following information is a summary of our appointment as well as important reminders.      EDUCATION POST BIOLOGICAL/CHEMOTHERAPY INFUSION  Call the triage nurse at your clinic or seek medical attention if you have chills and/or temperature greater than or equal to 100.5, uncontrolled nausea/vomiting, diarrhea, constipation, dizziness, shortness of breath, chest pain, heart palpitations, weakness or any other new or concerning symptoms, questions or concerns.  You can not have any live virus vaccines prior to or during treatment or up to 6 months post infusion.  If you have an upcoming surgery, medical procedure or dental procedure during treatment, this should be discussed with your ordering physician and your surgeon/dentist.  If you are having any concerning symptom, if you are unsure if you should get your next infusion or wish to speak to a provider before your next infusion, please call your care coordinator or triage nurse at your clinic to notify them so we can adequately serve you.    We look forward in seeing you on your next appointment here at [facility].  Please don t hesitate to call us at [phone] to reschedule any of your appointments or to speak with one of the [facility] registered nurses.  It was a pleasure taking care of you today.    Sincerely,    [facility]  Specialty Infusion & Procedure Center  [address] [place] [address]  [id number]  Phone:  [phone]

## 2018-11-12 LAB
DEPRECATED CALCIDIOL+CALCIFEROL SERPL-MC: <22 UG/L (ref 20–75)
VITAMIN D2 SERPL-MCNC: <5 UG/L
VITAMIN D3 SERPL-MCNC: 17 UG/L

## 2018-11-13 ENCOUNTER — TELEPHONE (OUTPATIENT)
Dept: CALL CENTER | Age: 73
End: 2018-11-13

## 2018-11-13 NOTE — TELEPHONE ENCOUNTER
Did  Not receive a callback from the patient so he was called. The bleeding was stopped and there is no blood running down the back of his throat.    He was reminded to keep the nose moist and if it starts bleeding again to go to the ER with someone else driving and he stated understanding.      Kathleen M Doege RN

## 2018-11-13 NOTE — TELEPHONE ENCOUNTER
"[facility]: Nurse Triage Note  SITUATION/BACKGROUND                                                      Rohan Monroe is a 72 year old male who calls with nose bleed.    Description:  Slow, steady bleeding - \"but not gushing\"  Onset/duration:  1 hour  Precip. factors:  Picked at dried out nasal membranes, takes one \"baby aspirin daily\" - no other blood thinners  Associated symptoms:  none  Improves/worsens symptoms:  nothing  Pain scale (0-10)   0/10    MEDICATIONS:   Taking medication(s) as prescribed? Yes  Taking over the counter medication(s?) No  Any barriers to taking medication(s) as prescribed?  No  Medication(s) improving/managing symptoms?  N/A    Allergies:   Allergies   Allergen Reactions     Prednisone Other (See Comments)     He reports that he can't sleep for days and can't use small to massive doses of prednisone   Pt. Does not do well with high doses of Prednisone, His MD says that Prednisone is counter indicated. Prednisone failed to treat his sarcoidosis        ASSESSMENT      Patient called after nose has been bleeding for just over an hour after picking at his dried out nasal membranes. He has been unable to get it to stop. He has held pressure and tried ice to the nose and tilting his head back. Nose is dried out due to the use of an O2 concentrator.  When asked about symptoms related to heavy blood loss he stated \"itis not gushing - it is not that kind of nose bleed.\"  Patient is not feeling faint - even when standing, no SOB or rapid heart beat. He has no history of nose bleeds.  When squeezing his nose it was not for long periods of time. He was instructed to sit with his head tilted slightly forward, squeeze his nose for a minimum of 15 minutes and note if there is any blood running down the back of his throat which it is not. He will hold nose for 15 minutes and call back with an update.    RECOMMENDATION/PLAN                                                  "     RECOMMENDED DISPOSITION:  Home care advice - HOME CARE ADVICE: NOSEBLEED      In sitting position with the head bent forward, firmly pinch the nose closed for 15 minutes with an ice-cold washcloth. Breathe through the mouth.     If the bleeding stops then recurS, pinch the nose closed for 10 to 15 minutes.    DO NOT blow nose.    Avoid aspirin and anti-inflammatory products.    Avoid smoking and strenuous activity for 24 hours after a nosebleed.    Keep mucous membranes moist. Use saline nasal drops/gel or spray several times a day.    Avoid hot liquids (such as soup, coffee, or tea).    REPORT THE FOLLOWING PROBLEMS TO YOUR PCP/CLINIC/ED    Bleeding persist after > 15 after this 15 minute - then go to the ER.    Difficulty breathing, lightheadedness, or dizziness - ER    Problems persist or worsens. ER    SEEK ED CARE IMMEDIATELY IF ANY OF THE FOLLOWING OCCUR    Change in level of consciousness    Rapid heart rate, pale skin, or shortness of breath.      Will comply with recommendation: Yes    If further questions/concerns or if symptoms do not improve, worsen or new symptoms develop, call your PCP or [phone] to talk with the Resident on call, as soon as possible.    Guideline used: Nosebleed   Pg [id number]  Telephone Triage Protocols for Nurses, Fifth Edition, Julie Briggs Kathleen M. Doege, RN

## 2018-11-14 NOTE — TELEPHONE ENCOUNTER
Pt called and he states th his nosebleeds have stopped. No bleeding overnight. He was reminded to keep the nose moist and if it starts bleeding again to go to the ER with someone else driving and he stated understanding.  Clinic numbers given for questions and or concerns.  Willow Bob RN 7:55 AM on 11/14/2018.

## 2018-11-18 ENCOUNTER — APPOINTMENT (OUTPATIENT)
Dept: CT IMAGING | Facility: CLINIC | Age: 73
DRG: 286 | End: 2018-11-18
Attending: PHYSICIAN ASSISTANT
Payer: MEDICARE

## 2018-11-18 ENCOUNTER — HOSPITAL ENCOUNTER (INPATIENT)
Facility: CLINIC | Age: 73
LOS: 3 days | Discharge: ANOTHER HOSPITAL | DRG: 286 | End: 2018-11-21
Attending: EMERGENCY MEDICINE | Admitting: HOSPITALIST
Payer: MEDICARE

## 2018-11-18 ENCOUNTER — APPOINTMENT (OUTPATIENT)
Dept: GENERAL RADIOLOGY | Facility: CLINIC | Age: 73
DRG: 286 | End: 2018-11-18
Attending: PHYSICIAN ASSISTANT
Payer: MEDICARE

## 2018-11-18 DIAGNOSIS — J18.9 PNEUMONIA OF BOTH LUNGS DUE TO INFECTIOUS ORGANISM, UNSPECIFIED PART OF LUNG: ICD-10-CM

## 2018-11-18 DIAGNOSIS — I50.9 ACUTE ON CHRONIC CONGESTIVE HEART FAILURE, UNSPECIFIED HEART FAILURE TYPE (H): ICD-10-CM

## 2018-11-18 DIAGNOSIS — I48.92 ATRIAL FLUTTER WITH RAPID VENTRICULAR RESPONSE (H): ICD-10-CM

## 2018-11-18 DIAGNOSIS — D86.9 SARCOIDOSIS: ICD-10-CM

## 2018-11-18 PROBLEM — R06.09 DOE (DYSPNEA ON EXERTION): Status: ACTIVE | Noted: 2018-11-18

## 2018-11-18 LAB
ANION GAP SERPL CALCULATED.3IONS-SCNC: 7 MMOL/L (ref 3–14)
BASOPHILS # BLD AUTO: 0.1 10E9/L (ref 0–0.2)
BASOPHILS NFR BLD AUTO: 0.5 %
BUN SERPL-MCNC: 37 MG/DL (ref 7–30)
CALCIUM SERPL-MCNC: 8.3 MG/DL (ref 8.5–10.1)
CHLORIDE SERPL-SCNC: 106 MMOL/L (ref 94–109)
CK SERPL-CCNC: 66 U/L (ref 30–300)
CO2 BLDCOV-SCNC: 26 MMOL/L (ref 21–28)
CO2 SERPL-SCNC: 26 MMOL/L (ref 20–32)
CREAT SERPL-MCNC: 1.66 MG/DL (ref 0.66–1.25)
D DIMER PPP FEU-MCNC: 1.3 UG/ML FEU (ref 0–0.5)
DIFFERENTIAL METHOD BLD: ABNORMAL
EOSINOPHIL # BLD AUTO: 0.5 10E9/L (ref 0–0.7)
EOSINOPHIL NFR BLD AUTO: 3.8 %
ERYTHROCYTE [DISTWIDTH] IN BLOOD BY AUTOMATED COUNT: 14.8 % (ref 10–15)
GFR SERPL CREATININE-BSD FRML MDRD: 41 ML/MIN/1.7M2
GLUCOSE BLDC GLUCOMTR-MCNC: 134 MG/DL (ref 70–99)
GLUCOSE SERPL-MCNC: 168 MG/DL (ref 70–99)
HCT VFR BLD AUTO: 36.6 % (ref 40–53)
HGB BLD-MCNC: 11.5 G/DL (ref 13.3–17.7)
IMM GRANULOCYTES # BLD: 0 10E9/L (ref 0–0.4)
IMM GRANULOCYTES NFR BLD: 0.2 %
INTERPRETATION ECG - MUSE: NORMAL
LACTATE BLD-SCNC: 0.7 MMOL/L (ref 0.7–2)
LACTATE BLD-SCNC: 0.8 MMOL/L (ref 0.7–2.1)
LACTATE BLD-SCNC: 1 MMOL/L (ref 0.7–2)
LYMPHOCYTES # BLD AUTO: 1 10E9/L (ref 0.8–5.3)
LYMPHOCYTES NFR BLD AUTO: 8.1 %
MAGNESIUM SERPL-MCNC: 2.1 MG/DL (ref 1.6–2.3)
MCH RBC QN AUTO: 30.3 PG (ref 26.5–33)
MCHC RBC AUTO-ENTMCNC: 31.4 G/DL (ref 31.5–36.5)
MCV RBC AUTO: 97 FL (ref 78–100)
MONOCYTES # BLD AUTO: 0.6 10E9/L (ref 0–1.3)
MONOCYTES NFR BLD AUTO: 4.5 %
NEUTROPHILS # BLD AUTO: 10.4 10E9/L (ref 1.6–8.3)
NEUTROPHILS NFR BLD AUTO: 82.9 %
NT-PROBNP SERPL-MCNC: ABNORMAL PG/ML (ref 0–900)
PCO2 BLDV: 45 MM HG (ref 40–50)
PH BLDV: 7.36 PH (ref 7.32–7.43)
PHOSPHATE SERPL-MCNC: 2.8 MG/DL (ref 2.5–4.5)
PLATELET # BLD AUTO: 221 10E9/L (ref 150–450)
PLATELET # BLD AUTO: 274 10E9/L (ref 150–450)
PO2 BLDV: 53 MM HG (ref 25–47)
POTASSIUM SERPL-SCNC: 4.6 MMOL/L (ref 3.4–5.3)
PROCALCITONIN SERPL-MCNC: <0.05 NG/ML
RBC # BLD AUTO: 3.79 10E12/L (ref 4.4–5.9)
SAO2 % BLDV FROM PO2: 85 %
SODIUM SERPL-SCNC: 139 MMOL/L (ref 133–144)
TROPONIN I SERPL-MCNC: 0.03 UG/L (ref 0–0.04)
TROPONIN I SERPL-MCNC: 0.03 UG/L (ref 0–0.04)
WBC # BLD AUTO: 12.5 10E9/L (ref 4–11)

## 2018-11-18 PROCEDURE — 83735 ASSAY OF MAGNESIUM: CPT | Performed by: HOSPITALIST

## 2018-11-18 PROCEDURE — 85025 COMPLETE CBC W/AUTO DIFF WBC: CPT | Performed by: PHYSICIAN ASSISTANT

## 2018-11-18 PROCEDURE — 84484 ASSAY OF TROPONIN QUANT: CPT | Performed by: PHYSICIAN ASSISTANT

## 2018-11-18 PROCEDURE — 96375 TX/PRO/DX INJ NEW DRUG ADDON: CPT

## 2018-11-18 PROCEDURE — 94640 AIRWAY INHALATION TREATMENT: CPT

## 2018-11-18 PROCEDURE — 00000146 ZZHCL STATISTIC GLUCOSE BY METER IP

## 2018-11-18 PROCEDURE — 25000128 H RX IP 250 OP 636: Performed by: EMERGENCY MEDICINE

## 2018-11-18 PROCEDURE — 25000125 ZZHC RX 250: Performed by: HOSPITALIST

## 2018-11-18 PROCEDURE — 83605 ASSAY OF LACTIC ACID: CPT | Performed by: EMERGENCY MEDICINE

## 2018-11-18 PROCEDURE — 25000128 H RX IP 250 OP 636: Performed by: HOSPITALIST

## 2018-11-18 PROCEDURE — 83605 ASSAY OF LACTIC ACID: CPT | Performed by: INTERNAL MEDICINE

## 2018-11-18 PROCEDURE — 80048 BASIC METABOLIC PNL TOTAL CA: CPT | Performed by: PHYSICIAN ASSISTANT

## 2018-11-18 PROCEDURE — 99285 EMERGENCY DEPT VISIT HI MDM: CPT | Mod: 25

## 2018-11-18 PROCEDURE — 84100 ASSAY OF PHOSPHORUS: CPT | Performed by: HOSPITALIST

## 2018-11-18 PROCEDURE — 84484 ASSAY OF TROPONIN QUANT: CPT | Performed by: HOSPITALIST

## 2018-11-18 PROCEDURE — 99223 1ST HOSP IP/OBS HIGH 75: CPT | Mod: AI | Performed by: HOSPITALIST

## 2018-11-18 PROCEDURE — 83880 ASSAY OF NATRIURETIC PEPTIDE: CPT | Performed by: PHYSICIAN ASSISTANT

## 2018-11-18 PROCEDURE — 21400004 ZZH R&B CCU CSC INTERMEDIATE

## 2018-11-18 PROCEDURE — 84145 PROCALCITONIN (PCT): CPT | Performed by: PHYSICIAN ASSISTANT

## 2018-11-18 PROCEDURE — 71250 CT THORAX DX C-: CPT

## 2018-11-18 PROCEDURE — 25000132 ZZH RX MED GY IP 250 OP 250 PS 637: Mod: GY | Performed by: HOSPITALIST

## 2018-11-18 PROCEDURE — 93005 ELECTROCARDIOGRAM TRACING: CPT

## 2018-11-18 PROCEDURE — 83605 ASSAY OF LACTIC ACID: CPT

## 2018-11-18 PROCEDURE — 85049 AUTOMATED PLATELET COUNT: CPT | Performed by: HOSPITALIST

## 2018-11-18 PROCEDURE — 96365 THER/PROPH/DIAG IV INF INIT: CPT

## 2018-11-18 PROCEDURE — 36415 COLL VENOUS BLD VENIPUNCTURE: CPT | Performed by: INTERNAL MEDICINE

## 2018-11-18 PROCEDURE — 40000275 ZZH STATISTIC RCP TIME EA 10 MIN

## 2018-11-18 PROCEDURE — 71046 X-RAY EXAM CHEST 2 VIEWS: CPT

## 2018-11-18 PROCEDURE — 96367 TX/PROPH/DG ADDL SEQ IV INF: CPT

## 2018-11-18 PROCEDURE — 87040 BLOOD CULTURE FOR BACTERIA: CPT | Performed by: PHYSICIAN ASSISTANT

## 2018-11-18 PROCEDURE — 25000128 H RX IP 250 OP 636: Performed by: PHYSICIAN ASSISTANT

## 2018-11-18 PROCEDURE — 82803 BLOOD GASES ANY COMBINATION: CPT

## 2018-11-18 PROCEDURE — 25000125 ZZHC RX 250: Performed by: PHYSICIAN ASSISTANT

## 2018-11-18 PROCEDURE — A9270 NON-COVERED ITEM OR SERVICE: HCPCS | Mod: GY | Performed by: HOSPITALIST

## 2018-11-18 PROCEDURE — 85379 FIBRIN DEGRADATION QUANT: CPT | Performed by: PHYSICIAN ASSISTANT

## 2018-11-18 PROCEDURE — 36415 COLL VENOUS BLD VENIPUNCTURE: CPT | Performed by: HOSPITALIST

## 2018-11-18 PROCEDURE — 82550 ASSAY OF CK (CPK): CPT | Performed by: HOSPITALIST

## 2018-11-18 PROCEDURE — 94640 AIRWAY INHALATION TREATMENT: CPT | Mod: 76

## 2018-11-18 RX ORDER — DIGOXIN 125 MCG
125 TABLET ORAL ONCE
Status: COMPLETED | OUTPATIENT
Start: 2018-11-19 | End: 2018-11-19

## 2018-11-18 RX ORDER — PIPERACILLIN SODIUM, TAZOBACTAM SODIUM 3; .375 G/15ML; G/15ML
3.38 INJECTION, POWDER, LYOPHILIZED, FOR SOLUTION INTRAVENOUS EVERY 6 HOURS
Status: DISCONTINUED | OUTPATIENT
Start: 2018-11-18 | End: 2018-11-21 | Stop reason: HOSPADM

## 2018-11-18 RX ORDER — NALOXONE HYDROCHLORIDE 0.4 MG/ML
.1-.4 INJECTION, SOLUTION INTRAMUSCULAR; INTRAVENOUS; SUBCUTANEOUS
Status: DISCONTINUED | OUTPATIENT
Start: 2018-11-18 | End: 2018-11-19

## 2018-11-18 RX ORDER — METOPROLOL TARTRATE 1 MG/ML
2.5 INJECTION, SOLUTION INTRAVENOUS EVERY 4 HOURS PRN
Status: DISCONTINUED | OUTPATIENT
Start: 2018-11-18 | End: 2018-11-21 | Stop reason: HOSPADM

## 2018-11-18 RX ORDER — PIPERACILLIN SODIUM, TAZOBACTAM SODIUM 4; .5 G/20ML; G/20ML
4.5 INJECTION, POWDER, LYOPHILIZED, FOR SOLUTION INTRAVENOUS ONCE
Status: COMPLETED | OUTPATIENT
Start: 2018-11-18 | End: 2018-11-18

## 2018-11-18 RX ORDER — DOCUSATE SODIUM 100 MG/1
100 CAPSULE, LIQUID FILLED ORAL 2 TIMES DAILY
Status: DISCONTINUED | OUTPATIENT
Start: 2018-11-18 | End: 2018-11-21 | Stop reason: HOSPADM

## 2018-11-18 RX ORDER — ACETAMINOPHEN 650 MG/1
650 SUPPOSITORY RECTAL EVERY 4 HOURS PRN
Status: DISCONTINUED | OUTPATIENT
Start: 2018-11-18 | End: 2018-11-21 | Stop reason: HOSPADM

## 2018-11-18 RX ORDER — ASPIRIN 81 MG/1
81 TABLET ORAL DAILY
Status: DISCONTINUED | OUTPATIENT
Start: 2018-11-19 | End: 2018-11-19

## 2018-11-18 RX ORDER — BISACODYL 10 MG
10 SUPPOSITORY, RECTAL RECTAL DAILY PRN
Status: DISCONTINUED | OUTPATIENT
Start: 2018-11-18 | End: 2018-11-21 | Stop reason: HOSPADM

## 2018-11-18 RX ORDER — IPRATROPIUM BROMIDE AND ALBUTEROL SULFATE 2.5; .5 MG/3ML; MG/3ML
3 SOLUTION RESPIRATORY (INHALATION) ONCE
Status: COMPLETED | OUTPATIENT
Start: 2018-11-18 | End: 2018-11-18

## 2018-11-18 RX ORDER — TIOTROPIUM BROMIDE 18 UG/1
18 CAPSULE ORAL; RESPIRATORY (INHALATION) DAILY
Status: DISCONTINUED | OUTPATIENT
Start: 2018-11-18 | End: 2018-11-18 | Stop reason: CLARIF

## 2018-11-18 RX ORDER — LEVOTHYROXINE SODIUM 125 UG/1
125 TABLET ORAL
Status: DISCONTINUED | OUTPATIENT
Start: 2018-11-19 | End: 2018-11-21 | Stop reason: HOSPADM

## 2018-11-18 RX ORDER — IPRATROPIUM BROMIDE AND ALBUTEROL SULFATE 2.5; .5 MG/3ML; MG/3ML
3 SOLUTION RESPIRATORY (INHALATION)
Status: DISCONTINUED | OUTPATIENT
Start: 2018-11-18 | End: 2018-11-21 | Stop reason: HOSPADM

## 2018-11-18 RX ORDER — CALCIUM CARBONATE 500 MG/1
1000 TABLET, CHEWABLE ORAL 4 TIMES DAILY PRN
Status: DISCONTINUED | OUTPATIENT
Start: 2018-11-18 | End: 2018-11-21 | Stop reason: HOSPADM

## 2018-11-18 RX ORDER — ATORVASTATIN CALCIUM 40 MG/1
40 TABLET, FILM COATED ORAL AT BEDTIME
Status: DISCONTINUED | OUTPATIENT
Start: 2018-11-18 | End: 2018-11-21 | Stop reason: HOSPADM

## 2018-11-18 RX ORDER — DIGOXIN 0.25 MG/ML
250 INJECTION INTRAMUSCULAR; INTRAVENOUS ONCE
Status: COMPLETED | OUTPATIENT
Start: 2018-11-18 | End: 2018-11-18

## 2018-11-18 RX ORDER — ACETAMINOPHEN 325 MG/1
650 TABLET ORAL EVERY 4 HOURS PRN
Status: DISCONTINUED | OUTPATIENT
Start: 2018-11-18 | End: 2018-11-21 | Stop reason: HOSPADM

## 2018-11-18 RX ORDER — POLYETHYLENE GLYCOL 3350 17 G/17G
17 POWDER, FOR SOLUTION ORAL DAILY PRN
COMMUNITY
End: 2018-12-05

## 2018-11-18 RX ORDER — FUROSEMIDE 10 MG/ML
40 INJECTION INTRAMUSCULAR; INTRAVENOUS
Status: DISCONTINUED | OUTPATIENT
Start: 2018-11-19 | End: 2018-11-19

## 2018-11-18 RX ORDER — NICOTINE POLACRILEX 4 MG
15-30 LOZENGE BUCCAL
Status: DISCONTINUED | OUTPATIENT
Start: 2018-11-18 | End: 2018-11-21 | Stop reason: HOSPADM

## 2018-11-18 RX ORDER — FUROSEMIDE 10 MG/ML
40 INJECTION INTRAMUSCULAR; INTRAVENOUS ONCE
Status: COMPLETED | OUTPATIENT
Start: 2018-11-18 | End: 2018-11-18

## 2018-11-18 RX ORDER — SILDENAFIL CITRATE 20 MG/1
20 TABLET ORAL 3 TIMES DAILY
Status: DISCONTINUED | OUTPATIENT
Start: 2018-11-18 | End: 2018-11-21 | Stop reason: HOSPADM

## 2018-11-18 RX ORDER — ASPIRIN 81 MG/1
81 TABLET ORAL DAILY
COMMUNITY

## 2018-11-18 RX ORDER — UREA 40 %
CREAM (GRAM) TOPICAL DAILY PRN
COMMUNITY

## 2018-11-18 RX ORDER — METOPROLOL TARTRATE 1 MG/ML
5 INJECTION, SOLUTION INTRAVENOUS
Status: DISCONTINUED | OUTPATIENT
Start: 2018-11-18 | End: 2018-11-18

## 2018-11-18 RX ORDER — METOPROLOL TARTRATE 100 MG
100 TABLET ORAL 2 TIMES DAILY
Status: DISCONTINUED | OUTPATIENT
Start: 2018-11-18 | End: 2018-11-21 | Stop reason: HOSPADM

## 2018-11-18 RX ORDER — HEPARIN SODIUM 5000 [USP'U]/.5ML
5000 INJECTION, SOLUTION INTRAVENOUS; SUBCUTANEOUS EVERY 12 HOURS
Status: DISCONTINUED | OUTPATIENT
Start: 2018-11-18 | End: 2018-11-19

## 2018-11-18 RX ORDER — ONDANSETRON 2 MG/ML
4 INJECTION INTRAMUSCULAR; INTRAVENOUS EVERY 6 HOURS PRN
Status: DISCONTINUED | OUTPATIENT
Start: 2018-11-18 | End: 2018-11-21 | Stop reason: HOSPADM

## 2018-11-18 RX ORDER — ONDANSETRON 4 MG/1
4 TABLET, ORALLY DISINTEGRATING ORAL EVERY 6 HOURS PRN
Status: DISCONTINUED | OUTPATIENT
Start: 2018-11-18 | End: 2018-11-21 | Stop reason: HOSPADM

## 2018-11-18 RX ORDER — ALBUTEROL SULFATE 0.83 MG/ML
2.5 SOLUTION RESPIRATORY (INHALATION)
Status: DISCONTINUED | OUTPATIENT
Start: 2018-11-18 | End: 2018-11-21 | Stop reason: HOSPADM

## 2018-11-18 RX ORDER — MIRTAZAPINE 15 MG/1
15 TABLET, FILM COATED ORAL
Status: DISCONTINUED | OUTPATIENT
Start: 2018-11-18 | End: 2018-11-21 | Stop reason: HOSPADM

## 2018-11-18 RX ORDER — CICLOPIROX OLAMINE 7.7 MG/G
CREAM TOPICAL 2 TIMES DAILY PRN
COMMUNITY

## 2018-11-18 RX ORDER — DEXTROSE MONOHYDRATE 25 G/50ML
25-50 INJECTION, SOLUTION INTRAVENOUS
Status: DISCONTINUED | OUTPATIENT
Start: 2018-11-18 | End: 2018-11-21 | Stop reason: HOSPADM

## 2018-11-18 RX ADMIN — VANCOMYCIN HYDROCHLORIDE 2000 MG: 1 INJECTION, POWDER, LYOPHILIZED, FOR SOLUTION INTRAVENOUS at 18:27

## 2018-11-18 RX ADMIN — PIPERACILLIN SODIUM,TAZOBACTAM SODIUM 4.5 G: 4; .5 INJECTION, POWDER, FOR SOLUTION INTRAVENOUS at 16:26

## 2018-11-18 RX ADMIN — ATORVASTATIN CALCIUM 40 MG: 40 TABLET, FILM COATED ORAL at 21:21

## 2018-11-18 RX ADMIN — HEPARIN SODIUM 5000 UNITS: 5000 INJECTION, SOLUTION INTRAVENOUS; SUBCUTANEOUS at 19:59

## 2018-11-18 RX ADMIN — AZITHROMYCIN MONOHYDRATE 500 MG: 500 INJECTION, POWDER, LYOPHILIZED, FOR SOLUTION INTRAVENOUS at 16:51

## 2018-11-18 RX ADMIN — METOPROLOL TARTRATE 100 MG: 100 TABLET, FILM COATED ORAL at 19:59

## 2018-11-18 RX ADMIN — DIGOXIN 250 MCG: 0.25 INJECTION INTRAMUSCULAR; INTRAVENOUS at 22:53

## 2018-11-18 RX ADMIN — IPRATROPIUM BROMIDE AND ALBUTEROL SULFATE 3 ML: 2.5; .5 SOLUTION RESPIRATORY (INHALATION) at 14:38

## 2018-11-18 RX ADMIN — DOCUSATE SODIUM 100 MG: 100 CAPSULE, LIQUID FILLED ORAL at 19:59

## 2018-11-18 RX ADMIN — SILDENAFIL 20 MG: 20 TABLET, FILM COATED ORAL at 19:39

## 2018-11-18 RX ADMIN — FUROSEMIDE 40 MG: 10 INJECTION, SOLUTION INTRAVENOUS at 16:25

## 2018-11-18 RX ADMIN — IPRATROPIUM BROMIDE AND ALBUTEROL SULFATE 3 ML: 2.5; .5 SOLUTION RESPIRATORY (INHALATION) at 20:08

## 2018-11-18 RX ADMIN — PIPERACILLIN SODIUM,TAZOBACTAM SODIUM 3.38 G: 3; .375 INJECTION, POWDER, FOR SOLUTION INTRAVENOUS at 21:22

## 2018-11-18 ASSESSMENT — ENCOUNTER SYMPTOMS
COUGH: 1
SHORTNESS OF BREATH: 1

## 2018-11-18 ASSESSMENT — ACTIVITIES OF DAILY LIVING (ADL): ADLS_ACUITY_SCORE: 12

## 2018-11-18 NOTE — PROGRESS NOTES
RECEIVING UNIT ED HANDOFF REVIEW    ED Nurse Handoff Report was reviewed by: Tamra Jones on November 18, 2018 at 4:56 PM

## 2018-11-18 NOTE — ED NOTES
[facility]  ED Nurse Handoff Report    ED Chief complaint: Shortness of Breath (recovering from pneumonia. o2)      ED Diagnosis:   Final diagnoses:   None       Code Status: Full Code    Allergies:   Allergies   Allergen Reactions     Prednisone Other (See Comments)     He reports that he can't sleep for days and can't use small to massive doses of prednisone   Pt. Does not do well with high doses of Prednisone, His MD says that Prednisone is counter indicated. Prednisone failed to treat his sarcoidosis        Activity level - Baseline/Home:  Independent    Activity Level - Current:   Stand with Assist     Needed?: No    Isolation: No  Infection: Not Applicable  Bariatric?: No    Vital Signs:   Vitals:    11/18/18 1432 11/18/18 1445 11/18/18 1500 11/18/18 1515   BP:  93/67 95/59 99/72   Resp:  13 16 28   Temp:       TempSrc:       SpO2: 94% 100% 94% 94%   Weight:       Height:           Cardiac Rhythm: ,        Pain level: 0-10 Pain Scale: 0    Is this patient confused?: No   [place] - Suicide Severity Rating Scale Completed?  Yes  If yes, what color did the patient score?  White    Patient Report: Initial Complaint: SOB  Focused Assessment: Pt c/o SOB; he has a hx of lung disease and is normally at 3L NC at home. Pt is coming in for CHF exacerbation. Pt is A&O and very pleasant.   Tests Performed: labs, CT  Abnormal Results:   Labs Ordered and Resulted from Time of ED Arrival Up to the Time of Departure from the ED   D DIMER QUANTITATIVE - Abnormal; Notable for the following:        Result Value    D Dimer 1.3 (*)     All other components within normal limits   CBC WITH PLATELETS DIFFERENTIAL - Abnormal; Notable for the following:     WBC 12.5 (*)     RBC Count 3.79 (*)     Hemoglobin 11.5 (*)     Hematocrit 36.6 (*)     MCHC 31.4 (*)     Absolute Neutrophil 10.4 (*)     All other components within normal limits   BASIC METABOLIC PANEL - Abnormal; Notable for the following:     Glucose  168 (*)     Urea Nitrogen 37 (*)     Creatinine 1.66 (*)     GFR Estimate 41 (*)     GFR Estimate If Black 49 (*)     Calcium 8.3 (*)     All other components within normal limits   NT PROBNP INPATIENT - Abnormal; Notable for the following:     N-Terminal Pro BNP Inpatient 91741 (*)     All other components within normal limits   ISTAT  GASES LACTATE ELIU POCT - Abnormal; Notable for the following:     PO2 Venous 53 (*)     All other components within normal limits   TROPONIN I   LACTIC ACID WHOLE BLOOD   PROCALCITONIN   CARDIAC CONTINUOUS MONITORING   ISTAT CG4 GASES LACTATE ELIU NURSING POCT       Treatments provided: lasix    Family Comments: son at bedside; daughter, Jaimie would applicate updates. She is in [place] and follows her [facility] care     OBS brochure/video discussed/provided to patient/family: N/A                 ED Medications:   Medications   metoprolol (LOPRESSOR) injection 5 mg (5 mg Intravenous Not Given 11/18/18 1440)   furosemide (LASIX) injection 40 mg (not administered)   ipratropium - albuterol 0.5 mg/2.5 mg/3 mL (DUONEB) neb solution 3 mL (3 mLs Nebulization Given 11/18/18 1438)       Drips infusing?:  No    For the majority of the shift this patient was Green.   Interventions performed were .    Severe Sepsis OR Septic Shock Diagnosis Present: No    To be done/followed up on inpatient unit:  pt wants to order dinner and vanco will need to be started .      ED NURSE PHONE NUMBER: *[phone]

## 2018-11-18 NOTE — H&P
[facility]    History and Physical  Hospitalist    Rohan Monroe MRN# [id number]   Age: 72 year old YOB: 1945     Date of Admission:  11/18/2018    Primary care provider: Jamie Foster          Assessment and Plan:     Rohan Monroe is a 72 year old  male with medical history of sarcoidosis on immunosuppressive therapy, COPD, chronic hypoxia on home oxygen, coronary artery disease status post stenting, heart failure with preserved ejection fraction, atrial flutter status post ablation, hypertension, hyperlipidemia, diabetes mellitus diet-controlled, chronic kidney disease stage III presented to the ED with his son for ongoing dyspnea on exertion.    Acute on chronic hypoxic respiratory distress multifactorial.  Possible CAP, Rule out atypical infection while immunocompromised  Patient states his symptoms started 1 month back, had seen his primary pulmonologist and had CT imaging done.  He was treated with Levaquin and azithromycin for a week with which symptoms slightly improved.  Over the last 2 weeks he has been having progressive shortness of breath, barely able to ambulate around the house.  At baseline uses 2-3 L of oxygen.      In the ED at the time of evaluation noted to have oxygen saturations of 85%, tachycardic with coarse breath sounds bilaterally with Rales in lower lobes.  1+ pitting edema bilateral ankles. Chest x-ray showed diffuse interstitial infiltrates bilaterally.  Increased opacity for the right lower lung zone and left mid lung zone. D-dimer 1.3.  WBC count 12.5. Creatinine 1.66, glucose 168.  Calcium 8.3,   Received DuoNeb/40 mg IV Lasix in the ED.  Unclear exact etiology for dyspnea on exertion, possible heart failure exacerbation as appears to be volume overloaded.  Will need to rule out ischemia. Could be atypical infections given sarcoidosis and immunocompromised, possible CAP.    Admit to inpatient.  Diuresis with intravenous Lasix 40  mg IV twice daily.  Will start on empirical vancomycin/Zosyn/azithromycin.    Follow blood and urine culture results.   Follow CT chest without contrast.  Add pro-calcitonin, CRP, ESR.   Follow influenza A/B/RSV panel, urine Legionella antigen, streptococcal pneumonia antigen  De-escalate antibiotics  accordingly  O2 PRN to maintain sats > 90%.  If any decompensation will consider BiPAP.  PRN albuterol nebulizer every 2 hours, DuoNeb 4 times a day.  No wheezing hence will hold off steroid.    Has slightly elevated d-dimer, was elevated during previous admissions also.  Has a CKD stage III, if shortness of breath does not improve can consider further workup for VT E.  Pulmonology consult requested for possible bronchoscopy given patient immunocompromise.  Cardiology consult requested for volume overload/A. fib management as below.    Addendum  Patient having systolic blood pressures in the 80s/heart rate in 120s.  Discussed with cardiology on-call recommend digoxin.  Stat dose of digoxin 0.25 milligrams followed by 0.125 mg 6 hours later ordered.  Further dosing as per cardiology in a.m.    Volume overload likely from heart failure exacerbation.  History of Heart failure with preserved ejection fraction: EF 60-65% in March 2018  EKG showed atrial flutter with variable AV block.  Heart rate 112, .  troponin 0 0.029.  N-terminal proBNP 47164.  Follows up with cardiology at [facility].  Strict input-output monitoring, daily weights.  Telemetry monitoring.  Trend troponins.  Echocardiogram for evaluation of heart function.  Diuresis with intravenous Lasix 40 mg IV twice daily.  Continue PTA aspirin 81 mg oral daily, and Lipitor 40 mg oral daily, metoprolol with hold parameters.  PTA losartan  given borderline blood pressures.  Cardiology consult requested.    A. Fib/flutter status post ablation.  [id number].  Is been off Coumadin for last 6 months.  Has been off amiodarone as he developed toxicity last  year.  Continue PTA metoprolol with hold parameters.    Continue PTA aspirin 81 mg  PRN IV metoprolol ordered.  Cardiology consult requested for medication optimization.  TSH, magnesium, phosphorus, CPK levels.    Pulmonary hypertension.  Continue PTA sildenafil 3 times a day.    CAD: 4/2008.  S/p PCI with ROSALIE to the mid-LAD and D2   Continue PTA aspirin 81 mg, metoprolol with hold parameters.    Sarcoidosis: Heart (presumed- as he has geneva filling pressures and Lung involvement.   Follows with Dr. Perlman.  Currently maintained on Remicade every 4 weeks and methotrexate   No Folic acid use on MTX- would recommend.    COPD on home oxygen 3 L.  Continue PTA Breo Ellipta/Spiriva.  Further treatment as above.    History of Exudative left pleural effusion status post thoracocentesis 4/2017  Was exudative pleural effusion, had no lymphadenopathy to suggest malignancy and cytology was negative.    Hypertension:   Continue PTA metoprolol with hold parameters.  Diuresis with IV Lasix as above.  PTA losartan on hold given borderline blood pressures.     Type 2 Diabetes Mellitus:   Last A1c was 7.5% with diet control.  Recheck hemoglobin A1c.  Carb controlled diet.  Medium intensity insulin sliding scale.    History of hep C   treated in the past.    Chronic kidney stage III.  Slight creatinine between 1.8-1.9.  Monitor renal function closely.  Avoid nephrotoxic drugs.    Hypothyroidism.  Continue PTA levothyroxine.  Check TSH a.m.    Insomnia.  Continue PTA as needed Remeron.    Physical deconditioning from acute illness/chronic debility.  PT, OT assessment.    DVT Prophylaxis: Heparin SQ and Pneumatic Compression Devices  Code Status: Full Code discussed with patient and his son.    Disposition: Expected discharge in 2-3 days pending clinical improvement  Patient, son by the bedside, interdisciplinary team involved in care and agrees with plan.    Total time  > 40 minutes. More than 50% of time spent in direct patient  care, care coordination, patient/caregiver counseling, and formalizing plan of care.    Keyana Gilbert MD          Chief Complaint:     History is obtained from the patient and medical records    Rohan Monroe is a 72 year old  male with medical history of sarcoidosis on immunosuppressive therapy, COPD, chronic hypoxia on home oxygen, coronary artery disease status post stenting, heart failure with preserved ejection fraction, atrial flutter status post ablation, hypertension, hyperlipidemia, diabetes mellitus diet-controlled, chronic kidney disease stage III presented to the ED with his son for ongoing dyspnea on exertion.  Patient states his symptoms started 1 month back, had seen his primary pulmonologist and had CT imaging done.  He was treated with Levaquin and azithromycin for a week with which symptoms slightly improved.  Over the last 2 weeks he has been having progressive shortness of breath, barely able to ambulate around the house.  At baseline uses 2-3 L of oxygen.  Minimal cough nonproductive.  Uses 2 pillows to sleep at night.  Is unsure about weight gain, has mild swelling of his feet.  No PND.  Compliant with his medications.  No fever or chills.  No recent travel.  No exposure to sick contacts.  Denies any chest pain or palpitations.  Denies any previous history of blood clots or recent surgeries.  Former smoker quit in 1995.  No abdominal pain, no nausea no vomiting.  No headache or dizziness.  No focal weakness, no incontinence of bowel or bladder.  No joint pains, no urinary disturbance.    In the ED at the time of evaluation noted to have oxygen saturations of 85%, tachycardic with coarse breath sounds bilaterally with Rales in lower lobes.  1+ pitting edema bilateral ankles.  EKG showed atrial flutter with variable AV block.  Heart rate 112, .  Chest x-ray showed diffuse interstitial infiltrates bilaterally may indicate some chronic underlying disease.  Increased opacity for  the right lower lung zone and left mid lung zone.  D-dimer 1.3.  WBC count 12.5.  Creatinine 1.66, glucose 168.  Calcium 8.3, troponin 0 0.029.  N-terminal proBNP 27126.    Received DuoNeb/40 mg IV Lasix in the ED.            Review of Systems:     GENERAL: no fever or chills  EENT:  no sinus problems, no difficulty swallowing, no hearing difficulty  PULMONARY: see HPI   CARDIAC: no chest pain, no irregular or fast heart beats   GI: No abdominal pain, nausea, vomiting, diarrhea, constipation, black or bloody stools  : No burning/pain with urination, no urgency, no hematuria.   NEURO:  no slurred speech, no seizures, no dizziness, no loss of consciousness  ENDOCRINE: No excessive thirst, no excessive bruising.  MUSCULOSKELETAL: No joint pain or swelling.  SKIN: No skin rashes  PSYCHIATRY no anxiety or depression    Medical History:     Past Medical History:   Diagnosis Date     Atrial flutter (H)      Cataract of both eyes      Chronic infection     Hep C     Congestive heart failure, unspecified      Coronary artery disease      Depressive disorder      Depressive disorder, not elsewhere classified     Depression (non-psychotic)     ERM OS (epiretinal membrane, left eye)      Generalized osteoarthrosis, unspecified site      Glaucoma suspect      Hypertension      Lichen planus      Other psoriasis      PVD (posterior vitreous detachment), left eye      Sarcoidosis      Sarcoidosis      Type II or unspecified type diabetes mellitus without mention of complication, not stated as uncontrolled      Unspecified hypothyroidism     Hypothyroidism     Unspecified viral hepatitis C without hepatic coma      Viral warts, unspecified         Surgical History:      Past Surgical History:   Procedure Laterality Date     ANESTHESIA CARDIOVERSION N/A 1/19/2017    Procedure: ANESTHESIA CARDIOVERSION;  Surgeon: GENERIC ANESTHESIA PROVIDER;  Location: [facility] OR     ANESTHESIA CARDIOVERSION N/A 1/23/2017    Procedure: ANESTHESIA  CARDIOVERSION;  Surgeon: GENERIC ANESTHESIA PROVIDER;  Location: [facility] OR     ANESTHESIA CARDIOVERSION N/A 1/24/2017    Procedure: ANESTHESIA CARDIOVERSION;  Surgeon: GENERIC ANESTHESIA PROVIDER;  Location: [facility] OR     ARTHROPLASTY HIP  8/24/2011    Procedure:ARTHROPLASTY HIP; Right Total Hip Arthroplasty  Choice anesthesia; Surgeon:LESLI WILKINSON; Location:[facility] OR     BIOPSY       C PELVIS/HIP JOINT SURGERY UNLISTED       cardiac stent      s/p     CARDIAC SURGERY       CATARACT IOL, RT/LT  9/15/2015    LE     COLONOSCOPY       CORONARY ANGIOGRAPHY ADULT ORDER       H ABLATION ATRIAL FLUTTER       HC REMOVAL OF TONSILS,<13 Y/O      Tonsils <10y.o.     HC REPAIR INCISIONAL HERNIA,REDUCIBLE      Hernia Repair, Incisional, Unilateral     HEART CATH, ANGIOPLASTY       JOINT REPLACEMENT      1 month ago--right hip     LIGATN/STRIP LONG & SHORT SAPHEN               Social History:      Social History   Substance Use Topics     Smoking status: Former Smoker     Packs/day: 1.00     Years: 30.00     Types: Cigarettes     Start date: 12/30/1960     Quit date: 7/22/1994     Smokeless tobacco: Never Used     Alcohol use No             Family History:     Family History   Problem Relation Age of Onset     HEART DISEASE Father      irreg heart beat     Circulatory Father      varicose veins     Prostate Cancer Father      HEART DISEASE Mother      heart attack     Arthritis Mother      Osteoporosis Mother      Thyroid Disease Mother      Hypertension Mother      Eye Disorder Maternal Grandmother      glaucoma     Diabetes Sister      type 2     Kidney Cancer Sister      Diabetes Sister      Glaucoma No family hx of      Macular Degeneration No family hx of      Cancer No family hx of      no skin cancer             Allergies:     Allergies   Allergen Reactions     Prednisone Other (See Comments)     He reports that he can't sleep for days and can't use small to massive doses of prednisone   Pt. Does not do well with high doses of  Prednisone, His MD says that Prednisone is counter indicated. Prednisone failed to treat his sarcoidosis              Medications:   Home meds reviewed          Physical Exam      Admission Weight: 74.8 kg (165 lb)  Current Weight: 74.8 kg (165 lb)    Vital Signs with Ranges  Temp:  [96.6  F (35.9  C)] 96.6  F (35.9  C)  Heart Rate:  [102-131] 131  Resp:  [13-28] 21  BP: ()/(59-79) 99/72  SpO2:  [85 %-100 %] 93 %    PHYSICAL EXAM  GENERAL: Patient is in no distress. Alert and oriented.  HEENT: Oropharynx pink, moist. Pupils equal  HEART: irregular rate and rhythm. S1S2.  Systolic murmur right sternal border.  Tachycardia.  LUNGS: Bilateral decreased breath sounds, no wheezing.  Rales lower lobes.  ABDOMEN: Soft, no abdominal tenderness, bowel sounds heard   NEURO: Cranial nerves II-XII intact. Motor and sensory system in normal range  EXTREMITIES: 1+ ankle edema. 2+ peripheral pulses.  SKIN: Warm, dry. No rash or bruising.  PSYCHIATRY Cooperative       Data:     Results for orders placed or performed during the hospital encounter of 11/18/18 (from the past 24 hour(s))   EKG 12-lead, tracing only   Result Value Ref Range    Interpretation ECG Click View Image link to view waveform and result    ISTAT gases lactate kathie POCT   Result Value Ref Range    Ph Venous 7.36 7.32 - 7.43 pH    PCO2 Venous 45 40 - 50 mm Hg    PO2 Venous 53 (H) 25 - 47 mm Hg    Bicarbonate Venous 26 21 - 28 mmol/L    O2 Sat Venous 85 %    Lactic Acid 0.8 0.7 - 2.1 mmol/L   D dimer quantitative   Result Value Ref Range    D Dimer 1.3 (H) 0.0 - 0.50 ug/ml FEU   CBC with platelets differential   Result Value Ref Range    WBC 12.5 (H) 4.0 - 11.0 10e9/L    RBC Count 3.79 (L) 4.4 - 5.9 10e12/L    Hemoglobin 11.5 (L) 13.3 - 17.7 g/dL    Hematocrit 36.6 (L) 40.0 - 53.0 %    MCV 97 78 - 100 fl    MCH 30.3 26.5 - 33.0 pg    MCHC 31.4 (L) 31.5 - 36.5 g/dL    RDW 14.8 10.0 - 15.0 %    Platelet Count 274 150 - 450 10e9/L    Diff Method Automated Method      % Neutrophils 82.9 %    % Lymphocytes 8.1 %    % Monocytes 4.5 %    % Eosinophils 3.8 %    % Basophils 0.5 %    % Immature Granulocytes 0.2 %    Absolute Neutrophil 10.4 (H) 1.6 - 8.3 10e9/L    Absolute Lymphocytes 1.0 0.8 - 5.3 10e9/L    Absolute Monocytes 0.6 0.0 - 1.3 10e9/L    Absolute Eosinophils 0.5 0.0 - 0.7 10e9/L    Absolute Basophils 0.1 0.0 - 0.2 10e9/L    Abs Immature Granulocytes 0.0 0 - 0.4 10e9/L   Basic metabolic panel   Result Value Ref Range    Sodium 139 133 - 144 mmol/L    Potassium 4.6 3.4 - 5.3 mmol/L    Chloride 106 94 - 109 mmol/L    Carbon Dioxide 26 20 - 32 mmol/L    Anion Gap 7 3 - 14 mmol/L    Glucose 168 (H) 70 - 99 mg/dL    Urea Nitrogen 37 (H) 7 - 30 mg/dL    Creatinine 1.66 (H) 0.66 - 1.25 mg/dL    GFR Estimate 41 (L) >60 mL/min/1.7m2    GFR Estimate If Black 49 (L) >60 mL/min/1.7m2    Calcium 8.3 (L) 8.5 - 10.1 mg/dL   Troponin I   Result Value Ref Range    Troponin I ES 0.029 0.000 - 0.045 ug/L   Nt probnp inpatient (BNP)   Result Value Ref Range    N-Terminal Pro BNP Inpatient 63178 (H) 0 - 900 pg/mL   Lactic acid whole blood   Result Value Ref Range    Lactic Acid 1.0 0.7 - 2.0 mmol/L   XR Chest 2 Views    Narrative    CHEST TWO VIEW 11/18/2018 2:26 PM     HISTORY: Shortness of breath.     COMPARISON: 5/15/2017.      Impression    IMPRESSION: Diffuse interstitial infiltrates bilaterally may indicate  some underlying chronic disease. However, there is increased opacity  in the right lower lung zone and left midlung zone suspicious for  superimposed acute infiltrates. Stable diffuse pleural thickening  bilaterally which may be chronic and is most prominent in the right  upper lung zone laterally. Heart size is at the upper limits of normal  and unchanged.     *Note: Due to a large number of results and/or encounters for the requested time period, some results have not been displayed. A complete set of results can be found in Results Review.

## 2018-11-18 NOTE — ED PROVIDER NOTES
History     Chief Complaint:  Shortness of Breath      HPI   Rohan Monroe is a 72 year old male with a history of COPD, coronary artery disease status post stents, heart failure, atrial flutter s/p ablation, sarcoidosis on remicade, who presents with his son to the Emergency Department for evaluation of shortness of breath. The patient  was previously diagnosed with pneumonia a month prior. He was given a course of Zithromax and Levaquin, which he finished several weeks ago.  Patient states that he did feel better for a few days, before becoming increasingly short of breath over the last several days.  He denies chest pain.  Shortness of breath is worse with exertion, not significantly orthopneic however.  Patient reports a minimal cough, but states this is not productive.  He utilizes 2-3 L of oxygen at baseline at home over the last several months.  The patient does not currently on anticoagulation, and has not had any known episodes of atrial flutter since his ablation.  Patient does report some increased swelling in his ankles, and he currently takes 40 mg of Lasix twice daily.  Interestingly, per his records, the patient did undergo a echocardiogram in March of this year which demonstrated normal ejection fraction and no significant diastolic dysfunction.  The patient denies any prior history of DVT or pulmonary embolism, recent surgeries or immobilizations, asymmetric leg swelling or pain, hemoptysis, malignancy.  He is a former smoker but quit in 1995.  The patient denies fever or chills.    Allergies:  Prednisone     Medications:    albuterol (PROAIR HFA/PROVENTIL HFA/VENTOLIN HFA) 108 (90 Base) MCG/ACT Inhaler  atorvastatin (LIPITOR) 40 MG tablet  azithromycin (ZITHROMAX) 250 MG tablet  colchicine (COLCRYS) 0.6 MG tablet  fluticasone-vilanterol (BREO ELLIPTA) 100-25 MCG/INH oral inhaler  furosemide (LASIX) 20 MG tablet  inFLIXimab (REMICADE) 100 MG injection  levofloxacin (LEVAQUIN) 750 MG  tablet  levothyroxine (SYNTHROID/LEVOTHROID) 125 MCG tablet  losartan (COZAAR) 50 MG tablet  methotrexate 2.5 MG tablet CHEMO  metoprolol tartrate (LOPRESSOR) 100 MG tablet  mirtazapine (REMERON) 15 MG tablet  polyethylene glycol (MIRALAX) powder  sildenafil (REVATIO) 20 MG tablet  tiotropium (SPIRIVA HANDIHALER) 18 MCG capsule    Past Medical History:    Atrial flutter (H)   Cataract of both eyes   Chronic infection   CKD   Congestive heart failure, unspecified   Coronary artery disease   Depressive disorder, not elsewhere classified   ERM OS (epiretinal membrane, left eye)   Generalized osteoarthrosis, unspecified site   Glaucoma suspect   Hypertension   Lichen planus   Other psoriasis   PVD (posterior vitreous detachment), left eye   Sarcoidosis   Sarcoidosis   Type II or unspecified type diabetes mellitus without mention of complication, not stated as uncontrolled  Unspecified hypothyroidism   Unspecified viral hepatitis C without hepatic coma   Viral warts, unspecified   Cirrhosis of liver     Past Surgical History:    ANESTHESIA CARDIOVERSION   ARTHROPLASTY HIP    BIOPSY    C PELVIS/HIP JOINT SURGERY UNLISTED    cardiac stent    CARDIAC SURGERY    CATARACT IOL, RT/LT    COLONOSCOPY    CORONARY ANGIOGRAPHY ADULT ORDER    H ABLATION ATRIAL FLUTTER    HC REPAIR INCISIONAL HERNIA,REDUCIBLE    HEART CATH, ANGIOPLASTY    JOINT REPLACEMENT    LIGATN/STRIP LONG & SHORT SAPHEN      Family History:    Heart Disease  Varicose Veins  Arthritis   Osteoporosis   Thyroid Disease  HTN  DM II    Social History:  Marital Status:    The patient was accompanied to the ED by his son.  Smoking Status: Former Smoker. 1 pack for 30 years. Quit 1994  Smokeless Tobacco: Never  Alcohol Use: No     Review of Systems   Respiratory: Positive for cough and shortness of breath.    Cardiovascular: Positive for leg swelling. Negative for chest pain.   All other systems reviewed and are negative.    Physical Exam   First Vitals:  BP:  "136/79  Heart Rate: 126  Temp: 96.6  F (35.9  C)  Resp: 28  Height: 167.6 cm (5' 6\")  Weight: 74.8 kg (165 lb)  SpO2: (!) 85 %      Physical Exam  General: Alert and cooperative with exam.  On oxygen at baseline.  Head:  Scalp is NC/AT  Eyes:  No scleral icterus, PERRL  ENT:  The external nose and ears are normal.   CV:  Tachycardic and irregular.    No pathologic murmur, rubs, or gallops.  Resp:  Coarse breath sounds bilaterally with rales in lower lobes.  No wheezes or rhonchi.    Tachypneic and slightly labored., no retractions or accessory muscle use  GI:  Abdomen is soft, no distension, no tenderness, no masses. No peritoneal signs.    MS:  1+ nonpitting edema bilaterally to ankles.  No asymmetric calf swelling or tenderness.  Skin:  Warm and dry, No rash or lesions noted.  Neuro: Oriented x 3. No gross motor deficits.  Psych: Awake. Alert. Normal affect. Appropriate interactions.    Emergency Department Course     ECG:  Indication: Shortness of Breath  Completed at 1358.  Read at 1349.   Atrial flutter with variable  AV block. Abnormal ECG.   Rate 112 bpm. AR interval *. QRS duration 72. QT/QTc 338/461. P-R-T axes 120 59 88.     Imaging:  Radiology findings were communicated with the patient who voiced understanding of the findings.    XR Chest 2 Views:   Diffuse interstitial infiltrates bilaterally may indicate  some underlying chronic disease. However, there is increased opacity  in the right lower lung zone and left midlung zone suspicious for  superimposed acute infiltrates. Stable diffuse pleural thickening  bilaterally which may be chronic and is most prominent in the right  upper lung zone laterally. Heart size is at the upper limits of normal  and unchanged.  Report per radiology      Laboratory:  Laboratory findings were communicated with the patient who voiced understanding of the findings.    ISTAT gases lactate kathie POCT:PO2 53 (H) o/w WNL     D Dimer: 1.3 (H)     CBC: WBC 12.5 (H) , HGB 11.5 (L)  " o/w WNL. ( )    BMP: AWNL (Creatinine 1.66 (H) , BUN 37 (H), Glucose 168 (H), GFR 41 (L), Calcium 8.3 (L) o/w WNL )     Troponin: 0.029  Nt probnp: 06807 (H)     Interventions:  1438: DuoNb 3 mL Nebulization   1500: Lasix 40mg IV  Emergency Department Course:  IV was inserted and blood was drawn for laboratory testing, results above.  The patient was sent for a XR while in the emergency department, results above.     Nursing notes and vitals reviewed.  1350: I performed an exam of the patient as documented above.   I spoke with Dr. Davila of the Hospitalist service  regarding patient's presentation, findings, and plan of care.    Findings and plan explained to the Patient and son who consents to admission. Discussed the patient with Dr. Davila, who will admit the patient to a [facility] bed for further monitoring, evaluation, and treatment.      Impression & Plan      Medical Decision Makin-year-old male with complex past medical history including sarcoidosis, COPD, congestive heart failure, coronary artery disease, a flutter status post ablation who presents with shortness of breath.  Patient history and records reviewed.  Broad differential was considered.  On examination, the patient is tachycardic and hypoxic on room air but satting in the low 90s on 3 L consistent with his home baseline.  Blood pressures are stable but somewhat soft in the 90s over 60s systolic.  EKG obtained and demonstrates atrial flutter with rapid ventricular response.  Chest x-ray as above showing diffuse bilateral infiltrates.  Lab work significant for mild leukocytosis, significantly elevated BNP to 18,000, stable chronic kidney disease with creatinine of 1.66.    The patient has a complex history and there are multiple factors likely contributing to his symptoms.  He does have evidence of congestive heart failure given his significantly elevated BNP and diffuse infiltrates.  This may be made worse by his atrial flutter as he  is not normally in a flutter.  He was given dose of Lasix here in the ED as above.  Not currently on anticoagulant for this despite chads VASC score greater than 2, as he has not had known episodes since his ablation.  His heart rates have been around 110 here in the ED, however given his soft blood pressures have held off on rate control agents for now.  He did recently complete a course of Levaquin for suspected pneumonia, and his white blood cell count is mildly elevated today however pro-calcitonin negative and afebrile with normal lactate here in the emergency department.  He was not given fluids given concern for volume overload in the setting of elevated BNP and pulmonary infiltrates.  His sarcoidosis may also be an etiology for his symptoms.  No chest pain, and EKG demonstrates no evidence of ischemia and troponin low but not positive.  D-dimer was drawn prior to return of additional findings, and was elevated though admittedly the patient's dimer is always elevated and given subsequent findings suggesting significantly more likely alternative etiologies and his borderline kidney function will hold off on CT pulmonary embolism study at this time.  CT chest without contrast ordered to better evaluate findings and pending.  Discussed the patient with Dr. Davila, who will admit the patient to the medical bed.  She did recommend drawing blood cultures, influenza, and starting the patient on broad-spectrum antibiotics which were ordered.  The patient will be [place] [facility] [place].  The patient consents to admission and all questions were answered.    Diagnosis:    ICD-10-CM    1. Sarcoidosis D86.9    2. Atrial flutter with rapid ventricular response (H) I48.92    3. Acute on chronic congestive heart failure, unspecified heart failure type (H) I50.9    4. Pneumonia of both lungs due to infectious organism, unspecified part of lung J18.9        Disposition:  Ad[place] to [facility]      Jeannie Arias  11/18/2018   [facility]  "EMERGENCY DEPARTMENT    Scribe Disclosure:  I, Jeannie Arias, am serving as a scribe at 1:50 PM on 11/18/2018 to document services personally performed by Jacob bOregon PA-C based on my observations and the provider's statements to me.       Emergency Department Attending Supervision Note  11/18/2018  3:02 PM      I evaluated this patient in conjunction with DIONICIO Malloy      Briefly, the patient is a 72-year-old male with COPD on chronic 3 L of nasal cannula, sarcoidosis on immunosuppressants, congestive heart failure, a flutter status post ablation who presents with increasing shortness of breath.  He was on antibiotics several weeks ago for pneumonia.  He states this feels different.  He notes several days to a week of increasing shortness of breath with even minimal exertion, such as putting on her shirt.  He denies chest pain, new cough.  He denies fever.  He does feel that he has had a little bit of increased bilateral lower extremity edema.  He also notes that he has been increasing his nasal cannula \"to as much as he can\" over the past 3-4 days.      On my exam  General/Appearance: appears stated age, well-groomed, appears uncomfortable  Eyes: EOMI, no scleral injection, no icterus  ENT: MMM  Neck: supple, nl ROM, no stiffness  Cardiovascular: irregularly irregular and tachy, nl S1S2, no m/r/g, 2+ pulses in all 4 extremities, cap refill <2sec  Respiratory: diffuse rales, tachypnea, on NC, good air movement, no cough  Back: no lesions  GI: abd soft, non-distended, nttp,  no HSM, no rebound, no guarding, nl BS  MSK: LAWSON, good tone, no bony abnormality  Skin: warm and well-perfused, no rash, trace bilateral pedal edema, no ecchymosis, nl turgor  Neuro: GCS 15, alert and oriented, no gross focal neuro deficits  Psych: interacts appropriately  Heme: no petechia, no purpura, no active bleeding        Results:  ECG:  Indication: Shortness of Breath  Completed at 1358.  Read at 1349.   Atrial flutter with variable  " AV block. Abnormal ECG.   Rate 112 bpm. AL interval *. QRS duration 72. QT/QTc 338/461. P-R-T axes 120 59 88.     Imaging:  Radiology findings were communicated with the patient who voiced understanding of the findings.    XR Chest 2 Views:   Diffuse interstitial infiltrates bilaterally may indicate  some underlying chronic disease. However, there is increased opacity  in the right lower lung zone and left midlung zone suspicious for  superimposed acute infiltrates. Stable diffuse pleural thickening  bilaterally which may be chronic and is most prominent in the right  upper lung zone laterally. Heart size is at the upper limits of normal  and unchanged.  Report per radiology      Laboratory:  Laboratory findings were communicated with the patient who voiced understanding of the findings.    ISTAT gases lactate kathie POCT:PO2 53 (H) o/w WNL     D Dimer: 1.3 (H)     CBC: WBC 12.5 (H) , HGB 11.5 (L)  o/w WNL. ( )    BMP: AWNL (Creatinine 1.66 (H) , BUN 37 (H), Glucose 168 (H), GFR 41 (L), Calcium 8.3 (L) o/w WNL )     Troponin: 0.029    Nt probnp: 16506 (H)     Procalcitonin: pending    Interventions:  1438: DuoNb 3 mL Nebulization   1440: Lopressor 5 mg IV  1500 Lasix 40mg IV      My impression is that this patient is a 72-year-old male with multiple cardiac and pulmonary chronic problems who presents with increasing shortness of breath as well as a flutter with RVR.  Is difficult to tell what came first, the shortness of breath leading to the a flutter with RVR versus a flutter causing problems leading to shortness of breath.  I suspect there are multiple underlying players.  Chest x-ray, clinical exam, BNP do suggest fluid overload with acute on chronic congestive heart failure.  Were giving Lasix for this.  I suspect this is contributing to the a flutter.  For the a flutter with RVR we have ordered metoprolol but have not given it due to lower blood pressures.  I considered infectious etiology however with the  pulmonary edema on the chest x-ray is difficult to find a distinct infiltrate.  Pro-calcitonin has been ordered.  I also considered PE.  A d-dimer was ordered prior to results started to come in.  This is elevated however he has chronically elevated D-dimers.  I believe that we now have a more likely etiology, meaning the congestive heart failure exacerbation, and given his low GFR feel that we can hold off on the CT PE study for right now.  He will come in to the hospital for continued management.        Diagnosis    ICD-10-CM    1. Sarcoidosis D86.9    2. Atrial flutter with rapid ventricular response (H) I48.92    3. Acute on chronic congestive heart failure, unspecified heart failure type (H) I50.9    4. Pneumonia of both lungs due to infectious organism, unspecified part of lung J18.9                 Jacob Obregon PA-C  11/18/18 1619

## 2018-11-19 ENCOUNTER — APPOINTMENT (OUTPATIENT)
Dept: CARDIOLOGY | Facility: CLINIC | Age: 73
DRG: 286 | End: 2018-11-19
Attending: HOSPITALIST
Payer: MEDICARE

## 2018-11-19 ENCOUNTER — APPOINTMENT (OUTPATIENT)
Dept: CARDIOLOGY | Facility: CLINIC | Age: 73
DRG: 286 | End: 2018-11-19
Attending: INTERNAL MEDICINE
Payer: MEDICARE

## 2018-11-19 LAB
ALBUMIN SERPL-MCNC: 2.6 G/DL (ref 3.4–5)
ALBUMIN UR-MCNC: 100 MG/DL
ALP SERPL-CCNC: 110 U/L (ref 40–150)
ALT SERPL W P-5'-P-CCNC: 42 U/L (ref 0–70)
ANION GAP SERPL CALCULATED.3IONS-SCNC: 6 MMOL/L (ref 3–14)
APPEARANCE UR: CLEAR
AST SERPL W P-5'-P-CCNC: 51 U/L (ref 0–45)
BILIRUB SERPL-MCNC: 0.7 MG/DL (ref 0.2–1.3)
BILIRUB UR QL STRIP: NEGATIVE
BUN SERPL-MCNC: 38 MG/DL (ref 7–30)
CALCIUM SERPL-MCNC: 7.9 MG/DL (ref 8.5–10.1)
CHLORIDE SERPL-SCNC: 106 MMOL/L (ref 94–109)
CO2 SERPL-SCNC: 28 MMOL/L (ref 20–32)
COLOR UR AUTO: ABNORMAL
CREAT SERPL-MCNC: 2 MG/DL (ref 0.66–1.25)
DIGOXIN SERPL-MCNC: 0.8 UG/L (ref 0.5–2)
DIGOXIN SERPL-MCNC: 0.8 UG/L (ref 0.5–2)
ERYTHROCYTE [DISTWIDTH] IN BLOOD BY AUTOMATED COUNT: 14.9 % (ref 10–15)
ERYTHROCYTE [DISTWIDTH] IN BLOOD BY AUTOMATED COUNT: 15.1 % (ref 10–15)
ERYTHROCYTE [SEDIMENTATION RATE] IN BLOOD BY WESTERGREN METHOD: 46 MM/H (ref 0–20)
GFR SERPL CREATININE-BSD FRML MDRD: 33 ML/MIN/1.7M2
GLUCOSE BLDC GLUCOMTR-MCNC: 108 MG/DL (ref 70–99)
GLUCOSE BLDC GLUCOMTR-MCNC: 117 MG/DL (ref 70–99)
GLUCOSE BLDC GLUCOMTR-MCNC: 119 MG/DL (ref 70–99)
GLUCOSE BLDC GLUCOMTR-MCNC: 127 MG/DL (ref 70–99)
GLUCOSE BLDC GLUCOMTR-MCNC: 132 MG/DL (ref 70–99)
GLUCOSE SERPL-MCNC: 125 MG/DL (ref 70–99)
GLUCOSE UR STRIP-MCNC: NEGATIVE MG/DL
HBA1C MFR BLD: 6.4 % (ref 0–5.6)
HCT VFR BLD AUTO: 33.5 % (ref 40–53)
HCT VFR BLD AUTO: 34.7 % (ref 40–53)
HGB BLD-MCNC: 10.5 G/DL (ref 13.3–17.7)
HGB BLD-MCNC: 10.6 G/DL (ref 13.3–17.7)
HGB UR QL STRIP: ABNORMAL
HYALINE CASTS #/AREA URNS LPF: 1 /LPF (ref 0–2)
INR PPP: 1.08 (ref 0.86–1.14)
KETONES UR STRIP-MCNC: NEGATIVE MG/DL
L PNEUMO1 AG UR QL IA: NORMAL
LEUKOCYTE ESTERASE UR QL STRIP: NEGATIVE
MAGNESIUM SERPL-MCNC: 2.2 MG/DL (ref 1.6–2.3)
MCH RBC QN AUTO: 29.8 PG (ref 26.5–33)
MCH RBC QN AUTO: 30.8 PG (ref 26.5–33)
MCHC RBC AUTO-ENTMCNC: 30.5 G/DL (ref 31.5–36.5)
MCHC RBC AUTO-ENTMCNC: 31.3 G/DL (ref 31.5–36.5)
MCV RBC AUTO: 98 FL (ref 78–100)
MCV RBC AUTO: 98 FL (ref 78–100)
MUCOUS THREADS #/AREA URNS LPF: PRESENT /LPF
NITRATE UR QL: NEGATIVE
PH UR STRIP: 6 PH (ref 5–7)
PLATELET # BLD AUTO: 224 10E9/L (ref 150–450)
PLATELET # BLD AUTO: 237 10E9/L (ref 150–450)
POTASSIUM SERPL-SCNC: 4.4 MMOL/L (ref 3.4–5.3)
POTASSIUM SERPL-SCNC: 4.5 MMOL/L (ref 3.4–5.3)
PROT SERPL-MCNC: 6.6 G/DL (ref 6.8–8.8)
RBC # BLD AUTO: 3.41 10E12/L (ref 4.4–5.9)
RBC # BLD AUTO: 3.56 10E12/L (ref 4.4–5.9)
RBC #/AREA URNS AUTO: 9 /HPF (ref 0–2)
S PNEUM AG SPEC QL: NORMAL
SODIUM SERPL-SCNC: 140 MMOL/L (ref 133–144)
SOURCE: ABNORMAL
SP GR UR STRIP: 1.01 (ref 1–1.03)
SPECIMEN SOURCE: NORMAL
SPECIMEN SOURCE: NORMAL
TROPONIN I SERPL-MCNC: 0.03 UG/L (ref 0–0.04)
TROPONIN I SERPL-MCNC: 0.04 UG/L (ref 0–0.04)
TSH SERPL DL<=0.005 MIU/L-ACNC: 1.44 MU/L (ref 0.4–4)
UROBILINOGEN UR STRIP-MCNC: NORMAL MG/DL (ref 0–2)
WBC # BLD AUTO: 10.2 10E9/L (ref 4–11)
WBC # BLD AUTO: 11.1 10E9/L (ref 4–11)
WBC #/AREA URNS AUTO: 1 /HPF (ref 0–5)

## 2018-11-19 PROCEDURE — 25000132 ZZH RX MED GY IP 250 OP 250 PS 637

## 2018-11-19 PROCEDURE — 27210910 ZZH NEEDLE CR6

## 2018-11-19 PROCEDURE — 4A023N6 MEASUREMENT OF CARDIAC SAMPLING AND PRESSURE, RIGHT HEART, PERCUTANEOUS APPROACH: ICD-10-PCS | Performed by: INTERNAL MEDICINE

## 2018-11-19 PROCEDURE — 80162 ASSAY OF DIGOXIN TOTAL: CPT | Performed by: INTERNAL MEDICINE

## 2018-11-19 PROCEDURE — 94640 AIRWAY INHALATION TREATMENT: CPT

## 2018-11-19 PROCEDURE — 82803 BLOOD GASES ANY COMBINATION: CPT

## 2018-11-19 PROCEDURE — 25000128 H RX IP 250 OP 636: Performed by: INTERNAL MEDICINE

## 2018-11-19 PROCEDURE — 99221 1ST HOSP IP/OBS SF/LOW 40: CPT | Mod: 25 | Performed by: INTERNAL MEDICINE

## 2018-11-19 PROCEDURE — 25000125 ZZHC RX 250: Performed by: INTERNAL MEDICINE

## 2018-11-19 PROCEDURE — 25500064 ZZH RX 255 OP 636: Performed by: HOSPITALIST

## 2018-11-19 PROCEDURE — 25000125 ZZHC RX 250: Performed by: HOSPITALIST

## 2018-11-19 PROCEDURE — 25000132 ZZH RX MED GY IP 250 OP 250 PS 637: Mod: GY | Performed by: HOSPITALIST

## 2018-11-19 PROCEDURE — 93451 RIGHT HEART CATH: CPT | Mod: 26 | Performed by: INTERNAL MEDICINE

## 2018-11-19 PROCEDURE — 85027 COMPLETE CBC AUTOMATED: CPT | Performed by: INTERNAL MEDICINE

## 2018-11-19 PROCEDURE — 40000275 ZZH STATISTIC RCP TIME EA 10 MIN

## 2018-11-19 PROCEDURE — A9270 NON-COVERED ITEM OR SERVICE: HCPCS

## 2018-11-19 PROCEDURE — 36415 COLL VENOUS BLD VENIPUNCTURE: CPT | Performed by: HOSPITALIST

## 2018-11-19 PROCEDURE — 27210946 ZZH KIT HC TOTES DISP CR8

## 2018-11-19 PROCEDURE — 85652 RBC SED RATE AUTOMATED: CPT | Performed by: HOSPITALIST

## 2018-11-19 PROCEDURE — 83735 ASSAY OF MAGNESIUM: CPT | Performed by: INTERNAL MEDICINE

## 2018-11-19 PROCEDURE — 00000146 ZZHCL STATISTIC GLUCOSE BY METER IP

## 2018-11-19 PROCEDURE — 99153 MOD SED SAME PHYS/QHP EA: CPT

## 2018-11-19 PROCEDURE — 85027 COMPLETE CBC AUTOMATED: CPT | Performed by: HOSPITALIST

## 2018-11-19 PROCEDURE — 25000128 H RX IP 250 OP 636: Performed by: HOSPITALIST

## 2018-11-19 PROCEDURE — 87899 AGENT NOS ASSAY W/OPTIC: CPT | Performed by: HOSPITALIST

## 2018-11-19 PROCEDURE — 81001 URINALYSIS AUTO W/SCOPE: CPT | Performed by: HOSPITALIST

## 2018-11-19 PROCEDURE — 27211181 ZZH BALLOON TIP PRESSURE CR5

## 2018-11-19 PROCEDURE — 99233 SBSQ HOSP IP/OBS HIGH 50: CPT | Performed by: HOSPITALIST

## 2018-11-19 PROCEDURE — 27210787 ZZH MANIFOLD CR2

## 2018-11-19 PROCEDURE — 93451 RIGHT HEART CATH: CPT

## 2018-11-19 PROCEDURE — A9270 NON-COVERED ITEM OR SERVICE: HCPCS | Mod: GY | Performed by: HOSPITALIST

## 2018-11-19 PROCEDURE — 84443 ASSAY THYROID STIM HORMONE: CPT | Performed by: HOSPITALIST

## 2018-11-19 PROCEDURE — 27211089 ZZH KIT ACIST INJECTOR CR3

## 2018-11-19 PROCEDURE — 85610 PROTHROMBIN TIME: CPT | Performed by: INTERNAL MEDICINE

## 2018-11-19 PROCEDURE — 25000128 H RX IP 250 OP 636

## 2018-11-19 PROCEDURE — 27210795 ZZH PAD DEFIB QUICK CR4

## 2018-11-19 PROCEDURE — 36415 COLL VENOUS BLD VENIPUNCTURE: CPT | Performed by: INTERNAL MEDICINE

## 2018-11-19 PROCEDURE — 99152 MOD SED SAME PHYS/QHP 5/>YRS: CPT

## 2018-11-19 PROCEDURE — 80053 COMPREHEN METABOLIC PANEL: CPT | Performed by: HOSPITALIST

## 2018-11-19 PROCEDURE — 21400004 ZZH R&B CCU CSC INTERMEDIATE

## 2018-11-19 PROCEDURE — 93306 TTE W/DOPPLER COMPLETE: CPT | Mod: 26 | Performed by: INTERNAL MEDICINE

## 2018-11-19 PROCEDURE — 84484 ASSAY OF TROPONIN QUANT: CPT | Performed by: HOSPITALIST

## 2018-11-19 PROCEDURE — 94640 AIRWAY INHALATION TREATMENT: CPT | Mod: 76

## 2018-11-19 PROCEDURE — 84132 ASSAY OF SERUM POTASSIUM: CPT | Performed by: INTERNAL MEDICINE

## 2018-11-19 PROCEDURE — 83036 HEMOGLOBIN GLYCOSYLATED A1C: CPT | Performed by: HOSPITALIST

## 2018-11-19 RX ORDER — ATROPINE SULFATE 0.1 MG/ML
.5-1 INJECTION INTRAVENOUS
Status: DISCONTINUED | OUTPATIENT
Start: 2018-11-19 | End: 2018-11-19 | Stop reason: HOSPADM

## 2018-11-19 RX ORDER — PRASUGREL 10 MG/1
10-60 TABLET, FILM COATED ORAL
Status: DISCONTINUED | OUTPATIENT
Start: 2018-11-19 | End: 2018-11-19 | Stop reason: HOSPADM

## 2018-11-19 RX ORDER — DOPAMINE HYDROCHLORIDE 160 MG/100ML
2-20 INJECTION, SOLUTION INTRAVENOUS CONTINUOUS PRN
Status: DISCONTINUED | OUTPATIENT
Start: 2018-11-19 | End: 2018-11-19 | Stop reason: HOSPADM

## 2018-11-19 RX ORDER — FUROSEMIDE 10 MG/ML
20-100 INJECTION INTRAMUSCULAR; INTRAVENOUS
Status: DISCONTINUED | OUTPATIENT
Start: 2018-11-19 | End: 2018-11-19 | Stop reason: HOSPADM

## 2018-11-19 RX ORDER — CLOPIDOGREL 300 MG/1
300-600 TABLET, FILM COATED ORAL
Status: DISCONTINUED | OUTPATIENT
Start: 2018-11-19 | End: 2018-11-19 | Stop reason: HOSPADM

## 2018-11-19 RX ORDER — ASPIRIN 81 MG/1
81 TABLET ORAL DAILY
Status: DISCONTINUED | OUTPATIENT
Start: 2018-11-20 | End: 2018-11-21 | Stop reason: HOSPADM

## 2018-11-19 RX ORDER — HEPARIN SODIUM 1000 [USP'U]/ML
1000-10000 INJECTION, SOLUTION INTRAVENOUS; SUBCUTANEOUS EVERY 5 MIN PRN
Status: DISCONTINUED | OUTPATIENT
Start: 2018-11-19 | End: 2018-11-19 | Stop reason: HOSPADM

## 2018-11-19 RX ORDER — FUROSEMIDE 10 MG/ML
40 INJECTION INTRAMUSCULAR; INTRAVENOUS ONCE
Status: COMPLETED | OUTPATIENT
Start: 2018-11-19 | End: 2018-11-19

## 2018-11-19 RX ORDER — PROTAMINE SULFATE 10 MG/ML
25-100 INJECTION, SOLUTION INTRAVENOUS EVERY 5 MIN PRN
Status: DISCONTINUED | OUTPATIENT
Start: 2018-11-19 | End: 2018-11-19 | Stop reason: HOSPADM

## 2018-11-19 RX ORDER — POTASSIUM CHLORIDE 1500 MG/1
40 TABLET, EXTENDED RELEASE ORAL
Status: DISCONTINUED | OUTPATIENT
Start: 2018-11-19 | End: 2018-11-19 | Stop reason: HOSPADM

## 2018-11-19 RX ORDER — NICARDIPINE HYDROCHLORIDE 2.5 MG/ML
100 INJECTION INTRAVENOUS
Status: DISCONTINUED | OUTPATIENT
Start: 2018-11-19 | End: 2018-11-19 | Stop reason: HOSPADM

## 2018-11-19 RX ORDER — VERAPAMIL HYDROCHLORIDE 2.5 MG/ML
1-2.5 INJECTION, SOLUTION INTRAVENOUS
Status: DISCONTINUED | OUTPATIENT
Start: 2018-11-19 | End: 2018-11-19 | Stop reason: HOSPADM

## 2018-11-19 RX ORDER — LIDOCAINE HYDROCHLORIDE 10 MG/ML
1-10 INJECTION, SOLUTION EPIDURAL; INFILTRATION; INTRACAUDAL; PERINEURAL
Status: COMPLETED | OUTPATIENT
Start: 2018-11-19 | End: 2018-11-19

## 2018-11-19 RX ORDER — ACETAMINOPHEN 325 MG/1
325-650 TABLET ORAL EVERY 4 HOURS PRN
Status: DISCONTINUED | OUTPATIENT
Start: 2018-11-19 | End: 2018-11-19

## 2018-11-19 RX ORDER — PROPOFOL 10 MG/ML
10-20 INJECTION, EMULSION INTRAVENOUS EVERY 30 MIN PRN
Status: DISCONTINUED | OUTPATIENT
Start: 2018-11-19 | End: 2018-11-19 | Stop reason: HOSPADM

## 2018-11-19 RX ORDER — ONDANSETRON 2 MG/ML
4 INJECTION INTRAMUSCULAR; INTRAVENOUS EVERY 4 HOURS PRN
Status: DISCONTINUED | OUTPATIENT
Start: 2018-11-19 | End: 2018-11-19 | Stop reason: HOSPADM

## 2018-11-19 RX ORDER — ASPIRIN 81 MG/1
81-324 TABLET, CHEWABLE ORAL
Status: DISCONTINUED | OUTPATIENT
Start: 2018-11-19 | End: 2018-11-19 | Stop reason: HOSPADM

## 2018-11-19 RX ORDER — PROPOFOL 10 MG/ML
5-75 INJECTION, EMULSION INTRAVENOUS CONTINUOUS
Status: DISCONTINUED | OUTPATIENT
Start: 2018-11-19 | End: 2018-11-19 | Stop reason: HOSPADM

## 2018-11-19 RX ORDER — NALOXONE HYDROCHLORIDE 0.4 MG/ML
0.4 INJECTION, SOLUTION INTRAMUSCULAR; INTRAVENOUS; SUBCUTANEOUS EVERY 5 MIN PRN
Status: DISCONTINUED | OUTPATIENT
Start: 2018-11-19 | End: 2018-11-19 | Stop reason: HOSPADM

## 2018-11-19 RX ORDER — SODIUM NITROPRUSSIDE 25 MG/ML
100-200 INJECTION INTRAVENOUS
Status: DISCONTINUED | OUTPATIENT
Start: 2018-11-19 | End: 2018-11-19 | Stop reason: HOSPADM

## 2018-11-19 RX ORDER — LORAZEPAM 2 MG/ML
0.5 INJECTION INTRAMUSCULAR
Status: DISCONTINUED | OUTPATIENT
Start: 2018-11-19 | End: 2018-11-19 | Stop reason: HOSPADM

## 2018-11-19 RX ORDER — NALOXONE HYDROCHLORIDE 0.4 MG/ML
.1-.4 INJECTION, SOLUTION INTRAMUSCULAR; INTRAVENOUS; SUBCUTANEOUS
Status: DISCONTINUED | OUTPATIENT
Start: 2018-11-19 | End: 2018-11-21 | Stop reason: HOSPADM

## 2018-11-19 RX ORDER — ATROPINE SULFATE 0.1 MG/ML
0.5 INJECTION INTRAVENOUS EVERY 5 MIN PRN
Status: ACTIVE | OUTPATIENT
Start: 2018-11-19 | End: 2018-11-20

## 2018-11-19 RX ORDER — FLUMAZENIL 0.1 MG/ML
0.2 INJECTION, SOLUTION INTRAVENOUS
Status: DISCONTINUED | OUTPATIENT
Start: 2018-11-19 | End: 2018-11-19 | Stop reason: HOSPADM

## 2018-11-19 RX ORDER — MAGNESIUM SULFATE HEPTAHYDRATE 40 MG/ML
2 INJECTION, SOLUTION INTRAVENOUS
Status: DISCONTINUED | OUTPATIENT
Start: 2018-11-19 | End: 2018-11-19 | Stop reason: HOSPADM

## 2018-11-19 RX ORDER — NALOXONE HYDROCHLORIDE 0.4 MG/ML
.2-.4 INJECTION, SOLUTION INTRAMUSCULAR; INTRAVENOUS; SUBCUTANEOUS
Status: ACTIVE | OUTPATIENT
Start: 2018-11-19 | End: 2018-11-20

## 2018-11-19 RX ORDER — ENALAPRILAT 1.25 MG/ML
1.25-2.5 INJECTION INTRAVENOUS
Status: DISCONTINUED | OUTPATIENT
Start: 2018-11-19 | End: 2018-11-19 | Stop reason: HOSPADM

## 2018-11-19 RX ORDER — LORAZEPAM 2 MG/ML
.5-2 INJECTION INTRAMUSCULAR EVERY 4 HOURS PRN
Status: DISCONTINUED | OUTPATIENT
Start: 2018-11-19 | End: 2018-11-19 | Stop reason: HOSPADM

## 2018-11-19 RX ORDER — CLOPIDOGREL BISULFATE 75 MG/1
75 TABLET ORAL
Status: DISCONTINUED | OUTPATIENT
Start: 2018-11-19 | End: 2018-11-19 | Stop reason: HOSPADM

## 2018-11-19 RX ORDER — WARFARIN SODIUM 5 MG/1
5 TABLET ORAL
Status: COMPLETED | OUTPATIENT
Start: 2018-11-19 | End: 2018-11-19

## 2018-11-19 RX ORDER — PROTAMINE SULFATE 10 MG/ML
1-5 INJECTION, SOLUTION INTRAVENOUS
Status: DISCONTINUED | OUTPATIENT
Start: 2018-11-19 | End: 2018-11-19 | Stop reason: HOSPADM

## 2018-11-19 RX ORDER — LORAZEPAM 0.5 MG/1
0.5 TABLET ORAL
Status: DISCONTINUED | OUTPATIENT
Start: 2018-11-19 | End: 2018-11-19 | Stop reason: HOSPADM

## 2018-11-19 RX ORDER — DOBUTAMINE HYDROCHLORIDE 200 MG/100ML
2-20 INJECTION INTRAVENOUS CONTINUOUS PRN
Status: DISCONTINUED | OUTPATIENT
Start: 2018-11-19 | End: 2018-11-19 | Stop reason: HOSPADM

## 2018-11-19 RX ORDER — NIFEDIPINE 10 MG/1
10 CAPSULE ORAL
Status: DISCONTINUED | OUTPATIENT
Start: 2018-11-19 | End: 2018-11-19 | Stop reason: HOSPADM

## 2018-11-19 RX ORDER — NITROGLYCERIN 5 MG/ML
100-200 VIAL (ML) INTRAVENOUS
Status: DISCONTINUED | OUTPATIENT
Start: 2018-11-19 | End: 2018-11-19 | Stop reason: HOSPADM

## 2018-11-19 RX ORDER — METOPROLOL TARTRATE 1 MG/ML
5 INJECTION, SOLUTION INTRAVENOUS EVERY 5 MIN PRN
Status: DISCONTINUED | OUTPATIENT
Start: 2018-11-19 | End: 2018-11-19 | Stop reason: HOSPADM

## 2018-11-19 RX ORDER — METHYLPREDNISOLONE SODIUM SUCCINATE 125 MG/2ML
125 INJECTION, POWDER, LYOPHILIZED, FOR SOLUTION INTRAMUSCULAR; INTRAVENOUS
Status: DISCONTINUED | OUTPATIENT
Start: 2018-11-19 | End: 2018-11-19 | Stop reason: HOSPADM

## 2018-11-19 RX ORDER — NITROGLYCERIN 0.4 MG/1
0.4 TABLET SUBLINGUAL EVERY 5 MIN PRN
Status: DISCONTINUED | OUTPATIENT
Start: 2018-11-19 | End: 2018-11-19 | Stop reason: HOSPADM

## 2018-11-19 RX ORDER — TIROFIBAN HYDROCHLORIDE 50 UG/ML
0.07 INJECTION INTRAVENOUS CONTINUOUS PRN
Status: DISCONTINUED | OUTPATIENT
Start: 2018-11-19 | End: 2018-11-19 | Stop reason: CLARIF

## 2018-11-19 RX ORDER — LORAZEPAM 2 MG/ML
0.5 INJECTION INTRAMUSCULAR
Status: COMPLETED | OUTPATIENT
Start: 2018-11-19 | End: 2018-11-19

## 2018-11-19 RX ORDER — FENTANYL CITRATE 50 UG/ML
25-50 INJECTION, SOLUTION INTRAMUSCULAR; INTRAVENOUS
Status: ACTIVE | OUTPATIENT
Start: 2018-11-19 | End: 2018-11-20

## 2018-11-19 RX ORDER — LIDOCAINE HYDROCHLORIDE 10 MG/ML
30 INJECTION, SOLUTION EPIDURAL; INFILTRATION; INTRACAUDAL; PERINEURAL
Status: DISCONTINUED | OUTPATIENT
Start: 2018-11-19 | End: 2018-11-19 | Stop reason: HOSPADM

## 2018-11-19 RX ORDER — MORPHINE SULFATE 2 MG/ML
1-2 INJECTION, SOLUTION INTRAMUSCULAR; INTRAVENOUS EVERY 5 MIN PRN
Status: DISCONTINUED | OUTPATIENT
Start: 2018-11-19 | End: 2018-11-19 | Stop reason: HOSPADM

## 2018-11-19 RX ORDER — POTASSIUM CHLORIDE 1500 MG/1
20 TABLET, EXTENDED RELEASE ORAL
Status: DISCONTINUED | OUTPATIENT
Start: 2018-11-19 | End: 2018-11-19 | Stop reason: HOSPADM

## 2018-11-19 RX ORDER — LIDOCAINE 40 MG/G
CREAM TOPICAL
Status: DISCONTINUED | OUTPATIENT
Start: 2018-11-19 | End: 2018-11-21 | Stop reason: HOSPADM

## 2018-11-19 RX ORDER — PHENYLEPHRINE HCL IN 0.9% NACL 1 MG/10 ML
20-100 SYRINGE (ML) INTRAVENOUS
Status: DISCONTINUED | OUTPATIENT
Start: 2018-11-19 | End: 2018-11-19 | Stop reason: HOSPADM

## 2018-11-19 RX ORDER — DEXTROSE MONOHYDRATE 25 G/50ML
12.5-5 INJECTION, SOLUTION INTRAVENOUS EVERY 30 MIN PRN
Status: DISCONTINUED | OUTPATIENT
Start: 2018-11-19 | End: 2018-11-19 | Stop reason: HOSPADM

## 2018-11-19 RX ORDER — HYDRALAZINE HYDROCHLORIDE 20 MG/ML
10-20 INJECTION INTRAMUSCULAR; INTRAVENOUS
Status: DISCONTINUED | OUTPATIENT
Start: 2018-11-19 | End: 2018-11-19 | Stop reason: HOSPADM

## 2018-11-19 RX ORDER — POTASSIUM CHLORIDE 7.45 MG/ML
10 INJECTION INTRAVENOUS
Status: DISCONTINUED | OUTPATIENT
Start: 2018-11-19 | End: 2018-11-19 | Stop reason: HOSPADM

## 2018-11-19 RX ORDER — HYDROCODONE BITARTRATE AND ACETAMINOPHEN 5; 325 MG/1; MG/1
1-2 TABLET ORAL EVERY 4 HOURS PRN
Status: DISCONTINUED | OUTPATIENT
Start: 2018-11-19 | End: 2018-11-21 | Stop reason: HOSPADM

## 2018-11-19 RX ORDER — LIDOCAINE 40 MG/G
CREAM TOPICAL
Status: DISCONTINUED | OUTPATIENT
Start: 2018-11-19 | End: 2018-11-19 | Stop reason: HOSPADM

## 2018-11-19 RX ORDER — DIPHENHYDRAMINE HYDROCHLORIDE 50 MG/ML
25-50 INJECTION INTRAMUSCULAR; INTRAVENOUS
Status: DISCONTINUED | OUTPATIENT
Start: 2018-11-19 | End: 2018-11-19 | Stop reason: HOSPADM

## 2018-11-19 RX ORDER — SODIUM CHLORIDE 9 MG/ML
INJECTION, SOLUTION INTRAVENOUS CONTINUOUS
Status: DISCONTINUED | OUTPATIENT
Start: 2018-11-19 | End: 2018-11-20

## 2018-11-19 RX ORDER — NITROGLYCERIN 5 MG/ML
100-500 VIAL (ML) INTRAVENOUS
Status: DISCONTINUED | OUTPATIENT
Start: 2018-11-19 | End: 2018-11-19 | Stop reason: HOSPADM

## 2018-11-19 RX ORDER — ASPIRIN 325 MG
325 TABLET ORAL
Status: DISCONTINUED | OUTPATIENT
Start: 2018-11-19 | End: 2018-11-19 | Stop reason: HOSPADM

## 2018-11-19 RX ORDER — EPINEPHRINE 1 MG/ML
0.3 INJECTION, SOLUTION, CONCENTRATE INTRAVENOUS
Status: DISCONTINUED | OUTPATIENT
Start: 2018-11-19 | End: 2018-11-19 | Stop reason: HOSPADM

## 2018-11-19 RX ORDER — FLUMAZENIL 0.1 MG/ML
0.2 INJECTION, SOLUTION INTRAVENOUS
Status: ACTIVE | OUTPATIENT
Start: 2018-11-19 | End: 2018-11-20

## 2018-11-19 RX ORDER — POTASSIUM CHLORIDE 29.8 MG/ML
20 INJECTION INTRAVENOUS
Status: DISCONTINUED | OUTPATIENT
Start: 2018-11-19 | End: 2018-11-19 | Stop reason: HOSPADM

## 2018-11-19 RX ORDER — NITROGLYCERIN 20 MG/100ML
.07-2 INJECTION INTRAVENOUS CONTINUOUS PRN
Status: DISCONTINUED | OUTPATIENT
Start: 2018-11-19 | End: 2018-11-19 | Stop reason: HOSPADM

## 2018-11-19 RX ORDER — ADENOSINE 3 MG/ML
12-12000 INJECTION, SOLUTION INTRAVENOUS
Status: DISCONTINUED | OUTPATIENT
Start: 2018-11-19 | End: 2018-11-19 | Stop reason: HOSPADM

## 2018-11-19 RX ORDER — BUPIVACAINE HYDROCHLORIDE 2.5 MG/ML
1-10 INJECTION, SOLUTION EPIDURAL; INFILTRATION; INTRACAUDAL
Status: DISCONTINUED | OUTPATIENT
Start: 2018-11-19 | End: 2018-11-19 | Stop reason: HOSPADM

## 2018-11-19 RX ORDER — FENTANYL CITRATE 50 UG/ML
25-50 INJECTION, SOLUTION INTRAMUSCULAR; INTRAVENOUS
Status: DISCONTINUED | OUTPATIENT
Start: 2018-11-19 | End: 2018-11-19 | Stop reason: HOSPADM

## 2018-11-19 RX ADMIN — ASPIRIN 81 MG: 81 TABLET, COATED ORAL at 10:02

## 2018-11-19 RX ADMIN — SILDENAFIL 20 MG: 20 TABLET, FILM COATED ORAL at 16:17

## 2018-11-19 RX ADMIN — DIGOXIN 125 MCG: 125 TABLET ORAL at 04:02

## 2018-11-19 RX ADMIN — Medication 1 MG: at 02:46

## 2018-11-19 RX ADMIN — VANCOMYCIN HYDROCHLORIDE 1500 MG: 5 INJECTION, POWDER, LYOPHILIZED, FOR SOLUTION INTRAVENOUS at 20:38

## 2018-11-19 RX ADMIN — FUROSEMIDE 40 MG: 10 INJECTION, SOLUTION INTRAVENOUS at 09:57

## 2018-11-19 RX ADMIN — METOPROLOL TARTRATE 100 MG: 100 TABLET, FILM COATED ORAL at 20:38

## 2018-11-19 RX ADMIN — HUMAN ALBUMIN MICROSPHERES AND PERFLUTREN 3 ML: 10; .22 INJECTION, SOLUTION INTRAVENOUS at 09:46

## 2018-11-19 RX ADMIN — HEPARIN SODIUM 5000 UNITS: 5000 INJECTION, SOLUTION INTRAVENOUS; SUBCUTANEOUS at 09:58

## 2018-11-19 RX ADMIN — SILDENAFIL 20 MG: 20 TABLET, FILM COATED ORAL at 02:46

## 2018-11-19 RX ADMIN — AZITHROMYCIN MONOHYDRATE 500 MG: 500 INJECTION, POWDER, LYOPHILIZED, FOR SOLUTION INTRAVENOUS at 16:17

## 2018-11-19 RX ADMIN — METOPROLOL TARTRATE 2.5 MG: 5 INJECTION, SOLUTION INTRAVENOUS at 06:08

## 2018-11-19 RX ADMIN — ATORVASTATIN CALCIUM 40 MG: 40 TABLET, FILM COATED ORAL at 20:38

## 2018-11-19 RX ADMIN — FLUTICASONE FUROATE AND VILANTEROL TRIFENATATE 1 PUFF: 100; 25 POWDER RESPIRATORY (INHALATION) at 10:00

## 2018-11-19 RX ADMIN — PIPERACILLIN SODIUM,TAZOBACTAM SODIUM 3.38 G: 3; .375 INJECTION, POWDER, FOR SOLUTION INTRAVENOUS at 04:02

## 2018-11-19 RX ADMIN — IPRATROPIUM BROMIDE AND ALBUTEROL SULFATE 3 ML: 2.5; .5 SOLUTION RESPIRATORY (INHALATION) at 07:27

## 2018-11-19 RX ADMIN — PIPERACILLIN SODIUM,TAZOBACTAM SODIUM 3.38 G: 3; .375 INJECTION, POWDER, FOR SOLUTION INTRAVENOUS at 10:44

## 2018-11-19 RX ADMIN — MIRTAZAPINE 15 MG: 15 TABLET, FILM COATED ORAL at 00:38

## 2018-11-19 RX ADMIN — LORAZEPAM 0.5 MG: 2 INJECTION, SOLUTION INTRAMUSCULAR; INTRAVENOUS at 03:53

## 2018-11-19 RX ADMIN — LEVOTHYROXINE SODIUM 125 MCG: 125 TABLET ORAL at 09:58

## 2018-11-19 RX ADMIN — ALBUTEROL SULFATE 2.5 MG: 2.5 SOLUTION RESPIRATORY (INHALATION) at 23:55

## 2018-11-19 RX ADMIN — WARFARIN SODIUM 5 MG: 5 TABLET ORAL at 18:55

## 2018-11-19 RX ADMIN — SILDENAFIL 20 MG: 20 TABLET, FILM COATED ORAL at 09:58

## 2018-11-19 RX ADMIN — FENTANYL CITRATE 50 MCG: 50 INJECTION, SOLUTION INTRAMUSCULAR; INTRAVENOUS at 13:57

## 2018-11-19 RX ADMIN — FUROSEMIDE 40 MG: 10 INJECTION, SOLUTION INTRAVENOUS at 16:17

## 2018-11-19 RX ADMIN — IPRATROPIUM BROMIDE AND ALBUTEROL SULFATE 3 ML: 2.5; .5 SOLUTION RESPIRATORY (INHALATION) at 16:34

## 2018-11-19 RX ADMIN — LIDOCAINE HYDROCHLORIDE 3 ML: 10 INJECTION, SOLUTION EPIDURAL; INFILTRATION; INTRACAUDAL; PERINEURAL at 13:59

## 2018-11-19 RX ADMIN — HEPARIN SODIUM 800 UNITS/HR: 10000 INJECTION, SOLUTION INTRAVENOUS at 18:55

## 2018-11-19 RX ADMIN — DOCUSATE SODIUM 100 MG: 100 CAPSULE, LIQUID FILLED ORAL at 20:38

## 2018-11-19 RX ADMIN — PIPERACILLIN SODIUM,TAZOBACTAM SODIUM 3.38 G: 3; .375 INJECTION, POWDER, FOR SOLUTION INTRAVENOUS at 18:13

## 2018-11-19 RX ADMIN — IPRATROPIUM BROMIDE AND ALBUTEROL SULFATE 3 ML: 2.5; .5 SOLUTION RESPIRATORY (INHALATION) at 11:14

## 2018-11-19 RX ADMIN — DOCUSATE SODIUM 100 MG: 100 CAPSULE, LIQUID FILLED ORAL at 10:00

## 2018-11-19 RX ADMIN — METOPROLOL TARTRATE 100 MG: 100 TABLET, FILM COATED ORAL at 09:59

## 2018-11-19 ASSESSMENT — ACTIVITIES OF DAILY LIVING (ADL)
ADLS_ACUITY_SCORE: 13
ADLS_ACUITY_SCORE: 13
ADLS_ACUITY_SCORE: 12
DRESS: 0-->INDEPENDENT
RETIRED_COMMUNICATION: 0-->UNDERSTANDS/COMMUNICATES WITHOUT DIFFICULTY
BATHING: 1-->ASSISTIVE EQUIPMENT
TRANSFERRING: 2-->ASSISTIVE PERSON
ADLS_ACUITY_SCORE: 13
SWALLOWING: 0-->SWALLOWS FOODS/LIQUIDS WITHOUT DIFFICULTY
ADLS_ACUITY_SCORE: 13
ADLS_ACUITY_SCORE: 13
RETIRED_EATING: 0-->INDEPENDENT
AMBULATION: 1-->ASSISTIVE EQUIPMENT
COGNITION: 0 - NO COGNITION ISSUES REPORTED
FALL_HISTORY_WITHIN_LAST_SIX_MONTHS: NO
TOILETING: 0-->INDEPENDENT
WHICH_OF_THE_ABOVE_FUNCTIONAL_RISKS_HAD_A_RECENT_ONSET_OR_CHANGE?: AMBULATION;TRANSFERRING

## 2018-11-19 NOTE — PHARMACY-ADMISSION MEDICATION HISTORY
Admission medication history interview status for the 11/18/2018  admission is complete. See EPIC admission navigator for prior to admission medications     Medication history source reliability:Good    Actions taken by pharmacist (provider contacted, etc):Verified all medications with patient's written list that he brought from home.      Additional medication history information not noted on PTA med list :None    Medication reconciliation/reorder completed by provider prior to medication history? No    Time spent in this activity: 25 minutes    Prior to Admission medications    Medication Sig Last Dose Taking? Auth Provider   albuterol (PROAIR HFA/PROVENTIL HFA/VENTOLIN HFA) 108 (90 Base) MCG/ACT Inhaler Inhale 2 puffs into the lungs every 6 hours as needed for shortness of breath / dyspnea prn med Yes Perlman, David Morris, MD   aspirin 81 MG EC tablet Take 81 mg by mouth daily 11/18/2018 at am Yes Unknown, Entered By History   atorvastatin (LIPITOR) 40 MG tablet TAKE ONE TABLET BY MOUTH ONE TIME DAILY   Patient taking differently: TAKE ONE TABLET BY MOUTH ONE TIME EVERY EVENING. 11/17/2018 at pm Yes Diane Paige MD   ciclopirox (LOPROX) 0.77 % cream Apply topically 2 times daily as needed prn med Yes Unknown, Entered By History   colchicine (COLCRYS) 0.6 MG tablet 2 tabs at the onset of flare, then 1 tab every 2 hrs until pain is better or diarrhea occurs, up to 10 doses. Wait 3 days until taking again prn med Yes Frank Monterroso MD   fluticasone-vilanterol (BREO ELLIPTA) 100-25 MCG/INH oral inhaler Inhale 1 puff into the lungs daily 11/18/2018 at am Yes Perlman, David Morris, MD   furosemide (LASIX) 20 MG tablet Take 2 tablets (40 mg) by mouth 2 times daily May take extra 20 mg as needed for wt .gain >3# 11/18/2018 at am x 1 dose Yes Diane Paige MD   levothyroxine (SYNTHROID/LEVOTHROID) 125 MCG tablet Take 1 tablet (125 mcg) by mouth daily 11/18/2018 at am Yes Moses Orosco MD   losartan  (COZAAR) 50 MG tablet TAKE ONE TABLET BY MOUTH ONE TIME DAILY  11/18/2018 at am Yes Diane Paige MD   methotrexate 2.5 MG tablet CHEMO Take 3 tablets (7.5 mg) by mouth once a week On Mondays 11/12/2018 Yes Perlman, David Morris, MD   metoprolol tartrate (LOPRESSOR) 100 MG tablet Take 1 tablet (100 mg) by mouth 2 times daily 11/18/2018 at am x 1 dose Yes Ollie Michel MD   mirtazapine (REMERON) 15 MG tablet Take 15 mg by mouth At Bedtime. 11/17/2018 at hs Yes Unknown, Entered By History   Multiple Vitamins-Minerals (MULTIVITAMIN & MINERAL PO) Take 1 tablet by mouth daily. 11/17/2018 at Unknown time Yes Unknown, Entered By History   polyethylene glycol (MIRALAX/GLYCOLAX) powder Take 17 g by mouth daily as needed for constipation prn med Yes Unknown, Entered By History   sildenafil (REVATIO) 20 MG tablet TAKE ONE TABLET BY MOUTH THREE TIMES DAILY  11/18/2018 at am x 1 dose Yes Diane Paige MD   Urea 40 % CREA Externally apply topically daily as needed for dry skin prn med Yes Unknown, Entered By History   blood glucose monitoring (ACCU-CHEK RONNIE PLUS) test strip Use to test blood sugar 2 times daily or as directed.  3 month supply.  Patient not taking: Reported on 9/13/2018   Macey Roy MD   blood glucose monitoring (ACCU-CHEK FASTCLIX) lancets Use to test blood sugar 2 times daily or as directed.  102 lancets per box.  3 month supply.  Patient not taking: Reported on 9/13/2018   Macey Roy MD   blood glucose monitoring (NO BRAND SPECIFIED) meter device kit Use to test blood sugar 2 times daily.  Patient not taking: Reported on 9/13/2018   Macey Roy MD   inFLIXimab (REMICADE) 100 MG injection Inject 100 mg into the vein every 28 days  ~ 1 week ago  Reported, Patient   order for DME Updated Oxygen: Patient requires supplemental Oxygen 3 LPM via nasal canula with activity and 2 LPM nocturnally. Please provide patient with a portable oxygen  concentrator for improved mobility.  Okay to spot check or titrated for conserving to keep stats above 90%. Oxygen will be for a lifetime.   Perlman, David Morris, MD   order for DME She requested for a 4 wheel walker, would like to change it to 4 wheel walker with a seat and oxygen tank durant.     Need this faxed over to her   [phone]   Jamie Foster MD   tiotropium (SPIRIVA HANDIHALER) 18 MCG capsule Inhale contents of one capsule daily.   Perlman, David Morris, MD

## 2018-11-19 NOTE — PROVIDER NOTIFICATION
Brief update:    Paged regarding anxiety, persistent tachycardia    Here with a-flutter with rapid ventricular rate.  On digoxin, cardiology aware    Patient reports feeling anxious.  No significant change in respiratory status during these episodes (on 5L O2, sats in mid 90s), though feels that he has trouble getting air.  Reports that he has taken Ativan in the past for this    Would give evening Remeron dose as per home regimen  If no effect from this, 0.5 milligrams IV Ativan x1 ordered    Aaron Mason MD  12:36 AM

## 2018-11-19 NOTE — PROVIDER NOTIFICATION
MD Notification    Notified Person: MD Gilbert    Notified Person Name:    Notification Date/Time: 11/18/18  9:59 PM      Notification Interaction:    Purpose of Notification:  BP 83/60 HR 120s    Orders Received: page cardiology fellow to get in contact with MD    Comments:

## 2018-11-19 NOTE — PROGRESS NOTES
"CT shows right middle lung infiltrate not previously observed. Baseline 3L, current demands are 5L NC.  91/66 B.P. 22 RR. 71 pulse.  LS coarse.  Barrel chest. Received Zosyn in ED. Admitted infusing Zithromax.  Currently Vanco infusing.  Ate 100% of dinner and ordered extra for snack tonight.  States he does not have diabetes \"any more;\"  bgm was 168 in ED. Pulmonology consult and Cardiology consult ordered.  Son bedside. Daughter lives in [place] and would like to be put on speaker phone when MD assess her dad.  "

## 2018-11-19 NOTE — PHARMACY-VANCOMYCIN DOSING SERVICE
Pharmacy Vancomycin Initial Note  Date of Service 2018  Patient's  1945  72 year old, male    Indication: [facility]ated Pneumonia    Current estimated CrCl = Estimated Creatinine Clearance: 41.3 mL/min (based on Cr of 1.66).    Creatinine for last 3 days  2018:  2:14 PM Creatinine 1.66 mg/dL    Recent Vancomycin Level(s) for last 3 days  No results found for requested labs within last 72 hours.      Vancomycin IV Administrations (past 72 hours)      No vancomycin orders with administrations in past 72 hours.                Nephrotoxins and other renal medications (Future)    Start     Dose/Rate Route Frequency Ordered Stop    18 1900  vancomycin (VANCOCIN) 1,500 mg in sodium chloride 0.9 % 250 mL intermittent infusion      1,500 mg  over 90 Minutes Intravenous EVERY 24 HOURS 18 1819      18 0800  furosemide (LASIX) injection 40 mg      40 mg  over 1-2 Minutes Intravenous 2 TIMES DAILY. 18 1726      18 2230  piperacillin-tazobactam (ZOSYN) 3.375 g vial to attach to  mL bag     Comments:  Pharmacy can adjust dose based on renal function.    3.375 g  over 30 Minutes Intravenous EVERY 6 HOURS 18 1726      18 1611  vancomycin (VANCOCIN) 2,000 mg in sodium chloride 0.9 % 500 mL intermittent infusion      2,000 mg  over 2 Hours Intravenous ONCE 18 1611            Contrast Orders - past 72 hours     None                Plan:  1.  Start vancomycin  1500 mg IV q24h.   2.  Goal Trough Level: 15-20 mg/L   3.  Pharmacy will check trough levels as appropriate in 1-3 Days.    4. Serum creatinine levels will be ordered daily for the first week of therapy and at least twice weekly for subsequent weeks.    5. Lyman method utilized to dose vancomycin therapy: Method 1    Akil Aliheyder

## 2018-11-19 NOTE — PLAN OF CARE
Problem: Patient Care Overview  Goal: Plan of Care/Patient Progress Review  Outcome: No Change  A&Ox4. VSS on 5L O2 ex Hypotension (mid 90s SBP) and Tachycardia (115s-130s). Tele: A fib RVR. C/O SOB at times - 4 episodes of severe SOB, tachycardia (140s-160s), hypoxia and anxiety lasting ~ 5 minutes. LS clear and diminished in lower lobes. +1 edema BLE. RLE ruben/dusky. Plan for cardiology and pulmonology consults.

## 2018-11-19 NOTE — CONSULTS
Pulmonary Medicine Consultation      Date of Admission: 11/18/2018  Primary Attending:  Keyana Gilbert MD  Consulting Physician: Dharmesh Erickson MD    History:    Rohan is a 72-year-old man with extensive past medical history.  Notable for biopsy-proven pulmonary sarcoidosis.  Typically follows at the [facility] and is maintained on both methotrexate and infliximab.  He also has known pulmonary hypertension as well as history of coronary artery disease with stenting.  In addition, there is history of atrial fib/flutter with rapid ventricular response status post cardioversion with recurrence and pulmonary toxicity in the setting of amiodarone.  He is planning to move to [place] at sometime in the next few months.  He has had increased dyspnea for the last month.  Was seen by his primary pulmonologist and a CT scan of the chest in October demonstrated a new infiltrate and he received antibiotics with some transient improvement.  He then notes the rather abrupt onset over the weekend of significant dyspnea prompting his admission.  On arrival, found to be volume overloaded and has received diuretics.  He denies increased fevers sweats or chills.  Does not have increased cough. Compliant with bronchodilators.      Review of Systems - A 10-system ROS is negative except for items mentioned above and in HPI.       Prior medical history:  Past Medical History:   Diagnosis Date     Atrial flutter (H)      Cataract of both eyes      Chronic infection     Hep C     Congestive heart failure, unspecified      Coronary artery disease      Depressive disorder      Depressive disorder, not elsewhere classified     Depression (non-psychotic)     ERM OS (epiretinal membrane, left eye)      Generalized osteoarthrosis, unspecified site      Glaucoma suspect      Hypertension      Lichen planus      Other psoriasis      PVD (posterior vitreous detachment), left eye      Sarcoidosis      Sarcoidosis      Type  II or unspecified type diabetes mellitus without mention of complication, not stated as uncontrolled      Unspecified hypothyroidism     Hypothyroidism     Unspecified viral hepatitis C without hepatic coma      Viral warts, unspecified        Past Surgical History:   Procedure Laterality Date     ANESTHESIA CARDIOVERSION N/A 1/19/2017    Procedure: ANESTHESIA CARDIOVERSION;  Surgeon: GENERIC ANESTHESIA PROVIDER;  Location: [facility] OR     ANESTHESIA CARDIOVERSION N/A 1/23/2017    Procedure: ANESTHESIA CARDIOVERSION;  Surgeon: GENERIC ANESTHESIA PROVIDER;  Location: [facility] OR     ANESTHESIA CARDIOVERSION N/A 1/24/2017    Procedure: ANESTHESIA CARDIOVERSION;  Surgeon: GENERIC ANESTHESIA PROVIDER;  Location: [facility] OR     ARTHROPLASTY HIP  8/24/2011    Procedure:ARTHROPLASTY HIP; Right Total Hip Arthroplasty  Choice anesthesia; Surgeon:LESLI WILKINSON; Location:[facility] OR     BIOPSY       C PELVIS/HIP JOINT SURGERY UNLISTED       cardiac stent      s/p     CARDIAC SURGERY       CATARACT IOL, RT/LT  9/15/2015    LE     COLONOSCOPY       CORONARY ANGIOGRAPHY ADULT ORDER       H ABLATION ATRIAL FLUTTER       HC REMOVAL OF TONSILS,<9 Y/O      Tonsils <10y.o.     HC REPAIR INCISIONAL HERNIA,REDUCIBLE      Hernia Repair, Incisional, Unilateral     HEART CATH, ANGIOPLASTY       JOINT REPLACEMENT      1 month ago--right hip     LIGATN/STRIP LONG & SHORT SAPHEN         Patient Active Problem List   Diagnosis     Hypothyroidism     SARCOIDOSIS-systemic     Generalized osteoarthrosis, unspecified site     Viral warts     Other psoriasis     Hypertrophy of prostate without urinary obstruction     Diabetes mellitus, type 2 (H)     CAD (coronary artery disease)     Type 2 diabetes, HbA1C goal < 8% (H)     CARDIOVASCULAR SCREENING; LDL GOAL LESS THAN 100     Atrial flutter with rapid ventricular response (H)     CKD (chronic kidney disease) stage 3, GFR 30-59 ml/min (H)     Hip joint pain     Hyperlipidemia LDL goal <70     Acute exacerbation of  chronic obstructive pulmonary disease (COPD) (H)     Cellulitis     Immunosuppression (H)     Hyponatremia     RADHA (acute kidney injury) (HCC)     COPD (chronic obstructive pulmonary disease) (H)     Cirrhosis of liver (H)     Pneumonia     Proteinuria     Microscopic hematuria     Sarcoidosis of lung (HCC)     Hyperlipidemia     Atrial flutter (H)     Long-term (current) use of anticoagulants [Z79.01]     Hypoxia     CAD S/P percutaneous coronary angioplasty     Chest pain     Dermatophytosis of nail     Tyloma     ACEVES (dyspnea on exertion)       Social History     Social History     Social History     Marital status:      Spouse name: N/A     Number of children: 2     Years of education: N/A     Occupational History      [employer]     Social History Main Topics     Smoking status: Former Smoker     Packs/day: 1.00     Years: 30.00     Types: Cigarettes     Start date: 12/30/1960     Quit date: 7/22/1994     Smokeless tobacco: Never Used     Alcohol use No     Drug use: No     Sexual activity: Not Currently     Partners: Female     Birth control/ protection: Post-menopausal     Other Topics Concern     Blood Transfusions No     Exercise Yes     Social History Narrative    Dairy/d 2 servings/d    Caffeine little servings/d    Exercise 3 x week    Sunscreen used - Yes    Seatbelts used - Yes    Working smoke/CO detectors in the home - Yes    Guns stored in the home - No    Self Breast Exams - NOT APPLICABLE    Self Testicular Exam - No    Eye Exam up to date - Yes    Dental Exam up to date - Yes    Pap Smear up to date - NOT APPLICABLE    Mammogram up to date - NOT APPLICABLE    PSA up to date - Yes    Dexa Scan up to date - NOT APPLICABLE    Flex Sig / Colonoscopy up to date - Yes    Immunizations up to date - Unsure    Abuse: Current or Past(Physical, Sexual or Emotional)- No    Do you feel safe in your environment - Yes         Family History  Family History   Problem Relation Age of Onset      HEART DISEASE Father      irreg heart beat     Circulatory Father      varicose veins     Prostate Cancer Father      HEART DISEASE Mother      heart attack     Arthritis Mother      Osteoporosis Mother      Thyroid Disease Mother      Hypertension Mother      Eye Disorder Maternal Grandmother      glaucoma     Diabetes Sister      type 2     Kidney Cancer Sister      Diabetes Sister      Glaucoma No family hx of      Macular Degeneration No family hx of      Cancer No family hx of      no skin cancer         Medications  No current outpatient prescriptions on file.     Current Facility-Administered Medications Ordered in Epic   Medication Dose Route Frequency Last Rate Last Dose     0.9% sodium chloride BOLUS  300 mL Intravenous Once PRN         acetaminophen (TYLENOL) Suppository 650 mg  650 mg Rectal Q4H PRN         acetaminophen (TYLENOL) tablet 650 mg  650 mg Oral Q4H PRN         albuterol neb solution 2.5 mg  2.5 mg Nebulization Q2H PRN         [START ON 11/20/2018] aspirin EC tablet 81 mg  81 mg Oral Daily         atorvastatin (LIPITOR) tablet 40 mg  40 mg Oral At Bedtime   40 mg at 11/18/18 2121     atropine injection 0.5 mg  0.5 mg Intravenous Q5 Min PRN         azithromycin (ZITHROMAX) 500 mg in sodium chloride 0.9 % 250 mL intermittent infusion  500 mg Intravenous Q24H 250 mL/hr at 11/19/18 1617 500 mg at 11/19/18 1617     bisacodyl (DULCOLAX) Suppository 10 mg  10 mg Rectal Daily PRN         calcium carbonate (TUMS) chewable tablet 1,000 mg  1,000 mg Oral 4x Daily PRN         glucose gel 15-30 g  15-30 g Oral Q15 Min PRN        Or     dextrose 50 % injection 25-50 mL  25-50 mL Intravenous Q15 Min PRN        Or     glucagon injection 1 mg  1 mg Subcutaneous Q15 Min PRN         docusate sodium (COLACE) capsule 100 mg  100 mg Oral BID   100 mg at 11/19/18 1000     fentaNYL (PF) (SUBLIMAZE) injection 25-50 mcg  25-50 mcg Intravenous Q15 Min PRN         flumazenil (ROMAZICON) injection 0.2 mg  0.2 mg  Intravenous q1 min prn         fluticasone-vilanterol (BREO ELLIPTA) 100-25 MCG/INH inhaler 1 puff  1 puff Inhalation Daily   1 puff at 11/19/18 1000     heparin infusion 25,000 units in 0.45% NaCl 250 mL  800 Units/hr Intravenous Continuous         HOLD:  Metformin and metformin containing medications if patient received IV contrast with acute kidney injury or severe chronic kidney disease (stage IV or stage V; i.e., eGFR less than 30)   Does not apply HOLD         HYDROcodone-acetaminophen (NORCO) 5-325 MG per tablet 1-2 tablet  1-2 tablet Oral Q4H PRN         [START ON 11/20/2018] influenza Vac Split High-Dose (FLUZONE) injection 0.5 mL  0.5 mL Intramuscular Prior to discharge         insulin aspart (NovoLOG) inj (RAPID ACTING)  1-7 Units Subcutaneous TID AC         insulin aspart (NovoLOG) inj (RAPID ACTING)  1-5 Units Subcutaneous At Bedtime         ipratropium - albuterol 0.5 mg/2.5 mg/3 mL (DUONEB) neb solution 3 mL  3 mL Nebulization 4x daily   3 mL at 11/19/18 1634     levothyroxine (SYNTHROID/LEVOTHROID) tablet 125 mcg  125 mcg Oral QAM AC   125 mcg at 11/19/18 0958     lidocaine (LMX4) cream   Topical Q1H PRN         lidocaine 1 % 1 mL  1 mL Other Q1H PRN         magnesium hydroxide (MILK OF MAGNESIA) suspension 30 mL  30 mL Oral Daily PRN         melatonin tablet 1 mg  1 mg Oral At Bedtime PRN   1 mg at 11/19/18 0246     metoprolol (LOPRESSOR) injection 2.5 mg  2.5 mg Intravenous Q4H PRN   2.5 mg at 11/19/18 0608     metoprolol tartrate (LOPRESSOR) tablet 100 mg  100 mg Oral BID   100 mg at 11/19/18 0959     midazolam (VERSED) injection 0.5-1 mg  0.5-1 mg Intravenous Q5 Min PRN         mirtazapine (REMERON) tablet 15 mg  15 mg Oral At Bedtime PRN   15 mg at 11/19/18 0038     naloxone (NARCAN) injection 0.1-0.4 mg  0.1-0.4 mg Intravenous Q2 Min PRN         naloxone (NARCAN) injection 0.2-0.4 mg  0.2-0.4 mg Intravenous Q2 Min PRN         ondansetron (ZOFRAN-ODT) ODT tab 4 mg  4 mg Oral Q6H PRN        Or      ondansetron (ZOFRAN) injection 4 mg  4 mg Intravenous Q6H PRN         piperacillin-tazobactam (ZOSYN) 3.375 g vial to attach to  mL bag  3.375 g Intravenous Q6H 200 mL/hr at 18 1044 3.375 g at 18 1044     sildenafil (REVATIO) tablet 20 mg  20 mg Oral TID   20 mg at 18 1617     sodium chloride (PF) 0.9% PF flush 3 mL  3 mL Intracatheter Q1H PRN         sodium chloride (PF) 0.9% PF flush 3 mL  3 mL Intracatheter Q8H         sodium chloride 0.9% infusion   Intravenous Continuous 75 mL/hr at 18 1500       vancomycin (VANCOCIN) 1,500 mg in sodium chloride 0.9 % 250 mL intermittent infusion  1,500 mg Intravenous Q24H         warfarin (COUMADIN) tablet 5 mg  5 mg Oral ONCE at 18:00         Warfarin Therapy Reminder (Check START DATE - warfarin may be starting in the FUTURE)  1 each Does not apply Continuous PRN         No current [facility]-ordered outpatient prescriptions on file.       Allergies   Allergen Reactions     Prednisone Other (See Comments)     He reports that he can't sleep for days and can't use small to massive doses of prednisone   Pt. Does not do well with high doses of Prednisone, His MD says that Prednisone is counter indicated. Prednisone failed to treat his sarcoidosis          Physical Examination:   Vitals:    18 1500 18 1516 18 1600 18 1615   BP: 133/67 132/78 134/82 109/66   BP Location:       Pulse: 95 102 119    Resp:  16   Temp:   98.1  F (36.7  C)    TempSrc:   Oral    SpO2: 98%  97%    Weight:       Height:         Body mass index is 24.37 kg/(m^2).  Temp (24hrs), Av.6  F (36.4  C), Min:97.3  F (36.3  C), Max:98.1  F (36.7  C)        Constitutional:  Appears comfortable.  HENT:  mucous membranes moist.  Eyes: no icterus, no pallor.   Neck: No lymphadenopathy  Cardiovascular: tachy, irregular  Respiratory/Chest: dec, few crackles, no sig wheezing  Gastrointestinal: Abdomen was soft, non-tender  Musculoskeletal: No clubbing or  cyanosis  Neurological: No focal motor or sensory deficits  Skin: No skin rash      CMP  Recent Labs  Lab 11/19/18  1150 11/19/18  0625 11/18/18  1850 11/18/18  1414   NA  --  140  --  139   POTASSIUM 4.5 4.4  --  4.6   CHLORIDE  --  106  --  106   CO2  --  28  --  26   ANIONGAP  --  6  --  7   GLC  --  125*  --  168*   BUN  --  38*  --  37*   CR  --  2.00*  --  1.66*   GFRESTIMATED  --  33*  --  41*   GFRESTBLACK  --  40*  --  49*   RAFFY  --  7.9*  --  8.3*   MAG 2.2  --  2.1  --    PHOS  --   --  2.8  --    PROTTOTAL  --  6.6*  --   --    ALBUMIN  --  2.6*  --   --    BILITOTAL  --  0.7  --   --    ALKPHOS  --  110  --   --    AST  --  51*  --   --    ALT  --  42  --   --      CBC  Recent Labs  Lab 11/19/18  1542 11/19/18  0625 11/18/18  1850 11/18/18  1414   WBC 10.2 11.1*  --  12.5*   RBC 3.41* 3.56*  --  3.79*   HGB 10.5* 10.6*  --  11.5*   HCT 33.5* 34.7*  --  36.6*   MCV 98 98  --  97   MCH 30.8 29.8  --  30.3   MCHC 31.3* 30.5*  --  31.4*   RDW 15.1* 14.9  --  14.8    237 221 274     INR  Recent Labs  Lab 11/19/18  1150   INR 1.08     Arterial Blood GasNo lab results found in last 7 days.    Recent Labs  Lab 11/18/18  1620 11/18/18  1500   CULT No growth after 17 hours No growth after 17 hours       Diagnostic Studies:  Chest Radiology:     CT Chest:  1. Small amount of infiltrate in lateral segment of the right middle lobe is new since the prior exam. Remainder of infiltrates and other opacities in the lungs bilaterally are unchanged, likely representing chronic changes from known sarcoidosis.   2. Currently there is no mediastinal or hilar adenopathy.  3. Vascular calcifications, including coronary artery calcifications, are again noted.  4. Small liver with nodular contour suggests cirrhosis.        Assessment/Plan:     Rohan is a 72-year-old man with extensive past medical history.  Notable for biopsy-proven pulmonary sarcoidosis.  Typically follows at the [facility] and is maintained  on both methotrexate and infliximab.  He also has known pulmonary hypertension as well as history of coronary artery disease with stenting.  In addition, there is history of atrial fib/flutter with rapid ventricular response status post cardioversion with recurrence and pulmonary toxicity in the setting of amiodarone.     Given the abrupt onset of his symptoms along with a CT scan of the chest that is relatively unchanged, suspect that his increased dyspnea is related to a cardiac etiology.  May be related to volume overload versus worsening of his pulmonary hypertension versus a rate or rhythm dependent problem.  Certainly he is immune suppressed and at risk for atypical or fungal infections.  No role for bronchoscopy at this time but certainly it could be considered if he does not improve.      -agree with cardiology evaluation  -continue methotrexate qweek  -wean o2 as tolerated  -no role for bronch at this time  -continue bronchodilators  -continue diuresis  -agree with cardiology assessment that it would be reasonable to consider transfer to [facility]  -will follow      Dharmesh Erickson MD  Pulmonary, Critical Care and Sleep Medicine  [facility]   Pager: [phone]  Office:[phone]

## 2018-11-19 NOTE — PLAN OF CARE
Problem: Patient Care Overview  Goal: Plan of Care/Patient Progress Review  CR: Orders received, eval attempted. Noting pt with RVR with rates sustained >130s at this time, very ACEVES. Not appropriate for CR at this time. RN updated and in agreement.

## 2018-11-19 NOTE — PROVIDER NOTIFICATION
MD Notification    Notified Person: MD Mason    Notified Person Name:    Notification Date/Time: 11/19/18  12:22 AM      Notification Interaction:    Purpose of Notification: Tachycardic -130s, Increased SOB, anxiety. Change Neb medication?    Orders Received: One time order Ativan     Comments:

## 2018-11-19 NOTE — PROGRESS NOTES
Patient is on a 3L NC with SpO2 in the mid 90's. Pt wears 3L NC at baseline. BS diminished expiratory wheezes with an increase in aeration post neb treatments. All nebs were given as ordered.  Will cont to follow.  11/19/2018  Fatmata Stark RRT

## 2018-11-19 NOTE — PROGRESS NOTES
Patient is going to cath lab around 1300.  [facility] availability is limited this afternoon and they are unable to accommodate the cardioversion.  Will put on the schedule for the a.m. 11-20-18.

## 2018-11-19 NOTE — PLAN OF CARE
Problem: Patient Care Overview  Goal: Plan of Care/Patient Progress Review  Outcome: No Change    Right jugular site from heart cath. DEAN/DCCV tomorrow. Voids per urinal. Denies pain. A&O x4. Son at bedside.

## 2018-11-19 NOTE — PROGRESS NOTES
[facility]    Hospitalist Progress Note      Assessment & Plan   Rohan Monroe is a 72 year old  male with medical history of sarcoidosis on immunosuppressive therapy, COPD, chronic hypoxia on home oxygen, coronary artery disease status post stenting, heart failure with preserved ejection fraction, atrial flutter status post ablation, hypertension, hyperlipidemia, diabetes mellitus diet-controlled, chronic kidney disease stage III presented to the ED with his son for ongoing dyspnea on exertion.      Acute on chronic hypoxic respiratory failure - multifactorial.  Possible CAP, Rule out atypical infection while immunocompromised  - Symptoms started 1 month back - had seen his primary pulmonologist and had CT imaging done; treated with Levaquin and azithromycin x 7d --> slight improvement.    - Over the last 2 weeks, progressive SOB, barely able to ambulate around the house.    - Baseline 2-3L NC O2  - In ED, bilateral infiltrates noted, D dime 1.3, WBC 12.5; received duoneb + 40 iv lasix  - Troponin I 0.043 <-- 0.032 <-- 0.028 <-- 0.029; ntProBNP >18k  - Etiology unclear - DDx heart failure/PHTN worsening, ischemia, atypical infections with sarcoidosis hx and immunocompromised status  - Continue Lasix 40 mg IV twice daily.  - Empirical vancomycin/Zosyn/azithromycin continued for now, ID consult to be placed   - Follow blood and urine culture results.   - Follow CT chest without contrast - new infiltrate noted since last CT on 10/16  - Add pro-calcitonin, CRP, ESR.   - Follow influenza A/B/RSV panel, urine Legionella antigen, streptococcal pneumonia antigen  - De-escalate antibiotics  accordingly  - O2 PRN to maintain sats > 90%.  If any decompensation will consider BiPAP.  - PRN albuterol nebulizer every 2 hours, DuoNeb 4 times a day.  No wheezing hence will hold off steroid.     - Upon admission has slightly elevated d-dimer, was elevated during previous admissions also.  Has a CKD stage  III, if shortness of breath does not improve can consider further workup for VT E.  - Pulmonology consult requested for possible bronchoscopy given patient immunocompromise.  - Cardiology consult requested for volume overload/A. fib management as below.  - Right heart cath today; likely DCCV tomorrow (possibly today) - on heparin gtt now     Volume overload likely from heart failure exacerbation.  History of Heart failure with preserved ejection fraction: EF 60-65% in March 2018  - EKG showed atrial flutter with variable AV block.  Heart rate 112, .  - Strict input-output monitoring, daily weights.  - Telemetry monitoring. Trops as above.  - Diuresis with intravenous Lasix 40 mg IV twice daily.  - Continue PTA aspirin 81 mg oral daily, and Lipitor 40 mg oral daily, metoprolol with hold parameters.  - PTA losartan  given borderline blood pressures.  - Cardiology consult requested - appreciate recs     A. Fib/flutter status post ablation.  - [id number].  Is been off Coumadin for last 6 months.  - Has been off amiodarone as he developed toxicity last year.  - Continue PTA metoprolol with hold parameters.    - Continue PTA aspirin 81 mg  - PRN IV metoprolol ordered.  - TSH, magnesium, phosphorus, CPK levels checked and WNL  - RVR last night - digoxin started per cardiology, rates controlled, hd stable     Pulmonary hypertension.  - Continue PTA sildenafil 3 times a day.  - Right heart cath today     CAD: 4/2008.  - S/p PCI with ROSALIE to the mid-LAD and D2   - Continue PTA aspirin 81 mg, metoprolol with hold parameters.     Sarcoidosis: Heart (presumed- as he has geneva filling pressures and Lung involvement.   - Follows with Dr. Perlman.  - Currently maintained on Remicade every 4 weeks and methotrexate   - No Folic acid use on MTX- would recommend.     COPD on home oxygen 3 L.  - Continue PTA Breo Ellipta/Spiriva.  - Further treatment as above.     History of Exudative left pleural effusion status post  thoracocentesis 4/2017  - Was exudative pleural effusion, had no lymphadenopathy to suggest malignancy and cytology was negative.     Hypertension:   - Continue PTA metoprolol with hold parameters.  - Diuresis with IV Lasix as above.  - PTA losartan on hold given borderline blood pressures.      Type 2 Diabetes Mellitus:   - 11/19 hemoglobin A1c 6.4%.  Carb controlled diet.  - Medium intensity insulin sliding scale.     History of hep C  - treated in the past.     Chronic kidney stage III.  - Slight creatinine between 1.8-1.9.  - Monitor renal function closely.  - Avoid nephrotoxic drugs.     Hypothyroidism.  - Continue PTA levothyroxine.  - TSH 1.44     Insomnia.  - Continue PTA as needed Remeron.     Physical deconditioning from acute illness/chronic debility.  - PT, OT assessment.     DVT Prophylaxis: Heparin SQ and Pneumatic Compression Devices  Code Status: Full Code discussed with patient and his son.     Disposition: Expected discharge in 2-3 days pending clinical improvement    Sacha Salinas MD    Text Page (7am to 6pm, M-F)    Interval History   No new complaints. Rapid rate over night as above. No chest pain or tightness. Pleasant robert.     -Data reviewed today: I reviewed all new labs and imaging results over the last 24 hours. I personally reviewed no images or EKG's today.    Physical Exam   Temp: 98.1  F (36.7  C) Temp src: Oral BP: 109/66 Pulse: 119 Heart Rate: 118 Resp: 16 SpO2: 97 % O2 Device: Nasal cannula Oxygen Delivery: 3 LPM  Vitals:    11/18/18 1330 11/19/18 0550   Weight: 74.8 kg (165 lb) 68.5 kg (151 lb 0.2 oz)     Vital Signs with Ranges  Temp:  [97.3  F (36.3  C)-98.1  F (36.7  C)] 98.1  F (36.7  C)  Pulse:  [] 119  Heart Rate:  [] 118  Resp:  [16-26] 16  BP: ()/(59-90) 109/66  SpO2:  [93 %-100 %] 97 %  I/O last 3 completed shifts:  In: 1150 [P.O.:1150]  Out: 1545 [Urine:1545]    Constitutional: Awake, alert, cooperative, no apparent distress  Respiratory: Clear to  auscultation bilaterally, no crackles or wheezing  Cardiovascular:  Reg rate, irreg irreg rhythm, s1s2 heard  GI: Normal bowel sounds, soft, non-distended, non-tender  Skin/Integumen: No rashes, no cyanosis, no edema    Medications     sodium chloride 75 mL/hr at 11/19/18 1500     Warfarin Therapy Reminder         [START ON 11/20/2018] aspirin  81 mg Oral Daily     atorvastatin  40 mg Oral At Bedtime     azithromycin  500 mg Intravenous Q24H     docusate sodium  100 mg Oral BID     fluticasone-vilanterol  1 puff Inhalation Daily     [START ON 11/20/2018] influenza Vac Split High-Dose  0.5 mL Intramuscular Prior to discharge     insulin aspart  1-7 Units Subcutaneous TID AC     insulin aspart  1-5 Units Subcutaneous At Bedtime     ipratropium - albuterol 0.5 mg/2.5 mg/3 mL  3 mL Nebulization 4x daily     levothyroxine  125 mcg Oral QAM AC     metoprolol tartrate  100 mg Oral BID     piperacillin-tazobactam  3.375 g Intravenous Q6H     sildenafil  20 mg Oral TID     sodium chloride (PF)  3 mL Intracatheter Q8H     vancomycin (VANCOCIN) IV  1,500 mg Intravenous Q24H       Data     Recent Labs  Lab 11/19/18  1542 11/19/18  1150 11/19/18  0625 11/18/18  2325 11/18/18  1850 11/18/18  1414   WBC 10.2  --  11.1*  --   --  12.5*   HGB 10.5*  --  10.6*  --   --  11.5*   MCV 98  --  98  --   --  97     --  237  --  221 274   INR  --  1.08  --   --   --   --    NA  --   --  140  --   --  139   POTASSIUM  --  4.5 4.4  --   --  4.6   CHLORIDE  --   --  106  --   --  106   CO2  --   --  28  --   --  26   BUN  --   --  38*  --   --  37*   CR  --   --  2.00*  --   --  1.66*   ANIONGAP  --   --  6  --   --  7   RAFFY  --   --  7.9*  --   --  8.3*   GLC  --   --  125*  --   --  168*   ALBUMIN  --   --  2.6*  --   --   --    PROTTOTAL  --   --  6.6*  --   --   --    BILITOTAL  --   --  0.7  --   --   --    ALKPHOS  --   --  110  --   --   --    ALT  --   --  42  --   --   --    AST  --   --  51*  --   --   --    TROPI  --   --   0.043 0.032 0.028 0.029       No results found for this or any previous visit (from the past 24 hour(s)).

## 2018-11-19 NOTE — PLAN OF CARE
Problem: Patient Care Overview  Goal: Plan of Care/Patient Progress Review  OT: Not appropriate for OT evaluation today per RN. Patient is SOB, with A-fib with RVR and tachycardia. Cardiology consult pending.

## 2018-11-19 NOTE — PROGRESS NOTES
RIGHT ATRIAL PRESSURE: 11/13/11   RIGHT VENTRICULAR PRESSURE: 72/3/13  PULMONARY ARTERY PRESSURE: 72/31/45  PULMONARY CAPILLARY WEDGE PRESSURE: 18/17/17  CARDIAC OUTPUT AND INDEX: 3.1 l/min and 1.8 l/min/m2  PULMONARY VASCULAR RESISTANCE: 9 Wood units    He is feeling well after the diuresis in the last 24 hours. O2 is coming back to home baseline. PH is worse compared to last year and appears to be predominantly Type III. His Wedge is mildly up (was not in 2017) likely from this AF and underlying HFpEF. CI is unchanged from last year. Will start anticoagulation and plan on DEAN cardioversion in the am tomorrow under anesthesia guidance. HR is around 110 bpms. Keep NPO at midnight. Given RA pressure is 11 (likely normal for him given the RV dysfunction) would recommend mild ongoing diuresis and not aggressively volume deplete him. Bridging will be needed given cardioversion tomorrow with heparin and warfarin that can be started tonight.

## 2018-11-20 LAB
ANION GAP SERPL CALCULATED.3IONS-SCNC: 6 MMOL/L (ref 3–14)
BUN SERPL-MCNC: 35 MG/DL (ref 7–30)
CALCIUM SERPL-MCNC: 8.3 MG/DL (ref 8.5–10.1)
CHLORIDE SERPL-SCNC: 102 MMOL/L (ref 94–109)
CO2 BLDCOV-SCNC: 28 MMOL/L (ref 21–28)
CO2 BLDCOV-SCNC: 30 MMOL/L (ref 21–28)
CO2 BLDCOV-SCNC: 30 MMOL/L (ref 21–28)
CO2 SERPL-SCNC: 32 MMOL/L (ref 20–32)
CREAT SERPL-MCNC: 2.3 MG/DL (ref 0.66–1.25)
ERYTHROCYTE [DISTWIDTH] IN BLOOD BY AUTOMATED COUNT: 15.2 % (ref 10–15)
GFR SERPL CREATININE-BSD FRML MDRD: 28 ML/MIN/1.7M2
GLUCOSE BLDC GLUCOMTR-MCNC: 110 MG/DL (ref 70–99)
GLUCOSE BLDC GLUCOMTR-MCNC: 120 MG/DL (ref 70–99)
GLUCOSE BLDC GLUCOMTR-MCNC: 129 MG/DL (ref 70–99)
GLUCOSE SERPL-MCNC: 122 MG/DL (ref 70–99)
GRAM STN SPEC: NORMAL
HCT VFR BLD AUTO: 35 % (ref 40–53)
HGB BLD-MCNC: 11.1 G/DL (ref 13.3–17.7)
INR PPP: 1.2 (ref 0.86–1.14)
LMWH PPP CHRO-ACNC: 0.16 IU/ML
LMWH PPP CHRO-ACNC: 0.22 IU/ML
Lab: NORMAL
MCH RBC QN AUTO: 30.7 PG (ref 26.5–33)
MCHC RBC AUTO-ENTMCNC: 31.7 G/DL (ref 31.5–36.5)
MCV RBC AUTO: 97 FL (ref 78–100)
PCO2 BLDV: 49 MM HG (ref 40–50)
PCO2 BLDV: 56 MM HG (ref 40–50)
PCO2 BLDV: 57 MM HG (ref 40–50)
PH BLDV: 7.32 PH (ref 7.32–7.43)
PH BLDV: 7.35 PH (ref 7.32–7.43)
PH BLDV: 7.36 PH (ref 7.32–7.43)
PLATELET # BLD AUTO: 222 10E9/L (ref 150–450)
PO2 BLDV: 23 MM HG (ref 25–47)
PO2 BLDV: 28 MM HG (ref 25–47)
PO2 BLDV: 38 MM HG (ref 25–47)
POTASSIUM SERPL-SCNC: 4.2 MMOL/L (ref 3.4–5.3)
RBC # BLD AUTO: 3.61 10E12/L (ref 4.4–5.9)
SAO2 % BLDV FROM PO2: 34 %
SAO2 % BLDV FROM PO2: 49 %
SAO2 % BLDV FROM PO2: 69 %
SODIUM SERPL-SCNC: 140 MMOL/L (ref 133–144)
SPECIMEN SOURCE: NORMAL
WBC # BLD AUTO: 9.8 10E9/L (ref 4–11)

## 2018-11-20 PROCEDURE — 85520 HEPARIN ASSAY: CPT | Performed by: INTERNAL MEDICINE

## 2018-11-20 PROCEDURE — 25000132 ZZH RX MED GY IP 250 OP 250 PS 637: Performed by: INTERNAL MEDICINE

## 2018-11-20 PROCEDURE — 25000128 H RX IP 250 OP 636: Performed by: INTERNAL MEDICINE

## 2018-11-20 PROCEDURE — 99233 SBSQ HOSP IP/OBS HIGH 50: CPT | Performed by: INTERNAL MEDICINE

## 2018-11-20 PROCEDURE — 80048 BASIC METABOLIC PNL TOTAL CA: CPT | Performed by: HOSPITALIST

## 2018-11-20 PROCEDURE — 93005 ELECTROCARDIOGRAM TRACING: CPT

## 2018-11-20 PROCEDURE — 25000125 ZZHC RX 250: Performed by: HOSPITALIST

## 2018-11-20 PROCEDURE — 36415 COLL VENOUS BLD VENIPUNCTURE: CPT | Performed by: HOSPITALIST

## 2018-11-20 PROCEDURE — 85027 COMPLETE CBC AUTOMATED: CPT | Performed by: HOSPITALIST

## 2018-11-20 PROCEDURE — 36415 COLL VENOUS BLD VENIPUNCTURE: CPT | Performed by: INTERNAL MEDICINE

## 2018-11-20 PROCEDURE — A9270 NON-COVERED ITEM OR SERVICE: HCPCS | Performed by: INTERNAL MEDICINE

## 2018-11-20 PROCEDURE — 85610 PROTHROMBIN TIME: CPT | Performed by: HOSPITALIST

## 2018-11-20 PROCEDURE — 87205 SMEAR GRAM STAIN: CPT | Performed by: INTERNAL MEDICINE

## 2018-11-20 PROCEDURE — 87798 DETECT AGENT NOS DNA AMP: CPT | Performed by: INTERNAL MEDICINE

## 2018-11-20 PROCEDURE — 00000146 ZZHCL STATISTIC GLUCOSE BY METER IP

## 2018-11-20 PROCEDURE — 40000275 ZZH STATISTIC RCP TIME EA 10 MIN

## 2018-11-20 PROCEDURE — 94640 AIRWAY INHALATION TREATMENT: CPT | Mod: 76

## 2018-11-20 PROCEDURE — 87070 CULTURE OTHR SPECIMN AEROBIC: CPT | Performed by: INTERNAL MEDICINE

## 2018-11-20 PROCEDURE — 25000128 H RX IP 250 OP 636: Performed by: HOSPITALIST

## 2018-11-20 PROCEDURE — 93010 ELECTROCARDIOGRAM REPORT: CPT | Performed by: INTERNAL MEDICINE

## 2018-11-20 PROCEDURE — 99222 1ST HOSP IP/OBS MODERATE 55: CPT | Mod: 25 | Performed by: INTERNAL MEDICINE

## 2018-11-20 PROCEDURE — A9270 NON-COVERED ITEM OR SERVICE: HCPCS | Performed by: HOSPITALIST

## 2018-11-20 PROCEDURE — 87299 ANTIBODY DETECTION NOS IF: CPT | Performed by: INTERNAL MEDICINE

## 2018-11-20 PROCEDURE — 21400004 ZZH R&B CCU CSC INTERMEDIATE

## 2018-11-20 PROCEDURE — 25000132 ZZH RX MED GY IP 250 OP 250 PS 637: Performed by: HOSPITALIST

## 2018-11-20 PROCEDURE — 94640 AIRWAY INHALATION TREATMENT: CPT

## 2018-11-20 PROCEDURE — 85520 HEPARIN ASSAY: CPT | Performed by: HOSPITALIST

## 2018-11-20 PROCEDURE — 99233 SBSQ HOSP IP/OBS HIGH 50: CPT | Performed by: HOSPITALIST

## 2018-11-20 RX ORDER — AMIODARONE HYDROCHLORIDE 200 MG/1
200 TABLET ORAL DAILY
Status: DISCONTINUED | OUTPATIENT
Start: 2018-11-20 | End: 2018-11-21

## 2018-11-20 RX ORDER — WARFARIN SODIUM 5 MG/1
5 TABLET ORAL
Status: COMPLETED | OUTPATIENT
Start: 2018-11-20 | End: 2018-11-20

## 2018-11-20 RX ORDER — FUROSEMIDE 10 MG/ML
40 INJECTION INTRAMUSCULAR; INTRAVENOUS ONCE
Status: COMPLETED | OUTPATIENT
Start: 2018-11-20 | End: 2018-11-20

## 2018-11-20 RX ORDER — FUROSEMIDE 40 MG
40 TABLET ORAL
Status: DISCONTINUED | OUTPATIENT
Start: 2018-11-20 | End: 2018-11-21 | Stop reason: HOSPADM

## 2018-11-20 RX ADMIN — PIPERACILLIN SODIUM,TAZOBACTAM SODIUM 3.38 G: 3; .375 INJECTION, POWDER, FOR SOLUTION INTRAVENOUS at 23:41

## 2018-11-20 RX ADMIN — Medication 1 MG: at 00:55

## 2018-11-20 RX ADMIN — FLUTICASONE FUROATE AND VILANTEROL TRIFENATATE 1 PUFF: 100; 25 POWDER RESPIRATORY (INHALATION) at 10:43

## 2018-11-20 RX ADMIN — IPRATROPIUM BROMIDE AND ALBUTEROL SULFATE 3 ML: 2.5; .5 SOLUTION RESPIRATORY (INHALATION) at 10:30

## 2018-11-20 RX ADMIN — MIRTAZAPINE 15 MG: 15 TABLET, FILM COATED ORAL at 21:57

## 2018-11-20 RX ADMIN — METOPROLOL TARTRATE 100 MG: 100 TABLET, FILM COATED ORAL at 20:31

## 2018-11-20 RX ADMIN — Medication 1 MG: at 21:57

## 2018-11-20 RX ADMIN — PIPERACILLIN SODIUM,TAZOBACTAM SODIUM 3.38 G: 3; .375 INJECTION, POWDER, FOR SOLUTION INTRAVENOUS at 19:03

## 2018-11-20 RX ADMIN — LEVOTHYROXINE SODIUM 125 MCG: 125 TABLET ORAL at 06:32

## 2018-11-20 RX ADMIN — SILDENAFIL 20 MG: 20 TABLET, FILM COATED ORAL at 16:58

## 2018-11-20 RX ADMIN — MIRTAZAPINE 15 MG: 15 TABLET, FILM COATED ORAL at 00:55

## 2018-11-20 RX ADMIN — ATORVASTATIN CALCIUM 40 MG: 40 TABLET, FILM COATED ORAL at 21:57

## 2018-11-20 RX ADMIN — IPRATROPIUM BROMIDE AND ALBUTEROL SULFATE 3 ML: 2.5; .5 SOLUTION RESPIRATORY (INHALATION) at 19:44

## 2018-11-20 RX ADMIN — METOPROLOL TARTRATE 100 MG: 100 TABLET, FILM COATED ORAL at 10:42

## 2018-11-20 RX ADMIN — PIPERACILLIN SODIUM,TAZOBACTAM SODIUM 3.38 G: 3; .375 INJECTION, POWDER, FOR SOLUTION INTRAVENOUS at 12:31

## 2018-11-20 RX ADMIN — FUROSEMIDE 40 MG: 40 TABLET ORAL at 10:53

## 2018-11-20 RX ADMIN — AZITHROMYCIN MONOHYDRATE 500 MG: 500 INJECTION, POWDER, LYOPHILIZED, FOR SOLUTION INTRAVENOUS at 16:51

## 2018-11-20 RX ADMIN — PIPERACILLIN SODIUM,TAZOBACTAM SODIUM 3.38 G: 3; .375 INJECTION, POWDER, FOR SOLUTION INTRAVENOUS at 06:33

## 2018-11-20 RX ADMIN — PIPERACILLIN SODIUM,TAZOBACTAM SODIUM 3.38 G: 3; .375 INJECTION, POWDER, FOR SOLUTION INTRAVENOUS at 00:55

## 2018-11-20 RX ADMIN — SILDENAFIL 20 MG: 20 TABLET, FILM COATED ORAL at 06:32

## 2018-11-20 RX ADMIN — DOCUSATE SODIUM 100 MG: 100 CAPSULE, LIQUID FILLED ORAL at 20:31

## 2018-11-20 RX ADMIN — IPRATROPIUM BROMIDE AND ALBUTEROL SULFATE 3 ML: 2.5; .5 SOLUTION RESPIRATORY (INHALATION) at 15:25

## 2018-11-20 RX ADMIN — FUROSEMIDE 40 MG: 10 INJECTION, SOLUTION INTRAVENOUS at 00:55

## 2018-11-20 RX ADMIN — IPRATROPIUM BROMIDE AND ALBUTEROL SULFATE 3 ML: 2.5; .5 SOLUTION RESPIRATORY (INHALATION) at 07:58

## 2018-11-20 RX ADMIN — DOCUSATE SODIUM 100 MG: 100 CAPSULE, LIQUID FILLED ORAL at 10:42

## 2018-11-20 RX ADMIN — ASPIRIN 81 MG: 81 TABLET, COATED ORAL at 10:42

## 2018-11-20 RX ADMIN — WARFARIN SODIUM 5 MG: 5 TABLET ORAL at 19:04

## 2018-11-20 RX ADMIN — FUROSEMIDE 40 MG: 40 TABLET ORAL at 16:58

## 2018-11-20 RX ADMIN — AMIODARONE HYDROCHLORIDE 200 MG: 200 TABLET ORAL at 10:42

## 2018-11-20 RX ADMIN — SILDENAFIL 20 MG: 20 TABLET, FILM COATED ORAL at 21:57

## 2018-11-20 ASSESSMENT — ACTIVITIES OF DAILY LIVING (ADL)
ADLS_ACUITY_SCORE: 13

## 2018-11-20 NOTE — PLAN OF CARE
Problem: Patient Care Overview  Goal: Plan of Care/Patient Progress Review  Discharge Planner PT   Patient plan for discharge: None stated.   Current status: Orders received and chart reviewed. Plan for patient to transfer to [facility] tomorrow.   Barriers to return to prior living situation: Unknown  Recommendations for discharge: Discharging to [facility].   Rationale for recommendations: Deferring PT evaluation to the [facility] to provide most appropriate discharge recommendations.        Entered by: Cornelia Reich 11/20/2018 3:23 PM

## 2018-11-20 NOTE — PLAN OF CARE
Problem: Patient Care Overview  Goal: Plan of Care/Patient Progress Review  Outcome: No Change  Pt has been pain free with V/signs stable and maintaining his O2 sat in the mid 90's on 4lL per N/C with humidity. Pt gets very anxious at times and becomes SOB with activity. Receiving IVPB for treatment of pneumonia. Sputum samples still need to be sent. Pt will transfer to [facility] [place] for further treatment when bed is available. Remains in NSR with (1) episode of V-tach self limiting and Dr Angelito Ervin did review Rhythm strip. Son at Bedside

## 2018-11-20 NOTE — PLAN OF CARE
Problem: Patient Care Overview  Goal: Plan of Care/Patient Progress Review  Discharge Planner OT   Patient plan for discharge: Unknown.  Current status: Noted patient transferring to the  tomorrow. Per nurse, isn't able to tolerate much activity.   Barriers to return to prior living situation: Unknown.  Recommendations for discharge: Defer to rehab team at the .  Rationale for recommendations: Deferring evals to the therapists at the . Order completed.        Entered by: Ann Vega 11/20/2018 4:30 PM

## 2018-11-20 NOTE — PHARMACY-ANTICOAGULATION SERVICE
Clinical Pharmacy - Warfarin Dosing Consult     Pharmacy has been consulted to manage this patient s warfarin therapy.  Indication: Atrial Fibrillation  Therapy Goal: INR 2-3  Warfarin Prior to Admission: No  Significant drug interactions: Zithromax, ASA    INR   Date Value Ref Range Status   11/19/2018 1.08 0.86 - 1.14 Final   08/28/2018 0.97 0.86 - 1.14 Final       Recommend warfarin 5 mg today.  Pharmacy will monitor Rohan Monroe daily and order warfarin doses to achieve specified goal.      Please contact pharmacy as soon as possible if the warfarin needs to be held for a procedure or if the warfarin goals change.

## 2018-11-20 NOTE — PROVIDER NOTIFICATION
Paged w/ request for ativan    Patient anxious, history of anxiety.    Received ativan last night, described this as palliative    Anxiety tonight w/ increased WOB and O2 needs from 3L to 6L    Give 40 IV lasix x1    Would also give HS remeron.    No ativan ordered at this time given objective change in respiratory status    Aaron Mason MD  12:46 AM

## 2018-11-20 NOTE — PLAN OF CARE
Problem: Chronic Respiratory Difficulty Comorbidity  Goal: Chronic Respiratory Difficulty  Patient comorbidity will be monitored for signs and symptoms of Respiratory Difficulty (Chronic) condition.  Problems will be absent, minimized or managed by discharge/transition of care.  Outcome: No Change  voiding

## 2018-11-20 NOTE — CONSULTS
Consult Date:  11/20/2018      CARDIAC ELECTROPHYSIOLOGY CONSULTATION      REQUESTING PHYSICIAN:  Angelito Ervin MD.      REASON FOR CONSULTATION:  Paroxysmal atrial fibrillation with rapid ventricular response in the setting of severe pulmonary hypertension.      HISTORY OF PRESENT ILLNESS:    It is my pleasure to see Mr. Rohan Monroe, a delightful 72-year-old male with a complex medical history.  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  History of CAD with prior stenting, as recently as in 2017.  He has severe pulmonary hypertension with systolic PA pressure around 70-80 mmHg.  I have been asked to see him specifically for the management of his atrial fibrillation.      The patient was admitted on 11/18/2018 with progressive dyspnea and was found to be in rapid atrial fibrillation.  His heart rate on the admission ECG was in the 150s.      The patient underwent right heart catheterization yesterday which showed severe pulmonary hypertension with PA pressure 72/31, mean wedge pressure of 17, cardiac output 3.1 L/min with a PVR of 9 Wood units.  Plans were made for cardioversion today, but the patient spontaneously converted to sinus earlier this morning.  He remains significantly dyspneic despite conversion to sinus rhythm.  He is having frequent PACs.      Mr. Monroe's arrhythmia history dates back to 2016.  At that time he was having recurrent typical atrial flutter and was seen by Dr. Brian Vazquez, my colleague at the [facility].  Dr. Vazquez performed successful catheter ablation of atrial flutter in 01/2017.  At the end of the procedure the patient developed atrial fibrillation and cardioversion resulted in immediate recurrence of arrhythmia (IRAF).  The patient was started on amiodarone and was cardioverted successfully before discharge home on  01/25/2017.      Two weeks later, on 02/10/2018, the patient stopped taking amiodarone because of nausea  and feeling unwell and not quite himself.  He has not been on antiarrhythmic drug therapy since then.      Current symptoms include marked dyspnea with even minimal exertion.  No chest pain, syncope or near-syncope.      DIAGNOSTIC STUDIES:    Sodium 140, potassium 4.2, creatinine 2.3, hematocrit 35%.  INR 1.2.    His 12-lead ECG on admission showed atrial fibrillation with RVR.  ECG post-cardioversion shows sinus rhythm with frequent PACs; this ECG is otherwise surprisingly normal considering the magnitude of underlying cardiopulmonary disease.    Echocardiography on 11/18/2018 showed LVEF of 45% to 50% (this was performed during rapid atrial fibrillation), reduced RV function and evidence of at least moderate pulmonary hypertension.      IMPRESSION:    1.  Atrial fibrillation.  Mr. Monroe was admitted with rapid atrial fibrillation and had spontaneous conversion earlier today.  His fragile cardiac status acutely deteriorated during rapid atrial fibrillation.        Unfortunately, options to maintain sinus rhythm are very limited.  Mr. Monroe is a poor candidate for AF ablation under general anesthesia.  The patient has history of coronary artery disease and significant structural cardiac abnormalities related to pulmonary hypertension.  This precludes the use of class Ic antiarrhythmics.  He has unstable renal function with creatinine that fluctuates from 1.5 to 2.5, making Class 3 agents, sotalol or dofetilide, unsafe.  I do note that his intolerance of amiodarone in 2017 was subjective.  I am not aware of the exact loading dose of amiodarone used at the time, but I am wondering whether we can try it again at a lower dose.  He is certainly not the best candidate for chronic amiodarone given his severe lung disease, but I do think that the development of persistent atrial fibrillation with loss of atrial kick would be detrimental for him.      RECOMMENDATIONS:   A.  Start low dose amiodarone 200 mg daily.  I  "explained it may take several weeks before the medication is fully loaded in his system.     B.  If he continues to have recurrences of atrial fibrillation with RVR, AV node ablation with pacemaker implantation may need to be considered, but in that case atrial kick would be lost and cardiac dyssynchrony may ensue from continuous ventricular pacing.  It is not an ideal solution.      I do appreciate the opportunity to be part of his care.  Please feel free to contact me with additional questions.         PAUL RODARTE MD, [facility]           Physical Exam:  Vitals: BP (!) 154/93 (BP Location: Right arm)  Pulse 114  Temp 98.1  F (36.7  C) (Oral)  Resp 20  Ht 1.676 m (5' 6\")  Wt 68 kg (149 lb 14.6 oz)  SpO2 96%  BMI 24.2 kg/m2  Intake/Output Summary (Last 24 hours) at 11/20/18 1049  Last data filed at 11/20/18 0938   Gross per 24 hour   Intake             2422 ml   Output             2845 ml   Net             -423 ml     Vitals:    11/18/18 1330 11/19/18 0550 11/20/18 0631   Weight: 74.8 kg (165 lb) 68.5 kg (151 lb 0.2 oz) 68 kg (149 lb 14.6 oz)     Constitutional:  A+O x3.  Pt is in NAD.  On O2 per n/c.   HEAD:  Normocephalic.  SKIN:  Skin normal color, texture and turgor with no lesions or eruptions.  Eyes:  PERRL, EOMI.  Wears glasses.  ENT:  Supple, normal JVP. No lymphadenopathy or thyroid enlargement.  Chest:  Decreased breath sound bilaterally with few rales.  Cardiac:  RV heave.  RR with frequent ectopics, normal  S1 and S2.  2/6 syst murmur at xiphoid, no rub or gallop.   Abdomen:  Normal BS.  Soft, non-tender and non-distended.  No rebound or guarding.    Extremities:  Pedious pulses palpable B/L.  No LE edema noticed.   Neurological:  Strength and sensation grossly symmetric and intact throughout.         Review of Systems:  Complete review of system is otherwise negative with the exception of what was described above.       CURRENT MEDICATIONS:    amiodarone  200 mg Oral Daily     aspirin  81 mg " Oral Daily     atorvastatin  40 mg Oral At Bedtime     azithromycin  500 mg Intravenous Q24H     docusate sodium  100 mg Oral BID     fluticasone-vilanterol  1 puff Inhalation Daily     furosemide  40 mg Oral BID     influenza Vac Split High-Dose  0.5 mL Intramuscular Prior to discharge     insulin aspart  1-7 Units Subcutaneous TID AC     insulin aspart  1-5 Units Subcutaneous At Bedtime     ipratropium - albuterol 0.5 mg/2.5 mg/3 mL  3 mL Nebulization 4x daily     levothyroxine  125 mcg Oral QAM AC     metoprolol tartrate  100 mg Oral BID     piperacillin-tazobactam  3.375 g Intravenous Q6H     sildenafil  20 mg Oral TID     sodium chloride (PF)  3 mL Intracatheter Q8H     warfarin  5 mg Oral ONCE at 18:00       ALLERGIES  Allergies   Allergen Reactions     Prednisone Other (See Comments)     He reports that he can't sleep for days and can't use small to massive doses of prednisone   Pt. Does not do well with high doses of Prednisone, His MD says that Prednisone is counter indicated. Prednisone failed to treat his sarcoidosis        PAST MEDICAL HISTORY:  Past Medical History:   Diagnosis Date     Atrial flutter (H)      Cataract of both eyes      Chronic infection     Hep C     Congestive heart failure, unspecified      Coronary artery disease      Depressive disorder      Depressive disorder, not elsewhere classified     Depression (non-psychotic)     ERM OS (epiretinal membrane, left eye)      Generalized osteoarthrosis, unspecified site      Glaucoma suspect      Hypertension      Lichen planus      Other psoriasis      PVD (posterior vitreous detachment), left eye      Sarcoidosis      Sarcoidosis      Type II or unspecified type diabetes mellitus without mention of complication, not stated as uncontrolled      Unspecified hypothyroidism     Hypothyroidism     Unspecified viral hepatitis C without hepatic coma      Viral warts, unspecified        PAST SURGICAL HISTORY:  Past Surgical History:   Procedure  Laterality Date     ANESTHESIA CARDIOVERSION N/A 1/19/2017    Procedure: ANESTHESIA CARDIOVERSION;  Surgeon: GENERIC ANESTHESIA PROVIDER;  Location: [facility] OR     ANESTHESIA CARDIOVERSION N/A 1/23/2017    Procedure: ANESTHESIA CARDIOVERSION;  Surgeon: GENERIC ANESTHESIA PROVIDER;  Location: [facility] OR     ANESTHESIA CARDIOVERSION N/A 1/24/2017    Procedure: ANESTHESIA CARDIOVERSION;  Surgeon: GENERIC ANESTHESIA PROVIDER;  Location: [facility] OR     ARTHROPLASTY HIP  8/24/2011    Procedure:ARTHROPLASTY HIP; Right Total Hip Arthroplasty  Choice anesthesia; Surgeon:ELSLI WILKINSON; Location:[facility] OR     BIOPSY       C PELVIS/HIP JOINT SURGERY UNLISTED       cardiac stent      s/p     CARDIAC SURGERY       CATARACT IOL, RT/LT  9/15/2015    LE     COLONOSCOPY       CORONARY ANGIOGRAPHY ADULT ORDER       H ABLATION ATRIAL FLUTTER       HC REMOVAL OF TONSILS,<11 Y/O      Tonsils <10y.o.     HC REPAIR INCISIONAL HERNIA,REDUCIBLE      Hernia Repair, Incisional, Unilateral     HEART CATH, ANGIOPLASTY       JOINT REPLACEMENT      1 month ago--right hip     LIGATN/STRIP LONG & SHORT SAPHEN         FAMILY HISTORY:  Family History   Problem Relation Age of Onset     HEART DISEASE Father      irreg heart beat     Circulatory Father      varicose veins     Prostate Cancer Father      HEART DISEASE Mother      heart attack     Arthritis Mother      Osteoporosis Mother      Thyroid Disease Mother      Hypertension Mother      Eye Disorder Maternal Grandmother      glaucoma     Diabetes Sister      type 2     Kidney Cancer Sister      Diabetes Sister      Glaucoma No family hx of      Macular Degeneration No family hx of      Cancer No family hx of      no skin cancer       SOCIAL HISTORY:  Social History     Social History     Marital status:      Spouse name: N/A     Number of children: 2     Years of education: N/A     Occupational History      [employer]     Social History Main Topics     Smoking status: Former Smoker      Packs/day: 1.00     Years: 30.00     Types: Cigarettes     Start date: 12/30/1960     Quit date: 7/22/1994     Smokeless tobacco: Never Used     Alcohol use No     Drug use: No     Sexual activity: Not Currently     Partners: Female     Birth control/ protection: Post-menopausal     Other Topics Concern     Blood Transfusions No     Exercise Yes     Social History Narrative    Dairy/d 2 servings/d    Caffeine little servings/d    Exercise 3 x week    Sunscreen used - Yes    Seatbelts used - Yes    Working smoke/CO detectors in the home - Yes    Guns stored in the home - No    Self Breast Exams - NOT APPLICABLE    Self Testicular Exam - No    Eye Exam up to date - Yes    Dental Exam up to date - Yes    Pap Smear up to date - NOT APPLICABLE    Mammogram up to date - NOT APPLICABLE    PSA up to date - Yes    Dexa Scan up to date - NOT APPLICABLE    Flex Sig / Colonoscopy up to date - Yes    Immunizations up to date - Unsure    Abuse: Current or Past(Physical, Sexual or Emotional)- No    Do you feel safe in your environment - Yes         Recent Lab Results:    Recent Labs  Lab 11/20/18  0616 11/19/18  1542 11/19/18  1150 11/19/18  0625 11/18/18  2325 11/18/18  1850 11/18/18  1414   WBC 9.8 10.2  --  11.1*  --   --  12.5*   HGB 11.1* 10.5*  --  10.6*  --   --  11.5*   MCV 97 98  --  98  --   --  97    224  --  237  --  221 274   INR 1.20*  --  1.08  --   --   --   --      --   --  140  --   --  139   POTASSIUM 4.2  --  4.5 4.4  --   --  4.6   CHLORIDE 102  --   --  106  --   --  106   CO2 32  --   --  28  --   --  26   BUN 35*  --   --  38*  --   --  37*   CR 2.30*  --   --  2.00*  --   --  1.66*   ANIONGAP 6  --   --  6  --   --  7   RAFFY 8.3*  --   --  7.9*  --   --  8.3*   *  --   --  125*  --   --  168*   ALBUMIN  --   --   --  2.6*  --   --   --    PROTTOTAL  --   --   --  6.6*  --   --   --    BILITOTAL  --   --   --  0.7  --   --   --    ALKPHOS  --   --   --  110  --   --   --    ALT  --   --    --  42  --   --   --    AST  --   --   --  51*  --   --   --    TROPI  --   --   --  0.043 0.032 0.028 0.029            D: 2018   T: 2018   MT: NATIVIDAD      Name:     LOU RAO   MRN:      [id number]        Account:       [id number]   :      1945           Consult Date:  2018      Document: [id number]       cc: Angelito Ervin MD

## 2018-11-20 NOTE — PROGRESS NOTES
Assessment and Plan:     Rohan Monroe is a 72 year old male who was admitted on 11/18/2018.     1. 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 Typical atrial flutter status post ablation in 2017  3. Post ablation recurrence of atypical atrial flutter and atrial fibrillation with intolerance/questionable toxicity to amiodarone  4. Moderate to severe pulmonary hypertension likely group 3 with right ventricular dysfunction  5. Mild heart failure with preserved ejection fraction maintained on low-dose of oral diuretics at home  6. Home oxygen dependence  7. Malnutrition   8. Chronic kidney disease  9. Anemia  10. Troponin elevation with prior stents, likely non specific    RHC from 11/19/2019  RIGHT ATRIAL PRESSURE: 11/13/11   RIGHT VENTRICULAR PRESSURE: 72/3/13  PULMONARY ARTERY PRESSURE: 72/31/45  PULMONARY CAPILLARY WEDGE PRESSURE: 18/17/17  CARDIAC OUTPUT AND INDEX: 3.1 l/min and 1.8 l/min/m2  PULMONARY VASCULAR RESISTANCE: 9 Wood units    Patient self converted out of atrial fibrillation last night to normal sinus rhythm this morning.  We will cancel his DEAN cardioversion.  Continue anticoagulation.  I met with pulmonary attending at bedside and both of us discussed overall plan of care with the patient.  It appears that patient has poor cardiopulmonary reserve.  Based on right heart catheterization data yesterday his pH is slightly worse compared to last year but his cardiac index is stably low.  His PVR is 9.  His wedge was slightly elevated during the right heart catheterization yesterday but he was in rapid atrial fibrillation at that time.  My guess is that with him coming out of atrial fibrillation now, that will settle down.      I recommend increasing his dose of Lasix to 40 twice a day at discharge with 1 week outpatient lab check.    In regards to his atrial fibrillation, I have consulted EP to comment on rhythm control approaches because his likelihood of going back into  atrial fibrillation is high overall. Further rate control recommendations per EP given renal dysfunction.    Over the big picture it appears that the patient continues to need more than baseline oxygen requirements.  He has poor reserve.  Pulmonary is consulting for potential need for a bronchial biopsy and bronchoscopy to see if there is any pulmonary pathology that could be exacerbating the issue given his immunosuppressed state.  He sees both cardiology and pulmonary at the [facility] and as such per our discussions and his wishes it would be best for him to be transferred to [facility].  I spoke with Dr. Grisel Pepper cards 2 attending at the [facility] and unfortunately there are no beds currently to accept the patient there.      Please notify bed control at [phone] to get him on the list to see if he can get transferred when able to heart failure service there with pulmonary consultation.  If we are not able to achieve transfer and/or the patient is feeling better in the next 24-48 hours before that time and seems stable for discharge, then close outpatient follow-up with his heart failure and pulmonary teams can be arranged for next week.    Angelito Ervin MD        Interval History:   Converted. Had SOB episodes last night. Stable this am, still more than baseline O2 needs          Medications:       aspirin  81 mg Oral Daily     atorvastatin  40 mg Oral At Bedtime     azithromycin  500 mg Intravenous Q24H     docusate sodium  100 mg Oral BID     fluticasone-vilanterol  1 puff Inhalation Daily     influenza Vac Split High-Dose  0.5 mL Intramuscular Prior to discharge     insulin aspart  1-7 Units Subcutaneous TID AC     insulin aspart  1-5 Units Subcutaneous At Bedtime     ipratropium - albuterol 0.5 mg/2.5 mg/3 mL  3 mL Nebulization 4x daily     levothyroxine  125 mcg Oral QAM AC     metoprolol tartrate  100 mg Oral BID     piperacillin-tazobactam  3.375 g Intravenous Q6H      sildenafil  20 mg Oral TID     sodium chloride (PF)  3 mL Intracatheter Q8H     vancomycin (VANCOCIN) IV  1,500 mg Intravenous Q24H            Physical Exam:     Patient Vitals for the past 24 hrs:   BP Temp Temp src Pulse Heart Rate Resp SpO2 Weight   11/20/18 0800 (!) 154/93 - - - 75 20 98 % -   11/20/18 0758 - - - - - - 94 % -   11/20/18 0643 - - - - 78 - 96 % -   11/20/18 0631 - - - - - - - 68 kg (149 lb 14.6 oz)   11/20/18 0400 - - - - - - 94 % -   11/20/18 0000 - - - - 82 - - -   11/19/18 2355 - - - - - - 96 % -   11/19/18 2345 141/80 98.1  F (36.7  C) Oral - 92 20 95 % -   11/19/18 2030 113/66 98.7  F (37.1  C) Oral 114 - 16 - -   11/19/18 1930 115/70 - - 101 - 16 97 % -   11/19/18 1830 159/57 - - 101 - 16 - -   11/19/18 1800 97/53 - - 114 - 16 - -   11/19/18 1730 118/70 - - 121 - 16 - -   11/19/18 1700 129/66 - - 118 - 16 - -   11/19/18 1630 134/82 - - 119 - 16 - -   11/19/18 1615 109/66 - - - 118 16 - -   11/19/18 1600 134/82 98.1  F (36.7  C) Oral 119 - 16 97 % -   11/19/18 1516 132/78 - - 102 - 18 - -   11/19/18 1500 133/67 - - 95 - 18 98 % -   11/19/18 1114 - - - - - - 97 % -   11/19/18 0959 113/65 97.4  F (36.3  C) - 119 - 20 100 % -     Vitals:    11/18/18 1330 11/19/18 0550 11/20/18 0631   Weight: 74.8 kg (165 lb) 68.5 kg (151 lb 0.2 oz) 68 kg (149 lb 14.6 oz)         Intake/Output Summary (Last 24 hours) at 11/20/18 0909  Last data filed at 11/20/18 0600   Gross per 24 hour   Intake             2422 ml   Output             2620 ml   Net             -198 ml       11/15 0700 - 11/20 0659  In: 3572 [P.O.:2400; I.V.:1172]  Out: 3345 [Urine:3345]  Net: 227    Exam:  General alert oriented x3: In mild distress due to dyspnea  JVP is around 8-10 cm  Cardiac regular heart sounds, no lower extremity edema no murmur rubs or gallops  Lungs coarse with mild wheezing  Abdomen is soft nontender  No lower extremity edema  Limited motor neuro exam is nonfocal  Psych appropriate affect         Data:   LABS (Last four  results)  CMP  Recent Labs  Lab 11/20/18  0616 11/19/18  1150 11/19/18  0625 11/18/18  1850 11/18/18  1414     --  140  --  139   POTASSIUM 4.2 4.5 4.4  --  4.6   CHLORIDE 102  --  106  --  106   CO2 32  --  28  --  26   ANIONGAP 6  --  6  --  7   *  --  125*  --  168*   BUN 35*  --  38*  --  37*   CR 2.30*  --  2.00*  --  1.66*   GFRESTIMATED 28*  --  33*  --  41*   GFRESTBLACK 34*  --  40*  --  49*   RAFFY 8.3*  --  7.9*  --  8.3*   MAG  --  2.2  --  2.1  --    PHOS  --   --   --  2.8  --    PROTTOTAL  --   --  6.6*  --   --    ALBUMIN  --   --  2.6*  --   --    BILITOTAL  --   --  0.7  --   --    ALKPHOS  --   --  110  --   --    AST  --   --  51*  --   --    ALT  --   --  42  --   --      CBC  Recent Labs  Lab 11/20/18  0616 11/19/18  1542 11/19/18  0625 11/18/18  1850 11/18/18  1414   WBC 9.8 10.2 11.1*  --  12.5*   RBC 3.61* 3.41* 3.56*  --  3.79*   HGB 11.1* 10.5* 10.6*  --  11.5*   HCT 35.0* 33.5* 34.7*  --  36.6*   MCV 97 98 98  --  97   MCH 30.7 30.8 29.8  --  30.3   MCHC 31.7 31.3* 30.5*  --  31.4*   RDW 15.2* 15.1* 14.9  --  14.8    224 237 221 274     INR  Recent Labs  Lab 11/20/18  0616 11/19/18  1150   INR 1.20* 1.08     TROPONINS Lab Results   Component Value Date    TROPI 0.043 11/19/2018    TROPI 0.032 11/18/2018    TROPI 0.028 11/18/2018    TROPI 0.029 11/18/2018    TROPI  04/22/2017     <0.015  The 99th percentile for upper reference range is 0.045 ug/L.  Troponin values in   the range of 0.045 - 0.120 ug/L may be associated with risks of adverse   clinical events.      TROPONIN 0.23 (HH) 01/18/2017    TROPONIN 0.03 06/25/2014    TROPONIN 0.01 08/31/2013    TROPONIN 0.01 09/24/2011                                                                                                               Angelito Ervin MD

## 2018-11-20 NOTE — PROGRESS NOTES
"PULMONARY PROGRESS NOTE          Interval History:      Feels a little better today, did convert to nsr overnight.         Physical Exam:      Blood pressure (!) 154/93, pulse 114, temperature 98.1  F (36.7  C), temperature source Oral, resp. rate 20, height 1.676 m (5' 6\"), weight 68 kg (149 lb 14.6 oz), SpO2 98 %.  Vitals:    11/18/18 1330 11/19/18 0550 11/20/18 0631   Weight: 74.8 kg (165 lb) 68.5 kg (151 lb 0.2 oz) 68 kg (149 lb 14.6 oz)     Vital Signs with Ranges  Temp:  [98.1  F (36.7  C)-98.7  F (37.1  C)] 98.1  F (36.7  C)  Pulse:  [] 114  Heart Rate:  [] 75  Resp:  [16-20] 20  BP: ()/(53-93) 154/93  SpO2:  [94 %-98 %] 98 %  I/O's Last 24 hours  I/O last 3 completed shifts:  In: 2822 [P.O.:1650; I.V.:1172]  Out: 2670 [Urine:2670]    Lungs: coarse   Cardiovascular: nsr   Other: abd soft               Medications:          amiodarone  200 mg Oral Daily     aspirin  81 mg Oral Daily     atorvastatin  40 mg Oral At Bedtime     azithromycin  500 mg Intravenous Q24H     docusate sodium  100 mg Oral BID     fluticasone-vilanterol  1 puff Inhalation Daily     furosemide  40 mg Oral BID     influenza Vac Split High-Dose  0.5 mL Intramuscular Prior to discharge     insulin aspart  1-7 Units Subcutaneous TID AC     insulin aspart  1-5 Units Subcutaneous At Bedtime     ipratropium - albuterol 0.5 mg/2.5 mg/3 mL  3 mL Nebulization 4x daily     levothyroxine  125 mcg Oral QAM AC     metoprolol tartrate  100 mg Oral BID     piperacillin-tazobactam  3.375 g Intravenous Q6H     sildenafil  20 mg Oral TID     sodium chloride (PF)  3 mL Intracatheter Q8H     warfarin  5 mg Oral ONCE at 18:00            Data:      All new lab and imaging data was reviewed.   Recent Labs   Lab Test  11/20/18   0616  11/19/18   1542  11/19/18   1150  11/19/18   0625   08/28/18   1009   WBC  9.8  10.2   --   11.1*   < >  8.9   HGB  11.1*  10.5*   --   10.6*   < >  13.0*   MCV  97  98   --   98   < >  96   PLT  222  224   --   " 237   < >  240   INR  1.20*   --   1.08   --    --   0.97    < > = values in this interval not displayed.      Recent Labs   Lab Test  11/20/18   0616  11/19/18   1150  11/19/18   0625  11/18/18   1414   NA  140   --   140  139   POTASSIUM  4.2  4.5  4.4  4.6   CHLORIDE  102   --   106  106   CO2  32   --   28  26   BUN  35*   --   38*  37*   CR  2.30*   --   2.00*  1.66*   ANIONGAP  6   --   6  7   RAFFY  8.3*   --   7.9*  8.3*   GLC  122*   --   125*  168*        I reviewed the patient's new clinical lab test results.     I reviewed the patient's new imaging test results.       Active Problems:    ACEVES (dyspnea on exertion)           Assessment and Plan:      Rohan is a 72-year-old man with extensive past medical history.  Notable for biopsy-proven pulmonary sarcoidosis.  Typically follows at the [facility] and is maintained on both methotrexate and infliximab.  He also has known pulmonary hypertension as well as history of coronary artery disease with stenting.  In addition, there is history of atrial fib/flutter with rapid ventricular response status post cardioversion with recurrence and pulmonary toxicity in the setting of amiodarone.      Given the abrupt onset of his symptoms along with a CT scan of the chest that is relatively unchanged, suspect that his increased dyspnea is related to a cardiac etiology.  May be related to volume overload versus worsening of his pulmonary hypertension versus a rate or rhythm dependent problem.  Certainly he is immune suppressed and at risk for atypical or fungal infections.  Back into NSR 11/20. Remains profuondly dyspneic. Not sure what change is abrupt worsening vs gradual progression of his disease. Agree with consideration of transfer to [facility]. It would be reasonable to consider bronch if he doesn't improve. This could likely be done at [facility] by his primary pulmonologist.       -agree with cardiology evaluation  -continue methotrexate qweek  -wean o2 as  tolerated  -no role for bronch at this time  -continue bronchodilators  -continue diuresis  -agree with cardiology assessment that it would be reasonable to transfer to [facility] to expedite eval by both his primary pulmonologist and cardiologist.    Discussed with Dr Aziza Erickson  [facility]  Office: [phone]  Pager: [phone]

## 2018-11-20 NOTE — PLAN OF CARE
Problem: Patient Care Overview  Goal: Plan of Care/Patient Progress Review  Outcome: Improving  Respiratory distress at shift change, neb helped. 1x lasix dose given with good output. Oxygenation stable on 4L, despite dyspnea. Very ACEVES- bedrest with minimal activity. VSS. Right subclavian site C/D/I. Continue IV antibiotics. Heparin infusing. Tele a-fib with CVR but now looks to be SR with PAC's.  NPO for possible cardioversion today.

## 2018-11-20 NOTE — PLAN OF CARE
Problem: Diabetes Comorbidity  Goal: Diabetes  Patient comorbidity will be monitored for signs and symptoms of hyperglycemia or hypoglycemia. Problems will be absent, minimized or managed by discharge/transition of care.  Outcome: Improving  108 and 132 BGM

## 2018-11-20 NOTE — PROGRESS NOTES
[facility]    Hospitalist Progress Note      Assessment & Plan      Rohan Monroe is a 72 year old  male with medical history of sarcoidosis on immunosuppressive therapy, COPD, chronic hypoxia on home oxygen, coronary artery disease status post stenting, heart failure with preserved ejection fraction, atrial flutter status post ablation, hypertension, hyperlipidemia, diabetes mellitus diet-controlled, chronic kidney disease stage III presented to the ED with his son for ongoing dyspnea on exertion.       Acute on chronic hypoxic respiratory failure - multifactorial.  Possible CAP, Rule out atypical infection while immunocompromised  - Symptoms started 1 month back - had seen his primary pulmonologist and had CT imaging done; treated with Levaquin and azithromycin x 7d --> slight improvement.    - Over the last 2 weeks, progressive SOB, barely able to ambulate around the house.    - Baseline 2-3L NC O2  - In ED, bilateral infiltrates noted, D dime 1.3, WBC 12.5; received duoneb + 40 iv lasix  - Troponin I 0.043 <-- 0.032 <-- 0.028 <-- 0.029; ntProBNP >18k  - Etiology unclear - DDx heart failure/PHTN worsening, ischemia, atypical infections with sarcoidosis hx and immunocompromised status  - Continue Lasix 40 mg IV twice daily.  - Empirical vancomycin/Zosyn/azithromycin continued for now, ID consult to be placed   - Follow blood and urine culture results.   - Follow CT chest without contrast - new infiltrate noted since last CT on 10/16  - Add pro-calcitonin, CRP, ESR.   - Follow influenza A/B/RSV panel, urine Legionella antigen, streptococcal pneumonia antigen  - De-escalate antibiotics  accordingly  - O2 PRN to maintain sats > 90%.  If any decompensation will consider BiPAP.  - PRN albuterol nebulizer every 2 hours, DuoNeb 4 times a day.  No wheezing hence will hold off steroid.      - Upon admission has slightly elevated d-dimer, was elevated during previous admissions also.  Has a CKD  stage III, if shortness of breath does not improve can consider further workup for VT E.  - Pulmonology consult requested for possible bronchoscopy given patient immunocompromise.  - Cardiology consult requested for volume overload/A. fib management as below.  - R heart cath 11/19 - DCCV scheduled for today but converted to NSR overnight  - Infectious disease following - recs appreciated    Volume overload likely from heart failure exacerbation.  History of Heart failure with preserved ejection fraction: EF 60-65% in March 2018  - EKG showed atrial flutter with variable AV block.  Heart rate 112, .  - Strict input-output monitoring, daily weights.  - Telemetry monitoring. Trops as above.  - Diuresis with intravenous Lasix 40 mg IV twice daily transitioned to PO 40 bid   - Continue PTA aspirin 81 mg oral daily, and Lipitor 40 mg oral daily, metoprolol with holding parameters.  - PTA losartan  given borderline blood pressures.  - Cardiology consulted - appreciate recs      A. Fib/flutter status post ablation.  - [id number].  Is been off Coumadin for last 6 months.  - Has been off amiodarone as he developed toxicity last year.  - Continue PTA metoprolol with hold parameters.    - Continue PTA aspirin 81 mg  - PRN IV metoprolol ordered.  - TSH, magnesium, phosphorus, CPK levels checked and WNL  - RVR 11/18-19 - digoxin started per cardiology, rates controlled, hd stable  - Prior to DCCV planned 11/20, converted to NSR      Pulmonary hypertension.  - Continue PTA sildenafil 3 times a day.  - Right heart cath today     CAD: 4/2008.  - S/p PCI with ROSALIE to the mid-LAD and D2   - Continue PTA aspirin 81 mg, metoprolol with holding parameters.      Sarcoidosis: Heart (presumed- as he has geneva filling pressures and Lung involvement.   - Follows with Dr. Perlman.  - Currently maintained on Remicade every 4 weeks and methotrexate   - No Folic acid use on MTX- would recommend.      COPD on home oxygen 3 L.  -  Continue PTA Breo Ellipta/Spiriva.  - Further treatment as above.      History of Exudative left pleural effusion status post thoracocentesis 4/2017  - Was exudative pleural effusion, had no lymphadenopathy to suggest malignancy and cytology was negative.      Hypertension:   - Continue PTA metoprolol with hold parameters.  - Diuresis with IV Lasix as above.  - PTA losartan on hold given borderline blood pressures.      Type 2 Diabetes Mellitus:   - 11/19 hemoglobin A1c 6.4%.  Carb controlled diet.  - Medium intensity insulin sliding scale.      History of hep C  - treated in the past.      Chronic kidney stage III.  - Creat slightly up, perhaps, to 2.30 11/20  - Monitor renal function closely.  - Avoid nephrotoxic drugs.      Hypothyroidism.  - Continue PTA levothyroxine.  - TSH 1.44      Insomnia.  - Continue PTA as needed Remeron.      Physical deconditioning from acute illness/chronic debility.  - PT, OT assessment.      DVT Prophylaxis: Heparin gtt, and warfarin initiated  Code Status: Full Code      Disposition: Expected discharge in 2-3 days pending clinical improvement. Cardiology and Pulmonary recommend escalating care to [facility] heart failure care - transfer is in process.     Sacha Salinas MD    Text Page (7am to 6pm, M-F)    Interval History   Episode of sob last night although O2 readings apparently normal. Some mild epistaxis today - has happened before with higher oxygen needs via nasal cannula. Otherwise no new complaints. Pleasant gentleman and understands potential need for transfer.    -Data reviewed today: I reviewed all new labs and imaging results over the last 24 hours. I personally reviewed no images or EKG's today.    Physical Exam   Temp: 98  F (36.7  C) Temp src: Oral BP: 138/81 Pulse: 114 Heart Rate: 97 Resp: 18 SpO2: 93 % O2 Device: Nasal cannula with humidification Oxygen Delivery: 4 LPM  Vitals:    11/18/18 1330 11/19/18 0550 11/20/18 0631   Weight: 74.8 kg (165 lb) 68.5 kg (151 lb 0.2  oz) 68 kg (149 lb 14.6 oz)     Vital Signs with Ranges  Temp:  [98  F (36.7  C)-98.7  F (37.1  C)] 98  F (36.7  C)  Pulse:  [] 114  Heart Rate:  [] 97  Resp:  [16-20] 18  BP: ()/(53-93) 138/81  SpO2:  [93 %-98 %] 93 %  I/O last 3 completed shifts:  In: 2822 [P.O.:1650; I.V.:1172]  Out: 2670 [Urine:2670]    Constitutional: Awake, alert, cooperative, no apparent distress  Respiratory: Clear to auscultation bilaterally, no crackles or wheezing  Cardiovascular: Regular rate and rhythm, s1 s2 heard  GI: Normal bowel sounds, soft, non-distended, non-tender  Skin/Integumen: No rashes, no cyanosis, no edema    Medications     HEParin 800 Units/hr (11/20/18 0800)     Warfarin Therapy Reminder         amiodarone  200 mg Oral Daily     aspirin  81 mg Oral Daily     atorvastatin  40 mg Oral At Bedtime     azithromycin  500 mg Intravenous Q24H     docusate sodium  100 mg Oral BID     fluticasone-vilanterol  1 puff Inhalation Daily     furosemide  40 mg Oral BID     influenza Vac Split High-Dose  0.5 mL Intramuscular Prior to discharge     insulin aspart  1-7 Units Subcutaneous TID AC     insulin aspart  1-5 Units Subcutaneous At Bedtime     ipratropium - albuterol 0.5 mg/2.5 mg/3 mL  3 mL Nebulization 4x daily     levothyroxine  125 mcg Oral QAM AC     metoprolol tartrate  100 mg Oral BID     piperacillin-tazobactam  3.375 g Intravenous Q6H     sildenafil  20 mg Oral TID     sodium chloride (PF)  3 mL Intracatheter Q8H     warfarin  5 mg Oral ONCE at 18:00       Data     Recent Labs  Lab 11/20/18  0616 11/19/18  1542 11/19/18  1150 11/19/18  0625 11/18/18  2325 11/18/18  1850 11/18/18  1414   WBC 9.8 10.2  --  11.1*  --   --  12.5*   HGB 11.1* 10.5*  --  10.6*  --   --  11.5*   MCV 97 98  --  98  --   --  97    224  --  237  --  221 274   INR 1.20*  --  1.08  --   --   --   --      --   --  140  --   --  139   POTASSIUM 4.2  --  4.5 4.4  --   --  4.6   CHLORIDE 102  --   --  106  --   --  106   CO2  32  --   --  28  --   --  26   BUN 35*  --   --  38*  --   --  37*   CR 2.30*  --   --  2.00*  --   --  1.66*   ANIONGAP 6  --   --  6  --   --  7   RAFFY 8.3*  --   --  7.9*  --   --  8.3*   *  --   --  125*  --   --  168*   ALBUMIN  --   --   --  2.6*  --   --   --    PROTTOTAL  --   --   --  6.6*  --   --   --    BILITOTAL  --   --   --  0.7  --   --   --    ALKPHOS  --   --   --  110  --   --   --    ALT  --   --   --  42  --   --   --    AST  --   --   --  51*  --   --   --    TROPI  --   --   --  0.043 0.032 0.028 0.029       No results found for this or any previous visit (from the past 24 hour(s)).

## 2018-11-20 NOTE — PLAN OF CARE
Problem: Cardiac Disease Comorbidity  Goal: Cardiac Disease  Patient comorbidity will be monitored for signs and symptoms of Cardiac Disease.  Problems will be absent, minimized or managed by discharge/transition of care.  Outcome: No Change  r heart cath today

## 2018-11-20 NOTE — PROVIDER NOTIFICATION
MD Notification    Notified Person: MD Dr. Mason     Notified Person Name: Amanda Seaver, RN     Notification Date/Time: 11/20/18 at 0015    Notification Interaction: Telephone    Purpose of Notification: Respiratory distress at shift change, neb given with improvement. Pt requesting Ativan for anxiety (he received this last night). Pt also has crackles throughout and is getting numerous IV antibx- Lasix?     Orders Received: Lasix 40 mg IV once, no Ativan       Comments:

## 2018-11-20 NOTE — CONSULTS
[facility]    Infectious Disease Consultation     Date of Admission:  11/18/2018  Date of Consult (When I saw the patient): 11/20/18    Assessment & Plan   Rohan Monroe is a 72 year old male who was admitted on 11/18/2018.     Impression:  1.72-year-old man with pulmonary sarcoidosis, on methotrexate and infliximab.    2. Pulmonary hypertension as well as history of coronary artery disease with stenting.    3. 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   4. Admitted with weeks of worsening resp symptoms along with episodic sudden worsening.     Recommendations:   Diagnosis is broad. ? Worsening pulmonary sarcoidosis, ? New oppurtunistic infection in setting of immunosuppression.   ? Cardiac edema.    He is on broad spectrum antibiotics, with  vanco and zosyn and azithromycin, I would stop vancomycin given the kidney function.   Get sputum for cultures.   Fungal antibodies ordered.   Agree with consideration of transfer to [facility]. If he stay bronch will be helpful in narrowing the cause of the symptoms.       Jana Shook MD    Reason for Consult   Reason for consult: I was asked by Dr. Salinas  to evaluate this patient for respiratory distress.    Primary Care Physician   Jamie Foster    Chief Complaint   Shortness of breath    History is obtained from the patient and medical records    History of Present Illness   Rohan Monroe is a 72 year old male with pulmonary sarcoidosis. On methotrexate and infliximab. Also carries a history of pulmonary toxicity in the setting of amiodarone.  He has had increased dyspnea for the last month.  Was seen by his primary pulmonologist and a CT scan of the chest in October demonstrated a new infiltrate and he received antibiotics with some transient improvement.  He then notes the rather abrupt onset over the weekend of significant dyspnea prompting his admission.  On arrival, found to be volume overloaded and has received  diuretics.  He denies increased fevers sweats or chills.  Does not have increased cough.    Past Medical History   I have reviewed this patient's medical history and updated it with pertinent information if needed.   Past Medical History:   Diagnosis Date     Atrial flutter (H)      Cataract of both eyes      Chronic infection     Hep C     Congestive heart failure, unspecified      Coronary artery disease      Depressive disorder      Depressive disorder, not elsewhere classified     Depression (non-psychotic)     ERM OS (epiretinal membrane, left eye)      Generalized osteoarthrosis, unspecified site      Glaucoma suspect      Hypertension      Lichen planus      Other psoriasis      PVD (posterior vitreous detachment), left eye      Sarcoidosis      Sarcoidosis      Type II or unspecified type diabetes mellitus without mention of complication, not stated as uncontrolled      Unspecified hypothyroidism     Hypothyroidism     Unspecified viral hepatitis C without hepatic coma      Viral warts, unspecified        Past Surgical History   I have reviewed this patient's surgical history and updated it with pertinent information if needed.  Past Surgical History:   Procedure Laterality Date     ANESTHESIA CARDIOVERSION N/A 1/19/2017    Procedure: ANESTHESIA CARDIOVERSION;  Surgeon: GENERIC ANESTHESIA PROVIDER;  Location: [facility] OR     ANESTHESIA CARDIOVERSION N/A 1/23/2017    Procedure: ANESTHESIA CARDIOVERSION;  Surgeon: GENERIC ANESTHESIA PROVIDER;  Location: [facility] OR     ANESTHESIA CARDIOVERSION N/A 1/24/2017    Procedure: ANESTHESIA CARDIOVERSION;  Surgeon: GENERIC ANESTHESIA PROVIDER;  Location: [facility] OR     ARTHROPLASTY HIP  8/24/2011    Procedure:ARTHROPLASTY HIP; Right Total Hip Arthroplasty  Choice anesthesia; Surgeon:LESLI WILKINSON; Location:[facility] OR     BIOPSY       C PELVIS/HIP JOINT SURGERY UNLISTED       cardiac stent      s/p     CARDIAC SURGERY       CATARACT IOL, RT/LT  9/15/2015    LE     COLONOSCOPY        CORONARY ANGIOGRAPHY ADULT ORDER       H ABLATION ATRIAL FLUTTER       HC REMOVAL OF TONSILS,<11 Y/O      Tonsils <10y.o.     HC REPAIR INCISIONAL HERNIA,REDUCIBLE      Hernia Repair, Incisional, Unilateral     HEART CATH, ANGIOPLASTY       JOINT REPLACEMENT      1 month ago--right hip     LIGATN/STRIP LONG & SHORT SAPHEN         Prior to Admission Medications   Prior to Admission Medications   Prescriptions Last Dose Informant Patient Reported? Taking?   Multiple Vitamins-Minerals (MULTIVITAMIN & MINERAL PO) 2018 at Unknown time Self Yes Yes   Sig: Take 1 tablet by mouth daily.   Urea 40 % CREA prn med Self Yes Yes   Sig: Externally apply topically daily as needed for dry skin   albuterol (PROAIR HFA/PROVENTIL HFA/VENTOLIN HFA) 108 (90 Base) MCG/ACT Inhaler prn med Self No Yes   Sig: Inhale 2 puffs into the lungs every 6 hours as needed for shortness of breath / dyspnea   aspirin 81 MG EC tablet 2018 at am Self Yes Yes   Sig: Take 81 mg by mouth daily   atorvastatin (LIPITOR) 40 MG tablet 2018 at pm Self No Yes   Sig: TAKE ONE TABLET BY MOUTH ONE TIME DAILY    Patient taking differently: TAKE ONE TABLET BY MOUTH ONE TIME EVERY EVENING.   blood glucose monitoring (ACCU-CHEK RONNIE PLUS) test strip  Self No No   Sig: Use to test blood sugar 2 times daily or as directed.  3 month supply.   Patient not taking: Reported on 2018   blood glucose monitoring (ACCU-CHEK FASTCLIX) lancets  Self No No   Sig: Use to test blood sugar 2 times daily or as directed.  102 lancets per box.  3 month supply.   Patient not taking: Reported on 2018   blood glucose monitoring (NO BRAND SPECIFIED) meter device kit  Self No No   Sig: Use to test blood sugar 2 times daily.   Patient not taking: Reported on 2018   ciclopirox (LOPROX) 0.77 % cream prn med Self Yes Yes   Sig: Apply topically 2 times daily as needed   colchicine (COLCRYS) 0.6 MG tablet prn med Self No Yes   Si tabs at the onset of flare, then  1 tab every 2 hrs until pain is better or diarrhea occurs, up to 10 doses. Wait 3 days until taking again   fluticasone-vilanterol (BREO ELLIPTA) 100-25 MCG/INH oral inhaler 11/18/2018 at am Self No Yes   Sig: Inhale 1 puff into the lungs daily   furosemide (LASIX) 20 MG tablet 11/18/2018 at am x 1 dose Self No Yes   Sig: Take 2 tablets (40 mg) by mouth 2 times daily May take extra 20 mg as needed for wt .gain >3#   inFLIXimab (REMICADE) 100 MG injection ~ 1 week ago Self Yes No   Sig: Inject 100 mg into the vein every 28 days    levothyroxine (SYNTHROID/LEVOTHROID) 125 MCG tablet 11/18/2018 at am Self No Yes   Sig: Take 1 tablet (125 mcg) by mouth daily   losartan (COZAAR) 50 MG tablet 11/18/2018 at am Self No Yes   Sig: TAKE ONE TABLET BY MOUTH ONE TIME DAILY    methotrexate 2.5 MG tablet CHEMO 11/12/2018 Self No Yes   Sig: Take 3 tablets (7.5 mg) by mouth once a week On Mondays   metoprolol tartrate (LOPRESSOR) 100 MG tablet 11/18/2018 at am x 1 dose Self No Yes   Sig: Take 1 tablet (100 mg) by mouth 2 times daily   mirtazapine (REMERON) 15 MG tablet 11/17/2018 at hs Self Yes Yes   Sig: Take 15 mg by mouth At Bedtime.   order for DME  Self No No   Sig: She requested for a 4 wheel walker, would like to change it to 4 wheel walker with a seat and oxygen tank durant.     Need this faxed over to her   [phone]   order for DME  Self No No   Sig: Updated Oxygen: Patient requires supplemental Oxygen 3 LPM via nasal canula with activity and 2 LPM nocturnally. Please provide patient with a portable oxygen concentrator for improved mobility.  Okay to spot check or titrated for conserving to keep stats above 90%. Oxygen will be for a lifetime.   polyethylene glycol (MIRALAX/GLYCOLAX) powder prn med Self Yes Yes   Sig: Take 17 g by mouth daily as needed for constipation   sildenafil (REVATIO) 20 MG tablet 11/18/2018 at am x 1 dose Self No Yes   Sig: TAKE ONE TABLET BY MOUTH THREE TIMES DAILY    tiotropium (SPIRIVA  HANDIHALER) 18 MCG capsule  Self No No   Sig: Inhale contents of one capsule daily.      Facility-Administered Medications: None     Allergies   Allergies   Allergen Reactions     Prednisone Other (See Comments)     He reports that he can't sleep for days and can't use small to massive doses of prednisone   Pt. Does not do well with high doses of Prednisone, His MD says that Prednisone is counter indicated. Prednisone failed to treat his sarcoidosis        Immunization History   Immunization History   Administered Date(s) Administered     Influenza (H1N1) 12/18/2009     Influenza (High Dose) 3 valent vaccine 12/12/2013, 11/06/2014, 10/27/2015, 01/20/2017, 12/14/2017     Influenza (IIV3) PF 12/22/2004, 11/04/2005, 10/13/2006, 09/18/2009, 10/07/2010, 09/27/2011, 09/27/2011, 09/25/2012     Mantoux Tuberculin Skin Test 05/27/2009     Pneumococcal (PCV 7) 08/11/2011     Pneumococcal 23 valent 10/13/2006, 07/18/2016     TDAP Vaccine (Boostrix) 08/11/2011     Tdap (Adacel,Boostrix) 10/13/2006, 08/11/2011       Social History   I have reviewed this patient's social history and updated it with pertinent information if needed. Rohan Monroe  reports that he quit smoking about 24 years ago. His smoking use included Cigarettes. He started smoking about 57 years ago. He has a 30.00 pack-year smoking history. He has never used smokeless tobacco. He reports that he does not drink alcohol or use illicit drugs.    Family History   I have reviewed this patient's family history and updated it with pertinent information if needed.   Family History   Problem Relation Age of Onset     HEART DISEASE Father      irreg heart beat     Circulatory Father      varicose veins     Prostate Cancer Father      HEART DISEASE Mother      heart attack     Arthritis Mother      Osteoporosis Mother      Thyroid Disease Mother      Hypertension Mother      Eye Disorder Maternal Grandmother      glaucoma     Diabetes Sister      type 2      Kidney Cancer Sister      Diabetes Sister      Glaucoma No family hx of      Macular Degeneration No family hx of      Cancer No family hx of      no skin cancer       Review of Systems   The 10 point Review of Systems is negative other than noted in the HPI or here.     Physical Exam   Temp: 98.1  F (36.7  C) Temp src: Oral BP: (!) 154/93 Pulse: 114 Heart Rate: 75 Resp: 20 SpO2: 98 % O2 Device: Nasal cannula Oxygen Delivery: 4 LPM  Vital Signs with Ranges  Temp:  [97.4  F (36.3  C)-98.7  F (37.1  C)] 98.1  F (36.7  C)  Pulse:  [] 114  Heart Rate:  [] 75  Resp:  [16-20] 20  BP: ()/(53-93) 154/93  SpO2:  [94 %-100 %] 98 %  149 lbs 14.6 oz  Body mass index is 24.2 kg/(m^2).    GENERAL APPEARANCE:  Nose bleed   EYES: Eyes grossly normal to inspection, PERRL and conjunctivae and sclerae normal  HENT: ear canals and TM's normal and nose and mouth without ulcers or lesions  NECK: no adenopathy, no asymmetry, masses, or scars and thyroid normal to palpation  RESP: very diminished breath sounds   CV: regular rates and rhythm, normal S1 S2, no S3 or S4 and no murmur, click or rub  LYMPHATICS: normal ant/post cervical and supraclavicular nodes  ABDOMEN: soft, nontender, without hepatosplenomegaly or masses and bowel sounds normal  MS: extremities normal- no gross deformities noted  SKIN: no suspicious lesions or rashes      Data   Lab Results   Component Value Date    WBC 9.8 11/20/2018    HGB 11.1 (L) 11/20/2018    HCT 35.0 (L) 11/20/2018     11/20/2018     11/20/2018    POTASSIUM 4.2 11/20/2018    CHLORIDE 102 11/20/2018    CO2 32 11/20/2018    BUN 35 (H) 11/20/2018    CR 2.30 (H) 11/20/2018     (H) 11/20/2018    SED 46 (H) 11/19/2018    DD 1.3 (H) 11/18/2018    NTBNPI 76044 (H) 11/18/2018    NTBNP 2688 (H) 09/13/2018    TROPONIN 0.23 (HH) 01/18/2017    TROPI 0.043 11/19/2018    AST 51 (H) 11/19/2018    ALT 42 11/19/2018    ALKPHOS 110 11/19/2018    BILITOTAL 0.7 11/19/2018    INR 1.20  (H) 11/20/2018       Recent Labs  Lab 11/18/18  1620 11/18/18  1500   CULT No growth after 2 days No growth after 2 days     Recent Labs   Lab Test  11/18/18   1620  11/18/18   1500  01/05/18   0628  04/26/17   0830  04/22/17   2240  04/22/17   1851  04/22/17   1845  01/18/17   1932  01/18/17   1830   CULT  No growth after 2 days  No growth after 2 days  No growth  No growth  Heavy growth Normal trent  Light growth Citrobacter koseri  *  No growth  No growth  No growth  No growth

## 2018-11-20 NOTE — PLAN OF CARE
Problem: Patient Care Overview  Goal: Plan of Care/Patient Progress Review  Outcome: No Change  Neuro:intact  CV/Rhythm:a fib  Resp/02:wheezing, 4 L NC  GI/Diet:mod cho  :voiding  Skin/Incisions/Sites:intact, R subclavian old site  Pulses/CMS:intact  Edema:-  Activity/Falls Risk:fall risk  Lines/Drains/IVs:piv x 2  Labs/BGM:, 132  Test/Procedures:dccv tomorrow  VS/Pain:pain free afib 110  DC Plan:after dccv  Other:antibiotics

## 2018-11-21 ENCOUNTER — HOSPITAL ENCOUNTER (INPATIENT)
Facility: CLINIC | Age: 73
LOS: 5 days | Discharge: HOME HEALTH | DRG: 177 | End: 2018-11-26
Attending: INTERNAL MEDICINE | Admitting: INTERNAL MEDICINE
Payer: MEDICARE

## 2018-11-21 VITALS
HEIGHT: 66 IN | BODY MASS INDEX: 23.35 KG/M2 | SYSTOLIC BLOOD PRESSURE: 140 MMHG | HEART RATE: 114 BPM | RESPIRATION RATE: 20 BRPM | TEMPERATURE: 98.8 F | WEIGHT: 145.28 LBS | OXYGEN SATURATION: 100 % | DIASTOLIC BLOOD PRESSURE: 85 MMHG

## 2018-11-21 DIAGNOSIS — B59 PNEUMONIA OF BOTH UPPER LOBES DUE TO PNEUMOCYSTIS JIROVECII (H): Primary | ICD-10-CM

## 2018-11-21 DIAGNOSIS — I50.30 (HFPEF) HEART FAILURE WITH PRESERVED EJECTION FRACTION (H): ICD-10-CM

## 2018-11-21 DIAGNOSIS — I27.20 PULMONARY HYPERTENSION (H): ICD-10-CM

## 2018-11-21 DIAGNOSIS — M10.9 GOUT, UNSPECIFIED CAUSE, UNSPECIFIED CHRONICITY, UNSPECIFIED SITE: ICD-10-CM

## 2018-11-21 DIAGNOSIS — G47.00 INSOMNIA, UNSPECIFIED TYPE: ICD-10-CM

## 2018-11-21 PROBLEM — Q24.9 CARDIAC ABNORMALITY: Status: ACTIVE | Noted: 2018-11-21

## 2018-11-21 LAB
CREAT SERPL-MCNC: 2.26 MG/DL (ref 0.66–1.25)
GFR SERPL CREATININE-BSD FRML MDRD: 28 ML/MIN/1.7M2
GLUCOSE BLDC GLUCOMTR-MCNC: 110 MG/DL (ref 70–99)
GLUCOSE BLDC GLUCOMTR-MCNC: 113 MG/DL (ref 70–99)
GLUCOSE BLDC GLUCOMTR-MCNC: 146 MG/DL (ref 70–99)
GLUCOSE BLDC GLUCOMTR-MCNC: 155 MG/DL (ref 70–99)
GLUCOSE BLDC GLUCOMTR-MCNC: 161 MG/DL (ref 70–99)
GLUCOSE BLDC GLUCOMTR-MCNC: 173 MG/DL (ref 70–99)
HEMOCCULT STL QL: POSITIVE
HGB BLD-MCNC: 11.1 G/DL (ref 13.3–17.7)
INR PPP: 1.33 (ref 0.86–1.14)
LACTATE BLD-SCNC: 1.3 MMOL/L (ref 0.7–2)
LMWH PPP CHRO-ACNC: 0.15 IU/ML
PLATELET # BLD AUTO: 253 10E9/L (ref 150–450)
PROCALCITONIN SERPL-MCNC: 0.09 NG/ML

## 2018-11-21 PROCEDURE — A9270 NON-COVERED ITEM OR SERVICE: HCPCS | Performed by: INTERNAL MEDICINE

## 2018-11-21 PROCEDURE — 94640 AIRWAY INHALATION TREATMENT: CPT | Mod: 76

## 2018-11-21 PROCEDURE — 86606 ASPERGILLUS ANTIBODY: CPT | Performed by: HOSPITALIST

## 2018-11-21 PROCEDURE — 36415 COLL VENOUS BLD VENIPUNCTURE: CPT | Performed by: HOSPITALIST

## 2018-11-21 PROCEDURE — 25000128 H RX IP 250 OP 636: Performed by: INTERNAL MEDICINE

## 2018-11-21 PROCEDURE — 25000125 ZZHC RX 250: Performed by: HOSPITALIST

## 2018-11-21 PROCEDURE — 40000275 ZZH STATISTIC RCP TIME EA 10 MIN

## 2018-11-21 PROCEDURE — 93005 ELECTROCARDIOGRAM TRACING: CPT

## 2018-11-21 PROCEDURE — 25000132 ZZH RX MED GY IP 250 OP 250 PS 637: Performed by: HOSPITALIST

## 2018-11-21 PROCEDURE — 00000146 ZZHCL STATISTIC GLUCOSE BY METER IP

## 2018-11-21 PROCEDURE — A9270 NON-COVERED ITEM OR SERVICE: HCPCS | Performed by: HOSPITALIST

## 2018-11-21 PROCEDURE — 25000132 ZZH RX MED GY IP 250 OP 250 PS 637: Performed by: INTERNAL MEDICINE

## 2018-11-21 PROCEDURE — 21400006 ZZH R&B CCU INTERMEDIATE UMMC

## 2018-11-21 PROCEDURE — 99233 SBSQ HOSP IP/OBS HIGH 50: CPT | Performed by: INTERNAL MEDICINE

## 2018-11-21 PROCEDURE — 94640 AIRWAY INHALATION TREATMENT: CPT

## 2018-11-21 PROCEDURE — 85520 HEPARIN ASSAY: CPT | Performed by: HOSPITALIST

## 2018-11-21 PROCEDURE — 86698 HISTOPLASMA ANTIBODY: CPT | Performed by: HOSPITALIST

## 2018-11-21 PROCEDURE — 82272 OCCULT BLD FECES 1-3 TESTS: CPT | Performed by: HOSPITALIST

## 2018-11-21 PROCEDURE — 25000128 H RX IP 250 OP 636: Performed by: HOSPITALIST

## 2018-11-21 PROCEDURE — 86635 COCCIDIOIDES ANTIBODY: CPT | Performed by: HOSPITALIST

## 2018-11-21 PROCEDURE — 85049 AUTOMATED PLATELET COUNT: CPT | Performed by: HOSPITALIST

## 2018-11-21 PROCEDURE — 84145 PROCALCITONIN (PCT): CPT | Performed by: HOSPITALIST

## 2018-11-21 PROCEDURE — 83605 ASSAY OF LACTIC ACID: CPT | Performed by: HOSPITALIST

## 2018-11-21 PROCEDURE — 82565 ASSAY OF CREATININE: CPT | Performed by: HOSPITALIST

## 2018-11-21 PROCEDURE — 99223 1ST HOSP IP/OBS HIGH 75: CPT | Mod: GC | Performed by: INTERNAL MEDICINE

## 2018-11-21 PROCEDURE — 85610 PROTHROMBIN TIME: CPT | Performed by: HOSPITALIST

## 2018-11-21 PROCEDURE — 99233 SBSQ HOSP IP/OBS HIGH 50: CPT | Performed by: HOSPITALIST

## 2018-11-21 PROCEDURE — 93010 ELECTROCARDIOGRAM REPORT: CPT | Performed by: INTERNAL MEDICINE

## 2018-11-21 PROCEDURE — 86612 BLASTOMYCES ANTIBODY: CPT | Performed by: HOSPITALIST

## 2018-11-21 PROCEDURE — 85018 HEMOGLOBIN: CPT | Performed by: HOSPITALIST

## 2018-11-21 RX ORDER — FUROSEMIDE 40 MG
40 TABLET ORAL
Status: DISCONTINUED | OUTPATIENT
Start: 2018-11-22 | End: 2018-11-22

## 2018-11-21 RX ORDER — LORAZEPAM 0.5 MG/1
0.5 TABLET ORAL
Status: DISCONTINUED | OUTPATIENT
Start: 2018-11-21 | End: 2018-11-21 | Stop reason: HOSPADM

## 2018-11-21 RX ORDER — LANOLIN ALCOHOL/MO/W.PET/CERES
3 CREAM (GRAM) TOPICAL
Status: DISCONTINUED | OUTPATIENT
Start: 2018-11-21 | End: 2018-11-21 | Stop reason: HOSPADM

## 2018-11-21 RX ORDER — WARFARIN SODIUM 3 MG/1
6 TABLET ORAL
Status: DISCONTINUED | OUTPATIENT
Start: 2018-11-21 | End: 2018-11-21

## 2018-11-21 RX ADMIN — AZITHROMYCIN MONOHYDRATE 500 MG: 500 INJECTION, POWDER, LYOPHILIZED, FOR SOLUTION INTRAVENOUS at 17:14

## 2018-11-21 RX ADMIN — AMIODARONE HYDROCHLORIDE 0.5 MG/MIN: 50 INJECTION, SOLUTION INTRAVENOUS at 20:11

## 2018-11-21 RX ADMIN — SILDENAFIL 20 MG: 20 TABLET, FILM COATED ORAL at 05:57

## 2018-11-21 RX ADMIN — FLUTICASONE FUROATE AND VILANTEROL TRIFENATATE 1 PUFF: 100; 25 POWDER RESPIRATORY (INHALATION) at 09:19

## 2018-11-21 RX ADMIN — FUROSEMIDE 40 MG: 40 TABLET ORAL at 09:19

## 2018-11-21 RX ADMIN — METOPROLOL TARTRATE 100 MG: 100 TABLET, FILM COATED ORAL at 11:00

## 2018-11-21 RX ADMIN — ASPIRIN 81 MG: 81 TABLET, COATED ORAL at 09:20

## 2018-11-21 RX ADMIN — IPRATROPIUM BROMIDE AND ALBUTEROL SULFATE 3 ML: 2.5; .5 SOLUTION RESPIRATORY (INHALATION) at 08:31

## 2018-11-21 RX ADMIN — IPRATROPIUM BROMIDE AND ALBUTEROL SULFATE 3 ML: 2.5; .5 SOLUTION RESPIRATORY (INHALATION) at 15:58

## 2018-11-21 RX ADMIN — IPRATROPIUM BROMIDE AND ALBUTEROL SULFATE 3 ML: 2.5; .5 SOLUTION RESPIRATORY (INHALATION) at 12:06

## 2018-11-21 RX ADMIN — LEVOTHYROXINE SODIUM 125 MCG: 125 TABLET ORAL at 09:18

## 2018-11-21 RX ADMIN — AMIODARONE HYDROCHLORIDE 150 MG: 1.5 INJECTION, SOLUTION INTRAVENOUS at 13:48

## 2018-11-21 RX ADMIN — FUROSEMIDE 40 MG: 40 TABLET ORAL at 17:14

## 2018-11-21 RX ADMIN — AMIODARONE HYDROCHLORIDE 200 MG: 200 TABLET ORAL at 09:20

## 2018-11-21 RX ADMIN — PIPERACILLIN SODIUM,TAZOBACTAM SODIUM 3.38 G: 3; .375 INJECTION, POWDER, FOR SOLUTION INTRAVENOUS at 13:43

## 2018-11-21 RX ADMIN — DOCUSATE SODIUM 100 MG: 100 CAPSULE, LIQUID FILLED ORAL at 09:20

## 2018-11-21 RX ADMIN — SILDENAFIL 20 MG: 20 TABLET, FILM COATED ORAL at 13:55

## 2018-11-21 RX ADMIN — HEPARIN SODIUM 800 UNITS/HR: 10000 INJECTION, SOLUTION INTRAVENOUS at 00:15

## 2018-11-21 RX ADMIN — PIPERACILLIN SODIUM,TAZOBACTAM SODIUM 3.38 G: 3; .375 INJECTION, POWDER, FOR SOLUTION INTRAVENOUS at 05:57

## 2018-11-21 RX ADMIN — AMIODARONE HYDROCHLORIDE 1 MG/MIN: 50 INJECTION, SOLUTION INTRAVENOUS at 18:59

## 2018-11-21 RX ADMIN — AMIODARONE HYDROCHLORIDE 1 MG/MIN: 50 INJECTION, SOLUTION INTRAVENOUS at 14:11

## 2018-11-21 RX ADMIN — PIPERACILLIN SODIUM,TAZOBACTAM SODIUM 3.38 G: 3; .375 INJECTION, POWDER, FOR SOLUTION INTRAVENOUS at 20:08

## 2018-11-21 ASSESSMENT — ACTIVITIES OF DAILY LIVING (ADL)
ADLS_ACUITY_SCORE: 14
ADLS_ACUITY_SCORE: 13
ADLS_ACUITY_SCORE: 14
ADLS_ACUITY_SCORE: 13

## 2018-11-21 NOTE — PROVIDER NOTIFICATION
Pt reports having increased anxiety. Attempted PRN remeron at 2200 as well as utilizing PRN melatonin with no effect. Pt reports that he had dose of ativan day of admission that helped improve his anxiety. Requesting additional dose. Paged on- call, Dr. Mason. Will await return call.    No additional medication ordered for anxiety. Will alert primary of repeat requests for ativan.

## 2018-11-21 NOTE — PROGRESS NOTES
[facility]    Infectious Disease Progress Note    Date of Service (when I saw the patient): 11/21/2018     Assessment & Plan   Rohan Monroe is a 72 year old male who was admitted on 11/18/2018.     Impression:  1.72-year-old man with pulmonary sarcoidosis, on methotrexate and infliximab.    2. Pulmonary hypertension as well as history of coronary artery disease with stenting.    3. 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 I discussed this with both pulmonology and cardiology, per card amiodarone is still the drug of choice.    4. Admitted with weeks of worsening resp symptoms along with episodic sudden worsening.      Recommendations:   Diagnosis is broad. ? Worsening pulmonary sarcoidosis, ? New oppurtunistic infection in setting of immunosuppression.   ? Cardiac/   He is on broad spectrum antibiotics, with  vanco and zosyn and azithromycin, I stopped vancomycin given the kidney function, if cultures positive for MRSA will reconsider. My suspicion is for a non infectious cause, I discussed with Dr. Erickson if there is a role of steroids, patient tells me is allergic to prednisone but those were side effects not allergy. Per Dr. Erickson would prefer ruling out infection/ treating as bacterial infection for now, more infiltrates today then yeterday Cultures so far not growing any thing, fungal antibodies are pending.         Agree with consideration of transfer to [facility]. If he stay bronch will be helpful in narrowing the cause of the symptoms      Jana Shook MD    Interval History   More short of breath   Nose bleeds stopped  Creat is stable   No micro positive yet     Physical Exam   Temp: 98.8  F (37.1  C) Temp src: Oral BP: 138/88   Heart Rate: 127 Resp: 24 SpO2: 94 % O2 Device: Nasal cannula Oxygen Delivery: 4 LPM  Vitals:    11/19/18 0550 11/20/18 0631 11/21/18 0415   Weight: 68.5 kg (151 lb 0.2 oz) 68 kg (149 lb 14.6 oz) 65.9 kg (145 lb 4.5 oz)     Vital Signs with  Ranges  Temp:  [97.9  F (36.6  C)-98.8  F (37.1  C)] 98.8  F (37.1  C)  Heart Rate:  [] 127  Resp:  [18-24] 24  BP: (116-160)/(69-98) 138/88  SpO2:  [92 %-97 %] 94 %    Constitutional: short of breath   Lungs: bilateral crackles   Cardiovascular: Regular rate and rhythm, normal S1 and S2, and no murmur noted  Abdomen: Normal bowel sounds, soft, non-distended, non-tender  Skin: No rashes, no cyanosis, no edema  Other:    Medications     amiodarone 1 mg/min (11/21/18 1411)     amiodarone       Warfarin Therapy Reminder         aspirin  81 mg Oral Daily     atorvastatin  40 mg Oral At Bedtime     azithromycin  500 mg Intravenous Q24H     docusate sodium  100 mg Oral BID     fluticasone-vilanterol  1 puff Inhalation Daily     furosemide  40 mg Oral BID     influenza Vac Split High-Dose  0.5 mL Intramuscular Prior to discharge     insulin aspart  1-7 Units Subcutaneous TID AC     insulin aspart  1-5 Units Subcutaneous At Bedtime     ipratropium - albuterol 0.5 mg/2.5 mg/3 mL  3 mL Nebulization 4x daily     levothyroxine  125 mcg Oral QAM AC     metoprolol tartrate  100 mg Oral BID     piperacillin-tazobactam  3.375 g Intravenous Q6H     sildenafil  20 mg Oral TID     sodium chloride (PF)  3 mL Intracatheter Q8H     warfarin  6 mg Oral ONCE at 18:00       Data   All microbiology laboratory data reviewed.  Recent Labs   Lab Test  11/21/18   1357  11/21/18   0703  11/20/18   0616  11/19/18   1542  11/19/18   0625   WBC   --    --   9.8  10.2  11.1*   HGB  11.1*   --   11.1*  10.5*  10.6*   HCT   --    --   35.0*  33.5*  34.7*   MCV   --    --   97  98  98   PLT   --   253  222  224  237     Recent Labs   Lab Test  11/21/18   0703  11/20/18   0616  11/19/18   0625   CR  2.26*  2.30*  2.00*     Recent Labs   Lab Test  11/19/18   0625   SED  46*     Recent Labs   Lab Test  11/20/18   1700  11/18/18   1620  11/18/18   1500  01/05/18   0628  04/26/17   0830  04/22/17   2240  04/22/17   1851  04/22/17   1845  01/18/17    1932   CULT  Culture negative monitoring continues  No growth after 3 days  No growth after 3 days  No growth  No growth  Heavy growth Normal trent  Light growth Citrobacter koseri  *  No growth  No growth  No growth

## 2018-11-21 NOTE — PROGRESS NOTES
"PULMONARY PROGRESS NOTE          Interval History:      Back into afib today. Feels fatigued.         Physical Exam:      Blood pressure 131/69, pulse 114, temperature 98.5  F (36.9  C), temperature source Oral, resp. rate 24, height 1.676 m (5' 6\"), weight 65.9 kg (145 lb 4.5 oz), SpO2 95 %.  Vitals:    11/19/18 0550 11/20/18 0631 11/21/18 0415   Weight: 68.5 kg (151 lb 0.2 oz) 68 kg (149 lb 14.6 oz) 65.9 kg (145 lb 4.5 oz)     Vital Signs with Ranges  Temp:  [97.9  F (36.6  C)-98.5  F (36.9  C)] 98.5  F (36.9  C)  Heart Rate:  [] 115  Resp:  [18-24] 24  BP: (116-160)/(69-98) 131/69  SpO2:  [92 %-97 %] 95 %  I/O's Last 24 hours  I/O last 3 completed shifts:  In: 656 [P.O.:360; I.V.:296]  Out: 2800 [Urine:2800]    Lungs: coarse   Cardiovascular: afib   Other: abd soft               Medications:          aspirin  81 mg Oral Daily     atorvastatin  40 mg Oral At Bedtime     azithromycin  500 mg Intravenous Q24H     docusate sodium  100 mg Oral BID     fluticasone-vilanterol  1 puff Inhalation Daily     furosemide  40 mg Oral BID     influenza Vac Split High-Dose  0.5 mL Intramuscular Prior to discharge     insulin aspart  1-7 Units Subcutaneous TID AC     insulin aspart  1-5 Units Subcutaneous At Bedtime     ipratropium - albuterol 0.5 mg/2.5 mg/3 mL  3 mL Nebulization 4x daily     levothyroxine  125 mcg Oral QAM AC     metoprolol tartrate  100 mg Oral BID     piperacillin-tazobactam  3.375 g Intravenous Q6H     sildenafil  20 mg Oral TID     sodium chloride (PF)  3 mL Intracatheter Q8H     warfarin  6 mg Oral ONCE at 18:00            Data:      All new lab and imaging data was reviewed.   Recent Labs   Lab Test  11/21/18   1357  11/21/18   0703  11/20/18   0616  11/19/18   1542  11/19/18   1150  11/19/18   0625   WBC   --    --   9.8  10.2   --   11.1*   HGB  11.1*   --   11.1*  10.5*   --   10.6*   MCV   --    --   97  98   --   98   PLT   --   253  222  224   --   237   INR   --   1.33*  1.20*   --   1.08   " --       Recent Labs   Lab Test  11/21/18   0703  11/20/18   0616  11/19/18   1150  11/19/18   0625  11/18/18   1414   NA   --   140   --   140  139   POTASSIUM   --   4.2  4.5  4.4  4.6   CHLORIDE   --   102   --   106  106   CO2   --   32   --   28  26   BUN   --   35*   --   38*  37*   CR  2.26*  2.30*   --   2.00*  1.66*   ANIONGAP   --   6   --   6  7   RAFFY   --   8.3*   --   7.9*  8.3*   GLC   --   122*   --   125*  168*        I reviewed the patient's new clinical lab test results.     I reviewed the patient's new imaging test results.       Active Problems:    AECVES (dyspnea on exertion)           Assessment and Plan:      Rohan is a 72-year-old man with extensive past medical history.  Notable for biopsy-proven pulmonary sarcoidosis.  Typically follows at the [facility] and is maintained on both methotrexate and infliximab.  He also has known pulmonary hypertension as well as history of coronary artery disease with stenting.  In addition, there is history of atrial fib/flutter with rapid ventricular response status post cardioversion with recurrence and pulmonary toxicity in the setting of amiodarone.      Given the abrupt onset of his symptoms along with a CT scan of the chest that is relatively unchanged, suspect that his increased dyspnea is related to a cardiac etiology.  May be related to volume overload versus worsening of his pulmonary hypertension versus a rate or rhythm dependent problem.  Certainly he is immune suppressed and at risk for atypical or fungal infections.  Back into NSR 11/20 then back into afib and now breathing again worse.  Agree with consideration of transfer to [facility]. It would be reasonable to consider bronch if he doesn't improve. This could likely be done at [facility] by his primary pulmonologist.       -agree with cardiology evaluation  -continue methotrexate qweek  -wean o2 as tolerated  -no role for bronch at this time  -continue bronchodilators  -continue  diuresis  -agree with cardiology assessment that it would be reasonable to transfer to [facility] to expedite eval by both his primary pulmonologist and cardiologist.  -I spoke with his pulmonologist, Dr Perlman, who is in agreement with transfer. Plans underway.    Will followup Friday. Please call if needed.    Discussed with Dr Aziza Erickson  [facility]  Office: [phone]  Pager: [phone]

## 2018-11-21 NOTE — DISCHARGE SUMMARY
[facility]    Hospitalist Progress Note      Assessment & Plan   Rohan Monroe is a 72 year old  male with medical history of sarcoidosis on immunosuppressive therapy, COPD, chronic hypoxia on home oxygen, coronary artery disease status post stenting, heart failure with preserved ejection fraction, atrial flutter status post ablation, hypertension, hyperlipidemia, diabetes mellitus diet-controlled, chronic kidney disease stage III presented to the ED with his son for ongoing dyspnea on exertion.       Acute on chronic hypoxic respiratory failure - multifactorial.  Possible CAP, Rule out atypical infection while immunocompromised  - Symptoms started 1 month back - had seen his primary pulmonologist and had CT imaging done; treated with Levaquin and azithromycin x 7d --> slight improvement.    - Over the last 2 weeks, progressive SOB, barely able to ambulate around the house.    - Baseline 2-3L NC O2  - In ED, bilateral infiltrates noted, D dime 1.3, WBC 12.5; received duoneb + 40 iv lasix  - Troponin I 0.043 <-- 0.032 <-- 0.028 <-- 0.029; ntProBNP >18k  - Etiology unclear - DDx heart failure/PHTN worsening, ischemia, atypical infections with sarcoidosis hx and immunocompromised status  - Continue Lasix 40 mg IV twice daily.  - Empirical Zosyn/azithromycin continued per ID (vancomycin stopped 11/20)  - ID following, recs appreciated  - Follow blood cultures, influenza negative, sputum, urine antigen, fungals - negative or no growth thus far 11/21  - CT chest without contrast - new infiltrate noted since last CT on 10/16  - O2 PRN to maintain sats > 90%; uses ~3L at home (3-4L nc as of 11/21 AM)  - PRN albuterol nebulizer every 2 hours, DuoNeb 4 times a day.  No wheezing hence will hold off steroid.    - Pulmonology consult requested for possible bronchoscopy given patient immunocompromise.  - Cardiology consult requested for volume overload/A. fib management as below.  - R heart cath 11/19 -  DCCV scheduled for today but converted to NSR overnight    *Pulmonary, Cardiology, and Infectious Disease following - treating as possible bacterial infection with concern of atypicals due to history of sarcoidosis and immunocompromisation; consideration of worsening sarcoidosis is noted but will hold off steroids for the time being. Transfer to [facility] where he follows with specialists is in progress - awaiting bed - Cardio & Pulm at [facility] are aware, Care transitions following.      Volume overload likely from heart failure exacerbation.  History of Heart failure with preserved ejection fraction: EF 60-65% in March 2018  - EKG showed atrial flutter with variable AV block.  Heart rate 112, .  - Strict input-output monitoring, daily weights.  - Telemetry monitoring. Trops as above.  - Continue PTA aspirin 81 mg oral daily, and Lipitor 40 mg oral daily, metoprolol with holding parameters.  - PTA losartan held given borderline blood pressures.  - Cardiology consulted - appreciate recs  - Furosemide 40 po bid continued (previously IV)    A. Fib/flutter status post ablation.  - [id number] 4.  Is been off Coumadin for last 6 months.  - Has been off amiodarone as he developed toxicity last year.  - Continue PTA metoprolol with hold parameters.    - Continue PTA aspirin 81 mg  - PRN IV metoprolol ordered.  - TSH, magnesium, phosphorus, CPK levels checked and WNL  - RVR 11/18-19 - digoxin started per cardiology, rates controlled, hd stable  - Prior to DCCV planned 11/20, converted to NSR  - Heparin gtt continues, warfarin initiated per pharmacy dosing      Pulmonary hypertension w/ history of sarcoidosis as below  - Continue PTA sildenafil 3 times a day.      CAD: 4/2008.  - S/p PCI with ROSALIE to the mid-LAD and D2   - Continue PTA aspirin 81 mg, metoprolol with holding parameters.      Sarcoidosis: Heart (presumed- as he has geneva filling pressures and Lung involvement.   - Follows with Dr. Perlman - aware of admission  and transfer awaited  - Currently maintained on Remicade every 4 weeks and methotrexate   - No Folic acid use on MTX- would recommend.      COPD on home oxygen 3 L.  - Continue PTA Breo Ellipta/Spiriva.  - Further treatment as above.      History of Exudative left pleural effusion status post thoracocentesis 4/2017  - Was exudative pleural effusion, had no lymphadenopathy to suggest malignancy and cytology was negative.      Hypertension:   - Continue PTA metoprolol with hold parameters.  - Diuresis with IV Lasix as above.  - PTA losartan on hold given borderline blood pressures.      Type 2 Diabetes Mellitus:   - 11/19 hemoglobin A1c 6.4%.  Carb controlled diet.  - Medium intensity insulin sliding scale.      History of hep C  - treated in the past.      Chronic kidney stage III.  - Creat slightly up, perhaps, to 2.30 11/20  - Monitor renal function closely.  - Avoid nephrotoxic drugs.      Hypothyroidism.  - Continue PTA levothyroxine.  - TSH 1.44      Insomnia.  - Continue PTA as needed Remeron; will provide melatonin (use first) and if necessary small po ativan       Physical deconditioning from acute illness/chronic debility.  - PT, OT assessment.      DVT Prophylaxis: Heparin gtt, and warfarin initiated  Code Status: Full Code      Disposition: Transfer to [facility] is in progress - awaiting beds. Cardio/Pulm aware at [facility]. Care transitions following.   If he improves prior to that, could discharge home with close follow up.    ADDENDUM 1530:  RN paged regarding black stool --> + occult blood, also return of mild epistaxis. Discontinued heparin gtt, will hold warfarin tonight as well. Hemoglobin checked - 11.1 (stable from yesterday).  Will monitor and would have to consider GI consultation if persists. Transfer still awaited.     Sacha Salinas MD    Text Page (7am to 6pm, M-F)    Interval History   Difficulty falling asleep especially with all the commotion in room with new patient. No new pains or SOB.  COntinues on 3-4L nc. No chest pressure or pain.      -Data reviewed today: I reviewed all new labs and imaging results over the last 24 hours. I personally reviewed no images or EKG's today.    Physical Exam   Temp: 98.8  F (37.1  C) Temp src: Oral BP: 138/88   Heart Rate: 127 Resp: 24 SpO2: 94 % O2 Device: Nasal cannula Oxygen Delivery: 4 LPM  Vitals:    11/19/18 0550 11/20/18 0631 11/21/18 0415   Weight: 68.5 kg (151 lb 0.2 oz) 68 kg (149 lb 14.6 oz) 65.9 kg (145 lb 4.5 oz)     Vital Signs with Ranges  Temp:  [97.9  F (36.6  C)-98.8  F (37.1  C)] 98.8  F (37.1  C)  Heart Rate:  [] 127  Resp:  [18-24] 24  BP: (116-160)/(69-98) 138/88  SpO2:  [92 %-97 %] 94 %  I/O last 3 completed shifts:  In: 656 [P.O.:360; I.V.:296]  Out: 2800 [Urine:2800]    Constitutional: Awake, alert, cooperative, no apparent distress. Very pleasant robert.  Respiratory: Clear to auscultation bilaterally, no crackles or wheezing, diminished in bases  Cardiovascular: Regular rate and rhythm, s1s2 heard  GI: Normal bowel sounds, soft, non-distended, non-tender  Skin/Integumen: No rashes, no cyanosis, no edema  Other: AAOx3, no focal deficits    Medications     amiodarone 1 mg/min (11/21/18 1411)     amiodarone       Warfarin Therapy Reminder         aspirin  81 mg Oral Daily     atorvastatin  40 mg Oral At Bedtime     azithromycin  500 mg Intravenous Q24H     docusate sodium  100 mg Oral BID     fluticasone-vilanterol  1 puff Inhalation Daily     furosemide  40 mg Oral BID     influenza Vac Split High-Dose  0.5 mL Intramuscular Prior to discharge     insulin aspart  1-7 Units Subcutaneous TID AC     insulin aspart  1-5 Units Subcutaneous At Bedtime     ipratropium - albuterol 0.5 mg/2.5 mg/3 mL  3 mL Nebulization 4x daily     levothyroxine  125 mcg Oral QAM AC     metoprolol tartrate  100 mg Oral BID     piperacillin-tazobactam  3.375 g Intravenous Q6H     sildenafil  20 mg Oral TID     sodium chloride (PF)  3 mL Intracatheter Q8H      warfarin  6 mg Oral ONCE at 18:00       Data     Recent Labs  Lab 11/21/18  1357 11/21/18  0703 11/20/18  0616 11/19/18  1542 11/19/18  1150 11/19/18  0625 11/18/18  2325 11/18/18  1850 11/18/18  1414   WBC  --   --  9.8 10.2  --  11.1*  --   --  12.5*   HGB 11.1*  --  11.1* 10.5*  --  10.6*  --   --  11.5*   MCV  --   --  97 98  --  98  --   --  97   PLT  --  253 222 224  --  237  --  221 274   INR  --  1.33* 1.20*  --  1.08  --   --   --   --    NA  --   --  140  --   --  140  --   --  139   POTASSIUM  --   --  4.2  --  4.5 4.4  --   --  4.6   CHLORIDE  --   --  102  --   --  106  --   --  106   CO2  --   --  32  --   --  28  --   --  26   BUN  --   --  35*  --   --  38*  --   --  37*   CR  --  2.26* 2.30*  --   --  2.00*  --   --  1.66*   ANIONGAP  --   --  6  --   --  6  --   --  7   RAFFY  --   --  8.3*  --   --  7.9*  --   --  8.3*   GLC  --   --  122*  --   --  125*  --   --  168*   ALBUMIN  --   --   --   --   --  2.6*  --   --   --    PROTTOTAL  --   --   --   --   --  6.6*  --   --   --    BILITOTAL  --   --   --   --   --  0.7  --   --   --    ALKPHOS  --   --   --   --   --  110  --   --   --    ALT  --   --   --   --   --  42  --   --   --    AST  --   --   --   --   --  51*  --   --   --    TROPI  --   --   --   --   --  0.043 0.032 0.028 0.029       No results found for this or any previous visit (from the past 24 hour(s)).

## 2018-11-21 NOTE — PROVIDER NOTIFICATION
Dr. Salinas notified of + stool for guaiac and frequent bloody nose.   Heparin gtt at 800 Units/hr   INR 1.33  1320- Dr. Ervin notified that Heparin off and that patient is now in [facility]- EKG being done.   Amiodarone bolus and gtt started.

## 2018-11-21 NOTE — PROVIDER NOTIFICATION
Dr. Salinas text paged regarding dark, black soft stool.  Hgb. 11.1   Order for stool for guaiac - specimen sent.

## 2018-11-21 NOTE — IP AVS SNAPSHOT
MRN:[id number]                      After Visit Summary   11/21/2018    Rohan Monroe    MRN: [id number]           Thank you!     Thank you for choosing [facility] for your care. Our goal is always to provide you with excellent care. Hearing back from our patients is one way we can continue to improve our services. Please take a few minutes to complete the written survey that you may receive in the mail after you visit with us. Thank you!        Patient Information     Date Of Birth          1945        Designated Caregiver       Most Recent Value    Caregiver    Will someone help with your care after discharge? yes    Name of designated caregiver Elvis    Phone number of caregiver [phone]    Caregiver address in chart      About your hospital stay     You were admitted on:  November 21, 2018 You last received care in the:  [address] [facility]    You were discharged on:  November 26, 2018        Reason for your hospital stay       PJP pneumonia   A.fib with RVR                  Who to Call     For medical emergencies, please call 911.  For non-urgent questions about your medical care, please call your primary care provider or clinic, [phone]          Attending Provider     Provider Specialty    Grisel Pepper MD Cardiology    Loc Dquue MD Cardiology    Tal Grullon MD Cardiology       Primary Care Provider Office Phone # Fax #    Jamie Foster [phone]       When to contact your care team       If you have worsening shortness of breath  If you have weight gain more than 3 lb per week   If you have worsening fatigue or metal taste in mouth   If you have palpitations or light headedness                  After Care Instructions     Activity       Your activity upon discharge: activity as tolerated            Diet       Follow this diet upon discharge: Orders Placed This Encounter      Fluid restriction 2000 ML FLUID      2 Gram  Sodium Diet            Discharge Instructions       Arrange follow up with your primary care doctor next week  You should have blood draws twice weekly while on antibiotic therapy to monitor your kidney function  Follow up with the lung doctor on Friday                  Follow-up Appointments     Adult [facility] Follow-up and recommended labs and tests       Follow up with primary care provider, Jamie Fotser, within 7 days to evaluate medication change.  The following labs/tests are recommended: BMP (twice weekly while on therapy).    Follow up with [facility] clinic next week  Follow up with pulmonary as scheduled prior     Appointments on [place] and/or [facility] (with [facility] provider or service). Call [phone] if you haven't heard regarding these appointments within 7 days of discharge.            Follow Up and recommended labs and tests       BMP twice weekly through 12/15                  Your next 10 appointments already scheduled     Nov 30, 2018 10:30 AM CST   PFT VISIT with WALTER PFL C   [facility] Function Testing ([facility])    [address]  3rd Floor  [facility] [address]   [phone]            Nov 30, 2018 11:00 AM CST   (Arrive by 10:45 AM)   Return Interstitial Lung with David Morris Perlman, MD   [facility] ([facility] [place])    [address]  [address]  [facility] [address]   [phone]            Dec 05, 2018  3:05 PM CST   (Arrive by 2:50 PM)   Return Visit with Jamie Foster MD   [facility] ([facility] [place])    [address]  4th Floor  [facility] [address]   [phone]            Dec 06, 2018  1:00 PM CST   Infusion 180 with WALTER SPEC INFUSION, UC 41 ATC   [facility] Special[facility] ([facility])    [address]  [address]  [facility] [address]    [phone]            Dec 10, 2018  9:20 AM CST   (Arrive by 9:05 AM)   RETURN FOOT/ANKLE with Chicho Mejia DPM   [facility] ([facility])    [address]  4th Floor  [facility] [id number] [phone]            Dec 12, 2018  2:00 PM CST   Lab with  LAB    [facility] ([facility])    [address]  1st Floor  [place] [address] [phone]            Dec 12, 2018  2:30 PM CST   (Arrive by 2:15 PM)   CORE NEW with VINNY Pina [facility] ([facility])    [address]  [address]  [place] [id number] [phone]            Dec 19, 2018 10:30 AM CST   US AORTA/IVC/ILIAC DUPLEX LIMITED with UCUSV2   [facility] US ([facility])    [address]  1st Floor  [place] [address] [phone]           How do I prepare for my exam? (Food and drink instructions) Adults: No eating, smoking, gum chewing or drinking for 8 hours before the exam. You may take medicine with a small sip of water.  Children: * Infants, breast-fed: may have breast milk up to 2 hours before exam. * Infants, formula: may have bottle until 4 hours before exam. * Children 1-5 years: No food or drink for 4 hours before exam. * Children 6 -12 years: No food or drink for 6 hours before exam. * Children over 12 years: No food or drink for 8 hours before exam.  * J Tube Fed: No need to stop feedings.  What should I wear: Wear comfortable clothes.  How long does the exam take: Most ultrasounds take 30 to 60 minutes.  What should I bring: Bring a list of your medicines, including vitamins, minerals and over-the-counter drugs. It is safest to leave personal items at home.  Do I need a :  No  is needed.  What do I need to tell my doctor: Tell your doctor about any allergies you may have.  What should I do after the exam: No  restrictions, You may resume normal activities.  What is this test: An ultrasound uses sound waves to make pictures of the body. Sound waves do not cause pain. The only discomfort may be the pressure of the wand against your skin or full bladder.  Who should I call with questions: If you have any questions, please call the Imaging Department where you will have your exam. Directions, parking instructions, and other information is available on our website, [email or web address].            Dec 19, 2018 11:40 AM CST   (Arrive by 11:25 AM)   Return Visit with Maria Victoria Palmer MD   [facility] ([facility] [place])    [address]  [address]  [place] [address] [phone]              Additional Services     Home care nursing referral       [facility]   Phone: [phone]   Fax: [phone]    For RN eval post hospitalization.  Assess:  vital signs, respiratory and cardiac status, activity tolerance, hydration, nutritional status.   Med set up and management.  Heart failure education reinforcement.   Labs: BMP twice weekly on Monday and Thursday; results to Dr. Lenora Angulo (Fax: [phone]) and to Dr. Jamie Foster (Fax: [phone]).   Physical Therapy eval and treat for deconditioning, strengthening, and endurance.      Your provider has ordered home care nursing services. If you have not been contacted within 2 days of your discharge please call the inpatient department phone number at [phone] .            Medication Therapy Management Referral       MTM referral reason            Patient had a hospital or ED visit in last 6 months and has more than 10   PTA or Discharge medications    Patient has 5 PTA or Discharge Medications AND one of the following   diagnoses: DM,HF,COPD,AMI DX,PULM HTN       This service is designed to help you get the most from your medications.  A specially trained pharmacist will work closely with you and  your doctors  to solve any problems related to your medications and to help you get the   best results from taking them.      The Medication Therapy Management staff will call you to schedule an appointment.                  General Recommendations To Control Heart Failure When You Get Home     Heart Failure Instructions for Patients and Families: Please read and check off each of these important instructions as you do them when you get home.     Weight and Symptoms    ___ Put a scale in your bathroom.    ___ Post a weight chart or calendar next to your scale.    ___ Weigh yourself everyday as soon as you get up in the morning (before breakfast).  You should only be wearing your pajamas.  Write your weight on the chart/calendar.    ___ Bring your weight chart/calendar with you to all appointments.    ___ Call your doctor or nurse practitioner if you gain 2 pounds (in 1 day) or 5 pounds in (1 week) from your goal  good  weight.  Your good weight is also called your  dry  weight.  Your doctor or nurse will tell you what your good weight should be.    ___ Call your doctor or nurse practitioner if you have shortness of breath that gets worse over time, leg swelling or fatigue.    Medications and Diet    ___ Make sure to take your medication as prescribed.    ___ Bring a current list of your medication and all of your medicine bottles with you to all appointments.    ___ Limit fluids if you still have swelling or shortness of breath, or if your doctor tells you to do so.      ___ Eat less than 2000 mg of sodium (salt) every day. Read food labels, and do not add salt to meals. Remember, if you eat less salt you retain less fluid.    ___ Follow a heart healthy diet that is low in saturated fat.         Activity and Suggested Lifestyle Changes   ___ Stay active. Talk to your doctor about an exercise program that is safe for your heart.    ___ Stop smoking. Reduce alcohol use.      ___ Lose weight if you are overweight.  Extra weight puts a lot of stress on the heart.    Control for Leg Swelling  ___ Keep your legs elevated (raised) as needed for swelling. If swelling is uncomfortable or elevation doesn t help, ask your doctor about using ACE wraps or support stockings.      What is the C.O.R.E. Clinic?     Cardiomyopathy  Optimization  Rehabilitation  Education    The C.O.R.E. Clinic is a heart failure specialty clinic within [facility].  It is an outpatient disease management program that is based on a phase-by-phase approach, which is tailored to each patient s individual needs.  The cardiologist, nurse practitioner, physician assistant and nurses provide an ongoing outpatient care and treatment plan that guides heart failure and cardiomyopathy patients from evaluation and education to stabilization. This team works with your current primary care doctor and cardiologist to help you:      Avoid hospitalizations    Slow the progression of the disease    Improve length and quality of life    Know who and when to call if heart failure symptoms appear    Receive easy access to quality health care and advice    Better understand your condition and treatment    Decrease the tremendous cost burden of heart failure care    Detect future heart problems before they become life threatening    Your C.O.R.E. Clinic Team will continue to educate you on your heart failure and may adjust medications based on your vital signs, lab work, and how you are feeling.  Therefore, it is very important to bring the following to all C.O.R.E. appointments:    - An accurate list of your medications  - Your medicine bottles  - Your weight chart/calendar    [facility]):    [phone]  [facility]):    [phone]  [facility]  [phone]        Pending Results     Date and Time Order Name Status Description    11/26/2018 0846 EKG 12-lead, tracing only  Preliminary     11/24/2018 0518 HIV Antigen Antibody Combo In process     11/23/2018 2300 Fungal Antibodies In process             Statement of Approval     Ordered          11/26/18 8361  I have reviewed and agree with all the recommendations and orders detailed in this document.  EFFECTIVE NOW     Approved and electronically signed by:  Grace Campbell CNP             Admission Information     Date & Time Provider Department Dept. Phone    11/21/2018 Tal Grullon MD [address] [facility] [phone]      Your Vitals Were     Blood Pressure Pulse Temperature Respirations Weight Pulse Oximetry    131/75 (BP Location: Right arm) 95 98.5  F (36.9  C) (Oral) 18 74.3 kg (163 lb 14.4 oz) 97%    BMI (Body Mass Index)                   26.45 kg/m2           MyChart Information     [email or web address] gives you secure access to your electronic health record. If you see a primary care provider, you can also send messages to your care team and make appointments. If you have questions, please call your primary care clinic.  If you do not have a primary care provider, please call [phone] and they will assist you.        Care EveryWhere ID     This is your Care EveryWhere ID. This could be used by other organizations to access your [facility] medical records  [id number]        Equal Access to Services     ISIAH MONTOYA AH: Alejandro Lewis, wapaulda lutarynadaha, qaybta kaalmada adejeff, nelly jacinto. So [facility] [phone].    ATENCIÓN: Si habla español, tiene a mcfadden disposición servicios gratuitos de asistencia lingüística. Llame al [phone].    We comply with applicable federal civil rights laws and [place] laws. We do not discriminate on the basis of race, color, national origin, age, disability, sex, sexual orientation, or gender identity.               Review of your medicines      START taking        Dose / Directions    diltiazem 360 MG 24 hr CD capsule   Commonly known as:   CARDIZEM CD/CARTIA XT   Used for:  (HFpEF) heart failure with preserved ejection fraction (H)        Dose:  360 mg   Start taking on:  11/27/2018   Take 1 capsule (360 mg) by mouth daily   Quantity:  90 capsule   Refills:  3       melatonin 1 MG Tabs tablet   Used for:  Insomnia, unspecified type        Dose:  1 mg   Take 1 tablet (1 mg) by mouth nightly as needed for sleep   Quantity:  30 tablet   Refills:  0       * sulfamethoxazole-trimethoprim 800-160 MG per tablet   Commonly known as:  BACTRIM DS/SEPTRA DS        Dose:  1 tablet   Take 1 tablet by mouth 2 times daily for 19 days   Quantity:  38 tablet   Refills:  0       * sulfamethoxazole-trimethoprim 400-80 MG per tablet   Commonly known as:  BACTRIM/SEPTRA        Dose:  1 tablet   Take 1 tablet by mouth every morning (before breakfast)   Quantity:  60 tablet   Refills:  3       * Notice:  This list has 2 medication(s) that are the same as other medications prescribed for you. Read the directions carefully, and ask your doctor or other care provider to review them with you.      CONTINUE these medicines which may have CHANGED, or have new prescriptions. If we are uncertain of the size of tablets/capsules you have at home, strength may be listed as something that might have changed.        Dose / Directions    atorvastatin 40 MG tablet   Commonly known as:  LIPITOR   This may have changed:  See the new instructions.   Used for:  Coronary artery disease involving native coronary artery of native heart without angina pectoris, CAD S/P percutaneous coronary angioplasty, Hyperlipidemia LDL goal <70        TAKE ONE TABLET BY MOUTH ONE TIME DAILY   Quantity:  90 tablet   Refills:  3       * colchicine 0.6 MG tablet   Commonly known as:  COLCRYS   This may have changed:  Another medication with the same name was added. Make sure you understand how and when to take each.        2 tabs at the onset of flare, then 1 tab every 2 hrs until pain is better or diarrhea  occurs, up to 10 doses. Wait 3 days until taking again   Quantity:  30 tablet   Refills:  0       * colchicine 0.6 MG tablet   Commonly known as:  COLCYRS   This may have changed:  You were already taking a medication with the same name, and this prescription was added. Make sure you understand how and when to take each.   Used for:  Gout, unspecified cause, unspecified chronicity, unspecified site        Dose:  0.6 mg   Take 1 tablet (0.6 mg) by mouth 2 times daily   Quantity:  4 tablet   Refills:  0       furosemide 20 MG tablet   Commonly known as:  LASIX   This may have changed:  how much to take   Used for:  Pulmonary hypertension (H)        Dose:  20 mg   Take 1 tablet (20 mg) by mouth 2 times daily May take extra 20 mg as needed for wt .gain >3#   Quantity:  240 tablet   Refills:  11       losartan 25 MG tablet   Commonly known as:  COZAAR   This may have changed:    - medication strength  - See the new instructions.   Used for:  (HFpEF) heart failure with preserved ejection fraction (H)        Dose:  25 mg   Take 1 tablet (25 mg) by mouth daily   Quantity:  60 tablet   Refills:  3       * Notice:  This list has 2 medication(s) that are the same as other medications prescribed for you. Read the directions carefully, and ask your doctor or other care provider to review them with you.      CONTINUE these medicines which have NOT CHANGED        Dose / Directions    albuterol 108 (90 Base) MCG/ACT inhaler   Commonly known as:  PROAIR HFA/PROVENTIL HFA/VENTOLIN HFA   Used for:  Sarcoidosis        Dose:  2 puff   Inhale 2 puffs into the lungs every 6 hours as needed for shortness of breath / dyspnea   Quantity:  3 Inhaler   Refills:  6       aspirin 81 MG EC tablet        Dose:  81 mg   Take 81 mg by mouth daily   Refills:  0       blood glucose monitoring lancets   Used for:  Type 2 diabetes, HbA1C goal < 8% (H)        Use to test blood sugar 2 times daily or as directed.  102 lancets per box.  3 month supply.    Quantity:  2 Box   Refills:  3       blood glucose monitoring meter device kit   Commonly known as:  no brand specified   Used for:  Type 2 diabetes mellitus with diabetic nephropathy (H)        Use to test blood sugar 2 times daily.   Quantity:  1 kit   Refills:  0       blood glucose monitoring test strip   Commonly known as:  ACCU-CHEK RONNIE PLUS   Used for:  Type 2 diabetes, HbA1C goal < 8% (H)        Use to test blood sugar 2 times daily or as directed.  3 month supply.   Quantity:  200 each   Refills:  3       ciclopirox 0.77 % cream   Commonly known as:  LOPROX        Apply topically 2 times daily as needed   Refills:  0       fluticasone-vilanterol 100-25 MCG/INH inhaler   Commonly known as:  BREO ELLIPTA   Used for:  Chronic obstructive pulmonary disease, unspecified COPD type (H)        Dose:  1 puff   Inhale 1 puff into the lungs daily   Quantity:  3 Inhaler   Refills:  3       inFLIXimab 100 MG injection   Commonly known as:  REMICADE        Dose:  100 mg   Inject 100 mg into the vein every 28 days   Refills:  0       levothyroxine 125 MCG tablet   Commonly known as:  SYNTHROID/LEVOTHROID   Used for:  Hypothyroidism due to Hashimoto's thyroiditis        Dose:  125 mcg   Take 1 tablet (125 mcg) by mouth daily   Quantity:  90 tablet   Refills:  3       methotrexate 2.5 MG tablet CHEMO   Used for:  Sarcoidosis        Dose:  7.5 mg   Take 3 tablets (7.5 mg) by mouth once a week On Mondays   Quantity:  48 tablet   Refills:  3       mirtazapine 15 MG tablet   Commonly known as:  REMERON        Dose:  15 mg   Take 15 mg by mouth At Bedtime.   Refills:  0       MULTIVITAMIN & MINERAL PO        Dose:  1 tablet   Take 1 tablet by mouth daily.   Refills:  0       * order for DME   Used for:  Sarcoidosis, lung (H), COPD (chronic obstructive pulmonary disease) (H), Abnormal gait, Congestive heart failure (H)        She requested for a 4 wheel walker, would like to change it to 4 wheel walker with a seat and oxygen  tank durant.   Need this faxed over to her  [phone]   Quantity:  1 Device   Refills:  1       * order for DME   Used for:  ILD (interstitial lung disease) (H), Hypoxia        Updated Oxygen: Patient requires supplemental Oxygen 3 LPM via nasal canula with activity and 2 LPM nocturnally. Please provide patient with a portable oxygen concentrator for improved mobility.  Okay to spot check or titrated for conserving to keep stats above 90%. Oxygen will be for a lifetime.   Quantity:  1 Device   Refills:  0       polyethylene glycol powder   Commonly known as:  MIRALAX/GLYCOLAX        Dose:  17 g   Take 17 g by mouth daily as needed for constipation   Refills:  0       sildenafil 20 MG tablet   Commonly known as:  REVATIO   Used for:  Pulmonary hypertension (H)        TAKE ONE TABLET BY MOUTH THREE TIMES DAILY   Quantity:  270 tablet   Refills:  3       tiotropium 18 MCG inhaled capsule   Commonly known as:  SPIRIVA HANDIHALER   Used for:  Chronic obstructive pulmonary disease, unspecified COPD type (H)        Inhale contents of one capsule daily.   Quantity:  90 capsule   Refills:  3       Urea 40 % Crea        Externally apply topically daily as needed for dry skin   Refills:  0       * Notice:  This list has 2 medication(s) that are the same as other medications prescribed for you. Read the directions carefully, and ask your doctor or other care provider to review them with you.      STOP taking     metoprolol tartrate 100 MG tablet   Commonly known as:  LOPRESSOR                Where to get your medicines      These medications were sent to [facility] - [place], [place] - [address]  [address], [facility] [address]     Phone:  [phone]     colchicine 0.6 MG tablet    diltiazem 360 MG 24 hr CD capsule    furosemide 20 MG tablet    losartan 25 MG tablet    melatonin 1 MG Tabs tablet    sulfamethoxazole-trimethoprim 400-80 MG per tablet    sulfamethoxazole-trimethoprim  800-160 MG per tablet                Protect others around you: Learn how to safely use, store and throw away your medicines at www.disposemymeds.org.        ANTIBIOTIC INSTRUCTION     You've Been Prescribed an Antibiotic - Now What?  Your healthcare team thinks that you or your loved one might have an infection. Some infections can be treated with antibiotics, which are powerful, life-saving drugs. Like all medications, antibiotics have side effects and should only be used when necessary. There are some important things you should know about your antibiotic treatment.      Your healthcare team may run tests before you start taking an antibiotic.    Your team may take samples (e.g., from your blood, urine or other areas) to run tests to look for bacteria. These test can be important to determine if you need an antibiotic at all and, if you do, which antibiotic will work best.      Within a few days, your healthcare team might change or even stop your antibiotic.    Your team may start you on an antibiotic while they are working to find out what is making you sick.    Your team might change your antibiotic because test results show that a different antibiotic would be better to treat your infection.    In some cases, once your team has more information, they learn that you do not need an antibiotic at all. They may find out that you don't have an infection, or that the antibiotic you're taking won't work against your infection. For example, an infection caused by a virus can't be treated with antibiotics. Staying on an antibiotic when you don't need it is more likely to be harmful than helpful.      You may experience side effects from your antibiotic.    Like all medications, antibiotics have side effects. Some of these can be serious.    Let you healthcare team know if you have any known allergies when you are admitted to the hospital.    One significant side effect of nearly all antibiotics is the risk of severe  and sometimes deadly diarrhea caused by Clostridium difficile (C. Difficile). This occurs when a person takes antibiotics because some good germs are destroyed. Antibiotic use allows C. diificile to take over, putting patients at high risk for this serious infection.    As a patient or caregiver, it is important to understand your or your loved one's antibiotic treatment. It is especially important for caregivers to speak up when patients can't speak for themselves. Here are some important questions to ask your healthcare team.    What infection is this antibiotic treating and how do you know I have that infection?    What side effects might occur from this antibiotic?    How long will I need to take this antibiotic?    Is it safe to take this antibiotic with other medications or supplements (e.g., vitamins) that I am taking?     Are there any special directions I need to know about taking this antibiotic? For example, should I take it with food?    How will I be monitored to know whether my infection is responding to the antibiotic?    What tests may help to make sure the right antibiotic is prescribed for me?      Information provided by:  www.cdc.gov/getsmart  U.S. Department of Health and Human Services  Centers for disease Control and Prevention  National Center for Emerging and Zoonotic Infectious Diseases  Division of Healthcare Quality Promotion             Medication List: This is a list of all your medications and when to take them. Check marks below indicate your daily home schedule. Keep this list as a reference.      Medications           Morning Afternoon Evening Bedtime As Needed    albuterol 108 (90 Base) MCG/ACT inhaler   Commonly known as:  PROAIR HFA/PROVENTIL HFA/VENTOLIN HFA   Inhale 2 puffs into the lungs every 6 hours as needed for shortness of breath / dyspnea                                   aspirin 81 MG EC tablet   Take 81 mg by mouth daily                                   atorvastatin  40 MG tablet   Commonly known as:  LIPITOR   TAKE ONE TABLET BY MOUTH ONE TIME DAILY   Last time this was given:  40 mg on 11/25/2018  7:49 PM                                   blood glucose monitoring lancets   Use to test blood sugar 2 times daily or as directed.  102 lancets per box.  3 month supply.                                blood glucose monitoring meter device kit   Commonly known as:  no brand specified   Use to test blood sugar 2 times daily.                                blood glucose monitoring test strip   Commonly known as:  ACCU-CHEK RONNIE PLUS   Use to test blood sugar 2 times daily or as directed.  3 month supply.                                ciclopirox 0.77 % cream   Commonly known as:  LOPROX   Apply topically 2 times daily as needed                                * colchicine 0.6 MG tablet   Commonly known as:  COLCRYS   2 tabs at the onset of flare, then 1 tab every 2 hrs until pain is better or diarrhea occurs, up to 10 doses. Wait 3 days until taking again   Last time this was given:  0.6 mg on 11/26/2018  8:58 AM                                * colchicine 0.6 MG tablet   Commonly known as:  COLCYRS   Take 1 tablet (0.6 mg) by mouth 2 times daily   Last time this was given:  0.6 mg on 11/26/2018  8:58 AM                                      diltiazem 360 MG 24 hr CD capsule   Commonly known as:  CARDIZEM CD/CARTIA XT   Take 1 capsule (360 mg) by mouth daily   Start taking on:  11/27/2018   Last time this was given:  240 mg on 11/26/2018  8:57 AM                                   fluticasone-vilanterol 100-25 MCG/INH inhaler   Commonly known as:  BREO ELLIPTA   Inhale 1 puff into the lungs daily   Last time this was given:  1 puff on 11/26/2018  8:57 AM                                   furosemide 20 MG tablet   Commonly known as:  LASIX   Take 1 tablet (20 mg) by mouth 2 times daily May take extra 20 mg as needed for wt .gain >3#   Last time this was given:  20 mg on 11/26/2018  4:23 PM                        Recommend taking no later than 6 PM               inFLIXimab 100 MG injection   Commonly known as:  REMICADE   Inject 100 mg into the vein every 28 days                                levothyroxine 125 MCG tablet   Commonly known as:  SYNTHROID/LEVOTHROID   Take 1 tablet (125 mcg) by mouth daily   Last time this was given:  125 mcg on 11/26/2018  8:57 AM                                   losartan 25 MG tablet   Commonly known as:  COZAAR   Take 1 tablet (25 mg) by mouth daily   Last time this was given:  25 mg on 11/25/2018  9:21 AM                                   melatonin 1 MG Tabs tablet   Take 1 tablet (1 mg) by mouth nightly as needed for sleep                                      methotrexate 2.5 MG tablet CHEMO   Take 3 tablets (7.5 mg) by mouth once a week On Mondays         You did not receive a dose for the week of 11/26                       mirtazapine 15 MG tablet   Commonly known as:  REMERON   Take 15 mg by mouth At Bedtime.                                MULTIVITAMIN & MINERAL PO   Take 1 tablet by mouth daily.                                * order for DME   She requested for a 4 wheel walker, would like to change it to 4 wheel walker with a seat and oxygen tank durant.   Need this faxed over to her  [phone]                                * order for DME   Updated Oxygen: Patient requires supplemental Oxygen 3 LPM via nasal canula with activity and 2 LPM nocturnally. Please provide patient with a portable oxygen concentrator for improved mobility.  Okay to spot check or titrated for conserving to keep stats above 90%. Oxygen will be for a lifetime.                                polyethylene glycol powder   Commonly known as:  MIRALAX/GLYCOLAX   Take 17 g by mouth daily as needed for constipation                                   sildenafil 20 MG tablet   Commonly known as:  REVATIO   TAKE ONE TABLET BY MOUTH THREE TIMES DAILY   Last time this was given:  20 mg on  11/26/2018  1:00 PM                                         * sulfamethoxazole-trimethoprim 800-160 MG per tablet   Commonly known as:  BACTRIM DS/SEPTRA DS   Take 1 tablet by mouth 2 times daily for 19 days            Until 12/15           Until 12/15               * sulfamethoxazole-trimethoprim 400-80 MG per tablet   Commonly known as:  BACTRIM/SEPTRA   Take 1 tablet by mouth every morning (before breakfast)   Last time this was given:  1 tablet on 11/24/2018  3:04 AM            Starting 12/16                       tiotropium 18 MCG inhaled capsule   Commonly known as:  SPIRIVA HANDIHALER   Inhale contents of one capsule daily.                                Urea 40 % Crea   Externally apply topically daily as needed for dry skin                                   * Notice:  This list has 6 medication(s) that are the same as other medications prescribed for you. Read the directions carefully, and ask your doctor or other care provider to review them with you.

## 2018-11-21 NOTE — LETTER
Transition Communication Hand-off for Care Transitions to Next Level of Care Provider    Name: Rohan Monroe  : 1945  MRN #: [id number]  Primary Care Provider: Jamie Foster     Primary Clinic: [address] [place] [id number]     Reason for Hospitalization:  pulmonary hypertension  Cardiac abnormality  Admit Date/Time: 2018  9:39 PM  Discharge Date: 18  Payor Source: Payor: MEDICARE / Plan: MEDICARE / Product Type: Medicare /  Readmission Assessment Measure (MARIELA) Risk Score/category: elevated.   Reason for Communication Hand-off Referral: Multiple providers/specialties  Discharge Plan:   Concern for non-adherence with plan of care: no    Discharge Needs Assessment:  Needs       Most Recent Value    Equipment Currently Used at Home cane, straight    Home Care [facility] [phone], Fax: [phone]      Follow-up specialty is recommended: Yes    Follow-up plan:  Future Appointments  Date Time Provider Department Center   2018 10:30 AM UC PFL C UCPFT [facility]   2018 11:00 AM Perlman, David Morris, MD [facility]   2018 3:05 PM Jamie Foster MD [facility]   2018 1:00 PM UC SPEC INFUSION UCINPR [facility]   12/10/2018 9:20 AM [facility]   2018 2:00 PM UC LAB UCLAB [facility]   2018 2:30 PM [facility]   2018 10:30 AM UCUSV2 UCCUS [facility]   2018 11:40 AM [facility]   2018 1:00 PM UC SPEC INFUSION UCINPR [facility]   2019 1:00 PM Kina Greco MD [facility]       Any outstanding tests or procedures:        Referrals     Future Labs/Procedures    Home care nursing referral     Comments:    [facility]   Phone: [phone]   Fax: [phone]    For RN nancy post hospitalization.  Assess:  vital signs, respiratory and cardiac status, activity tolerance, hydration, nutritional status.   Med set up and  management.  Heart failure education reinforcement.   Labs: BMP twice weekly on Monday and Thursday; results to Dr. Lenora Angulo (Fax: [phone]) and to Dr. Jamie Foster (Fax: [phone]).   Physical Therapy eval and treat for deconditioning, strengthening, and endurance.      Your provider has ordered home care nursing services. If you have not been contacted within 2 days of your discharge please call the inpatient department phone number at [phone] .    Medication Therapy Management Referral     Comments:    MTM referral reason            Patient had a hospital or ED visit in last 6 months and has more than 10   PTA or Discharge medications    Patient has 5 PTA or Discharge Medications AND one of the following   diagnoses: DM,HF,COPD,AMI DX,PULM HTN       This service is designed to help you get the most from your medications.  A specially trained pharmacist will work closely with you and your doctors  to solve any problems related to your medications and to help you get the   best results from taking them.      The Medication Therapy Management staff will call you to schedule an appointment.            Key Recommendations:  Post hospitalization follow up.     ELSA RITTER RNCC

## 2018-11-21 NOTE — PROGRESS NOTES
Assessment and Plan:     Rohan Monroe is a 72 year old male who was admitted on 11/18/2018.     1. 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 Typical atrial flutter status post ablation in 2017  3. Post ablation recurrence of atypical atrial flutter and atrial fibrillation with intolerance/questionable toxicity to amiodarone  4. Moderate to severe pulmonary hypertension likely group 3 with right ventricular dysfunction  5. Mild heart failure with preserved ejection fraction maintained on low-dose of oral diuretics at home  6. Home oxygen dependence  7. Malnutrition   8. Chronic kidney disease  9. Anemia  10. Troponin elevation with prior stents, likely non specific    RHC from 11/19/2019  RIGHT ATRIAL PRESSURE: 11/13/11   RIGHT VENTRICULAR PRESSURE: 72/3/13  PULMONARY ARTERY PRESSURE: 72/31/45  PULMONARY CAPILLARY WEDGE PRESSURE: 18/17/17  CARDIAC OUTPUT AND INDEX: 3.1 l/min and 1.8 l/min/m2  PULMONARY VASCULAR RESISTANCE: 9 Wood units    - Patient self converted out of atrial fibrillation last night to normal sinus rhythm this morning. Went back into AFib again this afternoon, Spoke with EP and will start IV amiodarone now.   - Awaiting transfer to the [facility] still. They do not have beds at this time I am told but he is on the list for transfer. Both [facility] and pulmnoary Dr. Schultz have been informed about him and are OK with transfer.   - Anticoagulation stopped this afternoon due to positive heme occult now.  - Lasix 40 twice a day    Please notify bed control at [phone] to get him on the list to see if he can get transferred when able to heart failure service there with pulmonary consultation. Hospital team is working on this.    Angelito Ervin MD        Interval History:   Converted. Had SOB episodes last night. Stable this am, still more than baseline O2 needs          Medications:       amiodarone  150 mg Intravenous Once     aspirin  81 mg Oral Daily     atorvastatin   40 mg Oral At Bedtime     azithromycin  500 mg Intravenous Q24H     docusate sodium  100 mg Oral BID     fluticasone-vilanterol  1 puff Inhalation Daily     furosemide  40 mg Oral BID     influenza Vac Split High-Dose  0.5 mL Intramuscular Prior to discharge     insulin aspart  1-7 Units Subcutaneous TID AC     insulin aspart  1-5 Units Subcutaneous At Bedtime     ipratropium - albuterol 0.5 mg/2.5 mg/3 mL  3 mL Nebulization 4x daily     levothyroxine  125 mcg Oral QAM AC     metoprolol tartrate  100 mg Oral BID     piperacillin-tazobactam  3.375 g Intravenous Q6H     sildenafil  20 mg Oral TID     sodium chloride (PF)  3 mL Intracatheter Q8H     warfarin  6 mg Oral ONCE at 18:00            Physical Exam:     Patient Vitals for the past 24 hrs:   BP Temp Temp src Heart Rate Resp SpO2 Weight   11/21/18 1100 145/72 - - 85 20 - -   11/21/18 0838 145/75 98  F (36.7  C) Oral 97 20 97 % -   11/21/18 0415 - - - - - - 65.9 kg (145 lb 4.5 oz)   11/20/18 2345 116/71 98  F (36.7  C) Oral 76 20 95 % -   11/20/18 2028 150/82 97.9  F (36.6  C) Oral 80 18 94 % -   11/20/18 1944 - - - - - 92 % -   11/20/18 1525 - - - - - 92 % -     Vitals:    11/18/18 1330 11/19/18 0550 11/20/18 0631 11/21/18 0415   Weight: 74.8 kg (165 lb) 68.5 kg (151 lb 0.2 oz) 68 kg (149 lb 14.6 oz) 65.9 kg (145 lb 4.5 oz)         Intake/Output Summary (Last 24 hours) at 11/20/18 0909  Last data filed at 11/20/18 0600   Gross per 24 hour   Intake             2422 ml   Output             2620 ml   Net             -198 ml       11/16 0700 - 11/21 0659  In: 4238 [P.O.:2770; I.V.:1468]  Out: 5420 [Urine:5420]  Net: -1182    Exam:  General alert oriented x3: In mild distress due to dyspnea  JVP is around 8-10 cm  Cardiac regular heart sounds, no lower extremity edema no murmur rubs or gallops  Lungs coarse with mild wheezing  Abdomen is soft nontender  No lower extremity edema  Limited motor neuro exam is nonfocal  Psych appropriate affect         Data:   LABS  (Last four results)  CMP    Recent Labs  Lab 11/21/18  0703 11/20/18  0616 11/19/18  1150 11/19/18  0625 11/18/18  1850 11/18/18  1414   NA  --  140  --  140  --  139   POTASSIUM  --  4.2 4.5 4.4  --  4.6   CHLORIDE  --  102  --  106  --  106   CO2  --  32  --  28  --  26   ANIONGAP  --  6  --  6  --  7   GLC  --  122*  --  125*  --  168*   BUN  --  35*  --  38*  --  37*   CR 2.26* 2.30*  --  2.00*  --  1.66*   GFRESTIMATED 28* 28*  --  33*  --  41*   GFRESTBLACK 34* 34*  --  40*  --  49*   RAFFY  --  8.3*  --  7.9*  --  8.3*   MAG  --   --  2.2  --  2.1  --    PHOS  --   --   --   --  2.8  --    PROTTOTAL  --   --   --  6.6*  --   --    ALBUMIN  --   --   --  2.6*  --   --    BILITOTAL  --   --   --  0.7  --   --    ALKPHOS  --   --   --  110  --   --    AST  --   --   --  51*  --   --    ALT  --   --   --  42  --   --      CBC  Recent Labs  Lab 11/21/18  0703 11/20/18  0616 11/19/18  1542 11/19/18  0625  11/18/18  1414   WBC  --  9.8 10.2 11.1*  --  12.5*   RBC  --  3.61* 3.41* 3.56*  --  3.79*   HGB  --  11.1* 10.5* 10.6*  --  11.5*   HCT  --  35.0* 33.5* 34.7*  --  36.6*   MCV  --  97 98 98  --  97   MCH  --  30.7 30.8 29.8  --  30.3   MCHC  --  31.7 31.3* 30.5*  --  31.4*   RDW  --  15.2* 15.1* 14.9  --  14.8    222 224 237  < > 274   < > = values in this interval not displayed.  INR    Recent Labs  Lab 11/21/18  0703 11/20/18  0616 11/19/18  1150   INR 1.33* 1.20* 1.08     TROPONINS   Lab Results   Component Value Date    TROPI 0.043 11/19/2018    TROPI 0.032 11/18/2018    TROPI 0.028 11/18/2018    TROPI 0.029 11/18/2018    TROPI  04/22/2017     <0.015  The 99th percentile for upper reference range is 0.045 ug/L.  Troponin values in   the range of 0.045 - 0.120 ug/L may be associated with risks of adverse   clinical events.      TROPONIN 0.23 (HH) 01/18/2017    TROPONIN 0.03 06/25/2014    TROPONIN 0.01 08/31/2013    TROPONIN 0.01 09/24/2011                                                                                                                Angelito Ervin MD

## 2018-11-21 NOTE — IP AVS SNAPSHOT
[address] [address]    Phone:  [phone]                                       After Visit Summary   11/21/2018    Rohan Monroe    MRN: [id number]           After Visit Summary Signature Page     I have received my discharge instructions, and my questions have been answered. I have discussed any challenges I see with this plan with the nurse or doctor.    ..........................................................................................................................................  Patient/Patient Representative Signature      ..........................................................................................................................................  Patient Representative Print Name and Relationship to Patient    ..................................................               ................................................  Date                                   Time    ..........................................................................................................................................  Reviewed by Signature/Title    ...................................................              ..............................................  Date                                               Time          22EPIC Rev 08/18

## 2018-11-21 NOTE — PLAN OF CARE
Problem: Patient Care Overview  Goal: Plan of Care/Patient Progress Review  Outcome: No Change  A&Ox4. VSS on 4L NC. Denies pain. Reports shortness of breath at rest and with exertion. Reports congested nonprod cough. LS diminished. Reports high anxiety, calming measures used, requesting an order for prn ativan. R jugular site, CDI, CMS intact. Angio on 11-20, no interventions. Converted to SR on 11-20, cardioversion cancelled. On heparin gtt at 8, on daily hep10a checks. Glucose checks ac+hs, not requiring sliding scale insulin. TELE SD. Plan for transfer to [facility] today 11-21, so his primary cardiologist and pulmonologist can see him.

## 2018-11-22 LAB
ANION GAP SERPL CALCULATED.3IONS-SCNC: 9 MMOL/L (ref 3–14)
BACTERIA SPEC CULT: NORMAL
BUN SERPL-MCNC: 37 MG/DL (ref 7–30)
CALCIUM SERPL-MCNC: 8.8 MG/DL (ref 8.5–10.1)
CHLORIDE SERPL-SCNC: 100 MMOL/L (ref 94–109)
CO2 SERPL-SCNC: 27 MMOL/L (ref 20–32)
CREAT SERPL-MCNC: 2.31 MG/DL (ref 0.66–1.25)
ERYTHROCYTE [DISTWIDTH] IN BLOOD BY AUTOMATED COUNT: 15.4 % (ref 10–15)
GFR SERPL CREATININE-BSD FRML MDRD: 28 ML/MIN/1.7M2
GLUCOSE BLDC GLUCOMTR-MCNC: 112 MG/DL (ref 70–99)
GLUCOSE BLDC GLUCOMTR-MCNC: 143 MG/DL (ref 70–99)
GLUCOSE BLDC GLUCOMTR-MCNC: 159 MG/DL (ref 70–99)
GLUCOSE BLDC GLUCOMTR-MCNC: 173 MG/DL (ref 70–99)
GLUCOSE SERPL-MCNC: 128 MG/DL (ref 70–99)
HCT VFR BLD AUTO: 36.1 % (ref 40–53)
HGB BLD-MCNC: 11.3 G/DL (ref 13.3–17.7)
LACTATE BLD-SCNC: 0.8 MMOL/L (ref 0.7–2)
MAGNESIUM SERPL-MCNC: 2 MG/DL (ref 1.6–2.3)
MCH RBC QN AUTO: 30.5 PG (ref 26.5–33)
MCHC RBC AUTO-ENTMCNC: 31.3 G/DL (ref 31.5–36.5)
MCV RBC AUTO: 97 FL (ref 78–100)
P JIROVECII AG SPEC QL IF: NEGATIVE
PLATELET # BLD AUTO: 262 10E9/L (ref 150–450)
POTASSIUM SERPL-SCNC: 3.6 MMOL/L (ref 3.4–5.3)
RBC # BLD AUTO: 3.71 10E12/L (ref 4.4–5.9)
SODIUM SERPL-SCNC: 136 MMOL/L (ref 133–144)
SPECIMEN SOURCE: NORMAL
SPECIMEN SOURCE: NORMAL
WBC # BLD AUTO: 14.3 10E9/L (ref 4–11)

## 2018-11-22 PROCEDURE — 25000128 H RX IP 250 OP 636: Performed by: STUDENT IN AN ORGANIZED HEALTH CARE EDUCATION/TRAINING PROGRAM

## 2018-11-22 PROCEDURE — 36415 COLL VENOUS BLD VENIPUNCTURE: CPT | Performed by: INTERNAL MEDICINE

## 2018-11-22 PROCEDURE — 94640 AIRWAY INHALATION TREATMENT: CPT

## 2018-11-22 PROCEDURE — 40000275 ZZH STATISTIC RCP TIME EA 10 MIN

## 2018-11-22 PROCEDURE — A9270 NON-COVERED ITEM OR SERVICE: HCPCS | Performed by: STUDENT IN AN ORGANIZED HEALTH CARE EDUCATION/TRAINING PROGRAM

## 2018-11-22 PROCEDURE — 99233 SBSQ HOSP IP/OBS HIGH 50: CPT | Performed by: INTERNAL MEDICINE

## 2018-11-22 PROCEDURE — 25000125 ZZHC RX 250: Performed by: STUDENT IN AN ORGANIZED HEALTH CARE EDUCATION/TRAINING PROGRAM

## 2018-11-22 PROCEDURE — 83605 ASSAY OF LACTIC ACID: CPT

## 2018-11-22 PROCEDURE — 83605 ASSAY OF LACTIC ACID: CPT | Performed by: INTERNAL MEDICINE

## 2018-11-22 PROCEDURE — 25000132 ZZH RX MED GY IP 250 OP 250 PS 637: Performed by: PHYSICIAN ASSISTANT

## 2018-11-22 PROCEDURE — 40000556 ZZH STATISTIC PERIPHERAL IV START W US GUIDANCE

## 2018-11-22 PROCEDURE — 21400006 ZZH R&B CCU INTERMEDIATE UMMC

## 2018-11-22 PROCEDURE — A9270 NON-COVERED ITEM OR SERVICE: HCPCS | Performed by: PHYSICIAN ASSISTANT

## 2018-11-22 PROCEDURE — 94640 AIRWAY INHALATION TREATMENT: CPT | Mod: 76

## 2018-11-22 PROCEDURE — 36415 COLL VENOUS BLD VENIPUNCTURE: CPT | Performed by: STUDENT IN AN ORGANIZED HEALTH CARE EDUCATION/TRAINING PROGRAM

## 2018-11-22 PROCEDURE — 00000146 ZZHCL STATISTIC GLUCOSE BY METER IP

## 2018-11-22 PROCEDURE — 85027 COMPLETE CBC AUTOMATED: CPT | Performed by: STUDENT IN AN ORGANIZED HEALTH CARE EDUCATION/TRAINING PROGRAM

## 2018-11-22 PROCEDURE — 25000132 ZZH RX MED GY IP 250 OP 250 PS 637: Performed by: STUDENT IN AN ORGANIZED HEALTH CARE EDUCATION/TRAINING PROGRAM

## 2018-11-22 PROCEDURE — 83735 ASSAY OF MAGNESIUM: CPT | Performed by: STUDENT IN AN ORGANIZED HEALTH CARE EDUCATION/TRAINING PROGRAM

## 2018-11-22 PROCEDURE — 80048 BASIC METABOLIC PNL TOTAL CA: CPT | Performed by: STUDENT IN AN ORGANIZED HEALTH CARE EDUCATION/TRAINING PROGRAM

## 2018-11-22 PROCEDURE — 25000128 H RX IP 250 OP 636

## 2018-11-22 PROCEDURE — 40000274 ZZH STATISTIC RCP CONSULT EA 30 MIN

## 2018-11-22 RX ORDER — LOSARTAN POTASSIUM 25 MG/1
25 TABLET ORAL DAILY
Status: DISCONTINUED | OUTPATIENT
Start: 2018-11-22 | End: 2018-11-22

## 2018-11-22 RX ORDER — LEVOTHYROXINE SODIUM 125 UG/1
125 TABLET ORAL
Status: DISCONTINUED | OUTPATIENT
Start: 2018-11-22 | End: 2018-11-26 | Stop reason: HOSPADM

## 2018-11-22 RX ORDER — PIPERACILLIN SODIUM, TAZOBACTAM SODIUM 3; .375 G/15ML; G/15ML
3.38 INJECTION, POWDER, LYOPHILIZED, FOR SOLUTION INTRAVENOUS EVERY 6 HOURS
Status: DISCONTINUED | OUTPATIENT
Start: 2018-11-22 | End: 2018-11-22 | Stop reason: DRUGHIGH

## 2018-11-22 RX ORDER — METOPROLOL TARTRATE 100 MG
100 TABLET ORAL 2 TIMES DAILY
Status: DISCONTINUED | OUTPATIENT
Start: 2018-11-22 | End: 2018-11-23

## 2018-11-22 RX ORDER — ASPIRIN 81 MG/1
81 TABLET, CHEWABLE ORAL DAILY
Status: DISCONTINUED | OUTPATIENT
Start: 2018-11-22 | End: 2018-11-26 | Stop reason: HOSPADM

## 2018-11-22 RX ORDER — NICOTINE POLACRILEX 4 MG
15-30 LOZENGE BUCCAL
Status: DISCONTINUED | OUTPATIENT
Start: 2018-11-22 | End: 2018-11-26 | Stop reason: HOSPADM

## 2018-11-22 RX ORDER — DEXTROSE MONOHYDRATE 25 G/50ML
25-50 INJECTION, SOLUTION INTRAVENOUS
Status: DISCONTINUED | OUTPATIENT
Start: 2018-11-22 | End: 2018-11-26 | Stop reason: HOSPADM

## 2018-11-22 RX ORDER — FUROSEMIDE 40 MG
40 TABLET ORAL
Status: DISCONTINUED | OUTPATIENT
Start: 2018-11-22 | End: 2018-11-24

## 2018-11-22 RX ORDER — ATORVASTATIN CALCIUM 40 MG/1
40 TABLET, FILM COATED ORAL EVERY EVENING
Status: DISCONTINUED | OUTPATIENT
Start: 2018-11-22 | End: 2018-11-26 | Stop reason: HOSPADM

## 2018-11-22 RX ORDER — SILDENAFIL CITRATE 20 MG/1
20 TABLET ORAL 3 TIMES DAILY
Status: DISCONTINUED | OUTPATIENT
Start: 2018-11-22 | End: 2018-11-26 | Stop reason: HOSPADM

## 2018-11-22 RX ORDER — PIPERACILLIN SODIUM, TAZOBACTAM SODIUM 2; .25 G/10ML; G/10ML
2.25 INJECTION, POWDER, LYOPHILIZED, FOR SOLUTION INTRAVENOUS EVERY 6 HOURS
Status: DISCONTINUED | OUTPATIENT
Start: 2018-11-22 | End: 2018-11-23 | Stop reason: ALTCHOICE

## 2018-11-22 RX ORDER — IPRATROPIUM BROMIDE AND ALBUTEROL SULFATE 2.5; .5 MG/3ML; MG/3ML
3 SOLUTION RESPIRATORY (INHALATION)
Status: DISCONTINUED | OUTPATIENT
Start: 2018-11-22 | End: 2018-11-26 | Stop reason: HOSPADM

## 2018-11-22 RX ORDER — LOSARTAN POTASSIUM 25 MG/1
25 TABLET ORAL DAILY
Status: DISCONTINUED | OUTPATIENT
Start: 2018-11-22 | End: 2018-11-26 | Stop reason: HOSPADM

## 2018-11-22 RX ADMIN — IPRATROPIUM BROMIDE AND ALBUTEROL SULFATE 3 ML: .5; 3 SOLUTION RESPIRATORY (INHALATION) at 19:31

## 2018-11-22 RX ADMIN — AMIODARONE HYDROCHLORIDE 0.5 MG/MIN: 50 INJECTION, SOLUTION INTRAVENOUS at 09:56

## 2018-11-22 RX ADMIN — PIPERACILLIN AND TAZOBACTAM 2.25 G: 2; .25 INJECTION, POWDER, FOR SOLUTION INTRAVENOUS at 02:50

## 2018-11-22 RX ADMIN — PIPERACILLIN AND TAZOBACTAM 2.25 G: 2; .25 INJECTION, POWDER, FOR SOLUTION INTRAVENOUS at 08:18

## 2018-11-22 RX ADMIN — METOPROLOL TARTRATE 100 MG: 100 TABLET, FILM COATED ORAL at 20:01

## 2018-11-22 RX ADMIN — ASPIRIN 81 MG CHEWABLE TABLET 81 MG: 81 TABLET CHEWABLE at 08:19

## 2018-11-22 RX ADMIN — IPRATROPIUM BROMIDE AND ALBUTEROL SULFATE 3 ML: .5; 3 SOLUTION RESPIRATORY (INHALATION) at 17:12

## 2018-11-22 RX ADMIN — FUROSEMIDE 40 MG: 40 TABLET ORAL at 08:19

## 2018-11-22 RX ADMIN — LEVOTHYROXINE SODIUM 125 MCG: 125 TABLET ORAL at 08:19

## 2018-11-22 RX ADMIN — ATORVASTATIN CALCIUM 40 MG: 40 TABLET, FILM COATED ORAL at 20:01

## 2018-11-22 RX ADMIN — SILDENAFIL 20 MG: 20 TABLET ORAL at 20:01

## 2018-11-22 RX ADMIN — LOSARTAN POTASSIUM 25 MG: 25 TABLET ORAL at 14:36

## 2018-11-22 RX ADMIN — METOPROLOL TARTRATE 100 MG: 100 TABLET, FILM COATED ORAL at 08:19

## 2018-11-22 RX ADMIN — SILDENAFIL 20 MG: 20 TABLET ORAL at 08:19

## 2018-11-22 RX ADMIN — IPRATROPIUM BROMIDE AND ALBUTEROL SULFATE 3 ML: .5; 3 SOLUTION RESPIRATORY (INHALATION) at 08:47

## 2018-11-22 RX ADMIN — IPRATROPIUM BROMIDE AND ALBUTEROL SULFATE 3 ML: .5; 3 SOLUTION RESPIRATORY (INHALATION) at 11:53

## 2018-11-22 RX ADMIN — FLUTICASONE FUROATE AND VILANTEROL TRIFENATATE 1 PUFF: 100; 25 POWDER RESPIRATORY (INHALATION) at 08:19

## 2018-11-22 RX ADMIN — FUROSEMIDE 40 MG: 40 TABLET ORAL at 15:38

## 2018-11-22 RX ADMIN — OMEPRAZOLE 40 MG: 20 CAPSULE, DELAYED RELEASE ORAL at 12:49

## 2018-11-22 RX ADMIN — PIPERACILLIN AND TAZOBACTAM 2.25 G: 2; .25 INJECTION, POWDER, FOR SOLUTION INTRAVENOUS at 14:08

## 2018-11-22 RX ADMIN — AZITHROMYCIN MONOHYDRATE 500 MG: 500 INJECTION, POWDER, LYOPHILIZED, FOR SOLUTION INTRAVENOUS at 15:39

## 2018-11-22 RX ADMIN — SILDENAFIL 20 MG: 20 TABLET ORAL at 14:08

## 2018-11-22 RX ADMIN — OMEPRAZOLE 40 MG: 20 CAPSULE, DELAYED RELEASE ORAL at 15:39

## 2018-11-22 RX ADMIN — PIPERACILLIN AND TAZOBACTAM 2.25 G: 2; .25 INJECTION, POWDER, FOR SOLUTION INTRAVENOUS at 20:02

## 2018-11-22 RX ADMIN — AMIODARONE HYDROCHLORIDE 0.5 MG/MIN: 50 INJECTION, SOLUTION INTRAVENOUS at 01:00

## 2018-11-22 ASSESSMENT — ACTIVITIES OF DAILY LIVING (ADL)
ADLS_ACUITY_SCORE: 14
ADLS_ACUITY_SCORE: 12

## 2018-11-22 NOTE — PLAN OF CARE
Problem: Breathing Pattern Ineffective (Adult)  Goal: Identify Related Risk Factors and Signs and Symptoms  Related risk factors and signs and symptoms are identified upon initiation of Human Response Clinical Practice Guideline (CPG).   Outcome: No Change  Continues with baseline dyspnea. Continues on 4 liters O2 and Nebs.

## 2018-11-22 NOTE — PROGRESS NOTES
Pt transferred to  of  via EMS.  Amiodarone drip infusing at 30ml/hr as ordered.  Zosyn infusing as well.  See MAR for further information.  Pt denies pain or other discomforts. Vitals remain stable and pt in afib with rates between .  Pt on 5 L/NC with sats at %.  Pt short of breath with activity and feels comfortable with current O2 rate.  Belongings transferred with pt, including 3 bags, wheelchair and oxygen concentrator.

## 2018-11-22 NOTE — PROGRESS NOTES
SW:  D:  Received a call from [facility].  They can accept patient after 18:30-19:00 today to [address].  They are asking that we call report prior to patient's discharge, [phone].  Call placed to [facility] to arrange for ALS transport at 19:30 today.  Faxed the facesheet and PCS to [facility].  Patient and bedside nurse informed of the plan and in agreement to the plan.

## 2018-11-22 NOTE — CONSULTS
PULMONARY CONSULT  Date of service: 2018    Patient: Rohan Monroe      : 1945      MRN: [id number]    We were consulted by Dr. Curry for evaluation of hypoxic resp failure.        Impressions/Recommendations:   72 year old male with PMHx most significant for pulmonary sarcoidosis, COPD (on 2-3 L O2 at baseline), pulmonary hypertension, CAD, atrial flutter, hypertension, CKD stage III that presents with 1 month of ongoing dyspnea on exertion, who presented to [facility] , with 2 weeks of progressive shortness of breath.    Progressive SOB:  Hypoxic Respiratory failure  Pulmonary HTN  Pulmonary Sarcoidosis  Acute on Chronic Diastolic Heart Failure: Baseline O2 needs are 2-3 L.  2 weeks progressive shortness of breath.  Admission to [facility]  BNP over 18,000, WBC 12.5.  Right heart cath with wedge of 17, PVR 9, mPAP 45. Per pulmonary notes at [facility] acute increase in dyspnea correlated with arrhythmia however patient is at risk for atypical/fungal infections.  Cultures and fungal antibodies are negative on day of transfer.  Question of possible bronchoscopy.    Tests/Micro   Procal   Negative   Sputum () Poor sample   PCP (sputum) Pending   Strep Pneumo Negative   Legionella  Negative   Fungus Ab Pending      Recommendations   - no bronch needed while improving   - PT/OT   - continue antibiotics   - aggressive rate/rhythm control   - diuresis if tolerated    Patient seen & discussed w/  Dr. Derick M.D., who is in agreement.     Keith Santana MD  Pulmonary & Critical Care, [place]  [phone] (Text Page)     Pulmonary Attending Attestation  I saw and examined the patient with Dr. Santana, confirming key aspects of the history and exam.  I personally reviewed the recent Xrays and other labs.  The fellow s note reflects our detailed discussion of the findings, assessment and plan.  73 yo patient with pulmonary and cardiac sarcoid followed by Dr Perlman and on methotrexate  and inflexumab.  He was and  and treated in 10/18 with 10 days of levoquin for increased SOB.  Got worse again with increased ACEVES and was hospitalized at [facility] since 11/18 with effort to determine if this is Pulmonary, cardiac or infectious origin.  R heart cath done there after diuresis revealed PA mean 45; PCWP 17 PVR 9;  CI 3.1.  Transferred to [facility] from [facility] by pulmonologist Dr Erickson (([facility]) for further care by [facility] ILD team.  No diagnostic sputum @ [facility] but poor specimen.  He has been empirically with Vanco, Zosyn and azithro.  On  11/19/18. VBG 7.35/56  No recent fevers. Feels he is starting to improve in terms of ACEVES.  Pt reports that SOB is worse when in Aflutter. No h/o PE/DVT  PMH:  sarcoid; Aflutter s/p ablation 2017 (currently on amiodarone); CKD3; chronic O2 Rx 2-3L; has marilou anticoagulated in past  Exam notable for + JVD (worse @ [facility] admit),  irreg heart bibasilar crackles R>L, no wheezes.  No adenopathy  CXR & Chest CT reviewed: diffuse patchy scarring with cystic changes  C/w rior CXR of 5/15/2017 there likely is a somewhat worse density in R lower lung zone  A&P:    Acute on Chronic Hypoxic & Hypercapnic Respiratory Insufficiency due to chronic pulmonary sarcoid with possible cardiac component  Has been diuresed somewhat.  Needs high quality sputum specimen.  He is improving gradually - due to a combination of time, antibiotics, diuresis and rate control.  Can't 100% exclude components of atypical indolent chronic infection or methotrexate pneumotoxicity, but these would be very hard to diagnose in this setting.  If he becomes febrile, then would consider bronchoscopy and BAL  The value of serum ACE (or other markers) in assessing disease activity is of uncertain benefit.  Will follow with you.  Jamie Sepulveda MD ([phone])  Jamie Sepulveda MD            History of Present Illness:   72 year old male with PMHx most significant for pulmonary sarcoidosis, COPD (on 2-3 L O2 at baseline), pulmonary  hypertension, CAD, atrial flutter, hypertension, CKD stage III that presents with 1 month of ongoing dyspnea on exertion, who presented to [facility] 11/18, with 2 weeks of progressive shortness of breath.    Patient reports feeling better since being admitted to [facility] up to now. During his admission he had been diuresed, rate controlled with amiodarone, started on vanc/zosyn. He feels that he has more energy and hoping to start with physical therapy.    Patient is a former smoker history. Patient reports socail EtOH use. Patient reports no recreational drug use.   Former assistant to           Review of Symptoms:   10-point ROS reviewed, & found negative w/ exceptions noted in the HPI.          Past Medical History:     Past Medical History:   Diagnosis Date     Atrial flutter (H)      Cataract of both eyes      Chronic infection     Hep C     Congestive heart failure, unspecified      Coronary artery disease      Depressive disorder      Depressive disorder, not elsewhere classified     Depression (non-psychotic)     ERM OS (epiretinal membrane, left eye)      Generalized osteoarthrosis, unspecified site      Glaucoma suspect      Hypertension      Lichen planus      Other psoriasis      PVD (posterior vitreous detachment), left eye      Sarcoidosis      Sarcoidosis      Type II or unspecified type diabetes mellitus without mention of complication, not stated as uncontrolled      Unspecified hypothyroidism     Hypothyroidism     Unspecified viral hepatitis C without hepatic coma      Viral warts, unspecified        Past Surgical History:   Procedure Laterality Date     ANESTHESIA CARDIOVERSION N/A 1/19/2017    Procedure: ANESTHESIA CARDIOVERSION;  Surgeon: GENERIC ANESTHESIA PROVIDER;  Location: [facility] OR     ANESTHESIA CARDIOVERSION N/A 1/23/2017    Procedure: ANESTHESIA CARDIOVERSION;  Surgeon: GENERIC ANESTHESIA PROVIDER;  Location: [facility] OR     ANESTHESIA CARDIOVERSION N/A 1/24/2017    Procedure:  ANESTHESIA CARDIOVERSION;  Surgeon: GENERIC ANESTHESIA PROVIDER;  Location: [facility] OR     ANESTHESIA CARDIOVERSION N/A 2018    Procedure: ANESTHESIA CARDIOVERSION;  Surgeon: GENERIC ANESTHESIA PROVIDER;  Location: [facility] OR     ARTHROPLASTY HIP  2011    Procedure:ARTHROPLASTY HIP; Right Total Hip Arthroplasty  Choice anesthesia; Surgeon:LESLI WILKINSON; Location:[facility] OR     BIOPSY       C PELVIS/HIP JOINT SURGERY UNLISTED       cardiac stent      s/p     CARDIAC SURGERY       CATARACT IOL, RT/LT  9/15/2015    LE     COLONOSCOPY       CORONARY ANGIOGRAPHY ADULT ORDER       H ABLATION ATRIAL FLUTTER       HC REMOVAL OF TONSILS,<9 Y/O      Tonsils <10y.o.     HC REPAIR INCISIONAL HERNIA,REDUCIBLE      Hernia Repair, Incisional, Unilateral     HEART CATH, ANGIOPLASTY       JOINT REPLACEMENT      1 month ago--right hip     LIGATN/STRIP LONG & SHORT SAPHEN              Allergies:     Allergies   Allergen Reactions     Prednisone Other (See Comments)     He reports that he can't sleep for days and can't use small to massive doses of prednisone   Pt. Does not do well with high doses of Prednisone, His MD says that Prednisone is counter indicated. Prednisone failed to treat his sarcoidosis              Outpatient Medications:       Current Facility-Administered Medications on File Prior to Encounter:  [COMPLETED] amiodarone (NEXTERONE) bolus 150 mg   [] amiodarone in D5W adult drip (NEXTERONE) 250 MG/250ML IV infusion   [DISCONTINUED] acetaminophen (TYLENOL) Suppository 650 mg   [DISCONTINUED] acetaminophen (TYLENOL) tablet 650 mg   [DISCONTINUED] albuterol neb solution 2.5 mg   [DISCONTINUED] amiodarone (PACERONE/CODARONE) tablet 200 mg   [DISCONTINUED] amiodarone in D5W adult drip (NEXTERONE) 250 MG/250ML IV infusion   [DISCONTINUED] aspirin EC tablet 81 mg   [DISCONTINUED] atorvastatin (LIPITOR) tablet 40 mg   [DISCONTINUED] azithromycin (ZITHROMAX) 500 mg in sodium chloride 0.9 % 250 mL intermittent infusion    [DISCONTINUED] bisacodyl (DULCOLAX) Suppository 10 mg   [DISCONTINUED] calcium carbonate (TUMS) chewable tablet 1,000 mg   [DISCONTINUED] dextrose 50 % injection 25-50 mL   [DISCONTINUED] docusate sodium (COLACE) capsule 100 mg   [DISCONTINUED] fluticasone-vilanterol (BREO ELLIPTA) 100-25 MCG/INH inhaler 1 puff   [DISCONTINUED] furosemide (LASIX) tablet 40 mg   [DISCONTINUED] glucagon injection 1 mg   [DISCONTINUED] glucose gel 15-30 g   [DISCONTINUED] heparin infusion 25,000 units in 0.45% NaCl 250 mL   [DISCONTINUED] HOLD MEDICATION   [DISCONTINUED] HOLD MEDICATION   [DISCONTINUED] HYDROcodone-acetaminophen (NORCO) 5-325 MG per tablet 1-2 tablet   [DISCONTINUED] influenza Vac Split High-Dose (FLUZONE) injection 0.5 mL   [DISCONTINUED] insulin aspart (NovoLOG) inj (RAPID ACTING)   [DISCONTINUED] insulin aspart (NovoLOG) inj (RAPID ACTING)   [DISCONTINUED] ipratropium - albuterol 0.5 mg/2.5 mg/3 mL (DUONEB) neb solution 3 mL   [DISCONTINUED] levothyroxine (SYNTHROID/LEVOTHROID) tablet 125 mcg   [DISCONTINUED] lidocaine (LMX4) cream   [DISCONTINUED] lidocaine 1 % 1 mL   [DISCONTINUED] LORazepam (ATIVAN) tablet 0.5 mg   [DISCONTINUED] magnesium hydroxide (MILK OF MAGNESIA) suspension 30 mL   [DISCONTINUED] melatonin tablet 1 mg   [DISCONTINUED] melatonin tablet 3 mg   [DISCONTINUED] metoprolol (LOPRESSOR) injection 2.5 mg   [DISCONTINUED] metoprolol tartrate (LOPRESSOR) tablet 100 mg   [DISCONTINUED] mirtazapine (REMERON) tablet 15 mg   [DISCONTINUED] naloxone (NARCAN) injection 0.1-0.4 mg   [DISCONTINUED] ondansetron (ZOFRAN) injection 4 mg   [DISCONTINUED] ondansetron (ZOFRAN-ODT) ODT tab 4 mg   [DISCONTINUED] piperacillin-tazobactam (ZOSYN) 3.375 g vial to attach to  mL bag   [DISCONTINUED] sildenafil (REVATIO) tablet 20 mg   [DISCONTINUED] sodium chloride (PF) 0.9% PF flush 3 mL   [DISCONTINUED] sodium chloride (PF) 0.9% PF flush 3 mL   [DISCONTINUED] warfarin (COUMADIN) tablet 6 mg   [DISCONTINUED]  Warfarin Therapy Reminder (Check START DATE - warfarin may be starting in the FUTURE)     Current Outpatient Prescriptions on File Prior to Encounter:  albuterol (PROAIR HFA/PROVENTIL HFA/VENTOLIN HFA) 108 (90 Base) MCG/ACT Inhaler Inhale 2 puffs into the lungs every 6 hours as needed for shortness of breath / dyspnea   aspirin 81 MG EC tablet Take 81 mg by mouth daily   atorvastatin (LIPITOR) 40 MG tablet TAKE ONE TABLET BY MOUTH ONE TIME DAILY  (Patient taking differently: TAKE ONE TABLET BY MOUTH ONE TIME EVERY EVENING.)   blood glucose monitoring (ACCU-CHEK RONNIE PLUS) test strip Use to test blood sugar 2 times daily or as directed.  3 month supply. (Patient not taking: Reported on 9/13/2018)   blood glucose monitoring (ACCU-CHEK FASTCLIX) lancets Use to test blood sugar 2 times daily or as directed.  102 lancets per box.  3 month supply. (Patient not taking: Reported on 9/13/2018)   blood glucose monitoring (NO BRAND SPECIFIED) meter device kit Use to test blood sugar 2 times daily. (Patient not taking: Reported on 9/13/2018)   ciclopirox (LOPROX) 0.77 % cream Apply topically 2 times daily as needed   colchicine (COLCRYS) 0.6 MG tablet 2 tabs at the onset of flare, then 1 tab every 2 hrs until pain is better or diarrhea occurs, up to 10 doses. Wait 3 days until taking again   fluticasone-vilanterol (BREO ELLIPTA) 100-25 MCG/INH oral inhaler Inhale 1 puff into the lungs daily   furosemide (LASIX) 20 MG tablet Take 2 tablets (40 mg) by mouth 2 times daily May take extra 20 mg as needed for wt .gain >3#   inFLIXimab (REMICADE) 100 MG injection Inject 100 mg into the vein every 28 days    levothyroxine (SYNTHROID/LEVOTHROID) 125 MCG tablet Take 1 tablet (125 mcg) by mouth daily   losartan (COZAAR) 50 MG tablet TAKE ONE TABLET BY MOUTH ONE TIME DAILY    methotrexate 2.5 MG tablet CHEMO Take 3 tablets (7.5 mg) by mouth once a week On Mondays   metoprolol tartrate (LOPRESSOR) 100 MG tablet Take 1 tablet (100 mg) by  mouth 2 times daily   mirtazapine (REMERON) 15 MG tablet Take 15 mg by mouth At Bedtime.   Multiple Vitamins-Minerals (MULTIVITAMIN & MINERAL PO) Take 1 tablet by mouth daily.   order for DME Updated Oxygen: Patient requires supplemental Oxygen 3 LPM via nasal canula with activity and 2 LPM nocturnally. Please provide patient with a portable oxygen concentrator for improved mobility.  Okay to spot check or titrated for conserving to keep stats above 90%. Oxygen will be for a lifetime.   order for DME She requested for a 4 wheel walker, would like to change it to 4 wheel walker with a seat and oxygen tank durant. Need this faxed over to her [phone]   polyethylene glycol (MIRALAX/GLYCOLAX) powder Take 17 g by mouth daily as needed for constipation   sildenafil (REVATIO) 20 MG tablet TAKE ONE TABLET BY MOUTH THREE TIMES DAILY    tiotropium (SPIRIVA HANDIHALER) 18 MCG capsule Inhale contents of one capsule daily.   Urea 40 % CREA Externally apply topically daily as needed for dry skin             Family History:     Family History   Problem Relation Age of Onset     HEART DISEASE Father      irreg heart beat     Circulatory Father      varicose veins     Prostate Cancer Father      HEART DISEASE Mother      heart attack     Arthritis Mother      Osteoporosis Mother      Thyroid Disease Mother      Hypertension Mother      Eye Disorder Maternal Grandmother      glaucoma     Diabetes Sister      type 2     Kidney Cancer Sister      Diabetes Sister      Glaucoma No family hx of      Macular Degeneration No family hx of      Cancer No family hx of      no skin cancer               Social History:     Social History   Substance Use Topics     Smoking status: Former Smoker     Packs/day: 1.00     Years: 30.00     Types: Cigarettes     Start date: 12/30/1960     Quit date: 7/22/1994     Smokeless tobacco: Never Used     Alcohol use No             Physical Exam:   BP (!) 130/93 (BP Location: Right arm)  Temp 97.9  F (36.6   C) (Axillary)  Resp 24  Wt 73 kg (160 lb 14.4 oz)  SpO2 100%  BMI 25.97 kg/m2    General: NAD, very pleasant   HEENT: Anicteric sclera, EOMI, MMM  CV: RRR, no m/r/g  Lungs: crackles in the bases  Abd: Soft, NT, ND  Ext: WWP,  No LE edema  Skin: No rashes, cyanosis, or jaundice  Neuro: AAOx3, no focal deficits          Data:   Labs (all laboratory studies reviewed by me):   Cr 2.3    Imaging (all imaging studies reviewed by me):  CT Chest  IMPRESSION:  1. Small amount of infiltrate in lateral segment of the right middle  lobe is new since the prior exam. Remainder of infiltrates and other  opacities in the lungs bilaterally are unchanged, likely representing  chronic changes from known sarcoidosis.   2. Currently there is no mediastinal or hilar adenopathy.  3. Vascular calcifications, including coronary artery calcifications,  are again noted.  4. Small liver with nodular contour suggests cirrhosis.    Procedures:   RHC at [facility]

## 2018-11-22 NOTE — H&P
"         Cardiology History and Physical  Rohan Monroe MRN: [id number]  Age: 72 year old, : 1945  Primary care provider: Jamie Foster           Chief Complaint:     Shortness of breath          History of Present Illness:   Rohan Monroe is a 71 y/o M with history of sarcoidosis on immunosuppressive therapy, COPD (on 3L home O2), CAD s/p stenting, HFpEF, atrial flutter s/p ablation with post-ablation recurrence, pulmonary HTN, HTN, DM2, CKD stage III who presents as a transfer from [facility] for dyspnea on exertion, found to have atrial fibrillation     Per patient and chart review, patient initially presented to [facility] ED on 18 with dyspnea on exertion. Symptoms started around one month prior to admission. He was seen by his primary pulmonogist at that time and imaging was concerning for pneumonia, so he was treated with one week of levaquin and azithromycin with slight improvement in symptoms. Over the past 2 weeks, he had progressively worsening shortness of breath. He states he could hardly bend over to pick something up on a table next to his bed and doing so required \"5 minutes to catch my breath.\" At OSH ED, he was found to have SpO2 of 85% and EKG demonstrated atrial flutter with .  NT-pro BNP was elevated to 18K. CXR showed diffuse infiltrates bilaterally.     During admission at OSH, pulmonary was consulted and felt that increased dyspnea is likely secondary to a cardiac etiology, although did note that his immunosuppressed places him at risk for atypical/fungal infections. Dr. Ervin of cardiology was consulted. RHC demonstrated pulmonary hypertension with low cardiac output. Per chart review, patient self-converted out of atrial fibrillation into NSR the morning of  but went back into Afib the same afternoon. IV amiodarone was started. Anticoagulation was stopped on  due to a positive heme occult. He was transferred to " [facility] for further evaluation by his primary cardiologist and pulmonologist.     History of arrhythmia dates back to 2016 per chart review. At that time, had recurrent atrial flutter and was seen by Dr. Vazquez, who performed successful catheter ablation in 1/2017. Patient experienced immediate recurrence of arrhythmia following the procedure and was started on amiodarone prior to successful cardioversion. On 2/10/18 (two weeks later), karen stopped taking amiodarone due to nausea and feeling unwell. He has not been on antiarrhythmic medication since this time.     Today, Mr. Monroe states he feels better than on admission. He is comfortable on 3L O2 but has not tried to exert himself or walk around the room yet. He denies chest pain. He continues to have chronic dry cough.     10 point ROS is otherwise negative apart from HPI.           Past Medical History:     Past Medical History:   Diagnosis Date     Atrial flutter (H)      Cataract of both eyes      Chronic infection     Hep C     Congestive heart failure, unspecified      Coronary artery disease      Depressive disorder      Depressive disorder, not elsewhere classified     Depression (non-psychotic)     ERM OS (epiretinal membrane, left eye)      Generalized osteoarthrosis, unspecified site      Glaucoma suspect      Hypertension      Lichen planus      Other psoriasis      PVD (posterior vitreous detachment), left eye      Sarcoidosis      Sarcoidosis      Type II or unspecified type diabetes mellitus without mention of complication, not stated as uncontrolled      Unspecified hypothyroidism     Hypothyroidism     Unspecified viral hepatitis C without hepatic coma      Viral warts, unspecified               Past Surgical History:      Past Surgical History:   Procedure Laterality Date     ANESTHESIA CARDIOVERSION N/A 1/19/2017    Procedure: ANESTHESIA CARDIOVERSION;  Surgeon: GENERIC ANESTHESIA PROVIDER;  Location: [facility] OR     ANESTHESIA CARDIOVERSION N/A  1/23/2017    Procedure: ANESTHESIA CARDIOVERSION;  Surgeon: GENERIC ANESTHESIA PROVIDER;  Location: [facility] OR     ANESTHESIA CARDIOVERSION N/A 1/24/2017    Procedure: ANESTHESIA CARDIOVERSION;  Surgeon: GENERIC ANESTHESIA PROVIDER;  Location: [facility] OR     ANESTHESIA CARDIOVERSION N/A 11/20/2018    Procedure: ANESTHESIA CARDIOVERSION;  Surgeon: GENERIC ANESTHESIA PROVIDER;  Location: [facility] OR     ARTHROPLASTY HIP  8/24/2011    Procedure:ARTHROPLASTY HIP; Right Total Hip Arthroplasty  Choice anesthesia; Surgeon:LESLI WILKINSON; Location:[facility] OR     BIOPSY       C PELVIS/HIP JOINT SURGERY UNLISTED       cardiac stent      s/p     CARDIAC SURGERY       CATARACT IOL, RT/LT  9/15/2015    LE     COLONOSCOPY       CORONARY ANGIOGRAPHY ADULT ORDER       H ABLATION ATRIAL FLUTTER       HC REMOVAL OF TONSILS,<9 Y/O      Tonsils <10y.o.     HC REPAIR INCISIONAL HERNIA,REDUCIBLE      Hernia Repair, Incisional, Unilateral     HEART CATH, ANGIOPLASTY       JOINT REPLACEMENT      1 month ago--right hip     LIGATN/STRIP LONG & SHORT SAPHEN                Social History:   Works at: Retired. Used to work as .  Lives: with 24 year old son  Tobacco: former smoker, quit in 1990s  Etoh: occasional wine          Family History:     Father-heart disease/irregular heart beat, prostate cancer  Mother- heart attack, arthritis, thyroid disease, HTN  Sister- DM2, kidney cancer          Allergies:     Allergies   Allergen Reactions     Prednisone Other (See Comments)     He reports that he can't sleep for days and can't use small to massive doses of prednisone   Pt. Does not do well with high doses of Prednisone, His MD says that Prednisone is counter indicated. Prednisone failed to treat his sarcoidosis               Medications:     Reviewed in chart.             Physical Exam:     Temp: 98.3  F (36.8  C) Temp src: Oral BP: (!) 135/98   Heart Rate: 126   SpO2: 100 % O2 Device: Nasal cannula Oxygen Delivery: 4 LPM    Gen: awake  and alert, no acute distress, resting comfortably in bed   HEENT:AT/ NC, EOM grossly intact, mucous membranes moist   LUNGS: scattered diffuse crackles, no wheezes   CV: tachycardic, irregular, no extra heart sounds, murmurs or rub auscultated.  ABD: obese, soft, nontender, nondistended. Normoactive bowel sounds  EXT: No edema, clubbing or cyanosis. Peripheral pulses palpable in LE b/l.   NEURO: A&Ox3, CN II-XII grossly intact, moving all extremities in bed  SKIN: no rashes appreciated on exposed skin, lines in places without surrounding erythema/tenderness    Drips: amiodarone 0.5mg/min (started at [facility] prior to transfer)           Data:     Labs Reviewed on Admission  Today's labs Pertinent for:    Procalcitonin 0.09     Lactate for Sepsis Protocol 1.3     Hemoglobin 11.1     Occult Blood Positive (A)     INR 1.33     Creatinine 2.26 (H)     Heparin 10A Level 0.15     Pending studies from OSH: PJP PCR, fungal antibodies, sputum culture         Most Recent Imaging:   Reviewed in chart. Notable for:  Echo: per report 11/19/18     Left ventricular systolic function is mildly reduced.  The visual ejection fraction is estimated at 45-50%.  With rapid atrial fibrillation, the visual approximation of left ventricular  systolic function may be falsely decreased.  Mildly decreased right ventricular systolic function  Right ventricular systolic pressure is elevated, consistent with moderate  pulmonary hypertension.  EF lower compared to 3/18, in setting of rapid atrial fibrillation. The study  was technically difficult.      Right Heart Cath per report 11/19/18  HEMODYNAMICS     RIGHT ATRIAL PRESSURE: 11/13/11   RIGHT VENTRICULAR PRESSURE: 72/3/13  PULMONARY ARTERY PRESSURE: 72/31/45  PULMONARY CAPILLARY WEDGE PRESSURE: 18/17/17  CARDIAC OUTPUT AND INDEX: 3.1 l/min and 1.8 l/min/m2  PULMONARY VASCULAR RESISTANCE: 9 Wood units      IMPRESSION:   1. Pulmonary hypertension  2. Mildly elevated left heart filling  pressure (wedge 17 mmHg)  3. Low cardiac output           Assessment and Plan:     Rohan Monroe is a 71 y/o M with history of sarcoidosis on immunosuppressive therapy, COPD (on 3L home O2), CAD s/p stenting, HFpEF, atrial flutter s/p ablation with post-ablation recurrence, severe pulmonary HTN, HTN, DM2, CKD stage III who presents as a transfer from [facility] for dyspnea on exertion found to be in Afib with RVR for additional workup by cardiology and pulmonary teams.     # Atrial flutter and fibrillation s/p ablation with post-ablation recurrence  Previously followed by Dr. Vazquez, who performed successful catheter ablation in 1/2017 with immediate recurrence of arrhythmia following the procedure. Was on amiodarone s/p successful cardioversion for 2 weeks, but stopped amiodarone due to intolerance. Presented to OSH on 11/18 with Afib with RVR, s/p spontaneous conversion but recurrence on 11/21, now on IV amiodarone gtt started prior to transfer.   - [id number] 4.  Has been off Coumadin for last 6 months. Was on heparin gtt and transitioned to warfarin at OSH, but cardiology note states AC stopped on 11/21 due to heme occult positive study   - Continue PTA metoprolol with hold parameters.    - Continue amiodarone gtt overnight (currently at 0.5mg/min), consideration of oral maintenance dosing in AM  - Pt may not be an ideal candidate for long-term amiodarone therapy d/t underlying lung disease and previous intolerance   - Appreciate Cards2 and EP involvement for consideration of AV lady ablation and/or pacemaker implantation     # HFpEF (EF 60-65% in March 2018)   # CAD s/p PCI with ROSALIE to mid-LAD and D2 in 4/2008   Recent TTE at OSH 11/19/18 with reduced EF to 45-50%, although done while in rapid Afib.   - Strict input-output monitoring, daily weights  - Telemetry monitoring  - Continue PTA aspirin 81 mg oral daily, and Lipitor 40 mg oral daily, metoprolol with holding parameters.  -  PTA losartan held given borderline blood pressures.  - Furosemide 40 po bid continued (previously IV)    # Severe pulmonary HTN  RHC results from 11/19 documented above. Per Dr. Ervin's note, PVR is 9 and wedge was slightly elevated during RHC but he was in rapid Afib at that time.   - continue lasix 40mg PO BID   - continue PTA sildenafil 20mg TID     # Severe dyspnea on exertion   # Pulmonary sarcoidosis   # ? 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 Follows with pulmonology, Dr. Perlman. Currently on 3L supplemental O2 (at baseline). Dyspnea on exertion likely not fully explained by progression of lung disease and cardiac etiology is likely (see above) per Dr. Ramon of pulmonology. Atypical/fungal or other opportunistic infections are possible given immunosuppression. Was started on vanc/zosyn and azithromycin at OSH, vancomycin stopped on 11/20 per ID recs. Appears euvolemic on exam. Chart review with unclear history of amiodarone-related pulmonary toxicity.   - Pulmonary consulted, appreciate guidance  - Could consider bronchoscopy if respiratory status continues to decline given immunocompromised state    - Wean O2 as able  - Continue PTA bronchodilators and diuresis   - Continue methotrexate qweek   - Follow up pending blood, sputum and atypical/fungal infection culture results from OSH   - ID consulted at OSH: recommend continuing azithromycin and zosyn for now. Consider ID consult in AM.   - Consider alternative to azithromycin given risk of arrhythmia, especially while on amiodarone     # CKD Stage III  - Monitor renal function closely  - Avoid nephrotoxic drugs    Chronic Conditions     # Hypothyroidism- continue PTA levothyroxine.  # History of HepC- treated in the past  # DM2- low intensity sliding scale insulin, hypoglycemia protocol   # Insomnia- cont PTA PRN melatonin. Holding PRN remeron      FEN: cardiac diet, low sodium <2g/day, fluid rest <2L/day   PPX: mechanical, bleeding risk (heme occult positive today)   Dispo: admit to Cards 2. Anticipate >2 midnights. Requiring IV amiodarone.   Code Status FULL. Confirmed on admission.      Patient discussed with cardiology fellow. To be staffed in AM.     Trish Curry   PGY-2 Internal Medicine  Cardiology Service  Pager: [phone]                  Staff Physician Comments:     I personally interviewed and examined Rohan Monroe on 11/22/18. Please see progress note dated 11/22/18.Agree with cardiology residents assessment and plan as documented above.        Loc Duque MD     Division of Cardiology  [facility] [place] [address]    Clinic nurse:  Cecilia Fregoso LPN   Nurse Care Coordinator- Heart Care   [phone] option 1, than option 3    [phone] Appointments  [phone] Fax  [phone] After hours

## 2018-11-22 NOTE — PLAN OF CARE
Problem: Patient Care Overview  Goal: Plan of Care/Patient Progress Review  Outcome: No Change  VSS.  Pt. Went into Atrial Fib with RVR- and was started on Amio bolus and gtt.   Fernando blood nasal secretions + guaiac stool x3.  Heparin gtt D/c'd and Coumadin held.   Hgb. Is currently 11.1.   Pt. On antibiotics x 2.   Pt. Has a baseline anxiety and Remeron and Melatonin are helpful .   Son at bedside all afternoon.   Plan is to transfer to [facility] - 6[facility].  Pt. Usually goes to [facility] and has a pulmonary MD there.   Report given - ACLS transport to be here at 2015.  Records ready for transfer.

## 2018-11-22 NOTE — PLAN OF CARE
Problem: Arrhythmia/Dysrhythmia (Symptomatic) (Adult)  Goal: Signs and Symptoms of Listed Potential Problems Will be Absent, Minimized or Managed (Arrhythmia/Dysrhythmia)  Signs and symptoms of listed potential problems will be absent, minimized or managed by discharge/transition of care (reference Arrhythmia/Dysrhythmia (Symptomatic) (Adult) CPG).   Outcome: Declining  Atrial Fib rhythm RVR - Amiodarone protocol.

## 2018-11-22 NOTE — PROGRESS NOTES
[facility]   Cardiology Consults  Progress Note     Interval History:  - transferred to [facility] 1 service  - amio off     Physical Exam:  Temp:  [97.7  F (36.5  C)-98.8  F (37.1  C)] 98.1  F (36.7  C)  Heart Rate:  [104-154] 134  Resp:  [20-26] 22  BP: (122-160)/() 137/94  SpO2:  [94 %-100 %] 99 %    I/O:    Wt:   Wt Readings from Last 5 Encounters:   11/22/18 73 kg (160 lb 14.4 oz)   11/21/18 65.9 kg (145 lb 4.5 oz)   11/08/18 76.9 kg (169 lb 8 oz)   10/16/18 72.6 kg (160 lb)   10/04/18 77.2 kg (170 lb 3.2 oz)       GEN: NAD, pleasant, alert, awake  Pulm: Expiratory wheeze bilaterally in upper fields, no rhonchi, no cough  Cardiac: No murmurs, normal S1/S1, irregular rate and rhythm  Vascular: No lower extremity edema and palpable radial and pedal pulses. Right lower extremity cool but with strong pedal pulse  GI: soft, non distended, +BS  Neuro: CN II-XII grossly intact    Medications:    aspirin  81 mg Oral Daily     atorvastatin  40 mg Oral QPM     azithromycin  500 mg Intravenous Q24H     fluticasone-vilanterol  1 puff Inhalation Daily     furosemide  40 mg Oral BID     insulin aspart  1-3 Units Subcutaneous TID AC     insulin aspart  1-3 Units Subcutaneous At Bedtime     ipratropium - albuterol 0.5 mg/2.5 mg/3 mL  3 mL Nebulization 4x daily     levothyroxine  125 mcg Oral QAM AC     losartan  25 mg Oral Daily     metoprolol tartrate  100 mg Oral BID     omeprazole  40 mg Oral BID AC     piperacillin-tazobactam  2.25 g Intravenous Q6H     sildenafil  20 mg Oral TID         Labs:   CMP    Recent Labs  Lab 11/22/18  0752 11/21/18  0703 11/20/18  0616 11/19/18  1150 11/19/18  0625 11/18/18  1850 11/18/18  1414     --  140  --  140  --  139   POTASSIUM 3.6  --  4.2 4.5 4.4  --  4.6   CHLORIDE 100  --  102  --  106  --  106   CO2 27  --  32  --  28  --  26   ANIONGAP 9  --  6  --  6  --  7   *  --  122*  --  125*  --  168*   BUN 37*  --  35*  --  38*  --  37*   CR  2.31* 2.26* 2.30*  --  2.00*  --  1.66*   GFRESTIMATED 28* 28* 28*  --  33*  --  41*   GFRESTBLACK 34* 34* 34*  --  40*  --  49*   RAFFY 8.8  --  8.3*  --  7.9*  --  8.3*   MAG 2.0  --   --  2.2  --  2.1  --    PHOS  --   --   --   --   --  2.8  --    PROTTOTAL  --   --   --   --  6.6*  --   --    ALBUMIN  --   --   --   --  2.6*  --   --    BILITOTAL  --   --   --   --  0.7  --   --    ALKPHOS  --   --   --   --  110  --   --    AST  --   --   --   --  51*  --   --    ALT  --   --   --   --  42  --   --      CBC  Recent Labs  Lab 11/22/18  0752 11/21/18  1357 11/21/18  0703 11/20/18  0616 11/19/18  1542 11/19/18  0625   WBC 14.3*  --   --  9.8 10.2 11.1*   RBC 3.71*  --   --  3.61* 3.41* 3.56*   HGB 11.3* 11.1*  --  11.1* 10.5* 10.6*   HCT 36.1*  --   --  35.0* 33.5* 34.7*   MCV 97  --   --  97 98 98   MCH 30.5  --   --  30.7 30.8 29.8   MCHC 31.3*  --   --  31.7 31.3* 30.5*   RDW 15.4*  --   --  15.2* 15.1* 14.9     --  253 222 224 237     INR  Recent Labs  Lab 11/21/18  0703 11/20/18  0616 11/19/18  1150   INR 1.33* 1.20* 1.08     Arterial Blood GasNo lab results found in last 7 days.    Diagnostics:    Reviewed in chart. Notable for:  Echo: per report 11/19/18     Left ventricular systolic function is mildly reduced.  The visual ejection fraction is estimated at 45-50%.  With rapid atrial fibrillation, the visual approximation of left ventricular  systolic function may be falsely decreased.  Mildly decreased right ventricular systolic function  Right ventricular systolic pressure is elevated, consistent with moderate  pulmonary hypertension.  EF lower compared to 3/18, in setting of rapid atrial fibrillation. The study  was technically difficult.        Right Heart Cath per report 11/19/18  HEMODYNAMICS      RIGHT ATRIAL PRESSURE: 11/13/11   RIGHT VENTRICULAR PRESSURE: 72/3/13  PULMONARY ARTERY PRESSURE: 72/31/45  PULMONARY CAPILLARY WEDGE PRESSURE: 18/17/17  CARDIAC OUTPUT AND INDEX: 3.1 l/min and 1.8  l/min/m2  PULMONARY VASCULAR RESISTANCE: 9 Wood units      IMPRESSION:   1. Pulmonary hypertension  2. Mildly elevated left heart filling pressure (wedge 17 mmHg)  3. Low cardiac output         ASSESSMENT/PLAN:  Rohan Monroe is a 73 y/o M with history of sarcoidosis on immunosuppressive therapy, COPD (on 3L home O2), CAD s/p ROSALIE to D2 and mLAD (05/2017), HFpEF, atrial flutter s/p ablation with post-ablation recurrence, severe pulmonary HTN, HTN, DM2, CKD stage III who presents as a transfer from [facility] for dyspnea on exertion found to be in Afib with RVR for additional workup by cardiology and pulmonary teams.        # Atrial flutter and fibrillation s/p ablation with post-ablation recurrence  Previously followed by Dr. Vazquez, who performed successful catheter ablation in 1/2017 with immediate recurrence of arrhythmia following the procedure. Was on amiodarone s/p successful cardioversion for 2 weeks, but stopped amiodarone due to intolerance. Presented to OSH on 11/18 with Afib with RVR, s/p spontaneous conversion but recurrence on 11/21, on IV amiodarone gtt started prior to transfer.   - [id number]- 4.  Has been off Coumadin for last 6 months (discontinued due to patient preference). Was on heparin gtt and transitioned to warfarin at OSH, but cardiology note states AC stopped on 11/21 due to heme occult positive study and dark stools per patient  - Continue PTA 100mg bid metoprolol with hold parameters.    - Stopping IV amiodarone given lack of anticoagulation and unclear time frame of patient being in a-fib. Poor candidate for anesthesia per OSH EP which precluded cardioversion  - EP consult in AM for rate/rhythm control assistance - discuss AV ablation and PPM?     # HFpEF (EF 60-65% in March 2018)   # CAD s/p PCI with ROSALIE to mid-LAD and D2 in 5/2017   Recent TTE at OSH 11/19/18 with reduced EF to 45-50%, although done while in rapid Afib.   - Troponin negative x4 at OSH  -  Strict input-output monitoring, daily weights  - Telemetry monitoring  - Continue PTA aspirin 81 mg oral daily, and Lipitor 40 mg oral daily, metoprolol with holding parameters.  - Start 25mg losartan, consider increasing to PTA 50mg dose if needed   - Furosemide 40 po bid continued (previously IV)     # Severe pulmonary HTN  RHC results from 11/19 documented above. Per Dr. Ervin's note, PVR is 9 and wedge was slightly elevated during RHC but he was in rapid Afib at that time.   - continue lasix 40mg PO BID   - continue PTA sildenafil 20mg TID      # Severe dyspnea on exertion   # Pulmonary sarcoidosis   # ? 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 Follows with pulmonology, Dr. Perlman. Currently on 3L supplemental O2 (at baseline). Dyspnea on exertion likely not fully explained by progression of lung disease and cardiac etiology is likely (see above) per Dr. Ramon of pulmonology. Trop negative x4 at OSH. Atypical/fungal or other opportunistic infections are possible given immunosuppression. Was started on vanc/zosyn and azithromycin at OSH, vancomycin stopped on 11/20 per ID recs. Appears euvolemic on exam. Chart review with unclear history of amiodarone intolerance.    - Pulmonary consulted, appreciate guidance  - Could consider bronchoscopy if respiratory status continues to decline given immunocompromised state  - Wean O2 as able  - Continue PTA bronchodilators and diuresis   - Continue methotrexate qweek   - Follow up pending blood, sputum and atypical/fungal infection culture results from OSH   - ID consulted at OSH: recommend continuing azithromycin and zosyn for now given immune compromised state. Consider ID consult in AM.   - Consider alternative to azithromycin given risk of arrhythmia, especially while on amiodarone      # CKD Stage III  - Monitor renal function closely  - Avoid nephrotoxic  drugs     Chronic Conditions      # Hypothyroidism- continue PTA levothyroxine.  # History of HepC- treated in the past  # DM2 (A1C 6.4)- low intensity sliding scale insulin, hypoglycemia protocol   # Insomnia- cont PTA PRN melatonin. Holding PRN remeron      FEN: cardiac diet, low sodium <2g/day, fluid rest <2L/day   PPX: mechanical, bleeding risk (heme occult positive today)   Dispo: admit to Cards 1. Anticipate >2 midnights.   Code Status FULL. Confirmed on admission.     Patient seen and discussed with Dr. Duque, who agrees with above plan.      Julio Farrell PA-C  [facility] Cardiology Team    Staff Physician Comments:     I personally interviewed and examined Rohan Monroe today during a shared visit, and agree with Julio Farrell cardiology KIARA's assessment and plan as documented above. Please see H&P note dated 11/21/18. Patient with complex history of dyspnea, bleeding, atrial fibrillation, CAD, PHTN, sarcoid and plan to continue with rate control rather than rhythm control due to concerns for not tolerating anticoagulation.     Loc Duque MD    Division of Cardiology  [facility] [place] Surgery [place] [id number] [phone] Appointments  [phone] Fax  [phone] After hours    Clinic nurse:  Cecilia Fregoso LPN   Nurse Care Coordinator- Heart Care   [phone] option 1, than option 3    Academic Mailing address:  [facility]  Department of Internal Medicine ([facility] [address]) [address] [place] [address]

## 2018-11-22 NOTE — PLAN OF CARE
Problem: Patient Care Overview  Goal: Plan of Care/Patient Progress Review  Outcome: No Change    D: Pt admitted 11/21 from [facility] for SOB and afib with RVR. PMH: sarcoidosis, COPD, CAD, HFpEF, atrial flutter s/p ablation, pulmonary HTN, HTN, DM II, CKD III.     I/A: A&Ox4. O2 sats 90's on 4L NC. BP slightly elevated, MD aware. Monitor shows afib/flutter, HR 90's-120's. Denied any pain. ACEVES. Amiodarone gtt infusing at 0.5mg/min. Also on IV abx. Voiding with adequate output. No BM overnight. Continues on 2gm Na diet with 2L FR. Pt unable to stand d/t deconditioning and SOB, however able to reposition himself in bed. Appeared to rest well between cares, making needs known.     P: Continue to monitor. Notify Cards 2 with changes/concerns.

## 2018-11-23 ENCOUNTER — APPOINTMENT (OUTPATIENT)
Dept: PHYSICAL THERAPY | Facility: CLINIC | Age: 73
DRG: 177 | End: 2018-11-23
Attending: INTERNAL MEDICINE
Payer: MEDICARE

## 2018-11-23 PROBLEM — B59: Status: ACTIVE | Noted: 2018-11-23

## 2018-11-23 LAB
ASPERGILLUS AB TITR SER CF: NORMAL {TITER}
B DERMAT AB SER-ACNC: 0.2 IV
COCCIDIOIDES AB TITR SER CF: NORMAL {TITER}
GLUCOSE BLDC GLUCOMTR-MCNC: 118 MG/DL (ref 70–99)
GLUCOSE BLDC GLUCOMTR-MCNC: 127 MG/DL (ref 70–99)
GLUCOSE BLDC GLUCOMTR-MCNC: 140 MG/DL (ref 70–99)
GLUCOSE BLDC GLUCOMTR-MCNC: 172 MG/DL (ref 70–99)
H CAPSUL MYC AB TITR SER CF: NORMAL {TITER}
H CAPSUL YST AB TITR SER CF: NORMAL {TITER}
P JIROVECII DNA SPEC QL NAA+PROBE: DETECTED
SPECIMEN SOURCE: ABNORMAL
URATE SERPL-MCNC: 6.5 MG/DL (ref 3.5–7.2)

## 2018-11-23 PROCEDURE — A9270 NON-COVERED ITEM OR SERVICE: HCPCS | Performed by: STUDENT IN AN ORGANIZED HEALTH CARE EDUCATION/TRAINING PROGRAM

## 2018-11-23 PROCEDURE — 25000125 ZZHC RX 250: Performed by: NURSE PRACTITIONER

## 2018-11-23 PROCEDURE — 84550 ASSAY OF BLOOD/URIC ACID: CPT | Performed by: NURSE PRACTITIONER

## 2018-11-23 PROCEDURE — 25000132 ZZH RX MED GY IP 250 OP 250 PS 637: Performed by: PHYSICIAN ASSISTANT

## 2018-11-23 PROCEDURE — 25000125 ZZHC RX 250: Performed by: STUDENT IN AN ORGANIZED HEALTH CARE EDUCATION/TRAINING PROGRAM

## 2018-11-23 PROCEDURE — 94640 AIRWAY INHALATION TREATMENT: CPT | Mod: 76

## 2018-11-23 PROCEDURE — 97530 THERAPEUTIC ACTIVITIES: CPT | Mod: GP

## 2018-11-23 PROCEDURE — A9270 NON-COVERED ITEM OR SERVICE: HCPCS | Performed by: PHYSICIAN ASSISTANT

## 2018-11-23 PROCEDURE — 36415 COLL VENOUS BLD VENIPUNCTURE: CPT | Performed by: NURSE PRACTITIONER

## 2018-11-23 PROCEDURE — 40000193 ZZH STATISTIC PT WARD VISIT

## 2018-11-23 PROCEDURE — 40000275 ZZH STATISTIC RCP TIME EA 10 MIN

## 2018-11-23 PROCEDURE — 97162 PT EVAL MOD COMPLEX 30 MIN: CPT | Mod: GP

## 2018-11-23 PROCEDURE — A9270 NON-COVERED ITEM OR SERVICE: HCPCS | Performed by: INTERNAL MEDICINE

## 2018-11-23 PROCEDURE — 94640 AIRWAY INHALATION TREATMENT: CPT

## 2018-11-23 PROCEDURE — 25000132 ZZH RX MED GY IP 250 OP 250 PS 637: Performed by: INTERNAL MEDICINE

## 2018-11-23 PROCEDURE — 25000132 ZZH RX MED GY IP 250 OP 250 PS 637: Performed by: STUDENT IN AN ORGANIZED HEALTH CARE EDUCATION/TRAINING PROGRAM

## 2018-11-23 PROCEDURE — 99233 SBSQ HOSP IP/OBS HIGH 50: CPT | Performed by: INTERNAL MEDICINE

## 2018-11-23 PROCEDURE — 25000128 H RX IP 250 OP 636: Performed by: STUDENT IN AN ORGANIZED HEALTH CARE EDUCATION/TRAINING PROGRAM

## 2018-11-23 PROCEDURE — 21400006 ZZH R&B CCU INTERMEDIATE UMMC

## 2018-11-23 PROCEDURE — A9270 NON-COVERED ITEM OR SERVICE: HCPCS | Performed by: NURSE PRACTITIONER

## 2018-11-23 PROCEDURE — 25000132 ZZH RX MED GY IP 250 OP 250 PS 637: Performed by: NURSE PRACTITIONER

## 2018-11-23 PROCEDURE — 00000146 ZZHCL STATISTIC GLUCOSE BY METER IP

## 2018-11-23 PROCEDURE — 25000128 H RX IP 250 OP 636

## 2018-11-23 PROCEDURE — 97110 THERAPEUTIC EXERCISES: CPT | Mod: GP

## 2018-11-23 RX ORDER — LIDOCAINE 40 MG/G
CREAM TOPICAL 3 TIMES DAILY PRN
Status: DISCONTINUED | OUTPATIENT
Start: 2018-11-23 | End: 2018-11-24

## 2018-11-23 RX ORDER — DILTIAZEM HYDROCHLORIDE 30 MG/1
30 TABLET, FILM COATED ORAL EVERY 6 HOURS SCHEDULED
Status: DISCONTINUED | OUTPATIENT
Start: 2018-11-23 | End: 2018-11-24

## 2018-11-23 RX ORDER — ACETAMINOPHEN 325 MG/1
650 TABLET ORAL EVERY 6 HOURS PRN
Status: DISCONTINUED | OUTPATIENT
Start: 2018-11-23 | End: 2018-11-23

## 2018-11-23 RX ORDER — ACETAMINOPHEN 325 MG/1
325-650 TABLET ORAL EVERY 6 HOURS PRN
Status: DISCONTINUED | OUTPATIENT
Start: 2018-11-23 | End: 2018-11-26 | Stop reason: HOSPADM

## 2018-11-23 RX ORDER — LIDOCAINE 40 MG/G
CREAM TOPICAL 3 TIMES DAILY PRN
Status: COMPLETED | OUTPATIENT
Start: 2018-11-23 | End: 2018-11-23

## 2018-11-23 RX ORDER — METOPROLOL TARTRATE 1 MG/ML
5 INJECTION, SOLUTION INTRAVENOUS EVERY 5 MIN PRN
Status: DISCONTINUED | OUTPATIENT
Start: 2018-11-23 | End: 2018-11-23

## 2018-11-23 RX ORDER — SULFAMETHOXAZOLE AND TRIMETHOPRIM 400; 80 MG/1; MG/1
1 TABLET ORAL EVERY 12 HOURS
Status: DISCONTINUED | OUTPATIENT
Start: 2018-11-23 | End: 2018-11-24 | Stop reason: DRUGHIGH

## 2018-11-23 RX ADMIN — SILDENAFIL 20 MG: 20 TABLET ORAL at 20:02

## 2018-11-23 RX ADMIN — IPRATROPIUM BROMIDE AND ALBUTEROL SULFATE 3 ML: .5; 3 SOLUTION RESPIRATORY (INHALATION) at 20:30

## 2018-11-23 RX ADMIN — SILDENAFIL 20 MG: 20 TABLET ORAL at 08:15

## 2018-11-23 RX ADMIN — OMEPRAZOLE 40 MG: 20 CAPSULE, DELAYED RELEASE ORAL at 08:15

## 2018-11-23 RX ADMIN — ACETAMINOPHEN 650 MG: 325 TABLET, FILM COATED ORAL at 20:01

## 2018-11-23 RX ADMIN — DILTIAZEM HYDROCHLORIDE 30 MG: 30 TABLET, FILM COATED ORAL at 23:37

## 2018-11-23 RX ADMIN — ACETAMINOPHEN 650 MG: 325 TABLET, FILM COATED ORAL at 04:24

## 2018-11-23 RX ADMIN — FLUTICASONE FUROATE AND VILANTEROL TRIFENATATE 1 PUFF: 100; 25 POWDER RESPIRATORY (INHALATION) at 08:15

## 2018-11-23 RX ADMIN — AZITHROMYCIN MONOHYDRATE 500 MG: 500 INJECTION, POWDER, LYOPHILIZED, FOR SOLUTION INTRAVENOUS at 16:13

## 2018-11-23 RX ADMIN — DILTIAZEM HYDROCHLORIDE 30 MG: 30 TABLET, FILM COATED ORAL at 17:32

## 2018-11-23 RX ADMIN — SULFAMETHOXAZOLE AND TRIMETHOPRIM 1 TABLET: 400; 80 TABLET ORAL at 14:22

## 2018-11-23 RX ADMIN — IPRATROPIUM BROMIDE AND ALBUTEROL SULFATE 3 ML: .5; 3 SOLUTION RESPIRATORY (INHALATION) at 12:34

## 2018-11-23 RX ADMIN — IPRATROPIUM BROMIDE AND ALBUTEROL SULFATE 3 ML: .5; 3 SOLUTION RESPIRATORY (INHALATION) at 16:24

## 2018-11-23 RX ADMIN — FUROSEMIDE 40 MG: 40 TABLET ORAL at 08:15

## 2018-11-23 RX ADMIN — OMEPRAZOLE 40 MG: 20 CAPSULE, DELAYED RELEASE ORAL at 16:13

## 2018-11-23 RX ADMIN — FUROSEMIDE 40 MG: 40 TABLET ORAL at 16:13

## 2018-11-23 RX ADMIN — SILDENAFIL 20 MG: 20 TABLET ORAL at 14:10

## 2018-11-23 RX ADMIN — LIDOCAINE: 40 CREAM TOPICAL at 03:22

## 2018-11-23 RX ADMIN — ATORVASTATIN CALCIUM 40 MG: 40 TABLET, FILM COATED ORAL at 20:01

## 2018-11-23 RX ADMIN — LIDOCAINE: 40 CREAM TOPICAL at 11:14

## 2018-11-23 RX ADMIN — LEVOTHYROXINE SODIUM 125 MCG: 125 TABLET ORAL at 08:15

## 2018-11-23 RX ADMIN — PIPERACILLIN AND TAZOBACTAM 2.25 G: 2; .25 INJECTION, POWDER, FOR SOLUTION INTRAVENOUS at 01:23

## 2018-11-23 RX ADMIN — PIPERACILLIN AND TAZOBACTAM 2.25 G: 2; .25 INJECTION, POWDER, FOR SOLUTION INTRAVENOUS at 08:14

## 2018-11-23 RX ADMIN — LOSARTAN POTASSIUM 25 MG: 25 TABLET ORAL at 08:15

## 2018-11-23 RX ADMIN — ASPIRIN 81 MG CHEWABLE TABLET 81 MG: 81 TABLET CHEWABLE at 08:15

## 2018-11-23 RX ADMIN — IPRATROPIUM BROMIDE AND ALBUTEROL SULFATE 3 ML: .5; 3 SOLUTION RESPIRATORY (INHALATION) at 08:52

## 2018-11-23 RX ADMIN — METOPROLOL TARTRATE 100 MG: 100 TABLET, FILM COATED ORAL at 08:15

## 2018-11-23 RX ADMIN — LIDOCAINE: 40 CREAM TOPICAL at 17:08

## 2018-11-23 ASSESSMENT — ACTIVITIES OF DAILY LIVING (ADL)
ADLS_ACUITY_SCORE: 14

## 2018-11-23 ASSESSMENT — PAIN DESCRIPTION - DESCRIPTORS
DESCRIPTORS: THROBBING
DESCRIPTORS: SORE

## 2018-11-23 NOTE — CONSULTS
General Infectious Disease Service Consultation     Patient:  Rohan Monroe   Date of birth 1945, Medical record number [id number]  Date of Visit:  11/23/2018  Date of Admission: 11/21/2018  Consult Requester:Loc Duque MD            Assessment and Recommendations:   ASSESSMENT:  1. Pneumocystis pneumonia, positive DNA PCR on Sputum from 11/20. DFA negative  1. Subacute onset is consistent.  2. Hypoxic, but on home O2 therapy at baseline O2 of 3L.    2. Sarcoidosis  3. COPD  4. ESRD CrCl =20  5. DM  6. Immunocompromised host.      RECOMMENDATION:  1. TMP/SMZ Single Strength 400/80 tab oral BID x 21 days.  This is ~10mg/kg/day  (ordered)  -- If O2 sat is <90%, then typically this is the indication for steroid burst. At 3L which is his home O2 dose.  -- watch renal function, may need to come up with alternative regimen.  Sulfa can precipitate out and cause an obstructive crystal nephropathy  3. Discontinue Zosyn (ordered)  4. Check 1,3-beta-d-glucan (ordered).   This is a common biomarker of infection.  Note that 1-2% of the population can harbor PCP in their nasopharynx, yielding a positive PCR.  5. Trend CRP to assess response to therapy (ordered)    Jamie Groves  [id number]        ________________________________________________________________    Consult Question:.  Admission Diagnosis: pulmonary hypertension  Cardiac abnormality         History of Present Illness:     72 year old male with PMHx most significant for pulmonary sarcoidosis, COPD (on 2-3 L O2 at baseline), pulmonary hypertension, CAD, atrial flutter, hypertension, CKD stage III that presents with 1 month of ongoing dyspnea on exertion, who presented to [facility] 11/18, with 2 weeks of progressive shortness of breath.    positive DNA PCR on Sputum from 11/20. DFA negative  Sputum Culture on 11/20 = normal trent  Vanc+azithro+Zosyn empiric therapy     Rohan Monroe is a 73 y/o M with history of sarcoidosis on  "immunosuppressive therapy, COPD (on 3L home O2), CAD s/p stenting, HFpEF, atrial flutter s/p ablation with post-ablation recurrence, pulmonary HTN, HTN, DM2, CKD stage III who presents as a transfer from [facility] for dyspnea on exertion, found to have atrial fibrillation      Per patient and chart review, patient initially presented to [facility] ED on 11/18/18 with dyspnea on exertion. Symptoms started around one month prior to admission. He was seen by his primary pulmonogist at that time and imaging was concerning for pneumonia, so he was treated with one week of levaquin and azithromycin with slight improvement in symptoms. Over the past 2 weeks, he had progressively worsening shortness of breath. He states he could hardly bend over to pick something up on a table next to his bed and doing so required \"5 minutes to catch my breath.\" At OSH ED, he was found to have SpO2 of 85% and EKG demonstrated atrial flutter with .  NT-pro BNP was elevated to 18K. CXR showed diffuse infiltrates bilaterally.      During admission at OSH, pulmonary was consulted and felt that increased dyspnea is likely secondary to a cardiac etiology, although did note that his immunosuppressed places him at risk for atypical/fungal infections. Dr. Ervin of cardiology was consulted. RHC demonstrated pulmonary hypertension with low cardiac output. Per chart review, patient self-converted out of atrial fibrillation into NSR the morning of 11/21 but went back into Afib the same afternoon. IV amiodarone was started. Anticoagulation was stopped on 11/21 due to a positive heme occult. He was transferred to [facility] for further evaluation by his primary cardiologist and pulmonologist.      History of arrhythmia dates back to 2016 per chart review. At that time, had recurrent atrial flutter and was seen by Dr. Vazquez, who performed successful catheter ablation in 1/2017. Patient experienced immediate recurrence of " arrhythmia following the procedure and was started on amiodarone prior to successful cardioversion. On 2/10/18 (two weeks later), karen stopped taking amiodarone due to nausea and feeling unwell. He has not been on antiarrhythmic medication since this time.      Today, Mr. Monroe states he feels better than on admission. He is comfortable on 3L O2 but has not tried to exert himself or walk around the room yet. He denies chest pain. He continues to have chronic dry cough.     Recent culture results include:  Culture Micro   Date Value Ref Range Status   11/20/2018 Light growth  Normal trent    Final   11/18/2018 No growth after 5 days  Preliminary   11/18/2018 No growth after 5 days  Preliminary   01/05/2018 No growth  Final   04/26/2017 No growth  Final   04/22/2017 (A)  Final    Heavy growth Normal trent  Light growth Citrobacter koseri     04/22/2017 No growth  Final   04/22/2017 No growth  Final   01/18/2017 No growth  Final   01/18/2017 No growth  Final              Review of Systems:   CONSTITUTIONAL:  No fevers or chills  EYES: negative for icterus  ENT:  negative for hearing loss, tinnitus and sore throat  RESPIRATORY: see HPI  CARDIOVASCULAR: see pmhx.  GASTROINTESTINAL:  negative for nausea, vomiting, diarrhea and constipation  GENITOURINARY:  negative for dysuria  HEME:  No easy bruising  INTEGUMENT:  negative for rash and pruritus  NEURO:  Negative for headache           Past Medical History:     Past Medical History:   Diagnosis Date     Atrial flutter (H)      Cataract of both eyes      Chronic infection     Hep C     Congestive heart failure, unspecified      Coronary artery disease      Depressive disorder      Depressive disorder, not elsewhere classified     Depression (non-psychotic)     ERM OS (epiretinal membrane, left eye)      Generalized osteoarthrosis, unspecified site      Glaucoma suspect      Hypertension      Lichen planus      Other psoriasis      Pneumocystis carinii pneumonia  11/23/2018     PVD (posterior vitreous detachment), left eye      Sarcoidosis      Sarcoidosis      Type II or unspecified type diabetes mellitus without mention of complication, not stated as uncontrolled      Unspecified hypothyroidism     Hypothyroidism     Unspecified viral hepatitis C without hepatic coma      Viral warts, unspecified             Past Surgical History:     Past Surgical History:   Procedure Laterality Date     ANESTHESIA CARDIOVERSION N/A 1/19/2017    Procedure: ANESTHESIA CARDIOVERSION;  Surgeon: GENERIC ANESTHESIA PROVIDER;  Location: [facility] OR     ANESTHESIA CARDIOVERSION N/A 1/23/2017    Procedure: ANESTHESIA CARDIOVERSION;  Surgeon: GENERIC ANESTHESIA PROVIDER;  Location: [facility] OR     ANESTHESIA CARDIOVERSION N/A 1/24/2017    Procedure: ANESTHESIA CARDIOVERSION;  Surgeon: GENERIC ANESTHESIA PROVIDER;  Location: [facility] OR     ANESTHESIA CARDIOVERSION N/A 11/20/2018    Procedure: ANESTHESIA CARDIOVERSION;  Surgeon: GENERIC ANESTHESIA PROVIDER;  Location: [facility] OR     ARTHROPLASTY HIP  8/24/2011    Procedure:ARTHROPLASTY HIP; Right Total Hip Arthroplasty  Choice anesthesia; Surgeon:LESLI WILKINSON; Location:[facility] OR     BIOPSY       C PELVIS/HIP JOINT SURGERY UNLISTED       cardiac stent      s/p     CARDIAC SURGERY       CATARACT IOL, RT/LT  9/15/2015    LE     COLONOSCOPY       CORONARY ANGIOGRAPHY ADULT ORDER       H ABLATION ATRIAL FLUTTER       HC REMOVAL OF TONSILS,<9 Y/O      Tonsils <10y.o.     HC REPAIR INCISIONAL HERNIA,REDUCIBLE      Hernia Repair, Incisional, Unilateral     HEART CATH, ANGIOPLASTY       JOINT REPLACEMENT      1 month ago--right hip     LIGATN/STRIP LONG & SHORT SAPHEN              Family History:     Family History   Problem Relation Age of Onset     HEART DISEASE Father      irreg heart beat     Circulatory Father      varicose veins     Prostate Cancer Father      HEART DISEASE Mother      heart attack     Arthritis Mother      Osteoporosis Mother      Thyroid Disease Mother       Hypertension Mother      Eye Disorder Maternal Grandmother      glaucoma     Diabetes Sister      type 2     Kidney Cancer Sister      Diabetes Sister      Glaucoma No family hx of      Macular Degeneration No family hx of      Cancer No family hx of      no skin cancer            Social History:     Social History   Substance Use Topics     Smoking status: Former Smoker     Packs/day: 1.00     Years: 30.00     Types: Cigarettes     Start date: 12/30/1960     Quit date: 7/22/1994     Smokeless tobacco: Never Used     Alcohol use No     History   Sexual Activity     Sexual activity: Not Currently     Partners: Female     Birth control/ protection: Post-menopausal            Current Medications (antimicrobials listed in bold):       aspirin  81 mg Oral Daily     atorvastatin  40 mg Oral QPM     azithromycin  500 mg Intravenous Q24H     diltiazem  30 mg Oral Q6H JOSE MANUEL     fluticasone-vilanterol  1 puff Inhalation Daily     furosemide  40 mg Oral BID     insulin aspart  1-3 Units Subcutaneous TID AC     insulin aspart  1-3 Units Subcutaneous At Bedtime     ipratropium - albuterol 0.5 mg/2.5 mg/3 mL  3 mL Nebulization 4x daily     levothyroxine  125 mcg Oral QAM AC     losartan  25 mg Oral Daily     omeprazole  40 mg Oral BID AC     sildenafil  20 mg Oral TID     sulfamethoxazole-trimethoprim (BACTRIM/SEPTRA) intermittent infusion  350 mg Intravenous Q6H     sulfamethoxazole-trimethoprim  1 tablet Oral Q12H            Allergies:     Allergies   Allergen Reactions     Prednisone Other (See Comments)     He reports that he can't sleep for days and can't use small to massive doses of prednisone   Pt. Does not do well with high doses of Prednisone, His MD says that Prednisone is counter indicated. Prednisone failed to treat his sarcoidosis             Physical Exam:   Vitals were reviewed  Patient Vitals for the past 24 hrs:   BP Temp Temp src Heart Rate Resp SpO2 Weight   11/23/18 1129 99/76 97.7  F (36.5  C) Oral 104 20  98 % -   11/23/18 0852 - - - - - 100 % -   11/23/18 0717 (!) 159/100 97.6  F (36.4  C) Oral 120 20 98 % -   11/23/18 0424 - 98.3  F (36.8  C) Oral - - - -   11/23/18 0315 117/80 99.5  F (37.5  C) Oral 109 22 98 % 74.3 kg (163 lb 11.2 oz)   11/22/18 2304 97/75 98  F (36.7  C) Oral 106 18 100 % -   11/22/18 2237 111/85 98  F (36.7  C) Oral 104 18 98 % -   11/22/18 1912 (!) 145/99 98.3  F (36.8  C) Oral 118 16 95 % -   11/22/18 1518 121/89 98.3  F (36.8  C) Oral 108 22 95 % -     Ranges for his vital signs:  Temp:  [97.6  F (36.4  C)-99.5  F (37.5  C)] 97.7  F (36.5  C)  Heart Rate:  [104-120] 104  Resp:  [16-22] 20  BP: ()/() 99/76  SpO2:  [95 %-100 %] 98 %    Physical Examination:  GENERAL:  well-developed, well-nourished, in bed in no acute distress.   HEENT:  Head is normocephalic, atraumatic   EYES:  Eyes have anicteric sclerae without conjunctival injection or stigmata of endocarditis.    ENT:  Oropharynx is moist without exudates or ulcers. Tongue is midline  NECK:  Supple. No  Cervical lymphadenopathy  LUNGS:  Crackles with  auscultation bilateral. No egophony. No wheeze.  CARDIOVASCULAR: tachy  rate and rhythm with no murmurs, gallops or rubs.  ABDOMEN:  Normal bowel sounds, soft, nontender. No appreciable hepatosplenomegaly  SKIN:  No acute rashes.  Line(s) are in place without any surrounding erythema or exudate. No stigmata of endocarditis.  NEUROLOGIC:  Grossly nonfocal. Active x4 extremities         Laboratory Data:     Inflammatory Markers    Recent Labs   Lab Test  11/19/18   0625  01/05/18   0520  04/23/17   0635  04/22/17   1248  03/09/17   1150  02/13/17   1359  02/29/16   1424  06/26/14   0611  09/02/13   0620  09/01/13   0557  08/31/13 2045   10/07/11   1103   SED  46*   --   44*   --    --   73*   --    --    --   13 13   --   15   CRP   --   110.0*  24.0*  27.0*  9.2*  17.4*  <2.9  68.6*  22.9*  30.0*  28.8*   < >  5.8    < > = values in this interval not displayed.        Hematology Studies  Recent Labs   Lab Test  11/22/18   0752  11/21/18   1357  11/21/18   0703  11/20/18   0616  11/19/18   1542  11/19/18   0625   11/18/18   1414  11/08/18   1215   01/05/18   0520   04/22/17   1248   01/18/17   1746  12/26/16   1015   03/26/16   1121   WBC  14.3*   --    --   9.8  10.2  11.1*   --   12.5*  9.2   < >  13.5*   < >  10.2   < >  12.3*  7.8   < >  10.9   ANEU   --    --    --    --    --    --    --   10.4*   --    --   12.4*   --   8.3   --   9.6*  5.9   --   9.0*   AEOS   --    --    --    --    --    --    --   0.5   --    --   0.1   --   0.4   --   0.4  0.7   --   0.4   HGB  11.3*  11.1*   --   11.1*  10.5*  10.6*   --   11.5*  11.6*   < >  10.6*   < >  12.5*   < >  15.1  14.0  13.6   < >  14.0   MCV  97   --    --   97  98  98   --   97  97   < >  94   < >  94   < >  98  98   < >  92   PLT  262   --   253  222  224  237   < >  274  289   < >  303   < >  348   < >  236  217   < >  262    < > = values in this interval not displayed.       Immune Globulin Studies  Recent Labs   Lab Test  11/20/15   1045   IGG  1410   IGM  144   IGA  296       Metabolic Studies   Recent Labs   Lab Test  11/22/18   0752  11/21/18   0703  11/20/18   0616  11/19/18   1150  11/19/18   0625  11/18/18   1414  11/08/18   1215   NA  136   --   140   --   140  139  137   POTASSIUM  3.6   --   4.2  4.5  4.4  4.6  4.4   CHLORIDE  100   --   102   --   106  106  101   CO2  27   --   32   --   28  26  30   BUN  37*   --   35*   --   38*  37*  38*   CR  2.31*  2.26*  2.30*   --   2.00*  1.66*  1.94*   GFRESTIMATED  28*  28*  28*   --   33*  41*  34*       Hepatic Studies  Recent Labs   Lab Test  11/19/18   0625  11/08/18   1215  09/13/18   1115  08/28/18   1009  03/08/18   1001  02/26/18   1317   08/29/17   0959  08/17/17   1234   BILITOTAL  0.7   --   0.8  0.8   --   0.6   --   0.5  0.6   ALKPHOS  110   --   110  116   --   128   --   151*  129   ALBUMIN  2.6*  2.9*  3.6  3.7  3.7  3.7   < >  3.4  3.5   AST   51*   --   26  23   --   24   --   14  17   ALT  42   --   30  29   --   42   --   21  20    < > = values in this interval not displayed.       Thyroid Studies    Recent Labs   Lab Test  11/19/18   0625  04/18/18   1225  03/21/18   1312  02/26/18   1318   TSH  1.44  2.05  7.94*  0.07*   T4   --    --   0.77  1.38       Microbiology:  Culture Micro   Date Value Ref Range Status   11/20/2018 Light growth  Normal trent    Final   11/18/2018 No growth after 5 days  Preliminary   11/18/2018 No growth after 5 days  Preliminary   01/05/2018 No growth  Final   04/26/2017 No growth  Final   04/22/2017 (A)  Final    Heavy growth Normal trent  Light growth Citrobacter koseri     04/22/2017 No growth  Final   04/22/2017 No growth  Final   01/18/2017 No growth  Final   01/18/2017 No growth  Final   03/26/2016 No growth  Final   06/28/2014   Final    Culture negative for acid fast bacilli  Assayed at [facility] [address]     06/27/2014   Final    Culture negative for acid fast bacilli Assayed at [facility] [address]     06/25/2014 Normal trent  Final   06/25/2014   Final    Culture negative for acid fast bacilli  Assayed at [facility] [address]     06/25/2014 No growth  Final   06/25/2014 No growth  Final   02/05/2014 No Beta Streptococcus isolated  Final   08/31/2013   Final    Moderate growth Beta hemolytic Streptococcus group B This isolate is presumed to   be clindamycin resistant based on detection of inducible clindamycin   resistance.  Plus normal skin trent.  Light growth Acinetobacter species, not baumannii   08/31/2013 No growth  Final   08/31/2013 No growth  Final   04/25/2012   Final    Incorrectly ordered by PCU/Clinic Canceled, Test credited  See wound culture, 4/25/12 @ 1133    04/25/2012   Final    Heavy growth Staphylococcus aureus  Heavy growth Beta hemolytic Streptococcus group B This Beta hemolytic   Streptococcus group B is  presumed to be clindamycin resistant based on   detection of inducible clindamycin resistance.  Plus normal skin trent.   08/29/2011   Final    <10,000 colonies/mL Gram negative rods  10 to 50,000 colonies/mL Gram positive cocci  Susceptibility testing not routinely done   08/27/2011 No growth  Final   08/27/2011   Final    Normal trent  Heavy growth Enterobacter cloacae  Susceptibility testing not routinely done   08/27/2011 No growth  Final   08/27/2011   Final    Incorrectly ordered by PCU/Clinic Canceled, Test credited   03/29/2011 Normal trent  Final   03/28/2011 No growth  Final   03/28/2011 No Beta Streptococcus isolated  Final   03/28/2011 No growth  Final   12/09/2003 No Beta Streptococcus isolated  Final       Urine Studies    Recent Labs   Lab Test  11/19/18   0230  01/27/17   1130  01/19/17   1930  03/26/16   1947  10/27/15   1339   LEUKEST  Negative  Negative  Negative  Negative  Negative   WBCU  1  3*  2  1  2       Attending Physician Attestation:  I have seen and evaluated Rohan Howard Gitalejandro. I have discussed with the house staff team or resident(s), and I agree with the above documented findings and plan in this note. I have reviewed today's vital signs, medications, labs and imaging.  If a medical student was involved in this note, they were serving in a capacity as a scribe.  Floor time: 30 minutes  Face-to-face time: 25 minutes  Total time: 55 minutes

## 2018-11-23 NOTE — PROGRESS NOTES
11/23/18 1157   Quick Adds   Type of Visit Initial PT Evaluation   Living Environment   Lives With child(jose), adult   Living Arrangements apartment   Home Accessibility no concerns   Number of Stairs to Enter Home 0   Number of Stairs Within Home 0   Living Environment Comment lives with adult son who is able to assist   Self-Care   Dominant Hand right   Usual Activity Tolerance good   Current Activity Tolerance poor   Regular Exercise yes   Activity/Exercise Type walking   Equipment Currently Used at Home cane, straight   Activity/Exercise/Self-Care Comment patient has been participating with OP GA and CR for last 1 year. Lives with adult son, indep with ADLs. Walks for exercise   Functional Level Prior   Ambulation 1-->assistive equipment   Transferring 1-->assistive equipment   Toileting 0-->independent   Bathing 2-->assistive person   Dressing 0-->independent   Eating 0-->independent   Communication 0-->understands/communicates without difficulty   Swallowing 0-->swallows foods/liquids without difficulty   Cognition 0 - no cognition issues reported   Fall history within last six months no   Which of the above functional risks had a recent onset or change? ambulation;transferring   Prior Functional Level Comment has cane, walker, and wheelchair; uses them intermittently. Indep with functional mobility   General Information   Onset of Illness/Injury or Date of Surgery - Date 11/21/18   Referring Physician Alejo Tovar   Patient/Family Goals Statement increase activity tolerance   Pertinent History of Current Problem (include personal factors and/or comorbidities that impact the POC) Rohan Monroe is a 73 y/o M with history of sarcoidosis on immunosuppressive therapy, COPD (on 3L home O2), CAD s/p ROSALIE to D2 and mLAD (05/2017), HFpEF, atrial flutter s/p ablation with post-ablation recurrence, severe pulmonary HTN, HTN, DM2, CKD stage III who presents as a transfer from [facility] for  dyspnea on exertion found to be in Afib with RVR for additional workup by cardiology and pulmonary teams   Precautions/Limitations oxygen therapy device and L/min  (3 LPM)   General Info Comments activity: ambulate with assist   Cognitive Status Examination   Orientation orientation to person, place and time   Level of Consciousness alert   Follows Commands and Answers Questions 100% of the time;able to follow multistep instructions   Personal Safety and Judgment intact   Memory intact   Pain Assessment   Patient Currently in Pain No   Range of Motion (ROM)   ROM Comment B LE WFL    Strength   Strength Comments B LE WFL   Bed Mobility   Bed Mobility Comments indep   Transfer Skills   Transfer Comments indep   Gait   Gait Comments not safe to ambulate at this time d/t shortness of breath   Balance   Balance Comments standing balance good with B UE support   Sensory Examination   Sensory Perception Comments no concerns   Coordination   Coordination no deficits were identified   Muscle Tone   Muscle Tone no deficits were identified   General Therapy Interventions   Planned Therapy Interventions balance training;bed mobility training;gait training;neuromuscular re-education;strengthening;transfer training;risk factor education;home program guidelines;progressive activity/exercise   Clinical Impression   Criteria for Skilled Therapeutic Intervention yes, treatment indicated   PT Diagnosis impaired functional mobility   Influenced by the following impairments weakness, decreased activity tolerance   Functional limitations due to impairments decreased indep with functional mobility   Clinical Presentation Evolving/Changing   Clinical Presentation Rationale complex medical history   Clinical Decision Making (Complexity) Moderate complexity   Therapy Frequency` daily   Predicted Duration of Therapy Intervention (days/wks) 1 week   Anticipated Discharge Disposition Home with Home Therapy   Risk & Benefits of therapy have been  "explained Yes   Patient, Family & other staff in agreement with plan of care Yes   [facility] AM-PAC TM \"6 Clicks\"   2016, Trustees of [facility], under license to [employer].  All rights reserved.   6 Clicks Short Forms Basic Mobility Inpatient Short Form   [facility] AM-PAC  \"6 Clicks\" V.2 Basic Mobility Inpatient Short Form   1. Turning from your back to your side while in a flat bed without using bedrails? 4 - None   2. Moving from lying on your back to sitting on the side of a flat bed without using bedrails? 4 - None   3. Moving to and from a bed to a chair (including a wheelchair)? 4 - None   4. Standing up from a chair using your arms (e.g., wheelchair, or bedside chair)? 4 - None   5. To walk in hospital room? 3 - A Little   6. Climbing 3-5 steps with a railing? 3 - A Little   Basic Mobility Raw Score (Score out of 24.Lower scores equate to lower levels of function) 22   Total Evaluation Time   Total Evaluation Time (Minutes) 8     "

## 2018-11-23 NOTE — PLAN OF CARE
"Problem: Patient Care Overview  Goal: Plan of Care/Patient Progress Review  Outcome: No Change    D: Pt admitted 11/21 from [facility] for SOB and afib with RVR. PMH: sarcoidosis, COPD, CAD, HFpEF, atrial flutter s/p ablation, pulmonary HTN, HTN, DM II, CKD III.     I/A: A&Ox4. Vital signs stable with elevated HR. O2 sats 90's on 3L NC. Monitor shows afib/flutter with rates varying 90's-140's. C/o throbbing pain in left knee and foot, states \"it feels like a gout flare up.\" Cards cross cover notified. PRN Tylenol and lidocaine cream ordered and administered with moderate relief. Voiding with adequate output. No BM overnight. Continues on 2gm Na diet with 2L FR. Encouraged frequent repositioning in bed. Appeared to rest well between cares, making needs known.     P: Continue to monitor. Notify Cards 2 with changes/concerns.       "

## 2018-11-23 NOTE — PLAN OF CARE
Problem: Patient Care Overview  Goal: Plan of Care/Patient Progress Review  Outcome: Improving  D/A/I:  Patient A&O x4, denied pain, palpitations, dizziness, and nausea.  Had ACEVES, lung sounds diminished in left lung base, fine crackles auscultated in right lung base.  Was given scheduled furosemide; patient had good urine output, see I&O flow sheet.  Was also given IS, encouraged use.  In atrial fibrillation/flutter with occasional PVCs, HR 90s-150s, SBP 120s-150s, SaO2 % on 4L, 95% on 3L O2 via nasal cannula.  Amiodarone gtt discontinued.  Repositioned in bed independently and frequently, buttocks skin dry/flaky but blanchable and intact.    P:  Continue to monitor pain, VS, heart rhythm, skin integrity, fluid status, cardiac and respiratory status.  Notify care team of changes in patient condition or other concerns.  Encourage IS use to promote lung function.  Awaiting EP consult for potential ablation and/or pacemaker placement.

## 2018-11-23 NOTE — PLAN OF CARE
Problem: Patient Care Overview  Goal: Plan of Care/Patient Progress Review  Discharge Planner PT   Patient plan for discharge: home with assist  Current status: patient demos indep bed mobility and sit to stand transfers. Poor activity tolerance, patient able to march in place ~30 seconds x2 and standing weight shifting with B UE support on FWW. Pt complains of pain in L foot which resolves with continued activity. Encourage ambulation to bathroom, not safe to ambulate in valadez d/t ACEVES and poor tolerance.  Barriers to return to prior living situation: medical stability, activity tolerance  Recommendations for discharge: home with assist, potential HH vs OP therapies for increased activity tolerance pending LOS  Rationale for recommendations: see above       Entered by: Sandra Johnson 11/23/2018 12:53 PM

## 2018-11-23 NOTE — PROGRESS NOTES
"    [facility]   Cardiology   Progress Note     Interval History:  - No acute events overnight  - Patient denies SOB at rest, continues to endorse SOB with minimal activity, but feels this is improving  - HR remains elevated, 120-140's   - Per RN HR always elevated in am, then after morning medications -120's  - Patient also c/o \"gout flare\" in foot and knee last night, improved with topical lidocaine and tylenol  - Per nursing staff, patient had BM this am, dark brown, no blood noted    Physical Exam:  Temp:  [97.6  F (36.4  C)-99.5  F (37.5  C)] 97.6  F (36.4  C)  Heart Rate:  [] 120  Resp:  [16-22] 20  BP: ()/() 159/100  SpO2:  [95 %-100 %] 98 %    I/O:  Intake/Output Summary (Last 24 hours) at 11/23/18 0850  Last data filed at 11/23/18 0800   Gross per 24 hour   Intake           1683.5 ml   Output             1825 ml   Net           -141.5 ml       Wt:   Wt Readings from Last 5 Encounters:   11/23/18 74.3 kg (163 lb 11.2 oz)   11/21/18 65.9 kg (145 lb 4.5 oz)   11/08/18 76.9 kg (169 lb 8 oz)   10/16/18 72.6 kg (160 lb)   10/04/18 77.2 kg (170 lb 3.2 oz)       General: NAD  HEENT:  PERRLA, EOMI. Sclera is non-icteric and conjunctiva is pink with no erythema. Oral mucosa moist, no oral lesions, good dentition.  Neck: JVD flat. No cervical/supraclavicular LAD.   CV: irregularly irregular, tachycardic. No murmur appreciated. No rubs or gallops. Peripheral radial pulse intact.  Resp: No increased work of breathing or use of accessory muscles, breathing comfortably on room air.  Lung sounds diminshed throughout/bilaterally  Abdomen:  Normal active bowel sounds.  Abdomen is rounded, soft. No distension, non-tender to palpation. No rebound tenderness or guarding.   MSK:  No large joint swelling or erythema.    Extremities: Warm. Capillary refill less than 3 sec. 2/4 radial pulses bilaterally.  2/4 pedal pulses bilaterally. No pre-tibial edema. No cyanosis or " clubbing.  Skin:  Warm and dry. No erythema, rashes, ulceration or diaphoresis.  Neuro: CN II-XII grossly intact. Alert and oriented x3.  Moving all extremities equally.    Medications:    aspirin  81 mg Oral Daily     atorvastatin  40 mg Oral QPM     azithromycin  500 mg Intravenous Q24H     fluticasone-vilanterol  1 puff Inhalation Daily     furosemide  40 mg Oral BID     insulin aspart  1-3 Units Subcutaneous TID AC     insulin aspart  1-3 Units Subcutaneous At Bedtime     ipratropium - albuterol 0.5 mg/2.5 mg/3 mL  3 mL Nebulization 4x daily     levothyroxine  125 mcg Oral QAM AC     losartan  25 mg Oral Daily     metoprolol tartrate  100 mg Oral BID     omeprazole  40 mg Oral BID AC     piperacillin-tazobactam  2.25 g Intravenous Q6H     sildenafil  20 mg Oral TID       Labs:   [facility]  Recent Labs  Lab 11/22/18  0752 11/21/18  0703 11/20/18  0616 11/19/18  1150 11/19/18  0625 11/18/18  1850 11/18/18  1414     --  140  --  140  --  139   POTASSIUM 3.6  --  4.2 4.5 4.4  --  4.6   CHLORIDE 100  --  102  --  106  --  106   CO2 27  --  32  --  28  --  26   ANIONGAP 9  --  6  --  6  --  7   *  --  122*  --  125*  --  168*   BUN 37*  --  35*  --  38*  --  37*   CR 2.31* 2.26* 2.30*  --  2.00*  --  1.66*   GFRESTIMATED 28* 28* 28*  --  33*  --  41*   GFRESTBLACK 34* 34* 34*  --  40*  --  49*   RAFFY 8.8  --  8.3*  --  7.9*  --  8.3*   MAG 2.0  --   --  2.2  --  2.1  --    PHOS  --   --   --   --   --  2.8  --    PROTTOTAL  --   --   --   --  6.6*  --   --    ALBUMIN  --   --   --   --  2.6*  --   --    BILITOTAL  --   --   --   --  0.7  --   --    ALKPHOS  --   --   --   --  110  --   --    AST  --   --   --   --  51*  --   --    ALT  --   --   --   --  42  --   --      CBC  Recent Labs  Lab 11/22/18  0752 11/21/18  1357 11/21/18  0703 11/20/18  0616 11/19/18  1542 11/19/18  0625   WBC 14.3*  --   --  9.8 10.2 11.1*   RBC 3.71*  --   --  3.61* 3.41* 3.56*   HGB 11.3* 11.1*  --  11.1* 10.5* 10.6*   HCT 36.1*  --    --  35.0* 33.5* 34.7*   MCV 97  --   --  97 98 98   MCH 30.5  --   --  30.7 30.8 29.8   MCHC 31.3*  --   --  31.7 31.3* 30.5*   RDW 15.4*  --   --  15.2* 15.1* 14.9     --  253 222 224 237     INR  Recent Labs  Lab 11/21/18  0703 11/20/18  0616 11/19/18  1150   INR 1.33* 1.20* 1.08     Arterial Blood GasNo lab results found in last 7 days.    Diagnostics/Imaging reviewed.    ASSESSMENT/PLAN:  Rohan Monroe is a 73 y/o M with history of sarcoidosis on immunosuppressive therapy, COPD (on 3L home O2), CAD s/p ROSALIE to D2 and mLAD (05/2017), HFpEF, atrial flutter s/p ablation with post-ablation recurrence, severe pulmonary HTN, HTN, DM2, CKD stage III who presents as a transfer from [facility] for dyspnea on exertion found to be in Afib with RVR for additional workup by cardiology and pulmonary teams.      # Atrial flutter and fibrillation s/p ablation with post-ablation recurrence  Previously followed by Dr. Vazquez, who performed successful catheter ablation in 1/2017 with immediate recurrence of arrhythmia following the procedure. Was on amiodarone s/p successful cardioversion for 2 weeks, but stopped amiodarone due to intolerance. Presented to OSH on 11/18 with Afib with RVR, s/p spontaneous conversion but recurrence on 11/21, on IV amiodarone gtt started prior to transfer. Patient continues to be in afib with uncontrolled rates in 120-160's, improves after scheduled metoprolol.     - [id number] 4.  Has been off Coumadin for last 6 months (discontinued due to patient preference). Was on heparin gtt and transitioned to warfarin at OSH, but cardiology note states AC stopped on 11/21 due to heme occult positive study and dark stools per patient  - Discontinue Metoprolol  - Start 30 mg PO Diltiazem q6h, for rate control given hx of HFpEF  - Goal HR < 110  - EP consult in AM for rate/rhythm control assistance - discuss AV ablation and PPM?      # HFpEF (EF 60-65% in March 2018)   # CAD  s/p PCI with ROSALIE to mid-LAD and D2 in 5/2017   Recent TTE at OSH 11/19/18 with reduced EF to 45-50%, although done while in rapid Afib.     - Troponin negative x4 at OSH  - Strict input-output monitoring, daily weights  - Telemetry monitoring  - Continue PTA aspirin 81 mg oral daily, and Lipitor 40 mg oral daily, metoprolol with holding parameters.  - Start 25mg losartan, consider increasing to PTA 50mg dose if needed   - Continue PTA Furosemide 40 po bid       # Severe pulmonary HTN  RHC 11/19, PVR is 9 and wedge was slightly elevated, 11 but, patient in rapid Afib at that time.   - continue lasix 40mg PO BID   - continue PTA sildenafil 20mg TID       # Severe dyspnea on exertion   # Pulmonary sarcoidosis   # ? 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 Follows with pulmonology, Dr. Perlman. Currently on 3L supplemental O2 (at baseline). Dyspnea on exertion likely not fully explained by progression of lung disease and cardiac etiology is likely (see above) per Dr. Ramon of pulmonology. Trop negative x4 at OSH. Atypical/fungal or other opportunistic infections are possible given immunosuppression. Was started on vanc/zosyn and azithromycin at OSH, vancomycin stopped on 11/20 per ID recs. Appears euvolemic on exam. Chart review with unclear history of amiodarone intolerance.     - Pulmonary consulted, appreciate guidance  - Could consider bronchoscopy if respiratory status continues to decline given immunocompromised state  - Wean O2 as able  - Continue PTA bronchodilators and diuresis   - Continue methotrexate qweek   - Follow up pending blood, sputum and atypical/fungal infection culture results from OSH   - ID consulted at OSH: recommend continuing azithromycin and zosyn for now given immune compromised state.        # CKD Stage III  - Monitor renal function closely  - Avoid nephrotoxic drugs      Chronic Conditions   # Hypothyroidism-  continue PTA levothyroxine.  # History of HepC- treated in the past  # DM2 (A1C 6.4)- low intensity sliding scale insulin, hypoglycemia protocol   # Insomnia- cont PTA PRN melatonin. Holding PRN remeron   #Gout- per patient never formally diagnosed, but does take colchicine for flares.  Patient with flare last night, resolved with prn topical lidocaine and tylenol. Will check uric acid this am      FEN: cardiac diet, low sodium <2g/day, fluid rest <2L/day   PPX: mechanical, bleeding risk (heme occult positive 11/22)   Dispo:  Anticipate >2 midnights.   Code Status FULL.       Patient seen and discussed with Dr. Duque, who agrees with above plan.    Carolynn Campbell, APRN, CNP  [facility] Cardiology Team  [phone]    Staff Physician Comments:     I personally interviewed and examined Rohan Monroe today during a shared visit, and agree with Grace Woodward, cardiology APRN CNP assessment and plan as documented above. Holding off anticoagulation and consulting EP for options including Watchman/ablation. Might need to offer NOAC with PPI.     Loc Duque MD    Division of Cardiology  [facility] & Surgery [address] [address] [phone] Appointments  [phone] Fax  [phone] After hours    Clinic nurse:  Osvaldo Shultz LPN   Nurse Care Coordinator- Heart Care   [phone] option 1, than option 3    Academic Mailing address:  [facility]  Department of Internal Medicine [facility] [phone] [place] [address]

## 2018-11-24 ENCOUNTER — APPOINTMENT (OUTPATIENT)
Dept: PHYSICAL THERAPY | Facility: CLINIC | Age: 73
DRG: 177 | End: 2018-11-24
Attending: INTERNAL MEDICINE
Payer: MEDICARE

## 2018-11-24 LAB
ANION GAP SERPL CALCULATED.3IONS-SCNC: 7 MMOL/L (ref 3–14)
BACTERIA SPEC CULT: NO GROWTH
BACTERIA SPEC CULT: NO GROWTH
BUN SERPL-MCNC: 46 MG/DL (ref 7–30)
CALCIUM SERPL-MCNC: 8.1 MG/DL (ref 8.5–10.1)
CHLORIDE SERPL-SCNC: 102 MMOL/L (ref 94–109)
CO2 SERPL-SCNC: 26 MMOL/L (ref 20–32)
CREAT SERPL-MCNC: 2.59 MG/DL (ref 0.66–1.25)
CRP SERPL-MCNC: 98 MG/L (ref 0–8)
CRYPTOC AG SPEC QL: NORMAL
ERYTHROCYTE [DISTWIDTH] IN BLOOD BY AUTOMATED COUNT: 15.5 % (ref 10–15)
GFR SERPL CREATININE-BSD FRML MDRD: 24 ML/MIN/1.7M2
GLUCOSE BLDC GLUCOMTR-MCNC: 120 MG/DL (ref 70–99)
GLUCOSE BLDC GLUCOMTR-MCNC: 123 MG/DL (ref 70–99)
GLUCOSE BLDC GLUCOMTR-MCNC: 130 MG/DL (ref 70–99)
GLUCOSE BLDC GLUCOMTR-MCNC: 130 MG/DL (ref 70–99)
GLUCOSE BLDC GLUCOMTR-MCNC: 143 MG/DL (ref 70–99)
GLUCOSE BLDC GLUCOMTR-MCNC: 155 MG/DL (ref 70–99)
GLUCOSE SERPL-MCNC: 109 MG/DL (ref 70–99)
HCT VFR BLD AUTO: 32.4 % (ref 40–53)
HGB BLD-MCNC: 10 G/DL (ref 13.3–17.7)
LACTATE BLD-SCNC: 0.9 MMOL/L (ref 0.7–2)
Lab: NORMAL
Lab: NORMAL
MAGNESIUM SERPL-MCNC: 1.9 MG/DL (ref 1.6–2.3)
MCH RBC QN AUTO: 30.2 PG (ref 26.5–33)
MCHC RBC AUTO-ENTMCNC: 30.9 G/DL (ref 31.5–36.5)
MCV RBC AUTO: 98 FL (ref 78–100)
PLATELET # BLD AUTO: 230 10E9/L (ref 150–450)
POTASSIUM SERPL-SCNC: 3.5 MMOL/L (ref 3.4–5.3)
RBC # BLD AUTO: 3.31 10E12/L (ref 4.4–5.9)
SODIUM SERPL-SCNC: 135 MMOL/L (ref 133–144)
SPECIMEN SOURCE: NORMAL
WBC # BLD AUTO: 12.7 10E9/L (ref 4–11)

## 2018-11-24 PROCEDURE — A9270 NON-COVERED ITEM OR SERVICE: HCPCS | Performed by: INTERNAL MEDICINE

## 2018-11-24 PROCEDURE — 99222 1ST HOSP IP/OBS MODERATE 55: CPT | Mod: GC | Performed by: INTERNAL MEDICINE

## 2018-11-24 PROCEDURE — 00000146 ZZHCL STATISTIC GLUCOSE BY METER IP

## 2018-11-24 PROCEDURE — A9270 NON-COVERED ITEM OR SERVICE: HCPCS | Performed by: NURSE PRACTITIONER

## 2018-11-24 PROCEDURE — 80048 BASIC METABOLIC PNL TOTAL CA: CPT | Performed by: INTERNAL MEDICINE

## 2018-11-24 PROCEDURE — 97530 THERAPEUTIC ACTIVITIES: CPT | Mod: GP | Performed by: REHABILITATION PRACTITIONER

## 2018-11-24 PROCEDURE — 25000132 ZZH RX MED GY IP 250 OP 250 PS 637: Performed by: PHYSICIAN ASSISTANT

## 2018-11-24 PROCEDURE — 86606 ASPERGILLUS ANTIBODY: CPT | Performed by: INTERNAL MEDICINE

## 2018-11-24 PROCEDURE — 40000193 ZZH STATISTIC PT WARD VISIT: Performed by: REHABILITATION PRACTITIONER

## 2018-11-24 PROCEDURE — A9270 NON-COVERED ITEM OR SERVICE: HCPCS | Performed by: STUDENT IN AN ORGANIZED HEALTH CARE EDUCATION/TRAINING PROGRAM

## 2018-11-24 PROCEDURE — 97116 GAIT TRAINING THERAPY: CPT | Mod: GP | Performed by: REHABILITATION PRACTITIONER

## 2018-11-24 PROCEDURE — 85027 COMPLETE CBC AUTOMATED: CPT | Performed by: INTERNAL MEDICINE

## 2018-11-24 PROCEDURE — 86698 HISTOPLASMA ANTIBODY: CPT | Performed by: INTERNAL MEDICINE

## 2018-11-24 PROCEDURE — 21400006 ZZH R&B CCU INTERMEDIATE UMMC

## 2018-11-24 PROCEDURE — 40000275 ZZH STATISTIC RCP TIME EA 10 MIN

## 2018-11-24 PROCEDURE — 99232 SBSQ HOSP IP/OBS MODERATE 35: CPT | Mod: GC | Performed by: INTERNAL MEDICINE

## 2018-11-24 PROCEDURE — 93010 ELECTROCARDIOGRAM REPORT: CPT | Performed by: INTERNAL MEDICINE

## 2018-11-24 PROCEDURE — 25000132 ZZH RX MED GY IP 250 OP 250 PS 637: Performed by: HOSPITALIST

## 2018-11-24 PROCEDURE — 94640 AIRWAY INHALATION TREATMENT: CPT

## 2018-11-24 PROCEDURE — 25000132 ZZH RX MED GY IP 250 OP 250 PS 637: Performed by: STUDENT IN AN ORGANIZED HEALTH CARE EDUCATION/TRAINING PROGRAM

## 2018-11-24 PROCEDURE — 25000125 ZZHC RX 250: Performed by: STUDENT IN AN ORGANIZED HEALTH CARE EDUCATION/TRAINING PROGRAM

## 2018-11-24 PROCEDURE — 87389 HIV-1 AG W/HIV-1&-2 AB AG IA: CPT | Performed by: INTERNAL MEDICINE

## 2018-11-24 PROCEDURE — 36415 COLL VENOUS BLD VENIPUNCTURE: CPT | Performed by: INTERNAL MEDICINE

## 2018-11-24 PROCEDURE — 94640 AIRWAY INHALATION TREATMENT: CPT | Mod: 76

## 2018-11-24 PROCEDURE — 86140 C-REACTIVE PROTEIN: CPT | Performed by: INTERNAL MEDICINE

## 2018-11-24 PROCEDURE — 25000132 ZZH RX MED GY IP 250 OP 250 PS 637: Performed by: NURSE PRACTITIONER

## 2018-11-24 PROCEDURE — A9270 NON-COVERED ITEM OR SERVICE: HCPCS | Performed by: HOSPITALIST

## 2018-11-24 PROCEDURE — 86635 COCCIDIOIDES ANTIBODY: CPT | Performed by: INTERNAL MEDICINE

## 2018-11-24 PROCEDURE — 83735 ASSAY OF MAGNESIUM: CPT | Performed by: INTERNAL MEDICINE

## 2018-11-24 PROCEDURE — 25000128 H RX IP 250 OP 636: Performed by: STUDENT IN AN ORGANIZED HEALTH CARE EDUCATION/TRAINING PROGRAM

## 2018-11-24 PROCEDURE — 25000132 ZZH RX MED GY IP 250 OP 250 PS 637: Performed by: INTERNAL MEDICINE

## 2018-11-24 PROCEDURE — 25000125 ZZHC RX 250

## 2018-11-24 PROCEDURE — 87899 AGENT NOS ASSAY W/OPTIC: CPT | Performed by: INTERNAL MEDICINE

## 2018-11-24 PROCEDURE — 83605 ASSAY OF LACTIC ACID: CPT | Performed by: INTERNAL MEDICINE

## 2018-11-24 PROCEDURE — A9270 NON-COVERED ITEM OR SERVICE: HCPCS | Performed by: PHYSICIAN ASSISTANT

## 2018-11-24 PROCEDURE — 93005 ELECTROCARDIOGRAM TRACING: CPT

## 2018-11-24 PROCEDURE — 86612 BLASTOMYCES ANTIBODY: CPT | Performed by: INTERNAL MEDICINE

## 2018-11-24 RX ORDER — COLCHICINE 0.6 MG/1
1.2 TABLET ORAL ONCE
Status: DISCONTINUED | OUTPATIENT
Start: 2018-11-24 | End: 2018-11-24

## 2018-11-24 RX ORDER — LIDOCAINE 40 MG/G
CREAM TOPICAL 3 TIMES DAILY PRN
Status: DISCONTINUED | OUTPATIENT
Start: 2018-11-24 | End: 2018-11-26 | Stop reason: HOSPADM

## 2018-11-24 RX ORDER — SULFAMETHOXAZOLE/TRIMETHOPRIM 400MG-80MG
1 TABLET ORAL 3 TIMES DAILY
Status: DISCONTINUED | OUTPATIENT
Start: 2018-11-24 | End: 2018-11-26 | Stop reason: HOSPADM

## 2018-11-24 RX ORDER — POTASSIUM CHLORIDE 1.5 G/1.58G
20 POWDER, FOR SOLUTION ORAL ONCE
Status: COMPLETED | OUTPATIENT
Start: 2018-11-24 | End: 2018-11-24

## 2018-11-24 RX ORDER — DILTIAZEM HYDROCHLORIDE 30 MG/1
60 TABLET, FILM COATED ORAL EVERY 6 HOURS SCHEDULED
Status: DISCONTINUED | OUTPATIENT
Start: 2018-11-24 | End: 2018-11-25

## 2018-11-24 RX ORDER — COLCHICINE 0.6 MG/1
0.6 TABLET ORAL 2 TIMES DAILY
Status: DISCONTINUED | OUTPATIENT
Start: 2018-11-24 | End: 2018-11-26 | Stop reason: HOSPADM

## 2018-11-24 RX ADMIN — OMEPRAZOLE 40 MG: 20 CAPSULE, DELAYED RELEASE ORAL at 17:42

## 2018-11-24 RX ADMIN — ACETAMINOPHEN 650 MG: 325 TABLET, FILM COATED ORAL at 15:16

## 2018-11-24 RX ADMIN — POTASSIUM CHLORIDE 20 MEQ: 1.5 POWDER, FOR SOLUTION ORAL at 08:41

## 2018-11-24 RX ADMIN — COLCHICINE 0.6 MG: 0.6 TABLET, FILM COATED ORAL at 22:07

## 2018-11-24 RX ADMIN — Medication 1 HALF-TAB: at 12:04

## 2018-11-24 RX ADMIN — ATORVASTATIN CALCIUM 40 MG: 40 TABLET, FILM COATED ORAL at 20:47

## 2018-11-24 RX ADMIN — SILDENAFIL 20 MG: 20 TABLET ORAL at 20:47

## 2018-11-24 RX ADMIN — SULFAMETHOXAZOLE AND TRIMETHOPRIM 1 TABLET: 400; 80 TABLET ORAL at 03:04

## 2018-11-24 RX ADMIN — Medication 1 HALF-TAB: at 20:47

## 2018-11-24 RX ADMIN — Medication 1 HALF-TAB: at 15:16

## 2018-11-24 RX ADMIN — LEVOTHYROXINE SODIUM 125 MCG: 125 TABLET ORAL at 08:42

## 2018-11-24 RX ADMIN — FUROSEMIDE 40 MG: 40 TABLET ORAL at 08:42

## 2018-11-24 RX ADMIN — SILDENAFIL 20 MG: 20 TABLET ORAL at 14:32

## 2018-11-24 RX ADMIN — ACETAMINOPHEN 650 MG: 325 TABLET, FILM COATED ORAL at 03:18

## 2018-11-24 RX ADMIN — DILTIAZEM HYDROCHLORIDE 30 MG: 30 TABLET, FILM COATED ORAL at 12:04

## 2018-11-24 RX ADMIN — DILTIAZEM HYDROCHLORIDE 60 MG: 30 TABLET, FILM COATED ORAL at 23:07

## 2018-11-24 RX ADMIN — SILDENAFIL 20 MG: 20 TABLET ORAL at 08:57

## 2018-11-24 RX ADMIN — DILTIAZEM HYDROCHLORIDE 60 MG: 30 TABLET, FILM COATED ORAL at 17:42

## 2018-11-24 RX ADMIN — LOSARTAN POTASSIUM 25 MG: 25 TABLET ORAL at 08:42

## 2018-11-24 RX ADMIN — OMEPRAZOLE 40 MG: 20 CAPSULE, DELAYED RELEASE ORAL at 08:42

## 2018-11-24 RX ADMIN — COLCHICINE 0.6 MG: 0.6 TABLET, FILM COATED ORAL at 08:57

## 2018-11-24 RX ADMIN — DILTIAZEM HYDROCHLORIDE 30 MG: 30 TABLET, FILM COATED ORAL at 05:55

## 2018-11-24 RX ADMIN — IPRATROPIUM BROMIDE AND ALBUTEROL SULFATE 3 ML: .5; 3 SOLUTION RESPIRATORY (INHALATION) at 07:57

## 2018-11-24 RX ADMIN — AZITHROMYCIN MONOHYDRATE 500 MG: 500 INJECTION, POWDER, LYOPHILIZED, FOR SOLUTION INTRAVENOUS at 15:16

## 2018-11-24 RX ADMIN — FLUTICASONE FUROATE AND VILANTEROL TRIFENATATE 1 PUFF: 100; 25 POWDER RESPIRATORY (INHALATION) at 08:42

## 2018-11-24 RX ADMIN — ASPIRIN 81 MG CHEWABLE TABLET 81 MG: 81 TABLET CHEWABLE at 08:42

## 2018-11-24 RX ADMIN — IPRATROPIUM BROMIDE AND ALBUTEROL SULFATE 3 ML: .5; 3 SOLUTION RESPIRATORY (INHALATION) at 16:45

## 2018-11-24 RX ADMIN — IPRATROPIUM BROMIDE AND ALBUTEROL SULFATE 3 ML: .5; 3 SOLUTION RESPIRATORY (INHALATION) at 20:56

## 2018-11-24 RX ADMIN — LIDOCAINE: 40 CREAM TOPICAL at 09:00

## 2018-11-24 RX ADMIN — LIDOCAINE: 40 CREAM TOPICAL at 15:16

## 2018-11-24 RX ADMIN — ACETAMINOPHEN 650 MG: 325 TABLET, FILM COATED ORAL at 08:57

## 2018-11-24 ASSESSMENT — ACTIVITIES OF DAILY LIVING (ADL)
ADLS_ACUITY_SCORE: 13

## 2018-11-24 ASSESSMENT — PAIN DESCRIPTION - DESCRIPTORS: DESCRIPTORS: SORE

## 2018-11-24 NOTE — PLAN OF CARE
Problem: Patient Care Overview  Goal: Plan of Care/Patient Progress Review  Outcome: No Change  D/A/I:  Patient A&O x4, denied palpitations, dizziness, and nausea.  Had ACEVES, lung sounds diminished in left lung base, fine crackles auscultated in right lung base.  Was given scheduled furosemide; patient had good urine output, see I&O flowsheet.  Patient also utilizing IS.  Had brief nosebleed at beginning of shift that resolved with compression, humidification added to oxygen from nasal cannula.  Continued to have loose dark brown stools; MD team aware, no testing ordered.  In atrial fibrillation with rare PVCs, HR 100s-150s, SBP 90s-150s, SaO2 % on 3L O2 via nasal cannula.  Reported left knee and foot pain with palpation that was well managed with topical lidocaine cream.  Ambulated short distances in room with 1-person assist and use of walker.    P:  Continue to monitor pain, VS, heart rhythm, skin integrity, fluid status, bowel status, cardiac and respiratory status.  Notify care team of changes in patient condition or other concerns.  Encourage IS use to promote lung function.  Ambulate patient short distances to regain strength and decrease deconditioning.  Awaiting EP consultation for potential ablation or pacemaker placement.

## 2018-11-24 NOTE — PLAN OF CARE
Problem: Patient Care Overview  Goal: Plan of Care/Patient Progress Review  Outcome: Improving  D: 11/21 for SOB, hx sarcoidosis on immunosuppression, COPD, CAD s/p stent, HFpEF, a-flutter s/p ablation, PHTN, HTN, DM2, CKD3  I/A: Afib 110-140's, otherwise VSS.  Left outer foot c/o pain- started on colchicine- patient reports improvement in pain/tolerance for weightbearing, continuing to treat with tylenol and lidocaine as needed.  Creatine trending up- AM dose lasix given, but now discontinued, sulfa dose reduced per ID.  EP consulted- increasing dilt dose.  In chair majority of day, reminding patient to redistribute weight frequently in chair, patient stated understanding of importance of pressure reducing tactics.     P: WOC consult placed for left third toe wound.  Continue to monitor labs and VS.  Notify team with concerns.

## 2018-11-24 NOTE — PROGRESS NOTES
[facility]   Cardiology   Progress Note     Interval History:  No acute events overnight. No fevers, chills, chest pain, abdominal pain, nausea, vomiting, diarrhea, constipation. Now developing productive cough. Foot pain is preventing him from being active with PT, etc.    Physical Exam:  Temp:  [97.7  F (36.5  C)-98.5  F (36.9  C)] 98  F (36.7  C)  Heart Rate:  [103-127] 103  Resp:  [18-20] 18  BP: ()/(54-89) 124/83  SpO2:  [95 %-100 %] 98 %    I/O:  Intake/Output Summary (Last 24 hours) at 11/23/18 0850  Last data filed at 11/23/18 0800   Gross per 24 hour   Intake           1683.5 ml   Output             1825 ml   Net           -141.5 ml       Wt:   Wt Readings from Last 5 Encounters:   11/24/18 73.9 kg (163 lb)   11/21/18 65.9 kg (145 lb 4.5 oz)   11/08/18 76.9 kg (169 lb 8 oz)   10/16/18 72.6 kg (160 lb)   10/04/18 77.2 kg (170 lb 3.2 oz)       General: NAD  HEENT:  PERRLA, EOMI. Sclera is non-icteric and conjunctiva is pink with no erythema. Oral mucosa moist, no oral lesions, good dentition.  Neck: JVD flat. No cervical/supraclavicular LAD.   CV: irregularly irregular, tachycardic. No murmur appreciated. No rubs or gallops. Peripheral radial pulse intact.  Resp: No increased work of breathing or use of accessory muscles, breathing comfortably on room air.  Lung sounds diminshed throughout/bilaterally  Abdomen:  Normal active bowel sounds.  Abdomen is rounded, soft. No distension, non-tender to palpation. No rebound tenderness or guarding.   MSK:  No large joint swelling or erythema.    Extremities: Warm. Capillary refill less than 3 sec. 2/4 radial pulses bilaterally.  2/4 pedal pulses bilaterally. No pre-tibial edema. No cyanosis or clubbing.  Skin:  Warm and dry. No erythema, rashes, ulceration or diaphoresis.  Neuro: CN II-XII grossly intact. Alert and oriented x3.  Moving all extremities equally.    Medications:    aspirin  81 mg Oral Daily     atorvastatin  40 mg  Oral QPM     azithromycin  500 mg Intravenous Q24H     diltiazem  30 mg Oral Q6H JOSE MANUEL     fluticasone-vilanterol  1 puff Inhalation Daily     furosemide  40 mg Oral BID     insulin aspart  1-3 Units Subcutaneous TID AC     insulin aspart  1-3 Units Subcutaneous At Bedtime     ipratropium - albuterol 0.5 mg/2.5 mg/3 mL  3 mL Nebulization 4x daily     levothyroxine  125 mcg Oral QAM AC     losartan  25 mg Oral Daily     omeprazole  40 mg Oral BID AC     potassium chloride  20 mEq Oral Once     sildenafil  20 mg Oral TID     sulfamethoxazole-trimethoprim  1 tablet Oral Q12H       Labs:   CMP    Recent Labs  Lab 11/24/18  0518 11/22/18  0752 11/21/18  0703 11/20/18  0616 11/19/18  1150 11/19/18  0625 11/18/18  1850    136  --  140  --  140  --    POTASSIUM 3.5 3.6  --  4.2 4.5 4.4  --    CHLORIDE 102 100  --  102  --  106  --    CO2 26 27  --  32  --  28  --    ANIONGAP 7 9  --  6  --  6  --    * 128*  --  122*  --  125*  --    BUN 46* 37*  --  35*  --  38*  --    CR 2.59* 2.31* 2.26* 2.30*  --  2.00*  --    GFRESTIMATED 24* 28* 28* 28*  --  33*  --    GFRESTBLACK 29* 34* 34* 34*  --  40*  --    RAFFY 8.1* 8.8  --  8.3*  --  7.9*  --    MAG  --  2.0  --   --  2.2  --  2.1   PHOS  --   --   --   --   --   --  2.8   PROTTOTAL  --   --   --   --   --  6.6*  --    ALBUMIN  --   --   --   --   --  2.6*  --    BILITOTAL  --   --   --   --   --  0.7  --    ALKPHOS  --   --   --   --   --  110  --    AST  --   --   --   --   --  51*  --    ALT  --   --   --   --   --  42  --      CBC    Recent Labs  Lab 11/24/18  0518 11/22/18  0752 11/21/18  1357 11/21/18  0703 11/20/18  0616 11/19/18  1542   WBC 12.7* 14.3*  --   --  9.8 10.2   RBC 3.31* 3.71*  --   --  3.61* 3.41*   HGB 10.0* 11.3* 11.1*  --  11.1* 10.5*   HCT 32.4* 36.1*  --   --  35.0* 33.5*   MCV 98 97  --   --  97 98   MCH 30.2 30.5  --   --  30.7 30.8   MCHC 30.9* 31.3*  --   --  31.7 31.3*   RDW 15.5* 15.4*  --   --  15.2* 15.1*    262  --  253 222 224      INR    Recent Labs  Lab 11/21/18  0703 11/20/18  0616 11/19/18  1150   INR 1.33* 1.20* 1.08     Arterial Blood GasNo lab results found in last 7 days.    Diagnostics/Imaging reviewed.    ASSESSMENT/PLAN:  Rohan Monroe is a 73 y/o M with history of sarcoidosis on immunosuppressive therapy, COPD (on 3L home O2), CAD s/p ROSALIE to D2 and mLAD (05/2017), HFpEF, atrial flutter s/p ablation with post-ablation recurrence, severe pulmonary HTN, HTN, DM2, CKD stage III who presents as a transfer from [facility] for dyspnea on exertion found to be in Afib with RVR for additional workup by cardiology and pulmonary teams.      # Atrial flutter and fibrillation s/p ablation with post-ablation recurrence  Previously followed by Dr. Vazquez, who performed successful catheter ablation in 1/2017 with immediate recurrence of arrhythmia following the procedure. Was on amiodarone s/p successful cardioversion for 2 weeks, but stopped amiodarone due to intolerance. Presented to OSH on 11/18 with Afib with RVR, s/p spontaneous conversion but recurrence on 11/21, on IV amiodarone gtt started prior to transfer. Patient continues to be in afib with uncontrolled rates in 120-160's, improves after scheduled metoprolol.     - [id number] 4.  Has been off Coumadin for last 6 months (discontinued due to patient preference). Was on heparin gtt and transitioned to warfarin at OSH, but cardiology note states AC stopped on 11/21 due to heme occult positive study and dark stools per patient  - 30 mg PO Diltiazem q6h, for rate control given hx of HFpEF  - Goal HR < 110  - EP consult in AM for rate/rhythm control assistance - discuss AV ablation and PPM, or continue with current management until pneumocystis pneumonia has been treated?      # HFpEF (EF 60-65% in March 2018)   # CAD s/p PCI with ROSALIE to mid-LAD and D2 in 5/2017   Recent TTE at OSH 11/19/18 with reduced EF to 45-50%, although done while in rapid Afib.     -  Troponin negative x4 at OSH  - Strict input-output monitoring, daily weights  - Telemetry monitoring  - Continue PTA aspirin 81 mg oral daily, and Lipitor 40 mg oral daily, metoprolol with holding parameters.  - Start 25mg losartan, consider increasing to PTA 50mg dose if needed   - Hold furosemide 11/24 due to RADHA on CKD      # Severe pulmonary HTN  RHC 11/19, PVR is 9 and wedge was slightly elevated, 11 but, patient in rapid Afib at that time.   - Hold furosemide 11/24 due to RADHA on CKD (lasix 40mg PO BID held)   - continue PTA sildenafil 20mg TID       # Pneumocystis Pneumonia  # Severe dyspnea on exertion   # Pulmonary sarcoidosis   # ? 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 Follows with pulmonology, Dr. Perlman. Currently on 3L supplemental O2 (at baseline). Dyspnea on exertion likely not fully explained by progression of lung disease and cardiac etiology is likely (see above) per Dr. Ramon of pulmonology. Trop negative x4 at OSH. Studies were positive yesterday for PCP pneumonia and he was started on bactrim. Per ID, because this was detected using PCR, it could represent colonization, so we will look at 1,3-beta-d-glucan as a marker of infection.     - Pulmonary consulted, appreciate guidance  - Bactrim single strength oral tabe BID x 21 days. If patient develops oxygen needs above baseline, will start steroid burst. Discontinued Zosyn, kept azithromycin on  - Trend CRP to assess response to therapy  - Wean O2 as able  - Continue PTA bronchodilators and diuresis   - Continue methotrexate qweek   - HIV test today    #Gout Per patient never formally diagnosed, but does take colchicine for flares.    - Started colchicine this AM      # CKD Stage III with RADHA  - Monitor renal function closely  - Avoid nephrotoxic drugs  - Held diuresis today      Chronic Conditions   # Hypothyroidism- continue PTA levothyroxine.  # History of HepC-  treated in the past  # DM2 (A1C 6.4)- low intensity sliding scale insulin, hypoglycemia protocol   # Insomnia- cont PTA PRN melatonin. Holding PRN remeron         FEN: cardiac diet, low sodium <2g/day, fluid rest <2L/day   PPX: mechanical, bleeding risk (heme occult positive 11/22)   Dispo:  Anticipate >2 midnights.   Code Status FULL.       Patient seen and discussed with Dr. Duque, who agrees with above plan.    Marlon Hernandez MD PhD  Cardiology Fellow  P: [phone]     Staff Physician Comments:     I personally interviewed and examined Rohan Monroe today, and agree with cardiology fellows/residents assessment and plan as documented above.        Loc Duque MD     Division of Cardiology  [facility] [place] [address]    Clinic nurse:  Osvaldo Shultz LPN   Nurse Care Coordinator- Heart Care   [phone] option 1, than option 3    [phone] Appointments  [phone] Fax  [phone] After hours

## 2018-11-24 NOTE — PROGRESS NOTES
General Infectious Disease Service Consultation     Patient:  Rohan Monroe   Date of birth 1945, Medical record number [id number]  Date of Visit:  11/24/2018  Date of Admission: 11/21/2018  Consult Requester:Loc Duque MD            Assessment and Recommendations:   ASSESSMENT:  1. Pneumocystis pneumonia, positive DNA PCR on Sputum from 11/20. DFA negative  1. Subacute onset is consistent.  2. Hypoxic, but on home O2 therapy at baseline O2 of 3L.    2. Sarcoidosis  3. COPD  4. Chronic Kidney Disease CrCl =20  5. DM  6. Immunocompromised host (MTX + inflixamab).      RECOMMENDATION:  1. Change TMP/SMZ to One-half tab of Single Strength tab (200/40) oral TID x 21 days.  This is ~8mg/kg/day  (ordered)  -- watch renal function, this is a 25% dose reduction.  May need to come up with alternative regimen if creatinine rises.  Sulfa can precipitate out and cause an obstructive crystal nephropathy  2. Check 1,3-beta-d-glucan (ordered).   This is a common biomarker of PCP infection.  Note that ~5% of the population can harbor PCP in their nasopharynx, yielding a positive PCR. If 1,3-beta-D-glucan is positive then this confirms.    Jamie Groves  [id number]      ________________________________________________________________    Consult Question:.  Admission Diagnosis: pulmonary hypertension  Cardiac abnormality         History of Present Illness:     72 year old male with PMHx most significant for pulmonary sarcoidosis, COPD (on 2-3 L O2 at baseline), pulmonary hypertension, CAD, atrial flutter, hypertension, CKD stage III that presents with 1 month of ongoing dyspnea on exertion, who presented to [facility] 11/18, with 2 weeks of progressive shortness of breath.    positive DNA PCR on Sputum from 11/20. DFA negative  Sputum Culture on 11/20 = normal trent  Vanc+azithro+Zosyn empiric therapy     Rohan Monroe is a 71 y/o M with history of sarcoidosis on immunosuppressive therapy, COPD  "(on 3L home O2), CAD s/p stenting, HFpEF, atrial flutter s/p ablation with post-ablation recurrence, pulmonary HTN, HTN, DM2, CKD stage III who presents as a transfer from [facility] for dyspnea on exertion, found to have atrial fibrillation      Per patient and chart review, patient initially presented to [facility] ED on 11/18/18 with dyspnea on exertion. Symptoms started around one month prior to admission. He was seen by his primary pulmonogist at that time and imaging was concerning for pneumonia, so he was treated with one week of levaquin and azithromycin with slight improvement in symptoms. Over the past 2 weeks, he had progressively worsening shortness of breath. He states he could hardly bend over to pick something up on a table next to his bed and doing so required \"5 minutes to catch my breath.\" At OSH ED, he was found to have SpO2 of 85% and EKG demonstrated atrial flutter with .  NT-pro BNP was elevated to 18K. CXR showed diffuse infiltrates bilaterally.      During admission at OSH, pulmonary was consulted and felt that increased dyspnea is likely secondary to a cardiac etiology, although did note that his immunosuppressed places him at risk for atypical/fungal infections. Dr. Ervin of cardiology was consulted. RHC demonstrated pulmonary hypertension with low cardiac output. Per chart review, patient self-converted out of atrial fibrillation into NSR the morning of 11/21 but went back into Afib the same afternoon. IV amiodarone was started. Anticoagulation was stopped on 11/21 due to a positive heme occult. He was transferred to [facility] for further evaluation by his primary cardiologist and pulmonologist.      History of arrhythmia dates back to 2016 per chart review. At that time, had recurrent atrial flutter and was seen by Dr. Vazquez, who performed successful catheter ablation in 1/2017. Patient experienced immediate recurrence of arrhythmia following the procedure and " was started on amiodarone prior to successful cardioversion. On 2/10/18 (two weeks later), karen stopped taking amiodarone due to nausea and feeling unwell. He has not been on antiarrhythmic medication since this time.      Today, Mr. Monroe states he feels better than on admission. He is comfortable on 3L O2 but has not tried to exert himself or walk around the room yet. He denies chest pain. He continues to have chronic dry cough.     Recent culture results include:  Culture Micro   Date Value Ref Range Status   11/20/2018 Light growth  Normal trent    Final   11/18/2018 No growth after 5 days  Preliminary   11/18/2018 No growth after 5 days  Preliminary   01/05/2018 No growth  Final   04/26/2017 No growth  Final   04/22/2017 (A)  Final    Heavy growth Normal trent  Light growth Citrobacter koseri     04/22/2017 No growth  Final   04/22/2017 No growth  Final   01/18/2017 No growth  Final   01/18/2017 No growth  Final              Review of Systems:   CONSTITUTIONAL:  No fevers or chills  EYES: negative for icterus  ENT:  negative for hearing loss, tinnitus and sore throat  RESPIRATORY: see HPI  CARDIOVASCULAR: see pmhx.  GASTROINTESTINAL:  negative for nausea, vomiting, diarrhea and constipation  GENITOURINARY:  negative for dysuria  HEME:  No easy bruising  INTEGUMENT:  negative for rash and pruritus  NEURO:  Negative for headache             Current Medications (antimicrobials listed in bold):       aspirin  81 mg Oral Daily     atorvastatin  40 mg Oral QPM     azithromycin  500 mg Intravenous Q24H     colchicine  0.6 mg Oral BID     diltiazem  30 mg Oral Q6H JOSE MANUEL     fluticasone-vilanterol  1 puff Inhalation Daily     furosemide  40 mg Oral BID     insulin aspart  1-3 Units Subcutaneous TID AC     insulin aspart  1-3 Units Subcutaneous At Bedtime     ipratropium - albuterol 0.5 mg/2.5 mg/3 mL  3 mL Nebulization 4x daily     levothyroxine  125 mcg Oral QAM AC     losartan  25 mg Oral Daily     omeprazole  40 mg  Oral BID AC     sildenafil  20 mg Oral TID     sulfamethoxazole-trimethoprim  1 tablet Oral Q12H            Allergies:     Allergies   Allergen Reactions     Prednisone Other (See Comments)     He reports that he can't sleep for days and can't use small to massive doses of prednisone   Pt. Does not do well with high doses of Prednisone, His MD says that Prednisone is counter indicated. Prednisone failed to treat his sarcoidosis             Physical Exam:   Vitals were reviewed  Patient Vitals for the past 24 hrs:   BP Temp Temp src Heart Rate Resp SpO2 Weight   11/24/18 0722 124/83 98  F (36.7  C) Oral 103 18 98 % -   11/24/18 0320 106/89 97.9  F (36.6  C) Oral 104 18 95 % 73.9 kg (163 lb)   11/23/18 2300 94/54 97.8  F (36.6  C) Oral 120 19 96 % -   11/23/18 2000 95/66 98.5  F (36.9  C) Oral 127 20 98 % -   11/23/18 1536 112/81 97.9  F (36.6  C) Oral 111 20 98 % -   11/23/18 1129 99/76 97.7  F (36.5  C) Oral 104 20 98 % -     Ranges for his vital signs:  Temp:  [97.7  F (36.5  C)-98.5  F (36.9  C)] 98  F (36.7  C)  Heart Rate:  [103-127] 103  Resp:  [18-20] 18  BP: ()/(54-89) 124/83  SpO2:  [95 %-98 %] 98 %    Physical Examination:  GENERAL:  well-developed, well-nourished, in bed in no acute distress.   HEENT:  Head is normocephalic, atraumatic   EYES:  Eyes have anicteric sclerae without conjunctival injection or stigmata of endocarditis.    ENT:  Oropharynx is moist without exudates or ulcers. Tongue is midline  NECK:  Supple. No  Cervical lymphadenopathy  LUNGS:  Crackles with  auscultation bilateral. No egophony. No wheeze.  CARDIOVASCULAR: tachy  rate and rhythm with no murmurs, gallops or rubs.  ABDOMEN:  Normal bowel sounds, soft, nontender. No appreciable hepatosplenomegaly  SKIN:  No acute rashes.  Line(s) are in place without any surrounding erythema or exudate. No stigmata of endocarditis.  NEUROLOGIC:  Grossly nonfocal. Active x4 extremities         Laboratory Data:     Inflammatory Markers     Recent Labs   Lab Test  11/24/18   0518  11/19/18   0625  01/05/18   0520  04/23/17   0635  04/22/17   1248  03/09/17   1150  02/13/17   1359  02/29/16   1424  06/26/14   0611   09/01/13   0557  08/31/13   2045   10/07/11   1103   SED   --   46*   --   44*   --    --   73*   --    --    --   13  13   --   15   CRP  98.0*   --   110.0*  24.0*  27.0*  9.2*  17.4*  <2.9  68.6*   < >  30.0*  28.8*   < >  5.8    < > = values in this interval not displayed.       Hematology Studies    Recent Labs   Lab Test  11/24/18   0518  11/22/18   0752  11/21/18   1357  11/21/18   0703  11/20/18   0616  11/19/18   1542  11/19/18   0625   11/18/18   1414   01/05/18   0520   04/22/17   1248   01/18/17   1746  12/26/16   1015   03/26/16   1121   WBC  12.7*  14.3*   --    --   9.8  10.2  11.1*   --   12.5*   < >  13.5*   < >  10.2   < >  12.3*  7.8   < >  10.9   ANEU   --    --    --    --    --    --    --    --   10.4*   --   12.4*   --   8.3   --   9.6*  5.9   --   9.0*   AEOS   --    --    --    --    --    --    --    --   0.5   --   0.1   --   0.4   --   0.4  0.7   --   0.4   HGB  10.0*  11.3*  11.1*   --   11.1*  10.5*  10.6*   --   11.5*   < >  10.6*   < >  12.5*   < >  15.1  14.0  13.6   < >  14.0   MCV  98  97   --    --   97  98  98   --   97   < >  94   < >  94   < >  98  98   < >  92   PLT  230  262   --   253  222  224  237   < >  274   < >  303   < >  348   < >  236  217   < >  262    < > = values in this interval not displayed.       Immune Globulin Studies    Recent Labs   Lab Test  11/20/15   1045   IGG  1410   IGM  144   IGA  296       Metabolic Studies     Recent Labs   Lab Test  11/24/18   0518  11/22/18   0752  11/21/18   0703  11/20/18   0616  11/19/18   1150  11/19/18   0625  11/18/18   1414   NA  135  136   --   140   --   140  139   POTASSIUM  3.5  3.6   --   4.2  4.5  4.4  4.6   CHLORIDE  102  100   --   102   --   106  106   CO2  26  27   --   32   --   28  26   BUN  46*  37*   --   35*   --   38*  37*   CR   2.59*  2.31*  2.26*  2.30*   --   2.00*  1.66*   GFRESTIMATED  24*  28*  28*  28*   --   33*  41*       Hepatic Studies    Recent Labs   Lab Test  11/19/18   0625  11/08/18   1215  09/13/18   1115  08/28/18   1009  03/08/18   1001  02/26/18   1317   08/29/17   0959  08/17/17   1234   BILITOTAL  0.7   --   0.8  0.8   --   0.6   --   0.5  0.6   ALKPHOS  110   --   110  116   --   128   --   151*  129   ALBUMIN  2.6*  2.9*  3.6  3.7  3.7  3.7   < >  3.4  3.5   AST  51*   --   26  23   --   24   --   14  17   ALT  42   --   30  29   --   42   --   21  20    < > = values in this interval not displayed.       Thyroid Studies    Recent Labs   Lab Test  11/19/18   0625  04/18/18   1225  03/21/18   1312  02/26/18   1318   TSH  1.44  2.05  7.94*  0.07*   T4   --    --   0.77  1.38       Microbiology:  Culture Micro   Date Value Ref Range Status   11/20/2018 Light growth  Normal trent    Final   11/18/2018 No growth after 5 days  Preliminary   11/18/2018 No growth after 5 days  Preliminary   01/05/2018 No growth  Final   04/26/2017 No growth  Final   04/22/2017 (A)  Final    Heavy growth Normal trent  Light growth Citrobacter koseri     04/22/2017 No growth  Final   04/22/2017 No growth  Final   01/18/2017 No growth  Final   01/18/2017 No growth  Final   03/26/2016 No growth  Final   06/28/2014   Final    Culture negative for acid fast bacilli  Assayed at [facility] [address]     06/27/2014   Final    Culture negative for acid fast bacilli Assayed at [facility] [address]     06/25/2014 Normal trent  Final   06/25/2014   Final    Culture negative for acid fast bacilli  Assayed at [facility] [address]     06/25/2014 No growth  Final   06/25/2014 No growth  Final   02/05/2014 No Beta Streptococcus isolated  Final   08/31/2013   Final    Moderate growth Beta hemolytic Streptococcus group B This isolate is presumed to   be clindamycin resistant based  on detection of inducible clindamycin   resistance.  Plus normal skin trent.  Light growth Acinetobacter species, not baumannii   08/31/2013 No growth  Final   08/31/2013 No growth  Final   04/25/2012   Final    Incorrectly ordered by PCU/Clinic Canceled, Test credited  See wound culture, 4/25/12 @ 1133 lm   04/25/2012   Final    Heavy growth Staphylococcus aureus  Heavy growth Beta hemolytic Streptococcus group B This Beta hemolytic   Streptococcus group B is presumed to be clindamycin resistant based on   detection of inducible clindamycin resistance.  Plus normal skin trent.   08/29/2011   Final    <10,000 colonies/mL Gram negative rods  10 to 50,000 colonies/mL Gram positive cocci  Susceptibility testing not routinely done   08/27/2011 No growth  Final   08/27/2011   Final    Normal trent  Heavy growth Enterobacter cloacae  Susceptibility testing not routinely done   08/27/2011 No growth  Final   08/27/2011   Final    Incorrectly ordered by PCU/Clinic Canceled, Test credited   03/29/2011 Normal trent  Final   03/28/2011 No growth  Final   03/28/2011 No Beta Streptococcus isolated  Final   03/28/2011 No growth  Final   12/09/2003 No Beta Streptococcus isolated  Final       Urine Studies    Recent Labs   Lab Test  11/19/18   0230  01/27/17   1130  01/19/17   1930  03/26/16   1947  10/27/15   1339   LEUKEST  Negative  Negative  Negative  Negative  Negative   WBCU  1  3*  2  1  2       Attending Physician Attestation:  I have seen and evaluated Rohan Monroe. I have discussed with the house staff team or resident(s), and I agree with the above documented findings and plan in this note. I have reviewed today's vital signs, medications, labs and imaging.  If a medical student was involved in this note, they were serving in a capacity as a scribe.  Floor time: 30 minutes  Face-to-face time: 25 minutes  Total time: 55 minutes

## 2018-11-24 NOTE — PROGRESS NOTES
[facility]   Pulmonary Progress Note  Rohan Monroe MRN: [id number]  1945  Date of Admission:11/21/2018  Date of Service: 11/23/2018  ___________________________________  Main Plans for Today  - Bactrim   - no bronch indicated   - updated Dr. Perlman   - appreciate ID recs  Assessment & Plan    72 year old male with PMHx most significant for pulmonary sarcoidosis, COPD (on 2-3 L O2 at baseline), pulmonary hypertension, CAD, atrial flutter, hypertension, CKD stage III that presents with 1 month of ongoing dyspnea on exertion, who presented to [facility] 11/18, with 2 weeks of progressive shortness of breath.    PCP Pneumonia:  Progressive SOB:  Hypoxic Respiratory failure:  Pulmonary HTN:  Pulmonary Sarcoidosis  Acute on Chronic Diastolic Heart Failure: Baseline O2 needs are 2-3 L.  2 weeks progressive shortness of breath.  Admission to [facility] 11/18 BNP over 18,000, WBC 12.5.  Right heart cath with wedge of 17, PVR 9, mPAP 45. Per pulmonary notes at [facility] acute increase in dyspnea correlated with arrhythmia however patient is at risk for atypical/fungal infections.  Cultures and fungal antibodies are negative on day of transfer.  Question of possible bronchoscopy.    Tests/Micro   Procal   Negative   Sputum (11/20) Poor sample   PCP (sputum) Positive   Strep Pneumo Negative   Legionella  Negative   Fungus Ab Pending      Recommendations   - no bronch needed   - PT/OT   - continue antibiotics   - aggressive rate/rhythm control   - diuresis if tolerated   - Bactrim    Plan d/w Dr. Derick M.D., who is in agreement.    Keith Santana MD  Pulmonary & Critical Care Fellow  [phone] (Text Page)     Pulmonary Attending Attestation  I saw and examined the patient with Dr. Santana, confirming key aspects of the history and exam.  I personally reviewed the recent Xrays and other labs.  The fellow s note reflects our detailed discussion of the findings, assessment and plan.  Surprising  finding of sputum PCR + for PCP.  Treatment being initiated and this should lead to further improvement in pulmonary symptoms.  Dx communicated to Dr David Perlman.  Jamie Sepulveda MD      Interval History  - Nursing notes reviewed.   - doing well this AM, still slowly improving   - worked with PT/OT, still very fatigued  Review of Systems   ROS:  6 point ROS including Respiratory, CV, GI and , other than that noted in the HPI, is negative    Physical Exam Temp:  [97.6  F (36.4  C)-99.5  F (37.5  C)] 97.9  F (36.6  C)  Heart Rate:  [104-120] 111  Resp:  [16-22] 20  BP: ()/() 112/81  SpO2:  [95 %-100 %] 98 %  I/O last 3 completed shifts:  In: 1703.5 [P.O.:1140; I.V.:563.5]  Out: 1750 [Urine:1750]  Wt Readings from Last 1 Encounters:   11/23/18 74.3 kg (163 lb 11.2 oz)    Body mass index is 26.42 kg/(m^2). Resp: 20    Exam:  General: NAD, very pleasant   HEENT: Anicteric sclera, EOMI, MMM  CV: RRR, no m/r/g  Lungs: crackles in the bases  Abd: Soft, NT, ND  Ext: WWP,  No LE edema  Skin: No rashes, cyanosis, or jaundice  Neuro: AAOx3, no focal deficits  Data   Labs (all laboratory studies reviewed by me):   Cr 2.3    Imaging (all imaging studies reviewed by me):  CT Chest  IMPRESSION:  1. Small amount of infiltrate in lateral segment of the right middle  lobe is new since the prior exam. Remainder of infiltrates and other  opacities in the lungs bilaterally are unchanged, likely representing  chronic changes from known sarcoidosis.   2. Currently there is no mediastinal or hilar adenopathy.  3. Vascular calcifications, including coronary artery calcifications,  are again noted.  4. Small liver with nodular contour suggests cirrhosis.    Procedures:   RHC at OSH  Medications   Current Facility-Administered Medications   Medication     acetaminophen (TYLENOL) tablet 325-650 mg     aspirin (ASA) chewable tablet 81 mg     atorvastatin (LIPITOR) tablet 40 mg     azithromycin (ZITHROMAX) 500 mg in sodium chloride  0.9 % 250 mL intermittent infusion     glucose gel 15-30 g    Or     dextrose 50 % injection 25-50 mL    Or     glucagon injection 1 mg     diltiazem (CARDIZEM) tablet 30 mg     fluticasone-vilanterol (BREO ELLIPTA) 100-25 MCG/INH inhaler 1 puff     furosemide (LASIX) tablet 40 mg     insulin aspart (NovoLOG) inj (RAPID ACTING)     insulin aspart (NovoLOG) inj (RAPID ACTING)     ipratropium - albuterol 0.5 mg/2.5 mg/3 mL (DUONEB) neb solution 3 mL     levothyroxine (SYNTHROID/LEVOTHROID) tablet 125 mcg     lidocaine (LMX4) cream     losartan (COZAAR) tablet 25 mg     melatonin tablet 1 mg     omeprazole (priLOSEC) CR capsule 40 mg     sildenafil (REVATIO) tablet 20 mg     sulfamethoxazole-trimethoprim (BACTRIM/SEPTRA) 400-80 MG per tablet 1 tablet

## 2018-11-24 NOTE — CONSULTS
Electrophysiology Consultation Note   EP Attending: Dr. Dye.   Reason for consultation: Assistance for managing atrial fibrillation and flutter.   Provider requesting consultation: Dr. Duque.  Date of Service: 11/24/2018      HPI:   Rohan Monroe is 72 year old with a history of sarcoidosis on immunosuppressive suppressive therapy, COPD on 3 L of oxygen at home, CAD status post drug-eluting stent to D1 and mid LAD in 5/2017, heart failure with preserved ejection fraction, severe pulmonary hypertension, hypertension, type 2 diabetes, CKD stage III, atrial flutter status post ablation with post-ablation recurrence, and atrial fibrillation with cardioversion after starting amiodarone with subsequent spontaneous cardioversion but now with recurrent atrial fibrillation.  He was transferred from [facility] for further management of dyspnea on exertion and atrial fibrillation/flutter with RVR.  During this hospitalization patient is also being treated for pneumocystis pneumonia.  EP was consulted for further assistance with managing atrial fibrillation and flutter and consideration of possible AV lady ablation.  Cardiac arrhythmia history is as follows: presented to the hospital in AFL with RVR January 2017, underwent DCCV but had an early recurrence of AFL. Thus underwent CTI ablation 1/23/17. Same day post-op he had an episode of Afib and required amiodarone loading with subsequent DCCV. Amiodarone toxicity developed and this was stopped.  Patient has been off Coumadin for the last 6 months and the Coumadin was discontinued due to patient preference.  Outside Cardiology note from [facility] states that anticoagulation was stopped on 11/21 due to Hemoccult positive study and dark stools per patient.  Prior to admission, patient was on metoprolol tartrate 100 mg twice daily.  He was switched from metoprolol tartrate to diltiazem 30 mg every 6 hours yesterday.     Patient  denies palpitations, chest pain or pressure, shortness of breath, lightheadedness and dizziness, and syncope.  The remainder review of systems is negative.  Past Medical History:   Past Medical History:   Diagnosis Date     Atrial flutter (H)      Cataract of both eyes      Chronic infection     Hep C     Congestive heart failure, unspecified      Coronary artery disease      Depressive disorder      Depressive disorder, not elsewhere classified     Depression (non-psychotic)     ERM OS (epiretinal membrane, left eye)      Generalized osteoarthrosis, unspecified site      Glaucoma suspect      Hypertension      Lichen planus      Other psoriasis      Pneumocystis carinii pneumonia 11/23/2018     PVD (posterior vitreous detachment), left eye      Sarcoidosis      Sarcoidosis      Type II or unspecified type diabetes mellitus without mention of complication, not stated as uncontrolled      Unspecified hypothyroidism     Hypothyroidism     Unspecified viral hepatitis C without hepatic coma      Viral warts, unspecified      Past Surgical History:   Past Surgical History:   Procedure Laterality Date     ANESTHESIA CARDIOVERSION N/A 1/19/2017    Procedure: ANESTHESIA CARDIOVERSION;  Surgeon: GENERIC ANESTHESIA PROVIDER;  Location: [facility] OR     ANESTHESIA CARDIOVERSION N/A 1/23/2017    Procedure: ANESTHESIA CARDIOVERSION;  Surgeon: GENERIC ANESTHESIA PROVIDER;  Location: [facility] OR     ANESTHESIA CARDIOVERSION N/A 1/24/2017    Procedure: ANESTHESIA CARDIOVERSION;  Surgeon: GENERIC ANESTHESIA PROVIDER;  Location: [facility] OR     ANESTHESIA CARDIOVERSION N/A 11/20/2018    Procedure: ANESTHESIA CARDIOVERSION;  Surgeon: GENERIC ANESTHESIA PROVIDER;  Location:  OR     ARTHROPLASTY HIP  8/24/2011    Procedure:ARTHROPLASTY HIP; Right Total Hip Arthroplasty  Choice anesthesia; Surgeon:LESLI WILKINSON; Location:[facility] OR     BIOPSY       C PELVIS/HIP JOINT SURGERY UNLISTED       cardiac stent      s/p     CARDIAC SURGERY       CATARACT IOL,  RT/LT  9/15/2015    LE     COLONOSCOPY       CORONARY ANGIOGRAPHY ADULT ORDER       H ABLATION ATRIAL FLUTTER       HC REMOVAL OF TONSILS,<11 Y/O      Tonsils <10y.o.     HC REPAIR INCISIONAL HERNIA,REDUCIBLE      Hernia Repair, Incisional, Unilateral     HEART CATH, ANGIOPLASTY       JOINT REPLACEMENT      1 month ago--right hip     LIGATN/STRIP LONG & SHORT SAPHEN       Allergies: Per MAR     Allergies   Allergen Reactions     Prednisone Other (See Comments)     He reports that he can't sleep for days and can't use small to massive doses of prednisone   Pt. Does not do well with high doses of Prednisone, His MD says that Prednisone is counter indicated. Prednisone failed to treat his sarcoidosis      Nitroglycerin      Patient is on sildenafil for pulmonary hypertension, nitroglycerin contraindicated      Medications:   Per MAR current outpatient cardiovascular medications include: Prescriptions Prior to Admission   Medication Sig Dispense Refill Last Dose     albuterol (PROAIR HFA/PROVENTIL HFA/VENTOLIN HFA) 108 (90 Base) MCG/ACT Inhaler Inhale 2 puffs into the lungs every 6 hours as needed for shortness of breath / dyspnea 3 Inhaler 6 prn med     aspirin 81 MG EC tablet Take 81 mg by mouth daily   11/18/2018 at am     atorvastatin (LIPITOR) 40 MG tablet TAKE ONE TABLET BY MOUTH ONE TIME DAILY  (Patient taking differently: TAKE ONE TABLET BY MOUTH ONE TIME EVERY EVENING.) 90 tablet 3 11/17/2018 at pm     blood glucose monitoring (ACCU-CHEK RONNIE PLUS) test strip Use to test blood sugar 2 times daily or as directed.  3 month supply. (Patient not taking: Reported on 9/13/2018) 200 each 3 Not Taking     blood glucose monitoring (ACCU-CHEK FASTCLIX) lancets Use to test blood sugar 2 times daily or as directed.  102 lancets per box.  3 month supply. (Patient not taking: Reported on 9/13/2018) 2 Box 3 Not Taking     blood glucose monitoring (NO BRAND SPECIFIED) meter device kit Use to test blood sugar 2 times daily.  (Patient not taking: Reported on 9/13/2018) 1 kit 0 Not Taking     ciclopirox (LOPROX) 0.77 % cream Apply topically 2 times daily as needed   prn med     colchicine (COLCRYS) 0.6 MG tablet 2 tabs at the onset of flare, then 1 tab every 2 hrs until pain is better or diarrhea occurs, up to 10 doses. Wait 3 days until taking again 30 tablet 0 prn med     fluticasone-vilanterol (BREO ELLIPTA) 100-25 MCG/INH oral inhaler Inhale 1 puff into the lungs daily 3 Inhaler 3 11/18/2018 at am     furosemide (LASIX) 20 MG tablet Take 2 tablets (40 mg) by mouth 2 times daily May take extra 20 mg as needed for wt .gain >3# 240 tablet 11 11/18/2018 at am x 1 dose     inFLIXimab (REMICADE) 100 MG injection Inject 100 mg into the vein every 28 days    ~ 1 week ago     levothyroxine (SYNTHROID/LEVOTHROID) 125 MCG tablet Take 1 tablet (125 mcg) by mouth daily 90 tablet 3 11/18/2018 at am     losartan (COZAAR) 50 MG tablet TAKE ONE TABLET BY MOUTH ONE TIME DAILY  90 tablet 3 11/18/2018 at am     methotrexate 2.5 MG tablet CHEMO Take 3 tablets (7.5 mg) by mouth once a week On Mondays 48 tablet 3 11/12/2018     metoprolol tartrate (LOPRESSOR) 100 MG tablet Take 1 tablet (100 mg) by mouth 2 times daily 60 tablet 11 11/18/2018 at am x 1 dose     mirtazapine (REMERON) 15 MG tablet Take 15 mg by mouth At Bedtime.   11/17/2018 at hs     Multiple Vitamins-Minerals (MULTIVITAMIN & MINERAL PO) Take 1 tablet by mouth daily.   11/17/2018 at Unknown time     order for DME Updated Oxygen: Patient requires supplemental Oxygen 3 LPM via nasal canula with activity and 2 LPM nocturnally. Please provide patient with a portable oxygen concentrator for improved mobility.  Okay to spot check or titrated for conserving to keep stats above 90%. Oxygen will be for a lifetime. 1 Device 0 Taking     order for DME She requested for a 4 wheel walker, would like to change it to 4 wheel walker with a seat and oxygen tank durant.     Need this faxed over to her    [phone] 1 Device 1 Taking     polyethylene glycol (MIRALAX/GLYCOLAX) powder Take 17 g by mouth daily as needed for constipation   prn med     sildenafil (REVATIO) 20 MG tablet TAKE ONE TABLET BY MOUTH THREE TIMES DAILY  270 tablet 3 11/18/2018 at am x 1 dose     tiotropium (SPIRIVA HANDIHALER) 18 MCG capsule Inhale contents of one capsule daily. 90 capsule 3 Taking     Urea 40 % CREA Externally apply topically daily as needed for dry skin   prn med     No current outpatient prescriptions on file.     Current Facility-Administered Medications   Medication Dose Route Frequency     aspirin  81 mg Oral Daily     atorvastatin  40 mg Oral QPM     azithromycin  500 mg Intravenous Q24H     colchicine  0.6 mg Oral BID     diltiazem  60 mg Oral Q6H JOSE MANUEL     fluticasone-vilanterol  1 puff Inhalation Daily     insulin aspart  1-3 Units Subcutaneous TID AC     insulin aspart  1-3 Units Subcutaneous At Bedtime     ipratropium - albuterol 0.5 mg/2.5 mg/3 mL  3 mL Nebulization 4x daily     levothyroxine  125 mcg Oral QAM AC     losartan  25 mg Oral Daily     omeprazole  40 mg Oral BID AC     sildenafil  20 mg Oral TID     sulfamethoxazole-trimethoprim  1 half-tab Oral TID     Family History:   Family History   Problem Relation Age of Onset     HEART DISEASE Father      irreg heart beat     Circulatory Father      varicose veins     Prostate Cancer Father      HEART DISEASE Mother      heart attack     Arthritis Mother      Osteoporosis Mother      Thyroid Disease Mother      Hypertension Mother      Eye Disorder Maternal Grandmother      glaucoma     Diabetes Sister      type 2     Kidney Cancer Sister      Diabetes Sister      Glaucoma No family hx of      Macular Degeneration No family hx of      Cancer No family hx of      no skin cancer     Social History:   Social History   Substance Use Topics     Smoking status: Former Smoker     Packs/day: 1.00     Years: 30.00     Types: Cigarettes     Start date: 12/30/1960     Quit  date: 7/22/1994     Smokeless tobacco: Never Used     Alcohol use No       ROS:   10 point ROS was negative other than as mentioned in HPI.    Physical Examination:   VITALS: BP (!) 119/91  Temp 98.1  F (36.7  C) (Oral)  Resp 18  Wt 73.9 kg (163 lb)  SpO2 97%  BMI 26.31 kg/m2  GENERAL APPEARANCE: AxO, NAD   HEENT: NCAT  NECK: No JVD.   CHEST: Coarse bilateral breath sounds, on 3 L NC  CARDIOVASCULAR: IRIR, normal S1 and S2, 3/6 SANDRA heard at LLSB  ABDOMEN: BS+, soft, NTND  EXTREMITIES: No pedal edema. Bilateral lower extremity venous stasis changes  NEURO: Grossly nonfocal.   PSYCH: Normal affect.  SKIN: Warm and dry.   Data:   Labs:  BMP  Recent Labs  Lab 11/24/18 0518 11/22/18  0752 11/21/18  0703 11/20/18  0616 11/19/18  1150 11/19/18  0625    136  --  140  --  140   POTASSIUM 3.5 3.6  --  4.2 4.5 4.4   CHLORIDE 102 100  --  102  --  106   RAFFY 8.1* 8.8  --  8.3*  --  7.9*   CO2 26 27  --  32  --  28   BUN 46* 37*  --  35*  --  38*   CR 2.59* 2.31* 2.26* 2.30*  --  2.00*   * 128*  --  122*  --  125*     CBC  Recent Labs  Lab 11/24/18 0518 11/22/18  0752 11/21/18  1357 11/21/18  0703 11/20/18  0616 11/19/18  1542   WBC 12.7* 14.3*  --   --  9.8 10.2   RBC 3.31* 3.71*  --   --  3.61* 3.41*   HGB 10.0* 11.3* 11.1*  --  11.1* 10.5*   HCT 32.4* 36.1*  --   --  35.0* 33.5*   MCV 98 97  --   --  97 98   MCH 30.2 30.5  --   --  30.7 30.8   MCHC 30.9* 31.3*  --   --  31.7 31.3*   RDW 15.5* 15.4*  --   --  15.2* 15.1*    262  --  253 222 224     INR  Recent Labs  Lab 11/21/18  0703 11/20/18  0616 11/19/18  1150   INR 1.33* 1.20* 1.08       Cholesterol (mg/dL)   Date Value   05/26/2017 106   05/13/2017 98   01/07/2014 128   05/21/2012 105     Cholesterol/HDL Ratio (no units)   Date Value   01/07/2014 3.6   05/21/2012 3.7   11/01/2010 4.3   02/09/2009 4.2     HDL Cholesterol (mg/dL)   Date Value   05/26/2017 36 (L)   05/13/2017 28 (L)   01/07/2014 35 (L)   05/21/2012 28 (L)     LDL Cholesterol  Calculated (mg/dL)   Date Value   2017 50   2017 50   2014 62   2012 50     EKG 18: Atrial flutter with variable block,   Tele: atrial fibrillation/flutter HR high 90s-160s  ECHO:   Recent Results (from the past 4320 hour(s))   ECHO COMPLETE WITH OPTISON    Narrative    [id number]  [id number]  [id number]  [id number]^RUTH^JOSETTE           [facility]  Echocardiography Laboratory  [address] [place] [address], [place] [address]        Name: LOU RAO  MRN: [id number]  : 1945  Study Date: 2018 09:13 AM  Age: 72 yrs  Gender: Male  Patient Location: [facility]  Reason For Study: ACEVES  Ordering Physician: JOSETTE MIRANDA  Referring Physician: KOTA HUNTER MD  Performed By: Brenna Tejeda RDCS     BSA: 1.8 m2  Height: 66 in  Weight: 165 lb  HR: 108  BP: 100/76 mmHg  _____________________________________________________________________________  __        Procedure  Complete Portable Echo Adult. Contrast Optison.  _____________________________________________________________________________  __        Interpretation Summary     Left ventricular systolic function is mildly reduced.  The visual ejection fraction is estimated at 45-50%.  With rapid atrial fibrillation, the visual approximation of left ventricular  systolic function may be falsely decreased.  Mildly decreased right ventricular systolic function  Right ventricular systolic pressure is elevated, consistent with moderate  pulmonary hypertension.  EF lower compared to 3/18, in setting of rapid atrial fibrillation. The study  was technically difficult.  _____________________________________________________________________________  __        Left Ventricle  The left ventricle is normal in size. There is concentric remodeling present.  Diastolic function not assessed due to atrial fibrillation. Left ventricular  systolic function is mildly reduced. The visual ejection fraction is estimated  at  45-50%. With rapid atrial fibrillation, the visual approximation of left  ventricular systolic function may be falsely decreased. There is mild global  hypokinesia of the left ventricle.     Right Ventricle  Borderline right ventricular enlargement. Mildly decreased right ventricular  systolic function.     Atria  There is mild biatrial enlargement.     Mitral Valve  There is mild mitral annular calcification. The mitral valve leaflets are  mildly thickened. There is trace to mild mitral regurgitation.        Tricuspid Valve  There is moderate (2+) tricuspid regurgitation. The right ventricular systolic  pressure is approximated at 48.7 mmHg plus the right atrial pressure. Normal  IVC (1.5-2.5cm) with >50% respiratory collapse; right atrial pressure is  estimated at 5-10mmHg. Right ventricular systolic pressure is elevated,  consistent with moderate pulmonary hypertension.     Aortic Valve  There is mild trileaflet aortic sclerosis. No aortic regurgitation is present.  No aortic stenosis is present.     Pulmonic Valve  The pulmonic valve is not well visualized.     Vessels  The aortic root is normal size.     Pericardium  There is no pericardial effusion.        Rhythm  The rhythm was rapid atrial fibrillation.  _____________________________________________________________________________  __  MMode/2D Measurements & Calculations  IVSd: 1.1 cm     LVIDd: 4.2 cm  LVIDs: 3.8 cm  LVPWd: 1.3 cm  LVPWs: 1.6 cm  FS: 8.7 %  LV mass(C)d: 183.7 grams  LV mass(C)dI: 99.7 grams/m2  Ao root diam: 3.2 cm  LA dimension: 4.0 cm  asc Aorta Diam: 3.3 cm  LA/Ao: 1.2  LA Volume (BP): 65.4 ml  LA Volume Index (BP): 35.5 ml/m2  RWT: 0.64           Doppler Measurements & Calculations  MV E max jhonatan: 93.0 cm/sec  MV dec time: 0.18 sec  TR max jhonatan: 347.8 cm/sec  TR max P.7 mmHg  E/E' avg: 10.2  Lateral E/e': 9.3  Medial E/e': 11.1           _____________________________________________________________________________  __            Report approved by: Sherrill Smith 11/19/2018 11:15 AM          Assessment:   Rohan Monroe is 72 year old with a history of sarcoidosis on immunosuppressive suppressive therapy, COPD on 3 L of oxygen at home, CAD status post drug-eluting stent to D1 and mid LAD in 5/2017, heart failure with preserved ejection fraction, severe pulmonary hypertension, hypertension, type 2 diabetes, CKD stage III, atrial flutter status post ablation with post-ablation recurrence, and atrial fibrillation with cardioversion after starting amiodarone with subsequent spontaneous cardioversion but now with recurrent atrial fibrillation.  EP was consulted for further assistance with managing atrial fibrillation and flutter and consideration of possible AV lady ablation.    EP Recommendations:  - Recommend increasing diltiazem from 30 mg every 6 hours to 60 mg every 6 hours.  If heart rate continues to not be controlled, can increase diltiazem to 90 mg every 6 hours.  Patient was previously on a moderate dose beta-blocker and is currently on a low dose calcium channel blocker.  If rates continue to be uncontrolled, recommend resuming beta-blocker.  Patient can be on both beta-blocker and a calcium channel blocker for rate control.    - Recommend lenient rate control with goal heart rate less than 100-110 in the setting of pulmonary sarcoidosis and pneumocystis pneumonia.    We will continue to follow the patient peripherally.  We will not see the patient in person tomorrow.  If there are any questions or concerns, please feel free to contact us    The patient states understanding and is agreeable with plan.   Thank you for allowing us to participate in the care of this patient.     The patient was seen and discussed with Dr. Dye.     Dolores Hernandez MD, PhD  Cardiology Fellow  Pager: [phone]      ELECTROPHYSIOLOGY STAFF  Patient seen and examined by me.  History and physical examination discussed with Dr. Hernandez whose note  reflects our joint assessment and recommendation/plans.  72 year old gentleman with PAF, pulmonary sarcoid and pnemocystis pneumonia.  We are asked for recommendations re: rate control during PAF and possible AV node ablation.  He has a moderately fast ventricular response in the setting of a lung infection and he is on minimal AV node blocking agents. His BP is adequate for advancing therapy. HRs appear 110s-120s. He had been on metoprolol 100 mg twice daily prior to admission.  This has been replaced by diltiazem 30 mg q 6 hours.  This is a significant decrease in AV lady blocking.  Suggest increasing diltiazem from current 120 mg/daily to 240 mg daily (eg. 60 mg q 6 hours, or long acting 240 mg).  Given his infection, a target ventricular response in the 110s seems reasonable.  If more aggressive rate control is needed, now or in the future, we suggest adding low dose beta-blocker to diltiazem. If beta-blocker is felt to be contraindicated then diltiazem could be further increased.  AV node ablation seems very premature at this time (but I am generally conservative in recommending this procedure).  Dominic Dye

## 2018-11-24 NOTE — PLAN OF CARE
Problem: Patient Care Overview  Goal: Plan of Care/Patient Progress Review  Outcome: No Change    D: admitted 11/21 for SOB, hx sarcoidosis on immunosuppression, COPD, CAD s/p stent, HFpEF, a-flutter s/p ablation, PHTN, HTN, DM2, CKD3     I/A:   Neuro- A&Ox4, Ax1 with walker, needs encouragement for activity  CV- A-fib 100-150s, BPs 90s/60s  Pulm- sating 92-95% on 3-4 LPM NC, ACEVES  GI/- 2g Na, 2 L FR, voiding adequately  LDA- 2 L PIV SL  Skin- LE ruben, blanchable erythema on buttocks  Pain- L foot pain, PRN acetaminophen and ice packs     P:  EP consult for ablation vs. pacemaker. Continue to monitor and notify CARDS1 with any changes or concerns.

## 2018-11-24 NOTE — PLAN OF CARE
Problem: Patient Care Overview  Goal: Plan of Care/Patient Progress Review  Discharge Planner PT   Patient plan for discharge: home with assist  Current status: Ambulates in room with FWW and CGA.  SBA for sit <> stand, limited by L gout foot pain.  Plz Encourage ambulation to bathroom outside of therapy sessions, not safe to ambulate in valadez d/t ACEVES and poor tolerance.  Barriers to return to prior living situation: medical stability, activity tolerance  Recommendations for discharge: home with assist, potential HH vs OP therapies for increased activity tolerance pending LOS  Rationale for recommendations: see above

## 2018-11-25 ENCOUNTER — APPOINTMENT (OUTPATIENT)
Dept: PHYSICAL THERAPY | Facility: CLINIC | Age: 73
DRG: 177 | End: 2018-11-25
Attending: INTERNAL MEDICINE
Payer: MEDICARE

## 2018-11-25 LAB
ANION GAP SERPL CALCULATED.3IONS-SCNC: 9 MMOL/L (ref 3–14)
BUN SERPL-MCNC: 47 MG/DL (ref 7–30)
CALCIUM SERPL-MCNC: 8.2 MG/DL (ref 8.5–10.1)
CHLORIDE SERPL-SCNC: 104 MMOL/L (ref 94–109)
CO2 SERPL-SCNC: 24 MMOL/L (ref 20–32)
CREAT SERPL-MCNC: 2.58 MG/DL (ref 0.66–1.25)
CRP SERPL-MCNC: 100 MG/L (ref 0–8)
ERYTHROCYTE [DISTWIDTH] IN BLOOD BY AUTOMATED COUNT: 15.4 % (ref 10–15)
GFR SERPL CREATININE-BSD FRML MDRD: 24 ML/MIN/1.7M2
GLUCOSE BLDC GLUCOMTR-MCNC: 110 MG/DL (ref 70–99)
GLUCOSE BLDC GLUCOMTR-MCNC: 112 MG/DL (ref 70–99)
GLUCOSE BLDC GLUCOMTR-MCNC: 125 MG/DL (ref 70–99)
GLUCOSE BLDC GLUCOMTR-MCNC: 149 MG/DL (ref 70–99)
GLUCOSE BLDC GLUCOMTR-MCNC: 158 MG/DL (ref 70–99)
GLUCOSE SERPL-MCNC: 113 MG/DL (ref 70–99)
HCT VFR BLD AUTO: 30 % (ref 40–53)
HGB BLD-MCNC: 9.2 G/DL (ref 13.3–17.7)
MAGNESIUM SERPL-MCNC: 2.1 MG/DL (ref 1.6–2.3)
MCH RBC QN AUTO: 30.1 PG (ref 26.5–33)
MCHC RBC AUTO-ENTMCNC: 30.7 G/DL (ref 31.5–36.5)
MCV RBC AUTO: 98 FL (ref 78–100)
PLATELET # BLD AUTO: 223 10E9/L (ref 150–450)
POTASSIUM SERPL-SCNC: 3.7 MMOL/L (ref 3.4–5.3)
RBC # BLD AUTO: 3.06 10E12/L (ref 4.4–5.9)
SODIUM SERPL-SCNC: 137 MMOL/L (ref 133–144)
WBC # BLD AUTO: 9.4 10E9/L (ref 4–11)

## 2018-11-25 PROCEDURE — 36415 COLL VENOUS BLD VENIPUNCTURE: CPT | Performed by: INTERNAL MEDICINE

## 2018-11-25 PROCEDURE — 25000132 ZZH RX MED GY IP 250 OP 250 PS 637: Performed by: STUDENT IN AN ORGANIZED HEALTH CARE EDUCATION/TRAINING PROGRAM

## 2018-11-25 PROCEDURE — A9270 NON-COVERED ITEM OR SERVICE: HCPCS | Performed by: STUDENT IN AN ORGANIZED HEALTH CARE EDUCATION/TRAINING PROGRAM

## 2018-11-25 PROCEDURE — A9270 NON-COVERED ITEM OR SERVICE: HCPCS | Performed by: HOSPITALIST

## 2018-11-25 PROCEDURE — 00000146 ZZHCL STATISTIC GLUCOSE BY METER IP

## 2018-11-25 PROCEDURE — 25000132 ZZH RX MED GY IP 250 OP 250 PS 637: Performed by: PHYSICIAN ASSISTANT

## 2018-11-25 PROCEDURE — A9270 NON-COVERED ITEM OR SERVICE: HCPCS | Performed by: PHYSICIAN ASSISTANT

## 2018-11-25 PROCEDURE — 86140 C-REACTIVE PROTEIN: CPT | Performed by: INTERNAL MEDICINE

## 2018-11-25 PROCEDURE — 94640 AIRWAY INHALATION TREATMENT: CPT

## 2018-11-25 PROCEDURE — 87449 NOS EACH ORGANISM AG IA: CPT | Performed by: INTERNAL MEDICINE

## 2018-11-25 PROCEDURE — 93005 ELECTROCARDIOGRAM TRACING: CPT

## 2018-11-25 PROCEDURE — 85027 COMPLETE CBC AUTOMATED: CPT | Performed by: INTERNAL MEDICINE

## 2018-11-25 PROCEDURE — 83735 ASSAY OF MAGNESIUM: CPT | Performed by: INTERNAL MEDICINE

## 2018-11-25 PROCEDURE — 97116 GAIT TRAINING THERAPY: CPT | Mod: GP

## 2018-11-25 PROCEDURE — 21400006 ZZH R&B CCU INTERMEDIATE UMMC

## 2018-11-25 PROCEDURE — A9270 NON-COVERED ITEM OR SERVICE: HCPCS | Performed by: INTERNAL MEDICINE

## 2018-11-25 PROCEDURE — 25000128 H RX IP 250 OP 636: Performed by: STUDENT IN AN ORGANIZED HEALTH CARE EDUCATION/TRAINING PROGRAM

## 2018-11-25 PROCEDURE — 93010 ELECTROCARDIOGRAM REPORT: CPT | Performed by: INTERNAL MEDICINE

## 2018-11-25 PROCEDURE — 99232 SBSQ HOSP IP/OBS MODERATE 35: CPT | Mod: GC | Performed by: INTERNAL MEDICINE

## 2018-11-25 PROCEDURE — 25000132 ZZH RX MED GY IP 250 OP 250 PS 637: Performed by: INTERNAL MEDICINE

## 2018-11-25 PROCEDURE — 94640 AIRWAY INHALATION TREATMENT: CPT | Mod: 76

## 2018-11-25 PROCEDURE — 40000275 ZZH STATISTIC RCP TIME EA 10 MIN

## 2018-11-25 PROCEDURE — 25000125 ZZHC RX 250: Performed by: STUDENT IN AN ORGANIZED HEALTH CARE EDUCATION/TRAINING PROGRAM

## 2018-11-25 PROCEDURE — 25000132 ZZH RX MED GY IP 250 OP 250 PS 637: Performed by: HOSPITALIST

## 2018-11-25 PROCEDURE — 80048 BASIC METABOLIC PNL TOTAL CA: CPT | Performed by: INTERNAL MEDICINE

## 2018-11-25 PROCEDURE — 40000193 ZZH STATISTIC PT WARD VISIT

## 2018-11-25 RX ORDER — DILTIAZEM HYDROCHLORIDE 120 MG/1
240 CAPSULE, COATED, EXTENDED RELEASE ORAL DAILY
Status: DISCONTINUED | OUTPATIENT
Start: 2018-11-25 | End: 2018-11-26 | Stop reason: HOSPADM

## 2018-11-25 RX ORDER — POTASSIUM CHLORIDE 750 MG/1
10 TABLET, EXTENDED RELEASE ORAL ONCE
Status: COMPLETED | OUTPATIENT
Start: 2018-11-25 | End: 2018-11-25

## 2018-11-25 RX ADMIN — IPRATROPIUM BROMIDE AND ALBUTEROL SULFATE 3 ML: .5; 3 SOLUTION RESPIRATORY (INHALATION) at 20:02

## 2018-11-25 RX ADMIN — POTASSIUM CHLORIDE 10 MEQ: 10 TABLET, EXTENDED RELEASE ORAL at 06:20

## 2018-11-25 RX ADMIN — SILDENAFIL 20 MG: 20 TABLET ORAL at 19:49

## 2018-11-25 RX ADMIN — LEVOTHYROXINE SODIUM 125 MCG: 125 TABLET ORAL at 09:21

## 2018-11-25 RX ADMIN — ASPIRIN 81 MG CHEWABLE TABLET 81 MG: 81 TABLET CHEWABLE at 09:20

## 2018-11-25 RX ADMIN — AZITHROMYCIN MONOHYDRATE 500 MG: 500 INJECTION, POWDER, LYOPHILIZED, FOR SOLUTION INTRAVENOUS at 15:49

## 2018-11-25 RX ADMIN — Medication 1 HALF-TAB: at 09:22

## 2018-11-25 RX ADMIN — COLCHICINE 0.6 MG: 0.6 TABLET, FILM COATED ORAL at 19:49

## 2018-11-25 RX ADMIN — ATORVASTATIN CALCIUM 40 MG: 40 TABLET, FILM COATED ORAL at 19:49

## 2018-11-25 RX ADMIN — LOSARTAN POTASSIUM 25 MG: 25 TABLET ORAL at 09:21

## 2018-11-25 RX ADMIN — SILDENAFIL 20 MG: 20 TABLET ORAL at 09:21

## 2018-11-25 RX ADMIN — IPRATROPIUM BROMIDE AND ALBUTEROL SULFATE 3 ML: .5; 3 SOLUTION RESPIRATORY (INHALATION) at 16:22

## 2018-11-25 RX ADMIN — OMEPRAZOLE 40 MG: 20 CAPSULE, DELAYED RELEASE ORAL at 15:49

## 2018-11-25 RX ADMIN — COLCHICINE 0.6 MG: 0.6 TABLET, FILM COATED ORAL at 09:21

## 2018-11-25 RX ADMIN — SILDENAFIL 20 MG: 20 TABLET ORAL at 15:48

## 2018-11-25 RX ADMIN — IPRATROPIUM BROMIDE AND ALBUTEROL SULFATE 3 ML: .5; 3 SOLUTION RESPIRATORY (INHALATION) at 11:52

## 2018-11-25 RX ADMIN — IPRATROPIUM BROMIDE AND ALBUTEROL SULFATE 3 ML: .5; 3 SOLUTION RESPIRATORY (INHALATION) at 08:48

## 2018-11-25 RX ADMIN — Medication 1 HALF-TAB: at 19:51

## 2018-11-25 RX ADMIN — OMEPRAZOLE 40 MG: 20 CAPSULE, DELAYED RELEASE ORAL at 09:21

## 2018-11-25 RX ADMIN — DILTIAZEM HYDROCHLORIDE 60 MG: 30 TABLET, FILM COATED ORAL at 05:50

## 2018-11-25 RX ADMIN — Medication 1 HALF-TAB: at 15:48

## 2018-11-25 RX ADMIN — FLUTICASONE FUROATE AND VILANTEROL TRIFENATATE 1 PUFF: 100; 25 POWDER RESPIRATORY (INHALATION) at 09:21

## 2018-11-25 RX ADMIN — DILTIAZEM HYDROCHLORIDE 240 MG: 120 CAPSULE, COATED, EXTENDED RELEASE ORAL at 12:51

## 2018-11-25 ASSESSMENT — ACTIVITIES OF DAILY LIVING (ADL)
ADLS_ACUITY_SCORE: 12
ADLS_ACUITY_SCORE: 13
ADLS_ACUITY_SCORE: 12
ADLS_ACUITY_SCORE: 13

## 2018-11-25 NOTE — PROVIDER NOTIFICATION
Paged crosscover to notify that pt converted to SR w/ frequent PACs and occasional PVCs, rate 70s. EKG and morning labs to be done early. VSS.

## 2018-11-25 NOTE — PLAN OF CARE
Problem: Patient Care Overview  Goal: Plan of Care/Patient Progress Review  Outcome: No Change    D: admitted 11/21 for SOB, hx sarcoidosis on immunosuppression, COPD, CAD s/p stent, HFpEF, a-flutter s/p ablation, PHTN, HTN, DM2, CKD3      I/A:   Neuro- A&Ox4, Ax1 with walker, needs encouragement for activity  CV- A-fib [id number] to SR 70s around 0330, see provider notification, BPs 90-110s/60s  Pulm- sating 92-95% on 3 LPM NC, ACEVES, fine crackles in bases  GI/- 2g Na, 2 L FR, voiding adequately  LDA- 2 L PIV SL  Skin- LE ruben, blanchable erythema on buttocks-mepilex applied, states understands importance of weight shifting, but needs reinforcement, +1 edema in LE  Pain- L foot pain, PRN acetaminophen and ice packs      P:   Started on colchicine-L foot pain improving. Cr trending up- furosemide discontinued and antibiotic dose decreased. Diltiazem dose increased. WOC consult for 3rd left toe. Continue to monitor and notify CARDS1 with any changes or concerns.

## 2018-11-25 NOTE — PLAN OF CARE
Problem: Patient Care Overview  Goal: Plan of Care/Patient Progress Review  Discharge Planner PT   Patient plan for discharge: home with A  Current status:  VSS on 3L via NC at rest.  Put on 6 L during gait via oxyplus mask.  Ambulates 20ft, 40ft and 80ft with FWW and w/c follow.  Requires 3 x3-4 min seated rest breaks following gait reps 2/2 fatigue and SOB.   VSS on 6L during gait.    Barriers to return to prior living situation: deconditioning, medical status  Recommendations for discharge: anticipate with continued medical stability and decreased SOB, pt will tolerate safe amb distance to discharge home.    Rationale for recommendations: Pt making large improvements today with PT.         Entered by: Arden Cotto 11/25/2018 11:40 AM

## 2018-11-25 NOTE — PROGRESS NOTES
[facility]   Cardiology   Progress Note     Interval History:  Converted to sinus rhythm early this morning. No acute events overnight. No fevers, chills, chest pain, abdominal pain, nausea, vomiting, diarrhea, constipation. Now developing productive cough. Foot pain is improving.    Physical Exam:  Temp:  [97.1  F (36.2  C)-98.6  F (37  C)] 98.6  F (37  C)  Heart Rate:  [] 72  Resp:  [16-19] 18  BP: (100-133)/(65-91) 133/77  SpO2:  [97 %-100 %] 97 %    I/O:  Intake/Output Summary (Last 24 hours) at 11/23/18 0850  Last data filed at 11/23/18 0800   Gross per 24 hour   Intake           1683.5 ml   Output             1825 ml   Net           -141.5 ml       Wt:   Wt Readings from Last 5 Encounters:   11/25/18 75 kg (165 lb 4.8 oz)   11/21/18 65.9 kg (145 lb 4.5 oz)   11/08/18 76.9 kg (169 lb 8 oz)   10/16/18 72.6 kg (160 lb)   10/04/18 77.2 kg (170 lb 3.2 oz)       General: NAD  HEENT:  PERRLA, EOMI. Sclera is non-icteric and conjunctiva is pink with no erythema. Oral mucosa moist, no oral lesions, good dentition.  Neck: JVD flat. No cervical/supraclavicular LAD.   CV: irregularly irregular, tachycardic. No murmur appreciated. No rubs or gallops. Peripheral radial pulse intact.  Resp: No increased work of breathing or use of accessory muscles, breathing comfortably on room air.  Lung sounds diminshed throughout/bilaterally  Abdomen:  Normal active bowel sounds.  Abdomen is rounded, soft. No distension, non-tender to palpation. No rebound tenderness or guarding.   MSK:  No large joint swelling or erythema.    Extremities: Warm. Capillary refill less than 3 sec. 2/4 radial pulses bilaterally.  2/4 pedal pulses bilaterally. No pre-tibial edema. No cyanosis or clubbing.  Skin:  Warm and dry. No erythema, rashes, ulceration or diaphoresis.  Neuro: CN II-XII grossly intact. Alert and oriented x3.  Moving all extremities equally.    Medications:    aspirin  81 mg Oral Daily      atorvastatin  40 mg Oral QPM     azithromycin  500 mg Intravenous Q24H     colchicine  0.6 mg Oral BID     diltiazem  60 mg Oral Q6H JOSE MANUEL     fluticasone-vilanterol  1 puff Inhalation Daily     insulin aspart  1-3 Units Subcutaneous TID AC     insulin aspart  1-3 Units Subcutaneous At Bedtime     ipratropium - albuterol 0.5 mg/2.5 mg/3 mL  3 mL Nebulization 4x daily     levothyroxine  125 mcg Oral QAM AC     losartan  25 mg Oral Daily     omeprazole  40 mg Oral BID AC     sildenafil  20 mg Oral TID     sulfamethoxazole-trimethoprim  1 half-tab Oral TID       Labs:   CMP    Recent Labs  Lab 11/25/18  0438 11/24/18  0518 11/22/18  0752 11/21/18  0703 11/20/18  0616 11/19/18  1150 11/19/18  0625 11/18/18  1850    135 136  --  140  --  140  --    POTASSIUM 3.7 3.5 3.6  --  4.2 4.5 4.4  --    CHLORIDE 104 102 100  --  102  --  106  --    CO2 24 26 27  --  32  --  28  --    ANIONGAP 9 7 9  --  6  --  6  --    * 109* 128*  --  122*  --  125*  --    BUN 47* 46* 37*  --  35*  --  38*  --    CR 2.58* 2.59* 2.31* 2.26* 2.30*  --  2.00*  --    GFRESTIMATED 24* 24* 28* 28* 28*  --  33*  --    GFRESTBLACK 30* 29* 34* 34* 34*  --  40*  --    RAFFY 8.2* 8.1* 8.8  --  8.3*  --  7.9*  --    MAG 2.1 1.9 2.0  --   --  2.2  --  2.1   PHOS  --   --   --   --   --   --   --  2.8   PROTTOTAL  --   --   --   --   --   --  6.6*  --    ALBUMIN  --   --   --   --   --   --  2.6*  --    BILITOTAL  --   --   --   --   --   --  0.7  --    ALKPHOS  --   --   --   --   --   --  110  --    AST  --   --   --   --   --   --  51*  --    ALT  --   --   --   --   --   --  42  --      CBC    Recent Labs  Lab 11/25/18  0438 11/24/18  0518 11/22/18  0752 11/21/18  1357 11/21/18  0703 11/20/18  0616   WBC 9.4 12.7* 14.3*  --   --  9.8   RBC 3.06* 3.31* 3.71*  --   --  3.61*   HGB 9.2* 10.0* 11.3* 11.1*  --  11.1*   HCT 30.0* 32.4* 36.1*  --   --  35.0*   MCV 98 98 97  --   --  97   MCH 30.1 30.2 30.5  --   --  30.7   MCHC 30.7* 30.9* 31.3*  --   --   31.7   RDW 15.4* 15.5* 15.4*  --   --  15.2*    230 262  --  253 222     INR    Recent Labs  Lab 11/21/18  0703 11/20/18  0616 11/19/18  1150   INR 1.33* 1.20* 1.08     Arterial Blood GasNo lab results found in last 7 days.    Diagnostics/Imaging reviewed.    ASSESSMENT/PLAN:  Rohan Monroe is a 71 y/o M with history of sarcoidosis on immunosuppressive therapy, COPD (on 3L home O2), CAD s/p ROSALIE to D2 and mLAD (05/2017), HFpEF, atrial flutter s/p ablation with post-ablation recurrence, severe pulmonary HTN, HTN, DM2, CKD stage III who presents as a transfer from [facility] for dyspnea on exertion found to be in Afib with RVR for additional workup by cardiology and pulmonary teams.      # Atrial flutter and fibrillation s/p ablation with post-ablation recurrence  Previously followed by Dr. Vazquez, who performed successful catheter ablation in 1/2017 with immediate recurrence of arrhythmia following the procedure. Was on amiodarone s/p successful cardioversion for 2 weeks, but stopped amiodarone due to intolerance. Presented to OSH on 11/18 with Afib with RVR, s/p spontaneous conversion but recurrence on 11/21, on IV amiodarone gtt started prior to transfer. Patient converted to sinus rhythm 11/25 after treatment of PCP pneumonia was initiated along with uptitration of diltiazem.    - [id number] 4.  Has been off Coumadin for last 6 months (discontinued due to patient preference). Was on heparin gtt and transitioned to warfarin at OSH, but cardiology note states AC stopped on 11/21 due to heme occult positive study and dark stools per patient. Consider watchman in future.  - transition 60 mg PO Diltiazem q6h to 240 mg qday (for rate control given hx of HFpEF)  - Goal HR < 110  - EP consult, recs appreciated      # HFpEF (EF 60-65% in March 2018)   # CAD s/p PCI with ROSALIE to mid-LAD and D2 in 5/2017   Recent TTE at OSH 11/19/18 with reduced EF to 45-50%, although done while in rapid  Afib.     - Troponin negative x4 at OSH  - Strict input-output monitoring, daily weights  - Telemetry monitoring  - Continue PTA aspirin 81 mg oral daily, and Lipitor 40 mg oral daily, metoprolol with holding parameters.  - Start 25mg losartan, consider increasing to PTA 50mg dose if needed   - Hold furosemide 11/24 due to RADHA on CKD      # Severe pulmonary HTN  RHC 11/19, PVR is 9 and wedge was slightly elevated, 11 but, patient in rapid Afib at that time.   - Hold furosemide 11/24 due to RADHA on CKD (lasix 40mg PO BID held)   - continue PTA sildenafil 20mg TID       # Pneumocystis Pneumonia  # Severe dyspnea on exertion   # Pulmonary sarcoidosis   # ? 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 Follows with pulmonology, Dr. Perlman. Currently on 3L supplemental O2 (at baseline). Dyspnea on exertion likely not fully explained by progression of lung disease and cardiac etiology is likely (see above) per Dr. Ramon of pulmonology. Trop negative x4 at OSH. Studies were positive yesterday for PCP pneumonia and he was started on bactrim. Per ID, because this was detected using PCR, it could represent colonization, so we will look at 1,3-beta-d-glucan as a marker of infection.     - Pulmonary consulted, appreciate guidance  - Bactrim single strength oral tabe BID x 21 days. If patient develops oxygen needs above baseline, will start steroid burst. Discontinued Zosyn, kept azithromycin on  - Trend CRP to assess response to therapy  - Wean O2 as able  - Continue PTA bronchodilators and diuresis   - Continue methotrexate qweek   - HIV test pending    #Gout Per patient never formally diagnosed, but does take colchicine for flares.    - Started colchicine 11/24      # CKD Stage III with RADHA  - Monitor renal function closely  - Avoid nephrotoxic drugs  - Held diuresis 11/24      Chronic Conditions   # Hypothyroidism- continue PTA levothyroxine.  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 Holding PRN remeron         FEN: cardiac diet, low sodium <2g/day, fluid rest <2L/day   PPX: mechanical, bleeding risk (heme occult positive 11/22)   Dispo:  Anticipate >2 midnights.   Code Status FULL.       Patient seen and discussed with Dr. Duque, who agrees with above plan.    Marlon Hernandez MD PhD  Cardiology Fellow  P: [phone]     Staff Physician Comments:     I personally interviewed and examined Rohan Monroe today, and agree with cardiology fellows/residents assessment and plan as documented above.        Loc Duque MD     Division of Cardiology  [place] [id number]    Clinic nurse:  Osvaldo Shultz LPN   Nurse Care Coordinator- Heart Care   [phone] option 1, than option 3    [phone] Appointments  [phone] Fax  [phone] After hours

## 2018-11-26 VITALS
OXYGEN SATURATION: 97 % | BODY MASS INDEX: 26.45 KG/M2 | TEMPERATURE: 98.5 F | RESPIRATION RATE: 18 BRPM | WEIGHT: 163.9 LBS | SYSTOLIC BLOOD PRESSURE: 131 MMHG | HEART RATE: 95 BPM | DIASTOLIC BLOOD PRESSURE: 75 MMHG

## 2018-11-26 LAB
1,3 BETA GLUCAN SER-MCNC: 72 PG/ML
ANION GAP SERPL CALCULATED.3IONS-SCNC: 8 MMOL/L (ref 3–14)
ASPERGILLUS AB TITR SER CF: NORMAL {TITER}
B DERMAT AB SER-ACNC: 0.3 IV
B-D GLUCAN INTERPRETATION (1,3): ABNORMAL
BUN SERPL-MCNC: 42 MG/DL (ref 7–30)
CALCIUM SERPL-MCNC: 8.2 MG/DL (ref 8.5–10.1)
CHLORIDE SERPL-SCNC: 106 MMOL/L (ref 94–109)
CO2 SERPL-SCNC: 22 MMOL/L (ref 20–32)
COCCIDIOIDES AB TITR SER CF: NORMAL {TITER}
CREAT SERPL-MCNC: 2.39 MG/DL (ref 0.66–1.25)
CRP SERPL-MCNC: 60 MG/L (ref 0–8)
ERYTHROCYTE [DISTWIDTH] IN BLOOD BY AUTOMATED COUNT: 15.6 % (ref 10–15)
GFR SERPL CREATININE-BSD FRML MDRD: 27 ML/MIN/1.7M2
GLUCOSE BLDC GLUCOMTR-MCNC: 103 MG/DL (ref 70–99)
GLUCOSE BLDC GLUCOMTR-MCNC: 108 MG/DL (ref 70–99)
GLUCOSE BLDC GLUCOMTR-MCNC: 135 MG/DL (ref 70–99)
GLUCOSE SERPL-MCNC: 106 MG/DL (ref 70–99)
H CAPSUL MYC AB TITR SER CF: NORMAL {TITER}
H CAPSUL YST AB TITR SER CF: NORMAL {TITER}
HCT VFR BLD AUTO: 31.4 % (ref 40–53)
HGB BLD-MCNC: 9.4 G/DL (ref 13.3–17.7)
HIV 1+2 AB+HIV1 P24 AG SERPL QL IA: NONREACTIVE
INTERPRETATION ECG - MUSE: NORMAL
INTERPRETATION ECG - MUSE: NORMAL
MAGNESIUM SERPL-MCNC: 2 MG/DL (ref 1.6–2.3)
MCH RBC QN AUTO: 29.7 PG (ref 26.5–33)
MCHC RBC AUTO-ENTMCNC: 29.9 G/DL (ref 31.5–36.5)
MCV RBC AUTO: 99 FL (ref 78–100)
PLATELET # BLD AUTO: 249 10E9/L (ref 150–450)
POTASSIUM SERPL-SCNC: 3.9 MMOL/L (ref 3.4–5.3)
RBC # BLD AUTO: 3.16 10E12/L (ref 4.4–5.9)
SODIUM SERPL-SCNC: 136 MMOL/L (ref 133–144)
WBC # BLD AUTO: 8.2 10E9/L (ref 4–11)

## 2018-11-26 PROCEDURE — 80048 BASIC METABOLIC PNL TOTAL CA: CPT | Performed by: INTERNAL MEDICINE

## 2018-11-26 PROCEDURE — 25000132 ZZH RX MED GY IP 250 OP 250 PS 637: Performed by: STUDENT IN AN ORGANIZED HEALTH CARE EDUCATION/TRAINING PROGRAM

## 2018-11-26 PROCEDURE — A9270 NON-COVERED ITEM OR SERVICE: HCPCS | Performed by: INTERNAL MEDICINE

## 2018-11-26 PROCEDURE — 25000128 H RX IP 250 OP 636: Performed by: INTERNAL MEDICINE

## 2018-11-26 PROCEDURE — A9270 NON-COVERED ITEM OR SERVICE: HCPCS | Performed by: STUDENT IN AN ORGANIZED HEALTH CARE EDUCATION/TRAINING PROGRAM

## 2018-11-26 PROCEDURE — 86140 C-REACTIVE PROTEIN: CPT | Performed by: INTERNAL MEDICINE

## 2018-11-26 PROCEDURE — 93010 ELECTROCARDIOGRAM REPORT: CPT | Performed by: INTERNAL MEDICINE

## 2018-11-26 PROCEDURE — 25000132 ZZH RX MED GY IP 250 OP 250 PS 637: Performed by: INTERNAL MEDICINE

## 2018-11-26 PROCEDURE — 40000275 ZZH STATISTIC RCP TIME EA 10 MIN

## 2018-11-26 PROCEDURE — 94640 AIRWAY INHALATION TREATMENT: CPT | Mod: 76

## 2018-11-26 PROCEDURE — 90662 IIV NO PRSV INCREASED AG IM: CPT | Performed by: INTERNAL MEDICINE

## 2018-11-26 PROCEDURE — 93005 ELECTROCARDIOGRAM TRACING: CPT

## 2018-11-26 PROCEDURE — G0463 HOSPITAL OUTPT CLINIC VISIT: HCPCS

## 2018-11-26 PROCEDURE — 99238 HOSP IP/OBS DSCHRG MGMT 30/<: CPT | Performed by: INTERNAL MEDICINE

## 2018-11-26 PROCEDURE — 25000125 ZZHC RX 250: Performed by: STUDENT IN AN ORGANIZED HEALTH CARE EDUCATION/TRAINING PROGRAM

## 2018-11-26 PROCEDURE — 94640 AIRWAY INHALATION TREATMENT: CPT

## 2018-11-26 PROCEDURE — 00000146 ZZHCL STATISTIC GLUCOSE BY METER IP

## 2018-11-26 PROCEDURE — A9270 NON-COVERED ITEM OR SERVICE: HCPCS | Performed by: HOSPITALIST

## 2018-11-26 PROCEDURE — 83735 ASSAY OF MAGNESIUM: CPT | Performed by: INTERNAL MEDICINE

## 2018-11-26 PROCEDURE — 85027 COMPLETE CBC AUTOMATED: CPT | Performed by: INTERNAL MEDICINE

## 2018-11-26 PROCEDURE — 36415 COLL VENOUS BLD VENIPUNCTURE: CPT | Performed by: INTERNAL MEDICINE

## 2018-11-26 PROCEDURE — 25000132 ZZH RX MED GY IP 250 OP 250 PS 637: Performed by: HOSPITALIST

## 2018-11-26 RX ORDER — LOSARTAN POTASSIUM 25 MG/1
25 TABLET ORAL DAILY
Qty: 60 TABLET | Refills: 3 | Status: SHIPPED | OUTPATIENT
Start: 2018-11-26

## 2018-11-26 RX ORDER — SULFAMETHOXAZOLE AND TRIMETHOPRIM 400; 80 MG/1; MG/1
1 TABLET ORAL
Qty: 60 TABLET | Refills: 3 | Status: ON HOLD | OUTPATIENT
Start: 2018-11-26 | End: 2018-12-14

## 2018-11-26 RX ORDER — DILTIAZEM HYDROCHLORIDE 360 MG/1
360 CAPSULE, EXTENDED RELEASE ORAL DAILY
Qty: 90 CAPSULE | Refills: 3 | Status: SHIPPED | OUTPATIENT
Start: 2018-11-27

## 2018-11-26 RX ORDER — COLCHICINE 0.6 MG/1
0.6 TABLET ORAL 2 TIMES DAILY
Qty: 4 TABLET | Refills: 0 | Status: SHIPPED | OUTPATIENT
Start: 2018-11-26 | End: 2018-12-21

## 2018-11-26 RX ORDER — FUROSEMIDE 20 MG
20 TABLET ORAL
Status: DISCONTINUED | OUTPATIENT
Start: 2018-11-26 | End: 2018-11-26 | Stop reason: HOSPADM

## 2018-11-26 RX ORDER — DILTIAZEM HYDROCHLORIDE 30 MG/1
60 TABLET, FILM COATED ORAL ONCE
Status: COMPLETED | OUTPATIENT
Start: 2018-11-26 | End: 2018-11-26

## 2018-11-26 RX ORDER — SULFAMETHOXAZOLE/TRIMETHOPRIM 800-160 MG
1 TABLET ORAL 2 TIMES DAILY
Qty: 38 TABLET | Refills: 0 | Status: ON HOLD | OUTPATIENT
Start: 2018-11-26 | End: 2018-12-14

## 2018-11-26 RX ORDER — FUROSEMIDE 20 MG
20 TABLET ORAL 2 TIMES DAILY
Qty: 240 TABLET | Refills: 11 | Status: ON HOLD | OUTPATIENT
Start: 2018-11-26 | End: 2018-12-14

## 2018-11-26 RX ADMIN — IPRATROPIUM BROMIDE AND ALBUTEROL SULFATE 3 ML: .5; 3 SOLUTION RESPIRATORY (INHALATION) at 07:23

## 2018-11-26 RX ADMIN — IPRATROPIUM BROMIDE AND ALBUTEROL SULFATE 3 ML: .5; 3 SOLUTION RESPIRATORY (INHALATION) at 11:38

## 2018-11-26 RX ADMIN — LEVOTHYROXINE SODIUM 125 MCG: 125 TABLET ORAL at 08:57

## 2018-11-26 RX ADMIN — OMEPRAZOLE 40 MG: 20 CAPSULE, DELAYED RELEASE ORAL at 16:23

## 2018-11-26 RX ADMIN — FLUTICASONE FUROATE AND VILANTEROL TRIFENATATE 1 PUFF: 100; 25 POWDER RESPIRATORY (INHALATION) at 08:57

## 2018-11-26 RX ADMIN — DILTIAZEM HYDROCHLORIDE 60 MG: 30 TABLET, FILM COATED ORAL at 12:57

## 2018-11-26 RX ADMIN — DILTIAZEM HYDROCHLORIDE 240 MG: 120 CAPSULE, COATED, EXTENDED RELEASE ORAL at 08:57

## 2018-11-26 RX ADMIN — FUROSEMIDE 20 MG: 20 TABLET ORAL at 12:57

## 2018-11-26 RX ADMIN — Medication 1 HALF-TAB: at 08:57

## 2018-11-26 RX ADMIN — SILDENAFIL 20 MG: 20 TABLET ORAL at 13:00

## 2018-11-26 RX ADMIN — COLCHICINE 0.6 MG: 0.6 TABLET, FILM COATED ORAL at 08:58

## 2018-11-26 RX ADMIN — INFLUENZA A VIRUS A/MICHIGAN/45/2015 X-275 (H1N1) ANTIGEN (FORMALDEHYDE INACTIVATED), INFLUENZA A VIRUS A/SINGAPORE/INFIMH-16-0019/2016 IVR-186 (H3N2) ANTIGEN (FORMALDEHYDE INACTIVATED), AND INFLUENZA B VIRUS B/MARYLAND/15/2016 BX-69A (A B/COLORADO/6/2017-LIKE VIRUS) ANTIGEN (FORMALDEHYDE INACTIVATED) 0.5 ML: 60; 60; 60 INJECTION, SUSPENSION INTRAMUSCULAR at 16:23

## 2018-11-26 RX ADMIN — OMEPRAZOLE 40 MG: 20 CAPSULE, DELAYED RELEASE ORAL at 08:58

## 2018-11-26 RX ADMIN — SILDENAFIL 20 MG: 20 TABLET ORAL at 08:57

## 2018-11-26 RX ADMIN — ASPIRIN 81 MG CHEWABLE TABLET 81 MG: 81 TABLET CHEWABLE at 08:58

## 2018-11-26 RX ADMIN — FUROSEMIDE 20 MG: 20 TABLET ORAL at 16:23

## 2018-11-26 RX ADMIN — Medication 1 HALF-TAB: at 13:00

## 2018-11-26 RX ADMIN — IPRATROPIUM BROMIDE AND ALBUTEROL SULFATE 3 ML: .5; 3 SOLUTION RESPIRATORY (INHALATION) at 15:34

## 2018-11-26 ASSESSMENT — ACTIVITIES OF DAILY LIVING (ADL)
ADLS_ACUITY_SCORE: 13

## 2018-11-26 NOTE — PROVIDER NOTIFICATION
Issue:  Patient had 7 beat run of aberrant afib at 1353.    Assessment:  Patient in afib with HR 100s-120s.  Patient resting comfortably in chair, no complaints at this time.    Notified: Dr. Dave Robert, pager [phone]    Interventions:  None at this time, expected to discharge this afternoon.

## 2018-11-26 NOTE — PROGRESS NOTES
DISCHARGE   Discharged to: Home  Via: Automobile  Accompanied by: Friend  Discharge Instructions: principal diagnosis, major findings/procedures, diet, activity, medications, follow up appointments, when to call the MD, and what to watchout for (i.e. s/s of infection, increasing SOB, palpitations, Angina). Pt states understanding of all discharge instructions.   Prescriptions: To be filled at discharge pharmacy per pt's request; scripts sent to pharmacy.  Follow Up Appointments: with PCP 12/5, in pulmonary clinic 11/30  Belongings: All sent with pt. Pt verifies that they are taking all belongings with them.   IV: discontinued.   Telemetry: discontinued.   Pt exhibits understanding of above discharge instructions; all questions answered.  Discharge Paperwork: faxed to discharge planner.

## 2018-11-26 NOTE — CONSULTS
"Rohan is a 72 year old male presenting with dyspnea on exertion, afib RVR. CORE consult requested. Rohan is currently admitted with afib/flutter, HFpEF    Rohan was provided with a CHF book.  I reviewed the importance of daily weights, 2 gm sodium diet, 2L fluid restriction and compliance with medications upon hospital discharge.  CORE contact phone numbers provided and patient is encouraged to call with any questions or concerns, including any weight gain or loss of 2 or more pounds in 24 hours or 5 or more pounds in 1 week.      Appointment made in CORE clinic on  with Adilia Mcguire NP at 2:30 with labs at 2:00.  I will follow-up with the patient at that time, sooner if needed.  Thank you for the consult.    Ann Law RN CHFN  Cardiology Care Coordinator - [facility]  Questions and [phone]  First press #1 for the [employer] and then press #3 for \"Medical Advise\" to reach us Cardiology Nurses.    "

## 2018-11-26 NOTE — PROGRESS NOTES
General Infectious Disease Service Consultation     Patient:  Rohan Monroe   Date of birth 1945, Medical record number [id number]  Date of Visit:  11/26/2018  Date of Admission: 11/21/2018  Consult Requester: Loc Duque MD          Assessment and Recommendations:   ASSESSMENT:  1. Pneumocystis pneumonia, positive DNA PCR on Sputum from 11/20. DFA negative. BDG indeterminate at 72. Did not require steroids.   1. Subacute onset is consistent.  2. Hypoxic, but on home O2 therapy at baseline O2 of 3L  3. Significantly improved from admission - per patient clearly associated with starting Bactrim  2. Sarcoidosis  3. COPD  4. Chronic Kidney Disease CrCl = 28 today  5. DM  6. Immunocompromised host (MTX + inflixamab)    DISCUSSION:   Patient with indeterminate BDG but clinical picture of subacute dyspnea consistent with PJP pneumonia and, per patient, dramatic improvement since starting treatment. Discussed dosing of Bactrim with pharmacy at length. Currently receiving ~1.5 mg/kg/day trimethoprim component. Recommended dosing is 15 mg/kg/day TMP component, with renal dosing recommendations of  decreasing that by 1/2. Given his improvement on low dose and tenuous renal function, will conservatively dose a 1 DS tab BID which will give ~5 mg/kg/day trimethoprim component.     RECOMMENDATION:  1. Increase Bactrim to 1 DS tab BID for completion of 21 day course  2. After 21 days, decrease to 1 SS tab daily and continue indefinitely for secondary prophylaxis  3. Check BMP twice weekly while on treatment dose to monitor for worsening creatinine. Can fax results to primary care and to me ([phone]) for review.   4. Can follow-up with pulmonology as planned next week. No specific ID follow-up needed unless additional questions arise.     It has been a pleasure to be involved with this patient's care. We will sign off for now, but please feel free to call with additional questions.  "    Lenora Angulo MD  Infectious Diseases  [phone]   ________________________________________________________________        Interval History:     Very pleasant 72 year old male with PMHx most significant for pulmonary sarcoidosis, COPD (on 2-3 L O2 at baseline), pulmonary hypertension, CAD, atrial flutter, hypertension, CKD stage III that presents with 1 month of ongoing dyspnea on exertion, who presented to [facility] 11/18, with 2 weeks of progressive shortness of breath. Pneumocystis pcr positive from sputum. He was started on Bactrim with dramatic improvement and is nearly back to his baseline respiratory status. Plan for discharge later today. Creatinine is stable.            History of Present Illness:     Rohan Monroe is a 73 y/o M with history of sarcoidosis on immunosuppressive therapy, COPD (on 3L home O2), CAD s/p stenting, HFpEF, atrial flutter s/p ablation with post-ablation recurrence, pulmonary HTN, HTN, DM2, CKD stage III who presents as a transfer from [facility] for dyspnea on exertion, found to have atrial fibrillation      Per patient and chart review, patient initially presented to [facility] ED on 11/18/18 with dyspnea on exertion. Symptoms started around one month prior to admission. He was seen by his primary pulmonogist at that time and imaging was concerning for pneumonia, so he was treated with one week of levaquin and azithromycin with slight improvement in symptoms. Over the past 2 weeks, he had progressively worsening shortness of breath. He states he could hardly bend over to pick something up on a table next to his bed and doing so required \"5 minutes to catch my breath.\" At OSH ED, he was found to have SpO2 of 85% and EKG demonstrated atrial flutter with .  NT-pro BNP was elevated to 18K. CXR showed diffuse infiltrates bilaterally.      During admission at OSH, pulmonary was consulted and felt that increased dyspnea is likely secondary " to a cardiac etiology, although did note that his immunosuppressed places him at risk for atypical/fungal infections. Dr. Ervin of cardiology was consulted. RHC demonstrated pulmonary hypertension with low cardiac output. Per chart review, patient self-converted out of atrial fibrillation into NSR the morning of 11/21 but went back into Afib the same afternoon. IV amiodarone was started. Anticoagulation was stopped on 11/21 due to a positive heme occult. He was transferred to [facility] for further evaluation by his primary cardiologist and pulmonologist.      History of arrhythmia dates back to 2016 per chart review. At that time, had recurrent atrial flutter and was seen by Dr. Vazquez, who performed successful catheter ablation in 1/2017. Patient experienced immediate recurrence of arrhythmia following the procedure and was started on amiodarone prior to successful cardioversion. On 2/10/18 (two weeks later), karen stopped taking amiodarone due to nausea and feeling unwell. He has not been on antiarrhythmic medication since this time.      Today, Mr. Monroe states he feels better than on admission. He is comfortable on 3L O2 but has not tried to exert himself or walk around the room yet. He denies chest pain. He continues to have chronic dry cough.     Recent culture results include:  Culture Micro   Date Value Ref Range Status   11/20/2018 Light growth  Normal trent    Final   11/18/2018 No growth  Final   11/18/2018 No growth  Final   01/05/2018 No growth  Final   04/26/2017 No growth  Final   04/22/2017 (A)  Final    Heavy growth Normal trent  Light growth Citrobacter koseri     04/22/2017 No growth  Final   04/22/2017 No growth  Final   01/18/2017 No growth  Final   01/18/2017 No growth  Final              Review of Systems:   4 point ROS including Respiratory, CV, GI and , other than that noted in the HPI,  is negative           Current Medications (antimicrobials listed in bold):       aspirin  81 mg Oral  Daily     atorvastatin  40 mg Oral QPM     colchicine  0.6 mg Oral BID     diltiazem  240 mg Oral Daily     fluticasone-vilanterol  1 puff Inhalation Daily     furosemide  20 mg Oral BID     influenza Vac Split High-Dose  0.5 mL Intramuscular Prior to discharge     insulin aspart  1-3 Units Subcutaneous TID AC     insulin aspart  1-3 Units Subcutaneous At Bedtime     ipratropium - albuterol 0.5 mg/2.5 mg/3 mL  3 mL Nebulization 4x daily     levothyroxine  125 mcg Oral QAM AC     losartan  25 mg Oral Daily     omeprazole  40 mg Oral BID AC     sildenafil  20 mg Oral TID     sulfamethoxazole-trimethoprim  1 half-tab Oral TID            Allergies:     Allergies   Allergen Reactions     Prednisone Other (See Comments)     He reports that he can't sleep for days and can't use small to massive doses of prednisone   Pt. Does not do well with high doses of Prednisone, His MD says that Prednisone is counter indicated. Prednisone failed to treat his sarcoidosis      Nitroglycerin      Patient is on sildenafil for pulmonary hypertension, nitroglycerin contraindicated             Physical Exam:   Vitals were reviewed  Patient Vitals for the past 24 hrs:   BP Temp Temp src Heart Rate Resp SpO2 Weight   11/26/18 1132 131/75 98.5  F (36.9  C) Oral 109 18 97 % -   11/26/18 0826 - - - 132 - - -   11/26/18 0756 139/75 98.1  F (36.7  C) Oral 94 18 96 % -   11/26/18 0400 - - - - - - 74.3 kg (163 lb 14.4 oz)   11/26/18 0306 113/67 98.1  F (36.7  C) Oral 90 18 100 % -   11/25/18 2320 118/61 98.3  F (36.8  C) Oral 110 19 99 % -   11/25/18 1952 130/71 97.8  F (36.6  C) Oral 103 19 97 % -   11/25/18 1531 132/70 98.5  F (36.9  C) Oral 72 17 100 % -     Ranges for his vital signs:  Temp:  [97.8  F (36.6  C)-98.5  F (36.9  C)] 98.5  F (36.9  C)  Heart Rate:  [] 109  Resp:  [17-19] 18  BP: (113-139)/(61-75) 131/75  SpO2:  [96 %-100 %] 97 %    Physical Examination:  Exam:  GENERAL:  Well-developed, well-nourished, sitting in bed in no  acute distress.   ENT:  Head is normocephalic, atraumatic. Anterior oropharynx without ulcers.  EYES:  Eyes have anicteric sclerae.    NECK:  Supple.  LUNGS:  Normal respiratory effort. Nasal cannula in place.   ABDOMEN:  Non-distended.   EXT: Extremities without visible edema.   SKIN:  No acute rashes.  Line is in place without any surrounding erythema.  NEUROLOGIC:  Grossly nonfocal.          Laboratory Data:     Inflammatory Markers    Recent Labs   Lab Test  11/26/18   0545  11/25/18   0438  11/24/18   0518  11/19/18   0625  01/05/18   0520  04/23/17   0635  04/22/17   1248  03/09/17   1150  02/13/17   1359   09/01/13   0557  08/31/13   2045   10/07/11   1103   SED   --    --    --   46*   --   44*   --    --   73*   --   13  13   --   15   CRP  60.0*  100.0*  98.0*   --   110.0*  24.0*  27.0*  9.2*  17.4*   < >  30.0*  28.8*   < >  5.8    < > = values in this interval not displayed.       Hematology Studies    Recent Labs   Lab Test  11/26/18   0545  11/25/18   0438  11/24/18   0518  11/22/18   0752  11/21/18   1357   11/20/18   0616  11/19/18   1542   11/18/18   1414   01/05/18   0520   04/22/17   1248   01/18/17   1746  12/26/16   1015   03/26/16   1121   WBC  8.2  9.4  12.7*  14.3*   --    --   9.8  10.2   < >  12.5*   < >  13.5*   < >  10.2   < >  12.3*  7.8   < >  10.9   ANEU   --    --    --    --    --    --    --    --    --   10.4*   --   12.4*   --   8.3   --   9.6*  5.9   --   9.0*   AEOS   --    --    --    --    --    --    --    --    --   0.5   --   0.1   --   0.4   --   0.4  0.7   --   0.4   HGB  9.4*  9.2*  10.0*  11.3*  11.1*   --   11.1*  10.5*   < >  11.5*   < >  10.6*   < >  12.5*   < >  15.1  14.0  13.6   < >  14.0   MCV  99  98  98  97   --    --   97  98   < >  97   < >  94   < >  94   < >  98  98   < >  92   PLT  249  223  230  262   --    < >  222  224   < >  274   < >  303   < >  348   < >  236  217   < >  262    < > = values in this interval not displayed.       Immune Globulin  Studies    Recent Labs   Lab Test  11/20/15   1045   IGG  1410   IGM  144   IGA  296       Metabolic Studies     Recent Labs   Lab Test  11/26/18   0545  11/25/18   0438  11/24/18   0518  11/22/18   0752  11/21/18   0703  11/20/18   0616   NA  136  137  135  136   --   140   POTASSIUM  3.9  3.7  3.5  3.6   --   4.2   CHLORIDE  106  104  102  100   --   102   CO2  22  24  26  27   --   32   BUN  42*  47*  46*  37*   --   35*   CR  2.39*  2.58*  2.59*  2.31*  2.26*  2.30*   GFRESTIMATED  27*  24*  24*  28*  28*  28*       Hepatic Studies    Recent Labs   Lab Test  11/19/18   0625  11/08/18   1215  09/13/18   1115  08/28/18   1009  03/08/18   1001  02/26/18   1317   08/29/17   0959  08/17/17   1234   BILITOTAL  0.7   --   0.8  0.8   --   0.6   --   0.5  0.6   ALKPHOS  110   --   110  116   --   128   --   151*  129   ALBUMIN  2.6*  2.9*  3.6  3.7  3.7  3.7   < >  3.4  3.5   AST  51*   --   26  23   --   24   --   14  17   ALT  42   --   30  29   --   42   --   21  20    < > = values in this interval not displayed.       Thyroid Studies    Recent Labs   Lab Test  11/19/18   0625  04/18/18   1225  03/21/18   1312  02/26/18   1318   TSH  1.44  2.05  7.94*  0.07*   T4   --    --   0.77  1.38       Microbiology:  Culture Micro   Date Value Ref Range Status   11/20/2018 Light growth  Normal trent    Final   11/18/2018 No growth  Final   11/18/2018 No growth  Final   01/05/2018 No growth  Final   04/26/2017 No growth  Final   04/22/2017 (A)  Final    Heavy growth Normal trent  Light growth Citrobacter koseri     04/22/2017 No growth  Final   04/22/2017 No growth  Final   01/18/2017 No growth  Final   01/18/2017 No growth  Final   03/26/2016 No growth  Final   06/28/2014   Final    Culture negative for acid fast bacilli  Assayed at [facility] [address]     06/27/2014   Final    Culture negative for acid fast bacilli Assayed at [facility] [address]     06/25/2014 Normal  trent  Final   06/25/2014   Final    Culture negative for acid fast bacilli  Assayed at [facility] [address]     06/25/2014 No growth  Final   06/25/2014 No growth  Final   02/05/2014 No Beta Streptococcus isolated  Final   08/31/2013   Final    Moderate growth Beta hemolytic Streptococcus group B This isolate is presumed to   be clindamycin resistant based on detection of inducible clindamycin   resistance.  Plus normal skin trent.  Light growth Acinetobacter species, not baumannii   08/31/2013 No growth  Final   08/31/2013 No growth  Final   04/25/2012   Final    Incorrectly ordered by PCU/Clinic Canceled, Test credited  See wound culture, 4/25/12 @ 1133 lm   04/25/2012   Final    Heavy growth Staphylococcus aureus  Heavy growth Beta hemolytic Streptococcus group B This Beta hemolytic   Streptococcus group B is presumed to be clindamycin resistant based on   detection of inducible clindamycin resistance.  Plus normal skin trent.   08/29/2011   Final    <10,000 colonies/mL Gram negative rods  10 to 50,000 colonies/mL Gram positive cocci  Susceptibility testing not routinely done   08/27/2011 No growth  Final   08/27/2011   Final    Normal trent  Heavy growth Enterobacter cloacae  Susceptibility testing not routinely done   08/27/2011 No growth  Final   08/27/2011   Final    Incorrectly ordered by PCU/Clinic Canceled, Test credited   03/29/2011 Normal trent  Final   03/28/2011 No growth  Final   03/28/2011 No Beta Streptococcus isolated  Final   03/28/2011 No growth  Final   12/09/2003 No Beta Streptococcus isolated  Final       Urine Studies    Recent Labs   Lab Test  11/19/18   0230  01/27/17   1130  01/19/17   1930  03/26/16   1947  10/27/15   1339   LEUKEST  Negative  Negative  Negative  Negative  Negative   WBCU  1  3*  2  1  2

## 2018-11-26 NOTE — PROGRESS NOTES
Social Work Services Progress Note     Hospital Day: 5  Date of Initial Social Work Evaluation:  Not completed, pt choosing to discharge to home.  Collaborated with:  Pt, Cards 1 team,     Data: Pt is a 72 year old male being followed by [facility] for discharge to home.  Pt at this time is declining TCU and prefers to go home.     Intervention:  SW met with pt to discuss/provide education about TCU placement including insurance coverage and how to admit from the community if home is not successful.  Pt is aware to request a homecare SW for support if he needs to admit to TCU from the community.  Pt states that at baseline he lives with his 23 yo son who will be at home for support when he goes home.  Will ask [facility] to follow pt for homecare needs per pt's choice.     - Referrals in Process:    None - pt wants to discharge to home with homecare.     Assessment: Pt open to education about rehab care.  Pt states that he wants to go home with homecare and states he has been successful with this intervention in the past.     Plan:    Anticipated Disposition: Home with services    Barriers to d/c plan: Medical stability    Follow Up:  [facility] to follow for discharge planning.     KT Delgado, APSW  6C Unit   Phone: [phone]  Pager: [phone]  Unit: [phone]    ___________________________________________________________________________________________________________________________________________________    Referrals Discontinued:  None    Community Case Management/Community Services in place:   None - see [facility] note for homecare details.

## 2018-11-26 NOTE — PLAN OF CARE
Problem: Patient Care Overview  Goal: Plan of Care/Patient Progress Review  Outcome: Adequate for Discharge Date Met: 11/26/18  D/A/I:  Patient A&O x4, denied pain, palpitations, dizziness, and nausea.  Had ACEVES, but significantly improved from admission.  Lung sounds diminished in bases, IS use encouraged.  Had 1-2+ edema in ankles and feet, increased ambulation during shift.  Was given one-time dose of furosemide; patient had adequate urine output, see I&O flowsheet.  Started shift in sinus arrhythmia with frequent PACs, HR 90s-110s, SBP 130s, SaO2 96% on 3L O2 via nasal cannula.  Around 0830 patient converted to atrial fibrillation with occasional PVCs, HR 110s-150s, SBP 130s, SaO2 97% on 3L O2 via nasal cannula.  EKG performed during shift, see Chart Review for results.  Ambulated in room and hallway with standby assist and use of walker.  Verbalized readiness for discharge, friend will drive patient home.    P:  Prepare for discharge.  Ensure patient understands follow-up cares and appointments.  Ensure patient understands and recognizes signs/symptoms that indicate a need for further medical consultation.

## 2018-11-26 NOTE — PLAN OF CARE
Problem: Patient Care Overview  Goal: Plan of Care/Patient Progress Review  Outcome: Improving    D: admitted 11/21 for SOB, hx sarcoidosis on immunosuppression, COPD, CAD s/p stent, HFpEF, a-flutter s/p ablation, PHTN, HTN, DM2, CKD3      I/A:   Neuro- A&Ox4, SBA with walker and gait belt, needs encouragement for activity  CV- sinus arrhythmia 90-120s, BPs 90-110s/60s  Pulm- sating 92-95% on 3 LPM NC, ACEVES  GI/- 2g Na, 2 L FR, voiding adequately  LDA- L PIV SL  Skin- LE ruben, blanchable erythema on buttocks-mepilex applied, states understands importance of weight shifting, but needs reinforcement, +1-2 edema in LE  Pain- L foot pain much improved with colchicine, PRN acetaminophen       P:   Diltiazem switched to 240 mg daily. Possible discharge in 1-2 days. Continue to monitor and notify CARDS1 with any changes or concerns.

## 2018-11-26 NOTE — DISCHARGE SUMMARY
[address] [address]  p: [phone]    Discharge Summary: Cardiology Service    Rohan Monroe MRN# [id number]   YOB: 1945 Age: 72 year old       Admission Date: 11/21/2018  Discharge Date: 11/26/18      Discharge Diagnoses:  1. Atrial ffib/flutter s/p ablation with post-ablation recurrence  2. Acute on Chronic Diastolic Heart Failure  3. CAD s/p ROSALIE to mLAD and D2 (5/2017)  4. Severe pulmonary HTN  5. Pneumocystis pneumonia  6. Pulmonary sarcoidosis  7. Gout  8. CKD III with RADHA    Pertinent Procedures:  1. N/A    Consults:  Pulmonology  Infectious Disease  Electrophysiology  PT/OT    Imaging with results:  Echocardiogram 11/19/18:  Interpretation Summary  Left ventricular systolic function is mildly reduced.  The visual ejection fraction is estimated at 45-50%.  With rapid atrial fibrillation, the visual approximation of left ventricular  systolic function may be falsely decreased.  Mildly decreased right ventricular systolic function  Right ventricular systolic pressure is elevated, consistent with moderate  pulmonary hypertension.  EF lower compared to 3/18, in setting of rapid atrial fibrillation. The study  was technically difficult.    Other imaging studies:  EKG 12Lead 11/26: Afib/flutter with occ PVCs      Brief HPI:  Rohan Monroe is a 73 y/o M with history of sarcoidosis on immunosuppressive therapy, COPD (on 3L home O2), CAD s/p ROSALIE to D2 and mLAD (05/2017), HFpEF, atrial flutter s/p ablation with post-ablation recurrence, severe pulmonary HTN, HTN, DM2, CKD stage III who presents as a transfer from [facility] for dyspnea on exertion found to be in Afib with RVR for additional workup by cardiology and pulmonary teams.       Hospital Course by Diagnosis:  # Atrial flutter and fibrillation s/p ablation with post-ablation recurrence  Previously followed by Dr. Vazquez, who performed  successful catheter ablation in 1/2017 with immediate recurrence of arrhythmia following the procedure. Was on amiodarone s/p successful cardioversion for 2 weeks, but stopped amiodarone due to intolerance. Presented to OSH on 11/18 with Afib with RVR, s/p spontaneous conversion but recurrence on 11/21, on IV amiodarone gtt started prior to transfer. Patient converted to sinus rhythm 11/25 after treatment of PCP pneumonia was initiated along with uptitration of diltiazem.  Has subsequently flipped back into atrial fibrillation 11/26 rate controlled, asymptomatic     - [id number].  Has been off Coumadin for last 6 months (discontinued due to patient preference). Was on heparin gtt and transitioned to warfarin at OSH, but cardiology note states AC stopped on 11/21 due to heme occult positive study and dark stools per patient. Consider watchman in future.  - Continue Diltiazem 360 mg daily   - Goal HR < 110  - Per EP; if patient continues to have fast HR, okay to add low dose beta blocker to CCB dosing  - Follow up with PCP in 1-2 weeks for medication change and post hospitalization follow up   - Follow up with EP in 1 month for afib/flutter follow up       # HFpEF (EF 60-65% in March 2018)   # CAD s/p PCI with ROSALIE to mid-LAD and D2 in 5/2017   Recent TTE at OSH 11/19/18 with reduced EF to 45-50%, although done while in rapid Afib. Troponin negative x4 at OSH.     - Continue daily weights, patient educated about importance of notifying provider if weight goes up 2# in one day or 5# in one weeks  - Continue PTA aspirin 81 mg oral daily, and Lipitor 40 mg oral daily  - Start 25mg losartan  - Start Cardizem 360 mg every day  - Lasix 20 mg BID  - [facility] follow up as outpatient      # Severe pulmonary HTN  RHC 11/19, PVR is 9 and wedge was slightly elevated, 11 but, patient in rapid Afib at that time.   - Hold furosemide 11/24 due to RADHA on CKD (lasix 40mg PO BID held)   - continue PTA sildenafil 20mg TID       #  Pneumocystis Pneumonia  # Severe dyspnea on exertion   # Pulmonary sarcoidosis   # ? 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 Follows with pulmonology, Dr. Perlman. Currently on 3L supplemental O2 (at baseline). Dyspnea on exertion likely not fully explained by progression of lung disease and cardiac etiology is likely (see above) per Dr. Ramon of pulmonology. Trop negative x4 at OSH. Studies were positive yesterday for PCP pneumonia and he was started on bactrim. Per ID, because this was detected using PCR, it could represent colonization, so we will look at 1,3-beta-d-glucan as a marker of infection.      - Bactrim double strength oral tablet BID x 21 days (end date 12/15). If patient develops oxygen needs above baseline, will start steroid burst.   - Followed by prophylactic dosed Bactrim due to patient being immunosuppressed; Bactrim single strength daily  - Trend CRP to assess response to therapy  - Wean O2 as able  - Continue PTA bronchodilators and diuresis   - Continue methotrexate qweek   - HIV test pending; PCP to follow up with results  - Follow up with PCP for medication evaluation; per ID will need twice weekly BMP while on Bactrim treatment   - Patient to discharge home with home care; likely transition to OP Cardiac Rehab  - Follow up with Pulmonology as previously arranged       #Gout Per patient never formally diagnosed, but does take colchicine for flares.    - Started colchicine 11/24      # CKD Stage III with RADHA  - Monitor renal function closely  - Avoid nephrotoxic drugs  - Held diuresis 11/24, restarted 11/26      Chronic Conditions   # Hypothyroidism- continue PTA levothyroxine.  # History of HepC- treated in the past  # DM2 (A1C 6.4)- low intensity sliding scale insulin, hypoglycemia protocol   # Insomnia- cont PTA PRN melatonin. Holding PRN remeron       Condition on discharge  Temp:  [97.8  F (36.6  C)-98.5  F  (36.9  C)] 98.5  F (36.9  C)  Heart Rate:  [] 109  Resp:  [17-19] 18  BP: (113-139)/(61-75) 131/75  SpO2:  [96 %-100 %] 97 %  General: Alert, interactive, NAD  Eyes: sclera anicteric, EOMI  Neck: JVP flat, carotid 2+ bilaterally  Cardiovascular: irregularly irregular rhythm, rate controlled, normal S1 and S2, no murmurs, gallops, or rubs  Resp: clear to auscultation bilaterally, no rales, wheezes, or rhonchi  GI: Soft, nontender, nondistended. +BS.    Extremities: no BLE edema, no cyanosis or clubbing, dorsalis pedis and posterior tibialis pulses 2+ bilaterally  Skin: Warm and dry, no jaundice or rash  Neuro: CN 2-12 intact, moves all extremities equally  Psych: Alert & oriented x 3      Medication Changes:  - Start Diltiazem 360 mg daily  - Start Losartan 25 mg daily  - Modify Lasix 20 mg BID  - Start Bactrim 1 double strength tablet (800-160 mg) BID x 21 days (end date 12/15)  - After 12/15 Bactrim therapy completed, patient to take Bactrim Single Strength daily  - Stop Metoprolol      Discharge medications:   Current Discharge Medication List      START taking these medications    Details   !! colchicine (COLCYRS) 0.6 MG tablet Take 1 tablet (0.6 mg) by mouth 2 times daily  Qty: 4 tablet, Refills: 0    Associated Diagnoses: Gout, unspecified cause, unspecified chronicity, unspecified site      diltiazem (CARDIZEM CD/CARTIA XT) 360 MG 24 hr CD capsule Take 1 capsule (360 mg) by mouth daily  Qty: 90 capsule, Refills: 3    Associated Diagnoses: (HFpEF) heart failure with preserved ejection fraction (H)      melatonin 1 MG TABS tablet Take 1 tablet (1 mg) by mouth nightly as needed for sleep  Qty: 30 tablet, Refills: 0    Associated Diagnoses: Insomnia, unspecified type       !! - Potential duplicate medications found. Please discuss with provider.      CONTINUE these medications which have CHANGED    Details   furosemide (LASIX) 20 MG tablet Take 1 tablet (20 mg) by mouth 2 times daily May take extra 20 mg as  needed for wt .gain >3#  Qty: 240 tablet, Refills: 11    Associated Diagnoses: Pulmonary hypertension (H)      losartan (COZAAR) 25 MG tablet Take 1 tablet (25 mg) by mouth daily  Qty: 60 tablet, Refills: 3    Associated Diagnoses: (HFpEF) heart failure with preserved ejection fraction (H)         CONTINUE these medications which have NOT CHANGED    Details   albuterol (PROAIR HFA/PROVENTIL HFA/VENTOLIN HFA) 108 (90 Base) MCG/ACT Inhaler Inhale 2 puffs into the lungs every 6 hours as needed for shortness of breath / dyspnea  Qty: 3 Inhaler, Refills: 6    Associated Diagnoses: Sarcoidosis      aspirin 81 MG EC tablet Take 81 mg by mouth daily      atorvastatin (LIPITOR) 40 MG tablet TAKE ONE TABLET BY MOUTH ONE TIME DAILY   Qty: 90 tablet, Refills: 3    Associated Diagnoses: Coronary artery disease involving native coronary artery of native heart without angina pectoris; CAD S/P percutaneous coronary angioplasty; Hyperlipidemia LDL goal <70      blood glucose monitoring (ACCU-CHEK RONNIE PLUS) test strip Use to test blood sugar 2 times daily or as directed.  3 month supply.  Qty: 200 each, Refills: 3    Associated Diagnoses: Type 2 diabetes, HbA1C goal < 8% (H)      blood glucose monitoring (ACCU-CHEK FASTCLIX) lancets Use to test blood sugar 2 times daily or as directed.  102 lancets per box.  3 month supply.  Qty: 2 Box, Refills: 3    Associated Diagnoses: Type 2 diabetes, HbA1C goal < 8% (H)      blood glucose monitoring (NO BRAND SPECIFIED) meter device kit Use to test blood sugar 2 times daily.  Qty: 1 kit, Refills: 0    Associated Diagnoses: Type 2 diabetes mellitus with diabetic nephropathy (H)      ciclopirox (LOPROX) 0.77 % cream Apply topically 2 times daily as needed      !! colchicine (COLCRYS) 0.6 MG tablet 2 tabs at the onset of flare, then 1 tab every 2 hrs until pain is better or diarrhea occurs, up to 10 doses. Wait 3 days until taking again  Qty: 30 tablet, Refills: 0      fluticasone-vilanterol  (BREO ELLIPTA) 100-25 MCG/INH oral inhaler Inhale 1 puff into the lungs daily  Qty: 3 Inhaler, Refills: 3    Associated Diagnoses: Chronic obstructive pulmonary disease, unspecified COPD type (H)      inFLIXimab (REMICADE) 100 MG injection Inject 100 mg into the vein every 28 days       levothyroxine (SYNTHROID/LEVOTHROID) 125 MCG tablet Take 1 tablet (125 mcg) by mouth daily  Qty: 90 tablet, Refills: 3    Associated Diagnoses: Hypothyroidism due to Hashimoto's thyroiditis      methotrexate 2.5 MG tablet CHEMO Take 3 tablets (7.5 mg) by mouth once a week On Mondays  Qty: 48 tablet, Refills: 3    Associated Diagnoses: Sarcoidosis      mirtazapine (REMERON) 15 MG tablet Take 15 mg by mouth At Bedtime.      Multiple Vitamins-Minerals (MULTIVITAMIN & MINERAL PO) Take 1 tablet by mouth daily.      !! order for DME Updated Oxygen: Patient requires supplemental Oxygen 3 LPM via nasal canula with activity and 2 LPM nocturnally. Please provide patient with a portable oxygen concentrator for improved mobility.  Okay to spot check or titrated for conserving to keep stats above 90%. Oxygen will be for a lifetime.  Qty: 1 Device, Refills: 0    Associated Diagnoses: ILD (interstitial lung disease) (H); Hypoxia      !! order for DME She requested for a 4 wheel walker, would like to change it to 4 wheel walker with a seat and oxygen tank durant.     Need this faxed over to her   [phone]  Qty: 1 Device, Refills: 1    Associated Diagnoses: Sarcoidosis, lung (H); COPD (chronic obstructive pulmonary disease) (H); Abnormal gait; Congestive heart failure (H)      polyethylene glycol (MIRALAX/GLYCOLAX) powder Take 17 g by mouth daily as needed for constipation      sildenafil (REVATIO) 20 MG tablet TAKE ONE TABLET BY MOUTH THREE TIMES DAILY   Qty: 270 tablet, Refills: 3    Associated Diagnoses: Pulmonary hypertension (H)      tiotropium (SPIRIVA HANDIHALER) 18 MCG capsule Inhale contents of one capsule daily.  Qty: 90 capsule,  Refills: 3    Associated Diagnoses: Chronic obstructive pulmonary disease, unspecified COPD type (H)      Urea 40 % CREA Externally apply topically daily as needed for dry skin       !! - Potential duplicate medications found. Please discuss with provider.      STOP taking these medications       metoprolol tartrate (LOPRESSOR) 100 MG tablet Comments:   Reason for Stopping:               Labs or imaging requiring follow-up after discharge:  BMP twice weekly for 21 days while on Bactrim treatment per ID (Okay to fax these results to ID; Fax: [phone])      Follow-up:  - Follow up with [facility] for heart failure follow up; [facility] RN to arrange  - Follow up with PCP in 7-10 days for medication change evaluation and post hospitalization follow up  - Follow with EP in 1 month    Code status:  Full    VINNY Heredia, CNP  [facility]  [phone]

## 2018-11-26 NOTE — PLAN OF CARE
Problem: Patient Care Overview  Goal: Plan of Care/Patient Progress Review  Outcome: Improving  D/A/I:  Patient A&O x4, denied pain, palpitations, dizziness, and nausea.  Had ACEVES, lung sounds diminished in bases.  Had 1-2+ edema in legs and feet with R > L; had good urine output, see I&O flowsheet.  Continued to have loose dark brown stools, no interventions at this time.  In sinus arrhythmia with occasional PVCs, HR 70s-120s, SBP 100s-130s, SaO2 % on 3L O2 via nasal cannula.  Ambulated in hallway with standby assist and use of walker with gait belt.    P:  Continue to monitor pain, VS, heart rhythm, fluid status, bowel status, cardiac and respiratory status.  Notify care team of changes in patient condition or other concerns.  Encourage IS use to promote lung function.  Ambulate patient to decrease deconditioning.  Potential discharge later this week.

## 2018-11-26 NOTE — PLAN OF CARE
Problem: Patient Care Overview  Goal: Plan of Care/Patient Progress Review  Physical Therapy Discharge Summary    Reason for therapy discharge:    Discharged to home.    Progress towards therapy goal(s). See goals on Care Plan in [facility] electronic health record for goal details.  Goals partially met.  Barriers to achieving goals:   discharge from facility.    Therapy recommendation(s):    Continued therapy is recommended.  Rationale/Recommendations:  home with assist from son.

## 2018-11-26 NOTE — PROGRESS NOTES
"[facility] Nurse Inpatient Wound Assessment     Initial  Assessment  S: Reason for consultation: Evaluate and treat toe wound    B: Per Md Notes: 73 y/o M with history of sarcoidosis on immunosuppressive therapy, COPD (on 3L home O2), CAD s/p ROSALIE to D2 and mLAD (05/2017), HFpEF, atrial flutter s/p ablation with post-ablation recurrence, severe pulmonary HTN, HTN, DM2, CKD stage III who presents as a transfer from [facility] for dyspnea on exertion found to be in Afib with RVR for additional workup by cardiology and pulmonary teams.     A: Assessed patient's bilateral feet, coccyx, sacrum, gluteal cleft, and bilateral buttock. Patient left foot 3rd toe has hyperkeratotic callous surrounding intact skin in center, patient missed an appointment to see podiatry to due being admitted. Patient right leg appears to have dry skin with hemosiderin staining which per patient is due to prior cellulitis scar tissue. Educated patient regarding moisturizing skin, but not between toes. Minor blanchable erythema on coccyx, and per patient protective sacral mepilex increases comfort.    R: Start Apply Aquaphor to bilateral feet (not between toes) and legs.    Plan of care for intact coccyx area: every 3 days and prn  1. Clean intact skin with gentle soap and water, dry and dry again.  2. Paint large area where the dressing will land with No Sting Skin Prep (#[id number]) and allow to dry thoroughly  3. Press a Mepilex  Sacral Dressing (PS#[id number])  to the area, making sure to conform nicely to skin curvatures (begin placing the Mepilex \"upside down\" at the most distal aspect then smooth upward along the gluteal cleft and then side to side.  NOTE- begin placement as high as possible and may be positioned off-set or not \"square\" to the body)   4. Time and date dressing change and jai with a \"P\" for prevention.  NOTE  - make sure to continue to assess under the Mepilex Dressing BID and document findings.  If epidermis  opens " "notify CWOCN's and change dressing to \"T\" for treatment and created LDA, etc  -position pt only side to side in bed  -when up to the chair, fully off load every 1-2 hours and encourage shifting weight every 15 minutes  -elevated heels by placing pillow under each leg, from knees to heels, heels floating    Orders entered. Supplies: ordered aquaphor  WOC Nurse follow-up plan:signing off  Nursing to notify the Provider(s) and re-consult the WOC Nurse if wound(s) deteriorates or new skin concern.    Discussed plan of care with Patient    Jaimie HUTSON Therese RIVERAN, RN, CWOCN     "

## 2018-11-26 NOTE — PROGRESS NOTES
Care Coordinator - Discharge Planning    Admission Date/Time:  11/21/2018  Attending MD:  Loc Duque MD   Data  Chart reviewed, discussed with interdisciplinary team.   Patient was admitted for:   1. Pneumonia of both upper lobes due to Pneumocystis jirovecii (H)    Assessment   Concerns with insurance coverage for discharge needs: None stated by pt.  Current Living Situation: Patient said that he lives with his adult son.   Support System: Supportive and Involved son, Elvis.  Services Involved: [facility] [employer] for home O2. Pt said that he has a port O2 conc for his ride home.   Transportation at Discharge: Pt said that his friend, Eris will provide him with a ride home.  Transportation to Medical Appointments: family or friend.     Coordination of Care and Referrals: Provided patient/family with options for home care.   Physical Therapy recommending home care; pt is in agreement with this plan and pt wants to use [facility].   Intervention:   Arrangements made with [facility] (Ph: [phone] Fax: [phone]) for RN eval post hospitalization, assess vital signs, respiratory and cardiac status, activity tolerance, hydration, nutritional status, med set up and management, heart failure education reinforcement, twice weekly BMP labs with results to Dr. Lenora Angulo and Dr. Jamie Foster. Physical Therapy eval and treat for deconditioning, strengthening, and endurance.       Plan  Anticipated Discharge Date:  11/26/18  Anticipated Discharge Plan:  Discharge to home with home care.     CTS Handoff completed:  YES    ELSA RITTER RN BSN  Care Coordinator Unit   [phone]

## 2018-11-27 ENCOUNTER — PATIENT OUTREACH (OUTPATIENT)
Dept: CARE COORDINATION | Facility: CLINIC | Age: 73
End: 2018-11-27

## 2018-11-27 LAB
INTERPRETATION ECG - MUSE: NORMAL
INTERPRETATION ECG - MUSE: NORMAL

## 2018-11-28 ENCOUNTER — TELEPHONE (OUTPATIENT)
Dept: INTERNAL MEDICINE | Facility: CLINIC | Age: 73
End: 2018-11-28

## 2018-11-28 ENCOUNTER — MEDICAL CORRESPONDENCE (OUTPATIENT)
Dept: HEALTH INFORMATION MANAGEMENT | Facility: CLINIC | Age: 73
End: 2018-11-28

## 2018-11-28 DIAGNOSIS — I50.30 (HFPEF) HEART FAILURE WITH PRESERVED EJECTION FRACTION (H): Primary | ICD-10-CM

## 2018-11-28 NOTE — TELEPHONE ENCOUNTER
Health Call Center    Phone Message    May a detailed message be left on voicemail: yes    Reason for Call: Order(s): Home Care Orders: Physical Therapy (PT): PT, Occupational Therapy (OT): OT and Other: Socail Eval, HHA 2x 8wk, Nursing 2x 3wk x6, 3AS    Action Taken: Message routed to:  [facility] ([facility] primary care     Home care RN Yossi called message left with verbal approval given for home care, PT,OT HHA and nursing given.   Documented.  Willow Bob RN 11:40 AM on 11/28/2018.

## 2018-11-29 DIAGNOSIS — I50.30 (HFPEF) HEART FAILURE WITH PRESERVED EJECTION FRACTION (H): Primary | ICD-10-CM

## 2018-11-29 LAB — INTERPRETATION ECG - MUSE: NORMAL

## 2018-11-30 ENCOUNTER — CARE COORDINATION (OUTPATIENT)
Dept: PULMONOLOGY | Facility: CLINIC | Age: 73
End: 2018-11-30

## 2018-11-30 ENCOUNTER — OFFICE VISIT (OUTPATIENT)
Dept: PULMONOLOGY | Facility: CLINIC | Age: 73
End: 2018-11-30
Attending: INTERNAL MEDICINE
Payer: MEDICARE

## 2018-11-30 VITALS
RESPIRATION RATE: 17 BRPM | WEIGHT: 163.8 LBS | DIASTOLIC BLOOD PRESSURE: 77 MMHG | SYSTOLIC BLOOD PRESSURE: 124 MMHG | HEIGHT: 66 IN | HEART RATE: 124 BPM | BODY MASS INDEX: 26.33 KG/M2 | OXYGEN SATURATION: 96 %

## 2018-11-30 DIAGNOSIS — I27.20 PULMONARY HYPERTENSION (H): ICD-10-CM

## 2018-11-30 DIAGNOSIS — D86.9 SARCOIDOSIS: ICD-10-CM

## 2018-11-30 DIAGNOSIS — J44.9 COPD (CHRONIC OBSTRUCTIVE PULMONARY DISEASE) (H): Primary | ICD-10-CM

## 2018-11-30 DIAGNOSIS — I50.22 CHRONIC SYSTOLIC CONGESTIVE HEART FAILURE (H): ICD-10-CM

## 2018-11-30 DIAGNOSIS — I48.92 ATRIAL FLUTTER, UNSPECIFIED TYPE (H): Primary | ICD-10-CM

## 2018-11-30 DIAGNOSIS — J84.9 ILD (INTERSTITIAL LUNG DISEASE) (H): Primary | ICD-10-CM

## 2018-11-30 LAB
6 MIN WALK (FT): 240 FT
6 MIN WALK (M): 73 M
ALBUMIN SERPL-MCNC: 3.2 G/DL (ref 3.4–5)
ALP SERPL-CCNC: 114 U/L (ref 40–150)
ALT SERPL W P-5'-P-CCNC: 40 U/L (ref 0–70)
ANION GAP SERPL CALCULATED.3IONS-SCNC: 9 MMOL/L (ref 3–14)
AST SERPL W P-5'-P-CCNC: 42 U/L (ref 0–45)
BASOPHILS # BLD AUTO: 0.1 10E9/L (ref 0–0.2)
BASOPHILS NFR BLD AUTO: 0.6 %
BILIRUB SERPL-MCNC: 0.3 MG/DL (ref 0.2–1.3)
BUN SERPL-MCNC: 40 MG/DL (ref 7–30)
CALCIUM SERPL-MCNC: 8.4 MG/DL (ref 8.5–10.1)
CHLORIDE SERPL-SCNC: 107 MMOL/L (ref 94–109)
CO2 SERPL-SCNC: 21 MMOL/L (ref 20–32)
CREAT SERPL-MCNC: 2.57 MG/DL (ref 0.66–1.25)
DIFFERENTIAL METHOD BLD: ABNORMAL
EOSINOPHIL # BLD AUTO: 0.2 10E9/L (ref 0–0.7)
EOSINOPHIL NFR BLD AUTO: 2.9 %
ERYTHROCYTE [DISTWIDTH] IN BLOOD BY AUTOMATED COUNT: 14.4 % (ref 10–15)
GFR SERPL CREATININE-BSD FRML MDRD: 25 ML/MIN/1.7M2
GLUCOSE SERPL-MCNC: 88 MG/DL (ref 70–99)
HCT VFR BLD AUTO: 33.9 % (ref 40–53)
HGB BLD-MCNC: 10.5 G/DL (ref 13.3–17.7)
IMM GRANULOCYTES # BLD: 0.1 10E9/L (ref 0–0.4)
IMM GRANULOCYTES NFR BLD: 0.7 %
LYMPHOCYTES # BLD AUTO: 0.5 10E9/L (ref 0.8–5.3)
LYMPHOCYTES NFR BLD AUTO: 6.7 %
MCH RBC QN AUTO: 29.4 PG (ref 26.5–33)
MCHC RBC AUTO-ENTMCNC: 31 G/DL (ref 31.5–36.5)
MCV RBC AUTO: 95 FL (ref 78–100)
MONOCYTES # BLD AUTO: 0.6 10E9/L (ref 0–1.3)
MONOCYTES NFR BLD AUTO: 7.1 %
NEUTROPHILS # BLD AUTO: 6.6 10E9/L (ref 1.6–8.3)
NEUTROPHILS NFR BLD AUTO: 82 %
NRBC # BLD AUTO: 0 10*3/UL
NRBC BLD AUTO-RTO: 0 /100
NT-PROBNP SERPL-MCNC: 6481 PG/ML (ref 0–125)
PLATELET # BLD AUTO: 314 10E9/L (ref 150–450)
POTASSIUM SERPL-SCNC: 4.4 MMOL/L (ref 3.4–5.3)
PROT SERPL-MCNC: 8 G/DL (ref 6.8–8.8)
RBC # BLD AUTO: 3.57 10E12/L (ref 4.4–5.9)
SODIUM SERPL-SCNC: 136 MMOL/L (ref 133–144)
WBC # BLD AUTO: 8 10E9/L (ref 4–11)

## 2018-11-30 PROCEDURE — 36415 COLL VENOUS BLD VENIPUNCTURE: CPT | Performed by: INTERNAL MEDICINE

## 2018-11-30 PROCEDURE — 80053 COMPREHEN METABOLIC PANEL: CPT | Performed by: INTERNAL MEDICINE

## 2018-11-30 PROCEDURE — 85025 COMPLETE CBC W/AUTO DIFF WBC: CPT | Performed by: INTERNAL MEDICINE

## 2018-11-30 PROCEDURE — G0463 HOSPITAL OUTPT CLINIC VISIT: HCPCS | Mod: ZF

## 2018-11-30 PROCEDURE — 93010 ELECTROCARDIOGRAM REPORT: CPT | Mod: ZP | Performed by: INTERNAL MEDICINE

## 2018-11-30 PROCEDURE — 83880 ASSAY OF NATRIURETIC PEPTIDE: CPT | Performed by: INTERNAL MEDICINE

## 2018-11-30 ASSESSMENT — PAIN SCALES - GENERAL: PAINLEVEL: NO PAIN (0)

## 2018-11-30 NOTE — MR AVS SNAPSHOT
After Visit Summary   11/30/2018    Rohan Monroe    MRN: [id number]           Patient Information     Date Of Birth          1945        Visit Information        Provider Department      11/30/2018 11:00 AM Perlman, David Morris, MD [facility]        Today's Diagnoses     Atrial flutter, unspecified type (H)    -  1    Sarcoidosis        Pulmonary hypertension (H)        Chronic systolic congestive heart failure (H)            Follow-ups after your visit        Your next 10 appointments already scheduled     Nov 30, 2018  2:00 PM CST   Lab with [facility]TER [facility]   [facility] Lab ([facility])    [address] [place] [address]  1st [facility] [address]   [phone]            Dec 05, 2018  2:00 PM CST   (Arrive by 1:45 PM)   Office Visit with Caryn Guzmán [facility] Medication Therapy Management ([facility])    [address] [place] [address]  4th [facility] [id number] [phone]           Bring a current list of meds and any records pertaining to this visit. For Physicals, please bring immunization records and any forms needing to be filled out. Please arrive 10 minutes early to complete paperwork.            Dec 05, 2018  3:05 PM CST   (Arrive by 2:50 PM)   Return Visit with Jamie Foster MD   [facility] Primary Care [facility] ([facility])    [address] [place] [address]  4th [facility] [address] [phone]            Dec 06, 2018  1:00 PM CST   Infusion 180 with UC SPEC INFUSION, UC 41 ATC   [facility] Specialty and Procedure ([facility])    [address] [place] [address]  [address]  [facility] [address]   [phone]            Dec 10, 2018  9:20 AM CST   (Arrive by 9:05 AM)   RETURN FO[facility]ANKLE [facility] ([facility])    [address] [place] [address]  Floor  [facility] [address]   [phone]            Dec 12, 2018  2:00 PM CST   Lab with  LAB    Health Lab ([facility])    [address]  1st Floor  [facility] [address] [phone]            Dec 12, 2018  2:30 PM CST   (Arrive by 2:15 PM)   CORE NEW with VINNY Pina CNP   [facility] ([facility])    [address]  [address]  [facility] [address] [phone]            Dec 12, 2018  3:30 PM CST   (Arrive by 3:15 PM)   RETURN ARRHYTHMIA with Brian Vazquez MD   [facility] ([facility])    [address] [place] [address]  [address]  [facility] [address] [phone]            Dec 19, 2018 10:30 AM CST   US AORTA/IVC/ILIAC DUPLEX LIMITED with UCUSV2   [facility] [place] [facility] US ([facility])    [address] [place] [address]  1st Floor  [facility] [address] [phone]           How do I prepare for my exam? (Food and drink instructions) Adults: No eating, smoking, gum chewing or drinking for 8 hours before the exam. You may take medicine with a small sip of water.  Children: * Infants, breast-fed: may have breast milk up to 2 hours before exam. * Infants, formula: may have bottle until 4 hours before exam. * Children 1-5 years: No food or drink for 4 hours before exam. * Children 6 -12 years: No food or drink for 6 hours before exam. * Children over 12 years: No food or drink for 8 hours before exam.  * J Tube Fed: No need to stop feedings.  What should I wear: Wear comfortable clothes.  How long does the exam take: Most ultrasounds take 30 to 60 minutes.  What should I bring: Bring a list of your medicines, including vitamins, minerals and over-the-counter drugs. It is safest to leave personal items at home.  Do I need a :  No  is needed.  What do I need to tell my doctor: Tell your doctor about any allergies you may have.  What should I do after  the exam: No restrictions, You may resume normal activities.  What is this test: An ultrasound uses sound waves to make pictures of the body. Sound waves do not cause pain. The only discomfort may be the pressure of the wand against your skin or full bladder.  Who should I call with questions: If you have any questions, please call the Imaging Department where you will have your exam. Directions, parking instructions, and other information is available on our website, [email or web address].              Future tests that were ordered for you today     Open Future Orders        Priority Expected Expires Ordered    N terminal pro BNP outpatient Routine 12/12/2018 11/29/2019 11/29/2018            Who to contact     If you have questions or need follow up information about today's clinic visit or your schedule please contact [facility] directly at [phone].  Normal or non-critical lab and imaging results will be communicated to you by [employer], letter or phone within 4 business days after the clinic has received the results. If you do not hear from us within 7 days, please contact the clinic through [employer] or phone. If you have a critical or abnormal lab result, we will notify you by phone as soon as possible.  Submit refill requests through [employer] or call your pharmacy and they will forward the refill request to us. Please allow 3 business days for your refill to be completed.          Additional Information About Your Visit        Mindscape Information     [employer] gives you secure access to your electronic health record. If you see a primary care provider, you can also send messages to your care team and make appointments. If you have questions, please call your primary care clinic.  If you do not have a primary care provider, please call [phone] and they will assist you.        Care EveryWhere ID     This is your Care EveryWhere ID. This could be used by other organizations to  "access your [facility] medical records  [id number]        Your Vitals Were     Pulse Respirations Height Pulse Oximetry BMI (Body Mass Index)       124 17 1.676 m (5' 6\") 96% 26.44 kg/m2        Blood Pressure from Last 3 Encounters:   11/30/18 124/77   11/26/18 131/75   11/21/18 140/85    Weight from Last 3 Encounters:   11/30/18 74.3 kg (163 lb 12.8 oz)   11/26/18 74.3 kg (163 lb 14.4 oz)   11/21/18 65.9 kg (145 lb 4.5 oz)              We Performed the Following     EKG 12-lead complete w/read - Clinics          Today's Medication Changes          These changes are accurate as of 11/30/18  1:49 PM.  If you have any questions, ask your nurse or doctor.               These medicines have changed or have updated prescriptions.        Dose/Directions    atorvastatin 40 MG tablet   Commonly known as:  LIPITOR   This may have changed:  See the new instructions.   Used for:  Coronary artery disease involving native coronary artery of native heart without angina pectoris, CAD S/P percutaneous coronary angioplasty, Hyperlipidemia LDL goal <70        TAKE ONE TABLET BY MOUTH ONE TIME DAILY   Quantity:  90 tablet   Refills:  3                Primary Care Provider Office Phone # Fax #    Jamie Foster [phone]       [address] [address]        Equal Access to Services     ISIAH MONTOYA : Hadii rhys jamison hadasho Soleslie, waaxda luqadaha, qaybta kaalmada adeforrestyada, nelly jacinto. So [facility] [phone].    ATENCIÓN: Si habla español, tiene a mcfadden disposición servicios gratuitos de asistencia lingüística. Llame al [phone].    We comply with applicable federal civil rights laws and [place] laws. We do not discriminate on the basis of race, color, national origin, age, disability, sex, sexual orientation, or gender identity.            Thank you!     Thank you for choosing [facility]  for your care. Our goal is always to " provide you with excellent care. Hearing back from our patients is one way we can continue to improve our services. Please take a few minutes to complete the written survey that you may receive in the mail after your visit with us. Thank you!             Your Updated Medication List - Protect others around you: Learn how to safely use, store and throw away your medicines at www.disposemymeds.org.          This list is accurate as of 11/30/18  1:49 PM.  Always use your most recent med list.                   Brand Name Dispense Instructions for use Diagnosis    albuterol 108 (90 Base) MCG/ACT inhaler    PROAIR HFA/PROVENTIL HFA/VENTOLIN HFA    3 Inhaler    Inhale 2 puffs into the lungs every 6 hours as needed for shortness of breath / dyspnea    Sarcoidosis       aspirin 81 MG EC tablet      Take 81 mg by mouth daily        atorvastatin 40 MG tablet    LIPITOR    90 tablet    TAKE ONE TABLET BY MOUTH ONE TIME DAILY    Coronary artery disease involving native coronary artery of native heart without angina pectoris, CAD S/P percutaneous coronary angioplasty, Hyperlipidemia LDL goal <70       blood glucose monitoring lancets     2 Box    Use to test blood sugar 2 times daily or as directed.  102 lancets per box.  3 month supply.    Type 2 diabetes, HbA1C goal < 8% (H)       blood glucose monitoring meter device kit    NO BRAND SPECIFIED    1 kit    Use to test blood sugar 2 times daily.    Type 2 diabetes mellitus with diabetic nephropathy (H)       blood glucose monitoring test strip    ACCU-CHEK RONNIE PLUS    200 each    Use to test blood sugar 2 times daily or as directed.  3 month supply.    Type 2 diabetes, HbA1C goal < 8% (H)       ciclopirox 0.77 % cream    LOPROX     Apply topically 2 times daily as needed        * colchicine 0.6 MG tablet    COLCRYS    30 tablet    2 tabs at the onset of flare, then 1 tab every 2 hrs until pain is better or diarrhea occurs, up to 10 doses. Wait 3 days until taking again         * colchicine 0.6 MG tablet    COLCYRS    4 tablet    Take 1 tablet (0.6 mg) by mouth 2 times daily    Gout, unspecified cause, unspecified chronicity, unspecified site       diltiazem ER COATED BEADS 360 MG 24 hr capsule    CARDIZEM CD    90 capsule    Take 1 capsule (360 mg) by mouth daily    (HFpEF) heart failure with preserved ejection fraction (H)       fluticasone-vilanterol 100-25 MCG/INH inhaler    BREO ELLIPTA    3 Inhaler    Inhale 1 puff into the lungs daily    Chronic obstructive pulmonary disease, unspecified COPD type (H)       furosemide 20 MG tablet    LASIX    240 tablet    Take 1 tablet (20 mg) by mouth 2 times daily May take extra 20 mg as needed for wt .gain >3#    Pulmonary hypertension (H)       inFLIXimab 100 MG injection    REMICADE     Inject 100 mg into the vein every 28 days        levothyroxine 125 MCG tablet    SYNTHROID/LEVOTHROID    90 tablet    Take 1 tablet (125 mcg) by mouth daily    Hypothyroidism due to Hashimoto's thyroiditis       losartan 25 MG tablet    COZAAR    60 tablet    Take 1 tablet (25 mg) by mouth daily    (HFpEF) heart failure with preserved ejection fraction (H)       melatonin 1 MG Tabs tablet     30 tablet    Take 1 tablet (1 mg) by mouth nightly as needed for sleep    Insomnia, unspecified type       methotrexate 2.5 MG tablet     48 tablet    Take 3 tablets (7.5 mg) by mouth once a week On Mondays    Sarcoidosis       mirtazapine 15 MG tablet    REMERON     Take 15 mg by mouth At Bedtime.        MULTIVITAMIN & MINERAL PO      Take 1 tablet by mouth daily.        * order for DME     1 Device    She requested for a 4 wheel walker, would like to change it to 4 wheel walker with a seat and oxygen tank durant.   Need this faxed over to her  [phone]    Sarcoidosis, lung (H), COPD (chronic obstructive pulmonary disease) (H), Abnormal gait, Congestive heart failure (H)       * order for DME     1 Device    Updated Oxygen: Patient requires supplemental Oxygen 3 LPM  via nasal canula with activity and 2 LPM nocturnally. Please provide patient with a portable oxygen concentrator for improved mobility.  Okay to spot check or titrated for conserving to keep stats above 90%. Oxygen will be for a lifetime.    ILD (interstitial lung disease) (H), Hypoxia       polyethylene glycol powder    MIRALAX/GLYCOLAX     Take 17 g by mouth daily as needed for constipation        sildenafil 20 MG tablet    REVATIO    270 tablet    TAKE ONE TABLET BY MOUTH THREE TIMES DAILY    Pulmonary hypertension (H)       * sulfamethoxazole-trimethoprim 800-160 MG tablet    BACTRIM DS/SEPTRA DS    38 tablet    Take 1 tablet by mouth 2 times daily for 19 days    Pneumonia of both upper lobes due to Pneumocystis jirovecii (H)       * sulfamethoxazole-trimethoprim 400-80 MG tablet    BACTRIM/SEPTRA    60 tablet    Take 1 tablet by mouth every morning (before breakfast)    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    Chronic obstructive pulmonary disease, unspecified COPD type (H)       Urea 40 % Crea      Externally apply topically daily as needed for dry skin        * Notice:  This list has 6 medication(s) that are the same as other medications prescribed for you. Read the directions carefully, and ask your doctor or other care provider to review them with you.

## 2018-11-30 NOTE — LETTER
11/30/2018       RE: Rohan Monroe  [address]  [place] [address]     Dear Colleague,    Thank you for referring your patient, Rohan Monroe, to the [facility] at [facility]. Please see a copy of my visit note below.    Reason for Visit  Rohan Monroe is a 72 year old year old male who is being seen for RECHECK (Sarcoids)    ILD HPI    Rohan Monroe is a 72-year-old male with a history of pulmonary sarcoidosis and cardiac sarcoidosis who is seen today for follow-up.  He recently had a hospitalization and was found to have PCP pneumonia.  He was treated and got better from this.  He was discharged about 5 days ago however he does feel that his dyspnea on exertion has worsened since then.  He does feel some fluttering in his chest and did have a recurrence of his atrial flutter during that hospitalization he was not put on any additional rate control.  He currently denies fevers chills night sweats or productive cough.  He continues on his treatment for the P CP with Bactrim.      Current Outpatient Prescriptions   Medication     albuterol (PROAIR HFA/PROVENTIL HFA/VENTOLIN HFA) 108 (90 Base) MCG/ACT Inhaler     aspirin 81 MG EC tablet     atorvastatin (LIPITOR) 40 MG tablet     ciclopirox (LOPROX) 0.77 % cream     colchicine (COLCRYS) 0.6 MG tablet     colchicine (COLCYRS) 0.6 MG tablet     diltiazem (CARDIZEM CD/CARTIA XT) 360 MG 24 hr CD capsule     fluticasone-vilanterol (BREO ELLIPTA) 100-25 MCG/INH oral inhaler     furosemide (LASIX) 20 MG tablet     inFLIXimab (REMICADE) 100 MG injection     levothyroxine (SYNTHROID/LEVOTHROID) 125 MCG tablet     losartan (COZAAR) 25 MG tablet     melatonin 1 MG TABS tablet     methotrexate 2.5 MG tablet CHEMO     mirtazapine (REMERON) 15 MG tablet     Multiple Vitamins-Minerals (MULTIVITAMIN & MINERAL PO)     order for DME     order for DME      polyethylene glycol (MIRALAX/GLYCOLAX) powder     sildenafil (REVATIO) 20 MG tablet     sulfamethoxazole-trimethoprim (BACTRIM DS/SEPTRA DS) 800-160 MG per tablet     sulfamethoxazole-trimethoprim (BACTRIM/SEPTRA) 400-80 MG per tablet     tiotropium (SPIRIVA HANDIHALER) 18 MCG capsule     Urea 40 % CREA     blood glucose monitoring (ACCU-CHEK RONNIE PLUS) test strip     blood glucose monitoring (ACCU-CHEK FASTCLIX) lancets     blood glucose monitoring (NO BRAND SPECIFIED) meter device kit     No current facility-administered medications for this visit.      Allergies   Allergen Reactions     Prednisone Other (See Comments)     He reports that he can't sleep for days and can't use small to massive doses of prednisone   Pt. Does not do well with high doses of Prednisone, His MD says that Prednisone is counter indicated. Prednisone failed to treat his sarcoidosis      Nitroglycerin      Patient is on sildenafil for pulmonary hypertension, nitroglycerin contraindicated      Past Medical History:   Diagnosis Date     Atrial flutter (H)      Cataract of both eyes      Chronic infection     Hep C     Congestive heart failure, unspecified      Coronary artery disease      Depressive disorder      Depressive disorder, not elsewhere classified     Depression (non-psychotic)     ERM OS (epiretinal membrane, left eye)      Generalized osteoarthrosis, unspecified site      Glaucoma suspect      Hypertension      Lichen planus      Other psoriasis      Pneumocystis carinii pneumonia 11/23/2018     PVD (posterior vitreous detachment), left eye      Sarcoidosis      Sarcoidosis      Type II or unspecified type diabetes mellitus without mention of complication, not stated as uncontrolled      Unspecified hypothyroidism     Hypothyroidism     Unspecified viral hepatitis C without hepatic coma      Viral warts, unspecified        Past Surgical History:   Procedure Laterality Date     ANESTHESIA CARDIOVERSION N/A 1/19/2017    Procedure:  ANESTHESIA CARDIOVERSION;  Surgeon: GENERIC ANESTHESIA PROVIDER;  Location: [facility] OR     ANESTHESIA CARDIOVERSION N/A 1/23/2017    Procedure: ANESTHESIA CARDIOVERSION;  Surgeon: GENERIC ANESTHESIA PROVIDER;  Location: [facility] OR     ANESTHESIA CARDIOVERSION N/A 1/24/2017    Procedure: ANESTHESIA CARDIOVERSION;  Surgeon: GENERIC ANESTHESIA PROVIDER;  Location: [facility] OR     ANESTHESIA CARDIOVERSION N/A 11/20/2018    Procedure: ANESTHESIA CARDIOVERSION;  Surgeon: GENERIC ANESTHESIA PROVIDER;  Location: [facility] OR     ARTHROPLASTY HIP  8/24/2011    Procedure:ARTHROPLASTY HIP; Right Total Hip Arthroplasty  Choice anesthesia; Surgeon:LESLI WILKINSON; Location:[facility] OR     BIOPSY       C PELVIS/HIP JOINT SURGERY UNLISTED       cardiac stent      s/p     CARDIAC SURGERY       CATARACT IOL, RT/LT  9/15/2015    LE     COLONOSCOPY       CORONARY ANGIOGRAPHY ADULT ORDER       H ABLATION ATRIAL FLUTTER       HC REMOVAL OF TONSILS,<11 Y/O      Tonsils <10y.o.     HC REPAIR INCISIONAL HERNIA,REDUCIBLE      Hernia Repair, Incisional, Unilateral     HEART CATH, ANGIOPLASTY       JOINT REPLACEMENT      1 month ago--right hip     LIGATN/STRIP LONG & SHORT SAPHEN         Social History     Social History     Marital status:      Spouse name: N/A     Number of children: 2     Years of education: N/A     Occupational History      [employer]     Social History Main Topics     Smoking status: Former Smoker     Packs/day: 1.00     Years: 30.00     Types: Cigarettes     Start date: 12/30/1960     Quit date: 7/22/1994     Smokeless tobacco: Never Used     Alcohol use No     Drug use: No     Sexual activity: Not Currently     Partners: Female     Birth control/ protection: Post-menopausal     Other Topics Concern     Blood Transfusions No     Exercise Yes     Social History Narrative    Dairy/d 2 servings/d    Caffeine little servings/d    Exercise 3 x week    Sunscreen used - Yes    Seatbelts used - Yes    Working smoke/CO detectors in the  "home - Yes    Guns stored in the home - No    Self Breast Exams - NOT APPLICABLE    Self Testicular Exam - No    Eye Exam up to date - Yes    Dental Exam up to date - Yes    Pap Smear up to date - NOT APPLICABLE    Mammogram up to date - NOT APPLICABLE    PSA up to date - Yes    Dexa Scan up to date - NOT APPLICABLE    Flex Sig / Colonoscopy up to date - Yes    Immunizations up to date - Unsure    Abuse: Current or Past(Physical, Sexual or Emotional)- No    Do you feel safe in your environment - Yes       Family History   Problem Relation Age of Onset     Heart Disease Father      irreg heart beat     Circulatory Father      varicose veins     Prostate Cancer Father      Heart Disease Mother      heart attack     Arthritis Mother      Osteoporosis Mother      Thyroid Disease Mother      Hypertension Mother      Eye Disorder Maternal Grandmother      glaucoma     Diabetes Sister      type 2     Kidney Cancer Sister      Diabetes Sister      Glaucoma No family hx of      Macular Degeneration No family hx of      Cancer No family hx of      no skin cancer       ROS Pulmonary    A complete ROS was otherwise negative except as noted in the HPI.  Vitals:    11/30/18 1123   BP: 124/77   Pulse: 124   Resp: 17   SpO2: 96%   Weight: 74.3 kg (163 lb 12.8 oz)   Height: 1.676 m (5' 6\")     Exam:   GENERAL APPEARANCE: Well developed, well nourished, alert, and in no apparent distress.  NECK: supple, no masses, no thyromegaly.  LYMPHATICS: No significant axillary, cervical, or supraclavicular nodes.  RESP: good air flow throughout, - few crackles  CV: Normal S1, S2, regular rhythm, normal rate, no rub, no murmur,  no gallop, no LE edema.   ABDOMEN:  Bowel sounds normal, soft, nontender, no HSM or masses.   MS: extremities normal- no clubbing, no cyanosis.  SKIN: no rash on limited exam  NEURO: Mentation intact, speech normal, normal strength and tone, normal gait and stance  PSYCH: mentation appears normal. and affect " normal/bright  Results: I have reviewed all imaging, PFTs and other relavent tests, please see below for details, PFT and imaging results were reviewed with the patient.  PFTs: Severe mixed restriction and obstruction stable from previous    Assessment and plan:    72-year-old male with advanced pulmonary sarcoidosis and multiple cardiac issues.  I did an EKG which showed recurrence of his atrial flutter with a heart rate of around 109.  I think that the main issue is that he is not having good rate control with exertion and that is why he feels so short of breath.  His lungs appear to be relatively stable as far as I can tell.  I did discuss with the cardiology and they will get him seen in the clinic next week.  Otherwise I am going to have him hold off on his next Remicade infusion until after he is completed the treatment for the PCP pneumonia.  We will follow-up with him in the next week to make sure that his symptoms are improving.      CBC   Recent Labs   Lab Test  11/26/18 0545  11/25/18 0438   RBC  3.16*  3.06*   HGB  9.4*  9.2*   HCT  31.4*  30.0*   PLT  249  223       Basic Metabolic Panel  Recent Labs   Lab Test  11/26/18   0545  11/25/18   0438   05/13/17   0116  05/13/17   0035   NA  136  137   < >   --    --    POTASSIUM  3.9  3.7   < >   --    --    CHLORIDE  106  104   < >   --    --    CO2  22  24   < >   --    --    BUN  42*  47*   < >   --    --    CT   --    --    --   Plasma, Thawed  PlateletPheresis LeukoReduced Irradiated  Plasma, Thawed  Plasma, Thawed  Plasma, Thawed  Apheresis Plasma Thawed  Plasma, Thawed  PlateletPheresis,LeukoRed Irrad (Part 2)  Red Blood Cells Leukocyte Reduced  Red Blood Cells Leukocyte Reduced  Red Blood Cells Leukocyte Reduced  Red Blood Cells Leukocyte Reduced  Red Blood Cells Leukocyte Reduced  Red Blood Cells LeukoReduced (Part 2)   GLC  106*  113*   < >   --    --    RAFFY  8.2*  8.2*   < >   --    --     < > = values in this interval not  displayed.       INR  Recent Labs   Lab Test  11/21/18   0703  11/20/18   0616   INR  1.33*  1.20*       PFT  PFT Latest Ref Rng & Units 11/30/2018   FVC L 1.92   FEV1 L 0.89   FVC% % 49   FEV1% % 30       Again, thank you for allowing me to participate in the care of your patient.      Sincerely,    David Morris Perlman, MD

## 2018-11-30 NOTE — PROGRESS NOTES
Reason for Visit  Rohan Monroe is a 72 year old year old male who is being seen for RECHECK (Sarcoids)    ILD HPI    Rohan Monroe is a 72-year-old male with a history of pulmonary sarcoidosis and cardiac sarcoidosis who is seen today for follow-up.  He recently had a hospitalization and was found to have PCP pneumonia.  He was treated and got better from this.  He was discharged about 5 days ago however he does feel that his dyspnea on exertion has worsened since then.  He does feel some fluttering in his chest and did have a recurrence of his atrial flutter during that hospitalization he was not put on any additional rate control.  He currently denies fevers chills night sweats or productive cough.  He continues on his treatment for the P CP with Bactrim.      Current Outpatient Prescriptions   Medication     albuterol (PROAIR HFA/PROVENTIL HFA/VENTOLIN HFA) 108 (90 Base) MCG/ACT Inhaler     aspirin 81 MG EC tablet     atorvastatin (LIPITOR) 40 MG tablet     ciclopirox (LOPROX) 0.77 % cream     colchicine (COLCRYS) 0.6 MG tablet     colchicine (COLCYRS) 0.6 MG tablet     diltiazem (CARDIZEM CD/CARTIA XT) 360 MG 24 hr CD capsule     fluticasone-vilanterol (BREO ELLIPTA) 100-25 MCG/INH oral inhaler     furosemide (LASIX) 20 MG tablet     inFLIXimab (REMICADE) 100 MG injection     levothyroxine (SYNTHROID/LEVOTHROID) 125 MCG tablet     losartan (COZAAR) 25 MG tablet     melatonin 1 MG TABS tablet     methotrexate 2.5 MG tablet CHEMO     mirtazapine (REMERON) 15 MG tablet     Multiple Vitamins-Minerals (MULTIVITAMIN & MINERAL PO)     order for DME     order for DME     polyethylene glycol (MIRALAX/GLYCOLAX) powder     sildenafil (REVATIO) 20 MG tablet     sulfamethoxazole-trimethoprim (BACTRIM DS/SEPTRA DS) 800-160 MG per tablet     sulfamethoxazole-trimethoprim (BACTRIM/SEPTRA) 400-80 MG per tablet     tiotropium (SPIRIVA HANDIHALER) 18 MCG capsule     Urea 40 % CREA     blood glucose monitoring  (ACCU-CHEK RONNIE PLUS) test strip     blood glucose monitoring (ACCU-CHEK FASTCLIX) lancets     blood glucose monitoring (NO BRAND SPECIFIED) meter device kit     No current facility-administered medications for this visit.      Allergies   Allergen Reactions     Prednisone Other (See Comments)     He reports that he can't sleep for days and can't use small to massive doses of prednisone   Pt. Does not do well with high doses of Prednisone, His MD says that Prednisone is counter indicated. Prednisone failed to treat his sarcoidosis      Nitroglycerin      Patient is on sildenafil for pulmonary hypertension, nitroglycerin contraindicated      Past Medical History:   Diagnosis Date     Atrial flutter (H)      Cataract of both eyes      Chronic infection     Hep C     Congestive heart failure, unspecified      Coronary artery disease      Depressive disorder      Depressive disorder, not elsewhere classified     Depression (non-psychotic)     ERM OS (epiretinal membrane, left eye)      Generalized osteoarthrosis, unspecified site      Glaucoma suspect      Hypertension      Lichen planus      Other psoriasis      Pneumocystis carinii pneumonia 11/23/2018     PVD (posterior vitreous detachment), left eye      Sarcoidosis      Sarcoidosis      Type II or unspecified type diabetes mellitus without mention of complication, not stated as uncontrolled      Unspecified hypothyroidism     Hypothyroidism     Unspecified viral hepatitis C without hepatic coma      Viral warts, unspecified        Past Surgical History:   Procedure Laterality Date     ANESTHESIA CARDIOVERSION N/A 1/19/2017    Procedure: ANESTHESIA CARDIOVERSION;  Surgeon: GENERIC ANESTHESIA PROVIDER;  Location: [facility] OR     ANESTHESIA CARDIOVERSION N/A 1/23/2017    Procedure: ANESTHESIA CARDIOVERSION;  Surgeon: GENERIC ANESTHESIA PROVIDER;  Location: [facility] OR     ANESTHESIA CARDIOVERSION N/A 1/24/2017    Procedure: ANESTHESIA CARDIOVERSION;  Surgeon: GENERIC ANESTHESIA  PROVIDER;  Location: [facility] OR     ANESTHESIA CARDIOVERSION N/A 11/20/2018    Procedure: ANESTHESIA CARDIOVERSION;  Surgeon: GENERIC ANESTHESIA PROVIDER;  Location: [facility]     ARTHROPLASTY HIP  8/24/2011    Procedure:ARTHROPLASTY HIP; Right Total Hip Arthroplasty  Choice anesthesia; Surgeon:LESLI WILKINSON; Location:[facility] OR     BIOPSY       C PELVIS/HIP JOINT SURGERY UNLISTED       cardiac stent      s/p     CARDIAC SURGERY       CATARACT IOL, RT/LT  9/15/2015    LE     COLONOSCOPY       CORONARY ANGIOGRAPHY ADULT ORDER       H ABLATION ATRIAL FLUTTER       HC REMOVAL OF TONSILS,<9 Y/O      Tonsils <10y.o.     HC REPAIR INCISIONAL HERNIA,REDUCIBLE      Hernia Repair, Incisional, Unilateral     HEART CATH, ANGIOPLASTY       JOINT REPLACEMENT      1 month ago--right hip     LIGATN/STRIP LONG & SHORT SAPHEN         Social History     Social History     Marital status:      Spouse name: N/A     Number of children: 2     Years of education: N/A     Occupational History      [employer]     Social History Main Topics     Smoking status: Former Smoker     Packs/day: 1.00     Years: 30.00     Types: Cigarettes     Start date: 12/30/1960     Quit date: 7/22/1994     Smokeless tobacco: Never Used     Alcohol use No     Drug use: No     Sexual activity: Not Currently     Partners: Female     Birth control/ protection: Post-menopausal     Other Topics Concern     Blood Transfusions No     Exercise Yes     Social History Narrative    Dairy/d 2 servings/d    Caffeine little servings/d    Exercise 3 x week    Sunscreen used - Yes    Seatbelts used - Yes    Working smoke/CO detectors in the home - Yes    Guns stored in the home - No    Self Breast Exams - NOT APPLICABLE    Self Testicular Exam - No    Eye Exam up to date - Yes    Dental Exam up to date - Yes    Pap Smear up to date - NOT APPLICABLE    Mammogram up to date - NOT APPLICABLE    PSA up to date - Yes    Dexa Scan up to date - NOT APPLICABLE    Flex Sig /  "Colonoscopy up to date - Yes    Immunizations up to date - Unsure    Abuse: Current or Past(Physical, Sexual or Emotional)- No    Do you feel safe in your environment - Yes       Family History   Problem Relation Age of Onset     Heart Disease Father      irreg heart beat     Circulatory Father      varicose veins     Prostate Cancer Father      Heart Disease Mother      heart attack     Arthritis Mother      Osteoporosis Mother      Thyroid Disease Mother      Hypertension Mother      Eye Disorder Maternal Grandmother      glaucoma     Diabetes Sister      type 2     Kidney Cancer Sister      Diabetes Sister      Glaucoma No family hx of      Macular Degeneration No family hx of      Cancer No family hx of      no skin cancer       ROS Pulmonary    A complete ROS was otherwise negative except as noted in the HPI.  Vitals:    11/30/18 1123   BP: 124/77   Pulse: 124   Resp: 17   SpO2: 96%   Weight: 74.3 kg (163 lb 12.8 oz)   Height: 1.676 m (5' 6\")     Exam:   GENERAL APPEARANCE: Well developed, well nourished, alert, and in no apparent distress.  NECK: supple, no masses, no thyromegaly.  LYMPHATICS: No significant axillary, cervical, or supraclavicular nodes.  RESP: good air flow throughout, - few crackles  CV: Normal S1, S2, regular rhythm, normal rate, no rub, no murmur,  no gallop, no LE edema.   ABDOMEN:  Bowel sounds normal, soft, nontender, no HSM or masses.   MS: extremities normal- no clubbing, no cyanosis.  SKIN: no rash on limited exam  NEURO: Mentation intact, speech normal, normal strength and tone, normal gait and stance  PSYCH: mentation appears normal. and affect normal/bright  Results: I have reviewed all imaging, PFTs and other relavent tests, please see below for details, PFT and imaging results were reviewed with the patient.  PFTs: Severe mixed restriction and obstruction stable from previous    Assessment and plan:    72-year-old male with advanced pulmonary sarcoidosis and multiple cardiac " issues.  I did an EKG which showed recurrence of his atrial flutter with a heart rate of around 109.  I think that the main issue is that he is not having good rate control with exertion and that is why he feels so short of breath.  His lungs appear to be relatively stable as far as I can tell.  I did discuss with the cardiology and they will get him seen in the clinic next week.  Otherwise I am going to have him hold off on his next Remicade infusion until after he is completed the treatment for the PCP pneumonia.  We will follow-up with him in the next week to make sure that his symptoms are improving.      CBC   Recent Labs   Lab Test  11/26/18   0545  11/25/18 0438   RBC  3.16*  3.06*   HGB  9.4*  9.2*   HCT  31.4*  30.0*   PLT  249  223       Basic Metabolic Panel  Recent Labs   Lab Test  11/26/18   0545  11/25/18   0438   05/13/17   0116  05/13/17   0035   NA  136  137   < >   --    --    POTASSIUM  3.9  3.7   < >   --    --    CHLORIDE  106  104   < >   --    --    CO2  22  24   < >   --    --    BUN  42*  47*   < >   --    --    CT   --    --    --   Plasma, Thawed  PlateletPheresis LeukoReduced Irradiated  Plasma, Thawed  Plasma, Thawed  Plasma, Thawed  Apheresis Plasma Thawed  Plasma, Thawed  PlateletPheresis,LeukoRed Irrad (Part 2)  Red Blood Cells Leukocyte Reduced  Red Blood Cells Leukocyte Reduced  Red Blood Cells Leukocyte Reduced  Red Blood Cells Leukocyte Reduced  Red Blood Cells Leukocyte Reduced  Red Blood Cells LeukoReduced (Part 2)   GLC  106*  113*   < >   --    --    RAFFY  8.2*  8.2*   < >   --    --     < > = values in this interval not displayed.       INR  Recent Labs   Lab Test  11/21/18   0703  11/20/18   0616   INR  1.33*  1.20*       PFT  PFT Latest Ref Rng & Units 11/30/2018   FVC L 1.92   FEV1 L 0.89   FVC% % 49   FEV1% % 30           CC:

## 2018-12-03 ENCOUNTER — OFFICE VISIT (OUTPATIENT)
Dept: CARDIOLOGY | Facility: CLINIC | Age: 73
End: 2018-12-03
Attending: PHYSICIAN ASSISTANT
Payer: MEDICARE

## 2018-12-03 VITALS
BODY MASS INDEX: 24.63 KG/M2 | HEART RATE: 89 BPM | WEIGHT: 162.5 LBS | DIASTOLIC BLOOD PRESSURE: 70 MMHG | SYSTOLIC BLOOD PRESSURE: 147 MMHG | HEIGHT: 68 IN | OXYGEN SATURATION: 99 %

## 2018-12-03 DIAGNOSIS — R06.09 DOE (DYSPNEA ON EXERTION): Primary | ICD-10-CM

## 2018-12-03 DIAGNOSIS — I48.0 PAROXYSMAL ATRIAL FIBRILLATION (H): Primary | ICD-10-CM

## 2018-12-03 LAB — INTERPRETATION ECG - MUSE: NORMAL

## 2018-12-03 PROCEDURE — G0463 HOSPITAL OUTPT CLINIC VISIT: HCPCS | Mod: ZF

## 2018-12-03 PROCEDURE — 99213 OFFICE O/P EST LOW 20 MIN: CPT | Mod: ZP | Performed by: PHYSICIAN ASSISTANT

## 2018-12-03 RX ORDER — METOPROLOL TARTRATE 50 MG
50 TABLET ORAL 2 TIMES DAILY
Qty: 60 TABLET | Refills: 1 | Status: SHIPPED | OUTPATIENT
Start: 2018-12-03

## 2018-12-03 ASSESSMENT — PAIN SCALES - GENERAL: PAINLEVEL: NO PAIN (0)

## 2018-12-03 NOTE — LETTER
12/3/2018    RE: Rohan Monroe  [address]  [place] [address]     Dear Colleague,    Thank you for the opportunity to participate in the care of your patient, Rohan Monroe, at the [facility] at [facility]. Please see a copy of my visit note below.    CARDIOLOGY CLINIC  Rohan Monroe is a 72 year old male with long standing lung sarcoidosis on 3L NC and presumed cardiac on infliximab since 2008 and methotrexate, coronary artery disease and atrial flutter who presents to CORE clinic as new enrollment.    He last saw Dr. Paige in September at which time no significant changes were made. He was recently hospitalized from 11/21 to 11/26 with atrial fibrillation with RVR. He had presented to [facility] on 11/18 and had spontaneous conversion but recurrence then on 11/21 prompting transfer. He was treated for PCP pneumonia as well. His Echo did show EF 45-50% but done during rapid rhythm.     His prior cardiac history including worsening heart failure in March 2017 at which time he saw Dr. Paige. He could barely walk 10 steps without dyspnea. Volume overload was noted and treated. Also, PCI to LAD and LCx but complicated by RADHA and RP bleed, right iliac stent and left femoral pseudoaneurysm requiring thrombin. Arrhythmia history if a flutter with RVR in January 2017, underwent DCCV but had early recurrence and underwent ablation. Post procedure he had atrial fibrillation, was loaded with Amiodarone but it was stopped to feeling poor overall and not clear thinking. No recurrence of arrhythmia, anticoagulation stopped per his request after a 1 week Zio without any a fib in March 2018.    Today he presents feeling unwell. He had a rough weekend. Since being home he feels he needs higher oxygen and he had some issues with [employer] over the weekend. He notes when he checks his oximeter his readings are 100%  but his heart rate can be up to 140s. He usually wears 3L NC but has been using 5L. Minimal activity makes him very dyspneic. The episodes seem to come on quickly     He saw Dr. Perlman 3 days ago for follow up and his HR was elevated so they wanted him to be seen this week instead of next week as previously arranged. His weight was remained stable.     PAST MEDICAL HISTORY:  Past Medical History:   Diagnosis Date     Atrial flutter (H)      Cataract of both eyes      Chronic infection     Hep C     Congestive heart failure, unspecified      Coronary artery disease      Depressive disorder      Depressive disorder, not elsewhere classified     Depression (non-psychotic)     ERM OS (epiretinal membrane, left eye)      Generalized osteoarthrosis, unspecified site      Glaucoma suspect      Hypertension      Lichen planus      Other psoriasis      Pneumocystis carinii pneumonia 11/23/2018     PVD (posterior vitreous detachment), left eye      Sarcoidosis      Sarcoidosis      Type II or unspecified type diabetes mellitus without mention of complication, not stated as uncontrolled      Unspecified hypothyroidism     Hypothyroidism     Unspecified viral hepatitis C without hepatic coma      Viral warts, unspecified        FAMILY HISTORY:  Family History   Problem Relation Age of Onset     Heart Disease Father      irreg heart beat     Circulatory Father      varicose veins     Prostate Cancer Father      Heart Disease Mother      heart attack     Arthritis Mother      Osteoporosis Mother      Thyroid Disease Mother      Hypertension Mother      Eye Disorder Maternal Grandmother      glaucoma     Diabetes Sister      type 2     Kidney Cancer Sister      Diabetes Sister      Glaucoma No family hx of      Macular Degeneration No family hx of      Cancer No family hx of      no skin cancer       SOCIAL HISTORY:  Social History     Social History     Marital status:      Spouse name: N/A     Number of children: 2      Years of education: N/A     Occupational History      [employer]     Social History Main Topics     Smoking status: Former Smoker     Packs/day: 1.00     Years: 30.00     Types: Cigarettes     Start date: 12/30/1960     Quit date: 7/22/1994     Smokeless tobacco: Never Used     Alcohol use No     Drug use: No     Sexual activity: Not Currently     Partners: Female     Birth control/ protection: Post-menopausal     Other Topics Concern     Blood Transfusions No     Exercise Yes     Social History Narrative    Dairy/d 2 servings/d    Caffeine little servings/d    Exercise 3 x week    Sunscreen used - Yes    Seatbelts used - Yes    Working smoke/CO detectors in the home - Yes    Guns stored in the home - No    Self Breast Exams - NOT APPLICABLE    Self Testicular Exam - No    Eye Exam up to date - Yes    Dental Exam up to date - Yes    Pap Smear up to date - NOT APPLICABLE    Mammogram up to date - NOT APPLICABLE    PSA up to date - Yes    Dexa Scan up to date - NOT APPLICABLE    Flex Sig / Colonoscopy up to date - Yes    Immunizations up to date - Unsure    Abuse: Current or Past(Physical, Sexual or Emotional)- No    Do you feel safe in your environment - Yes       CURRENT MEDICATIONS:  Current Outpatient Prescriptions   Medication Sig Dispense Refill     albuterol (PROAIR HFA/PROVENTIL HFA/VENTOLIN HFA) 108 (90 Base) MCG/ACT Inhaler Inhale 2 puffs into the lungs every 6 hours as needed for shortness of breath / dyspnea 3 Inhaler 6     aspirin 81 MG EC tablet Take 81 mg by mouth daily       atorvastatin (LIPITOR) 40 MG tablet TAKE ONE TABLET BY MOUTH ONE TIME DAILY  (Patient taking differently: TAKE ONE TABLET BY MOUTH ONE TIME EVERY EVENING.) 90 tablet 3     blood glucose monitoring (ACCU-CHEK RONNIE PLUS) test strip Use to test blood sugar 2 times daily or as directed.  3 month supply. (Patient not taking: Reported on 9/13/2018) 200 each 3     blood glucose monitoring (ACCU-CHEK FASTCLIX) lancets Use to test  blood sugar 2 times daily or as directed.  102 lancets per box.  3 month supply. (Patient not taking: Reported on 9/13/2018) 2 Box 3     blood glucose monitoring (NO BRAND SPECIFIED) meter device kit Use to test blood sugar 2 times daily. (Patient not taking: Reported on 9/13/2018) 1 kit 0     ciclopirox (LOPROX) 0.77 % cream Apply topically 2 times daily as needed       colchicine (COLCRYS) 0.6 MG tablet 2 tabs at the onset of flare, then 1 tab every 2 hrs until pain is better or diarrhea occurs, up to 10 doses. Wait 3 days until taking again 30 tablet 0     colchicine (COLCYRS) 0.6 MG tablet Take 1 tablet (0.6 mg) by mouth 2 times daily 4 tablet 0     diltiazem (CARDIZEM CD/CARTIA XT) 360 MG 24 hr CD capsule Take 1 capsule (360 mg) by mouth daily 90 capsule 3     fluticasone-vilanterol (BREO ELLIPTA) 100-25 MCG/INH oral inhaler Inhale 1 puff into the lungs daily 3 Inhaler 3     furosemide (LASIX) 20 MG tablet Take 1 tablet (20 mg) by mouth 2 times daily May take extra 20 mg as needed for wt .gain >3# 240 tablet 11     inFLIXimab (REMICADE) 100 MG injection Inject 100 mg into the vein every 28 days        levothyroxine (SYNTHROID/LEVOTHROID) 125 MCG tablet Take 1 tablet (125 mcg) by mouth daily 90 tablet 3     losartan (COZAAR) 25 MG tablet Take 1 tablet (25 mg) by mouth daily 60 tablet 3     melatonin 1 MG TABS tablet Take 1 tablet (1 mg) by mouth nightly as needed for sleep 30 tablet 0     methotrexate 2.5 MG tablet CHEMO Take 3 tablets (7.5 mg) by mouth once a week On Mondays 48 tablet 3     mirtazapine (REMERON) 15 MG tablet Take 15 mg by mouth At Bedtime.       Multiple Vitamins-Minerals (MULTIVITAMIN & MINERAL PO) Take 1 tablet by mouth daily.       order for DME Please provide patient with updated oxygen equipment.  Patient now requires 4 LPM via nasal canula with activity. 1 Device 0     order for DME Updated Oxygen: Patient requires supplemental Oxygen 3 LPM via nasal canula with activity and 2 LPM  "nocturnally. Please provide patient with a portable oxygen concentrator for improved mobility.  Okay to spot check or titrated for conserving to keep stats above 90%. Oxygen will be for a lifetime. 1 Device 0     order for DME She requested for a 4 wheel walker, would like to change it to 4 wheel walker with a seat and oxygen tank durant.     Need this faxed over to her   [phone] 1 Device 1     polyethylene glycol (MIRALAX/GLYCOLAX) powder Take 17 g by mouth daily as needed for constipation       sildenafil (REVATIO) 20 MG tablet TAKE ONE TABLET BY MOUTH THREE TIMES DAILY  270 tablet 3     sulfamethoxazole-trimethoprim (BACTRIM DS/SEPTRA DS) 800-160 MG per tablet Take 1 tablet by mouth 2 times daily for 19 days 38 tablet 0     sulfamethoxazole-trimethoprim (BACTRIM/SEPTRA) 400-80 MG per tablet Take 1 tablet by mouth every morning (before breakfast) 60 tablet 3     tiotropium (SPIRIVA HANDIHALER) 18 MCG capsule Inhale contents of one capsule daily. 90 capsule 3     Urea 40 % CREA Externally apply topically daily as needed for dry skin         ROS:    EXAM:  /70 (BP Location: Left arm, Patient Position: Chair, Cuff Size: Adult Regular)  Pulse 89  Ht 1.727 m (5' 8\")  Wt 73.7 kg (162 lb 8 oz)  SpO2 99%  BMI 24.71 kg/m2  Gen: seated in wheelchair, in mild resp distress  HEENT: NC/AT, sclera anicteric, nasal cannula present  Card: irregular, no murmur appreciated  Resp: mild resp distress, coarse BS    Labs:  CBC RESULTS:  Lab Results   Component Value Date    WBC 8.0 11/30/2018    RBC 3.57 (L) 11/30/2018    HGB 10.5 (L) 11/30/2018    HCT 33.9 (L) 11/30/2018    MCV 95 11/30/2018    MCH 29.4 11/30/2018    MCHC 31.0 (L) 11/30/2018    RDW 14.4 11/30/2018     11/30/2018     CMP RESULTS:  Lab Results   Component Value Date     11/30/2018    POTASSIUM 4.4 11/30/2018    CHLORIDE 107 11/30/2018    CO2 21 11/30/2018    ANIONGAP 9 11/30/2018    GLC 88 11/30/2018    BUN 40 (H) 11/30/2018    CR 2.57 (H) " 2018    GFRESTIMATED 25 (L) 2018    GFRESTBLACK 30 (L) 2018    RAFFY 8.4 (L) 2018    BILITOTAL 0.3 2018    ALBUMIN 3.2 (L) 2018    ALKPHOS 114 2018    ALT 40 2018    AST 42 2018      Lab Results   Component Value Date    NTBNP 6481 (H) 2018     Most recent echocardiogram:  Recent Results (from the past 4320 hour(s))   ECHO COMPLETE WITH OPTISON    Narrative    [id number]  [facility]  [id number]  [id number]^RUTH^JOSETTE     [facility]  Echocardiography Laboratory  [facility] [place] [address]     Name: LOU RAO  MRN: [id number]  : 1945  Study Date: 2018 09:13 AM  Age: 72 yrs  Gender: Male  Patient Location: [facility]  Reason For Study: ACEVES  Ordering Physician: JOSETTE MIRANDA  Referring Physician: KOTA HUNTER MD  Performed By: Brenna Tejeda RDCS     BSA: 1.8 m2  Height: 66 in  Weight: 165 lb  HR: 108  BP: 100/76 mmHg  __________________________________________________________________________  Procedure  Complete Portable Echo Adult. Contrast Optison.  __________________________________________________________________________  Interpretation Summary     Left ventricular systolic function is mildly reduced.  The visual ejection fraction is estimated at 45-50%.  With rapid atrial fibrillation, the visual approximation of left ventricular  systolic function may be falsely decreased.  Mildly decreased right ventricular systolic function  Right ventricular systolic pressure is elevated, consistent with moderate  pulmonary hypertension.  EF lower compared to 3/18, in setting of rapid atrial fibrillation. The study  was technically difficult.  __________________________________________________________________________  Left Ventricle  The left ventricle is normal in size. There is concentric remodeling present.  Diastolic function not assessed due to atrial fibrillation. Left ventricular  systolic function is  mildly reduced. The visual ejection fraction is estimated  at 45-50%. With rapid atrial fibrillation, the visual approximation of left  ventricular systolic function may be falsely decreased. There is mild global  hypokinesia of the left ventricle.     Right Ventricle  Borderline right ventricular enlargement. Mildly decreased right ventricular  systolic function.     Atria  There is mild biatrial enlargement.     Mitral Valve  There is mild mitral annular calcification. The mitral valve leaflets are  mildly thickened. There is trace to mild mitral regurgitation.        Tricuspid Valve  There is moderate (2+) tricuspid regurgitation. The right ventricular systolic  pressure is approximated at 48.7 mmHg plus the right atrial pressure. Normal  IVC (1.5-2.5cm) with >50% respiratory collapse; right atrial pressure is  estimated at 5-10mmHg. Right ventricular systolic pressure is elevated,  consistent with moderate pulmonary hypertension.     Aortic Valve  There is mild trileaflet aortic sclerosis. No aortic regurgitation is present.  No aortic stenosis is present.     Pulmonic Valve  The pulmonic valve is not well visualized.     Vessels  The aortic root is normal size.     Pericardium  There is no pericardial effusion.      Rhythm  The rhythm was rapid atrial fibrillation.  __________________________________________________________________________  MMode/2D Measurements & Calculations  IVSd: 1.1 cm     LVIDd: 4.2 cm  LVIDs: 3.8 cm  LVPWd: 1.3 cm  LVPWs: 1.6 cm  FS: 8.7 %  LV mass(C)d: 183.7 grams  LV mass(C)dI: 99.7 grams/m2  Ao root diam: 3.2 cm  LA dimension: 4.0 cm  asc Aorta Diam: 3.3 cm  LA/Ao: 1.2  LA Volume (BP): 65.4 ml  LA Volume Index (BP): 35.5 ml/m2  RWT: 0.64      Doppler Measurements & Calculations  MV E max jhonatan: 93.0 cm/sec  MV dec time: 0.18 sec  TR max jhonatan: 347.8 cm/sec  TR max P.7 mmHg  E/E' avg: 10.2  Lateral E/e': 9.3  Medial E/e':  11.1  __________________________________________________________________________     Report approved by: Sherrill Smith 11/19/2018 11:15 AM      Assessment and Plan:    Rohan Monroe is a 72 year old male with sarcoidosis, diastolic heart failure, atrial arrhythmias and coronary artery disease who presents to clinic for hospital follow up. He has been most recently followed by Dr. Paige for, but has previously seen Dr. Vazquez for his arrhythmia. We discussed today that likely his arrhythmia is causing his symptoms. He does not appear at all volume overloaded. Will try to add some BB in addition to his CCB to improve rate control. We will call him later this week to see if he's improving.    1. Chronic diastolic heart failure, Stage C , NYHA class IV, confounded by concomitant lung disease  Fluid status euvolemic, continue Lasix 20mg twice daily  Aldosterone antagonist: no, given Creat >2  BP: controlled  Sleep Apnea Evaluation:ACEi/ARB - N/A no evidence to support use HFpEF  BB - N/A no evidence to support use in HFpEF  NSAID use: Contraindicated, reviewed with patient     2. Atrial arrhythmia  Add metoprolol 50mg BID  Follow up Dr. Vazquez in just over a week as already arranged    Reyna Rob PA-C  [facility] Heart [facility] [place]

## 2018-12-03 NOTE — PATIENT INSTRUCTIONS
"You were seen today in the Cardiovascular Clinic at the [facility].     Cardiology Providers you saw during your visit: DIONICIO Thakkar     Follow up and medication changes:  1. Start metoprolol 50mg twice a day  2. We will do a phone call later this week to check in  3. Follow up next week as already arranged.         Please limit your fluid intake to 2 L (68 ounces) daily.  2 Liters a day = 8.5 cups, or 68 ounces.  Please limit your salt intake to 2 grams a day or less.     If you gain 2# in 24 hours or 5# in one week call Nasrin Mendenhall RN so we can adjust your medications as needed over the phone.     Please feel free to call me with any questions or concerns.       Nasrin Mendenhall RN  [facility]  Cardiology Care Coordinator-Heart Failure Clinic     Questions and [phone].   First press #1 for the University and then press #3 for \"Medical Questions\" to reach us Cardiology Nurses.      On Call Cardiologist for after hours or on weekends: [phone]   option #4 and ask to speak to the on-call Cardiologist. Inform them you are a CORE/heart failure patient at the [facility].        If you need a medication refill please contact your pharmacy.  Please allow 3 business days for your refill to be completed.  _______________________________________________________  C.O.R.E. CLINIC Cardiomyopathy, Optimization, Rehabilitation, Education   The C.O.R.E. CLINIC is a heart failure specialty clinic within the [facility] where you will work with specialized nurse practitioners dedicated to helping patients with heart failure carefully adjust medications, receive education, and learn who and when to call if symptoms develop. They specialize in helping you better understand your condition, slow the progression of your disease, improve the length and quality of your life, help you detect future heart problems before they become life " threatening, and avoid hospitalizations.  As always, thank you for trusting us with your health care needs!

## 2018-12-03 NOTE — NURSING NOTE
Chief Complaint   Patient presents with     New Patient     71 yo male, HFpEF, EF 45-50% presents for hospital follow up and to establish care with labs prior.      Vitals were taken and medications were reconciled.     Paradise Velazquez RMA  1:35 PM

## 2018-12-03 NOTE — PROGRESS NOTES
CARDIOLOGY CLINIC  Rohan Monroe is a 72 year old male with long standing lung sarcoidosis on 3L NC and presumed cardiac on infliximab since 2008 and methotrexate, coronary artery disease and atrial flutter who presents to [facility] as new enrollment.    He last saw Dr. Paige in September at which time no significant changes were made. He was recently hospitalized from 11/21 to 11/26 with atrial fibrillation with RVR. He had presented to [facility] on 11/18 and had spontaneous conversion but recurrence then on 11/21 prompting transfer. He was treated for PCP pneumonia as well. His Echo did show EF 45-50% but done during rapid rhythm.     His prior cardiac history including worsening heart failure in March 2017 at which time he saw Dr. Paige. He could barely walk 10 steps without dyspnea. Volume overload was noted and treated. Also, PCI to LAD and LCx but complicated by RADHA and RP bleed, right iliac stent and left femoral pseudoaneurysm requiring thrombin. Arrhythmia history if a flutter with RVR in January 2017, underwent DCCV but had early recurrence and underwent ablation. Post procedure he had atrial fibrillation, was loaded with Amiodarone but it was stopped to feeling poor overall and not clear thinking. No recurrence of arrhythmia, anticoagulation stopped per his request after a 1 week Zio without any a fib in March 2018.    Today he presents feeling unwell. He had a rough weekend. Since being home he feels he needs higher oxygen and he had some issues with [facility] over the weekend. He notes when he checks his oximeter his readings are 100% but his heart rate can be up to 140s. He usually wears 3L NC but has been using 5L. Minimal activity makes him very dyspneic. The episodes seem to come on quickly     He saw Dr. Perlman 3 days ago for follow up and his HR was elevated so they wanted him to be seen this week instead of next week as previously arranged. His weight was remained  stable.     PAST MEDICAL HISTORY:  Past Medical History:   Diagnosis Date     Atrial flutter (H)      Cataract of both eyes      Chronic infection     Hep C     Congestive heart failure, unspecified      Coronary artery disease      Depressive disorder      Depressive disorder, not elsewhere classified     Depression (non-psychotic)     ERM OS (epiretinal membrane, left eye)      Generalized osteoarthrosis, unspecified site      Glaucoma suspect      Hypertension      Lichen planus      Other psoriasis      Pneumocystis carinii pneumonia 11/23/2018     PVD (posterior vitreous detachment), left eye      Sarcoidosis      Sarcoidosis      Type II or unspecified type diabetes mellitus without mention of complication, not stated as uncontrolled      Unspecified hypothyroidism     Hypothyroidism     Unspecified viral hepatitis C without hepatic coma      Viral warts, unspecified        FAMILY HISTORY:  Family History   Problem Relation Age of Onset     Heart Disease Father      irreg heart beat     Circulatory Father      varicose veins     Prostate Cancer Father      Heart Disease Mother      heart attack     Arthritis Mother      Osteoporosis Mother      Thyroid Disease Mother      Hypertension Mother      Eye Disorder Maternal Grandmother      glaucoma     Diabetes Sister      type 2     Kidney Cancer Sister      Diabetes Sister      Glaucoma No family hx of      Macular Degeneration No family hx of      Cancer No family hx of      no skin cancer       SOCIAL HISTORY:  Social History     Social History     Marital status:      Spouse name: N/A     Number of children: 2     Years of education: N/A     Occupational History      [employer]     Social History Main Topics     Smoking status: Former Smoker     Packs/day: 1.00     Years: 30.00     Types: Cigarettes     Start date: 12/30/1960     Quit date: 7/22/1994     Smokeless tobacco: Never Used     Alcohol use No     Drug use: No     Sexual activity: Not  Currently     Partners: Female     Birth control/ protection: Post-menopausal     Other Topics Concern     Blood Transfusions No     Exercise Yes     Social History Narrative    Dairy/d 2 servings/d    Caffeine little servings/d    Exercise 3 x week    Sunscreen used - Yes    Seatbelts used - Yes    Working smoke/CO detectors in the home - Yes    Guns stored in the home - No    Self Breast Exams - NOT APPLICABLE    Self Testicular Exam - No    Eye Exam up to date - Yes    Dental Exam up to date - Yes    Pap Smear up to date - NOT APPLICABLE    Mammogram up to date - NOT APPLICABLE    PSA up to date - Yes    Dexa Scan up to date - NOT APPLICABLE    Flex Sig / Colonoscopy up to date - Yes    Immunizations up to date - Unsure    Abuse: Current or Past(Physical, Sexual or Emotional)- No    Do you feel safe in your environment - Yes       CURRENT MEDICATIONS:  Current Outpatient Prescriptions   Medication Sig Dispense Refill     albuterol (PROAIR HFA/PROVENTIL HFA/VENTOLIN HFA) 108 (90 Base) MCG/ACT Inhaler Inhale 2 puffs into the lungs every 6 hours as needed for shortness of breath / dyspnea 3 Inhaler 6     aspirin 81 MG EC tablet Take 81 mg by mouth daily       atorvastatin (LIPITOR) 40 MG tablet TAKE ONE TABLET BY MOUTH ONE TIME DAILY  (Patient taking differently: TAKE ONE TABLET BY MOUTH ONE TIME EVERY EVENING.) 90 tablet 3     blood glucose monitoring (ACCU-CHEK RONNIE PLUS) test strip Use to test blood sugar 2 times daily or as directed.  3 month supply. (Patient not taking: Reported on 9/13/2018) 200 each 3     blood glucose monitoring (ACCU-CHEK FASTCLIX) lancets Use to test blood sugar 2 times daily or as directed.  102 lancets per box.  3 month supply. (Patient not taking: Reported on 9/13/2018) 2 Box 3     blood glucose monitoring (NO BRAND SPECIFIED) meter device kit Use to test blood sugar 2 times daily. (Patient not taking: Reported on 9/13/2018) 1 kit 0     ciclopirox (LOPROX) 0.77 % cream Apply topically  2 times daily as needed       colchicine (COLCRYS) 0.6 MG tablet 2 tabs at the onset of flare, then 1 tab every 2 hrs until pain is better or diarrhea occurs, up to 10 doses. Wait 3 days until taking again 30 tablet 0     colchicine (COLCYRS) 0.6 MG tablet Take 1 tablet (0.6 mg) by mouth 2 times daily 4 tablet 0     diltiazem (CARDIZEM CD/CARTIA XT) 360 MG 24 hr CD capsule Take 1 capsule (360 mg) by mouth daily 90 capsule 3     fluticasone-vilanterol (BREO ELLIPTA) 100-25 MCG/INH oral inhaler Inhale 1 puff into the lungs daily 3 Inhaler 3     furosemide (LASIX) 20 MG tablet Take 1 tablet (20 mg) by mouth 2 times daily May take extra 20 mg as needed for wt .gain >3# 240 tablet 11     inFLIXimab (REMICADE) 100 MG injection Inject 100 mg into the vein every 28 days        levothyroxine (SYNTHROID/LEVOTHROID) 125 MCG tablet Take 1 tablet (125 mcg) by mouth daily 90 tablet 3     losartan (COZAAR) 25 MG tablet Take 1 tablet (25 mg) by mouth daily 60 tablet 3     melatonin 1 MG TABS tablet Take 1 tablet (1 mg) by mouth nightly as needed for sleep 30 tablet 0     methotrexate 2.5 MG tablet CHEMO Take 3 tablets (7.5 mg) by mouth once a week On Mondays 48 tablet 3     mirtazapine (REMERON) 15 MG tablet Take 15 mg by mouth At Bedtime.       Multiple Vitamins-Minerals (MULTIVITAMIN & MINERAL PO) Take 1 tablet by mouth daily.       order for DME Please provide patient with updated oxygen equipment.  Patient now requires 4 LPM via nasal canula with activity. 1 Device 0     order for DME Updated Oxygen: Patient requires supplemental Oxygen 3 LPM via nasal canula with activity and 2 LPM nocturnally. Please provide patient with a portable oxygen concentrator for improved mobility.  Okay to spot check or titrated for conserving to keep stats above 90%. Oxygen will be for a lifetime. 1 Device 0     order for DME She requested for a 4 wheel walker, would like to change it to 4 wheel walker with a seat and oxygen tank durant.     Need  "this faxed over to her   [phone] 1 Device 1     polyethylene glycol (MIRALAX/GLYCOLAX) powder Take 17 g by mouth daily as needed for constipation       sildenafil (REVATIO) 20 MG tablet TAKE ONE TABLET BY MOUTH THREE TIMES DAILY  270 tablet 3     sulfamethoxazole-trimethoprim (BACTRIM DS/SEPTRA DS) 800-160 MG per tablet Take 1 tablet by mouth 2 times daily for 19 days 38 tablet 0     sulfamethoxazole-trimethoprim (BACTRIM/SEPTRA) 400-80 MG per tablet Take 1 tablet by mouth every morning (before breakfast) 60 tablet 3     tiotropium (SPIRIVA HANDIHALER) 18 MCG capsule Inhale contents of one capsule daily. 90 capsule 3     Urea 40 % CREA Externally apply topically daily as needed for dry skin         ROS:    EXAM:  /70 (BP Location: Left arm, Patient Position: Chair, Cuff Size: Adult Regular)  Pulse 89  Ht 1.727 m (5' 8\")  Wt 73.7 kg (162 lb 8 oz)  SpO2 99%  BMI 24.71 kg/m2  Gen: seated in wheelchair, in mild resp distress  HEENT: NC/AT, sclera anicteric, nasal cannula present  Card: irregular, no murmur appreciated  Resp: mild resp distress, coarse BS    Labs:  CBC RESULTS:  Lab Results   Component Value Date    WBC 8.0 11/30/2018    RBC 3.57 (L) 11/30/2018    HGB 10.5 (L) 11/30/2018    HCT 33.9 (L) 11/30/2018    MCV 95 11/30/2018    MCH 29.4 11/30/2018    MCHC 31.0 (L) 11/30/2018    RDW 14.4 11/30/2018     11/30/2018     CMP RESULTS:  Lab Results   Component Value Date     11/30/2018    POTASSIUM 4.4 11/30/2018    CHLORIDE 107 11/30/2018    CO2 21 11/30/2018    ANIONGAP 9 11/30/2018    GLC 88 11/30/2018    BUN 40 (H) 11/30/2018    CR 2.57 (H) 11/30/2018    GFRESTIMATED 25 (L) 11/30/2018    GFRESTBLACK 30 (L) 11/30/2018    RAFFY 8.4 (L) 11/30/2018    BILITOTAL 0.3 11/30/2018    ALBUMIN 3.2 (L) 11/30/2018    ALKPHOS 114 11/30/2018    ALT 40 11/30/2018    AST 42 11/30/2018      Lab Results   Component Value Date    NTBNP 6481 (H) 11/30/2018     Most recent echocardiogram:  Recent Results " (from the past 4320 hour(s))   ECHO COMPLETE WITH OPTISON    Narrative    [id number]  [id number]  [id number]  [id number]^RUTH^JOSETTE           [facility]  Echocardiography Laboratory  [address] [place] [address]        Name: LOU RAO  MRN: [id number]  : 1945  Study Date: 2018 09:13 AM  Age: 72 yrs  Gender: Male  Patient Location: [facility]  Reason For Study: ACEVES  Ordering Physician: JOSETTE MIRANDA  Referring Physician: KOTA HUNTER MD  Performed By: Brenna Tejeda RDCS     BSA: 1.8 m2  Height: 66 in  Weight: 165 lb  HR: 108  BP: 100/76 mmHg  _____________________________________________________________________________  __        Procedure  Complete Portable Echo Adult. Contrast Optison.  _____________________________________________________________________________  __        Interpretation Summary     Left ventricular systolic function is mildly reduced.  The visual ejection fraction is estimated at 45-50%.  With rapid atrial fibrillation, the visual approximation of left ventricular  systolic function may be falsely decreased.  Mildly decreased right ventricular systolic function  Right ventricular systolic pressure is elevated, consistent with moderate  pulmonary hypertension.  EF lower compared to 3/18, in setting of rapid atrial fibrillation. The study  was technically difficult.  _____________________________________________________________________________  __        Left Ventricle  The left ventricle is normal in size. There is concentric remodeling present.  Diastolic function not assessed due to atrial fibrillation. Left ventricular  systolic function is mildly reduced. The visual ejection fraction is estimated  at 45-50%. With rapid atrial fibrillation, the visual approximation of left  ventricular systolic function may be falsely decreased. There is mild global  hypokinesia of the left ventricle.     Right Ventricle  Borderline right ventricular  enlargement. Mildly decreased right ventricular  systolic function.     Atria  There is mild biatrial enlargement.     Mitral Valve  There is mild mitral annular calcification. The mitral valve leaflets are  mildly thickened. There is trace to mild mitral regurgitation.        Tricuspid Valve  There is moderate (2+) tricuspid regurgitation. The right ventricular systolic  pressure is approximated at 48.7 mmHg plus the right atrial pressure. Normal  IVC (1.5-2.5cm) with >50% respiratory collapse; right atrial pressure is  estimated at 5-10mmHg. Right ventricular systolic pressure is elevated,  consistent with moderate pulmonary hypertension.     Aortic Valve  There is mild trileaflet aortic sclerosis. No aortic regurgitation is present.  No aortic stenosis is present.     Pulmonic Valve  The pulmonic valve is not well visualized.     Vessels  The aortic root is normal size.     Pericardium  There is no pericardial effusion.        Rhythm  The rhythm was rapid atrial fibrillation.  _____________________________________________________________________________  __  MMode/2D Measurements & Calculations  IVSd: 1.1 cm     LVIDd: 4.2 cm  LVIDs: 3.8 cm  LVPWd: 1.3 cm  LVPWs: 1.6 cm  FS: 8.7 %  LV mass(C)d: 183.7 grams  LV mass(C)dI: 99.7 grams/m2  Ao root diam: 3.2 cm  LA dimension: 4.0 cm  asc Aorta Diam: 3.3 cm  LA/Ao: 1.2  LA Volume (BP): 65.4 ml  LA Volume Index (BP): 35.5 ml/m2  RWT: 0.64           Doppler Measurements & Calculations  MV E max jhonatan: 93.0 cm/sec  MV dec time: 0.18 sec  TR max jhonatan: 347.8 cm/sec  TR max P.7 mmHg  E/E' avg: 10.2  Lateral E/e': 9.3  Medial E/e': 11.1           _____________________________________________________________________________  __           Report approved by: Sherrill Smith 2018 11:15 AM          Assessment and Plan:    Rohan Monroe is a 72 year old male with sarcoidosis, diastolic heart failure, atrial arrhythmias and coronary artery disease who presents to  clinic for hospital follow up. He has been most recently followed by Dr. Paige for, but has previously seen Dr. Vazquez for his arrhythmia. We discussed today that likely his arrhythmia is causing his symptoms. He does not appear at all volume overloaded. Will try to add some BB in addition to his CCB to improve rate control. We will call him later this week to see if he's improving.    1. Chronic diastolic heart failure, Stage C , NYHA class IV, confounded by concomitant lung disease  Fluid status euvolemic, continue Lasix 20mg twice daily  Aldosterone antagonist: no, given Creat >2  BP: controlled  Sleep Apnea Evaluation:ACEi/ARB - N/A no evidence to support use HFpEF  BB - N/A no evidence to support use in HFpEF  NSAID use: Contraindicated, reviewed with patient     2. Atrial arrhythmia  Add metoprolol 50mg BID  Follow up Dr. Vazquez in just over a week as already arranged    Reyna Rob PA-C  [facility]

## 2018-12-04 LAB
ANION GAP SERPL CALCULATED.3IONS-SCNC: 10 MMOL/L (ref 3–14)
BUN SERPL-MCNC: 36 MG/DL (ref 7–30)
CALCIUM SERPL-MCNC: 8.7 MG/DL (ref 8.5–10.1)
CHLORIDE SERPL-SCNC: 106 MMOL/L (ref 94–109)
CO2 SERPL-SCNC: 20 MMOL/L (ref 20–32)
CREAT SERPL-MCNC: 2.48 MG/DL (ref 0.66–1.25)
GFR SERPL CREATININE-BSD FRML MDRD: 26 ML/MIN/1.7M2
GLUCOSE SERPL-MCNC: 139 MG/DL (ref 70–99)
POTASSIUM SERPL-SCNC: 4.1 MMOL/L (ref 3.4–5.3)
SODIUM SERPL-SCNC: 136 MMOL/L (ref 133–144)

## 2018-12-04 PROCEDURE — 80048 BASIC METABOLIC PNL TOTAL CA: CPT | Performed by: INTERNAL MEDICINE

## 2018-12-05 ENCOUNTER — OFFICE VISIT (OUTPATIENT)
Dept: PHARMACY | Facility: CLINIC | Age: 73
End: 2018-12-05
Attending: INTERNAL MEDICINE
Payer: COMMERCIAL

## 2018-12-05 ENCOUNTER — OFFICE VISIT (OUTPATIENT)
Dept: INTERNAL MEDICINE | Facility: CLINIC | Age: 73
End: 2018-12-05
Payer: MEDICARE

## 2018-12-05 VITALS
SYSTOLIC BLOOD PRESSURE: 110 MMHG | DIASTOLIC BLOOD PRESSURE: 59 MMHG | HEART RATE: 64 BPM | RESPIRATION RATE: 18 BRPM | OXYGEN SATURATION: 98 %

## 2018-12-05 DIAGNOSIS — I27.20 PULMONARY HYPERTENSION (H): ICD-10-CM

## 2018-12-05 DIAGNOSIS — E03.9 HYPOTHYROIDISM, UNSPECIFIED TYPE: ICD-10-CM

## 2018-12-05 DIAGNOSIS — K59.00 CONSTIPATION, UNSPECIFIED CONSTIPATION TYPE: Primary | ICD-10-CM

## 2018-12-05 DIAGNOSIS — D86.0 SARCOIDOSIS OF LUNG (H): ICD-10-CM

## 2018-12-05 DIAGNOSIS — I48.92 ATRIAL FLUTTER WITH RAPID VENTRICULAR RESPONSE (H): ICD-10-CM

## 2018-12-05 DIAGNOSIS — G47.00 INSOMNIA, UNSPECIFIED TYPE: ICD-10-CM

## 2018-12-05 DIAGNOSIS — I25.10 CORONARY ARTERY DISEASE WITHOUT ANGINA PECTORIS, UNSPECIFIED VESSEL OR LESION TYPE, UNSPECIFIED WHETHER NATIVE OR TRANSPLANTED HEART: ICD-10-CM

## 2018-12-05 DIAGNOSIS — D64.9 ANEMIA, UNSPECIFIED TYPE: Primary | ICD-10-CM

## 2018-12-05 DIAGNOSIS — J44.9 CHRONIC OBSTRUCTIVE PULMONARY DISEASE, UNSPECIFIED COPD TYPE (H): ICD-10-CM

## 2018-12-05 DIAGNOSIS — I50.9 HEART FAILURE, UNSPECIFIED HF CHRONICITY, UNSPECIFIED HEART FAILURE TYPE (H): ICD-10-CM

## 2018-12-05 DIAGNOSIS — M10.9 GOUT, UNSPECIFIED CAUSE, UNSPECIFIED CHRONICITY, UNSPECIFIED SITE: ICD-10-CM

## 2018-12-05 DIAGNOSIS — E11.9 TYPE 2 DIABETES MELLITUS WITHOUT COMPLICATION, WITHOUT LONG-TERM CURRENT USE OF INSULIN (H): ICD-10-CM

## 2018-12-05 PROCEDURE — 99605 MTMS BY PHARM NP 15 MIN: CPT | Performed by: PHARMACIST

## 2018-12-05 PROCEDURE — 99607 MTMS BY PHARM ADDL 15 MIN: CPT | Performed by: PHARMACIST

## 2018-12-05 RX ORDER — TIOTROPIUM BROMIDE 18 UG/1
CAPSULE ORAL; RESPIRATORY (INHALATION)
Qty: 90 CAPSULE | Refills: 3 | Status: SHIPPED | OUTPATIENT
Start: 2018-12-05 | End: 2018-12-21

## 2018-12-05 RX ORDER — POLYETHYLENE GLYCOL 3350 17 G/17G
17 POWDER, FOR SOLUTION ORAL DAILY PRN
Qty: 138 G | Refills: 11 | Status: SHIPPED | OUTPATIENT
Start: 2018-12-05

## 2018-12-05 ASSESSMENT — PAIN SCALES - GENERAL: PAINLEVEL: NO PAIN (0)

## 2018-12-05 NOTE — PROGRESS NOTES
HPI:    Pt. Comes in for follow up today. He was discharged from the hospital 11/26/2018 for afib/aflutter, PCP pneumonia, pulmonary HTN. He was seen in [facility] clinic 12/3/2018. He was seen by Dr.Perlman, Pulmonary 11/30/2018; he has h/o pulmonary sarcoid and his Remicade infusion on hold during treatment PCP pneumonia. No other new HEENT, cardiopulmonary, abdominal, , neurological, systemic complaints.         SocHx:   History   Smoking Status     Former Smoker     Packs/day: 1.00     Years: 30.00     Types: Cigarettes     Start date: 12/30/1960     Quit date: 7/22/1994   Smokeless Tobacco     Never Used        Patient Active Problem List   Diagnosis     Hypothyroidism     SARCOIDOSIS-systemic     Generalized osteoarthrosis, unspecified site     Viral warts     Other psoriasis     Hypertrophy of prostate without urinary obstruction     Diabetes mellitus, type 2 (H)     CAD (coronary artery disease)     Type 2 diabetes, HbA1C goal < 8% (H)     CARDIOVASCULAR SCREENING; LDL GOAL LESS THAN 100     Atrial flutter with rapid ventricular response (H)     CKD (chronic kidney disease) stage 3, GFR 30-59 ml/min (H)     Hip joint pain     Hyperlipidemia LDL goal <70     Acute exacerbation of chronic obstructive pulmonary disease (COPD) (H)     Cellulitis     Immunosuppression (H)     Hyponatremia     RADHA (acute kidney injury) (HCC)     COPD (chronic obstructive pulmonary disease) (H)     Cirrhosis of liver (H)     Pneumonia     Proteinuria     Microscopic hematuria     Sarcoidosis of lung (HCC)     Hyperlipidemia     Atrial flutter (H)     Long-term (current) use of anticoagulants [Z79.01]     Hypoxia     CAD S/P percutaneous coronary angioplasty     Chest pain     Dermatophytosis of nail     Tyloma     ACEVES (dyspnea on exertion)     Cardiac abnormality     Pneumocystis carinii pneumonia             PE:    Vitals noted gen nad cooperative alert, HEENT, wearing NC O2, oropharynx clear no exudate, neck supple nl  rom, LCTA with good air movement, RRR, S1, S2, abdomen, nt, nd, grossly normal neurological exam.        ASSESSMENT/PLAN:      1. He will hold monthly  Remicade  for ILD/pulmoanry sarcoid during PCP treatment.  Seen by Dr. Perlman, Pulmonary 11/30/2018; He was discharged from the  Hospital 11/26/2018 for PCP pneumonia.  2. CKD; seen by Dr. Michel 9/2017; Cr baseline around 2.5  3. TSH checked 11/19/2018 at 1.44  4.. He continues in cardiology seen in [facility] 12/3/2018; he has follow up with Dr. Vazquez 12/12/2018. He had h/o afib/aflutter. He is not on anticoagulation. He had dark stools and positive FIT test 11/21/2018. [facility] 11/19/2018 with pulmonary HTN.   5. See by Dr. Orosco , GI for cirrhosis from Hep C 2/26/2018  6. Seen by Dr. May Interventional Radiology,  2/14/2018 for follow up R EIA stent and has follow up 12/29/2018  7. Colonoscopy 10/8/2018; will recheck CBC and iron studies for anemia and positive FIT test 11/21/2018    Total time spent 25 minutes.  More than 50% of the time spent with Mr. Monroe on counseling / coordinating his care

## 2018-12-05 NOTE — PATIENT INSTRUCTIONS
[facility] Phone Number [phone]  [facility] Medication Refill Request Information:  * Please contact your pharmacy regarding ANY request for medication refills.  ** [facility] Prescription Fax = [phone]  * Please allow 3 business days for routine medication refills.  * Please allow 5 business days for controlled substance medication refills.     [facility] Test Result notification information:  *You will be notified with in 7-10 days of your appointment day regarding the results of your test.  If you are on MyChart you will be notified as soon as the provider has reviewed the results and signed off on them.

## 2018-12-05 NOTE — NURSING NOTE
Chief Complaint   Patient presents with     Hospital F/U     pt is here for a hospital follow up       Josselyn Horton CMA at 1:53 PM on 12/5/2018

## 2018-12-05 NOTE — MR AVS SNAPSHOT
After Visit Summary   12/5/2018    Rohan Monroe    MRN: [id number]           Patient Information     Date Of Birth          1945        Visit Information        Provider Department      12/5/2018 3:05 PM Jamie Foster MD [facility]        Today's Diagnoses     Anemia, unspecified type    -  1      Care Instructions    Primary Care Center Phone Number [phone]  Primary Care Center Medication Refill Request Information:  * Please contact your pharmacy regarding ANY request for medication refills.  ** [facility] Prescription Fax = [phone]  * Please allow 3 business days for routine medication refills.  * Please allow 5 business days for controlled substance medication refills.     Primary Care Center Test Result notification information:  *You will be notified with in 7-10 days of your appointment day regarding the results of your test.  If you are on MyChart you will be notified as soon as the provider has reviewed the results and signed off on them.                  Follow-ups after your visit        Your next 10 appointments already scheduled     Dec 06, 2018  1:00 PM CST   Infusion 180 with UC SPEC INFUSION, UC 41 ATC   [facility] Special[facility] ([facility] [place])    [address] [place] [address]  [address]  [facility] [id number] [phone]            Dec 10, 2018  9:20 AM CST   (Arrive by 9:05 AM)   RETURN FO[facility]ANKLE [facility] ([facility])    [address] [place] [address]  4th [facility] [id number] [phone]            Dec 12, 2018  2:00 PM CST   Lab with UC LAB   [facility] Lab ([facility])    [address] [place] [address]  1st Floor  [facility] [id number] [phone]            Dec 12, 2018  2:30 PM CST   (Arrive by 2:15 PM)   CORE NEW with VINNY Pina [facility])    [address]  [address]  [facility] [address]   [phone]            Dec 12, 2018  3:30 PM CST   (Arrive by 3:15 PM)   RETURN ARRHYTHMIA with Brian Vazquez MD   [facility] ([facility])    [address]  [address]  [facility] [address]   [phone]            Dec 19, 2018 10:30 AM CST   US AORTA/IVC/ILIAC DUPLEX LIMITED with UCUSV2   [facility] US ([facility])    [address]  1st Floor  [facility] [address]   [phone]           How do I prepare for my exam? (Food and drink instructions) Adults: No eating, smoking, gum chewing or drinking for 8 hours before the exam. You may take medicine with a small sip of water.  Children: * Infants, breast-fed: may have breast milk up to 2 hours before exam. * Infants, formula: may have bottle until 4 hours before exam. * Children 1-5 years: No food or drink for 4 hours before exam. * Children 6 -12 years: No food or drink for 6 hours before exam. * Children over 12 years: No food or drink for 8 hours before exam.  * J Tube Fed: No need to stop feedings.  What should I wear: Wear comfortable clothes.  How long does the exam take: Most ultrasounds take 30 to 60 minutes.  What should I bring: Bring a list of your medicines, including vitamins, minerals and over-the-counter drugs. It is safest to leave personal items at home.  Do I need a :  No  is needed.  What do I need to tell my doctor: Tell your doctor about any allergies you may have.  What should I do after the exam: No restrictions, You may resume normal activities.  What is this test: An ultrasound uses sound waves to make pictures of the body. Sound waves do not cause pain. The only discomfort may be the pressure of the wand against your skin or full bladder.  Who should I call with questions: If you have any questions, please call the Imaging Department where you will have  your exam. Directions, parking instructions, and other information is available on our website, [email or web address].            Dec 19, 2018 11:40 AM CST   (Arrive by 11:25 AM)   Return Visit with Maria Victoria Palmer MD   [facility] ([facility])    [address]  [address]  [facility] [address] [phone]            Dec 27, 2018  1:00 PM CST   Infusion 180 with UC SPEC INFUSION, UC 41 ATC   [facility] [place] Specialty and Procedure ([facility])    [address]  [address]  [facility] [address] [phone]              Future tests that were ordered for you today     Open Future Orders        Priority Expected Expires Ordered    CBC with platelets differential Routine 12/5/2018 12/19/2018 12/5/2018    Iron and iron binding capacity Routine 12/5/2018 12/5/2019 12/5/2018    Ferritin Routine 12/5/2018 12/5/2019 12/5/2018    Vitamin B12 Routine 12/5/2018 12/5/2019 12/5/2018    Folate Routine 12/5/2018 12/5/2019 12/5/2018            Who to contact     Please call your clinic at [phone] to:    Ask questions about your health    Make or cancel appointments    Discuss your medicines    Learn about your test results    Speak to your doctor            Additional Information About Your Visit        MyChart Information     [employer] gives you secure access to your electronic health record. If you see a primary care provider, you can also send messages to your care team and make appointments. If you have questions, please call your primary care clinic.  If you do not have a primary care provider, please call [phone] and they will assist you.      [employer] is an electronic gateway that provides easy, online access to your medical records. With [employer], you can request a clinic appointment, read your test results, renew a prescription or communicate with your care team.     To access your existing  account, please contact your [facility] or call [phone] for assistance.        Care EveryWhere ID     This is your Care EveryWhere ID. This could be used by other organizations to access your [place] medical records  [id number]        Your Vitals Were     Pulse Respirations Pulse Oximetry             64 18 98%          Blood Pressure from Last 3 Encounters:   12/05/18 110/59   12/03/18 147/70   11/30/18 124/77    Weight from Last 3 Encounters:   12/03/18 73.7 kg (162 lb 8 oz)   11/30/18 74.3 kg (163 lb 12.8 oz)   11/26/18 74.3 kg (163 lb 14.4 oz)                 Today's Medication Changes          These changes are accurate as of 12/5/18  3:23 PM.  If you have any questions, ask your nurse or doctor.               These medicines have changed or have updated prescriptions.        Dose/Directions    atorvastatin 40 MG tablet   Commonly known as:  LIPITOR   This may have changed:  See the new instructions.   Used for:  Coronary artery disease involving native coronary artery of native heart without angina pectoris, CAD S/P percutaneous coronary angioplasty, Hyperlipidemia LDL goal <70        TAKE ONE TABLET BY MOUTH ONE TIME DAILY   Quantity:  90 tablet   Refills:  3            Where to get your medicines      These medications were sent to [id number] - [address] [address]     Phone:  [phone]     fluticasone-vilanterol 100-25 MCG/INH inhaler    polyethylene glycol powder    tiotropium 18 MCG inhaled capsule                Primary Care Provider Office Phone # Fax #    Jamie Foster [phone]       [address] [address]        Equal Access to Services     JEREMI [facility] HospitalJOJO : Hadii rhys Lewis, judi barclay, nelly gonzáles. So [facility] [phone].    ATENCIÓN: shaneka Quezada  a mcfadden disposición servicios gratuitos de asistencia lingüística. Osman felder [phone].    We comply with applicable federal civil rights laws and [place] laws. We do not discriminate on the basis of race, color, national origin, age, disability, sex, sexual orientation, or gender identity.            Thank you!     Thank you for choosing [facility]  for your care. Our goal is always to provide you with excellent care. Hearing back from our patients is one way we can continue to improve our services. Please take a few minutes to complete the written survey that you may receive in the mail after your visit with us. Thank you!             Your Updated Medication List - Protect others around you: Learn how to safely use, store and throw away your medicines at www.disposemymeds.org.          This list is accurate as of 12/5/18  3:23 PM.  Always use your most recent med list.                   Brand Name Dispense Instructions for use Diagnosis    albuterol 108 (90 Base) MCG/ACT inhaler    PROAIR HFA/PROVENTIL HFA/VENTOLIN HFA    3 Inhaler    Inhale 2 puffs into the lungs every 6 hours as needed for shortness of breath / dyspnea    Sarcoidosis       aspirin 81 MG EC tablet      Take 81 mg by mouth daily        atorvastatin 40 MG tablet    LIPITOR    90 tablet    TAKE ONE TABLET BY MOUTH ONE TIME DAILY    Coronary artery disease involving native coronary artery of native heart without angina pectoris, CAD S/P percutaneous coronary angioplasty, Hyperlipidemia LDL goal <70       blood glucose monitoring lancets     2 Box    Use to test blood sugar 2 times daily or as directed.  102 lancets per box.  3 month supply.    Type 2 diabetes, HbA1C goal < 8% (H)       blood glucose monitoring meter device kit    NO BRAND SPECIFIED    1 kit    Use to test blood sugar 2 times daily.    Type 2 diabetes mellitus with diabetic nephropathy (H)       blood glucose monitoring test strip    ACCU-CHEK RONNIE PLUS    200 each     Use to test blood sugar 2 times daily or as directed.  3 month supply.    Type 2 diabetes, HbA1C goal < 8% (H)       ciclopirox 0.77 % cream    LOPROX     Apply topically 2 times daily as needed        colchicine 0.6 MG tablet    COLCYRS    4 tablet    Take 1 tablet (0.6 mg) by mouth 2 times daily    Gout, unspecified cause, unspecified chronicity, unspecified site       diltiazem ER COATED BEADS 360 MG 24 hr capsule    CARDIZEM CD    90 capsule    Take 1 capsule (360 mg) by mouth daily    (HFpEF) heart failure with preserved ejection fraction (H)       DOCUSATE SODIUM PO      Take 50 mg by mouth daily        fluticasone-vilanterol 100-25 MCG/INH inhaler    BREO ELLIPTA    3 Inhaler    Inhale 1 puff into the lungs daily    Chronic obstructive pulmonary disease, unspecified COPD type (H)       furosemide 20 MG tablet    LASIX    240 tablet    Take 1 tablet (20 mg) by mouth 2 times daily May take extra 20 mg as needed for wt .gain >3#    Pulmonary hypertension (H)       inFLIXimab 100 MG injection    REMICADE     Inject 100 mg into the vein every 28 days        levothyroxine 125 MCG tablet    SYNTHROID/LEVOTHROID    90 tablet    Take 1 tablet (125 mcg) by mouth daily    Hypothyroidism due to Hashimoto's thyroiditis       losartan 25 MG tablet    COZAAR    60 tablet    Take 1 tablet (25 mg) by mouth daily    (HFpEF) heart failure with preserved ejection fraction (H)       melatonin 1 MG Tabs tablet     30 tablet    Take 1 tablet (1 mg) by mouth nightly as needed for sleep    Insomnia, unspecified type       methotrexate 2.5 MG tablet     48 tablet    Take 3 tablets (7.5 mg) by mouth once a week On Mondays    Sarcoidosis       metoprolol tartrate 50 MG tablet    LOPRESSOR    60 tablet    Take 1 tablet (50 mg) by mouth 2 times daily    Paroxysmal atrial fibrillation (H)       mirtazapine 15 MG tablet    REMERON     Take 15 mg by mouth At Bedtime.        MULTIVITAMIN & MINERAL PO      Take 1 tablet by mouth daily.         * order for DME     1 Device    She requested for a 4 wheel walker, would like to change it to 4 wheel walker with a seat and oxygen tank durant.   Need this faxed over to her  [phone]    Sarcoidosis, lung (H), COPD (chronic obstructive pulmonary disease) (H), Abnormal gait, Congestive heart failure (H)       * order for DME     1 Device    Updated Oxygen: Patient requires supplemental Oxygen 3 LPM via nasal canula with activity and 2 LPM nocturnally. Please provide patient with a portable oxygen concentrator for improved mobility.  Okay to spot check or titrated for conserving to keep stats above 90%. Oxygen will be for a lifetime.    ILD (interstitial lung disease) (H), Hypoxia       order for DME     1 Device    Please provide patient with updated oxygen equipment.  Patient now requires 4 LPM via nasal canula with activity.    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    Chronic obstructive pulmonary disease, unspecified COPD type (H)       Urea 40 % Crea      Externally apply topically daily as needed for dry skin        * Notice:  This list has 4 medication(s) that are the same as other  medications prescribed for you. Read the directions carefully, and ask your doctor or other care provider to review them with you.       yes

## 2018-12-06 ENCOUNTER — MEDICAL CORRESPONDENCE (OUTPATIENT)
Dept: HEALTH INFORMATION MANAGEMENT | Facility: CLINIC | Age: 73
End: 2018-12-06

## 2018-12-06 ENCOUNTER — TELEPHONE (OUTPATIENT)
Dept: INTERNAL MEDICINE | Facility: CLINIC | Age: 73
End: 2018-12-06

## 2018-12-06 LAB
ANION GAP SERPL CALCULATED.3IONS-SCNC: 7 MMOL/L (ref 3–14)
BUN SERPL-MCNC: 48 MG/DL (ref 7–30)
CALCIUM SERPL-MCNC: 8.4 MG/DL (ref 8.5–10.1)
CHLORIDE SERPL-SCNC: 106 MMOL/L (ref 94–109)
CO2 SERPL-SCNC: 20 MMOL/L (ref 20–32)
CREAT SERPL-MCNC: 3 MG/DL (ref 0.66–1.25)
GFR SERPL CREATININE-BSD FRML MDRD: 21 ML/MIN/1.7M2
GLUCOSE SERPL-MCNC: 96 MG/DL (ref 70–99)
POTASSIUM SERPL-SCNC: 5 MMOL/L (ref 3.4–5.3)
SODIUM SERPL-SCNC: 133 MMOL/L (ref 133–144)

## 2018-12-06 PROCEDURE — 80048 BASIC METABOLIC PNL TOTAL CA: CPT | Performed by: INTERNAL MEDICINE

## 2018-12-06 NOTE — TELEPHONE ENCOUNTER
[facility]    Phone Message    May a detailed message be left on voicemail: yes    Reason for Call: Other: Per Jack, wanting to know what other labs are needing to be done for Pt. Was wanrting to know due to seeing if he can help out and get the labs done and provide the results, since Pt was seen yesterday and had labs done yestday. Jack stated he is not at the Nursing Home much longer, if someone can reach out to him Asap to let him know.      Action Taken: Message routed to:  [facility] ([facility]

## 2018-12-06 NOTE — TELEPHONE ENCOUNTER
Spoke with Jack at [facility] regarding future labs ordered by Dr. Foster.  Patient will be having labs drawn on Monday when home care nurse returns to visit.  Fernando Alfaro LPN at 4:08 PM on 12/6/2018

## 2018-12-06 NOTE — TELEPHONE ENCOUNTER
[facility] Call Center    Phone Message    May a detailed message be left on voicemail: yes    Reason for Call: Sarita calling to get an order for ongoing pt continuing for 2x week for 2 weeks and 1x week for 1 week. Please call Sarita and let her know yes or no on her VM.    Action Taken: Message routed to:  [facility] ([facility]): Primary Care

## 2018-12-07 ENCOUNTER — CARE COORDINATION (OUTPATIENT)
Dept: CARDIOLOGY | Facility: CLINIC | Age: 73
End: 2018-12-07

## 2018-12-07 NOTE — PROGRESS NOTES
"Per provider, \"Can we reach out to Denis today. I saw him on Monday, he was quite SOB, we added a little BB. He's seeing Adilia next week and Dr. Vazquez.\"  Called pt, he reports that he is feeling significantly better, \"I wasn't jessy to walk to the bathroom that is in the same room as me. Now I can walk to any room of the house.\" Pt will follow-up in [facility] next week. Ayesha Siddiqui RN [facility] Care Coordinator     "

## 2018-12-10 ENCOUNTER — TELEPHONE (OUTPATIENT)
Dept: INTERNAL MEDICINE | Facility: CLINIC | Age: 73
End: 2018-12-10

## 2018-12-10 ENCOUNTER — HOSPITAL ENCOUNTER (INPATIENT)
Facility: CLINIC | Age: 73
LOS: 4 days | Discharge: HOME | DRG: 177 | End: 2018-12-14
Attending: EMERGENCY MEDICINE | Admitting: INTERNAL MEDICINE
Payer: MEDICARE

## 2018-12-10 ENCOUNTER — APPOINTMENT (OUTPATIENT)
Dept: GENERAL RADIOLOGY | Facility: CLINIC | Age: 73
DRG: 177 | End: 2018-12-10
Attending: EMERGENCY MEDICINE
Payer: MEDICARE

## 2018-12-10 DIAGNOSIS — E11.22 TYPE 2 DIABETES MELLITUS WITH DIABETIC CHRONIC KIDNEY DISEASE, UNSPECIFIED CKD STAGE, UNSPECIFIED WHETHER LONG TERM INSULIN USE (H): ICD-10-CM

## 2018-12-10 DIAGNOSIS — I13.0 MALIGNANT HYPERTENSIVE HEART AND RENAL DISEASE WITH RENAL FAILURE, STAGE 1 THROUGH STAGE 4 OR UNSPECIFIED CHRONIC KIDNEY DISEASE, WITH HEART FAILURE (H): ICD-10-CM

## 2018-12-10 DIAGNOSIS — N18.30 CHRONIC KIDNEY DISEASE, STAGE III (MODERATE) (H): ICD-10-CM

## 2018-12-10 DIAGNOSIS — Z79.4 ENCOUNTER FOR LONG-TERM (CURRENT) USE OF INSULIN (H): ICD-10-CM

## 2018-12-10 DIAGNOSIS — I50.9 HEART FAILURE EXACERBATED BY SOTALOL (H): ICD-10-CM

## 2018-12-10 DIAGNOSIS — J44.9 CHRONIC OBSTRUCTIVE PULMONARY DISEASE, UNSPECIFIED COPD TYPE (H): ICD-10-CM

## 2018-12-10 DIAGNOSIS — B59 PNEUMONIA DUE TO PNEUMOCYSTIS JIROVECII, UNSPECIFIED LATERALITY, UNSPECIFIED PART OF LUNG (H): ICD-10-CM

## 2018-12-10 DIAGNOSIS — I27.20 PULMONARY HYPERTENSION (H): ICD-10-CM

## 2018-12-10 DIAGNOSIS — D86.0 SARCOIDOSIS OF LUNG (H): Primary | ICD-10-CM

## 2018-12-10 DIAGNOSIS — N17.9 ACUTE KIDNEY INJURY (H): ICD-10-CM

## 2018-12-10 DIAGNOSIS — Z87.891 PERSONAL HISTORY OF TOBACCO USE, PRESENTING HAZARDS TO HEALTH: ICD-10-CM

## 2018-12-10 LAB
ALBUMIN SERPL-MCNC: 3.3 G/DL (ref 3.4–5)
ALBUMIN UR-MCNC: 30 MG/DL
ALP SERPL-CCNC: 116 U/L (ref 40–150)
ALT SERPL W P-5'-P-CCNC: 41 U/L (ref 0–70)
ANION GAP SERPL CALCULATED.3IONS-SCNC: 10 MMOL/L (ref 3–14)
ANION GAP SERPL CALCULATED.3IONS-SCNC: 9 MMOL/L (ref 3–14)
APPEARANCE UR: CLEAR
AST SERPL W P-5'-P-CCNC: 34 U/L (ref 0–45)
BASOPHILS # BLD AUTO: 0 10E9/L (ref 0–0.2)
BASOPHILS NFR BLD AUTO: 0.5 %
BILIRUB SERPL-MCNC: 0.2 MG/DL (ref 0.2–1.3)
BILIRUB UR QL STRIP: NEGATIVE
BUN SERPL-MCNC: 75 MG/DL (ref 7–30)
BUN SERPL-MCNC: 77 MG/DL (ref 7–30)
CALCIUM SERPL-MCNC: 8.3 MG/DL (ref 8.5–10.1)
CALCIUM SERPL-MCNC: 8.3 MG/DL (ref 8.5–10.1)
CHLORIDE SERPL-SCNC: 104 MMOL/L (ref 94–109)
CHLORIDE SERPL-SCNC: 105 MMOL/L (ref 94–109)
CO2 SERPL-SCNC: 16 MMOL/L (ref 20–32)
CO2 SERPL-SCNC: 19 MMOL/L (ref 20–32)
COLOR UR AUTO: YELLOW
CREAT SERPL-MCNC: 4.42 MG/DL (ref 0.66–1.25)
CREAT SERPL-MCNC: 4.44 MG/DL (ref 0.66–1.25)
CREAT UR-MCNC: 64 MG/DL
DIFFERENTIAL METHOD BLD: ABNORMAL
DLCOUNC-%PRED-PRE: 29 %
DLCOUNC-PRE: 7.25 ML/MIN/MMHG
DLCOUNC-PRED: 24.39 ML/MIN/MMHG
EOSINOPHIL # BLD AUTO: 0.1 10E9/L (ref 0–0.7)
EOSINOPHIL NFR BLD AUTO: 1.8 %
ERV-%PRED-PRE: 67 %
ERV-PRE: 0.74 L
ERV-PRED: 1.1 L
ERYTHROCYTE [DISTWIDTH] IN BLOOD BY AUTOMATED COUNT: 15.7 % (ref 10–15)
EXPTIME-PRE: 9.58 SEC
FEF2575-%PRED-PRE: 17 %
FEF2575-PRE: 0.37 L/SEC
FEF2575-PRED: 2.15 L/SEC
FEFMAX-%PRED-PRE: 34 %
FEFMAX-PRE: 2.58 L/SEC
FEFMAX-PRED: 7.5 L/SEC
FEV1-%PRED-PRE: 30 %
FEV1-PRE: 0.89 L
FEV1FEV6-PRE: 49 %
FEV1FEV6-PRED: 77 %
FEV1FVC-PRE: 47 %
FEV1FVC-PRED: 73 %
FEV1SVC-PRE: 50 %
FEV1SVC-PRED: 66 %
FIFMAX-PRE: 3.34 L/SEC
FVC-%PRED-PRE: 49 %
FVC-PRE: 1.92 L
FVC-PRED: 3.86 L
GFR SERPL CREATININE-BSD FRML MDRD: 13 ML/MIN/1.7M2
GFR SERPL CREATININE-BSD FRML MDRD: 13 ML/MIN/1.7M2
GLUCOSE BLDC GLUCOMTR-MCNC: 126 MG/DL (ref 70–99)
GLUCOSE SERPL-MCNC: 123 MG/DL (ref 70–99)
GLUCOSE SERPL-MCNC: 91 MG/DL (ref 70–99)
GLUCOSE UR STRIP-MCNC: NEGATIVE MG/DL
HCT VFR BLD AUTO: 30 % (ref 40–53)
HGB BLD-MCNC: 9.4 G/DL (ref 13.3–17.7)
HGB UR QL STRIP: NEGATIVE
HYALINE CASTS #/AREA URNS LPF: 8 /LPF (ref 0–2)
IC-%PRED-PRE: 32 %
IC-PRE: 1.06 L
IC-PRED: 3.29 L
IMM GRANULOCYTES # BLD: 0 10E9/L (ref 0–0.4)
IMM GRANULOCYTES NFR BLD: 0.2 %
INTERPRETATION ECG - MUSE: NORMAL
KETONES UR STRIP-MCNC: NEGATIVE MG/DL
LEUKOCYTE ESTERASE UR QL STRIP: NEGATIVE
LYMPHOCYTES # BLD AUTO: 0.8 10E9/L (ref 0.8–5.3)
LYMPHOCYTES NFR BLD AUTO: 11.3 %
MCH RBC QN AUTO: 29.8 PG (ref 26.5–33)
MCHC RBC AUTO-ENTMCNC: 31.3 G/DL (ref 31.5–36.5)
MCV RBC AUTO: 95 FL (ref 78–100)
MONOCYTES # BLD AUTO: 0 10E9/L (ref 0–1.3)
MONOCYTES NFR BLD AUTO: 0.6 %
MUCOUS THREADS #/AREA URNS LPF: PRESENT /LPF
NEUTROPHILS # BLD AUTO: 5.7 10E9/L (ref 1.6–8.3)
NEUTROPHILS NFR BLD AUTO: 85.6 %
NITRATE UR QL: NEGATIVE
NRBC # BLD AUTO: 0 10*3/UL
NRBC BLD AUTO-RTO: 0 /100
NT-PROBNP SERPL-MCNC: 5378 PG/ML (ref 0–900)
PH UR STRIP: 5 PH (ref 5–7)
PLATELET # BLD AUTO: 254 10E9/L (ref 150–450)
POTASSIUM SERPL-SCNC: 5.5 MMOL/L (ref 3.4–5.3)
POTASSIUM SERPL-SCNC: 5.6 MMOL/L (ref 3.4–5.3)
PROT SERPL-MCNC: 7.5 G/DL (ref 6.8–8.8)
RBC # BLD AUTO: 3.15 10E12/L (ref 4.4–5.9)
RBC #/AREA URNS AUTO: <1 /HPF (ref 0–2)
SODIUM SERPL-SCNC: 131 MMOL/L (ref 133–144)
SODIUM SERPL-SCNC: 131 MMOL/L (ref 133–144)
SOURCE: ABNORMAL
SP GR UR STRIP: 1.01 (ref 1–1.03)
SQUAMOUS #/AREA URNS AUTO: <1 /HPF (ref 0–1)
TROPONIN I SERPL-MCNC: 0.02 UG/L (ref 0–0.04)
UROBILINOGEN UR STRIP-MCNC: NORMAL MG/DL (ref 0–2)
UUN UR-MCNC: 338 MG/DL
UUN/CREAT 24H UR: 5 G/G CR
VA-%PRED-PRE: 56 %
VA-PRE: 3.66 L
VC-%PRED-PRE: 41 %
VC-PRE: 1.8 L
VC-PRED: 4.39 L
WBC # BLD AUTO: 6.6 10E9/L (ref 4–11)
WBC #/AREA URNS AUTO: 1 /HPF (ref 0–5)

## 2018-12-10 PROCEDURE — 99223 1ST HOSP IP/OBS HIGH 75: CPT | Mod: GC | Performed by: INTERNAL MEDICINE

## 2018-12-10 PROCEDURE — 25000128 H RX IP 250 OP 636: Performed by: EMERGENCY MEDICINE

## 2018-12-10 PROCEDURE — 71046 X-RAY EXAM CHEST 2 VIEWS: CPT

## 2018-12-10 PROCEDURE — 80048 BASIC METABOLIC PNL TOTAL CA: CPT | Performed by: STUDENT IN AN ORGANIZED HEALTH CARE EDUCATION/TRAINING PROGRAM

## 2018-12-10 PROCEDURE — 83880 ASSAY OF NATRIURETIC PEPTIDE: CPT | Performed by: EMERGENCY MEDICINE

## 2018-12-10 PROCEDURE — 25000132 ZZH RX MED GY IP 250 OP 250 PS 637: Mod: GY | Performed by: STUDENT IN AN ORGANIZED HEALTH CARE EDUCATION/TRAINING PROGRAM

## 2018-12-10 PROCEDURE — 25000128 H RX IP 250 OP 636: Performed by: STUDENT IN AN ORGANIZED HEALTH CARE EDUCATION/TRAINING PROGRAM

## 2018-12-10 PROCEDURE — 21400006 ZZH R&B CCU INTERMEDIATE UMMC

## 2018-12-10 PROCEDURE — 93005 ELECTROCARDIOGRAM TRACING: CPT | Performed by: EMERGENCY MEDICINE

## 2018-12-10 PROCEDURE — 25000125 ZZHC RX 250: Performed by: EMERGENCY MEDICINE

## 2018-12-10 PROCEDURE — 25000125 ZZHC RX 250: Performed by: STUDENT IN AN ORGANIZED HEALTH CARE EDUCATION/TRAINING PROGRAM

## 2018-12-10 PROCEDURE — 36415 COLL VENOUS BLD VENIPUNCTURE: CPT | Performed by: STUDENT IN AN ORGANIZED HEALTH CARE EDUCATION/TRAINING PROGRAM

## 2018-12-10 PROCEDURE — 81001 URINALYSIS AUTO W/SCOPE: CPT | Performed by: STUDENT IN AN ORGANIZED HEALTH CARE EDUCATION/TRAINING PROGRAM

## 2018-12-10 PROCEDURE — 93010 ELECTROCARDIOGRAM REPORT: CPT | Mod: Z6 | Performed by: EMERGENCY MEDICINE

## 2018-12-10 PROCEDURE — 85025 COMPLETE CBC W/AUTO DIFF WBC: CPT | Performed by: EMERGENCY MEDICINE

## 2018-12-10 PROCEDURE — 99285 EMERGENCY DEPT VISIT HI MDM: CPT | Mod: 25 | Performed by: EMERGENCY MEDICINE

## 2018-12-10 PROCEDURE — 84484 ASSAY OF TROPONIN QUANT: CPT | Performed by: EMERGENCY MEDICINE

## 2018-12-10 PROCEDURE — 84540 ASSAY OF URINE/UREA-N: CPT | Performed by: STUDENT IN AN ORGANIZED HEALTH CARE EDUCATION/TRAINING PROGRAM

## 2018-12-10 PROCEDURE — 00000146 ZZHCL STATISTIC GLUCOSE BY METER IP

## 2018-12-10 PROCEDURE — 80053 COMPREHEN METABOLIC PANEL: CPT | Performed by: EMERGENCY MEDICINE

## 2018-12-10 PROCEDURE — A9270 NON-COVERED ITEM OR SERVICE: HCPCS | Mod: GY | Performed by: STUDENT IN AN ORGANIZED HEALTH CARE EDUCATION/TRAINING PROGRAM

## 2018-12-10 PROCEDURE — 96374 THER/PROPH/DIAG INJ IV PUSH: CPT | Performed by: EMERGENCY MEDICINE

## 2018-12-10 PROCEDURE — 94640 AIRWAY INHALATION TREATMENT: CPT | Performed by: EMERGENCY MEDICINE

## 2018-12-10 RX ORDER — METOPROLOL TARTRATE 50 MG
50 TABLET ORAL 2 TIMES DAILY
Status: DISCONTINUED | OUTPATIENT
Start: 2018-12-10 | End: 2018-12-11

## 2018-12-10 RX ORDER — LORAZEPAM 2 MG/ML
INJECTION INTRAMUSCULAR
Status: DISCONTINUED
Start: 2018-12-10 | End: 2018-12-10 | Stop reason: HOSPADM

## 2018-12-10 RX ORDER — POLYETHYLENE GLYCOL 3350 17 G/17G
17 POWDER, FOR SOLUTION ORAL DAILY PRN
Status: DISCONTINUED | OUTPATIENT
Start: 2018-12-10 | End: 2018-12-14 | Stop reason: HOSPADM

## 2018-12-10 RX ORDER — NICOTINE POLACRILEX 4 MG
15-30 LOZENGE BUCCAL
Status: DISCONTINUED | OUTPATIENT
Start: 2018-12-10 | End: 2018-12-14 | Stop reason: HOSPADM

## 2018-12-10 RX ORDER — IPRATROPIUM BROMIDE AND ALBUTEROL SULFATE 2.5; .5 MG/3ML; MG/3ML
3 SOLUTION RESPIRATORY (INHALATION) ONCE
Status: COMPLETED | OUTPATIENT
Start: 2018-12-10 | End: 2018-12-10

## 2018-12-10 RX ORDER — MIRTAZAPINE 15 MG/1
15 TABLET, FILM COATED ORAL AT BEDTIME
Status: DISCONTINUED | OUTPATIENT
Start: 2018-12-10 | End: 2018-12-14 | Stop reason: HOSPADM

## 2018-12-10 RX ORDER — TIOTROPIUM BROMIDE 18 UG/1
18 CAPSULE ORAL; RESPIRATORY (INHALATION) DAILY
Status: DISCONTINUED | OUTPATIENT
Start: 2018-12-11 | End: 2018-12-10

## 2018-12-10 RX ORDER — FUROSEMIDE 10 MG/ML
40 INJECTION INTRAMUSCULAR; INTRAVENOUS ONCE
Status: COMPLETED | OUTPATIENT
Start: 2018-12-10 | End: 2018-12-10

## 2018-12-10 RX ORDER — LEVOTHYROXINE SODIUM 125 UG/1
125 TABLET ORAL DAILY
Status: DISCONTINUED | OUTPATIENT
Start: 2018-12-11 | End: 2018-12-14 | Stop reason: HOSPADM

## 2018-12-10 RX ORDER — AMOXICILLIN 250 MG
2 CAPSULE ORAL 2 TIMES DAILY PRN
Status: DISCONTINUED | OUTPATIENT
Start: 2018-12-10 | End: 2018-12-14 | Stop reason: HOSPADM

## 2018-12-10 RX ORDER — ATORVASTATIN CALCIUM 40 MG/1
40 TABLET, FILM COATED ORAL EVERY EVENING
Status: DISCONTINUED | OUTPATIENT
Start: 2018-12-10 | End: 2018-12-14 | Stop reason: HOSPADM

## 2018-12-10 RX ORDER — ALBUTEROL SULFATE 0.83 MG/ML
2.5 SOLUTION RESPIRATORY (INHALATION)
Status: DISCONTINUED | OUTPATIENT
Start: 2018-12-11 | End: 2018-12-10

## 2018-12-10 RX ORDER — SILDENAFIL CITRATE 20 MG/1
20 TABLET ORAL 3 TIMES DAILY
Status: DISCONTINUED | OUTPATIENT
Start: 2018-12-10 | End: 2018-12-14 | Stop reason: HOSPADM

## 2018-12-10 RX ORDER — LOSARTAN POTASSIUM 25 MG/1
25 TABLET ORAL DAILY
Status: CANCELLED | OUTPATIENT
Start: 2018-12-11

## 2018-12-10 RX ORDER — ASPIRIN 81 MG/1
81 TABLET ORAL DAILY
Status: DISCONTINUED | OUTPATIENT
Start: 2018-12-11 | End: 2018-12-14 | Stop reason: HOSPADM

## 2018-12-10 RX ORDER — ALBUTEROL SULFATE 90 UG/1
2 AEROSOL, METERED RESPIRATORY (INHALATION) EVERY 6 HOURS PRN
Status: DISCONTINUED | OUTPATIENT
Start: 2018-12-10 | End: 2018-12-14 | Stop reason: HOSPADM

## 2018-12-10 RX ORDER — FUROSEMIDE 10 MG/ML
60 INJECTION INTRAMUSCULAR; INTRAVENOUS ONCE
Status: COMPLETED | OUTPATIENT
Start: 2018-12-10 | End: 2018-12-10

## 2018-12-10 RX ORDER — ALBUTEROL SULFATE 0.83 MG/ML
2.5 SOLUTION RESPIRATORY (INHALATION) EVERY 4 HOURS PRN
Status: DISCONTINUED | OUTPATIENT
Start: 2018-12-10 | End: 2018-12-14 | Stop reason: HOSPADM

## 2018-12-10 RX ORDER — DILTIAZEM HYDROCHLORIDE 180 MG/1
360 CAPSULE, COATED, EXTENDED RELEASE ORAL DAILY
Status: DISCONTINUED | OUTPATIENT
Start: 2018-12-11 | End: 2018-12-14 | Stop reason: HOSPADM

## 2018-12-10 RX ORDER — MIRTAZAPINE 15 MG/1
15 TABLET, FILM COATED ORAL AT BEDTIME
Status: CANCELLED | OUTPATIENT
Start: 2018-12-10

## 2018-12-10 RX ORDER — AMOXICILLIN 250 MG
1 CAPSULE ORAL 2 TIMES DAILY PRN
Status: DISCONTINUED | OUTPATIENT
Start: 2018-12-10 | End: 2018-12-14 | Stop reason: HOSPADM

## 2018-12-10 RX ORDER — DEXTROSE MONOHYDRATE 25 G/50ML
25-50 INJECTION, SOLUTION INTRAVENOUS
Status: DISCONTINUED | OUTPATIENT
Start: 2018-12-10 | End: 2018-12-14 | Stop reason: HOSPADM

## 2018-12-10 RX ADMIN — IPRATROPIUM BROMIDE AND ALBUTEROL SULFATE 3 ML: .5; 3 SOLUTION RESPIRATORY (INHALATION) at 13:19

## 2018-12-10 RX ADMIN — FUROSEMIDE 40 MG: 10 INJECTION, SOLUTION INTRAVENOUS at 16:45

## 2018-12-10 RX ADMIN — FUROSEMIDE 60 MG: 10 INJECTION, SOLUTION INTRAVENOUS at 22:10

## 2018-12-10 RX ADMIN — MIRTAZAPINE 15 MG: 15 TABLET, FILM COATED ORAL at 22:11

## 2018-12-10 RX ADMIN — PENTAMIDINE ISETHIONATE 300 MG: 300 INJECTION, POWDER, LYOPHILIZED, FOR SOLUTION INTRAMUSCULAR; INTRAVENOUS at 22:15

## 2018-12-10 RX ADMIN — ATORVASTATIN CALCIUM 40 MG: 40 TABLET, FILM COATED ORAL at 21:35

## 2018-12-10 RX ADMIN — SILDENAFIL 20 MG: 20 TABLET ORAL at 21:35

## 2018-12-10 ASSESSMENT — ENCOUNTER SYMPTOMS
CHILLS: 0
ADENOPATHY: 0
CONFUSION: 0
SHORTNESS OF BREATH: 1
POLYDIPSIA: 0
COLOR CHANGE: 0
NECK STIFFNESS: 0
ARTHRALGIAS: 0
NAUSEA: 0
ABDOMINAL PAIN: 0
EYE REDNESS: 0
FEVER: 0
VOMITING: 0
DIFFICULTY URINATING: 0
HEADACHES: 0
COUGH: 0

## 2018-12-10 ASSESSMENT — MIFFLIN-ST. JEOR: SCORE: 1485.61

## 2018-12-10 ASSESSMENT — ACTIVITIES OF DAILY LIVING (ADL)
ADLS_ACUITY_SCORE: 20
WHICH_OF_THE_ABOVE_FUNCTIONAL_RISKS_HAD_A_RECENT_ONSET_OR_CHANGE?: AMBULATION
TRANSFERRING: 1-->ASSISTIVE EQUIPMENT

## 2018-12-10 NOTE — ED TRIAGE NOTES
Pt presents via EMS from home. Pt states for few days to a week has had increasing SOB and air hunger. Pt discharge from hospital for exacerbation of COPD/CHF. Pt presently on 6LPM oxygen via NC. PT denies pain. Pt hx A-fib and recent tx for pneumonia. Pt states prior to hospitalization was on 3LPM.

## 2018-12-10 NOTE — ED NOTES
Bed: ED03  Expected date:   Expected time:   Means of arrival:   Comments:  H 423  72M   Difficulty breathing

## 2018-12-10 NOTE — H&P
[facility], [place]    History and Physical - Cardiology 1 Service        Date of Admission:  12/10/2018    Assessment & Plan   Rohan Monroe is a 71 y/o M with history of sarcoidosis on immunosuppressive therapy, COPD (on 4-6 L home O2), CAD s/p ROSALIE to D2 and mLAD (05/2017), HFpEF, atrial flutter s/p ablation with post-ablation recurrence, severe pulmonary HTN, HTN, DM2, CKD stage III, and recent diagnosis of PCP pneumonia (on bactrim) who presents to the ED with acute on chronic dyspnea on exertion.     # Acute on chronic HFpEF (EF 60-65% in March 2018)   # CAD s/p PCI with ROSALIE to mid-LAD and D2 in 5/2017   Recent TTE at OSH 11/19/18 with reduced EF to 45-50%, although done while in rapid Afib. Presents with weight gain of ~6kg since discharge in the setting of recently decreased lasix dose due to RADHA. In addition, has RADHA which has further reduced UOP. Clinical exam consistent with hypervolemia. BNP elevated but decreased from previous.  Troponin negative. EKG with 1st degree AV block, no ischemic changes. No signs of shock.     - Continue PTA aspirin 81 mg daily, and Lipitor 40 mg daily  - Holding PTA losartan d/t RADHA   - Continue Cardizem 360 mg every day  - Diuresis: 40mg IV lasix in ED. Will diuresis to goal net negative 2L overnight.   - Daily weights, strict I's and O's  - Low sodium diet, fluid restriction   - CORE follow up as outpatient     # Pneumocystis Pneumonia  # Severe dyspnea on exertion   # Pulmonary sarcoidosis   # ? 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 Follows with pulmonology, Dr. Perlman. Was previously on 3L O2 at home, recently increased to 4-6 L. Requiring baseline ~5L O2 in ED on admission. Symptoms have been stable to slightly worsened since recent discharge, likely due to decompensated HF as discussed above. Positive for PCP pneumonia during recent admission, now with  RADHA likely 2/2 bactrim (see below). WBC normal, afebrile, stable O2 requirements. No signs of worsening infection.   - CXR demonstrates significantly increased pulmonary opacities, likely secondary to known PCP along with fluid overload.   - ID consulted, appreciate recs    - Will hold bactrim due to RADHA and transition to pentamidine per ID recs, pharmacy   - Continue PTA bronchodilators and diuresis   - Takes methotrexate qweek   - Diuresing as discussed above     # RADHA on CKD stage III   # Hyperkalemia   Baseline creatinine recently around 1.9-2.4 (2.39 on 11/26), now elevated to 4.42. Potassium elevated to 5.6 in ED, shifted with lasix. RADHA likely secondary to bactrim use for PCP pneumonia. Lasix was decreased during recent admission.     - Nephrology consulted, appreciate recs  - UA, urine urea and creatinine pending   - Monitor renal function closely  - Avoid nephrotoxic drugs  - BMP q6 hours, shift K PRN     # Atrial flutter and fibrillation s/p ablation with post-ablation recurrence  Recently admitted to OSH on 11/18 with Afib with RVR, s/p spontaneous conversion but recurrence on 11/21. Patient converted to sinus rhythm 11/25 after treatment of PCP pneumonia was initiated along with uptitration of diltiazem, but flipped back into Afib on 11/26 prior to recent discharge. HR bradycardic in the ED.   - [id number] 4. Was previously on coumadin, but stopped due to patient preference along with heme occult positive study during recent admission.   - Continue Diltiazem 360 mg daily   - Goal HR < 110    # Severe pulmonary HTN  RHC 11/19, PVR is 9 and wedge was slightly elevated, 11 but patient in rapid Afib at that time.   - Continue PTA sildenafil 20mg TID     # Normocytic anemia  Hgb 9.4 on admission, within recent baseline. No active bleeding.  - CBC in AM    # Hyponatremia  Mild, Na 131 down from 133 on 12/6. Urine studies pending.   - BMP q6 hrs as discussed above     # Gout   Per patient never formally  "diagnosed, but does take colchicine for flares.  Started colchicine 11/24. No current flare.     Chronic Conditions   # Hypothyroidism- continue PTA levothyroxine  # History of HepC- treated in the past  # DM2 (A1C 6.4)- low intensity sliding scale insulin, hypoglycemia protocol   # Insomnia- cont PTA PRN melatonin and PRN remeron        Diet: 2g sodium diet, 2L fluid restriction   Fluids: no mIVF   DVT Prophylaxis: Pneumatic Compression Devices  Fung Catheter: not present  Code Status: Full     Disposition Plan   Expected discharge: 2 - 3 days, recommended to prior living arrangement once renal function improved.  Entered: Trish Curry MD 12/10/2018, 4:12 PM     Discussed with cardiology fellow Dr. Hays who discussed with Dr. Wallace, staff cardiologist.     Trish Curry MD  Internal Medicine, PGY2  Cardiology 1 Service  [facility], [place]  Pager: [phone]  Please see sticky note for cross cover information  ______________________________________________________________________    Chief Complaint   Dyspnea on exertion     History is obtained from the patient    History of Present Illness   Rohan Monroe is a 73 y/o M with history of sarcoidosis on immunosuppressive therapy, COPD (on 4-6 L home O2), CAD s/p ROSALIE to D2 and mLAD (05/2017), HFpEF, severe pulmonary HTN, atrial flutter s/p ablation with post-ablation recurrence, HTN, DM2, CKD stage III who presents to the ED with dyspnea on exertion.     Patient was recently admitted to [facility] from 11/21-11/26/18 with atrial fibrillation and PCP pneumonia. He was discharged on Cardizem 360 mg daily and bactrim single strength daily. He reports feeling ok at time of discharge, although dyspnea on exertion never fully resolved. Since discharge, he feels his dyspnea is slightly worse. He reports orthopnea and PND, feeling like he is \"gasping for air\" especially when he wakes up in the morning. He is on 4-6 L " supplemental O2 at baseline (was previously on 3L but increased s/p recent hospitalization). He does not feel like he has gained weight, but has noticed some mild LE edema bilaterally. He reports good compliance with his medications. He denies fevers, chills, cough, chest pain, abdominal pain. He has some diarrhea alternating with constipation.     In addition, his UOP has decreased recently. He was discharged on a reduced dose of lasix due to RADHA (creatinine 2.4 on recent discharge 11/26) and is not sure how much he has been taking (discharge summary reports 20mg BID). He has been following a fluid restricted diet.     Review of Systems    The 10 point Review of Systems is negative other than noted in the HPI or here.     Past Medical History    I have reviewed this patient's medical history and updated it with pertinent information if needed.   Past Medical History:   Diagnosis Date     Atrial flutter (H)      Cataract of both eyes      Chronic infection     Hep C     Congestive heart failure, unspecified      Coronary artery disease      Depressive disorder      Depressive disorder, not elsewhere classified     Depression (non-psychotic)     ERM OS (epiretinal membrane, left eye)      Generalized osteoarthrosis, unspecified site      Glaucoma suspect      Hypertension      Lichen planus      Other psoriasis      Pneumocystis carinii pneumonia 11/23/2018     PVD (posterior vitreous detachment), left eye      Sarcoidosis      Sarcoidosis      Type II or unspecified type diabetes mellitus without mention of complication, not stated as uncontrolled      Unspecified hypothyroidism     Hypothyroidism     Unspecified viral hepatitis C without hepatic coma      Viral warts, unspecified      Past Surgical History   I have reviewed this patient's surgical history and updated it with pertinent information if needed.  Past Surgical History:   Procedure Laterality Date     ANESTHESIA CARDIOVERSION N/A 1/19/2017    Procedure:  ANESTHESIA CARDIOVERSION;  Surgeon: GENERIC ANESTHESIA PROVIDER;  Location: [facility] OR     ANESTHESIA CARDIOVERSION N/A 1/23/2017    Procedure: ANESTHESIA CARDIOVERSION;  Surgeon: GENERIC ANESTHESIA PROVIDER;  Location: [facility] OR     ANESTHESIA CARDIOVERSION N/A 1/24/2017    Procedure: ANESTHESIA CARDIOVERSION;  Surgeon: GENERIC ANESTHESIA PROVIDER;  Location: [facility] OR     ANESTHESIA CARDIOVERSION N/A 11/20/2018    Procedure: ANESTHESIA CARDIOVERSION;  Surgeon: GENERIC ANESTHESIA PROVIDER;  Location: [facility] OR     ARTHROPLASTY HIP  8/24/2011    Procedure:ARTHROPLASTY HIP; Right Total Hip Arthroplasty  Choice anesthesia; Surgeon:LESLI WILKINSON; Location:[facility] OR     BIOPSY       C PELVIS/HIP JOINT SURGERY UNLISTED       cardiac stent      s/p     CARDIAC SURGERY       CATARACT IOL, RT/LT  9/15/2015    LE     COLONOSCOPY       CORONARY ANGIOGRAPHY ADULT ORDER       H ABLATION ATRIAL FLUTTER       HC REMOVAL OF TONSILS,<11 Y/O      Tonsils <10y.o.     HC REPAIR INCISIONAL HERNIA,REDUCIBLE      Hernia Repair, Incisional, Unilateral     HEART CATH, ANGIOPLASTY       JOINT REPLACEMENT      1 month ago--right hip     LIGATN/STRIP LONG & SHORT SAPHEN        Social History   I have reviewed this patient's social history and updated it with pertinent information if needed. Rohan Monroe  reports that he quit smoking about 24 years ago. His smoking use included cigarettes. He started smoking about 57 years ago. He has a 30.00 pack-year smoking history. he has never used smokeless tobacco. He reports that he does not drink alcohol or use drugs.    Family History   I have reviewed this patient's family history and updated it with pertinent information if needed.   Family History   Problem Relation Age of Onset     Heart Disease Father         irreg heart beat     Circulatory Father         varicose veins     Prostate Cancer Father      Heart Disease Mother         heart attack     Arthritis Mother      Osteoporosis Mother      Thyroid  Disease Mother      Hypertension Mother      Eye Disorder Maternal Grandmother         glaucoma     Diabetes Sister         type 2     Kidney Cancer Sister      Diabetes Sister      Glaucoma No family hx of      Macular Degeneration No family hx of      Cancer No family hx of         no skin cancer       Prior to Admission Medications   Prior to Admission Medications   Prescriptions Last Dose Informant Patient Reported? Taking?   DOCUSATE SODIUM PO Unknown at Unknown time  Yes No   Sig: Take 50 mg by mouth daily   Multiple Vitamins-Minerals (MULTIVITAMIN & MINERAL PO) Unknown at Unknown time Self Yes No   Sig: Take 1 tablet by mouth daily.   Urea 40 % CREA Unknown at Unknown time Self Yes No   Sig: Externally apply topically daily as needed for dry skin   albuterol (PROAIR HFA/PROVENTIL HFA/VENTOLIN HFA) 108 (90 Base) MCG/ACT Inhaler 12/10/2018 at Unknown time Self No Yes   Sig: Inhale 2 puffs into the lungs every 6 hours as needed for shortness of breath / dyspnea   aspirin 81 MG EC tablet 12/10/2018 at Unknown time Self Yes Yes   Sig: Take 81 mg by mouth daily   atorvastatin (LIPITOR) 40 MG tablet 12/10/2018 at Unknown time Self No Yes   Sig: TAKE ONE TABLET BY MOUTH ONE TIME DAILY    Patient taking differently: TAKE ONE TABLET BY MOUTH ONE TIME EVERY EVENING.   blood glucose monitoring (ACCU-CHEK RONNIE PLUS) test strip Unknown at Unknown time Self No No   Sig: Use to test blood sugar 2 times daily or as directed.  3 month supply.   blood glucose monitoring (ACCU-CHEK FASTCLIX) lancets Unknown at Unknown time Self No No   Sig: Use to test blood sugar 2 times daily or as directed.  102 lancets per box.  3 month supply.   blood glucose monitoring (NO BRAND SPECIFIED) meter device kit Unknown at Unknown time Self No No   Sig: Use to test blood sugar 2 times daily.   ciclopirox (LOPROX) 0.77 % cream Unknown at Unknown time Self Yes No   Sig: Apply topically 2 times daily as needed   colchicine (COLCYRS) 0.6 MG tablet  12/10/2018 at Unknown time  No Yes   Sig: Take 1 tablet (0.6 mg) by mouth 2 times daily   diltiazem (CARDIZEM CD/CARTIA XT) 360 MG 24 hr CD capsule 12/10/2018 at Unknown time  No Yes   Sig: Take 1 capsule (360 mg) by mouth daily   fluticasone-vilanterol (BREO ELLIPTA) 100-25 MCG/INH inhaler 12/10/2018 at Unknown time  No Yes   Sig: Inhale 1 puff into the lungs daily   furosemide (LASIX) 20 MG tablet 12/10/2018 at Unknown time  No Yes   Sig: Take 1 tablet (20 mg) by mouth 2 times daily May take extra 20 mg as needed for wt .gain >3#   inFLIXimab (REMICADE) 100 MG injection Unknown at Unknown time Self Yes No   Sig: Inject 100 mg into the vein every 28 days    levothyroxine (SYNTHROID/LEVOTHROID) 125 MCG tablet 12/10/2018 at Unknown time Self No Yes   Sig: Take 1 tablet (125 mcg) by mouth daily   losartan (COZAAR) 25 MG tablet 12/10/2018 at Unknown time  No Yes   Sig: Take 1 tablet (25 mg) by mouth daily   melatonin 1 MG TABS tablet 12/10/2018 at Unknown time  No Yes   Sig: Take 1 tablet (1 mg) by mouth nightly as needed for sleep   methotrexate 2.5 MG tablet CHEMO 12/10/2018 at Unknown time Self No Yes   Sig: Take 3 tablets (7.5 mg) by mouth once a week On Mondays   metoprolol tartrate (LOPRESSOR) 50 MG tablet 12/10/2018 at Unknown time  No Yes   Sig: Take 1 tablet (50 mg) by mouth 2 times daily   mirtazapine (REMERON) 15 MG tablet Unknown at Unknown time Self Yes No   Sig: Take 15 mg by mouth At Bedtime.   order for DME  Self No No   Sig: She requested for a 4 wheel walker, would like to change it to 4 wheel walker with a seat and oxygen tank durant.     Need this faxed over to her   [phone]   order for DME  Self No No   Sig: Updated Oxygen: Patient requires supplemental Oxygen 3 LPM via nasal canula with activity and 2 LPM nocturnally. Please provide patient with a portable oxygen concentrator for improved mobility.  Okay to spot check or titrated for conserving to keep stats above 90%. Oxygen will be for a  lifetime.   order for DME   No No   Sig: Please provide patient with updated oxygen equipment.  Patient now requires 4 LPM via nasal canula with activity.   polyethylene glycol (MIRALAX/GLYCOLAX) powder Unknown at Unknown time  No No   Sig: Take 17 g by mouth daily as needed for constipation   sildenafil (REVATIO) 20 MG tablet 12/10/2018 at Unknown time Self No Yes   Sig: TAKE ONE TABLET BY MOUTH THREE TIMES DAILY    sulfamethoxazole-trimethoprim (BACTRIM DS/SEPTRA DS) 800-160 MG per tablet 12/10/2018 at Unknown time  No Yes   Sig: Take 1 tablet by mouth 2 times daily for 19 days   sulfamethoxazole-trimethoprim (BACTRIM/SEPTRA) 400-80 MG per tablet Unknown at Unknown time  No No   Sig: Take 1 tablet by mouth every morning (before breakfast)   Patient not taking: Reported on 12/5/2018   tiotropium (SPIRIVA HANDIHALER) 18 MCG inhaled capsule 12/10/2018 at Unknown time  No Yes   Sig: Inhale contents of one capsule daily.      Facility-Administered Medications: None     Allergies   Allergies   Allergen Reactions     Prednisone Other (See Comments)     He reports that he can't sleep for days and can't use small to massive doses of prednisone   Pt. Does not do well with high doses of Prednisone, His MD says that Prednisone is counter indicated. Prednisone failed to treat his sarcoidosis      Nitroglycerin      Patient is on sildenafil for pulmonary hypertension, nitroglycerin contraindicated        Physical Exam   Vital Signs: Temp: 97.6  F (36.4  C) Temp src: Oral BP: 98/58 Pulse: 54 Heart Rate: (!) 47 Resp: 26 SpO2: 100 % O2 Device: Nasal cannula Oxygen Delivery: 5 LPM  Weight: 175 lbs 12.8 oz     Constitutional: awake, alert, lying in bed, NAD  HEENT: normalmocephalic, atraumatic, anicteric sclera without conjunctival injection, MM dry   Neck: +JVD to ears   Cardiovascular: bradycardic, normal S1/S2, no murmurs/rubs/gallops appreciated   Respiratory: diffuse crackles bilaterally, reduced breath sounds throughout, no  wheezing   Gastrointestinal: soft, nontender, nondistended, normal bowel sounds  Ext: warm and well perfused, 2+ pitting edema in LE b/l   Skin: no rashes noted on exposed skin   Neurologic: alert and oriented, CN II-XII grossly intact, moving all extremities, nonfocal   Psychiatric: appropriate affect    Data   Data reviewed today: I reviewed all medications, new labs and imaging results over the last 24 hours. I personally reviewed the EKG tracing showing 1st degree AV block, no acute ST changes, bradycardia.    Recent Labs   Lab 12/10/18  1327 12/06/18  1220 12/04/18  1100   WBC 6.6  --   --    HGB 9.4*  --   --    MCV 95  --   --      --   --    * 133 136   POTASSIUM 5.6* 5.0 4.1   CHLORIDE 104 106 106   CO2 19* 20 20   BUN 77* 48* 36*   CR 4.42* 3.00* 2.48*   ANIONGAP 9 7 10   RAFFY 8.3* 8.4* 8.7   GLC 91 96 139*   ALBUMIN 3.3*  --   --    PROTTOTAL 7.5  --   --    BILITOTAL 0.2  --   --    ALKPHOS 116  --   --    ALT 41  --   --    AST 34  --   --    TROPI 0.021  --   --         Echocardiogram 11/19/18:  Interpretation Summary  Left ventricular systolic function is mildly reduced.  The visual ejection fraction is estimated at 45-50%.  With rapid atrial fibrillation, the visual approximation of left ventricular  systolic function may be falsely decreased.  Mildly decreased right ventricular systolic function  Right ventricular systolic pressure is elevated, consistent with moderate  pulmonary hypertension.  EF lower compared to 3/18, in setting of rapid atrial fibrillation. The study  was technically difficult.

## 2018-12-10 NOTE — TELEPHONE ENCOUNTER
[facility] Call Center    Phone Message    May a detailed message be left on voicemail: yes    Reason for Call: Symptoms or Concerns     If patient has red-flag symptoms, warm transfer to triage line    Current symptom or concern: patient out of breath when putting on a shirt    Symptoms have been present for:  1.5 week(s)    Has patient previously been seen for this? Yes    By: ER Visit on the 11/21/18    Date: 11/21/18    Are there any new or worsening symptoms? Yes: worse since hospital visit was getting better.       Action Taken: Message routed to:  [facility] ([facility]     Patient called reporting red-flag symptom: SOB with history of heart issue    Per the P Red-Flag symptom list, patient was: instructed to hang up and dial 911, patient reported he will call 911 and be at the [facility].

## 2018-12-10 NOTE — TELEPHONE ENCOUNTER
Spoke to patient on the phone. Patient has been having ongoing SOB. Patient states that he is fine if he just sits there. Patient was advised that he should go to ER to be evaluated due to scheduling conflicts with new Epic upgrade. Angie Roldan LPN 12/10/2018 11:55 AM      Did call patient back to and left a message to relay that we were able to get patient into appointment with Dr. Hernandez at 3:20pm 12/10/2018 if pt was able to make appointment, if not he could call to cancel appointment. Angie Roldan LPN 12/10/2018 11:56 AM

## 2018-12-10 NOTE — ED PROVIDER NOTES
[place] EMERGENCY DEPARTMENT ([facility])  12/10/18   History     Chief Complaint   Patient presents with     Shortness of Breath     The history is provided by the patient and medical records.     Rohan Monroe is a 72 year old male with a medical history significant for sarcoidosis on immunosuppressive therapy, COPD, CAD s/p ROSALIE, HFpEF, atrial flurry s/p ablation with post-ablation recurrence, severe pulmonary hypertension, hypertension, type 2 diabetes mellitus and CKD stage III.  Per review of patient's chart, patient was recently hospitalized from 11/18/2018 to 11/26/2018; patient was originally hospitalized at [facility], but was transferred here to [facility] on 11/21/2018.  The patient had presented to [facility] Emergency Department with dyspnea on exertion found to be in atrial fibrillation with RVR.  During the patient's hospitalization, the patient was diagnosed with pneumocystis pneumonia and he was started on bactrim.  He was discharged with instructions to increase his chronic home oxygen from 3 L to 4-6 L.    The patient presents to the Emergency Department today for evaluation of worsening shortness of breath.  The patient reports since he was discharged, he has been having progressively worsening shortness of breath.  He denies any chest pain, fevers or cough.  He does note some mild leg swelling, but nothing significantly abnormal for him.  The patient reports that despite him feeling short of breath, his O2 saturations have been staying in the 90s.  Patient is a nonsmoker.    I have reviewed the Medications, Allergies, Past Medical and Surgical History, and Social History in the Epic system.    Past Medical History:   Diagnosis Date     Atrial flutter (H)      Cataract of both eyes      Chronic infection     Hep C     Congestive heart failure, unspecified      Coronary artery disease      Depressive disorder      Depressive disorder, not elsewhere classified      Depression (non-psychotic)     ERM OS (epiretinal membrane, left eye)      Generalized osteoarthrosis, unspecified site      Glaucoma suspect      Hypertension      Lichen planus      Other psoriasis      Pneumocystis carinii pneumonia 11/23/2018     PVD (posterior vitreous detachment), left eye      Sarcoidosis      Sarcoidosis      Type II or unspecified type diabetes mellitus without mention of complication, not stated as uncontrolled      Unspecified hypothyroidism     Hypothyroidism     Unspecified viral hepatitis C without hepatic coma      Viral warts, unspecified        Past Surgical History:   Procedure Laterality Date     ANESTHESIA CARDIOVERSION N/A 1/19/2017    Procedure: ANESTHESIA CARDIOVERSION;  Surgeon: GENERIC ANESTHESIA PROVIDER;  Location: [facility] OR     ANESTHESIA CARDIOVERSION N/A 1/23/2017    Procedure: ANESTHESIA CARDIOVERSION;  Surgeon: GENERIC ANESTHESIA PROVIDER;  Location: [facility] OR     ANESTHESIA CARDIOVERSION N/A 1/24/2017    Procedure: ANESTHESIA CARDIOVERSION;  Surgeon: GENERIC ANESTHESIA PROVIDER;  Location: [facility] OR     ANESTHESIA CARDIOVERSION N/A 11/20/2018    Procedure: ANESTHESIA CARDIOVERSION;  Surgeon: GENERIC ANESTHESIA PROVIDER;  Location: [facility] OR     ARTHROPLASTY HIP  8/24/2011    Procedure:ARTHROPLASTY HIP; Right Total Hip Arthroplasty  Choice anesthesia; Surgeon:LESLI WILKINSON; Location:[facility] OR     BIOPSY       C PELVIS/HIP JOINT SURGERY UNLISTED       cardiac stent      s/p     CARDIAC SURGERY       CATARACT IOL, RT/LT  9/15/2015    LE     COLONOSCOPY       CORONARY ANGIOGRAPHY ADULT ORDER       H ABLATION ATRIAL FLUTTER       HC REMOVAL OF TONSILS,<11 Y/O      Tonsils <10y.o.     HC REPAIR INCISIONAL HERNIA,REDUCIBLE      Hernia Repair, Incisional, Unilateral     HEART CATH, ANGIOPLASTY       JOINT REPLACEMENT      1 month ago--right hip     LIGATN/STRIP LONG & SHORT SAPHEN         Family History   Problem Relation Age of Onset     Heart Disease Father         irreg heart beat      Circulatory Father         varicose veins     Prostate Cancer Father      Heart Disease Mother         heart attack     Arthritis Mother      Osteoporosis Mother      Thyroid Disease Mother      Hypertension Mother      Eye Disorder Maternal Grandmother         glaucoma     Diabetes Sister         type 2     Kidney Cancer Sister      Diabetes Sister      Glaucoma No family hx of      Macular Degeneration No family hx of      Cancer No family hx of         no skin cancer       Social History     Tobacco Use     Smoking status: Former Smoker     Packs/day: 1.00     Years: 30.00     Pack years: 30.00     Types: Cigarettes     Start date: 1960     Last attempt to quit: 1994     Years since quittin.4     Smokeless tobacco: Never Used   Substance Use Topics     Alcohol use: No     Alcohol/week: 0.0 oz       No current facility-administered medications for this encounter.         Allergies   Allergen Reactions     Prednisone Other (See Comments)     He reports that he can't sleep for days and can't use small to massive doses of prednisone   Pt. Does not do well with high doses of Prednisone, His MD says that Prednisone is counter indicated. Prednisone failed to treat his sarcoidosis      Nitroglycerin      Patient is on sildenafil for pulmonary hypertension, nitroglycerin contraindicated          Review of Systems   Constitutional: Negative for chills and fever.   HENT: Negative for congestion.    Eyes: Negative for redness.   Respiratory: Positive for shortness of breath. Negative for cough.    Cardiovascular: Negative for chest pain.   Gastrointestinal: Negative for abdominal pain, nausea and vomiting.   Endocrine: Negative for polydipsia and polyuria.   Genitourinary: Negative for difficulty urinating.   Musculoskeletal: Negative for arthralgias and neck stiffness.   Skin: Negative for color change.   Allergic/Immunologic: Negative for immunocompromised state.   Neurological: Negative for headaches.    Hematological: Negative for adenopathy.   Psychiatric/Behavioral: Negative for confusion.       Physical Exam   BP: 122/58  Pulse: 54  Heart Rate: (!) 49  Temp: 97.6  F (36.4  C)  Resp: 18  Weight: 79.7 kg (175 lb 12.8 oz)  SpO2: 98 %      Physical Exam   Constitutional: He is oriented to person, place, and time. He appears well-developed and well-nourished. No distress.   HENT:   Head: Normocephalic and atraumatic.   Mouth/Throat: Oropharynx is clear and moist.   Eyes: Pupils are equal, round, and reactive to light. No scleral icterus.   Neck: Normal range of motion.   Cardiovascular: Normal rate, normal heart sounds and intact distal pulses.   Pulmonary/Chest: He is in respiratory distress ( mild).   Diffuse, coarse breath sounds bilaterally in anterior and posterior lung fields.   Abdominal: Soft. Bowel sounds are normal. There is no tenderness.   Musculoskeletal: He exhibits edema ( 2 b/l LEs). He exhibits no tenderness.   Neurological: He is alert and oriented to person, place, and time.   Skin: Skin is warm. No rash noted. He is not diaphoretic.   Psychiatric: He has a normal mood and affect. His behavior is normal. Judgment and thought content normal.       ED Course   1:27 PM  The patient was seen and examined by Jaret Tidwell MD in Room ED03.        Procedures             EKG Interpretation:      Interpreted by Bandar Tidwell  Time reviewed: 1:11 PM  Symptoms at time of EKG: Shortness of breath  Rhythm: sinus bradycardia and 1 degree AV block  Rate: 50-60  Axis: Normal  Ectopy: none  Conduction: 1st degree AV block  ST Segments/ T Waves: No ST-T wave changes  Q Waves: none  Comparison to prior: Improved since old EKG done on November 30, 2018    Clinical Impression: sinus bradycardia and 1st degree AV block                Critical Care time:  none             Labs Ordered and Resulted from Time of ED Arrival Up to the Time of Departure from the ED   CBC WITH PLATELETS DIFFERENTIAL - Abnormal; Notable for the  following components:       Result Value    RBC Count 3.15 (*)     Hemoglobin 9.4 (*)     Hematocrit 30.0 (*)     MCHC 31.3 (*)     RDW 15.7 (*)     All other components within normal limits   COMPREHENSIVE METABOLIC PANEL - Abnormal; Notable for the following components:    Sodium 131 (*)     Potassium 5.6 (*)     Carbon Dioxide 19 (*)     Urea Nitrogen 77 (*)     Creatinine 4.42 (*)     GFR Estimate 13 (*)     GFR Estimate If Black 16 (*)     Calcium 8.3 (*)     Albumin 3.3 (*)     All other components within normal limits   NT PROBNP INPATIENT - Abnormal; Notable for the following components:    N-Terminal Pro BNP Inpatient 5,378 (*)     All other components within normal limits   TROPONIN I   ROUTINE UA WITH MICROSCOPIC REFLEX TO CULTURE   UREA NITROGEN RANDOM URINE   CREATININE RANDOM URINE   CARDIAC CONTINUOUS MONITORING   PULSE OXIMETRY NURSING            Assessments & Plan (with Medical Decision Making)   72-year-old man presenting with worsening shortness of breath.  Differential diagnosis: ACS, CHF exacerbation, sarcoidosis, pneumonia, fluid overload.    After thorough history and physical examination, patient appears to be in no acute distress.  I will obtain laboratory studies, EKG and chest x-ray for further diagnostic evaluation.  Patient was given DuoNeb treatment by the triage nurse and he states that his symptoms are slightly improved; however, not by much.    Patient's laboratory studies returned with no leukocytosis, WBC is normal at 6,600. There is anemia with a hemoglobin of 9.4, which is at patient's baseline. Electrolytes show acute kidney injury with an elevated creatinine of 4.42 along with mild hypokalemia of 5.6. BMP is elevated at 5,378. Patient was given a dose of IV Lasix. Troponin is within normal limits at 0.021 and EKG showed no acute ischemia. I reviewed patient's chest x-ray and I read the Radiology report; there is concern for mixed picture of potential pulmonary edema, ARDS, or  infection. Case was discussed with Cards 1 staff, Dr. Wallace, who agreed to accept the patient to her service for further evaluation and management.     This part of the medical record was transcribed by Mil Farias, Medical Scribe, from a dictation done by Jaret Tidwell MD.       I have reviewed the nursing notes.    I have reviewed the findings, diagnosis, plan and need for follow up with the patient.    Current Discharge Medication List          Final diagnoses:   Heart failure exacerbated by sotalol (H)   Acute kidney injury (H)     I, Mil Farias, am serving as a trained medical scribe to document services personally performed by Jaret Tidwell MD, based on the provider's statements to me.   I, Jaret Tidwell MD, was physically present and have reviewed and verified the accuracy of this note documented by Mil Farias.    12/10/2018   [facility], [place], EMERGENCY DEPARTMENT     Bandar Tidwell MD  12/10/18 [id number]

## 2018-12-10 NOTE — ED NOTES
[facility], [place]   ED Nurse to Floor Handoff     Rohan Monroe is a 72 year old male who speaks English and lives with others,  in a home  They arrived in the ED by ambulance from home    ED Chief Complaint: Shortness of Breath    ED Dx;   Final diagnoses:   Heart failure exacerbated by sotalol (H)   Acute kidney injury (H)         Needed?: No    Allergies:   Allergies   Allergen Reactions     Prednisone Other (See Comments)     He reports that he can't sleep for days and can't use small to massive doses of prednisone   Pt. Does not do well with high doses of Prednisone, His MD says that Prednisone is counter indicated. Prednisone failed to treat his sarcoidosis      Nitroglycerin      Patient is on sildenafil for pulmonary hypertension, nitroglycerin contraindicated    .  Past Medical Hx:   Past Medical History:   Diagnosis Date     Atrial flutter (H)      Cataract of both eyes      Chronic infection     Hep C     Congestive heart failure, unspecified      Coronary artery disease      Depressive disorder      Depressive disorder, not elsewhere classified     Depression (non-psychotic)     ERM OS (epiretinal membrane, left eye)      Generalized osteoarthrosis, unspecified site      Glaucoma suspect      Hypertension      Lichen planus      Other psoriasis      Pneumocystis carinii pneumonia 11/23/2018     PVD (posterior vitreous detachment), left eye      Sarcoidosis      Sarcoidosis      Type II or unspecified type diabetes mellitus without mention of complication, not stated as uncontrolled      Unspecified hypothyroidism     Hypothyroidism     Unspecified viral hepatitis C without hepatic coma      Viral warts, unspecified       Baseline Mental status: WDL  Current Mental Status changes: at basesline    Infection present or suspected this encounter: no  Sepsis suspected: No  Isolation type: No active isolations     Activity level - Baseline/Home:   Independent  Activity Level - Current:   Stand with Assist    Bariatric equipment needed?: No    In the ED these meds were given:   Medications   LORazepam (ATIVAN) 2 MG/ML injection (not administered)   furosemide (LASIX) injection 40 mg (not administered)   ipratropium - albuterol 0.5 mg/2.5 mg/3 mL (DUONEB) neb solution 3 mL (3 mLs Nebulization Given 12/10/18 1319)       Drips running?  No    Home pump  No    Current LDAs  Peripheral IV 12/10/18 Right Upper forearm (Active)   Number of days: 0       Pressure Injury 11/21/18 Buttocks (Active)   Number of days: 19       Rash 11/24/18 1030 Right arm plaque (Active)   Number of days: 16       Labs results:   Labs Ordered and Resulted from Time of ED Arrival Up to the Time of Departure from the ED   CBC WITH PLATELETS DIFFERENTIAL - Abnormal; Notable for the following components:       Result Value    RBC Count 3.15 (*)     Hemoglobin 9.4 (*)     Hematocrit 30.0 (*)     MCHC 31.3 (*)     RDW 15.7 (*)     All other components within normal limits   COMPREHENSIVE METABOLIC PANEL - Abnormal; Notable for the following components:    Sodium 131 (*)     Potassium 5.6 (*)     Carbon Dioxide 19 (*)     Urea Nitrogen 77 (*)     Creatinine 4.42 (*)     GFR Estimate 13 (*)     GFR Estimate If Black 16 (*)     Calcium 8.3 (*)     Albumin 3.3 (*)     All other components within normal limits   NT PROBNP INPATIENT - Abnormal; Notable for the following components:    N-Terminal Pro BNP Inpatient 5,378 (*)     All other components within normal limits   TROPONIN I   CARDIAC CONTINUOUS MONITORING   PULSE OXIMETRY NURSING       Imaging Studies: No results found for this or any previous visit (from the past 24 hour(s)).    Recent vital signs:   BP 98/58   Pulse 54   Temp 97.6  F (36.4  C) (Oral)   Resp 26   Wt 79.7 kg (175 lb 12.8 oz)   SpO2 100%   BMI 26.73 kg/m      Cardiac Rhythm: Normal Sinus and Bradycardia  Pt needs tele? Yes  Skin/wound Issues: None    Code Status: Full  Code    Pain control: pt had none    Nausea control: pt had none    Abnormal labs/tests/findings requiring intervention: CHF exacerbation    Family present during ED course? Yes   Family Comments/Social Situation comments: @ bedside    Tasks needing completion: None    [phone] [facility]

## 2018-12-11 ENCOUNTER — APPOINTMENT (OUTPATIENT)
Dept: CARDIOLOGY | Facility: CLINIC | Age: 73
DRG: 177 | End: 2018-12-11
Payer: MEDICARE

## 2018-12-11 LAB
ANION GAP SERPL CALCULATED.3IONS-SCNC: 6 MMOL/L (ref 3–14)
ANION GAP SERPL CALCULATED.3IONS-SCNC: 7 MMOL/L (ref 3–14)
ANION GAP SERPL CALCULATED.3IONS-SCNC: 7 MMOL/L (ref 3–14)
ANION GAP SERPL CALCULATED.3IONS-SCNC: 8 MMOL/L (ref 3–14)
BUN SERPL-MCNC: 83 MG/DL (ref 7–30)
BUN SERPL-MCNC: 83 MG/DL (ref 7–30)
BUN SERPL-MCNC: 84 MG/DL (ref 7–30)
BUN SERPL-MCNC: 85 MG/DL (ref 7–30)
CALCIUM SERPL-MCNC: 7.9 MG/DL (ref 8.5–10.1)
CALCIUM SERPL-MCNC: 8.4 MG/DL (ref 8.5–10.1)
CALCIUM SERPL-MCNC: 8.5 MG/DL (ref 8.5–10.1)
CALCIUM SERPL-MCNC: 8.5 MG/DL (ref 8.5–10.1)
CHLORIDE SERPL-SCNC: 105 MMOL/L (ref 94–109)
CHLORIDE SERPL-SCNC: 106 MMOL/L (ref 94–109)
CO2 SERPL-SCNC: 18 MMOL/L (ref 20–32)
CO2 SERPL-SCNC: 18 MMOL/L (ref 20–32)
CO2 SERPL-SCNC: 21 MMOL/L (ref 20–32)
CO2 SERPL-SCNC: 22 MMOL/L (ref 20–32)
CREAT SERPL-MCNC: 4.29 MG/DL (ref 0.66–1.25)
CREAT SERPL-MCNC: 4.43 MG/DL (ref 0.66–1.25)
CREAT SERPL-MCNC: 4.74 MG/DL (ref 0.66–1.25)
CREAT SERPL-MCNC: 4.77 MG/DL (ref 0.66–1.25)
ERYTHROCYTE [DISTWIDTH] IN BLOOD BY AUTOMATED COUNT: 15.7 % (ref 10–15)
GFR SERPL CREATININE-BSD FRML MDRD: 12 ML/MIN/1.7M2
GFR SERPL CREATININE-BSD FRML MDRD: 12 ML/MIN/1.7M2
GFR SERPL CREATININE-BSD FRML MDRD: 13 ML/MIN/1.7M2
GFR SERPL CREATININE-BSD FRML MDRD: 14 ML/MIN/1.7M2
GLUCOSE BLDC GLUCOMTR-MCNC: 112 MG/DL (ref 70–99)
GLUCOSE BLDC GLUCOMTR-MCNC: 128 MG/DL (ref 70–99)
GLUCOSE BLDC GLUCOMTR-MCNC: 88 MG/DL (ref 70–99)
GLUCOSE BLDC GLUCOMTR-MCNC: 95 MG/DL (ref 70–99)
GLUCOSE SERPL-MCNC: 103 MG/DL (ref 70–99)
GLUCOSE SERPL-MCNC: 109 MG/DL (ref 70–99)
GLUCOSE SERPL-MCNC: 79 MG/DL (ref 70–99)
GLUCOSE SERPL-MCNC: 85 MG/DL (ref 70–99)
HCT VFR BLD AUTO: 28.4 % (ref 40–53)
HGB BLD-MCNC: 8.9 G/DL (ref 13.3–17.7)
MCH RBC QN AUTO: 29.4 PG (ref 26.5–33)
MCHC RBC AUTO-ENTMCNC: 31.3 G/DL (ref 31.5–36.5)
MCV RBC AUTO: 94 FL (ref 78–100)
PLATELET # BLD AUTO: 208 10E9/L (ref 150–450)
POTASSIUM SERPL-SCNC: 5.3 MMOL/L (ref 3.4–5.3)
POTASSIUM SERPL-SCNC: 5.6 MMOL/L (ref 3.4–5.3)
POTASSIUM SERPL-SCNC: 5.8 MMOL/L (ref 3.4–5.3)
POTASSIUM SERPL-SCNC: 6 MMOL/L (ref 3.4–5.3)
RBC # BLD AUTO: 3.03 10E12/L (ref 4.4–5.9)
SODIUM SERPL-SCNC: 131 MMOL/L (ref 133–144)
SODIUM SERPL-SCNC: 132 MMOL/L (ref 133–144)
SODIUM SERPL-SCNC: 134 MMOL/L (ref 133–144)
SODIUM SERPL-SCNC: 134 MMOL/L (ref 133–144)
SODIUM UR-SCNC: 20 MMOL/L
WBC # BLD AUTO: 5.2 10E9/L (ref 4–11)

## 2018-12-11 PROCEDURE — 80048 BASIC METABOLIC PNL TOTAL CA: CPT | Performed by: STUDENT IN AN ORGANIZED HEALTH CARE EDUCATION/TRAINING PROGRAM

## 2018-12-11 PROCEDURE — 00000146 ZZHCL STATISTIC GLUCOSE BY METER IP

## 2018-12-11 PROCEDURE — 93306 TTE W/DOPPLER COMPLETE: CPT | Mod: 26 | Performed by: INTERNAL MEDICINE

## 2018-12-11 PROCEDURE — 25000132 ZZH RX MED GY IP 250 OP 250 PS 637: Mod: GY | Performed by: STUDENT IN AN ORGANIZED HEALTH CARE EDUCATION/TRAINING PROGRAM

## 2018-12-11 PROCEDURE — 25000125 ZZHC RX 250: Performed by: STUDENT IN AN ORGANIZED HEALTH CARE EDUCATION/TRAINING PROGRAM

## 2018-12-11 PROCEDURE — A9270 NON-COVERED ITEM OR SERVICE: HCPCS | Mod: GY | Performed by: STUDENT IN AN ORGANIZED HEALTH CARE EDUCATION/TRAINING PROGRAM

## 2018-12-11 PROCEDURE — 99233 SBSQ HOSP IP/OBS HIGH 50: CPT | Mod: 25 | Performed by: INTERNAL MEDICINE

## 2018-12-11 PROCEDURE — 21400006 ZZH R&B CCU INTERMEDIATE UMMC

## 2018-12-11 PROCEDURE — 36415 COLL VENOUS BLD VENIPUNCTURE: CPT | Performed by: STUDENT IN AN ORGANIZED HEALTH CARE EDUCATION/TRAINING PROGRAM

## 2018-12-11 PROCEDURE — 93010 ELECTROCARDIOGRAM REPORT: CPT | Performed by: INTERNAL MEDICINE

## 2018-12-11 PROCEDURE — 25000132 ZZH RX MED GY IP 250 OP 250 PS 637: Mod: GY | Performed by: INTERNAL MEDICINE

## 2018-12-11 PROCEDURE — 84300 ASSAY OF URINE SODIUM: CPT | Performed by: STUDENT IN AN ORGANIZED HEALTH CARE EDUCATION/TRAINING PROGRAM

## 2018-12-11 PROCEDURE — 93005 ELECTROCARDIOGRAM TRACING: CPT

## 2018-12-11 PROCEDURE — 25500064 ZZH RX 255 OP 636: Performed by: INTERNAL MEDICINE

## 2018-12-11 PROCEDURE — 94640 AIRWAY INHALATION TREATMENT: CPT

## 2018-12-11 PROCEDURE — 85027 COMPLETE CBC AUTOMATED: CPT | Performed by: STUDENT IN AN ORGANIZED HEALTH CARE EDUCATION/TRAINING PROGRAM

## 2018-12-11 PROCEDURE — 40000264 ECHOCARDIOGRAM COMPLETE

## 2018-12-11 RX ORDER — GLIPIZIDE 10 MG/1
1 TABLET ORAL
Status: DISCONTINUED | OUTPATIENT
Start: 2018-12-11 | End: 2018-12-14 | Stop reason: HOSPADM

## 2018-12-11 RX ORDER — SODIUM POLYSTYRENE SULFONATE 15 G/60ML
30 SUSPENSION ORAL; RECTAL ONCE
Status: COMPLETED | OUTPATIENT
Start: 2018-12-11 | End: 2018-12-11

## 2018-12-11 RX ORDER — ATOVAQUONE 750 MG/5ML
750 SUSPENSION ORAL EVERY 12 HOURS
Status: DISCONTINUED | OUTPATIENT
Start: 2018-12-11 | End: 2018-12-14 | Stop reason: HOSPADM

## 2018-12-11 RX ORDER — METOPROLOL TARTRATE 25 MG/1
25 TABLET, FILM COATED ORAL 2 TIMES DAILY
Status: DISCONTINUED | OUTPATIENT
Start: 2018-12-11 | End: 2018-12-13

## 2018-12-11 RX ADMIN — SILDENAFIL 20 MG: 20 TABLET ORAL at 19:19

## 2018-12-11 RX ADMIN — ALBUTEROL SULFATE 2.5 MG: 2.5 SOLUTION RESPIRATORY (INHALATION) at 00:32

## 2018-12-11 RX ADMIN — SILDENAFIL 20 MG: 20 TABLET ORAL at 13:55

## 2018-12-11 RX ADMIN — SILDENAFIL 20 MG: 20 TABLET ORAL at 08:58

## 2018-12-11 RX ADMIN — LEVOTHYROXINE SODIUM 125 MCG: 125 TABLET ORAL at 08:55

## 2018-12-11 RX ADMIN — ATOVAQUONE 750 MG: 750 SUSPENSION ORAL at 17:57

## 2018-12-11 RX ADMIN — SODIUM POLYSTYRENE SULFONATE 30 G: 15 SUSPENSION ORAL; RECTAL at 19:24

## 2018-12-11 RX ADMIN — DILTIAZEM HYDROCHLORIDE 360 MG: 180 CAPSULE, EXTENDED RELEASE ORAL at 08:55

## 2018-12-11 RX ADMIN — METOPROLOL TARTRATE 50 MG: 50 TABLET ORAL at 08:55

## 2018-12-11 RX ADMIN — MIRTAZAPINE 15 MG: 15 TABLET, FILM COATED ORAL at 21:45

## 2018-12-11 RX ADMIN — ATORVASTATIN CALCIUM 40 MG: 40 TABLET, FILM COATED ORAL at 19:19

## 2018-12-11 RX ADMIN — HUMAN ALBUMIN MICROSPHERES AND PERFLUTREN 6 ML: 10; .22 INJECTION, SOLUTION INTRAVENOUS at 11:00

## 2018-12-11 RX ADMIN — FLUTICASONE FUROATE AND VILANTEROL TRIFENATATE 1 PUFF: 100; 25 POWDER RESPIRATORY (INHALATION) at 08:56

## 2018-12-11 RX ADMIN — ASPIRIN 81 MG: 81 TABLET, COATED ORAL at 08:55

## 2018-12-11 RX ADMIN — UMECLIDINIUM 1 PUFF: 62.5 AEROSOL, POWDER ORAL at 08:55

## 2018-12-11 ASSESSMENT — ACTIVITIES OF DAILY LIVING (ADL)
ADLS_ACUITY_SCORE: 14
ADLS_ACUITY_SCORE: 12
ADLS_ACUITY_SCORE: 14
ADLS_ACUITY_SCORE: 14
ADLS_ACUITY_SCORE: 12
ADLS_ACUITY_SCORE: 12

## 2018-12-11 NOTE — PLAN OF CARE
"D:Reports shortness of breath is \"slightly better\".   Reports having these \"episodes and not being able to lie on side\".   Agreed to get out of bed to chair.   Transferred to chair with assist of one.  No noticeable increased shortness of breath with this activity.   Later assisted to bathroom with assist of two due to IV pole and other  tubing.  Mild shortness of breath with ambulation.  No acute distress.  Lungs are coarse bilaterally with crackles on the right lower lobe.  O2 sat within normal range on 6L/NC.  Temp 98.1 HR 69,  NSR with 1s degree AVB,  no ectopy.  Bp 120/63, MAP 88.    A:Breathing easy and tolerating increased activity.  Tolerated being up in chair for about a half an hour.   Tolerated ambulating to bathroom on O2 with assist of two.   A/O times 4,   AVSS.  Rhythm is stable.  Condition is stable.    P:Up with assist only  Ambulate on oxygen.  Assess respiratory status and monitor O2 sats.  Monitor temp and vital signs.   Assess level of comfort.  Notify MD with any acute changes in condition, abnormal vital signs,  O2 sat decrease, abnormal rhythm.  "

## 2018-12-11 NOTE — PROGRESS NOTES
[facility], [place]    Progress Note - Cardiology Service        Date of Admission:  12/10/2018    Assessment & Plan     Rohan Monroe is a 71 y/o M with history of sarcoidosis on immunosuppressive therapy, COPD (on 4-6 L home O2), CAD s/p ROSALIE to D2 and mLAD (05/2017), HFpEF, atrial flutter s/p ablation with post-ablation recurrence, severe pulmonary HTN, HTN, DM2, CKD stage III, and recent diagnosis of PCP pneumonia (on bactrim) admitted on 12/10 with acute on chronic dyspnea on exertion felt to be multifactorial and secondary to underlying pulmonary sarcoid, ?worsening PCP pneumonia, and fluid overload secondary to acute on chronic HFpEF and RADHA.    Changes Today:  - ID and nephrology consulted, appreciate recs   - Decrease metoprolol to 25mg BID (from 50mg BID) d/t bradycardia   - TTE this morning  - PT consulted     # Acute on chronic HFpEF (EF 60-65% in March 2018)   # CAD s/p PCI with ROSALIE to mid-LAD and D2 in 5/2017   Recent TTE at OSH 11/19/18 with reduced EF to 45-50%, although done while in rapid Afib. Presents with weight gain of ~6kg and increased dyspnea/orthopnea since discharge on 11/26 concerning for fluid overload in the setting of reduced lasix dose and worsening RADHA. Apart from weight gain, however, fluid overload likely does not explain all of patients symptoms. Worsening dyspnea is likely multifactorial and secondary to other numerous pulmonary issues (see below).  BNP elevated but decreased from previous.  Troponin negative. EKG with 1st degree AV block, no ischemic changes. No signs of shock.    -CXR with significant, diffuse interstitial and airspace infiltrates   - Continue PTA aspirin 81 mg daily, and Lipitor 40 mg daily  - Holding PTA losartan d/t RADHA   - Continue Cardizem 360 mg every day  - BB: decreased metoprolol to 25mg BID (from 50mg) d/t bradycardia on 12/11   - Diuresis: s/p 40mg x1 dose and 60mg x1 dose IV lasix on 12/11 without significant  UOP. Will hold off on further diuresis and discuss with nephrology   - Daily weights, strict I's and O's  - Low sodium diet, fluid restriction   - CORE follow up as outpatient     # Pneumocystis Pneumonia  # Severe dyspnea on exertion   # Pulmonary sarcoidosis   # ? 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 Follows with pulmonology, Dr. Perlman. Was previously on 3L O2 at home, recently increased to 4-6 L following PCP diagnosis. O2 requirements stable without worsening hypoxia. Ongoing dyspnea is likely multifactorial and secondary to fluid overload (see discussion above) and ongoing/worsening PCP pneumonia in the setting of underlying severe pulmonary HTN and pulmonary sarcoid. CXR significantly abnormal and concerning for infection/fluid overload on top of underlying ILD.     - ID consulted, appreciate recs    - Will hold bactrim due to RADHA and transition to pentamidine per ID recs, pharmacy assistance to dose  - Continue PTA bronchodilators and diuresis   - Takes methotrexate qweek (will hold for now given RADHA  - Diuresing as discussed above     # RADHA on CKD stage III   # Hyperkalemia   Baseline creatinine recently around 1.9-2.4 (2.39 on 11/26), now elevated to 4.42. Potassium elevated to 5.6 in ED, shifted with lasix. RADHA likely secondary to bactrim use for PCP pneumonia. Lasix was decreased during recent admission.      - Nephrology consulted, appreciate recs  - UA, urine urea and creatinine ordered. FeUrea concerning for pre-renal etiology.   - Monitor renal function closely  - Avoid nephrotoxic drugs  - BMP q6 hours, shift K PRN      # Atrial flutter and fibrillation s/p ablation with post-ablation recurrence  Recently admitted to OSH on 11/18 with Afib with RVR, s/p spontaneous conversion but recurrence on 11/21. Patient converted to sinus rhythm 11/25 after treatment of PCP pneumonia was initiated along with uptitration of  diltiazem, but flipped back into Afib on 11/26 prior to recent discharge. HR bradycardic in the ED.   - [id number]. Was previously on coumadin, but stopped due to patient preference along with heme occult positive study during recent admission.   - Continue Diltiazem 360 mg daily   - Goal HR < 110     # Severe pulmonary HTN  RHC 11/19, PVR is 9 and wedge was slightly elevated, 11 but patient in rapid Afib at that time.   - Continue PTA sildenafil 20mg TID      # Normocytic anemia  Hgb 9.4 on admission, within recent baseline. No active bleeding.  - CBC in AM     # Hyponatremia- resolved   Mild, Na 131 down from 133 on 12/6, now resolved.     # Gout   Per patient never formally diagnosed, but does take colchicine for flares.  Started colchicine 11/24. No current flare.      Chronic Conditions   # Hypothyroidism- continue PTA levothyroxine  # History of HepC- treated in the past  # DM2 (A1C 6.4)- low intensity sliding scale insulin, hypoglycemia protocol   # Insomnia- cont PTA PRN melatonin and PRN remeron      Diet: 2g sodium diet, 2L fluid restriction   Fluids: no mIVF   DVT Prophylaxis: Pneumatic Compression Devices  Fung Catheter: not present  Code Status: Full     Disposition Plan   Expected discharge: 2 - 3 days, recommended to prior living arrangement once renal function improved.  Entered: Trish Curry MD 12/11/2018, 10:41 AM     The patient's care was discussed with the Attending Physician, Dr. Wallace.    Trish Curry MD  Cardiology 1 Service  [facility], [place]  Pager: [phone]  Please see sticky note for cross cover information  ______________________________________________________________________    Interval History   Patient admitted yesterday evening, see H&P for additional details. Patient reports ongoing significant dyspnea with mild exertion. He had an uncomfortable episode of PND overnight, but does not feel like sitting up in bed  significantly improved his symptoms. BiPAP was ordered to try and improve symptoms, but patient declined use. No hypoxia this morning on baseline 5L O2.     Data reviewed today: I reviewed all medications, new labs and imaging results over the last 24 hours.    Physical Exam   Vital Signs: Temp: 97.8  F (36.6  C) Temp src: Oral BP: 137/67 Pulse: 54 Heart Rate: 77 Resp: 18 SpO2: 100 % O2 Device: Nasal cannula Oxygen Delivery: 5 LPM  Weight: 170 lbs 0 oz  Constitutional: awake, alert, lying in bed, NAD   HEENT: normalmocephalic, atraumatic, anicteric sclera without conjunctival injection  Neck: JVD minimally elevated and decreased from yesterday   Cardiovascular: bra, normal S1/S2, no murmurs/rubs/gallops appreciated   Respiratory: diffuse crackles bilaterally, no wheezing   Gastrointestinal: soft, nontender, nondistended, normal bowel sounds  Ext: warm and well perfused, trace LE edema b/l   Skin: no rashes noted on exposed skin, face is flushed, venous stasis changes in LE   Neurologic: alert and oriented, CN II-XII grossly intact, moving all extremities, nonfocal   Psychiatric: appropriate affect

## 2018-12-11 NOTE — CONSULTS
Nephrology Initial Consult  December 11, 2018      Rohan Monroe MRN:[id number] YOB: 1945  Date of Admission:12/10/2018  Primary care provider: Jamie Foster  Requesting physician: Marilu Wallace MD    ASSESSMENT AND RECOMMENDATIONS:   Rohan Monroe is a 72 year old male with a PMH of pulmonary sarcoidosis on immunosuppression, COPD (4-6 L O2 at baseline), CAD s/p ROSALIE to D2 and LAD, HFpEF, a flutter status post ablation, pulmonary HTN, HTN, DM2, CKD III, and recent diagnosis of PCP PNA on treatment dose bactrim who was admitted on 12/10 with worsening dyspnea on exertion.   Nephrology was consulted regarding the patients RADHA and hyperkalemia.    RADHA  #RADHA on CKD  Creatinine rise to L4.43 on 12/11, up from baseline CKD III Cr 1.9-2.4, likely multifactorial etiology.  P.o. intake with fluid restriction abdominal bloating, dry membranes on physical exam, hyaline casts on UA suggest prerenal component.  In addition, the amount of creatinine rise may be artificially elevated in the setting of decreased creatinine secretion with Bactrim.  AIN less likely given patient has no WBCs or RBCs in UA.  Per chart review, patient has been very fluid responsive in the past with regards to his creatinine.  Nonoliguric AK I given urine output of 1200 cc and one unmeasured episode on 12/11.  - Recommend holding diuresis at this time-given TTE on 12/11 revealed normal IVC size, potential prerenal component  - Agree with holding losartan and Bactrim at this time  - trend BMP, avoid nephrotoxic agents  - strict I/Os, daily weights    ELECTROLYTES  #hyperkalemia  Potassium of 5.6 on admission down to 5.3 on 12/11 after 40 mg IV Lasix and 60 mg IV Lasix with adequate urine output.  Hyperkalemia likely secondary to Bactrim effect ENaC channels, producing an aldosterone resistance, which in turn blocks K and H+ excretion. Hyperkalemia coincides with initiation of bactrim therapy.  In addition,  losartan in the RAAS system and creating a hyporenin state is additive in a patient with renal insufficiency, which contributes to the hyperkalemia.  - Agree with holding losartan and Bactrim at this time  - trend BMP, avoid nephrotoxic agents  - strict I/Os, daily weights    ACID/BASE  No acute issues at this time, patient initially admitted with abicar of 19 for slight acidosis, likely secondary to bactrim induced aldosterone resistance, creating a type 4 RTA pattern with decreased K and H+ excretion.  - Agree with holding losartan and Bactrim at this time  - trend BMP, avoid nephrotoxic agents  - strict I/Os, daily weights    VOLUME STATUS/BLOOD PRESSURE  Nonoliguric RADHA given urine output of 1200 cc and one unmeasured episode on 12/11. SBP of 94-130s during this admission.  Concern for prerenal component to  RADHA  - Recommend holding diuresis at this time-given TTE on 12/11 revealed normal IVC size, potential prerenal component  - Agree with holding losartan and Bactrim at this time  - trend BMP, avoid nephrotoxic agents  - strict I/Os, daily weightsPatient was seen and discussed with Dr. Marilu Peterson MD  Internal Medicine, PGY-1  [phone]      REASON FOR CONSULT:   RADHA, hyperkalemia     HISTORY OF PRESENT ILLNESS:  Rohan Monroe is a 72 year old male with a PMH of pulmonary sarcoidosis on immunosuppression, COPD (4-6 L O2 at baseline), CAD s/p ROSALIE to D2 and LAD, HFpEF, a flutter status post ablation, pulmonary HTN, HTN, DM2, CKD III, and recent diagnosis of PCP PNA on treatment dose bactrim who was admitted on 12/10 with worsening dyspnea on exertion.  Patient has a baseline creatinine of 1.9-2.4 (2.39 on 11/26).  Of note patient started on treatment dose Bactrim on 11/25 to start a 3-week course PCP pneumonia.  Since his recent discharge on 11/26, notes  worsening shortness of breath.  Between 11/24 - 12/04, patient has had regular BMP checks, with creatinine ranging between 2.39-2.59 and  K of 3.5-4.4.  On 12/06, Cr gretchen to 3.0.  On admission on 12/10, pt was found to have a K of 5.6, Na 131, Cr of 4.42.  EKG with no peaked T waves.  Pt was given 40mg IV lasix and then an additional 60mg IV lasix after minimal urinary response. Bactrim and losartan were held, Cr peaked at 4.77 and returned to 4.43.  Patient endorses decreased p.o. intake due to abdominal bloating and distention in addition to being on fluid restriction.  Patient denies fevers, chills, chest pain, abdominal pain, nausea, vomiting, diarrhea, dysuria.  PAST MEDICAL HISTORY:    Past Medical History:   Diagnosis Date     Atrial flutter (H)      Cataract of both eyes      Chronic infection     Hep C     Congestive heart failure, unspecified      Coronary artery disease      Depressive disorder      Depressive disorder, not elsewhere classified     Depression (non-psychotic)     ERM OS (epiretinal membrane, left eye)      Generalized osteoarthrosis, unspecified site      Glaucoma suspect      Hypertension      Lichen planus      Other psoriasis      Pneumocystis carinii pneumonia 11/23/2018     PVD (posterior vitreous detachment), left eye      Sarcoidosis      Sarcoidosis      Type II or unspecified type diabetes mellitus without mention of complication, not stated as uncontrolled      Unspecified hypothyroidism     Hypothyroidism     Unspecified viral hepatitis C without hepatic coma      Viral warts, unspecified        Past Surgical History:   Procedure Laterality Date     ANESTHESIA CARDIOVERSION N/A 1/19/2017    Procedure: ANESTHESIA CARDIOVERSION;  Surgeon: GENERIC ANESTHESIA PROVIDER;  Location: [facility] OR     ANESTHESIA CARDIOVERSION N/A 1/23/2017    Procedure: ANESTHESIA CARDIOVERSION;  Surgeon: GENERIC ANESTHESIA PROVIDER;  Location: [facility]     ANESTHESIA CARDIOVERSION N/A 1/24/2017    Procedure: ANESTHESIA CARDIOVERSION;  Surgeon: GENERIC ANESTHESIA PROVIDER;  Location: [facility]     ANESTHESIA CARDIOVERSION N/A 11/20/2018    Procedure:  ANESTHESIA CARDIOVERSION;  Surgeon: GENERIC ANESTHESIA PROVIDER;  Location: [facility]     ARTHROPLASTY HIP  8/24/2011    Procedure:ARTHROPLASTY HIP; Right Total Hip Arthroplasty  Choice anesthesia; Surgeon:LESLI WILKINSON; Location:[facility]     BIOPSY       C PELVIS/HIP JOINT SURGERY UNLISTED       cardiac stent      s/p     CARDIAC SURGERY       CATARACT IOL, RT/LT  9/15/2015    LE     COLONOSCOPY       CORONARY ANGIOGRAPHY ADULT ORDER       H ABLATION ATRIAL FLUTTER       HC REMOVAL OF TONSILS,<11 Y/O      Tonsils <10y.o.     HC REPAIR INCISIONAL HERNIA,REDUCIBLE      Hernia Repair, Incisional, Unilateral     HEART CATH, ANGIOPLASTY       JOINT REPLACEMENT      1 month ago--right hip     LIGATN/STRIP LONG & SHORT SAPHEN          MEDICATIONS:  PTA Meds  Prior to Admission medications    Medication Sig Last Dose Taking? Auth Provider   albuterol (PROAIR HFA/PROVENTIL HFA/VENTOLIN HFA) 108 (90 Base) MCG/ACT Inhaler Inhale 2 puffs into the lungs every 6 hours as needed for shortness of breath / dyspnea 12/10/2018 at Unknown time Yes Perlman, David Morris, MD   aspirin 81 MG EC tablet Take 81 mg by mouth daily 12/10/2018 at Unknown time Yes Unknown, Entered By History   atorvastatin (LIPITOR) 40 MG tablet TAKE ONE TABLET BY MOUTH ONE TIME DAILY   Patient taking differently: TAKE ONE TABLET BY MOUTH ONE TIME EVERY EVENING. 12/9/2018 at Unknown time Yes Diane Paige MD   colchicine (COLCYRS) 0.6 MG tablet Take 1 tablet (0.6 mg) by mouth 2 times daily 12/10/2018 at Unknown time Yes Tal Grullon MD   diltiazem (CARDIZEM CD/CARTIA XT) 360 MG 24 hr CD capsule Take 1 capsule (360 mg) by mouth daily 12/10/2018 at Unknown time Yes Tal Grullon MD   fluticasone-vilanterol (BREO ELLIPTA) 100-25 MCG/INH inhaler Inhale 1 puff into the lungs daily 12/10/2018 at Unknown time Yes Jamie Foster MD   furosemide (LASIX) 20 MG tablet Take 1 tablet (20 mg) by mouth 2 times daily May take extra 20 mg as needed for wt  .gain >3# 12/10/2018 at Unknown time Yes Tal Grullon MD   levothyroxine (SYNTHROID/LEVOTHROID) 125 MCG tablet Take 1 tablet (125 mcg) by mouth daily 12/10/2018 at Unknown time Yes Moses Orosco MD   losartan (COZAAR) 25 MG tablet Take 1 tablet (25 mg) by mouth daily 12/10/2018 at Unknown time Yes Tal Grullon MD   melatonin 1 MG TABS tablet Take 1 tablet (1 mg) by mouth nightly as needed for sleep Past Month at Unknown time Yes Tal Grullon MD   methotrexate 2.5 MG tablet CHEMO Take 3 tablets (7.5 mg) by mouth once a week On Mondays Past Week at Unknown time Yes Perlman, David Morris, MD   metoprolol tartrate (LOPRESSOR) 50 MG tablet Take 1 tablet (50 mg) by mouth 2 times daily 12/10/2018 at Unknown time Yes Reyna Rob PA-C   mirtazapine (REMERON) 15 MG tablet Take 15 mg by mouth At Bedtime. 12/9/2018 at Unknown time Yes Unknown, Entered By History   Multiple Vitamins-Minerals (MULTIVITAMIN & MINERAL PO) Take 1 tablet by mouth daily. 12/10/2018 at Unknown time Yes Unknown, Entered By History   sildenafil (REVATIO) 20 MG tablet TAKE ONE TABLET BY MOUTH THREE TIMES DAILY  12/10/2018 at Unknown time Yes Diane Paige MD   sulfamethoxazole-trimethoprim (BACTRIM DS/SEPTRA DS) 800-160 MG per tablet Take 1 tablet by mouth 2 times daily for 19 days 12/10/2018 at Unknown time Yes Tal Grullon MD   tiotropium (SPIRIVA HANDIHALER) 18 MCG inhaled capsule Inhale contents of one capsule daily. 12/10/2018 at Unknown time Yes Jamie Foster MD   blood glucose monitoring (ACCU-CHEK RONNIE PLUS) test strip Use to test blood sugar 2 times daily or as directed.  3 month supply. Unknown at Unknown time  Macey Roy MD   blood glucose monitoring (ACCU-CHEK FASTCLIX) lancets Use to test blood sugar 2 times daily or as directed.  102 lancets per box.  3 month supply. Unknown at Unknown time  Macey Roy MD   blood glucose monitoring (NO BRAND SPECIFIED) meter device  kit Use to test blood sugar 2 times daily. Unknown at Unknown time  Macey Roy MD   ciclopirox (LOPROX) 0.77 % cream Apply topically 2 times daily as needed Unknown at Unknown time  Unknown, Entered By History   DOCUSATE SODIUM PO Take 50 mg by mouth daily Unknown at Unknown time  Reported, Patient   inFLIXimab (REMICADE) 100 MG injection Inject 100 mg into the vein every 28 days  Unknown at Unknown time  Reported, Patient   order for DME Please provide patient with updated oxygen equipment.  Patient now requires 4 LPM via nasal canula with activity.   Perlman, David Morris, MD   order for DME Updated Oxygen: Patient requires supplemental Oxygen 3 LPM via nasal canula with activity and 2 LPM nocturnally. Please provide patient with a portable oxygen concentrator for improved mobility.  Okay to spot check or titrated for conserving to keep stats above 90%. Oxygen will be for a lifetime.   Perlman, David Morris, MD   order for DME She requested for a 4 wheel walker, would like to change it to 4 wheel walker with a seat and oxygen tank durant.     Need this faxed over to her   [phone]   Jamie Foster MD   polyethylene glycol (MIRALAX/GLYCOLAX) powder Take 17 g by mouth daily as needed for constipation Unknown at Unknown time  Jamie Foster MD   sulfamethoxazole-trimethoprim (BACTRIM/SEPTRA) 400-80 MG per tablet Take 1 tablet by mouth every morning (before breakfast)  Patient not taking: Reported on 12/5/2018 Unknown at Unknown time  Tal Grullon MD   Urea 40 % CREA Externally apply topically daily as needed for dry skin Unknown at Unknown time  Unknown, Entered By History      Current Meds    aspirin  81 mg Oral Daily     atorvastatin  40 mg Oral QPM     atovaquone  750 mg Oral Q12H     diltiazem ER COATED BEADS  360 mg Oral Daily     fluticasone-vilanterol  1 puff Inhalation Daily     insulin aspart  1-3 Units Subcutaneous TID AC     insulin aspart  1-3 Units Subcutaneous At Bedtime      levothyroxine  125 mcg Oral Daily     metoprolol tartrate  25 mg Oral BID     mirtazapine  15 mg Oral At Bedtime     sildenafil  20 mg Oral TID     umeclidinium  1 puff Inhalation Daily     Infusion Meds    - MEDICATION INSTRUCTIONS -       - MEDICATION INSTRUCTIONS -         ALLERGIES:    Allergies   Allergen Reactions     Prednisone Other (See Comments)     He reports that he can't sleep for days and can't use small to massive doses of prednisone   Pt. Does not do well with high doses of Prednisone, His MD says that Prednisone is counter indicated. Prednisone failed to treat his sarcoidosis      Nitroglycerin      Patient is on sildenafil for pulmonary hypertension, nitroglycerin contraindicated        REVIEW OF SYSTEMS:  A comprehensive of systems was negative except as noted above.    SOCIAL HISTORY:   Social History     Socioeconomic History     Marital status:      Spouse name: Not on file     Number of children: 2     Years of education: Not on file     Highest education level: Not on file   Social Needs     Financial resource strain: Not on file     Food insecurity - worry: Not on file     Food insecurity - inability: Not on file     Transportation needs - medical: Not on file     Transportation needs - non-medical: Not on file   Occupational History     Employer: [employer]   Tobacco Use     Smoking status: Former Smoker     Packs/day: 1.00     Years: 30.00     Pack years: 30.00     Types: Cigarettes     Start date: 1960     Last attempt to quit: 1994     Years since quittin.4     Smokeless tobacco: Never Used   Substance and Sexual Activity     Alcohol use: No     Alcohol/week: 0.0 oz     Drug use: No     Sexual activity: Not Currently     Partners: Female     Birth control/protection: Post-menopausal   Other Topics Concern     Parent/sibling w/ CABG, MI or angioplasty before 65F 55M? Not Asked      Service Not Asked     Blood Transfusions No     Caffeine Concern  "Not Asked     Occupational Exposure Not Asked     Hobby Hazards Not Asked     Sleep Concern Not Asked     Stress Concern Not Asked     Weight Concern Not Asked     Special Diet Not Asked     Back Care Not Asked     Exercise Yes     Bike Helmet Not Asked     Seat Belt Not Asked     Self-Exams Not Asked   Social History Narrative    Dairy/d 2 servings/d    Caffeine little servings/d    Exercise 3 x week    Sunscreen used - Yes    Seatbelts used - Yes    Working smoke/CO detectors in the home - Yes    Guns stored in the home - No    Self Breast Exams - NOT APPLICABLE    Self Testicular Exam - No    Eye Exam up to date - Yes    Dental Exam up to date - Yes    Pap Smear up to date - NOT APPLICABLE    Mammogram up to date - NOT APPLICABLE    PSA up to date - Yes    Dexa Scan up to date - NOT APPLICABLE    Flex Sig / Colonoscopy up to date - Yes    Immunizations up to date - Unsure    Abuse: Current or Past(Physical, Sexual or Emotional)- No    Do you feel safe in your environment - Yes       FAMILY MEDICAL HISTORY:   Family History   Problem Relation Age of Onset     Heart Disease Father         irreg heart beat     Circulatory Father         varicose veins     Prostate Cancer Father      Heart Disease Mother         heart attack     Arthritis Mother      Osteoporosis Mother      Thyroid Disease Mother      Hypertension Mother      Eye Disorder Maternal Grandmother         glaucoma     Diabetes Sister         type 2     Kidney Cancer Sister      Diabetes Sister      Glaucoma No family hx of      Macular Degeneration No family hx of      Cancer No family hx of         no skin cancer       PHYSICAL EXAM:   Temp  Av.7  F (36.5  C)  Min: 97.4  F (36.3  C)  Max: 98.1  F (36.7  C)      Pulse  Av  Min: 54  Max: 54 Resp  Av.6  Min: 18  Max: 26  SpO2  Av.6 %  Min: 95 %  Max: 100 %       /61   Pulse 54   Temp 98.1  F (36.7  C) (Oral)   Resp 18   Ht 1.727 m (5' 8\")   Wt 77.1 kg (170 lb)   SpO2 98%   " BMI 25.85 kg/m     Date 12/11/18 0700 - 12/12/18 0659   Shift 0247-7904 4200-4672 7488-1433 24 Hour Total   INTAKE   Shift Total(mL/kg)       OUTPUT   Urine 550 100  650   Shift Total(mL/kg) 550(7.13) 100(1.3)  650(8.43)   Weight (kg) 77.11 77.11 77.11 77.11      Admit Weight: 79.7 kg (175 lb 12.8 oz)     GENERAL APPEARANCE: alert, lying in bed, NAD  HEENT: NC/AT, EOMI, no scleral icterus, dry mucus membranes  Pulmonary: bilateral crackles on auscultation, SOB on exertion or movement  CV: regular rhythm, normal rate, no rub  GI: soft, nontender, distended, normal bowel sounds  EXT: no lower extremity edema  SKIN: no rash, warm, dry skin   NEURO: face symmetric, moving all 4 extremities equally     LABS:   CMP  Recent Labs   Lab 12/11/18  1116 12/11/18  0516 12/10/18  2330 12/10/18  2016 12/10/18  1327    134 131* 131* 131*   POTASSIUM 5.3 5.6* 5.8* 5.5* 5.6*   CHLORIDE 105 106 106 105 104   CO2 22 21 18* 16* 19*   ANIONGAP 6 7 7 10 9   GLC 79 85 103* 123* 91   BUN 84* 83* 83* 75* 77*   CR 4.43* 4.74* 4.77* 4.44* 4.42*   GFRESTIMATED 13* 12* 12* 13* 13*   GFRESTBLACK 16* 15* 15* 16* 16*   RAFFY 8.5 8.4* 7.9* 8.3* 8.3*   PROTTOTAL  --   --   --   --  7.5   ALBUMIN  --   --   --   --  3.3*   BILITOTAL  --   --   --   --  0.2   ALKPHOS  --   --   --   --  116   AST  --   --   --   --  34   ALT  --   --   --   --  41     CBC  Recent Labs   Lab 12/11/18  0516 12/10/18  1327   HGB 8.9* 9.4*   WBC 5.2 6.6   RBC 3.03* 3.15*   HCT 28.4* 30.0*   MCV 94 95   MCH 29.4 29.8   MCHC 31.3* 31.3*   RDW 15.7* 15.7*    254     URINE STUDIES  Recent Labs   Lab Test 12/10/18  2110 11/19/18  0230 01/27/17  1130 01/19/17  1930   COLOR Yellow Straw Yellow Yellow   APPEARANCE Clear Clear Clear Clear   URINEGLC Negative Negative Negative Negative   URINEBILI Negative Negative Negative Negative   URINEKETONE Negative Negative Negative Negative   SG 1.009 1.009 1.014 1.016   UBLD Negative Small* Large* Moderate*   URINEPH 5.0 6.0 6.0  5.5   PROTEIN 30* 100* >500* 300*   NITRITE Negative Negative Negative Negative   LEUKEST Negative Negative Negative Negative   RBCU <1 9* 90* 20*   WBCU 1 1 3* 2     Recent Labs   Lab Test 08/28/18  1013 03/08/18  1013 02/26/18  1316 10/25/17  1105 10/04/17  1122 09/05/17  1137 05/08/17  1650 04/18/17  1212 02/28/17  1206 12/26/16  1015 08/30/16  1320 07/18/16  1324 03/15/16  1107 02/29/16  1424 10/27/15  1339 10/07/15  1032 01/27/11  1507 11/16/10  0950   UTPG 3.82* 0.84* 0.76* 1.02* 1.13* 1.98* 1.87* 3.08* 6.07* 3.60* 1.40* 2.44* 1.50* 2.29* 2.00* 1.81* 0.02 0.10     PTH  Recent Labs   Lab Test 11/08/18  1215 08/28/18  1009 08/17/17  1234 02/28/17  1150 03/15/16  1116 01/27/11  1435   PTHI 138* 87* 163* 183* 98* 24     IRON STUDIES  Recent Labs   Lab Test 02/26/18  1317 02/28/17  1150 03/15/16  1116   IRON 49 61 91    316 346   IRONSAT 18 19 26   TIFFANIE 164 181 176

## 2018-12-11 NOTE — PROGRESS NOTES
[facility]  Patient is currently open to home care services with [facility].  The patient is currently receiving RN/HA/PT/OT services.  [facility]  and team have been notified of patient admission.  [facility] liaison will continue to follow patient during stay.  If appropriate provide orders to resume home care at time of discharge.    Thank you  Madalyn Lerner RN, BSN  [facility] Liaison  [facility]  [phone]

## 2018-12-11 NOTE — PLAN OF CARE
VSS. HR 40's-60's. Sinus curtis. Pt switching between 6L nasal canula and BiPAP overnight. Pt stated feeling ACEVES with minimal activity and began feeling short of breath at beginning of shift, prn neb given with relief. Has been resting comfortably with sats in the high 90's. LS crackles in bases. Denies pain. Tolerating 2gm Na diet with 2L FR. Oliguric, voiding in bedside urinal. PIV TKO between abx. A/Ox4, calling appropriately and making needs known. Will continue to monitor and follow POC.

## 2018-12-11 NOTE — PROGRESS NOTES
"                              Progress Note  Rohan Monroe MRN: [id number]  Age: 72 year old, : 1945  Date: 2018            Interval History:     Rohan Monroe is a 73 y/o M with history of sarcoidosis on immunosuppressive therapy, COPD (on 4-6 L home O2), CAD s/p ROSALIE to D2 and mLAD (2017), HFpEF, atrial flutter s/p ablation with post-ablation recurrence, severe pulmonary HTN, HTN, DM2, CKD stage III, and recent diagnosis of PCP pneumonia (on pentamidine, bactrim removed due to RADHA) who presents to the ED with acute on chronic dyspnea on exertion. The patient reports that he can not walk to the bathroom without feeling short of breath. Is sometimes dyspneic when he moves around in the bed. Denies exertional chest pain and chest pain at rest.           PHYSICAL EXAM    Vital signs:  Temp: 97.8  F (36.6  C) Temp src: Oral BP: 137/67 Pulse: 54 Heart Rate: 77 Resp: 18 SpO2: 100 % O2 Device: Nasal cannula Oxygen Delivery: 5 LPM Height: 172.7 cm (5' 8\") Weight: 77.1 kg (170 lb)  Estimated body mass index is 25.85 kg/m  as calculated from the following:    Height as of this encounter: 1.727 m (5' 8\").    Weight as of this encounter: 77.1 kg (170 lb).    Constitutional: awake, alert, lying in bed, NAD  HEENT: normalmocephalic, atraumatic, anicteric sclera without conjunctival injection, MM dry   Cardiovascular: bradycardic, normal S1/S2, no murmurs/rubs/gallops appreciated   Respiratory: diffuse crackles bilaterally, reduced breath sounds throughout, no wheezing   Gastrointestinal: soft, nontender, nondistended, normal bowel sounds  Ext: warm and well perfused, 2+ pitting edema in LE b/l   Skin: no rashes noted on exposed skin   Neurologic: alert and oriented, CN II-XII grossly intact, moving all extremities, nonfocal   Psychiatric: appropriate affect      Intake/Output Summary (Last 24 hours) at 2018 1030  Last data filed at 2018 0939  Gross per 24 hour   Intake 170 ml "   Output 1150 ml   Net -980 ml       DIAGNOSTIC STUDIES  ROUTINE ICU LABS (Last four results)  CMP  Recent Labs   Lab 12/11/18  0516 12/10/18  2330 12/10/18  2016 12/10/18  1327    131* 131* 131*   POTASSIUM 5.6* 5.8* 5.5* 5.6*   CHLORIDE 106 106 105 104   CO2 21 18* 16* 19*   ANIONGAP 7 7 10 9   GLC 85 103* 123* 91   BUN 83* 83* 75* 77*   CR 4.74* 4.77* 4.44* 4.42*   GFRESTIMATED 12* 12* 13* 13*   GFRESTBLACK 15* 15* 16* 16*   RAFFY 8.4* 7.9* 8.3* 8.3*   PROTTOTAL  --   --   --  7.5   ALBUMIN  --   --   --  3.3*   BILITOTAL  --   --   --  0.2   ALKPHOS  --   --   --  116   AST  --   --   --  34   ALT  --   --   --  41     CBC  Recent Labs   Lab 12/11/18  0516 12/10/18  1327   WBC 5.2 6.6   RBC 3.03* 3.15*   HGB 8.9* 9.4*   HCT 28.4* 30.0*   MCV 94 95   MCH 29.4 29.8   MCHC 31.3* 31.3*   RDW 15.7* 15.7*    254     INRNo lab results found in last 7 days.  Arterial Blood GasNo lab results found in last 7 days.     Ref. Range 12/10/2018 21:10   Color Urine Unknown Yellow   Appearance Urine Unknown Clear   Glucose Urine Latest Ref Range: NEG^Negative mg/dL Negative   Bilirubin Urine Latest Ref Range: NEG^Negative  Negative   Ketones Urine Latest Ref Range: NEG^Negative mg/dL Negative   Specific Gravity Urine Latest Ref Range: 1.003 - 1.035  1.009   pH Urine Latest Ref Range: 5.0 - 7.0 pH 5.0   Protein Albumin Urine Latest Ref Range: NEG^Negative mg/dL 30 (A)   Urobilinogen mg/dL Latest Ref Range: 0.0 - 2.0 mg/dL Normal   Nitrite Urine Latest Ref Range: NEG^Negative  Negative   Blood Urine Latest Ref Range: NEG^Negative  Negative   Leukocyte Esterase Urine Latest Ref Range: NEG^Negative  Negative   Source Unknown Clean catch urine   WBC Urine Latest Ref Range: 0 - 5 /HPF 1   RBC Urine Latest Ref Range: 0 - 2 /HPF <1   Squamous Epithelial /HPF Urine Latest Ref Range: 0 - 1 /HPF <1   Mucous Urine Latest Ref Range: NEG^Negative /LPF Present (A)   Hyaline Casts Latest Ref Range: 0 - 2 /LPF 8 (H)          MICROBIOLOGY  No results found for the last 168 hours.    IMAGING  CXR 12/10/2018  FINDINGS:  AP and lateral views of the chest. Worsening diffuse interstitial and  airspace opacities throughout both lung fields with numerous perihilar  nodules consistent with patient's history of sarcoidosis. Increasing  bilateral pleural effusions, right greater than left. Diffuse pleural  thickening. Cardiomediastinal silhouette is largely stable upper  abdomen is unremarkable. No visualized bony lesions.  IMPRESSION:  Diffuse interstitial and airspace infiltrates concerning for  superimposed infection, ARDS or pulmonary edema on top of patient's  known interstitial lung disease interstitial lung disease from  sarcoidosis.    TTE 11/19/2018  Interpretation Summary  Left ventricular systolic function is mildly reduced.  The visual ejection fraction is estimated at 45-50%.  With rapid atrial fibrillation, the visual approximation of left ventricular  systolic function may be falsely decreased.  Mildly decreased right ventricular systolic function  Right ventricular systolic pressure is elevated, consistent with moderate  pulmonary hypertension.  EF lower compared to 3/18, in setting of rapid atrial fibrillation. The study  was technically difficult.    EKG 12/10/2018  Sinus bradycardia with 1st degree A-V block  52 bpm  QTc 429 ms    Assessment and Plan:     Rohan Monroe is a 71 y/o M with history of sarcoidosis on immunosuppressive therapy, COPD (on 4-6 L home O2), CAD s/p ROSALIE to D2 and mLAD (05/2017), HFpEF, atrial flutter s/p ablation with post-ablation recurrence, severe pulmonary HTN, HTN, DM2, CKD stage III, and recent diagnosis of PCP pneumonia (on pentamidine, bactrim removed due to RADHA) who presents to the ED with acute on chronic dyspnea on exertion.       PLAN:  Today:  - TTE repeat - Classify diastolic dysfunction + Volume status  - Nephrology, ID, pulm consult  - Continue pentamidine therapy    #  Acute on chronic HFpEF (EF 60-65% in March 2018)   # CAD s/p PCI with ROSALIE to mid-LAD and D2 in 5/2017   Recent TTE at OSH 11/19/18 with reduced EF to 45-50%, although done while in rapid Afib. Presents with weight gain of ~6kg since discharge in the setting of recently decreased lasix dose due to RADHA. In addition, has RADHA which has further reduced UOP. Clinical exam consistent with hypervolemia. BNP elevated but decreased from previous.  Troponin negative. EKG with 1st degree AV block, no ischemic changes. No signs of shock.      - Continue PTA aspirin 81 mg daily, and Lipitor 40 mg daily  - Holding PTA losartan d/t RADHA   - Continue Cardizem 360 mg every day  - Diuresis: 40mg IV lasix in ED. Will diuresis to goal net negative 2L overnight.   - Daily weights, strict I's and O's  - Low sodium diet, fluid restriction   - CORE follow up as outpatient      # Pneumocystis Pneumonia  # Severe dyspnea on exertion   # Pulmonary sarcoidosis   # ? 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 Follows with pulmonology, Dr. Perlman. Was previously on 3L O2 at home, recently increased to 4-6 L. Requiring baseline ~5L O2 in ED on admission. Symptoms have been stable to slightly worsened since recent discharge, likely due to decompensated HF as discussed above. Positive for PCP pneumonia during recent admission, now with RADHA likely 2/2 bactrim (see below). WBC normal, afebrile, stable O2 requirements. No signs of worsening infection.   - CXR demonstrates significantly increased pulmonary opacities, likely secondary to known PCP along with fluid overload.   - ID consulted, appreciate recs    - Will hold bactrim due to RADHA and transition to pentamidine per ID recs, pharmacy   - Continue PTA bronchodilators and diuresis   - Takes methotrexate qweek   - Diuresing as discussed above      # RADHA on CKD stage III   # Hyperkalemia   Baseline creatinine recently around  1.9-2.4 (2.39 on 11/26), now elevated to 4.42. Potassium elevated to 5.6 in ED, shifted with lasix. RADHA likely secondary to bactrim use for PCP pneumonia. Lasix was decreased during recent admission.      - Nephrology consulted, appreciate recs  - UA, urine urea and creatinine pending   - Monitor renal function closely  - Avoid nephrotoxic drugs  - BMP q6 hours, shift K PRN      # Atrial flutter and fibrillation s/p ablation with post-ablation recurrence  Recently admitted to OSH on 11/18 with Afib with RVR, s/p spontaneous conversion but recurrence on 11/21. Patient converted to sinus rhythm 11/25 after treatment of PCP pneumonia was initiated along with uptitration of diltiazem, but flipped back into Afib on 11/26 prior to recent discharge. HR bradycardic in the ED.   - [id number]. Was previously on coumadin, but stopped due to patient preference along with heme occult positive study during recent admission.   - Continue Diltiazem 360 mg daily   - Goal HR < 110     # Severe pulmonary HTN  RHC 11/19, PVR is 9 and wedge was slightly elevated, 11 but patient in rapid Afib at that time.   - Continue PTA sildenafil 20mg TID      # Normocytic anemia  Hgb 9.4 on admission, within recent baseline. No active bleeding.  - CBC in AM     # Hyponatremia  Mild, Na 131 down from 133 on 12/6. Urine studies pending.   - BMP q6 hrs as discussed above      # Gout   Per patient never formally diagnosed, but does take colchicine for flares.  Started colchicine 11/24. No current flare.      Chronic Conditions   # Hypothyroidism- continue PTA levothyroxine  # History of HepC- treated in the past  # DM2 (A1C 6.4)- low intensity sliding scale insulin, hypoglycemia protocol   # Insomnia- cont PTA PRN melatonin and PRN remeron     Diet: 2g sodium diet, 2L fluid restriction   Fluids: no mIVF   DVT Prophylaxis: Pneumatic Compression Devices  Fung Catheter: not present  Code Status: Full     Patient was seen and discussed with attending  physician Dr. Wallace, who agrees with above assessment and plan.    Louie Wheatley MS

## 2018-12-11 NOTE — PLAN OF CARE
Admission  D: Patient admitted from [facility] with increasing SOB. He was recently hospitalized for PNA and has had increased oxygenation needs and more ACEVES since discharge.  I: Settled and oriented patient to unit. Gave 60mg IV lasix and IV antibiotic.  A: AxOx4,SB (40s-held evening metoprolol, MD aware), BPs stable, 6L O2, denies pain. Asked to use hover mat from gurney to bed because he becomes very SOB with minimal exertion. Crackles in lung bases. Elevated K and Cr.  P: Diuresis. Nephrology and ID consults placed by primary team.

## 2018-12-11 NOTE — CONSULTS
[facility] SERVICE CONSULTATION     Patient:  Rohan Monroe   Date of birth 1945, Medical record number [id number]  Date of Visit:  12/11/2018  Date of Admission: 12/10/2018  Consult Requester:Marilu Wallace MD            Assessment and Recommendations:     Mr. Monroe is a 71yo male with history of sarcoidosis on immunosuppressive therapy, COPD, CAD, HFpEF, pHTN, atrial flutter, HTN, DM2 and CKD3 with recent hospitalization in which he was diagnosed with PJP and started on bactrim course, now presenting with worsening dyspnea and worsening diffuse interstitial and airspace opacities bilaterally on CXR. We consider his presentation most suspicious for decompensated heart failure, with increased weight, since discharge, lower extremity edema, and especially orthopnea most indicative of this etiology. We also interpret his CXR as more suggestive of pulmonary edema than infection. As we do not consider this a worsening of his PJP, pentamidine should be stopped, and atovaquone may be started instead, initially at a dose of 750mg q12h until 12/15, at which date he should be transitioned to prophylactic dosing moving forward. We will continue to monitor him closely.      PROBLEM LIST:  1. Pneumocystis pneumonia, recently diagnosed on 11/20, treated on bactrim prior to current admission  2. Pulmonary sarcoidosis, biopsy proven  3. Pulmonary hypertension  4. 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   5. RADHA on CKD3: baseline Cr ~2.0 now elevated to 4.4; likely due to bactrim.  bactrim stopped  6. HFpEF (EF 60-65% in March 2018): weight gain of ~6kg and increased dyspnea/orthopnea since discharge on 11/26 concerning for fluid overload in the setting of reduced lasix dose and worsening RADHA. CXR with significant, diffuse interstitial and airspace infiltrates.   7. Atrial flutter and fibrillation s/p ablation with post-ablation recurrence    RECOMMENDATION:  1. Stop pentamidine  2.  Start atovaquone 750mg q12h - after 12/15, transition to ppx dosing 1500 mg po daily      ID will continue to follow.    Recommendations discussed with primary team.    KULDEEP Barrett  Patient seen and discussed with attending ID physician, Dr. Leon.      Attending Physician Attestation:  I have seen and evaluated Rohan Monroe. I have discussed with Kristian Azar MS4  and I agree with the above documented findings and plan in this note. I have reviewed today's vital signs, medications, labs and imaging.  If a medical student was involved in this note, they were serving in a capacity as a scribe.    Israel Hsu MD,M.Med.Sc.  Attending, Infectious Diseases  Pager: [phone]          ________________________________________________________________    Consult Question: Is current decompensation infectious in etiology?  Admission Diagnosis: Acute kidney injury (H) [N17.9]  Heart failure exacerbated by sotalol (H) [I50.9]         History of Present Illness:     Rohan Monroe is a 73 y/o M with history of biopsy-proven sarcoidosis on immunosuppressive therapy, COPD (on 4-6 L home O2), CAD s/p ROSALIE to D2 and mLAD (05/2017), HFpEF, severe pulmonary HTN, atrial flutter s/p ablation with post-ablation recurrence, HTN, DM2, CKD3 who presented to the ED on 12/10 with dyspnea on exertion. ID was consulted due to recent diagnosis of PCP pneumonia with concern for bactrim-related RADHA.     Patient's worsened pulmonary symptoms date to October, when he was seen by his primary pulmonologist on 10/16, and imaging was concerning for pneumonia, so he was treated with 2 weeks of levaquin with slight improvement in symptoms. However, in November, he had progressively worsening dyspnea.    At OSH ED on 11/18, he was found to have SpO2 of 85% and EKG demonstrated atrial flutter with .  NT-pro BNP was elevated to 18K. CXR showed diffuse infiltrates bilaterally. He was ultimately transferred to  "[facility], where he was admitted from 11/21-11/26/18. Positive PJP PCR on Sputum from 11/20. PJP DFA negative. Sputum Culture on 11/20 = normal trent. He was started on Bactrim with dramatic improvement and was nearly back to his baseline respiratory status as of 11/26 and was discharged. His BDG was indeterminate. Dr. Groves of ID was consulted during that admission. It was felt that his clinical picture of subacute dyspnea was consistent with PJP pneumonia and, per patient, he experienced dramatic improvement since starting treatment.    Unfortunately, following discharge, his dyspnea again worsened. He was seen by Dr. Perlman (pulmonology) on 11/30 who felt that his increasing dyspnea was due to his atrial fibrillation with need for rhythm control. Cardiology saw him on 12/03 and apparently agreed. On admission 12/10, he reported orthopnea and PND, feeling like he was \"gasping for air\" especially when he wakes up in the morning. He was on 4-6 L supplemental O2 at baseline (was previously on 3L but increased s/p recent hospitalization). His weight was up ~6kg, and he had noticed some mild LE edema bilaterally. He reported good compliance with his medications. He denied fevers, chills, cough, chest pain, abdominal pain. He has some diarrhea alternating with constipation.     ED course: Required 5L O2 on admission. CXR demonstrated significantly worsened pulmonary opacities. WBC normal, afebrile, stable O2 requirements. No signs of worsening infection. Also found to have RADHA overlying his CKD (Cr bl 2.5) felt likely 2/2 bactrim. Transitioned to pentamidine per ID and pharmacy recs.    Antibiotic history:  * Azithromycin (11/18-11/25)  * Bactrim (11/23-12/10)  * Zosyn (11/18-11/23)      Recent culture results include:  Culture Micro   Date Value Ref Range Status   11/20/2018 Light growth  Normal trent    Final   11/18/2018 No growth  Final   11/18/2018 No growth  Final   01/05/2018 No growth  Final   04/26/2017 No " growth  Final   04/22/2017 (A)  Final    Heavy growth Normal trent  Light growth Citrobacter koseri     04/22/2017 No growth  Final   04/22/2017 No growth  Final   01/18/2017 No growth  Final   01/18/2017 No growth  Final              Review of Systems:     CONSTITUTIONAL:  negative for fevers, sweats, or chills  EYES: negative for icterus  ENT:  negative for hearing loss, tinnitus and sore throat  RESPIRATORY:  dyspnea, orthopnea, PND present; negative for cough with sputum  CARDIOVASCULAR: negative for chest pain  GASTROINTESTINAL: some diarrhea, constipation present; negative for nausea, vomiting, abdominal pain  GENITOURINARY:  negative for dysuria  HEME:  negative for easy bruising  INTEGUMENT:  negative for rash and pruritus  NEURO:  negative for headache           Past Medical History:     Past Medical History:   Diagnosis Date     Atrial flutter (H)      Cataract of both eyes      Chronic infection     Hep C     Congestive heart failure, unspecified      Coronary artery disease      Depressive disorder      Depressive disorder, not elsewhere classified     Depression (non-psychotic)     ERM OS (epiretinal membrane, left eye)      Generalized osteoarthrosis, unspecified site      Glaucoma suspect      Hypertension      Lichen planus      Other psoriasis      Pneumocystis carinii pneumonia 11/23/2018     PVD (posterior vitreous detachment), left eye      Sarcoidosis      Sarcoidosis      Type II or unspecified type diabetes mellitus without mention of complication, not stated as uncontrolled      Unspecified hypothyroidism     Hypothyroidism     Unspecified viral hepatitis C without hepatic coma      Viral warts, unspecified             Past Surgical History:     Past Surgical History:   Procedure Laterality Date     ANESTHESIA CARDIOVERSION N/A 1/19/2017    Procedure: ANESTHESIA CARDIOVERSION;  Surgeon: GENERIC ANESTHESIA PROVIDER;  Location: [facility] OR     ANESTHESIA CARDIOVERSION N/A 1/23/2017    Procedure:  ANESTHESIA CARDIOVERSION;  Surgeon: GENERIC ANESTHESIA PROVIDER;  Location: [facility] OR     ANESTHESIA CARDIOVERSION N/A 2017    Procedure: ANESTHESIA CARDIOVERSION;  Surgeon: GENERIC ANESTHESIA PROVIDER;  Location: [facility] OR     ANESTHESIA CARDIOVERSION N/A 2018    Procedure: ANESTHESIA CARDIOVERSION;  Surgeon: GENERIC ANESTHESIA PROVIDER;  Location: [facility] OR     ARTHROPLASTY HIP  2011    Procedure:ARTHROPLASTY HIP; Right Total Hip Arthroplasty  Choice anesthesia; Surgeon:LESLI WILKINSON; Location:[facility] OR     BIOPSY       C PELVIS/HIP JOINT SURGERY UNLISTED       cardiac stent      s/p     CARDIAC SURGERY       CATARACT IOL, RT/LT  9/15/2015    LE     COLONOSCOPY       CORONARY ANGIOGRAPHY ADULT ORDER       H ABLATION ATRIAL FLUTTER       HC REMOVAL OF TONSILS,<9 Y/O      Tonsils <10y.o.     HC REPAIR INCISIONAL HERNIA,REDUCIBLE      Hernia Repair, Incisional, Unilateral     HEART CATH, ANGIOPLASTY       JOINT REPLACEMENT      1 month ago--right hip     LIGATN/STRIP LONG & SHORT SAPHEN              Family History:     Family History   Problem Relation Age of Onset     Heart Disease Father         irreg heart beat     Circulatory Father         varicose veins     Prostate Cancer Father      Heart Disease Mother         heart attack     Arthritis Mother      Osteoporosis Mother      Thyroid Disease Mother      Hypertension Mother      Eye Disorder Maternal Grandmother         glaucoma     Diabetes Sister         type 2     Kidney Cancer Sister      Diabetes Sister      Glaucoma No family hx of      Macular Degeneration No family hx of      Cancer No family hx of         no skin cancer            Social History:     Social History     Tobacco Use     Smoking status: Former Smoker     Packs/day: 1.00     Years: 30.00     Pack years: 30.00     Types: Cigarettes     Start date: 1960     Last attempt to quit: 1994     Years since quittin.4     Smokeless tobacco: Never Used   Substance Use Topics      "Alcohol use: No     Alcohol/week: 0.0 oz     History   Sexual Activity     Sexual activity: Not Currently     Partners: Female     Birth control/ protection: Post-menopausal            Current Medications (antimicrobials listed in bold):       aspirin  81 mg Oral Daily     atorvastatin  40 mg Oral QPM     diltiazem ER COATED BEADS  360 mg Oral Daily     fluticasone-vilanterol  1 puff Inhalation Daily     insulin aspart  1-3 Units Subcutaneous TID AC     insulin aspart  1-3 Units Subcutaneous At Bedtime     levothyroxine  125 mcg Oral Daily     metoprolol tartrate  50 mg Oral BID     mirtazapine  15 mg Oral At Bedtime     pentamidine intermittent infusion  300 mg Intravenous Q24H     sildenafil  20 mg Oral TID     umeclidinium  1 puff Inhalation Daily            Allergies:     Allergies   Allergen Reactions     Prednisone Other (See Comments)     He reports that he can't sleep for days and can't use small to massive doses of prednisone   Pt. Does not do well with high doses of Prednisone, His MD says that Prednisone is counter indicated. Prednisone failed to treat his sarcoidosis      Nitroglycerin      Patient is on sildenafil for pulmonary hypertension, nitroglycerin contraindicated             Physical Exam:   Vitals were reviewed  Patient Vitals for the past 24 hrs:   BP Temp Temp src Pulse Heart Rate Resp SpO2 Height Weight   12/11/18 0657 137/67 97.8  F (36.6  C) Oral -- 77 18 100 % -- --   12/11/18 0435 114/63 97.4  F (36.3  C) Oral -- 64 18 100 % -- --   12/11/18 0015 94/48 97.6  F (36.4  C) Oral -- 59 20 100 % -- --   12/10/18 2127 106/73 -- -- -- (!) 48 -- 100 % -- --   12/10/18 1900 99/58 97.6  F (36.4  C) Oral -- (!) 49 20 96 % 1.727 m (5' 8\") 77.1 kg (170 lb)   12/10/18 1749 -- -- -- -- (!) 48 19 -- -- --   12/10/18 1600 98/58 -- -- -- (!) 47 26 100 % -- --   12/10/18 1500 101/60 -- -- -- (!) 49 18 98 % -- --   12/10/18 1300 122/58 97.6  F (36.4  C) Oral 54 -- -- -- -- 79.7 kg (175 lb 12.8 oz)     Ranges " for his vital signs:  Temp:  [97.4  F (36.3  C)-97.8  F (36.6  C)] 97.8  F (36.6  C)  Pulse:  [54] 54  Heart Rate:  [47-77] 77  Resp:  [18-26] 18  BP: ()/(48-73) 137/67  SpO2:  [96 %-100 %] 100 %    Physical Examination:  GENERAL:  well-developed, well-nourished, in bed in no acute distress.   HEENT:  Head is normocephalic, atraumatic   EYES:  Eyes have anicteric sclerae without conjunctival injection or stigmata of endocarditis.    LUNGS:  diffuse b/l crackles, breath sounds diminished, dyspneic with speech  CARDIOVASCULAR: Elevated JVP. Regular rate and rhythm with no murmurs, gallops or rubs.  ABDOMEN:  Normal bowel sounds, soft, nontender. No appreciable hepatosplenomegaly  SKIN:  No acute rashes.  Line(s) are in place without any surrounding erythema or exudate. No stigmata of endocarditis.  NEUROLOGIC:  Grossly nonfocal. Active x4 extremities  EXTR: 2+ b/l LE edema         Laboratory Data:     Inflammatory Markers    Recent Labs   Lab Test 11/26/18  0545 11/25/18  0438 11/24/18  0518 11/19/18  0625 01/05/18  0520 04/23/17  0635 04/22/17  1248 03/09/17  1150 02/13/17  1359  09/01/13  0557 08/31/13  2045  10/07/11  1103   SED  --   --   --  46*  --  44*  --   --  73*  --  13 13  --  15   CRP 60.0* 100.0* 98.0*  --  110.0* 24.0* 27.0* 9.2* 17.4*   < > 30.0* 28.8*   < > 5.8    < > = values in this interval not displayed.       Hematology Studies    Recent Labs   Lab Test 12/11/18  0516 12/10/18  1327 11/30/18  1329 11/26/18  0545 11/25/18  0438 11/24/18  0518  11/18/18  1414  01/05/18  0520  04/22/17  1248  01/18/17  1746   WBC 5.2 6.6 8.0 8.2 9.4 12.7*   < > 12.5*   < > 13.5*   < > 10.2   < > 12.3*   ANEU  --  5.7 6.6  --   --   --   --  10.4*  --  12.4*  --  8.3  --  9.6*   AEOS  --  0.1 0.2  --   --   --   --  0.5  --  0.1  --  0.4  --  0.4   HGB 8.9* 9.4* 10.5* 9.4* 9.2* 10.0*   < > 11.5*   < > 10.6*   < > 12.5*   < > 15.1   MCV 94 95 95 99 98 98   < > 97   < > 94   < > 94   < > 98    833 873 791  223 230   < > 274   < > 303   < > 348   < > 236    < > = values in this interval not displayed.       Immune Globulin Studies    Recent Labs   Lab Test 11/20/15  1045   IGG 1,410             Metabolic Studies     Recent Labs   Lab Test 12/11/18  0516 12/10/18  2330 12/10/18  2016 12/10/18  1327 12/06/18  1220    131* 131* 131* 133   POTASSIUM 5.6* 5.8* 5.5* 5.6* 5.0   CHLORIDE 106 106 105 104 106   CO2 21 18* 16* 19* 20   BUN 83* 83* 75* 77* 48*   CR 4.74* 4.77* 4.44* 4.42* 3.00*   GFRESTIMATED 12* 12* 13* 13* 21*       Hepatic Studies    Recent Labs   Lab Test 12/10/18  1327 11/30/18  1329 11/19/18  0625 11/08/18  1215 09/13/18  1115 08/28/18  1009  02/26/18  1317   BILITOTAL 0.2 0.3 0.7  --  0.8 0.8  --  0.6   ALKPHOS 116 114 110  --  110 116  --  128   ALBUMIN 3.3* 3.2* 2.6* 2.9* 3.6 3.7   < > 3.7   AST 34 42 51*  --  26 23  --  24   ALT 41 40 42  --  30 29  --  42    < > = values in this interval not displayed.       Thyroid Studies    Recent Labs   Lab Test 11/19/18  0625 04/18/18  1225 03/21/18  1312 02/26/18  1318   TSH 1.44 2.05 7.94* 0.07*   T4  --   --  0.77 1.38       Microbiology:  Culture Micro   Date Value Ref Range Status   11/20/2018 Light growth  Normal trent    Final   11/18/2018 No growth  Final   11/18/2018 No growth  Final   01/05/2018 No growth  Final   04/26/2017 No growth  Final   04/22/2017 (A)  Final    Heavy growth Normal trent  Light growth Citrobacter koseri     04/22/2017 No growth  Final   04/22/2017 No growth  Final   01/18/2017 No growth  Final   01/18/2017 No growth  Final   03/26/2016 No growth  Final   06/28/2014   Final    Culture negative for acid fast bacilli  Assayed at [facility] [address]     06/27/2014   Final    Culture negative for acid fast bacilli Assayed at [facility] [address]     06/25/2014 Normal trent  Final   06/25/2014   Final    Culture negative for acid fast bacilli  Assayed at [facility] [address]     06/25/2014 No growth  Final   06/25/2014 No growth  Final   02/05/2014 No Beta Streptococcus isolated  Final   08/31/2013   Final    Moderate growth Beta hemolytic Streptococcus group B This isolate is presumed to   be clindamycin resistant based on detection of inducible clindamycin   resistance.  Plus normal skin trent.  Light growth Acinetobacter species, not baumannii   08/31/2013 No growth  Final   08/31/2013 No growth  Final   04/25/2012   Final    Incorrectly ordered by PCU/Clinic Canceled, Test credited  See wound culture, 4/25/12 @ 1133    04/25/2012   Final    Heavy growth Staphylococcus aureus  Heavy growth Beta hemolytic Streptococcus group B This Beta hemolytic   Streptococcus group B is presumed to be clindamycin resistant based on   detection of inducible clindamycin resistance.  Plus normal skin trent.   08/29/2011   Final    <10,000 colonies/mL Gram negative rods  10 to 50,000 colonies/mL Gram positive cocci  Susceptibility testing not routinely done   08/27/2011 No growth  Final   08/27/2011   Final    Normal trent  Heavy growth Enterobacter cloacae  Susceptibility testing not routinely done   08/27/2011 No growth  Final   08/27/2011   Final    Incorrectly ordered by PCU/Clinic Canceled, Test credited   03/29/2011 Normal trent  Final   03/28/2011 No growth  Final   03/28/2011 No Beta Streptococcus isolated  Final   03/28/2011 No growth  Final   12/09/2003 No Beta Streptococcus isolated  Final       Urine Studies    Recent Labs   Lab Test 12/10/18  2110 11/19/18  0230 01/27/17  1130 01/19/17  1930 03/26/16  1947   LEUKEST Negative Negative Negative Negative Negative   WBCU 1 1 3* 2 1       Vancomycin Levels  No lab results found.    Invalid input(s): VANCO    Serostatus:  No results found for: CMVG, CMIGG, CMIG, CMIM, CMVIGM, CMVM, CMLTX, HSVG1, HSVG2, HSVTP1, IW0854, HS12M, HS12GR, HS1GR, HS2GR, HERPES, HSIM, HSIG, HSIGR, HSVIGMAB, HSVG1, VARICZOSAB,  EBVIGG, EBIGG, EBVAGN, [id number]  No results found for: EBIG2, EBIGM, TOXG  No lab results found.    Invalid input(s): HSV12, VZVG, NKW607    Toxoplasma Testing  No lab results found.    Invalid input(s): TOXPL, TOXABM, TOXPCR    Virology:  CMV viral loads  No lab results found.  No results found for: CMQNT, CMVQ  EBV viral loads   No lab results found.  No results found for: EBVDN, EBRES, EBVDN, EBVSP, EBVPC, EBVPCR  BK viral loads No lab results found.    Invalid input(s): BKDN, BKVPC, BKVPCR  HSV testing  No lab results found.    Hepatitis B Testing No lab results found.  Hepatitis C Testing   No results found for: HCVAB, HQTG, HCGENO, HCPCR, HQTRNA, HEPRNA  Respiratory Virus Testing    No results found for: RS, FLUAG      Imagin/10/18, XR CHEST 2 VW  FINDINGS:  AP and lateral views of the chest. Worsening diffuse interstitial and  airspace opacities throughout both lung fields with numerous perihilar  nodules consistent with patient's history of sarcoidosis. Increasing  bilateral pleural effusions, right greater than left. Diffuse pleural  thickening. Cardiomediastinal silhouette is largely stable upper  abdomen is unremarkable. No visualized bony lesions.                       IMPRESSION:  Diffuse interstitial and airspace infiltrates concerning for  superimposed infection, ARDS or pulmonary edema on top of patient's  known interstitial lung disease from sarcoidosis.     18, CT CHEST W/O CONTRAST  IMPRESSION:  1. Small amount of infiltrate in lateral segment of the right middle  lobe is new since the prior exam. Remainder of infiltrates and other  opacities in the lungs bilaterally are unchanged, likely representing  chronic changes from known sarcoidosis.   2. Currently there is no mediastinal or hilar adenopathy.  3. Vascular calcifications, including coronary artery calcifications,  are again noted.  4. Small liver with nodular contour suggests cirrhosis.

## 2018-12-12 ENCOUNTER — APPOINTMENT (OUTPATIENT)
Dept: ULTRASOUND IMAGING | Facility: CLINIC | Age: 73
DRG: 177 | End: 2018-12-12
Payer: MEDICARE

## 2018-12-12 ENCOUNTER — APPOINTMENT (OUTPATIENT)
Dept: PHYSICAL THERAPY | Facility: CLINIC | Age: 73
DRG: 177 | End: 2018-12-12
Payer: MEDICARE

## 2018-12-12 ENCOUNTER — APPOINTMENT (OUTPATIENT)
Dept: CT IMAGING | Facility: CLINIC | Age: 73
DRG: 177 | End: 2018-12-12
Payer: MEDICARE

## 2018-12-12 LAB
ANION GAP SERPL CALCULATED.3IONS-SCNC: 5 MMOL/L (ref 3–14)
ANION GAP SERPL CALCULATED.3IONS-SCNC: 6 MMOL/L (ref 3–14)
ANION GAP SERPL CALCULATED.3IONS-SCNC: 7 MMOL/L (ref 3–14)
ANION GAP SERPL CALCULATED.3IONS-SCNC: 7 MMOL/L (ref 3–14)
BUN SERPL-MCNC: 70 MG/DL (ref 7–30)
BUN SERPL-MCNC: 73 MG/DL (ref 7–30)
BUN SERPL-MCNC: 77 MG/DL (ref 7–30)
BUN SERPL-MCNC: 84 MG/DL (ref 7–30)
CALCIUM SERPL-MCNC: 8.5 MG/DL (ref 8.5–10.1)
CALCIUM SERPL-MCNC: 8.5 MG/DL (ref 8.5–10.1)
CALCIUM SERPL-MCNC: 8.8 MG/DL (ref 8.5–10.1)
CALCIUM SERPL-MCNC: 8.9 MG/DL (ref 8.5–10.1)
CHLORIDE SERPL-SCNC: 108 MMOL/L (ref 94–109)
CHLORIDE SERPL-SCNC: 108 MMOL/L (ref 94–109)
CHLORIDE SERPL-SCNC: 109 MMOL/L (ref 94–109)
CHLORIDE SERPL-SCNC: 109 MMOL/L (ref 94–109)
CO2 SERPL-SCNC: 20 MMOL/L (ref 20–32)
CO2 SERPL-SCNC: 21 MMOL/L (ref 20–32)
CO2 SERPL-SCNC: 22 MMOL/L (ref 20–32)
CO2 SERPL-SCNC: 22 MMOL/L (ref 20–32)
CREAT SERPL-MCNC: 3.05 MG/DL (ref 0.66–1.25)
CREAT SERPL-MCNC: 3.47 MG/DL (ref 0.66–1.25)
CREAT SERPL-MCNC: 3.66 MG/DL (ref 0.66–1.25)
CREAT SERPL-MCNC: 4.22 MG/DL (ref 0.66–1.25)
ERYTHROCYTE [DISTWIDTH] IN BLOOD BY AUTOMATED COUNT: 15.6 % (ref 10–15)
GFR SERPL CREATININE-BSD FRML MDRD: 14 ML/MIN/1.7M2
GFR SERPL CREATININE-BSD FRML MDRD: 16 ML/MIN/1.7M2
GFR SERPL CREATININE-BSD FRML MDRD: 17 ML/MIN/1.7M2
GFR SERPL CREATININE-BSD FRML MDRD: 20 ML/MIN/1.7M2
GLUCOSE BLDC GLUCOMTR-MCNC: 104 MG/DL (ref 70–99)
GLUCOSE BLDC GLUCOMTR-MCNC: 128 MG/DL (ref 70–99)
GLUCOSE BLDC GLUCOMTR-MCNC: 81 MG/DL (ref 70–99)
GLUCOSE BLDC GLUCOMTR-MCNC: 88 MG/DL (ref 70–99)
GLUCOSE BLDC GLUCOMTR-MCNC: 95 MG/DL (ref 70–99)
GLUCOSE SERPL-MCNC: 80 MG/DL (ref 70–99)
GLUCOSE SERPL-MCNC: 90 MG/DL (ref 70–99)
GLUCOSE SERPL-MCNC: 96 MG/DL (ref 70–99)
GLUCOSE SERPL-MCNC: 99 MG/DL (ref 70–99)
HCT VFR BLD AUTO: 28.6 % (ref 40–53)
HGB BLD-MCNC: 8.9 G/DL (ref 13.3–17.7)
INTERPRETATION ECG - MUSE: NORMAL
INTERPRETATION ECG - MUSE: NORMAL
LACTATE BLD-SCNC: 0.3 MMOL/L (ref 0.7–2)
MCH RBC QN AUTO: 29.1 PG (ref 26.5–33)
MCHC RBC AUTO-ENTMCNC: 31.1 G/DL (ref 31.5–36.5)
MCV RBC AUTO: 94 FL (ref 78–100)
PLATELET # BLD AUTO: 200 10E9/L (ref 150–450)
POTASSIUM SERPL-SCNC: 4.7 MMOL/L (ref 3.4–5.3)
POTASSIUM SERPL-SCNC: 4.7 MMOL/L (ref 3.4–5.3)
POTASSIUM SERPL-SCNC: 5.4 MMOL/L (ref 3.4–5.3)
POTASSIUM SERPL-SCNC: 5.5 MMOL/L (ref 3.4–5.3)
RBC # BLD AUTO: 3.06 10E12/L (ref 4.4–5.9)
SODIUM SERPL-SCNC: 135 MMOL/L (ref 133–144)
SODIUM SERPL-SCNC: 136 MMOL/L (ref 133–144)
SODIUM SERPL-SCNC: 136 MMOL/L (ref 133–144)
SODIUM SERPL-SCNC: 137 MMOL/L (ref 133–144)
WBC # BLD AUTO: 3.9 10E9/L (ref 4–11)

## 2018-12-12 PROCEDURE — 40000802 ZZH SITE CHECK

## 2018-12-12 PROCEDURE — 40000141 ZZH STATISTIC PERIPHERAL IV START W/O US GUIDANCE

## 2018-12-12 PROCEDURE — 36415 COLL VENOUS BLD VENIPUNCTURE: CPT | Performed by: STUDENT IN AN ORGANIZED HEALTH CARE EDUCATION/TRAINING PROGRAM

## 2018-12-12 PROCEDURE — 40000275 ZZH STATISTIC RCP TIME EA 10 MIN

## 2018-12-12 PROCEDURE — A9270 NON-COVERED ITEM OR SERVICE: HCPCS | Mod: GY | Performed by: STUDENT IN AN ORGANIZED HEALTH CARE EDUCATION/TRAINING PROGRAM

## 2018-12-12 PROCEDURE — 36415 COLL VENOUS BLD VENIPUNCTURE: CPT | Performed by: INTERNAL MEDICINE

## 2018-12-12 PROCEDURE — 97161 PT EVAL LOW COMPLEX 20 MIN: CPT | Mod: GP

## 2018-12-12 PROCEDURE — 83605 ASSAY OF LACTIC ACID: CPT | Performed by: INTERNAL MEDICINE

## 2018-12-12 PROCEDURE — 93005 ELECTROCARDIOGRAM TRACING: CPT

## 2018-12-12 PROCEDURE — 97530 THERAPEUTIC ACTIVITIES: CPT | Mod: GP

## 2018-12-12 PROCEDURE — 80048 BASIC METABOLIC PNL TOTAL CA: CPT | Performed by: STUDENT IN AN ORGANIZED HEALTH CARE EDUCATION/TRAINING PROGRAM

## 2018-12-12 PROCEDURE — 25000132 ZZH RX MED GY IP 250 OP 250 PS 637: Mod: GY | Performed by: STUDENT IN AN ORGANIZED HEALTH CARE EDUCATION/TRAINING PROGRAM

## 2018-12-12 PROCEDURE — 40000193 ZZH STATISTIC PT WARD VISIT

## 2018-12-12 PROCEDURE — 93010 ELECTROCARDIOGRAM REPORT: CPT | Performed by: INTERNAL MEDICINE

## 2018-12-12 PROCEDURE — 00000146 ZZHCL STATISTIC GLUCOSE BY METER IP

## 2018-12-12 PROCEDURE — 85027 COMPLETE CBC AUTOMATED: CPT | Performed by: STUDENT IN AN ORGANIZED HEALTH CARE EDUCATION/TRAINING PROGRAM

## 2018-12-12 PROCEDURE — 21400006 ZZH R&B CCU INTERMEDIATE UMMC

## 2018-12-12 PROCEDURE — 97116 GAIT TRAINING THERAPY: CPT | Mod: GP

## 2018-12-12 PROCEDURE — 76770 US EXAM ABDO BACK WALL COMP: CPT

## 2018-12-12 PROCEDURE — 71250 CT THORAX DX C-: CPT

## 2018-12-12 PROCEDURE — 99233 SBSQ HOSP IP/OBS HIGH 50: CPT | Mod: GC | Performed by: INTERNAL MEDICINE

## 2018-12-12 RX ADMIN — ATOVAQUONE 750 MG: 750 SUSPENSION ORAL at 05:16

## 2018-12-12 RX ADMIN — UMECLIDINIUM 1 PUFF: 62.5 AEROSOL, POWDER ORAL at 07:39

## 2018-12-12 RX ADMIN — LEVOTHYROXINE SODIUM 125 MCG: 125 TABLET ORAL at 07:38

## 2018-12-12 RX ADMIN — FLUTICASONE FUROATE AND VILANTEROL TRIFENATATE 1 PUFF: 100; 25 POWDER RESPIRATORY (INHALATION) at 07:39

## 2018-12-12 RX ADMIN — ASPIRIN 81 MG: 81 TABLET, COATED ORAL at 07:38

## 2018-12-12 RX ADMIN — DILTIAZEM HYDROCHLORIDE 360 MG: 180 CAPSULE, EXTENDED RELEASE ORAL at 07:37

## 2018-12-12 RX ADMIN — ATOVAQUONE 750 MG: 750 SUSPENSION ORAL at 16:54

## 2018-12-12 RX ADMIN — SILDENAFIL 20 MG: 20 TABLET ORAL at 07:41

## 2018-12-12 RX ADMIN — ATORVASTATIN CALCIUM 40 MG: 40 TABLET, FILM COATED ORAL at 19:09

## 2018-12-12 RX ADMIN — METOPROLOL TARTRATE 25 MG: 25 TABLET ORAL at 19:09

## 2018-12-12 RX ADMIN — METOPROLOL TARTRATE 25 MG: 25 TABLET ORAL at 07:38

## 2018-12-12 RX ADMIN — MIRTAZAPINE 15 MG: 15 TABLET, FILM COATED ORAL at 21:23

## 2018-12-12 RX ADMIN — SILDENAFIL 20 MG: 20 TABLET ORAL at 19:09

## 2018-12-12 RX ADMIN — SILDENAFIL 20 MG: 20 TABLET ORAL at 14:15

## 2018-12-12 ASSESSMENT — ACTIVITIES OF DAILY LIVING (ADL)
ADLS_ACUITY_SCORE: 14

## 2018-12-12 NOTE — PROGRESS NOTES
[facility] SERVICE CONSULTATION     Patient:  Rohan Monroe   Date of birth 1945, Medical record number [id number]  Date of Visit:  12/12/2018  Date of Admission: 12/10/2018  Consult Requester:Marilu Wallace MD            Assessment and Recommendations:     Mr. Monroe is a 73yo male with history of sarcoidosis on immunosuppressive therapy, COPD, CAD, HFpEF, pHTN, atrial flutter, HTN, DM2 and CKD3 with recent hospitalization in which he was diagnosed with PJP and started on bactrim course, who presented with worsening dyspnea and worsening diffuse interstitial and airspace opacities bilaterally on CXR. We considered his presentation most suspicious for decompensated heart failure, with increased weight, since discharge, lower extremity edema, and especially orthopnea most indicative of this etiology. We also interpreted his CXR as more suggestive of pulmonary edema than infection. As we did not consider this a worsening of his PJP, we advised discontinuing pentamidine and initiation of atovaquone. As of 12/12, his respiratory status is greatly improved, and we continue to relate his respiratory decompensation to volume overload in the context of HF and RADHA rather than PJP exacerbation, which is unlikely to have shown such abrupt improvement in response to transitioning antibiotics.    PROBLEM LIST:  1. Pneumocystis pneumonia, recently diagnosed on 11/20, treated on bactrim prior to current admission  2. Pulmonary sarcoidosis, biopsy proven  3. Pulmonary hypertension  4. 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   5. RADHA on CKD3: baseline Cr ~2.0 now elevated to 4.4; likely due to bactrim.  bactrim stopped  6. HFpEF (EF 60-65% in March 2018): weight gain of ~6kg and increased dyspnea/orthopnea since discharge on 11/26 concerning for fluid overload in the setting of reduced lasix dose and worsening RADHA. CXR with significant, diffuse interstitial and airspace infiltrates.   7.  Atrial flutter and fibrillation s/p ablation with post-ablation recurrence    RECOMMENDATION:  1. Continue atovaquone 750mg q12h until 12/15 ( to complete 21 days of treatment) . After that date, transition to prophylaxis dosing 1500 mg po daily due to ongoing immunosuppressive state.   - tolerates atovaquone well       ID will sign off at this time.    Kristian Azar, MS4  Patient seen and discussed with attending ID physician, Dr. Leon.    Attestation:  This patient has been seen and evaluated by me.  I discussed this patient with Kristian Azar MS4  (served as scribe)   and agree with the findings and plan in this note. I also personally edited this note to reflect my findings. I have reviewed today's vital signs, medications, labs and imaging.    Israel Hsu MD,M.Med.Sc.  Attending, Infectious Diseases  Pager: [phone]        ________________________________________________________________    Consult Question: Is current decompensation infectious in etiology?  Admission Diagnosis: Acute kidney injury (H) [N17.9]  Heart failure exacerbated by sotalol (H) [I50.9]         Interval Events:     Per ID recs, pt transitioned from pentamidine to atovaquone yesterday.  Fluctuated between NC/OxiPlus mask 6-7L overnight. This morning on NC 4-6L. Pt was noted to have converted to a-fib shortly after 5am this morning but has not noted any change in symptoms. Pt says that he feels his breathing is much improved from admission. His sense is that off-loading volume has really helped in this regard. He denies any increase in dyspnea since he converted to a-fib.      Recent culture results include:  Culture Micro   Date Value Ref Range Status   11/20/2018 Light growth  Normal trent    Final   11/18/2018 No growth  Final   11/18/2018 No growth  Final   01/05/2018 No growth  Final   04/26/2017 No growth  Final   04/22/2017 (A)  Final    Heavy growth Normal trent  Light growth Citrobacter koseri     04/22/2017 No  growth  Final   04/22/2017 No growth  Final   01/18/2017 No growth  Final   01/18/2017 No growth  Final          Physical Exam:   Vitals were reviewed  Patient Vitals for the past 24 hrs:   BP Temp Temp src Heart Rate Resp SpO2   12/12/18 0725 128/87 98.2  F (36.8  C) Axillary 124 18 94 %   12/12/18 0700 -- -- -- 114 -- --   12/12/18 0638 -- -- -- 110 -- 98 %   12/12/18 0618 132/82 97.7  F (36.5  C) Oral 93 16 97 %   12/12/18 0612 -- -- -- -- -- 98 %   12/12/18 0600 -- -- -- -- -- 97 %   12/12/18 0530 147/87 -- -- 105 16 97 %   12/12/18 0500 -- -- -- -- -- 97 %   12/12/18 0400 -- -- -- -- -- 98 %   12/12/18 0309 143/65 97.8  F (36.6  C) Oral 79 20 99 %   12/12/18 0214 -- -- -- 73 -- 98 %   12/12/18 0200 -- -- -- 72 -- 90 %   12/12/18 0100 -- -- -- -- -- 99 %   12/12/18 0000 -- -- -- -- -- 96 %   12/11/18 2300 -- -- -- -- -- 100 %   12/11/18 2215 127/65 97.9  F (36.6  C) Oral 60 16 97 %   12/11/18 2200 -- -- -- -- -- 99 %   12/11/18 2100 -- -- -- -- -- 100 %   12/11/18 2000 -- -- -- -- -- 97 %   12/11/18 1952 116/73 -- -- (!) 48 -- 97 %   12/11/18 1849 -- 97.8  F (36.6  C) Oral (!) 49 18 96 %   12/11/18 1321 104/61 -- -- (!) 48 -- 98 %   12/11/18 1114 120/63 98.1  F (36.7  C) Oral 69 18 95 %     Ranges for his vital signs:  Temp:  [97.7  F (36.5  C)-98.2  F (36.8  C)] 98.2  F (36.8  C)  Heart Rate:  [] 124  Resp:  [16-20] 18  BP: (104-147)/(61-87) 128/87  SpO2:  [90 %-100 %] 94 %    Physical Examination:  GENERAL: Well-developed, well-nourished, in bed in no acute distress.   HEENT: Head is normocephalic, atraumatic   EYES:  Eyes have anicteric sclerae without conjunctival injection or stigmata of endocarditis.    LUNGS: Lungs sound clearer today, somewhat decreased breath sounds.  CARDIOVASCULAR: Tachycardic with irregular rhythm with no murmurs, gallops or rubs.  ABDOMEN:  Normal bowel sounds, soft, nontender.  SKIN:  No acute rashes.  Line(s) are in place without any surrounding erythema or exudate. No  stigmata of endocarditis.  NEUROLOGIC:  Grossly nonfocal. Active x4 extremities  EXTR: 1+ b/l LE edema         Laboratory Data:     Inflammatory Markers    Recent Labs   Lab Test 11/26/18  0545 11/25/18  0438 11/24/18  0518 11/19/18  0625 01/05/18  0520 04/23/17  0635 04/22/17  1248 03/09/17  1150 02/13/17  1359  09/01/13  0557 08/31/13  2045  10/07/11  1103   SED  --   --   --  46*  --  44*  --   --  73*  --  13 13 --  15   CRP 60.0* 100.0* 98.0*  --  110.0* 24.0* 27.0* 9.2* 17.4*   < > 30.0* 28.8*   < > 5.8    < > = values in this interval not displayed.       Hematology Studies    Recent Labs   Lab Test 12/12/18  0526 12/11/18  0516 12/10/18  1327 11/30/18  1329 11/26/18  0545 11/25/18  0438  11/18/18  1414  01/05/18  0520  04/22/17  1248  01/18/17  1746   WBC 3.9* 5.2 6.6 8.0 8.2 9.4   < > 12.5*   < > 13.5*   < > 10.2   < > 12.3*   ANEU  --   --  5.7 6.6  --   --   --  10.4*  --  12.4*  --  8.3  --  9.6*   AEOS  --   --  0.1 0.2  --   --   --  0.5  --  0.1  --  0.4  --  0.4   HGB 8.9* 8.9* 9.4* 10.5* 9.4* 9.2*   < > 11.5*   < > 10.6*   < > 12.5*   < > 15.1   MCV 94 94 95 95 99 98   < > 97   < > 94   < > 94   < > 98    208 254 314 249 223   < > 274   < > 303   < > 348   < > 236    < > = values in this interval not displayed.       Immune Globulin Studies    Recent Labs   Lab Test 11/20/15  1045   IGG 1,410             Metabolic Studies     Recent Labs   Lab Test 12/12/18  0526 12/11/18  2328 12/11/18  1734 12/11/18  1116 12/11/18  0516    136 132* 134 134   POTASSIUM 4.7 5.4* 6.0* 5.3 5.6*   CHLORIDE 109 108 106 105 106   CO2 20 21 18* 22 21   BUN 77* 84* 85* 84* 83*   CR 3.66* 4.22* 4.29* 4.43* 4.74*   GFRESTIMATED 16* 14* 14* 13* 12*       Hepatic Studies    Recent Labs   Lab Test 12/10/18  1327 11/30/18  1329 11/19/18  0625 11/08/18  1215 09/13/18  1115 08/28/18  1009  02/26/18  1317   BILITOTAL 0.2 0.3 0.7  --  0.8 0.8  --  0.6   ALKPHOS 116 114 110  --  110 116  --  128   ALBUMIN  3.3* 3.2* 2.6* 2.9* 3.6 3.7   < > 3.7   AST 34 42 51*  --  26 23  --  24   ALT 41 40 42  --  30 29  --  42    < > = values in this interval not displayed.       Thyroid Studies    Recent Labs   Lab Test 11/19/18  0625 04/18/18  1225 03/21/18  1312 02/26/18  1318   TSH 1.44 2.05 7.94* 0.07*   T4  --   --  0.77 1.38       Microbiology:  Culture Micro   Date Value Ref Range Status   11/20/2018 Light growth  Normal trent    Final   11/18/2018 No growth  Final   11/18/2018 No growth  Final   01/05/2018 No growth  Final   04/26/2017 No growth  Final   04/22/2017 (A)  Final    Heavy growth Normal trent  Light growth Citrobacter koseri     04/22/2017 No growth  Final   04/22/2017 No growth  Final   01/18/2017 No growth  Final   01/18/2017 No growth  Final   03/26/2016 No growth  Final   06/28/2014   Final    Culture negative for acid fast bacilli  Assayed at [facility] [address]     06/27/2014   Final    Culture negative for acid fast bacilli Assayed at [facility] [address]     06/25/2014 Normal trent  Final   06/25/2014   Final    Culture negative for acid fast bacilli  Assayed at [facility] [address]     06/25/2014 No growth  Final   06/25/2014 No growth  Final   02/05/2014 No Beta Streptococcus isolated  Final   08/31/2013   Final    Moderate growth Beta hemolytic Streptococcus group B This isolate is presumed to   be clindamycin resistant based on detection of inducible clindamycin   resistance.  Plus normal skin trent.  Light growth Acinetobacter species, not baumannii   08/31/2013 No growth  Final   08/31/2013 No growth  Final   04/25/2012   Final    Incorrectly ordered by PCU/Clinic Canceled, Test credited  See wound culture, 4/25/12 @ 1133    04/25/2012   Final    Heavy growth Staphylococcus aureus  Heavy growth Beta hemolytic Streptococcus group B This Beta hemolytic   Streptococcus group B is presumed to be clindamycin resistant  based on   detection of inducible clindamycin resistance.  Plus normal skin trent.   2011   Final    <10,000 colonies/mL Gram negative rods  10 to 50,000 colonies/mL Gram positive cocci  Susceptibility testing not routinely done   2011 No growth  Final   2011   Final    Normal trent  Heavy growth Enterobacter cloacae  Susceptibility testing not routinely done   2011 No growth  Final   2011   Final    Incorrectly ordered by PCU/Clinic Canceled, Test credited   2011 Normal trent  Final   2011 No growth  Final   2011 No Beta Streptococcus isolated  Final   2011 No growth  Final   2003 No Beta Streptococcus isolated  Final       Urine Studies    Recent Labs   Lab Test 12/10/18  2110 18  0230 17  1130 17  1930 16  1947   LEUKEST Negative Negative Negative Negative Negative   WBCU 1 1 3* 2 1       Vancomycin Levels  No lab results found.    Invalid input(s): VANCO    Serostatus:  No results found for: CMVG, CMIGG, CMIG, CMIM, CMVIGM, CMVM, CMLTX, HSVG1, HSVG2, HSVTP1, BL0858, HS12M, HS12GR, HS1GR, HS2GR, HERPES, HSIM, HSIG, HSIGR, HSVIGMAB, HSVG1, VARICZOSAB, EBVIGG, EBIGG, EBVAGN, [id number]  No results found for: EBIG2, EBIGM, TOXG  No lab results found.    Invalid input(s): HSV12, VZVG, [id number]    Toxoplasma Testing  No lab results found.    Invalid input(s): TOXPL, TOXABM, TOXPCR    Virology:  CMV viral loads  No lab results found.  No results found for: CMQNT, CMVQ  EBV viral loads   No lab results found.  No results found for: EBVDN, EBRES, EBVDN, EBVSP, EBVPC, EBVPCR  BK viral loads No lab results found.    Invalid input(s): BKDN, BKVPC, BKVPCR  HSV testing  No lab results found.    Hepatitis B Testing No lab results found.  Hepatitis C Testing   No results found for: HCVAB, HQTG, HCGENO, HCPCR, HQTRNA, HEPRNA  Respiratory Virus Testing    No results found for: RS, FLUAG      IMAGIN/10/18, XR CHEST 2 VW  FINDINGS:  AP and lateral  views of the chest. Worsening diffuse interstitial and  airspace opacities throughout both lung fields with numerous perihilar  nodules consistent with patient's history of sarcoidosis. Increasing  bilateral pleural effusions, right greater than left. Diffuse pleural  thickening. Cardiomediastinal silhouette is largely stable upper  abdomen is unremarkable. No visualized bony lesions.                       IMPRESSION:  Diffuse interstitial and airspace infiltrates concerning for  superimposed infection, ARDS or pulmonary edema on top of patient's  known interstitial lung disease from sarcoidosis.     11/18/18, CT CHEST W/O CONTRAST  IMPRESSION:  1. Small amount of infiltrate in lateral segment of the right middle  lobe is new since the prior exam. Remainder of infiltrates and other  opacities in the lungs bilaterally are unchanged, likely representing  chronic changes from known sarcoidosis.   2. Currently there is no mediastinal or hilar adenopathy.  3. Vascular calcifications, including coronary artery calcifications,  are again noted.  4. Small liver with nodular contour suggests cirrhosis.      PROCEDURES:    12/11/18, TTE:     Interpretation Summary  Global and regional left ventricular function is normal with an EF of 55% by  visual estimation Grade II or moderate diastolic dysfunction.  Right ventricular function, chamber size, wall motion, and thickness are  normal.  Trileaflet aortic sclerosis without stenosis.  Mild tricuspid insufficiency is present.  Pulmonary hypertension is present with right ventricular systolic pressure is  46mmHg above the right atrial pressure.  The inferior vena cava was normal in size with preserved respiratory  variability.  No pericardial effusion is present.     Compared to prior study dated 11/19/2018, rhythm is now regular and LVEF is  normal. Diastolic dysfunction is present. Moderate pulmonary hypertension is  unchanged.

## 2018-12-12 NOTE — PROGRESS NOTES
"                              Progress Note  Rohan Monroe MRN: [id number]  Age: 72 year old, : 1945  Date: 2018            Interval History:     Mr. Monroe was admitted 2 ago with chief complain of worsening of despnea, see H&P for additional details. The patient experiences a significant improvement in his SOB. However, still dyspneic when walking to the toilet. Denies exertional chest pain and chest pain at rest. No further symptom exacerbations compared to yesterday. Refuses anticoagulation therapy despite >2 CHADSVASC.    PHYSICAL EXAM    Vital signs:  Temp: 98.2  F (36.8  C) Temp src: Axillary BP: 128/87   Heart Rate: 124 Resp: 18 SpO2: 94 % O2 Device: Nasal cannula Oxygen Delivery: 4 LPM Height: 172.7 cm (5' 8\") Weight: 77.1 kg (170 lb)  Estimated body mass index is 25.85 kg/m  as calculated from the following:    Height as of this encounter: 1.727 m (5' 8\").    Weight as of this encounter: 77.1 kg (170 lb).      Intake/Output Summary (Last 24 hours) at 2018 1046  Last data filed at 2018 0700  Gross per 24 hour   Intake 120 ml   Output 950 ml   Net -830 ml         GENERAL:  well-developed, well-nourished, in bed in no acute distress.   HEENT:  Head is normocephalic, atraumatic   EYES:  Eyes have anicteric sclerae without conjunctival injection or stigmata of endocarditis.    LUNGS:  diffuse b/l crackles, breath sounds diminished, dyspneic with speech  CARDIOVASCULAR: Irregular rhythm with no murmurs, gallops or rubs.  ABDOMEN:  Normal bowel sounds, soft, nontender. No appreciable hepatosplenomegaly  SKIN:  No acute rashes.  Line(s) are in place without any surrounding erythema or exudate. No stigmata of endocarditis.  NEUROLOGIC:  Grossly nonfocal. Active x4 extremities  EXTR: No LE edema        DIAGNOSTIC STUDIES  ROUTINE ICU LABS (Last four results)  CMP  Recent Labs   Lab 18  0526 18  2328 18  1734 18  1116  12/10/18  1327    136 132* 134   " < > 131*   POTASSIUM 4.7 5.4* 6.0* 5.3   < > 5.6*   CHLORIDE 109 108 106 105   < > 104   CO2 20 21 18* 22   < > 19*   ANIONGAP 7 7 8 6   < > 9   GLC 80 96 109* 79   < > 91   BUN 77* 84* 85* 84*   < > 77*   CR 3.66* 4.22* 4.29* 4.43*   < > 4.42*   GFRESTIMATED 16* 14* 14* 13*   < > 13*   GFRESTBLACK 20* 17* 16* 16*   < > 16*   RAFFY 8.5 8.5 8.5 8.5   < > 8.3*   PROTTOTAL  --   --   --   --   --  7.5   ALBUMIN  --   --   --   --   --  3.3*   BILITOTAL  --   --   --   --   --  0.2   ALKPHOS  --   --   --   --   --  116   AST  --   --   --   --   --  34   ALT  --   --   --   --   --  41    < > = values in this interval not displayed.     CBC  Recent Labs   Lab 12/12/18  0526 12/11/18  0516 12/10/18  1327   WBC 3.9* 5.2 6.6   RBC 3.06* 3.03* 3.15*   HGB 8.9* 8.9* 9.4*   HCT 28.6* 28.4* 30.0*   MCV 94 94 95   MCH 29.1 29.4 29.8   MCHC 31.1* 31.3* 31.3*   RDW 15.6* 15.7* 15.7*    208 254     INRNo lab results found in last 7 days.  Arterial Blood GasNo lab results found in last 7 days.    MICROBIOLOGY  No results found for the last 168 hours.    IMAGING    TTE 12/11/2018  Interpretation Summary  Global and regional left ventricular function is normal with an EF of 55% by  visual estimation Grade II or moderate diastolic dysfunction.  Right ventricular function, chamber size, wall motion, and thickness are  normal.  Trileaflet aortic sclerosis without stenosis.  Mild tricuspid insufficiency is present.  Pulmonary hypertension is present with right ventricular systolic pressure is  46mmHg above the right atrial pressure.  The inferior vena cava was normal in size with preserved respiratory  variability.  No pericardial effusion is present.     Compared to prior study dated 11/19/2018, rhythm is now regular and LVEF is  normal. Diastolic dysfunction is present. Moderate pulmonary hypertension is  unchanged.    EKG 12/12/2018  Atrial fibrillation with rapid ventricular response with premature ventricular or aberrantly  conducted complexes  Nonspecific ST abnormality  Abnormal QRS-T angle, consider primary T wave abnormality       US Renal 12/12/2018  FINDINGS:  Right kidney measures 10.2 cm in length. It is of increased  echogenicity.   No hydronephrosis. Tiny 1 cm cyst in the lower Pole.  Left kidney measures 10.1 cm in length. It is of increased  echogenicity.   No hydronephrosis.  Bladder is well distended and does not show any abnormal wall  thickening.                                   Impression: No hydronephrosis. Normal-appearing kidneys.      Assessment and Plan:     Rohan Monroe is a 72 year old male with a PMH of pulmonary sarcoidosis on immunosuppression, COPD (4-6 L O2 at baseline), CAD s/p ROSALIE to D2 and LAD, HFpEF, a flutter status post ablation, pulmonary HTN, HTN, DM2, CKD III, and recent diagnosis of PCP PNA on treatment dose pentamidine who was admitted on 12/10 with worsening dyspnea on exertion.      PLAN:  Today:  - Switch from pentamidine to atovaquone per ID recommendation  - Discharge from cardiovascular department - transfer to IM team  - Monitoring kidney function  - Pulm consult      # Acute on chronic HFpEF (EF 55%; Grade 2 Diastolic Dysfunction)   # CAD s/p PCI with ROSALIE to mid-LAD and D2 in 5/2017   Recent TTE at OSH 11/19/18 with reduced EF to 45-50%, although done while in rapid Afib. Presents with weight gain of ~6kg since discharge in the setting of recently decreased lasix dose due to RADHA. In addition, has RADHA which has further reduced UOP. Clinical exam consistent with hypervolemia. BNP elevated but decreased from previous.  Troponin negative. EKG with 1st degree AV block, no ischemic changes. No signs of shock.      - Continue PTA aspirin 81 mg daily, and Lipitor 40 mg daily  - Holding PTA losartan d/t RADHA   - Continue Cardizem 360 mg every day  - Daily weights, strict I's and O's  - Low sodium diet, fluid restriction   - CORE follow up as outpatient      # Pneumocystis Pneumonia  #  Severe dyspnea on exertion   # Pulmonary sarcoidosis   # ? 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 Follows with pulmonology, Dr. Perlman. Was previously on 3L O2 at home, recently increased to 4-6 L. Requiring baseline ~5L O2 in ED on admission. Symptoms have been stable to slightly worsened since recent discharge, likely due to decompensated HF as discussed above. Positive for PCP pneumonia during recent admission, now with RADHA likely 2/2 bactrim (see below). WBC normal, afebrile, stable O2 requirements. No signs of worsening infection.   - CXR demonstrates significantly increased pulmonary opacities, likely secondary to known PCP along with fluid overload.   - ID consulted, switching therapy to atovaquone  - Pulm consult today  - Continue PTA bronchodilators  - Takes methotrexate qweek      # RADHA on CKD stage III   Baseline creatinine recently around 1.9-2.4 (2.39 on 11/26), now elevated to 3.66. Potassium elevated to 5.6 in ED, shifted with lasix. RADHA likely secondary to bactrim use for PCP pneumonia. Lasix was decreased during recent admission.      - Nephrology consulted, appreciate recs  - Monitor renal function closely  - Avoid nephrotoxic drugs  - BMP q6 hours, shift K PRN      # Atrial flutter and fibrillation s/p ablation with post-ablation recurrence  Recently admitted to OSH on 11/18 with Afib with RVR, s/p spontaneous conversion but recurrence on 11/21. Patient converted to sinus rhythm 11/25 after treatment of PCP pneumonia was initiated along with uptitration of diltiazem, but flipped back into Afib on 11/26 prior to recent discharge. HR bradycardic in the ED.   - [id number]. Was previously on coumadin, but stopped due to patient preference along with heme occult positive study during recent admission.   - Continue Diltiazem 360 mg daily   - Continue Metoprolol 25 mg BID  - Goal HR < 110     # Severe pulmonary  HTN  RHC 11/19, PVR is 9 and wedge was slightly elevated, 11 but patient in rapid Afib at that time.   - Continue PTA sildenafil 20mg TID     # Gout   Per patient never formally diagnosed, but does take colchicine for flares.  Started colchicine 11/24. No current flare.      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 Wallace, who agrees with above assessment and plan.    Louie Wheatley MS

## 2018-12-12 NOTE — PROGRESS NOTES
12/12/18 1205   Quick Adds   Type of Visit Initial PT Evaluation       Present no   Living Environment   Lives With child(jose), adult   Living Arrangements apartment   Home Accessibility no concerns   Living Environment Comment Pt lives in an apartment with his 25 y/o son who assists as needed. Elevator access so no stairs required.   Self-Care   Usual Activity Tolerance moderate   Current Activity Tolerance poor   Equipment Currently Used at Home cane, straight;shower chair;walker, rolling;wheelchair, manual   Activity/Exercise/Self-Care Comment Pt reports he has a cane, 2 walkers, and a wc at home (FWW and 4WW) though has not been using them inside the apartment. Pt states he enjoys exercise but has not been able to tolerate much exercise/activity lately. Pt states he has been working with HHPT/OT though does not state when last session was.   Functional Level Prior   Ambulation 0-->independent   Transferring 0-->independent   Toileting 0-->independent   Bathing 1-->assistive equipment   Communication 0-->understands/communicates without difficulty   Swallowing 0-->swallows foods/liquids without difficulty   Cognition 0 - no cognition issues reported   Fall history within last six months no   Which of the above functional risks had a recent onset or change? ambulation   Prior Functional Level Comment Prior ind with functional mobility, intermittently uses cane, walker, and wc   General Information   Onset of Illness/Injury or Date of Surgery - Date 12/10/18   Referring Physician Trish Curry MD   Patient/Family Goals Statement To increase activity tolerance   Pertinent History of Current Problem (include personal factors and/or comorbidities that impact the POC) Per chart, Rohan Monroe is a 73 y/o M with history of sarcoidosis on immunosuppressive therapy, COPD (on 4-6 L home O2), CAD s/p ROSALIE to D2 and mLAD (05/2017), HFpEF, atrial flutter s/p ablation with  post-ablation recurrence, severe pulmonary HTN, HTN, DM2, CKD stage III, and recent diagnosis of PCP pneumonia (on bactrim) who presents to the ED with acute on chronic dyspnea on exertion.    Precautions/Limitations oxygen therapy device and L/min  (8LPM via oxymizer cannula)   General Observations 72-year-old male sitting up in chair at beginning of session, on 8LPM via oxymizer cannula   General Info Comments Activity orders: activity as tolerated   Cognitive Status Examination   Orientation orientation to person, place and time   Level of Consciousness alert   Follows Commands and Answers Questions 100% of the time   Personal Safety and Judgment intact   Memory intact   Pain Assessment   Patient Currently in Pain No   Integumentary/Edema   Integumentary/Edema no deficits were identifed   Posture    Posture Forward head position;Protracted shoulders   Range of Motion (ROM)   ROM Comment B LE ROM WFL   Strength   Strength Comments B LE strength at least 3/5 per functional abilities and ability to sit<>stand with SBA.   Bed Mobility   Bed Mobility Comments Not assessed at this date, pt up in chair at beginning and end of session   Transfer Skills   Transfer Comments SBA sit<>stand   Gait   Gait Comments Pt ambulates 25ft with FWW and CGA, slow pace and short step length   Balance   Balance Comments Good sitting and standing balance   Sensory Examination   Sensory Perception no deficits were identified   General Therapy Interventions   Planned Therapy Interventions gait training;strengthening;home program guidelines;progressive activity/exercise   Clinical Impression   Criteria for Skilled Therapeutic Intervention yes, treatment indicated   PT Diagnosis Impaired functional mobility   Influenced by the following impairments Decreased strength and endurance   Functional limitations due to impairments Pt with limited tolerance to activity, requires SBA for transfers and ambulation   Clinical Presentation  "Stable/Uncomplicated   Clinical Presentation Rationale PMH and clinical judgment   Clinical Decision Making (Complexity) Low complexity   Therapy Frequency` other (see comments)  (6x/week)   Predicted Duration of Therapy Intervention (days/wks) 5 days   Anticipated Equipment Needs at Discharge (Likely none)   Anticipated Discharge Disposition Home with Home Therapy;Home with Assist   Risk & Benefits of therapy have been explained Yes   Patient, Family & other staff in agreement with plan of care Yes   [facility] [employer] TM \"6 Clicks\"   2016, Trustees of [facility], under license to [employer].  All rights reserved.   6 Clicks Short Forms Basic Mobility Inpatient Short Form   [facility] AM-PAC  \"6 Clicks\" V.2 Basic Mobility Inpatient Short Form   1. Turning from your back to your side while in a flat bed without using bedrails? 4 - None  (assumed, not assessed)   2. Moving from lying on your back to sitting on the side of a flat bed without using bedrails? 4 - None  (assumed, not assessed)   3. Moving to and from a bed to a chair (including a wheelchair)? 3 - A Little   4. Standing up from a chair using your arms (e.g., wheelchair, or bedside chair)? 3 - A Little   5. To walk in hospital room? 3 - A Little   6. Climbing 3-5 steps with a railing? 3 - A Little  (assumed, not assessed)   Basic Mobility Raw Score (Score out of 24.Lower scores equate to lower levels of function) 20   Total Evaluation Time   Total Evaluation Time (Minutes) 9     "

## 2018-12-12 NOTE — CONSULTS
Pulmonary Consult Note    Assessment and Recommendations:  72M sarcoidosis on methotrexate, COPD on 4-6L O2 at home, CAD s/p ROSALIE, HFpEF, aflutter s/p ablation, severe pHTN, CKDIII, recent diagnosis of PJP PNA on bactrim admitted 12/10 for acute on chronic dyspnea. Given improvement after treatment for heart failure, feel that this was a large contributor to his symptoms. Question of whether or not sarcoidosis is contributing is unclear, at this time. Will review CT images with radiology and discuss with Dr. Perlman on if additional treatment is needed for sarcoidosis.     - continue treatment dose bactrim  - will f/u tomorrow on if steroid or other agents needed (pt reports intolerance to steroids due to psychosis)    Summary:  72M sarcoidosis on methotrexate, COPD on 4-6L O2 at home, CAD s/p ROSALIE, HFpEF, aflutter s/p ablation, severe pHTN, CKDIII, recent diagnosis of PJP PNA on bactrim admitted 12/10 for acute on chronic dyspnea. Pt feeling increasing ACEVES at home. Denies new F/C, rhinorrhea, CP, cough - sputum. Tx as HFpEF exacerbation by cardiology team and pt now states he is starting to feel much better. Able to ambulate around the unit, whereas on admission he could not make it 5ft to the bathroom.     Follows with Dr. Perlman, last seen 11/30. Pulmonary disease stable, at that time.     10 point review of systems negative, aside from that mentioned above    Past Medical History:   Diagnosis Date     Atrial flutter (H)      Cataract of both eyes      Chronic infection     Hep C     Congestive heart failure, unspecified      Coronary artery disease      Depressive disorder      Depressive disorder, not elsewhere classified     Depression (non-psychotic)     ERM OS (epiretinal membrane, left eye)      Generalized osteoarthrosis, unspecified site      Glaucoma suspect      Hypertension      Lichen planus      Other psoriasis      Pneumocystis carinii pneumonia 11/23/2018     PVD (posterior vitreous detachment),  left eye      Sarcoidosis      Sarcoidosis      Type II or unspecified type diabetes mellitus without mention of complication, not stated as uncontrolled      Unspecified hypothyroidism     Hypothyroidism     Unspecified viral hepatitis C without hepatic coma      Viral warts, unspecified        Past Surgical History:   Procedure Laterality Date     ANESTHESIA CARDIOVERSION N/A 1/19/2017    Procedure: ANESTHESIA CARDIOVERSION;  Surgeon: GENERIC ANESTHESIA PROVIDER;  Location: [facility] OR     ANESTHESIA CARDIOVERSION N/A 1/23/2017    Procedure: ANESTHESIA CARDIOVERSION;  Surgeon: GENERIC ANESTHESIA PROVIDER;  Location: [facility] OR     ANESTHESIA CARDIOVERSION N/A 1/24/2017    Procedure: ANESTHESIA CARDIOVERSION;  Surgeon: GENERIC ANESTHESIA PROVIDER;  Location: [facility] OR     ANESTHESIA CARDIOVERSION N/A 11/20/2018    Procedure: ANESTHESIA CARDIOVERSION;  Surgeon: GENERIC ANESTHESIA PROVIDER;  Location: [facility] OR     ARTHROPLASTY HIP  8/24/2011    Procedure:ARTHROPLASTY HIP; Right Total Hip Arthroplasty  Choice anesthesia; Surgeon:LESLI WILKINSON; Location:[facility] OR     BIOPSY       C PELVIS/HIP JOINT SURGERY UNLISTED       cardiac stent      s/p     CARDIAC SURGERY       CATARACT IOL, RT/LT  9/15/2015    LE     COLONOSCOPY       CORONARY ANGIOGRAPHY ADULT ORDER       H ABLATION ATRIAL FLUTTER       HC REMOVAL OF TONSILS,<13 Y/O      Tonsils <10y.o.     HC REPAIR INCISIONAL HERNIA,REDUCIBLE      Hernia Repair, Incisional, Unilateral     HEART CATH, ANGIOPLASTY       JOINT REPLACEMENT      1 month ago--right hip     LIGATN/STRIP LONG & SHORT SAPHEN         Family History   Problem Relation Age of Onset     Heart Disease Father         irreg heart beat     Circulatory Father         varicose veins     Prostate Cancer Father      Heart Disease Mother         heart attack     Arthritis Mother      Osteoporosis Mother      Thyroid Disease Mother      Hypertension Mother      Eye Disorder Maternal Grandmother         glaucoma     Diabetes Sister          type 2     Kidney Cancer Sister      Diabetes Sister      Glaucoma No family hx of      Macular Degeneration No family hx of      Cancer No family hx of         no skin cancer       Social History     Socioeconomic History     Marital status:      Spouse name: Not on file     Number of children: 2     Years of education: Not on file     Highest education level: Not on file   Social Needs     Financial resource strain: Not on file     Food insecurity - worry: Not on file     Food insecurity - inability: Not on file     Transportation needs - medical: Not on file     Transportation needs - non-medical: Not on file   Occupational History     Employer: [employer]   Tobacco Use     Smoking status: Former Smoker     Packs/day: 1.00     Years: 30.     Pack years: 30.00     Types: Cigarettes     Start date: 1960     Last attempt to quit: 1994     Years since quittin.4     Smokeless tobacco: Never Used   Substance and Sexual Activity     Alcohol use: No     Alcohol/week: 0.0 oz     Drug use: No     Sexual activity: Not Currently     Partners: Female     Birth control/protection: Post-menopausal   Other Topics Concern     Parent/sibling w/ CABG, MI or angioplasty before 65F 55M? Not Asked      Service Not Asked     Blood Transfusions No     Caffeine Concern Not Asked     Occupational Exposure Not Asked     Hobby Hazards Not Asked     Sleep Concern Not Asked     Stress Concern Not Asked     Weight Concern Not Asked     Special Diet Not Asked     Back Care Not Asked     Exercise Yes     Bike Helmet Not Asked     Seat Belt Not Asked     Self-Exams Not Asked   Social History Narrative    Dairy/d 2 servings/d    Caffeine little servings/d    Exercise 3 x week    Sunscreen used - Yes    Seatbelts used - Yes    Working smoke/CO detectors in the home - Yes    Guns stored in the home - No    Self Breast Exams - NOT APPLICABLE    Self Testicular Exam - No    Eye Exam up to date - Yes     "Dental Exam up to date - Yes    Pap Smear up to date - NOT APPLICABLE    Mammogram up to date - NOT APPLICABLE    PSA up to date - Yes    Dexa Scan up to date - NOT APPLICABLE    Flex Sig / Colonoscopy up to date - Yes    Immunizations up to date - Unsure    Abuse: Current or Past(Physical, Sexual or Emotional)- No    Do you feel safe in your environment - Yes       /63   Pulse 54   Temp 98  F (36.7  C) (Oral)   Resp 18   Ht 1.727 m (5' 8\")   Wt 77.1 kg (170 lb)   SpO2 94%   BMI 25.85 kg/m    Speaking full sentences, NAD  OP clear  RRR  Bilateral crackles  Abd NTND  Trace b/l edema    Labs: personally reviewed    Imaging: personally reviewed    Sandeep Gleason MD  Pulmonary and Critical Care Medicine  [phone]    "

## 2018-12-12 NOTE — PLAN OF CARE
D:Bradycardic.  Denies light headedness or dizziness.  Sitting up in bed.  MD notified earlier regarding decreased heart rate.      A:Asymptomatic.  Vital signs stable.  Condition stable.    P;Up with assist only.   Continue to monitor rhythm and vital signs.  Continue with current plan of care.

## 2018-12-12 NOTE — PLAN OF CARE
Discharge Planner PT  6C  Patient plan for discharge: Home with son, open to HHPT  Current status: PT eval complete, treatment initiated. Pt performs sit<>stand with SBA and FWW, ambulates 75ft + 100ft + 125ft + 100ft + 100ft with FWW and SBA, seated rest breaks 2/2 SOB. No unsteadiness or LOB noted throughout, pt limited by fatigue/SOB. Pt on 8L O2 via oxymizer cannula throughout session with O2 sats 96% and above.  Barriers to return to prior living situation: Medical status, decreased activity tolerance, O2 needs  Recommendations for discharge: Home with assist + HHPT  Rationale for recommendations: Pt has good support of son who is able to assist as needed. Pt would benefit from HHPT services to progress strength, activity tolerance, and independence with functional mobility.       Entered by: Marion Pardo 12/12/2018 11:58 AM

## 2018-12-12 NOTE — PROVIDER NOTIFICATION
Pt converted from SR to afib  at 0535 this am.  Pt asymptomatic.  /84.  97% on 6L per NC.  Pt resting in bed in no apparent distress.  MD notified.  EKG done. Continue to monitor and notify MD if HR sustained 130's or pt asymptomatic.

## 2018-12-12 NOTE — PROGRESS NOTES
[facility], [place]    Progress Note - Cardiology Service        Date of Admission:  12/10/2018    Assessment & Plan     Rohan Monroe is a 71 y/o M with history of sarcoidosis on immunosuppressive therapy, COPD (on 4-6 L home O2), CAD s/p ROSALIE to D2 and mLAD (05/2017), HFpEF, atrial flutter s/p ablation with post-ablation recurrence, severe pulmonary HTN, HTN, DM2, CKD stage III, and recent diagnosis of PCP pneumonia (on bactrim) admitted on 12/10 with acute on chronic dyspnea on exertion felt to be multifactorial and secondary to underlying pulmonary sarcoid, PCP pneumonia, and mild fluid overload secondary to chronic HFpEF and RADHA.    Changes Today:  - Transfer to Medicine team   - Pulmonology consulted, appreciate recs  - Non-contrast CT Chest ordered per pulm   - Continue to hold diuretics   - Resume PO metoprolol 25mg BID (evening dose held d/t bradycardia), goal HR<110     # Pneumocystis Pneumonia  # Severe dyspnea on exertion   # Pulmonary sarcoidosis   # ? 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 Follows with pulmonology, Dr. Perlman. Was previously on 3L O2 at home, recently increased to 4-6 L following PCP diagnosis. O2 requirements stable without worsening hypoxia. Ongoing dyspnea is likely multifactorial and secondary to ongoing PCP pneumonia in the setting of underlying severe pulmonary HTN and pulmonary sarcoid (?worsening ILD). CXR significantly abnormal and concerning for infection/fluid overload/worsening of underlying ILD. HFpEF does not appear to be acutely decompensated, pt euvolemic on exam and TTE. Ddx includes PE, although unable to get CTA d/t RADHA and V/Q scan would likely not be helpful in setting of ILD.     - ID consulted, appreciate recs. PCP pneumonia appears to be clinically improving.   - Pulmonology consulted on 12/12   - Holding bactrim due to RADHA. S/p one day of  pentamidine on 12/10, now transitioned to atovaquone per ID  - Continue PTA bronchodilators and diuresis   - Takes methotrexate qweek (currently holding)   - Diuresing as discussed above     # RADHA on CKD stage III   # Hyperkalemia   Baseline creatinine recently around 1.9-2.4 (2.39 on 11/26), now elevated to 4.42. Potassium elevated to 5.6 in ED, shifted with lasix. RADHA likely multifactorial and in part secondary to decreased creatinine secretion related to bactrim use for PCP pneumonia. Lasix was decreased during recent admission.      - Nephrology consulted, appreciate recs  - UA with hyaline casts and FeUrea concerning for pre-renal etiology.   - Monitor renal function closely  - Avoid nephrotoxic drugs  - BMP q6 hours, shift K PRN     # Chronic HFpEF (EF 60-65% in March 2018)   # CAD s/p PCI with ROSALIE to mid-LAD and D2 in 5/2017   Recent TTE at OSH 11/19/18 with reduced EF to 45-50%, although done while in rapid Afib. Presents with weight gain of ~6kg and increased dyspnea/orthopnea since discharge on 11/26 concerning for fluid overload in the setting of reduced lasix dose and worsening RADHA. Apart from weight gain, however, fluid overload likely does not explain all of patients symptoms. Worsening dyspnea is likely multifactorial and secondary to other numerous pulmonary issues dicussed above.  BNP elevated but decreased from previous.  Troponin negative. EKG with 1st degree AV block, no ischemic changes. No signs of shock.     - Continue PTA aspirin 81 mg daily, and Lipitor 40 mg daily  - Holding PTA losartan d/t RADHA   - Continue Cardizem 360 mg every day  - BB: decreased metoprolol to 25mg BID (from 50mg) d/t bradycardia on 12/11. Flipped into Afib on 12/12 overnight after evening dose of BB was held. BB resumed this morning.   - Diuresis: s/p IV lasix on 12/11. Now appears euvolemic on exam and TTE. RADHA improving without diuretics. Will continue to hold.   - Daily weights, strict I's and O's  - Low sodium  diet, fluid restriction   - CORE follow up as outpatient      # Atrial flutter and fibrillation s/p ablation with post-ablation recurrence  Recently admitted to OSH on 11/18 with Afib with RVR, s/p spontaneous conversion but recurrence on 11/21. Patient converted to sinus rhythm 11/25 after treatment of PCP pneumonia was initiated along with uptitration of diltiazem, but flipped back into Afib on 11/26 prior to recent discharge. HR bradycardic in the ED.   - [id number]- 4. Was previously on coumadin, but stopped due to patient preference along with heme occult positive study during recent admission.   - Continue Diltiazem 360 mg daily   - Goal HR < 110     # Severe pulmonary HTN  RHC 11/19, PVR is 9 and wedge was slightly elevated, 11 but patient in rapid Afib at that time.   - Continue PTA sildenafil 20mg TID      # Normocytic anemia  Hgb 9.4 on admission, within recent baseline. No active bleeding.  - CBC in AM     # Hyponatremia- resolved   Mild, Na 131 down from 133 on 12/6, now resolved.     # Gout   Per patient never formally diagnosed, but does take colchicine for flares.  Started colchicine 11/24. No current flare.      Chronic Conditions   # Hypothyroidism- continue PTA levothyroxine  # History of HepC- treated in the past  # DM2 (A1C 6.4)- low intensity sliding scale insulin, hypoglycemia protocol   # Insomnia- cont PTA PRN melatonin and PRN remeron      Diet: 2g sodium diet, 2L fluid restriction   Fluids: no mIVF   DVT Prophylaxis: Pneumatic Compression Devices  Fung Catheter: not present  Code Status: Full     Disposition Plan   Transfer to medicine team today. Expected discharge: 2 - 3 days, recommended to prior living arrangement once renal function improved and pending dyspnea workup.  Entered: Trish Curry MD 12/12/2018, 10:44 AM     The patient's care was discussed with the Attending Physician, Dr. Wallace.    Trish Curry MD  Cardiology 1 Service  [facility]  [facility]  Pager: [phone]  Please see sticky note for cross cover information  ______________________________________________________________________    Interval History   Afebrile overnight. Patient was bradycardic to 48 bpm yesterday and evening metoprolol was held. He converted into Afib overnight with rates 100-120. Restarted on PO metoprolol at lower dose (25mg BID) this morning. Otherwise hemodynamically stable with SpO2 in mid 90s on 4-5L O2 (baseline). Patient feels dyspnea has improved somewhat.     Data reviewed today: I reviewed all medications, new labs and imaging results over the last 24 hours.    Physical Exam   Vital Signs: Temp: 98.2  F (36.8  C) Temp src: Axillary BP: 128/87   Heart Rate: 124 Resp: 18 SpO2: 94 % O2 Device: Nasal cannula Oxygen Delivery: 4 LPM  Weight: 170 lbs 0 oz  Constitutional: awake, alert, lying in bed, NAD   HEENT: normalmocephalic, atraumatic, anicteric sclera without conjunctival injection  Neck: no JVD  Cardiovascular: tachycardic, irregular, normal S1/S2, no murmurs/rubs/gallops appreciated   Respiratory: diffuse crackles bilaterally, no wheezing   Gastrointestinal: soft, nontender, nondistended, normal bowel sounds  Ext: warm and well perfused, trace LE edema b/l   Skin: no rashes noted on exposed skin,, venous stasis changes in LE   Neurologic: alert and oriented, CN II-XII grossly intact, moving all extremities, nonfocal   Psychiatric: appropriate affect

## 2018-12-12 NOTE — PLAN OF CARE
Afebrile.  BP stable.  SB/SR 48-70's.  O2 sat % alternating between NC and OxiPlus mask at 6-7LPM overnight per pt comfort.  (O2 sat reading 95 and > on 5-6L).  K 6.0.  Kayexalate given per orders.  Up to commode with assist of 1. Lg BM.  Recheck K+ 5.4.  Cr 4.22(was 4.29).  ABX given per orders.  BG stable.  Slept between cares and assessments in no apparent distress.  Continue to monitor respiratory status/O2 needs, labs/kidney function.  Notify MD with any changes/concerns.

## 2018-12-12 NOTE — PROGRESS NOTES
Nephrology Progress Note  12/12/2018         Assessment & Recommendations:   Rohan Monroe is a 72 year old male with a PMH of pulmonary sarcoidosis on immunosuppression, COPD (4-6 L O2 at baseline), CAD s/p ROSALIE to D2 and LAD, HFpEF, a flutter status post ablation, pulmonary HTN, HTN, DM2, CKD III, and recent diagnosis of PCP PNA on treatment dose bactrim who was admitted on 12/10 with worsening dyspnea on exertion.   Nephrology was consulted regarding the patients RADHA and hyperkalemia.    RADHA  #RADHA on CKD  Creatinine rise to L4.43 on 12/11, up from baseline CKD III Cr 1.9-2.4, likely multifactorial etiology.  P.o. intake with fluid restriction abdominal bloating, dry membranes on physical exam, hyaline casts on UA suggest prerenal component.  In addition, the amount of creatinine rise may be artificially elevated in the setting of decreased creatinine secretion with Bactrim.  AIN less likely given patient has no WBCs or RBCs in UA.  Per chart review, patient has been very fluid responsive in the past with regards to his creatinine.  Nonoliguric RADHA given urine output of 1400 cc and 4 unmeasured episodes on 12/11.  Cr of 3.66 on 12/12, decreased from 4.43 on 12/11  - Recommend holding diuresis at this time-given TTE on 12/11 revealed normal IVC size, potential prerenal component  - Agree with holding losartan and Bactrim at this time  - trend BMP, avoid nephrotoxic agents  - strict I/Os, daily weights    ELECTROLYTES  #hyperkalemia  Potassium of 5.6 on admission down to 5.3 on 12/11 after 40 mg IV Lasix and 60 mg IV Lasix with adequate urine output.  Hyperkalemia likely secondary to Bactrim effect ENaC channels, producing an aldosterone resistance, which in turn blocks K and H+ excretion. Hyperkalemia coincides with initiation of bactrim therapy.  In addition, losartan in the RAAS system and creating a hyporenin state is additive in a patient with renal insufficiency, which contributes to the  "hyperkalemia. Patient with K of 6.0 overnight, given kayexalate with 1 large BM.  K decreased to 4.7  - Agree with holding losartan and Bactrim at this time  - trend BMP, avoid nephrotoxic agents  - strict I/Os, daily weights    ACID/BASE  No acute issues at this time, patient initially admitted with abicar of 19 for slight acidosis, likely secondary to bactrim induced aldosterone resistance, creating a type 4 RTA pattern with decreased K and H+ excretion.  - Agree with holding losartan and Bactrim at this time  - trend BMP, avoid nephrotoxic agents  - strict I/Os, daily weights    VOLUME STATUS/BLOOD PRESSURE  Nonoliguric RADHA given urine output of 1400 cc and 4 unmeasured episodes on 12/11. Normotensive overnight.  Concern for prerenal component to RADHA  - Recommend holding diuresis at this time-given TTE on 12/11 revealed normal IVC size, potential prerenal component  - Agree with holding losartan and Bactrim at this time  - trend BMP, avoid nephrotoxic agents  - strict I/Os, daily weightsPatient was seen and discussed with Dr. Marilu Peterson MD  Internal Medicine, PGY-1  [phone]      Interval History :   Patient with K of 6.0 overnight, given kayexalate with 1 large BM.  K decreased to 4.7.  Pt with run of asymptomatic Afib overnight that returned to sinus rhythm.  Patient continues to endorse SOB, worsened by exertion. Denies fevers, chills, chest pain, abdominal pain, nausea, vomiting     Physical Exam:   I/O last 3 completed shifts:  In: 120 [P.O.:120]  Out: 1150 [Urine:1150]   /87 (BP Location: Other (Comment))   Pulse 54   Temp 98.2  F (36.8  C) (Axillary)   Resp 18   Ht 1.727 m (5' 8\")   Wt 77.1 kg (170 lb)   SpO2 94%   BMI 25.85 kg/m       GENERAL APPEARANCE: alert, lying in bed, NAD  HEENT: NC/AT, EOMI, no scleral icterus, dry mucus membranes  Pulmonary: bilateral crackles on auscultation  CV: regular rhythm, normal rate, no rub  GI: soft, nontender, distended, normal bowel " sounds  EXT: no lower extremity edema  SKIN: no rash, warm, dry skin   NEURO: face symmetric, moving all 4 extremities equally     Labs:   All labs reviewed by me  Electrolytes/Renal -   Recent Labs   Lab Test 12/12/18  0526 12/11/18  2328 12/11/18  1734  11/26/18  0545 11/25/18  0438 11/24/18  0518  11/18/18  1850  11/08/18  1215  08/28/18  1009    136 132*   < > 136 137 135   < >  --    < > 137   < > 136   POTASSIUM 4.7 5.4* 6.0*   < > 3.9 3.7 3.5   < >  --    < > 4.4   < > 4.5   CHLORIDE 109 108 106   < > 106 104 102   < >  --    < > 101   < > 104   CO2 20 21 18*   < > 22 24 26   < >  --    < > 30   < > 24   BUN 77* 84* 85*   < > 42* 47* 46*   < >  --    < > 38*   < > 46*   CR 3.66* 4.22* 4.29*   < > 2.39* 2.58* 2.59*   < >  --    < > 1.94*   < > 1.90*   GLC 80 96 109*   < > 106* 113* 109*   < >  --    < > 126*   < > 146*   RAFFY 8.5 8.5 8.5   < > 8.2* 8.2* 8.1*   < >  --    < > 8.3*   < > 9.2   MAG  --   --   --   --  2.0 2.1 1.9   < > 2.1  --   --   --   --    PHOS  --   --   --   --   --   --   --   --  2.8  --  3.2  --  3.3    < > = values in this interval not displayed.       CBC -   Recent Labs   Lab Test 12/12/18  0526 12/11/18  0516 12/10/18  1327   WBC 3.9* 5.2 6.6   HGB 8.9* 8.9* 9.4*    208 254       LFTs -   Recent Labs   Lab Test 12/10/18  1327 11/30/18  1329 11/19/18  0625   ALKPHOS 116 114 110   BILITOTAL 0.2 0.3 0.7   ALT 41 40 42   AST 34 42 51*   PROTTOTAL 7.5 8.0 6.6*   ALBUMIN 3.3* 3.2* 2.6*       Iron Panel -   Recent Labs   Lab Test 02/26/18  1317 02/28/17  1150 03/15/16  1116   IRON 49 61 91   IRONSAT 18 19 26   TIFFANIE 164 181 176         Imaging:  Imaging:  Recent Results (from the past 24 hour(s))   US Renal Complete    Narrative    EXAM: Renal Ultrasound.    HISTORY: Acute kidney injury of unknown etiology    COMPARISON: 8/28/2018     FINDINGS:  Right kidney measures 10.2 cm in length. It is of increased  echogenicity.   No hydronephrosis. Tiny 1 cm cyst in the lower  Pole.    Left kidney measures 10.1 cm in length. It is of increased  echogenicity.   No hydronephrosis.    Bladder is well distended and does not show any abnormal wall  thickening.      Impression    Impression: No hydronephrosis. Normal-appearing kidneys.         RAJIV ROLAND MD         Current Medications:    aspirin  81 mg Oral Daily     atorvastatin  40 mg Oral QPM     atovaquone  750 mg Oral Q12H     diltiazem ER COATED BEADS  360 mg Oral Daily     fluticasone-vilanterol  1 puff Inhalation Daily     insulin aspart  1-3 Units Subcutaneous TID AC     insulin aspart  1-3 Units Subcutaneous At Bedtime     levothyroxine  125 mcg Oral Daily     metoprolol tartrate  25 mg Oral BID     mirtazapine  15 mg Oral At Bedtime     sildenafil  20 mg Oral TID     umeclidinium  1 puff Inhalation Daily       - MEDICATION INSTRUCTIONS -       - MEDICATION INSTRUCTIONS -

## 2018-12-12 NOTE — PLAN OF CARE
"D:Was given kayexalate last evening.   Has had two bowel movements.   Reports feeling \"less bloated and appetite improved.   And the best I have eaten\".   Continue to have some shortness of breah with exertion.  No acute distress.   More anxiety.   Coaching and emotional support has helped after transfer or ambulating to bathroom.   O2 weaned from oxy plus facemask 8L to oximizer 7 to 3L.  Sating in the mid 90's on 3L oximyzer    Using incentive spirometer with encouragement.   Lungs are coarse bilateraly with coarse crackles on the right lower lobe.   Infrequent,  nonproductive cough.  Out walking in halls with with walker/transfer belt and assist of physical therapy staff.   Patient reports \"it went well.  I had to rest a few times\".   Rhythm  at on set of shift was Aflutter 120's.   Was given reduced dose of metoprolol,  25mg po instead of 50mg.   Patien reported feeling \"doppie and tired\".  MD aware.  Just before noon,  patient converted to NSR.  MD informed.   Patient transferred form [place] [address] service to [place] [address].  Was seen Dr. Timoteo Gleason on pulmonary service and also MD from [place] [address].   Chest CT completed.  Results pendeng.      A:Is tolerating increased activity well.   Appetite has improve.  Out of bed more sitting up in chair a few hours.   Breathing easy and has tolerated weaning down of oxygen.   Rhythm is improved.   AVSS.  Condition is improving.    P:Continue to encourage to get out of bed to chair,  ambulate in halls and to bathroom.   Assess respiratory status.  Monitor O2 sats.  Monitor temp and vital signs.  Notify Md with any acute changes.  "

## 2018-12-12 NOTE — PROGRESS NOTES
"SPIRITUAL HEALTH SERVICES  SPIRITUAL ASSESSMENT Progress Note  [facility] 6C     REFERRAL SOURCE: Initial unit  visit with pt, per request for hospital  visit as noted in initial nursing assessment.    Pt very pleasant, talked in-depth regarding:     his rediscovery of [employer]ie and spiritual practice (pt is active in a [employer] congregration)     the strong support pt has from his son (\"my son lives with me and helps me out a lot\")     his appreciation of care he is receiving here and hopes for some measure of healing    PLAN: continue to follow, will visit again this week if pt still on unit    Mc Styles M.Div. (Bill), [facility]  Staff   Pager [phone]      "

## 2018-12-13 ENCOUNTER — APPOINTMENT (OUTPATIENT)
Dept: PHYSICAL THERAPY | Facility: CLINIC | Age: 73
DRG: 177 | End: 2018-12-13
Payer: MEDICARE

## 2018-12-13 LAB
ANION GAP SERPL CALCULATED.3IONS-SCNC: 9 MMOL/L (ref 3–14)
BUN SERPL-MCNC: 66 MG/DL (ref 7–30)
CALCIUM SERPL-MCNC: 8.8 MG/DL (ref 8.5–10.1)
CD3 CELLS # BLD: 647 CELLS/UL (ref 603–2990)
CD3 CELLS NFR BLD: 58 % (ref 49–84)
CD3+CD4+ CELLS # BLD: 405 CELLS/UL (ref 441–2156)
CD3+CD4+ CELLS NFR BLD: 36 % (ref 28–63)
CD3+CD4+ CELLS/CD3+CD8+ CLL BLD: 1.71 % (ref 1.4–2.6)
CD3+CD8+ CELLS # BLD: 236 CELLS/UL (ref 125–1312)
CD3+CD8+ CELLS NFR BLD: 21 % (ref 10–40)
CHLORIDE SERPL-SCNC: 109 MMOL/L (ref 94–109)
CO2 SERPL-SCNC: 20 MMOL/L (ref 20–32)
CREAT SERPL-MCNC: 2.61 MG/DL (ref 0.66–1.25)
GFR SERPL CREATININE-BSD FRML MDRD: 24 ML/MIN/1.7M2
GLUCOSE BLDC GLUCOMTR-MCNC: 101 MG/DL (ref 70–99)
GLUCOSE BLDC GLUCOMTR-MCNC: 111 MG/DL (ref 70–99)
GLUCOSE BLDC GLUCOMTR-MCNC: 137 MG/DL (ref 70–99)
GLUCOSE BLDC GLUCOMTR-MCNC: 85 MG/DL (ref 70–99)
GLUCOSE BLDC GLUCOMTR-MCNC: 96 MG/DL (ref 70–99)
GLUCOSE SERPL-MCNC: 93 MG/DL (ref 70–99)
IFC SPECIMEN: ABNORMAL
IGG SERPL-MCNC: 1300 MG/DL (ref 695–1620)
LACTATE BLD-SCNC: 0.7 MMOL/L (ref 0.7–2)
POTASSIUM SERPL-SCNC: 4.8 MMOL/L (ref 3.4–5.3)
SODIUM SERPL-SCNC: 138 MMOL/L (ref 133–144)

## 2018-12-13 PROCEDURE — 25000132 ZZH RX MED GY IP 250 OP 250 PS 637: Mod: GY | Performed by: STUDENT IN AN ORGANIZED HEALTH CARE EDUCATION/TRAINING PROGRAM

## 2018-12-13 PROCEDURE — 36415 COLL VENOUS BLD VENIPUNCTURE: CPT | Performed by: STUDENT IN AN ORGANIZED HEALTH CARE EDUCATION/TRAINING PROGRAM

## 2018-12-13 PROCEDURE — A9270 NON-COVERED ITEM OR SERVICE: HCPCS | Mod: GY | Performed by: STUDENT IN AN ORGANIZED HEALTH CARE EDUCATION/TRAINING PROGRAM

## 2018-12-13 PROCEDURE — 97530 THERAPEUTIC ACTIVITIES: CPT | Mod: GP

## 2018-12-13 PROCEDURE — 99233 SBSQ HOSP IP/OBS HIGH 50: CPT | Mod: GC | Performed by: STUDENT IN AN ORGANIZED HEALTH CARE EDUCATION/TRAINING PROGRAM

## 2018-12-13 PROCEDURE — 83605 ASSAY OF LACTIC ACID: CPT | Performed by: STUDENT IN AN ORGANIZED HEALTH CARE EDUCATION/TRAINING PROGRAM

## 2018-12-13 PROCEDURE — 82784 ASSAY IGA/IGD/IGG/IGM EACH: CPT | Performed by: STUDENT IN AN ORGANIZED HEALTH CARE EDUCATION/TRAINING PROGRAM

## 2018-12-13 PROCEDURE — 00000146 ZZHCL STATISTIC GLUCOSE BY METER IP

## 2018-12-13 PROCEDURE — 99231 SBSQ HOSP IP/OBS SF/LOW 25: CPT | Performed by: INTERNAL MEDICINE

## 2018-12-13 PROCEDURE — 86360 T CELL ABSOLUTE COUNT/RATIO: CPT | Performed by: STUDENT IN AN ORGANIZED HEALTH CARE EDUCATION/TRAINING PROGRAM

## 2018-12-13 PROCEDURE — 40000193 ZZH STATISTIC PT WARD VISIT

## 2018-12-13 PROCEDURE — 97116 GAIT TRAINING THERAPY: CPT | Mod: GP

## 2018-12-13 PROCEDURE — 80048 BASIC METABOLIC PNL TOTAL CA: CPT | Performed by: STUDENT IN AN ORGANIZED HEALTH CARE EDUCATION/TRAINING PROGRAM

## 2018-12-13 PROCEDURE — 21400006 ZZH R&B CCU INTERMEDIATE UMMC

## 2018-12-13 PROCEDURE — 86359 T CELLS TOTAL COUNT: CPT | Performed by: STUDENT IN AN ORGANIZED HEALTH CARE EDUCATION/TRAINING PROGRAM

## 2018-12-13 RX ORDER — METOPROLOL TARTRATE 50 MG
50 TABLET ORAL 2 TIMES DAILY
Status: DISCONTINUED | OUTPATIENT
Start: 2018-12-13 | End: 2018-12-14 | Stop reason: HOSPADM

## 2018-12-13 RX ORDER — FUROSEMIDE 20 MG
20 TABLET ORAL DAILY
Status: DISCONTINUED | OUTPATIENT
Start: 2018-12-13 | End: 2018-12-14 | Stop reason: HOSPADM

## 2018-12-13 RX ADMIN — ASPIRIN 81 MG: 81 TABLET, COATED ORAL at 08:34

## 2018-12-13 RX ADMIN — METOPROLOL TARTRATE 50 MG: 50 TABLET ORAL at 19:47

## 2018-12-13 RX ADMIN — METOPROLOL TARTRATE 25 MG: 25 TABLET ORAL at 08:34

## 2018-12-13 RX ADMIN — FUROSEMIDE 20 MG: 20 TABLET ORAL at 14:25

## 2018-12-13 RX ADMIN — ATORVASTATIN CALCIUM 40 MG: 40 TABLET, FILM COATED ORAL at 19:47

## 2018-12-13 RX ADMIN — DILTIAZEM HYDROCHLORIDE 360 MG: 180 CAPSULE, EXTENDED RELEASE ORAL at 08:34

## 2018-12-13 RX ADMIN — SILDENAFIL 20 MG: 20 TABLET ORAL at 19:47

## 2018-12-13 RX ADMIN — UMECLIDINIUM 1 PUFF: 62.5 AEROSOL, POWDER ORAL at 08:35

## 2018-12-13 RX ADMIN — ATOVAQUONE 750 MG: 750 SUSPENSION ORAL at 16:54

## 2018-12-13 RX ADMIN — SILDENAFIL 20 MG: 20 TABLET ORAL at 14:25

## 2018-12-13 RX ADMIN — ATOVAQUONE 750 MG: 750 SUSPENSION ORAL at 03:51

## 2018-12-13 RX ADMIN — MIRTAZAPINE 15 MG: 15 TABLET, FILM COATED ORAL at 22:19

## 2018-12-13 RX ADMIN — SILDENAFIL 20 MG: 20 TABLET ORAL at 08:38

## 2018-12-13 RX ADMIN — LEVOTHYROXINE SODIUM 125 MCG: 125 TABLET ORAL at 08:34

## 2018-12-13 RX ADMIN — FLUTICASONE FUROATE AND VILANTEROL TRIFENATATE 1 PUFF: 100; 25 POWDER RESPIRATORY (INHALATION) at 08:35

## 2018-12-13 ASSESSMENT — ACTIVITIES OF DAILY LIVING (ADL)
ADLS_ACUITY_SCORE: 14
ADLS_ACUITY_SCORE: 14
ADLS_ACUITY_SCORE: 12
ADLS_ACUITY_SCORE: 14
ADLS_ACUITY_SCORE: 14
ADLS_ACUITY_SCORE: 12

## 2018-12-13 NOTE — PROGRESS NOTES
INTERNAL MEDICINE PROGRESS NOTE    Rohan Monroe ([id number]) admitted on 12/10/2018  12/13/2018    Changes Today     - Restart lasix 20 mg daily   - Increase metoprolol to 50 mg bid     Assessment & Plan     Rohan Monroe is a 72 year-old male with medical history significant for sarcoidosis on methotrexate and infliximab , very severe COPD (on 4-6 L home O2), CAD s/p ROSALIE to D2 and mLAD (05/2017), HFpEF, atrial flutter s/p ablation with post-ablation recurrence, severe pulmonary HTN, HTN, type 2 DM, CKD stage III, and recent diagnosis of PCP pneumonia (11/20) who was admitted on 12/10 with acute on chronic dyspnea on exertion felt to be multifactorial and secondary to underlying pulmonary sarcoid, PCP pneumonia, and mild fluid overload secondary to chronic HFpEF and RADHA.    # Acute heart failure exacerbation   # Chronic HFpEF (EF 60-65% in March 2018)   # CAD s/p PCI with ROSALIE to mid-LAD and D2 in 5/2017   Recent TTE at OSH 11/19/18 with reduced EF to 45-50%, although done while in rapid Afib. Presents with weight gain of ~6kg and increased dyspnea/orthopnea since discharge on 11/26 concerning for fluid overload in the setting of reduced lasix dose and worsening RADHA. Apart from weight gain, however, fluid overload likely does not explain all of patients symptoms. Worsening dyspnea is likely multifactorial and secondary to other numerous pulmonary issues.  BNP elevated but decreased from previous. Troponin negative. EKG with 1st degree AV block, no ischemic changes. No signs of shock.   - Continue PTA aspirin 81 mg daily, and Lipitor 40 mg daily  - Holding PTA losartan d/t RADHA   - BB: decreased metoprolol to 25mg BID (from 50mg) d/t bradycardia on 12/11. Flipped into Afib on 12/12 overnight after evening dose of BB was held. Metoprolol 25 mg was started on 12/12 and increased to 50 mg on 12/13   - Diuresis: s/p IV lasix on 12/11. Now appears euvolemic on exam and TTE. RADHA improving without  diuretics. 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 Follows with pulmonology, Dr. Perlman. Was previously on 3L O2 at home, recently increased to 4-6 L following PCP diagnosis. O2 requirements stable without worsening hypoxia. Ongoing dyspnea is likely multifactorial and secondary to ongoing PCP pneumonia in the setting of underlying severe pulmonary HTN and pulmonary sarcoid.  - ID consulted, appreciate recs. PCP pneumonia appears to be clinically improving.   - Pulmonology consulted, need follow up outpatient CT scan    - CT chest without contrast (12/12) showed grossly stable appearance of fibrotic changes that are likely interstitial lung disease related to patient's history of sarcoidosis. Stable groundglass opacities in the lung bases that were new on previous examination dated 11/18/2018. These are likely to be infectious versus exacerbation of interstitial lung disease. Suspicious subcarinal lymph node measuring 2.6 cm, previously measured 2.0 cm   - Holding bactrim due to RADHA. S/p one day of pentamidine on 12/10, now transitioned to atovaquone 750mg q 12 h until 12/15 ( to complete 21 days of treatment) . After that date, transition to prophylaxis dosing 1500 mg po daily due to ongoing immunosuppressive state.   - Continue PTA bronchodilators and diuresis   - Takes methotrexate qweek (currently holding)   - Diuresing as discussed above     # RADHA on CKD stage III   # Hyperkalemia   Baseline creatinine recently around 1.9-2.4 (2.39 on 11/26), now elevated to 4.42. Potassium elevated to 5.6 in ED, shifted with lasix. RADHA likely multifactorial and in part secondary to decreased creatinine  secretion related to bactrim use for PCP pneumonia. Lasix was decreased during recent admission.   - Nephrology consulted, appreciate recs  - UA with hyaline casts and FeUrea concerning for pre-renal etiology.   - Monitor renal function closely  - Avoid nephrotoxic drugs     # Atrial flutter and fibrillation s/p ablation with post-ablation recurrence  Recently admitted to OSH on 11/18 with Afib with RVR, s/p spontaneous conversion but recurrence on 11/21. Patient converted to sinus rhythm 11/25 after treatment of PCP pneumonia was initiated along with uptitration of diltiazem, but flipped back into Afib on 11/26 prior to recent discharge. HR bradycardic in the ED.   - [id number] 4. Was previously on coumadin, but stopped due to patient preference along with heme occult positive study during recent admission.   - Continue diltiazem 360 mg daily with metoprolol 50 mg bid    - Goal HR < 110  - Need EP follow up per cardiology       # Severe pulmonary HTN  RHC 11/19, PVR is 9 and wedge was slightly elevated, 11 but patient in rapid Afib at that time.   - Continue PTA sildenafil 20mg TID      # Normocytic anemia  Hgb 9.4 on admission, within recent baseline. No active bleeding.  - CBC in AM     # Hyponatremia- resolved   Mild, Na 131 down from 133 on 12/6, now resolved.      # Gout   Per patient never formally diagnosed, but does take colchicine for flares.  Started colchicine 11/24. No current flare.      Chronic Conditions     # Hypothyroidism  - Continue PTA levothyroxine    # History of HepC  - Treated in the past    # Type 2 DM  (A1C 6.4)  - low intensity sliding scale insulin, hypoglycemia protocol     # Insomnia  - cont PTA PRN melatonin and PRN remeron      FEN: 2g sodium diet, 2L fluid restriction   DVT Prophylaxis: Pneumatic Compression Devices   GI Prophylaxis: None  Disposition: Likely home tomorrow   Code Status: Full code     Seen and discussed with Dr. Weber  who agrees with above assessment and  "plan.    Supavit \"Mac\" MD [place]   PGY-2, Internal Medicine  [phone]    Subjective     Nursing note was reviewed, no acute event overnight. Patient states that he feels much better. He could not sleep well. He denies shortness of breath, chest pain, cough or palpitation.      Objective   Most recent vital signs:  /69 (BP Location: Right arm)   Pulse 54   Temp 98.2  F (36.8  C) (Oral)   Resp 18   Ht 1.727 m (5' 8\")   Wt 77.1 kg (170 lb)   SpO2 97%   BMI 25.85 kg/m    Temp:  [97.7  F (36.5  C)-98.2  F (36.8  C)] 98.2  F (36.8  C)  Heart Rate:  [] 89  Resp:  [16-18] 18  BP: (120-164)/(67-94) 131/69  SpO2:  [93 %-100 %] 97 %  Wt Readings from Last 2 Encounters:   12/10/18 77.1 kg (170 lb)   12/03/18 73.7 kg (162 lb 8 oz)     Intake/Output Summary (Last 24 hours) at 12/13/2018 1643  Last data filed at 12/13/2018 1300  Gross per 24 hour   Intake 480 ml   Output 1590 ml   Net -1110 ml     Physical exam:  Constitutional: awake, alert, sitting on the chair, NAD   HEENT: normalmocephalic, atraumatic, anicteric sclera without conjunctival injection  Neck: no JVD  Cardiovascular: tachycardic, irregular, normal S1/S2, no murmurs/rubs/gallops appreciated   Respiratory: diffuse crackles bilaterally, no wheezing   Gastrointestinal: soft, nontender, nondistended, normal bowel sounds  Ext: warm and well perfused, trace LE edema b/l   Skin: no rashes noted on exposed skin,, venous stasis changes in LE   Neurologic: alert and oriented, CN II-XII grossly intact, moving all extremities, nonfocal   Psychiatric: appropriate affect     Labs and imaging were reviewed in Epic.        "

## 2018-12-13 NOTE — PLAN OF CARE
AVSS. O2 sat % on 3L-4 via oxymizer. Pt denies discomfort/SOB.  Reports breathing improved.  Pt calm, pleasant.  Visited with family last evening.  Pt states lives with son who is very helpful in assisting him at home when necessary.  Last K+ 5.5.  Recheck in am.  Up to commode with SBA.  Rhythm: aflutter (brief episode SR).  Continue to follow respiratory status/O2 needs/Rhythm.  Notify MD with any changes/concerns.

## 2018-12-13 NOTE — PROGRESS NOTES
Nephrology Progress Note  12/13/2018         Assessment & Recommendations:   Rohan Monroe is a 72 year old male with a PMH of pulmonary sarcoidosis on immunosuppression, COPD (4-6 L O2 at baseline), CAD s/p ROSALIE to D2 and LAD, HFpEF, a flutter status post ablation, pulmonary HTN, HTN, DM2, CKD III, and recent diagnosis of PCP PNA on treatment dose bactrim who was admitted on 12/10 with worsening dyspnea on exertion.   Nephrology was consulted regarding the patients RADHA and hyperkalemia.    RADHA superimposed on stage III CKD-Creatinine rise to 4.43 on 12/11, up from baseline CKD III Cr 1.9-2.4, likely multifactorial etiology.  P.o. intake with fluid restriction abdominal bloating, dry membranes on physical exam, hyaline casts on UA suggest prerenal component.  In addition, the amount of creatinine rise may be artificially elevated in the setting of decreased creatinine secretion with Bactrim.  AIN less likely given patient has no WBCs or RBCs in UA.  Per chart review, patient has been very fluid responsive in the past with regards to his creatinine.  Patient had urine output of 1570 cc  Without diuretics yesterday Patient SCr is trending down from 3.05 to 2.6 mg/dl this am  - Recommend holding diuresis at this time-given TTE on 12/11 revealed normal IVC size, potential prerenal component  -con't to hold losartan and Bactrim at this time  - trend BMP, avoid nephrotoxic agents  - strict I/Os, daily weights    ELECTROLYTES  #Hyperkalemia-likely secondary to Bactrim effect ENaC channels, producing an aldosterone resistance, which in turn blocks K and H+ excretion. Hyperkalemia coincides with initiation of bactrim therapy.  In addition, losartan in the RAAS system and creating a hyporenin state is additive in a patient with renal insufficiency, which contributes to the hyperkalemia. Resolved this am.   - con't to hold losartan and Bactrim at this time  -low potassium diet      ACID/BASE STATUS No acute issues at  "this time, patient initially admitted with abicar of 19 for slight acidosis, likely secondary to bactrim induced aldosterone resistance, creating a type 4 RTA pattern with decreased K and H+ excretion. HCo3 is 20 this am. We will follow    VOLUME STATUS/BLOOD PRESSURE-patient's BP remains in normotensive range on current regimen. Patient appears euvolemic on exam. 2D ECHO showed LVEF of 55%, grade II diastolic dysfunction, RVSP of 46 mmHG. Patient remains on home o2 supplementation 4 L via NC. CT chest 12/10 showed stable chronic changes   -recommend no changes  -cont' to hold losartan and Lasix for now  - trend BMP, avoid nephrotoxic agents  - strict I/Os, daily weights    NORMOCYTIC ANEMIA-HGb is  trending down to 8.9 mg/dl this am. I sat was 18%, ferritin 164 in 02/18. We will consider to satrt CATIE if iron repeleted  -recommend to repeat iron studies    BMD  -normal serum calcium  -secondary hyperparathyroidism- PTH was 183 in 2/18  -vit D was in low range 21 in 8/2017  Patient was seen and discussed with Dr.Manske Brooke Jefferson MD  Nephrology Fellow  [phone]        Interval History :   Patient had no acute issues. Patient states that he is feeling better today. Patient reports improved dyspnea with excertion.  Denies fevers, chills, chest pain, abdominal pain, nausea, vomiting, dysuria, rash     Physical Exam:   I/O last 3 completed shifts:  In: 480 [P.O.:480]  Out: 1590 [Urine:1590]   /69 (BP Location: Right arm)   Pulse 54   Temp 98.2  F (36.8  C) (Oral)   Resp 18   Ht 1.727 m (5' 8\")   Wt 77.1 kg (170 lb)   SpO2 97%   BMI 25.85 kg/m       GENERAL APPEARANCE: alert, NAD, NC in place  HEENT: NC/AT, EOMI, no scleral icterus, dry mucus membranes  Pulmonary: diminished breath sounds but CTA b/l  CV: regular rhythm, normal rate, no rub  GI: soft, nontender, distended, normal bowel sounds  EXT: no lower extremity edema  SKIN: no rash, warm, dry skin   NEURO: face symmetric, moving all 4 " extremities equally     Labs:   All labs reviewed by me  Electrolytes/Renal -   Recent Labs   Lab Test 12/13/18  0536 12/12/18  1708 12/12/18  1110  11/26/18  0545 11/25/18  0438 11/24/18  0518  11/18/18  1850  11/08/18  1215  08/28/18  1009    137 135   < > 136 137 135   < >  --    < > 137   < > 136   POTASSIUM 4.8 5.5* 4.7   < > 3.9 3.7 3.5   < >  --    < > 4.4   < > 4.5   CHLORIDE 109 109 108   < > 106 104 102   < >  --    < > 101   < > 104   CO2 20 22 22   < > 22 24 26   < >  --    < > 30   < > 24   BUN 66* 70* 73*   < > 42* 47* 46*   < >  --    < > 38*   < > 46*   CR 2.61* 3.05* 3.47*   < > 2.39* 2.58* 2.59*   < >  --    < > 1.94*   < > 1.90*   GLC 93 90 99   < > 106* 113* 109*   < >  --    < > 126*   < > 146*   RAFFY 8.8 8.9 8.8   < > 8.2* 8.2* 8.1*   < >  --    < > 8.3*   < > 9.2   MAG  --   --   --   --  2.0 2.1 1.9   < > 2.1  --   --   --   --    PHOS  --   --   --   --   --   --   --   --  2.8  --  3.2  --  3.3    < > = values in this interval not displayed.       CBC -   Recent Labs   Lab Test 12/12/18  0526 12/11/18  0516 12/10/18  1327   WBC 3.9* 5.2 6.6   HGB 8.9* 8.9* 9.4*    208 254       LFTs -   Recent Labs   Lab Test 12/10/18  1327 11/30/18  1329 11/19/18  0625   ALKPHOS 116 114 110   BILITOTAL 0.2 0.3 0.7   ALT 41 40 42   AST 34 42 51*   PROTTOTAL 7.5 8.0 6.6*   ALBUMIN 3.3* 3.2* 2.6*       Iron Panel -   Recent Labs   Lab Test 02/26/18  1317 02/28/17  1150 03/15/16  1116   IRON 49 61 91   IRONSAT 18 19 26   TIFFANIE 164 181 176         Imaging:  Imaging:  No results found for this or any previous visit (from the past 24 hour(s)).      Current Medications:    aspirin  81 mg Oral Daily     atorvastatin  40 mg Oral QPM     atovaquone  750 mg Oral Q12H     diltiazem ER COATED BEADS  360 mg Oral Daily     fluticasone-vilanterol  1 puff Inhalation Daily     furosemide  20 mg Oral Daily     insulin aspart  1-3 Units Subcutaneous TID AC     insulin aspart  1-3 Units Subcutaneous At Bedtime      levothyroxine  125 mcg Oral Daily     metoprolol tartrate  37.5 mg Oral BID     mirtazapine  15 mg Oral At Bedtime     sildenafil  20 mg Oral TID     umeclidinium  1 puff Inhalation Daily       - MEDICATION INSTRUCTIONS -       - MEDICATION INSTRUCTIONS -       I have seen and examined this patient with the resident.  This note reflects our joint assessment and plan.     Cyndie Alex MD

## 2018-12-13 NOTE — PROGRESS NOTES
Care Coordinator Progress Note    Admission Date/Time:  12/10/2018  Attending MD:  Sacha Weber*    Data  Chart reviewed, discussed with interdisciplinary team.   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    Concerns with insurance coverage for discharge needs: None.  Current Living Situation: Patient lives in an apartment with his 25 y/o son who assists as needed. Elevator access, no stairs required in [place], moving on 12/27/18 to [place] to be close to daughter/family  Equipment Currently Used at Home: cane straight; shower chair; walker rolling; wheelchair  manual  Support System: Supportive and Involved  Services Involved: DME and Home Care  Transportation at Discharge: Family or friend will provide  Transportation to Medical Appointments:  - Name of caregiver: christopher Melgar  Barriers to Discharge: none    Coordination of Care  D: Chart reviewed and plan of care discussed with Medical Team. Plan for patient to discharge when medically stable.     I/A: RNCC spent >30 minutes answering questions and easing patient/daughter concerns regarding moving to [place] and transferring medical care. Patient has already arranged appointments with new Pulmonologist and Cardiologist. Christopher Garcia is assisting patient with securing new Internal Medicine PCP and making first appointment. Provided patient with [employer] paperwork at his request. Patient plans to complete the paperwork  this evening and contact RNCC tomorrow to have it notarized and added to his medical record. Patient requested RNCC add daughter and son-in-law to emergency contact list (completed). Patient contacted [facility] while RNCC was present in the room to request transfer of home oxygen machines and supplies to another company in [place] ([facility] will arrange transfer). Patient has a transport oxygen tank in the room for discharge. Patients daughter will transport home at time of discharge which patient believes to be tomorrow. Patient does want to continue [facility] services for the last two weeks that he is in [place] (plans to get new home care in [place]) resumption orders completed.   .   P: Care Coordinator will remain available for discharge needs that may arise.       Referrals: Provided patient/family with options for none new referrals needed at this time.   Resumption orders completed at patients request.     DME  [facility]  [address]   [address]  Phone: [phone]  Fax: [phone]    Dx COPD on 4-6 L home O2  __________________________________    [facility]  Phone  [phone]  Fax  [phone]     RESUMPTION OF HOME CARE SERVICES    RN skilled nursing visit.   RN to assess vital signs and weight, respiratory and cardiac status, pain level and activity tolerance, hydration, nutrition and bowel status   RN to complete home safety evaluation.  RN to complete weekly medication management and set-up, please involve family/primary caregiver in med education.     Home health aide to assist patient with activities of daily living (bathing, dressing, grooming, etc) three times per week     Physical Therapy to evaluate and treat  Occupational Therapy to evaluate and treat      Plan  Anticipated Discharge Date:  12/14/2018  Anticipated Discharge Plan:  Home    Rossi Braswell RN, BSN, PHN  Medicine Care Coordinator  [facility]  [facility]  Desk Phone: [phone]  Pager: [phone]    To contact Weekend RNCC, dial [phone] and enter job code [phone] at prompt.   This pager can not be contacted by text page or outside line.

## 2018-12-13 NOTE — PLAN OF CARE
Pt converted from SR to aflutter  at 2008.  Asymptomatic.  VSS. Pt received scheduled Metoprolol at 1900.  MD notified.  No new orders at this time.  Continue to monitor and notify MD with any changes.

## 2018-12-13 NOTE — PROGRESS NOTES
Pulmonary Consult Note    Assessment and Recommendations:  72M sarcoidosis on methotrexate, COPD on 4-6L O2 at home, CAD s/p ROSALIE, HFpEF, aflutter s/p ablation, severe pHTN, CKDIII, recent diagnosis of PJP PNA on bactrim admitted 12/10 for acute on chronic dyspnea. Given improvement after treatment for heart failure, feel that this was a large contributor to his symptoms. Given stable CT appearance, do not feel sarcoidosis is contributing. Incidentally, pt has 2.6cm subcarinal lymph node, likely reactive.     - continue treatment dose bactrim  - given stable appearance of CT, no indication for additional tx for pulmonary sarcoidosis  - repeat CT 3mo outpatient to assess lymph nodes and disease  - sleep medicine c/s outpatient    Interval Hx:  CT chest yesterday stable. 3-4L O2. AF, HDS.     10 point review of systems negative, aside from that mentioned above    Past Medical History:   Diagnosis Date     Atrial flutter (H)      Cataract of both eyes      Chronic infection     Hep C     Congestive heart failure, unspecified      Coronary artery disease      Depressive disorder      Depressive disorder, not elsewhere classified     Depression (non-psychotic)     ERM OS (epiretinal membrane, left eye)      Generalized osteoarthrosis, unspecified site      Glaucoma suspect      Hypertension      Lichen planus      Other psoriasis      Pneumocystis carinii pneumonia 11/23/2018     PVD (posterior vitreous detachment), left eye      Sarcoidosis      Sarcoidosis      Type II or unspecified type diabetes mellitus without mention of complication, not stated as uncontrolled      Unspecified hypothyroidism     Hypothyroidism     Unspecified viral hepatitis C without hepatic coma      Viral warts, unspecified        Past Surgical History:   Procedure Laterality Date     ANESTHESIA CARDIOVERSION N/A 1/19/2017    Procedure: ANESTHESIA CARDIOVERSION;  Surgeon: GENERIC ANESTHESIA PROVIDER;  Location: [facility] OR     ANESTHESIA CARDIOVERSION  N/A 1/23/2017    Procedure: ANESTHESIA CARDIOVERSION;  Surgeon: GENERIC ANESTHESIA PROVIDER;  Location: [facility] OR     ANESTHESIA CARDIOVERSION N/A 1/24/2017    Procedure: ANESTHESIA CARDIOVERSION;  Surgeon: GENERIC ANESTHESIA PROVIDER;  Location: [facility] OR     ANESTHESIA CARDIOVERSION N/A 11/20/2018    Procedure: ANESTHESIA CARDIOVERSION;  Surgeon: GENERIC ANESTHESIA PROVIDER;  Location: [facility] OR     ARTHROPLASTY HIP  8/24/2011    Procedure:ARTHROPLASTY HIP; Right Total Hip Arthroplasty  Choice anesthesia; Surgeon:LESLI WILKINSON; Location:[facility] OR     BIOPSY       C PELVIS/HIP JOINT SURGERY UNLISTED       cardiac stent      s/p     CARDIAC SURGERY       CATARACT IOL, RT/LT  9/15/2015    LE     COLONOSCOPY       CORONARY ANGIOGRAPHY ADULT ORDER       H ABLATION ATRIAL FLUTTER       HC REMOVAL OF TONSILS,<9 Y/O      Tonsils <10y.o.     HC REPAIR INCISIONAL HERNIA,REDUCIBLE      Hernia Repair, Incisional, Unilateral     HEART CATH, ANGIOPLASTY       JOINT REPLACEMENT      1 month ago--right hip     LIGATN/STRIP LONG & SHORT SAPHEN         Family History   Problem Relation Age of Onset     Heart Disease Father         irreg heart beat     Circulatory Father         varicose veins     Prostate Cancer Father      Heart Disease Mother         heart attack     Arthritis Mother      Osteoporosis Mother      Thyroid Disease Mother      Hypertension Mother      Eye Disorder Maternal Grandmother         glaucoma     Diabetes Sister         type 2     Kidney Cancer Sister      Diabetes Sister      Glaucoma No family hx of      Macular Degeneration No family hx of      Cancer No family hx of         no skin cancer       Social History     Socioeconomic History     Marital status:      Spouse name: Not on file     Number of children: 2     Years of education: Not on file     Highest education level: Not on file   Social Needs     Financial resource strain: Not on file     Food insecurity - worry: Not on file     Food insecurity -  "inability: Not on file     Transportation needs - medical: Not on file     Transportation needs - non-medical: Not on file   Occupational History     Employer: [employer]   Tobacco Use     Smoking status: Former Smoker     Packs/day: 1.00     Years: 30.00     Pack years: 30.00     Types: Cigarettes     Start date: 1960     Last attempt to quit: 1994     Years since quittin.4     Smokeless tobacco: Never Used   Substance and Sexual Activity     Alcohol use: No     Alcohol/week: 0.0 oz     Drug use: No     Sexual activity: Not Currently     Partners: Female     Birth control/protection: Post-menopausal   Other Topics Concern     Parent/sibling w/ CABG, MI or angioplasty before 65F 55M? Not Asked      Service Not Asked     Blood Transfusions No     Caffeine Concern Not Asked     Occupational Exposure Not Asked     Hobby Hazards Not Asked     Sleep Concern Not Asked     Stress Concern Not Asked     Weight Concern Not Asked     Special Diet Not Asked     Back Care Not Asked     Exercise Yes     Bike Helmet Not Asked     Seat Belt Not Asked     Self-Exams Not Asked   Social History Narrative    Dairy/d 2 servings/d    Caffeine little servings/d    Exercise 3 x week    Sunscreen used - Yes    Seatbelts used - Yes    Working smoke/CO detectors in the home - Yes    Guns stored in the home - No    Self Breast Exams - NOT APPLICABLE    Self Testicular Exam - No    Eye Exam up to date - Yes    Dental Exam up to date - Yes    Pap Smear up to date - NOT APPLICABLE    Mammogram up to date - NOT APPLICABLE    PSA up to date - Yes    Dexa Scan up to date - NOT APPLICABLE    Flex Sig / Colonoscopy up to date - Yes    Immunizations up to date - Unsure    Abuse: Current or Past(Physical, Sexual or Emotional)- No    Do you feel safe in your environment - Yes       BP (!) 152/94 (BP Location: Right arm)   Pulse 54   Temp 97.7  F (36.5  C) (Oral)   Resp 16   Ht 1.727 m (5' 8\")   Wt 77.1 kg (170 lb)   " SpO2 93%   BMI 25.85 kg/m    Speaking full sentences, NAD  OP clear  RRR  Bilateral crackles  Abd NTND  Trace b/l edema    Labs: personally reviewed    Imaging: personally reviewed  CT Chest - stable appearing changes related to pt sarcoidosis    Sandeep Gleason MD  Pulmonary and Critical Care Medicine  [phone]

## 2018-12-13 NOTE — PLAN OF CARE
Discharge Planner PT  6C  Patient plan for discharge: Home, open to HHPT  Current status: Pt performs sit<>stand Ariela with FWW, ambulates 125ft + 150ft + 200ft with FWW, SBA, and wc follow. 2 seated rest breaks 2/2 fatigue and SOB, though overall pt demonstrates improved activity tolerance with less rest breaks taken. Pt initially on 4L O2 via oxymizer cannula though O2 sats decrease to 84%, increased O2 to 6L with O2 sats 91% and above for remainder of session. RN notified and aware.   Barriers to return to prior living situation: Medical status, decreased activity tolerance, O2 needs  Recommendations for discharge: Home with assist as needed + HHPT  Rationale for recommendations: Pt has good support of his son who is able to assist as needed. Pt would benefit from HHPT services to progress strength, endurance, and safety and independence with functional mobility.       Entered by: Marion Pardo 12/13/2018 10:36 AM

## 2018-12-13 NOTE — PROGRESS NOTES
[facility] Medicine   Cross Cover Note  Date of Service: 12/13/2018     Subjective: breathing much improved from admission.  Denies chest pain, cough, palpitations.    Objective:  Vital Signs - mild hypertension, Pulse  within the last 24 hours.  Sating 99% on 4L NC at rest.  Mild desaturations per nursing with ambulation.  Physical Exam - mild inspiratory crackles at bibasilar lung bases. Equal chest expansion.  Irregularly irregular rate/rhythm. No appreciable murmur, rub, gallop.  Weight: unknown    Assessment and Plan:  Rohan Monroe is a 73 y/o M with history of sarcoidosis on immunosuppressive therapy, COPD (on 4-6 L home O2), CAD s/p ROSALIE to D2 and mLAD (05/2017), HFpEF, atrial flutter s/p ablation with post-ablation recurrence, severe pulmonary HTN, HTN, DM2, CKD stage III, and recent diagnosis of PCP pneumonia (on bactrim) admitted on 12/10 with acute on chronic dyspnea on exertion felt to be multifactorial and secondary to underlying pulmonary sarcoid, PCP pneumonia, and mild fluid overload secondary to chronic HFpEF and RADHA. Transferred to the medicine service 12/12/2018 as fluid overload no longer thought to be contributing to exertional dyspnea.    # Chronic HFpEF (EF 60-65% in March 2018)   # CAD s/p PCI with ROSALIE to mid-LAD and D2 in 5/2017   TTE 11/19/18 with EF to 45-50% (although done while in rapid Afib). Presented 12/10/2018 with weight gain of ~6kg and increased dyspnea/orthopnea since discharge on 11/26. This was concerning for fluid overload in the setting of RADHA. Was given IV lasix x2 with minimal urine output  ECHO was completed 12/11/2018 which showed collapsible IVC; in addition, renal function was worsening.  As such, IV lasix discontinued. Patient transferred to medicine 12/12/2018 for ongoing care. PO lasix resumed 12/13/2018.  Appears euvolemic on exam 12/13/2018.  - recommend discharge on 20mg PO lasix, once daily  - Goal  Directed medical Therapy      - Antiplatelet: Continue PTA aspirin 81 mg daily      - Statin: Lipitor 40 mg daily      - ACE: recommend continuing to hold PTA losartan; could consider resumption prior to discharge if RADHA resolved/hypertensive.  However, reasonable to address at CORE clinic visit      - BB: up titrate to 50mg PO metoprolol BID (back to home dose)  - Daily weights, strict I's and O's  - Low sodium diet, fluid restriction   - CORE follow up as outpatient (care coordinators contacted 12/13/2018)    # Atrial flutter and fibrillation s/p ablation with post-ablation recurrence  Recently admitted to OSH on 11/18 with Afib with RVR, s/p spontaneous conversion but recurrence on 11/21. Patient converted to sinus rhythm 11/25 after treatment of PCP pneumonia was initiated along with uptitration of diltiazem, but flipped back into Afib on 11/26 prior to recent discharge. HR bradycardic in the ED.   - [id number]. Was previously on coumadin, but stopped due to patient preference along with heme occult positive study during recent admission. Patient wishes to continue to hold this  - increase metoprolol as above  - Continue Diltiazem 360 mg daily   - Goal HR < 110  - EP follow up as outpatient (care coordinators contacted 12/13/2018)        Thank you for this consult.  Cardiology will sign off.  Please do no hesitate to reach out for any further assistance in regards to this patient's care.    Case discussed with Dr. Marilu Wallace.      Anna Wahl PA-C  [facility] Cardiology  P [phone]

## 2018-12-13 NOTE — PLAN OF CARE
"D:Up out of bed to chair for breakfast.   At which time patient heart rate increased to 159 at rest.  Patient report feeling it \"beating fast\".  Denied chest pain or shortness of breath at the time.   O2 sat 97-99% on 4L oxymizer at the time.   Dr. Salgado  notified.   Patient had just received cardiazem and metroprolol am. Meds.  After about an hour heart rate consistently one teens to 120's.   Out walking in halls with rehab staff on portable O2 at 6L/NC.   Rehab staff reported patient desaturated to the low 80's and patient required resting a few times, but less than yesterday.  Heart rate during this time of ambulation did not exceed 120 per rehab staff.   Patient returned to room and sat up in chair a few more hours visiting with daughter Jaimie.  Given a one time dose po lasix.  Lungs continue to be coarse with crackles in the right lower lobe.  Trace edema.  Bp 144//69 -83,  Rhythm Afib.     A:Mild palpitations with brief increase in heart rate.  Otherwise asymptomatic.   Rate is now controled.   Afebrile.  Bloodpressure stable.   Condition is stable.    P;Continue to monitor rhythm and vital signs.  Up with assist only.   Continue to assess respiratory status and monitor O2 sats.  Notify Dr. Salgado or primary team with consistent increased heart rate or acute changes in condition.  "

## 2018-12-14 ENCOUNTER — APPOINTMENT (OUTPATIENT)
Dept: PHYSICAL THERAPY | Facility: CLINIC | Age: 73
DRG: 177 | End: 2018-12-14
Payer: MEDICARE

## 2018-12-14 VITALS
RESPIRATION RATE: 16 BRPM | HEART RATE: 54 BPM | OXYGEN SATURATION: 95 % | TEMPERATURE: 97.8 F | DIASTOLIC BLOOD PRESSURE: 78 MMHG | WEIGHT: 158.6 LBS | BODY MASS INDEX: 24.04 KG/M2 | HEIGHT: 68 IN | SYSTOLIC BLOOD PRESSURE: 137 MMHG

## 2018-12-14 LAB
ANION GAP SERPL CALCULATED.3IONS-SCNC: 9 MMOL/L (ref 3–14)
BUN SERPL-MCNC: 55 MG/DL (ref 7–30)
CALCIUM SERPL-MCNC: 8.8 MG/DL (ref 8.5–10.1)
CHLORIDE SERPL-SCNC: 107 MMOL/L (ref 94–109)
CO2 SERPL-SCNC: 22 MMOL/L (ref 20–32)
CREAT SERPL-MCNC: 2.11 MG/DL (ref 0.66–1.25)
GFR SERPL CREATININE-BSD FRML MDRD: 31 ML/MIN/1.7M2
GLUCOSE BLDC GLUCOMTR-MCNC: 104 MG/DL (ref 70–99)
GLUCOSE BLDC GLUCOMTR-MCNC: 108 MG/DL (ref 70–99)
GLUCOSE SERPL-MCNC: 113 MG/DL (ref 70–99)
POTASSIUM SERPL-SCNC: 4.9 MMOL/L (ref 3.4–5.3)
RADIOLOGIST FLAGS: ABNORMAL
SODIUM SERPL-SCNC: 138 MMOL/L (ref 133–144)

## 2018-12-14 PROCEDURE — 97110 THERAPEUTIC EXERCISES: CPT | Mod: GP

## 2018-12-14 PROCEDURE — 25000132 ZZH RX MED GY IP 250 OP 250 PS 637: Mod: GY | Performed by: STUDENT IN AN ORGANIZED HEALTH CARE EDUCATION/TRAINING PROGRAM

## 2018-12-14 PROCEDURE — 80048 BASIC METABOLIC PNL TOTAL CA: CPT | Performed by: STUDENT IN AN ORGANIZED HEALTH CARE EDUCATION/TRAINING PROGRAM

## 2018-12-14 PROCEDURE — A9270 NON-COVERED ITEM OR SERVICE: HCPCS | Mod: GY | Performed by: STUDENT IN AN ORGANIZED HEALTH CARE EDUCATION/TRAINING PROGRAM

## 2018-12-14 PROCEDURE — 99239 HOSP IP/OBS DSCHRG MGMT >30: CPT | Mod: GC | Performed by: STUDENT IN AN ORGANIZED HEALTH CARE EDUCATION/TRAINING PROGRAM

## 2018-12-14 PROCEDURE — 40000193 ZZH STATISTIC PT WARD VISIT

## 2018-12-14 PROCEDURE — 93005 ELECTROCARDIOGRAM TRACING: CPT

## 2018-12-14 PROCEDURE — 93010 ELECTROCARDIOGRAM REPORT: CPT | Performed by: INTERNAL MEDICINE

## 2018-12-14 PROCEDURE — 97530 THERAPEUTIC ACTIVITIES: CPT | Mod: GP

## 2018-12-14 PROCEDURE — 36415 COLL VENOUS BLD VENIPUNCTURE: CPT | Performed by: STUDENT IN AN ORGANIZED HEALTH CARE EDUCATION/TRAINING PROGRAM

## 2018-12-14 PROCEDURE — 00000146 ZZHCL STATISTIC GLUCOSE BY METER IP

## 2018-12-14 RX ORDER — ATOVAQUONE 750 MG/5ML
1500 SUSPENSION ORAL DAILY
Qty: 600 ML | Refills: 0 | Status: SHIPPED | OUTPATIENT
Start: 2018-12-14

## 2018-12-14 RX ORDER — FUROSEMIDE 20 MG
20 TABLET ORAL DAILY
Qty: 30 TABLET | Refills: 0 | Status: SHIPPED | OUTPATIENT
Start: 2018-12-14 | End: 2018-12-19

## 2018-12-14 RX ORDER — ATOVAQUONE 750 MG/5ML
750 SUSPENSION ORAL EVERY 12 HOURS
Qty: 10 ML | Refills: 0 | Status: SHIPPED | OUTPATIENT
Start: 2018-12-14 | End: 2018-12-15

## 2018-12-14 RX ADMIN — LEVOTHYROXINE SODIUM 125 MCG: 125 TABLET ORAL at 08:18

## 2018-12-14 RX ADMIN — UMECLIDINIUM 1 PUFF: 62.5 AEROSOL, POWDER ORAL at 08:22

## 2018-12-14 RX ADMIN — DILTIAZEM HYDROCHLORIDE 360 MG: 180 CAPSULE, EXTENDED RELEASE ORAL at 08:19

## 2018-12-14 RX ADMIN — ASPIRIN 81 MG: 81 TABLET, COATED ORAL at 08:19

## 2018-12-14 RX ADMIN — ATOVAQUONE 750 MG: 750 SUSPENSION ORAL at 05:25

## 2018-12-14 RX ADMIN — SILDENAFIL 20 MG: 20 TABLET ORAL at 08:27

## 2018-12-14 RX ADMIN — SILDENAFIL 20 MG: 20 TABLET ORAL at 13:24

## 2018-12-14 RX ADMIN — FUROSEMIDE 20 MG: 20 TABLET ORAL at 08:19

## 2018-12-14 RX ADMIN — METOPROLOL TARTRATE 50 MG: 50 TABLET ORAL at 08:19

## 2018-12-14 RX ADMIN — FLUTICASONE FUROATE AND VILANTEROL TRIFENATATE 1 PUFF: 100; 25 POWDER RESPIRATORY (INHALATION) at 08:21

## 2018-12-14 ASSESSMENT — ACTIVITIES OF DAILY LIVING (ADL)
ADLS_ACUITY_SCORE: 14

## 2018-12-14 ASSESSMENT — MIFFLIN-ST. JEOR: SCORE: 1433.9

## 2018-12-14 NOTE — PLAN OF CARE
PT / 6C -     Discharge Planner PT   Patient plan for discharge: home   Current status: Patient performing sit>stand transfers MOD I.  Ambulating in hallway with no AD and with FWW.  Engaged in LE strengthening.  Barriers to return to prior living situation: CV endurance, LE strength, supplemental O2  Recommendations for discharge: home with OP PT  Rationale for recommendations: Denis requires continued skilled therapy to improve LE strength and endurance to improve overall strength and endurance.        Entered by: Cornelia Lynch 12/14/2018 3:59 PM       Physical Therapy Discharge Summary    Reason for therapy discharge:    Discharged to home with outpatient therapy.    Progress towards therapy goal(s). See goals on [facility] record for goal details.  Goals met    Therapy recommendation(s):    Continued therapy is recommended.  Rationale/Recommendations:  see above.

## 2018-12-14 NOTE — PROGRESS NOTES
Telemetry tech notified RN that patient converted to sinus rhythm at 0400, currently having frequent bigeminal and trigeminal PACs with some PVCs. Rates in the 70s. [facility] cross-cover notified via web-based paging.   Will continue to monitor and follow POC.

## 2018-12-14 NOTE — PROGRESS NOTES
"D:Patient verbalized a readiness to discharge to home.   Expressed feeling like he is \"at my base line\".   Denies pain.   Denies shortness of breath.  Lungs are coarse,  Diminished in the lower lobes bilaterally.  No cough noted.  Encouraged to continue to use incentive spirometer.  Up walking approximately 500 feet per rehab staff and did well.   Converted to NSR during the night and has remained in a normal rhythm with freqent PAC's.  No ventricular ectopy.   Reviewed discharge order with patient and his daughter Jaimie regarding reportable signs and symptoms and follow up labs and appointments.   [place]   Patient primary team is actively helping in that that transition of care.    A:Condition is improved and adequate to discharge to home with daughter.   Patient and daughter verbalized understanding of discharge orders.  Questions were answered.   AVSS.   Breathing easy with normal sats on 3L oxymizer.       P:Discharge to home with daughter Jaimie.   Escorted in a wheel chair per patient transport.  "

## 2018-12-14 NOTE — PROGRESS NOTES
Nephrology Progress Note  12/14/2018         Assessment & Recommendations:   Rohan Monroe is a 72 year old male with a PMH of pulmonary sarcoidosis on immunosuppression, COPD (4-6 L O2 at baseline), CAD s/p ROSALIE to D2 and LAD, HFpEF, a flutter status post ablation, pulmonary HTN, HTN, DM2, CKD III, and recent diagnosis of PCP PNA on treatment dose bactrim who was admitted on 12/10 with worsening dyspnea on exertion.   Nephrology was consulted regarding the patients RADHA and hyperkalemia.    RADHA superimposed on stage III CKD-Creatinine rise to 4.43 on 12/11, up from baseline CKD III Cr 1.9-2.4, likely multifactorial etiology.  P.o. intake with fluid restriction abdominal bloating, dry membranes on physical exam, hyaline casts on UA suggest prerenal component.  In addition, the amount of creatinine rise may be artificially elevated in the setting of decreased creatinine secretion with Bactrim.  AIN less likely given patient has no WBCs or RBCs in UA.  Per chart review, patient has been very fluid responsive in the past with regards to his creatinine. Without diuretics yesterday Patient SCr is trending down from 2.61 to 2.11mg/dl this am  - agree with starting lasix 20mg PO at this time. Recommend following up with [facility] for outpatient management of his diuretics  - trend BMP, avoid nephrotoxic agents  - strict I/Os, daily weights    ELECTROLYTES  #Hyperkalemia-likely secondary to Bactrim effect ENaC channels, producing an aldosterone resistance, which in turn blocks K and H+ excretion. Hyperkalemia coincides with initiation of bactrim therapy.  In addition, losartan in the RAAS system and creating a hyporenin state is additive in a patient with renal insufficiency, which contributes to the hyperkalemia. Resolved this am.   -low potassium diet      ACID/BASE STATUS No acute issues at this time, patient initially admitted with abicar of 19 for slight acidosis, likely secondary to bactrim induced  "aldosterone resistance, creating a type 4 RTA pattern with decreased K and H+ excretion. HCo3 is 20 this am. We will follow    VOLUME STATUS/BLOOD PRESSURE-patient's BP remains in normotensive range on current regimen. Patient appears euvolemic on exam. 2D ECHO showed LVEF of 55%, grade II diastolic dysfunction, RVSP of 46 mmHG. Patient remains on home o2 supplementation 4 L via NC. CT chest 12/10 showed stable chronic changes   - agree with starting lasix 20mg PO at this time. Recommend following up with [facility] for outpatient management of his diuretics  - trend BMP, avoid nephrotoxic agents  - strict I/Os, daily weights    NORMOCYTIC ANEMIA-HGb is  trending down to 8.9 mg/dl this am. I sat was 18%, ferritin 164 in 02/18. We will consider to satrt CATIE if iron repeleted  -recommend to repeat iron studies    BMD  -normal serum calcium  -secondary hyperparathyroidism- PTH was 183 in 2/18  -vit D was in low range 21 in 8/2017    Patient was seen and discussed with Dr.Junghare Ramiro Peterson MD  Internal Medicine, PGY-1  [phone]        Interval History :   No acute events overnight.  Patient reports improved shortness of breath.    Physical Exam:   I/O last 3 completed shifts:  In: 720 [P.O.:720]  Out: 700 [Urine:700]   /78 (BP Location: Right arm)   Pulse 54   Temp 97.8  F (36.6  C) (Oral)   Resp 16   Ht 1.727 m (5' 8\")   Wt 71.9 kg (158 lb 9.6 oz)   SpO2 95%   BMI 24.12 kg/m       GENERAL APPEARANCE: alert, NAD, NC in place  HEENT: NC/AT, EOMI, no scleral icterus, dry mucus membranes  Pulmonary: diminished breath sounds but CTA b/l  CV: regular rhythm, normal rate, no rub  EXT: no lower extremity edema  SKIN: no rash, warm, dry skin   NEURO: face symmetric, moving all 4 extremities equally     Labs:   All labs reviewed by me  Electrolytes/Renal -   Recent Labs   Lab Test 12/14/18  0609 12/13/18  0536 12/12/18  1708  11/26/18  0545 11/25/18  0438 11/24/18  0518  11/18/18  1850  11/08/18  1215  " 08/28/18  1009    138 137   < > 136 137 135   < >  --    < > 137   < > 136   POTASSIUM 4.9 4.8 5.5*   < > 3.9 3.7 3.5   < >  --    < > 4.4   < > 4.5   CHLORIDE 107 109 109   < > 106 104 102   < >  --    < > 101   < > 104   CO2 22 20 22   < > 22 24 26   < >  --    < > 30   < > 24   BUN 55* 66* 70*   < > 42* 47* 46*   < >  --    < > 38*   < > 46*   CR 2.11* 2.61* 3.05*   < > 2.39* 2.58* 2.59*   < >  --    < > 1.94*   < > 1.90*   * 93 90   < > 106* 113* 109*   < >  --    < > 126*   < > 146*   RAFFY 8.8 8.8 8.9   < > 8.2* 8.2* 8.1*   < >  --    < > 8.3*   < > 9.2   MAG  --   --   --   --  2.0 2.1 1.9   < > 2.1  --   --   --   --    PHOS  --   --   --   --   --   --   --   --  2.8  --  3.2  --  3.3    < > = values in this interval not displayed.       CBC -   Recent Labs   Lab Test 12/12/18  0526 12/11/18  0516 12/10/18  1327   WBC 3.9* 5.2 6.6   HGB 8.9* 8.9* 9.4*    208 254       LFTs -   Recent Labs   Lab Test 12/10/18  1327 11/30/18  1329 11/19/18  0625   ALKPHOS 116 114 110   BILITOTAL 0.2 0.3 0.7   ALT 41 40 42   AST 34 42 51*   PROTTOTAL 7.5 8.0 6.6*   ALBUMIN 3.3* 3.2* 2.6*       Iron Panel -   Recent Labs   Lab Test 02/26/18  1317 02/28/17  1150 03/15/16  1116   IRON 49 61 91   IRONSAT 18 19 26   TIFFANIE 164 181 176         Current Medications:    aspirin  81 mg Oral Daily     atorvastatin  40 mg Oral QPM     atovaquone  750 mg Oral Q12H     diltiazem ER COATED BEADS  360 mg Oral Daily     fluticasone-vilanterol  1 puff Inhalation Daily     furosemide  20 mg Oral Daily     insulin aspart  1-3 Units Subcutaneous TID AC     insulin aspart  1-3 Units Subcutaneous At Bedtime     levothyroxine  125 mcg Oral Daily     metoprolol tartrate  50 mg Oral BID     mirtazapine  15 mg Oral At Bedtime     sildenafil  20 mg Oral TID     umeclidinium  1 puff Inhalation Daily       - MEDICATION INSTRUCTIONS -       - MEDICATION INSTRUCTIONS -

## 2018-12-14 NOTE — PLAN OF CARE
[id number]:   Neuro: A&Ox4, no deficits. Slightly [place]. Strengths equal. Generalized weakness.   Cardiac: A. Flutter rate controlled 80-90s, low 100s with activity. BPs stable 140s/70s. Denies chest pain.   Resp: Satting mid 90s on 3L O2 via oximizer cannula. Hx COPD and recent and current pneumonia. 6L with activity during the day.  GI/: Voiding adequate amounts via urinal, LBM 12/13/18.  Diet/Appetite: Tolerating 2gm Na diet with 2L FR. Fair appetite.   Skin: No new deficits.  Access: L) PIV SL  Drains: none  Activity: up SBA  Pain: Denies pain.  Plan: [facility] team has taken over care of this patient. Continue to hear rhythm/rate. Continue abx for pneumonia. Possible EP follow-up.     Will continue with plan of care and notify MD of any changes.

## 2018-12-14 NOTE — PROGRESS NOTES
VSS w/ irregular rhythm, intermittent tachy. Rhythm: aflutter, metoprolol increased to 50mg this evening. O2 sat mid 90s on 4L via oxymizer. Reports breathing improved, but still ACEVES w/ 6L. Ambulated to sun room and rested then back to room.  Visited with family today and evening, daughter given MD note. Urinating via urinal. Continue to follow respiratory status/O2 needs/Rhythm. Notify [facility] with any changes/concerns.

## 2018-12-17 ENCOUNTER — PATIENT OUTREACH (OUTPATIENT)
Dept: CARE COORDINATION | Facility: CLINIC | Age: 73
End: 2018-12-17

## 2018-12-17 ENCOUNTER — TELEPHONE (OUTPATIENT)
Dept: INTERNAL MEDICINE | Facility: CLINIC | Age: 73
End: 2018-12-17

## 2018-12-17 LAB — INTERPRETATION ECG - MUSE: NORMAL

## 2018-12-17 NOTE — TELEPHONE ENCOUNTER
[facility] Call Center    Phone Message    May a detailed message be left on voicemail: yes    Reason for Call: Order(s): Home Care Orders: Other: Skilled nursing as well as PT and OT evals.     Action Taken: Message routed to:  [facility] ([facility]): EDDIE

## 2018-12-17 NOTE — TELEPHONE ENCOUNTER
Left message with okay for orders as written.     Josselyn Horton CMA at 1:07 PM on 12/17/2018

## 2018-12-17 NOTE — PROGRESS NOTES
Patient has clinic visit within 24-48 hours of Discharge so no post DC follow up call is needed    Date: 12/19/2018 Status: [place]   Time: 11:40 AM Length: 20   Visit Type: UMP RETURN [[id number]] LISETH: [id number]   Provider: Maria Victoria Palmer MD Department:  [facility]

## 2018-12-18 DIAGNOSIS — I50.9 HEART FAILURE (H): Primary | ICD-10-CM

## 2018-12-18 NOTE — PROGRESS NOTES
Electrophysiology Clinic Note  HPI:   Mr. Monroe is a 72 year old male who has a past medical history significant for pulmonary and cardiac sarcoidosis, CAD s/p PCI mLAD and D2 2008 with ISR s/p PCI LAD and D2 on 5/12/17 complicated by RP bleed s/p iliac artery stenting in IR, DM2, HTN, HLD, Hep C, hypothyroidism, and AFL s/p DCCV 1/19/17 with early recurrence then s/p CTI ablation on 1/23/17 (CHADSVASC 4 now off Warfarin), PAF diagnosed after AFL therapy    He presented on 1/18/17 to [facility] with a 3-4 week history of worsening SOB which worsened over the last several days. He also endorsed chest pain with deep breathing. He was found to be in AFL with RVR Vent Rate 150 bpm. Toponin elevated to 0.482 thought to be demand ischemia. He was subsequently admitted. He was started on heparin infusion. He then underwent a EDAN/DCCV on 1/19/17. He was noted to have frequent PACs and runs of AT post DCCV for which he was started on amiodarone. His DEAN pre DCCV showed LVEF 5% (done in AFL) but repeat surface TTE showed LVEF 55%. He then recurred back into AFL, appearing mostly typical on ECGs and was referred for ablation. He underwent a successful CTI ablation on 1/23/17. He went into AF post ablation while testing. He was given IV amiodarone bolus and DCCV attempted several times unsuccessfully. After further amiodarone loading, he underwent a successful DCCV on 1/24/17.  He was bridged to therapeutic warfarin. He developed RADHA with Scr up to 2.7 and was 2.4 at discharge. His losartan was stopped and diuretics were changed to PRN. He was discharged on 1/25/17. He did not tolerate amiodarone due to nausea and fatigue and self stopped the mediation on 2/10/17. PFTs from 2/24/17 showed mixed restriction and obstruction, moderate to severe reduction in DLCO. Spirometry overall stable, DLCO down from previous but has been that low before. He follows with Dr. Perlman for pulmonary sarcoid who plans to repeat PET scan. He was started  on Noravasc 10 mg daily in 2/2017 due to continued hypertension.   He was hospitalized in 4/22/17-4/28/17 with hypoxia, ACEVES and worsening shortness of breath. This was felt to be multifactorial. RHC showed normal wedge but markedly elevated pulmonary arterial pressures. LHC showed ISR but no obstruction and decision was made to pursue elective PCI in a few weeks after further tune up. He was started on increased diuretic doses and sildenafil. Imaging showed consolidation and he was also treated with abx. He was discharged on home oxygen. He then underwent PCI to LAD and D2 on 5/12/17 complicated by large RP Bleed s/p iliac artery stenting and RADHA. He followed up in 6/2017 in EP clinic and reported feeling well. He reported a lump in left groin area. Assessment shows golf ball sized, hard, firm mass in left groin. Slight pulsatility felt, no bruit appreciated on ascultation.  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 Potentially bilobed. Also a 6.0 x 6.1 x 4.4 cm hematoma in the left groin separate from the previously mentioned pseudoaneurysm. He underwent a successful ultrasound-guided thrombin injection into a left common femoral arterial bilobed pseudoaneurysm    He was admitted as a transfer from [facility] on 11/24/18-11/26/18 transfer from [facility] for dyspnea on exertion found to be in Afib with RVR. He spontaneously converted to sinus on 11/20/18 prior to transfer but recurrence on 11/21/18 and was placed on IV amiodarone gtt started prior to transfer. Patient converted to SR 11/25/18 after treatment of PCP pneumonia was initiated along with uptitration of diltiazem.  Has subsequently flipped back into atrial fibrillation 11/26/18 rate controlled, asymptomatic. He has been off Coumadin for last 6 months (discontinued due to patient preference). Was on heparin gtt and transitioned to  warfarin initally, but cardiology note states AC stopped on 11/21/18 due to heme occult positive study and dark stools per patient. Consider watchman in future. During this hospitalization, his metoprolol was stopped, he was placed on diltiazem 360 mg daily, started on losartan 25 mg daily, started on bactrim, and lasix dose was adjusted.     He was then admitted on 12/10/18-12/14/18 with acute on chronic dyspnea on exertion felt to be multifactorial and secondary to underlying pulmonary sarcoid, PCP pneumonia, and mild fluid overload secondary to chronic HFpEF and RADHA. He had a weight gain of ~6 kg and increased dyspnea/orthopnea since discharge on 11/26 concerning for fluid overload in the setting of reduced lasix dose and worsening RADHA. BNP elevated but decreased from previous. Troponin negative. EKG with 1st degree AV block, no ischemic changes. No signs of shock. He had diuresis and had improvement in symptoms. Echo showed LVEF 55% with Grade II or moderate diastolic dysfunction. Normal IVC. He was discharged on atovaquone.         He presents today for follow up. He states he is moving to [place] in a couple weeks. He reports feeling better since his last hospitalization. He still has ACEVES/SOB which is about at his baseline now. He is wearing home oxygen.He denies any chest pain/pressures, dizziness, lightheadedness, palpitations, or syncopal symptoms.  Presenting 12 lead ECG shows SB 1 AVB Vent Rate 50 bpm,  ms, QRS 76 ms, QTc 417 ms. Current cardiac medications include: Lasix, Diltiazem, Lipitor, Plavix, Warfarin, Sildenafil, MTX, and Remicaide.   PAST MEDICAL HISTORY:  Past Medical History:   Diagnosis Date     Atrial flutter (H)      Cataract of both eyes      Chronic infection     Hep C     Congestive heart failure, unspecified      Coronary artery disease      Depressive disorder      Depressive disorder, not elsewhere classified     Depression (non-psychotic)     ERM OS (epiretinal membrane, left eye)       Generalized osteoarthrosis, unspecified site      Glaucoma suspect      Hypertension      Lichen planus      Other psoriasis      Pneumocystis carinii pneumonia 11/23/2018     PVD (posterior vitreous detachment), left eye      Sarcoidosis      Sarcoidosis      Type II or unspecified type diabetes mellitus without mention of complication, not stated as uncontrolled      Unspecified hypothyroidism     Hypothyroidism     Unspecified viral hepatitis C without hepatic coma      Viral warts, unspecified        CURRENT MEDICATIONS:  Current Outpatient Medications   Medication Sig Dispense Refill     albuterol (PROAIR HFA/PROVENTIL HFA/VENTOLIN HFA) 108 (90 Base) MCG/ACT Inhaler Inhale 2 puffs into the lungs every 6 hours as needed for shortness of breath / dyspnea 3 Inhaler 6     aspirin 81 MG EC tablet Take 81 mg by mouth daily       atorvastatin (LIPITOR) 40 MG tablet TAKE ONE TABLET BY MOUTH ONE TIME DAILY  (Patient taking differently: TAKE ONE TABLET BY MOUTH ONE TIME EVERY EVENING.) 90 tablet 3     atovaquone (MEPRON) 750 MG/5ML suspension Take 10 mLs (1,500 mg) by mouth daily 600 mL 0     blood glucose monitoring (ACCU-CHEK RONNIE PLUS) test strip Use to test blood sugar 2 times daily or as directed.  3 month supply. 200 each 3     blood glucose monitoring (ACCU-CHEK FASTCLIX) lancets Use to test blood sugar 2 times daily or as directed.  102 lancets per box.  3 month supply. 2 Box 3     blood glucose monitoring (NO BRAND SPECIFIED) meter device kit Use to test blood sugar 2 times daily. 1 kit 0     ciclopirox (LOPROX) 0.77 % cream Apply topically 2 times daily as needed       colchicine (COLCYRS) 0.6 MG tablet Take 1 tablet (0.6 mg) by mouth 2 times daily 4 tablet 0     diltiazem (CARDIZEM CD/CARTIA XT) 360 MG 24 hr CD capsule Take 1 capsule (360 mg) by mouth daily 90 capsule 3     DOCUSATE SODIUM PO Take 50 mg by mouth daily       fluticasone-vilanterol (BREO ELLIPTA) 100-25 MCG/INH inhaler Inhale 1 puff into  the lungs daily 3 Inhaler 3     furosemide (LASIX) 20 MG tablet Take 1 tablet (20 mg) by mouth daily May take extra 20 mg as needed for wt .gain >3# 30 tablet 0     inFLIXimab (REMICADE) 100 MG injection Inject 100 mg into the vein every 28 days        levothyroxine (SYNTHROID/LEVOTHROID) 125 MCG tablet Take 1 tablet (125 mcg) by mouth daily 90 tablet 3     losartan (COZAAR) 25 MG tablet Take 1 tablet (25 mg) by mouth daily 60 tablet 3     melatonin 1 MG TABS tablet Take 1 tablet (1 mg) by mouth nightly as needed for sleep 30 tablet 0     methotrexate 2.5 MG tablet CHEMO Take 3 tablets (7.5 mg) by mouth once a week On Mondays 48 tablet 3     metoprolol tartrate (LOPRESSOR) 50 MG tablet Take 1 tablet (50 mg) by mouth 2 times daily 60 tablet 1     mirtazapine (REMERON) 15 MG tablet Take 15 mg by mouth At Bedtime.       Multiple Vitamins-Minerals (MULTIVITAMIN & MINERAL PO) Take 1 tablet by mouth daily.       order for DME Please provide patient with updated oxygen equipment.  Patient now requires 4 LPM via nasal canula with activity. 1 Device 0     order for DME Updated Oxygen: Patient requires supplemental Oxygen 3 LPM via nasal canula with activity and 2 LPM nocturnally. Please provide patient with a portable oxygen concentrator for improved mobility.  Okay to spot check or titrated for conserving to keep stats above 90%. Oxygen will be for a lifetime. 1 Device 0     order for DME She requested for a 4 wheel walker, would like to change it to 4 wheel walker with a seat and oxygen tank durant.     Need this faxed over to her   [phone] 1 Device 1     polyethylene glycol (MIRALAX/GLYCOLAX) powder Take 17 g by mouth daily as needed for constipation 138 g 11     sildenafil (REVATIO) 20 MG tablet TAKE ONE TABLET BY MOUTH THREE TIMES DAILY  270 tablet 3     tiotropium (SPIRIVA HANDIHALER) 18 MCG inhaled capsule Inhale contents of one capsule daily. 90 capsule 3     Urea 40 % CREA Externally apply topically daily as  needed for dry skin         PAST SURGICAL HISTORY:  Past Surgical History:   Procedure Laterality Date     ANESTHESIA CARDIOVERSION N/A 1/19/2017    Procedure: ANESTHESIA CARDIOVERSION;  Surgeon: GENERIC ANESTHESIA PROVIDER;  Location: [facility] OR     ANESTHESIA CARDIOVERSION N/A 1/23/2017    Procedure: ANESTHESIA CARDIOVERSION;  Surgeon: GENERIC ANESTHESIA PROVIDER;  Location: [facility] OR     ANESTHESIA CARDIOVERSION N/A 1/24/2017    Procedure: ANESTHESIA CARDIOVERSION;  Surgeon: GENERIC ANESTHESIA PROVIDER;  Location: [facility] OR     ANESTHESIA CARDIOVERSION N/A 11/20/2018    Procedure: ANESTHESIA CARDIOVERSION;  Surgeon: GENERIC ANESTHESIA PROVIDER;  Location: [facility] OR     ARTHROPLASTY HIP  8/24/2011    Procedure:ARTHROPLASTY HIP; Right Total Hip Arthroplasty  Choice anesthesia; Surgeon:LESLI WILKINSON; Location:[facility] OR     BIOPSY       C PELVIS/HIP JOINT SURGERY UNLISTED       cardiac stent      s/p     CARDIAC SURGERY       CATARACT IOL, RT/LT  9/15/2015    LE     COLONOSCOPY       CORONARY ANGIOGRAPHY ADULT ORDER       H ABLATION ATRIAL FLUTTER       HC REMOVAL OF TONSILS,<9 Y/O      Tonsils <10y.o.     HC REPAIR INCISIONAL HERNIA,REDUCIBLE      Hernia Repair, Incisional, Unilateral     HEART CATH, ANGIOPLASTY       JOINT REPLACEMENT      1 month ago--right hip     LIGATN/STRIP LONG & SHORT SAPHEN         ALLERGIES:     Allergies   Allergen Reactions     Prednisone Other (See Comments)     He reports that he can't sleep for days and can't use small to massive doses of prednisone   Pt. Does not do well with high doses of Prednisone, His MD says that Prednisone is counter indicated. Prednisone failed to treat his sarcoidosis      Nitroglycerin      Patient is on sildenafil for pulmonary hypertension, nitroglycerin contraindicated        FAMILY HISTORY:  Family History   Problem Relation Age of Onset     Heart Disease Father         irreg heart beat     Circulatory Father         varicose veins     Prostate Cancer Father      Heart  "Disease Mother         heart attack     Arthritis Mother      Osteoporosis Mother      Thyroid Disease Mother      Hypertension Mother      Eye Disorder Maternal Grandmother         glaucoma     Diabetes Sister         type 2     Kidney Cancer Sister      Diabetes Sister      Glaucoma No family hx of      Macular Degeneration No family hx of      Cancer No family hx of         no skin cancer       SOCIAL HISTORY:  Social History     Tobacco Use     Smoking status: Former Smoker     Packs/day: 1.00     Years: 30.00     Pack years: 30.00     Types: Cigarettes     Start date: 1960     Last attempt to quit: 1994     Years since quittin.4     Smokeless tobacco: Never Used   Substance Use Topics     Alcohol use: No     Alcohol/week: 0.0 oz     Drug use: No       ROS:   A comprehensive 10 point review of systems negative other than as mentioned in HPI.  Exam:  /69   Pulse 53   Ht 1.702 m (5' 7\")   Wt 74.8 kg (165 lb)   SpO2 100%   BMI 25.84 kg/m    GENERAL APPEARANCE: alert and no distress, on home oxygen.  HEENT: PERRLA, MMM.   NECK: supple.   RESPIRATORY: wearing oxygen, respirations are unlabored, normal respiratory rate, fine crackles in bases bilaterally.   CARDIOVASCULAR: regular rhythm, S1/S2, and no murmur, click or rub, precordium quiet with normal PMI.  ABDOMEN: soft, non tender, bowel sounds normal  EXTREMITIES: peripheral pulses normal, trace edema, hard lump in left groin area  NEURO: alert and oriented to person/place/time, normal speech, gait and affect  SKIN: some ecchymoses, no rashes    Labs:  Reviewed.     Testing/Procedures:  PULMONARY FUNCTION TESTS:   PFT Latest Ref Rng & Units 2018   FVC L 1.92   FEV1 L 0.89   FVC% % 49   FEV1% % 30     17 DEAN:  Interpretation Summary  No left atrial mass or thrombus visualized.  There is spontaneous echo contrast in the left atrium.  The LV systolic function is severely reduced with global hypokinesis. The  LVEF is estimated at " 5-10% at a heart rate of 150 bpm.  This study was compared with the study from 3/2015, the LVEF is reduced and  patient is in atrial flutter.  1/18/17 ECHOCARDIOGRAM:   Interpretation Summary  The Ejection Fraction is estimated at 50-55%.    4/24/17 ECHO:  Interpretation Summary  Limited study.  Borderline (EF 50-55%) reduced left ventricular function is present. Traced at  51%.  No change from prior.    12/2018 ECHO:  Interpretation Summary  Global and regional left ventricular function is normal with an EF of 55% by  visual estimation Grade II or moderate diastolic dysfunction.  Right ventricular function, chamber size, wall motion, and thickness are  normal.  Trileaflet aortic sclerosis without stenosis.  Mild tricuspid insufficiency is present.  Pulmonary hypertension is present with right ventricular systolic pressure is  46mmHg above the right atrial pressure.  The inferior vena cava was normal in size with preserved respiratory  variability.  No pericardial effusion is present.     Compared to prior study dated 11/19/2018, rhythm is now regular and LVEF is  normal. Diastolic dysfunction is present. Moderate pulmonary hypertension is  Unchanged.      Assessment and Plan:   Mr. Monroe is a 72 year old male who has a past medical history significant for pulmonary and cardiac sarcoidosis, CAD s/p PCI mLAD and D2 2008 with ISR s/p PCI LAD and D2 on 5/12/17 complicated by RP bleed s/p iliac artery stenting in IR, DM2, HTN, HLD, Hep C, hypothyroidism, and AFL s/p DCCV 1/19/17 with early recurrence then s/p CTI ablation on 1/23/17 (CHADSVASC 4 on Warfarin). He was admitted as a transfer from [facility] on 11/24/18-11/26/18 transfer from [facility] for dyspnea on exertion found to be in Afib with RVR. He had had paroxysms of AF throughout his hospital stay. Was on heparin gtt and transitioned to warfarin initally, but cardiology note states AC stopped on 11/21/18 due to heme occult positive study and dark  stools per patient. He has been off Coumadin for last 6 months (discontinued due to patient preference).  Consider watchman in future. During this hospitalization, his metoprolol was stopped, he was placed on diltiazem 360 mg daily.He was then admitted on 12/10/18-12/14/18 with acute on chronic dyspnea on exertion felt to be multifactorial and secondary to underlying pulmonary sarcoid, PCP pneumonia, and mild fluid overload secondary to chronic HFpEF and RADHA. He had diuresis and had improvement in symptoms. Echo showed LVEF 55% with Grade II or moderate diastolic dysfunction. Normal IVC. He presents today for follow up. He states he is moving to [place] in a couple weeks. He reports feeling better since his last hospitalization. He still has ACEVES/SOB which is about at his baseline now. He is wearing home oxygen.    Cardiopulmonary  Sarcoidosis:   - Continue MTX and remicade   -His ACEVES/SOB is likely multifactorial d/t cardiopulmonary sarcoid/COPD. It is improving.   - On Atovaquone for prophylaxis     Atrial Flutter/Atrial Fibrillation:   - Stroke Prophylaxis:  CHADSVASC=+DM2, +HTN, +CAD, +age  4, corresponding to a 4.0% annual stroke / systemic emolism event rate. indicating need for long term oral anticoagulation.  He stopped his Warfarin over 6 months ago per his choice. In 11/2018, he was placed on heparin gtt to warfarin but reportedly had heme occult positive study and dark stools per patient. Therefore, anticoagulation was stopped. Can consider Watchman in future. He wishes to address this after his move to [place].  - Rate Control: Continue diltiazem.   - Rhythm Control:  S/p CTI ablation on 1/23/17 with AF post ablation s/p DCCV during ablation and then loaded with amiodarone and had a successful DCCV on 1/23/17. Had recent admission with recurrent AF. Currently in sinus. He stopped amiodarone d/t side effects on 2/10/17. Renal function and CAD limit other AAT options. Will focus on rate control approach.   - Risk  Factor Management: Continue Statin, maintain tight BP control, and RAHEEM evaluation.     As he is moving to [place], we will not arrange follow up here but will be happy to assist him in transferring his care to a local care team.   The patient states understanding and is agreeable with plan.   This patient was seen and examined with Dr. Vazquez. The above note reflects our joint assessment and plan.   VINNY Fox CNP  Pager: [phone]    EP STAFF NOTE  I have seen and examined the patient as part of a shared visit with DONOVAN Fox NP.  I agree with the note above. I reviewed today's vital signs and medications. I have reviewed and discussed with the advanced practice provider their physical examination, assessment, and plan   Briefly, doing well from rhythm standpoint, off amio, minimally to asymptomatic  My key history/exam findings are: RRR.   The key management decisions made by me: no need for further intervention from EP standpoint for the time being..    Brian Vazquez MD [facility]  Cardiology - Electrophysiology

## 2018-12-19 ENCOUNTER — OFFICE VISIT (OUTPATIENT)
Dept: CARDIOLOGY | Facility: CLINIC | Age: 73
End: 2018-12-19
Attending: NURSE PRACTITIONER
Payer: MEDICARE

## 2018-12-19 ENCOUNTER — OFFICE VISIT (OUTPATIENT)
Dept: RADIOLOGY | Facility: CLINIC | Age: 73
End: 2018-12-19
Attending: RADIOLOGY
Payer: MEDICARE

## 2018-12-19 ENCOUNTER — ANCILLARY PROCEDURE (OUTPATIENT)
Dept: ULTRASOUND IMAGING | Facility: CLINIC | Age: 73
End: 2018-12-19
Attending: RADIOLOGY
Payer: MEDICARE

## 2018-12-19 ENCOUNTER — OFFICE VISIT (OUTPATIENT)
Dept: CARDIOLOGY | Facility: CLINIC | Age: 73
End: 2018-12-19
Attending: INTERNAL MEDICINE
Payer: MEDICARE

## 2018-12-19 VITALS
RESPIRATION RATE: 16 BRPM | OXYGEN SATURATION: 100 % | TEMPERATURE: 96.9 F | SYSTOLIC BLOOD PRESSURE: 125 MMHG | DIASTOLIC BLOOD PRESSURE: 69 MMHG | HEART RATE: 53 BPM | HEIGHT: 68 IN | BODY MASS INDEX: 24.12 KG/M2

## 2018-12-19 VITALS
SYSTOLIC BLOOD PRESSURE: 125 MMHG | DIASTOLIC BLOOD PRESSURE: 69 MMHG | BODY MASS INDEX: 25.9 KG/M2 | OXYGEN SATURATION: 100 % | WEIGHT: 165 LBS | HEART RATE: 53 BPM | HEIGHT: 67 IN

## 2018-12-19 VITALS
DIASTOLIC BLOOD PRESSURE: 69 MMHG | OXYGEN SATURATION: 100 % | BODY MASS INDEX: 25.9 KG/M2 | HEART RATE: 53 BPM | WEIGHT: 165 LBS | SYSTOLIC BLOOD PRESSURE: 125 MMHG | HEIGHT: 67 IN

## 2018-12-19 DIAGNOSIS — I72.3 PSEUDOANEURYSM OF ILIAC ARTERY (H): ICD-10-CM

## 2018-12-19 DIAGNOSIS — I72.9 PSEUDOANEURYSM (H): Primary | ICD-10-CM

## 2018-12-19 DIAGNOSIS — E78.5 HYPERLIPIDEMIA LDL GOAL <70: ICD-10-CM

## 2018-12-19 DIAGNOSIS — I48.91 ATRIAL FIBRILLATION, UNSPECIFIED TYPE (H): Primary | ICD-10-CM

## 2018-12-19 DIAGNOSIS — I50.32 CHRONIC DIASTOLIC CONGESTIVE HEART FAILURE (H): Primary | ICD-10-CM

## 2018-12-19 DIAGNOSIS — I27.20 PULMONARY HYPERTENSION (H): ICD-10-CM

## 2018-12-19 DIAGNOSIS — D64.9 ANEMIA, UNSPECIFIED TYPE: ICD-10-CM

## 2018-12-19 DIAGNOSIS — I50.9 HEART FAILURE (H): ICD-10-CM

## 2018-12-19 LAB
ANION GAP SERPL CALCULATED.3IONS-SCNC: 10 MMOL/L (ref 3–14)
BASOPHILS # BLD AUTO: 0.1 10E9/L (ref 0–0.2)
BASOPHILS NFR BLD AUTO: 0.7 %
BUN SERPL-MCNC: 72 MG/DL (ref 7–30)
CALCIUM SERPL-MCNC: 8.5 MG/DL (ref 8.5–10.1)
CHLORIDE SERPL-SCNC: 108 MMOL/L (ref 94–109)
CHOLEST SERPL-MCNC: 85 MG/DL
CO2 SERPL-SCNC: 18 MMOL/L (ref 20–32)
CREAT SERPL-MCNC: 2.61 MG/DL (ref 0.66–1.25)
DIFFERENTIAL METHOD BLD: ABNORMAL
EOSINOPHIL # BLD AUTO: 0.4 10E9/L (ref 0–0.7)
EOSINOPHIL NFR BLD AUTO: 4 %
ERYTHROCYTE [DISTWIDTH] IN BLOOD BY AUTOMATED COUNT: 15.7 % (ref 10–15)
FERRITIN SERPL-MCNC: 59 NG/ML (ref 26–388)
FOLATE SERPL-MCNC: 7.3 NG/ML
GFR SERPL CREATININE-BSD FRML MDRD: 23 ML/MIN/{1.73_M2}
GLUCOSE SERPL-MCNC: 167 MG/DL (ref 70–99)
HCT VFR BLD AUTO: 31.1 % (ref 40–53)
HDLC SERPL-MCNC: 41 MG/DL
HGB BLD-MCNC: 9.4 G/DL (ref 13.3–17.7)
IMM GRANULOCYTES # BLD: 0.1 10E9/L (ref 0–0.4)
IMM GRANULOCYTES NFR BLD: 0.6 %
IRON SATN MFR SERPL: 10 % (ref 15–46)
IRON SERPL-MCNC: 33 UG/DL (ref 35–180)
LDLC SERPL CALC-MCNC: 30 MG/DL
LYMPHOCYTES # BLD AUTO: 1 10E9/L (ref 0.8–5.3)
LYMPHOCYTES NFR BLD AUTO: 9.4 %
MCH RBC QN AUTO: 29 PG (ref 26.5–33)
MCHC RBC AUTO-ENTMCNC: 30.2 G/DL (ref 31.5–36.5)
MCV RBC AUTO: 96 FL (ref 78–100)
MONOCYTES # BLD AUTO: 0.7 10E9/L (ref 0–1.3)
MONOCYTES NFR BLD AUTO: 6.3 %
NEUTROPHILS # BLD AUTO: 8.1 10E9/L (ref 1.6–8.3)
NEUTROPHILS NFR BLD AUTO: 79 %
NONHDLC SERPL-MCNC: 44 MG/DL
NRBC # BLD AUTO: 0 10*3/UL
NRBC BLD AUTO-RTO: 0 /100
PLATELET # BLD AUTO: 272 10E9/L (ref 150–450)
POTASSIUM SERPL-SCNC: 5.1 MMOL/L (ref 3.4–5.3)
RBC # BLD AUTO: 3.24 10E12/L (ref 4.4–5.9)
SODIUM SERPL-SCNC: 136 MMOL/L (ref 133–144)
TIBC SERPL-MCNC: 320 UG/DL (ref 240–430)
TRIGL SERPL-MCNC: 66 MG/DL
VIT B12 SERPL-MCNC: 711 PG/ML (ref 193–986)
WBC # BLD AUTO: 10.2 10E9/L (ref 4–11)

## 2018-12-19 PROCEDURE — 93005 ELECTROCARDIOGRAM TRACING: CPT | Mod: ZF

## 2018-12-19 PROCEDURE — 36415 COLL VENOUS BLD VENIPUNCTURE: CPT | Performed by: NURSE PRACTITIONER

## 2018-12-19 PROCEDURE — G0463 HOSPITAL OUTPT CLINIC VISIT: HCPCS | Mod: 27

## 2018-12-19 PROCEDURE — 99213 OFFICE O/P EST LOW 20 MIN: CPT | Mod: ZP | Performed by: NURSE PRACTITIONER

## 2018-12-19 PROCEDURE — 82746 ASSAY OF FOLIC ACID SERUM: CPT | Performed by: INTERNAL MEDICINE

## 2018-12-19 PROCEDURE — 93010 ELECTROCARDIOGRAM REPORT: CPT | Mod: ZP | Performed by: INTERNAL MEDICINE

## 2018-12-19 PROCEDURE — G0463 HOSPITAL OUTPT CLINIC VISIT: HCPCS

## 2018-12-19 PROCEDURE — 99214 OFFICE O/P EST MOD 30 MIN: CPT | Mod: 25 | Performed by: INTERNAL MEDICINE

## 2018-12-19 RX ORDER — FUROSEMIDE 20 MG
20-40 TABLET ORAL DAILY
Qty: 30 TABLET | Refills: 0 | Status: SHIPPED | OUTPATIENT
Start: 2018-12-19

## 2018-12-19 ASSESSMENT — MIFFLIN-ST. JEOR
SCORE: 1447.07
SCORE: 1447.07

## 2018-12-19 ASSESSMENT — PAIN SCALES - GENERAL
PAINLEVEL: MILD PAIN (3)
PAINLEVEL: MILD PAIN (3)
PAINLEVEL: NO PAIN (0)

## 2018-12-19 NOTE — NURSING NOTE
Chief Complaint   Patient presents with     Follow Up     Return CORE, Hospital follow-up, 71 yo male, HFpEF, EF 45-50%, labs prior.      Medications reviewed and vitals performed.

## 2018-12-19 NOTE — NURSING NOTE
Chief Complaint   Patient presents with     Follow Up     1 wk follow-up hospitalization for fluid overload. Moving to [place] at end of month.     Medications reviewed and vitals/ EKG performed.  Rachel Albarran CMA

## 2018-12-19 NOTE — PATIENT INSTRUCTIONS
Cardiology Providers you saw during your visit:  Carmen Robbins NP    Results for LOU RAO (MRN [id number]) as of 12/19/2018 14:28   Ref. Range 12/19/2018 11:49 12/19/2018 13:36   Sodium Latest Ref Range: 133 - 144 mmol/L  136   Potassium Latest Ref Range: 3.4 - 5.3 mmol/L  5.1   Chloride Latest Ref Range: 94 - 109 mmol/L  108   Carbon Dioxide Latest Ref Range: 20 - 32 mmol/L  18 (L)   Urea Nitrogen Latest Ref Range: 7 - 30 mg/dL  72 (H)   Creatinine Latest Ref Range: 0.66 - 1.25 mg/dL  2.61 (H)   GFR Estimate Latest Ref Range: >60 mL/min/1.73_m2  23 (L)   GFR Estimate If Black Latest Ref Range: >60 mL/min/1.73_m2  27 (L)   Calcium Latest Ref Range: 8.5 - 10.1 mg/dL  8.5   Anion Gap Latest Ref Range: 3 - 14 mmol/L  10   Cholesterol Latest Ref Range: <200 mg/dL  85   HDL Cholesterol Latest Ref Range: >39 mg/dL  41   LDL Cholesterol Calculated Latest Ref Range: <100 mg/dL  30   Non HDL Cholesterol Latest Ref Range: <130 mg/dL  44   Triglycerides Latest Ref Range: <150 mg/dL  66   Glucose Latest Ref Range: 70 - 99 mg/dL  167 (H)   WBC Latest Ref Range: 4.0 - 11.0 10e9/L  10.2   Hemoglobin Latest Ref Range: 13.3 - 17.7 g/dL  9.4 (L)   Hematocrit Latest Ref Range: 40.0 - 53.0 %  31.1 (L)   Platelet Count Latest Ref Range: 150 - 450 10e9/L  272   RBC Count Latest Ref Range: 4.4 - 5.9 10e12/L  3.24 (L)   MCV Latest Ref Range: 78 - 100 fl  96   MCH Latest Ref Range: 26.5 - 33.0 pg  29.0   MCHC Latest Ref Range: 31.5 - 36.5 g/dL  30.2 (L)   RDW Latest Ref Range: 10.0 - 15.0 %  15.7 (H)   Diff Method Unknown  Automated Method   % Neutrophils Latest Units: %  79.0   % Lymphocytes Latest Units: %  9.4   % Monocytes Latest Units: %  6.3   % Eosinophils Latest Units: %  4.0   % Basophils Latest Units: %  0.7   % Immature Granulocytes Latest Units: %  0.6   Nucleated RBCs Latest Ref Range: 0 /100  0   Absolute Neutrophil Latest Ref Range: 1.6 - 8.3 10e9/L  8.1   Absolute Lymphocytes Latest Ref Range: 0.8 - 5.3  10e9/L  1.0   Absolute Monocytes Latest Ref Range: 0.0 - 1.3 10e9/L  0.7   Absolute Eosinophils Latest Ref Range: 0.0 - 0.7 10e9/L  0.4   Absolute Basophils Latest Ref Range: 0.0 - 0.2 10e9/L  0.1   Abs Immature Granulocytes Latest Ref Range: 0 - 0.4 10e9/L  0.1   Absolute Nucleated RBC Unknown  0.0       Medication changes:  1. Please increase your Lasix to 20 mg twice daily. If you start losing weight quickly, consider backing off to 20 mg once daily    Follow up:  1. Please have labs drawn to check your kidneys and electrolytes in 1 week  2. Best of luck with your move to [place]. Please let us know if we can help in any way.        Please call if you have:  1. Weight gain of more than 2 pounds in a day or 5 pounds in a week  2. Increased shortness of breath, swelling or bloating  3. Dizziness, lightheadedness   4. Any questions or concerns.     Follow the American Heart Association Diet and Lifestyle recommendations:  Limit saturated fat, trans fat, sodium, red meat, sweets and sugar-sweetened beverages. If you choose to eat red meat, compare labels and select the leanest cuts available.  Aim for at least 150 minutes of moderate physical activity or 75 minutes of vigorous physical activity - or an equal combination of both - each week.    During business hours: [phone], press option # 1 to be routed to the [facility] then option # 3 for medical questions to speak with a nurse.     After hours, weekends or holidays: On Call Cardiologist- [phone]   option #4 and ask to speak to the on-call Cardiologist. Inform them you are a CORE/heart failure patient at the [facility].      Ayesha Siddiqui, RN  Cardiology Nurse Care Coordinator    Keep up the good work!    Take Care!

## 2018-12-19 NOTE — PATIENT INSTRUCTIONS
Cardiology Provider you saw in clinic today: Dr. Vazquez     Follow-up Visit:  As needed      Please contact us via [facility] for questions or concerns.    Jaclyn Pina RN   Electrophysiology Nurse Coordinator.  [phone]    If your question concerns the schedule/appointment times, contact:  [facility]   [phone]    Device Clinic (Pacemakers, ICD, Loop Recorders)   [phone]      You will receive all normal lab and testing results via [facility] or mail if not reviewed in clinic today.   If you need a medication refill please contact your pharmacy.    As always, thank you for trusting us with your health care needs!

## 2018-12-19 NOTE — LETTER
12/19/2018      RE: Rohan Monroe  [address]  [place] [address]       Dear Colleague,    Thank you for the opportunity to participate in the care of your patient, Rohan Monroe, at the [facility] Please see a copy of my visit note below.        PlElectrophysiology Clinic Note  HPI:   Mr. Monroe is a 72 year old male who has a past medical history significant for pulmonary and cardiac sarcoidosis, CAD s/p PCI mLAD and D2 2008 with ISR s/p PCI LAD and D2 on 5/12/17 complicated by RP bleed s/p iliac artery stenting in IR, DM2, HTN, HLD, Hep C, hypothyroidism, and AFL s/p DCCV 1/19/17 with early recurrence then s/p CTI ablation on 1/23/17 (CHADSVASC 4 now off Warfarin), PAF diagnosed after AFL therapy    He presented on 1/18/17 to [facility] with a 3-4 week history of worsening SOB which worsened over the last several days. He also endorsed chest pain with deep breathing. He was found to be in AFL with RVR Vent Rate 150 bpm. Toponin elevated to 0.482 thought to be demand ischemia. He was subsequently admitted. He was started on heparin infusion. He then underwent a DEAN/DCCV on 1/19/17. He was noted to have frequent PACs and runs of AT post DCCV for which he was started on amiodarone. His DEAN pre DCCV showed LVEF 5% (done in AFL) but repeat surface TTE showed LVEF 55%. He then recurred back into AFL, appearing mostly typical on ECGs and was referred for ablation. He underwent a successful CTI ablation on 1/23/17. He went into AF post ablation while testing. He was given IV amiodarone bolus and DCCV attempted several times unsuccessfully. After further amiodarone loading, he underwent a successful DCCV on 1/24/17.  He was bridged to therapeutic warfarin. He developed RADHA with Scr up to 2.7 and was 2.4 at discharge. His losartan was stopped and diuretics were changed to PRN. He was discharged on 1/25/17. He did not tolerate  amiodarone due to nausea and fatigue and self stopped the mediation on 2/10/17. PFTs from 2/24/17 showed mixed restriction and obstruction, moderate to severe reduction in DLCO. Spirometry overall stable, DLCO down from previous but has been that low before. He follows with Dr. Perlman for pulmonary sarcoid who plans to repeat PET scan. He was started on Noravasc 10 mg daily in 2/2017 due to continued hypertension.   He was hospitalized in 4/22/17-4/28/17 with hypoxia, ACEVES and worsening shortness of breath. This was felt to be multifactorial. RHC showed normal wedge but markedly elevated pulmonary arterial pressures. LHC showed ISR but no obstruction and decision was made to pursue elective PCI in a few weeks after further tune up. He was started on increased diuretic doses and sildenafil. Imaging showed consolidation and he was also treated with abx. He was discharged on home oxygen. He then underwent PCI to LAD and D2 on 5/12/17 complicated by large RP Bleed s/p iliac artery stenting and RADHA. He followed up in 6/2017 in EP clinic and reported feeling well. He reported a lump in left groin area. Assessment shows golf ball sized, hard, firm mass in left groin. Slight pulsatility felt, no bruit appreciated on ascultation.  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 Potentially bilobed. Also a 6.0 x 6.1 x 4.4 cm hematoma in the left groin separate from the previously mentioned pseudoaneurysm. He underwent a successful ultrasound-guided thrombin injection into a left common femoral arterial bilobed pseudoaneurysm    He was admitted as a transfer from [facility] on 11/24/18-11/26/18 transfer from [facility] for dyspnea on exertion found to be in Afib with RVR. He spontaneously converted to sinus on 11/20/18 prior to transfer but recurrence on 11/21/18 and was placed on IV amiodarone gtt started prior  to transfer. Patient converted to SR 11/25/18 after treatment of PCP pneumonia was initiated along with uptitration of diltiazem.  Has subsequently flipped back into atrial fibrillation 11/26/18 rate controlled, asymptomatic. He has been off Coumadin for last 6 months (discontinued due to patient preference). Was on heparin gtt and transitioned to warfarin initally, but cardiology note states AC stopped on 11/21/18 due to heme occult positive study and dark stools per patient. Consider watchman in future. During this hospitalization, his metoprolol was stopped, he was placed on diltiazem 360 mg daily, started on losartan 25 mg daily, started on bactrim, and lasix dose was adjusted.     He was then admitted on 12/10/18-12/14/18 with acute on chronic dyspnea on exertion felt to be multifactorial and secondary to underlying pulmonary sarcoid, PCP pneumonia, and mild fluid overload secondary to chronic HFpEF and RADHA. He had a weight gain of ~6 kg and increased dyspnea/orthopnea since discharge on 11/26 concerning for fluid overload in the setting of reduced lasix dose and worsening RADHA. BNP elevated but decreased from previous. Troponin negative. EKG with 1st degree AV block, no ischemic changes. No signs of shock.  He had diuresis and had improvement in symptoms. Echo showed LVEF 55% with Grade II or moderate diastolic dysfunction. Normal IVC. He was discharged on atovaquone.         He presents today for follow up. He states he is moving to [place] in a couple weeks. He reports feeling better since his last hospitalization. He still has ACEVES/SOB which is about at his baseline now. He is wearing home oxygen.He denies any chest pain/pressures, dizziness, lightheadedness, palpitations, or syncopal symptoms.  Presenting 12 lead ECG shows SB 1 AVB Vent Rate 50 bpm,  ms, QRS 76 ms, QTc 417 ms. Current cardiac medications include: Lasix, Diltiazem, Lipitor, Plavix, Warfarin, Sildenafil, MTX, and Remicaide.   PAST MEDICAL  HISTORY:  Past Medical History:   Diagnosis Date     Atrial flutter (H)      Cataract of both eyes      Chronic infection     Hep C     Congestive heart failure, unspecified      Coronary artery disease      Depressive disorder      Depressive disorder, not elsewhere classified     Depression (non-psychotic)     ERM OS (epiretinal membrane, left eye)      Generalized osteoarthrosis, unspecified site      Glaucoma suspect      Hypertension      Lichen planus      Other psoriasis      Pneumocystis carinii pneumonia 11/23/2018     PVD (posterior vitreous detachment), left eye      Sarcoidosis      Sarcoidosis      Type II or unspecified type diabetes mellitus without mention of complication, not stated as uncontrolled      Unspecified hypothyroidism     Hypothyroidism     Unspecified viral hepatitis C without hepatic coma      Viral warts, unspecified        CURRENT MEDICATIONS:  Current Outpatient Medications   Medication Sig Dispense Refill     albuterol (PROAIR HFA/PROVENTIL HFA/VENTOLIN HFA) 108 (90 Base) MCG/ACT Inhaler Inhale 2 puffs into the lungs every 6 hours as needed for shortness of breath / dyspnea 3 Inhaler 6     aspirin 81 MG EC tablet Take 81 mg by mouth daily       atorvastatin (LIPITOR) 40 MG tablet TAKE ONE TABLET BY MOUTH ONE TIME DAILY  (Patient taking differently: TAKE ONE TABLET BY MOUTH ONE TIME EVERY EVENING.) 90 tablet 3     atovaquone (MEPRON) 750 MG/5ML suspension Take 10 mLs (1,500 mg) by mouth daily 600 mL 0     blood glucose monitoring (ACCU-CHEK RONNIE PLUS) test strip Use to test blood sugar 2 times daily or as directed.  3 month supply. 200 each 3     blood glucose monitoring (ACCU-CHEK FASTCLIX) lancets Use to test blood sugar 2 times daily or as directed.  102 lancets per box.  3 month supply. 2 Box 3     blood glucose monitoring (NO BRAND SPECIFIED) meter device kit Use to test blood sugar 2 times daily. 1 kit 0     ciclopirox (LOPROX) 0.77 % cream Apply topically 2 times daily as  needed       colchicine (COLCYRS) 0.6 MG tablet Take 1 tablet (0.6 mg) by mouth 2 times daily 4 tablet 0     diltiazem (CARDIZEM CD/CARTIA XT) 360 MG 24 hr CD capsule Take 1 capsule (360 mg) by mouth daily 90 capsule 3     DOCUSATE SODIUM PO Take 50 mg by mouth daily       fluticasone-vilanterol (BREO ELLIPTA) 100-25 MCG/INH inhaler Inhale 1 puff into the lungs daily 3 Inhaler 3     furosemide (LASIX) 20 MG tablet Take 1 tablet (20 mg) by mouth daily May take extra 20 mg as needed for wt .gain >3# 30 tablet 0     inFLIXimab (REMICADE) 100 MG injection Inject 100 mg into the vein every 28 days        levothyroxine (SYNTHROID/LEVOTHROID) 125 MCG tablet Take 1 tablet (125 mcg) by mouth daily 90 tablet 3     losartan (COZAAR) 25 MG tablet Take 1 tablet (25 mg) by mouth daily 60 tablet 3     melatonin 1 MG TABS tablet Take 1 tablet (1 mg) by mouth nightly as needed for sleep 30 tablet 0     methotrexate 2.5 MG tablet CHEMO Take 3 tablets (7.5 mg) by mouth once a week On Mondays 48 tablet 3     metoprolol tartrate (LOPRESSOR) 50 MG tablet Take 1 tablet (50 mg) by mouth 2 times daily 60 tablet 1     mirtazapine (REMERON) 15 MG tablet Take 15 mg by mouth At Bedtime.       Multiple Vitamins-Minerals (MULTIVITAMIN & MINERAL PO) Take 1 tablet by mouth daily.       order for DME Please provide patient with updated oxygen equipment.  Patient now requires 4 LPM via nasal canula with activity. 1 Device 0     order for DME Updated Oxygen: Patient requires supplemental Oxygen 3 LPM via nasal canula with activity and 2 LPM nocturnally. Please provide patient with a portable oxygen concentrator for improved mobility.  Okay to spot check or titrated for conserving to keep stats above 90%. Oxygen will be for a lifetime. 1 Device 0     order for DME She requested for a 4 wheel walker, would like to change it to 4 wheel walker with a seat and oxygen tank durant.     Need this faxed over to her   [phone] 1 Device 1     polyethylene  glycol (MIRALAX/GLYCOLAX) powder Take 17 g by mouth daily as needed for constipation 138 g 11     sildenafil (REVATIO) 20 MG tablet TAKE ONE TABLET BY MOUTH THREE TIMES DAILY  270 tablet 3     tiotropium (SPIRIVA HANDIHALER) 18 MCG inhaled capsule Inhale contents of one capsule daily. 90 capsule 3     Urea 40 % CREA Externally apply topically daily as needed for dry skin         PAST SURGICAL HISTORY:  Past Surgical History:   Procedure Laterality Date     ANESTHESIA CARDIOVERSION N/A 1/19/2017    Procedure: ANESTHESIA CARDIOVERSION;  Surgeon: GENERIC ANESTHESIA PROVIDER;  Location: [facility] OR     ANESTHESIA CARDIOVERSION N/A 1/23/2017    Procedure: ANESTHESIA CARDIOVERSION;  Surgeon: GENERIC ANESTHESIA PROVIDER;  Location: [facility] OR     ANESTHESIA CARDIOVERSION N/A 1/24/2017    Procedure: ANESTHESIA CARDIOVERSION;  Surgeon: GENERIC ANESTHESIA PROVIDER;  Location: [facility] OR     ANESTHESIA CARDIOVERSION N/A 11/20/2018    Procedure: ANESTHESIA CARDIOVERSION;  Surgeon: GENERIC ANESTHESIA PROVIDER;  Location: [facility] OR     ARTHROPLASTY HIP  8/24/2011    Procedure:ARTHROPLASTY HIP; Right Total Hip Arthroplasty  Choice anesthesia; Surgeon:LESLI WILKINSON; Location:[facility] OR     BIOPSY       C PELVIS/HIP JOINT SURGERY UNLISTED       cardiac stent      s/p     CARDIAC SURGERY       CATARACT IOL, RT/LT  9/15/2015    LE     COLONOSCOPY       CORONARY ANGIOGRAPHY ADULT ORDER       H ABLATION ATRIAL FLUTTER       HC REMOVAL OF TONSILS,<11 Y/O      Tonsils <10y.o.     HC REPAIR INCISIONAL HERNIA,REDUCIBLE      Hernia Repair, Incisional, Unilateral     HEART CATH, ANGIOPLASTY       JOINT REPLACEMENT      1 month ago--right hip     LIGATN/STRIP LONG & SHORT SAPHEN         ALLERGIES:     Allergies   Allergen Reactions     Prednisone Other (See Comments)     He reports that he can't sleep for days and can't use small to massive doses of prednisone   Pt. Does not do well with high doses of Prednisone, His MD says that Prednisone is counter indicated.  "Prednisone failed to treat his sarcoidosis      Nitroglycerin      Patient is on sildenafil for pulmonary hypertension, nitroglycerin contraindicated        FAMILY HISTORY:  Family History   Problem Relation Age of Onset     Heart Disease Father         irreg heart beat     Circulatory Father         varicose veins     Prostate Cancer Father      Heart Disease Mother         heart attack     Arthritis Mother      Osteoporosis Mother      Thyroid Disease Mother      Hypertension Mother      Eye Disorder Maternal Grandmother         glaucoma     Diabetes Sister         type 2     Kidney Cancer Sister      Diabetes Sister      Glaucoma No family hx of      Macular Degeneration No family hx of      Cancer No family hx of         no skin cancer       SOCIAL HISTORY:  Social History     Tobacco Use     Smoking status: Former Smoker     Packs/day: 1.00     Years: 30.00     Pack years: 30.00     Types: Cigarettes     Start date: 1960     Last attempt to quit: 1994     Years since quittin.4     Smokeless tobacco: Never Used   Substance Use Topics     Alcohol use: No     Alcohol/week: 0.0 oz     Drug use: No       ROS:   A comprehensive 10 point review of systems negative other than as mentioned in HPI.  Exam:  /69   Pulse 53   Ht 1.702 m (5' 7\")   Wt 74.8 kg (165 lb)   SpO2 100%   BMI 25.84 kg/m     GENERAL APPEARANCE: alert and no distress, on home oxygen.  HEENT: PERRLA, MMM.   NECK: supple.   RESPIRATORY: wearing oxygen, respirations are unlabored, normal respiratory rate, fine crackles in bases bilaterally.   CARDIOVASCULAR: regular rhythm, S1/S2, and no murmur, click or rub, precordium quiet with normal PMI.  ABDOMEN: soft, non tender, bowel sounds normal  EXTREMITIES: peripheral pulses normal, trace edema, hard lump in left groin area  NEURO: alert and oriented to person/place/time, normal speech, gait and affect  SKIN: some ecchymoses, no rashes    Labs:  Reviewed. "     Testing/Procedures:  PULMONARY FUNCTION TESTS:   PFT Latest Ref Rng & Units 11/30/2018   FVC L 1.92   FEV1 L 0.89   FVC% % 49   FEV1% % 30     1/18/17 DEAN:  Interpretation Summary  No left atrial mass or thrombus visualized.  There is spontaneous echo contrast in the left atrium.  The LV systolic function is severely reduced with global hypokinesis. The  LVEF is estimated at 5-10% at a heart rate of 150 bpm.  This study was compared with the study from 3/2015, the LVEF is reduced and  patient is in atrial flutter.  1/18/17 ECHOCARDIOGRAM:   Interpretation Summary  The Ejection Fraction is estimated at 50-55%.    4/24/17 ECHO:  Interpretation Summary  Limited study.  Borderline (EF 50-55%) reduced left ventricular function is present. Traced at  51%.  No change from prior.    12/2018 ECHO:  Interpretation Summary  Global and regional left ventricular function is normal with an EF of 55% by  visual estimation Grade II or moderate diastolic dysfunction.  Right ventricular function, chamber size, wall motion, and thickness are  normal.  Trileaflet aortic sclerosis without stenosis.  Mild tricuspid insufficiency is present.  Pulmonary hypertension is present with right ventricular systolic pressure is  46mmHg above the right atrial pressure.  The inferior vena cava was normal in size with preserved respiratory  variability.  No pericardial effusion is present.     Compared to prior study dated 11/19/2018, rhythm is now regular and LVEF is  normal. Diastolic dysfunction is present. Moderate pulmonary hypertension is  Unchanged.      Assessment and Plan:   Mr. Monroe is a 72 year old male who has a past medical history significant for pulmonary and cardiac sarcoidosis, CAD s/p PCI mLAD and D2 2008 with ISR s/p PCI LAD and D2 on 5/12/17 complicated by RP bleed s/p iliac artery stenting in IR, DM2, HTN, HLD, Hep C, hypothyroidism, and AFL s/p DCCV 1/19/17 with early recurrence then s/p CTI ablation on 1/23/17 (CHADSVASC  4 on Warfarin). He was admitted as a transfer from [facility] on 11/24/18-11/26/18 transfer from [facility] for dyspnea on exertion found to be in Afib with RVR. He had had paroxysms of AF throughout his hospital stay. Was on heparin gtt and transitioned to warfarin initally, but cardiology note states AC stopped on 11/21/18 due to heme occult positive study and dark stools per patient. He has been off Coumadin for last 6 months (discontinued due to patient preference).  Consider watchman in future. During this hospitalization, his metoprolol was stopped, he was placed on diltiazem 360 mg daily.He was then admitted on 12/10/18-12/14/18 with acute on chronic dyspnea on exertion felt to be multifactorial and secondary to underlying pulmonary sarcoid, PCP pneumonia, and mild fluid overload secondary to chronic HFpEF and RADHA. He had diuresis and had improvement in symptoms. Echo showed LVEF 55% with Grade II or moderate diastolic dysfunction. Normal IVC. He presents today for follow up. He states he is moving to [place] in a couple weeks. He reports feeling better since his last hospitalization. He still has ACEVES/SOB which is about at his baseline now. He is wearing home oxygen.    Cardiopulmonary  Sarcoidosis:   - Continue MTX and remicade   -His ACEVES/SOB is likely multifactorial d/t cardiopulmonary sarcoid/COPD. It is improving.   - On Atovaquone for prophylaxis     Atrial Flutter/Atrial Fibrillation:   - Stroke Prophylaxis:  CHADSVASC=+DM2, +HTN, +CAD, +age  2, corresponding to a 4.0% annual stroke / systemic emolism event rate. indicating need for long term oral anticoagulation.  He stopped his Warfarin over 6 months ago per his choice. In 11/2018, he was placed on heparin gtt to warfarin but reportedly had heme occult positive study and dark stools per patient. Therefore, anticoagulation was stopped. Can consider Watchman in future. He wishes to address this after his move to [place].  - Rate Control: Continue  diltiazem.   - Rhythm Control:  S/p CTI ablation on 1/23/17 with AF post ablation s/p DCCV during ablation and then loaded with amiodarone and had a successful DCCV on 1/23/17. Had recent admission with recurrent AF. Currently in sinus. He stopped amiodarone d/t side effects on 2/10/17. Renal function and CAD limit other AAT options. Will focus on rate control approach.   - Risk Factor Management: Continue Statin, maintain tight BP control, and RAHEEM evaluation.     As he is moving to [place], we will not arrange follow up here but will be happy to assist him in transferring his care to a local care team.   The patient states understanding and is agreeable with plan.   This patient was seen and examined with Dr. Vazquez. The above note reflects our joint assessment and plan.   VINNY Fox CNP  Pager: [phone]    EP STAFF NOTE  I have seen and examined the patient as part of a shared visit with Elizabeth Harris, DONOVAN NP.  I agree with the note above. I reviewed today's vital signs and medications. I have reviewed and discussed with the advanced practice provider their physical examination, assessment, and plan   Briefly, doing well from rhythm standpoint, off amio, minimally to asymptomatic  My key history/exam findings are: RRR.   The key management decisions made by me: no need for further intervention from EP standpoint for the time being..    Brian Vazquez MD [facility]  Cardiology - Electrophysiology

## 2018-12-19 NOTE — PROGRESS NOTES
SUBJECTIVE:  Rohan Monroe is a 72 year old man who presents to clinic for followup of right EIA stent placed 5/13/2017 in the setting of retroperitoneal bleed following cardiac angiography. Left CFA PSA treated successfully with US guided thrombin injection. o leg pain or claudication. Able to walk treadmill over 30 minutes with no leg pain. Cardiac and pulmonary rehab going well and his dyspnea has not at all worsened. Remains on home O2 with no increased requirement. No chest pain or palpitations. He was recently hospitalized for PCP pneumonia but has been feeling back to baseline since discharge.       Patient Active Problem List   Diagnosis     Hypothyroidism     SARCOIDOSIS-systemic     Generalized osteoarthrosis, unspecified site     Viral warts     Other psoriasis     Hypertrophy of prostate without urinary obstruction     Diabetes mellitus, type 2 (H)     CAD (coronary artery disease)     Type 2 diabetes, HbA1C goal < 8% (H)     CARDIOVASCULAR SCREENING; LDL GOAL LESS THAN 100     Atrial flutter with rapid ventricular response (H)     CKD (chronic kidney disease) stage 3, GFR 30-59 ml/min (H)     Hip joint pain     Hyperlipidemia LDL goal <70     Acute exacerbation of chronic obstructive pulmonary disease (COPD) (H)     Cellulitis     Immunosuppression (H)     Hyponatremia     RADHA (acute kidney injury) (HCC)     COPD (chronic obstructive pulmonary disease) (H)     Cirrhosis of liver (H)     Pneumonia     Proteinuria     Microscopic hematuria     Sarcoidosis of lung (HCC)     Hyperlipidemia     Atrial flutter (H)     Long-term (current) use of anticoagulants [Z79.01]     Hypoxia     CAD S/P percutaneous coronary angioplasty     Chest pain     Dermatophytosis of nail     Tyloma     ACEVES (dyspnea on exertion)     Cardiac abnormality     Pneumocystis carinii pneumonia     Heart failure (H)      Current Outpatient Medications   Medication Sig     albuterol (PROAIR HFA/PROVENTIL HFA/VENTOLIN HFA) 108 (90 Base)  MCG/ACT Inhaler Inhale 2 puffs into the lungs every 6 hours as needed for shortness of breath / dyspnea     aspirin 81 MG EC tablet Take 81 mg by mouth daily     atorvastatin (LIPITOR) 40 MG tablet TAKE ONE TABLET BY MOUTH ONE TIME DAILY  (Patient taking differently: TAKE ONE TABLET BY MOUTH ONE TIME EVERY EVENING.)     atovaquone (MEPRON) 750 MG/5ML suspension Take 10 mLs (1,500 mg) by mouth daily     blood glucose monitoring (ACCU-CHEK RONNIE PLUS) test strip Use to test blood sugar 2 times daily or as directed.  3 month supply.     blood glucose monitoring (ACCU-CHEK FASTCLIX) lancets Use to test blood sugar 2 times daily or as directed.  102 lancets per box.  3 month supply.     blood glucose monitoring (NO BRAND SPECIFIED) meter device kit Use to test blood sugar 2 times daily.     ciclopirox (LOPROX) 0.77 % cream Apply topically 2 times daily as needed     colchicine (COLCYRS) 0.6 MG tablet Take 1 tablet (0.6 mg) by mouth 2 times daily     diltiazem (CARDIZEM CD/CARTIA XT) 360 MG 24 hr CD capsule Take 1 capsule (360 mg) by mouth daily     DOCUSATE SODIUM PO Take 50 mg by mouth daily     fluticasone-vilanterol (BREO ELLIPTA) 100-25 MCG/INH inhaler Inhale 1 puff into the lungs daily     furosemide (LASIX) 20 MG tablet Take 1 tablet (20 mg) by mouth daily May take extra 20 mg as needed for wt .gain >3#     inFLIXimab (REMICADE) 100 MG injection Inject 100 mg into the vein every 28 days      levothyroxine (SYNTHROID/LEVOTHROID) 125 MCG tablet Take 1 tablet (125 mcg) by mouth daily     losartan (COZAAR) 25 MG tablet Take 1 tablet (25 mg) by mouth daily     melatonin 1 MG TABS tablet Take 1 tablet (1 mg) by mouth nightly as needed for sleep     methotrexate 2.5 MG tablet CHEMO Take 3 tablets (7.5 mg) by mouth once a week On Mondays     metoprolol tartrate (LOPRESSOR) 50 MG tablet Take 1 tablet (50 mg) by mouth 2 times daily     mirtazapine (REMERON) 15 MG tablet Take 15 mg by mouth At Bedtime.     Multiple  Vitamins-Minerals (MULTIVITAMIN & MINERAL PO) Take 1 tablet by mouth daily.     order for DME Please provide patient with updated oxygen equipment.  Patient now requires 4 LPM via nasal canula with activity.     order for DME Updated Oxygen: Patient requires supplemental Oxygen 3 LPM via nasal canula with activity and 2 LPM nocturnally. Please provide patient with a portable oxygen concentrator for improved mobility.  Okay to spot check or titrated for conserving to keep stats above 90%. Oxygen will be for a lifetime.     order for DME She requested for a 4 wheel walker, would like to change it to 4 wheel walker with a seat and oxygen tank durant.     Need this faxed over to her   [phone]     polyethylene glycol (MIRALAX/GLYCOLAX) powder Take 17 g by mouth daily as needed for constipation     sildenafil (REVATIO) 20 MG tablet TAKE ONE TABLET BY MOUTH THREE TIMES DAILY      tiotropium (SPIRIVA HANDIHALER) 18 MCG inhaled capsule Inhale contents of one capsule daily.     Urea 40 % CREA Externally apply topically daily as needed for dry skin     No current facility-administered medications for this visit.      Social History     Tobacco Use     Smoking status: Former Smoker     Packs/day: 1.00     Years: 30.00     Pack years: 30.00     Types: Cigarettes     Start date: 1960     Last attempt to quit: 1994     Years since quittin.4     Smokeless tobacco: Never Used   Substance Use Topics     Alcohol use: No     Alcohol/week: 0.0 oz     Drug use: No      REVIEW OF SYSTEMS:  ROS: 10 point ROS neg other than the symptoms noted above in the HPI.    OBJECTIVE:  General appearance: Pleasant, cooperative, no distress.  Lungs: On O2 via nasal canula. Breathing comfortably with no apparent respiratory distress.   Extremities: 2+ edema bilaterally on lower extremities. Posterior tibial pulses equal to palpation bilaterally.    Mental Status: cooperative, normal affect, no gross thought process defects during  casual conversation.    Imaging findings: Ultrasound of aorta/ivc/iliac arteries from 12/19/2018 reviewed by me. Right EIA stent is patent throughout without evidence of stenosis    Impression:   Patent right external iliac artery stent with no evidence of focal  stenosis in the right iliac arteries based on velocity criteria.     ASSESSMENT AND PLAN:   Rohan is moving to [place] 12/17/2018 and has already transferred his records to [facility] where he will receive his care. He has established care with an internist, pulmonologist, and cardiologist. He will need to have annual ultrasound follow up to ensure patency of stent. He understands this.    I was present for the entire clinic visit and agree with the assessment and plan documented by Dr. Garcia.     It was a pleasure to see Mr. Monroe in clinic today. Thank you for involving the Interventional Radiology service in her care.     I spent a total of 20 minutes with this patient, over 50% time was for counseling and care coordination.     Maria Victoria Palmer MD  Interventional Radiology Attending  [facility]  Pager [phone]

## 2018-12-19 NOTE — PROGRESS NOTES
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 Since then, he's had an admission with dyspnea related to pulmonary sarcoid, PCP penumonia, and mild volume overload. Course notable for improving symptoms with only one dose of IV Lasix. He was discharged to home 5 days ago. He returns for follow up.    Mr. Monroe is on 6 liters of oxygen, previoulsy on 3. He is short of breath with ADLs, sleeps on an incline since admission though feels orthopnea is improving. PND several times a week. No chest pain, palpitaitons, lightheadedness, some ankle edema that contues to improve. +Bloating-- improved since admission. Weakness is gradually improving.     Mr. Monroe is [place] out of [place] next [place]. He assures me he has follow up planned with a team at [facility].    PAST MEDICAL HISTORY:  Past Medical History:   Diagnosis Date     Atrial flutter (H)      Cataract of both eyes      Chronic infection     Hep C     Congestive heart failure, unspecified      Coronary artery disease      Depressive disorder      Depressive disorder, not elsewhere classified     Depression (non-psychotic)     ERM OS (epiretinal membrane, left eye)      Generalized osteoarthrosis, unspecified site      Glaucoma suspect      Hypertension      Lichen planus      Other psoriasis      Pneumocystis carinii pneumonia 11/23/2018     PVD (posterior vitreous detachment), left eye      Sarcoidosis      Sarcoidosis      Type II or unspecified type diabetes mellitus without mention of complication, not stated as uncontrolled      Unspecified hypothyroidism     Hypothyroidism     Unspecified viral hepatitis C without hepatic coma      Viral warts, unspecified        FAMILY HISTORY:  Family History   Problem Relation Age of Onset      Heart Disease Father         irreg heart beat     Circulatory Father         varicose veins     Prostate Cancer Father      Heart Disease Mother         heart attack     Arthritis Mother      Osteoporosis Mother      Thyroid Disease Mother      Hypertension Mother      Eye Disorder Maternal Grandmother         glaucoma     Diabetes Sister         type 2     Kidney Cancer Sister      Diabetes Sister      Glaucoma No family hx of      Macular Degeneration No family hx of      Cancer No family hx of         no skin cancer       SOCIAL HISTORY:  Social History     Social History     Marital status:      Spouse name: N/A     Number of children: 2     Years of education: N/A     Occupational History      [employer]     Social History Main Topics     Smoking status: Former Smoker     Packs/day: 1.00     Years: 30.00     Types: Cigarettes     Start date: 12/30/1960     Quit date: 7/22/1994     Smokeless tobacco: Never Used     Alcohol use No     Drug use: No     Sexual activity: Not Currently     Partners: Female     Birth control/ protection: Post-menopausal     Other Topics Concern     Blood Transfusions No     Exercise Yes     Social History Narrative    Dairy/d 2 servings/d    Caffeine little servings/d    Exercise 3 x week    Sunscreen used - Yes    Seatbelts used - Yes    Working smoke/CO detectors in the home - Yes    Guns stored in the home - No    Self Breast Exams - NOT APPLICABLE    Self Testicular Exam - No    Eye Exam up to date - Yes    Dental Exam up to date - Yes    Pap Smear up to date - NOT APPLICABLE    Mammogram up to date - NOT APPLICABLE    PSA up to date - Yes    Dexa Scan up to date - NOT APPLICABLE    Flex Sig / Colonoscopy up to date - Yes    Immunizations up to date - Unsure    Abuse: Current or Past(Physical, Sexual or Emotional)- No    Do you feel safe in your environment - Yes       CURRENT MEDICATIONS:   Current Outpatient Medications on File Prior to Visit:  albuterol (PROAIR  HFA/PROVENTIL HFA/VENTOLIN HFA) 108 (90 Base) MCG/ACT Inhaler Inhale 2 puffs into the lungs every 6 hours as needed for shortness of breath / dyspnea   aspirin 81 MG EC tablet Take 81 mg by mouth daily   atorvastatin (LIPITOR) 40 MG tablet TAKE ONE TABLET BY MOUTH ONE TIME DAILY  (Patient taking differently: TAKE ONE TABLET BY MOUTH ONE TIME EVERY EVENING.)   atovaquone (MEPRON) 750 MG/5ML suspension Take 10 mLs (1,500 mg) by mouth daily   ciclopirox (LOPROX) 0.77 % cream Apply topically 2 times daily as needed   diltiazem (CARDIZEM CD/CARTIA XT) 360 MG 24 hr CD capsule Take 1 capsule (360 mg) by mouth daily   fluticasone-vilanterol (BREO ELLIPTA) 100-25 MCG/INH inhaler Inhale 1 puff into the lungs daily   furosemide (LASIX) 20 MG tablet Take 1 tablet (20 mg) by mouth daily May take extra 20 mg as needed for wt .gain >3#   inFLIXimab (REMICADE) 100 MG injection Inject 100 mg into the vein every 28 days    levothyroxine (SYNTHROID/LEVOTHROID) 125 MCG tablet Take 1 tablet (125 mcg) by mouth daily   losartan (COZAAR) 25 MG tablet Take 1 tablet (25 mg) by mouth daily   melatonin 1 MG TABS tablet Take 1 tablet (1 mg) by mouth nightly as needed for sleep   methotrexate 2.5 MG tablet CHEMO Take 3 tablets (7.5 mg) by mouth once a week On Mondays   metoprolol tartrate (LOPRESSOR) 50 MG tablet Take 1 tablet (50 mg) by mouth 2 times daily   mirtazapine (REMERON) 15 MG tablet Take 15 mg by mouth At Bedtime.   Multiple Vitamins-Minerals (MULTIVITAMIN & MINERAL PO) Take 1 tablet by mouth daily.   order for DME Please provide patient with updated oxygen equipment.  Patient now requires 4 LPM via nasal canula with activity.   order for DME Updated Oxygen: Patient requires supplemental Oxygen 3 LPM via nasal canula with activity and 2 LPM nocturnally. Please provide patient with a portable oxygen concentrator for improved mobility.  Okay to spot check or titrated for conserving to keep stats above 90%. Oxygen will be for a lifetime.  "  order for DME She requested for a 4 wheel walker, would like to change it to 4 wheel walker with a seat and oxygen tank durant. Need this faxed over to her [phone]   polyethylene glycol (MIRALAX/GLYCOLAX) powder Take 17 g by mouth daily as needed for constipation   sildenafil (REVATIO) 20 MG tablet TAKE ONE TABLET BY MOUTH THREE TIMES DAILY    Urea 40 % CREA Externally apply topically daily as needed for dry skin   blood glucose monitoring (ACCU-CHEK RONNIE PLUS) test strip Use to test blood sugar 2 times daily or as directed.  3 month supply. (Patient not taking: Reported on 12/19/2018)   blood glucose monitoring (ACCU-CHEK FASTCLIX) lancets Use to test blood sugar 2 times daily or as directed.  102 lancets per box.  3 month supply. (Patient not taking: Reported on 12/19/2018)   blood glucose monitoring (NO BRAND SPECIFIED) meter device kit Use to test blood sugar 2 times daily. (Patient not taking: Reported on 12/19/2018)   colchicine (COLCYRS) 0.6 MG tablet Take 1 tablet (0.6 mg) by mouth 2 times daily (Patient not taking: Reported on 12/19/2018)   DOCUSATE SODIUM PO Take 50 mg by mouth daily   tiotropium (SPIRIVA HANDIHALER) 18 MCG inhaled capsule Inhale contents of one capsule daily. (Patient not taking: Reported on 12/19/2018)     No current facility-administered medications on file prior to visit.     Physical examination:  /69   Pulse 53   Ht 1.702 m (5' 7\")   Wt 74.8 kg (165 lb)   SpO2 100%   BMI 25.84 kg/m    GENERAL APPEARANCE: frail appearing, alert and no distress, arrives by wheelchair  HEENT: no icterus, no xanthelasmas, normal pupil size and reaction, normal palate, mucosa moist, no cyanosis.  NECK: no adenopathy, no asymmetry, masses, or scars, thyroid normal to palpation, carotid upstrokes are normal bilaterally, no scaring  CHEST: lungs clear to auscultation - no rales, rhonchi or wheezes, no use of accessory muscles, no retractions, respirations are unlabored, normal respiratory " rate, no kyphosis, no scoliosis  CARDIOVASCULAR: regular rhythm, normal S1 with physiologic split S2, no S3 or S4 and no murmur, click or rub, precordium quiet with normal PMI. JVP elevated to midneck while sitting upright  ABDOMEN: soft, non tender, without hepatomegaly, no masses palpable, bowel sounds normal, aorta not enlarged by palpation, no abdominal bruits  EXTREMITIES: no clubbing, cyanosis or edema  NEURO: alert and oriented to person/place/time, normal speech, gait and affect  SKIN: no ecchymoses, no rashes    Labs:  CMP RESULTS:  Lab Results   Component Value Date     2018    POTASSIUM 5.1 2018    CHLORIDE 108 2018    CO2 18 (L) 2018    ANIONGAP 10 2018     (H) 2018    BUN 72 (H) 2018    CR 2.61 (H) 2018    GFRESTIMATED 23 (L) 2018    GFRESTBLACK 27 (L) 2018    RAFFY 8.5 2018    BILITOTAL 0.2 12/10/2018    ALBUMIN 3.3 (L) 12/10/2018    ALKPHOS 116 12/10/2018    ALT 41 12/10/2018    AST 34 12/10/2018      Lab Results   Component Value Date    NTBNP 6,481 (H) 2018     Most recent echocardiogram:  Recent Results (from the past 4320 hour(s))   ECHO COMPLETE WITH OPTISON    Narrative    [id number]  [id number]  [id number]  [id number]^RUTH^JOSETTE           [facility]  Echocardiography Laboratory  [address] [place] [address]        Name: LOU RAO  MRN: [id number]  : 1945  Study Date: 2018 09:13 AM  Age: 72 yrs  Gender: Male  Patient Location: [facility]  Reason For Study: ACEVES  Ordering Physician: JOSETTE MIRANDA  Referring Physician: KOTA HUNTER MD  Performed By: Brenna Tejeda RICCARDO     BSA: 1.8 m2  Height: 66 in  Weight: 165 lb  HR: 108  BP: 100/76 mmHg  _____________________________________________________________________________  __        Procedure  Complete Portable Echo Adult. Contrast  Optison.  _____________________________________________________________________________  __        Interpretation Summary     Left ventricular systolic function is mildly reduced.  The visual ejection fraction is estimated at 45-50%.  With rapid atrial fibrillation, the visual approximation of left ventricular  systolic function may be falsely decreased.  Mildly decreased right ventricular systolic function  Right ventricular systolic pressure is elevated, consistent with moderate  pulmonary hypertension.  EF lower compared to 3/18, in setting of rapid atrial fibrillation. The study  was technically difficult.  _____________________________________________________________________________  __        Left Ventricle  The left ventricle is normal in size. There is concentric remodeling present.  Diastolic function not assessed due to atrial fibrillation. Left ventricular  systolic function is mildly reduced. The visual ejection fraction is estimated  at 45-50%. With rapid atrial fibrillation, the visual approximation of left  ventricular systolic function may be falsely decreased. There is mild global  hypokinesia of the left ventricle.     Right Ventricle  Borderline right ventricular enlargement. Mildly decreased right ventricular  systolic function.     Atria  There is mild biatrial enlargement.     Mitral Valve  There is mild mitral annular calcification. The mitral valve leaflets are  mildly thickened. There is trace to mild mitral regurgitation.        Tricuspid Valve  There is moderate (2+) tricuspid regurgitation. The right ventricular systolic  pressure is approximated at 48.7 mmHg plus the right atrial pressure. Normal  IVC (1.5-2.5cm) with >50% respiratory collapse; right atrial pressure is  estimated at 5-10mmHg. Right ventricular systolic pressure is elevated,  consistent with moderate pulmonary hypertension.     Aortic Valve  There is mild trileaflet aortic sclerosis. No aortic regurgitation is  present.  No aortic stenosis is present.     Pulmonic Valve  The pulmonic valve is not well visualized.     Vessels  The aortic root is normal size.     Pericardium  There is no pericardial effusion.        Rhythm  The rhythm was rapid atrial fibrillation.  _____________________________________________________________________________  __  MMode/2D Measurements & Calculations  IVSd: 1.1 cm     LVIDd: 4.2 cm  LVIDs: 3.8 cm  LVPWd: 1.3 cm  LVPWs: 1.6 cm  FS: 8.7 %  LV mass(C)d: 183.7 grams  LV mass(C)dI: 99.7 grams/m2  Ao root diam: 3.2 cm  LA dimension: 4.0 cm  asc Aorta Diam: 3.3 cm  LA/Ao: 1.2  LA Volume (BP): 65.4 ml  LA Volume Index (BP): 35.5 ml/m2  RWT: 0.64           Doppler Measurements & Calculations  MV E max jhonatan: 93.0 cm/sec  MV dec time: 0.18 sec  TR max jhonatan: 347.8 cm/sec  TR max P.7 mmHg  E/E' avg: 10.2  Lateral E/e': 9.3  Medial E/e': 11.1           _____________________________________________________________________________  __           Report approved by: Sherrill Smith 2018 11:15 AM          Assessment and Plan:    Rohan Monroe is a 72 year old male with 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 If he notes precipitous weight loss, he may cut back to current dosing. His renal function has improved since discharge though his potassium is 5.1 without supplementation. I've placed orders for him to repeat a BMP within 2 weeks. He assures me he has care already scheduled at [facility]. He was encouraged to contact us if any plans fall through. He is mildly bradycardic today but seeing EP as well.     20 minutes spent in direct care, >50% in counseling

## 2018-12-19 NOTE — LETTER
12/19/2018      RE: Rohan Monroe  [address]  [place] [address]       Dear Colleague,    Thank you for the opportunity to participate in the care of your patient, Rohan Monroe, at the [facility] HEART [facility]. Please see a copy of my visit note below.        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 Since then, he's had an admission with dyspnea related to pulmonary sarcoid, PCP penumonia, and mild volume overload. Course notable for improving symptoms with only one dose of IV Lasix. He was discharged to home 5 days ago. He returns for follow up.    Mr. Monroe is on 6 liters of oxygen, previoulsy on 3. He is short of breath with ADLs, sleeps on an incline since admission though feels orthopnea is improving. PND several times a week. No chest pain, palpitaitons, lightheadedness, some ankle edema that contues to improve. +Bloating-- improved since admission. Weakness is gradually improving.     Mr. Monroe is moving out of [place] next [place]. He assures me he has follow up planned with a team at [facility].    PAST MEDICAL HISTORY:  Past Medical History:   Diagnosis Date     Atrial flutter (H)      Cataract of both eyes      Chronic infection     Hep C     Congestive heart failure, unspecified      Coronary artery disease      Depressive disorder      Depressive disorder, not elsewhere classified     Depression (non-psychotic)     ERM OS (epiretinal membrane, left eye)      Generalized osteoarthrosis, unspecified site      Glaucoma suspect      Hypertension      Lichen planus      Other psoriasis      Pneumocystis carinii pneumonia 11/23/2018     PVD (posterior vitreous detachment), left eye       Sarcoidosis      Sarcoidosis      Type II or unspecified type diabetes mellitus without mention of complication, not stated as uncontrolled      Unspecified hypothyroidism     Hypothyroidism     Unspecified viral hepatitis C without hepatic coma      Viral warts, unspecified        FAMILY HISTORY:  Family History   Problem Relation Age of Onset     Heart Disease Father         irreg heart beat     Circulatory Father         varicose veins     Prostate Cancer Father      Heart Disease Mother         heart attack     Arthritis Mother      Osteoporosis Mother      Thyroid Disease Mother      Hypertension Mother      Eye Disorder Maternal Grandmother         glaucoma     Diabetes Sister         type 2     Kidney Cancer Sister      Diabetes Sister      Glaucoma No family hx of      Macular Degeneration No family hx of      Cancer No family hx of         no skin cancer       SOCIAL HISTORY:  Social History     Social History     Marital status:      Spouse name: N/A     Number of children: 2     Years of education: N/A     Occupational History      [employer]     Social History Main Topics     Smoking status: Former Smoker     Packs/day: 1.00     Years: 30.00     Types: Cigarettes     Start date: 12/30/1960     Quit date: 7/22/1994     Smokeless tobacco: Never Used     Alcohol use No     Drug use: No     Sexual activity: Not Currently     Partners: Female     Birth control/ protection: Post-menopausal     Other Topics Concern     Blood Transfusions No     Exercise Yes     Social History Narrative    Dairy/d 2 servings/d    Caffeine little servings/d    Exercise 3 x week    Sunscreen used - Yes    Seatbelts used - Yes    Working smoke/CO detectors in the home - Yes    Guns stored in the home - No    Self Breast Exams - NOT APPLICABLE    Self Testicular Exam - No    Eye Exam up to date - Yes    Dental Exam up to date - Yes    Pap Smear up to date - NOT APPLICABLE    Mammogram up to date - NOT APPLICABLE     PSA up to date - Yes    Dexa Scan up to date - NOT APPLICABLE    Flex Sig / Colonoscopy up to date - Yes    Immunizations up to date - Unsure    Abuse: Current or Past(Physical, Sexual or Emotional)- No    Do you feel safe in your environment - Yes       CURRENT MEDICATIONS:   Current Outpatient Medications on File Prior to Visit:  albuterol (PROAIR HFA/PROVENTIL HFA/VENTOLIN HFA) 108 (90 Base) MCG/ACT Inhaler Inhale 2 puffs into the lungs every 6 hours as needed for shortness of breath / dyspnea   aspirin 81 MG EC tablet Take 81 mg by mouth daily   atorvastatin (LIPITOR) 40 MG tablet TAKE ONE TABLET BY MOUTH ONE TIME DAILY  (Patient taking differently: TAKE ONE TABLET BY MOUTH ONE TIME EVERY EVENING.)   atovaquone (MEPRON) 750 MG/5ML suspension Take 10 mLs (1,500 mg) by mouth daily   ciclopirox (LOPROX) 0.77 % cream Apply topically 2 times daily as needed   diltiazem (CARDIZEM CD/CARTIA XT) 360 MG 24 hr CD capsule Take 1 capsule (360 mg) by mouth daily   fluticasone-vilanterol (BREO ELLIPTA) 100-25 MCG/INH inhaler Inhale 1 puff into the lungs daily   furosemide (LASIX) 20 MG tablet Take 1 tablet (20 mg) by mouth daily May take extra 20 mg as needed for wt .gain >3#   inFLIXimab (REMICADE) 100 MG injection Inject 100 mg into the vein every 28 days    levothyroxine (SYNTHROID/LEVOTHROID) 125 MCG tablet Take 1 tablet (125 mcg) by mouth daily   losartan (COZAAR) 25 MG tablet Take 1 tablet (25 mg) by mouth daily   melatonin 1 MG TABS tablet Take 1 tablet (1 mg) by mouth nightly as needed for sleep   methotrexate 2.5 MG tablet CHEMO Take 3 tablets (7.5 mg) by mouth once a week On Mondays   metoprolol tartrate (LOPRESSOR) 50 MG tablet Take 1 tablet (50 mg) by mouth 2 times daily   mirtazapine (REMERON) 15 MG tablet Take 15 mg by mouth At Bedtime.   Multiple Vitamins-Minerals (MULTIVITAMIN & MINERAL PO) Take 1 tablet by mouth daily.   order for DME Please provide patient with updated oxygen equipment.  Patient now requires  "4 LPM via nasal canula with activity.   order for DME Updated Oxygen: Patient requires supplemental Oxygen 3 LPM via nasal canula with activity and 2 LPM nocturnally. Please provide patient with a portable oxygen concentrator for improved mobility.  Okay to spot check or titrated for conserving to keep stats above 90%. Oxygen will be for a lifetime.   order for DME She requested for a 4 wheel walker, would like to change it to 4 wheel walker with a seat and oxygen tank durant. Need this faxed over to her [phone]   polyethylene glycol (MIRALAX/GLYCOLAX) powder Take 17 g by mouth daily as needed for constipation   sildenafil (REVATIO) 20 MG tablet TAKE ONE TABLET BY MOUTH THREE TIMES DAILY    Urea 40 % CREA Externally apply topically daily as needed for dry skin   blood glucose monitoring (ACCU-CHEK RONNIE PLUS) test strip Use to test blood sugar 2 times daily or as directed.  3 month supply. (Patient not taking: Reported on 12/19/2018)   blood glucose monitoring (ACCU-CHEK FASTCLIX) lancets Use to test blood sugar 2 times daily or as directed.  102 lancets per box.  3 month supply. (Patient not taking: Reported on 12/19/2018)   blood glucose monitoring (NO BRAND SPECIFIED) meter device kit Use to test blood sugar 2 times daily. (Patient not taking: Reported on 12/19/2018)   colchicine (COLCYRS) 0.6 MG tablet Take 1 tablet (0.6 mg) by mouth 2 times daily (Patient not taking: Reported on 12/19/2018)   DOCUSATE SODIUM PO Take 50 mg by mouth daily   tiotropium (SPIRIVA HANDIHALER) 18 MCG inhaled capsule Inhale contents of one capsule daily. (Patient not taking: Reported on 12/19/2018)     No current facility-administered medications on file prior to visit.     Physical examination:  /69   Pulse 53   Ht 1.702 m (5' 7\")   Wt 74.8 kg (165 lb)   SpO2 100%   BMI 25.84 kg/m     GENERAL APPEARANCE: frail appearing, alert and no distress, arrives by wheelchair  HEENT: no icterus, no xanthelasmas, normal pupil size " and reaction, normal palate, mucosa moist, no cyanosis.  NECK: no adenopathy, no asymmetry, masses, or scars, thyroid normal to palpation, carotid upstrokes are normal bilaterally, no scaring  CHEST: lungs clear to auscultation - no rales, rhonchi or wheezes, no use of accessory muscles, no retractions, respirations are unlabored, normal respiratory rate, no kyphosis, no scoliosis  CARDIOVASCULAR: regular rhythm, normal S1 with physiologic split S2, no S3 or S4 and no murmur, click or rub, precordium quiet with normal PMI. JVP elevated to midneck while sitting upright  ABDOMEN: soft, non tender, without hepatomegaly, no masses palpable, bowel sounds normal, aorta not enlarged by palpation, no abdominal bruits  EXTREMITIES: no clubbing, cyanosis or edema  NEURO: alert and oriented to person/place/time, normal speech, gait and affect  SKIN: no ecchymoses, no rashes    Labs:  CMP RESULTS:  Lab Results   Component Value Date     2018    POTASSIUM 5.1 2018    CHLORIDE 108 2018    CO2 18 (L) 2018    ANIONGAP 10 2018     (H) 2018    BUN 72 (H) 2018    CR 2.61 (H) 2018    GFRESTIMATED 23 (L) 2018    GFRESTBLACK 27 (L) 2018    RAFFY 8.5 2018    BILITOTAL 0.2 12/10/2018    ALBUMIN 3.3 (L) 12/10/2018    ALKPHOS 116 12/10/2018    ALT 41 12/10/2018    AST 34 12/10/2018      Lab Results   Component Value Date    NTBNP 6,481 (H) 2018     Most recent echocardiogram:  Recent Results (from the past 4320 hour(s))   ECHO COMPLETE WITH OPTISON    Narrative    [id number]  [id number]  [id number]  [id number]^RUTH^JOSETTE           [facility]  Echocardiography Laboratory  [address] [place] [address]        Name: LOU RAO  MRN: [id number]  : 1945  Study Date: 2018 09:13 AM  Age: 72 yrs  Gender: Male  Patient Location: [facility]  Reason For Study: ACEVES  Ordering Physician: JOSETTE MIRANDA  Referring Physician:  KOTA HUNTER MD  Performed By: Brenna Tejeda RICCARDO     BSA: 1.8 m2  Height: 66 in  Weight: 165 lb  HR: 108  BP: 100/76 mmHg  _____________________________________________________________________________  __        Procedure  Complete Portable Echo Adult. Contrast Optison.  _____________________________________________________________________________  __        Interpretation Summary     Left ventricular systolic function is mildly reduced.  The visual ejection fraction is estimated at 45-50%.  With rapid atrial fibrillation, the visual approximation of left ventricular  systolic function may be falsely decreased.  Mildly decreased right ventricular systolic function  Right ventricular systolic pressure is elevated, consistent with moderate  pulmonary hypertension.  EF lower compared to 3/18, in setting of rapid atrial fibrillation. The study  was technically difficult.  _____________________________________________________________________________  __        Left Ventricle  The left ventricle is normal in size. There is concentric remodeling present.  Diastolic function not assessed due to atrial fibrillation. Left ventricular  systolic function is mildly reduced. The visual ejection fraction is estimated  at 45-50%. With rapid atrial fibrillation, the visual approximation of left  ventricular systolic function may be falsely decreased. There is mild global  hypokinesia of the left ventricle.     Right Ventricle  Borderline right ventricular enlargement. Mildly decreased right ventricular  systolic function.     Atria  There is mild biatrial enlargement.     Mitral Valve  There is mild mitral annular calcification. The mitral valve leaflets are  mildly thickened. There is trace to mild mitral regurgitation.        Tricuspid Valve  There is moderate (2+) tricuspid regurgitation. The right ventricular systolic  pressure is approximated at 48.7 mmHg plus the right atrial pressure. Normal  IVC (1.5-2.5cm) with >50%  respiratory collapse; right atrial pressure is  estimated at 5-10mmHg. Right ventricular systolic pressure is elevated,  consistent with moderate pulmonary hypertension.     Aortic Valve  There is mild trileaflet aortic sclerosis. No aortic regurgitation is present.  No aortic stenosis is present.     Pulmonic Valve  The pulmonic valve is not well visualized.     Vessels  The aortic root is normal size.     Pericardium  There is no pericardial effusion.        Rhythm  The rhythm was rapid atrial fibrillation.  _____________________________________________________________________________  __  MMode/2D Measurements & Calculations  IVSd: 1.1 cm     LVIDd: 4.2 cm  LVIDs: 3.8 cm  LVPWd: 1.3 cm  LVPWs: 1.6 cm  FS: 8.7 %  LV mass(C)d: 183.7 grams  LV mass(C)dI: 99.7 grams/m2  Ao root diam: 3.2 cm  LA dimension: 4.0 cm  asc Aorta Diam: 3.3 cm  LA/Ao: 1.2  LA Volume (BP): 65.4 ml  LA Volume Index (BP): 35.5 ml/m2  RWT: 0.64           Doppler Measurements & Calculations  MV E max jhonatan: 93.0 cm/sec  MV dec time: 0.18 sec  TR max jhonatan: 347.8 cm/sec  TR max P.7 mmHg  E/E' avg: 10.2  Lateral E/e': 9.3  Medial E/e': 11.1           _____________________________________________________________________________  __           Report approved by: Sherrill Smith 2018 11:15 AM          Assessment and Plan:    Rohan Monroe is a 72 year old male with 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 If he notes precipitous weight loss, he may cut back to current dosing. His renal function has improved since discharge though his potassium is 5.1 without supplementation. I've placed orders for him to repeat a BMP within 2 weeks. He assures me he has care already scheduled at [facility]. He was encouraged to contact us if any plans fall through. He is mildly  bradycardic today but seeing EP as well.     20 minutes spent in direct care, >50% in counseling    Carmen Robbins NP

## 2018-12-19 NOTE — NURSING NOTE
"Oncology Rooming Note    December 19, 2018 11:56 AM   Rohan Monroe is a 72 year old male who presents for:    Chief Complaint   Patient presents with     RECHECK     RETURN F/UP     Initial Vitals: /69 (BP Location: Right arm, Patient Position: Sitting, Cuff Size: Adult Regular)   Pulse 53   Temp 96.9  F (36.1  C) (Oral)   Resp 16   Ht 1.727 m (5' 7.99\")   SpO2 100%   BMI 24.12 kg/m   Estimated body mass index is 24.12 kg/m  as calculated from the following:    Height as of this encounter: 1.727 m (5' 7.99\").    Weight as of 12/14/18: 71.9 kg (158 lb 9.6 oz). Body surface area is 1.86 meters squared.  No Pain (0) Comment: Data Unavailable   No LMP for male patient.  Allergies reviewed: Yes  Medications reviewed: Yes    Medications: Medication refills not needed today.  Pharmacy name entered into [facility]: [id number] - [place] [id number] [facility]    Clinical concerns: none      8 minutes for nursing intake (face to face time)     Zo ANAND Tate              "

## 2018-12-19 NOTE — LETTER
12/19/2018       RE: Rohan Monroe  [address]  [place] [address]     Dear Colleague,    Thank you for referring your patient, Rohan Monroe, to the [facility] CANCER CLINIC. Please see a copy of my visit note below.    SUBJECTIVE:  Rohan Monroe is a 72 year old man who presents to clinic for followup of right EIA stent placed 5/13/2017 in the setting of retroperitoneal bleed following cardiac angiography. Left CFA PSA treated successfully with US guided thrombin injection. o leg pain or claudication. Able to walk treadmill over 30 minutes with no leg pain. Cardiac and pulmonary rehab going well and his dyspnea has not at all worsened. Remains on home O2 with no increased requirement. No chest pain or palpitations. He was recently hospitalized for PCP pneumonia but has been feeling back to baseline since discharge.       Patient Active Problem List   Diagnosis     Hypothyroidism     SARCOIDOSIS-systemic     Generalized osteoarthrosis, unspecified site     Viral warts     Other psoriasis     Hypertrophy of prostate without urinary obstruction     Diabetes mellitus, type 2 (H)     CAD (coronary artery disease)     Type 2 diabetes, HbA1C goal < 8% (H)     CARDIOVASCULAR SCREENING; LDL GOAL LESS THAN 100     Atrial flutter with rapid ventricular response (H)     CKD (chronic kidney disease) stage 3, GFR 30-59 ml/min (H)     Hip joint pain     Hyperlipidemia LDL goal <70     Acute exacerbation of chronic obstructive pulmonary disease (COPD) (H)     Cellulitis     Immunosuppression (H)     Hyponatremia     RADHA (acute kidney injury) (HCC)     COPD (chronic obstructive pulmonary disease) (H)     Cirrhosis of liver (H)     Pneumonia     Proteinuria     Microscopic hematuria     Sarcoidosis of lung (HCC)     Hyperlipidemia     Atrial flutter (H)     Long-term (current) use of anticoagulants [Z79.01]     Hypoxia     CAD S/P percutaneous coronary angioplasty     Chest pain      Dermatophytosis of nail     Tyloma     ACEVES (dyspnea on exertion)     Cardiac abnormality     Pneumocystis carinii pneumonia     Heart failure (H)      Current Outpatient Medications   Medication Sig     albuterol (PROAIR HFA/PROVENTIL HFA/VENTOLIN HFA) 108 (90 Base) MCG/ACT Inhaler Inhale 2 puffs into the lungs every 6 hours as needed for shortness of breath / dyspnea     aspirin 81 MG EC tablet Take 81 mg by mouth daily     atorvastatin (LIPITOR) 40 MG tablet TAKE ONE TABLET BY MOUTH ONE TIME DAILY  (Patient taking differently: TAKE ONE TABLET BY MOUTH ONE TIME EVERY EVENING.)     atovaquone (MEPRON) 750 MG/5ML suspension Take 10 mLs (1,500 mg) by mouth daily     blood glucose monitoring (ACCU-CHEK RONNIE PLUS) test strip Use to test blood sugar 2 times daily or as directed.  3 month supply.     blood glucose monitoring (ACCU-CHEK FASTCLIX) lancets Use to test blood sugar 2 times daily or as directed.  102 lancets per box.  3 month supply.     blood glucose monitoring (NO BRAND SPECIFIED) meter device kit Use to test blood sugar 2 times daily.     ciclopirox (LOPROX) 0.77 % cream Apply topically 2 times daily as needed     colchicine (COLCYRS) 0.6 MG tablet Take 1 tablet (0.6 mg) by mouth 2 times daily     diltiazem (CARDIZEM CD/CARTIA XT) 360 MG 24 hr CD capsule Take 1 capsule (360 mg) by mouth daily     DOCUSATE SODIUM PO Take 50 mg by mouth daily     fluticasone-vilanterol (BREO ELLIPTA) 100-25 MCG/INH inhaler Inhale 1 puff into the lungs daily     furosemide (LASIX) 20 MG tablet Take 1 tablet (20 mg) by mouth daily May take extra 20 mg as needed for wt .gain >3#     inFLIXimab (REMICADE) 100 MG injection Inject 100 mg into the vein every 28 days      levothyroxine (SYNTHROID/LEVOTHROID) 125 MCG tablet Take 1 tablet (125 mcg) by mouth daily     losartan (COZAAR) 25 MG tablet Take 1 tablet (25 mg) by mouth daily     melatonin 1 MG TABS tablet Take 1 tablet (1 mg) by mouth nightly as needed for sleep      methotrexate 2.5 MG tablet CHEMO Take 3 tablets (7.5 mg) by mouth once a week On      metoprolol tartrate (LOPRESSOR) 50 MG tablet Take 1 tablet (50 mg) by mouth 2 times daily     mirtazapine (REMERON) 15 MG tablet Take 15 mg by mouth At Bedtime.     Multiple Vitamins-Minerals (MULTIVITAMIN & MINERAL PO) Take 1 tablet by mouth daily.     order for DME Please provide patient with updated oxygen equipment.  Patient now requires 4 LPM via nasal canula with activity.     order for DME Updated Oxygen: Patient requires supplemental Oxygen 3 LPM via nasal canula with activity and 2 LPM nocturnally. Please provide patient with a portable oxygen concentrator for improved mobility.  Okay to spot check or titrated for conserving to keep stats above 90%. Oxygen will be for a lifetime.     order for DME She requested for a 4 wheel walker, would like to change it to 4 wheel walker with a seat and oxygen tank durant.     Need this faxed over to her   [phone]     polyethylene glycol (MIRALAX/GLYCOLAX) powder Take 17 g by mouth daily as needed for constipation     sildenafil (REVATIO) 20 MG tablet TAKE ONE TABLET BY MOUTH THREE TIMES DAILY      tiotropium (SPIRIVA HANDIHALER) 18 MCG inhaled capsule Inhale contents of one capsule daily.     Urea 40 % CREA Externally apply topically daily as needed for dry skin     No current facility-administered medications for this visit.      Social History     Tobacco Use     Smoking status: Former Smoker     Packs/day: 1.00     Years: 30.00     Pack years: 30.00     Types: Cigarettes     Start date: 1960     Last attempt to quit: 1994     Years since quittin.4     Smokeless tobacco: Never Used   Substance Use Topics     Alcohol use: No     Alcohol/week: 0.0 oz     Drug use: No      REVIEW OF SYSTEMS:  ROS: 10 point ROS neg other than the symptoms noted above in the HPI.    OBJECTIVE:  General appearance: Pleasant, cooperative, no distress.  Lungs: On O2 via nasal  canula. Breathing comfortably with no apparent respiratory distress.   Extremities: 2+ edema bilaterally on lower extremities. Posterior tibial pulses equal to palpation bilaterally.    Mental Status: cooperative, normal affect, no gross thought process defects during casual conversation.    Imaging findings: Ultrasound of aorta/ivc/iliac arteries from 12/19/2018 reviewed by me. Right EIA stent is patent throughout without evidence of stenosis    Impression:   Patent right external iliac artery stent with no evidence of focal  stenosis in the right iliac arteries based on velocity criteria.     ASSESSMENT AND PLAN:   Rohan is moving to [place] 12/17/2018 and has already transferred his records to [facility] [place] where he will receive his care. He has established care with an internist, pulmonologist, and cardiologist. He will need to have annual ultrasound follow up to ensure patency of stent. He understands this.    I was present for the entire clinic visit and agree with the assessment and plan documented by Dr. Garcia.     It was a pleasure to see Mr. Monroe in clinic today. Thank you for involving the Interventional Radiology service in her care.     I spent a total of 20 minutes with this patient, over 50% time was for counseling and care coordination.     Maria Victoria Palmer MD  Interventional Radiology Attending  [facility]  Pager [phone]

## 2018-12-20 LAB — INTERPRETATION ECG - MUSE: NORMAL

## 2018-12-21 ENCOUNTER — MEDICAL CORRESPONDENCE (OUTPATIENT)
Dept: HEALTH INFORMATION MANAGEMENT | Facility: CLINIC | Age: 73
End: 2018-12-21

## 2018-12-21 ENCOUNTER — TELEPHONE (OUTPATIENT)
Dept: INTERNAL MEDICINE | Facility: CLINIC | Age: 73
End: 2018-12-21

## 2018-12-21 ENCOUNTER — ALLIED HEALTH/NURSE VISIT (OUTPATIENT)
Dept: PHARMACY | Facility: CLINIC | Age: 73
End: 2018-12-21
Attending: INTERNAL MEDICINE
Payer: COMMERCIAL

## 2018-12-21 DIAGNOSIS — I25.10 CORONARY ARTERY DISEASE WITHOUT ANGINA PECTORIS, UNSPECIFIED VESSEL OR LESION TYPE, UNSPECIFIED WHETHER NATIVE OR TRANSPLANTED HEART: ICD-10-CM

## 2018-12-21 DIAGNOSIS — J44.9 CHRONIC OBSTRUCTIVE PULMONARY DISEASE, UNSPECIFIED COPD TYPE (H): ICD-10-CM

## 2018-12-21 DIAGNOSIS — I50.9 HEART FAILURE, UNSPECIFIED HF CHRONICITY, UNSPECIFIED HEART FAILURE TYPE (H): ICD-10-CM

## 2018-12-21 DIAGNOSIS — K59.00 CONSTIPATION, UNSPECIFIED CONSTIPATION TYPE: ICD-10-CM

## 2018-12-21 DIAGNOSIS — I48.92 ATRIAL FLUTTER WITH RAPID VENTRICULAR RESPONSE (H): Primary | ICD-10-CM

## 2018-12-21 DIAGNOSIS — D64.9 ANEMIA, UNSPECIFIED TYPE: Primary | ICD-10-CM

## 2018-12-21 DIAGNOSIS — I27.20 PULMONARY HYPERTENSION (H): ICD-10-CM

## 2018-12-21 DIAGNOSIS — G47.00 INSOMNIA, UNSPECIFIED TYPE: ICD-10-CM

## 2018-12-21 DIAGNOSIS — D86.0 SARCOIDOSIS OF LUNG (H): ICD-10-CM

## 2018-12-21 DIAGNOSIS — E03.9 HYPOTHYROIDISM, UNSPECIFIED TYPE: ICD-10-CM

## 2018-12-21 PROCEDURE — 99605 MTMS BY PHARM NP 15 MIN: CPT | Performed by: PHARMACIST

## 2018-12-21 PROCEDURE — 99607 MTMS BY PHARM ADDL 15 MIN: CPT | Performed by: PHARMACIST

## 2018-12-21 NOTE — TELEPHONE ENCOUNTER
Placed hematology referral this encounter        Dear Denis;    Your lab results are attached. As you know your hemoglobin is still low as well as your iron. If you are planning to stay in [place], I recommend you see our Hematology doctors. I placed a referral for this today and you can call [phone] to schedule this appointment    SAVANNA Foster

## 2018-12-21 NOTE — PROGRESS NOTES
SUBJECTIVE/OBJECTIVE:                Rohan Monroe is a 72 year old male coming in for a transitions of care visit.  He was discharged from [facility] on 12/14/18 for acute heart failure. Denis shares with me that he is moving back to [place] next week despite his most recent hospitalization.     Chief Complaint: YUDY    Allergies/ADRs: Reviewed in Epic  Tobacco: History of tobacco dependence - quit 1994   Alcohol: not currently using  Caffeine: 1-2 cups/day of coffee  Activity: Limited to ADLs right now   PMH: Reviewed in Epic    Medication Adherence/Access:  no issues reported     AFib: Current medications include diltiazem 360 mg daily with a HR goal of <110 BPM and metoprolol 50 mg twice daily.  He notes that he is a little tired with this change but he feels that his heart rate is improved and he feels so much better in other ways. He still has shortness of breath but he feels less weighed down and unable to participate in ADLs as he did prior to his previous hospitalization.   BP Readings from Last 3 Encounters:   12/19/18 125/69   12/19/18 125/69   12/19/18 125/69     HFpEF: Current medications include losartan 25 mg daily (restarted in the hospital), furosemide 20 mg twice daily and [facility] as outpatient. He doesn't endorse any problems with these medications today. He notes that he was hypervolemic at his most recent admission and will remain on furosemide 20 mg twice daily.     Last Comprehensive Metabolic Panel:  Sodium   Date Value Ref Range Status   12/19/2018 136 133 - 144 mmol/L Final     Potassium   Date Value Ref Range Status   12/19/2018 5.1 3.4 - 5.3 mmol/L Final     Chloride   Date Value Ref Range Status   12/19/2018 108 94 - 109 mmol/L Final     Carbon Dioxide   Date Value Ref Range Status   12/19/2018 18 (L) 20 - 32 mmol/L Final     Anion Gap   Date Value Ref Range Status   12/19/2018 10 3 - 14 mmol/L Final     Glucose   Date Value Ref Range Status   12/19/2018 167 (H) 70 - 99 mg/dL  Final     Urea Nitrogen   Date Value Ref Range Status   12/19/2018 72 (H) 7 - 30 mg/dL Final     Creatinine   Date Value Ref Range Status   12/19/2018 2.61 (H) 0.66 - 1.25 mg/dL Final     GFR Estimate   Date Value Ref Range Status   12/19/2018 23 (L) >60 mL/min/[1.73_m2] Final     Comment:     Non  GFR Calc  Starting 12/18/2018, serum creatinine based estimated GFR (eGFR) will be   calculated using the Chronic Kidney Disease Epidemiology Collaboration   (CKD-EPI) equation.       Calcium   Date Value Ref Range Status   12/19/2018 8.5 8.5 - 10.1 mg/dL Final     Estimated Creatinine Clearance: 26.3 mL/min (A) (based on SCr of 2.61 mg/dL (H)).    CAD/Hyperlipidemia: Current therapy includes atorvastatin 40 mg once daily and aspirin 81 mg daily.  Pt reports no significant myalgias or other side effects..  The ASCVD Risk score (Michael IBANEZ Jr., et al., 2013) failed to calculate for the following reasons:    The valid total cholesterol range is 130 to 320 mg/dL     Recent Labs   Lab Test  05/26/17   0933  05/13/17   0551  01/07/14   1100  05/21/12   0936   CHOL  106  98  128  105   HDL  36*  28*  35*  28*   LDL  50  50  62  50   TRIG  102  98  148  131   CHOLHDLRATIO   --    --   3.6  3.7     Pulmonary Hypertension: Current medications include sildenafil 20 mg three times daily. No concerns or complaints about this today.     Hypothyroidism: Patient is taking levothyroxine 125 mcg daily. Patient is having the following symptoms: none.   TSH   Date Value Ref Range Status   11/19/2018 1.44 0.40 - 4.00 mU/L Final     T4 Free   Date Value Ref Range Status   03/21/2018 0.77 0.76 - 1.46 ng/dL Final     Insomnia: Current medications include: mirtazapine 15 mg at night.  He reports good sleep and no side effects at home.     COPD/Sarcoidosis: Current therapies include 6 L supplemental oxygen, inflixamab infusions, atovaquone 1500 mg daily (in place of Bactrim), methotrexate 7.5 once weekly along with  Fluticasone/Vilanterol (Breo Ellipta) once daily and spiriva 18 mcg daily (not taking due to cost).  Pt rinses their mouth after using steroid inhaler. We discuss that he should be using a long-term anticholinergic inhaler.  However, he requests that I do not explore his formulary for an alternative as he will be getting new insurance when he moves to [place] next week.   Pt is not experiencing side effects.   Pt reports the following symptoms: increased oxygen use.  Pt does have an COPD Action Plan on file.   Has spirometry been completed: Yes   PIF was completed today: No     CBC RESULTS:   Recent Labs   Lab Test 12/19/18  1336   WBC 10.2   RBC 3.24*   HGB 9.4*   HCT 31.1*   MCV 96   MCH 29.0   MCHC 30.2*   RDW 15.7*        Constipation:  He will use miralax as needed with docusate 50 mg daily. This combination works well for him.     Today's Vitals: There were no vitals taken for this visit.  - telemed    ASSESSMENT:                 Current medications were reviewed today.      Medication Adherence: good, no issues identified    AFib: Plan in place with cardiology.     HFpEF: Plan in place with cardiology.     CAD/Hyperlipidemia: Stable.     Pulmonary Hypertension: Plan in place with cardiology.     Hypothyroidism: Stable.     Insomnia: Stable.     COPD/Sarcoidosis: Needs improvement. Patient is not taking Spiriva due to cost. He would benefit from a long-acting anticholinergic therapy for COPD.     Constipation: Stable.     PLAN:                2. Start Incruse Ellipta - pt declined.    I spent 20 minutes with this patient today. All changes were made via collaborative practice agreement with Jaime Foster. A copy of the visit note was provided to the patient's primary care provider.    Will follow up as needed per care team.    The patient declined a summary of these recommendations as an after visit summary.    Caryn Guzmán, Pharm.D., [facility]  Medication Therapy Management Pharmacist  Page/VM:   [phone]

## 2018-12-22 ENCOUNTER — INFUSION THERAPY VISIT (OUTPATIENT)
Dept: INFUSION THERAPY | Facility: CLINIC | Age: 73
End: 2018-12-22
Attending: INTERNAL MEDICINE
Payer: MEDICARE

## 2018-12-22 VITALS
TEMPERATURE: 98.6 F | BODY MASS INDEX: 25.19 KG/M2 | HEART RATE: 76 BPM | DIASTOLIC BLOOD PRESSURE: 68 MMHG | WEIGHT: 160.5 LBS | HEIGHT: 67 IN | OXYGEN SATURATION: 99 % | SYSTOLIC BLOOD PRESSURE: 115 MMHG

## 2018-12-22 DIAGNOSIS — D86.9 SARCOIDOSIS: ICD-10-CM

## 2018-12-22 DIAGNOSIS — D86.0 SARCOIDOSIS OF LUNG (H): Primary | ICD-10-CM

## 2018-12-22 PROCEDURE — 25000128 H RX IP 250 OP 636: Mod: ZF | Performed by: INTERNAL MEDICINE

## 2018-12-22 PROCEDURE — 96413 CHEMO IV INFUSION 1 HR: CPT

## 2018-12-22 RX ADMIN — SODIUM CHLORIDE 50 ML: 9 INJECTION, SOLUTION INTRAVENOUS at 10:44

## 2018-12-22 RX ADMIN — INFLIXIMAB 400 MG: 100 INJECTION, POWDER, LYOPHILIZED, FOR SOLUTION INTRAVENOUS at 10:44

## 2018-12-22 ASSESSMENT — MIFFLIN-ST. JEOR: SCORE: 1426.65

## 2018-12-22 NOTE — PROGRESS NOTES
"Nursing Note  Rohan Monroe presents today to [facility] for:   Chief Complaint   Patient presents with     Infusion     Remicade (infliximab)     During today's [facility] appointment, orders from Dr. Perlman were completed.  Frequency: monthly    Progress note:  Patient identification verified by name and date of birth.  Assessment completed.  Vitals recorded in Doc Flowsheets.  Patient was provided with education regarding infusion and possible side effects.  Patient verbalized understanding.      needed: No  Premedications: were not ordered.  Infusion Rates: 315 ml/hr., over one hour  Approximate Infusion length:2 hours.   Labs: were not ordered for this appointment.  Vascular access: peripheral IV placed today.  Treatment Conditions: patient denies fever, chills, signs of infection, recent illness, on antibiotics, productive cough or elevated temperature.  Patient tolerated infusion: well.    Drug Waste Record? No     Discharge Plan:   Follow up plan of care with: ongoing infusions at [facility].  Discharge instructions were reviewed with patient.  Patient/representative verbalized understanding of discharge instructions and all questions answered.  Patient discharged from [facility] in stable condition.    Angela Law RN    Administrations This Visit     0.9% sodium chloride BOLUS     Admin Date  12/22/2018 Action  New Bag Dose  50 mL Route  Intravenous Administered By  Angela Law RN          inFLIXimab (REMICADE) 400 mg in sodium chloride 0.9 % 315 mL infusion     Admin Date  12/22/2018 Action  New Bag Dose  400 mg Rate  315 mL/hr Route  Intravenous Administered By  Angela Law RN                /68   Pulse 76   Temp 98.6  F (37  C) (Oral)   Ht 1.702 m (5' 7\")   Wt 72.8 kg (160 lb 8 oz)   SpO2 99%   BMI 25.14 kg/m      "

## 2018-12-28 ENCOUNTER — MEDICAL CORRESPONDENCE (OUTPATIENT)
Dept: HEALTH INFORMATION MANAGEMENT | Facility: CLINIC | Age: 73
End: 2018-12-28

## 2019-01-05 ENCOUNTER — MEDICAL CORRESPONDENCE (OUTPATIENT)
Dept: HEALTH INFORMATION MANAGEMENT | Facility: CLINIC | Age: 74
End: 2019-01-05

## 2019-01-22 NOTE — PROGRESS NOTES
Respiratory Therapy/Pulmonary Rehabilitation Discharge Summary    Reason for discharge:    Change in medical status.    Progress towards goals:  Goals partially met.  Barriers to achieving goals:   discharged due to illness.    Recommendation(s):    Continue home exercise program.

## 2019-01-22 NOTE — ADDENDUM NOTE
Encounter addended by: Kendal rPo on: 1/22/2019 8:29 AM   Actions taken: Episode resolved, Sign clinical note

## 2019-11-04 ENCOUNTER — HEALTH MAINTENANCE LETTER (OUTPATIENT)
Age: 74
End: 2019-11-04

## 2020-02-16 ENCOUNTER — HEALTH MAINTENANCE LETTER (OUTPATIENT)
Age: 75
End: 2020-02-16

## 2020-11-22 ENCOUNTER — HEALTH MAINTENANCE LETTER (OUTPATIENT)
Age: 75
End: 2020-11-22

## 2021-02-19 NOTE — PROGRESS NOTES
[facility]    Pulmonary, Allergy, Critical Care and Sleep Medicine    Follow-up Note  April 25, 2017    Assessment and Plan:  Rohan Monroe is a 71 year old male with a  history of sarcoidosis (pulmonary and presumed cardiac involvement treated with MTX and Remicade infusions), aflutter s/p ablation in Jan 2017 (on warfarin), COPD, Hep C (treated with SVR), and HFpEF who is admitted from the cardiology clinic for dyspnea on exertion.      Acute on chronic hypoxemic respiratory failure:  Complicated history of sarcoidosis (both pulmonary and cardiac involvement) currently being managed with MTX (7.5 mg qweek) and Remicaide injections (now [id number]). His current symptoms are primarily transient episodes of dyspnea with exertion. He feels very strongly that these are similar to pain he had when he had his coronary stents placed.  His CT does show new left pleural effusion and new LLL infiltrates compared to previous CT in 2015. Differential is broad at this point but would include infection, sarcoidosis flare, amiodarone or MTX toxicity.  We are more concerned though that this is actually cardiac related, possibly transient coronary ischemia given only with exertion and agree with Plan for angiogram and RHC.  Has not had his effusion tapped yet and would be helpful to see if transudate/exudate.  -Continue with antibiotics total of 5 days  -Follow up infectious w/u  -Would continue to hold MTX  -Agree with plan for RHC/LHC today with cardiology  -IR to perform diagnostic/therapeutic thoracentesis    Seen and discussed with Dr Kang Cardona  Pulmonary/Critical Care Fellow    Interval History:  No big events overnight.  Slept OK.  Was sitting up at side of bed yesterday but not ambulating much as gets short of breath easily.   Still no fevers, still needing 3LNC.    Medications:       aspirin EC  325 mg Oral Once     furosemide  40 mg Intravenous BID     senna-docusate  1-2 tablet  [Nutrition/ Exercise/ Weight Management] : nutrition, exercise, weight management Oral BID     aspirin EC EC tablet 81 mg  81 mg Oral Daily     atorvastatin  20 mg Oral Daily     fluticasone-vilanterol  1 puff Inhalation Daily     levothyroxine  137 mcg Oral Daily     metoprolol  100 mg Oral BID     mirtazapine  15 mg Oral At Bedtime     umeclidinium  1 puff Inhalation Daily     cefTRIAXone  1 g Intravenous Q24H     doxycycline  100 mg Oral Q12H JOSE MANUEL       Physical Exam:    Vitals:    04/24/17 2125 04/25/17 0059 04/25/17 0747 04/25/17 0749   BP:  154/86 (!) 170/98 (!) 158/98   BP Location:  Left arm Left arm Left arm   Pulse:  77 80    Resp:  20 20    Temp:  96.6  F (35.9  C) 97  F (36.1  C)    TempSrc:  Oral Oral    SpO2:  96% 96%    Weight: 85.3 kg (188 lb 1.6 oz)      Height:           Intake/Output Summary (Last 24 hours) at 04/25/17 0809  Last data filed at 04/25/17 0106   Gross per 24 hour   Intake             1140 ml   Output             2105 ml   Net             -965 ml         General: laying in bed in NAD  HEENT: anicteric, moist mucosa  Neck: no palpable lymphadenopathy, no JVD noted  Chest: Diminished B/L, has crackles at posterior bases, no wheezing.  Cardiac: RRR no murmurs  Abdomen: Soft, flat, non tender, active BS  Extremities: No LE Edema  Neuro: A&Ox3, no focal defecits  Skin: no rash noted           Data:   All laboratory and imaging data reviewed.    CMP    Recent Labs  Lab 04/25/17  0707 04/24/17  1305 04/23/17  0635 04/22/17  1248 04/18/17  1231    138 138 139 138   POTASSIUM 3.8 4.0 4.0 3.6 4.2   CHLORIDE 103 102 105 102 104   CO2 27 30 25 28 24   ANIONGAP 8 6 8 9 10   * 166* 136* 111* 123*   BUN 29 29 37* 42* 49*   CR 1.60* 1.82* 1.84* 1.98* 2.32*   GFRESTIMATED 43* 37* 36* 33* 28*   GFRESTBLACK 51* 44* 44* 40* 34*   RAFFY 9.0 8.8 8.6 9.2 9.2   MAG  --  2.0  --   --   --    PHOS  --   --   --   --  3.2   PROTTOTAL  --   --  7.6 7.9  --    ALBUMIN  --   --  3.3* 3.4 3.5   BILITOTAL  --   --  1.2 1.0  --    ALKPHOS  --   --  108 109  --    AST  --   --  27 28  --   [Vitamins/Supplements] : vitamins/supplements   ALT  --   --  31 24  --      CBC    Recent Labs  Lab 04/25/17  0707 04/23/17  0635 04/22/17  1248 04/18/17  1231   WBC 11.2* 11.9* 10.2 9.2   RBC 4.24* 4.17* 4.14* 4.30*   HGB 12.9* 12.8* 12.5* 13.2*   HCT 40.4 39.8* 39.0* 40.6   MCV 95 95 94 94   MCH 30.4 30.7 30.2 30.7   MCHC 31.9 32.2 32.1 32.5   RDW 16.0* 16.3* 16.0* 16.0*    339 348 360     INR    Recent Labs  Lab 04/25/17  0707 04/24/17  0715 04/23/17  0635 04/22/17  1248   INR 1.46* 2.38* 2.06* 1.61*       Urine Studies    Recent Labs   Lab Test  01/27/17   1130  01/19/17   1930  03/26/16   1947  10/27/15   1339  07/24/14   1630   URINEPH  6.0  5.5  5.0  6.0  5.5   NITRITE  Negative  Negative  Negative  Negative  Negative   LEUKEST  Negative  Negative  Negative  Negative  Trace*   WBCU  3*  2  1  2  2   Sputum Culture last 3 months:  Specimen Description   Date Value Ref Range Status   04/23/2017 Nares  Final   04/22/2017 Urine  Final   04/22/2017 Urine  Final   04/22/2017 Sputum  Final   04/22/2017 Sputum  Final    Culture Micro   Date Value Ref Range Status   04/22/2017   Final    Moderate growth Normal trent to date  Culture in progress     04/22/2017 No growth after 3 days  Final   04/22/2017 No growth after 3 days  Final

## 2021-04-04 ENCOUNTER — HEALTH MAINTENANCE LETTER (OUTPATIENT)
Age: 76
End: 2021-04-04

## 2021-05-29 ENCOUNTER — HEALTH MAINTENANCE LETTER (OUTPATIENT)
Age: 76
End: 2021-05-29

## 2021-09-18 ENCOUNTER — HEALTH MAINTENANCE LETTER (OUTPATIENT)
Age: 76
End: 2021-09-18

## 2022-01-08 ENCOUNTER — HEALTH MAINTENANCE LETTER (OUTPATIENT)
Age: 77
End: 2022-01-08

## 2022-04-30 ENCOUNTER — HEALTH MAINTENANCE LETTER (OUTPATIENT)
Age: 77
End: 2022-04-30

## 2022-08-20 ENCOUNTER — HEALTH MAINTENANCE LETTER (OUTPATIENT)
Age: 77
End: 2022-08-20

## 2022-09-07 NOTE — PHARMACY-ANTICOAGULATION SERVICE
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     Patient was evaluated in our TAVR clinic in 2021, at that time was deemed too high risk given frailty. Unfortunately, he has continued to decline since that visit and is now on tube feeds for failure to thrive. After further discussion with the structural team, given his co-morbidities, frailty, and rapid decline in functional status he remains high risk candidate for TAVR and deemed not an appropriate candidate. Recommend ongoing conservative management with medical therapy and optimization of guideline directed medical therapy for heart failure.     Please don't hesitate to reach out with any questions or concerns.     Kaley Robles CNP  11:13 AM 2022   [facility]  Pager: [phone]             Pharmacy note: Consulted to dose Phytonadione for patient having procedure scheduled in AM.     Patient on warfarin for afib/aflutter with goal range of 2-3. INR was subtherapeutic on admission at 1.61.  Patient received 5 mg each day on 4/22 and 4/23, with current INR at 2.38.    Per pharmacy reversal protocol, will order phytonadione 2 mg IV x 1 today for reversal with INR in am.   No warfarin dose tonight. Discussed with Dr. Sands.     Please contact pharmacy with any questions.   Thank you.    Luann Story, PharmD  April 24, 2017

## 2022-09-21 NOTE — PROGRESS NOTES
CARDIOLOGY SERVICE NOTE  01/22/2017    INTERVAL HISTORY:  Atrial flutter rates mostly controlled until 0500 w/ rates ~150  Complains of congestion and orthopnea without fever, chills, or productive sputum  +1.5L net yesterday w/ amiodarone infusion    PERTINENT MEDICATIONS:  Amiodarone 400mg bid  ASA  Atorvastatin  Losartan 100  Metoprolol 100mg BID  Heparin gtt  Warfarin   Lasix 20mg IV bid  Diltiazem gtt    PERTINENT LABS:  CMP    Recent Labs  Lab 01/22/17  0810 01/21/17  0749 01/20/17  0803 01/19/17  1424 01/19/17  0633 01/19/17  0003  01/18/17  1748 01/18/17  1746   NA  --  133 132* 139 135  --   --  138 135   POTASSIUM  --  4.0 4.3  --  4.6 4.3  --  4.8 4.5   CHLORIDE  --  99 101  --  102  --   --   --  102   CO2  --  22 20  --  25  --   --   --  23   ANIONGAP  --  12 11  --  8  --   --   --  10   GLC  --  133* 146*  --  175*  --   --  137* 131*   BUN  --  56* 57*  --  45*  --   --   --  47*   CR  --  2.09* 2.33* 2.16* 2.20*  --   --   --  1.99*   GFRESTIMATED  --  31* 28* 30* 29*  --   --   --  33*   GFRESTBLACK  --  38* 33* 36* 36*  --   --   --  40*   RAFFY  --  8.4* 8.2*  --  8.4*  --   --   --  8.6   MAG 2.0 2.4* 2.5*  --  2.1 2.0  < >  --   --    PROTTOTAL  --   --   --   --   --   --   --   --  8.3   ALBUMIN  --   --   --   --   --   --   --   --  3.7   BILITOTAL  --   --   --   --   --   --   --   --  0.9   ALKPHOS  --   --   --   --   --   --   --   --  132   AST  --   --   --   --   --   --   --   --  32   ALT  --   --   --   --   --   --   --   --  66   < > = values in this interval not displayed.  CBC    Recent Labs  Lab 01/21/17  0749 01/20/17  0803 01/18/17  1748 01/18/17  1746   WBC 6.7 9.1  --  12.3*   RBC 4.33* 4.10*  --  4.59   HGB 14.0 13.4 16.0 15.1   HCT 42.2 40.8  --  45.1   MCV 98 100  --  98   MCH 32.3 32.7  --  32.9   MCHC 33.2 32.8  --  33.5   RDW 13.7 14.0  --  13.8    194  --  236     INR    Recent Labs  Lab 01/22/17  0810 01/21/17  0749 01/20/17  0803 01/19/17  0003   INR 1.24*  [address] [facility]       Patient ID: Viktor Zaidi is a 76 y o  female Date of Birth 1945     Location of Service: [address] [place] [address]    Performed wound round with: Wound team     Chief Complaint : Left hip    Wound Instructions:  Wound:  Left hip  Discontinue previous wound order  Cleanse the wound bed with NSS   Apply DSD to wound bed  Frequency : daily and prn for soiling  Offload all wounds  Turn and reposition frequently, maximum of every two hours  Increase protein intake  Monitor for any sign of infection or worsening, inform PCP or patient's primary physician in your facility  Allergies  Penicillins, Sulfa antibiotics, K56 folate [folic acid-vit O8-LUX T14 - food allergy], Cobalt, Lyrica [pregabalin], Other, Cortisone acetate [cortisone], and Nickel      Assessment & Plan:  1  Surgical wound present  Assessment & Plan:  Left trochanter  S/p  REMOVAL NAIL IM  FEMUR (Left Leg Upper) , ARTHROPLASTY KNEE TOTAL (Left Knee)  - incision stapled, intact, no obvious sign of infection  - patient have follow up appointment with 9/27/2022  - follow up : as per referral by facility             Subjective:   9/21/2022This is a 76 y o , female referred to our service for wound/ skin alterations on left trochanter  Patient have a complex medical history including but not limited to  has a past medical history of Hyperlipidemia, Hypertension, Incontinence in female, Irregular heart beat, Lyme disease, PONV (postoperative nausea and vomiting), Premature ventricular contraction, Primary osteoarthritis of both knees  Patient was referred by Senior Care Team  Patient was seen in collaboration with the facility wound team      Wound History:   S/p  REMOVAL NAIL IM  FEMUR (Left Leg Upper) , ARTHROPLASTY KNEE TOTAL (Left Knee) on 9/12/2022    Received patient in bed, denies pain  No significant issues related to the wound  Review of Systems   Constitutional: Negative  1.23* 1.30* 1.28*     Arterial Blood GasNo lab results found in last 7 days.    PHYSICAL EXAM:  Temp:  [98  F (36.7  C)-98.3  F (36.8  C)] 98.1  F (36.7  C)  Pulse:  [120] 120  Heart Rate:  [] 155  Resp:  [18-22] 20  BP: ()/() 122/86 mmHg  SpO2:  [94 %-97 %] 95 %  Sitting comfortably in chair  Tachycardic, regular, no appreciable murmur; +JVD  Diffuse expiratory wheezing without crackles  Ext are warm with 2+ pedal edema to knees b/l  Alert and oriented    ASSESSMENT AND PLAN:  Mr. Monroe is a 70 yo male with history of cardio-pulmonary sarcoid, CAD s/p PCI to mLAD and D2 in 2008, T2DM, HTN, HLD who was admitted with symptomatic atrial flutter.    Successful DCCV on 1/19 w/ subsequent initiation of amiodarone. 2:1 A-flutter recurred on 01/21. Hypervolemic on exam with BLE edema improving w/ diuresis. TTE w/ EF 55% after cardioversion.    Active Problem List:  1. Atrial flutter: s/p DEAN DCCV and on amiodarone  2. CAD s/p PCI with ROSALIE to mLAD and D2  3. Cardiopulmonary sarcoid  4. Hypervolemia  5. Constipation  6. RADHA on CKD  7. HTN  8. COPD    Plan  - cont heparin gtt until INR is therapeutic; CKD prevents use of lovenox for bridging  - cont losartan, metoprolol for BP and possible cardiomyopathy  - diltiazem 10mg x 1 w/ gtt @5mg/hr; will increase for rate control  - plan for EPS and possible ablation tomorrow pending lab availability  - increase diuresis to furosemide 40mg tid w/ goal -2 to -3L  - cont ASA, statin  - schedule duonebs w/ wheezing on exam and COPD history; avoiding prednisone w/ fluid overload  - follow up with Dr. Vazquez in clinic after discharge    DVT prophylaxis: heparin gtt  Disposition: inpatient for heparin bridge and ablation Monday w/ possible DC 01/24    Patient and plan of care discussed with Dr. George Braga DO  IM- PGY-2  [phone]    CARDIOLOGY STAFF NOTE  I have seen and examined the patient with the Cards 1 team. I agree with the note above that  HENT: Negative  Eyes: Negative  Respiratory: Negative  Cardiovascular: Negative for chest pain and leg swelling  Gastrointestinal: Negative  Endocrine: Negative  Genitourinary: Negative  Musculoskeletal: Positive for gait problem  Skin: Positive for wound  See HPI   Neurological: Negative for dizziness and headaches  Psychiatric/Behavioral: Negative for behavioral problems  Objective:    Physical Exam  Constitutional:       Appearance: Normal appearance  HENT:      Head: Normocephalic  Nose: Nose normal       Mouth/Throat:      Pharynx: Oropharynx is clear  Eyes:      Conjunctiva/sclera: Conjunctivae normal    Cardiovascular:      Rate and Rhythm: Normal rate  Pulmonary:      Effort: Pulmonary effort is normal    Abdominal:      Tenderness: There is no abdominal tenderness  There is no guarding  Genitourinary:     Comments: continent  Musculoskeletal:         General: No tenderness  Cervical back: Normal range of motion  Right lower leg: No edema  Left lower leg: No edema  Comments: LROM   Skin:     Findings: Lesion present  Comments: Proximal left hip - wound size is 7 x 05 cm, staples, 16 staples, approximated, no drainage, no obvious sign of infection    Distal left lower leg - wound size is 23 x 0 5 cm , 50 staples, approximated, no drainage, no obvious sign of infection   Neurological:      Mental Status: She is alert  Gait: Gait abnormal    Psychiatric:         Mood and Affect: Mood normal          Behavior: Behavior normal               Procedures           Patient's care was coordinated with nursing facility staff  Recent vitals, labs and updated medications were reviewed on EMR or chart system of facility   Past Medical, surgical, social, medication and allergy history and patient's previous records were reviewed and updated as appropriate: Most up-to date information is available in the facility EMR where the patient is currently admitted  Patient Active Problem List   Diagnosis    Tachy-smita syndrome ([facility] [address] )    Essential hypertension    Pacemaker    Chronic bilateral low back pain without sciatica    Chronic GERD    Degenerative lumbar spinal stenosis    Fibromyalgia    GERD (gastroesophageal reflux disease)    Low back pain    Lumbar degenerative disc disease    Mild carpal tunnel syndrome, right    Overweight    Psoriasis    Thyroid nodule    Urinary incontinence    Other insomnia    Anxiety    Tremor    Chronic pain of left knee    Age related osteoporosis    Dysphonia    Vitamin D insufficiency    Hyperlipidemia    Arthritis    Other fatigue    Leukocytosis    Ambulatory dysfunction    Reactive airway disease with acute exacerbation    Morbid obesity with body mass index (BMI) of 40 0 to 49 9 (HCC)    Chronic gout with tophus    Current moderate episode of major depressive disorder (HCC)    Paroxysmal atrial fibrillation (HCC)    Poor dentition    Closed bicondylar fracture of left femur with delayed healing    Preop cardiovascular exam    Stage 3a chronic kidney disease ([facility] [address] )    Preop examination    Renal insufficiency    Surgical wound present     Past Medical History:   Diagnosis Date    Abnormal ECG     Last Assessed 9/29/2016    Anxiety     Last Assessed 6/08/2016    Arthritis     knees    Asthma     Last Assessed 11/06/2013    Atrial premature complex     Cataract, bilateral     Last Assessed 7/14/2016    Cataract, left eye     Both eyes  Had surgery on left   COVID-19     Difficulty swallowing     Dizziness     Fibromyalgia     Fibromyalgia, primary     Gastric reflux     Heart failure ([facility] [address] )     5/23/22 Pt reports had heart failure in the past    History of COVID-19     5/23/22 Pt reports having COVID in 2021      History of transfusion     no reaction    [place]hard [place]     Hyperlipidemia     Hypertension     Incontinence in female     5/23/22 Pt reflects my assessment and plan of care.  Brian Vazquez MD [facility]  Cardiology - Electrophysiology             reports has incontinence -wears depends especially at night/riding in car    Irregular heart beat     5/23/22 Pt reports hx of A fib    Lyme disease     PONV (postoperative nausea and vomiting)     Premature ventricular contraction     Primary osteoarthritis of both knees     Last Assessed 7/14/2016    Rheumatic fever     5/23/22 Pt reports had hx of rheumatic fever as a child twice    Sore throat     Tuberculosis 1945     Past Surgical History:   Procedure Laterality Date    APPENDECTOMY      CARDIAC PACEMAKER PLACEMENT  2019    COLONOSCOPY      DENTAL SURGERY      extractions    HYSTERECTOMY      5/23/22 Pt reports had appendix out with hysterectomy and "tightened up my bladder"    ORIF FEMUR FRACTURE Left 08/05/2021    Procedure: OPEN REDUCTION W/ INTERNAL FIXATION (ORIF) DISTAL FEMUR; INSERTION RETROGRADE NAIL;  Surgeon: Joey Matthews MD;  [facility];  Service: Orthopedics    KY DILATE ESOPHAGUS N/A 04/06/2016    Procedure: DILATATION ESOPHAGEAL;  Surgeon: Ayanna Nicole MD;  [facility]; Service: Gastroenterology    [place] EGD TRANSORAL BIOPSY SINGLE/MULTIPLE N/A 04/06/2016    Procedure: ESOPHAGOGASTRODUODENOSCOPY (EGD); Surgeon: Ayanna Nicole MD;  [facility]; Service: Gastroenterology    [place] REMOVAL DEEP IMPLANT Left 9/12/2022    Procedure: REMOVAL NAIL IM  FEMUR;  Surgeon: Joey Matthews MD;  [facility];  Service: Orthopedics    [place] TOTAL KNEE ARTHROPLASTY Left 9/12/2022    Procedure: ARTHROPLASTY KNEE TOTAL;  Surgeon: Joey Matthews MD;  [facility];  Service: Orthopedics    [place] XCAPSL CTRC RMVL INSJ IO LENS PROSTH W/O ECP Left 03/15/2016    Procedure: EXTRACTION EXTRACAPSULAR CATARACT PHACO INTRAOCULAR LENS (IOL);   Surgeon: Steven Hendricks MD;  [facility];  Service: Ophthalmology    REPLACEMENT TOTAL KNEE Right     REPLACEMENT TOTAL KNEE      right     TONSILLECTOMY       Social History     Socioeconomic History    Marital status:      Spouse name: None    Number of children: None    Years of education: None    Highest education level: None   Occupational History    None   Tobacco Use    Smoking status: Never Smoker    Smokeless tobacco: Never Used    Tobacco comment: Denies any former or current smoking   Vaping Use    Vaping Use: Never used   Substance and Sexual Activity    Alcohol use: Not Currently     Comment: Per [employer]- pt reports no current alcohol use at this time    Drug use: No     Comment: Denies any former or current drug use    Sexual activity: Never     Comment:  for 12 years - not active   Other Topics Concern    None   Social History Narrative    None     Social Determinants of Health     Financial Resource Strain: Not on file   Food Insecurity: No Food Insecurity    Worried About Running Out of Food in the Last Year: Never true    Luda of Food in the Last Year: Never true   Transportation Needs: No Transportation Needs    Lack of Transportation (Medical): No    Lack of Transportation (Non-Medical):  No   Physical Activity: Not on file   Stress: Not on file   Social Connections: Not on file   Intimate Partner Violence: Not on file   Housing Stability: Unknown    Unable to Pay for Housing in the Last Year: No    Number of Places Lived in the Last Year: Not on file    Unstable Housing in the Last Year: No        Current Outpatient Medications:     acetaminophen (TYLENOL) 650 mg CR tablet, Take 1 tablet (650 mg total) by mouth every 8 (eight) hours as needed for mild pain, Disp: 30 tablet, Rfl: 0    albuterol (PROVENTIL HFA,VENTOLIN HFA) 90 mcg/act inhaler, INHALE 2 PUFFS BY MOUTH EVERY 6 HOURS AS NEEDED FOR WHEEZING (Patient not taking: No sig reported), Disp: 3 Inhaler, Rfl: 0    allopurinol (ZYLOPRIM) 100 mg tablet, Take 1 tablet (100 mg total) by mouth daily, Disp: 90 tablet, Rfl: 3    apixaban (Eliquis) 5 mg, Take 1 tablet (5 mg total) by mouth 2 (two) times a day, Disp: 60 tablet, Rfl: 6    ascorbic acid (VITAMIN C) 500 MG tablet, Take 1 tablet (500 mg total) by mouth 2 (two) times a day, Disp: 60 tablet, Rfl: 1    BIOTIN PO, Take by mouth in the morning, Disp: , Rfl:     Cholecalciferol (VITAMIN D3 PO), Take by mouth in the morning, Disp: , Rfl:     Cyanocobalamin (VITAMIN B-12 PO), Take by mouth in the morning, Disp: , Rfl:     diltiazem (CARDIZEM CD) 120 mg 24 hr capsule, Take 1 capsule (120 mg total) by mouth daily (Patient taking differently: Take 120 mg by mouth daily Takes in the am), Disp: 90 capsule, Rfl: 3    docusate sodium (COLACE) 100 mg capsule, Take 1 capsule (100 mg total) by mouth 2 (two) times a day, Disp: 10 capsule, Rfl: 0    DULoxetine (CYMBALTA) 30 mg delayed release capsule, TAKE 1 CAPSULE(30 MG) BY MOUTH DAILY, Disp: 30 capsule, Rfl: 5    ezetimibe (ZETIA) 10 mg tablet, Take 1 tablet (10 mg total) by mouth daily, Disp: 90 tablet, Rfl: 1    ferrous sulfate 324 (65 Fe) mg, Take 1 tablet (324 mg total) by mouth 2 (two) times a day before meals, Disp: 60 tablet, Rfl: 1    FIBER PO, Take 1 tablet by mouth daily, Disp: , Rfl:     folic acid (FOLVITE) 1 mg tablet, TAKE 1 TABLET(1 MG) BY MOUTH DAILY (Patient not taking: No sig reported), Disp: 90 tablet, Rfl: 0    furosemide (LASIX) 40 mg tablet, Take 1 tablet (40 mg total) by mouth in the morning , Disp: 90 tablet, Rfl: 1    lisinopril (ZESTRIL) 2 5 mg tablet, Take 1 tablet (2 5 mg total) by mouth daily, Disp: 30 tablet, Rfl: 2    LORazepam (Ativan) 0 5 mg tablet, Take 1 tablet (0 5 mg total) by mouth every 8 (eight) hours as needed for anxiety, Disp: 5 tablet, Rfl: 0    Melatonin 10 MG TABS, Take by mouth Takes daily at night, Disp: , Rfl:     metoprolol tartrate (LOPRESSOR) 50 mg tablet, Take 100 mg in the morning and 50 mg in the evening, Disp: 270 tablet, Rfl: 3    Mirabegron ER (Myrbetriq) 25 MG TB24, Take 25 mg by mouth daily Takes in the evening, Disp: 30 tablet, Rfl: 6   Multiple Vitamin (MULTIVITAMIN ADULT PO), Take by mouth in the morning, Disp: , Rfl:     naloxone (NARCAN) 4 mg/0 1 mL nasal spray, Administer 1 spray into a nostril  If no response after 2-3 minutes, give another dose in the other nostril using a new spray  (Patient not taking: No sig reported), Disp: 1 each, Rfl: 1    oxybutynin (DITROPAN-XL) 5 mg 24 hr tablet, Take 1 tablet (5 mg total) by mouth daily (Patient taking differently: Take 5 mg by mouth daily Takes at night), Disp: 90 tablet, Rfl: 3    oxyCODONE (ROXICODONE) 5 immediate release tablet, Take 1 tablet (5 mg total) by mouth every 4 (four) hours as needed for moderate pain for up to 10 days Max Daily Amount: 30 mg, Disp: 40 tablet, Rfl: 0  Family History   Problem Relation Age of Onset    Coronary artery disease Mother     Heart attack Mother         Prior    Heart disease Father     Heart attack Father         Prior    Anesthesia problems Neg Hx               Coordination of Care: Wound team aware of the treatment plan  Facility nurse will provide wound treatment and monitor the wound for any changes  Patient / Staff education : Patient / Staff was given education on sign of infection and pressure ulcer prevention  Patient/ Staff verbalized understanding     Follow up :  PRN    Voice-recognition software may have been used in the preparation of this document  Occasional wrong word or "sound-alike" substitutions may have occurred due to the inherent limitations of voice recognition software  Interpretation should be guided by context        JERI Hendricks

## 2022-09-24 NOTE — ANESTHESIA PREPROCEDURE EVALUATION
Procedure:  Procedure(s):  Cardioversion     Patient Active Problem List   Diagnosis     Hypothyroidism     SARCOIDOSIS-systemic     Generalized osteoarthrosis, unspecified site     Viral warts     Other psoriasis     Hypertrophy of prostate without urinary obstruction     Diabetes mellitus, type 2 (H)     CAD (coronary artery disease)     Type 2 diabetes, HbA1C goal < 8% (H)     CARDIOVASCULAR SCREENING; LDL GOAL LESS THAN 100     Sepsis (H)     Atrial flutter with rapid ventricular response (H)     CKD (chronic kidney disease) stage 3, GFR 30-59 ml/min     Hip joint pain     Hyperlipidemia LDL goal <70     Acute exacerbation of chronic obstructive pulmonary disease (COPD) (H)     Cellulitis     Immunosuppression (H)     Hyponatremia     RADHA (acute kidney injury) (HCC)     COPD (chronic obstructive pulmonary disease) (H)     Cirrhosis of liver (H)     Pneumonia     Proteinuria     Microscopic hematuria     Sarcoidosis of lung (HCC)     Hyperlipidemia     Atrial flutter (H)     Long-term (current) use of anticoagulants [Z79.01]       Past Medical History   Diagnosis Date     Unspecified hypothyroidism      Hypothyroidism     Unspecified viral hepatitis C without hepatic coma      Sarcoidosis (H)      Generalized osteoarthrosis, unspecified site      Depressive disorder, not elsewhere classified      Depression (non-psychotic)     Other psoriasis      Viral warts, unspecified      Lichen planus      Type II or unspecified type diabetes mellitus without mention of complication, not stated as uncontrolled      Chronic infection      Hep C     Congestive heart failure, unspecified      Sarcoidosis (H)      Glaucoma suspect      ERM OS (epiretinal membrane, left eye)      PVD (posterior vitreous detachment), left eye      Cataract of both eyes      Hypertension        Past Surgical History   Procedure Laterality Date     Hc repair incisional hernia,reducible       Hernia Repair, Incisional, Unilateral     C ligatn/strip  long & short saphen       Hc removal of tonsils,<9 y/o       Tonsils <10y.o.     Cardiac stent       s/p     Arthroplasty hip  8/24/2011     Procedure:ARTHROPLASTY HIP; Right Total Hip Arthroplasty  Choice anesthesia; Surgeon:LESLI WILKINSON; Location:[facility]     Joint replacement       1 month ago--right hip     Cataract iol, rt/lt  9/15/2015     LE     Colonoscopy       Cardiac surgery       Biopsy       Anesthesia cardioversion N/A 1/19/2017     Procedure: ANESTHESIA CARDIOVERSION;  Surgeon: GENERIC ANESTHESIA PROVIDER;  [facility]     Anesthesia cardioversion N/A 1/23/2017     Procedure: ANESTHESIA CARDIOVERSION;  Surgeon: GENERIC ANESTHESIA PROVIDER;  [facility]         No current facility-administered medications on file prior to encounter.  Current Outpatient Prescriptions on File Prior to Encounter:  inFLIXimab (REMICADE) 100 MG injection    NIFEdipine ER osmotic (ADALAT CC) 60 MG 24 hr tablet Take 1 tablet (60 mg) by mouth daily   chlorthalidone (HYGROTON) 25 MG tablet Take 1 tablet (25 mg) by mouth daily   metoprolol (LOPRESSOR) 100 MG tablet Take 1 tablet (100 mg) by mouth 2 times daily   levothyroxine (SYNTHROID/LEVOTHROID) 137 MCG tablet Take 1 tablet (137 mcg) by mouth daily   tiotropium (SPIRIVA HANDIHALER) 18 MCG capsule Inhale contents of one capsule daily.   fluticasone - vilanterol (BREO ELLIPTA) 100-25 MCG/INH oral inhaler Inhale 1 puff into the lungs daily   methotrexate 2.5 MG tablet Take 3 tablets (7.5 mg) by mouth once a week On Mondays   ciclopirox (LOPROX) 0.77 % cream Apply topically 2 times daily To feet and toenails.   albuterol (PROAIR HFA, PROVENTIL HFA, VENTOLIN HFA) 108 (90 BASE) MCG/ACT inhaler Inhale 2 puffs into the lungs every 6 hours as needed for shortness of breath / dyspnea   losartan (COZAAR) 100 MG tablet Take 1 tablet (100 mg) by mouth daily   atorvastatin (LIPITOR) 20 MG tablet Take 1 tablet (20 mg) by mouth daily   ASPIRIN EC PO Take 81 mg by mouth daily    blood glucose monitoring (ACCU-CHEK RONNIE PLUS) test strip Use to test blood sugar 2 times daily or as directed.  3 month supply.   blood glucose monitoring (ACCU-CHEK FASTCLIX) lancets Use to test blood sugar 2 times daily or as directed.  102 lancets per box.  3 month supply.   blood glucose monitoring (NO BRAND SPECIFIED) meter device kit Use to test blood sugar 2 times daily.   Urea (CARMOL 40) 40 % CREA To feet daily (Patient taking differently: To feet daily PRN)   INFLIXIMAB IV Every 45 days   Multiple Vitamins-Minerals (MULTIVITAMIN & MINERAL PO) Take 1 tablet by mouth daily.   mirtazapine (REMERON) 15 MG tablet Take 15 mg by mouth At Bedtime.   sildenafil (VIAGRA) 50 MG tablet Take 2 tablets (100 mg) by mouth daily as needed for erectile dysfunction       Allergies   Allergen Reactions     Prednisone Other (See Comments)     He reports that he can't sleep for days and can't use small to massive doses of prednisone   Pt. Does not do well with high doses of Prednisone, His MD says that Prednisone is counter indicated. Prednisone failed to treat his sarcoidosis        Recent Labs   Lab Test  01/22/17   1625   HGB  14.3     Recent Labs   Lab Test  01/24/17   1037   POTASSIUM  3.9     Recent Labs   Lab Test  01/22/17   1625   PLT  213     Recent Labs   Lab Test  01/24/17   1037   INR  2.24*                             Anesthesia Plan      History & Physical Review  History and physical reviewed and following examination; no interval change.    ASA Status:  3 .        Plan for General with Intravenous induction. Maintenance will be TIVA.    PONV prophylaxis:  Ondansetron (or other 5HT-3)       Postoperative Care  Postoperative pain management:  IV analgesics.      Consents                          .   No

## 2022-09-28 NOTE — PLAN OF CARE
Problem: Goal Outcome Summary  Goal: Goal Outcome Summary  RN:  1.Pt will remain hemodynamically stable.    Outcome: Improving  Patient returned from IR at 0530 am.   Patient is alert and oriented to person, place, time, and situation moves all extremities.  Follows commands. Pupils equal, round, reactive to light, accommodating. Lungs sound Clear to ausculation oxygen saturations >92%  On 6L per oxy plus. Abdomen distended, bowel sounds present, passing gas. Fung patent. circulation, motion, sensation both lower extremities intact. No bending at the waste till after 8 am. Patient vomited at 0645 am. Writer discussed nausea medications with team, awaitng orders. Patient resting comfortably with call light in reach.  Continue to monitor patient and update team of changes.        68

## 2022-11-20 ENCOUNTER — HEALTH MAINTENANCE LETTER (OUTPATIENT)
Age: 77
End: 2022-11-20

## 2023-04-15 ENCOUNTER — HEALTH MAINTENANCE LETTER (OUTPATIENT)
Age: 78
End: 2023-04-15

## 2023-06-02 ENCOUNTER — HEALTH MAINTENANCE LETTER (OUTPATIENT)
Age: 78
End: 2023-06-02

## 2023-11-25 ENCOUNTER — HEALTH MAINTENANCE LETTER (OUTPATIENT)
Age: 78
End: 2023-11-25

## 2023-11-29 NOTE — PROGRESS NOTES
Patient found to have Right illiac Artery bleed on CT. Left for IR via bed accompanied by Tim Ceja and Circulator Julisa BUCK RN.  Patient alert and oriented to person, place, time, and situation.  Lungs diminished in bases, clear in upper lobes, oxygen saturations >92%  On 6 liters of oxygen per oxi plus mask.  Abdominal pressure on diaphragm cause dyspnea.  Fung placed.  Consent for blood transfusions signed.  Writer attempted to contact patients Daughter Jaimie in [place], message left on voice mail for her to call [phone]. Patient does not want his son informed, would rather have his daughter explain to his son what is happening.      Report called to P2 nurse.  Jose Manuel updated re: patient's room change. Patient belongings and medications sent with patient.

## 2024-09-24 NOTE — CONSULTS
[facility]    Cardiology Consultation     Date of Admission:  11/18/2018    Assessment & Plan   Rohan Monroe is a 72 year old male who was admitted on 11/18/2018.    1. 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 Typical atrial flutter status post ablation in 2017  3. Post ablation recurrence of atypical atrial flutter and atrial fibrillation with intolerance/questionable toxicity to amiodarone  4. Moderate to severe pulmonary hypertension likely group 3 with right ventricular dysfunction  5. Mild heart failure with preserved ejection fraction maintained on low-dose of oral diuretics at home  6. Home oxygen dependence  7. Malnutrition   8. Chronic kidney disease  9. Anemia  10. Troponin elevation with prior stents, likely non specific    Bedside echocardiography done today shows normal left ventricular function at about 50-55%.  There is xetb-uw-ojif variability and difficult to assess for wall motion and true EF given his tachycardia.  His right ventricle certainly appears to be dilated and there is moderate to severe RV dysfunction on preliminary review of images.  Patient says that he feels somewhat better after IV diuretics since last night however continues to feel much worse than baseline.  There is evidence of moderate to severe pulmonary hypertension on his echo as well.  IVC appears to be collapsible but somewhat dilated.  JVP is difficult to see  but appears to be around 10 cm.  Presentation is complicated by presence of atrial flutter that now appears to be atypical. CL is <200 msec and positive inferiorly.  It is unclear at this point whether the atrial flutter has triggered the current presentation with shortness of breath due to loss of AV synchrony or is this vice versa such that the atrial arrhythmias are a consequence of underlying worsening pulmonary status given his recent infections.    At first I recommend doing a right heart catheterization to  objectively assess his volume status, degree of pulmonary hypertension, his wedge pressures and cardiac index.  His blood pressure is fine at this point but echo shows right ventricular dysfunction.  We will make further recommendations about management of his PH based on these findings.      In regards to the atrial arrhythmias, options at this point are limited.  He is at 100 mg twice a day of metoprolol.  Rhythm control strategy is warranted however given his CKD and prior amiodarone intolerance/toxicity we are limited with antiarrhythmic options short of flutter and fibrillation ablation, and as a last resort therapy, AV junction ablation with PPM.  However at this point we will try to objectively define his pulmonary hypertension status first because restoring normal sinus rhythm with a DEAN cardioversion (1) may not work or be short lasting and (2) I am unsure how well he will tolerate DAEN sedation and cardioversion with this pulmonary status. We may however have to cardiovert him later this afternoon - he has received digoxin last night and this am and we will see if it helps his HR.     He will need anticoagulation for his atrial flutter. He is ok with going back on coumadin. For now, I recommend you start IV heparin.    Troponin are likely non specific in the setting of tachycardia and co morbidities. No angina. Conservative management for now.     Pulmonary and perhaps ID involvement highly appreciated at this time given immunosuppressed state.     Patient is very ill overall from cardiac and non cardiac comorbidities. We may have to transfer him to the [facility] under care and expertise of their advance heart failure team depending on how his PH looks and RV does. I will place an inpatient EP consult to assist HF and EP co management.    Angelito Ervin MD    Primary Care Physician   Jamie Foster    Reason for Consult   Reason for consult: I was asked by medicine team to evaluate this  patient for new AF and PH.    History of Present Illness   Rohan Monroe is a 72 year old male who presents with SOB. This is a very pleasant 72-year-old man with a complicated past medical history.  The patient follows up at [facility] with Dr. Paige in the pulmonary hypertension and sarcoid clinic.  The patient has longstanding history of lung sarcoidosis which was biopsy proven and presumed cardiac as well who has been on infliximab since 2008 (currently taking every four weeks) as well as longstanding methotrexate (taking every week), who was recently seen in the heart failure clinic at the [facility] by Dr. Paige for worsening heart failure in March 2017.  He has a prior history of coronary disease with stents.  In 2017 he underwent further cardiac stenting to his LAD and LCx for which he feels much improved from an angina standpoint. This procedure was complicated by RADHA and retroperitoneal bleed for which he received a right iliac stent and subsequently needed thrombin injection for left-sided pseudoaneurysm. He has moderate to severe pulmonary involvement and he is using around 3L/min of oxygen delivery at baseline.  Cardiac arrhythmia history is as follows: presented to the hospital in AFL with RVR January 2017, underwent DCCV but had an early recurrence of AFL. Thus underwent CTI ablation 1/23/17 by Dr. Vazquez. Same day post-op he had an episode of Afib and required Amiodarone loading with subsequent DCCV. Amiodarone ?toxicity developed and this was stopped.  It is unclear if the patient had worsening pulmonary status as a result of amiodarone or was just nausea and fatigue as per patient history.  In either case he has been off it ever since.  There has been no recurrence of any arrhythmias since then.  When he was seen by Dr. Paige earlier this year he wanted to come off warfarin, thus 1 week ziopatch performed did not show any atrial fibrillation, thus has  been off warfarin for the last 6 months.  From that standpoint the patient is 100 mg twice a day of metoprolol.      In regards to his pulmonary status, it appears that he had a right heart catheterization in April 2017.  This was done for sarcoidosis and pulmonary hypertension.  That showed a right atrial pressure of 8, PA pressures of 65/28/42, wedge pressure of 11, a cardiac index of 1.7 and a pulmonary vascular resistance of 8.1.  It seems that given his normal wedge pressure he has been maintained on a low-dose of sildenafil 20 mg 3 times daily for his group 3 pulmonary hypertension.    In September of this year the patient had upper respiratory infection and was treated for pneumonia.  He says that since then he has continued to vacs on and off in regards to his symptoms.  More recently over the last 1 week his pulmonary symptoms have worsened and now he started having worsening resting dyspnea for which he came into the hospital.  On arrival he was noted to be mildly volume overloaded and given IV diuretics.  He was also noted to be tachycardic with new onset what appears to be atypical atrial flutter with heart rate ranging from 120-140 bpm.  Cardiology has been consulted for further workup.      Patient Active Problem List   Diagnosis     Hypothyroidism     SARCOIDOSIS-systemic     Generalized osteoarthrosis, unspecified site     Viral warts     Other psoriasis     Hypertrophy of prostate without urinary obstruction     Diabetes mellitus, type 2 (H)     CAD (coronary artery disease)     Type 2 diabetes, HbA1C goal < 8% (H)     CARDIOVASCULAR SCREENING; LDL GOAL LESS THAN 100     Atrial flutter with rapid ventricular response (H)     CKD (chronic kidney disease) stage 3, GFR 30-59 ml/min (H)     Hip joint pain     Hyperlipidemia LDL goal <70     Acute exacerbation of chronic obstructive pulmonary disease (COPD) (H)     Cellulitis     Immunosuppression (H)     Hyponatremia     RADHA (acute kidney injury) (HCC)      COPD (chronic obstructive pulmonary disease) (H)     Cirrhosis of liver (H)     Pneumonia     Proteinuria     Microscopic hematuria     Sarcoidosis of lung (HCC)     Hyperlipidemia     Atrial flutter (H)     Long-term (current) use of anticoagulants [Z79.01]     Hypoxia     CAD S/P percutaneous coronary angioplasty     Chest pain     Dermatophytosis of nail     Tyloma     ACEVES (dyspnea on exertion)       Past Medical History   I have reviewed this patient's medical history and updated it with pertinent information if needed.   Past Medical History:   Diagnosis Date     Atrial flutter (H)      Cataract of both eyes      Chronic infection     Hep C     Congestive heart failure, unspecified      Coronary artery disease      Depressive disorder      Depressive disorder, not elsewhere classified     Depression (non-psychotic)     ERM OS (epiretinal membrane, left eye)      Generalized osteoarthrosis, unspecified site      Glaucoma suspect      Hypertension      Lichen planus      Other psoriasis      PVD (posterior vitreous detachment), left eye      Sarcoidosis      Sarcoidosis      Type II or unspecified type diabetes mellitus without mention of complication, not stated as uncontrolled      Unspecified hypothyroidism     Hypothyroidism     Unspecified viral hepatitis C without hepatic coma      Viral warts, unspecified        Past Surgical History   I have reviewed this patient's surgical history and updated it with pertinent information if needed.  Past Surgical History:   Procedure Laterality Date     ANESTHESIA CARDIOVERSION N/A 1/19/2017    Procedure: ANESTHESIA CARDIOVERSION;  Surgeon: GENERIC ANESTHESIA PROVIDER;  Location: [facility] OR     ANESTHESIA CARDIOVERSION N/A 1/23/2017    Procedure: ANESTHESIA CARDIOVERSION;  Surgeon: GENERIC ANESTHESIA PROVIDER;  Location: [facility] OR     ANESTHESIA CARDIOVERSION N/A 1/24/2017    Procedure: ANESTHESIA CARDIOVERSION;  Surgeon: GENERIC ANESTHESIA PROVIDER;  Location:  OR      ARTHROPLASTY HIP  8/24/2011    Procedure:ARTHROPLASTY HIP; Right Total Hip Arthroplasty  Choice anesthesia; Surgeon:LESLI WILKINSON; Location:[facility] OR     BIOPSY       C PELVIS/HIP JOINT SURGERY UNLISTED       cardiac stent      s/p     CARDIAC SURGERY       CATARACT IOL, RT/LT  9/15/2015    LE     COLONOSCOPY       CORONARY ANGIOGRAPHY ADULT ORDER       H ABLATION ATRIAL FLUTTER       HC REMOVAL OF TONSILS,<9 Y/O      Tonsils <10y.o.     HC REPAIR INCISIONAL HERNIA,REDUCIBLE      Hernia Repair, Incisional, Unilateral     HEART CATH, ANGIOPLASTY       JOINT REPLACEMENT      1 month ago--right hip     LIGATN/STRIP LONG & SHORT SAPHEN         Prior to Admission Medications   Prior to Admission Medications   Prescriptions Last Dose Informant Patient Reported? Taking?   Multiple Vitamins-Minerals (MULTIVITAMIN & MINERAL PO) 11/17/2018 at Unknown time Self Yes Yes   Sig: Take 1 tablet by mouth daily.   Urea 40 % CREA prn med Self Yes Yes   Sig: Externally apply topically daily as needed for dry skin   albuterol (PROAIR HFA/PROVENTIL HFA/VENTOLIN HFA) 108 (90 Base) MCG/ACT Inhaler prn med Self No Yes   Sig: Inhale 2 puffs into the lungs every 6 hours as needed for shortness of breath / dyspnea   aspirin 81 MG EC tablet 11/18/2018 at am Self Yes Yes   Sig: Take 81 mg by mouth daily   atorvastatin (LIPITOR) 40 MG tablet 11/17/2018 at pm Self No Yes   Sig: TAKE ONE TABLET BY MOUTH ONE TIME DAILY    Patient taking differently: TAKE ONE TABLET BY MOUTH ONE TIME EVERY EVENING.   blood glucose monitoring (ACCU-CHEK RONNIE PLUS) test strip  Self No No   Sig: Use to test blood sugar 2 times daily or as directed.  3 month supply.   Patient not taking: Reported on 9/13/2018   blood glucose monitoring (ACCU-CHEK FASTCLIX) lancets  Self No No   Sig: Use to test blood sugar 2 times daily or as directed.  102 lancets per box.  3 month supply.   Patient not taking: Reported on 9/13/2018   blood glucose monitoring (NO BRAND SPECIFIED)  meter device kit  Self No No   Sig: Use to test blood sugar 2 times daily.   Patient not taking: Reported on 2018   ciclopirox (LOPROX) 0.77 % cream prn med Self Yes Yes   Sig: Apply topically 2 times daily as needed   colchicine (COLCRYS) 0.6 MG tablet prn med Self No Yes   Si tabs at the onset of flare, then 1 tab every 2 hrs until pain is better or diarrhea occurs, up to 10 doses. Wait 3 days until taking again   fluticasone-vilanterol (BREO ELLIPTA) 100-25 MCG/INH oral inhaler 2018 at am Self No Yes   Sig: Inhale 1 puff into the lungs daily   furosemide (LASIX) 20 MG tablet 2018 at am x 1 dose Self No Yes   Sig: Take 2 tablets (40 mg) by mouth 2 times daily May take extra 20 mg as needed for wt .gain >3#   inFLIXimab (REMICADE) 100 MG injection ~ 1 week ago Self Yes No   Sig: Inject 100 mg into the vein every 28 days    levothyroxine (SYNTHROID/LEVOTHROID) 125 MCG tablet 2018 at am Self No Yes   Sig: Take 1 tablet (125 mcg) by mouth daily   losartan (COZAAR) 50 MG tablet 2018 at am Self No Yes   Sig: TAKE ONE TABLET BY MOUTH ONE TIME DAILY    methotrexate 2.5 MG tablet CHEMO 2018 Self No Yes   Sig: Take 3 tablets (7.5 mg) by mouth once a week On    metoprolol tartrate (LOPRESSOR) 100 MG tablet 2018 at am x 1 dose Self No Yes   Sig: Take 1 tablet (100 mg) by mouth 2 times daily   mirtazapine (REMERON) 15 MG tablet 2018 at hs Self Yes Yes   Sig: Take 15 mg by mouth At Bedtime.   order for DME  Self No No   Sig: She requested for a 4 wheel walker, would like to change it to 4 wheel walker with a seat and oxygen tank durant.     Need this faxed over to her   [phone]   order for DME  Self No No   Sig: Updated Oxygen: Patient requires supplemental Oxygen 3 LPM via nasal canula with activity and 2 LPM nocturnally. Please provide patient with a portable oxygen concentrator for improved mobility.  Okay to spot check or titrated for conserving to keep stats  above 90%. Oxygen will be for a lifetime.   polyethylene glycol (MIRALAX/GLYCOLAX) powder prn med Self Yes Yes   Sig: Take 17 g by mouth daily as needed for constipation   sildenafil (REVATIO) 20 MG tablet 11/18/2018 at am x 1 dose Self No Yes   Sig: TAKE ONE TABLET BY MOUTH THREE TIMES DAILY    tiotropium (SPIRIVA HANDIHALER) 18 MCG capsule  Self No No   Sig: Inhale contents of one capsule daily.      Facility-Administered Medications: None     Current Facility-Administered Medications   Medication Dose Route Frequency     aspirin  81 mg Oral Daily     atorvastatin  40 mg Oral At Bedtime     azithromycin  500 mg Intravenous Q24H     docusate sodium  100 mg Oral BID     fluticasone-vilanterol  1 puff Inhalation Daily     furosemide  40 mg Intravenous BID     heparin  5,000 Units Subcutaneous Q12H     insulin aspart  1-7 Units Subcutaneous TID AC     insulin aspart  1-5 Units Subcutaneous At Bedtime     ipratropium - albuterol 0.5 mg/2.5 mg/3 mL  3 mL Nebulization 4x daily     levothyroxine  125 mcg Oral QAM AC     metoprolol tartrate  100 mg Oral BID     piperacillin-tazobactam  3.375 g Intravenous Q6H     sildenafil  20 mg Oral TID     vancomycin (VANCOCIN) IV  1,500 mg Intravenous Q24H     Current Facility-Administered Medications   Medication Last Rate     Allergies   Allergies   Allergen Reactions     Prednisone Other (See Comments)     He reports that he can't sleep for days and can't use small to massive doses of prednisone   Pt. Does not do well with high doses of Prednisone, His MD says that Prednisone is counter indicated. Prednisone failed to treat his sarcoidosis        Social History    reports that he quit smoking about 24 years ago. His smoking use included Cigarettes. He started smoking about 57 years ago. He has a 30.00 pack-year smoking history. He has never used smokeless tobacco. He reports that he does not drink alcohol or use illicit drugs.    Family History   Family History   Problem Relation  "Age of Onset     HEART DISEASE Father      irreg heart beat     Circulatory Father      varicose veins     Prostate Cancer Father      HEART DISEASE Mother      heart attack     Arthritis Mother      Osteoporosis Mother      Thyroid Disease Mother      Hypertension Mother      Eye Disorder Maternal Grandmother      glaucoma     Diabetes Sister      type 2     Kidney Cancer Sister      Diabetes Sister      Glaucoma No family hx of      Macular Degeneration No family hx of      Cancer No family hx of      no skin cancer       Review of Systems   The comprehensive 10 point Review of Systems is negative other than noted in the HPI or here.     Physical Exam   Vital Signs with Ranges  Temp:  [96.6  F (35.9  C)-97.6  F (36.4  C)] 97.6  F (36.4  C)  Heart Rate:  [] 113  Resp:  [13-28] 24  BP: ()/(59-90) 132/87  SpO2:  [85 %-100 %] 98 %  Wt Readings from Last 4 Encounters:   11/19/18 68.5 kg (151 lb 0.2 oz)   11/08/18 76.9 kg (169 lb 8 oz)   10/16/18 72.6 kg (160 lb)   10/04/18 77.2 kg (170 lb 3.2 oz)     I/O last 3 completed shifts:  In: 750 [P.O.:750]  Out: 675 [Urine:675]      Vitals: /87 (BP Location: Right arm)  Temp 97.6  F (36.4  C) (Axillary)  Resp 24  Ht 1.676 m (5' 6\")  Wt 68.5 kg (151 lb 0.2 oz)  SpO2 98%  BMI 24.37 kg/m2    General alert oriented x3: In mild distress due to dyspnea  JVP is around 10 cm  Cardiac tachycardic and irregular heart sounds, no lower extremity edema no murmur rubs or gallops  Lungs coarse with mild wheezing  Abdomen is soft nontender  No lower extremity edema  Limited motor neuro exam is nonfocal  Psych appropriate affect      Recent Labs  Lab 11/19/18  0625 11/18/18  2325 11/18/18  1850 11/18/18  1414   TROPI 0.043 0.032 0.028 0.029         Recent Labs  Lab 11/19/18  0625 11/18/18  2325 11/18/18  1850 11/18/18  1414   WBC 11.1*  --   --  12.5*   HGB 10.6*  --   --  11.5*   MCV 98  --   --  97     --  221 274     --   --  139   POTASSIUM 4.4  --   -- "  4.6   CHLORIDE 106  --   --  106   CO2 28  --   --  26   BUN 38*  --   --  37*   CR 2.00*  --   --  1.66*   GFRESTIMATED 33*  --   --  41*   GFRESTBLACK 40*  --   --  49*   ANIONGAP 6  --   --  7   RAFFY 7.9*  --   --  8.3*   *  --   --  168*   ALBUMIN 2.6*  --   --   --    PROTTOTAL 6.6*  --   --   --    BILITOTAL 0.7  --   --   --    ALKPHOS 110  --   --   --    ALT 42  --   --   --    AST 51*  --   --   --    TROPI 0.043 0.032 0.028 0.029     Recent Labs   Lab Test  05/26/17   0933  05/13/17   0551  01/07/14   1100  05/21/12   0936   CHOL  106  98  128  105   HDL  36*  28*  35*  28*   LDL  50  50  62  50   TRIG  102  98  148  131   CHOLHDLRATIO   --    --   3.6  3.7       Recent Labs  Lab 11/19/18  0625 11/18/18  1850 11/18/18  1414   WBC 11.1*  --  12.5*   HGB 10.6*  --  11.5*   HCT 34.7*  --  36.6*   MCV 98  --  97    221 274       Recent Labs  Lab 11/18/18  1412   PHV 7.36   PO2V 53*   PCO2V 45   HCO3V 26       Recent Labs  Lab 11/18/18  1414   NTBNPI [id number]*       Recent Labs  Lab 11/18/18  1414   DD 1.3*       Recent Labs  Lab 11/19/18  0625   SED 46*       Recent Labs  Lab 11/19/18  0625 11/18/18  1850 11/18/18  1414    221 274       Recent Labs  Lab 11/19/18  0625   TSH 1.44       Recent Labs  Lab 11/19/18  0230   COLOR Straw   APPEARANCE Clear   URINEGLC Negative   URINEBILI Negative   URINEKETONE Negative   SG 1.009   UBLD Small*   URINEPH 6.0   PROTEIN 100*   NITRITE Negative   LEUKEST Negative   RBCU 9*   WBCU 1       Imaging:  Recent Results (from the past 48 hour(s))   XR Chest 2 Views    Narrative    CHEST TWO VIEW 11/18/2018 2:26 PM     HISTORY: Shortness of breath.     COMPARISON: 5/15/2017.      Impression    IMPRESSION: Diffuse interstitial infiltrates bilaterally may indicate  some underlying chronic disease. However, there is increased opacity  in the right lower lung zone and left midlung zone suspicious for  superimposed acute infiltrates. Stable diffuse pleural  thickening  bilaterally which may be chronic and is most prominent in the right  upper lung zone laterally. Heart size is at the upper limits of normal  and unchanged.    MAIKEL MCGRATH MD   Chest CT w/o contrast    Narrative    CT CHEST WITHOUT CONTRAST 11/18/2018 3:45 PM     HISTORY: Hypoxia. History of sarcoidosis. Recent pneumonia and history  of congestive heart failure.    TECHNIQUE: Helical axial scans from lung apices through lung bases  without contrast. Radiation dose for this scan was reduced using  automated exposure control, adjustment of the mA and/or kV according  to patient size, or iterative reconstruction technique.    COMPARISON: 10/16/2018.     FINDINGS: Vascular calcifications, including coronary artery  calcifications again noted. A few minimally enlarged lymph nodes in  the mediastinum, probably decreased since the prior exam. No  significant adenopathy on the current exam. The mediastinal and hilar  structures are otherwise unremarkable. Patchy infiltrate right lower  lobe posteriorly without change possibly representing chronic disease.  A small amount of patchy infiltrate in the posterior medial left lung  base is also probably unchanged and may represent chronic disease.  There is a small amount of infiltrate in the right middle lobe lateral  segment which is new (image 41 of series 3). Underlying chronic  appearing peribronchial thickening throughout the remainder of the  lungs bilaterally without significant change. Emphysematous changes in  the lung bases, left greater than right. A few new small foci of  platelike atelectasis in the left midlung zone.    Visualized upper abdomen shows a small liver with a slightly nodular  contour which may indicate cirrhosis but this appears unchanged.  Calcifications in the spleen consistent with old granulomatous disease  again noted.      Impression    IMPRESSION:  1. Small amount of infiltrate in lateral segment of the right middle  lobe is new  since the prior exam. Remainder of infiltrates and other  opacities in the lungs bilaterally are unchanged, likely representing  chronic changes from known sarcoidosis.   2. Currently there is no mediastinal or hilar adenopathy.  3. Vascular calcifications, including coronary artery calcifications,  are again noted.  4. Small liver with nodular contour suggests cirrhosis.    MAIKEL MCGRATH MD       Echo:  No results found for this or any previous visit (from the past 4320 hour(s)).              15
